# Patient Record
Sex: MALE | Race: WHITE | NOT HISPANIC OR LATINO | Employment: OTHER | URBAN - METROPOLITAN AREA
[De-identification: names, ages, dates, MRNs, and addresses within clinical notes are randomized per-mention and may not be internally consistent; named-entity substitution may affect disease eponyms.]

---

## 2023-07-08 ENCOUNTER — HOSPITAL ENCOUNTER (INPATIENT)
Facility: CLINIC | Age: 58
LOS: 8 days | Discharge: HOME | DRG: 435 | End: 2023-07-16
Attending: EMERGENCY MEDICINE | Admitting: INTERNAL MEDICINE
Payer: COMMERCIAL

## 2023-07-08 ENCOUNTER — APPOINTMENT (OUTPATIENT)
Dept: CT IMAGING | Facility: CLINIC | Age: 58
DRG: 435 | End: 2023-07-08
Attending: EMERGENCY MEDICINE
Payer: COMMERCIAL

## 2023-07-08 DIAGNOSIS — E55.9 VITAMIN D DEFICIENCY: ICD-10-CM

## 2023-07-08 DIAGNOSIS — R73.9 HYPERGLYCEMIA: ICD-10-CM

## 2023-07-08 DIAGNOSIS — D64.9 ANEMIA, UNSPECIFIED TYPE: ICD-10-CM

## 2023-07-08 DIAGNOSIS — K83.1 BILIARY OBSTRUCTION (H): ICD-10-CM

## 2023-07-08 DIAGNOSIS — Z79.4 TYPE 2 DIABETES MELLITUS WITH HYPERGLYCEMIA, WITH LONG-TERM CURRENT USE OF INSULIN (H): ICD-10-CM

## 2023-07-08 DIAGNOSIS — Z98.890 HISTORY OF BILIARY STENT INSERTION: ICD-10-CM

## 2023-07-08 DIAGNOSIS — E11.65 TYPE 2 DIABETES MELLITUS WITH HYPERGLYCEMIA, WITH LONG-TERM CURRENT USE OF INSULIN (H): ICD-10-CM

## 2023-07-08 DIAGNOSIS — C25.9 PANCREATIC ADENOCARCINOMA (H): Primary | ICD-10-CM

## 2023-07-08 DIAGNOSIS — R10.30 LOWER ABDOMINAL PAIN: ICD-10-CM

## 2023-07-08 DIAGNOSIS — K86.89 PANCREATIC MASS: ICD-10-CM

## 2023-07-08 LAB
ALBUMIN SERPL BCG-MCNC: 3.3 G/DL (ref 3.5–5.2)
ALP SERPL-CCNC: 404 U/L (ref 40–129)
ALT SERPL W P-5'-P-CCNC: 224 U/L (ref 0–70)
ANION GAP SERPL CALCULATED.3IONS-SCNC: 9 MMOL/L (ref 7–15)
AST SERPL W P-5'-P-CCNC: 83 U/L (ref 0–45)
BASOPHILS # BLD AUTO: 0.1 10E3/UL (ref 0–0.2)
BASOPHILS NFR BLD AUTO: 1 %
BILIRUB SERPL-MCNC: 6.7 MG/DL
BUN SERPL-MCNC: 15.6 MG/DL (ref 6–20)
CALCIUM SERPL-MCNC: 9 MG/DL (ref 8.6–10)
CEA SERPL-MCNC: 10.6 NG/ML
CHLORIDE SERPL-SCNC: 98 MMOL/L (ref 98–107)
CREAT SERPL-MCNC: 0.6 MG/DL (ref 0.67–1.17)
DEPRECATED HCO3 PLAS-SCNC: 28 MMOL/L (ref 22–29)
EOSINOPHIL # BLD AUTO: 0.1 10E3/UL (ref 0–0.7)
EOSINOPHIL NFR BLD AUTO: 2 %
ERYTHROCYTE [DISTWIDTH] IN BLOOD BY AUTOMATED COUNT: 14.8 % (ref 10–15)
GFR SERPL CREATININE-BSD FRML MDRD: >90 ML/MIN/1.73M2
GLUCOSE BLDC GLUCOMTR-MCNC: 174 MG/DL (ref 70–99)
GLUCOSE SERPL-MCNC: 199 MG/DL (ref 70–99)
HBA1C MFR BLD: 7.9 %
HCT VFR BLD AUTO: 27.1 % (ref 40–53)
HGB BLD-MCNC: 8.8 G/DL (ref 13.3–17.7)
IMM GRANULOCYTES # BLD: 0 10E3/UL
IMM GRANULOCYTES NFR BLD: 1 %
INR PPP: 1.37 (ref 0.85–1.15)
LACTATE SERPL-SCNC: 1 MMOL/L (ref 0.7–2)
LIPASE SERPL-CCNC: 38 U/L (ref 13–60)
LYMPHOCYTES # BLD AUTO: 0.8 10E3/UL (ref 0.8–5.3)
LYMPHOCYTES NFR BLD AUTO: 12 %
MCH RBC QN AUTO: 31.8 PG (ref 26.5–33)
MCHC RBC AUTO-ENTMCNC: 32.5 G/DL (ref 31.5–36.5)
MCV RBC AUTO: 98 FL (ref 78–100)
MONOCYTES # BLD AUTO: 0.6 10E3/UL (ref 0–1.3)
MONOCYTES NFR BLD AUTO: 8 %
NEUTROPHILS # BLD AUTO: 5.2 10E3/UL (ref 1.6–8.3)
NEUTROPHILS NFR BLD AUTO: 76 %
NRBC # BLD AUTO: 0 10E3/UL
NRBC BLD AUTO-RTO: 0 /100
PLATELET # BLD AUTO: 270 10E3/UL (ref 150–450)
POTASSIUM SERPL-SCNC: 4.3 MMOL/L (ref 3.4–5.3)
PROT SERPL-MCNC: 6.1 G/DL (ref 6.4–8.3)
RBC # BLD AUTO: 2.77 10E6/UL (ref 4.4–5.9)
SODIUM SERPL-SCNC: 135 MMOL/L (ref 136–145)
WBC # BLD AUTO: 6.8 10E3/UL (ref 4–11)

## 2023-07-08 PROCEDURE — 99207 PR NO BILLABLE SERVICE THIS VISIT: CPT | Performed by: PEDIATRICS

## 2023-07-08 PROCEDURE — 250N000012 HC RX MED GY IP 250 OP 636 PS 637: Performed by: PHYSICIAN ASSISTANT

## 2023-07-08 PROCEDURE — 85610 PROTHROMBIN TIME: CPT | Performed by: EMERGENCY MEDICINE

## 2023-07-08 PROCEDURE — 74177 CT ABD & PELVIS W/CONTRAST: CPT

## 2023-07-08 PROCEDURE — 96361 HYDRATE IV INFUSION ADD-ON: CPT | Performed by: EMERGENCY MEDICINE

## 2023-07-08 PROCEDURE — 250N000011 HC RX IP 250 OP 636: Performed by: EMERGENCY MEDICINE

## 2023-07-08 PROCEDURE — 250N000009 HC RX 250: Performed by: EMERGENCY MEDICINE

## 2023-07-08 PROCEDURE — 258N000003 HC RX IP 258 OP 636: Performed by: PHYSICIAN ASSISTANT

## 2023-07-08 PROCEDURE — 99223 1ST HOSP IP/OBS HIGH 75: CPT | Performed by: PHYSICIAN ASSISTANT

## 2023-07-08 PROCEDURE — 120N000003 HC R&B IMCU UMMC

## 2023-07-08 PROCEDURE — 83690 ASSAY OF LIPASE: CPT | Performed by: EMERGENCY MEDICINE

## 2023-07-08 PROCEDURE — 36415 COLL VENOUS BLD VENIPUNCTURE: CPT | Performed by: PHYSICIAN ASSISTANT

## 2023-07-08 PROCEDURE — 250N000011 HC RX IP 250 OP 636: Mod: JZ | Performed by: EMERGENCY MEDICINE

## 2023-07-08 PROCEDURE — 96374 THER/PROPH/DIAG INJ IV PUSH: CPT | Performed by: EMERGENCY MEDICINE

## 2023-07-08 PROCEDURE — 82378 CARCINOEMBRYONIC ANTIGEN: CPT | Performed by: PHYSICIAN ASSISTANT

## 2023-07-08 PROCEDURE — 99285 EMERGENCY DEPT VISIT HI MDM: CPT | Performed by: EMERGENCY MEDICINE

## 2023-07-08 PROCEDURE — 80053 COMPREHEN METABOLIC PANEL: CPT | Performed by: EMERGENCY MEDICINE

## 2023-07-08 PROCEDURE — 258N000003 HC RX IP 258 OP 636: Performed by: EMERGENCY MEDICINE

## 2023-07-08 PROCEDURE — 99285 EMERGENCY DEPT VISIT HI MDM: CPT | Mod: 25 | Performed by: EMERGENCY MEDICINE

## 2023-07-08 PROCEDURE — 83605 ASSAY OF LACTIC ACID: CPT | Performed by: EMERGENCY MEDICINE

## 2023-07-08 PROCEDURE — 36415 COLL VENOUS BLD VENIPUNCTURE: CPT | Performed by: EMERGENCY MEDICINE

## 2023-07-08 PROCEDURE — 85025 COMPLETE CBC W/AUTO DIFF WBC: CPT | Performed by: EMERGENCY MEDICINE

## 2023-07-08 PROCEDURE — 86301 IMMUNOASSAY TUMOR CA 19-9: CPT | Performed by: PHYSICIAN ASSISTANT

## 2023-07-08 PROCEDURE — 99418 PROLNG IP/OBS E/M EA 15 MIN: CPT | Performed by: PHYSICIAN ASSISTANT

## 2023-07-08 PROCEDURE — 250N000011 HC RX IP 250 OP 636: Performed by: INTERNAL MEDICINE

## 2023-07-08 PROCEDURE — 250N000013 HC RX MED GY IP 250 OP 250 PS 637: Performed by: PHYSICIAN ASSISTANT

## 2023-07-08 PROCEDURE — 83036 HEMOGLOBIN GLYCOSYLATED A1C: CPT | Performed by: PHYSICIAN ASSISTANT

## 2023-07-08 PROCEDURE — 96376 TX/PRO/DX INJ SAME DRUG ADON: CPT | Performed by: EMERGENCY MEDICINE

## 2023-07-08 RX ORDER — BISACODYL 10 MG
10 SUPPOSITORY, RECTAL RECTAL DAILY PRN
Status: DISCONTINUED | OUTPATIENT
Start: 2023-07-08 | End: 2023-07-16 | Stop reason: HOSPADM

## 2023-07-08 RX ORDER — NALOXONE HYDROCHLORIDE 0.4 MG/ML
0.2 INJECTION, SOLUTION INTRAMUSCULAR; INTRAVENOUS; SUBCUTANEOUS
Status: DISCONTINUED | OUTPATIENT
Start: 2023-07-08 | End: 2023-07-16 | Stop reason: HOSPADM

## 2023-07-08 RX ORDER — LIDOCAINE 40 MG/G
CREAM TOPICAL
Status: DISCONTINUED | OUTPATIENT
Start: 2023-07-08 | End: 2023-07-16 | Stop reason: HOSPADM

## 2023-07-08 RX ORDER — DEXTROSE MONOHYDRATE 25 G/50ML
25-50 INJECTION, SOLUTION INTRAVENOUS
Status: DISCONTINUED | OUTPATIENT
Start: 2023-07-08 | End: 2023-07-16 | Stop reason: HOSPADM

## 2023-07-08 RX ORDER — ACETAMINOPHEN 325 MG/1
650 TABLET ORAL EVERY 6 HOURS PRN
Status: DISCONTINUED | OUTPATIENT
Start: 2023-07-08 | End: 2023-07-14

## 2023-07-08 RX ORDER — INSULIN GLARGINE 100 [IU]/ML
INJECTION, SOLUTION SUBCUTANEOUS
Status: ON HOLD | COMMUNITY
Start: 2023-07-03 | End: 2023-07-16

## 2023-07-08 RX ORDER — HYDROMORPHONE HYDROCHLORIDE 1 MG/ML
0.5 INJECTION, SOLUTION INTRAMUSCULAR; INTRAVENOUS; SUBCUTANEOUS ONCE
Status: COMPLETED | OUTPATIENT
Start: 2023-07-08 | End: 2023-07-08

## 2023-07-08 RX ORDER — POLYETHYLENE GLYCOL 3350 17 G/17G
17 POWDER, FOR SOLUTION ORAL DAILY
Status: DISCONTINUED | OUTPATIENT
Start: 2023-07-09 | End: 2023-07-12

## 2023-07-08 RX ORDER — HYDROMORPHONE HYDROCHLORIDE 1 MG/ML
0.5 INJECTION, SOLUTION INTRAMUSCULAR; INTRAVENOUS; SUBCUTANEOUS
Status: DISCONTINUED | OUTPATIENT
Start: 2023-07-08 | End: 2023-07-08

## 2023-07-08 RX ORDER — LANOLIN ALCOHOL/MO/W.PET/CERES
6 CREAM (GRAM) TOPICAL
Status: DISCONTINUED | OUTPATIENT
Start: 2023-07-08 | End: 2023-07-14

## 2023-07-08 RX ORDER — NALOXONE HYDROCHLORIDE 0.4 MG/ML
0.4 INJECTION, SOLUTION INTRAMUSCULAR; INTRAVENOUS; SUBCUTANEOUS
Status: DISCONTINUED | OUTPATIENT
Start: 2023-07-08 | End: 2023-07-16 | Stop reason: HOSPADM

## 2023-07-08 RX ORDER — ONDANSETRON 4 MG/1
4 TABLET, ORALLY DISINTEGRATING ORAL EVERY 6 HOURS PRN
Status: DISCONTINUED | OUTPATIENT
Start: 2023-07-08 | End: 2023-07-11

## 2023-07-08 RX ORDER — HYDROXYZINE HYDROCHLORIDE 25 MG/1
25 TABLET, FILM COATED ORAL 3 TIMES DAILY PRN
Status: DISCONTINUED | OUTPATIENT
Start: 2023-07-08 | End: 2023-07-14

## 2023-07-08 RX ORDER — IOPAMIDOL 755 MG/ML
100 INJECTION, SOLUTION INTRAVASCULAR ONCE
Status: COMPLETED | OUTPATIENT
Start: 2023-07-08 | End: 2023-07-08

## 2023-07-08 RX ORDER — SODIUM CHLORIDE, SODIUM LACTATE, POTASSIUM CHLORIDE, CALCIUM CHLORIDE 600; 310; 30; 20 MG/100ML; MG/100ML; MG/100ML; MG/100ML
INJECTION, SOLUTION INTRAVENOUS CONTINUOUS
Status: DISCONTINUED | OUTPATIENT
Start: 2023-07-08 | End: 2023-07-11

## 2023-07-08 RX ORDER — HYDROXYZINE PAMOATE 25 MG/1
25 CAPSULE ORAL
COMMUNITY
Start: 2023-07-03 | End: 2023-08-21

## 2023-07-08 RX ORDER — HYDROMORPHONE HYDROCHLORIDE 2 MG/1
TABLET ORAL
Status: ON HOLD | COMMUNITY
Start: 2023-07-03 | End: 2023-07-16

## 2023-07-08 RX ORDER — DEXTROSE MONOHYDRATE 100 MG/ML
INJECTION, SOLUTION INTRAVENOUS CONTINUOUS PRN
Status: DISCONTINUED | OUTPATIENT
Start: 2023-07-08 | End: 2023-07-16 | Stop reason: HOSPADM

## 2023-07-08 RX ORDER — HYDROMORPHONE HYDROCHLORIDE 1 MG/ML
0.5 INJECTION, SOLUTION INTRAMUSCULAR; INTRAVENOUS; SUBCUTANEOUS
Status: DISCONTINUED | OUTPATIENT
Start: 2023-07-08 | End: 2023-07-11

## 2023-07-08 RX ORDER — NICOTINE POLACRILEX 4 MG
15-30 LOZENGE BUCCAL
Status: DISCONTINUED | OUTPATIENT
Start: 2023-07-08 | End: 2023-07-16 | Stop reason: HOSPADM

## 2023-07-08 RX ORDER — HYDROXYZINE HYDROCHLORIDE 25 MG/1
25 TABLET, FILM COATED ORAL ONCE
Status: COMPLETED | OUTPATIENT
Start: 2023-07-08 | End: 2023-07-08

## 2023-07-08 RX ORDER — SODIUM CHLORIDE 9 MG/ML
INJECTION, SOLUTION INTRAVENOUS ONCE
Status: COMPLETED | OUTPATIENT
Start: 2023-07-08 | End: 2023-07-08

## 2023-07-08 RX ORDER — BLOOD SUGAR DIAGNOSTIC
STRIP MISCELLANEOUS PRN
COMMUNITY
Start: 2023-05-12 | End: 2024-07-02

## 2023-07-08 RX ORDER — ONDANSETRON 2 MG/ML
4 INJECTION INTRAMUSCULAR; INTRAVENOUS EVERY 6 HOURS PRN
Status: DISCONTINUED | OUTPATIENT
Start: 2023-07-08 | End: 2023-07-11

## 2023-07-08 RX ORDER — HYDROMORPHONE HYDROCHLORIDE 2 MG/1
2 TABLET ORAL
Status: DISCONTINUED | OUTPATIENT
Start: 2023-07-08 | End: 2023-07-09

## 2023-07-08 RX ADMIN — SODIUM CHLORIDE, POTASSIUM CHLORIDE, SODIUM LACTATE AND CALCIUM CHLORIDE: 600; 310; 30; 20 INJECTION, SOLUTION INTRAVENOUS at 21:31

## 2023-07-08 RX ADMIN — HYDROMORPHONE HYDROCHLORIDE 0.5 MG: 1 INJECTION, SOLUTION INTRAMUSCULAR; INTRAVENOUS; SUBCUTANEOUS at 19:37

## 2023-07-08 RX ADMIN — HYDROMORPHONE HYDROCHLORIDE 0.5 MG: 1 INJECTION, SOLUTION INTRAMUSCULAR; INTRAVENOUS; SUBCUTANEOUS at 11:35

## 2023-07-08 RX ADMIN — HYDROMORPHONE HYDROCHLORIDE 2 MG: 2 TABLET ORAL at 23:58

## 2023-07-08 RX ADMIN — SODIUM CHLORIDE 1000 ML: 9 INJECTION, SOLUTION INTRAVENOUS at 11:35

## 2023-07-08 RX ADMIN — SODIUM CHLORIDE 61 ML: 9 INJECTION, SOLUTION INTRAVENOUS at 13:20

## 2023-07-08 RX ADMIN — SODIUM CHLORIDE: 9 INJECTION, SOLUTION INTRAVENOUS at 16:06

## 2023-07-08 RX ADMIN — HYDROMORPHONE HYDROCHLORIDE 0.5 MG: 1 INJECTION, SOLUTION INTRAMUSCULAR; INTRAVENOUS; SUBCUTANEOUS at 16:40

## 2023-07-08 RX ADMIN — IOPAMIDOL 77 ML: 755 INJECTION, SOLUTION INTRAVENOUS at 13:09

## 2023-07-08 RX ADMIN — HYDROMORPHONE HYDROCHLORIDE 0.5 MG: 1 INJECTION, SOLUTION INTRAMUSCULAR; INTRAVENOUS; SUBCUTANEOUS at 12:55

## 2023-07-08 RX ADMIN — INSULIN GLARGINE 8 UNITS: 100 INJECTION, SOLUTION SUBCUTANEOUS at 22:26

## 2023-07-08 RX ADMIN — HYDROMORPHONE HYDROCHLORIDE 0.5 MG: 1 INJECTION, SOLUTION INTRAMUSCULAR; INTRAVENOUS; SUBCUTANEOUS at 21:01

## 2023-07-08 RX ADMIN — INSULIN ASPART 1 UNITS: 100 INJECTION, SOLUTION INTRAVENOUS; SUBCUTANEOUS at 22:25

## 2023-07-08 ASSESSMENT — ACTIVITIES OF DAILY LIVING (ADL)
DIFFICULTY_EATING/SWALLOWING: NO
CONCENTRATING,_REMEMBERING_OR_MAKING_DECISIONS_DIFFICULTY: NO
DOING_ERRANDS_INDEPENDENTLY_DIFFICULTY: YES
ADLS_ACUITY_SCORE: 35
TOILETING_ISSUES: NO
FALL_HISTORY_WITHIN_LAST_SIX_MONTHS: NO
DRESSING/BATHING_DIFFICULTY: NO
ADLS_ACUITY_SCORE: 35
CHANGE_IN_FUNCTIONAL_STATUS_SINCE_ONSET_OF_CURRENT_ILLNESS/INJURY: NO
ADLS_ACUITY_SCORE: 35
WALKING_OR_CLIMBING_STAIRS_DIFFICULTY: NO
ADLS_ACUITY_SCORE: 22
WEAR_GLASSES_OR_BLIND: NO
ADLS_ACUITY_SCORE: 35

## 2023-07-08 NOTE — ED PROVIDER NOTES
[facility] EMERGENCY DEPARTMENT ([facility])    7/08/23      ED PROVIDER NOTE  ED 19   History     Chief Complaint   Patient presents with     Abdominal Pain     Pt states that he has a blocked duct into his liver. He has a GJ tube. He is on continous feedings of peptamin 1.5. He has severe abd pain     The history is provided by the patient and medical records.     Gallo Navarro is a 57 year old male, new to the [facility] system, originally seen through [facility], who presents emergency department today seeking a second opinion and to establish cares with a pancreas specialist here for persistent abdominal pain for the past week and uncontrolled blood sugars.  Patient is accompanied by wife who provides history.  Patient has extensive notes and charts through care everywhere.  He carries a diagnosis of recent acute pancreatitis with ileus and duodenal stenosis, gastric outlet obstruction on GJ tube for feedings, transaminitis, insulin-dependent diabetes and hyperglycemia.  He has had multiple labs, imaging and procedures for this acute pancreatitis and abdominal pain.  He just underwent ERCP yesterday with sphincterotomy, bile duct sweeping with biliary stent placement..  Patient states that he was told that he should be pain-free after this procedure, but instead has had constant and unrelenting abdominal pain.  He states that he tried Dilaudid for some pain relief, normally this is effective in treating acute pain but this time it did not work.  He is tearful, states that he would like some analgesia just so he could rest.  He also notes having decreased bile drain output.  The abdominal pain is across his abdomen, diffuse over entire abdomen and radiates through to his back.  He feels that he is a mess.  He also has had his GJ tube in place for the past 5 weeks, and notes that the tube was just replaced yesterday as the original tubing malfunctioned after a staff member tried to put  medications through the balloon port.  No fevers.  He has been on on the freestyle tyrone CGM for the past week, states that his blood sugars have been ranging in the 300s to 400s without it.  These days it is more around 240-360.  Over the past few days with the CGM he has had improvement to his blood sugars to 95, but they have risen again.  He states that he is on both long-acting and short acting insulin at.  Last dose of insulin was at 8:30 PM last night with long-acting insulin, but has had to use short acting insulin as his blood sugars have been going up.  He also notes that his abdomen feels more distended.    Patient is accompanied by wife who was also tearful and feels that she is having a panic attack.  She states that all of his problems started at the end of March and has been seen at [facility] multiple times for this abdominal pain and pancreatitis.  Patient and wife are frustrated with the stepwise, piecemeal work-up.  Wife notes that there is seems to be a lack of collaboration and communication between GI specialists there and that she has had to be on the phone multiple times talking to different people just to get medications for him, states that he got denied Prevacid by his insurance company.     Patient notes that he had a recent EUS and they biopsied his small bowel. They wanted to biopsy the pancreas but there is a mass in the way.  He notes being started on parenteral feeding due to the pancreatitis, was initially on TPN via PICC line and then transitioned to feeds through GJ tube.     Patient and wife are extraordinarily frustrated at [facility] and are here in hopes of getting established with the GI service, specifically Dr. Greenberg.    Social history: accompanied by wife Abigail. Wife is quite frustrated with his care at [facility]    I have reviewed the Medications, Allergies, Past Medical and Surgical History, and Social History in the Epic system.  No past medical history  on file.    Past Surgical History:   Procedure Laterality Date     IR NG TUBE PLACEMENT REQ RAD & FLUORO  5/8/2023       Past medical history, past surgical history, medications, and allergies were reviewed with the patient. Additional pertinent items: None    No family history on file.    Social History     Tobacco Use     Smoking status: Not on file     Smokeless tobacco: Not on file   Substance Use Topics     Alcohol use: Not on file        Social history was reviewed with the patient. Additional pertinent items: None    Past Medical History  No past medical history on file.  Past Surgical History:   Procedure Laterality Date     IR NG TUBE PLACEMENT REQ RAD & FLUORO  5/8/2023     blood glucose (FREESTYLE TEST STRIPS) test strip  HYDROmorphone (DILAUDID) 2 MG tablet  hydrOXYzine (VISTARIL) 25 MG capsule  insulin aspart (NOVOLOG PEN) 100 UNIT/ML pen  insulin glargine (BASAGLAR KWIKPEN) 100 UNIT/ML pen      No Known Allergies  Family History  No family history on file.  Social History          A medically appropriate review of systems was performed with pertinent positives and negatives noted in the HPI, and all other systems negative.   Physical Exam   BP: 114/77  Pulse: 101  Temp: 98  F (36.7  C)  Resp: 18  Weight: (P) 78.9 kg (174 lb)  SpO2: 99 %  /77   Pulse 101   Temp 98  F (36.7  C)   Resp 18   Wt (P) 78.9 kg (174 lb)   SpO2 99%    Physical Exam    Physical Exam   Constitutional:   well nourished, well developed, appears uncomfortable and anxious  HENT:   Head: Normocephalic and atraumatic.   Eyes: Conjunctivae are normal. Pupils are equal, round, and reactive to light.scleral icterus pharynx has no erythema or exudate, mucous membranes are moist  Neck:   no adenopathy, no bony tenderness  Cardiovascular: regular rate and rhythm without murmurs or gallops  Pulmonary/Chest: Clear to auscultation bilaterally, with no wheezes or retractions. No respiratory distress.  GI: Soft with good bowel sounds.   "GJ tube in place, diffusely tender, especially in lower abdomen, mildly distended    Back:  No bony or CVA tenderness   Musculoskeletal:  no edema or clubbing   Skin: Skin is warm and dry. No rash noted.notable  jaundice  Neurological: alert and oriented to person, place, and time. Nonfocal exam  Psychiatric:  Anxious  mood and affect.     ED Course        Procedures          EXAM: CT ABDOMEN AND PELVIS (with intravenous contrast)   DATE: 7/2/2023 9:40 PM   IMPRESSION:     1. New intrahepatic and extrahepatic biliary ductal dilatation with the common bile duct measuring 11 mm. MRCP suggested for further assessment.   2. Fullness in the pancreatic head and uncinate process appears worsened compared to prior examination suggesting possibility of worsening pancreatitis. No drainable fluid collection identified.   3. New ill-defined nodular densities within both lung bases are probably infectious or inflammatory in etiology.   4. New mild inflammatory stranding along the ascending colon suggestive the possibility of a nonspecific colitis. No mesenteric arterial or venous thrombosis identified. Relatively large amount stool throughout the colon.     [facility] LABS 7/3/2023 via Care Everywhere:  ALT 7 - 40 U/L 342 High     ALKALINE P'TASE 46 - 116 U/L 369 High     AST (SGOT) 13 - 40 U/L 135 High     PROTEIN TOTAL 5.7 - 8.2 g/dL 6.1    ALBUMIN 3.4 - 5.0 g/dL 2.9 Low     BILIRUBIN-DIRECT <0.40 mg/dL 4.18 High     BILIRUBIN-TOTAL 0.3 - 1.2 mg/dL 5.3 High       WBC 4.3 - 10.8 K/uL 6.2     RBC 4.60 - 6.20 M/uL 3.53 Low      HEMOGLOBIN 14.0 - 18.0 gm/dL 11.1 Low      HEMATOCRIT 40.0 - 54.0 % 33.2 Low      MCV 80 - 100 fL 94     MCH 27 - 33 pg 31     MCHC 33 - 36 gm/dL 33     RDW 11.5 - 14.5 % 13.9     PLATELET COUNT 150 - 400 K/     MPV 6.5 - 12 fL 9.5     PMN % % 64.6     IG% <=1.0 % 1.1 High   Immature granulocytes often indicate \"left shift\" when outside of normal limits.   LYMPH % % 22.6     MONO % % 8.5     EOS " % % 2.9     BASO % % 0.3     PMN ABSOLUTE 1.80 - 7.80 K/uL 4.02     IG ABSOLUTE 0.00 - 0.00 K/uL 0.07 High      LYMPH ABSOLUTE 1.00 - 4.00 K/uL 1.41     MONO ABSOLUTE 0.00 - 1.00 K/uL 0.53     EOS ABSOLUTE 0.00 - 0.45 K/uL 0.18     BASO ABSOLUTE 0.00 - 0.20 K/uL 0.02     NUCL RBC % 0.0 - 0.0 /100 WBC 0.0     NUCL RBC ABSOLUTE 0.00 - 0.00 K/uL 0.00       [facility]  Procedure Date: 7/7/2023 8:12 AM  Attending MD: Jaswant Dumont MD,   Procedure: ERCP  Indications:      57 y.o. with chronic pancreatitis complicated by an area of necrosis in       the uncinate process with gastric outlet obstrution secondary to a       duodenal stricture, s/p GJ tube, EUS with no evidence of malignancy,       however unable to sample the area given high grade duodenal obstruction,       now with jaundice, abdominal pain, and biliary dilation possible to       distal biliary stricture.  Providers:      Jaswant Dumont MD, Noreen Antony, DARELL, Abiola Loja, DARELL, Jaswant Dumont MD  Requesting Provider:     Medicines:      General Anesthesia  Complications:      No immediate complications. Estimated blood loss: Minimal.  Procedure:      Pre-Anesthesia Assessment:      - Prior to the procedure, a History and Physical was performed, and       patient medications and allergies were reviewed. The patient is       competent. The risks and benefits of the procedure and the sedation       options and risks were discussed with the patient. All questions were       answered and informed consent was obtained. Patient identification and       proposed procedure were verified by the physician, the nurse and the       anesthetist in the pre-procedure area in the procedure room. Mental       Status Examination: alert and oriented. Prophylactic Antibiotics: The       patient does not require prophylactic antibiotics. Prior Anticoagulants:       The patient has taken no anticoagulant or antiplatelet agents. ASA Grade        Assessment: III - A patient with severe systemic disease. After       reviewing the risks and benefits, the patient was deemed in satisfactory       condition to undergo the procedure. The anesthesia plan was to use       general anesthesia. Immediately prior to administration of medications,       the patient was re-assessed for adequacy to receive sedatives. The heart       rate, respiratory rate, oxygen saturations, blood pressure, adequacy of       pulmonary ventilation, and response to care were monitored throughout       the procedure. The physical status of the patient was re-assessed after       the procedure.      After obtaining informed consent, the scope was passed under direct       vision. Throughout the procedure, the patient's blood pressure, pulse,       and oxygen saturations were monitored continuously. The Duodenoscope was       introduced through the mouth, and advanced to the duodenum and used to       inject contrast into the bile duct. The ERCP was accomplished without       difficulty. The patient tolerated the procedure well.  Findings:      A  film of the abdomen was obtained. One gastrojejunostomy tube       ending in the mid upper abdomen was seen. A 0.018 inch NovAFARld       guidewire was advanced through the tube. The esophagus was successfully       intubated under direct vision. The scope was advanced to an       edematous-appering major papilla in the descending duodenum without       detailed examination of the pharynx, larynx and associated structures,       and upper GI tract. The duodenal stricture appeared to be unchanged. The       GJ tube was preventing adequate visualization of the major papilla and       was therefore removed over the wire. The bile duct was deeply cannulated       with the short-nosed traction sphincterotome. Contrast was injected.       There was a smooth stricture in the distal CBD measuring approximately 5       mm in length. A 0.025 inch Jagwire  Revolution was passed into the       biliary tree. A 10 mm biliary sphincterotomy was made with a       monofilament traction (standard) sphincterotome using ERBE       electrocautery. There was self limited oozing from the sphincterotomy       which did not require treatment. The biliary tree was swept with an 11.5       mm balloon and 15 mm balloon starting at the bifurcation. Sludge was       swept from the duct. One 10 Fr by 5 cm Sofflex biliary stent with a       single external flap and a single internal flap was placed 5 cm into the       common bile duct. A large amount of sludge flowed through the stent. The       stent was in good position. The GJ tube was then replaced over the       previously placed guidewire.  Impression:      - Smooth distal biliary stricture likely secondary to the adjacent       pancreatic necrosis explaining the patient's jaundice. Treated with a       biliary sphincterotomy and plastic stent.      - Significant amount of retained stool seen on the  film likely       contributing to the right side abdominal pain.  Recommendation:      - Discharge home.      - Magnesium citrate to address the right sided constipation.      - Follow up with surgery for duodenal obstruction and consideration of a       gastrojejunostomy.      - ERCP in 3 months to exchange the stent and reassess the stricture.      - GI clinic follow up in 1 month as previously recommended.  Procedure Code(s):      --- Professional ---      05810, Endoscopic retrograde cholangiopancreatography (ERCP); with       placement of endoscopic stent into biliary or pancreatic duct, including       pre- and post-dilation and guide wire passage, when performed, including       sphincterotomy, when performed, each stent      39426, Endoscopic retrograde cholangiopancreatography (ERCP); with       removal of calculi/debris from biliary/pancreatic duct(s)         Results for orders placed or performed during the hospital  encounter of 07/08/23 (from the past 24 hour(s))   CBC with platelets differential    Narrative    The following orders were created for panel order CBC with platelets differential.  Procedure                               Abnormality         Status                     ---------                               -----------         ------                     CBC with platelets and d...[[id number]]  Abnormal            Final result                 Please view results for these tests on the individual orders.   Comprehensive metabolic panel   Result Value Ref Range    Sodium 135 (L) 136 - 145 mmol/L    Potassium 4.3 3.4 - 5.3 mmol/L    Chloride 98 98 - 107 mmol/L    Carbon Dioxide (CO2) 28 22 - 29 mmol/L    Anion Gap 9 7 - 15 mmol/L    Urea Nitrogen 15.6 6.0 - 20.0 mg/dL    Creatinine 0.60 (L) 0.67 - 1.17 mg/dL    Calcium 9.0 8.6 - 10.0 mg/dL    Glucose 199 (H) 70 - 99 mg/dL    Alkaline Phosphatase 404 (H) 40 - 129 U/L    AST 83 (H) 0 - 45 U/L     (H) 0 - 70 U/L    Protein Total 6.1 (L) 6.4 - 8.3 g/dL    Albumin 3.3 (L) 3.5 - 5.2 g/dL    Bilirubin Total 6.7 (H) <=1.2 mg/dL    GFR Estimate >90 >60 mL/min/1.73m2   Lipase   Result Value Ref Range    Lipase 38 13 - 60 U/L   Lactic acid whole blood   Result Value Ref Range    Lactic Acid 1.0 0.7 - 2.0 mmol/L   INR   Result Value Ref Range    INR 1.37 (H) 0.85 - 1.15   CBC with platelets and differential   Result Value Ref Range    WBC Count 6.8 4.0 - 11.0 10e3/uL    RBC Count 2.77 (L) 4.40 - 5.90 10e6/uL    Hemoglobin 8.8 (L) 13.3 - 17.7 g/dL    Hematocrit 27.1 (L) 40.0 - 53.0 %    MCV 98 78 - 100 fL    MCH 31.8 26.5 - 33.0 pg    MCHC 32.5 31.5 - 36.5 g/dL    RDW 14.8 10.0 - 15.0 %    Platelet Count 270 150 - 450 10e3/uL    % Neutrophils 76 %    % Lymphocytes 12 %    % Monocytes 8 %    % Eosinophils 2 %    % Basophils 1 %    % Immature Granulocytes 1 %    NRBCs per 100 WBC 0 <1 /100    Absolute Neutrophils 5.2 1.6 - 8.3 10e3/uL    Absolute Lymphocytes 0.8 0.8 - 5.3  10e3/uL    Absolute Monocytes 0.6 0.0 - 1.3 10e3/uL    Absolute Eosinophils 0.1 0.0 - 0.7 10e3/uL    Absolute Basophils 0.1 0.0 - 0.2 10e3/uL    Absolute Immature Granulocytes 0.0 <=0.4 10e3/uL    Absolute NRBCs 0.0 10e3/uL   CT Abdomen Pelvis w Contrast    Narrative    EXAM: CT ABDOMEN AND PELVIS WITH CONTRAST  LOCATION: [facility]  DATE: 07/08/2023    INDICATION: Patient with pancreatic mass and recently placed biliary stent, now with worsening abdominal pain.  COMPARISON: None.  TECHNIQUE: CT scan of the abdomen and pelvis was performed following injection of IV contrast. Multiplanar reformats were obtained. Dose reduction techniques were used.  CONTRAST: 77 mL isovue 370.    FINDINGS:   LOWER CHEST: Minimal dependent atelectasis versus less likely infiltrate.    HEPATOBILIARY: Biliary stent in good position with mild biliary dilatation and pneumobilia. There is a calcific density adjacent to the stent, it is unclear if this is a pancreatic calcification or a calcified gallstone. No focal liver lesions   demonstrated.    PANCREAS: Pancreas mass in the head and body measuring approximately 6.3 x 3.9 cm. No definite pancreatic ductal dilatation.    SPLEEN: Normal size.    ADRENAL GLANDS: No significant nodules.    KIDNEYS/BLADDER: No significant mass, stone, or hydronephrosis.    BOWEL: No obstruction or inflammatory change.    LYMPH NODES: Mild peripancreatic adenopathy.    VASCULATURE: No abdominal aortic aneurysm.    PELVIC ORGANS: No pelvic masses.    MUSCULOSKELETAL: No frankly destructive bony lesions.      Impression    IMPRESSION:   1.  Pancreas mass without pancreatic ductal dilatation.  2.  Biliary stent in good position with mild pneumobilia.  3.  Question a calcific density adjacent to the distal biliary stent, it is unclear if this is a pancreatic calcification in the adjacent parenchyma versus a calcified gallstone.         Medications   0.9% sodium  chloride BOLUS (0 mLs Intravenous Stopped 7/8/23 1300)   HYDROmorphone (PF) (DILAUDID) injection 0.5 mg (0.5 mg Intravenous $Given 7/8/23 1135)   iopamidol (ISOVUE-370) solution 100 mL (77 mLs Intravenous $Given 7/8/23 1309)   sodium chloride 100mL for CT scan flush use (61 mLs Intravenous $Given 7/8/23 1320)   HYDROmorphone (PF) (DILAUDID) injection 0.5 mg (0.5 mg Intravenous $Given 7/8/23 1255)              Critical care was not performed.     Medical Decision Making  The patient's presentation was of high complexity (a chronic illness severe exacerbation, progression, or side effect of treatment).    The patient's evaluation involved:  review of external note(s) from 3+ sources (outpatient and [facility] visits)  ordering and/or review of 3+ test(s) in this encounter (see separate area of note for details)  review of 3+ test result(s) ordered prior to this encounter (prior labs and CTs)  independent interpretation of testing performed by another health professional (I independently reviewed the CT scan)  discussion of management or test interpretation with another health professional (GI)    The patient's management necessitated high risk (a parenteral controlled substance) and high risk (a decision regarding hospitalization).       Assessments & Plan (with Medical Decision Making)       I have reviewed the nursing notes.  EMERGENCY DEPARTMENT COURSE: Patient was seen and examined at 1050 am in room 19.     An IV was established and I treated the patient with normal saline bolus IV, and Dilaudid IV for repeat doses.     Laboratory studies show anemia, with a hemoglobin of 8.8.  WBC is normal at 6.8.  Lactate is normal at 1.0.  INR is 1.37.  Comprehensive metabolic panel shows elevated LFTs.  Alkaline phosphatase is 404, AST is 83, ALT is 224 and total bilirubin is elevated at 6.7.  Patient has hyperglycemia, with a glucose of 199.  Sodium is compensatorily low at 135.  Abdomen and total protein are low.   Lipase is normal at 38.    CT of the abdomen pelvis done with IV contrast shows:                                                                IMPRESSION:   1.  Pancreas mass without pancreatic ductal dilatation.  2.  Biliary stent in good position with mild pneumobilia.  3.  Question a calcific density adjacent to the distal biliary stent, it is unclear if this is a pancreatic calcification in the adjacent parenchyma versus a calcified gallstone.    I note that the patient total bilirubin is climbing.  He has also had a nearly 3 g drop in hemoglobin since comparison labs on July 3.  Hemoglobin on July 3 was 11.1.  Today it is 8.8.  In addition total bilirubin was 4.18 and today it is 6.7.    Gallo Navarro is a 57 year old male with a h/o pancreatic mass and a biliary stent placed yesterday who presents with worsening abdominal pain and great difficulty with glucose fluctuations.  He has been unhappy with his care at [facility] and presents to [facility] for further treatment.  He has worsening total bilirubin and a decreasing hemoglobin.  I treated her with normal saline bolus IV and an IV infusion as well as repeat doses of Dilaudid.  He has also received hydroxyzine p.o.  He will be admitted to the medicine team for further evaluation and treatment on the [place].  I spoke with Dr. Carr, triage hospitalist, regarding this patient.  I also spoke with 2 gastrointestinal attendings--Dr. Pickett of panc/bili and Dr. Williamson of liver regarding the patient.  He will be admitted to the [facility] for further evaluation and treatment.  I have reviewed the findings, diagnosis, plan and need for follow up with the patient.    New Prescriptions    No medications on file       Final diagnoses:   Pancreatic mass   Lower abdominal pain   Hyperglycemia   History of biliary stent insertion       I, Pauline Petty, am serving as a trained medical scribe to document services personally performed by Clara Alfaro MD based on the  provider's statements to me on July 8, 2023.  This document has been checked and approved by the attending provider.    I, Clara Alfaro MD, was physically present and have reviewed and verified the accuracy of this note documented by Pauline Petty, medical scribe.       This note was created in part by the use of Dragon voice recognition dictation system. Inadvertent grammatical errors and typographical errors may still exist.    Clara Alfaro MD      7/8/2023   [facility] EMERGENCY DEPARTMENT     Clara Alfaro MD  07/08/23 1606

## 2023-07-08 NOTE — ED TRIAGE NOTES
Pt has a history of panceatitis. He is on insulin and a an insulin monitor. He reports losing 40 lbs

## 2023-07-08 NOTE — H&P
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 Initially presented with sudden onset vomiting on 3/2023, found to have lipase >2000  but did not have active abdominal pain, with presumed dx of pancreatitis. Continued to have intractable bilious vomiting despite multiple admissions to the hospital, later found to have an acquired duodenal stricture with gastric outlet obstruction thought to be 2/2 pancreatic inflammation. Failed multiple PO trials and NG/NJ tubes, ultimately had PEGJ placed for gastric venting and nurition with unintentional weight loss of 40 lbs. Reports continued weight loss despite enteral tube feeds. Repeat CT imaging of abdomen on 6/2/2023 in follow-up of pancreatitis with note of more focal irregular hypodense masslike region in the pancreatic head (2.2 x 1.5 x 1.4 cm) with persistent mild main pancreatic ductal dilatation, subcentimeter mid mesenteric lymph nodes, stable narrowing of the main portal vein near the portal venous confluence. EUS  biopsy was performed on 6/27/23 showing duodenal inflammation. Unable to get biopsy of pancreatic mass due to poor window.  Patient then developed new abdominal pain since 6/27 after biopsy, bandlike, radiating to his back, with increase jaundice. 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 Unfortunately patient's pain progresses which is why he presents to the ED, and wanting a second opinion. Currnetly Afebrile. Mild tachycardia, but VSS. Labs notable for LFT started rising since May, currently with alk phos 404, AST 83, , and T bili 6.7 (rising since labs in care-everywhere with T bilirubin on 7/3 5.3).  Jaundice on exam with generalized abdominal pain.  CT abd/pelvis in ED with pancreatic mass without pancreatic ductal dilation with biliary stent in good position with a possible calcific density adjacent to the distal biliary stent (pancreatic calcification vs calcified gallstone). Could have pancreatic pseudocyst or inflammation causing obstruction, but with progressive symptoms and weight loss need to rule out underlying pancreatic malignancy.   - Panc/Bili consult  - Continue tube feeds    - Peptamin 1.5, 90 ml/hr.  ml Q2H   - LR 75 ml/hr   - Pain management: tylenol 650 mg Q6H PRN, Dilaudid PO 2 mg Q3H PRN, IV dilaudid 0.5 mg Q2H PRN breakthrough pain  - Bowel regimen with miralax   - Atarax PRN itching   - Repeat CEA, CA 19-9. Prior CA 19-9 was low at 7 back in 4/2023.   - Oncology consult     # IDDM2 - Diagnosed after initial dx pancreatitis 3/2023 likely 2/2 pancreatitic insufficiency.   - Hemoglobin A1c 7.9 on admission  - Endocrine consult  - Lantus 8 units daily  - Novolog sliding scale Q4H     # Acute on chronic anemia - H/H 8.8/27.1, down from one week ago on 7/3, patient was at H/H 11.1/33.2. No reported bleeding.   - Trend CBC  - Transfuse for  hgb goal >7.0     # Malnutrition   - Nutrition consulted as noted above       Diet:  Regular, as tolerated. Nutrition via PEGJ   DVT Prophylaxis: Pneumatic Compression Devices, as may need procedure.   Adan Catheter: Not present  Lines: None     Cardiac Monitoring: None  Code Status:  FULL CODE     Clinically Significant Risk Factors Present on Admission              # Hypoalbuminemia: Lowest albumin = 3.3 g/dL at 7/8/2023 11:37 AM, will monitor as appropriate  # Coagulation Defect: INR = 1.37 (Ref range: 0.85 - 1.15) and/or PTT = N/A, will monitor for bleeding                  Disposition Plan      Expected Discharge Date: 07/10/2023                The patient's care was discussed with the Attending Physician, Dr. Francesca Holcomb, Bedside Nurse, Patient and Patient's Family.    Jenna Segura PA-C  Hospitalist Service  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     Patient initially presented to OSH 3/30 with intractable bilious vomiting without any associated abdominal pain.  He was find to have elevated lipase, and diagnosed with pancreatitis.  Was thought to  be due to alcohol use, though patient was only drinking 2-4 light beers twice a week.  He has not drank any alcohol since March. Patient was sent home. He then returned due to intractable nausea and vomiting, unable to tolerate oral intake. Hospitalized at OSH 4/10/23 - 4/12/23 for pancreatitis with work up unremarkable.  Ultrasound negative for gallstone pathology or elevated triglycerides.  CT abdomen and pelvis at that time with peripancreatic inflammation without pancreatic necrosis or fluid collection.  GI felt nausea and vomiting was likely due to regional duodenal inflammation due to the pancreatitis.  Patient was discharged on PPI/pepcid.     Patient returned again to OSH due to intractable vomiting and inability to tolerate PO. He was admitted from 4/16/23-5/12/23, required NGT but failed multiple PO trials. EGD on 4/19 revealed extrinsic duodenal stenosis from pancreatic edema. NG progressed to NJ and started on tube feedings, but continued to fail PO trials, even despite MRCP 4/24/23 showing improvement in pancreatic inflammation, and small bowel follow through with no signs of obstruction. Patient was trialed on TPN via PICC, but ultimately had an PEGJ placed, with venting G tube and nutrition via J tube, tolerated tube feeds and discharged home.     Patient was then hospitalized at OSH 6/21/23 - 6/23/2023 for electrolyte imbalances, hyponatremia and weight loss.    CT imaging of abdomen on 6/2/2023 in follow-up of pancreatitis with note of more focal irregular hypodense masslike region in the pancreatic head (2.2 x 1.5 x 1.4 cm) with persistent mild main pancreatic ductal dilatation, subcentimeter mid mesenteric lymph nodes, stable narrowing of the main portal vein near the portal venous confluence. Follow up of mass on EUS by Dr. Jaswant Dumont on 6/27/2023 endosonographic imaging of the pancreas with sonographic changes consistent with severe chronic pancreatitis without clear evidence of neoplasm, area  of likely necrosis in the region of the uncinate process resulting in high-grade obstruction of the second portion of the duodenum just distal to the major papilla with dilated bile duct with sludge in the bile duct and gallbladder likely secondary to poor oral intake with no stricture noted in the bile duct. ERCP was not completed as liver test had recently been essentially normal. GJ tube was exchanged. Plan at that time was for referral for consideration of surgical duodenal bypass, follow-up in pancreas clinic in 2-3 weeks with consideration for interval MRI pending course.    Following his endoscopic ultrasound patient contacted Dr. Dumont with complaints of weakness and dehydration along with concerns of jaundice and scleral icterus. At that time recommendation was for being seen in the ED, however, he initiated Gatorade through the feeding tube and began to feel better. He did have liver tests checked through his primary care provider with bilirubin >4. Patient underwent ERCP with Dr. Dumont at [facility] on 7/7, found to have smooth distal biliary stricture likely 2/2 adjacent pancreatic necrosis, s/p biliary sphincterotomy and plastic stent.     But after procedure he reported worsening abdominal pain at 8/10 in severity, band like radiating to the back. Described as burning. States he never had pain initially until he had the EUS biopsy. Also noted increased abdominal distention. Denies any diarrhea. State he has been having hard stools daily. No reported bleeding in stools. States he was not taking prevacid, as he did not have coverage, later finding out it was OTC. Pain improved with IV dilaudid in the ED to 4/10 in severity. He has not been sleeping 2/2 pain.  Denies any fevers. Reports uncontrolled blood sugars with increasing insulin requirement. Despite getting tube feeds, patient continues to report weight loss. He reports increased thirst. Patient's wife is concerned too with new tremor in UE  bilaterally.     Patient and family mention significant frustration with previous care at [facility] and hoping to get answers with second opinion at the [facility].     Past Medical History    No past medical history on file.    Past Surgical History   Past Surgical History:   Procedure Laterality Date     IR NG TUBE PLACEMENT REQ RAD & FLUORO  2023       Prior to Admission Medications   Prior to Admission Medications   Prescriptions Last Dose Informant Patient Reported? Taking?   HYDROmorphone (DILAUDID) 2 MG tablet 2023  Yes Yes   Si.5 tablet to 1  tablet as needed Orally every 6 hrs for 4 days   blood glucose (FREESTYLE TEST STRIPS) test strip 2023  Yes Yes   Sig: by Other route as needed   hydrOXYzine (VISTARIL) 25 MG capsule 2023 at 7am  Yes Yes   Sig: Take 25 mg by mouth   insulin aspart (NOVOLOG PEN) 100 UNIT/ML pen 2023 at am  Yes Yes   insulin glargine (BASAGLAR KWIKPEN) 100 UNIT/ML pen 2023 at pm  Yes Yes   Sig: inject 10 units Subcutaneous once a day*      Facility-Administered Medications: None        Physical Exam   Vital Signs: Temp: 98  F (36.7  C)   BP: 111/74 Pulse: 93   Resp: 18 SpO2: 99 %      Weight: 0 lbs 0 oz  GENERAL: Alert and awake. Answering questions appropriately. Appears uncomfortable and anxious. Tearful. Pleasant and conversational  HEENT: Icteric sclera. EOMI without nystagmus. Mucous membranes moist   CARDIOVASCULAR: RRR. S1, S2. No murmurs, rubs, or gallops.   RESPIRATORY: Effort normal on RA. Clear to auscultation bilaterally, no rales, rhonchi or wheezes  GI: Abdomen soft, TTP in epigastrium without rebound or guarding, normoactive bowel sounds present. PEGJ in place with venting G tube draining clear liquid.  EXTREMITIES: No peripheral edema.  NEUROLOGICAL: CN II-XII grossly intact. Moving all extremities symmetrically. Fine tremor in upper extremities.   SKIN: Intact. Warm and dry. Mild jaundice.       Medical Decision Making       120 MINUTES SPENT BY  ME on the date of service doing chart review, history, exam, documentation & further activities per the note.      Data     I have personally reviewed the following data over the past 24 hrs:    6.8  \   8.8 (L)   / 270     135 (L) 98 15.6 /  174 (H)   4.3 28 0.60 (L) \       ALT: 224 (H) AST: 83 (H) AP: 404 (H) TBILI: 6.7 (H)   ALB: 3.3 (L) TOT PROTEIN: 6.1 (L) LIPASE: 38       TSH: N/A T4: N/A A1C: 7.9 (H)       Procal: N/A CRP: N/A Lactic Acid: 1.0       INR:  1.37 (H) PTT:  N/A   D-dimer:  N/A Fibrinogen:  N/A       Imaging results reviewed over the past 24 hrs:   Recent Results (from the past 24 hour(s))   CT Abdomen Pelvis w Contrast    Narrative    EXAM: CT ABDOMEN AND PELVIS WITH CONTRAST  LOCATION: [facility]  DATE: 07/08/2023    INDICATION: Patient with pancreatic mass and recently placed biliary stent, now with worsening abdominal pain.  COMPARISON: None.  TECHNIQUE: CT scan of the abdomen and pelvis was performed following injection of IV contrast. Multiplanar reformats were obtained. Dose reduction techniques were used.  CONTRAST: 77 mL isovue 370.    FINDINGS:   LOWER CHEST: Minimal dependent atelectasis versus less likely infiltrate.    HEPATOBILIARY: Biliary stent in good position with mild biliary dilatation and pneumobilia. There is a calcific density adjacent to the stent, it is unclear if this is a pancreatic calcification or a calcified gallstone. No focal liver lesions   demonstrated.    PANCREAS: Pancreas mass in the head and body measuring approximately 6.3 x 3.9 cm. No definite pancreatic ductal dilatation.    SPLEEN: Normal size.    ADRENAL GLANDS: No significant nodules.    KIDNEYS/BLADDER: No significant mass, stone, or hydronephrosis.    BOWEL: No obstruction or inflammatory change.    LYMPH NODES: Mild peripancreatic adenopathy.    VASCULATURE: No abdominal aortic aneurysm.    PELVIC ORGANS: No pelvic masses.    MUSCULOSKELETAL: No frankly  destructive bony lesions.      Impression    IMPRESSION:   1.  Pancreas mass without pancreatic ductal dilatation.  2.  Biliary stent in good position with mild pneumobilia.  3.  Question a calcific density adjacent to the distal biliary stent, it is unclear if this is a pancreatic calcification in the adjacent parenchyma versus a calcified gallstone.

## 2023-07-08 NOTE — PROGRESS NOTES
Transfer Type: [facility]  Transfer Triage Note    Originating unit:[facility] ED    Final intended location for transfer: [facility] surg or IMC     What services or anticipated services require transfer to the [place]? (please refer to triage job guide or discuss with PP RN if [facility] capabilities would be appropriate)     Tele required:  No    Time of admission request: 259pm    Anticipated to be boarding in ED for >4 hours? Yes    Was Hospitalist Team notified to complete H&P, admission orders, and assume care (sign into chart, collaborate with ED nurse, etc)? Yes    Patient added to Interhospital transfer list?  Yes    Brief case description:     Recently dgx'd new Pancreatic mass NOS (no biopsy) and s/p biliary stent yday at [facility]; worsening abd pain NV and jaundice  Cannot control his BG's at home  Pt states does not want to go back to [facility], wants to estab. At the   Reportedly there is no good window for biopsy of this mass, and thus no path specimens have been obtained to date    In the ED had work up, notable CT abd. has pancr mass that is stable, stent is patent  Afebrile thru out ED stay  Concerning Tbili rise from ~4---->6.7 and Hgb drop from 11-->8.8, no report of blood per any os    Plan to admit to [place]:  -- Oncology consult  -- Panc bili consult      Rickey Carr MD

## 2023-07-08 NOTE — MEDICATION SCRIBE - ADMISSION MEDICATION HISTORY
Medication Scribe Admission Medication History    Admission medication history is complete. The information provided in this note is only as accurate as the sources available at the time of the update.    Medication reconciliation/reorder completed by provider prior to medication history? No    Information Source(s): Patient, Family member and CareEverywhere/SureScripts via in-person    Pertinent Information: Medication history completed with help from patient and his wife Violet.    Changes made to PTA medication list:    Added: None    Deleted: None    Changed: None    Medication Affordability:       Allergies reviewed with patient and updates made in EHR: yes    Medication History Completed By: Radha Valdez 7/8/2023 11:59 AM    Prior to Admission medications    Medication Sig Last Dose Taking? Auth Provider Long Term End Date   blood glucose (FREESTYLE TEST STRIPS) test strip by Other route as needed 6/30/2023 Yes Reported, Patient     HYDROmorphone (DILAUDID) 2 MG tablet 0.5 tablet to 1  tablet as needed Orally every 6 hrs for 4 days 7/8/2023 Yes Reported, Patient     hydrOXYzine (VISTARIL) 25 MG capsule Take 25 mg by mouth 7/8/2023 at 7am Yes Reported, Patient     insulin aspart (NOVOLOG PEN) 100 UNIT/ML pen  7/8/2023 at am Yes Reported, Patient Yes    insulin glargine (BASAGLAR KWIKPEN) 100 UNIT/ML pen inject 10 units Subcutaneous once a day* 7/7/2023 at pm Yes Reported, Patient Yes

## 2023-07-09 LAB
ALBUMIN SERPL BCG-MCNC: 3.3 G/DL (ref 3.5–5.2)
ALBUMIN UR-MCNC: 20 MG/DL
ALP SERPL-CCNC: 396 U/L (ref 40–129)
ALT SERPL W P-5'-P-CCNC: 231 U/L (ref 0–70)
ANION GAP SERPL CALCULATED.3IONS-SCNC: 10 MMOL/L (ref 7–15)
APPEARANCE UR: CLEAR
AST SERPL W P-5'-P-CCNC: 100 U/L (ref 0–45)
BASOPHILS # BLD AUTO: 0 10E3/UL (ref 0–0.2)
BASOPHILS NFR BLD AUTO: 0 %
BILIRUB SERPL-MCNC: 8.1 MG/DL
BILIRUB UR QL STRIP: ABNORMAL
BUN SERPL-MCNC: 14.4 MG/DL (ref 6–20)
CALCIUM SERPL-MCNC: 8.7 MG/DL (ref 8.6–10)
CHLORIDE SERPL-SCNC: 100 MMOL/L (ref 98–107)
COLOR UR AUTO: ABNORMAL
CREAT SERPL-MCNC: 0.69 MG/DL (ref 0.67–1.17)
DEPRECATED HCO3 PLAS-SCNC: 24 MMOL/L (ref 22–29)
EOSINOPHIL # BLD AUTO: 0 10E3/UL (ref 0–0.7)
EOSINOPHIL NFR BLD AUTO: 0 %
ERYTHROCYTE [DISTWIDTH] IN BLOOD BY AUTOMATED COUNT: 15.1 % (ref 10–15)
GFR SERPL CREATININE-BSD FRML MDRD: >90 ML/MIN/1.73M2
GLUCOSE BLDC GLUCOMTR-MCNC: 200 MG/DL (ref 70–99)
GLUCOSE BLDC GLUCOMTR-MCNC: 236 MG/DL (ref 70–99)
GLUCOSE BLDC GLUCOMTR-MCNC: 276 MG/DL (ref 70–99)
GLUCOSE SERPL-MCNC: 271 MG/DL (ref 70–99)
GLUCOSE UR STRIP-MCNC: 1000 MG/DL
HCT VFR BLD AUTO: 34.6 % (ref 40–53)
HGB BLD-MCNC: 10.8 G/DL (ref 13.3–17.7)
HGB UR QL STRIP: NEGATIVE
IMM GRANULOCYTES # BLD: 0.1 10E3/UL
IMM GRANULOCYTES NFR BLD: 0 %
INR PPP: 1.78 (ref 0.85–1.15)
KETONES UR STRIP-MCNC: ABNORMAL MG/DL
LEUKOCYTE ESTERASE UR QL STRIP: NEGATIVE
LIPASE SERPL-CCNC: 34 U/L (ref 13–60)
LYMPHOCYTES # BLD AUTO: 0.4 10E3/UL (ref 0.8–5.3)
LYMPHOCYTES NFR BLD AUTO: 3 %
MCH RBC QN AUTO: 30.9 PG (ref 26.5–33)
MCHC RBC AUTO-ENTMCNC: 31.2 G/DL (ref 31.5–36.5)
MCV RBC AUTO: 99 FL (ref 78–100)
MONOCYTES # BLD AUTO: 0.8 10E3/UL (ref 0–1.3)
MONOCYTES NFR BLD AUTO: 7 %
NEUTROPHILS # BLD AUTO: 10.7 10E3/UL (ref 1.6–8.3)
NEUTROPHILS NFR BLD AUTO: 90 %
NITRATE UR QL: NEGATIVE
NRBC # BLD AUTO: 0 10E3/UL
NRBC BLD AUTO-RTO: 0 /100
PH UR STRIP: 8.5 [PH] (ref 5–7)
PLATELET # BLD AUTO: 308 10E3/UL (ref 150–450)
POTASSIUM SERPL-SCNC: 4.3 MMOL/L (ref 3.4–5.3)
PROT SERPL-MCNC: 6.1 G/DL (ref 6.4–8.3)
RBC # BLD AUTO: 3.5 10E6/UL (ref 4.4–5.9)
RBC URINE: 0 /HPF
SODIUM SERPL-SCNC: 134 MMOL/L (ref 136–145)
SP GR UR STRIP: 1.02 (ref 1–1.03)
UROBILINOGEN UR STRIP-MCNC: NORMAL MG/DL
WBC # BLD AUTO: 12 10E3/UL (ref 4–11)
WBC URINE: 2 /HPF

## 2023-07-09 PROCEDURE — 120N000003 HC R&B IMCU UMMC

## 2023-07-09 PROCEDURE — 85610 PROTHROMBIN TIME: CPT | Performed by: PHYSICIAN ASSISTANT

## 2023-07-09 PROCEDURE — 250N000013 HC RX MED GY IP 250 OP 250 PS 637: Performed by: PHYSICIAN ASSISTANT

## 2023-07-09 PROCEDURE — 80321 ALCOHOLS BIOMARKERS 1OR 2: CPT | Performed by: HOSPITALIST

## 2023-07-09 PROCEDURE — 83690 ASSAY OF LIPASE: CPT | Performed by: PHYSICIAN ASSISTANT

## 2023-07-09 PROCEDURE — 82306 VITAMIN D 25 HYDROXY: CPT | Performed by: STUDENT IN AN ORGANIZED HEALTH CARE EDUCATION/TRAINING PROGRAM

## 2023-07-09 PROCEDURE — 250N000011 HC RX IP 250 OP 636: Mod: JZ | Performed by: PHYSICIAN ASSISTANT

## 2023-07-09 PROCEDURE — 80053 COMPREHEN METABOLIC PANEL: CPT | Performed by: PHYSICIAN ASSISTANT

## 2023-07-09 PROCEDURE — 250N000013 HC RX MED GY IP 250 OP 250 PS 637: Performed by: NURSE PRACTITIONER

## 2023-07-09 PROCEDURE — 99222 1ST HOSP IP/OBS MODERATE 55: CPT

## 2023-07-09 PROCEDURE — 36415 COLL VENOUS BLD VENIPUNCTURE: CPT | Performed by: PHYSICIAN ASSISTANT

## 2023-07-09 PROCEDURE — 250N000011 HC RX IP 250 OP 636: Performed by: INTERNAL MEDICINE

## 2023-07-09 PROCEDURE — 250N000011 HC RX IP 250 OP 636: Mod: JZ | Performed by: STUDENT IN AN ORGANIZED HEALTH CARE EDUCATION/TRAINING PROGRAM

## 2023-07-09 PROCEDURE — 87077 CULTURE AEROBIC IDENTIFY: CPT | Performed by: STUDENT IN AN ORGANIZED HEALTH CARE EDUCATION/TRAINING PROGRAM

## 2023-07-09 PROCEDURE — 87149 DNA/RNA DIRECT PROBE: CPT | Performed by: STUDENT IN AN ORGANIZED HEALTH CARE EDUCATION/TRAINING PROGRAM

## 2023-07-09 PROCEDURE — 82784 ASSAY IGA/IGD/IGG/IGM EACH: CPT | Performed by: STUDENT IN AN ORGANIZED HEALTH CARE EDUCATION/TRAINING PROGRAM

## 2023-07-09 PROCEDURE — 85025 COMPLETE CBC W/AUTO DIFF WBC: CPT | Performed by: PHYSICIAN ASSISTANT

## 2023-07-09 PROCEDURE — 99231 SBSQ HOSP IP/OBS SF/LOW 25: CPT | Performed by: NURSE PRACTITIONER

## 2023-07-09 PROCEDURE — 250N000013 HC RX MED GY IP 250 OP 250 PS 637: Performed by: STUDENT IN AN ORGANIZED HEALTH CARE EDUCATION/TRAINING PROGRAM

## 2023-07-09 PROCEDURE — 99233 SBSQ HOSP IP/OBS HIGH 50: CPT | Performed by: STUDENT IN AN ORGANIZED HEALTH CARE EDUCATION/TRAINING PROGRAM

## 2023-07-09 PROCEDURE — C9113 INJ PANTOPRAZOLE SODIUM, VIA: HCPCS | Mod: JZ | Performed by: PHYSICIAN ASSISTANT

## 2023-07-09 PROCEDURE — 99222 1ST HOSP IP/OBS MODERATE 55: CPT | Mod: GC | Performed by: INTERNAL MEDICINE

## 2023-07-09 PROCEDURE — 81001 URINALYSIS AUTO W/SCOPE: CPT | Performed by: PHYSICIAN ASSISTANT

## 2023-07-09 PROCEDURE — 36415 COLL VENOUS BLD VENIPUNCTURE: CPT | Performed by: STUDENT IN AN ORGANIZED HEALTH CARE EDUCATION/TRAINING PROGRAM

## 2023-07-09 RX ORDER — PIPERACILLIN SODIUM, TAZOBACTAM SODIUM 3; .375 G/15ML; G/15ML
3.38 INJECTION, POWDER, LYOPHILIZED, FOR SOLUTION INTRAVENOUS EVERY 6 HOURS
Status: DISCONTINUED | OUTPATIENT
Start: 2023-07-09 | End: 2023-07-12

## 2023-07-09 RX ORDER — HYDROXYZINE HYDROCHLORIDE 25 MG/1
25 TABLET, FILM COATED ORAL ONCE
Status: COMPLETED | OUTPATIENT
Start: 2023-07-09 | End: 2023-07-09

## 2023-07-09 RX ORDER — HYDROMORPHONE HYDROCHLORIDE 4 MG/1
4 TABLET ORAL
Status: DISCONTINUED | OUTPATIENT
Start: 2023-07-09 | End: 2023-07-14

## 2023-07-09 RX ORDER — DEXTROSE MONOHYDRATE 100 MG/ML
INJECTION, SOLUTION INTRAVENOUS CONTINUOUS PRN
Status: DISCONTINUED | OUTPATIENT
Start: 2023-07-09 | End: 2023-07-09

## 2023-07-09 RX ADMIN — Medication 50 MCG: at 17:30

## 2023-07-09 RX ADMIN — PIPERACILLIN AND TAZOBACTAM 3.38 G: 3; .375 INJECTION, POWDER, LYOPHILIZED, FOR SOLUTION INTRAVENOUS at 22:15

## 2023-07-09 RX ADMIN — PIPERACILLIN AND TAZOBACTAM 3.38 G: 3; .375 INJECTION, POWDER, LYOPHILIZED, FOR SOLUTION INTRAVENOUS at 10:55

## 2023-07-09 RX ADMIN — HYDROMORPHONE HYDROCHLORIDE 4 MG: 4 TABLET ORAL at 21:06

## 2023-07-09 RX ADMIN — INSULIN ASPART 3 UNITS: 100 INJECTION, SOLUTION INTRAVENOUS; SUBCUTANEOUS at 04:17

## 2023-07-09 RX ADMIN — HYDROMORPHONE HYDROCHLORIDE 2 MG: 2 TABLET ORAL at 03:28

## 2023-07-09 RX ADMIN — HYDROMORPHONE HYDROCHLORIDE 2 MG: 2 TABLET ORAL at 11:09

## 2023-07-09 RX ADMIN — HYDROMORPHONE HYDROCHLORIDE 0.5 MG: 1 INJECTION, SOLUTION INTRAMUSCULAR; INTRAVENOUS; SUBCUTANEOUS at 08:52

## 2023-07-09 RX ADMIN — INSULIN ASPART 3 UNITS: 100 INJECTION, SOLUTION INTRAVENOUS; SUBCUTANEOUS at 09:18

## 2023-07-09 RX ADMIN — POLYETHYLENE GLYCOL 3350 17 G: 17 POWDER, FOR SOLUTION ORAL at 08:52

## 2023-07-09 RX ADMIN — HYDROMORPHONE HYDROCHLORIDE 0.5 MG: 1 INJECTION, SOLUTION INTRAMUSCULAR; INTRAVENOUS; SUBCUTANEOUS at 02:25

## 2023-07-09 RX ADMIN — HYDROMORPHONE HYDROCHLORIDE 4 MG: 4 TABLET ORAL at 23:54

## 2023-07-09 RX ADMIN — ACETAMINOPHEN 650 MG: 325 TABLET, FILM COATED ORAL at 20:04

## 2023-07-09 RX ADMIN — HYDROXYZINE HYDROCHLORIDE 25 MG: 25 TABLET, FILM COATED ORAL at 23:54

## 2023-07-09 RX ADMIN — HYDROXYZINE HYDROCHLORIDE 25 MG: 25 TABLET, FILM COATED ORAL at 20:04

## 2023-07-09 RX ADMIN — HYDROMORPHONE HYDROCHLORIDE 4 MG: 4 TABLET ORAL at 17:11

## 2023-07-09 RX ADMIN — HYDROMORPHONE HYDROCHLORIDE 0.5 MG: 1 INJECTION, SOLUTION INTRAMUSCULAR; INTRAVENOUS; SUBCUTANEOUS at 05:39

## 2023-07-09 RX ADMIN — PANTOPRAZOLE SODIUM 40 MG: 40 INJECTION, POWDER, FOR SOLUTION INTRAVENOUS at 08:51

## 2023-07-09 RX ADMIN — INSULIN GLARGINE 8 UNITS: 100 INJECTION, SOLUTION SUBCUTANEOUS at 22:12

## 2023-07-09 RX ADMIN — ACETAMINOPHEN 650 MG: 325 TABLET, FILM COATED ORAL at 08:51

## 2023-07-09 RX ADMIN — HYDROMORPHONE HYDROCHLORIDE 0.5 MG: 1 INJECTION, SOLUTION INTRAMUSCULAR; INTRAVENOUS; SUBCUTANEOUS at 13:14

## 2023-07-09 RX ADMIN — PIPERACILLIN AND TAZOBACTAM 3.38 G: 3; .375 INJECTION, POWDER, LYOPHILIZED, FOR SOLUTION INTRAVENOUS at 17:13

## 2023-07-09 RX ADMIN — INSULIN ASPART 3 UNITS: 100 INJECTION, SOLUTION INTRAVENOUS; SUBCUTANEOUS at 00:06

## 2023-07-09 ASSESSMENT — ACTIVITIES OF DAILY LIVING (ADL)
ADLS_ACUITY_SCORE: 22

## 2023-07-09 NOTE — PROVIDER NOTIFICATION
"   07/09/23 1500   Call Information   Date of Call 07/09/23   Time of Call 1502   Name of person requesting the team Geovanna   Title of person requesting team RN   RRT Arrival time 1510   Time RRT ended 1520   Reason for call   Type of RRT Adult   Primary reason for call Cardiovascular   Cardiovascular SBP less than 90   Was patient transferred from the ED, ICU, or PACU within last 24 hours prior to RRT call? 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 Pt is AOx4, afebrile. Repeat BP stable, 131/80, O2 sats 100% on room air.   Interventions Meds  (frequency of pain medication increased)   Patient Outcome   Patient Outcome Stabilized on unit   RRT Team   Attending/Primary/Covering Physician Family Medicine   Date Attending Physician notified 07/09/23   Time Attending Physician notified 1502   Physician(s) Sue Morales, MAXIME   Lead RN Sosa Austin   RT NA   Post RRT Intervention Assessment   Post RRT Assessment Stable/Improved   Date Follow Up Done 07/09/23   Time Follow Up Done 1800   Comments BP improved/stablized       "

## 2023-07-09 NOTE — PHARMACY-CONSULT NOTE
Pharmacy Tube Feeding Consult    Medication reviewed for administration by feeding tube and for potential food/drug interactions.    Recommendation: No changes are needed at this time.     Pharmacy will continue to follow as new medications are ordered.        Frank Bacon, PharmD, BCPS  [phone]

## 2023-07-09 NOTE — PROGRESS NOTES
CLINICAL NUTRITION SERVICES - ASSESSMENT NOTE     Nutrition Prescription    RECOMMENDATIONS FOR MDs/PROVIDERS TO ORDER:  1. Further Fluids needs per team.   - If G-tube to be kept open to gravity, patient would need to be set up for outpatient IVF infusion 2-3 times per week. Patient reports previous admissions to OSH with hypotension and hypovolemia.     - Please continue with IVF in the hospital to match G-tube output.   - Continue with free water flushes via J-tube to provide for hydration needs with TF support.     2. Consider to get endocrine involved given high BG with TF support   3. Patient would need Feed and flush pump orders for home  4. If patient with low elastase test and need for Relizorb at discharge, would need to get approval for enzymes replacement therapy. Please involve CC or SW.     Malnutrition Status:    Moderate malnutrition in the context of chronic illness     Recommendations already ordered by Registered Dietitian (RD):  1. Enteral Nutrition support recommendation:   - Access: Jejunostomy tube of the G/J tube   - Dosing wt: 81 kg admit wt on 7/8/23      - TF: Once patient is out of peptamen 1.5 ( this is home TF regimen and spouse will try to bring some more tomorrow), for now substitute tube feeds with Vital 1.5, Initiate @ 50 ml/hr and advance by 20 ml Q 6 hr as tolerated to goal @ 90 ml/hr.  Do not start or advance unless K+ >3.0, Mg++ > 1.5,  and Phos > 1.9.     Vital 1.5 @ 90 ml/hr ( 2160 ml/day) to provide: 3240 kcals ( 40 kcal/kg), 145 g PRO ( 1.8 gm/kg), 1649 ml free H20, 404 gm CHO, and 12.3 gm soluble fiber daily.    - Free water flushes: 60 ml/hr free water flushes @ continuous rate to meet hydration needs with high calorie tube feeds.   ( TF + FWF -> 1650 + 1440 -> 3090 ml free water daily -> meets lower end estimated hydration needs with TF support     - K+, Mg, Phos ordered per protocol     2. Ordered PERT:   RELIZORB CARTRIDGES  *Change 1 cartridge every 12 hours with TF  rate >20 ml/hr  *Change 1 cartridge every 6 hours with TF rate > 50 ml/hr ( need every 6 hour change due to high volume tube feed and to avoid FT clogging)  *Supplies: Obtain cartridges in the 6B unit med room or call [phone] and provide [employer] #[phone]    3. Ordered metabolic cart to better assess for calorie needs.   4. Ordered fecal elastase to better assess needs for pancreatic insufficiency  5. Ordered Divisol 5 ml daily via FT. Ordered Vitamin D lab check ( patient reports low vitamin D level previously).     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     # IDDM2 - Diagnosed after initial dx pancreatitis 3/2023, Hemoglobin A1c 7.9 on admission     NUTRITION HISTORY   Obtained from chart review:   Presents with worsening abdominal pain, increased jaundice, poor PO intake and weight loss.  - PEGJ placed previously on 5/23 for gastric venting and nutrition with unintentional weight loss of 40 lbs. Reports continued weight loss despite enteral tube feeds.      Tube feed support:   Dosing wt: use 81 kg admit wt ( 7/8/23)  Access: Jejunostomy tube of the G/J tube    EN: Peptamen 1.5, 90 ml/hr at continuous rate  Peptamen 1.5 @ goal 90 ml/hr (2160 ml/day) to provide 3240 kcals/day (40 kcal/kg/day), 147 g PRO (1.8 g/kg/day), 1663 ml free H2O, 122 gm Fat (70% from  "MCTs), 406 gm CHO and no Fiber daily.   ml Q2H -> per patient uses syringe to give water flushes manually.         Met with patient today. Spouse at bedside.   EN: via Jejunostomy. Patient notes a recent significant wt loss while on TF and therefore TF advanced gradually to current rate @ 90 ml/hr. Patient reports tolerating TF infusion well. Would like to stay on peptamen 1.5. Wife will try to bring some from home tomorrow.   Patient and spouse can not remember their home infusion provider.      PO: Reports not taking anything orally due to GOO. Has nausea and will throw up if takes any PO. Per patient, he has a venting G-tube that is open to gravity at all times. Patient notes having 1.5-2 liters output daily.     Stool: having daily BM.       CURRENT NUTRITION ORDERS  Diet: Regular ?  G/J tube. G to gravity -> 2 liters output today so far.   J-tube w/ tube feeds @ goal 90 ml/hr with Q4 FWF @ 200 ml ordered by MD last night.     LR @ 75 ml/hr.    Intake/Tolerance: Per above       LABS  Na+: 134 (L)  BUN: 14.4, Cr: 0.69, GFR: >90  LFT's: elevated  B ( on insulin), A1C: 7.9 ( on 23)   WBC: 12.0 (elevated), CRP: N/A  Fecal elastase N/A   Vitamin D: N/A -> patient notes low value at OSH       MEDICATIONS  - Bowel regimen with miralax   - Lantus 8 units daily  - Novolog sliding scale Q4H     ANTHROPOMETRICS  Height: 182.9 cm (6' .008\")  Most Recent Weight: 80.6 kg (177 lb 11.1 oz)    IBW: 81 kg ( 100% IbW)   BMI: Normal BMI  Weight History:   Wt Readings from Last 10 Encounters:   23 80.6 kg (177 lb 11.1 oz)       Dosing Weight: 81 kg admit wt 23    ASSESSED NUTRITION NEEDS  Estimated Energy Needs: 2430 - 2835 kcals/day (30-35  kcals/kg)  Justification: wt restoration, recent severe wt loss   Estimated Protein Needs: 97- 122 grams protein/day (1.2 - 1.5 grams of pro/kg)  Justification: Hypercatabolism with acute illness  Estimated Fluid Needs: (1 mL/kcal)   Justification: " Maintenance    PHYSICAL FINDINGS  See malnutrition section below.    MALNUTRITION  % Intake: On TF since last month   % Weight Loss: Previously 40 lb wt loss ( Severe )   Subcutaneous Fat Loss: Facial region:  Mild   Muscle Loss: Facial & jaw region:  Mild   Fluid Accumulation/Edema: None noted  Malnutrition Diagnosis: Moderate malnutrition in the context of chronic illness     NUTRITION DIAGNOSIS  Inadequate oral intake related to gastric outlet obstruction associated with pancreatic mass as evidenced by TF dependency     INTERVENTIONS  Implementation  Nutrition Education: Role of RD in nutrition POC.   Enteral Nutrition - Modified tube feeds, substituted tube feed to vital 1.5, as peptamen 1.5 is not on [facility] formulary list.   Feeding tube flush - Ordered feed and flush     Goals  Total avg nutritional intake to meet a minimum of 30 kcal/kg and 1.2 PRO/kg daily (per dosing wt 81 kg admit wt ).     Monitoring/Evaluation  Progress toward goals will be monitored and evaluated per protocol.    Andrea Neville RD/ARGELIA  6B/Obs RD pager [phone]  Weekend/Holiday RD pager [phone]

## 2023-07-09 NOTE — CODE/RAPID RESPONSE
Rapid Response Team Note    Assessment   In assessment a rapid response was called on Gallo Navarro due to hypotension. This presentation is likely due to vasovagal response vs acute on chronic dehydration, malnutrition.     Plan   -  BP recovered without intervention on subsequent checks, pt asymptomatic   -  Infectious w/u already in progress by primary team   -  The Internal Medicine primary team was paged and currently awaiting a response.  -  Disposition: The patient will remain on the current unit. We will continue to monitor this patient closely.  -  Reassessment and plan follow-up will be performed by the primary team      Sue Morales CNP  [facility] RRT [facility] Job Code Contact #[phone]  [facility] Paging/Directory    Hospital Course   Brief Summary of events leading to rapid response:   Patient found to have isolated SBP reading in 70s.  No c/o chest pain or dyspnea, has acute on chronic abdominal pain.  No loss of consciousness.  Mentation at baseline.      Admission Diagnosis:   Hyperglycemia [R73.9]  Pancreatic mass [K86.89]  Lower abdominal pain [R10.30]  History of biliary stent insertion [Z98.890]    Physical Exam   Temp: 98.6  F (37  C) Temp  Min: 98.3  F (36.8  C)  Max: 101.9  F (38.8  C)  Resp: 18 Resp  Min: 16  Max: 18  SpO2: 99 % SpO2  Min: 97 %  Max: 99 %  Pulse: 110 Pulse  Min: 102  Max: 125    No data recorded  BP: 130/78 Systolic (24hrs), Av , Min:78 , Max:136   Diastolic (24hrs), Av, Min:64, Max:90     I/Os: I/O last 3 completed shifts:  In: 2691.25 [P.O.:1500; I.V.:636.25; NG/GT:555]  Out: 2875 [Urine:300; Emesis/NG output:2575]     Exam:   General: chronically ill appearing  Mental Status: baseline mental status.  GI: mild abd distension, no TTP   Skin: Marked jaundice     Significant Results and Procedures   Lactic Acid:   Recent Labs   Lab Test 23  1137   LACT 1.0     CBC:   Recent Labs   Lab Test 23  0501 23  1137   WBC 12.0* 6.8   HGB 10.8* 8.8*   HCT 34.6*  27.1*    270        Sepsis Evaluation   The patient is not known to have an infection.

## 2023-07-09 NOTE — CONSULTS
GASTROENTEROLOGY CONSULTATION      Date of Admission:  7/8/2023           Reason for Consultation:   We were asked by Dr. Cannon to evaluate this patient with pancreatic mass           ASSESSMENT AND RECOMMENDATIONS:   Assessment:  Idiopathic pancreatitis  Abnormal imaging of the pancreas, concern for pancreas neoplasm   Obstructive jaundice  Leukocytosis, fever spikes  DM requiring insulin    57 year old male with a history of idiopathic pancreatitis, diabetes on insulin who presented to the ED on 7/8/2023 with abdominal pain and jaundice.    Pt's first attack of pancreatitis was in 3/2023. His main issue was intolerance to oral intake and persistent vomiting rather than pain. He had a prolonged hospital course at [facility] in April 2023 during which time he was found to have extrinsic compression of the duodenum which was thought to be due to pancreatitis, due to continued intolerance to oral diet,he was briefly on TPN and  a PEG-J tube was placed during that hosptialization.  Despite tube feedings he continued to lose weight and needed ER visits/admissions for dehydration and hypotension and dyselectrolytemia.  More recently on 6/2/2023 he was found to have a mass in the pancreatic head region on imaging, this was evaluated via EUS on 6/27/2023 there was no safe window to biopsy this lesion and it was deemed to be necrosis rather than a mass.  Few days after this EUS procedure patient developed abdominal pain and obstructive jaundice.  This led to ERCP on 7/7/2023 with biliary sphincterotomy and placement of a biliary stent.  Patient ultimately wanted to shift his care to [facility] comprehensive pancreas clinic and presented to the ER here.    Taken together this patient has idiopathic pancreatitis with a sentinel attack dating back to 3/2023.  It is unclear if the current  imaging abnormality in the pancreatic head regions/ uncinate process area is a neoplastic mass or if it is  necrosis from  prior attack of pancreatitis.  Further imaging will help understand this better.     He currently has obstructive jaundice and had a fever spike with elevated liver tests and leukocytosis which raises suspicion for possible superimposed cholangitis.  He is hemodynamically stable at this moment which is assuring.       Recommendations  -- MRCP for further evaluation of the pancreatic anatomy and bile duct  -- Will plan for further interventions basing on this study  -- Continue Zosyn  -- Follow-up blood cultures and liver tests  -- IGG4 levels  -- fecal elastase   -- Inform GI pancreaticobiliary team on call if there is any relevant change in clinical condition of the patient, in case of decompensated infection he will need urgent hepatobiliary decompression.     Thank you for involving us in this patient's care. Please do not hesitate to contact the GI service with any questions or concerns.     Pt care plan discussed with Dr. Pickett, GI staff physician.    Chester Murphy MD  -------------------------------------------------------------------------------------------------------------------           History of Present Illness:   Gallo Navarro is a 57 year old male with a history of  idiopathic pancreatitis, diabetes on insulin who presented to the ED on 7/8/2023 with abdominal pain and jaundice.      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  This continued to happen for a couple of days.  Finally he went to his  primary care provider who sent the lipase levels and they were elevated to more than 2000.  At this point he was diagnosed with pancreatitis but interestingly the patient never had pain.  He initially went to [facility] ED a couple of times due to persistent vomiting and inability to tolerate any oral food.  Eventually he went to [facility] in April 2023 and then was admitted there for 26 days.   Hospital course here was significant for initiation of TPN, development of diabetes requiring insulin therapy.  He underwent upper endoscopy there and was found to have evidence of extrinsic compression on the duodenum, he eventually ended up with a PEG J-tube.      Since then he had couple of ED visits to [facility] for low blood pressures requiring initiation of IV fluids.  On 6/2 he had a CT scan done as an outpatient that showed evidence of a 2.2 into 1.5 to 1.4 cm masslike lesion in the pancreatic head.  For further evaluation of this he was scheduled for an EUS on 6/27/2023 by Dr. Salinas and in the meantime he was admitted again on 6/21/2023 with electrolyte disturbances that required IV fluids.    He eventually had endoscopic ultrasound done on 6/27/2023 by Dr. Ivory, as per as per the report there was sonographic evidence for chronic pancreatitis, the disease process in the pancreatic head/uncinate region was felt to be necrosis and there was no safe window for performance of an FN biopsy.  A couple of days following this procedure patient reports that he developed abdominal pain and was also noticed to have jaundice.  Up until this point he reports that he never had any abdominal pain.    His primary care provider obtain liver tests which were elevated.  This led to an ERCP on 7/7/2023, there was evidence of a small distal biliary stricture adjacent to the pancreatic necrosis on cholangiogram.  Biliary sphincterotomy was done, sludge was cleared and a 10 Citizen of [place] into 5 cm of flex biliary stent with a single external and internal flap was placed.    Patient reports that following the ERCP procedure he continued to have abdominal pain.  He was upset with care at [facility] and wanted to establish care with [facility] and pancreas clinic specifically with Dr. Greenberg and decided to come to the ED.    In the ER he had evidence of leukocytosis, elevated liver tests and was admitted for further management.   Overnight patient developed fever spikes and was started on Zosyn.    At the time of my evaluation patient was tearful.  He reported that he is losing weight and wasting away and is concerned about the clinical trajectory.  He strongly denied having any significant alcohol intake.         Data:   Key relevant labs:    Latest Reference Range & Units 07/08/23 11:37 07/09/23 05:01   WBC 4.0 - 11.0 10e3/uL 6.8 12.0 (H)   Hemoglobin 13.3 - 17.7 g/dL 8.8 (L) 10.8 (L)   Hematocrit 40.0 - 53.0 % 27.1 (L) 34.6 (L)   Platelet Count 150 - 450 10e3/uL 270 308   (H): Data is abnormally high  (L): Data is abnormally low     Latest Reference Range & Units 07/08/23 11:37 07/08/23 22:36 07/09/23 05:01   Albumin 3.5 - 5.2 g/dL 3.3 (L)  3.3 (L)   Protein Total 6.4 - 8.3 g/dL 6.1 (L)  6.1 (L)   Alkaline Phosphatase 40 - 129 U/L 404 (H)  396 (H)   ALT 0 - 70 U/L 224 (H)  231 (H)   AST 0 - 45 U/L 83 (H)  100 (H)   Bilirubin Total <=1.2 mg/dL 6.7 (H)  8.1 (H)   CEA ng/mL  10.6    Glucose 70 - 99 mg/dL 199 (H)  271 (H)   Hemoglobin A1C <5.7 % 7.9 (H)     (L): Data is abnormally low  (H): Data is abnormally high    Key relevant imaging:  CT abdomen and pelvis with contrast (7/8/23)  IMPRESSION:   1.  Pancreas mass without pancreatic ductal dilatation.  2.  Biliary stent in good position with mild pneumobilia.  3.  Question a calcific density adjacent to the distal biliary stent, it is unclear if this is a pancreatic calcification in the adjacent parenchyma versus a calcified gallstone.                 Previous Endoscopy:   ERCP 7/7/2023  Impression:        - Smooth distal biliary stricture likely secondary to the adjacent        pancreatic necrosis explaining the patient's jaundice. Treated with a        biliary sphincterotomy and plastic stent.        - Significant amount of retained stool seen on the  film likely        contributing to the right side abdominal pain.     EUS 6/27/2023  Findings:        ENDOSONOGRAPHIC FINDING: :         There was a high grade stricture in the 2nd portion of the duodenum        consistent with the findings on prior endoscopy. No mass lesion was seen        in this area. aBiopsies were taken with a cold forceps for histology.        The major papilla was normal endoscopically and sonographically.        The bile duct was dilated measuring 11.5 mm in diameter. The gallbladder        was not distended with no pericholecystic fluid. A large amount of        sludge was seen in the bile duct and gallbladder.        The pancreatic parenchyma was diffusely hypoechoic and atrophic.        Hyperechoic strands were present. The pancreatic duct was tortuous and        irregular with no visible dilated sidebranches. No masses cysts or        stones were visualized in the pancreas. There was a heterogeneous, mixed        hypoechoic and hyperechoic, irregular area in the region of the uncinate        process of the pancreas consistent with the findings on imaging. This        was not ammenable to FNA due to the high grade obstruction in the 2nd        portion of the duodenum so that adequate angle of the needle could not        be obtained.        The pancreatic duct was followed from the head to the tail excluding        pancreas divisum. It measured 4 mm in the head and 2.8 mm in the body,        and 1.5 mm in the tail of the pancreas.        An 8.4 mm by 6 mm mixed isoechoic and hyperechoic, irregular,        well-defined, lymph node was seen in the jared hepatis. No other        prominent lymph nodes were seen in the upper abdomen or mediastinum.        No lesions were seen in the visualized portions of the liver.        The left adrenal was normal.        A 0.028 inch NovaGold wire was advanced through the existing GJ tube and        into the jejunum. The tube was removed leaving the guidewire in place,        and a new 18 Fr GJ tube was then advanced into the jejunum under        endoscopic and fluoroscopic guidance.  "  Impression:        - Endosonographic imaging of the pancreas showed sonographic changes        consistent with severe chronic pancreatitis with no evidence of a        neoplasm.        - Area of likely necrosis in the region of the uncinate process        resulting in high grade obstruction of the second portion of the        duodenum just distal to the major papilla.        - Dilated bile duct with sludge in the bile duct and gallbladder likely        secondary to poor oral intake as no stricture was seen in the bile duct.        ERCP not performed as patient's recent LFTs have been normal.        - GJ tube exchanged    EGD 4/19/2023    Impression:        - Normal esophagus.        - Normal stomach.        - Normal duodenal bulb.        - Acquired duodenal stenosis. Biopsied. Suspect secondary to edema        related to pancreatitis.        - Normal third portion of the duodenum and fourth portion of the        duodenum.        - Feeding tube placement was successfully performed.            Medications:     Medications Prior to Admission   Medication Sig Dispense Refill Last Dose     blood glucose (FREESTYLE TEST STRIPS) test strip by Other route as needed   6/30/2023     HYDROmorphone (DILAUDID) 2 MG tablet 0.5 tablet to 1  tablet as needed Orally every 6 hrs for 4 days   7/8/2023     hydrOXYzine (VISTARIL) 25 MG capsule Take 25 mg by mouth   7/8/2023 at 7am     insulin aspart (NOVOLOG PEN) 100 UNIT/ML pen    7/8/2023 at am     insulin glargine (BASAGLAR KWIKPEN) 100 UNIT/ML pen inject 10 units Subcutaneous once a day*   7/7/2023 at pm             Physical Exam:   BP (!) 126/92 (BP Location: Right arm, Cuff Size: Adult Regular)   Pulse 100   Temp 100.4  F (38  C) (Oral)   Resp 18   Ht 1.829 m (6' 0.01\")   Wt 80.6 kg (177 lb 11.1 oz)   SpO2 97%   BMI 24.09 kg/m    Wt:   Wt Readings from Last 2 Encounters:   07/08/23 80.6 kg (177 lb 11.1 oz)      Constitutional: Pt in no acute distress  Eyes: Sclera " icteric  Ears/nose/mouth/throat:hearing intact  CV: No edema  Respiratory: Unlabored breathing  Abd: Soft, PEGJ tube +ve, epigastric tenderness on deep palpation, no pain on superficial palpation  Skin: Jaundice  Neuro: AAO x 3, No asterixis  Psych: Normal affect  MSK: No gross deformities          Past Medical History:   Reviewed and edited as appropriate  Past Medical History:   Diagnosis Date     Acute pancreatitis 04/16/2023     Gastric outlet obstruction      Recurrent acute pancreatitis             Past Surgical History:   Reviewed and edited as appropriate   Past Surgical History:   Procedure Laterality Date     AS OPEN TREATMENT CLAVICULAR FRACTURE INTERNAL FX       INSERT PICC LINE  04/29/2023     IR NG TUBE PLACEMENT REQ RAD & FLUORO  05/08/2023            Social History:   Alcohol use: 2-3 drinks occasionally, none since 3/2023  Smoking history: Never  Living situation: Retired teacher          Family History:   Reviewed and edited as appropriate  Family History   Problem Relation Age of Onset     Diabetes Type 2  Father      Cirrhosis Father      Genetic Disorder Brother         missing [id number], mild retardation     Colon Polyps Brother      Colon Cancer Paternal Uncle      Pancreatic Cancer Paternal Aunt      Pancreatitis Cousin             Allergies:   Reviewed and edited as appropriate   No Known Allergies           Review of Systems:     A complete 10 point review of systems was performed and is negative except as noted in the HPI

## 2023-07-09 NOTE — PROGRESS NOTES
End of Shift Summary. See flowsheets for vital signs and detailed assessment.    Changes this shift: Pt admitted from [facility] @ 2100 for pancreatic mass workup and pain management. AxO 4, endorses 8-10/10 abdominal pain. Q2h IV 0.5 dilaudid and Q3 2mg J-tube dilaudid. SBA. No tele. T-max 99.4. RA. Regular diet, G/J tube. G to gravity- clear output at first, now brownish output w/ sediment. J-tube w/ tube feeds @ goal 90ml/hr Q4 FWF @ 200. LR @ 75. Voiding- kasey urine. UA sent. Per team okay to use pt's continuous glucose monitor, verified w/ hospital glucometer q8. Q4 insulin. Long acting given overnight, bgms 200s with current scale. Team notified.    Plan: Trend labs, manage glucose levels. Assess and manage pain w/ PRNs. Ongoing plan of care.

## 2023-07-09 NOTE — CONSULTS
Inpatient Diabetes consult service (IDS)  Assessment/plan  1. Hyperglycemia./ Diabetes mellitus type 3 related to pancreatic disease.   Hyperglycemia due to tube feeding and sress .  Change aspart correction from medium to high dose every 4 hours  Add prandial aspart 1/15 grams of carb   Since he is planned to be NPO after midnight tonight I will leave the glargine dose as is.    2.  On tube feeding continuously. He is also taking PO but not eating .  As above.  For now will try the regimen as listed.    3.  Tube feeding hyperglycemia  4.  Stress hyperglycemia  please notify inpatient diabetes service if changes are planned that will impact glycemia, such as NPO, starting/adjusting steroids, enteral feeding, parenteral feeding, dextrose fluids or procedures.     Pura Barreto MD    Chief complaint:  Gallo is a 57 year old male seen in consultation at the request of Dr Jenna Segura for diabetes   I have reviewed Care Everywhere including [facility], [facility] lab reports, imaging reports and provider notes as indicated.      HISTORY OF PRESENT ILLNESS     Gallo  presented to the [facility] ED last night and was admitted for worsening abdominal pain and increased jaundice, poor PO intake, weight loss, concern of biliary obstruction and further work up . Work up here so far has shown increased LFTS and bilirubin, and pancreatic mass (vs inflammation ) on CT      Gallo  recalls he was well until onset of vomiting in 4/2023.  Since then, he has had multiple ED visits and hospitalizations at [facility]:   3/30/23 Ed visit - elevated lipase; acute gastritis   4/10-4/12/23 ed pancreatitis  4/16-5/12 [facility] acute pancreaiitis with ileus and duodenal stenosis, GOO, inabiltiy to tolerate PO   5/19/23 ED  5/23/23 ED   6/21-6/23 [facility] hyponatremia, hypoK, moderate malnutrition,   7/2-7/3 [facility] pancreatic mass, GOO, tube feedings secondary to above, DM- discharged on aspart   7/8  presented to ED with abdominal pain. He also reported BS glucoses in the 300s and 400s.      He reports to me that he was taking Lantus 8 units/day at night . He was taking novolog prn high glucose > 250 which he was reading both off the CGM and confirming with FS.  He report he has taken up to 40 units/day of the novolog in the last days.     During this hospitalization he has had the following treatments relevant to the diabetes  The tube feeding has continued at the previous rate.   Ordered DM relevant regimen  glargine 8 units/day  aspart medium resistance scale     Total daily insulin:  7/9 so far: 19 units   7/8: 9 units    Active Diet Order  Orders Placed This Encounter      Combination Diet Regular Diet Adult      NPO per Anesthesia Guidelines for Procedure/Surgery Except for: Meds, Ice Chips    Tube feeding continuous peptamen 1.5 goal 90 ml/hour    Recent Labs   Lab 07/09/23  1503 07/09/23  1127 07/09/23  0501 07/09/23  0413 07/08/23  2048 07/08/23  1137   * 276* 271* 236* 174* 199*       Past DM history:   Diabetes type: 3, related to pancreatic disease starting 4/2023  Diabetes Duration: 4/2023 approximately  Usual Diabetes Regimen:   lantus 8 units at HS  aspart prn - up to 40 units/day - using unknown scale and frequency, based on his cgm review .  Ability to [place] Prescribed Regimen:   Diabetes Control: has CGM  Diabetes Complications:   History of DKA:   Able to Detect Hypoglycemia:   Usual Diabetes Care Provider:   Factors Impacting Glucose Control:     Endocrine relevant labs are as follows:  5/2/23 HgbA1c 6%  7/3/23 Na 140, K 3.6, Cl 101, Co2 31, BUn 15, creatinine 0.65, glucose 140, Ca 9.2, , alk phos 369, , albumin 2.9, bili 5.3,   7/8/2023 HgbA1c 7.9    Relevant imaging is as follows: (as read by me as it pertains to endocrine relevant organs)    REVIEW OF SYSTEMS  Weight loss 40# since April Freezing, can't get warm for a while before I entered the room  -- got extra  blankets ; had ice pack on back just prior   GI: no diarrhea; last BM last night; The pain in abdomen and back is new.  He never had pain before  10 system ROS otherwise as per the HPI or negative    Past Medical History  Past Medical History:   Diagnosis Date     Acute pancreatitis 04/16/2023     Gastric outlet obstruction      Recurrent acute pancreatitis       Past Surgical History:   Procedure Laterality Date     AS OPEN TREATMENT CLAVICULAR FRACTURE INTERNAL FX       INSERT PICC LINE  04/29/2023     IR NG TUBE PLACEMENT REQ RAD & FLUORO  05/08/2023    JG tube       Medications  No current outpatient medications on file.     Current Facility-Administered Medications   Medication     acetaminophen (TYLENOL) tablet 650 mg     bisacodyl (DULCOLAX) suppository 10 mg     cholecalciferol (D-VI-SOL, Vitamin D3) 10 mcg/mL (400 units/mL) liquid 50 mcg     dextrose 10% infusion     glucose gel 15-30 g    Or     dextrose 50 % injection 25-50 mL    Or     glucagon injection 1 mg     HYDROmorphone (DILAUDID) tablet 4 mg     HYDROmorphone (PF) (DILAUDID) injection 0.5 mg     hydrOXYzine (ATARAX) tablet 25 mg     insulin aspart (NovoLOG) injection (RAPID ACTING)     insulin aspart (NovoLOG) injection (RAPID ACTING)     insulin aspart (NovoLOG) injection (RAPID ACTING)     insulin glargine (LANTUS PEN) injection 8 Units     lactated ringers infusion     lidocaine (LMX4) cream     lidocaine 1 % 0.1-1 mL     melatonin tablet 6 mg     naloxone (NARCAN) injection 0.2 mg    Or     naloxone (NARCAN) injection 0.4 mg    Or     naloxone (NARCAN) injection 0.2 mg    Or     naloxone (NARCAN) injection 0.4 mg     ondansetron (ZOFRAN ODT) ODT tab 4 mg    Or     ondansetron (ZOFRAN) injection 4 mg     pantoprazole (PROTONIX) IV push injection 40 mg     piperacillin-tazobactam (ZOSYN) 3.375 g vial to attach to  mL bag     polyethylene glycol (MIRALAX) Packet 17 g     sodium chloride (PF) 0.9% PF flush 3 mL     sodium chloride (PF) 0.9%  "PF flush 3 mL       Allergies  No Known Allergies      Family History  Family History   Problem Relation Age of Onset     Diabetes Type 2  Father      Cirrhosis Father      Genetic Disorder Brother         missing [id number], mild retardation     Colon Polyps Brother      Colon Cancer Paternal Uncle      Pancreatic Cancer Paternal Aunt      Pancreatitis Cousin      Social History   ;     Physical Exam approx 3 PM  BP (!) 126/92 (BP Location: Right arm, Cuff Size: Adult Regular)   Pulse 100   Temp 100.4  F (38  C) (Oral)   Resp 18   Ht 1.829 m (6' 0.01\")   Wt 80.6 kg (177 lb 11.1 oz)   SpO2 97%   BMI 24.09 kg/m    Body mass index is 24.09 kg/m .  GENERAL lying in bed, HOB up; rigors; blankets   SKIN: normal color, temperature, texture   HEENT: PER, no scleral icterus, eyelid retraction, stare, lid lag, proptosis or conjunctival injection.     NECK: supple.  No visible neck masses,   LUNGS: clear to auscultation bilaterally.   CARDIAC: RRR, S1, S2 without murmurs, rubs or gallops.    ABDOMEN: Gtube upper mid abdomen; quiet BS - not palpated  BACK: normal spinal contour.    NEURO: Alert, responds appropriately to questions,     DATA REVIEW     Latest Reference Range & Units 07/09/23 05:01   Sodium 136 - 145 mmol/L 134 (L)   Potassium 3.4 - 5.3 mmol/L 4.3   Chloride 98 - 107 mmol/L 100   Carbon Dioxide (CO2) 22 - 29 mmol/L 24   Urea Nitrogen 6.0 - 20.0 mg/dL 14.4   Creatinine 0.67 - 1.17 mg/dL 0.69   GFR Estimate >60 mL/min/1.73m2 >90   Calcium 8.6 - 10.0 mg/dL 8.7   Anion Gap 7 - 15 mmol/L 10   Albumin 3.5 - 5.2 g/dL 3.3 (L)   Protein Total 6.4 - 8.3 g/dL 6.1 (L)   Alkaline Phosphatase 40 - 129 U/L 396 (H)   ALT 0 - 70 U/L 231 (H)   AST 0 - 45 U/L 100 (H)   Bilirubin Total <=1.2 mg/dL 8.1 (H)   Glucose 70 - 99 mg/dL 271 (H)   Lipase 13 - 60 U/L 34   (L): Data is abnormally low  (H): Data is abnormally high      EXAM: CT ABDOMEN AND PELVIS WITH CONTRAST  LOCATION: [facility]  DATE: 07/08/2023     INDICATION: Patient with pancreatic mass and recently placed biliary stent, now with worsening abdominal pain.  COMPARISON: None.  TECHNIQUE: CT scan of the abdomen and pelvis was performed following injection of IV contrast. Multiplanar reformats were obtained. Dose reduction techniques were used.  CONTRAST: 77 mL isovue 370.  FINDINGS:   LOWER CHEST: Minimal dependent atelectasis versus less likely infiltrate.  HEPATOBILIARY: Biliary stent in good position with mild biliary dilatation and pneumobilia. There is a calcific density adjacent to the stent, it is unclear if this is a pancreatic calcification or a calcified gallstone. No focal liver lesions   demonstrated.  PANCREAS: Pancreas mass in the head and body measuring approximately 6.3 x 3.9 cm. No definite pancreatic ductal dilatation.  SPLEEN: Normal size.  ADRENAL GLANDS: No significant nodules.  KIDNEYS/BLADDER: No significant mass, stone, or hydronephrosis.  BOWEL: No obstruction or inflammatory change.  LYMPH NODES: Mild peripancreatic adenopathy.  VASCULATURE: No abdominal aortic aneurysm.  PELVIC ORGANS: No pelvic masses.  MUSCULOSKELETAL: No frankly destructive bony lesions.                                                              IMPRESSION:   1.  Pancreas mass without pancreatic ductal dilatation.  2.  Biliary stent in good position with mild pneumobilia.  3.  Question a calcific density adjacent to the distal biliary stent, it is unclear if this is a pancreatic calcification in the adjacent parenchyma versus a calcified gallstone.

## 2023-07-09 NOTE — PROGRESS NOTES
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     Changes today:  -HR in 1102- 120s, WBC rising to 12s. Bcx x2 ordered and started on zosyn 3.375 q6h for empiric coverage for sepsis, possibly from pancreatic infection  -Appreciate GI recs for pancreatic head mass work-up - recommend MRCP, Ig4 levels, and fecal elastase  -NPO except for meds and ice chips at midnight for MRCP  -Appreciate endo recs for insulin  -Appreciate nutrition recs - home continuous Peptamen then transition to Vital.   -Spoke with oncology 7/9, patient overwhelmed. Once work-up is completed, can place outpatient referral to oncology if indicated. Consult cancelled.  -Dilaudid 2 mg po changed to 4 mg po q3h prn (hopefully will providing long-lasting relief compared to IV Dilaudid). Continue IV dilaudid and only use if po is ineffective.  -Continue fluids for at least another 24 hours, reassess 7/10  -Noted Rapid response this PM due to hypotension. Likely due to vasovagal response vs acute on chronic dehydration, malnutrition. Self-resolved, no intervention.      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 Initially presented with sudden onset vomiting on 3/2023, found to have lipase >2000  but did not have active abdominal pain, with presumed dx of pancreatitis. Continued to have intractable bilious vomiting despite multiple admissions to the hospital, later found to have an acquired duodenal stricture with gastric outlet obstruction thought to be 2/2 pancreatic inflammation. Failed multiple PO trials and NG/NJ tubes, ultimately had PEGJ placed for gastric venting and nurition with unintentional weight loss of 40 lbs. Reports continued weight loss despite enteral tube feeds. Repeat CT imaging of abdomen on 6/2/2023 in follow-up of pancreatitis with note of more focal irregular hypodense masslike region in the pancreatic head (2.2 x 1.5 x 1.4 cm) with persistent mild main pancreatic ductal dilatation, subcentimeter mid mesenteric lymph nodes, stable narrowing of the main portal vein near the portal venous confluence. EUS biopsy was performed on 6/27/23 showing duodenal inflammation. Unable to get biopsy of pancreatic mass due to poor window.  Patient then developed new abdominal pain since 6/27 after biopsy, bandlike, radiating to his back, with increase jaundice. 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 Unfortunately patient's pain progresses which is why he presents to the ED, and wanting a second opinion. Currnetly Afebrile. Mild tachycardia, but VSS. Labs notable for LFT started rising since May, currently with alk phos 404, AST 83, , and T bili 6.7 (rising since labs in care-everywhere with T bilirubin on 7/3 5.3).  Jaundice on exam with generalized abdominal pain.  CT abd/pelvis in ED with pancreatic mass without pancreatic ductal dilation with biliary stent in good  position with a possible calcific density adjacent to the distal biliary stent (pancreatic calcification vs calcified gallstone). Could have pancreatic pseudocyst or inflammation causing obstruction, but with progressive symptoms and weight loss need to rule out underlying pancreatic malignancy.   - Panc/Bili consult pending  - Nutrition - Continue tube feeds    - Peptamen 1.5, 90 ml/hr.  ml Q2H - once out of Peptamen, change to Vital 1.5 @ 90 ml/hr ( 2160 ml/day) to provide: 3240 kcals ( 40 kcal/kg), 145 g PRO ( 1.8 gm/kg), 1649 ml free H20, 404 gm CHO, and 12.3 gm soluble fiber daily.  - LR 75 ml/hr   - Pain management: tylenol 650 mg Q6H PRN, Dilaudid PO 2 mg Q3H PRN, IV dilaudid 0.5 mg Q2H PRN breakthrough pain  - Bowel regimen with miralax   - Atarax PRN itching   - Repeat CEA, CA 19-9. Prior CA 19-9 was low at 7 back in 4/2023.   - Spoke with oncology 7/9, patient overwhelmed. Once work-up is completed, can place outpatient referral to oncology if indicated. Consult cancelled.    # IDDM2 - Diagnosed after initial dx pancreatitis 3/2023 likely 2/2 pancreatitic insufficiency.   - Hemoglobin A1c 7.9 on admission  - Endocrine consult  - Lantus 8 units daily  - Novolog sliding scale Q4H     # Acute on chronic anemia - H/H 8.8/27.1, down from one week ago on 7/3, patient was at H/H 11.1/33.2. No reported bleeding.   - Trend CBC  - Transfuse for hgb goal >7.0     # Malnutrition   - Nutrition consulted as noted above       Diet: Combination Diet Regular Diet Adult  Adult Formula Drip Feeding: Continuous Other; Peptamen 1.5; Jejunostomy; Goal Rate: 90; mL/hr; From: 12:00 AM; To: 11:59 PM  Adult Formula Drip Feeding: Continuous Vital 1.5; Jejunostomy; Goal Rate: Once patient is out of peptamen 1.5 ( home TF regimen and spouse will try to bring some more tomorrow), for now substitute tube feeds with Vital 1.5, Initiate @ 50 ml/hr and...    DVT Prophylaxis: Pneumatic Compression Devices  Adan Catheter: Not  present  Lines: None     Cardiac Monitoring: None  Code Status: Full Code      Clinically Significant Risk Factors              # Hypoalbuminemia: Lowest albumin = 3.3 g/dL at 7/9/2023  5:01 AM, will monitor as appropriate  # Coagulation Defect: INR = 1.78 (Ref range: 0.85 - 1.15) and/or PTT = N/A, will monitor for bleeding            # Moderate Malnutrition: based on nutrition assessment, PRESENT ON ADMISSION        Disposition Plan      Expected Discharge Date: 07/11/2023                  Lucas Ray MD  Hospitalist Service, GOLD TEAM [address] [place] [address]  Securely message with [employer] (more info)  Text page via [facility] Paging/Directory   See signed in provider for up to date coverage information  ______________________________________________________________________    Interval History   Admitted yesterday PM. Seen and examined at bedside. Noting increased abdominal distension. Pain also not well controlled with oral Dilaudid but better controlled with IV dilaudid but does not last long. Pain meds adjusted as above.    Wife Violet also present. Explained to Gallo and Violet pathophysiology of what is happening with GOO and pancreas. Also discussed why reflux may occur in Gallo's case.    Pending GI recs for pancreatic bx of head mass and endo recs for diabetes.     Noted to them I ordered blood cultures and started him on abx due to rising WBC and tachycardia. Will continue fluids for now.    Physical Exam   Vital Signs: Temp: 100.4  F (38  C) Temp src: Oral BP: (!) 126/92 Pulse: 100   Resp: 18 SpO2: 97 % O2 Device: None (Room air)    Weight: 177 lbs 11.05 oz  General: Not in acute distress.  Head: Atraumatic. Normocephalic.   Eyes: Anicteric without injection. Eyes conjugate. Extraocular movements intact.  Ear, Nose, and Throat: Mouth pink and moist without lesions. Neck without overt masses.  Pulmonary: Unlabored. Clear to auscultation bilaterally. Speaking in full  sentences  Cardiovascular: S1 and S2 noted. No murmurs, rubs, or gallops. No overt jugular venous distension. Non-edematous.  Abdomen: Somewhat distended. Soft. Somewhat tender to epigastric palpation. Normal bowel sound present. G-tube present.   Skin: Warm. Dry. No bruises or rashes noted.  Musculoskeletal: Joints of hand normal. Muscle bulk and tone normal in UE and LE.  Neuro: Speech fluent. Face symmetric. No focal neurologic deficit noted.  Psych: Normal mood and affect. Sensorium, gross memory, thought processes, and fund of knowledge intact.      Medical Decision Making       55 MINUTES SPENT BY ME on the date of service doing chart review, history, exam, documentation & further activities per the note.      Data     I have personally reviewed the following data over the past 24 hrs:    12.0 (H)  \   10.8 (L)   / 308     134 (L) 100 14.4 /  200 (H)   4.3 24 0.69 \       ALT: 231 (H) AST: 100 (H) AP: 396 (H) TBILI: 8.1 (H)   ALB: 3.3 (L) TOT PROTEIN: 6.1 (L) LIPASE: 34       TSH: N/A T4: N/A A1C: N/A       INR:  1.78 (H) PTT:  N/A   D-dimer:  N/A Fibrinogen:  N/A

## 2023-07-09 NOTE — PROGRESS NOTES
Admitted/transferred from: [facility]   Reason for admission/transfer: Estab care, pain management, pancreatic mass workup  Patient status upon admission/transfer: RA, reports abd pain, L PIV  Interventions: CHG bath, VS obtained, PA at bedside   Plan: Continue tube feeds, monitor glucose, okay to use pt glucometer per team- verify w/ hospital glucose machine. Trend labs.   2 RN skin assessment: completed by Berta CASTANO And Ernesto LOZOYA  Sexual Orientation and Gender Identity (SOGI) smartfom completed: Done/Not Done  Result of skin assessment and interventions/actions: blanchable erythema across waistline d/t underwear band. Blanchable erythema on buttocks and sacrum  Height, weight, drug calc weight: Done  Patient belongings (see Flowsheet - Adult Profile for details):   MDRO education (if applicable):

## 2023-07-10 ENCOUNTER — APPOINTMENT (OUTPATIENT)
Dept: MRI IMAGING | Facility: CLINIC | Age: 58
DRG: 435 | End: 2023-07-10
Attending: STUDENT IN AN ORGANIZED HEALTH CARE EDUCATION/TRAINING PROGRAM
Payer: COMMERCIAL

## 2023-07-10 LAB
ALBUMIN SERPL BCG-MCNC: 2.9 G/DL (ref 3.5–5.2)
ALP SERPL-CCNC: 332 U/L (ref 40–129)
ALT SERPL W P-5'-P-CCNC: 198 U/L (ref 0–70)
ANION GAP SERPL CALCULATED.3IONS-SCNC: 11 MMOL/L (ref 7–15)
AST SERPL W P-5'-P-CCNC: 85 U/L (ref 0–45)
BILIRUB SERPL-MCNC: 8.7 MG/DL
BUN SERPL-MCNC: 12.8 MG/DL (ref 6–20)
CALCIUM SERPL-MCNC: 8.6 MG/DL (ref 8.6–10)
CANCER AG19-9 SERPL IA-ACNC: 29 U/ML
CHLORIDE SERPL-SCNC: 100 MMOL/L (ref 98–107)
CREAT SERPL-MCNC: 0.64 MG/DL (ref 0.67–1.17)
DEPRECATED CALCIDIOL+CALCIFEROL SERPL-MC: 33 UG/L (ref 20–75)
DEPRECATED HCO3 PLAS-SCNC: 23 MMOL/L (ref 22–29)
ENTEROCOCCUS FAECALIS: NOT DETECTED
ENTEROCOCCUS FAECIUM: NOT DETECTED
ERYTHROCYTE [DISTWIDTH] IN BLOOD BY AUTOMATED COUNT: 15.1 % (ref 10–15)
GFR SERPL CREATININE-BSD FRML MDRD: >90 ML/MIN/1.73M2
GLUCOSE BLDC GLUCOMTR-MCNC: 144 MG/DL (ref 70–99)
GLUCOSE BLDC GLUCOMTR-MCNC: 147 MG/DL (ref 70–99)
GLUCOSE BLDC GLUCOMTR-MCNC: 171 MG/DL (ref 70–99)
GLUCOSE BLDC GLUCOMTR-MCNC: 173 MG/DL (ref 70–99)
GLUCOSE BLDC GLUCOMTR-MCNC: 201 MG/DL (ref 70–99)
GLUCOSE BLDC GLUCOMTR-MCNC: 224 MG/DL (ref 70–99)
GLUCOSE BLDC GLUCOMTR-MCNC: 234 MG/DL (ref 70–99)
GLUCOSE SERPL-MCNC: 135 MG/DL (ref 70–99)
HCT VFR BLD AUTO: 29.3 % (ref 40–53)
HGB BLD-MCNC: 9.1 G/DL (ref 13.3–17.7)
HOLD SPECIMEN: NORMAL
INR PPP: 1.96 (ref 0.85–1.15)
LISTERIA SPECIES (DETECTED/NOT DETECTED): NOT DETECTED
MAGNESIUM SERPL-MCNC: 1.9 MG/DL (ref 1.7–2.3)
MCH RBC QN AUTO: 31 PG (ref 26.5–33)
MCHC RBC AUTO-ENTMCNC: 31.1 G/DL (ref 31.5–36.5)
MCV RBC AUTO: 100 FL (ref 78–100)
PHOSPHATE SERPL-MCNC: 2.8 MG/DL (ref 2.5–4.5)
PLATELET # BLD AUTO: 233 10E3/UL (ref 150–450)
POTASSIUM SERPL-SCNC: 3.9 MMOL/L (ref 3.4–5.3)
POTASSIUM SERPL-SCNC: 3.9 MMOL/L (ref 3.4–5.3)
PROT SERPL-MCNC: 5.7 G/DL (ref 6.4–8.3)
RBC # BLD AUTO: 2.94 10E6/UL (ref 4.4–5.9)
SODIUM SERPL-SCNC: 134 MMOL/L (ref 136–145)
STAPHYLOCOCCUS AUREUS: NOT DETECTED
STAPHYLOCOCCUS EPIDERMIDIS: DETECTED
STAPHYLOCOCCUS LUGDUNENSIS: NOT DETECTED
STREPTOCOCCUS AGALACTIAE: NOT DETECTED
STREPTOCOCCUS ANGINOSUS GROUP: NOT DETECTED
STREPTOCOCCUS PNEUMONIAE: NOT DETECTED
STREPTOCOCCUS PYOGENES: NOT DETECTED
STREPTOCOCCUS SPECIES: NOT DETECTED
WBC # BLD AUTO: 7.2 10E3/UL (ref 4–11)

## 2023-07-10 PROCEDURE — 250N000011 HC RX IP 250 OP 636: Mod: JZ | Performed by: STUDENT IN AN ORGANIZED HEALTH CARE EDUCATION/TRAINING PROGRAM

## 2023-07-10 PROCEDURE — 250N000013 HC RX MED GY IP 250 OP 250 PS 637: Performed by: PHYSICIAN ASSISTANT

## 2023-07-10 PROCEDURE — 258N000001 HC RX 258: Performed by: PHYSICIAN ASSISTANT

## 2023-07-10 PROCEDURE — 120N000002 HC R&B MED SURG/OB UMMC

## 2023-07-10 PROCEDURE — 85610 PROTHROMBIN TIME: CPT | Performed by: DIETITIAN, REGISTERED

## 2023-07-10 PROCEDURE — 250N000013 HC RX MED GY IP 250 OP 250 PS 637: Performed by: STUDENT IN AN ORGANIZED HEALTH CARE EDUCATION/TRAINING PROGRAM

## 2023-07-10 PROCEDURE — 84100 ASSAY OF PHOSPHORUS: CPT | Performed by: PHYSICIAN ASSISTANT

## 2023-07-10 PROCEDURE — 74183 MRI ABD W/O CNTR FLWD CNTR: CPT

## 2023-07-10 PROCEDURE — 84132 ASSAY OF SERUM POTASSIUM: CPT | Performed by: STUDENT IN AN ORGANIZED HEALTH CARE EDUCATION/TRAINING PROGRAM

## 2023-07-10 PROCEDURE — 99233 SBSQ HOSP IP/OBS HIGH 50: CPT | Performed by: CLINICAL NURSE SPECIALIST

## 2023-07-10 PROCEDURE — A9585 GADOBUTROL INJECTION: HCPCS | Mod: JZ | Performed by: STUDENT IN AN ORGANIZED HEALTH CARE EDUCATION/TRAINING PROGRAM

## 2023-07-10 PROCEDURE — 258N000003 HC RX IP 258 OP 636: Performed by: PHYSICIAN ASSISTANT

## 2023-07-10 PROCEDURE — 250N000009 HC RX 250: Performed by: STUDENT IN AN ORGANIZED HEALTH CARE EDUCATION/TRAINING PROGRAM

## 2023-07-10 PROCEDURE — 36415 COLL VENOUS BLD VENIPUNCTURE: CPT | Performed by: DIETITIAN, REGISTERED

## 2023-07-10 PROCEDURE — 80053 COMPREHEN METABOLIC PANEL: CPT | Performed by: DIETITIAN, REGISTERED

## 2023-07-10 PROCEDURE — 250N000011 HC RX IP 250 OP 636: Mod: JZ | Performed by: PHYSICIAN ASSISTANT

## 2023-07-10 PROCEDURE — 99233 SBSQ HOSP IP/OBS HIGH 50: CPT | Performed by: DIETITIAN, REGISTERED

## 2023-07-10 PROCEDURE — 82653 EL-1 FECAL QUANTITATIVE: CPT | Performed by: PHYSICIAN ASSISTANT

## 2023-07-10 PROCEDURE — 74183 MRI ABD W/O CNTR FLWD CNTR: CPT | Mod: 26 | Performed by: RADIOLOGY

## 2023-07-10 PROCEDURE — 85027 COMPLETE CBC AUTOMATED: CPT | Performed by: DIETITIAN, REGISTERED

## 2023-07-10 PROCEDURE — 36415 COLL VENOUS BLD VENIPUNCTURE: CPT | Performed by: PHYSICIAN ASSISTANT

## 2023-07-10 PROCEDURE — 255N000002 HC RX 255 OP 636: Mod: JZ | Performed by: STUDENT IN AN ORGANIZED HEALTH CARE EDUCATION/TRAINING PROGRAM

## 2023-07-10 PROCEDURE — 83735 ASSAY OF MAGNESIUM: CPT | Performed by: PHYSICIAN ASSISTANT

## 2023-07-10 PROCEDURE — 99233 SBSQ HOSP IP/OBS HIGH 50: CPT | Performed by: STUDENT IN AN ORGANIZED HEALTH CARE EDUCATION/TRAINING PROGRAM

## 2023-07-10 PROCEDURE — C9113 INJ PANTOPRAZOLE SODIUM, VIA: HCPCS | Mod: JZ | Performed by: PHYSICIAN ASSISTANT

## 2023-07-10 RX ORDER — HYDROXYZINE HYDROCHLORIDE 10 MG/1
10 TABLET, FILM COATED ORAL 3 TIMES DAILY PRN
Status: DISCONTINUED | OUTPATIENT
Start: 2023-07-10 | End: 2023-07-10

## 2023-07-10 RX ORDER — GADOBUTROL 604.72 MG/ML
8 INJECTION INTRAVENOUS ONCE
Status: DISCONTINUED | OUTPATIENT
Start: 2023-07-10 | End: 2023-07-10

## 2023-07-10 RX ORDER — CHOLESTYRAMINE 4 G/9G
1 POWDER, FOR SUSPENSION ORAL 2 TIMES DAILY
Status: DISCONTINUED | OUTPATIENT
Start: 2023-07-10 | End: 2023-07-16 | Stop reason: HOSPADM

## 2023-07-10 RX ORDER — PHYTONADIONE 5 MG/1
5 TABLET ORAL ONCE
Status: DISCONTINUED | OUTPATIENT
Start: 2023-07-10 | End: 2023-07-10

## 2023-07-10 RX ORDER — GADOBUTROL 604.72 MG/ML
8 INJECTION INTRAVENOUS ONCE
Status: COMPLETED | OUTPATIENT
Start: 2023-07-10 | End: 2023-07-10

## 2023-07-10 RX ADMIN — GADOBUTROL 8 ML: 604.72 INJECTION INTRAVENOUS at 10:04

## 2023-07-10 RX ADMIN — PIPERACILLIN AND TAZOBACTAM 3.38 G: 3; .375 INJECTION, POWDER, LYOPHILIZED, FOR SOLUTION INTRAVENOUS at 22:55

## 2023-07-10 RX ADMIN — PIPERACILLIN AND TAZOBACTAM 3.38 G: 3; .375 INJECTION, POWDER, LYOPHILIZED, FOR SOLUTION INTRAVENOUS at 04:38

## 2023-07-10 RX ADMIN — PIPERACILLIN AND TAZOBACTAM 3.38 G: 3; .375 INJECTION, POWDER, LYOPHILIZED, FOR SOLUTION INTRAVENOUS at 11:20

## 2023-07-10 RX ADMIN — HYDROXYZINE HYDROCHLORIDE 25 MG: 25 TABLET, FILM COATED ORAL at 04:43

## 2023-07-10 RX ADMIN — HYDROMORPHONE HYDROCHLORIDE 4 MG: 4 TABLET ORAL at 12:48

## 2023-07-10 RX ADMIN — HYDROMORPHONE HYDROCHLORIDE 4 MG: 4 TABLET ORAL at 16:11

## 2023-07-10 RX ADMIN — CHOLESTYRAMINE 4 G: 4 POWDER, FOR SUSPENSION ORAL at 20:02

## 2023-07-10 RX ADMIN — Medication: at 05:31

## 2023-07-10 RX ADMIN — HYDROXYZINE HYDROCHLORIDE 25 MG: 25 TABLET, FILM COATED ORAL at 12:52

## 2023-07-10 RX ADMIN — DEXTROSE MONOHYDRATE 1000 ML: 100 INJECTION, SOLUTION INTRAVENOUS at 00:09

## 2023-07-10 RX ADMIN — HYDROXYZINE HYDROCHLORIDE 25 MG: 25 TABLET, FILM COATED ORAL at 20:15

## 2023-07-10 RX ADMIN — PANTOPRAZOLE SODIUM 40 MG: 40 INJECTION, POWDER, FOR SOLUTION INTRAVENOUS at 09:17

## 2023-07-10 RX ADMIN — HYDROMORPHONE HYDROCHLORIDE 4 MG: 4 TABLET ORAL at 05:59

## 2023-07-10 RX ADMIN — SODIUM CHLORIDE, POTASSIUM CHLORIDE, SODIUM LACTATE AND CALCIUM CHLORIDE: 600; 310; 30; 20 INJECTION, SOLUTION INTRAVENOUS at 00:10

## 2023-07-10 RX ADMIN — INSULIN GLARGINE 8 UNITS: 100 INJECTION, SOLUTION SUBCUTANEOUS at 22:55

## 2023-07-10 RX ADMIN — HYDROMORPHONE HYDROCHLORIDE 4 MG: 4 TABLET ORAL at 03:03

## 2023-07-10 RX ADMIN — HYDROMORPHONE HYDROCHLORIDE 4 MG: 4 TABLET ORAL at 20:15

## 2023-07-10 RX ADMIN — PHYTONADIONE 5 MG: 10 INJECTION, EMULSION INTRAMUSCULAR; INTRAVENOUS; SUBCUTANEOUS at 18:19

## 2023-07-10 RX ADMIN — HYDROMORPHONE HYDROCHLORIDE 4 MG: 4 TABLET ORAL at 09:17

## 2023-07-10 RX ADMIN — CHOLESTYRAMINE 4 G: 4 POWDER, FOR SUSPENSION ORAL at 09:18

## 2023-07-10 RX ADMIN — PIPERACILLIN AND TAZOBACTAM 3.38 G: 3; .375 INJECTION, POWDER, LYOPHILIZED, FOR SOLUTION INTRAVENOUS at 16:01

## 2023-07-10 RX ADMIN — Medication 50 MCG: at 09:17

## 2023-07-10 ASSESSMENT — ACTIVITIES OF DAILY LIVING (ADL)
ADLS_ACUITY_SCORE: 22

## 2023-07-10 NOTE — PLAN OF CARE
Neuro: A&Ox4.   Cardiac: No tele. VSS.   Respiratory: Sating >94% on RA.  GI/: Adequate urine output. No BM  Diet/appetite: Tolerating reg diet. NPO since MN. TF at 50 ml/hr, paused at MN, D10 started at 50 ml/hr.  Activity:  SBA  Pain: Pain is managed with Dilaudid Q3H, pt reports itching all over his body, prn atarax ineffective. Paged team, benadryl cream ordered with good relief. Cholestyramine ordered BID  Skin: No new deficits noted.  LDA's: PIVx2, GJ tube   MRCP today  Plan: Continue with POC. Notify primary team with changes.

## 2023-07-10 NOTE — PROGRESS NOTES
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     #Hyperglycemia./ Diabetes mellitus type 3 related to pancreatic disease with Hyperglycemia exacerbated by tube feeding and stress       - aspart 1 units per 15 grams carb for PO intake, meals/snacks  - aspart high resistance correction 4h until on stable glargine for TF  - glargine 8 units at HS-- appears to fairly closely match patient's basal need.  - ADD glargine 8 units every morning, first dose now, timed with restart of Vital 1.5 at 90 ml/h.  This is still conservative based on pt's estimated insulin to carb ratio.  - BG q4h  - hypoglycemia protocol  - PRN D10W revised to read-- Infuse IV D10W at 120 ml/h-- this replaces 75% of carbs in Vital 1.5 at 90 ml/h.  IF glucose trending >180, reduce rate to 95 ml/h.  If still trending >180, reduce rate to 70 ml/h.  - education needs: assessment ongoing       Outpatient diabetes follow up: TBD-- strongly recommend diabetes specialty care  Plan discussed with patient, bedside RN, dietician and paged primary team.           Interval History:     The last 24 hours progress and nursing notes reviewed.  Glucose improved toward target once receiving D10W at 50 rather than TF at 50, 5 grams carb/h rather than 9.4.  D10W totally stopped between about 0895-7942 and no hypoglycemia developed: 147 upon return from [facility].  TF now  Los 4d  Chart reviewed by [facility].    Current 02 demand 7L/ NC  C/o SOB during Ambulation  Will continue to follow for discharge planning needs  Please consult  if any new issues arise  Discharge plan is Home with family assistance possible home 02 needed "started at GOAL 90 ml/h, providing 16.8 grams carb/h    Gallo notes his glucose has never been in target when TF running and he has not had advise to increase glargine, only been using tons of aspart.  Described proactive approach and he understands this, wants to move ahead with more glargine.  He likes the idea of BID dosing.  Has his CGMS and is tracking his trends.  He also logs his bolus doses.  Lowest BG on CGMS was 96 around 0330 today    If his basal need is about 8 units and represents 50% of his need, insulin to carb ratio is about 1 per 30.  TF provides 404 grams carb.  May need another 13 units of glargine to cover TF carbs.        Recent Labs   Lab 07/10/23  1113 07/10/23  0834 07/10/23  0437 07/10/23  0408 07/10/23  0100 07/09/23  1503   * 171* 144* 135* 201* 200*           Nutrition:     Orders Placed This Encounter      NPO per Anesthesia Guidelines for Procedure/Surgery Except for: Meds, Ice Chips            PTA Regimen:   lantus 8 units at HS  aspart prn - up to 40 units/day - using unknown scale and frequency, based on his cgm review  And home TF was Peptamin 1.5 at 90 ml/h            Review of Systems:   See interval hx          Medications of note:   No steroid         Physical Exam:     Gen: Alert, in NAD   HEENT: NC/AT hearing intact to conversational volume  Resp: Unlabored  Skin: jaundiced  Neuro: oriented x3, communicating clearly  Psych: calm mood, easily engaged  /67 (BP Location: Right arm)   Pulse 94   Temp 98.3  F (36.8  C) (Oral)   Resp 16   Ht 1.829 m (6' 0.01\")   Wt 81.1 kg (178 lb 12.7 oz)   SpO2 96%   BMI 24.24 kg/m               Data:     Lab Results   Component Value Date    A1C 7.9 07/08/2023          No results found for: HEBMP, [id number], CREAT      CBC RESULTS:   Recent Labs   Lab Test 07/10/23  0840   WBC 7.2   RBC 2.94*   HGB 9.1*   HCT 29.3*      MCH 31.0   MCHC 31.1*   RDW 15.1*        Recent Labs   Lab Test 07/10/23  1113 " 07/10/23  0834 07/10/23  0437 07/10/23  0408 07/09/23  1127 07/09/23  0501   NA  --   --   --  134*  --  134*   POTASSIUM  --   --   --  3.9  3.9  --  4.3   CHLORIDE  --   --   --  100  --  100   CO2  --   --   --  23  --  24   ANIONGAP  --   --   --  11  --  10   * 171*   < > 135*   < > 271*   BUN  --   --   --  12.8  --  14.4   CR  --   --   --  0.64*  --  0.69   DENISE  --   --   --  8.6  --  8.7    < > = values in this interval not displayed.     Liver Function Studies -   Recent Labs   Lab Test 07/10/23  0408   PROTTOTAL 5.7*   ALBUMIN 2.9*   BILITOTAL 8.7*   ALKPHOS 332*   AST 85*   *     Lab Results   Component Value Date    INR 1.96 07/10/2023    INR 1.78 07/09/2023    INR 1.37 07/08/2023       No results found for: HEBMP, COMCBC      50 minutes spent on the date of the encounter doing chart review, history and exam, coordinating care/communication, documentation and further activities per the note.          Iris MCALLISTER CNS   To contact Endocrine Diabetes service:   From 8AM-4PM: page inpatient diabetes provider that is following the patient that day (see filed or incomplete progress notes/consult notes).  If uncertain of provider assignment: page job code [phone].  For questions or updates from 4PM-8AM: page the diabetes job code for on call fellow: [phone]    Please notify inpatient diabetes service if changes are planned that will impact glycemia, such as changes to steroids, enteral feeding, parenteral feeding, dextrose fluids or procedures requiring prolonged NPO status.

## 2023-07-10 NOTE — PLAN OF CARE
"Status [id number]    BP 93/59 (BP Location: Right arm)   Pulse 105   Temp 99.9  F (37.7  C) (Oral)   Resp 18   Ht 1.829 m (6' 0.01\")   Wt 80.6 kg (177 lb 11.1 oz)   SpO2 98%   BMI 24.09 kg/m      A&O x4. Up SBA in room. VSS, tachy. Temp 99.9. Remains on continuous TF at 50mL/hr via Jtube. Gtube to gravity. PO dilaudid given for abdominal pain with adequate relief. LR infusing at 75mL/hr. Remains on IV abx. Complaints of itching, hydroxyzine given with little effect. Provider paged for additional dose.    Continue with plan of care, report changes to Gold 9.  "

## 2023-07-10 NOTE — PROGRESS NOTES
GASTROENTEROLOGY PROGRESS NOTE    Date of Admission: 7/8/2023  Reason for Admission: Pancreatic mass, hx biliary stent insertion      ASSESSMENT:  57 year old male with a history of idiopathic pancreatitis, diabetes on insulin who presented to the ED on 7/8/2023 with abdominal pain and jaundice.    #Idiopathic pancreatitis  #Abnormal imaging of the pancreas, concern for pancreas neoplasm   #Obstructive jaundice  #Leukocytosis, fever/chills    Pancreatitis History:  -Etiology: suspect idiopathic vs malignancy vs AI (pending IgG4 7/9) vs EtOH (pending PEth 7/9)  -Date of onset: 3/2023  -Concurrent organ failure: none  -Thromboses: none  -Diabetes: Type 3c (dx after initial dx of pancreatitis), A1C 7.9% (7/8), on insulin  -EPI: unknown - fecal elastase pending (7/9)  -Current nutrition access: PEG-J (placed 5/2023)  -Last imaging:    -7/8 CT A/P: Pancreas mass without pancreatic ductal dilatation, biliary stent in position with mild pneumobilia, calcific density adjacent to biliary stent (?calcified gallstone)   -7/10: MRCP pending  -Infection/anti-infectives: Zosyn (7/9-present), BC pending 7/9  -Interventions: None yet this adm.     Patient's main issue was intolerance to oral intake and persistent vomiting rather than pain. He had a prolonged hospital course at [facility] in April 2023 during which time he was found to have extrinsic compression of the duodenum which was thought to be due to pancreatitis, due to continued intolerance to oral diet,he was briefly on TPN and  a PEG-J tube was placed during that hosptialization. Despite tube feedings he continued to lose weight and needed ER visits/admissions for dehydration and hypotension and dyselectrolytemia.      More recently on 6/2/2023 he was found to have a mass in the pancreatic head region on imaging, this was evaluated via EUS on 6/27/2023 there was no safe window to biopsy vs deferred bx this lesion due to risk for infecting necrosis.  Few  days after this EUS procedure patient developed abdominal pain and symptomatic obstructive jaundice with c/f cholangitis given elevated LFTs, fever and leukocytosis.  Alk Phos remains elevated/stable but T.bili continues to rise and pt with worsening sx of pruritis.    It is unclear if the current  imaging abnormality in the pancreatic head regions/ uncinate process area is a neoplastic mass or if it is necrosis from prior attack of pancreatitis. CEA 10.6, CA 19-9 pending (7/8). Further imaging will help understand this better and awaiting completion of ordered MRCP.       #Moderate protein-calorie malnutrition  #S/p PEG-J (5/2023)  Presented with poor po d/t GOO due to duodenal stenosis and subsequent weight loss of 40#.  Taking po liquids for pleasure only PTA and Gtube vented essentially at all times with 1.5-2 L output daily and started receiving IVF infusions PTA as had hyponatremia and electrolyte abnormalities frequently.  Pending fecal elastase to evaluate need for PERT; consider evaluate fat soluble vitamin levels if determine patient has EPI.  Dietitian following.  Receiving peptide-based TFs.    RECOMMENDATIONS:  -- Await MRCP for further evaluation of the pancreatic anatomy and bile duct.  Tentatively plan (add on) for ERCP/EUS with possible bx/GJ exchange on 7/11 (pending imaging).  -- Will defer procedures 7/10 and can resume TF/CL for now.  Make NPO/hold TF at midnight 7/11.  -- Gtube to gravity.  -- Monitor Gtube outputs.  Consider replace with 0.5-1 mL IVF (LR) for every 1 mL of Gtube output to avoid hypovolemia and electrolyte imbalances.  -- Consider change to High electrolyte replacement protocols and add Phos to currently ordered Mg/K.  -- Vitamin K to correct INR <1.5 for possible procedure.  -- Repeat CBC, CMP and INR 7/11 AM (preprocedure labs).  -- Continue Zosyn  -- Follow blood cultures and LFTs  -- Follow IGG4 levels  -- Follow fecal elastase for need to consider PERT with TFs, indication  "to check ADEK levels.  -- Follow PEth  -- Inform GI pancreaticobiliary team on call if there is any relevant change in clinical condition of the patient, in case of decompensated infection he will need urgent hepatobiliary decompression.      The patient was discussed and plan agreed upon with GI staff, Dr. Pickett.    GI Follow up (we will arrange):  TBD pending clinical course.    Overall time spent on the date of this encounter preparing to see the patient (including chart review of available notes, clinical status events, imaging and labs); obtaining interim history/subjective data; ordering and/or coordinating medications, tests or procedures; ordering medications, tests or procedures; communicating with other health care professionals; and documenting the above clinical information in the electronic medical record was 50 minutes.     Iris Kaur PA-C  GI Service  [facility]  Text Page  _______________________________________________________________      Subjective: Nursing notes and 24hr events reviewed. Patient seen and examined at 11:45. Patient reports did have some chills yesterday but no fevers/chills so far today, c/o lower abd pain and feels distended but denies N/V.  Large BM yesterday. C/o significant itching.  Reports PTA was NPO or only drinking CL for pleasure and kept Gtube to gravity most of the time.    ROS:   4 pt ROS negative unless noted in subjective.     Objective:  Blood pressure 102/67, pulse 94, temperature 98.3  F (36.8  C), temperature source Oral, resp. rate 16, height 1.829 m (6' 0.01\"), weight 81.1 kg (178 lb 12.7 oz), SpO2 96 %.  Gen: Sitting up in bed. Pleasant male. Appears comfortable and in NAD  HEENT: NCAT. Sclera icteric.  CV: RRR, Peripheral perfusion intact  Resp: non-labored work of breathing on RA.  Abd: Soft, ND, minimally tender to palpation in lower quadrants but negative Dickens's, no guarding/peritoneal sx. PEG-J site c/d/i with " Gtube to gravity with purple liquid (per pt just drank grape juice) in bag.  Msk: no gross deformity  Skin: jaundice  Ext: warm, well perfused  Neuro: grossly normal  Mental status/Psych: A&O. Asks/answers questions appropriately     PROCEDURES:  None this adm.    LABS:  BMP  Recent Labs   Lab 07/10/23  0834 07/10/23  0437 07/10/23  0408 07/10/23  0100 07/09/23  1127 07/09/23  0501 07/08/23 2048 07/08/23  1137   NA  --   --  134*  --   --  134*  --  135*   POTASSIUM  --   --  3.9  3.9  --   --  4.3  --  4.3   CHLORIDE  --   --  100  --   --  100  --  98   DENISE  --   --  8.6  --   --  8.7  --  9.0   CO2  --   --  23  --   --  24  --  28   BUN  --   --  12.8  --   --  14.4  --  15.6   CR  --   --  0.64*  --   --  0.69  --  0.60*   * 144* 135* 201*   < > 271*   < > 199*    < > = values in this interval not displayed.     CBC  Recent Labs   Lab 07/10/23  0840 07/09/23  0501 07/08/23  1137   WBC 7.2 12.0* 6.8   RBC 2.94* 3.50* 2.77*   HGB 9.1* 10.8* 8.8*   HCT 29.3* 34.6* 27.1*    99 98   MCH 31.0 30.9 31.8   MCHC 31.1* 31.2* 32.5   RDW 15.1* 15.1* 14.8    308 270     INR  Recent Labs   Lab 07/10/23  0840 07/09/23 0501 07/08/23  1137   INR 1.96* 1.78* 1.37*     LFTs  Recent Labs   Lab 07/10/23  0408 07/09/23  0501 07/08/23  1137   ALKPHOS 332* 396* 404*   AST 85* 100* 83*   * 231* 224*   BILITOTAL 8.7* 8.1* 6.7*   PROTTOTAL 5.7* 6.1* 6.1*   ALBUMIN 2.9* 3.3* 3.3*      PANC  Recent Labs   Lab 07/09/23  0501 07/08/23  1137   LIPASE 34 38     IMAGING:  (Personally reviewed)    7/10/2023: MRCP - pending completion    7/8/2023: CT ABDOMEN AND PELVIS WITH CONTRAST  FINDINGS:   LOWER CHEST: Minimal dependent atelectasis versus less likely infiltrate.     HEPATOBILIARY: Biliary stent in good position with mild biliary dilatation and pneumobilia. There is a calcific density adjacent to the stent, it is unclear if this is a pancreatic calcification or a calcified gallstone. No focal liver lesions    demonstrated.     PANCREAS: Pancreas mass in the head and body measuring approximately 6.3 x 3.9 cm. No definite pancreatic ductal dilatation.     SPLEEN: Normal size.     ADRENAL GLANDS: No significant nodules.     KIDNEYS/BLADDER: No significant mass, stone, or hydronephrosis.     BOWEL: No obstruction or inflammatory change.     LYMPH NODES: Mild peripancreatic adenopathy.     VASCULATURE: No abdominal aortic aneurysm.     PELVIC ORGANS: No pelvic masses.     MUSCULOSKELETAL: No frankly destructive bony lesions.                                                                    IMPRESSION:   1.  Pancreas mass without pancreatic ductal dilatation.  2.  Biliary stent in good position with mild pneumobilia.  3.  Question a calcific density adjacent to the distal biliary stent, it is unclear if this is a pancreatic calcification in the adjacent parenchyma versus a calcified gallstone.

## 2023-07-10 NOTE — PROVIDER NOTIFICATION
Time of notification: 10:26 AM  Provider notified: Cory BELTRE  Patient status: positive blood cultures from 7/9. Gram+ cocci  Temp:  [97.9  F (36.6  C)-100.4  F (38  C)] 98.3  F (36.8  C)  Pulse:  [] 94  Resp:  [16-18] 16  BP: ()/(59-92) 102/67  SpO2:  [96 %-99 %] 96 %  Orders received: continue with POC

## 2023-07-10 NOTE — PROVIDER NOTIFICATION
"Provider paged: William Kimball    \"Pt reports feeling itchy, 25mg hydroxyzine given with little effect. Can he get extra dose?\"  "

## 2023-07-10 NOTE — PLAN OF CARE
Neuro: A&Ox4.   Cardiac: No tele. VSS. B/P: 112/69, T: 98.8, P: 99, R: 16  Respiratory: O2 sats stable on RA  GI/: Adequate urine output. No BM this shift. GJ - g to gravity drainage, J with TF.  Diet/appetite: Tolerating full liquid diet. TF via J tube @ 90ml/h with 60ml FWF Q1H. NPO at midnight for EUS tomorrow 7/11  Activity:  Assist of 1 up to chair and in halls.  Pain: At acceptable level on current regimen.   Skin: No new deficits noted. Generalized itching, hydroxyzine minimally effective. Benadryl cream not effective.  LDA's: PIV x2 SL., GJ - G to gravity, J with TF.    EUS tomorrow, NPO at midnight.    Plan: Continue with POC. Notify primary team with changes.

## 2023-07-10 NOTE — PROGRESS NOTES
CLINICAL NUTRITION SERVICES - BRIEF NOTE     Nutrition Prescription     Recommendations already ordered by Registered Dietitian (RD):    Updated TF order, ok to continue Vital 1.5 sneha as per [facility] formulary   Current TF regimen via J-port: Vital 1.5 (or equivalent) at 90 ml/hr continuous rate via J-tube to provide 2160 mL formula, 3240 Kcals (40 Kcal/kg), 147 gm protein (1.8 gm/kg), 404 gm CHO, 13 gm fiber, and 1650 mL free water daily       Metabolic cart study ordered/timing adjusted to Wednesday due to intermittent NPO status today and tomorrow (may alter results of test). Goal to better assess Kcal needs.     Future/Additional Recommendations:  Continue to monitor nutrition related findings per protocol     Free water flushes running 60 mL/hr continuously, monitor fluid status/hydration status for need to adjust    Monitor for result of fecal elastase levels; holding off on Relizorb at this time (differs from RD note yesterday)   For last full RD assessment, see note dated 7/9/23    NEW FINDINGS   - RD following up regarding nutrition POC/TFs. Pt receiving EN via J-port PTA with regimen consisting of Peptamen 1.5 sneha at rate of 90 mL/hr, run continuously x24 hours per day. Nutritionally equivalent formula Vital 1.5 initiated yesterday. Wife did bring in home formula, but pt tolerating Vital 1.5 fine and is ok with staying on this formula while admitted to [facility]. Will update TF orders.     - Fecal elastase not yet collected, but was ordered yesterday to rule out EPI. Pt denies having concern for loose/oily stools PTA; states his stools are actually pellet-like, or formed (not loose). Does admit to having some oily BMs over a month ago but state that this has resolved. Pt is aware to inform RN when he has a BM and is aware that fecal elastase levels are being checked. Will NOT order Relizorb cartridges with TF at present, given no current concern for loose stools but will monitor result of fecal elastase for need to  consider in future.     - Updated TF order to reflect ok to continue w/ Vital 1.5 formula as above. Discussed with endocrine team who are managing insulin regimen/dosing. Paged primary team MD to update as well (waiting for call back).      INTERVENTIONS  Implementation  Collaboration with other providers - Primary team MD (paged), endocrine team, bedside RN   Enteral Nutrition - Updated order to reflect POC. Not planning to order Relizorb unless indicated per fecal elastase.   Feeding tube flush - Continuous flushes per high volume fluid needs at baseline. Monitor fluid/hydration status for need to adjust.   Metabolic cart study ordered for Wednesday    Monitoring/Evaluation  Will continue to monitor and evaluate per protocol.    Puneet Hauser, SHANTANUN, LD, CNSC  [address]B work-room RD phone: *[phone]   [address]/Obs RD pager: [phone]    Weekend/Holiday RD pager [phone]

## 2023-07-10 NOTE — PLAN OF CARE
Goal Outcome Evaluation:                 Neuro: A&Ox4.   Cardiac: VSS. Tachy in the 110s. No tele    Respiratory: Sating >95 on RA.  GI/: Adequate urine output. No BM. STOOL SAMPLE NEEDED  Diet/appetite: regular diet, but with possibly biliary obstruction does not want any solid food. Drinking juice and water   Activity:  SBA to bathroom   Pain: 8/10 burning abdominal pain. IV dilaudid and PO dilaudid through J tube given consistently throughout shift   Skin: juandice   LDA's: 2 left PIVs infusing LR and intermittent antibiotics     Plan: Continue with POC. Notify primary team with changes.       Time of notification: 1500 PM  Provider notified: Dr. Ray  Patient status: pt developed rigors. Temp 99.4. systolic BP in the 70s. . LOCx4  Orders received:  No new orders. Rapid called. Subsequent Bps increased to 130s sysolic. Low BP. May have been a vaso vagal response. Pt already had blood cultures drawn and is receiving antibiotics

## 2023-07-10 NOTE — PROGRESS NOTES
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      Changes today:  -Blood culture x 1 + for staph epidermidis, methicillin sensitive. Unclear if contaminant, pt continuing on zosyn which should adequately cover for now. Will re-draw blood cultures and monitor closely.   -Appreciate GI recs for pancreatic head mass work-up - plan for EGD/EUS on 7/11. MRCP completed today 7/10. NPO @ midnight.   -VIt K PO dose per pharm given today 7/10  -Appreciate endo recs for insulin  -Appreciate nutrition recs - home continuous Peptamen then transition to Vital.   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 Initially presented with sudden onset vomiting on 3/2023, found to have lipase >2000  but did not have active abdominal pain, with presumed dx of pancreatitis. Continued to have intractable bilious vomiting despite multiple admissions to the hospital, later found to have an acquired duodenal stricture with gastric outlet  obstruction thought to be 2/2 pancreatic inflammation. Failed multiple PO trials and NG/NJ tubes, ultimately had PEGJ placed for gastric venting and nurition with unintentional weight loss of 40 lbs. Reports continued weight loss despite enteral tube feeds. Repeat CT imaging of abdomen on 6/2/2023 in follow-up of pancreatitis with note of more focal irregular hypodense masslike region in the pancreatic head (2.2 x 1.5 x 1.4 cm) with persistent mild main pancreatic ductal dilatation, subcentimeter mid mesenteric lymph nodes, stable narrowing of the main portal vein near the portal venous confluence. EUS biopsy was performed on 6/27/23 showing duodenal inflammation. Unable to get biopsy of pancreatic mass due to poor window.  Patient then developed new abdominal pain since 6/27 after biopsy, bandlike, radiating to his back, with increase jaundice. 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 Unfortunately patient's pain progresses which is why he presents to the ED, and wanting a second opinion. Currnetly Afebrile. Mild tachycardia, but VSS. Labs notable for LFT started rising since May, currently with alk phos 404, AST 83, , and T bili 6.7 (rising since labs in care-everywhere with T bilirubin on 7/3 5.3).  Jaundice on exam with generalized abdominal pain.  CT abd/pelvis in ED with pancreatic mass without pancreatic ductal dilation with biliary stent in good position with a possible calcific density adjacent to the distal biliary stent (pancreatic calcification vs calcified gallstone). Could have pancreatic pseudocyst or inflammation causing obstruction, but with progressive symptoms and weight loss need to rule out underlying pancreatic malignancy.   - Panc/Bili consult pending  - Nutrition - Continue tube feeds                - Peptamen 1.5, 90 ml/hr.  ml Q2H -  once out of Peptamen, change to Vital 1.5 @ 90 ml/hr ( 2160 ml/day) to provide: 3240 kcals ( 40 kcal/kg), 145 g PRO ( 1.8 gm/kg), 1649 ml free H20, 404 gm CHO, and 12.3 gm soluble fiber daily.  - LR 75 ml/hr   - Pain management: tylenol 650 mg Q6H PRN, Dilaudid PO 4 mg Q3H PRN, IV dilaudid 0.5 mg Q2H PRN breakthrough pain  - Bowel regimen with miralax   - Atarax PRN itching   - Repeat CEA, CA 19-9. Prior CA 19-9 was low at 7 back in 4/2023.   - Spoke with oncology 7/9, patient overwhelmed. Once work-up is completed, can place outpatient referral to oncology if indicated. Consult cancelled.     #+staph epi x 1 blood culture  Pt remains clinically stable. Methicillin sensitive. Will monitor closely  - repeat blood cultures  - continue zosyn, should be covering MSSE if true and not contaminant     # IDDM2 - Diagnosed after initial dx pancreatitis 3/2023 likely 2/2 pancreatitic insufficiency.   - Hemoglobin A1c 7.9 on admission  - Endocrine consult  - Lantus 8 units daily  - Novolog sliding scale Q4H     # Acute on chronic anemia - H/H 8.8/27.1, down from one week ago on 7/3, patient was at H/H 11.1/33.2. No reported bleeding.   - Trend CBC  - Transfuse for hgb goal >7.0     # Malnutrition   - Nutrition consulted as noted above    # Itching  LIkely due to cholestasis  - hydroxyzine PRN  - cholestyramine BID         Diet: Adult Formula Drip Feeding: Continuous Vital 1.5; Jejunostomy; Goal Rate: 90 mL/hr; mL/hr  Full Liquid Diet  NPO per Anesthesia Guidelines for Procedure/Surgery Except for: Meds    DVT Prophylaxis: Pneumatic Compression Devices  Adan Catheter: Not present  Lines: None     Cardiac Monitoring: None  Code Status: Full Code      Clinically Significant Risk Factors              # Hypoalbuminemia: Lowest albumin = 2.9 g/dL at 7/10/2023  4:08 AM, will monitor as appropriate  # Coagulation Defect: INR = 1.96 (Ref range: 0.85 - 1.15) and/or PTT = N/A, will monitor for bleeding            # Moderate  Malnutrition: based on nutrition assessment, PRESENT ON ADMISSION        Disposition Plan      Expected Discharge Date: 07/17/2023                  Clau Ray MD  Hospitalist Service, GOLD TEAM 9  [facility]  Securely message with [employer] (more info)  Text page via [employer] Paging/Directory   See signed in provider for up to date coverage information  ______________________________________________________________________    Interval History   States skin is very itchy regardless of medications that he has tried. Having abdominal discomfort that hasn't changed.     Physical Exam   Vital Signs: Temp: 98.1  F (36.7  C) Temp src: Oral BP: 113/76 Pulse: 97   Resp: 16 SpO2: 97 % O2 Device: None (Room air)    Weight: 178 lbs 12.69 oz    Gen: no acute distress, alert, interactive  HEENT: mild scleral icterus, EOMI  Nares: No discharge noted from nares bilaterally   CV: RRR, no murmurs  Pulm: CTBA  Abd: soft, tender to palpation (generalized). PEG J site c/d/i.   Msk: no deformities  Extremities: no edema  Neuro: moving all extremities spontaneously       Medical Decision Making       50 MINUTES SPENT BY ME on the date of service doing chart review, history, exam, documentation & further activities per the note.      Data     I have personally reviewed the following data over the past 24 hrs:    7.2  \   9.1 (L)   / 233     134 (L) 100 12.8 /  224 (H)   3.9; 3.9 23 0.64 (L) \       ALT: 198 (H) AST: 85 (H) AP: 332 (H) TBILI: 8.7 (H)   ALB: 2.9 (L) TOT PROTEIN: 5.7 (L) LIPASE: N/A       INR:  1.96 (H) PTT:  N/A   D-dimer:  N/A Fibrinogen:  N/A       Imaging results reviewed over the past 24 hrs:   Recent Results (from the past 24 hour(s))   MR Abdomen MRCP w/o & w Contrast    Narrative    Exam: MR ABDOMEN MRCP W/O & W CONTRAST, 7/10/2023 1:01 PM    Indication: Pancreatitis, now with pancreatic head mass    Comparison: CT abdomen 7/8/2023    Technique: Images were acquired with and  without intravenous  gadolinium contrast through the upper abdomen. The following MR images  were acquired without intravenous contrast: TrueFISP, multiplanar  T2-weighted, axial T1 in/out of phase, T2-weighted MRCP images, axial  diffusion-weighted and axial apparent diffusion coefficient.  T1-weighted images were obtained before contrast at the multiple time  points following contrast injection. 3-D reformatted images were  generated by the technologist. Contrast dose: 8mL Gadavist    FINDINGS:    Gallbladder/Biliary Tree: There is a stent within the CBD extending to  the duodenum. There is internal and extrahepatic biliary dilation. The  common bile duct measuring up to 15 mm in diameter with abrupt  narrowing at the pancreatic head. The main pancreatic duct is also  dilated and tortuous with change in caliber the pancreatic head.  Dependent sludge is noted in the gallbladder and CBD.    Pancreas: There is an ill-defined masslike soft tissue in the  pancreatic head with restricted diffusion measuring approximately 3.1  x 2.8 cm (series 103, image 10). Fat stranding surrounding the lesion  encases the SMV and SMV with focal narrowing of the SMV.    Liver: No focal hepatic mass. Heterogenous arterial phase enhancement  does not persist on the delayed phase, likely representing transient  perfusion differences. There is no T2 hyperintensity to suggest  cholangitis.    Spleen: Normal size. No focal lesions.    Kidneys: No hydronephrosis or mass.    Adrenal glands: Normal.    Bowel: Gastrojejunostomy tube in the stomach with tip in the proximal  jejunum. There is edema of the second and third portions of the  duodenum. Visualized small and large bowel loops are normal in  caliber.    Lymph nodes: Mildly enlarged peripancreatic and periportal lymph nodes  are present, including 1.3 cm common hepatic node.    Blood vessels: No aortic aneurysm.    Lung bases: Unremarkable.    Bones and soft tissues: No suspicious osseous  lesion.    Mesentery and abdominal wall: Mesenteric edema noted.    Ascites: Absent.      Impression    IMPRESSION:   1.  Ill-defined masslike soft tissue in the pancreatic head is  suspicious for malignancy given the biliary and pancreatic duct  obstruction as well as vascular encasement and narrowing of SMV,  although less likely pancreatitis may also produce similar findings.  Recommend EUS biopsy.  2.  Indeterminate mildly enlarged peripancreatic, periportal and  mesenteric lymph nodes.    FABIANO YOUNG MD         SYSTEM ID:  [id number]

## 2023-07-11 ENCOUNTER — APPOINTMENT (OUTPATIENT)
Dept: GENERAL RADIOLOGY | Facility: CLINIC | Age: 58
DRG: 435 | End: 2023-07-11
Attending: INTERNAL MEDICINE
Payer: COMMERCIAL

## 2023-07-11 ENCOUNTER — TRANSFERRED RECORDS (OUTPATIENT)
Dept: HEALTH INFORMATION MANAGEMENT | Facility: CLINIC | Age: 58
End: 2023-07-11

## 2023-07-11 ENCOUNTER — ANESTHESIA (OUTPATIENT)
Dept: SURGERY | Facility: CLINIC | Age: 58
DRG: 435 | End: 2023-07-11
Payer: COMMERCIAL

## 2023-07-11 ENCOUNTER — ANESTHESIA EVENT (OUTPATIENT)
Dept: SURGERY | Facility: CLINIC | Age: 58
DRG: 435 | End: 2023-07-11
Payer: COMMERCIAL

## 2023-07-11 LAB
ALBUMIN SERPL BCG-MCNC: 3 G/DL (ref 3.5–5.2)
ALBUMIN SERPL BCG-MCNC: 3.1 G/DL (ref 3.5–5.2)
ALP SERPL-CCNC: 355 U/L (ref 40–129)
ALP SERPL-CCNC: 356 U/L (ref 40–129)
ALT SERPL W P-5'-P-CCNC: 179 U/L (ref 0–70)
ALT SERPL W P-5'-P-CCNC: 179 U/L (ref 0–70)
ANION GAP SERPL CALCULATED.3IONS-SCNC: 10 MMOL/L (ref 7–15)
ANION GAP SERPL CALCULATED.3IONS-SCNC: 11 MMOL/L (ref 7–15)
AST SERPL W P-5'-P-CCNC: 84 U/L (ref 0–45)
AST SERPL W P-5'-P-CCNC: 87 U/L (ref 0–45)
BILIRUB SERPL-MCNC: 8.8 MG/DL
BILIRUB SERPL-MCNC: 9 MG/DL
BUN SERPL-MCNC: 10.8 MG/DL (ref 6–20)
BUN SERPL-MCNC: 9.7 MG/DL (ref 6–20)
CALCIUM SERPL-MCNC: 8.7 MG/DL (ref 8.6–10)
CALCIUM SERPL-MCNC: 8.8 MG/DL (ref 8.6–10)
CHLORIDE SERPL-SCNC: 102 MMOL/L (ref 98–107)
CHLORIDE SERPL-SCNC: 103 MMOL/L (ref 98–107)
CREAT SERPL-MCNC: 0.56 MG/DL (ref 0.67–1.17)
CREAT SERPL-MCNC: 0.65 MG/DL (ref 0.67–1.17)
DEPRECATED HCO3 PLAS-SCNC: 24 MMOL/L (ref 22–29)
DEPRECATED HCO3 PLAS-SCNC: 26 MMOL/L (ref 22–29)
ERCP: NORMAL
ERYTHROCYTE [DISTWIDTH] IN BLOOD BY AUTOMATED COUNT: 14.6 % (ref 10–15)
ERYTHROCYTE [DISTWIDTH] IN BLOOD BY AUTOMATED COUNT: 14.7 % (ref 10–15)
GFR SERPL CREATININE-BSD FRML MDRD: >90 ML/MIN/1.73M2
GFR SERPL CREATININE-BSD FRML MDRD: >90 ML/MIN/1.73M2
GLUCOSE BLDC GLUCOMTR-MCNC: 107 MG/DL (ref 70–99)
GLUCOSE BLDC GLUCOMTR-MCNC: 121 MG/DL (ref 70–99)
GLUCOSE BLDC GLUCOMTR-MCNC: 135 MG/DL (ref 70–99)
GLUCOSE BLDC GLUCOMTR-MCNC: 153 MG/DL (ref 70–99)
GLUCOSE BLDC GLUCOMTR-MCNC: 232 MG/DL (ref 70–99)
GLUCOSE SERPL-MCNC: 112 MG/DL (ref 70–99)
GLUCOSE SERPL-MCNC: 114 MG/DL (ref 70–99)
HCT VFR BLD AUTO: 30.1 % (ref 40–53)
HCT VFR BLD AUTO: 30.1 % (ref 40–53)
HGB BLD-MCNC: 9.7 G/DL (ref 13.3–17.7)
HGB BLD-MCNC: 9.9 G/DL (ref 13.3–17.7)
IGG SERPL-MCNC: 684 MG/DL (ref 610–1616)
INR PPP: 1.67 (ref 0.85–1.15)
INR PPP: 1.73 (ref 0.85–1.15)
LABORATORY REPORT: NORMAL
MAGNESIUM SERPL-MCNC: 2.1 MG/DL (ref 1.7–2.3)
MCH RBC QN AUTO: 31.5 PG (ref 26.5–33)
MCH RBC QN AUTO: 31.6 PG (ref 26.5–33)
MCHC RBC AUTO-ENTMCNC: 32.2 G/DL (ref 31.5–36.5)
MCHC RBC AUTO-ENTMCNC: 32.9 G/DL (ref 31.5–36.5)
MCV RBC AUTO: 96 FL (ref 78–100)
MCV RBC AUTO: 98 FL (ref 78–100)
PHOSPHATE SERPL-MCNC: 3.4 MG/DL (ref 2.5–4.5)
PLATELET # BLD AUTO: 270 10E3/UL (ref 150–450)
PLATELET # BLD AUTO: 274 10E3/UL (ref 150–450)
PLPETH BLD-MCNC: <10 NG/ML
POPETH BLD-MCNC: <10 NG/ML
POTASSIUM SERPL-SCNC: 3.7 MMOL/L (ref 3.4–5.3)
POTASSIUM SERPL-SCNC: 4 MMOL/L (ref 3.4–5.3)
PROT SERPL-MCNC: 6 G/DL (ref 6.4–8.3)
PROT SERPL-MCNC: 6 G/DL (ref 6.4–8.3)
RBC # BLD AUTO: 3.08 10E6/UL (ref 4.4–5.9)
RBC # BLD AUTO: 3.13 10E6/UL (ref 4.4–5.9)
SODIUM SERPL-SCNC: 138 MMOL/L (ref 136–145)
SODIUM SERPL-SCNC: 138 MMOL/L (ref 136–145)
UPPER EUS: NORMAL
WBC # BLD AUTO: 5.3 10E3/UL (ref 4–11)
WBC # BLD AUTO: 5.7 10E3/UL (ref 4–11)

## 2023-07-11 PROCEDURE — C1876 STENT, NON-COA/NON-COV W/DEL: HCPCS | Performed by: INTERNAL MEDICINE

## 2023-07-11 PROCEDURE — 370N000017 HC ANESTHESIA TECHNICAL FEE, PER MIN: Performed by: INTERNAL MEDICINE

## 2023-07-11 PROCEDURE — 250N000011 HC RX IP 250 OP 636: Performed by: ANESTHESIOLOGY

## 2023-07-11 PROCEDURE — 87040 BLOOD CULTURE FOR BACTERIA: CPT | Performed by: STUDENT IN AN ORGANIZED HEALTH CARE EDUCATION/TRAINING PROGRAM

## 2023-07-11 PROCEDURE — 36415 COLL VENOUS BLD VENIPUNCTURE: CPT | Performed by: STUDENT IN AN ORGANIZED HEALTH CARE EDUCATION/TRAINING PROGRAM

## 2023-07-11 PROCEDURE — 85027 COMPLETE CBC AUTOMATED: CPT | Performed by: INTERNAL MEDICINE

## 2023-07-11 PROCEDURE — 250N000009 HC RX 250: Performed by: NURSE ANESTHETIST, CERTIFIED REGISTERED

## 2023-07-11 PROCEDURE — 250N000011 HC RX IP 250 OP 636: Performed by: INTERNAL MEDICINE

## 2023-07-11 PROCEDURE — 272N000001 HC OR GENERAL SUPPLY STERILE: Performed by: INTERNAL MEDICINE

## 2023-07-11 PROCEDURE — 88173 CYTOPATH EVAL FNA REPORT: CPT | Mod: 26 | Performed by: PATHOLOGY

## 2023-07-11 PROCEDURE — 710N000010 HC RECOVERY PHASE 1, LEVEL 2, PER MIN: Performed by: INTERNAL MEDICINE

## 2023-07-11 PROCEDURE — 360N000083 HC SURGERY LEVEL 3 W/ FLUORO, PER MIN: Performed by: INTERNAL MEDICINE

## 2023-07-11 PROCEDURE — 0F798DZ DILATION OF COMMON BILE DUCT WITH INTRALUMINAL DEVICE, VIA NATURAL OR ARTIFICIAL OPENING ENDOSCOPIC: ICD-10-PCS | Performed by: INTERNAL MEDICINE

## 2023-07-11 PROCEDURE — 0FBD8ZX EXCISION OF PANCREATIC DUCT, VIA NATURAL OR ARTIFICIAL OPENING ENDOSCOPIC, DIAGNOSTIC: ICD-10-PCS | Performed by: INTERNAL MEDICINE

## 2023-07-11 PROCEDURE — 84450 TRANSFERASE (AST) (SGOT): CPT | Performed by: INTERNAL MEDICINE

## 2023-07-11 PROCEDURE — 88172 CYTP DX EVAL FNA 1ST EA SITE: CPT | Mod: 26 | Performed by: PATHOLOGY

## 2023-07-11 PROCEDURE — 120N000002 HC R&B MED SURG/OB UMMC

## 2023-07-11 PROCEDURE — 99233 SBSQ HOSP IP/OBS HIGH 50: CPT | Performed by: STUDENT IN AN ORGANIZED HEALTH CARE EDUCATION/TRAINING PROGRAM

## 2023-07-11 PROCEDURE — 250N000011 HC RX IP 250 OP 636: Mod: JZ | Performed by: NURSE ANESTHETIST, CERTIFIED REGISTERED

## 2023-07-11 PROCEDURE — 250N000025 HC SEVOFLURANE, PER MIN: Performed by: INTERNAL MEDICINE

## 2023-07-11 PROCEDURE — 999N000181 XR SURGERY CARM FLUORO GREATER THAN 5 MIN W STILLS: Mod: TC

## 2023-07-11 PROCEDURE — 250N000011 HC RX IP 250 OP 636: Mod: JZ | Performed by: STUDENT IN AN ORGANIZED HEALTH CARE EDUCATION/TRAINING PROGRAM

## 2023-07-11 PROCEDURE — 999N000141 HC STATISTIC PRE-PROCEDURE NURSING ASSESSMENT: Performed by: INTERNAL MEDICINE

## 2023-07-11 PROCEDURE — 250N000013 HC RX MED GY IP 250 OP 250 PS 637: Performed by: PHYSICIAN ASSISTANT

## 2023-07-11 PROCEDURE — C1726 CATH, BAL DIL, NON-VASCULAR: HCPCS | Performed by: INTERNAL MEDICINE

## 2023-07-11 PROCEDURE — 99231 SBSQ HOSP IP/OBS SF/LOW 25: CPT | Performed by: NURSE PRACTITIONER

## 2023-07-11 PROCEDURE — 85610 PROTHROMBIN TIME: CPT | Performed by: STUDENT IN AN ORGANIZED HEALTH CARE EDUCATION/TRAINING PROGRAM

## 2023-07-11 PROCEDURE — 88172 CYTP DX EVAL FNA 1ST EA SITE: CPT | Mod: TC | Performed by: INTERNAL MEDICINE

## 2023-07-11 PROCEDURE — 258N000003 HC RX IP 258 OP 636: Performed by: NURSE ANESTHETIST, CERTIFIED REGISTERED

## 2023-07-11 PROCEDURE — 88305 TISSUE EXAM BY PATHOLOGIST: CPT | Mod: 26 | Performed by: PATHOLOGY

## 2023-07-11 PROCEDURE — 255N000002 HC RX 255 OP 636: Mod: JZ | Performed by: INTERNAL MEDICINE

## 2023-07-11 PROCEDURE — 250N000011 HC RX IP 250 OP 636: Performed by: STUDENT IN AN ORGANIZED HEALTH CARE EDUCATION/TRAINING PROGRAM

## 2023-07-11 PROCEDURE — 36415 COLL VENOUS BLD VENIPUNCTURE: CPT | Performed by: INTERNAL MEDICINE

## 2023-07-11 PROCEDURE — C1769 GUIDE WIRE: HCPCS | Performed by: INTERNAL MEDICINE

## 2023-07-11 PROCEDURE — 250N000012 HC RX MED GY IP 250 OP 636 PS 637: Performed by: NURSE PRACTITIONER

## 2023-07-11 PROCEDURE — 80053 COMPREHEN METABOLIC PANEL: CPT | Performed by: STUDENT IN AN ORGANIZED HEALTH CARE EDUCATION/TRAINING PROGRAM

## 2023-07-11 PROCEDURE — 85610 PROTHROMBIN TIME: CPT | Performed by: INTERNAL MEDICINE

## 2023-07-11 PROCEDURE — 250N000011 HC RX IP 250 OP 636: Performed by: NURSE ANESTHETIST, CERTIFIED REGISTERED

## 2023-07-11 PROCEDURE — 258N000003 HC RX IP 258 OP 636: Performed by: STUDENT IN AN ORGANIZED HEALTH CARE EDUCATION/TRAINING PROGRAM

## 2023-07-11 PROCEDURE — 84100 ASSAY OF PHOSPHORUS: CPT | Performed by: PHYSICIAN ASSISTANT

## 2023-07-11 PROCEDURE — C1874 STENT, COATED/COV W/DEL SYS: HCPCS | Performed by: INTERNAL MEDICINE

## 2023-07-11 PROCEDURE — 250N000009 HC RX 250: Performed by: INTERNAL MEDICINE

## 2023-07-11 PROCEDURE — 250N000013 HC RX MED GY IP 250 OP 250 PS 637: Performed by: STUDENT IN AN ORGANIZED HEALTH CARE EDUCATION/TRAINING PROGRAM

## 2023-07-11 PROCEDURE — 83735 ASSAY OF MAGNESIUM: CPT | Performed by: PHYSICIAN ASSISTANT

## 2023-07-11 PROCEDURE — 85014 HEMATOCRIT: CPT | Performed by: STUDENT IN AN ORGANIZED HEALTH CARE EDUCATION/TRAINING PROGRAM

## 2023-07-11 DEVICE — STENT SOLUS BILIARY 10FRX05CM DBL PIGTAIL W/INTRO G25672
Type: IMPLANTABLE DEVICE | Site: BILE DUCT | Status: NON-FUNCTIONAL
Removed: 2023-08-17

## 2023-07-11 DEVICE — STENT ENDOPROS BILIARY GORE VIABIL W/O HL 10X40MM VN1004200: Type: IMPLANTABLE DEVICE | Site: BILE DUCT | Status: FUNCTIONAL

## 2023-07-11 RX ORDER — DEXAMETHASONE SODIUM PHOSPHATE 4 MG/ML
INJECTION, SOLUTION INTRA-ARTICULAR; INTRALESIONAL; INTRAMUSCULAR; INTRAVENOUS; SOFT TISSUE PRN
Status: DISCONTINUED | OUTPATIENT
Start: 2023-07-11 | End: 2023-07-11

## 2023-07-11 RX ORDER — LIDOCAINE HYDROCHLORIDE 20 MG/ML
INJECTION, SOLUTION INFILTRATION; PERINEURAL PRN
Status: DISCONTINUED | OUTPATIENT
Start: 2023-07-11 | End: 2023-07-11

## 2023-07-11 RX ORDER — HYDROMORPHONE HCL IN WATER/PF 6 MG/30 ML
0.4 PATIENT CONTROLLED ANALGESIA SYRINGE INTRAVENOUS EVERY 5 MIN PRN
Status: DISCONTINUED | OUTPATIENT
Start: 2023-07-11 | End: 2023-07-11 | Stop reason: HOSPADM

## 2023-07-11 RX ORDER — OXYCODONE HYDROCHLORIDE 10 MG/1
10 TABLET ORAL
Status: DISCONTINUED | OUTPATIENT
Start: 2023-07-11 | End: 2023-07-11 | Stop reason: HOSPADM

## 2023-07-11 RX ORDER — IOPAMIDOL 510 MG/ML
INJECTION, SOLUTION INTRAVASCULAR PRN
Status: DISCONTINUED | OUTPATIENT
Start: 2023-07-11 | End: 2023-07-11 | Stop reason: HOSPADM

## 2023-07-11 RX ORDER — HYDROMORPHONE HCL IN WATER/PF 6 MG/30 ML
0.2 PATIENT CONTROLLED ANALGESIA SYRINGE INTRAVENOUS EVERY 5 MIN PRN
Status: DISCONTINUED | OUTPATIENT
Start: 2023-07-11 | End: 2023-07-11 | Stop reason: HOSPADM

## 2023-07-11 RX ORDER — ONDANSETRON 2 MG/ML
4 INJECTION INTRAMUSCULAR; INTRAVENOUS EVERY 30 MIN PRN
Status: DISCONTINUED | OUTPATIENT
Start: 2023-07-11 | End: 2023-07-11 | Stop reason: HOSPADM

## 2023-07-11 RX ORDER — FLUMAZENIL 0.1 MG/ML
0.2 INJECTION, SOLUTION INTRAVENOUS
Status: ACTIVE | OUTPATIENT
Start: 2023-07-11 | End: 2023-07-12

## 2023-07-11 RX ORDER — OXYCODONE HYDROCHLORIDE 5 MG/1
5 TABLET ORAL
Status: DISCONTINUED | OUTPATIENT
Start: 2023-07-11 | End: 2023-07-11 | Stop reason: HOSPADM

## 2023-07-11 RX ORDER — PROPOFOL 10 MG/ML
INJECTION, EMULSION INTRAVENOUS PRN
Status: DISCONTINUED | OUTPATIENT
Start: 2023-07-11 | End: 2023-07-11

## 2023-07-11 RX ORDER — HYDROMORPHONE HYDROCHLORIDE 1 MG/ML
.5-1 INJECTION, SOLUTION INTRAMUSCULAR; INTRAVENOUS; SUBCUTANEOUS
Status: DISCONTINUED | OUTPATIENT
Start: 2023-07-11 | End: 2023-07-16 | Stop reason: HOSPADM

## 2023-07-11 RX ORDER — INDOMETHACIN 50 MG/1
100 SUPPOSITORY RECTAL
Status: DISCONTINUED | OUTPATIENT
Start: 2023-07-11 | End: 2023-07-11 | Stop reason: HOSPADM

## 2023-07-11 RX ORDER — FENTANYL CITRATE 50 UG/ML
25 INJECTION, SOLUTION INTRAMUSCULAR; INTRAVENOUS EVERY 5 MIN PRN
Status: DISCONTINUED | OUTPATIENT
Start: 2023-07-11 | End: 2023-07-11 | Stop reason: HOSPADM

## 2023-07-11 RX ORDER — FENTANYL CITRATE 50 UG/ML
50 INJECTION, SOLUTION INTRAMUSCULAR; INTRAVENOUS EVERY 5 MIN PRN
Status: DISCONTINUED | OUTPATIENT
Start: 2023-07-11 | End: 2023-07-11 | Stop reason: HOSPADM

## 2023-07-11 RX ORDER — ONDANSETRON 4 MG/1
4 TABLET, ORALLY DISINTEGRATING ORAL EVERY 30 MIN PRN
Status: DISCONTINUED | OUTPATIENT
Start: 2023-07-11 | End: 2023-07-11 | Stop reason: HOSPADM

## 2023-07-11 RX ORDER — ONDANSETRON 2 MG/ML
4 INJECTION INTRAMUSCULAR; INTRAVENOUS EVERY 6 HOURS PRN
Status: DISCONTINUED | OUTPATIENT
Start: 2023-07-11 | End: 2023-07-16 | Stop reason: HOSPADM

## 2023-07-11 RX ORDER — SODIUM CHLORIDE, SODIUM LACTATE, POTASSIUM CHLORIDE, CALCIUM CHLORIDE 600; 310; 30; 20 MG/100ML; MG/100ML; MG/100ML; MG/100ML
INJECTION, SOLUTION INTRAVENOUS CONTINUOUS PRN
Status: DISCONTINUED | OUTPATIENT
Start: 2023-07-11 | End: 2023-07-11

## 2023-07-11 RX ORDER — FENTANYL CITRATE 50 UG/ML
INJECTION, SOLUTION INTRAMUSCULAR; INTRAVENOUS PRN
Status: DISCONTINUED | OUTPATIENT
Start: 2023-07-11 | End: 2023-07-11

## 2023-07-11 RX ORDER — ONDANSETRON 2 MG/ML
INJECTION INTRAMUSCULAR; INTRAVENOUS PRN
Status: DISCONTINUED | OUTPATIENT
Start: 2023-07-11 | End: 2023-07-11

## 2023-07-11 RX ORDER — LEVOFLOXACIN 5 MG/ML
INJECTION, SOLUTION INTRAVENOUS PRN
Status: DISCONTINUED | OUTPATIENT
Start: 2023-07-11 | End: 2023-07-11

## 2023-07-11 RX ORDER — SODIUM CHLORIDE, SODIUM GLUCONATE, SODIUM ACETATE, POTASSIUM CHLORIDE AND MAGNESIUM CHLORIDE 526; 502; 368; 37; 30 MG/100ML; MG/100ML; MG/100ML; MG/100ML; MG/100ML
INJECTION, SOLUTION INTRAVENOUS CONTINUOUS PRN
Status: DISCONTINUED | OUTPATIENT
Start: 2023-07-11 | End: 2023-07-11

## 2023-07-11 RX ORDER — ONDANSETRON 4 MG/1
4 TABLET, ORALLY DISINTEGRATING ORAL EVERY 6 HOURS PRN
Status: DISCONTINUED | OUTPATIENT
Start: 2023-07-11 | End: 2023-07-16 | Stop reason: HOSPADM

## 2023-07-11 RX ORDER — SODIUM CHLORIDE, SODIUM LACTATE, POTASSIUM CHLORIDE, CALCIUM CHLORIDE 600; 310; 30; 20 MG/100ML; MG/100ML; MG/100ML; MG/100ML
INJECTION, SOLUTION INTRAVENOUS CONTINUOUS
Status: DISCONTINUED | OUTPATIENT
Start: 2023-07-11 | End: 2023-07-11 | Stop reason: HOSPADM

## 2023-07-11 RX ORDER — LIDOCAINE 40 MG/G
CREAM TOPICAL
Status: DISCONTINUED | OUTPATIENT
Start: 2023-07-11 | End: 2023-07-11 | Stop reason: HOSPADM

## 2023-07-11 RX ORDER — SODIUM CHLORIDE, SODIUM LACTATE, POTASSIUM CHLORIDE, CALCIUM CHLORIDE 600; 310; 30; 20 MG/100ML; MG/100ML; MG/100ML; MG/100ML
INJECTION, SOLUTION INTRAVENOUS CONTINUOUS
Status: ACTIVE | OUTPATIENT
Start: 2023-07-11 | End: 2023-07-12

## 2023-07-11 RX ORDER — HYDROMORPHONE HCL IN WATER/PF 6 MG/30 ML
0.5 PATIENT CONTROLLED ANALGESIA SYRINGE INTRAVENOUS ONCE
Status: COMPLETED | OUTPATIENT
Start: 2023-07-11 | End: 2023-07-11

## 2023-07-11 RX ADMIN — HYDROMORPHONE HYDROCHLORIDE 0.5 MG: 1 INJECTION, SOLUTION INTRAMUSCULAR; INTRAVENOUS; SUBCUTANEOUS at 15:28

## 2023-07-11 RX ADMIN — HYDROMORPHONE HYDROCHLORIDE 1 MG: 1 INJECTION, SOLUTION INTRAMUSCULAR; INTRAVENOUS; SUBCUTANEOUS at 19:21

## 2023-07-11 RX ADMIN — FENTANYL CITRATE 100 MCG: 50 INJECTION, SOLUTION INTRAMUSCULAR; INTRAVENOUS at 10:49

## 2023-07-11 RX ADMIN — DEXAMETHASONE SODIUM PHOSPHATE 4 MG: 4 INJECTION, SOLUTION INTRA-ARTICULAR; INTRALESIONAL; INTRAMUSCULAR; INTRAVENOUS; SOFT TISSUE at 11:15

## 2023-07-11 RX ADMIN — HYDROMORPHONE HYDROCHLORIDE 0.5 MG: 1 INJECTION, SOLUTION INTRAMUSCULAR; INTRAVENOUS; SUBCUTANEOUS at 08:25

## 2023-07-11 RX ADMIN — ONDANSETRON 4 MG: 2 INJECTION INTRAMUSCULAR; INTRAVENOUS at 12:49

## 2023-07-11 RX ADMIN — PIPERACILLIN AND TAZOBACTAM 3.38 G: 3; .375 INJECTION, POWDER, LYOPHILIZED, FOR SOLUTION INTRAVENOUS at 21:31

## 2023-07-11 RX ADMIN — HYDROMORPHONE HYDROCHLORIDE 0.5 MG: 1 INJECTION, SOLUTION INTRAMUSCULAR; INTRAVENOUS; SUBCUTANEOUS at 05:49

## 2023-07-11 RX ADMIN — PIPERACILLIN AND TAZOBACTAM 3.38 G: 3; .375 INJECTION, POWDER, LYOPHILIZED, FOR SOLUTION INTRAVENOUS at 04:00

## 2023-07-11 RX ADMIN — SUGAMMADEX 200 MG: 100 INJECTION, SOLUTION INTRAVENOUS at 12:56

## 2023-07-11 RX ADMIN — HYDROMORPHONE HYDROCHLORIDE 0.5 MG: 1 INJECTION, SOLUTION INTRAMUSCULAR; INTRAVENOUS; SUBCUTANEOUS at 00:34

## 2023-07-11 RX ADMIN — INSULIN HUMAN 10 UNITS: 100 INJECTION, SUSPENSION SUBCUTANEOUS at 20:56

## 2023-07-11 RX ADMIN — PHENYLEPHRINE HYDROCHLORIDE 100 MCG: 10 INJECTION INTRAVENOUS at 10:52

## 2023-07-11 RX ADMIN — HYDROMORPHONE HYDROCHLORIDE 4 MG: 4 TABLET ORAL at 16:59

## 2023-07-11 RX ADMIN — PROPOFOL 30 MG: 10 INJECTION, EMULSION INTRAVENOUS at 12:25

## 2023-07-11 RX ADMIN — HYDROMORPHONE HYDROCHLORIDE 1 MG: 1 INJECTION, SOLUTION INTRAMUSCULAR; INTRAVENOUS; SUBCUTANEOUS at 23:05

## 2023-07-11 RX ADMIN — SODIUM CHLORIDE, POTASSIUM CHLORIDE, SODIUM LACTATE AND CALCIUM CHLORIDE: 600; 310; 30; 20 INJECTION, SOLUTION INTRAVENOUS at 17:40

## 2023-07-11 RX ADMIN — CHOLESTYRAMINE 4 G: 4 POWDER, FOR SUSPENSION ORAL at 19:21

## 2023-07-11 RX ADMIN — FENTANYL CITRATE 25 MCG: 50 INJECTION, SOLUTION INTRAMUSCULAR; INTRAVENOUS at 14:27

## 2023-07-11 RX ADMIN — PHENYLEPHRINE HYDROCHLORIDE 100 MCG: 10 INJECTION INTRAVENOUS at 11:41

## 2023-07-11 RX ADMIN — HYDROMORPHONE HYDROCHLORIDE 0.5 MG: 0.2 INJECTION, SOLUTION INTRAMUSCULAR; INTRAVENOUS; SUBCUTANEOUS at 10:18

## 2023-07-11 RX ADMIN — PROPOFOL 150 MG: 10 INJECTION, EMULSION INTRAVENOUS at 10:49

## 2023-07-11 RX ADMIN — SODIUM CHLORIDE, POTASSIUM CHLORIDE, SODIUM LACTATE AND CALCIUM CHLORIDE: 600; 310; 30; 20 INJECTION, SOLUTION INTRAVENOUS at 12:22

## 2023-07-11 RX ADMIN — LIDOCAINE HYDROCHLORIDE 100 MG: 20 INJECTION, SOLUTION INFILTRATION; PERINEURAL at 10:49

## 2023-07-11 RX ADMIN — Medication 10 MG: at 12:25

## 2023-07-11 RX ADMIN — Medication 50 MG: at 10:50

## 2023-07-11 RX ADMIN — LEVOFLOXACIN 500 MG: 5 INJECTION, SOLUTION INTRAVENOUS at 10:40

## 2023-07-11 RX ADMIN — FENTANYL CITRATE 50 MCG: 50 INJECTION, SOLUTION INTRAMUSCULAR; INTRAVENOUS at 14:05

## 2023-07-11 RX ADMIN — SODIUM CHLORIDE, SODIUM GLUCONATE, SODIUM ACETATE, POTASSIUM CHLORIDE AND MAGNESIUM CHLORIDE: 526; 502; 368; 37; 30 INJECTION, SOLUTION INTRAVENOUS at 10:40

## 2023-07-11 RX ADMIN — PIPERACILLIN AND TAZOBACTAM 3.38 G: 3; .375 INJECTION, POWDER, LYOPHILIZED, FOR SOLUTION INTRAVENOUS at 17:12

## 2023-07-11 RX ADMIN — MIDAZOLAM 2 MG: 1 INJECTION INTRAMUSCULAR; INTRAVENOUS at 10:35

## 2023-07-11 RX ADMIN — FENTANYL CITRATE 25 MCG: 50 INJECTION, SOLUTION INTRAMUSCULAR; INTRAVENOUS at 14:12

## 2023-07-11 RX ADMIN — HYDROMORPHONE HYDROCHLORIDE 4 MG: 4 TABLET ORAL at 21:10

## 2023-07-11 RX ADMIN — PHENYLEPHRINE HYDROCHLORIDE 150 MCG: 10 INJECTION INTRAVENOUS at 11:59

## 2023-07-11 RX ADMIN — ACETAMINOPHEN 650 MG: 325 TABLET, FILM COATED ORAL at 16:59

## 2023-07-11 ASSESSMENT — ACTIVITIES OF DAILY LIVING (ADL)
ADLS_ACUITY_SCORE: 20
ADLS_ACUITY_SCORE: 22
ADLS_ACUITY_SCORE: 20

## 2023-07-11 NOTE — PROGRESS NOTES
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      Assessment:     1.  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  Acute Hyperglycemia exacerbated by tube feeding and stress   3.  Anemia         Plan:      -  Lantus 10 unit(s) at 2000 - basal needs - increased and adjusted timing   -  Lantus 8 unit(s) this AM (0800) - held NPO and in OR this AM - will review again later once returned from OR and adjust  -  Addm:  2000 hrs - Will add x1 dose of NPH 10 unit(s) to bridge until tomorrow AM Lantus dose.    -  Novolog 1 units per 15 grams carb for PO intake, meals/snacks - addm:  Will increase to 1:12 g cho for dinner since received Dex in OR  -  Novolog high resistance sliding scale insulin q4h   -  BG q4h  -  Hypoglycemia protocol  -  PRN D10W revised to read-- Infuse IV D10W at 120 ml/h-- this replaces 75% of carbs in Vital 1.5 at 90 ml/h.  IF glucose trending >180, reduce rate to 95 ml/h.  If still trending >180, reduce rate to 70 ml/h.  - Education needs: assessment ongoing  - Outpatient diabetes follow up: TBD-- strongly recommend diabetes specialty care  - Diabetes service will continue to follow.  Please do not hesitate to contact the team with any questions/concerns.   - Please notify inpatient diabetes service if changes are planned that will  "impact glycemia, such as NPO, starting/adjusting steroids, enteral feeding, parenteral feeding, dextrose fluids or procedures.     Plan discussed with bedside RN/primary team via this note.         Interval History:     The last 24 hours progress and nursing notes reviewed.      Last BG taken was 0400, none since  Lab BG at 0548 112 mg/dL     AM Lantus held 2/2 NPO  TF not running, now in OR for ERCP    Depending on timing and TF, will release Lantus    No contact for RN this AM - will face-to-face during rounds.     BG trend:       11:20 AM  Remains in OR at time of rounds. Will check in again later and adjust all orders as indicated.       Planned Procedures/surgeries: ERCP this AM   D5W-containing solutions/medications: TF continuously Vital 1.5 at 90 ml/hr     Recent Labs   Lab 23  0359 07/10/23  2250 07/10/23  1958 07/10/23  1545 07/10/23  1113 07/10/23  0834   * 234* 173* 224* 147* 171*         Nutrition:     Orders Placed This Encounter      NPO per Anesthesia Guidelines for Procedure/Surgery Except for: Meds    Tube Feeding:  Held this AM - OR/NPO   TF continuously Vital 1.5 at 90 ml/hr         PTA Regimen:     lantus 8 units at HS  aspart prn - up to 40 units/day - using unknown scale and frequency, based on his cgm review  And home TF was Peptamin 1.5 at 90 ml/h           Review of Systems:   CC: deferred - in OR          Medications:   Steroid plannin mg in OR this AM        Physical Exam:   BP (!) 140/83 (BP Location: Right arm)   Pulse 77   Temp 98  F (36.7  C) (Oral)   Resp 16   Ht 1.829 m (6' 0.01\")   Wt 81.1 kg (178 lb 12.7 oz)   SpO2 96%   BMI 24.24 kg/m      Remainder deferred - remains in OR          Data:     Lab Results   Component Value Date    A1C 7.9 2023        CBC RESULTS:   Recent Labs   Lab Test 23  0548   WBC 5.3   RBC 3.13*   HGB 9.9*   HCT 30.1*   MCV 96   MCH 31.6   MCHC 32.9   RDW 14.6        Recent Labs   Lab Test 23  0359 " 07/10/23  2250 07/10/23  0437 07/10/23  0408 07/09/23  1127 07/09/23  0501   NA  --   --   --  134*  --  134*   POTASSIUM  --   --   --  3.9  3.9  --  4.3   CHLORIDE  --   --   --  100  --  100   CO2  --   --   --  23  --  24   ANIONGAP  --   --   --  11  --  10   * 234*   < > 135*   < > 271*   BUN  --   --   --  12.8  --  14.4   CR  --   --   --  0.64*  --  0.69   DENISE  --   --   --  8.6  --  8.7    < > = values in this interval not displayed.     Liver Function Studies -   Recent Labs   Lab Test 07/10/23  0408   PROTTOTAL 5.7*   ALBUMIN 2.9*   BILITOTAL 8.7*   ALKPHOS 332*   AST 85*   *     Lab Results   Component Value Date    INR 1.96 07/10/2023    INR 1.78 07/09/2023    INR 1.37 07/08/2023     ESVIN Wheatley CNP, [facility]  Inpatient Diabetes Management Service  Pager - [phone]  Available on [employer]     To contact Endocrine Diabetes service:   From 7AM-5PM: page inpatient diabetes provider who is following the patient that day (see filed or incomplete progress notes/consult notes).  If uncertain of provider assignment: page job code [phone]. (To page job code in-house dial 3 stars, [phone] then enter number).  For questions or updates AFTER HOURS from 5PM-7AM: page the diabetes job code for on call fellow: [phone]    Please notify inpatient diabetes service if changes are planned to steroids, nutrition, or if procedures are planned requiring prolonged NPO status. Diabetes Management Team job code: [phone]     I spent a total of 25 minutes on the date of the encounter doing prep/post-work, chart review, history and exam, documentation and further activities per the note including lab review, multidisciplinary communication, counseling the patient and/or coordinating care regarding acute hyper/hypoglycemic management, as well as discharge management and planning/communication.  See note for details.

## 2023-07-11 NOTE — ANESTHESIA PREPROCEDURE EVALUATION
Anesthesia Pre-Procedure Evaluation    Patient: Gallo Navarro   MRN: [id number] : 1965        Procedure : Procedure(s):  ENDOSCOPIC RETROGRADE CHOLANGIOPANCREATOGRAPHY  possible Endoscopic ultrasound upper gastrointestinal tract (GI)          Past Medical History:   Diagnosis Date     Acute pancreatitis 2023     Gastric outlet obstruction      Recurrent acute pancreatitis       Past Surgical History:   Procedure Laterality Date     AS OPEN TREATMENT CLAVICULAR FRACTURE INTERNAL FX       INSERT PICC LINE  2023     IR NG TUBE PLACEMENT REQ RAD & FLUORO  2023    JG tube      No Known Allergies   Social History     Tobacco Use     Smoking status: Not on file     Smokeless tobacco: Not on file   Substance Use Topics     Alcohol use: Not on file      Wt Readings from Last 1 Encounters:   07/10/23 81.1 kg (178 lb 12.7 oz)        Anesthesia Evaluation   Pt has had prior anesthetic. Type: General and MAC.        ROS/MED HX  ENT/Pulmonary:  - neg pulmonary ROS     Neurologic:  - neg neurologic ROS     Cardiovascular:  - neg cardiovascular ROS     METS/Exercise Tolerance:     Hematologic:  - neg hematologic  ROS     Musculoskeletal:  - neg musculoskeletal ROS     GI/Hepatic: Comment: The patient has a pancreatic mass, and hyperbilirubinemia.  He has had two previous ERCP, and the last one had a stent placed.    (+) liver disease,     Renal/Genitourinary:       Endo: Comment: He has diabetes secondary to his pancreatic mass.    (+) type II DM,     Psychiatric/Substance Use:  - neg psychiatric ROS     Infectious Disease:  - neg infectious disease ROS     Malignancy: Comment: Pancreatic mass of unknown diagnosis at this time.      Other:            Physical Exam    Airway  airway exam normal           Respiratory Devices and Support         Dental       (+) Completely normal teeth      Cardiovascular   cardiovascular exam normal          Pulmonary   pulmonary exam normal                OUTSIDE LABS:  CBC:    Lab Results   Component Value Date    WBC 5.7 07/11/2023    WBC 5.3 07/11/2023    HGB 9.7 (L) 07/11/2023    HGB 9.9 (L) 07/11/2023    HCT 30.1 (L) 07/11/2023    HCT 30.1 (L) 07/11/2023     07/11/2023     07/11/2023     BMP:   Lab Results   Component Value Date     07/11/2023     (L) 07/10/2023    POTASSIUM 4.0 07/11/2023    POTASSIUM 3.9 07/10/2023    POTASSIUM 3.9 07/10/2023    CHLORIDE 103 07/11/2023    CHLORIDE 100 07/10/2023    CO2 24 07/11/2023    CO2 23 07/10/2023    BUN 9.7 07/11/2023    BUN 12.8 07/10/2023    CR 0.56 (L) 07/11/2023    CR 0.64 (L) 07/10/2023     (H) 07/11/2023     (H) 07/11/2023     COAGS:   Lab Results   Component Value Date    INR 1.73 (H) 07/11/2023     POC: No results found for: BGM, HCG, HCGS  HEPATIC:   Lab Results   Component Value Date    ALBUMIN 3.1 (L) 07/11/2023    PROTTOTAL 6.0 (L) 07/11/2023     (H) 07/11/2023    AST 84 (H) 07/11/2023    ALKPHOS 356 (H) 07/11/2023    BILITOTAL 8.8 (H) 07/11/2023     OTHER:   Lab Results   Component Value Date    LACT 1.0 07/08/2023    A1C 7.9 (H) 07/08/2023    DENISE 8.8 07/11/2023    PHOS 3.4 07/11/2023    MAG 2.1 07/11/2023    LIPASE 34 07/09/2023       Anesthesia Plan    ASA Status:  3, emergent    NPO Status:  NPO Appropriate    Anesthesia Type: General.   Induction: Intravenous, RSI.           Consents    Anesthesia Plan(s) and associated risks, benefits, and realistic alternatives discussed. Questions answered and patient/representative(s) expressed understanding.     - Discussed: Risks, Benefits and Alternatives for BOTH SEDATION and the PROCEDURE were discussed     - Discussed with:  Patient      - Extended Intubation/Ventilatory Support Discussed: Yes.      - Patient is DNR/DNI Status: No    Use of blood products discussed: Yes.     - Discussed with: Patient.     Postoperative Care    Pain management: IV analgesics.   PONV prophylaxis: Ondansetron (or other 5HT-3), Dexamethasone or Solumedrol      Comments:    Other Comments: The material risks, benefits and alternatives were discussed in detail.  The patient agrees to proceed.  The patient has no other complaints at this time.            Gallo Tam MD

## 2023-07-11 NOTE — ANESTHESIA PROCEDURE NOTES
Airway       Patient location during procedure: OR       Procedure Start/Stop Times: 7/11/2023 10:51 AM  Staff -        CRNA: Yasmeen Dunlap APRN CRNA       Performed By: CRNA  Consent for Airway        Urgency: elective  Indications and Patient Condition       Indications for airway management: lacey-procedural       Induction type:intravenous       Mask difficulty assessment: 2 - vent by mask + OA or adjuvant +/- NMBA    Final Airway Details       Final airway type: endotracheal airway       Successful airway: ETT - single and Oral  Endotracheal Airway Details        ETT size (mm): 7.5       Cuffed: yes       Cuff volume (mL): 8       Successful intubation technique: direct laryngoscopy       DL Blade Type: Schultz 2       Grade View of Cords: 1       Adjucts: stylet       Position: Right       Measured from: gums/teeth       Secured at (cm): 24       Bite Block used: endo bite block.    Post intubation assessment        Placement verified by: capnometry, equal breath sounds and chest rise        Number of attempts at approach: 1       Secured with: pink tape       Ease of procedure: easy       Dentition: Intact and Unchanged    Medication(s) Administered   Medication Administration Time: 7/11/2023 10:51 AM

## 2023-07-11 NOTE — ANESTHESIA CARE TRANSFER NOTE
Patient: Gallo Navarro    Procedure: Procedure(s):  ENDOSCOPIC RETROGRADE CHOLANGIOPANCREATOGRAPHY  Endoscopic ultrasound upper gastrointestinal tract (GI), with biposy, GJ tube repositioning, stent placement       Diagnosis: Pancreatic mass [K86.89]  Biliary obstruction [K83.1]  Diagnosis Additional Information: No value filed.    Anesthesia Type:   General     Note:    Oropharynx: oropharynx clear of all foreign objects and spontaneously breathing  Level of Consciousness: awake  Oxygen Supplementation: nasal cannula  Level of Supplemental Oxygen (L/min / FiO2): 2  Independent Airway: airway patency satisfactory and stable  Dentition: dentition unchanged  Vital Signs Stable: post-procedure vital signs reviewed and stable  Report to RN Given: handoff report given  Patient transferred to: PACU    Handoff Report: Identifed the Patient, Identified the Reponsible Provider, Reviewed the pertinent medical history, Discussed the surgical course, Reviewed Intra-OP anesthesia mangement and issues during anesthesia, Set expectations for post-procedure period and Allowed opportunity for questions and acknowledgement of understanding      Vitals:  Vitals Value Taken Time   /85 07/11/23 1306   Temp     Pulse 101 07/11/23 1310   Resp 10 07/11/23 1310   SpO2 100 % 07/11/23 1310   Vitals shown include unvalidated device data.    Electronically Signed By: ESVIN Nina CRNA  July 11, 2023  1:11 PM

## 2023-07-11 NOTE — ANESTHESIA POSTPROCEDURE EVALUATION
Patient: Gallo Navarro    Procedure: Procedure(s):  ENDOSCOPIC RETROGRADE CHOLANGIOPANCREATOGRAPHY  Endoscopic ultrasound upper gastrointestinal tract (GI), with biposy, GJ tube repositioning, stent placement       Anesthesia Type:  General    Note:  Disposition: Inpatient   Postop Pain Control: Uneventful            Sign Out: Well controlled pain   PONV: No   Neuro/Psych: Uneventful            Sign Out: Acceptable/Baseline neuro status   Airway/Respiratory: Uneventful            Sign Out: Acceptable/Baseline resp. status   CV/Hemodynamics: Uneventful            Sign Out: Acceptable CV status; No obvious hypovolemia; No obvious fluid overload   Other NRE: NONE   DID A NON-ROUTINE EVENT OCCUR? No    Event details/Postop Comments:  No complications.           Last vitals:  Vitals Value Taken Time   /77 07/11/23 1315   Temp 36.5  C (97.7  F) 07/11/23 1306   Pulse 100 07/11/23 1316   Resp 8 07/11/23 1316   SpO2 100 % 07/11/23 1316   Vitals shown include unvalidated device data.    Electronically Signed By: Gallo Tam MD  July 11, 2023  1:17 PM

## 2023-07-11 NOTE — PROGRESS NOTES
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      Changes today:  -Blood cultures drawn (did not get drawn on 7/10)  -ERCP and EUS completed 7/11. Biopsies taken. Severe duodenal stricture present.  Stent x 2 placed. DDX includes malignancy; family is aware.   - liberal pain control overnight     # Epigastric abdominal pain   # Pancreatitic mass vs inflammation   # Acquired duodenal stricture with gastric outlet obstruction s/p GJ tube placed for gastric venting and nutrition   # Transaminitis, with concern for biliary obstruction s/p stent    Complicated pancreatic hx. Initially presented with sudden onset vomiting on 3/2023, found to have lipase >2000  but did not have active abdominal pain, with presumed dx of pancreatitis. Continued to have intractable bilious vomiting despite multiple admissions to the hospital, later found to have an acquired duodenal stricture with gastric outlet obstruction thought to be 2/2 pancreatic inflammation. Failed multiple PO trials and NG/NJ tubes, ultimately had PEGJ placed for gastric venting and nurition with unintentional weight loss of 40 lbs. Reports continued weight loss despite enteral tube feeds. Repeat CT imaging of abdomen on 6/2/2023 in follow-up of pancreatitis with note of more focal  irregular hypodense masslike region in the pancreatic head (2.2 x 1.5 x 1.4 cm) with persistent mild main pancreatic ductal dilatation, subcentimeter mid mesenteric lymph nodes, stable narrowing of the main portal vein near the portal venous confluence. EUS biopsy was performed on 6/27/23 showing duodenal inflammation. Unable to get biopsy of pancreatic mass due to poor window.  Patient then developed new abdominal pain since 6/27 after biopsy, bandlike, radiating to his back, with increase jaundice. 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 Unfortunately patient's pain progresses which is why he presents to the ED, and wanting a second opinion. Currnetly Afebrile. Mild tachycardia, but VSS. Labs notable for LFT started rising since May, currently with alk phos 404, AST 83, , and T bili 6.7 (rising since labs in care-everywhere with T bilirubin on 7/3 5.3).  Jaundice on exam with generalized abdominal pain.  CT abd/pelvis in ED with pancreatic mass without pancreatic ductal dilation with biliary stent in good position with a possible calcific density adjacent to the distal biliary stent (pancreatic calcification vs calcified gallstone). Could have pancreatic pseudocyst or inflammation causing obstruction, but with progressive symptoms and weight loss need to rule out underlying pancreatic malignancy.   - Panc/Bili consult pending  - Nutrition - Continue tube feeds                - Peptamen 1.5, 90 ml/hr.  ml Q2H - once out of Peptamen, change to Vital 1.5 @ 90 ml/hr ( 2160 ml/day) to provide: 3240 kcals ( 40 kcal/kg), 145 g PRO ( 1.8 gm/kg), 1649 ml free H20, 404 gm CHO, and 12.3 gm soluble fiber daily.  - Pain management: tylenol 650 mg Q6H PRN, Dilaudid PO 4 mg Q3H PRN, IV dilaudid 0.5-1mg mg Q2H PRN breakthrough pain, OK to increase as needed.   - continue  LR 75ml/hr overnight, stop after 12 hrs   - Bowel regimen with miralax   - Atarax PRN itching   - Repeat CEA, CA 19-9. Prior CA 19-9 was low at 7 back in 4/2023.   - Spoke with oncology 7/9, patient overwhelmed. Once work-up is completed, can place outpatient referral to oncology if indicated. Consult cancelled.     #+staph epi x 1 blood culture  Pt remains clinically stable. Methicillin sensitive. Will monitor closely. Also growing staph. Capitis, likely all contaminants.   - repeat blood cultures x 7/11 (not completed on 7/10)  - continue zosyn, should be covering MSSE if true and not contaminant     # IDDM2 - Diagnosed after initial dx pancreatitis 3/2023 likely 2/2 pancreatitic insufficiency.   - Hemoglobin A1c 7.9 on admission  - Endocrine consult  - Lantus 10 units daily  - Novolog sliding scale Q4H     # Acute on chronic anemia - H/H 8.8/27.1, down from one week ago on 7/3, patient was at H/H 11.1/33.2. No reported bleeding.   - Trend CBC  - Transfuse for hgb goal >7.0     # Malnutrition   - Nutrition consulted as noted above    # Itching  LIkely due to cholestasis  - hydroxyzine PRN  - cholestyramine BID           Diet: Adult Formula Drip Feeding: Continuous Vital 1.5; Jejunostomy; Goal Rate: 90 mL/hr; mL/hr  Clear Liquid Diet    DVT Prophylaxis: Pneumatic Compression Devices  Adan Catheter: Not present  Lines: None     Cardiac Monitoring: None  Code Status: Full Code      Clinically Significant Risk Factors              # Hypoalbuminemia: Lowest albumin = 2.9 g/dL at 7/10/2023  4:08 AM, will monitor as appropriate  # Coagulation Defect: INR = 1.67 (Ref range: 0.85 - 1.15) and/or PTT = N/A, will monitor for bleeding            # Moderate Malnutrition: based on nutrition assessment, PRESENT ON ADMISSION        Disposition Plan      Expected Discharge Date: 07/18/2023                  Clau Ray MD  Hospitalist Service, GOLD TEAM 9  [facility]  Securely message  with Nancy (more info)  Text page via [facility] Paging/Directory   See signed in provider for up to date coverage information  ______________________________________________________________________    Interval History   Tolerated EGD and EUS ok, seen after procedure. Pt tearful when talking about potential prognosis. Wife at bedside and supportive. Discussed that Gallo did not want to talk about potential diagnosis in front of his kids. States pain was 8/10 post-procedure. Eager to have a diet.     Physical Exam   Vital Signs: Temp: 98.2  F (36.8  C) Temp src: Oral BP: (P) 120/74 Pulse: 90   Resp: 20 SpO2: 96 % O2 Device: None (Room air) Oxygen Delivery: 2 LPM  Weight: 178 lbs 12.69 oz    Gen: no acute distress, alert, interactive, a bit anxious and tearful. Jaundiced appearance  HEENT:scleral icterus, EOMI  Nares: No discharge noted from nares bilaterally   CV: RRR, no murmurs  Pulm: CTBA  Abd: soft, tender to palpation (generalized). Did not visualize PEG J site c/d/i.   Msk: no deformities  Extremities: no edema  Neuro: moving all extremities spontaneously     Medical Decision Making       50 MINUTES SPENT BY ME on the date of service doing chart review, history, exam, documentation & further activities per the note.      Data     I have personally reviewed the following data over the past 24 hrs:    5.7  \   9.7 (L)   / 270     138 102 10.8 /  153 (H)   3.7 26 0.65 (L) \       ALT: 179 (H) AST: 87 (H) AP: 355 (H) TBILI: 9.0 (H)   ALB: 3.0 (L) TOT PROTEIN: 6.0 (L) LIPASE: N/A       INR:  1.67 (H) PTT:  N/A   D-dimer:  N/A Fibrinogen:  N/A       Imaging results reviewed over the past 24 hrs:   Recent Results (from the past 24 hour(s))   XR Surgery KIEL G/T 5 Min Fluoro w Stills    Narrative    This exam was marked as non-reportable because it will not be read by a   radiologist or a [facility] non-radiologist provider.

## 2023-07-11 NOTE — CARE PLAN
"Admitted/transferred from:   2 RN full  skin assessment completed by Chadd Padgett RN and Francesca COTTON RN.   Skin assessment finding: other Jaundiced, G/J tube. CDI.    Interventions/actions: skin interventions is not required at this time. Will continue to monitor.    Vitals: Blood pressure 102/67, pulse 97, temperature 98.3  F (36.8  C), temperature source Oral, resp. rate 15, height 1.829 m (6' 0.01\"), weight 81.1 kg (178 lb 12.7 oz), SpO2 96 %.  Time: 9564-4705  Neuro: A/O x 4, calls appropriately and makes needs known.  Activity: up with SBA.  Pain and N/V: abdominal pain managed with dilaudid. pt threwup once. Relief after G-tube unclogged.    GI/: LBM 01/10/23 Voiding spontaneously. AUOP.   Cardiac: Denies cardiac chest pain.   Diet: NPO at midnight, EUS tomorrow.  Respiratory: Denies SOB, on RA  Lines: L PIV infusing LR @ 75/hr.  Incisions/Drains/Skin: WDL-X G/J. G to gravity and J is clamped. No new deficit.    Lab: Reviewed.  New changes this shift: No significant changes this shift, Continue POC.    "

## 2023-07-12 ENCOUNTER — APPOINTMENT (OUTPATIENT)
Dept: CT IMAGING | Facility: CLINIC | Age: 58
DRG: 435 | End: 2023-07-12
Attending: STUDENT IN AN ORGANIZED HEALTH CARE EDUCATION/TRAINING PROGRAM
Payer: COMMERCIAL

## 2023-07-12 LAB
ALBUMIN SERPL BCG-MCNC: 2.9 G/DL (ref 3.5–5.2)
ALP SERPL-CCNC: 322 U/L (ref 40–129)
ALT SERPL W P-5'-P-CCNC: 139 U/L (ref 0–70)
ANION GAP SERPL CALCULATED.3IONS-SCNC: 10 MMOL/L (ref 7–15)
AST SERPL W P-5'-P-CCNC: 49 U/L (ref 0–45)
BACTERIA BLD CULT: ABNORMAL
BILIRUB SERPL-MCNC: 4.9 MG/DL
BUN SERPL-MCNC: 15.8 MG/DL (ref 6–20)
CALCIUM SERPL-MCNC: 8.5 MG/DL (ref 8.6–10)
CHLORIDE SERPL-SCNC: 100 MMOL/L (ref 98–107)
CREAT SERPL-MCNC: 0.6 MG/DL (ref 0.67–1.17)
DEPRECATED HCO3 PLAS-SCNC: 26 MMOL/L (ref 22–29)
ELASTASE PANC STL-MCNT: 0.5 UG/G
ERYTHROCYTE [DISTWIDTH] IN BLOOD BY AUTOMATED COUNT: 14.6 % (ref 10–15)
GFR SERPL CREATININE-BSD FRML MDRD: >90 ML/MIN/1.73M2
GLUCOSE BLDC GLUCOMTR-MCNC: 192 MG/DL (ref 70–99)
GLUCOSE BLDC GLUCOMTR-MCNC: 218 MG/DL (ref 70–99)
GLUCOSE BLDC GLUCOMTR-MCNC: 256 MG/DL (ref 70–99)
GLUCOSE BLDC GLUCOMTR-MCNC: 259 MG/DL (ref 70–99)
GLUCOSE BLDC GLUCOMTR-MCNC: 261 MG/DL (ref 70–99)
GLUCOSE BLDC GLUCOMTR-MCNC: 285 MG/DL (ref 70–99)
GLUCOSE SERPL-MCNC: 193 MG/DL (ref 70–99)
HCT VFR BLD AUTO: 30.4 % (ref 40–53)
HGB BLD-MCNC: 9.7 G/DL (ref 13.3–17.7)
MAGNESIUM SERPL-MCNC: 2.1 MG/DL (ref 1.7–2.3)
MCH RBC QN AUTO: 31.3 PG (ref 26.5–33)
MCHC RBC AUTO-ENTMCNC: 31.9 G/DL (ref 31.5–36.5)
MCV RBC AUTO: 98 FL (ref 78–100)
PATH REPORT.COMMENTS IMP SPEC: ABNORMAL
PATH REPORT.COMMENTS IMP SPEC: ABNORMAL
PATH REPORT.COMMENTS IMP SPEC: YES
PATH REPORT.FINAL DX SPEC: ABNORMAL
PATH REPORT.GROSS SPEC: ABNORMAL
PATH REPORT.RELEVANT HX SPEC: ABNORMAL
PLATELET # BLD AUTO: 334 10E3/UL (ref 150–450)
POTASSIUM SERPL-SCNC: 4 MMOL/L (ref 3.4–5.3)
PROT SERPL-MCNC: 5.7 G/DL (ref 6.4–8.3)
RBC # BLD AUTO: 3.1 10E6/UL (ref 4.4–5.9)
SODIUM SERPL-SCNC: 136 MMOL/L (ref 136–145)
WBC # BLD AUTO: 5.4 10E3/UL (ref 4–11)

## 2023-07-12 PROCEDURE — 250N000013 HC RX MED GY IP 250 OP 250 PS 637: Performed by: STUDENT IN AN ORGANIZED HEALTH CARE EDUCATION/TRAINING PROGRAM

## 2023-07-12 PROCEDURE — 80053 COMPREHEN METABOLIC PANEL: CPT | Performed by: STUDENT IN AN ORGANIZED HEALTH CARE EDUCATION/TRAINING PROGRAM

## 2023-07-12 PROCEDURE — 250N000013 HC RX MED GY IP 250 OP 250 PS 637: Performed by: PHYSICIAN ASSISTANT

## 2023-07-12 PROCEDURE — 99418 PROLNG IP/OBS E/M EA 15 MIN: CPT | Performed by: STUDENT IN AN ORGANIZED HEALTH CARE EDUCATION/TRAINING PROGRAM

## 2023-07-12 PROCEDURE — 99233 SBSQ HOSP IP/OBS HIGH 50: CPT | Performed by: NURSE PRACTITIONER

## 2023-07-12 PROCEDURE — 36415 COLL VENOUS BLD VENIPUNCTURE: CPT | Performed by: STUDENT IN AN ORGANIZED HEALTH CARE EDUCATION/TRAINING PROGRAM

## 2023-07-12 PROCEDURE — 71260 CT THORAX DX C+: CPT | Mod: 26 | Performed by: STUDENT IN AN ORGANIZED HEALTH CARE EDUCATION/TRAINING PROGRAM

## 2023-07-12 PROCEDURE — 250N000011 HC RX IP 250 OP 636: Performed by: STUDENT IN AN ORGANIZED HEALTH CARE EDUCATION/TRAINING PROGRAM

## 2023-07-12 PROCEDURE — 71260 CT THORAX DX C+: CPT

## 2023-07-12 PROCEDURE — C9113 INJ PANTOPRAZOLE SODIUM, VIA: HCPCS | Mod: JZ | Performed by: PHYSICIAN ASSISTANT

## 2023-07-12 PROCEDURE — 120N000002 HC R&B MED SURG/OB UMMC

## 2023-07-12 PROCEDURE — 83735 ASSAY OF MAGNESIUM: CPT | Performed by: HOSPITALIST

## 2023-07-12 PROCEDURE — 36415 COLL VENOUS BLD VENIPUNCTURE: CPT | Performed by: HOSPITALIST

## 2023-07-12 PROCEDURE — 85027 COMPLETE CBC AUTOMATED: CPT | Performed by: STUDENT IN AN ORGANIZED HEALTH CARE EDUCATION/TRAINING PROGRAM

## 2023-07-12 PROCEDURE — 99233 SBSQ HOSP IP/OBS HIGH 50: CPT | Performed by: STUDENT IN AN ORGANIZED HEALTH CARE EDUCATION/TRAINING PROGRAM

## 2023-07-12 PROCEDURE — 999N000157 HC STATISTIC RCP TIME EA 10 MIN

## 2023-07-12 PROCEDURE — 99233 SBSQ HOSP IP/OBS HIGH 50: CPT | Performed by: DIETITIAN, REGISTERED

## 2023-07-12 PROCEDURE — 250N000011 HC RX IP 250 OP 636: Mod: JZ | Performed by: STUDENT IN AN ORGANIZED HEALTH CARE EDUCATION/TRAINING PROGRAM

## 2023-07-12 PROCEDURE — 94681 O2 UPTK CO2 OUTP % O2 XTRC: CPT

## 2023-07-12 PROCEDURE — 250N000011 HC RX IP 250 OP 636: Mod: JZ | Performed by: PHYSICIAN ASSISTANT

## 2023-07-12 RX ORDER — SENNOSIDES 8.6 MG
8.6 TABLET ORAL 2 TIMES DAILY
Status: DISCONTINUED | OUTPATIENT
Start: 2023-07-12 | End: 2023-07-14

## 2023-07-12 RX ORDER — IOPAMIDOL 755 MG/ML
88 INJECTION, SOLUTION INTRAVASCULAR ONCE
Status: COMPLETED | OUTPATIENT
Start: 2023-07-12 | End: 2023-07-12

## 2023-07-12 RX ORDER — POLYETHYLENE GLYCOL 3350 17 G/17G
17 POWDER, FOR SOLUTION ORAL 2 TIMES DAILY
Status: DISCONTINUED | OUTPATIENT
Start: 2023-07-12 | End: 2023-07-16 | Stop reason: HOSPADM

## 2023-07-12 RX ADMIN — AMOXICILLIN AND CLAVULANATE POTASSIUM 1 TABLET: 875; 125 TABLET, FILM COATED ORAL at 21:25

## 2023-07-12 RX ADMIN — SENNOSIDES 8.6 MG: 8.6 TABLET, FILM COATED ORAL at 19:10

## 2023-07-12 RX ADMIN — HYDROMORPHONE HYDROCHLORIDE 4 MG: 4 TABLET ORAL at 15:47

## 2023-07-12 RX ADMIN — IOPAMIDOL 88 ML: 755 INJECTION, SOLUTION INTRAVENOUS at 19:22

## 2023-07-12 RX ADMIN — PIPERACILLIN AND TAZOBACTAM 3.38 G: 3; .375 INJECTION, POWDER, LYOPHILIZED, FOR SOLUTION INTRAVENOUS at 05:09

## 2023-07-12 RX ADMIN — HYDROMORPHONE HYDROCHLORIDE 4 MG: 4 TABLET ORAL at 09:03

## 2023-07-12 RX ADMIN — HYDROMORPHONE HYDROCHLORIDE 4 MG: 4 TABLET ORAL at 12:27

## 2023-07-12 RX ADMIN — POLYETHYLENE GLYCOL 3350 17 G: 17 POWDER, FOR SOLUTION ORAL at 19:10

## 2023-07-12 RX ADMIN — Medication 50 MCG: at 09:21

## 2023-07-12 RX ADMIN — CHOLESTYRAMINE 4 G: 4 POWDER, FOR SUSPENSION ORAL at 20:53

## 2023-07-12 RX ADMIN — POLYETHYLENE GLYCOL 3350 17 G: 17 POWDER, FOR SOLUTION ORAL at 09:20

## 2023-07-12 RX ADMIN — HYDROMORPHONE HYDROCHLORIDE 4 MG: 4 TABLET ORAL at 01:15

## 2023-07-12 RX ADMIN — PIPERACILLIN AND TAZOBACTAM 3.38 G: 3; .375 INJECTION, POWDER, LYOPHILIZED, FOR SOLUTION INTRAVENOUS at 16:36

## 2023-07-12 RX ADMIN — PANTOPRAZOLE SODIUM 40 MG: 40 INJECTION, POWDER, FOR SOLUTION INTRAVENOUS at 09:20

## 2023-07-12 RX ADMIN — HYDROXYZINE HYDROCHLORIDE 25 MG: 25 TABLET, FILM COATED ORAL at 16:35

## 2023-07-12 RX ADMIN — PIPERACILLIN AND TAZOBACTAM 3.38 G: 3; .375 INJECTION, POWDER, LYOPHILIZED, FOR SOLUTION INTRAVENOUS at 10:51

## 2023-07-12 RX ADMIN — HYDROMORPHONE HYDROCHLORIDE 4 MG: 4 TABLET ORAL at 19:10

## 2023-07-12 RX ADMIN — HYDROMORPHONE HYDROCHLORIDE 4 MG: 4 TABLET ORAL at 05:09

## 2023-07-12 RX ADMIN — HYDROXYZINE HYDROCHLORIDE 25 MG: 25 TABLET, FILM COATED ORAL at 10:51

## 2023-07-12 RX ADMIN — CHOLESTYRAMINE 4 G: 4 POWDER, FOR SUSPENSION ORAL at 09:20

## 2023-07-12 RX ADMIN — HYDROMORPHONE HYDROCHLORIDE 1 MG: 1 INJECTION, SOLUTION INTRAMUSCULAR; INTRAVENOUS; SUBCUTANEOUS at 21:25

## 2023-07-12 ASSESSMENT — ACTIVITIES OF DAILY LIVING (ADL)
ADLS_ACUITY_SCORE: 20
DEPENDENT_IADLS:: INDEPENDENT
ADLS_ACUITY_SCORE: 20

## 2023-07-12 NOTE — PROGRESS NOTES
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      Assessment:     1.  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  Acute Hyperglycemia exacerbated by tube feeding and stress   3.  Anemia         Plan:      -  Lantus 10 unit(s) at 2000 - covering basal needs - reduced for NPO (planned dose today 15 unit(s)   -  BID NPH - HOLD for NPO at midnight - will give fixed dose of novolog to cover from 2000 - 0000  -  Novolog x1 fixed dose to cover for no NPH - 5 unit(s) x1 at 2000   -  Novolog 1 units per 12 grams carb for PO intake, meals/snacks - will need reduction once Dex effect wanes   -  Novolog high resistance sliding scale insulin q4h   -  BG q4h  -  Hypoglycemia protocol  -  PRN D10W revised to read-- Infuse IV D10W at 120 ml/h-- this replaces 75% of carbs in Vital 1.5 at 90 ml/h.  IF glucose trending >180, reduce rate to 95 ml/h.  If still trending >180, reduce rate to 70 ml/h.  - Education needs: assessment ongoing  - Outpatient diabetes follow up: TBD-- strongly recommend diabetes specialty care  - Diabetes service will continue to follow.  Please do not hesitate to contact the team with any questions/concerns.   - Please notify inpatient diabetes service if changes are planned that will impact glycemia, such as NPO, starting/adjusting steroids,  enteral feeding, parenteral feeding, dextrose fluids or procedures.     Plan discussed with bedside RN/primary team via this note.         Interval History:     The last 24 hours progress and nursing notes reviewed.       BG trend: Remains above target despite addition of 10 unit(s) of NPH last evening at 2000 hrs and q4h correction.     NPH 10 unit(s) not quite enough for the TF + dex effect from the OR.  Will increase to 13 unit(s) today to cover the dex effect.     Today, Gallo is having a lot of pain.  He expresses a lot of frustration with the past treatment but a lot of gratitude for being here now.  Reflective/supportive listening.       We reviewed the plan for the day and he is appreciative for the update and the conversation.           6:44 PM  Review of the notes today indicates patient has new diagnosis of adenocarinoma and has been made NPO.   Team is unclear of the length of time the procedure will be tomorrow.      Will adjust all insulins accordingly since TF will be stopped at midnight and will be NPO.   Drip is an option give the escalating BG with likely stress of dx and ongoing pain and TF.    Will adjust orders and if there is a need, can always add drip.  Patient aware of this option as it was discussed earlier during rounds.       Planned Procedures/surgeries: none (ERCP yesterday)  - will be NPO again tonight for repeat EUS/EGD     D5W-containing solutions/medications: TF continuously Vital 1.5 at 90 ml/hr     Recent Labs   Lab 07/12/23  0505 07/12/23  0108 07/11/23  1919 07/11/23  1714 07/11/23  1503 07/11/23  1325   * 285* 232* 153* 135* 107*         Nutrition:     Orders Placed This Encounter      Clear Liquid Diet    Tube Feeding:  Held this AM - OR/NPO   TF continuously Vital 1.5 at 90 ml/hr         PTA Regimen:     lantus 8 units at HS  aspart prn - up to 40 units/day - using unknown scale and frequency, based on his cgm review  And home TF was Peptamin 1.5 at 90 ml/h            "Review of Systems:   CC: \"a lot of pain right now\"          Medications:   Steroid plannin mg in OR this AM        Physical Exam:   /62 (BP Location: Left arm, Patient Position: Semi-Nicole's, Cuff Size: Adult Regular)   Pulse 78   Temp 98  F (36.7  C) (Oral)   Resp 18   Ht 1.829 m (6' 0.01\")   Wt 81.1 kg (178 lb 12.7 oz)   SpO2 99%   BMI 24.24 kg/m      General:  pleasant, in moderate distress. NG in R nare   HEENT:  NC/AT. MMM, sclera not injected  Lungs:  unremarkable, no new cough, no SOB  ABD:  Rounded -G tube in place w dressing   Skin:  warm and dry, no obvious lesions  Feet:   Not assessed today   MSK:   moving all extremities  Lymp:   Did not assess LE edema  Mental Status:  Alert and oriented x3  Psych:   Cooperative, good eye contact, full/appropriate affect         Data:     Lab Results   Component Value Date    A1C 7.9 2023        CBC RESULTS:   Recent Labs   Lab Test 23  0548   WBC 5.3   RBC 3.13*   HGB 9.9*   HCT 30.1*   MCV 96   MCH 31.6   MCHC 32.9   RDW 14.6        Recent Labs   Lab Test 23  0359 07/10/23  2250 07/10/23  0437 07/10/23  0408 23  1127 23  0501   NA  --   --   --  134*  --  134*   POTASSIUM  --   --   --  3.9  3.9  --  4.3   CHLORIDE  --   --   --  100  --  100   CO2  --   --   --    --  24   ANIONGAP  --   --   --    --  10   * 234*   < > 135*   < > 271*   BUN  --   --   --  12.8  --  14.4   CR  --   --   --  0.64*  --  0.69   DENISE  --   --   --  8.6  --  8.7    < > = values in this interval not displayed.     Liver Function Studies -   Recent Labs   Lab Test 07/10/23  0408   PROTTOTAL 5.7*   ALBUMIN 2.9*   BILITOTAL 8.7*   ALKPHOS 332*   AST 85*   *     Lab Results   Component Value Date    INR 1.96 07/10/2023    INR 1.78 2023    INR 1.37 2023     ESVIN [facility], [facility]  Inpatient Diabetes Management Service  Pager - [phone]  Available on Vocera     To contact Endocrine Diabetes " service:   From 7AM-5PM: page inpatient diabetes provider who is following the patient that day (see filed or incomplete progress notes/consult notes).  If uncertain of provider assignment: page job code [phone]. (To page job code in-house dial 3 stars, 777 then enter number).  For questions or updates AFTER HOURS from 5PM-7AM: page the diabetes job code for on call fellow: [phone]    Please notify inpatient diabetes service if changes are planned to steroids, nutrition, or if procedures are planned requiring prolonged NPO status. Diabetes Management Team job code: [phone]     I spent a total of >50 minutes on the date of the encounter doing prep/post-work, chart review, history and exam, documentation and further activities per the note including lab review, multidisciplinary communication, counseling the patient and/or coordinating care regarding acute hyper/hypoglycemic management, as well as discharge management and planning/communication.  See note for details.

## 2023-07-12 NOTE — PLAN OF CARE
"Vital signs:  Temp: 98  F (36.7  C) Temp src: Oral BP: 106/62 Pulse: 78   Resp: 18 SpO2: 99 % O2 Device: None (Room air)  Height: 182.9 cm (6' 0.01\") Weight: 81.1 kg (178 lb 12.7 oz)    5300-1959:    Activity: Up independently to bathroom.  Neuros: A & Ox4. Neuro intact.  Cardiac: BPs soft at 99/59 at beginning of shift, within parameters. Recheck /62. HR 70s. Asymptomatic.   Respiratory: LS clear. O2 sats high 90s on RA. Denies SOB. Unlabored.   GI/: BS hypoactive, denies passing flatus, no BM. Voiding adequate in bedside urinal.   Diet: Tolerating clears. New TF set up at 0530; continuous TF running at 90 mL/hr via J-tube per orders. Flushes 60cc/hr per orders.  Skin: Jaundice.   Lines: LR was infusing at 75 mL/hr via left arm PIV, now TKO. Other PIV TKO   Drains: G-tube to gravity with 1350 mL green output.  Labs: Reviewed.  and , administered sliding scale insulin per orders.   Pain/nausea: PRN oral Dilaudid 4mg x2 effective for generalized achy mid abdominal pain, patient slept in between cares. Denies nausea.   New changes this shift: None.   Plan: Continue POC.           "

## 2023-07-12 NOTE — PROGRESS NOTES
"/74 (BP Location: Left arm)   Pulse 90   Temp 98.2  F (36.8  C) (Oral)   Resp 20   Ht 1.829 m (6' 0.01\")   Wt 81.1 kg (178 lb 12.7 oz)   SpO2 96%   BMI 24.24 kg/m       Neuro: A&Ox4.   Cardiac: Afebrile, VSS.   Respiratory: RA , declined capno. Pt has been alert and awake since arriving on the floor.  GI/: Voiding spontaneously. No BM this shift.   Diet/appetite: Tolerating  Diet. TFs at goal -90cc/hr. Denies nausea   Activity: Up SBA    Pain: Agreeable to current regimen  Skin: No new deficits noted.  Lines:PIV  Drains: PEG, G-tube to gravity, J-tube tube feeds.  No new complaints.Will continue to monitor and follow plan of care.       "

## 2023-07-12 NOTE — CONSULTS
Care Management Initial Consult    General Information  Assessment completed with: Patient,    Type of CM/SW Visit: Initial Assessment    Primary Care Provider verified and updated as needed: Yes   Readmission within the last 30 days:        Reason for Consult: discharge planning  Advance Care Planning: Advance Care Planning Reviewed: no concerns identified          Communication Assessment  Patient's communication style: spoken language (English or Bilingual)    Hearing Difficulty or Deaf: no   Wear Glasses or Blind: no    Cognitive  Cognitive/Neuro/Behavioral: WDL     Arousal Level: opens eyes spontaneously     Mood/Behavior: calm, cooperative          Living Environment:   People in home: child(merry), dependent, spouse     Current living Arrangements: house      Able to return to prior arrangements: yes       Family/Social Support:  Care provided by: self, spouse/significant other  Provides care for:    Marital Status:   Wife          Description of Support System: Supportive, Involved    Support Assessment: Adequate family and caregiver support, Adequate social supports    Current Resources:   Patient receiving home care services: No     Community Resources: None  Equipment currently used at home: none  Supplies currently used at home: None    Employment/Financial:  Employment Status: retired        Financial Concerns: No concerns identified      Lifestyle & Psychosocial Needs:  Social Determinants of Health     Tobacco Use: Not on file   Alcohol Use: Not on file   Financial Resource Strain: Not on file   Food Insecurity: Not on file   Transportation Needs: Not on file   Physical Activity: Not on file   Stress: Not on file   Social Connections: Not on file   Intimate Partner Violence: Not on file   Depression: Not on file   Housing Stability: Not on file       Functional Status:  Prior to admission patient needed assistance:   Dependent ADLs:: Independent  Dependent IADLs:: Independent  Assesssment of  Functional Status: At functional baseline    Mental Health Status:  Mental Health Status: No Current Concerns       Chemical Dependency Status:  Chemical Dependency Status: No Current Concerns             Values/Beliefs:  Spiritual, Cultural Beliefs, Christian Practices, Values that affect care: no               Additional Information:  Patient is a 57 year old male with acute on chronic pancreatitis with pancreatic mass 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    Met with pt at bedside to introduce RNCC role and discuss discharge planning.  Patient lives with his wife and children(15yo, 19yo) in [place].  Pt is on tube feedings provided by [employer]. Pt was managing these independently.  Patient hopes to return home at discharge.  He reports he was needing to go to the ER frequently for IV fluids and electrolyte replacements.  He is hoping he could be ordered to get labs and fluids on a schedule as an outpt.  Will f/u with the provider regarding this at time of discharge.      GI updated this writer that pt was just told he has pancreatic adenocarcinoma and wanted resources on how to talk to their children about the diagnosis.  Provider placed palliative consult.  Reached out the palliative SW who gave this writer the following to provide to the patient and then their team will meet with the pt tomorrow.       Three Things to Tell Your Children (whatever your children's ages, what you are going to do at this point is: 1) Tell them you are seriously ill 2) Tell them the name of your disease and 3) Tell them your best understanding of what may happen. The book is How to Help Children Through a Parent's Serious  Illness, by Erendira Marin    CM team will continue to follow and assist with discharge planning.      [facility])  Phone: [phone]  Fax: [phone]      BONITA [facility]  Phone: [phone]  Pager: [phone]    SEARCHABLE in [facility] - search CARE COORDINATOR     [place] (5981-8376) Saturday & Sunday; (2504-7023) [facility] Recognized Holidays     Units: 4A, 4C, 4E, 5A & 5B   Pager: [phone]    Units: 6A & 6B    Pager: [phone]    Units: 6C & 6D   Pager: [phone]    Units: 7A, 7B, 7C, 7D & 5C    Pager: [phone]    Units: [facility] ED, 5 Ortho, 5 Med/Surg, 6 Med/Surg, 8A, 10 ICU, & [facility]    Pager: [phone]

## 2023-07-12 NOTE — CONSULTS
[facility]    Consult Note - Surgical Oncology Service  Date of Admission:  7/8/2023  Consult Requested by: Dr. Cory Michele    Reason for Consult: New Pancreatic Adenocarcinoma    Assessment & Plan: Surgery   Gallo Navarro is a previously healthy 57 year old male with recent diagnosis of pancreatitis with pancreatic insufficiency s/p PEGJ placement, EGD, ERCP, MRCP, and EUS biopsy that confirmed pancreatic adenocarcinoma with encasement of the SMA and SMV    - No plan for surgical intervention at this time - Unresectable due to extent of vascular invasion   - Agree with medical oncology consult for medical management  - Recommend port placement while in patient for chemotherapy  - Agree with GI consult for consideration of duodenal stent  - Will see in surg onc clinic after chemotherapy - Message sent to        Drains: PRESENT         - 1 Biliary Drain(s)   Code Status: Full Code      Clinically Significant Risk Factors              # Hypoalbuminemia: Lowest albumin = 2.9 g/dL at 7/12/2023  5:47 AM, will monitor as appropriate  # Coagulation Defect: INR = 1.67 (Ref range: 0.85 - 1.15) and/or PTT = N/A, will monitor for bleeding            # Moderate Malnutrition: based on nutrition assessment       The patient's care was discussed with the Chief resident who discussed with Staff Dr. Ravi Lujan MD  General Surgery PGY 3 Resident  Non-urgent messages: Securely message with [employer] (more info)  Text page via [facility] Paging/Directory     ______________________________________________________________________    Chief Complaint   New pancreatic adenocarcinoma    History is obtained from the patient and chart review     History of Present Illness   Gallo Navarro is a previously healthy 57 year old male who presented with worsening abdominal pain after ERCP and stent placement. He initially presented to an OSH 4 months ago with intractable vomiting and was  "diagnosed with pancreatitis. He has had multiple ED visits and hospitalizations since then with continued inability to tolerate PO, 40 lbs weight loss, and worsening abdominal pain and chem panel including hepatitis, pancreatitis, hypokalemia, and hyponatremia. He presented on this admission with worsening abdominal pain. Work up and course thus far is detailed below, including  PEGJ placement, EGD, and ERCP, and MRCP.     He reports that he currently does not have much abdominal pain. Nutrition has been via J tube with G tube to gravity. He continues to pass flatus and have bowel movements. Family history is significant for paternal aunt with pancreatic cancer in her 70s     He is a recently retired college , lives with his wife Violet in [place] and has 2 children. He denies a history of smoking. Uses alcohol occasionally, no recreational drugs.     Last colonoscopy was in 2016 with normal colon and terminal ileum.        Diagnostic course  \"  Initially presented with sudden onset vomiting on 3/2023, found to have lipase >2000  but did not have active abdominal pain, with presumed dx of pancreatitis. Continued to have intractable bilious vomiting despite multiple admissions to the hospital, later found to have an acquired duodenal stricture with gastric outlet obstruction thought to be 2/2 pancreatic inflammation. Failed multiple PO trials and NG/NJ tubes, ultimately had PEGJ placed for gastric venting and nurition with unintentional weight loss of 40 lbs. Reports continued weight loss despite enteral tube feeds. Repeat CT imaging of abdomen on 6/2/2023 in follow-up of pancreatitis with note of more focal irregular hypodense masslike region in the pancreatic head (2.2 x 1.5 x 1.4 cm) with persistent mild main pancreatic ductal dilatation, subcentimeter mid mesenteric lymph nodes, stable narrowing of the main portal vein near the portal venous confluence. EUS biopsy was performed on 6/27/23 " "showing duodenal inflammation. Unable to get biopsy of pancreatic mass due to poor window.  Patient then developed new abdominal pain since 6/27 after biopsy, bandlike, radiating to his back, with increase jaundice. 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 Unfortunately patient's pain progressed which is why he presented to the ED on 7/8/2023 wanting a second opinion. Labs were notable for rising LFTs since May, with alk phos 404, AST 83, , and T bili 6.7 (rising since labs in care-everywhere with T bilirubin on 7/3 5.3).  Jaundice on exam with generalized abdominal pain.  CT abd/pelvis in ED with pancreatic mass without pancreatic ductal dilation with biliary stent in good position with a possible calcific density adjacent to the distal biliary stent (pancreatic calcification vs calcified gallstone). He then underwent EGD/EUS completed 7/11 w/ biopsies taken. Pathology reports back on 7/12 with pancreatic adenocarcinoma. \"       Past Medical History    Past Medical History:   Diagnosis Date     Acute pancreatitis 04/16/2023     Gastric outlet obstruction      Recurrent acute pancreatitis        Past Surgical History   Past Surgical History:   Procedure Laterality Date     AS OPEN TREATMENT CLAVICULAR FRACTURE INTERNAL FX       INSERT PICC LINE  04/29/2023     IR NG TUBE PLACEMENT REQ RAD & FLUORO  05/08/2023    JG tube   No previous abdominal surgeries     Prior to Admission Medications   Medications Prior to Admission   Medication Sig Dispense Refill Last Dose     blood glucose (FREESTYLE TEST STRIPS) test strip by Other route as needed   6/30/2023     HYDROmorphone (DILAUDID) 2 MG tablet 0.5 tablet to 1  tablet as needed Orally every 6 hrs for 4 days   7/8/2023     hydrOXYzine (VISTARIL) 25 MG capsule Take 25 mg by mouth   7/8/2023 at 7am     insulin aspart " (NOVOLOG PEN) 100 UNIT/ML pen    7/8/2023 at am     insulin glargine (BASAGLAR KWIKPEN) 100 UNIT/ML pen inject 10 units Subcutaneous once a day*   7/7/2023 at pm          Review of Systems    The 10 point Review of Systems is negative other than noted in the HPI    Physical Exam   Vital Signs: Temp: 98.1  F (36.7  C) Temp src: Oral BP: 117/72 Pulse: 85   Resp: 14 SpO2: 98 % O2 Device: None (Room air)    Weight: 178 lbs 12.69 oz    Intake/Output Summary (Last 24 hours) at 7/12/2023 1623  Last data filed at 7/12/2023 1507  Gross per 24 hour   Intake 3766.25 ml   Output 4725 ml   Net -958.75 ml     GENERAL: Appears alert and oriented times three.   HEENT: Eye symmetrical and free of discharge bilaterally. Mucous membranes moist and without lesions. Icteric sclera  NECK: Supple and without lymphadenopathy.  CV: RRR   RESPIRATORY: Respirations regular, even, and unlabored on room air   GI: Soft and minimally distended.  No tenderness, rebound, guarding; PEGJ in place without surrounding erythema   EXTREMITIES: No peripheral edema. 2+ bilateral pedal pulses.   NEUROLOGIC: Alert and orientated x 3. CN II-XII grossly intact. No focal deficits.   MUSCULOSKELETAL: No joint swelling or tenderness.   SKIN: Jaundiced       Data     I have personally reviewed the following data over the past 24 hrs:    5.4  \   9.7 (L)   / 334     136 100 15.8 /  261 (H)   4.0 26 0.60 (L) \       ALT: 139 (H) AST: 49 (H) AP: 322 (H) TBILI: 4.9 (H)   ALB: 2.9 (L) TOT PROTEIN: 5.7 (L) LIPASE: N/A       Imaging results reviewed over the past 24 hrs:   No results found for this or any previous visit (from the past 24 hour(s)).

## 2023-07-12 NOTE — PROGRESS NOTES
GASTROENTEROLOGY PROGRESS NOTE    Date of Admission: 7/8/2023  Reason for Admission: Pancreatic mass, hx biliary stent insertion      ASSESSMENT:  57 year old male with a history of idiopathic pancreatitis, diabetes on insulin who presented to the ED on 7/8/2023 with abdominal pain and jaundice.    #Idiopathic pancreatitis  #Abnormal imaging of the pancreas, concern for pancreas neoplasm   #Gastric outlet obstruction r/t severe duodenal stricture  #Obstructive jaundice, s/p repeat biliary stenting 7/11/2023  #Leukocytosis, fever/chills (resolved)    Pancreatitis History:  -Etiology: suspect malignancy (path pending) vs idiopathic; AI (IgG4 normal) vs EtOH (PEth <10)  -Date of onset: 3/2023  -Concurrent organ failure: none  -Thromboses: none  -Diabetes: suspect Type 3c (dx after onset of pancreatitis), A1C 7.9% (7/8), on insulin  -EPI: unknown - fecal elastase still pending (7/9)  -Current nutrition access: PEG-J (placed 5/2023)  -Last imaging:    -7/8 CT A/P: Pancreas mass without pancreatic ductal dilatation, biliary stent in position with mild pneumobilia, calcific density adjacent to biliary stent (?calcified gallstone)   -7/10: MRCP with ill-defined soft tissue mass in pancreatic head with lymphadenopathy suspicious for malignancy.  -Infection/anti-infectives: Zosyn (7/9-present), BC +staphy capitis and staph epidermidis only 1/2 on 7/9 (?contaminant), repeat BC NGTD (7/11)  -Interventions:    -7/11/2023: EUS and ERCP (Dr. Pickett) - Severe D2 strictu, ~4 cm mass was identified in the uncinate process of the pancreas (bx pending), unable to visualize SMA or SMV adjacent to mass. Celiac trunk uninvolved. Lymphadenopathy.  Biliary stent occluded (likely d/t compression from mass effect of duod stricture), placed covered metal biliary sent and additional plastic DP stent                  Patient's main issue was intolerance to oral intake and persistent vomiting rather than pain. He had a prolonged hospital  course at [facility] in April 2023 during which time he was found to have extrinsic compression of the duodenum which was thought to be due to pancreatitis, due to continued intolerance to oral diet,he was briefly on TPN and  a PEG-J tube was placed during that hosptialization. Despite tube feedings he continued to lose weight and needed ER visits/admissions for dehydration and hypotension and dyselectrolytemia.      More recently on 6/2/2023 he was found to have a mass in the pancreatic head region on imaging, this was evaluated via EUS on 6/27/2023 there was no safe window to biopsy vs deferred bx this lesion due to risk for infecting necrosis.  Few days after this EUS procedure patient developed abdominal pain and symptomatic obstructive jaundice with c/f cholangitis given elevated LFTs/T.bili with pruritis, fever and leukocytosis.      It is unclear if the current imaging abnormality in the pancreatic head regions/ uncinate process area is a neoplastic mass or if it is necrosis from prior attack of pancreatitis. CEA 10.6, CA 19-9 29 (7/8). MRCP 7/10 with ill-defined soft tissue mass in pancreatic head with lymphadenopathy suspicious for malignancy.  Now s/p EUS with bx of mass (path pending) and ERCP on 7/11 with biliary stricture and possibly occluded stents, placement of additional biliary stents and noted pus/dark bile draining post.  Noted +BC 7/9 but suspect likely contaminant given organisms and only grew in 1of2 bottles.  However, would complete 7 days of abx post new drain placement/source control given sx c/f cholangitis previously.    #Moderate protein-calorie malnutrition  #S/p PEG-J (5/2023)  Presented with poor po d/t GOO due to duodenal stenosis and subsequent weight loss of 40#.  Taking po liquids for pleasure only PTA and Gtube vented essentially at all times with 1.5-2 L output daily and started receiving IVF infusions PTA as had hyponatremia and electrolyte abnormalities  frequently.  Pending fecal elastase to evaluate need for PERT; consider evaluate fat soluble vitamin levels if determine patient has EPI.  Dietitian following.  Receiving peptide-based TFs.    RECOMMENDATIONS:  -- Follow pathology for need to consider surgical oncology and hem/onc consults if positive for malignancy.     -If path positive for malignancy but surgical candidate could consider duodenal stenting for stricture/GOO vs if not a surgical candidate consider EUS GJ (inaptient vs outpatient) to bypass duodenal stricture to allow resumption of PO diet.  -- Follow repeat BC  -- Transition to PO abx (Cipro/Flagyl or Augmentin) and continue x7 day course (though 7/18) post source control with new stents and if repeat BC remains negative.  -- Trend CBC and CMP/LFTs daily  -- Continue Gtube to gravity.  -- Begin Gtube flush 30 ml q 4 hrs to ensure patency (ordered for you).  -- Monitor Gtube outputs.  Consider replace with 0.5-1 mL IVF (LR) for every 1 mL of Gtube output to avoid hypovolemia and electrolyte imbalances.  -- Increase Miralax to BID, add senna-docusate 1-2 tablet BID.  -- Administer dulcolax suppository 7/12 if without BM.    -- Consider change to High electrolyte replacement protocols and add Phos to currently ordered Mg/K.  -- Follow fecal elastase for need to consider PERT with TFs, indication to check ADEK levels.  -- Consider US evaluation of b/l LE for DVT given c/o b/l LE swelling/currently not on AC.     The patient was seen and discussed and with GI staff, Dr. York.    GI Follow up (we will arrange):  TBD pending clinical course.    Overall time spent on the date of this encounter preparing to see the patient (including chart review of available notes, clinical status events, imaging and labs); obtaining interim history/subjective data; ordering and/or coordinating medications, tests or procedures; ordering medications, tests or procedures; communicating with other health care  "professionals; and documenting the above clinical information in the electronic medical record was 50 minutes.     Iris Kaur PA-C  GI Service  [facility]  Text Page  _______________________________________________________________      Subjective: Nursing notes and 24hr events reviewed. Patient seen and examined at 11:50.  Reports initially felt better yesterday with improved pruritis after procedure but poor sleep overnight (unable to pinpoint exact reason why awake).  Reports ongoing improved pruritis but had increasing abd distention with noted decreased Gtube outputs and burning in lower abd.  Passing gas (witnessed) but no BM yesterday.  C/o increased swelling in b/l LE.    ROS:   4 pt ROS negative unless noted in subjective.     Objective:  Blood pressure 107/63, pulse 82, temperature 98.5  F (36.9  C), temperature source Oral, resp. rate 16, height 1.829 m (6' 0.01\"), weight 81.1 kg (178 lb 12.7 oz), SpO2 99 %.     Gen: Sitting up in bed/asleep but easily arouses to voice. Appears comfortable and in NAD  HEENT: NCAT. Sclera icteric.  CV: RRR, Peripheral perfusion intact  Resp: non-labored work of breathing on RA.  Abd: Soft, mildly distended, tympanic to percussion, minimally tender to palpation, no guarding/peritoneal sx. PEG-J site c/d/i with Gtube to gravity with minimal yellow/bilious fluid in bag/tubing, TF infusing via Jtube.  Msk: no gross deformity  Skin: jaundice  Ext: warm, well perfused. No significant b/l LE edema noted. Negative Frank's sx.  Neuro: grossly normal  Mental status/Psych: A&O. Asks/answers questions appropriately     PROCEDURES:  7/11/2023: EUS with Dr. Pickett  Impression:            - Severe duodenal stricture in D2 limiting exam. GJ                          tube repositioned partly                          - A ~4 cm mass was identified in the uncinate process                          of the pancreas but only partially visualized from the         "                  gastric antrum. Fine needle biopsy performed. Unable                          to visualize SMA or SMV adjacent to mass. Celiac trunk                          uninvolved.                          - A few enlarged lymph nodes were visualized in the                          peripancreatic region that appeared reactive.                          - Evidence of biliary stent obstruction with upstream                          dilation and sludge   Pathology: PENDING    7/11/2023: ERCP with Dr. Pickett  Impression:      - GJ tube temporarily repositioned to facilitate                          procedure                          - Severe duodenal stricture in D2 limiting                          visualization and scope positioning                          - Previously placed plastic stent was occluded likely                          due to distal impaction of the plastic stent against                          the edematous/strictured duodenal wall.                          - Left previously placed stent to serve as a guide for                          the rest of the ERCP. Large amount of pus and dark                          bile drained out of the biliary tree.                          - Cholangiogram showed distal biliary stricture and                          upstream sludge and dilation                          - Distal CBD stricture dilated to 4 mm alonside                          previously placed plastic stent                          - One 10 mm x 4 cm fully covered Viabil stent placed                          alongside previously placed plastic stent                          - One 10 Fr x 5 cm double pigtail Solus stent placed                          coaxially within the metal biliary stent to act as a                          bumper                          - Elected to leave the previously place plastic stent                          due to poor visualization and should add to drainage                           anyway     LABS:  BMP  Recent Labs   Lab 07/12/23  0852 07/12/23  0547 07/12/23  0505 07/12/23  0108 07/11/23  1325 07/11/23  0904 07/11/23  0548 07/10/23  0437 07/10/23  0408   NA  --  136  --   --   --  138 138  --  134*   POTASSIUM  --  4.0  --   --   --  3.7 4.0  --  3.9  3.9   CHLORIDE  --  100  --   --   --  102 103  --  100   DENISE  --  8.5*  --   --   --  8.7 8.8  --  8.6   CO2  --  26  --   --   --  26 24  --  23   BUN  --  15.8  --   --   --  10.8 9.7  --  12.8   CR  --  0.60*  --   --   --  0.65* 0.56*  --  0.64*   * 193* 256* 285*   < > 114* 112*   < > 135*    < > = values in this interval not displayed.     CBC  Recent Labs   Lab 07/12/23  0547 07/11/23  0904 07/11/23  0548 07/10/23  0840   WBC 5.4 5.7 5.3 7.2   RBC 3.10* 3.08* 3.13* 2.94*   HGB 9.7* 9.7* 9.9* 9.1*   HCT 30.4* 30.1* 30.1* 29.3*   MCV 98 98 96 100   MCH 31.3 31.5 31.6 31.0   MCHC 31.9 32.2 32.9 31.1*   RDW 14.6 14.7 14.6 15.1*    270 274 233     INR  Recent Labs   Lab 07/11/23  0904 07/11/23  0548 07/10/23  0840 07/09/23  0501   INR 1.67* 1.73* 1.96* 1.78*     LFTs  Recent Labs   Lab 07/12/23  0547 07/11/23  0904 07/11/23  0548 07/10/23  0408   ALKPHOS 322* 355* 356* 332*   AST 49* 87* 84* 85*   * 179* 179* 198*   BILITOTAL 4.9* 9.0* 8.8* 8.7*   PROTTOTAL 5.7* 6.0* 6.0* 5.7*   ALBUMIN 2.9* 3.0* 3.1* 2.9*      PANC  Recent Labs   Lab 07/09/23  0501 07/08/23  1137   LIPASE 34 38     IMAGING:  (Personally reviewed)    7/10/2023: MR ABDOMEN MRCP W/O & W CONTRAST  IMPRESSION:   1.  Ill-defined masslike soft tissue in the pancreatic head is  suspicious for malignancy given the biliary and pancreatic duct  obstruction as well as vascular encasement and narrowing of SMV,  although less likely pancreatitis may also produce similar findings.  Recommend EUS biopsy.  2.  Indeterminate mildly enlarged peripancreatic, periportal and  mesenteric lymph nodes.    7/8/2023: CT ABDOMEN AND PELVIS WITH CONTRAST  FINDINGS:    LOWER CHEST: Minimal dependent atelectasis versus less likely infiltrate.     HEPATOBILIARY: Biliary stent in good position with mild biliary dilatation and pneumobilia. There is a calcific density adjacent to the stent, it is unclear if this is a pancreatic calcification or a calcified gallstone. No focal liver lesions   demonstrated.     PANCREAS: Pancreas mass in the head and body measuring approximately 6.3 x 3.9 cm. No definite pancreatic ductal dilatation.     SPLEEN: Normal size.     ADRENAL GLANDS: No significant nodules.     KIDNEYS/BLADDER: No significant mass, stone, or hydronephrosis.     BOWEL: No obstruction or inflammatory change.     LYMPH NODES: Mild peripancreatic adenopathy.     VASCULATURE: No abdominal aortic aneurysm.     PELVIC ORGANS: No pelvic masses.     MUSCULOSKELETAL: No frankly destructive bony lesions.                                                                    IMPRESSION:   1.  Pancreas mass without pancreatic ductal dilatation.  2.  Biliary stent in good position with mild pneumobilia.  3.  Question a calcific density adjacent to the distal biliary stent, it is unclear if this is a pancreatic calcification in the adjacent parenchyma versus a calcified gallstone.

## 2023-07-12 NOTE — PROGRESS NOTES
[facility]    Medicine Progress Note - Hospitalist Service, GOLD TEAM 9    Date of Admission:  7/8/2023    Assessment & Plan   Galol Navarro is a 57 year old male with recent diagnosis of acute pancreatitis 3/2023 c/b chronic pancreatitis with pancreatic mass causing duodenal stenosis with gastric outlet obstruction s/p GJ tube (placed 5/2023), biliary obstruction s/p stent placement on 7/7/23, pancreatic insufficiency, and IDDM2, who presents with worsening abdominal pain, increased jaundice, poor PO intake and weight loss admitted on 7/8/2023 for concern of biliary obstruction, biopsy of pancreatic mass returned positive for pancreatic adenocarcinoma on 7/12/23.      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 Initially presented with sudden onset vomiting on 3/2023, found to have lipase >2000  but did not have active abdominal pain, with presumed dx of pancreatitis. Continued to have intractable bilious vomiting despite multiple admissions to the hospital, later found to have an acquired duodenal stricture with gastric outlet obstruction thought to be 2/2 pancreatic inflammation. Failed multiple PO trials and NG/NJ tubes, ultimately had PEGJ placed for gastric venting and nurition with unintentional weight loss of 40 lbs. Reports continued weight loss despite enteral tube feeds. Repeat CT imaging of abdomen on 6/2/2023 in follow-up of pancreatitis with note of more focal irregular hypodense masslike region in the pancreatic head (2.2 x  1.5 x 1.4 cm) with persistent mild main pancreatic ductal dilatation, subcentimeter mid mesenteric lymph nodes, stable narrowing of the main portal vein near the portal venous confluence. EUS biopsy was performed on 6/27/23 showing duodenal inflammation. Unable to get biopsy of pancreatic mass due to poor window.  Patient then developed new abdominal pain since 6/27 after biopsy, bandlike, radiating to his back, with increase jaundice. 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 EGD/EUS completed 7/11 w/ biopsies taken > pathology report back on 7/12 +pancreatic adenocarcinoma.   - Panc/Bili consulted  - Nutrition - Continue tube feeds                - Peptamen 1.5, 90 ml/hr.  ml Q2H - once out of Peptamen, change to Vital 1.5 @ 90 ml/hr ( 2160 ml/day) to provide: 3240 kcals ( 40 kcal/kg), 145 g PRO ( 1.8 gm/kg), 1649 ml free H20, 404 gm CHO, and 12.3 gm soluble fiber daily.  - Pain management: tylenol 650 mg Q6H PRN, Dilaudid PO 4 mg Q3H PRN, IV dilaudid 0.5-1mg mg Q2H PRN breakthrough pain, OK to increase as needed.   - Bowel regimen with miralax, senna, and suppository PRN  - Atarax PRN itching   - NPO at midnight for repeat EGD/EUS on 7/13  - Surgical Oncology consulted  - Oncology consulted   -CT chest w/ contrast ordered  - Palliative Care SW and Child Fam Life consulted to assist w/ discussions w/ children regarding diagnosis.      #+staph epi x 1 blood culture  Pt remains clinically stable. Methicillin sensitive. Will monitor closely. Also growing staph. Capitis, likely all contaminants.   - repeat blood cultures x 7/11 NGTD  - Zosyn > Augmentin on 7/12, complete course on 7/18     # IDDM2 - Diagnosed after initial dx pancreatitis 3/2023 likely 2/2 pancreatitic insufficiency.   - Hemoglobin A1c 7.9 on admission  - Endocrine consult  - Lantus 10 units daily  -  Novolog sliding scale Q4H     # Acute on chronic anemia - H/H 8.8/27.1, down from one week ago on 7/3, patient was at H/H 11.1/33.2. No reported bleeding.   - Trend CBC  - Transfuse for hgb goal >7.0     # Malnutrition   - Nutrition consulted as noted above    # Itching  LIkely due to cholestasis  - hydroxyzine PRN  - cholestyramine BID        Diet: Adult Formula Drip Feeding: Continuous Vital 1.5; Jejunostomy; Goal Rate: 90 mL/hr; mL/hr  Clear Liquid Diet  NPO per Anesthesia Guidelines for Procedure/Surgery Except for: Meds    DVT Prophylaxis: Pneumatic Compression Devices  Adan Catheter: Not present  Lines: None     Cardiac Monitoring: None  Code Status: Full Code      Clinically Significant Risk Factors              # Hypoalbuminemia: Lowest albumin = 2.9 g/dL at 7/12/2023  5:47 AM, will monitor as appropriate  # Coagulation Defect: INR = 1.67 (Ref range: 0.85 - 1.15) and/or PTT = N/A, will monitor for bleeding            # Moderate Malnutrition: based on nutrition assessment         Disposition Plan      Expected Discharge Date: 07/17/2023      Destination: home;home with help/services            Clau Ray MD  Hospitalist Service, GOLD TEAM 9  [facility]  Securely message with [employer] (more info)  Text page via [employer] Paging/Directory   See signed in provider for up to date coverage information  ______________________________________________________________________    Interval History   Pain better controlled overnight. GI delivered the news of pancreatic adenocarcinoma to patient and wife. They are wondering if there can be assistance with discussing the news with children. Aware of plan for addtl GI procedures on 7/13.       Physical Exam   Vital Signs: Temp: 98.1  F (36.7  C) Temp src: Oral BP: 117/72 Pulse: 85   Resp: 14 SpO2: 98 % O2 Device: None (Room air)    Weight: 178 lbs 12.69 oz     Gen: no acute distress, alert, interactive, a bit anxious. Jaundiced  appearance  HEENT:scleral icterus, EOMI  Nares: No discharge noted from nares bilaterally   CV: RRR, no murmurs  Pulm: CTBA  Abd: soft, tender to palpation (generalized). PEG J site c/d/i, with drainage (bilious) in bag.   Msk: no deformities  Extremities: trace edema in lower extremities.   Neuro: moving all extremities spontaneously    Medical Decision Making       80 MINUTES SPENT BY ME on the date of service doing chart review, history, exam, documentation & further activities per the note.      Data     I have personally reviewed the following data over the past 24 hrs:    5.4  \   9.7 (L)   / 334     136 100 15.8 /  261 (H)   4.0 26 0.60 (L) \       ALT: 139 (H) AST: 49 (H) AP: 322 (H) TBILI: 4.9 (H)   ALB: 2.9 (L) TOT PROTEIN: 5.7 (L) LIPASE: N/A       Imaging results reviewed over the past 24 hrs:   No results found for this or any previous visit (from the past 24 hour(s)).

## 2023-07-12 NOTE — PROVIDER NOTIFICATION
"Provider notified, \"7B. JUMANA Navarro (Rm [id number]) Hello, patient  reports general itching. pt asking for some prn medication that would help. thank you. aydin lauren [phone] \"  Carmella Carbajal RN on 7/12/2023 at 10:35 AM    "

## 2023-07-13 ENCOUNTER — ANESTHESIA EVENT (OUTPATIENT)
Dept: SURGERY | Facility: CLINIC | Age: 58
DRG: 435 | End: 2023-07-13
Payer: COMMERCIAL

## 2023-07-13 ENCOUNTER — ANESTHESIA (OUTPATIENT)
Dept: SURGERY | Facility: CLINIC | Age: 58
DRG: 435 | End: 2023-07-13
Payer: COMMERCIAL

## 2023-07-13 ENCOUNTER — APPOINTMENT (OUTPATIENT)
Dept: GENERAL RADIOLOGY | Facility: CLINIC | Age: 58
DRG: 435 | End: 2023-07-13
Attending: INTERNAL MEDICINE
Payer: COMMERCIAL

## 2023-07-13 LAB
ALBUMIN SERPL BCG-MCNC: 3.3 G/DL (ref 3.5–5.2)
ALP SERPL-CCNC: 293 U/L (ref 40–129)
ALT SERPL W P-5'-P-CCNC: 120 U/L (ref 0–70)
ANION GAP SERPL CALCULATED.3IONS-SCNC: 9 MMOL/L (ref 7–15)
AST SERPL W P-5'-P-CCNC: 39 U/L (ref 0–45)
BILIRUB SERPL-MCNC: 3.9 MG/DL
BUN SERPL-MCNC: 9.3 MG/DL (ref 6–20)
CALCIUM SERPL-MCNC: 8.8 MG/DL (ref 8.6–10)
CHLORIDE SERPL-SCNC: 104 MMOL/L (ref 98–107)
CREAT SERPL-MCNC: 0.6 MG/DL (ref 0.67–1.17)
DEPRECATED HCO3 PLAS-SCNC: 27 MMOL/L (ref 22–29)
ERYTHROCYTE [DISTWIDTH] IN BLOOD BY AUTOMATED COUNT: 14.5 % (ref 10–15)
GFR SERPL CREATININE-BSD FRML MDRD: >90 ML/MIN/1.73M2
GLUCOSE BLDC GLUCOMTR-MCNC: 104 MG/DL (ref 70–99)
GLUCOSE BLDC GLUCOMTR-MCNC: 125 MG/DL (ref 70–99)
GLUCOSE BLDC GLUCOMTR-MCNC: 134 MG/DL (ref 70–99)
GLUCOSE BLDC GLUCOMTR-MCNC: 141 MG/DL (ref 70–99)
GLUCOSE BLDC GLUCOMTR-MCNC: 191 MG/DL (ref 70–99)
GLUCOSE BLDC GLUCOMTR-MCNC: 211 MG/DL (ref 70–99)
GLUCOSE BLDC GLUCOMTR-MCNC: 233 MG/DL (ref 70–99)
GLUCOSE BLDC GLUCOMTR-MCNC: 257 MG/DL (ref 70–99)
GLUCOSE SERPL-MCNC: 109 MG/DL (ref 70–99)
HCT VFR BLD AUTO: 32.3 % (ref 40–53)
HGB BLD-MCNC: 10 G/DL (ref 13.3–17.7)
INR PPP: 1.46 (ref 0.85–1.15)
MAGNESIUM SERPL-MCNC: 2.1 MG/DL (ref 1.7–2.3)
MCH RBC QN AUTO: 30.2 PG (ref 26.5–33)
MCHC RBC AUTO-ENTMCNC: 31 G/DL (ref 31.5–36.5)
MCV RBC AUTO: 98 FL (ref 78–100)
PLATELET # BLD AUTO: 375 10E3/UL (ref 150–450)
POTASSIUM SERPL-SCNC: 4 MMOL/L (ref 3.4–5.3)
PROT SERPL-MCNC: 6.2 G/DL (ref 6.4–8.3)
RBC # BLD AUTO: 3.31 10E6/UL (ref 4.4–5.9)
SODIUM SERPL-SCNC: 140 MMOL/L (ref 136–145)
WBC # BLD AUTO: 5.9 10E3/UL (ref 4–11)

## 2023-07-13 PROCEDURE — 250N000013 HC RX MED GY IP 250 OP 250 PS 637: Performed by: STUDENT IN AN ORGANIZED HEALTH CARE EDUCATION/TRAINING PROGRAM

## 2023-07-13 PROCEDURE — 250N000011 HC RX IP 250 OP 636: Mod: JZ | Performed by: PHYSICIAN ASSISTANT

## 2023-07-13 PROCEDURE — 99223 1ST HOSP IP/OBS HIGH 75: CPT | Performed by: STUDENT IN AN ORGANIZED HEALTH CARE EDUCATION/TRAINING PROGRAM

## 2023-07-13 PROCEDURE — 36415 COLL VENOUS BLD VENIPUNCTURE: CPT | Performed by: STUDENT IN AN ORGANIZED HEALTH CARE EDUCATION/TRAINING PROGRAM

## 2023-07-13 PROCEDURE — C1769 GUIDE WIRE: HCPCS | Performed by: INTERNAL MEDICINE

## 2023-07-13 PROCEDURE — 250N000013 HC RX MED GY IP 250 OP 250 PS 637: Performed by: INTERNAL MEDICINE

## 2023-07-13 PROCEDURE — C1874 STENT, COATED/COV W/DEL SYS: HCPCS | Performed by: INTERNAL MEDICINE

## 2023-07-13 PROCEDURE — 250N000011 HC RX IP 250 OP 636: Mod: JZ | Performed by: INTERNAL MEDICINE

## 2023-07-13 PROCEDURE — 255N000002 HC RX 255 OP 636: Mod: JZ | Performed by: INTERNAL MEDICINE

## 2023-07-13 PROCEDURE — 258N000003 HC RX IP 258 OP 636: Performed by: NURSE ANESTHETIST, CERTIFIED REGISTERED

## 2023-07-13 PROCEDURE — 99223 1ST HOSP IP/OBS HIGH 75: CPT | Mod: GC | Performed by: SURGERY

## 2023-07-13 PROCEDURE — 99207 PR NO CHARGE SIGN-OFF PS: CPT | Performed by: STUDENT IN AN ORGANIZED HEALTH CARE EDUCATION/TRAINING PROGRAM

## 2023-07-13 PROCEDURE — 83735 ASSAY OF MAGNESIUM: CPT | Performed by: STUDENT IN AN ORGANIZED HEALTH CARE EDUCATION/TRAINING PROGRAM

## 2023-07-13 PROCEDURE — 272N000001 HC OR GENERAL SUPPLY STERILE: Performed by: INTERNAL MEDICINE

## 2023-07-13 PROCEDURE — 250N000011 HC RX IP 250 OP 636: Performed by: NURSE ANESTHETIST, CERTIFIED REGISTERED

## 2023-07-13 PROCEDURE — 250N000025 HC SEVOFLURANE, PER MIN: Performed by: INTERNAL MEDICINE

## 2023-07-13 PROCEDURE — 80053 COMPREHEN METABOLIC PANEL: CPT | Performed by: STUDENT IN AN ORGANIZED HEALTH CARE EDUCATION/TRAINING PROGRAM

## 2023-07-13 PROCEDURE — 85610 PROTHROMBIN TIME: CPT | Performed by: STUDENT IN AN ORGANIZED HEALTH CARE EDUCATION/TRAINING PROGRAM

## 2023-07-13 PROCEDURE — 250N000009 HC RX 250: Performed by: NURSE ANESTHETIST, CERTIFIED REGISTERED

## 2023-07-13 PROCEDURE — 0DBA8ZX EXCISION OF JEJUNUM, VIA NATURAL OR ARTIFICIAL OPENING ENDOSCOPIC, DIAGNOSTIC: ICD-10-PCS | Performed by: INTERNAL MEDICINE

## 2023-07-13 PROCEDURE — 710N000010 HC RECOVERY PHASE 1, LEVEL 2, PER MIN: Performed by: INTERNAL MEDICINE

## 2023-07-13 PROCEDURE — 250N000013 HC RX MED GY IP 250 OP 250 PS 637: Performed by: PHYSICIAN ASSISTANT

## 2023-07-13 PROCEDURE — 999N000141 HC STATISTIC PRE-PROCEDURE NURSING ASSESSMENT: Performed by: INTERNAL MEDICINE

## 2023-07-13 PROCEDURE — 120N000002 HC R&B MED SURG/OB UMMC

## 2023-07-13 PROCEDURE — 258N000003 HC RX IP 258 OP 636: Performed by: STUDENT IN AN ORGANIZED HEALTH CARE EDUCATION/TRAINING PROGRAM

## 2023-07-13 PROCEDURE — 85027 COMPLETE CBC AUTOMATED: CPT | Performed by: STUDENT IN AN ORGANIZED HEALTH CARE EDUCATION/TRAINING PROGRAM

## 2023-07-13 PROCEDURE — 250N000011 HC RX IP 250 OP 636: Performed by: PAIN MEDICINE

## 2023-07-13 PROCEDURE — 86301 IMMUNOASSAY TUMOR CA 19-9: CPT | Performed by: STUDENT IN AN ORGANIZED HEALTH CARE EDUCATION/TRAINING PROGRAM

## 2023-07-13 PROCEDURE — C9113 INJ PANTOPRAZOLE SODIUM, VIA: HCPCS | Mod: JZ | Performed by: PHYSICIAN ASSISTANT

## 2023-07-13 PROCEDURE — 99233 SBSQ HOSP IP/OBS HIGH 50: CPT | Performed by: STUDENT IN AN ORGANIZED HEALTH CARE EDUCATION/TRAINING PROGRAM

## 2023-07-13 PROCEDURE — 0D7A8DZ DILATION OF JEJUNUM WITH INTRALUMINAL DEVICE, VIA NATURAL OR ARTIFICIAL OPENING ENDOSCOPIC: ICD-10-PCS | Performed by: INTERNAL MEDICINE

## 2023-07-13 PROCEDURE — 99232 SBSQ HOSP IP/OBS MODERATE 35: CPT | Performed by: NURSE PRACTITIONER

## 2023-07-13 PROCEDURE — 999N000179 XR SURGERY CARM FLUORO LESS THAN 5 MIN W STILLS: Mod: TC

## 2023-07-13 PROCEDURE — C1726 CATH, BAL DIL, NON-VASCULAR: HCPCS | Performed by: INTERNAL MEDICINE

## 2023-07-13 PROCEDURE — 250N000011 HC RX IP 250 OP 636: Performed by: STUDENT IN AN ORGANIZED HEALTH CARE EDUCATION/TRAINING PROGRAM

## 2023-07-13 PROCEDURE — 370N000017 HC ANESTHESIA TECHNICAL FEE, PER MIN: Performed by: INTERNAL MEDICINE

## 2023-07-13 PROCEDURE — 360N000075 HC SURGERY LEVEL 2, PER MIN: Performed by: INTERNAL MEDICINE

## 2023-07-13 PROCEDURE — 99418 PROLNG IP/OBS E/M EA 15 MIN: CPT | Performed by: SURGERY

## 2023-07-13 DEVICE — STENT ESU AXIOS W/DEL SYS 15MMX10MM 10.8FR 138CM M00553650: Type: IMPLANTABLE DEVICE | Site: STOMACH | Status: FUNCTIONAL

## 2023-07-13 RX ORDER — HYDROMORPHONE HCL IN WATER/PF 6 MG/30 ML
0.2 PATIENT CONTROLLED ANALGESIA SYRINGE INTRAVENOUS EVERY 5 MIN PRN
Status: DISCONTINUED | OUTPATIENT
Start: 2023-07-13 | End: 2023-07-13 | Stop reason: HOSPADM

## 2023-07-13 RX ORDER — HYDROMORPHONE HCL IN WATER/PF 6 MG/30 ML
0.4 PATIENT CONTROLLED ANALGESIA SYRINGE INTRAVENOUS EVERY 5 MIN PRN
Status: DISCONTINUED | OUTPATIENT
Start: 2023-07-13 | End: 2023-07-13 | Stop reason: HOSPADM

## 2023-07-13 RX ORDER — FENTANYL CITRATE 50 UG/ML
50 INJECTION, SOLUTION INTRAMUSCULAR; INTRAVENOUS EVERY 5 MIN PRN
Status: DISCONTINUED | OUTPATIENT
Start: 2023-07-13 | End: 2023-07-13 | Stop reason: HOSPADM

## 2023-07-13 RX ORDER — IOPAMIDOL 510 MG/ML
INJECTION, SOLUTION INTRAVASCULAR PRN
Status: DISCONTINUED | OUTPATIENT
Start: 2023-07-13 | End: 2023-07-13 | Stop reason: HOSPADM

## 2023-07-13 RX ORDER — ONDANSETRON 4 MG/1
4 TABLET, ORALLY DISINTEGRATING ORAL EVERY 30 MIN PRN
Status: DISCONTINUED | OUTPATIENT
Start: 2023-07-13 | End: 2023-07-13 | Stop reason: HOSPADM

## 2023-07-13 RX ORDER — FENTANYL CITRATE 50 UG/ML
25 INJECTION, SOLUTION INTRAMUSCULAR; INTRAVENOUS EVERY 5 MIN PRN
Status: DISCONTINUED | OUTPATIENT
Start: 2023-07-13 | End: 2023-07-13 | Stop reason: HOSPADM

## 2023-07-13 RX ORDER — ONDANSETRON 2 MG/ML
INJECTION INTRAMUSCULAR; INTRAVENOUS PRN
Status: DISCONTINUED | OUTPATIENT
Start: 2023-07-13 | End: 2023-07-13

## 2023-07-13 RX ORDER — DEXAMETHASONE SODIUM PHOSPHATE 4 MG/ML
INJECTION, SOLUTION INTRA-ARTICULAR; INTRALESIONAL; INTRAMUSCULAR; INTRAVENOUS; SOFT TISSUE PRN
Status: DISCONTINUED | OUTPATIENT
Start: 2023-07-13 | End: 2023-07-13

## 2023-07-13 RX ORDER — PROPOFOL 10 MG/ML
INJECTION, EMULSION INTRAVENOUS PRN
Status: DISCONTINUED | OUTPATIENT
Start: 2023-07-13 | End: 2023-07-13

## 2023-07-13 RX ORDER — LEVOFLOXACIN 5 MG/ML
INJECTION, SOLUTION INTRAVENOUS PRN
Status: DISCONTINUED | OUTPATIENT
Start: 2023-07-13 | End: 2023-07-13

## 2023-07-13 RX ORDER — FENTANYL CITRATE 50 UG/ML
INJECTION, SOLUTION INTRAMUSCULAR; INTRAVENOUS PRN
Status: DISCONTINUED | OUTPATIENT
Start: 2023-07-13 | End: 2023-07-13

## 2023-07-13 RX ORDER — SODIUM CHLORIDE, CALCIUM CHLORIDE, AND POTASSIUM CHLORIDE .86; .033; .03 G/100ML; G/100ML; G/100ML
INJECTION, SOLUTION INTRAVENOUS CONTINUOUS
Status: DISCONTINUED | OUTPATIENT
Start: 2023-07-13 | End: 2023-07-13

## 2023-07-13 RX ORDER — SODIUM CHLORIDE, SODIUM LACTATE, POTASSIUM CHLORIDE, CALCIUM CHLORIDE 600; 310; 30; 20 MG/100ML; MG/100ML; MG/100ML; MG/100ML
INJECTION, SOLUTION INTRAVENOUS CONTINUOUS
Status: DISCONTINUED | OUTPATIENT
Start: 2023-07-13 | End: 2023-07-13 | Stop reason: HOSPADM

## 2023-07-13 RX ORDER — SODIUM CHLORIDE, SODIUM LACTATE, POTASSIUM CHLORIDE, CALCIUM CHLORIDE 600; 310; 30; 20 MG/100ML; MG/100ML; MG/100ML; MG/100ML
INJECTION, SOLUTION INTRAVENOUS CONTINUOUS PRN
Status: DISCONTINUED | OUTPATIENT
Start: 2023-07-13 | End: 2023-07-13

## 2023-07-13 RX ORDER — SODIUM CHLORIDE, SODIUM LACTATE, POTASSIUM CHLORIDE, CALCIUM CHLORIDE 600; 310; 30; 20 MG/100ML; MG/100ML; MG/100ML; MG/100ML
INJECTION, SOLUTION INTRAVENOUS CONTINUOUS
Status: DISCONTINUED | OUTPATIENT
Start: 2023-07-13 | End: 2023-07-14

## 2023-07-13 RX ORDER — PHENYLEPHRINE HCL IN 0.9% NACL 50MG/250ML
.5-1.25 PLASTIC BAG, INJECTION (ML) INTRAVENOUS CONTINUOUS
Status: DISCONTINUED | OUTPATIENT
Start: 2023-07-13 | End: 2023-07-13 | Stop reason: HOSPADM

## 2023-07-13 RX ORDER — LEVOFLOXACIN 500 MG/1
500 TABLET, FILM COATED ORAL DAILY
Status: DISCONTINUED | OUTPATIENT
Start: 2023-07-14 | End: 2023-07-14

## 2023-07-13 RX ORDER — LIDOCAINE HYDROCHLORIDE 20 MG/ML
INJECTION, SOLUTION INFILTRATION; PERINEURAL PRN
Status: DISCONTINUED | OUTPATIENT
Start: 2023-07-13 | End: 2023-07-13

## 2023-07-13 RX ORDER — ONDANSETRON 2 MG/ML
4 INJECTION INTRAMUSCULAR; INTRAVENOUS EVERY 30 MIN PRN
Status: DISCONTINUED | OUTPATIENT
Start: 2023-07-13 | End: 2023-07-13 | Stop reason: HOSPADM

## 2023-07-13 RX ADMIN — HYDROMORPHONE HYDROCHLORIDE 0.5 MG: 1 INJECTION, SOLUTION INTRAMUSCULAR; INTRAVENOUS; SUBCUTANEOUS at 17:24

## 2023-07-13 RX ADMIN — PANTOPRAZOLE SODIUM 40 MG: 40 INJECTION, POWDER, FOR SOLUTION INTRAVENOUS at 08:22

## 2023-07-13 RX ADMIN — PHENYLEPHRINE HYDROCHLORIDE 200 MCG: 10 INJECTION INTRAVENOUS at 12:25

## 2023-07-13 RX ADMIN — HYDROXYZINE HYDROCHLORIDE 25 MG: 25 TABLET, FILM COATED ORAL at 18:22

## 2023-07-13 RX ADMIN — SODIUM CHLORIDE, POTASSIUM CHLORIDE, SODIUM LACTATE AND CALCIUM CHLORIDE: 600; 310; 30; 20 INJECTION, SOLUTION INTRAVENOUS at 00:25

## 2023-07-13 RX ADMIN — PHENYLEPHRINE HYDROCHLORIDE 200 MCG: 10 INJECTION INTRAVENOUS at 12:37

## 2023-07-13 RX ADMIN — DEXAMETHASONE SODIUM PHOSPHATE 4 MG: 4 INJECTION, SOLUTION INTRA-ARTICULAR; INTRALESIONAL; INTRAMUSCULAR; INTRAVENOUS; SOFT TISSUE at 12:24

## 2023-07-13 RX ADMIN — POLYETHYLENE GLYCOL 3350 17 G: 17 POWDER, FOR SOLUTION ORAL at 21:19

## 2023-07-13 RX ADMIN — LIDOCAINE HYDROCHLORIDE 100 MG: 20 INJECTION, SOLUTION INFILTRATION; PERINEURAL at 12:20

## 2023-07-13 RX ADMIN — SUGAMMADEX 200 MG: 100 INJECTION, SOLUTION INTRAVENOUS at 13:34

## 2023-07-13 RX ADMIN — FENTANYL CITRATE 100 MCG: 50 INJECTION, SOLUTION INTRAMUSCULAR; INTRAVENOUS at 12:20

## 2023-07-13 RX ADMIN — Medication 50 MCG: at 21:28

## 2023-07-13 RX ADMIN — AMOXICILLIN AND CLAVULANATE POTASSIUM 1 TABLET: 875; 125 TABLET, FILM COATED ORAL at 09:13

## 2023-07-13 RX ADMIN — HYDROMORPHONE HYDROCHLORIDE 1 MG: 1 INJECTION, SOLUTION INTRAMUSCULAR; INTRAVENOUS; SUBCUTANEOUS at 19:35

## 2023-07-13 RX ADMIN — HYDROMORPHONE HYDROCHLORIDE 1 MG: 1 INJECTION, SOLUTION INTRAMUSCULAR; INTRAVENOUS; SUBCUTANEOUS at 06:04

## 2023-07-13 RX ADMIN — HYDROMORPHONE HYDROCHLORIDE 1 MG: 1 INJECTION, SOLUTION INTRAMUSCULAR; INTRAVENOUS; SUBCUTANEOUS at 08:22

## 2023-07-13 RX ADMIN — HYDROMORPHONE HYDROCHLORIDE 1 MG: 1 INJECTION, SOLUTION INTRAMUSCULAR; INTRAVENOUS; SUBCUTANEOUS at 03:12

## 2023-07-13 RX ADMIN — Medication 50 MG: at 12:21

## 2023-07-13 RX ADMIN — PHENYLEPHRINE HYDROCHLORIDE 200 MCG: 10 INJECTION INTRAVENOUS at 13:03

## 2023-07-13 RX ADMIN — PROPOFOL 150 MG: 10 INJECTION, EMULSION INTRAVENOUS at 12:20

## 2023-07-13 RX ADMIN — ONDANSETRON 4 MG: 2 INJECTION INTRAMUSCULAR; INTRAVENOUS at 12:25

## 2023-07-13 RX ADMIN — MIDAZOLAM 2 MG: 1 INJECTION INTRAMUSCULAR; INTRAVENOUS at 12:05

## 2023-07-13 RX ADMIN — HYDROMORPHONE HYDROCHLORIDE 0.5 MG: 1 INJECTION, SOLUTION INTRAMUSCULAR; INTRAVENOUS; SUBCUTANEOUS at 00:25

## 2023-07-13 RX ADMIN — HYDROMORPHONE HYDROCHLORIDE 4 MG: 4 TABLET ORAL at 09:13

## 2023-07-13 RX ADMIN — BISACODYL 10 MG: 10 SUPPOSITORY RECTAL at 03:24

## 2023-07-13 RX ADMIN — PHENYLEPHRINE HYDROCHLORIDE 100 MCG: 10 INJECTION INTRAVENOUS at 12:43

## 2023-07-13 RX ADMIN — HYDROMORPHONE HYDROCHLORIDE 4 MG: 4 TABLET ORAL at 23:14

## 2023-07-13 RX ADMIN — LEVOFLOXACIN 500 MG: 5 INJECTION, SOLUTION INTRAVENOUS at 12:05

## 2023-07-13 RX ADMIN — HYDROMORPHONE HYDROCHLORIDE 0.5 MG: 1 INJECTION, SOLUTION INTRAMUSCULAR; INTRAVENOUS; SUBCUTANEOUS at 17:16

## 2023-07-13 RX ADMIN — HYDROMORPHONE HYDROCHLORIDE 1 MG: 1 INJECTION, SOLUTION INTRAMUSCULAR; INTRAVENOUS; SUBCUTANEOUS at 21:19

## 2023-07-13 RX ADMIN — GLUCAGON 0.4 MG: KIT at 13:04

## 2023-07-13 RX ADMIN — HYDROMORPHONE HYDROCHLORIDE 1 MG: 1 INJECTION, SOLUTION INTRAMUSCULAR; INTRAVENOUS; SUBCUTANEOUS at 11:23

## 2023-07-13 RX ADMIN — PHENYLEPHRINE HYDROCHLORIDE 200 MCG: 10 INJECTION INTRAVENOUS at 12:58

## 2023-07-13 RX ADMIN — SODIUM CHLORIDE, POTASSIUM CHLORIDE, SODIUM LACTATE AND CALCIUM CHLORIDE: 600; 310; 30; 20 INJECTION, SOLUTION INTRAVENOUS at 12:05

## 2023-07-13 RX ADMIN — CHOLESTYRAMINE 4 G: 4 POWDER, FOR SUSPENSION ORAL at 21:19

## 2023-07-13 RX ADMIN — SENNOSIDES 8.6 MG: 8.6 TABLET, FILM COATED ORAL at 21:20

## 2023-07-13 RX ADMIN — ACETAMINOPHEN 650 MG: 325 TABLET, FILM COATED ORAL at 23:14

## 2023-07-13 RX ADMIN — PHENYLEPHRINE HYDROCHLORIDE 0.5 MCG/KG/MIN: 10 INJECTION INTRAVENOUS at 13:15

## 2023-07-13 RX ADMIN — PHENYLEPHRINE HYDROCHLORIDE 200 MCG: 10 INJECTION INTRAVENOUS at 12:51

## 2023-07-13 RX ADMIN — PHENYLEPHRINE HYDROCHLORIDE 200 MCG: 10 INJECTION INTRAVENOUS at 13:09

## 2023-07-13 RX ADMIN — PHENYLEPHRINE HYDROCHLORIDE 100 MCG: 10 INJECTION INTRAVENOUS at 12:48

## 2023-07-13 RX ADMIN — ACETAMINOPHEN 650 MG: 325 TABLET, FILM COATED ORAL at 09:12

## 2023-07-13 ASSESSMENT — ACTIVITIES OF DAILY LIVING (ADL)
ADLS_ACUITY_SCORE: 20

## 2023-07-13 ASSESSMENT — ENCOUNTER SYMPTOMS: DYSRHYTHMIAS: 0

## 2023-07-13 ASSESSMENT — LIFESTYLE VARIABLES: TOBACCO_USE: 0

## 2023-07-13 NOTE — PROGRESS NOTES
[facility]    Medicine Progress Note - Hospitalist Service, GOLD TEAM 9    Date of Admission:  7/8/2023    Assessment & Plan   Gallo Navarro is a 57 year old male with recent diagnosis of acute pancreatitis 3/2023 c/b chronic pancreatitis with pancreatic mass causing duodenal stenosis with gastric outlet obstruction s/p GJ tube (placed 5/2023), biliary obstruction s/p stent placement on 7/7/23, pancreatic insufficiency, and IDDM2, who presents with worsening abdominal pain, increased jaundice, poor PO intake and weight loss admitted on 7/8/2023 for concern of biliary obstruction, biopsy of pancreatic mass returned positive for pancreatic adenocarcinoma on 7/12/23.      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 Initially presented with sudden onset vomiting on 3/2023, found to have lipase >2000  but did not have active abdominal pain, with presumed dx of pancreatitis. Continued to have intractable bilious vomiting despite multiple admissions to the hospital, later found to have an acquired duodenal stricture with gastric outlet obstruction thought to be 2/2 pancreatic inflammation. Failed multiple PO trials and NG/NJ tubes, ultimately had PEGJ placed for gastric venting and nurition with unintentional weight loss of 40 lbs. Reports continued weight loss despite enteral tube feeds. Repeat CT imaging of abdomen on 6/2/2023 in follow-up of pancreatitis with note of more focal irregular hypodense masslike region in the pancreatic head  (2.2 x 1.5 x 1.4 cm) with persistent mild main pancreatic ductal dilatation, subcentimeter mid mesenteric lymph nodes, stable narrowing of the main portal vein near the portal venous confluence. EUS biopsy was performed on 6/27/23 showing duodenal inflammation. Unable to get biopsy of pancreatic mass due to poor window.  Patient then developed new abdominal pain since 6/27 after biopsy, bandlike, radiating to his back, with increase jaundice. 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 EGD/EUS completed 7/11 w/ biopsies taken > pathology report back on 7/12 +pancreatic adenocarcinoma.   - Panc/Bili consulted  - Nutrition - Hold tube feeds 7/13               - Peptamen 1.5, 90 ml/hr.  ml Q2H - once out of Peptamen, change to Vital 1.5 @ 90 ml/hr ( 2160 ml/day) to provide: 3240 kcals ( 40 kcal/kg), 145 g PRO ( 1.8 gm/kg), 1649 ml free H20, 404 gm CHO, and 12.3 gm soluble fiber daily.  - Pain management: tylenol 650 mg Q6H PRN, Dilaudid PO 4 mg Q3H PRN, IV dilaudid 0.5-1mg mg Q2H PRN breakthrough pain, OK to increase as needed.   - Bowel regimen with miralax, senna, and suppository PRN  - Atarax PRN itching   - Continue NPO per GI   - Surgical Oncology consulted  - Oncology consulted   -CT chest w/ contrast completed 7/12      #+staph epi x 1 blood culture  Pt remains clinically stable. Methicillin sensitive. Will monitor closely. Also growing staph. Capitis, likely all contaminants.   - repeat blood cultures x 7/11 NGTD  - Zosyn > Augmentin on 7/12, discontinued augmentin, started levofloxacin per GI total 5 day course (7/13-     # IDDM2 - Diagnosed after initial dx pancreatitis 3/2023 likely 2/2 pancreatitic insufficiency.   - Hemoglobin A1c 7.9 on admission  - Endocrine consult  - Lantus 10 units daily  - Novolog sliding scale Q4H     # Acute on chronic anemia - H/H  8.8/27.1, down from one week ago on 7/3, patient was at H/H 11.1/33.2. No reported bleeding.   - Trend CBC  - Transfuse for hgb goal >7.0     # Malnutrition   - Nutrition consulted as noted above    # Itching  LIkely due to cholestasis  - hydroxyzine PRN  - cholestyramine BID          Diet: NPO per Anesthesia Guidelines for Procedure/Surgery Except for: Meds  Adult Formula Drip Feeding: Continuous Vital 1.5; Jejunostomy; Goal Rate: 90 mL/hr; mL/hr    DVT Prophylaxis: Pneumatic Compression Devices  Adan Catheter: Not present  Lines: None     Cardiac Monitoring: None  Code Status: Full Code      Clinically Significant Risk Factors              # Hypoalbuminemia: Lowest albumin = 2.9 g/dL at 7/12/2023  5:47 AM, will monitor as appropriate  # Coagulation Defect: INR = 1.46 (Ref range: 0.85 - 1.15) and/or PTT = N/A, will monitor for bleeding            # Moderate Malnutrition: based on nutrition assessment         Disposition Plan      Expected Discharge Date: 07/17/2023      Destination: home            Clau Ray MD  Hospitalist Service, [address]  Securely message with [employer] (more info)  Text page via [facility] Paging/Directory   See signed in provider for up to date coverage information  ______________________________________________________________________    Interval History   Doing somewhat OK w/ the news. Processing. Is feeling that he wants to spend more time with his family and do things that he enjoys once he gets out of the hospital.     Physical Exam   Vital Signs: Temp: 97.8  F (36.6  C) Temp src: Oral BP: 110/66 Pulse: 88   Resp: 18 SpO2: 93 % O2 Device: None (Room air) Oxygen Delivery: 2 LPM  Weight: 178 lbs 12.69 oz    Gen: no acute distress, alert, interactive,answering questions appropriately. Jaundiced appearance  HEENT:scleral icterus, EOMI  Nares: No discharge noted from nares bilaterally   CV: RRR, no murmurs  Pulm: CTBA  Abd: soft, tender  to palpation (generalized). PEG J site c/d/i, with drainage (bilious) in bag.   Msk: no deformities  Extremities: trace edema in lower extremities.   Neuro: moving all extremities spontaneously     Medical Decision Making       60 MINUTES SPENT BY ME on the date of service doing chart review, history, exam, documentation & further activities per the note.      Data     I have personally reviewed the following data over the past 24 hrs:    5.9  \   10.0 (L)   / 375     140 104 9.3 /  191 (H)   4.0 27 0.60 (L) \       ALT: 120 (H) AST: 39 AP: 293 (H) TBILI: 3.9 (H)   ALB: 3.3 (L) TOT PROTEIN: 6.2 (L) LIPASE: N/A       INR:  1.46 (H) PTT:  N/A   D-dimer:  N/A Fibrinogen:  N/A       Imaging results reviewed over the past 24 hrs:   Recent Results (from the past 24 hour(s))   CT Chest w Contrast    Narrative    CT CHEST W CONTRAST 7/12/2023 7:35 PM    History: new pancreatic adenocarcinoma, per hem/onc, staging    Comparison: CT 7/8/2023.    Technique: CT of the chest was obtained withintravenous contrast.  Axial, coronal, and sagittal reconstructions were obtained and  reviewed    Contrast: 80 mL [id number]    Findings:     Lungs: No pneumothorax or pleural effusion. Discoid subsegmental  atelectasis in the right middle lobe, lingula, as well as the lower  lobes. Subpleural 7 mm oval pulmonary nodule in the paramediastinal  left upper lobe (series 4 image 151). No dense airspace consolidation.  Airways: Central tracheobronchial tree is clear.  Vessels: Main pulmonary artery and aorta are normal in caliber. Normal  three-vessel arch  Heart: Heart size is normal without pericardial effusion  Lymph nodes: No suspicious mediastinal or hilar lymphadenopathy.  Thyroid: Within normal limits.  Esophagus: Within normal limits    Upper abdomen: Partially visualized percutaneous gastrojejunostomy  tube. Biliary stents and ill-defined pancreatic head mass with  peripancreatic and gastrohepatic lymphadenopathy, better  characterized  on 7/10/2023 MRI. Trace pneumobilia.    Bones and soft tissues: Similar ill-defined sclerotic area in the  right aspect of the T10 vertebral body (series 5 image 44). Mild  spinal spondylosis.. No suspicious soft tissue lesion.      Impression    Impression:   1. A 7 mm pulmonary nodule in the left upper lobe which has not been  imaged previously is indeterminate. Recommend 3-6 month follow-up.  2. Partially visualized ill-defined pancreatic head mass with upper  abdominal lymphadenopathy. Biliary drains and percutaneous  gastrojejunostomy tube.  3. No acute pathology within the chest.    I have personally reviewed the examination and initial interpretation  and I agree with the findings.    PAULO KILLIAN DO         SYSTEM ID:  [id number]   XR Surgery KIEL Fluoro Less Than 5 Min w Stills    Narrative    This exam was marked as non-reportable because it will not be read by a   radiologist or a [facility] non-radiologist provider.

## 2023-07-13 NOTE — ANESTHESIA PROCEDURE NOTES
Airway       Patient location during procedure: OR       Procedure Start/Stop Times: 7/13/2023 12:22 PM  Staff -        CRNA: Noreen Genao APRN CRNA       Performed By: CRNA  Consent for Airway        Urgency: elective  Indications and Patient Condition       Indications for airway management: lacey-procedural       Induction type:intravenous       Mask difficulty assessment: 1 - vent by mask    Final Airway Details       Final airway type: endotracheal airway       Successful airway: ETT - single and Oral  Endotracheal Airway Details        ETT size (mm): 7.5       Cuffed: yes       Successful intubation technique: direct laryngoscopy       DL Blade Type: MAC 3       Grade View of Cords: 1       Adjucts: stylet       Position: Right       Measured from: lips       Secured at (cm): 23    Post intubation assessment        Placement verified by: capnometry, equal breath sounds and chest rise        Number of attempts at approach: 1       Secured with: commercial tube jorgensen (TubeTamer)       Ease of procedure: easy       Dentition: Unchanged and Intact    Medication(s) Administered   Medication Administration Time: 7/13/2023 12:22 PM

## 2023-07-13 NOTE — ANESTHESIA CARE TRANSFER NOTE
Patient: Gallo Navarro    Procedure: Procedure(s):  ENDOSCOPIC ULTRASOUND, ESOPHAGOSCOPY, EUS guided gastrojejunostomy       Diagnosis: Pancreatic mass [K86.89]  Diagnosis Additional Information: No value filed.    Anesthesia Type:   General     Note:    Oropharynx: oropharynx clear of all foreign objects and spontaneously breathing  Level of Consciousness: awake  Oxygen Supplementation: face mask  Level of Supplemental Oxygen (L/min / FiO2): 6  Independent Airway: airway patency satisfactory and stable  Dentition: dentition unchanged  Vital Signs Stable: post-procedure vital signs reviewed and stable  Report to RN Given: handoff report given  Patient transferred to: PACU    Handoff Report: Identifed the Patient, Identified the Reponsible Provider, Reviewed the pertinent medical history, Discussed the surgical course, Reviewed Intra-OP anesthesia mangement and issues during anesthesia, Set expectations for post-procedure period and Allowed opportunity for questions and acknowledgement of understanding      Vitals:  Vitals Value Taken Time   /85 07/13/23 1345   Temp     Pulse 81 07/13/23 1346   Resp 12 07/13/23 1346   SpO2 100 % 07/13/23 1346   Vitals shown include unvalidated device data.    Electronically Signed By: ESVIN Purcell CRNA  July 13, 2023  1:47 PM

## 2023-07-13 NOTE — PLAN OF CARE
"Goal Outcome Evaluation:      Plan of Care Reviewed With: patient    Overall Patient Progress: no changeOverall Patient Progress: no change  /66 (BP Location: Left arm)   Pulse 88   Temp 97.8  F (36.6  C) (Oral)   Resp 18   Ht 1.829 m (6' 0.01\")   Wt 81.1 kg (178 lb 12.7 oz)   SpO2 93%   BMI 24.24 kg/m    Status: s/p EUS/ EGD, stent placement.   Patient returned to unit at 1445 from PACU.  VSS. Denies pain. Voided once post procedure.  G tube and J tube clamped per orders. NPO status, TF on hold. Per MD, ken to use J tube for medication.  Abx changed to Levofloxacin. Continue to monitor and POC.   "

## 2023-07-13 NOTE — PROGRESS NOTES
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      Assessment:     1.  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  Acute Hyperglycemia exacerbated by tube feeding and stress   3.  Anemia   4.  New dx of pancreatic adenocarcinoma         Plan:      -  Lantus 12 unit(s)  this evening unit(s) at 2000 - covering basal needs - received 10 unit(s) last evening reduced for NPO  -  BID NPH - HOLD for NPO at midnight NPO  -  Novolog 1 units per 12 grams carb for PO intake, meals/snacks - once PO resumes   -  Novolog medium resistance sliding scale insulin q4h - reduced for NPO   -  BG q4h  -  Hypoglycemia protocol  -  PRN D10W revised to read-- Infuse IV D10W at 120 ml/h-- this replaces 75% of carbs in Vital 1.5 at 90 ml/h.  IF glucose trending >180, reduce rate to 95 ml/h.  If still trending >180, reduce rate to 70 ml/h.  - Education needs: assessment ongoing  - Outpatient diabetes follow up: TBD-- strongly recommend diabetes specialty care  - Diabetes service will continue to follow.  Please do not hesitate to contact the team with any questions/concerns.     - Please notify inpatient diabetes service if changes are planned that will impact glycemia, such as NPO, starting/adjusting steroids, enteral feeding, parenteral feeding, dextrose fluids or  "procedures.     Plan discussed patient/wife, with bedside RN/primary team via this note.         Interval History:     The last 24 hours progress and nursing notes reviewed.      *Primary team - please update our service with changes which will greatly impact the glycemic plan*.     BG trend: At target this AM - was elevated yesterday with impact of stress and TF and steroids.   Last dex 4 mg was  at 1100 AM in OR - effects waned.    NPO for OR this AM  Fasting BG this AM is 125 mg/dL    Holding NPH while TF is held  Lantus decreased for NPO      Discussed all adjustments and planning as able.  Dosing for this afternoon/evening TBD once back from OR and TF resumed? PO? Steroids?    Reviewed everything with wife, all questions and concerns addressed.     6:33 PM  Adjusted PM dose of lantus to 12 unit(s) - got steroids again in OR and Glucagon is recorded on glucose accordion.   20% increase will help with the pain, stress, steroids and if glucagon was given.   Will continue the q4h correction at the lower intensity of Medium resistance sliding scale insulin       Planned Procedures/surgeries:  NPO today - headed to OR at some point today.    D5W-containing solutions/medications: TF continuously Vital 1.5 at 90 ml/hr     Recent Labs   Lab 23  0447 23  0410 23  0213 23  0014 23  2052 23  1601   * 104* 134* 257* 259* 261*         Nutrition:     Orders Placed This Encounter      NPO per Anesthesia Guidelines for Procedure/Surgery Except for: Meds    Tube Feeding:  Held this AM - OR/NPO   TF continuously Vital 1.5 at 90 ml/hr         PTA Regimen:     lantus 8 units at HS  aspart prn - up to 40 units/day - using unknown scale and frequency, based on his cgm review  And home TF was Peptamin 1.5 at 90 ml/h           Review of Systems:   CC: \"doing ok, pain is fine at the moment, able to get some sleep last night\"         Medications:   Steroid plannin mg in OR this AM       " " Physical Exam:   /71 (BP Location: Left arm, Patient Position: Semi-Nicole's, Cuff Size: Adult Regular)   Pulse 74   Temp 98  F (36.7  C) (Oral)   Resp 17   Ht 1.829 m (6' 0.01\")   Wt 81.1 kg (178 lb 12.7 oz)   SpO2 98%   BMI 24.24 kg/m      General:  pleasant, wife in room - supportive    HEENT:  NC/AT. MMM, sclera mild jaundice today   Lungs:  unremarkable, no new cough, no SOB  ABD:  Rounded -G tube in place w dressing   Skin:  warm and dry, no obvious lesions  MSK:   moving all extremities  Mental Status:  Alert and oriented x3  Psych:   Cooperative, good eye contact, full/appropriate affect         Data:     Lab Results   Component Value Date    A1C 7.9 07/08/2023        CBC RESULTS: Recent Labs   Lab Test 07/13/23 0447   WBC 5.9   RBC 3.31*   HGB 10.0*   HCT 32.3*   MCV 98   MCH 30.2   MCHC 31.0*   RDW 14.5        Recent Labs   Lab Test 07/13/23  1050 07/13/23  0823 07/13/23  0447 07/12/23  0852 07/12/23  0547   NA  --   --  140  --  136   POTASSIUM  --   --  4.0  --  4.0   CHLORIDE  --   --  104  --  100   CO2  --   --  27  --  26   ANIONGAP  --   --  9  --  10   * 125* 109*   < > 193*   BUN  --   --  9.3  --  15.8   CR  --   --  0.60*  --  0.60*   DENISE  --   --  8.8  --  8.5*    < > = values in this interval not displayed.     Liver Function Studies -   Recent Labs   Lab Test 07/10/23  0408   PROTTOTAL 5.7*   ALBUMIN 2.9*   BILITOTAL 8.7*   ALKPHOS 332*   AST 85*   *     Lab Results   Component Value Date    INR 1.96 07/10/2023    INR 1.78 07/09/2023    INR 1.37 07/08/2023     Nataliya Guy, APRN CNP, BC-ADM  Inpatient Diabetes Management Service  Pager - [phone]  Available on [email or web address]     To contact Endocrine Diabetes service:   From 7AM-5PM: page inpatient diabetes provider who is following the patient that day (see filed or incomplete progress notes/consult notes).  If uncertain of provider assignment: page job code [phone]. (To page job code in-house dial 3 stars, " 777 then enter number).  For questions or updates AFTER HOURS from 5PM-7AM: page the diabetes job code for on call fellow: [phone]    Please notify inpatient diabetes service if changes are planned to steroids, nutrition, or if procedures are planned requiring prolonged NPO status. Diabetes Management Team job code: [phone]     I spent a total of 35 minutes on the date of the encounter doing prep/post-work, chart review, history and exam, documentation and further activities per the note including lab review, multidisciplinary communication, counseling the patient and/or coordinating care regarding acute hyper/hypoglycemic management, as well as discharge management and planning/communication.  See note for details.

## 2023-07-13 NOTE — PLAN OF CARE
Goal Outcome Evaluation:      Plan of Care Reviewed With: patient    Overall Patient Progress: no change    Time: 1930-0730  Neuro: A&Ox4. Calm and cooperative.  Pain: Abdominal pain managed per mar.   Nausea: Denies N/V  Cardiac: WNL. Denies cardiac chest pain.  Respiratory: WNL. Denies SOB. No cough.   GI/:  Voids spontaneously AUOP. Passing flatus per patient. No BM this shift, pt requested prn suppository overnight.    Diet: NPO at midnight. TF stopped at midnight   Lines: L PIV infusing LR at 75 ml/hr.   Skin: No acute change.  Drains: G-tube to gravity, J-tube clamped.   Lab: BG monitored.   Electrolyte Replacements: n/a  New changes this shift: Dr. Bradly Phelan paged at 0010. Pt is NPO, TF held at midnight, does pt need MIVF? New order placed for continuous LR @75 ml/hr.  Activity: Up ambulating independent in the room.  Plan: NPO at midnight, repeat EUS/EGD on 7/13. Continue POC.

## 2023-07-13 NOTE — PLAN OF CARE
"Goal Outcome Evaluation:      Plan of Care Reviewed With: patient    Overall Patient Progress: no changeOverall Patient Progress: no change    /79 (BP Location: Left arm)   Pulse 88   Temp 98  F (36.7  C) (Oral)   Resp 16   Ht 1.829 m (6' 0.01\")   Wt 81.1 kg (178 lb 12.7 oz)   SpO2 99%   BMI 24.24 kg/m      Neuros: A/O x4, calls appropriately.  Cardiac: denies chest pain, BP stable.   Respiratory: denies SOB, sating in high 90's on RA.   GI/: voiding, not saving. +BS, reports had 1 large BM this morning.   Diet: NPO   Activity: up ad nora  Skin: no new deficits.   LDA: G to gravity, drained 400 green output. J clamped. Meds given via J tube.  Pain: reports pain is well controlled. Given IV and oral dilaudid with relief.   Lab: reviewed.   Plan: patient left unit to OR for EUS/EGD, possible duodenal stent placement.          "

## 2023-07-13 NOTE — ANESTHESIA PREPROCEDURE EVALUATION
Anesthesia Pre-Procedure Evaluation    Patient: Gallo Navarro   MRN: [id number] : 1965        Procedure : Procedure(s):  ENDOSCOPIC ULTRASOUND, ESOPHAGOSCOPY / UPPER GASTROINTESTINAL TRACT (GI), possible duodenal stent, possible EUS guided gastrojejunostomy          Past Medical History:   Diagnosis Date     Acute pancreatitis 2023     Gastric outlet obstruction      Recurrent acute pancreatitis       Past Surgical History:   Procedure Laterality Date     AS OPEN TREATMENT CLAVICULAR FRACTURE INTERNAL FX       ENDOSCOPIC RETROGRADE CHOLANGIOPANCREATOGRAM N/A 2023    Procedure: ENDOSCOPIC RETROGRADE CHOLANGIOPANCREATOGRAPHY;  Surgeon: Khadar Pickett MD;  Location: [facility] OR     ENDOSCOPIC ULTRASOUND UPPER GASTROINTESTINAL TRACT (GI) N/A 2023    Procedure: Endoscopic ultrasound upper gastrointestinal tract (GI), with biposy, GJ tube repositioning, stent placement;  Surgeon: Khadar Pickett MD;  Location: [facility] OR     INSERT PICC LINE  2023     IR NG TUBE PLACEMENT REQ RAD & FLUORO  2023    JG tube      No Known Allergies   Social History     Tobacco Use     Smoking status: Never     Smokeless tobacco: Never   Substance Use Topics     Alcohol use: Not Currently     Comment: o/w light beer 2days a week      Wt Readings from Last 1 Encounters:   07/10/23 81.1 kg (178 lb 12.7 oz)        Anesthesia Evaluation   Pt has had prior anesthetic. Type: General and MAC.    No history of anesthetic complications       ROS/MED HX  ENT/Pulmonary:  - neg pulmonary ROS  (-) tobacco use, asthma and sleep apnea   Neurologic:  - neg neurologic ROS     Cardiovascular:  - neg cardiovascular ROS  (-) hypertension, CAD, BENJAMIN, arrhythmias, stent and murmur   METS/Exercise Tolerance: 4 - Raking leaves, gardening    Hematologic:       Musculoskeletal:       GI/Hepatic: Comment: Duodenal stenosis and biliary obstruction due to pancreatic mass found to be adenocarcinoma   (-) GERD   Renal/Genitourinary:  - neg Renal ROS      Endo:     (+) type II DM, Using insulin,     Psychiatric/Substance Use:  - neg psychiatric ROS     Infectious Disease:  - neg infectious disease ROS     Malignancy:       Other:            Physical Exam    Airway        Mallampati: I   TM distance: > 3 FB   Neck ROM: full   Mouth opening: > 3 cm    Respiratory Devices and Support         Dental       (+) Completely normal teeth      Cardiovascular   cardiovascular exam normal       Rhythm and rate: regular and normal (-) no murmur    Pulmonary   pulmonary exam normal        breath sounds clear to auscultation           OUTSIDE LABS:  CBC:   Lab Results   Component Value Date    WBC 5.9 07/13/2023    WBC 5.4 07/12/2023    HGB 10.0 (L) 07/13/2023    HGB 9.7 (L) 07/12/2023    HCT 32.3 (L) 07/13/2023    HCT 30.4 (L) 07/12/2023     07/13/2023     07/12/2023     BMP:   Lab Results   Component Value Date     07/13/2023     07/12/2023    POTASSIUM 4.0 07/13/2023    POTASSIUM 4.0 07/12/2023    CHLORIDE 104 07/13/2023    CHLORIDE 100 07/12/2023    CO2 27 07/13/2023    CO2 26 07/12/2023    BUN 9.3 07/13/2023    BUN 15.8 07/12/2023    CR 0.60 (L) 07/13/2023    CR 0.60 (L) 07/12/2023     (H) 07/13/2023     (H) 07/13/2023     COAGS:   Lab Results   Component Value Date    INR 1.46 (H) 07/13/2023     POC: No results found for: BGM, HCG, HCGS  HEPATIC:   Lab Results   Component Value Date    ALBUMIN 3.3 (L) 07/13/2023    PROTTOTAL 6.2 (L) 07/13/2023     (H) 07/13/2023    AST 39 07/13/2023    ALKPHOS 293 (H) 07/13/2023    BILITOTAL 3.9 (H) 07/13/2023     OTHER:   Lab Results   Component Value Date    LACT 1.0 07/08/2023    A1C 7.9 (H) 07/08/2023    DENISE 8.8 07/13/2023    PHOS 3.4 07/11/2023    MAG 2.1 07/13/2023    LIPASE 34 07/09/2023       Anesthesia Plan    ASA Status:  3   NPO Status:  NPO Appropriate    Anesthesia Type: General.     - Airway: ETT   Induction: Intravenous.   Maintenance: Inhalation.        Consents    Anesthesia  Plan(s) and associated risks, benefits, and realistic alternatives discussed. Questions answered and patient/representative(s) expressed understanding.     - Discussed: Risks, Benefits and Alternatives for BOTH SEDATION and the PROCEDURE were discussed     - Discussed with:  Patient, Spouse      - Patient is DNR/DNI Status: No         Postoperative Care    Pain management: IV analgesics, Oral pain medications.   PONV prophylaxis: Ondansetron (or other 5HT-3)     Comments:           H&P reviewed: Unable to attach H&P to encounter due to EHR limitations. H&P Update: appropriate H&P reviewed, patient examined. No interval changes since H&P (within 30 days).         Rocco Brumfield MD

## 2023-07-13 NOTE — PROGRESS NOTES
Brief GI note:    Procedure (7/13/2023): S/p EGD and EUS-guided GJ  Findings: Successful EUS-guided gastrojejunostomy using a 15 x 10 mm Hot Axios stent. Endoscopic and contrast injection confirms intrajejunal location without leakage or free air.    Recommendations:  -Levaquin IV or PO/JT x5 d course (received first dose 7/13).  -Continue NPO status.  -Begin Gtube clamp trial.    -GI team to assess in AM for approp to begin diet adv trials.    Communicated plan to primary medicine team.    Iris Kaur PA-C, RD  Inpatient Gastroenterology Service  [facility]  Pager: *[phone]  Text Page

## 2023-07-13 NOTE — BRIEF OP NOTE
[facility]    Brief Operative Note    Pre-operative diagnosis: Pancreatic mass [K86.89]  Post-operative diagnosis Duodenal obstruction.    Procedure: Procedure(s):  ENDOSCOPIC ULTRASOUND, ESOPHAGOSCOPY, EUS guided gastrojejunostomy  Surgeon: Surgeon(s) and Role:     * Tre York MD - Primary  Anesthesia: General   Estimated Blood Loss: None    Drains: None  Specimens: * No specimens in log *  Findings:   Successful EUS-guided gastrojejunostomy using a 15 x 10 mm Hot Axios stent. Endoscopic and contrast injection confirms intrajejunal location without leakage or free air.  Complications: None.  Implants:   Implant Name Type Inv. Item Serial No.  Lot No. LRB No. Used Action   STENT ESU AXIOS W/DEL SYS [id number] 10.8FR 138CM [id number] - [id number] Stent STENT ESU AXIOS W/DEL SYS [id number] 10.8FR 138CM [id number]  [employer] [id number] N/A 1 Implanted     Recommend clamping/capping G port and assessing tolerance today. If tolerates, then Gi team will assess in am re possible advancement of diet.    NPO unclear cleared by GI team.    Levaquin 500 mg po/IV x 5 days (received today's dose intraoperatively).    MARY York MD  Professor of Medicine  Division of Gastroenterology, Hepatology and Nutrition  [facility]

## 2023-07-13 NOTE — ANESTHESIA POSTPROCEDURE EVALUATION
Patient: Gallo Navarro    Procedure: Procedure(s):  ENDOSCOPIC ULTRASOUND, ESOPHAGOSCOPY, EUS guided gastrojejunostomy       Anesthesia Type:  General    Note:  Disposition: Inpatient   Postop Pain Control: Uneventful            Sign Out: Well controlled pain   PONV: No   Neuro/Psych: Uneventful            Sign Out: Acceptable/Baseline neuro status   Airway/Respiratory: Uneventful            Sign Out: Acceptable/Baseline resp. status   CV/Hemodynamics: Uneventful            Sign Out: Acceptable CV status; No obvious hypovolemia; No obvious fluid overload   Other NRE: NONE   DID A NON-ROUTINE EVENT OCCUR?            Last vitals:  Vitals Value Taken Time   /85 07/13/23 1345   Temp 36.5  C (97.7  F) 07/13/23 1340   Pulse 81 07/13/23 1357   Resp 9 07/13/23 1357   SpO2 99 % 07/13/23 1357   Vitals shown include unvalidated device data.    Electronically Signed By: Rocco Brumfield MD  July 13, 2023  1:58 PM

## 2023-07-13 NOTE — PROGRESS NOTES
Vitals: Temp: 98.1  F (36.7  C) Temp src: Oral BP: 117/72 Pulse: 85   Resp: 14 SpO2: 98 % O2 Device: None (Room air)       Time: 0700- 1930  Reason for admission: acute pancreatitis 3/2023, duodenal stenosis with gastric outlet obstruction s/p GJ tube.  Activity:  independent in the room.  Pain:  abdominal pain 6/10 managed per mar.   Neuro: A&O x4. Calm and cooperative.  Cardiac: wdl. Denies cardiac chest pain.  Respiratory:  wdl. Denies SOB. No cough.   GI/:  Voids spontaneously not saving. Passing flatus per patient. No BM. TF continuous.   Diet: clear liquids. npo at midnight.   Lines:  PIV infusing per mar.   Incisions/Drains/Skin:  No acute change.  Lab:  Reviewed.   Electrolyte Replacements: n/a, morning labs ordered.   New changes this shift: positive for pancreatic adenocarcinoma on 7/12/23

## 2023-07-13 NOTE — PROGRESS NOTES
CLINICAL NUTRITION SERVICES - BRIEF NOTE      Nutrition Prescription     RECOMMENDATIONS FOR MDs/PROVIDERS TO ORDER:  Home FWF regimen is 170 mL 10x/day for a total of 1700 mL free water daily.     Recommendations already ordered by Registered Dietitian (RD):  1. Add Relizorb to enteral nutrition regimen  RN: Change 1 RELIZORB cartridge every 24 hours with TF rate at 10-20 ml/hr.  Change 1 RELIZORB cartridge every 12 hours with TF rates >20 ml/hr.     2. Continue current enteral regimen  Vital 1.5 @ 90 ml/hr continuous rate via J-tube to provide 2160 mL formula, 3240 Kcals (40 Kcal/kg), 147 gm protein (1.8 gm/kg), 404 gm CHO, 13 gm fiber, and 1650 mL free water daily      Future/Additional Recommendations:  Could consider repeat metabolic cart study in the future.  Monitor TF tolerance, bowel movements, pertinent labs.    *Please see full assessment note from 7/9/23    New Findings:  Spoke with pt and wife who reported that their free water flush regimen at home is 170 mL 10x/day for a total of 1700 mL free water daily.    Labs:  Fecal elastase 0.5 (L)    Obtained metabolic cart study 7/12 @ 1743 with the following results: MREE = 1648 kcals/day (equiv to 20 kcal/kg/day) with RQ = 0.83.  Pt received 1800 ml of TF in 24 hours preceding the study providing 2700 kcals (164 % MREE).  RQ within physiologic range; RQ not logical given provisions (164% MREE) received prior to study.      Would not decrease feeds based off of this data d/t pt on similar caloric regimen at home and pt w/ recent wt loss.    Interventions  Enteral Nutrition - Continue current regimen with addition of Relizorb    RD to follow per protocol.    LEONCIO Sparks, RD  7C/7B (beds 230-240) RD pager: [phone]  Weekend/Holiday RD pager: [phone]

## 2023-07-13 NOTE — PROGRESS NOTES
Gallo is a 57 year-old male who has struggled with pancreatitis and recurrent admissions due to biliary tree obstruction. During this admission, he had a biopsy of his lesion in the pancreatic head performed which yielded pancreatic adenocarcinoma. Oncology and surg onc were consulted.    Palliative was also consulted along with child life for family support and to help break the news to Gallo's children. PCSW gave resources to the unit SW.  No palliative medical needs noted (symptoms or goals of care).     Messaged primary team and consult has been cancelled. I have placed a discharge referral to the palliative clinic in the discharge orders tab. Please reconsult if needed. Thank you.       The patient was not seen. This is a non-billable note.    Travon Ray DO / Palliative Medicine   Securely message with the [employer] Web Console (learn more here)   Text page via Blackboard Paging/Directory

## 2023-07-13 NOTE — OR NURSING
Notified MDA Dr. Brumfield of patients blood glucose 191 - no new orders or interventions at this time.

## 2023-07-14 PROBLEM — C25.0 MALIGNANT NEOPLASM OF HEAD OF PANCREAS (H): Status: ACTIVE | Noted: 2023-07-14

## 2023-07-14 LAB
ALBUMIN SERPL BCG-MCNC: 3.7 G/DL (ref 3.5–5.2)
ALP SERPL-CCNC: 320 U/L (ref 40–129)
ALT SERPL W P-5'-P-CCNC: 151 U/L (ref 0–70)
ANION GAP SERPL CALCULATED.3IONS-SCNC: 11 MMOL/L (ref 7–15)
AST SERPL W P-5'-P-CCNC: 85 U/L (ref 0–45)
BACTERIA BLD CULT: NO GROWTH
BILIRUB SERPL-MCNC: 4.1 MG/DL
BUN SERPL-MCNC: 9.5 MG/DL (ref 6–20)
CALCIUM SERPL-MCNC: 9.5 MG/DL (ref 8.6–10)
CANCER AG19-9 SERPL IA-ACNC: 30 U/ML
CHLORIDE SERPL-SCNC: 101 MMOL/L (ref 98–107)
CREAT SERPL-MCNC: 0.61 MG/DL (ref 0.67–1.17)
DEPRECATED HCO3 PLAS-SCNC: 24 MMOL/L (ref 22–29)
ERYTHROCYTE [DISTWIDTH] IN BLOOD BY AUTOMATED COUNT: 14.1 % (ref 10–15)
GFR SERPL CREATININE-BSD FRML MDRD: >90 ML/MIN/1.73M2
GLUCOSE BLDC GLUCOMTR-MCNC: 117 MG/DL (ref 70–99)
GLUCOSE BLDC GLUCOMTR-MCNC: 118 MG/DL (ref 70–99)
GLUCOSE BLDC GLUCOMTR-MCNC: 152 MG/DL (ref 70–99)
GLUCOSE BLDC GLUCOMTR-MCNC: 162 MG/DL (ref 70–99)
GLUCOSE BLDC GLUCOMTR-MCNC: 99 MG/DL (ref 70–99)
GLUCOSE SERPL-MCNC: 124 MG/DL (ref 70–99)
HCT VFR BLD AUTO: 35.8 % (ref 40–53)
HGB BLD-MCNC: 11.4 G/DL (ref 13.3–17.7)
MAGNESIUM SERPL-MCNC: 2.1 MG/DL (ref 1.7–2.3)
MCH RBC QN AUTO: 30.6 PG (ref 26.5–33)
MCHC RBC AUTO-ENTMCNC: 31.8 G/DL (ref 31.5–36.5)
MCV RBC AUTO: 96 FL (ref 78–100)
PLATELET # BLD AUTO: 498 10E3/UL (ref 150–450)
POTASSIUM SERPL-SCNC: 3.7 MMOL/L (ref 3.4–5.3)
PROT SERPL-MCNC: 7.2 G/DL (ref 6.4–8.3)
RBC # BLD AUTO: 3.72 10E6/UL (ref 4.4–5.9)
SODIUM SERPL-SCNC: 136 MMOL/L (ref 136–145)
WBC # BLD AUTO: 9.7 10E3/UL (ref 4–11)

## 2023-07-14 PROCEDURE — 99233 SBSQ HOSP IP/OBS HIGH 50: CPT | Performed by: STUDENT IN AN ORGANIZED HEALTH CARE EDUCATION/TRAINING PROGRAM

## 2023-07-14 PROCEDURE — 99231 SBSQ HOSP IP/OBS SF/LOW 25: CPT | Performed by: NURSE PRACTITIONER

## 2023-07-14 PROCEDURE — 250N000011 HC RX IP 250 OP 636: Mod: JZ | Performed by: INTERNAL MEDICINE

## 2023-07-14 PROCEDURE — 83735 ASSAY OF MAGNESIUM: CPT | Performed by: STUDENT IN AN ORGANIZED HEALTH CARE EDUCATION/TRAINING PROGRAM

## 2023-07-14 PROCEDURE — 85027 COMPLETE CBC AUTOMATED: CPT | Performed by: DIETITIAN, REGISTERED

## 2023-07-14 PROCEDURE — 250N000013 HC RX MED GY IP 250 OP 250 PS 637: Performed by: STUDENT IN AN ORGANIZED HEALTH CARE EDUCATION/TRAINING PROGRAM

## 2023-07-14 PROCEDURE — 258N000003 HC RX IP 258 OP 636: Performed by: STUDENT IN AN ORGANIZED HEALTH CARE EDUCATION/TRAINING PROGRAM

## 2023-07-14 PROCEDURE — 250N000013 HC RX MED GY IP 250 OP 250 PS 637: Performed by: DIETITIAN, REGISTERED

## 2023-07-14 PROCEDURE — 250N000011 HC RX IP 250 OP 636: Performed by: STUDENT IN AN ORGANIZED HEALTH CARE EDUCATION/TRAINING PROGRAM

## 2023-07-14 PROCEDURE — 36415 COLL VENOUS BLD VENIPUNCTURE: CPT | Performed by: DIETITIAN, REGISTERED

## 2023-07-14 PROCEDURE — 250N000013 HC RX MED GY IP 250 OP 250 PS 637: Performed by: INTERNAL MEDICINE

## 2023-07-14 PROCEDURE — 250N000013 HC RX MED GY IP 250 OP 250 PS 637: Performed by: PHYSICIAN ASSISTANT

## 2023-07-14 PROCEDURE — C9113 INJ PANTOPRAZOLE SODIUM, VIA: HCPCS | Mod: JZ | Performed by: INTERNAL MEDICINE

## 2023-07-14 PROCEDURE — 80053 COMPREHEN METABOLIC PANEL: CPT | Performed by: DIETITIAN, REGISTERED

## 2023-07-14 PROCEDURE — 120N000002 HC R&B MED SURG/OB UMMC

## 2023-07-14 RX ORDER — HYDROMORPHONE HYDROCHLORIDE 5 MG/5ML
4 SOLUTION ORAL
Status: DISCONTINUED | OUTPATIENT
Start: 2023-07-14 | End: 2023-07-16 | Stop reason: HOSPADM

## 2023-07-14 RX ORDER — HYDROXYZINE HCL 10 MG/5 ML
25 SOLUTION, ORAL ORAL 3 TIMES DAILY PRN
Status: DISCONTINUED | OUTPATIENT
Start: 2023-07-14 | End: 2023-07-16 | Stop reason: HOSPADM

## 2023-07-14 RX ORDER — LEVOFLOXACIN 25 MG/ML
500 SOLUTION ORAL DAILY
Status: DISCONTINUED | OUTPATIENT
Start: 2023-07-15 | End: 2023-07-16 | Stop reason: HOSPADM

## 2023-07-14 RX ORDER — ACETAMINOPHEN 325 MG/10.15ML
650 LIQUID ORAL EVERY 6 HOURS PRN
Status: DISCONTINUED | OUTPATIENT
Start: 2023-07-14 | End: 2023-07-16 | Stop reason: HOSPADM

## 2023-07-14 RX ORDER — SODIUM CHLORIDE, SODIUM LACTATE, POTASSIUM CHLORIDE, CALCIUM CHLORIDE 600; 310; 30; 20 MG/100ML; MG/100ML; MG/100ML; MG/100ML
INJECTION, SOLUTION INTRAVENOUS CONTINUOUS
Status: DISCONTINUED | OUTPATIENT
Start: 2023-07-14 | End: 2023-07-14

## 2023-07-14 RX ORDER — LEVOFLOXACIN 25 MG/ML
500 SOLUTION ORAL DAILY
Status: DISCONTINUED | OUTPATIENT
Start: 2023-07-14 | End: 2023-07-14

## 2023-07-14 RX ORDER — SODIUM BICARBONATE 325 MG/1
325 TABLET ORAL
Status: DISCONTINUED | OUTPATIENT
Start: 2023-07-14 | End: 2023-07-16 | Stop reason: HOSPADM

## 2023-07-14 RX ADMIN — HYDROMORPHONE HYDROCHLORIDE 1 MG: 1 INJECTION, SOLUTION INTRAMUSCULAR; INTRAVENOUS; SUBCUTANEOUS at 04:54

## 2023-07-14 RX ADMIN — ACETAMINOPHEN 650 MG: 325 TABLET, FILM COATED ORAL at 07:54

## 2023-07-14 RX ADMIN — HYDROMORPHONE HYDROCHLORIDE 4 MG: 4 TABLET ORAL at 07:54

## 2023-07-14 RX ADMIN — PANTOPRAZOLE SODIUM 40 MG: 40 INJECTION, POWDER, FOR SOLUTION INTRAVENOUS at 09:29

## 2023-07-14 RX ADMIN — LEVOFLOXACIN 500 MG: 500 TABLET, FILM COATED ORAL at 13:12

## 2023-07-14 RX ADMIN — SODIUM BICARBONATE 325 MG: 325 TABLET ORAL at 20:25

## 2023-07-14 RX ADMIN — PANCRELIPASE 2 CAPSULE: 24000; 76000; 120000 CAPSULE, DELAYED RELEASE PELLETS ORAL at 20:25

## 2023-07-14 RX ADMIN — Medication 50 MCG: at 09:29

## 2023-07-14 RX ADMIN — POLYETHYLENE GLYCOL 3350 17 G: 17 POWDER, FOR SOLUTION ORAL at 09:29

## 2023-07-14 RX ADMIN — ACETAMINOPHEN 650 MG: 325 SOLUTION ORAL at 22:42

## 2023-07-14 RX ADMIN — HYDROMORPHONE HYDROCHLORIDE 4 MG: 4 TABLET ORAL at 15:59

## 2023-07-14 RX ADMIN — SENNOSIDES 8.6 MG: 8.6 TABLET, FILM COATED ORAL at 09:30

## 2023-07-14 RX ADMIN — HYDROMORPHONE HYDROCHLORIDE 4 MG: 1 SOLUTION ORAL at 22:42

## 2023-07-14 RX ADMIN — HYDROMORPHONE HYDROCHLORIDE 1 MG: 1 INJECTION, SOLUTION INTRAMUSCULAR; INTRAVENOUS; SUBCUTANEOUS at 12:05

## 2023-07-14 RX ADMIN — HYDROMORPHONE HYDROCHLORIDE 1 MG: 1 INJECTION, SOLUTION INTRAMUSCULAR; INTRAVENOUS; SUBCUTANEOUS at 14:28

## 2023-07-14 RX ADMIN — HYDROMORPHONE HYDROCHLORIDE 1 MG: 1 INJECTION, SOLUTION INTRAMUSCULAR; INTRAVENOUS; SUBCUTANEOUS at 18:07

## 2023-07-14 RX ADMIN — ACETAMINOPHEN 650 MG: 325 TABLET, FILM COATED ORAL at 15:59

## 2023-07-14 RX ADMIN — SODIUM CHLORIDE, POTASSIUM CHLORIDE, SODIUM LACTATE AND CALCIUM CHLORIDE: 600; 310; 30; 20 INJECTION, SOLUTION INTRAVENOUS at 13:12

## 2023-07-14 RX ADMIN — POLYETHYLENE GLYCOL 3350 17 G: 17 POWDER, FOR SOLUTION ORAL at 20:15

## 2023-07-14 RX ADMIN — CHOLESTYRAMINE 4 G: 4 POWDER, FOR SUSPENSION ORAL at 21:38

## 2023-07-14 RX ADMIN — SENNOSIDES 5 ML: 8.8 LIQUID ORAL at 20:15

## 2023-07-14 RX ADMIN — CHOLESTYRAMINE 4 G: 4 POWDER, FOR SUSPENSION ORAL at 09:29

## 2023-07-14 RX ADMIN — HYDROMORPHONE HYDROCHLORIDE 1 MG: 1 INJECTION, SOLUTION INTRAMUSCULAR; INTRAVENOUS; SUBCUTANEOUS at 20:29

## 2023-07-14 RX ADMIN — PANCRELIPASE 3 CAPSULE: 24000; 76000; 120000 CAPSULE, DELAYED RELEASE PELLETS ORAL at 18:07

## 2023-07-14 RX ADMIN — SODIUM CHLORIDE, POTASSIUM CHLORIDE, SODIUM LACTATE AND CALCIUM CHLORIDE: 600; 310; 30; 20 INJECTION, SOLUTION INTRAVENOUS at 00:18

## 2023-07-14 ASSESSMENT — ACTIVITIES OF DAILY LIVING (ADL)
ADLS_ACUITY_SCORE: 20

## 2023-07-14 NOTE — CONSULTS
[facility] - Medical Oncology New Inpatient Consult Note  2023    Patient Identifiers     Name: Gallo Navarro  Preferred Address: [place]  Preferred Language: English  : 1965  Gender: male    Assessment and Plan     Mr. Gallo Navarro is a 57 year old male with prior history of pancreatitis c/b pancreatic insufficiency currently admitted for biliary obstruction and newly diagnosed pancreas cancer. Medical oncology consulted for treatment counseling.    Patient is admitted with newly diagnosed pancreatic adenocarcinoma with no evidence of metastatic disease. Per systematic review of the literature [Mary QP, et al. JNCI 2019], we recommend neoadjuvant chemotherapy with FOLFIRINOX.  We agree with surgical oncology regarding placement of port so that treatment may be initiated quickly.  Patient should follow-up with Dr. Haile in the outpatient setting for further management counseling.    We will make arrangements for oncologic follow-up, and treatment initiation.  Following these arrangements, patient has no further inpatient oncologic needs.  No indication for treatment start during this admission, especially as patient is recovering from biliary obstruction.  Given no further inpatient oncologic needs, oncology consult to sign off.    Thank you for this interesting consult. Oncology Consult Service to sign off. Feel free to reach out with further questions.     90 minutes spent on the date of the encounter doing chart review, review of test results, interpretation of tests, patient visit, documentation and discussion with other provider(s)     Luis Haile MD, PhD   of Medicine  Division of Hematology, Oncology and Transplantation  [facility]    -----------------------------------    Oncology Summary and HPI      Cancer Staging   Malignant neoplasm of head of pancreas (H)  Staging form: Pancreas, AJCC 8th Edition  - Clinical stage from 2023: Stage III (cT2,  "cN2, cM0) - Signed by Luis Haile MD on 7/14/2023      Oncology History   Malignant neoplasm of head of pancreas (H)   7/11/2023 -  Cancer Staged    Staging form: Pancreas, AJCC 8th Edition  - Clinical stage from 7/11/2023: Stage III (cT2, cN2, cM0)     7/14/2023 Initial Diagnosis    Malignant neoplasm of head of pancreas (H)         Subjective/Interval Events     - patient was in OR undergoing duodenal stenting during rounds, treatment decision making discussed with patients primary team and Surg Onc  - pt subsequently seen, with diagnosis and treatment discussed extensively with patient and his wife    Physical Exam     Vital signs: /83 (BP Location: Left arm)   Pulse 92   Temp 98  F (36.7  C) (Oral)   Resp 16   Ht 1.829 m (6' 0.01\")   Wt 81.1 kg (178 lb 12.7 oz)   SpO2 96%   BMI 24.24 kg/m      Physical Exam:  - otherwise healthy looking man, lying in bed in NAD    Objective Data     Lab data:  I have personally reviewed the lab data, notable for:    - TBili 3.9  - Hgb 10.0    Radiology data:  I have personally reviewed the radiology data, notable for:  07/12/2023 CT Chest  Impression:   1. A 7 mm pulmonary nodule in the left upper lobe which has not been  imaged previously is indeterminate. Recommend 3-6 month follow-up.  2. Partially visualized ill-defined pancreatic head mass with upper  abdominal lymphadenopathy. Biliary drains and percutaneous  gastrojejunostomy tube.  3. No acute pathology within the chest.    07/10/2023 MR Abd/Pelvis  IMPRESSION:   1.  Ill-defined masslike soft tissue in the pancreatic head is  suspicious for malignancy given the biliary and pancreatic duct  obstruction as well as vascular encasement and narrowing of SMV,  although less likely pancreatitis may also produce similar findings.  Recommend EUS biopsy.  2.  Indeterminate mildly enlarged peripancreatic, periportal and  mesenteric lymph nodes.    Pathology and other data:  I have personally reviewed and interpreted " the pathology data, notable for:    07/11/2023 FNA  Final Diagnosis   Specimen A                 Interpretation:                  Positive for malignancy  Morphologically consistent with adenocarcinoma          Medical/Surgical History     Past medical history:  Active Ambulatory Problems     Diagnosis Date Noted     No Active Ambulatory Problems     Resolved Ambulatory Problems     Diagnosis Date Noted     No Resolved Ambulatory Problems     Past Medical History:   Diagnosis Date     Acute pancreatitis 04/16/2023     Gastric outlet obstruction      Recurrent acute pancreatitis        Past surgical history:  Past Surgical History:   Procedure Laterality Date     AS OPEN TREATMENT CLAVICULAR FRACTURE INTERNAL FX       ENDOSCOPIC RETROGRADE CHOLANGIOPANCREATOGRAM N/A 7/11/2023    Procedure: ENDOSCOPIC RETROGRADE CHOLANGIOPANCREATOGRAPHY;  Surgeon: Khadar Pickett MD;  Location: [facility] OR     ENDOSCOPIC ULTRASOUND UPPER GASTROINTESTINAL TRACT (GI) N/A 7/11/2023    Procedure: Endoscopic ultrasound upper gastrointestinal tract (GI), with biposy, GJ tube repositioning, stent placement;  Surgeon: Khadar Pickett MD;  Location: [facility] OR     INSERT PICC LINE  04/29/2023     IR NG TUBE PLACEMENT REQ RAD & FLUORO  05/08/2023    JG tube        Social history:   Social History     Tobacco Use     Smoking status: Never     Smokeless tobacco: Never   Vaping Use     Vaping Use: Never used   Substance Use Topics     Alcohol use: Not Currently     Comment: o/w light beer 2days a week     Drug use: Never       Family history:  Family History   Problem Relation Age of Onset     Diabetes Type 2  Father      Cirrhosis Father      Genetic Disorder Brother         missing [id number], mild retardation     Colon Polyps Brother      Colon Cancer Paternal Uncle      Pancreatic Cancer Paternal Aunt      Pancreatitis Cousin        Allergies:   No Known Allergies    Outpatient medications:     Current Facility-Administered Medications:       acetaminophen (TYLENOL) tablet 650 mg, 650 mg, Per J Tube, Q6H PRN, Khadar Pickett MD, 650 mg at 07/13/23 0912     bisacodyl (DULCOLAX) suppository 10 mg, 10 mg, Rectal, Daily PRN, Khadar Pickett MD, 10 mg at 07/13/23 0324     cholecalciferol (D-VI-SOL, Vitamin D3) 10 mcg/mL (400 units/mL) liquid 50 mcg, 50 mcg, Per Feeding Tube, Daily, Khadar Pickett MD, 50 mcg at 07/13/23 2128     cholestyramine (QUESTRAN) Packet 4 g, 1 packet, Oral, BID, Khadar Pickett MD, 4 g at 07/13/23 2119     dextrose 10% infusion, , Intravenous, Continuous PRN, Khadar Pickett MD, Stopped at 07/10/23 1222     glucose gel 15-30 g, 15-30 g, Oral, Q15 Min PRN **OR** dextrose 50 % injection 25-50 mL, 25-50 mL, Intravenous, Q15 Min PRN **OR** glucagon injection 1 mg, 1 mg, Subcutaneous, Q15 Min PRN, Khadar Pickett MD     diphenhydrAMINE-zinc acetate (BENADRYL) 1-0.1 % cream, , Topical, TID PRN, Khadar Pickett MD, Given at 07/10/23 0531     HYDROmorphone (DILAUDID) tablet 4 mg, 4 mg, Per J Tube, Q3H PRN, Khadar Pickett MD, 4 mg at 07/13/23 0913     HYDROmorphone (PF) (DILAUDID) injection 0.5-1 mg, 0.5-1 mg, Intravenous, Q2H PRN, Clau Ray MD, 1 mg at 07/13/23 2119     hydrOXYzine (ATARAX) tablet 25 mg, 25 mg, Per J Tube, TID PRN, Khadar Pickett MD, 25 mg at 07/13/23 1822     insulin aspart (NovoLOG) injection (RAPID ACTING), 1-7 Units, Subcutaneous, Q4H, Nataliya Guy APRN CNP, 2 Units at 07/13/23 2010     [Held by provider] insulin aspart (NovoLOG) injection (RAPID ACTING), , Subcutaneous, TID w/meals, Nataliya Guy APRN CNP     [Held by provider] insulin aspart (NovoLOG) injection (RAPID ACTING), , Subcutaneous, With Snacks or Supplements, Khadar Pickett MD, 2 Units at 07/09/23 1829     insulin glargine (LANTUS PEN) injection 12 Units, 12 Units, Subcutaneous, QPM, Nataliya Guy APRN CNP, 12 Units at 07/13/23 2011     [Held by provider] insulin NPH injection 13 Units, 13 Units, Subcutaneous, QAM, Nataliya Guy APRN  CNP, 13 Units at 07/12/23 0910     [Held by provider] insulin NPH injection 15 Units, 15 Units, Subcutaneous, QPM, Nataliya Guy APRN CNP     lactated ringers infusion, , Intravenous, Continuous, Clau Ray MD, Last Rate: 75 mL/hr at 07/13/23 1630, Restarted at 07/13/23 1630     [START ON 7/14/2023] levofloxacin (LEVAQUIN) tablet 500 mg, 500 mg, Oral, Daily, Clau Ray MD     lidocaine (LMX4) cream, , Topical, Q1H PRN, Khadar Pickett MD     lidocaine 1 % 0.1-1 mL, 0.1-1 mL, Other, Q1H PRN, Khadar Pickett MD     melatonin tablet 6 mg, 6 mg, Per J Tube, At Bedtime PRN, Khadar Pickett MD     naloxone (NARCAN) injection 0.2 mg, 0.2 mg, Intravenous, Q2 Min PRN **OR** naloxone (NARCAN) injection 0.4 mg, 0.4 mg, Intravenous, Q2 Min PRN **OR** naloxone (NARCAN) injection 0.2 mg, 0.2 mg, Intramuscular, Q2 Min PRN **OR** naloxone (NARCAN) injection 0.4 mg, 0.4 mg, Intramuscular, Q2 Min PRN, Khadar Pickett MD     ondansetron (ZOFRAN ODT) ODT tab 4 mg, 4 mg, Oral, Q6H PRN **OR** ondansetron (ZOFRAN) injection 4 mg, 4 mg, Intravenous, Q6H PRN, Khadar Pickett MD     pantoprazole (PROTONIX) IV push injection 40 mg, 40 mg, Intravenous, Daily with breakfast, Khadar Pickett MD, 40 mg at 07/13/23 0822     phytonadione 2 mg in sodium chloride 0.9 % 50 mL intermittent infusion, 2 mg, Intravenous, Once, Khadar Pickett MD     polyethylene glycol (MIRALAX) Packet 17 g, 17 g, Per J Tube, BID, Clau Ray MD, 17 g at 07/13/23 2119     sennosides (SENOKOT) tablet 8.6 mg, 8.6 mg, Oral, BID, Clau Ray MD, 8.6 mg at 07/13/23 2120     sodium chloride (PF) 0.9% PF flush 3 mL, 3 mL, Intracatheter, Q8H, Khadar Pickett MD, 3 mL at 07/10/23 2021     sodium chloride (PF) 0.9% PF flush 3 mL, 3 mL, Intracatheter, q1 min prn, Khadar Pickett MD

## 2023-07-14 NOTE — PROGRESS NOTES
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      Assessment:     1.  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  Acute Hyperglycemia exacerbated by tube feeding and stress   3.  Anemia   4.  New dx of pancreatic adenocarcinoma      Plan:    Addm:   -  Lantus 12 unit(s)  this evening unit(s) at 2000 - covering basal needs  -  NPH 15 unit(s) at 2000 at the start of TF   -  Resume Novolog 1 units per 12 grams carb for PO intake, meals/snacks    -  Novolog medium resistance sliding scale insulin - 0800/1200 and 1600 when TF off   -  Novolog high resistance sliding scale insulin - 2000/0000 and 0400 when TF on   -  BG q4h   -  Hypoglycemia protocol  -  PRN D10W revised to read-- Infuse IV D10W at 120 ml/h-- this replaces 75% of carbs in Vital 1.5 at 90 ml/h.  IF glucose trending >180, reduce rate to 95 ml/h.  If still trending >180, reduce rate to 70 ml/h.  - Education needs: assessment ongoing  - Outpatient diabetes follow up: TBD-- strongly recommend diabetes specialty care  - Diabetes service will continue to follow.  Please do not hesitate to contact the team with any questions/concerns.     - Please notify inpatient diabetes service if changes are planned that will impact glycemia, such as NPO, starting/adjusting steroids, enteral feeding,  "parenteral feeding, dextrose fluids or procedures.     Plan discussed patient/wife, with bedside RN/primary team via this note.         Interval History:     The last 24 hours progress and nursing notes reviewed.      BG trend: At target this AM - did get steroids in OR and Glucagon  NPO again for OR this AM - appreciate updates on upcoming procedures   Fasting BG this AM is 117 mg/dL        Meeting with GI at time of rounds.    Will return later or after OR to review plan.    Updated by RD re: PO now advanced, TF plan to cycle starting at  this evening      Planned Procedures/surgeries:  NPO today - headed to OR again today     D5W-containing solutions/medications: TF continuously Vital 1.5 at 90 ml/hr - held, LR running     Recent Labs   Lab 23  0457 23  2359 23  1945 23  1729 23  1346 23  1050   * 162* 211* 233* 191* 141*         Nutrition:     Orders Placed This Encounter      NPO per Anesthesia Guidelines for Procedure/Surgery Except for: Meds    Tube Feeding:  Held until  then resume   TF to resume - cycle 12 hour  - 08 -  Vital 1.5 at 90 ml/hr         PTA Regimen:   lantus 8 units at HS  aspart prn - up to 40 units/day - using unknown scale and frequency, based on his cgm review  And home TF was Peptamin 1.5 at 90 ml/h           Review of Systems:   CC: deferred          Medications:   Steroid plannin mg in OR this AM        Physical Exam:   /71 (BP Location: Left arm)   Pulse 86   Temp 98.6  F (37  C) (Oral)   Resp 16   Ht 1.829 m (6' 0.01\")   Wt 81.1 kg (178 lb 12.7 oz)   SpO2 97%   BMI 24.24 kg/m      General:  NAD   HEENT:  NC/AT. MMM  Lungs:  unremarkable, no new cough, no SOB  ABD:  Rounded   Skin:  warm and dry, no obvious lesions  MSK:   moving all extremities  Mental Status:  Alert and oriented x3  Psych:   Cooperative, good eye contact, full/appropriate affect         Data:     Lab Results   Component Value Date    A1C 7.9 " 07/08/2023        CBC RESULTS: Recent Labs   Lab Test 07/14/23  0849   WBC 9.7   RBC 3.72*   HGB 11.4*   HCT 35.8*   MCV 96   MCH 30.6   MCHC 31.8   RDW 14.1   *     Recent Labs   Lab Test 07/14/23  0849 07/14/23  0457 07/13/23  0823 07/13/23  0447     --   --  140   POTASSIUM 3.7  --   --  4.0   CHLORIDE 101  --   --  104   CO2 24  --   --  27   ANIONGAP 11  --   --  9   * 117*   < > 109*   BUN 9.5  --   --  9.3   CR 0.61*  --   --  0.60*   DENISE 9.5  --   --  8.8    < > = values in this interval not displayed.     Liver Function Studies -   Recent Labs   Lab Test 07/10/23  0408   PROTTOTAL 5.7*   ALBUMIN 2.9*   BILITOTAL 8.7*   ALKPHOS 332*   AST 85*   *     Lab Results   Component Value Date    INR 1.96 07/10/2023    INR 1.78 07/09/2023    INR 1.37 07/08/2023     ESVIN Wheatley CNP, [facility]  Inpatient Diabetes Management Service  Pager - [phone]  Available on [employer]     To contact Endocrine Diabetes service:   From 7AM-5PM: page inpatient diabetes provider who is following the patient that day (see filed or incomplete progress notes/consult notes).  If uncertain of provider assignment: page job code [phone]. (To page job code in-house dial 3 stars, [phone] then enter number).  For questions or updates AFTER HOURS from 5PM-7AM: page the diabetes job code for on call fellow: [phone]    Please notify inpatient diabetes service if changes are planned to steroids, nutrition, or if procedures are planned requiring prolonged NPO status. Diabetes Management Team job code: [phone]     I spent a total of 25 minutes on the date of the encounter doing prep/post-work, chart review, history and exam, documentation and further activities per the note including lab review, multidisciplinary communication, counseling the patient and/or coordinating care regarding acute hyper/hypoglycemic management, as well as discharge management and planning/communication.  See note for details.

## 2023-07-14 NOTE — PROGRESS NOTES
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      Changes today:  - CLD if okay, advance to FLD tonight  - plan to advance to soft diet potentially 7/15  - levofloxacin for total 5 day course per GI (7/13-   - OK to continue tube feeds tonight 7/14     #Pancreatic adenocarcinoma (dx 7/12/23)  # Acquired duodenal stricture with gastric outlet obstruction s/p GJ tube placed for gastric venting and nutrition   Complicated pancreatic hx. Initially presented with sudden onset vomiting on 3/2023, found to have lipase >2000  but did not have active abdominal pain, with presumed dx of pancreatitis. Continued to have intractable bilious vomiting despite multiple admissions to the hospital, later found to have an acquired duodenal stricture with gastric outlet obstruction thought to be 2/2 pancreatic inflammation. Failed multiple PO trials and NG/NJ tubes, ultimately had PEGJ placed for gastric venting and nurition with unintentional weight loss of 40 lbs. Reports continued weight loss despite enteral tube feeds. Repeat CT imaging of abdomen on 6/2/2023 in follow-up of pancreatitis with note of more focal irregular hypodense masslike region in the pancreatic head (2.2 x 1.5 x 1.4 cm) with persistent mild main pancreatic ductal dilatation, subcentimeter mid mesenteric  lymph nodes, stable narrowing of the main portal vein near the portal venous confluence. EUS biopsy was performed on 6/27/23 showing duodenal inflammation. Unable to get biopsy of pancreatic mass due to poor window.  Patient then developed new abdominal pain since 6/27 after biopsy, bandlike, radiating to his back, with increase jaundice. 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 EGD/EUS completed 7/11 w/ biopsies taken > pathology report back on 7/12 +pancreatic adenocarcinoma.   - Panc/Bili consulted  - Nutrition - resume tube feeds on 7/14 PM per RD                - Peptamen 1.5, 90 ml/hr.  ml Q2H - once out of Peptamen, change to Vital 1.5 @ 90 ml/hr ( 2160 ml/day) to provide: 3240 kcals ( 40 kcal/kg), 145 g PRO ( 1.8 gm/kg), 1649 ml free H20, 404 gm CHO, and 12.3 gm soluble fiber daily.  - Pain management: tylenol 650 mg Q6H PRN, Dilaudid PO 4 mg Q3H PRN, IV dilaudid 0.5-1mg mg Q2H PRN breakthrough pain, OK to increase as needed.   - Bowel regimen with miralax, senna, and suppository PRN  - Atarax PRN itching   - Continue diet advancement as detailed above; GI following   - Surgical Oncology consulted   - they are helping to look into port placement while inpatient, IR has declined to do the procedure inpatient   - Oncology consulted   -CT chest w/ contrast completed 7/12      #+staph epi x 1 blood culture  Pt remains clinically stable. Methicillin sensitive. Will monitor closely. Also growing staph. Capitis, likely all contaminants.   - repeat blood cultures x 7/11 NGTD  - Zosyn > Augmentin on 7/12, discontinued augmentin, started levofloxacin per GI total 5 day course (7/13-     # IDDM2 - Diagnosed after initial dx pancreatitis 3/2023 likely 2/2 pancreatitic insufficiency.   - Hemoglobin A1c 7.9 on admission  - Endocrine consult  - Lantus 10 units daily  -  Novolog sliding scale Q4H     # Acute on chronic anemia - H/H 8.8/27.1, down from one week ago on 7/3, patient was at H/H 11.1/33.2. No reported bleeding.   - Trend CBC  - Transfuse for hgb goal >7.0     # Malnutrition   - Nutrition consulted as noted above    # Itching  LIkely due to cholestasis  - hydroxyzine PRN  - cholestyramine BID            Diet: Clear Liquid Diet  Adult Formula Drip Feeding: Continuous Vital 1.5; Jejunostomy; Goal Rate: 90 mL/hr x 12 hrs from 8pm-8am. Please adhere to 4-hr hangtime and see MAR for Creon/sodium bicarb administration instructions; mL/hr; From: 8:00 PM; To: 8:00 AM    DVT Prophylaxis: Pneumatic Compression Devices  Adan Catheter: Not present  Lines: None     Cardiac Monitoring: None  Code Status: Full Code      Clinically Significant Risk Factors              # Hypoalbuminemia: Lowest albumin = 2.9 g/dL at 7/12/2023  5:47 AM, will monitor as appropriate  # Coagulation Defect: INR = 1.46 (Ref range: 0.85 - 1.15) and/or PTT = N/A, will monitor for bleeding            # Moderate Malnutrition: based on nutrition assessment         Disposition Plan           Clau Ray MD  Hospitalist Service, GOLD TEAM [address] [place] [address]  Securely message with [employer] (more info)  Text page via [facility] Paging/Directory   See signed in provider for up to date coverage information  ______________________________________________________________________    Interval History   NAEO. Pain adequately managed overnight. Discussed the plan for advancing diet; discussed that potentially they would be able to discharge in the next 1-2 days. They would like to transfer their care to  system (oncology and primary care).     Physical Exam   Vital Signs: Temp: 98.3  F (36.8  C) Temp src: Oral BP: 130/76 Pulse: 96   Resp: 16 SpO2: 100 % O2 Device: None (Room air)    Weight: 178 lbs 12.69 oz    Gen: no acute distress, alert, interactive,answering questions  appropriately. Jaundiced appearance  HEENT:scleral icterus, EOMI  Nares: No discharge noted from nares bilaterally   CV: well perfused appearance  Pulm: breathing comfortably on RA   Abd:. PEG J site c/d/i, with drainage (bilious) in bag.   Neuro: moving all extremities spontaneously    Medical Decision Making       50 MINUTES SPENT BY ME on the date of service doing chart review, history, exam, documentation & further activities per the note.      Data     I have personally reviewed the following data over the past 24 hrs:    9.7  \   11.4 (L)   / 498 (H)     136 101 9.5 /  118 (H)   3.7 24 0.61 (L) \       ALT: 151 (H) AST: 85 (H) AP: 320 (H) TBILI: 4.1 (H)   ALB: 3.7 TOT PROTEIN: 7.2 LIPASE: N/A       Imaging results reviewed over the past 24 hrs:   No results found for this or any previous visit (from the past 24 hour(s)).

## 2023-07-14 NOTE — PROGRESS NOTES
Discharge date: 07/15/23 or 07/16/23 (VIPUL)  Discharge therapies to be provided by [facility]: Enteral  Formula: New Formula Vital 1.5  Rate/Dose: 90ml/hr x 12 hourse  Provider to manage enteral at home: Same as prior to admission  Estimated length of need: 90 days or more  Education completed: No teaching needed due to having enteral feedings prior   Delivery/supplies: Supplies to the home on day of discharge. Supplies needed: New formula Vital 1.5 and 60ml syringes.   Agency: NA  SNV: NA  Notes: Met with pt at bedside and went over enteral supplies he will need at home.  Pt has been changed to a new formula that is covered by his insurance per intake team.  Pt will need the new formula delivered to the home on day of discharge along with 60ml syringes. Pt stated he feels comfortable doing enteral feedings independently. Informed delivery will be to his home on day of discharge.    Thank you,  Faiza Howard LPN  Enteral Nurse Liaison  [facility]  [address] [place] [address]  [phone]

## 2023-07-14 NOTE — PROGRESS NOTES
CLINICAL NUTRITION SERVICES - REASSESSMENT NOTE     Nutrition Prescription    RECOMMENDATIONS FOR MDs/PROVIDERS TO ORDER:  Cycled nighttime TF providing ~70% estimated kcal/protein needs.  If diet doesn't advance and/or is not tolerated, consult RD to adjust TF regimen as needed (would need to coordinate with Endo as well)    Adjust IV fluids per provider discretion    Malnutrition Status:    Moderate malnutrition in the context of chronic illness    Recommendations already ordered by Registered Dietitian (RD):  1. Transition to nighttime supplemental TF tonight. Discussed plan with Endo, primary team, and GI    GOAL TF: Vital 1.5 Cuate via J-tube @ goal of 90 mL/hr x 12 hrs 8pm-8am (1080ml/day) will provide: 1620 kcals (20 kcal/kg), 72 g PRO (0.9 g/kg), 62 g fat,  825 ml free H20, 201 g CHO, and 6 g fiber daily  --> This provides 67% kcal needs and 73% protein needs    2.     Addendum @ 1434: per RNCC, pt does not have OP coverage for Relizorb.  Will order Creon/sodium bicarb to be mixed into TF formula instead.  Discussed this change with pt, discussed that bedside nursing should be able to provide teaching.  Let bedside RN know about change and need for teaching on Creon/sodium bicarb mixing into TF before discharge.    Once begin TFs, begin the following pancreatic enzyme regimen and recommend order each of the following:   (please copy and paste administration instructions into each order)  A) Sodium Bicarb tablet (325 mg), 1 tablet q 4 hrs via Jtube. Administration Instructions: Crush 1 tablet and mix into 15 ml of warm water and use this solution to mix with Creon pancreatic enzymes. DO NOT administer directly into Jtube (to be mixed into TF formula with Creon enzyme - see Creon enzyme order)   B) Creon 24, 1- 2 capsules q 4 hrs via Jtube. Administration Instructions:   --If TF rate is running @ 10-50 ml/hr, administer 1 capsule q 4 hrs;   --If TF rate is running @ 60-90 ml/hr, increase to 2 capsules q 4 hrs.     **Open capsule and empty contents into 15 ml sodium bicarb solution (see sodium bicarb order), let dissolve for about 20-30 minutes and then add this solution to the amount of TF formula hung in TF bag every 4 hrs (i.e., once TF @ goal infusion 90 ml/hr will mix 2 capsules into 360 ml of TF formula every 4 hrs).   *Note: this enzyme regimen with TF @ goal infusion will provide approx 2322 units of lipase/gram of total Fat daily and approp dosing initially for pancreatic insufficiency with more elemental TF formula.        3. Discussed with GI PA, will plan to do 50% the amount of free water flushes we had previously been giving patient now that diet advancing and G-tube clamped.    ---> Free Water Flushes (via J-tube):  60 mL before and after TF cycle + 120 mL flushes 5 times during the day (suggested times 9am, 11am, 1pm, 3pm, 5pm), will provide additional 720 mL/day free water    Future/Additional Recommendations:  Monitor diet advancement/tolerances, wt trends     EVALUATION OF THE PROGRESS TOWARD GOALS   Diet: Clear Liquid    Nutrition Support: Vital 1.5 @ 90 ml/hr continuous rate via J-tube to provide 2160 mL formula, 3240 Kcals (40 Kcal/kg), 147 gm protein (1.8 gm/kg), 404 gm CHO, 13 gm fiber, and 1650 mL free water daily     -- Relizorb ordered to start yesterday (not yet started as TF still on hold)    Free Water: 60 mL/hr at continuous rate via J-tube (1440 mL free water)    Intake: TF on hold since 7/13 @ midnight for procedure on 7/13. Per I/Os, pt has not been receiving goal TF (intake documented 7/9-7/13 was 630-1620 mL/day), but the only TF interruption noted in progress notes since admit was the current one since 7/13 (suspect some I/O documentation error?)    Diet advancing to clear liquids today, per GI will slowly advance as tolerated (possible FLD later today, soft diet tomorrow 7/15, regular diet 7/16). GI provider thinks diet advancement will go well for patient so ok with plan to have TF just  "be supplemental starting tonight.     Pt reports clear liquids going well so far.  G-tube has been clamped since yesterday and pt says he is tolerating this well.  Eager for clinical progression and to begin cancer treatment.  Pt aware of plan to start cycled TF just at night and relizorb.     NEW FINDINGS   Weight: 2 weights this admission 7/8 ad 7/10 stable ~81 kg    Labs:  Cr 0.61 (L)    Meds:  - Vitamin D (50 mcg/day)  - Questran BID  - Medium sliding scale insulin q4h  - Lantus (12 units every evening)  - NPH BID - ON HOLD  - Novolog CHO counting with meals - ON HOLD  - Levaquin (PO) ordered to start today - per med administration instructions, TF to be held 1 hr before and 1 hr after med administration (ordered at noon daily  - LR @ 75 mL/hr    GI:   - S/p EGD and EUS-guided GJ on 7/13.  Per GI note, \"Findings: Successful EUS-guided gastrojejunostomy using a 15 x 10 mm Hot Aphiosos stent. Endoscopic and contrast injection confirms intrajejunal location without leakage or free air\".    - G-tube clamped     MALNUTRITION  % Intake: Decreased intake does not meet criteria  % Weight Loss: > 5% in 1 month (severe) PTA  Subcutaneous Fat Loss: Facial region: mild (limited to observation only as pt up and walking around room)  Muscle Loss: Temporal and Facial & jaw region: mild (limited to observation only as pt up and walking around room)  Fluid Accumulation/Edema: None noted per chart review today  Malnutrition Diagnosis: Moderate malnutrition in the context of chronic illness    Previous Goals   Total avg nutritional intake to meet a minimum of 30 kcal/kg and 1.2 PRO/kg daily (per dosing wt 81 kg admit wt ).  Evaluation: Not met    Previous Nutrition Diagnosis  Inadequate oral intake related to gastric outlet obstruction associated with pancreatic mass as evidenced by TF dependency   Evaluation: Improving    CURRENT NUTRITION DIAGNOSIS  Inadequate oral intake related to slow diet advancement as evidenced by clear " liquids currently and previously with diet for pleasure with G-tube to gravity    INTERVENTIONS  Implementation  Collaboration with other providers - see above. Told RNCC pt to be started on Relizorb and requested insurance coverage be looked into.  Enteral Nutrition - see above  Feeding tube flush - see above  Nutrition Education - Discussed plan to cycle TF (supplemental for now, now that diet is advancing), begin Relizorb with pt    Goals  Total avg nutritional intake to meet a minimum of 30 kcal/kg and 1.2 g PRO/kg daily (per dosing wt 81 kg).    Monitoring/Evaluation  Progress toward goals will be monitored and evaluated per protocol.     Deena Ramos, RD, , LD  Weekday Pager: [phone]  Weekday Units covered: 7C (all beds) and 7B (beds 7230 through 7240)  Weekend/Holiday RD Pager: [phone]

## 2023-07-14 NOTE — PROGRESS NOTES
GASTROENTEROLOGY PROGRESS NOTE    Date of Admission: 7/8/2023  Reason for Admission: Pancreatic mass, hx biliary stent insertion      ASSESSMENT:  57 year old male with a history of idiopathic pancreatitis, diabetes on insulin who presented to the ED on 7/8/2023 with abdominal pain and jaundice.    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 - s/p EUS-guided GJ (7/13/23)  #Obstructive jaundice, s/p repeat biliary stenting 7/11/2023  Pancreatitis History:  -Etiology: due to malignancy; AI (IgG4 normal) vs EtOH (PEth <10)  -Date of onset: 3/2023  -Concurrent organ failure: none  -Thromboses: none  -Diabetes: suspect Type 3c (dx after onset of pancreatitis), A1C 7.9% (7/8), on insulin  -EPI: Severe (fecal elastase <0.5 on 7/9/23), on Relizorb (with TF)  -Current nutrition access: PEG-J (placed 5/2023)  -Last imaging:    -7/8 CT A/P: Pancreas mass without pancreatic ductal dilatation, biliary stent in position with mild pneumobilia, calcific density adjacent to biliary stent (?calcified gallstone)   -7/10: MRCP with ill-defined soft tissue mass in pancreatic head with lymphadenopathy suspicious for malignancy.  -Infection/anti-infectives: Zosyn (7/9-7/12), Levaquin (7/13-present); , BC +staphy capitis and staph epidermidis only 1/2 on 7/9 (?contaminant), repeat BC NGTD (7/11)  -Interventions:    -7/11/2023: EUS and ERCP (Dr. Pickett) - Severe D2 stricture, ~4 cm mass was identified in the uncinate process of the pancreas with Bx positive for adenocarcinoma with lymphadenopathy.  Biliary stent occluded (likely d/t compression from mass effect of duod stricture), placed covered metal biliary sent and additional plastic DP stent   -7/13/2023: EUS-guided GJ (Dr. York)               Patient's main issue was intolerance to oral intake and persistent vomiting rather than pain. He had a prolonged hospital course at [facility]  hospital in April 2023 during which time he was found to have extrinsic compression of the duodenum which was thought to be due to pancreatitis, due to continued intolerance to oral diet,he was briefly on TPN and  a PEG-J tube was placed during that hosptialization. Despite tube feedings he continued to lose weight and needed ER visits/admissions for dehydration and hypotension and dyselectrolytemia.      More recently on 6/2/2023 he was found to have a mass in the pancreatic head region on imaging, this was evaluated via EUS on 6/27/2023 there was no safe window to biopsy vs deferred bx this lesion due to risk for infecting necrosis.  Few days after this EUS procedure patient developed abdominal pain and symptomatic obstructive jaundice with c/f cholangitis given elevated LFTs/T.bili with pruritis, fever and leukocytosis.       It is unclear if the current imaging abnormality in the pancreatic head regions/ uncinate process area is a neoplastic mass or if it is necrosis from prior attack of pancreatitis. CEA 10.6, CA 19-9 29 (7/8). MRCP 7/10 with ill-defined soft tissue mass in pancreatic head with lymphadenopathy suspicious for malignancy.  Now 7/11 s/p EUS with bx of mass resulting pancreatic adenocarcinoma. Additionally, repeat ERCP performed on 7/11 with biliary stricture and possibly occluded stents, placement of additional biliary stents and noted pus/dark bile draining post. Noted +BC 7/9 but suspect likely contaminant given organisms (staph epidermidis and capitis) and only grew in 1of2 bottles.  However, would complete 7 days of abx post new drain placement/source control given sx c/f cholangitis and pus noted during most recent ERCP.    Surg oncology consulted but unresectable due to extent of vascular invasion.   Hem/Onc consulted - no e/o metastatic disease and rec placement of port for neoadjuvant chemo to be initiated in OP setting ASAP.    Now 7/13 s/p EUS-guided GJ. Tolerating Gtube clamp trials  post procedure without any increased abd pain nor N/V and c/o hunger.    #Moderate protein-calorie malnutrition  #Severe pancreatic exocrine insufficiency  #S/p PEG-J (5/2023)  Presented with poor po d/t GOO due to duodenal stenosis and subsequent weight loss of 40#.  Taking po liquids for pleasure only PTA and Gtube vented essentially at all times with 1.5-2 L output daily and started receiving IVF infusions PTA as had hyponatremia and electrolyte abnormalities frequently.  Fecal elastase <0.5 consistent with severe EPI.  Previous Vitamin D 33 (7/9) and on cholecalciferol 50 mcg daily; no Vitamin A/E and high risk for other fat soluble vitamin deficiencies (noted previously required IV Vit K for high INR).  Dietitian following.  Receiving peptide-based TFs with plan to begin Relizorb for pancreatic enzyme replacement therapy (PERT) with TF.  [facility] per pt.    RECOMMENDATIONS:  -- Recheck CBC and CMP (ordered for you) - continue to trend daily while admitted.  -- Clamp Gtube, vent PRN if c/o abd distention/N/V.  -- Begin the following diet progression:   -CL diet now.  If tolerates, can advance later today to FL.  If tolerates liquid diet then,   -Advance soft diet 7/15 and if tolerates them,   -Advanced to Regular diet on 7/16.  -- Education completed (handout provided) with patient on post enteral stent diet with above diet progression and encouraged chew foods well with fluids after solids  -- Resume nocturnal TF regimen for now and recommend continue at discharge (provisions and flushes per RD but anticipate can begin to wean provisions some with Gtube clamped/diet advancing).  Wean per home infusion RD pending adequacy of po intakes.  -- PERT:    -Relizorb with TF   -Begin Creon 24, 3 capsules TID with meals and 1-2 with snacks once able to advance diet > CL (ordered for you).  -- Check Vitamin A and E levels.  Supplement as indicated per RD.  -- Levaquin IV/PO daily x 5 total  (7/13-7/17)  -- If without BM 7/14, increase Miralax to BID and add senna-docusate 1-2 tablet BID.  -- Analgesia per primary team.  If pt without adequate pain control despite opioids, could consider EUS for celiac neurolysis (in OP setting).  -- Appropriate for discharge from GI perspective in the next 1-2 day if pain well controlled and tolerating soft diet.    Discussed recs and plan above with patient, RD and primary medicine provider (Dr. Ray)      The patient was seen and discussed and with GI staff, Dr. York.    GI Follow up (we will arrange):  -Recheck LFTs in 2 weeks post discharge to evaluate trends.  -Arrange for GI RNCC phone follow up with patient to follow up on the following:   --Labs: LFT check to evaluate labs for need to consider repeat ERCP (Dr. Pickett)   --Pain for approp to consider EUS/celiac neurolysis (Dr. York))   --Nutrition: PO intake progress/TF for approp to schedule for removal of PEG-J.    Overall time spent on the date of this encounter preparing to see the patient (including chart review of available notes, clinical status events, imaging and labs); obtaining interim history/subjective data; ordering and/or coordinating medications, tests or procedures; ordering medications, tests or procedures; communicating with other health care professionals; and documenting the above clinical information in the electronic medical record was 60 minutes.     Iris Kaur PA-C  GI Service  [facility]  Text Page  _______________________________________________________________      Subjective: Nursing notes and 24hr events reviewed. NAEO.  Gtube clamped.  TF not yet restarted.    Patient seen and examined at 09:30.  Reports improved abd pain (still having some lower RLQ/LLQ abd pain that radiates to back) but denies any N/V/abd distention since Gtube clamped last night. C/o hunger and wants to eat.  Denies fevers, chills. Denies pruritis. No BM today, last  "recorded 7/11.    ROS:   4 pt ROS negative unless noted in subjective.     Objective:  Blood pressure 130/76, pulse 96, temperature 98.3  F (36.8  C), temperature source Oral, resp. rate 16, height 1.829 m (6' 0.01\"), weight 81.1 kg (178 lb 12.7 oz), SpO2 100 %.     Gen: Sitting up in bed. Appears comfortable and in NAD  HEENT: NCAT. Sclera icteric (improving).  CV: RRR, Peripheral perfusion intact  Resp: non-labored work of breathing on RA.  Abd: Soft, non-distended, minimally tender to palpation, no guarding/peritoneal sx. PEG-J site c/d/i with Gtube capped. TF not infusing yet via Jtube.  Msk: no gross deformity  Skin: jaundice (improving)  Ext: warm, well perfused. No significant b/l LE edema noted.   Neuro: grossly normal  Mental status/Psych: A&O. Asks/answers questions appropriately     PROCEDURES:  7/13/2023: EUS-guided GJ with Dr. York  Impression:      - Normal esophagus.                          - The PEG/J was found in the stomach in good position.                          - Acquired duodenal stenosis.                          - Successful EUS-guided gastrojejunostomy using lumen                          apposing (Axios) stent.     7/11/2023: EUS with Dr. Pickett  Impression:            - Severe duodenal stricture in D2 limiting exam. GJ                          tube repositioned partly                          - A ~4 cm mass was identified in the uncinate process                          of the pancreas but only partially visualized from the                          gastric antrum. Fine needle biopsy performed. Unable                          to visualize SMA or SMV adjacent to mass. Celiac trunk                          uninvolved.                          - A few enlarged lymph nodes were visualized in the                          peripancreatic region that appeared reactive.                          - Evidence of biliary stent obstruction with upstream                          dilation and sludge "   Pathology: PENDING    7/11/2023: ERCP with Dr. Pickett  Impression:      - GJ tube temporarily repositioned to facilitate                          procedure                          - Severe duodenal stricture in D2 limiting                          visualization and scope positioning                          - Previously placed plastic stent was occluded likely                          due to distal impaction of the plastic stent against                          the edematous/strictured duodenal wall.                          - Left previously placed stent to serve as a guide for                          the rest of the ERCP. Large amount of pus and dark                          bile drained out of the biliary tree.                          - Cholangiogram showed distal biliary stricture and                          upstream sludge and dilation                          - Distal CBD stricture dilated to 4 mm alonside                          previously placed plastic stent                          - One 10 mm x 4 cm fully covered Viabil stent placed                          alongside previously placed plastic stent                          - One 10 Fr x 5 cm double pigtail Solus stent placed                          coaxially within the metal biliary stent to act as a                          bumper                          - Elected to leave the previously place plastic stent                          due to poor visualization and should add to drainage                          anyway     LABS:  [facility]  Recent Labs   Lab 07/14/23  0457 07/13/23  2359 07/13/23  1945 07/13/23  1729 07/13/23  0823 07/13/23  0447 07/12/23  0852 07/12/23  0547 07/11/23  1325 07/11/23  0904 07/11/23  0548   NA  --   --   --   --   --  140  --  136  --  138 138   POTASSIUM  --   --   --   --   --  4.0  --  4.0  --  3.7 4.0   CHLORIDE  --   --   --   --   --  104  --  100  --  102 103   DENISE  --   --   --   --   --  8.8  --  8.5*  --  8.7 8.8    CO2  --   --   --   --   --  27  --  26  --  26 24   BUN  --   --   --   --   --  9.3  --  15.8  --  10.8 9.7   CR  --   --   --   --   --  0.60*  --  0.60*  --  0.65* 0.56*   * 162* 211* 233*   < > 109*   < > 193*   < > 114* 112*    < > = values in this interval not displayed.     CBC  Recent Labs   Lab 07/13/23 0447 07/12/23  0547 07/11/23  0904 07/11/23  0548   WBC 5.9 5.4 5.7 5.3   RBC 3.31* 3.10* 3.08* 3.13*   HGB 10.0* 9.7* 9.7* 9.9*   HCT 32.3* 30.4* 30.1* 30.1*   MCV 98 98 98 96   MCH 30.2 31.3 31.5 31.6   MCHC 31.0* 31.9 32.2 32.9   RDW 14.5 14.6 14.7 14.6    334 270 274     INR  Recent Labs   Lab 07/13/23 0447 07/11/23  0904 07/11/23  0548 07/10/23  0840   INR 1.46* 1.67* 1.73* 1.96*     LFTs  Recent Labs   Lab 07/13/23 0447 07/12/23  0547 07/11/23  0904 07/11/23  0548   ALKPHOS 293* 322* 355* 356*   AST 39 49* 87* 84*   * 139* 179* 179*   BILITOTAL 3.9* 4.9* 9.0* 8.8*   PROTTOTAL 6.2* 5.7* 6.0* 6.0*   ALBUMIN 3.3* 2.9* 3.0* 3.1*      PANC  Recent Labs   Lab 07/09/23  0501 07/08/23  1137   LIPASE 34 38     IMAGING:  (Personally reviewed)    7/10/2023: MR ABDOMEN MRCP W/O & W CONTRAST  IMPRESSION:   1.  Ill-defined masslike soft tissue in the pancreatic head is  suspicious for malignancy given the biliary and pancreatic duct  obstruction as well as vascular encasement and narrowing of SMV,  although less likely pancreatitis may also produce similar findings.  Recommend EUS biopsy.  2.  Indeterminate mildly enlarged peripancreatic, periportal and  mesenteric lymph nodes.    7/8/2023: CT ABDOMEN AND PELVIS WITH CONTRAST  FINDINGS:   LOWER CHEST: Minimal dependent atelectasis versus less likely infiltrate.     HEPATOBILIARY: Biliary stent in good position with mild biliary dilatation and pneumobilia. There is a calcific density adjacent to the stent, it is unclear if this is a pancreatic calcification or a calcified gallstone. No focal liver lesions   demonstrated.     PANCREAS:  Pancreas mass in the head and body measuring approximately 6.3 x 3.9 cm. No definite pancreatic ductal dilatation.     SPLEEN: Normal size.     ADRENAL GLANDS: No significant nodules.     KIDNEYS/BLADDER: No significant mass, stone, or hydronephrosis.     BOWEL: No obstruction or inflammatory change.     LYMPH NODES: Mild peripancreatic adenopathy.     VASCULATURE: No abdominal aortic aneurysm.     PELVIC ORGANS: No pelvic masses.     MUSCULOSKELETAL: No frankly destructive bony lesions.                                                                    IMPRESSION:   1.  Pancreas mass without pancreatic ductal dilatation.  2.  Biliary stent in good position with mild pneumobilia.  3.  Question a calcific density adjacent to the distal biliary stent, it is unclear if this is a pancreatic calcification in the adjacent parenchyma versus a calcified gallstone.

## 2023-07-14 NOTE — PLAN OF CARE
"Goal Outcome Evaluation:    Care from 3:30 pm to 11 pm  Pt presented \"with worsening abdominal pain, increased jaundice, poor PO intake and weight loss admitted on 7/8/2023 for concern of biliary obstruction, biopsy of pancreatic mass returned positive for pancreatic adenocarcinoma on 7/12/23. \" per Md note    A&Ox4.AVSS. Sats high 90's on RA. GI placed gastrojejunostomy for duodenal obstruction  today .Pt reports mid, and bilateral abdominal pain that goes to his back rated 7/10; relief with PRN dilaudid 1 mg IV Q 2 hrs PRN. Pt requested to take po dilaudid and tylenol at bed time so that he can sleep. NPO, no tube feeds, GI team will reassess diet 7/14 in the AM per Md order.  G tube is clamped per Md order   Bs Q 4 hrs; on sliding scale and pt had lantus 12 units at 8 pm per endocrine as pt is DM 2 and pt  had dexamethasone and glucagon in OR  even though pt is NPO.LR is infusing at 75 ml/hr.  Uses urinal while in bed. No Bm this shift, last BM was this morning per pt report.  Continue with POC    "

## 2023-07-14 NOTE — PROGRESS NOTES
Time: 2330-0730  Neuro: A&Ox4. Calm and cooperative.  Pain: Abdominal pain managed with prn dilaudid with some relief.   Nausea: Denies N/V  Cardiac: WNL. Denies cardiac chest pain.  Respiratory: WNL. Denies SOB.   GI/:  Voids spontaneously AUOP. +BS, +flatus,   Diet: NPO, no tube feeds, GI team will reassess diet 7/14 in the AM  Lines: L PIV infusing LR at 75 ml/hr.   Skin: No acute change.  Drains: G-tube clamped. J-tube clamped.   Lab:  & 117.   Electrolyte Replacements: n/a  New changes this shift: No acute changes  Activity: Up ad nora.   Plan:  Continue POC.

## 2023-07-14 NOTE — DISCHARGE INSTRUCTIONS
"Gastroenterology Discharge Instructions:  -We will plan to have your liver function tests rechecked in 2 weeks after discharge and then our GI RN Care Coordinator will reach out with you to discuss the lab results, discuss how your pain is being managed and how your nutrition and TFs are going.  -Advance your diet as follows as we discussed: Liquids only 7/14,  If tolerates liquid diet then can advanced to soft diet 7/15.  If you tolerate the soft diet then advanced to regular diet on 7/16.  Continue to chew foods well, drink fluids after solids.    -Once tolerating diet beyond liquids, begin taking your Creon 24 (pancreatic enzymes supplements) with meals (3 capsules) and 1-2 with snacks to help you digest and absorb your nutrition.  -Weigh yourself at the same time every few days to make sure you are not losing a significant amount of weight as we transition you to a diet and off TF and to help make sure your pancreatic enzymes are working.  -If you develop any of the following or have questions or concerns, please contact your GI RNCC (Johanna) at [phone] or if after hours call [phone] to speak with a triage nurse:  Fevers over 101, +/- chills.  Significant nausea/vomiting causing inability to take in fluids depleting hydration.  Severe pain, ongoing symptoms (pain, nausea) that cannot be controlled with prescribed or over the counter medications.  Signs of dehydration (pale skin, low blood pressure, lightheadedness, dark urine, \"sticky\" skin when pinched, rapid heart rate, little to no urine output).       Tube feeds:  A) Sodium Bicarb tablet (325 mg), 1 tablet q 4 hrs via Jtube. Administration Instructions: Crush 1 tablet and mix into 15 ml of warm water and use this solution to mix with Creon pancreatic enzymes. DO NOT administer directly into Jtube (to be mixed into TF formula with Creon enzyme - see Creon enzyme order)   B) Creon 24, 1- 2 capsules q 4 hrs via Jtube. Administration Instructions: "   --If TF rate is running @ 10-50 ml/hr, administer 1 capsule q 4 hrs;   --If TF rate is running @ 60-90 ml/hr, increase to 2 capsules q 4 hrs.    **Open capsule and empty contents into 15 ml sodium bicarb solution (see sodium bicarb order), let dissolve for about 20-30 minutes and then add this solution to the amount of TF formula hung in TF bag every 4 hrs (i.e., once TF @ goal infusion 90 ml/hr will mix 2 capsules into 360 ml of TF formula every 4 hrs).       Endocrinology Discharge Plan              Lantus 20 U subcutaneous once daily in the morning              NPH 15 U subcutaneous once daily at night for as long as you continue     nightime tube feedings, once that is stopped, you can stop NPH insulin                                   Novolog 1 U per 8 gr of carbohydrates                            Novolog correction, to be administered if pre meal blood sugar is greater   than 140 on a sliding scale, providing 1 U of insulin for every 25 mg/dL             above 140 as follows              For Pre-Meal  - 164 give 1 unit.               For Pre-Meal  - 189 give 2 units.               For Pre-Meal  - 214 give 3 units.               For Pre-Meal  - 239 give 4 units.               For Pre-Meal  - 264 give 5 units.               For Pre-Meal  - 289 give 6 units.               For Pre-Meal  - 314 give 7 units.               For Pre-Meal  - 339 give 8 units.               For Pre-Meal  - 364 give 9 units.               For Pre-Meal BG greater than or equal to 365 give 10 units    Home TF support:  Please use Peptamen 1.5 @ goal of 90 mL/hr x 12 hrs 8pm-8am (1080ml/day)   Peptamen 1.5 @ goal (1080 ml/day) to provide 1620 kcals, 73 g PRO,  832 ml free H2O, 60 g Fat (70% from MCTs), 203 g CHO and no Fiber daily.     Water flushes: 120 ml before and after each cycle tube feeds + additional 120 ml free water flushes 4 times during the day to provide for lower end  hydration needs.    -

## 2023-07-14 NOTE — PLAN OF CARE
Goal Outcome Evaluation:      Plan of Care Reviewed With: patient    Overall Patient Progress: improvingOverall Patient Progress: improving    Outcome Evaluation: See RD progress note

## 2023-07-15 LAB
ALBUMIN SERPL BCG-MCNC: 3.2 G/DL (ref 3.5–5.2)
ALP SERPL-CCNC: 237 U/L (ref 40–129)
ALT SERPL W P-5'-P-CCNC: 119 U/L (ref 0–70)
ANION GAP SERPL CALCULATED.3IONS-SCNC: 9 MMOL/L (ref 7–15)
AST SERPL W P-5'-P-CCNC: 62 U/L (ref 0–45)
BILIRUB SERPL-MCNC: 2.8 MG/DL
BUN SERPL-MCNC: 8.9 MG/DL (ref 6–20)
CALCIUM SERPL-MCNC: 8.5 MG/DL (ref 8.6–10)
CHLORIDE SERPL-SCNC: 104 MMOL/L (ref 98–107)
CREAT SERPL-MCNC: 0.64 MG/DL (ref 0.67–1.17)
DEPRECATED HCO3 PLAS-SCNC: 25 MMOL/L (ref 22–29)
ERYTHROCYTE [DISTWIDTH] IN BLOOD BY AUTOMATED COUNT: 14.1 % (ref 10–15)
GFR SERPL CREATININE-BSD FRML MDRD: >90 ML/MIN/1.73M2
GLUCOSE BLDC GLUCOMTR-MCNC: 106 MG/DL (ref 70–99)
GLUCOSE BLDC GLUCOMTR-MCNC: 141 MG/DL (ref 70–99)
GLUCOSE BLDC GLUCOMTR-MCNC: 150 MG/DL (ref 70–99)
GLUCOSE BLDC GLUCOMTR-MCNC: 223 MG/DL (ref 70–99)
GLUCOSE BLDC GLUCOMTR-MCNC: 234 MG/DL (ref 70–99)
GLUCOSE BLDC GLUCOMTR-MCNC: 236 MG/DL (ref 70–99)
GLUCOSE BLDC GLUCOMTR-MCNC: 64 MG/DL (ref 70–99)
GLUCOSE BLDC GLUCOMTR-MCNC: 82 MG/DL (ref 70–99)
GLUCOSE BLDC GLUCOMTR-MCNC: 92 MG/DL (ref 70–99)
GLUCOSE BLDC GLUCOMTR-MCNC: 97 MG/DL (ref 70–99)
GLUCOSE SERPL-MCNC: 245 MG/DL (ref 70–99)
HCT VFR BLD AUTO: 31.7 % (ref 40–53)
HGB BLD-MCNC: 10.2 G/DL (ref 13.3–17.7)
MAGNESIUM SERPL-MCNC: 2 MG/DL (ref 1.7–2.3)
MCH RBC QN AUTO: 31.7 PG (ref 26.5–33)
MCHC RBC AUTO-ENTMCNC: 32.2 G/DL (ref 31.5–36.5)
MCV RBC AUTO: 98 FL (ref 78–100)
PLATELET # BLD AUTO: 347 10E3/UL (ref 150–450)
POTASSIUM SERPL-SCNC: 4.3 MMOL/L (ref 3.4–5.3)
PROT SERPL-MCNC: 5.9 G/DL (ref 6.4–8.3)
RBC # BLD AUTO: 3.22 10E6/UL (ref 4.4–5.9)
SODIUM SERPL-SCNC: 138 MMOL/L (ref 136–145)
WBC # BLD AUTO: 11.4 10E3/UL (ref 4–11)

## 2023-07-15 PROCEDURE — 250N000013 HC RX MED GY IP 250 OP 250 PS 637: Performed by: DIETITIAN, REGISTERED

## 2023-07-15 PROCEDURE — 83735 ASSAY OF MAGNESIUM: CPT | Performed by: STUDENT IN AN ORGANIZED HEALTH CARE EDUCATION/TRAINING PROGRAM

## 2023-07-15 PROCEDURE — 250N000013 HC RX MED GY IP 250 OP 250 PS 637: Performed by: INTERNAL MEDICINE

## 2023-07-15 PROCEDURE — 99232 SBSQ HOSP IP/OBS MODERATE 35: CPT | Mod: GC | Performed by: INTERNAL MEDICINE

## 2023-07-15 PROCEDURE — C9113 INJ PANTOPRAZOLE SODIUM, VIA: HCPCS | Mod: JZ | Performed by: INTERNAL MEDICINE

## 2023-07-15 PROCEDURE — 250N000011 HC RX IP 250 OP 636: Mod: JZ | Performed by: INTERNAL MEDICINE

## 2023-07-15 PROCEDURE — 36415 COLL VENOUS BLD VENIPUNCTURE: CPT | Performed by: STUDENT IN AN ORGANIZED HEALTH CARE EDUCATION/TRAINING PROGRAM

## 2023-07-15 PROCEDURE — 85014 HEMATOCRIT: CPT | Performed by: STUDENT IN AN ORGANIZED HEALTH CARE EDUCATION/TRAINING PROGRAM

## 2023-07-15 PROCEDURE — 250N000013 HC RX MED GY IP 250 OP 250 PS 637: Performed by: PHYSICIAN ASSISTANT

## 2023-07-15 PROCEDURE — 120N000002 HC R&B MED SURG/OB UMMC

## 2023-07-15 PROCEDURE — 250N000013 HC RX MED GY IP 250 OP 250 PS 637: Performed by: STUDENT IN AN ORGANIZED HEALTH CARE EDUCATION/TRAINING PROGRAM

## 2023-07-15 PROCEDURE — 999N000128 HC STATISTIC PERIPHERAL IV START W/O US GUIDANCE

## 2023-07-15 PROCEDURE — 250N000011 HC RX IP 250 OP 636: Performed by: STUDENT IN AN ORGANIZED HEALTH CARE EDUCATION/TRAINING PROGRAM

## 2023-07-15 PROCEDURE — 80053 COMPREHEN METABOLIC PANEL: CPT | Performed by: STUDENT IN AN ORGANIZED HEALTH CARE EDUCATION/TRAINING PROGRAM

## 2023-07-15 PROCEDURE — 99233 SBSQ HOSP IP/OBS HIGH 50: CPT | Performed by: STUDENT IN AN ORGANIZED HEALTH CARE EDUCATION/TRAINING PROGRAM

## 2023-07-15 RX ORDER — LEVOFLOXACIN 25 MG/ML
500 SOLUTION ORAL DAILY
Qty: 40 ML | Refills: 0 | Status: SHIPPED | OUTPATIENT
Start: 2023-07-15 | End: 2023-07-16

## 2023-07-15 RX ORDER — SODIUM BICARBONATE 325 MG/1
325 TABLET ORAL 3 TIMES DAILY
Qty: 90 TABLET | Refills: 0 | Status: SHIPPED | OUTPATIENT
Start: 2023-07-15 | End: 2023-07-16

## 2023-07-15 RX ORDER — POLYETHYLENE GLYCOL 3350 17 G/17G
17 POWDER, FOR SOLUTION ORAL DAILY
Qty: 510 G | Refills: 3 | Status: SHIPPED | OUTPATIENT
Start: 2023-07-15 | End: 2023-07-16

## 2023-07-15 RX ADMIN — ACETAMINOPHEN 650 MG: 325 SOLUTION ORAL at 11:25

## 2023-07-15 RX ADMIN — PANCRELIPASE 3 CAPSULE: 24000; 76000; 120000 CAPSULE, DELAYED RELEASE PELLETS ORAL at 08:09

## 2023-07-15 RX ADMIN — SENNOSIDES 5 ML: 8.8 LIQUID ORAL at 08:09

## 2023-07-15 RX ADMIN — PANCRELIPASE 2 CAPSULE: 24000; 76000; 120000 CAPSULE, DELAYED RELEASE PELLETS ORAL at 20:18

## 2023-07-15 RX ADMIN — HYDROMORPHONE HYDROCHLORIDE 0.5 MG: 1 INJECTION, SOLUTION INTRAMUSCULAR; INTRAVENOUS; SUBCUTANEOUS at 14:38

## 2023-07-15 RX ADMIN — DEXTROSE 15 G: 15 GEL ORAL at 15:19

## 2023-07-15 RX ADMIN — PANCRELIPASE 2 CAPSULE: 24000; 76000; 120000 CAPSULE, DELAYED RELEASE PELLETS ORAL at 04:00

## 2023-07-15 RX ADMIN — PANCRELIPASE 3 CAPSULE: 24000; 76000; 120000 CAPSULE, DELAYED RELEASE PELLETS ORAL at 13:22

## 2023-07-15 RX ADMIN — SODIUM BICARBONATE 325 MG: 325 TABLET ORAL at 04:00

## 2023-07-15 RX ADMIN — SENNOSIDES 5 ML: 8.8 LIQUID ORAL at 20:18

## 2023-07-15 RX ADMIN — HYDROMORPHONE HYDROCHLORIDE 1 MG: 1 INJECTION, SOLUTION INTRAMUSCULAR; INTRAVENOUS; SUBCUTANEOUS at 18:57

## 2023-07-15 RX ADMIN — HYDROMORPHONE HYDROCHLORIDE 4 MG: 1 SOLUTION ORAL at 04:09

## 2023-07-15 RX ADMIN — ACETAMINOPHEN 650 MG: 325 SOLUTION ORAL at 04:47

## 2023-07-15 RX ADMIN — HYDROMORPHONE HYDROCHLORIDE 1 MG: 1 INJECTION, SOLUTION INTRAMUSCULAR; INTRAVENOUS; SUBCUTANEOUS at 21:28

## 2023-07-15 RX ADMIN — PANCRELIPASE 2 CAPSULE: 24000; 76000; 120000 CAPSULE, DELAYED RELEASE PELLETS ORAL at 00:21

## 2023-07-15 RX ADMIN — ACETAMINOPHEN 650 MG: 325 SOLUTION ORAL at 17:53

## 2023-07-15 RX ADMIN — SODIUM BICARBONATE 325 MG: 325 TABLET ORAL at 20:18

## 2023-07-15 RX ADMIN — HYDROMORPHONE HYDROCHLORIDE 4 MG: 1 SOLUTION ORAL at 07:41

## 2023-07-15 RX ADMIN — LEVOFLOXACIN 500 MG: 25 SOLUTION ORAL at 15:50

## 2023-07-15 RX ADMIN — ONDANSETRON 4 MG: 2 INJECTION INTRAMUSCULAR; INTRAVENOUS at 15:50

## 2023-07-15 RX ADMIN — CHOLESTYRAMINE 4 G: 4 POWDER, FOR SUSPENSION ORAL at 21:31

## 2023-07-15 RX ADMIN — HYDROMORPHONE HYDROCHLORIDE 4 MG: 1 SOLUTION ORAL at 11:25

## 2023-07-15 RX ADMIN — CHOLESTYRAMINE 4 G: 4 POWDER, FOR SUSPENSION ORAL at 08:09

## 2023-07-15 RX ADMIN — HYDROMORPHONE HYDROCHLORIDE 4 MG: 1 SOLUTION ORAL at 17:53

## 2023-07-15 RX ADMIN — SODIUM BICARBONATE 325 MG: 325 TABLET ORAL at 00:21

## 2023-07-15 RX ADMIN — HYDROXYZINE HYDROCHLORIDE 25 MG: 10 SOLUTION ORAL at 18:00

## 2023-07-15 RX ADMIN — HYDROMORPHONE HYDROCHLORIDE 0.5 MG: 1 INJECTION, SOLUTION INTRAMUSCULAR; INTRAVENOUS; SUBCUTANEOUS at 09:53

## 2023-07-15 RX ADMIN — PANTOPRAZOLE SODIUM 40 MG: 40 INJECTION, POWDER, FOR SOLUTION INTRAVENOUS at 08:09

## 2023-07-15 RX ADMIN — Medication 50 MCG: at 08:10

## 2023-07-15 ASSESSMENT — ACTIVITIES OF DAILY LIVING (ADL)
ADLS_ACUITY_SCORE: 20

## 2023-07-15 NOTE — PLAN OF CARE
Goal Outcome Evaluation:    Temp: 98.3  F (36.8  C) Temp src: Oral BP: 116/79 Pulse: 83   Resp: 18 SpO2: 99 % O2 Device: None (Room air)      Shift: 6937-0833    Respiratory: WDL. 02 >99% on RA. Denies SOB on rest and exertion.   Cardiac: WDL. Denies cardiac chest pain.   Neuro: A&OX4. Pt able to make needs known.  GI/: Abdomen is slightly distended, soft, and tender. Voids spontaneously. +BS, but no BM on this shift. Last BM 7/13.   Diet: Clear liquid, pt tolerating diet without any N/V.   Skin: No new deficit noted.   Lines/drains: PIV SL.   Pain: Transverse abdominal and lower back pain managed with PRN oral and IV dilaudid and PRN tylenol.   Labs: Reviewed. No Pot and Mg replacement needed.   Activity: Independent. Pt ambulated in room and hallway.   Plan: Continue with POC.

## 2023-07-15 NOTE — PROGRESS NOTES
"VS /60 (BP Location: Right arm)   Pulse 87   Temp 98.5  F (36.9  C) (Oral)   Resp 16   Ht 1.829 m (6' 0.01\")   Wt 81.1 kg (178 lb 12.7 oz)   SpO2 97%   BMI 24.24 kg/m     Activity: IND  Neuros: A&O x4. Able to make needs known.  Cardiac: WDL. Denies cardiac chest pain.  Respiratory: WDL. RA. Denies SOB  GI/: Voiding spon. x1 BM this shift.  Diet: Full liquid. Cycled TF 8p-8a  Skin/Incisions: No new deficits noted  Lines/Drains: (L) PIV - SL. G tube- clamped. J-tube cycled TF & meds  Labs: BG 99, 236, 150  Pain/nausea: 4-6/10 pain. Managed w/ PRN dilaudid, PRN tylenol   New changes this shift: Pt refused NPH @ 2000 d/t 99 BG. Pt changed mind @ 9002. Writer discussed w/ charge RN and decided it was ok to admin.  "

## 2023-07-15 NOTE — PROGRESS NOTES
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      Assessment:     1.  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  Acute Hyperglycemia exacerbated by tube feeding and stress   3.  Anemia   4.  New dx of pancreatic adenocarcinoma      Plan:    Addm:   -  Lantus 12 unit(s), will transition to morning time administration and increase dose based on needs  -  NPH 15 unit(s) at 2000 at the start of TF   -  Resume Novolog 1 units per 12 grams carb for PO intake, meals/snacks    -  Novolog medium resistance sliding scale insulin - 0800/1200 and 1600 when TF off   -  Novolog high resistance sliding scale insulin - 2000/0000 and 0400 when TF on   -  BG q4h   -  Hypoglycemia protocol  -  PRN D10W revised to read-- Infuse IV D10W at 120 ml/h-- this replaces 75% of carbs in Vital 1.5 at 90 ml/h.  IF glucose trending >180, reduce rate to 95 ml/h.  If still trending >180, reduce rate to 70 ml/h.  - Education needs: assessment ongoing  - Outpatient diabetes follow up: TBD-- strongly recommend diabetes specialty care  - Diabetes service will continue to follow.  Please do not hesitate to contact the team with any questions/concerns.     - Please notify inpatient diabetes service if changes are planned that will impact glycemia, such as NPO, starting/adjusting  "steroids, enteral feeding, parenteral feeding, dextrose fluids or procedures.     Plan discussed patient/wife, with bedside RN/primary team via this note.         Interval History:     The last 24 hours progress and nursing notes reviewed.      He started soft diet and tolerated well.     No additional procedures planned at this time.     Continues with nighttime enteral nutrition      D5W-containing solutions/medications: TF continuously Vital 1.5 at 90 ml/hr - held, LR running           Nutrition:     Orders Placed This Encounter      Soft & Bite Sized Diet (level 6) Thin Liquids (level 0)    Tube Feeding:  Held until  then resume   TF to resume - cycle 12 hour  - 08 -  Vital 1.5 at 90 ml/hr         PTA Regimen:   lantus 8 units at HS  aspart prn - up to 40 units/day - using unknown scale and frequency, based on his cgm review  And home TF was Peptamin 1.5 at 90 ml/h           Review of Systems:   CC: deferred          Medications:   Steroid plannin mg in OR this AM        Physical Exam:   /64 (BP Location: Right arm)   Pulse 103   Temp 98.7  F (37.1  C) (Oral)   Resp 16   Ht 1.829 m (6' 0.01\")   Wt 81.1 kg (178 lb 12.7 oz)   SpO2 97%   BMI 24.24 kg/m      General:  NAD   HEENT:  NC/AT. MMM  Lungs:  unremarkable, no new cough, no SOB  ABD:  Rounded   Skin:  warm and dry, no obvious lesions  MSK:   moving all extremities  Mental Status:  Alert and oriented x3  Psych:   Cooperative, good eye contact, full/appropriate affect         Data:     Lab Results   Component Value Date    A1C 7.9 2023        CBC RESULTS: Recent Labs   Lab Test 23  0849   WBC 9.7   RBC 3.72*   HGB 11.4*   HCT 35.8*   MCV 96   MCH 30.6   MCHC 31.8   RDW 14.1   *     Recent Labs   Lab Test 23  0849 237 23     --   --  140   POTASSIUM 3.7  --   --  4.0   CHLORIDE 101  --   --  104   CO2 24  --   --  27   ANIONGAP 11  --   --  9   * 117*   < > " 109*   BUN 9.5  --   --  9.3   CR 0.61*  --   --  0.60*   DENISE 9.5  --   --  8.8    < > = values in this interval not displayed.     Liver Function Studies -   Recent Labs   Lab Test 07/10/23  0408   PROTTOTAL 5.7*   ALBUMIN 2.9*   BILITOTAL 8.7*   ALKPHOS 332*   AST 85*   *     Lab Results   Component Value Date    INR 1.96 07/10/2023    INR 1.78 07/09/2023    INR 1.37 07/08/2023     Acosta Murphy MD  Inpatient Diabetes Management Service  Pager - [phone]  Available on [employer]     To contact Endocrine Diabetes service:   From 7AM-5PM: page inpatient diabetes provider who is following the patient that day (see filed or incomplete progress notes/consult notes).  If uncertain of provider assignment: page job code [phone]. (To page job code in-house dial 3 stars, [phone] then enter number).  For questions or updates AFTER HOURS from 5PM-7AM: page the diabetes job code for on call fellow: [phone]    Please notify inpatient diabetes service if changes are planned to steroids, nutrition, or if procedures are planned requiring prolonged NPO status. Diabetes Management Team job code: [phone]    Attending tie-in note  I saw the patient with endocrine fellow Dr. Murphy and directly examined patient and discussed. Agree above note and plan.       April Sawant MD  Staff Physician  Endocrinology and Metabolism  [facility]  License: [id number]  Pager: [phone]

## 2023-07-15 NOTE — PROGRESS NOTES
[facility]    Medicine Progress Note - Hospitalist Service, GOLD TEAM 9    Date of Admission:  7/8/2023    Assessment & Plan   Gallo Navarro is a 57 year old male with recent diagnosis of acute pancreatitis 3/2023 c/b chronic pancreatitis with pancreatic mass causing duodenal stenosis with gastric outlet obstruction s/p GJ tube (placed 5/2023), biliary obstruction s/p stent placement on 7/7/23, pancreatic insufficiency, and IDDM2, who presents with worsening abdominal pain, increased jaundice, poor PO intake and weight loss admitted on 7/8/2023 for concern of biliary obstruction, biopsy of pancreatic mass returned positive for pancreatic adenocarcinoma on 7/12/23. Planning for discharge on 7/16.      Changes today:  - Resumed soft diet, if doing okay, advance to regular diet on 7/16  - levofloxacin for total 5 day course per GI (7/13-   - OK to continue tube feeds tonight 7/15, per GI, once discharged, could potentially think about removing in 1-2 weeks if tolerating Oral intake without issue.      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 Initially presented with sudden onset vomiting on 3/2023, found to have lipase >2000  but did not have active abdominal pain, with presumed dx of pancreatitis. Continued to have intractable bilious vomiting despite multiple admissions to the hospital, later found to have an acquired duodenal stricture with gastric outlet obstruction thought to be 2/2 pancreatic inflammation. Failed multiple PO trials and NG/NJ tubes, ultimately had PEGJ placed for gastric venting and nurition with unintentional weight loss of 40 lbs. Reports continued weight loss despite enteral tube feeds. Repeat CT imaging of abdomen on 6/2/2023 in follow-up of pancreatitis with note of more focal irregular hypodense masslike  region in the pancreatic head (2.2 x 1.5 x 1.4 cm) with persistent mild main pancreatic ductal dilatation, subcentimeter mid mesenteric lymph nodes, stable narrowing of the main portal vein near the portal venous confluence. EUS biopsy was performed on 6/27/23 showing duodenal inflammation. Unable to get biopsy of pancreatic mass due to poor window.  Patient then developed new abdominal pain since 6/27 after biopsy, bandlike, radiating to his back, with increase jaundice. 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 EGD/EUS completed 7/11 w/ biopsies taken > pathology report back on 7/12 +pancreatic adenocarcinoma.   - Panc/Bili consulted  - Nutrition - resume tube feeds on 7/14 PM per RD                - Peptamen 1.5, 90 ml/hr.  ml Q2H - once out of Peptamen, change to Vital 1.5 @ 90 ml/hr ( 2160 ml/day) to provide: 3240 kcals ( 40 kcal/kg), 145 g PRO ( 1.8 gm/kg), 1649 ml free H20, 404 gm CHO, and 12.3 gm soluble fiber daily.  - Pain management: tylenol 650 mg Q6H PRN, Dilaudid PO 4 mg Q3H PRN, IV dilaudid 0.5-1mg mg Q2H PRN breakthrough pain, OK to increase as needed.   - Bowel regimen with miralax, senna, and suppository PRN  - Atarax PRN itching   - Continue diet advancement as detailed above; GI following   - Surgical Oncology consulted   - they are helping to look into port placement while inpatient, IR has declined to do the procedure inpatient   - Oncology consulted   -CT chest w/ contrast completed 7/12    -oncology to arrange outpatient follow up for treatment initiation     #+staph epi x 1 blood culture  Pt remains clinically stable. Methicillin sensitive. Will monitor closely. Also growing staph. Capitis, likely all contaminants.   - repeat blood cultures x 7/11 NGTD  - Zosyn > Augmentin on 7/12, discontinued augmentin, started levofloxacin per GI total 5  day course (7/13-     # IDDM2 - Diagnosed after initial dx pancreatitis 3/2023 likely 2/2 pancreatitic insufficiency.   - Hemoglobin A1c 7.9 on admission  - Endocrine consult  - Lantus 12 units daily  -NPH at 15 units at 20:00  - Novolog sliding scale Q4H   - Outpatient diabetes follow up: TBD-- strongly recommend diabetes specialty care    # Acute on chronic anemia - H/H 8.8/27.1, down from one week ago on 7/3, patient was at H/H 11.1/33.2. No reported bleeding.   - Trend CBC  - Transfuse for hgb goal >7.0     # Malnutrition   - Nutrition consulted as noted above    # Itching  LIkely due to cholestasis  - hydroxyzine PRN  - cholestyramine BID        Diet: Adult Formula Drip Feeding: Continuous Vital 1.5; Jejunostomy; Goal Rate: 90 mL/hr x 12 hrs from 8pm-8am. Please adhere to 4-hr hangtime and see MAR for Creon/sodium bicarb administration instructions; mL/hr; From: 8:00 PM; To: 8:00 AM  Soft & Bite Sized Diet (level 6) Thin Liquids (level 0)    DVT Prophylaxis: Pneumatic Compression Devices   Adan Catheter: Not present  Lines: None     Cardiac Monitoring: None  Code Status: Full Code      Clinically Significant Risk Factors           # Hypercalcemia: corrected calcium is >10.1, will monitor as appropriate    # Hypoalbuminemia: Lowest albumin = 2.9 g/dL at 7/12/2023  5:47 AM, will monitor as appropriate  # Coagulation Defect: INR = 1.46 (Ref range: 0.85 - 1.15) and/or PTT = N/A, will monitor for bleeding            # Moderate Malnutrition: based on nutrition assessment         Disposition Plan           Clau Ray MD  Hospitalist Service, GOLD TEAM 9  [facility]  Securely message with [email or web address] (more info)  Text page via [facility] Paging/Directory   See signed in provider for up to date coverage information  ______________________________________________________________________    Interval History   Tolerated advancement of diet without much pain or issue. Agreeable to  continue advancing diet throughout day. Family visiting today. Discussed plan of discharging tmr, likely.     Physical Exam   Vital Signs: Temp: 98.7  F (37.1  C) Temp src: Oral BP: 103/64 Pulse: 103   Resp: 16 SpO2: 97 % O2 Device: None (Room air)    Weight: 178 lbs 12.69 oz    Gen: no acute distress, alert, interactive,answering questions appropriately. Improved jaundice  HEENT:minimal scleral icterus, EOMI  Nares: No discharge noted from nares bilaterally   CV: well perfused appearance  Pulm: breathing comfortably on RA   Abd:. PEG J site c/d/i, Gtube clamped.   Neuro: moving all extremities spontaneously    Medical Decision Making       Low Risk/Ambulatory with no VTE prophylaxis indicated and Pneumatic Compression Devices      Data     I have personally reviewed the following data over the past 24 hrs:    11.4 (H)  \   10.2 (L)   / 347     138 104 8.9 /  141 (H)   4.3 25 0.64 (L) \       ALT: 119 (H) AST: 62 (H) AP: 237 (H) TBILI: 2.8 (H)   ALB: 3.2 (L) TOT PROTEIN: 5.9 (L) LIPASE: N/A       Imaging results reviewed over the past 24 hrs:   No results found for this or any previous visit (from the past 24 hour(s)).

## 2023-07-16 VITALS
WEIGHT: 172.9 LBS | HEART RATE: 86 BPM | BODY MASS INDEX: 23.42 KG/M2 | HEIGHT: 72 IN | SYSTOLIC BLOOD PRESSURE: 119 MMHG | RESPIRATION RATE: 16 BRPM | DIASTOLIC BLOOD PRESSURE: 66 MMHG | TEMPERATURE: 98.3 F | OXYGEN SATURATION: 98 %

## 2023-07-16 LAB
ALBUMIN SERPL BCG-MCNC: 3.2 G/DL (ref 3.5–5.2)
ALP SERPL-CCNC: 243 U/L (ref 40–129)
ALT SERPL W P-5'-P-CCNC: 97 U/L (ref 0–70)
ANION GAP SERPL CALCULATED.3IONS-SCNC: 9 MMOL/L (ref 7–15)
AST SERPL W P-5'-P-CCNC: 36 U/L (ref 0–45)
BACTERIA BLD CULT: NO GROWTH
BACTERIA BLD CULT: NO GROWTH
BILIRUB SERPL-MCNC: 2.5 MG/DL
BUN SERPL-MCNC: 7.1 MG/DL (ref 6–20)
CALCIUM SERPL-MCNC: 8.8 MG/DL (ref 8.6–10)
CHLORIDE SERPL-SCNC: 103 MMOL/L (ref 98–107)
CREAT SERPL-MCNC: 0.61 MG/DL (ref 0.67–1.17)
DEPRECATED HCO3 PLAS-SCNC: 26 MMOL/L (ref 22–29)
ERYTHROCYTE [DISTWIDTH] IN BLOOD BY AUTOMATED COUNT: 14.4 % (ref 10–15)
GFR SERPL CREATININE-BSD FRML MDRD: >90 ML/MIN/1.73M2
GLUCOSE BLDC GLUCOMTR-MCNC: 124 MG/DL (ref 70–99)
GLUCOSE BLDC GLUCOMTR-MCNC: 152 MG/DL (ref 70–99)
GLUCOSE BLDC GLUCOMTR-MCNC: 244 MG/DL (ref 70–99)
GLUCOSE BLDC GLUCOMTR-MCNC: 278 MG/DL (ref 70–99)
GLUCOSE SERPL-MCNC: 309 MG/DL (ref 70–99)
HCT VFR BLD AUTO: 32.4 % (ref 40–53)
HGB BLD-MCNC: 10.2 G/DL (ref 13.3–17.7)
MAGNESIUM SERPL-MCNC: 2.1 MG/DL (ref 1.7–2.3)
MCH RBC QN AUTO: 31.5 PG (ref 26.5–33)
MCHC RBC AUTO-ENTMCNC: 31.5 G/DL (ref 31.5–36.5)
MCV RBC AUTO: 100 FL (ref 78–100)
PLATELET # BLD AUTO: 378 10E3/UL (ref 150–450)
POTASSIUM SERPL-SCNC: 4.8 MMOL/L (ref 3.4–5.3)
PROT SERPL-MCNC: 6.3 G/DL (ref 6.4–8.3)
RBC # BLD AUTO: 3.24 10E6/UL (ref 4.4–5.9)
SODIUM SERPL-SCNC: 138 MMOL/L (ref 136–145)
WBC # BLD AUTO: 9 10E3/UL (ref 4–11)

## 2023-07-16 PROCEDURE — 99239 HOSP IP/OBS DSCHRG MGMT >30: CPT | Performed by: STUDENT IN AN ORGANIZED HEALTH CARE EDUCATION/TRAINING PROGRAM

## 2023-07-16 PROCEDURE — 85027 COMPLETE CBC AUTOMATED: CPT | Performed by: STUDENT IN AN ORGANIZED HEALTH CARE EDUCATION/TRAINING PROGRAM

## 2023-07-16 PROCEDURE — 250N000011 HC RX IP 250 OP 636: Mod: JZ | Performed by: INTERNAL MEDICINE

## 2023-07-16 PROCEDURE — 80053 COMPREHEN METABOLIC PANEL: CPT | Performed by: STUDENT IN AN ORGANIZED HEALTH CARE EDUCATION/TRAINING PROGRAM

## 2023-07-16 PROCEDURE — 250N000011 HC RX IP 250 OP 636: Performed by: STUDENT IN AN ORGANIZED HEALTH CARE EDUCATION/TRAINING PROGRAM

## 2023-07-16 PROCEDURE — 250N000013 HC RX MED GY IP 250 OP 250 PS 637: Performed by: STUDENT IN AN ORGANIZED HEALTH CARE EDUCATION/TRAINING PROGRAM

## 2023-07-16 PROCEDURE — 250N000013 HC RX MED GY IP 250 OP 250 PS 637: Performed by: DIETITIAN, REGISTERED

## 2023-07-16 PROCEDURE — 36415 COLL VENOUS BLD VENIPUNCTURE: CPT | Performed by: STUDENT IN AN ORGANIZED HEALTH CARE EDUCATION/TRAINING PROGRAM

## 2023-07-16 PROCEDURE — 99232 SBSQ HOSP IP/OBS MODERATE 35: CPT | Mod: GC | Performed by: INTERNAL MEDICINE

## 2023-07-16 PROCEDURE — 83735 ASSAY OF MAGNESIUM: CPT | Performed by: STUDENT IN AN ORGANIZED HEALTH CARE EDUCATION/TRAINING PROGRAM

## 2023-07-16 PROCEDURE — 250N000013 HC RX MED GY IP 250 OP 250 PS 637: Performed by: INTERNAL MEDICINE

## 2023-07-16 PROCEDURE — 250N000013 HC RX MED GY IP 250 OP 250 PS 637: Performed by: PHYSICIAN ASSISTANT

## 2023-07-16 PROCEDURE — C9113 INJ PANTOPRAZOLE SODIUM, VIA: HCPCS | Mod: JZ | Performed by: INTERNAL MEDICINE

## 2023-07-16 RX ORDER — INSULIN GLARGINE 100 [IU]/ML
20 INJECTION, SOLUTION SUBCUTANEOUS EVERY MORNING
Qty: 18 ML | Refills: 3 | Status: SHIPPED | OUTPATIENT
Start: 2023-07-16 | End: 2023-12-08

## 2023-07-16 RX ORDER — HYDROMORPHONE HYDROCHLORIDE 2 MG/1
2 TABLET ORAL EVERY 6 HOURS PRN
Qty: 24 TABLET | Refills: 0 | Status: SHIPPED | OUTPATIENT
Start: 2023-07-16 | End: 2023-07-22

## 2023-07-16 RX ORDER — CHOLECALCIFEROL (VITAMIN D3) 50 MCG
1 TABLET ORAL DAILY
Qty: 30 TABLET | Refills: 1 | Status: SHIPPED | OUTPATIENT
Start: 2023-07-16 | End: 2023-10-04

## 2023-07-16 RX ORDER — LEVOFLOXACIN 25 MG/ML
500 SOLUTION ORAL DAILY
Qty: 40 ML | Refills: 0 | Status: SHIPPED | OUTPATIENT
Start: 2023-07-16 | End: 2023-08-07

## 2023-07-16 RX ORDER — SODIUM BICARBONATE 325 MG/1
325 TABLET ORAL 3 TIMES DAILY
Qty: 90 TABLET | Refills: 0 | Status: SHIPPED | OUTPATIENT
Start: 2023-07-16 | End: 2023-08-10

## 2023-07-16 RX ORDER — POLYETHYLENE GLYCOL 3350 17 G/17G
17 POWDER, FOR SOLUTION ORAL DAILY
Qty: 510 G | Refills: 3 | Status: SHIPPED | OUTPATIENT
Start: 2023-07-16 | End: 2023-12-08

## 2023-07-16 RX ADMIN — HYDROMORPHONE HYDROCHLORIDE 1 MG: 1 INJECTION, SOLUTION INTRAMUSCULAR; INTRAVENOUS; SUBCUTANEOUS at 10:49

## 2023-07-16 RX ADMIN — CHOLESTYRAMINE 4 G: 4 POWDER, FOR SUSPENSION ORAL at 07:52

## 2023-07-16 RX ADMIN — SODIUM BICARBONATE 325 MG: 325 TABLET ORAL at 04:01

## 2023-07-16 RX ADMIN — HYDROMORPHONE HYDROCHLORIDE 4 MG: 1 SOLUTION ORAL at 04:06

## 2023-07-16 RX ADMIN — HYDROMORPHONE HYDROCHLORIDE 0.5 MG: 1 INJECTION, SOLUTION INTRAMUSCULAR; INTRAVENOUS; SUBCUTANEOUS at 12:48

## 2023-07-16 RX ADMIN — ACETAMINOPHEN 650 MG: 325 SOLUTION ORAL at 13:58

## 2023-07-16 RX ADMIN — SENNOSIDES 5 ML: 8.8 LIQUID ORAL at 07:53

## 2023-07-16 RX ADMIN — PANCRELIPASE 3 CAPSULE: 24000; 76000; 120000 CAPSULE, DELAYED RELEASE PELLETS ORAL at 13:14

## 2023-07-16 RX ADMIN — ACETAMINOPHEN 650 MG: 325 SOLUTION ORAL at 07:52

## 2023-07-16 RX ADMIN — PANCRELIPASE 2 CAPSULE: 24000; 76000; 120000 CAPSULE, DELAYED RELEASE PELLETS ORAL at 00:19

## 2023-07-16 RX ADMIN — LEVOFLOXACIN 500 MG: 25 SOLUTION ORAL at 13:15

## 2023-07-16 RX ADMIN — HYDROMORPHONE HYDROCHLORIDE 1 MG: 1 INJECTION, SOLUTION INTRAMUSCULAR; INTRAVENOUS; SUBCUTANEOUS at 01:45

## 2023-07-16 RX ADMIN — HYDROMORPHONE HYDROCHLORIDE 4 MG: 1 SOLUTION ORAL at 07:52

## 2023-07-16 RX ADMIN — PANTOPRAZOLE SODIUM 40 MG: 40 INJECTION, POWDER, FOR SOLUTION INTRAVENOUS at 07:53

## 2023-07-16 RX ADMIN — Medication 50 MCG: at 07:52

## 2023-07-16 RX ADMIN — SODIUM BICARBONATE 325 MG: 325 TABLET ORAL at 00:19

## 2023-07-16 RX ADMIN — PANCRELIPASE 2 CAPSULE: 24000; 76000; 120000 CAPSULE, DELAYED RELEASE PELLETS ORAL at 04:01

## 2023-07-16 RX ADMIN — POLYETHYLENE GLYCOL 3350 17 G: 17 POWDER, FOR SOLUTION ORAL at 07:52

## 2023-07-16 RX ADMIN — ACETAMINOPHEN 650 MG: 325 SOLUTION ORAL at 00:22

## 2023-07-16 RX ADMIN — HYDROMORPHONE HYDROCHLORIDE 4 MG: 1 SOLUTION ORAL at 13:58

## 2023-07-16 RX ADMIN — PANCRELIPASE 3 CAPSULE: 24000; 76000; 120000 CAPSULE, DELAYED RELEASE PELLETS ORAL at 07:53

## 2023-07-16 RX ADMIN — HYDROMORPHONE HYDROCHLORIDE 4 MG: 1 SOLUTION ORAL at 00:22

## 2023-07-16 ASSESSMENT — ACTIVITIES OF DAILY LIVING (ADL)
ADLS_ACUITY_SCORE: 20

## 2023-07-16 NOTE — PROGRESS NOTES
"VS /71 (BP Location: Right arm)   Pulse 84   Temp 98.3  F (36.8  C) (Oral)   Resp 16   Ht 1.829 m (6' 0.01\")   Wt 78.4 kg (172 lb 14.4 oz)   SpO2 100%   BMI 23.44 kg/m     Activity: IND  Neuros: A&O x4. Able to make needs known.  Cardiac: WDL. Denies cardiac chest pain.  Respiratory: WDL. RA. Denies SOB  GI/: Voiding spon. x1 BM this shift.  Diet: Soft & Bite Sized diet. Cycled TF 8p-8a  Skin/Incisions: No new deficits noted  Lines/Drains: (L) PIV - SL. G tube- clamped. J-tube cycled TF & meds  Labs: , 278, 152  Pain/nausea: 4-6/10 pain. Managed w/ PRN dilaudid, PRN tylenol  "

## 2023-07-16 NOTE — PROGRESS NOTES
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      Assessment:     1.  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  Acute Hyperglycemia exacerbated by tube feeding and stress   3.  Anemia   4.  New dx of pancreatic adenocarcinoma      Plan:      - Discharge Plan   Lantus 20 U subcutaneous once daily in the morning   NPH 15 U subcutaneous once daily at night for as long as you continue  nightime tube feedings, once that is stopped, you can stop NPH insulin       Novolog 1 U per 8 gr of carbohydrates      Novolog correction, to be administered if pre meal blood sugar is greater  than 140 on a sliding scale, providing 1 U of insulin for every 25 mg/dL  above 140 as follows   For Pre-Meal  - 164 give 1 unit.    For Pre-Meal  - 189 give 2 units.    For Pre-Meal  - 214 give 3 units.    For Pre-Meal  - 239 give 4 units.    For Pre-Meal  - 264 give 5 units.    For Pre-Meal  - 289 give 6 units.    For Pre-Meal  - 314 give 7 units.    For Pre-Meal  - 339 give 8 units.    For Pre-Meal  - 364 give 9 units.    For Pre-Meal BG greater than or equal to 365 give 10 units             Interval History:     Has tolerated oral diet well. Some abdominal pain after a large lunch      D5W-containing  "solutions/medications: TF continuously Vital 1.5 at 90 ml/hr - held, LR running           Nutrition:     Orders Placed This Encounter      Soft & Bite Sized Diet (level 6) Thin Liquids (level 0)      Diet    TF to resume - cycle 12 hour 2000 - 0800 -  Vital 1.5 at 90 ml/hr         PTA Regimen:   lantus 8 units at HS  aspart prn - up to 40 units/day - using unknown scale and frequency, based on his cgm review  And home TF was Peptamin 1.5 at 90 ml/h           Review of Systems:   CC: deferred          Medications:          Physical Exam:   /69 (BP Location: Right arm)   Pulse 92   Temp 98.5  F (36.9  C) (Oral)   Resp 18   Ht 1.829 m (6' 0.01\")   Wt 78.4 kg (172 lb 14.4 oz)   SpO2 97%   BMI 23.44 kg/m    General:  NAD   Mental status: fully alert, awake  Face: normal color  Leg: no swelling         Data:     Lab Results   Component Value Date    A1C 7.9 07/08/2023          Acosta Murphy MD  Endocrinology Fellow    Attending tie-in note  I saw the patient with endocrine fellow Dr. Murphy and directly examined patient and discussed. Agree above note and plan.       April Sawant MD  Staff Physician  Endocrinology and Metabolism  [facility]  License: [id number]  Pager: [phone]      "

## 2023-07-16 NOTE — PLAN OF CARE
Goal Outcome Evaluation:    Temp: 98.3  F (36.8  C) Temp src: Oral BP: 125/80 Pulse: 88   Resp: 14 SpO2: 99 % O2 Device: None (Room air)      Shift: 8301-0629  Respiratory: WDL. 02 >99% on RA. Denies SOB on rest and exertion.   Cardiac: WDL. Denies cardiac chest pain.   Neuro: A&OX4. Pt able to make needs known.  GI/: Abdomen is slightly distended, soft, and tender. Voids spontaneously. +BS, but no BM on this shift. Last BM 7/14.   Diet: Soft bites, thin liquids. Pt c/o of abdominal fullness and discomfort shortly after lunch. Pt also c/o of nausea and had dry heaves, IV Zofran given X1.   Skin: No new deficit noted. Some swollen in BLE.   Lines/drains: PIV SL.   Pain: Transverse abdominal and lower back pain managed with PRN oral and IV dilaudid and PRN tylenol.   Labs: Reviewed. No Pot and Mg replacement needed. BG dropped to 64 at 1430. BG stablized to 106 after pt drank apple juice and took bites of the glucosne gel.   Activity: Independent. Pt ambulated in room and hallway.   Plan: Continue with POC.

## 2023-07-16 NOTE — PROGRESS NOTES
Nutrition Brief - contacted by provider regarding patient/ family request to see RD    Nutrition:  Diet: Soft & Bite Sized diet.   EN: Cycled TF with Vital 1.5 from 8p-8am ( G tube- clamped. J-tube cycled TF & meds)  PERT: Creon 15437 with bicarb mixture every 4 hours with TF support + oral creon    Labs noted: Fecal elastase 0.5 ( severe exocrine insufficiency)  Endocrine: On Lantus at HS and sliding scale insulin    GI/stool: x1 (7/15), x1 (7/14). Per patient, having daily BM.     Interventions:  Visited with patient today.  1. EN: patient would like to resume home TF support of Peptamen 1.5 at discharge. He is being discharged today ( wrote discharge instruction for peptamen 1.5) and discussed with [facility].   2. PERT: patient will be on creon / bicarb solution with EN support. Patient to request nursing to show the process of mixing the enzyme with bicarb prior to discharge. Patient in agreement.  3. On oral diet soft / bite sized. Patient was wondering ways to increase kcal/protein needs. Encouraged oral nutrition supplements ( ensure max up to 2-3 per day to optimize protein provision). Along with small frequent meals.     If patient with ongoing wt loss, would suggest to switch tube feed back to continuous  rate.       Andrea Neville RD/ARGELIA  Weekend Pager [phone]

## 2023-07-17 ENCOUNTER — PATIENT OUTREACH (OUTPATIENT)
Dept: GASTROENTEROLOGY | Facility: CLINIC | Age: 58
End: 2023-07-17
Payer: COMMERCIAL

## 2023-07-17 ENCOUNTER — HOME INFUSION (PRE-WILLOW HOME INFUSION) (OUTPATIENT)
Dept: PHARMACY | Facility: CLINIC | Age: 58
End: 2023-07-17
Payer: COMMERCIAL

## 2023-07-17 ENCOUNTER — TELEPHONE (OUTPATIENT)
Dept: ONCOLOGY | Facility: CLINIC | Age: 58
End: 2023-07-17
Payer: COMMERCIAL

## 2023-07-17 ENCOUNTER — TELEPHONE (OUTPATIENT)
Facility: CLINIC | Age: 58
End: 2023-07-17
Payer: COMMERCIAL

## 2023-07-17 ENCOUNTER — TRANSCRIBE ORDERS (OUTPATIENT)
Dept: OTHER | Age: 58
End: 2023-07-17

## 2023-07-17 DIAGNOSIS — C25.0 MALIGNANT NEOPLASM OF HEAD OF PANCREAS (H): Primary | ICD-10-CM

## 2023-07-17 DIAGNOSIS — Z98.890 HISTORY OF BILIARY STENT INSERTION: ICD-10-CM

## 2023-07-17 DIAGNOSIS — K86.89 PANCREATIC MASS: ICD-10-CM

## 2023-07-17 DIAGNOSIS — K31.5 DUODENAL STRICTURE: ICD-10-CM

## 2023-07-17 DIAGNOSIS — R10.84 ABDOMINAL PAIN, GENERALIZED: ICD-10-CM

## 2023-07-17 RX ORDER — ATROPINE SULFATE 0.4 MG/ML
0.4 AMPUL (ML) INJECTION
Status: CANCELLED | OUTPATIENT
Start: 2023-07-17

## 2023-07-17 RX ORDER — EPINEPHRINE 1 MG/ML
0.3 INJECTION, SOLUTION, CONCENTRATE INTRAVENOUS EVERY 5 MIN PRN
Status: CANCELLED | OUTPATIENT
Start: 2023-07-17

## 2023-07-17 RX ORDER — LORAZEPAM 2 MG/ML
0.5 INJECTION INTRAMUSCULAR EVERY 4 HOURS PRN
Status: CANCELLED | OUTPATIENT
Start: 2023-07-17

## 2023-07-17 RX ORDER — DIPHENHYDRAMINE HYDROCHLORIDE 50 MG/ML
50 INJECTION INTRAMUSCULAR; INTRAVENOUS
Status: CANCELLED
Start: 2023-07-17

## 2023-07-17 RX ORDER — ONDANSETRON 2 MG/ML
8 INJECTION INTRAMUSCULAR; INTRAVENOUS ONCE
Status: CANCELLED | OUTPATIENT
Start: 2023-07-17

## 2023-07-17 RX ORDER — ALBUTEROL SULFATE 0.83 MG/ML
2.5 SOLUTION RESPIRATORY (INHALATION)
Status: CANCELLED | OUTPATIENT
Start: 2023-07-17

## 2023-07-17 RX ORDER — MEPERIDINE HYDROCHLORIDE 25 MG/ML
25 INJECTION INTRAMUSCULAR; INTRAVENOUS; SUBCUTANEOUS EVERY 30 MIN PRN
Status: CANCELLED | OUTPATIENT
Start: 2023-07-17

## 2023-07-17 RX ORDER — HEPARIN SODIUM,PORCINE 10 UNIT/ML
5-20 VIAL (ML) INTRAVENOUS DAILY PRN
Status: CANCELLED | OUTPATIENT
Start: 2023-08-01

## 2023-07-17 RX ORDER — HEPARIN SODIUM,PORCINE 10 UNIT/ML
5-20 VIAL (ML) INTRAVENOUS DAILY PRN
Status: CANCELLED | OUTPATIENT
Start: 2023-07-17

## 2023-07-17 RX ORDER — HEPARIN SODIUM (PORCINE) LOCK FLUSH IV SOLN 100 UNIT/ML 100 UNIT/ML
5 SOLUTION INTRAVENOUS
Status: CANCELLED | OUTPATIENT
Start: 2023-08-01

## 2023-07-17 RX ORDER — ALBUTEROL SULFATE 90 UG/1
1-2 AEROSOL, METERED RESPIRATORY (INHALATION)
Status: CANCELLED
Start: 2023-07-17

## 2023-07-17 RX ORDER — HEPARIN SODIUM (PORCINE) LOCK FLUSH IV SOLN 100 UNIT/ML 100 UNIT/ML
5 SOLUTION INTRAVENOUS
Status: CANCELLED | OUTPATIENT
Start: 2023-07-17

## 2023-07-17 NOTE — DISCHARGE SUMMARY
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            Unresulted Labs Ordered in the Past 30 Days of this Admission     No orders found from 6/8/2023 to 7/9/2023.        Discharge Disposition   Discharged to home  Condition at discharge: Stable    Hospital Course   Gallo Navarro is a 57 year old male with recent diagnosis of acute pancreatitis 3/2023 c/b chronic pancreatitis with pancreatic mass causing duodenal stenosis with gastric outlet obstruction s/p GJ tube (placed 5/2023), biliary obstruction s/p stent placement on 7/7/23, pancreatic insufficiency, and IDDM2, who presented with worsening abdominal pain, increased jaundice, poor PO intake and weight loss admitted on 7/8/2023 for concern of biliary obstruction. Unfortunately, during this admission, biopsy of pancreatic mass returned positive for pancreatic adenocarcinoma on 7/12/23.     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 Initially presented with  sudden onset vomiting on 3/2023, found to have lipase >2000  but did not have active abdominal pain, with presumed dx of pancreatitis. Continued to have intractable bilious vomiting despite multiple admissions to the hospital, later found to have an acquired duodenal stricture with gastric outlet obstruction thought to be 2/2 pancreatic inflammation. Failed multiple PO trials and NG/NJ tubes, ultimately had PEGJ placed for gastric venting and nurition with unintentional weight loss of 40 lbs. Reports continued weight loss despite enteral tube feeds. Repeat CT imaging of abdomen on 6/2/2023 in follow-up of pancreatitis with note of more focal irregular hypodense masslike region in the pancreatic head (2.2 x 1.5 x 1.4 cm) with persistent mild main pancreatic ductal dilatation, subcentimeter mid mesenteric lymph nodes, stable narrowing of the main portal vein near the portal venous confluence. EUS biopsy was performed on 6/27/23 showing duodenal inflammation. Unable to get biopsy of pancreatic mass due to poor window.  Patient then developed new abdominal pain since 6/27 after biopsy, bandlike, radiating to his back, with increase jaundice. 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 EGD/EUS completed 7/11 w/ biopsies taken > pathology report back on 7/12 +pancreatic adenocarcinoma.   - Panc/Bili consulted and followed during the admission as above   GI Follow up:  -Recheck LFTs in 2 weeks post discharge to evaluate trends.  -Arrange for GI [facility] phone follow up with patient to follow up on the following:               --Labs: LFT check to evaluate labs for need to consider repeat ERCP (Dr. Pickett)               --Pain for approp to consider EUS/celiac neurolysis (Dr. York))               --Nutrition: PO intake progress/TF for approp to schedule for removal of  PEG-J.  -discharged to finished 5 day total course of levofloxacin (end date 7/17)  - Progressed to soft diet by day of discharge and instructed to continue to advance as tolerated to full diet. He was also instructed to continue night time tube feeds  Peptamen 1.5 at discharge @ goal of 90 mL/hr x 12 hrs 8pm-8am (1080ml/day)   Patient will be on creon / bicarb solution with EN support  Water flushes: 120 ml before and after each cycle tube feeds + additional 120 ml free water flushes 4 times during the day to provide for lower end hydration needs.  - pain well controlled w/ PO tylenol and a prescription of dilaudid was provided on discharge  - Bowel regimen with miralax, senna, and suppository PRN  - Surgical Oncology consulted   - newly diagnosed pancreatic cancer involves critical vascular structures   -He has encasement of the superior mesenteric artery as well as abutment of his portal vein.   -no surgical intervention is planned currently  - Oncology consulted   -attempted to place port while inpatient, but IR was unable to complete inpatient   -oncology to arrange outpatient follow up for treatment initiation     #+staph epi x 1 blood culture - contaminant  - repeat blood cultures x 7/11 NGTD    # IDDM2 - Diagnosed after initial dx pancreatitis 3/2023 likely 2/2 pancreatitic insufficiency.   - Hemoglobin A1c 7.9 on admission   - Discharge Plan              Lantus 20 U subcutaneous once daily in the morning              NPH 15 U subcutaneous once daily at night for as long as you continue     nightime tube feedings, once that is stopped, you can stop NPH insulin                                   Novolog 1 U per 8 gr of carbohydrates                            Novolog correction, to be administered if pre meal blood sugar is greater   than 140 on a sliding scale, providing 1 U of insulin for every 25 mg/dL             above 140 as follows              For Pre-Meal  - 164 give 1 unit.               For  Pre-Meal  - 189 give 2 units.               For Pre-Meal  - 214 give 3 units.               For Pre-Meal  - 239 give 4 units.               For Pre-Meal  - 264 give 5 units.               For Pre-Meal  - 289 give 6 units.               For Pre-Meal  - 314 give 7 units.               For Pre-Meal  - 339 give 8 units.               For Pre-Meal  - 364 give 9 units.               For Pre-Meal BG greater than or equal to 365 give 10 units   - Outpatient diabetes follow up: Diabetes Educator Referral placed    # Acute on chronic anemia - remained stable  # Malnutrition   - Nutrition consulted    # Itching  LIkely due to cholestasis, improved w/ stent placement    Consultations This Hospital Stay   GI PANCREATICOBILIARY ADULT IP CONSULT  ENDOCRINE DIABETES ADULT IP CONSULT  NUTRITION SERVICES ADULT IP CONSULT  PHARMACY IP CONSULT  ONCOLOGY ADULT IP CONSULT  PHARMACY IP CONSULT  ONCOLOGY ADULT IP CONSULT  NURSING TO CONSULT FOR VASCULAR ACCESS CARE IP CONSULT  PHARMACY IP CONSULT  CARE MANAGEMENT / SOCIAL WORK IP CONSULT  ONCOLOGY ADULT IP CONSULT  SURGICAL ONCOLOGY ADULT IP CONSULT  PALLIATIVE CARE ADULT IP CONSULT  CHILD FAMILY LIFE IP CONSULT  INTERVENTIONAL RADIOLOGY ADULT/PEDS IP CONSULT  NURSING TO CONSULT FOR VASCULAR ACCESS CARE IP CONSULT  INTERVENTIONAL RADIOLOGY ADULT/PEDS IP CONSULT    Code Status   Prior    Time Spent on this Encounter   I, Clau Ray MD, personally saw the patient today and spent greater than 30 minutes discharging this patient.       Clau Ray MD  [facility] [address] [place] [id number]  Phone: [phone]  ______________________________________________________________________    Physical Exam   Vital Signs: Temp: 98.3  F (36.8  C) Temp src: Oral BP: 119/66 Pulse: 86   Resp: 16 SpO2: 98 % O2 Device: None (Room air)    Weight: 172 lbs 14.4 oz     Gen: no acute distress, alert, interactive,answering  questions appropriately. Improved jaundice  HEENT:minimal scleral icterus, EOMI  Nares: No discharge noted from nares bilaterally   CV: well perfused appearance  Pulm: breathing comfortably on RA   Abd:. PEG J site c/d/i, Gtube clamped.   Neuro: moving all extremities spontaneously          Primary Care Physician   [facility]    Discharge Orders      Palliative Care Referral      Home Infusion Referral      Diabetes Educator Referral      Reason for your hospital stay    You were admitted to the hospital and unfortunately found to have a new diagnosis of pancreatic adenocarcinoma     Activity    Your activity upon discharge: activity as tolerated     Follow Up and recommended labs and tests    Follow up with Gi and Oncology, they will arrange follow up in their clinic    A referral to new primary care provider in the [facility] has been sent.     Diet    Follow this diet upon discharge:  Regular diet as tolerated    Tube feeds: Vital 1.5 Cuate via J-tube @ goal of 90 mL/hr x 12 hrs 8pm-8am       Significant Results and Procedures   Most Recent 3 CBC's:  Recent Labs   Lab Test 07/16/23  0827 07/15/23  0725 07/14/23  0849   WBC 9.0 11.4* 9.7   HGB 10.2* 10.2* 11.4*    98 96    347 498*     Most Recent 2 LFT's:  Recent Labs   Lab Test 07/16/23  0827 07/15/23  0725   AST 36 62*   ALT 97* 119*   ALKPHOS 243* 237*   BILITOTAL 2.5* 2.8*   ,   Results for orders placed or performed during the hospital encounter of 07/08/23   CT Abdomen Pelvis w Contrast    Narrative    EXAM: CT ABDOMEN AND PELVIS WITH CONTRAST  LOCATION: [facility]  DATE: 07/08/2023    INDICATION: Patient with pancreatic mass and recently placed biliary stent, now with worsening abdominal pain.  COMPARISON: None.  TECHNIQUE: CT scan of the abdomen and pelvis was performed following injection of IV contrast. Multiplanar reformats were obtained. Dose reduction techniques were  used.  CONTRAST: 77 mL isovue 370.    FINDINGS:   LOWER CHEST: Minimal dependent atelectasis versus less likely infiltrate.    HEPATOBILIARY: Biliary stent in good position with mild biliary dilatation and pneumobilia. There is a calcific density adjacent to the stent, it is unclear if this is a pancreatic calcification or a calcified gallstone. No focal liver lesions   demonstrated.    PANCREAS: Pancreas mass in the head and body measuring approximately 6.3 x 3.9 cm. No definite pancreatic ductal dilatation.    SPLEEN: Normal size.    ADRENAL GLANDS: No significant nodules.    KIDNEYS/BLADDER: No significant mass, stone, or hydronephrosis.    BOWEL: No obstruction or inflammatory change.    LYMPH NODES: Mild peripancreatic adenopathy.    VASCULATURE: No abdominal aortic aneurysm.    PELVIC ORGANS: No pelvic masses.    MUSCULOSKELETAL: No frankly destructive bony lesions.      Impression    IMPRESSION:   1.  Pancreas mass without pancreatic ductal dilatation.  2.  Biliary stent in good position with mild pneumobilia.  3.  Question a calcific density adjacent to the distal biliary stent, it is unclear if this is a pancreatic calcification in the adjacent parenchyma versus a calcified gallstone.       MR Abdomen MRCP w/o & w Contrast    Narrative    Exam: MR ABDOMEN MRCP W/O & W CONTRAST, 7/10/2023 1:01 PM    Indication: Pancreatitis, now with pancreatic head mass    Comparison: CT abdomen 7/8/2023    Technique: Images were acquired with and without intravenous  gadolinium contrast through the upper abdomen. The following MR images  were acquired without intravenous contrast: TrueFISP, multiplanar  T2-weighted, axial T1 in/out of phase, T2-weighted MRCP images, axial  diffusion-weighted and axial apparent diffusion coefficient.  T1-weighted images were obtained before contrast at the multiple time  points following contrast injection. 3-D reformatted images were  generated by the technologist. Contrast dose: 8mL  Gadavist    FINDINGS:    Gallbladder/Biliary Tree: There is a stent within the CBD extending to  the duodenum. There is internal and extrahepatic biliary dilation. The  common bile duct measuring up to 15 mm in diameter with abrupt  narrowing at the pancreatic head. The main pancreatic duct is also  dilated and tortuous with change in caliber the pancreatic head.  Dependent sludge is noted in the gallbladder and CBD.    Pancreas: There is an ill-defined masslike soft tissue in the  pancreatic head with restricted diffusion measuring approximately 3.1  x 2.8 cm (series 103, image 10). Fat stranding surrounding the lesion  encases the SMV and SMV with focal narrowing of the SMV.    Liver: No focal hepatic mass. Heterogenous arterial phase enhancement  does not persist on the delayed phase, likely representing transient  perfusion differences. There is no T2 hyperintensity to suggest  cholangitis.    Spleen: Normal size. No focal lesions.    Kidneys: No hydronephrosis or mass.    Adrenal glands: Normal.    Bowel: Gastrojejunostomy tube in the stomach with tip in the proximal  jejunum. There is edema of the second and third portions of the  duodenum. Visualized small and large bowel loops are normal in  caliber.    Lymph nodes: Mildly enlarged peripancreatic and periportal lymph nodes  are present, including 1.3 cm common hepatic node.    Blood vessels: No aortic aneurysm.    Lung bases: Unremarkable.    Bones and soft tissues: No suspicious osseous lesion.    Mesentery and abdominal wall: Mesenteric edema noted.    Ascites: Absent.      Impression    IMPRESSION:   1.  Ill-defined masslike soft tissue in the pancreatic head is  suspicious for malignancy given the biliary and pancreatic duct  obstruction as well as vascular encasement and narrowing of SMV,  although less likely pancreatitis may also produce similar findings.  Recommend EUS biopsy.  2.  Indeterminate mildly enlarged peripancreatic, periportal  and  mesenteric lymph nodes.    FABIANO YOUNG MD         SYSTEM ID:  [id number]   XR Surgery KIEL G/T 5 Min Fluoro w Stills    Narrative    This exam was marked as non-reportable because it will not be read by a   radiologist or a [facility] non-radiologist provider.         CT Chest w Contrast    Narrative    CT CHEST W CONTRAST 7/12/2023 7:35 PM    History: new pancreatic adenocarcinoma, per hem/onc, staging    Comparison: CT 7/8/2023.    Technique: CT of the chest was obtained withintravenous contrast.  Axial, coronal, and sagittal reconstructions were obtained and  reviewed    Contrast: 80 mL Isovue-370    Findings:     Lungs: No pneumothorax or pleural effusion. Discoid subsegmental  atelectasis in the right middle lobe, lingula, as well as the lower  lobes. Subpleural 7 mm oval pulmonary nodule in the paramediastinal  left upper lobe (series 4 image 151). No dense airspace consolidation.  Airways: Central tracheobronchial tree is clear.  Vessels: Main pulmonary artery and aorta are normal in caliber. Normal  three-vessel arch  Heart: Heart size is normal without pericardial effusion  Lymph nodes: No suspicious mediastinal or hilar lymphadenopathy.  Thyroid: Within normal limits.  Esophagus: Within normal limits    Upper abdomen: Partially visualized percutaneous gastrojejunostomy  tube. Biliary stents and ill-defined pancreatic head mass with  peripancreatic and gastrohepatic lymphadenopathy, better characterized  on 7/10/2023 MRI. Trace pneumobilia.    Bones and soft tissues: Similar ill-defined sclerotic area in the  right aspect of the T10 vertebral body (series 5 image 44). Mild  spinal spondylosis.. No suspicious soft tissue lesion.      Impression    Impression:   1. A 7 mm pulmonary nodule in the left upper lobe which has not been  imaged previously is indeterminate. Recommend 3-6 month follow-up.  2. Partially visualized ill-defined pancreatic head mass with upper  abdominal lymphadenopathy. Biliary  drains and percutaneous  gastrojejunostomy tube.  3. No acute pathology within the chest.    I have personally reviewed the examination and initial interpretation  and I agree with the findings.    PAULO KILLIAN DO         SYSTEM ID:  [id number]   XR Surgery KIEL Fluoro Less Than 5 Min w Stills    Narrative    This exam was marked as non-reportable because it will not be read by a   radiologist or a [facility] non-radiologist provider.               Discharge Medications   Discharge Medication List as of 7/16/2023  3:00 PM      START taking these medications    Details   acetaminophen (TYLENOL) 32 mg/mL liquid Take 10.156 mLs (325 mg) by mouth every 4 hours as needed for fever or mild pain, Disp-236 mL, R-1, E-Prescribe      insulin  UNIT/ML injection Inject 15 Units Subcutaneous every evening for 60 days, Disp-9 mL, R-0, E-Prescribe      vitamin D3 (CHOLECALCIFEROL) 50 mcg (2000 units) tablet Take 1 tablet (50 mcg) by mouth daily, Disp-30 tablet, R-1, E-Prescribe         CONTINUE these medications which have CHANGED    Details   HYDROmorphone (DILAUDID) 2 MG tablet Take 1 tablet (2 mg) by mouth every 6 hours as needed for severe pain, Disp-24 tablet, R-0, E-Prescribe      insulin aspart (NOVOLOG PEN) 100 UNIT/ML pen Inject 1-10 Units Subcutaneous 3 times daily (with meals), Disp-15 mL, R-3, E-Prescribe      insulin glargine (BASAGLAR KWIKPEN) 100 UNIT/ML pen Inject 20 Units Subcutaneous every morning for 360 days, Disp-18 mL, R-3, E-PrescribeIf Basaglar is not covered by insurance, may substitute Lantus or Semglee or other insulin glargine product per insurance preference at same dose and frequency.        levofloxacin (LEVAQUIN) 25 MG/ML solution 20 mLs (500 mg) by Per J Tube route daily, Disp-40 mL, R-0, E-Prescribe      lipase-protease-amylase (CREON 24) 24893-22899-610792 units CPEP per EC capsule 1-2 capsules by Per J Tube route 3 times daily (with meals), Disp-180 capsule, R-0, E-PrescribeThree times daily  with meals and also with overnight tube feeds (nightly)      polyethylene glycol (MIRALAX) 17 GM/Dose powder Take 17 g by mouth daily, Disp-510 g, R-3, E-Prescribe      sennosides (SENOKOT) 8.8 MG/5ML syrup 5 mLs by Per J Tube route 2 times daily, Disp-236 mL, R-3, E-Prescribe      sodium bicarbonate 325 MG tablet 1 tablet (325 mg) by Per J Tube route 3 times daily, Disp-90 tablet, R-0, E-Prescribe         CONTINUE these medications which have NOT CHANGED    Details   blood glucose (FREESTYLE TEST STRIPS) test strip by Other route as needed, Historical      hydrOXYzine (VISTARIL) 25 MG capsule Take 25 mg by mouth, Historical           Allergies   No Known Allergies

## 2023-07-17 NOTE — TELEPHONE ENCOUNTER
Called pt to discuss discharge follow up, request for LFT's     Eating, with advise of dietician, slowly. Tried oatmeal and hard boiled eggs. Does feel full depending on what he eats. Is taking enzymes (1-2 depending on the meal) and bicarb. Mixing with the tube feeds as well, running over 12 hours from 7p-7a. Filled out the application for Relizorb prior to discharge yesterday but has not been approved.     Follow up plan:   -Primary [facility] GI MD: Dr. York   -Repeat imaging/labs: LFTs in 2 weeks   -Can you please do a follow up phone visit initially with patient following LFTs for the following:   --LFTs to check on labs improving (versus need to discuss with Dr. York/Dr. Pickett for repeat ERCP if not improving)   --Pain control adequate (versus need to set up for neurolysis with Dr. York)   --Nutrition - if tolerating solid diet, if RD from home infusion thinks nutrition improved for ability to schedule for PEG-J removal.     Arranged virtual visit on 8/2/23 at 8am, pt will have LFT's drawn on 7/31/23, added to appt already made for Dr Haile's labs.  Pt has contact in case of questions.    Rosalia Cordova, RN, BSN,   [facility]  Care coordinator

## 2023-07-17 NOTE — PLAN OF CARE
Goal Outcome Evaluation:    Temp: 98.3  F (36.8  C) Temp src: Oral BP: 119/66 Pulse: 86   Resp: 16 SpO2: 98 % O2 Device: None (Room air)      Shift: [id number]    Respiratory: WDL. 02 >98% on RA. Denies SOB on rest and exertion.   Cardiac: WDL. Denies cardiac chest pain.   Neuro: A&OX4. Anxious at times. Pt able to make needs known.  GI/: Abdomen is slightly distended, soft, and tender. Voids spontaneously. +BS, but no BM on this shift. Last BM 7/14.   Diet: Soft bites, thin liquids. Pt c/o of abdominal fullness and discomfort after breakfast but was less severity than yesterday. Due to this, pt didn't eat lunch.   Skin: No new deficit noted. Some swollen in BLE.   Lines/drains: PIV removed.    Pain: Transverse abdominal and lower back pain managed with PRN oral and IV dilaudid and PRN tylenol.   Labs: Reviewed. No Pot and Mg replacement needed.   Activity: SBA. Pt ambulated in room and hallway.   Plan: Pt discharge to home at 1630.

## 2023-07-17 NOTE — TELEPHONE ENCOUNTER
Prior Authorization Approval    Medication: HYDROMORPHONE HCL 2 MG PO TABS  Authorization Effective Date: 7/17/2023  Authorization Expiration Date: 7/16/2024  Approved Dose/Quantity: 24 for 6 days  Reference #: CMM Key: [id number] - PA Case ID: [id number]   Insurance Company: [employer] - Phone [phone] Fax [phone]  Which Pharmacy is filling the prescription: [facility] [address]    Yasmin Grier  Pharmacy Liaison  Teams: Yasmin Grier  Phone: [phone]  Email: [email or web address]  July 17, 2023

## 2023-07-17 NOTE — PROGRESS NOTES
Therapy: 5fu, home disconnects, IVF, OnPro  Insurance: [employer]  Ded: $400  Met: $400    Co-Insurance: 95/5  Max Out of Pocket: $1700  Met: $1700    In reference to a benefits check received on 07/17/23 from [facility] to check 5fu, home discharge, IVF, and OnPro coverage.    Please contact Intake with any questions, [phone] or In Basket pool, [facility] ([phone]).

## 2023-07-17 NOTE — PROGRESS NOTES
Patient discharged home following hospital stay for: New diagnosis of      Vitals stable   Oxygen status: 02 >96% on RA  Diet: Patient tolerating soft bite diet.      Belongings sent with patient. IV site discontinued. Discharge meds to discharge pharmacy. AVS and education gone over with patient and wife, signed copy in patient chart. Pt to follow up as outpatient and with PCP. Pt and wife verbalized understanding of all education and teaching.

## 2023-07-18 ENCOUNTER — PATIENT OUTREACH (OUTPATIENT)
Dept: CARE COORDINATION | Facility: CLINIC | Age: 58
End: 2023-07-18
Payer: COMMERCIAL

## 2023-07-19 ENCOUNTER — PATIENT OUTREACH (OUTPATIENT)
Dept: ONCOLOGY | Facility: CLINIC | Age: 58
End: 2023-07-19
Payer: COMMERCIAL

## 2023-07-19 ENCOUNTER — MYC MEDICAL ADVICE (OUTPATIENT)
Dept: GASTROENTEROLOGY | Facility: CLINIC | Age: 58
End: 2023-07-19
Payer: COMMERCIAL

## 2023-07-19 ENCOUNTER — PATIENT OUTREACH (OUTPATIENT)
Dept: GASTROENTEROLOGY | Facility: CLINIC | Age: 58
End: 2023-07-19
Payer: COMMERCIAL

## 2023-07-19 NOTE — TELEPHONE ENCOUNTER
Returned call to pt and spouse regarding setting up a palliative care appointment; message stated so many voicemails had been left for pt they are unable to find the correct call back number.    Reached [facility]'s vm and provided main clinic line [phone] Options 5 and 2 to call back to schedule with palliative care.

## 2023-07-19 NOTE — TELEPHONE ENCOUNTER
"Called pt to follow up on conversation from Monday, is advancing diet slowly. But experiencing more gas and acid with solid foods. Taking protein shakes, apple sauce and yogurt, tried spaghetti with red sauce last night. We discussed low gas and low acid diet, will send mychart education in regards to this. Also need for starting calorie counts with dietician Cindy ([facility]) contacted and left message with our plans for follow up. Asked that reports from RD at [facility] be faxed.    Gallo states the G tube does \"puff up and lets out air at the insertion site\" And is worse in the morning after having the feeds running all night. He is currently not venting the tube.  I advised he does vent the G in the morning, prior to taking anything po. Also venting when he is noticing more gas or \"puffing up\" of the site. He understands to not vent within 1 hour of eating/drinking.     We also discussed that the appt on 8/2 will not be a formal visit, but I will call him to discuss the progress of his po intake and Dr York will follow up after that.  Relizorb application was not received, upon reviewing RD notes he does not have OP coverage for Relizorb   Pt ok with continuing with Creon/sodium bicarb via feeds and taking additional Creon by mouth. Will revisit the need for relizorb if pt does have issue with the current enzyme plan.    Rosalia Cordova, RN, BSN,   [facility]  Care coordinator    "

## 2023-07-21 ENCOUNTER — ONCOLOGY VISIT (OUTPATIENT)
Dept: ONCOLOGY | Facility: CLINIC | Age: 58
End: 2023-07-21
Attending: STUDENT IN AN ORGANIZED HEALTH CARE EDUCATION/TRAINING PROGRAM
Payer: COMMERCIAL

## 2023-07-21 ENCOUNTER — APPOINTMENT (OUTPATIENT)
Dept: LAB | Facility: CLINIC | Age: 58
End: 2023-07-21
Attending: STUDENT IN AN ORGANIZED HEALTH CARE EDUCATION/TRAINING PROGRAM
Payer: COMMERCIAL

## 2023-07-21 VITALS
TEMPERATURE: 97.9 F | DIASTOLIC BLOOD PRESSURE: 89 MMHG | HEART RATE: 87 BPM | BODY MASS INDEX: 22.94 KG/M2 | WEIGHT: 169.2 LBS | OXYGEN SATURATION: 99 % | SYSTOLIC BLOOD PRESSURE: 128 MMHG | RESPIRATION RATE: 18 BRPM

## 2023-07-21 DIAGNOSIS — C25.0 MALIGNANT NEOPLASM OF HEAD OF PANCREAS (H): ICD-10-CM

## 2023-07-21 LAB
ALBUMIN SERPL BCG-MCNC: 3.8 G/DL (ref 3.5–5.2)
ALP SERPL-CCNC: 172 U/L (ref 40–129)
ALT SERPL W P-5'-P-CCNC: 67 U/L (ref 0–70)
ANION GAP SERPL CALCULATED.3IONS-SCNC: 11 MMOL/L (ref 7–15)
AST SERPL W P-5'-P-CCNC: 38 U/L (ref 0–45)
BASOPHILS # BLD AUTO: 0.1 10E3/UL (ref 0–0.2)
BASOPHILS NFR BLD AUTO: 1 %
BILIRUB SERPL-MCNC: 1.9 MG/DL
BUN SERPL-MCNC: 16 MG/DL (ref 6–20)
CALCIUM SERPL-MCNC: 9.5 MG/DL (ref 8.6–10)
CHLORIDE SERPL-SCNC: 100 MMOL/L (ref 98–107)
CREAT SERPL-MCNC: 0.66 MG/DL (ref 0.67–1.17)
DEPRECATED HCO3 PLAS-SCNC: 27 MMOL/L (ref 22–29)
EOSINOPHIL # BLD AUTO: 0.3 10E3/UL (ref 0–0.7)
EOSINOPHIL NFR BLD AUTO: 4 %
ERYTHROCYTE [DISTWIDTH] IN BLOOD BY AUTOMATED COUNT: 13.6 % (ref 10–15)
GFR SERPL CREATININE-BSD FRML MDRD: >90 ML/MIN/1.73M2
GLUCOSE SERPL-MCNC: 91 MG/DL (ref 70–99)
HCT VFR BLD AUTO: 31.5 % (ref 40–53)
HGB BLD-MCNC: 10.4 G/DL (ref 13.3–17.7)
IMM GRANULOCYTES # BLD: 0 10E3/UL
IMM GRANULOCYTES NFR BLD: 1 %
LYMPHOCYTES # BLD AUTO: 1 10E3/UL (ref 0.8–5.3)
LYMPHOCYTES NFR BLD AUTO: 14 %
MCH RBC QN AUTO: 31.1 PG (ref 26.5–33)
MCHC RBC AUTO-ENTMCNC: 33 G/DL (ref 31.5–36.5)
MCV RBC AUTO: 94 FL (ref 78–100)
MONOCYTES # BLD AUTO: 0.8 10E3/UL (ref 0–1.3)
MONOCYTES NFR BLD AUTO: 11 %
NEUTROPHILS # BLD AUTO: 5 10E3/UL (ref 1.6–8.3)
NEUTROPHILS NFR BLD AUTO: 69 %
NRBC # BLD AUTO: 0 10E3/UL
NRBC BLD AUTO-RTO: 0 /100
PLATELET # BLD AUTO: 355 10E3/UL (ref 150–450)
POTASSIUM SERPL-SCNC: 3.8 MMOL/L (ref 3.4–5.3)
PROT SERPL-MCNC: 7.1 G/DL (ref 6.4–8.3)
RBC # BLD AUTO: 3.34 10E6/UL (ref 4.4–5.9)
SODIUM SERPL-SCNC: 138 MMOL/L (ref 136–145)
WBC # BLD AUTO: 7.2 10E3/UL (ref 4–11)

## 2023-07-21 PROCEDURE — 36415 COLL VENOUS BLD VENIPUNCTURE: CPT | Performed by: STUDENT IN AN ORGANIZED HEALTH CARE EDUCATION/TRAINING PROGRAM

## 2023-07-21 PROCEDURE — 80053 COMPREHEN METABOLIC PANEL: CPT | Performed by: STUDENT IN AN ORGANIZED HEALTH CARE EDUCATION/TRAINING PROGRAM

## 2023-07-21 PROCEDURE — 86301 IMMUNOASSAY TUMOR CA 19-9: CPT | Performed by: STUDENT IN AN ORGANIZED HEALTH CARE EDUCATION/TRAINING PROGRAM

## 2023-07-21 PROCEDURE — G0463 HOSPITAL OUTPT CLINIC VISIT: HCPCS | Performed by: STUDENT IN AN ORGANIZED HEALTH CARE EDUCATION/TRAINING PROGRAM

## 2023-07-21 PROCEDURE — 99417 PROLNG OP E/M EACH 15 MIN: CPT | Performed by: STUDENT IN AN ORGANIZED HEALTH CARE EDUCATION/TRAINING PROGRAM

## 2023-07-21 PROCEDURE — 99215 OFFICE O/P EST HI 40 MIN: CPT | Performed by: STUDENT IN AN ORGANIZED HEALTH CARE EDUCATION/TRAINING PROGRAM

## 2023-07-21 PROCEDURE — 85025 COMPLETE CBC W/AUTO DIFF WBC: CPT | Performed by: STUDENT IN AN ORGANIZED HEALTH CARE EDUCATION/TRAINING PROGRAM

## 2023-07-21 RX ORDER — PEN NEEDLE, DIABETIC 31 GX5/16"
NEEDLE, DISPOSABLE MISCELLANEOUS
COMMUNITY
Start: 2023-07-17 | End: 2023-08-17

## 2023-07-21 RX ORDER — LANCETS 33 GAUGE
EACH MISCELLANEOUS
COMMUNITY
Start: 2023-07-20 | End: 2023-12-08 | Stop reason: ALTCHOICE

## 2023-07-21 RX ORDER — PANTOPRAZOLE SODIUM 40 MG/1
40 TABLET, DELAYED RELEASE ORAL DAILY
Status: ON HOLD | COMMUNITY
Start: 2023-04-12 | End: 2023-08-25

## 2023-07-21 ASSESSMENT — PAIN SCALES - GENERAL: PAINLEVEL: EXTREME PAIN (8)

## 2023-07-21 NOTE — LETTER
2023         RE: Gallo Navarro  [address] [address] [place] [id number]        Dear Colleague,    Thank you for referring your patient, Gallo Navarro, to the [facility]. Please see a copy of my visit note below.    [facility] - Medical Oncology Follow-Up Consult Note  2023    Patient Identifiers     Name: Gallo Navarro  Preferred Address: [place]  Preferred Language: English  : 1965  Gender: male    Assessment and Plan     Mr. Gallo Navarro is a 57 year old male with a history of IDDM who returns to clinic for locally advanced, unresectable pancreatic cancer.    Mr. Navarro presents for treatment counseling for locally advanced, unresectable pancreas cancer.  We have already arranged for FOLFIRINOX infusion visits, but patient has yet to have a port placed.  We will reach out to IR team to see if port may be placed prior to scheduled infusion.  Else we will arrange for PICC placement as a temporary measure prior to port.  Plan for 3 months of FOLFIRINOX chemotherapy followed by imaging evaluation.    Given significant arterial encasement noted in original imaging, patient's eligibility for potential resection remains in significant question.  We will consider potential chemoradiation versus continued chemotherapy, versus clinical trial based on treatment response seen on follow-up imaging.  Of note, given unresectable nature of disease, we would recommend NGS testing and screening/enrollment in TCR2 clinical trial.    Patient and spouse are both under significant strain given diagnosis and challenges it represents.  We  have placed mental health referral today and ensure appropriate support for patient's spouse with caregiver support groups.    Patient to return to clinic every 2 weeks while on treatment.  We will reevaluate imaging in 3 months as outlined above followed by MD visit.    80 minutes spent on the date of the encounter doing chart review, review  of test results, interpretation of tests, patient visit, documentation, and discussion with family     Luis Haile MD, PhD   of Medicine  Division of Hematology, Oncology and Transplantation  [facility]    -----------------------------------    Oncology Summary     Cancer Staging   Malignant neoplasm of head of pancreas (H)  Staging form: Pancreas, AJCC 8th Edition  - Clinical stage from 7/11/2023: Stage III (cT2, cN2, cM0) - Signed by Luis Haile MD on 7/14/2023      Oncology History   Malignant neoplasm of head of pancreas (H)   7/11/2023 -  Cancer Staged    Staging form: Pancreas, AJCC 8th Edition  - Clinical stage from 7/11/2023: Stage III (cT2, cN2, cM0)     7/14/2023 Initial Diagnosis    Malignant neoplasm of head of pancreas (H)     7/17/2023 -  Chemotherapy    OP ONC Pancreatic Cancer - Modified FOLFIRINOX  Plan Provider: Luis Haile MD  Treatment goal: Curative  Line of treatment: [No plan line of treatment]         Subjective/Interval Events     - went to the dentist yesterday, and feels a lot more gassy and bloated. Feels it's due to inverted positioning during cleaning  - otherwise generally doing well    - pt previously noted palpitations with PICC line placement    Physical Exam     Vital signs: /89   Pulse 87   Temp 97.9  F (36.6  C) (Oral)   Resp 18   Wt 76.7 kg (169 lb 3.2 oz)   SpO2 99%   BMI 22.94 kg/m      ECOG performance status:  1  Vascular access: none    Physical Exam:  Exam performed, notable for:  - no notables on exam    Objective Data     Lab data:  I have personally reviewed the lab data, notable for:    - Hgb 10.4  - Cr 0.66    Radiology data:  I have personally reviewed the radiology data, notable for:  07/12/2023 CT Chest  Impression:   1. A 7 mm pulmonary nodule in the left upper lobe which has not been  imaged previously is indeterminate. Recommend 3-6 month follow-up.  2. Partially visualized ill-defined pancreatic head mass with  upper  abdominal lymphadenopathy. Biliary drains and percutaneous  gastrojejunostomy tube.  3. No acute pathology within the chest.    07/08/2023 CT Abd/Pelvis  IMPRESSION:   1.  Pancreas mass without pancreatic ductal dilatation.  2.  Biliary stent in good position with mild pneumobilia.  3.  Question a calcific density adjacent to the distal biliary stent, it is unclear if this is a pancreatic calcification in the adjacent parenchyma versus a calcified gallstone.    Pathology and other data:  I have personally reviewed and interpreted the pathology data, notable for:    07/11/2023 Fine Needle Aspirate  Final Diagnosis   Specimen A                 Interpretation:                  Positive for malignancy  Morphologically consistent with adenocarcinoma                 Adequacy:                 Satisfactory for evaluation     Sincerely,        Luis Haile MD

## 2023-07-21 NOTE — NURSING NOTE
Chief Complaint   Patient presents with     Oncology Clinic Visit     Malignant neoplasm of head of pancreas      Blood Draw     Labs drawn via  by RN. VS taken.     Labs collected from venipuncture by RN. Vitals taken. Checked in for appointment(s).    Flavio De Leon RN

## 2023-07-21 NOTE — NURSING NOTE
"Oncology Rooming Note    July 21, 2023 4:29 PM   Gallo Navarro is a 57 year old male who presents for:    Chief Complaint   Patient presents with     Oncology Clinic Visit     Malignant neoplasm of head of pancreas      Blood Draw     Labs drawn via  by RN. VS taken.     Initial Vitals: /89   Pulse 87   Temp 97.9  F (36.6  C) (Oral)   Resp 18   Wt 76.7 kg (169 lb 3.2 oz)   SpO2 99%   BMI 22.94 kg/m   Estimated body mass index is 22.94 kg/m  as calculated from the following:    Height as of 7/9/23: 1.829 m (6' 0.01\").    Weight as of this encounter: 76.7 kg (169 lb 3.2 oz). Body surface area is 1.97 meters squared.  Extreme Pain (8) Comment: Data Unavailable   No LMP for male patient.  Allergies reviewed: Yes  Medications reviewed: Yes    Medications: Pt req refills of Sodium Bicarb, Dilaudid + Childrens liquid tylenol if possible.    Pharmacy name entered into [facility]:    [facility] [address] [place] [address] - [address] [place] [address] - [phone]    Clinical concerns: No new clinical concerns other than reason for visit today.    Khadijah Muller, EMT    "

## 2023-07-21 NOTE — PROGRESS NOTES
[facility] - Medical Oncology Follow-Up Consult Note  2023    Patient Identifiers     Name: Gallo Navarro  Preferred Address: [place]  Preferred Language: English  : 1965  Gender: male    Assessment and Plan     Mr. Gallo Navarro is a 57 year old male with a history of IDDM who returns to clinic for locally advanced, unresectable pancreatic cancer.    Mr. Navarro presents for treatment counseling for locally advanced, unresectable pancreas cancer.  We have already arranged for FOLFIRINOX infusion visits, but patient has yet to have a port placed.  We will reach out to IR team to see if port may be placed prior to scheduled infusion.  Else we will arrange for PICC placement as a temporary measure prior to port.  Plan for 3 months of FOLFIRINOX chemotherapy followed by imaging evaluation.    Given significant arterial encasement noted in original imaging, patient's eligibility for potential resection remains in significant question.  We will consider potential chemoradiation versus continued chemotherapy, versus clinical trial based on treatment response seen on follow-up imaging.  Of note, given unresectable nature of disease, we would recommend NGS testing and screening/enrollment in TCR2 clinical trial.    Patient and spouse are both under significant strain given diagnosis and challenges it represents.  We  have placed mental health referral today and ensure appropriate support for patient's spouse with caregiver support groups.    Patient to return to clinic every 2 weeks while on treatment.  We will reevaluate imaging in 3 months as outlined above followed by MD visit.    80 minutes spent on the date of the encounter doing chart review, review of test results, interpretation of tests, patient visit, documentation, and discussion with family     Luis Haile MD, PhD   of Medicine  Division of Hematology, Oncology and Transplantation  [facility]  [place]    -----------------------------------    Oncology Summary     Cancer Staging   Malignant neoplasm of head of pancreas (H)  Staging form: Pancreas, AJCC 8th Edition  - Clinical stage from 7/11/2023: Stage III (cT2, cN2, cM0) - Signed by Luis Haile MD on 7/14/2023      Oncology History   Malignant neoplasm of head of pancreas (H)   7/11/2023 -  Cancer Staged    Staging form: Pancreas, AJCC 8th Edition  - Clinical stage from 7/11/2023: Stage III (cT2, cN2, cM0)     7/14/2023 Initial Diagnosis    Malignant neoplasm of head of pancreas (H)     7/17/2023 -  Chemotherapy    OP ONC Pancreatic Cancer - Modified FOLFIRINOX  Plan Provider: Luis Haile MD  Treatment goal: Curative  Line of treatment: [No plan line of treatment]         Subjective/Interval Events     - went to the dentist yesterday, and feels a lot more gassy and bloated. Feels it's due to inverted positioning during cleaning  - otherwise generally doing well    - pt previously noted palpitations with PICC line placement    Physical Exam     Vital signs: /89   Pulse 87   Temp 97.9  F (36.6  C) (Oral)   Resp 18   Wt 76.7 kg (169 lb 3.2 oz)   SpO2 99%   BMI 22.94 kg/m      ECOG performance status:  1  Vascular access: none    Physical Exam:  Exam performed, notable for:  - no notables on exam    Objective Data     Lab data:  I have personally reviewed the lab data, notable for:    - Hgb 10.4  - Cr 0.66    Radiology data:  I have personally reviewed the radiology data, notable for:  07/12/2023 CT Chest  Impression:   1. A 7 mm pulmonary nodule in the left upper lobe which has not been  imaged previously is indeterminate. Recommend 3-6 month follow-up.  2. Partially visualized ill-defined pancreatic head mass with upper  abdominal lymphadenopathy. Biliary drains and percutaneous  gastrojejunostomy tube.  3. No acute pathology within the chest.    07/08/2023 CT Abd/Pelvis  IMPRESSION:   1.  Pancreas mass without pancreatic ductal  dilatation.  2.  Biliary stent in good position with mild pneumobilia.  3.  Question a calcific density adjacent to the distal biliary stent, it is unclear if this is a pancreatic calcification in the adjacent parenchyma versus a calcified gallstone.    Pathology and other data:  I have personally reviewed and interpreted the pathology data, notable for:    07/11/2023 Fine Needle Aspirate  Final Diagnosis   Specimen A                 Interpretation:                  Positive for malignancy  Morphologically consistent with adenocarcinoma                 Adequacy:                 Satisfactory for evaluation

## 2023-07-22 ENCOUNTER — NURSE TRIAGE (OUTPATIENT)
Dept: NURSING | Facility: CLINIC | Age: 58
End: 2023-07-22
Payer: COMMERCIAL

## 2023-07-22 DIAGNOSIS — R10.30 LOWER ABDOMINAL PAIN: ICD-10-CM

## 2023-07-22 DIAGNOSIS — C25.9 PANCREATIC ADENOCARCINOMA (H): ICD-10-CM

## 2023-07-22 LAB — CANCER AG19-9 SERPL IA-ACNC: 12 U/ML

## 2023-07-22 RX ORDER — HYDROMORPHONE HYDROCHLORIDE 2 MG/1
2 TABLET ORAL EVERY 6 HOURS PRN
Qty: 30 TABLET | Refills: 0 | Status: SHIPPED | OUTPATIENT
Start: 2023-07-22 | End: 2023-07-27

## 2023-07-22 NOTE — TELEPHONE ENCOUNTER
At Oncology visit yesterday with Dr. Haile,   was first visit since leaving hospital 7/16. When leaving hospital was given Dilaudid # 24. Only has 4 left which will run out in 24 hours. Taking 1 every 6 hours. At visit yesterday, they requested refill which is reflected in notes and [facility] pharmacy [place] is listed. Paged on call provider Eduardo Del Valle at 911am. Provider called back at 914am to say he will reach out to provider who may be able to do this and call patient back when this may happen. Call placed to patient to give message. No answer, left message on voicemail that provider will be calling back later with details.     CARLEE AGUAYO RN  [facility] nurse advisors  7/22/2023  9:17 AM  Reason for Disposition    [1] Prescription refill request for ESSENTIAL medicine (i.e., likelihood of harm to patient if not taken) AND [2] triager unable to refill per department policy    Protocols used: Medication Refill and Renewal Call-A-

## 2023-07-26 ENCOUNTER — PATIENT OUTREACH (OUTPATIENT)
Dept: GASTROENTEROLOGY | Facility: CLINIC | Age: 58
End: 2023-07-26
Payer: COMMERCIAL

## 2023-07-26 ENCOUNTER — DOCUMENTATION ONLY (OUTPATIENT)
Dept: ONCOLOGY | Facility: CLINIC | Age: 58
End: 2023-07-26
Payer: COMMERCIAL

## 2023-07-26 ENCOUNTER — PATIENT OUTREACH (OUTPATIENT)
Dept: ONCOLOGY | Facility: CLINIC | Age: 58
End: 2023-07-26
Payer: COMMERCIAL

## 2023-07-26 DIAGNOSIS — C25.9 PANCREATIC ADENOCARCINOMA (H): Primary | ICD-10-CM

## 2023-07-26 PROCEDURE — 93010 ELECTROCARDIOGRAM REPORT: CPT | Performed by: STUDENT IN AN ORGANIZED HEALTH CARE EDUCATION/TRAINING PROGRAM

## 2023-07-26 NOTE — TELEPHONE ENCOUNTER
Gas and bloating is becoming worse. Seems more so when he lies down making sleep difficult. At the feeding tube insertion site he is getting air that comes out at the tube site. Is venting to the drainage bag when he feels this way and that does help. We discussed trying simethicone OTC.     Got about 500 calories by mouth yesterday and did not feel well. Has increased pain after eating at times.  Eating soft foods like crackers. We discussed dialing back to a liquid diet if he is not tolerating the soft diet and he agreed.     Low blood glucose levels today down to 44. BS varies, 260 at night and then slowly drifts all day while he is off the tube feeds during the day. On long acting 20 units in the morning and 15 units in the evening. I did advise he hold his morning long acting insulin tomorrow and monitor BS and give short acting insulin. Appears that an endocrine referral was placed on 7/16 at discharge but pt states there has been no follow up with that clinic. Will send message to Endo pool to inquire about this.    Rosalia Cordova, RN, BSN,   [facility]  Care coordinator   [phone]  Fax [phone]

## 2023-07-27 ENCOUNTER — VIRTUAL VISIT (OUTPATIENT)
Dept: RADIATION ONCOLOGY | Facility: CLINIC | Age: 58
End: 2023-07-27
Attending: PHYSICIAN ASSISTANT
Payer: COMMERCIAL

## 2023-07-27 ENCOUNTER — ANESTHESIA EVENT (OUTPATIENT)
Dept: SURGERY | Facility: AMBULATORY SURGERY CENTER | Age: 58
End: 2023-07-27
Payer: COMMERCIAL

## 2023-07-27 VITALS — BODY MASS INDEX: 22.89 KG/M2 | WEIGHT: 169 LBS | HEIGHT: 72 IN

## 2023-07-27 DIAGNOSIS — R10.30 LOWER ABDOMINAL PAIN: ICD-10-CM

## 2023-07-27 DIAGNOSIS — C25.9 PANCREATIC ADENOCARCINOMA (H): ICD-10-CM

## 2023-07-27 DIAGNOSIS — Z98.890 HISTORY OF BILIARY STENT INSERTION: ICD-10-CM

## 2023-07-27 DIAGNOSIS — Z51.5 PALLIATIVE CARE PATIENT: Primary | ICD-10-CM

## 2023-07-27 DIAGNOSIS — G89.3 CANCER ASSOCIATED PAIN: ICD-10-CM

## 2023-07-27 DIAGNOSIS — C25.9 PANCREATIC ADENOCARCINOMA (H): Primary | ICD-10-CM

## 2023-07-27 PROCEDURE — 99205 OFFICE O/P NEW HI 60 MIN: CPT | Mod: VID | Performed by: FAMILY MEDICINE

## 2023-07-27 RX ORDER — HYDROMORPHONE HYDROCHLORIDE 2 MG/1
2 TABLET ORAL EVERY 4 HOURS PRN
Qty: 60 TABLET | Refills: 0 | Status: SHIPPED | OUTPATIENT
Start: 2023-07-27 | End: 2023-08-04

## 2023-07-27 RX ORDER — BUPRENORPHINE 10 UG/H
1 PATCH TRANSDERMAL
Qty: 4 PATCH | Refills: 3 | Status: ON HOLD | OUTPATIENT
Start: 2023-07-27 | End: 2023-08-25

## 2023-07-27 RX ORDER — LIDOCAINE/PRILOCAINE 2.5 %-2.5%
CREAM (GRAM) TOPICAL
Qty: 30 G | Refills: 3 | Status: SHIPPED | OUTPATIENT
Start: 2023-07-27 | End: 2023-12-05

## 2023-07-27 ASSESSMENT — PAIN SCALES - GENERAL: PAINLEVEL: EXTREME PAIN (8)

## 2023-07-27 NOTE — TELEPHONE ENCOUNTER
RN Care Coordination Note     OUTGOING CALL: spoke with patient via telephone call    Provided RNCC contact information, [facility] phone number (which has options to talk with a Nurse available 24/7 - triage and RNCC via this option during business hours).   Reviewed appropriate use of MyChart, not to be used for symptom reporting.   Reviewed [facility] care team members including midlevel providers in oncology dept and Dr. Haile usual clinic hours.    Chemo Teach  re: Folfirinox treatment plan   -See Pt Education documentation - Chemo Effects (Adult)  -Reviewed treatment schedule including cycle length, lab monitoring and prn medications.  -Verbal and written instruction provided using:   [facility] Drug Information : reviewed Possible Side Effects, When to Contact Your Doctor of Health Care Provider and Self Care Tips sections.   We discussed what to expect on day of infusion / [facility] In[facility] visitor policy  Take home medications: prochlorperazine and ondansetron for nausea, loperamide for diarrhea and dexamethasone for 2 days.  Emla cream sent to local pharmacy for use on Monday 7/31 prior to coming to the clinic.    [facility] to disconnect pump, infusion RN will provide office number and date/time RN is expected to come disconnect    Follow-up visit: 7/31 C1, 8/15 C2 will see Berta GONZALEZ prior  -last minute add on: EKG needed prior to start of infusion, scheduling messaged to add on appointment tomorrow when pt is at [facility] for his port placement    Pt voiced understanding of above instructions and information and denied further questions

## 2023-07-27 NOTE — PATIENT INSTRUCTIONS
"It was good to see you today, Gallo and Abigail.    Here are the things we talked about:  START buprenorphine 10 mcg/hour patch and replace weekly  Continue dilaudid 1-2 mg by j tube q 4 hours as needed  Tylenol suspension 650 mg by J tube Three times a day  Narcan also prescribed for safety    We talked about starting to look at completing a healthcare directive.  I recommend you check out- [email or web address]     They have several tools and some advice about getting started.  Under the \"How do I document my choices\" section, I am a fan of the full length healthcare directive (for adults with serious illness) because I think it has a good combination of the medical questions that are important as well as the social and personal questions that allow healthcare providers to get to know you as a person if you are unable to speak for yourself.  You do not have to complete every question on the document.  I generally recommend patients start with 3 or 4 questions on the goals page and work from there.     There is also a Short Form, which primarily serves to designate health care agents. These are people who can speak on your behalf about your healthcare wishes/goals if you are not able to speak for yourself.    Someone from the team will reach out to schedule a follow up appointment in 4 weeks and Megan will check in with you next week.    How to get a hold of us:  For non-urgent matters, MyChart works best.    For more urgent matters, or if you prefer not to use MyChart, call our clinic nurse coordinator Megan Funez RN at [phone]    We have an on-call number for evenings and weekends. Please call this only if you are having uncontrolled symptoms or serious side effects from your medicines: [phone].     For refills, please give us a week (5 working days) notice. We don't always have providers available everyday to do refills. If you call the day you " run out of your medicine, we may not be able to refill it in time, so call 5 days in advance!    Jeremy Hurt MD MS FAAFP CAQHPM  [facility]  Office [phone]  Fax [phone]

## 2023-07-27 NOTE — NURSING NOTE
Is the patient currently in the state of [place]? YES    Visit mode:VIDEO    If the visit is dropped, the patient can be reconnected by: VIDEO VISIT: Send to e-mail at: [email or web address]    Will anyone else be joining the visit? YES: How would they like to receive their invitation? Send to e-mail at: [email or web address]      How would you like to obtain your AVS? MyChart    Are changes needed to the allergy or medication list? NO    Reason for visit: Video Visit (New apt )      Brittany Ray

## 2023-07-28 ENCOUNTER — HOSPITAL ENCOUNTER (OUTPATIENT)
Facility: AMBULATORY SURGERY CENTER | Age: 58
Discharge: HOME | End: 2023-07-28
Attending: RADIOLOGY
Payer: COMMERCIAL

## 2023-07-28 ENCOUNTER — ANESTHESIA (OUTPATIENT)
Dept: SURGERY | Facility: AMBULATORY SURGERY CENTER | Age: 58
End: 2023-07-28
Payer: COMMERCIAL

## 2023-07-28 ENCOUNTER — ANCILLARY PROCEDURE (OUTPATIENT)
Dept: RADIOLOGY | Facility: AMBULATORY SURGERY CENTER | Age: 58
End: 2023-07-28
Attending: STUDENT IN AN ORGANIZED HEALTH CARE EDUCATION/TRAINING PROGRAM
Payer: COMMERCIAL

## 2023-07-28 VITALS
WEIGHT: 169 LBS | TEMPERATURE: 97.1 F | RESPIRATION RATE: 15 BRPM | HEIGHT: 72 IN | BODY MASS INDEX: 22.89 KG/M2 | OXYGEN SATURATION: 99 % | HEART RATE: 79 BPM | DIASTOLIC BLOOD PRESSURE: 85 MMHG | SYSTOLIC BLOOD PRESSURE: 130 MMHG

## 2023-07-28 DIAGNOSIS — C25.9 PANCREATIC ADENOCARCINOMA (H): ICD-10-CM

## 2023-07-28 DIAGNOSIS — C25.0 MALIGNANT NEOPLASM OF HEAD OF PANCREAS (H): ICD-10-CM

## 2023-07-28 LAB
GLUCOSE BLDC GLUCOMTR-MCNC: 126 MG/DL (ref 70–99)
GLUCOSE BLDC GLUCOMTR-MCNC: 135 MG/DL (ref 70–99)

## 2023-07-28 PROCEDURE — 82962 GLUCOSE BLOOD TEST: CPT | Performed by: PATHOLOGY

## 2023-07-28 PROCEDURE — 77001 FLUOROGUIDE FOR VEIN DEVICE: CPT | Mod: 26 | Performed by: RADIOLOGY

## 2023-07-28 PROCEDURE — 36561 INSERT TUNNELED CV CATH: CPT | Performed by: RADIOLOGY

## 2023-07-28 PROCEDURE — 36561 INSERT TUNNELED CV CATH: CPT

## 2023-07-28 PROCEDURE — 76937 US GUIDE VASCULAR ACCESS: CPT | Mod: 26 | Performed by: RADIOLOGY

## 2023-07-28 RX ORDER — HYDRALAZINE HYDROCHLORIDE 20 MG/ML
2.5-5 INJECTION INTRAMUSCULAR; INTRAVENOUS EVERY 10 MIN PRN
Status: DISCONTINUED | OUTPATIENT
Start: 2023-07-28 | End: 2023-07-29 | Stop reason: HOSPADM

## 2023-07-28 RX ORDER — PROPOFOL 10 MG/ML
INJECTION, EMULSION INTRAVENOUS PRN
Status: DISCONTINUED | OUTPATIENT
Start: 2023-07-28 | End: 2023-07-28

## 2023-07-28 RX ORDER — HYDROXYZINE HYDROCHLORIDE 25 MG/1
25 TABLET, FILM COATED ORAL EVERY 6 HOURS PRN
Status: DISCONTINUED | OUTPATIENT
Start: 2023-07-28 | End: 2023-07-29 | Stop reason: HOSPADM

## 2023-07-28 RX ORDER — LORAZEPAM 2 MG/ML
.5-1 INJECTION INTRAMUSCULAR
Status: DISCONTINUED | OUTPATIENT
Start: 2023-07-28 | End: 2023-07-29 | Stop reason: HOSPADM

## 2023-07-28 RX ORDER — HYDROMORPHONE HYDROCHLORIDE 1 MG/ML
0.4 INJECTION, SOLUTION INTRAMUSCULAR; INTRAVENOUS; SUBCUTANEOUS EVERY 5 MIN PRN
Status: DISCONTINUED | OUTPATIENT
Start: 2023-07-28 | End: 2023-07-29 | Stop reason: HOSPADM

## 2023-07-28 RX ORDER — SODIUM CHLORIDE, SODIUM LACTATE, POTASSIUM CHLORIDE, CALCIUM CHLORIDE 600; 310; 30; 20 MG/100ML; MG/100ML; MG/100ML; MG/100ML
INJECTION, SOLUTION INTRAVENOUS CONTINUOUS
Status: DISCONTINUED | OUTPATIENT
Start: 2023-07-28 | End: 2023-07-29 | Stop reason: HOSPADM

## 2023-07-28 RX ORDER — ONDANSETRON 2 MG/ML
4 INJECTION INTRAMUSCULAR; INTRAVENOUS EVERY 30 MIN PRN
Status: DISCONTINUED | OUTPATIENT
Start: 2023-07-28 | End: 2023-07-29 | Stop reason: HOSPADM

## 2023-07-28 RX ORDER — HALOPERIDOL 5 MG/ML
1 INJECTION INTRAMUSCULAR
Status: DISCONTINUED | OUTPATIENT
Start: 2023-07-28 | End: 2023-07-29 | Stop reason: HOSPADM

## 2023-07-28 RX ORDER — HYDROMORPHONE HYDROCHLORIDE 1 MG/ML
0.2 INJECTION, SOLUTION INTRAMUSCULAR; INTRAVENOUS; SUBCUTANEOUS EVERY 5 MIN PRN
Status: DISCONTINUED | OUTPATIENT
Start: 2023-07-28 | End: 2023-07-29 | Stop reason: HOSPADM

## 2023-07-28 RX ORDER — FENTANYL CITRATE 50 UG/ML
25 INJECTION, SOLUTION INTRAMUSCULAR; INTRAVENOUS
Status: DISCONTINUED | OUTPATIENT
Start: 2023-07-28 | End: 2023-07-29 | Stop reason: HOSPADM

## 2023-07-28 RX ORDER — HEPARIN SODIUM,PORCINE 10 UNIT/ML
5-10 VIAL (ML) INTRAVENOUS EVERY 24 HOURS
Status: DISCONTINUED | OUTPATIENT
Start: 2023-07-28 | End: 2023-07-29 | Stop reason: HOSPADM

## 2023-07-28 RX ORDER — ALBUTEROL SULFATE 0.83 MG/ML
2.5 SOLUTION RESPIRATORY (INHALATION) EVERY 4 HOURS PRN
Status: DISCONTINUED | OUTPATIENT
Start: 2023-07-28 | End: 2023-07-29 | Stop reason: HOSPADM

## 2023-07-28 RX ORDER — FENTANYL CITRATE 50 UG/ML
50 INJECTION, SOLUTION INTRAMUSCULAR; INTRAVENOUS EVERY 5 MIN PRN
Status: DISCONTINUED | OUTPATIENT
Start: 2023-07-28 | End: 2023-07-29 | Stop reason: HOSPADM

## 2023-07-28 RX ORDER — ONDANSETRON 4 MG/1
4 TABLET, ORALLY DISINTEGRATING ORAL EVERY 30 MIN PRN
Status: DISCONTINUED | OUTPATIENT
Start: 2023-07-28 | End: 2023-07-29 | Stop reason: HOSPADM

## 2023-07-28 RX ORDER — HEPARIN SODIUM,PORCINE 10 UNIT/ML
5-10 VIAL (ML) INTRAVENOUS
Status: DISCONTINUED | OUTPATIENT
Start: 2023-07-28 | End: 2023-07-29 | Stop reason: HOSPADM

## 2023-07-28 RX ORDER — MEPERIDINE HYDROCHLORIDE 25 MG/ML
12.5 INJECTION INTRAMUSCULAR; INTRAVENOUS; SUBCUTANEOUS EVERY 5 MIN PRN
Status: DISCONTINUED | OUTPATIENT
Start: 2023-07-28 | End: 2023-07-29 | Stop reason: HOSPADM

## 2023-07-28 RX ORDER — PROPOFOL 10 MG/ML
INJECTION, EMULSION INTRAVENOUS CONTINUOUS PRN
Status: DISCONTINUED | OUTPATIENT
Start: 2023-07-28 | End: 2023-07-28

## 2023-07-28 RX ORDER — LABETALOL HYDROCHLORIDE 5 MG/ML
10 INJECTION, SOLUTION INTRAVENOUS
Status: DISCONTINUED | OUTPATIENT
Start: 2023-07-28 | End: 2023-07-29 | Stop reason: HOSPADM

## 2023-07-28 RX ORDER — OXYCODONE HYDROCHLORIDE 5 MG/1
5 TABLET ORAL
Status: DISCONTINUED | OUTPATIENT
Start: 2023-07-28 | End: 2023-07-29 | Stop reason: HOSPADM

## 2023-07-28 RX ORDER — FENTANYL CITRATE 50 UG/ML
25 INJECTION, SOLUTION INTRAMUSCULAR; INTRAVENOUS EVERY 5 MIN PRN
Status: DISCONTINUED | OUTPATIENT
Start: 2023-07-28 | End: 2023-07-29 | Stop reason: HOSPADM

## 2023-07-28 RX ORDER — OXYCODONE HYDROCHLORIDE 5 MG/1
10 TABLET ORAL
Status: DISCONTINUED | OUTPATIENT
Start: 2023-07-28 | End: 2023-07-29 | Stop reason: HOSPADM

## 2023-07-28 RX ORDER — DIMENHYDRINATE 50 MG/ML
25 INJECTION, SOLUTION INTRAMUSCULAR; INTRAVENOUS
Status: DISCONTINUED | OUTPATIENT
Start: 2023-07-28 | End: 2023-07-29 | Stop reason: HOSPADM

## 2023-07-28 RX ORDER — LIDOCAINE 40 MG/G
CREAM TOPICAL
Status: DISCONTINUED | OUTPATIENT
Start: 2023-07-28 | End: 2023-07-29 | Stop reason: HOSPADM

## 2023-07-28 RX ORDER — ACETAMINOPHEN 325 MG/1
975 TABLET ORAL ONCE
Status: DISCONTINUED | OUTPATIENT
Start: 2023-07-28 | End: 2023-07-29 | Stop reason: HOSPADM

## 2023-07-28 RX ORDER — DEXAMETHASONE SODIUM PHOSPHATE 10 MG/ML
4 INJECTION, SOLUTION INTRAMUSCULAR; INTRAVENOUS
Status: DISCONTINUED | OUTPATIENT
Start: 2023-07-28 | End: 2023-07-29 | Stop reason: HOSPADM

## 2023-07-28 RX ORDER — HEPARIN SODIUM (PORCINE) LOCK FLUSH IV SOLN 100 UNIT/ML 100 UNIT/ML
5-10 SOLUTION INTRAVENOUS
Status: DISCONTINUED | OUTPATIENT
Start: 2023-07-28 | End: 2023-07-29 | Stop reason: HOSPADM

## 2023-07-28 RX ORDER — CEFAZOLIN SODIUM 2 G/50ML
2 SOLUTION INTRAVENOUS
Status: COMPLETED | OUTPATIENT
Start: 2023-07-28 | End: 2023-07-28

## 2023-07-28 RX ORDER — LIDOCAINE HYDROCHLORIDE 20 MG/ML
INJECTION, SOLUTION INFILTRATION; PERINEURAL PRN
Status: DISCONTINUED | OUTPATIENT
Start: 2023-07-28 | End: 2023-07-28

## 2023-07-28 RX ORDER — KETOROLAC TROMETHAMINE 30 MG/ML
15 INJECTION, SOLUTION INTRAMUSCULAR; INTRAVENOUS
Status: DISCONTINUED | OUTPATIENT
Start: 2023-07-28 | End: 2023-07-29 | Stop reason: HOSPADM

## 2023-07-28 RX ORDER — ACETAMINOPHEN 325 MG/1
975 TABLET ORAL ONCE
Status: COMPLETED | OUTPATIENT
Start: 2023-07-28 | End: 2023-07-28

## 2023-07-28 RX ORDER — HEPARIN SODIUM (PORCINE) LOCK FLUSH IV SOLN 100 UNIT/ML 100 UNIT/ML
SOLUTION INTRAVENOUS DAILY PRN
Status: DISCONTINUED | OUTPATIENT
Start: 2023-07-28 | End: 2023-07-28 | Stop reason: HOSPADM

## 2023-07-28 RX ADMIN — LIDOCAINE HYDROCHLORIDE 60 MG: 20 INJECTION, SOLUTION INFILTRATION; PERINEURAL at 10:17

## 2023-07-28 RX ADMIN — FENTANYL CITRATE 50 MCG: 50 INJECTION, SOLUTION INTRAMUSCULAR; INTRAVENOUS at 11:22

## 2023-07-28 RX ADMIN — CEFAZOLIN SODIUM 2 G: 2 SOLUTION INTRAVENOUS at 10:18

## 2023-07-28 RX ADMIN — PROPOFOL 80 MG: 10 INJECTION, EMULSION INTRAVENOUS at 10:17

## 2023-07-28 RX ADMIN — SODIUM CHLORIDE, SODIUM LACTATE, POTASSIUM CHLORIDE, CALCIUM CHLORIDE: 600; 310; 30; 20 INJECTION, SOLUTION INTRAVENOUS at 10:18

## 2023-07-28 RX ADMIN — PROPOFOL 200 MCG/KG/MIN: 10 INJECTION, EMULSION INTRAVENOUS at 10:17

## 2023-07-28 RX ADMIN — SODIUM CHLORIDE, SODIUM LACTATE, POTASSIUM CHLORIDE, CALCIUM CHLORIDE: 600; 310; 30; 20 INJECTION, SOLUTION INTRAVENOUS at 10:17

## 2023-07-28 ASSESSMENT — LIFESTYLE VARIABLES: TOBACCO_USE: 0

## 2023-07-28 ASSESSMENT — ENCOUNTER SYMPTOMS: DYSRHYTHMIAS: 0

## 2023-07-28 NOTE — BRIEF OP NOTE
[facility]    Brief Operative Note    Pre-operative diagnosis: Malignant neoplasm of head of pancreas (H) [C25.0]  Post-operative diagnosis Same as pre-operative diagnosis    Procedure: Procedure(s):  Insert port vascular access  Surgeon: Surgeon(s) and Role:     * Tom العلي MD - Primary     * Rakan Kruger PA-C - Assisting  Anesthesia: MAC   Estimated Blood Loss: Minimal    Drains: None  Specimens: * No specimens in log *  Findings:   None.  Complications: None.  Implants:   Implant Name Type Inv. Item Serial No.  Lot No. LRB No. Used Action   Urge ClearVUE Slim Implantable Port     [id number] Right 1 Implanted     6 Fr 25 cm single lumen right internal jugular chest port with catheter tip in high right atrium. Functions well, heparin locked and ready for immediate use.

## 2023-07-28 NOTE — BRIEF OP NOTE
[facility]    Brief Operative Note    Pre-operative diagnosis: Malignant neoplasm of head of pancreas (H) [C25.0]  Post-operative diagnosis Same as pre-operative diagnosis    Procedure: Procedure(s):  Insert port vascular access  Surgeon: Surgeon(s) and Role:     * Tom العلي MD - Primary     * Rakan Kruger PA-C - Assisting  Anesthesia: MAC   Estimated Blood Loss: Minimal    Drains: None  Specimens: * No specimens in log *  Findings:   None.  Complications: None.  Implants:   Implant Name Type Inv. Item Serial No.  Lot No. LRB No. Used Action   PowerPort ClearVUE Slim Implantable Port     [id number] Right 1 Implanted       6 [place] 25 cm single lumen BD PowerPort ClearVUE Slim placed via right internal jugular vein. Tip in the high right atrium. Heparin locked and ready for use.

## 2023-07-28 NOTE — ANESTHESIA POSTPROCEDURE EVALUATION
Patient: Gallo Navarro    Procedure: Procedure(s):  Insert port vascular access       Anesthesia Type:  MAC    Note:  Disposition: Outpatient   Postop Pain Control: Uneventful            Sign Out: Well controlled pain   PONV: No   Neuro/Psych: Uneventful            Sign Out: Acceptable/Baseline neuro status   Airway/Respiratory: Uneventful            Sign Out: Acceptable/Baseline resp. status   CV/Hemodynamics: Uneventful            Sign Out: Acceptable CV status; No obvious hypovolemia; No obvious fluid overload   Other NRE: NONE   DID A NON-ROUTINE EVENT OCCUR? No           Last vitals:  Vitals Value Taken Time   /80 07/28/23 1059   Temp 36.3  C (97.3  F) 07/28/23 1059   Pulse 80 07/28/23 1059   Resp 16 07/28/23 1059   SpO2 100 % 07/28/23 1059       Electronically Signed By: Peng Mancuso MD  July 28, 2023  11:27 AM

## 2023-07-28 NOTE — DISCHARGE INSTRUCTIONS
A collaboration between [facility] and [facility]  Experts in minimally invasive, targeted treatments performed using imaging guidance    Venous Access Device,  Port Catheter or Tunneled or Non-Tunneled Central Line Placement    Today you had a procedure today to install a venous access device; either a tunneled central vein catheter or a subcutaneous port catheter.    After you go home:  Drink plenty of fluids.  Generally 6-8 (8 ounce) glasses a day is recommended.  Resume your regular diet unless otherwise ordered by a medical provider.  Keep any applied tape/gauze dressings clean and dry.  Change tape/gauze dressings if they get wet or soiled.  You may shower the following day after procedure, however cover and protect from moisture any tape/gauze dressings.  You may let water hit and run over dried skin glue, but do not scrub.  Pat the area dry after showering.  Port placement incisions are closed with absorbable suture, meaning they do not need to be removed at a later date, and a topical skin adhesive (skin glue).  This glue will wear off in 7-14 days.  Do not remove before this time.  If 14 days have passed and residual glue is present, you may gently remove it.  Do not apply gels, lotions, or ointments to the glue site for the first 10 days as this may cause the glue to prematurely soften and fail.  Do not perform strenuous activities or lift greater than 10 pounds for the next three days.  If there is bleeding or oozing from the procedure site, apply firm pressure to the area for 5-10 minutes.  If the bleeding continues seek medical advice at the numbers below.  Mild procedure site discomfort can be treated with an ice pack and over-the-counter pain relievers.        For 24 hours after any sedation used:  Relax and take it easy.  No strenuous activities.  Do not drive or operate machines at home or at work.  No alcohol consumption.  Do not make any  important or legal decisions.    Call our Interventional Radiology (IR) service if:  If you start bleeding from the procedure site.  If you do start to bleed from the site, lie down and hold some pressure on the site.  Our radiology provider can help you decide if you need to return to the hospital.  If you have new or worsening pain related to the procedure.  If you have concerning swelling at the procedure site.  If you develop persistent nausea or vomiting.  If you develop hives or a rash or any unexplained itching.  If you have a fever of greater than 100.5  F and chills in the first 5 days after procedure.  Any other concerns related to your procedure.      [facility]  Interventional Radiology (IR)  [address]  [address] [facility] [place] [address]    Contact Number:  [phone]  (IR control desk)  Monday - Friday 8:00 am - 4:30 pm    After hours for urgent concerns:  [phone]  After 4:30 pm Monday - Friday, Weekends and Holidays.   Ask for Interventional Radiology on-call.  Someone is available 24 hours a day.  [facility] toll free number:  [phone]

## 2023-07-28 NOTE — ANESTHESIA PREPROCEDURE EVALUATION
Anesthesia Pre-Procedure Evaluation    Patient: Gallo Navarro   MRN: [id number] : 1965        Procedure : Procedure(s):  Insert port vascular access          Past Medical History:   Diagnosis Date    Acute pancreatitis 2023    Gastric outlet obstruction     Recurrent acute pancreatitis       Past Surgical History:   Procedure Laterality Date    AS OPEN TREATMENT CLAVICULAR FRACTURE INTERNAL FX      ENDOSCOPIC RETROGRADE CHOLANGIOPANCREATOGRAM N/A 2023    Procedure: ENDOSCOPIC RETROGRADE CHOLANGIOPANCREATOGRAPHY;  Surgeon: Khadar Pickett MD;  Location: [facility] OR    ENDOSCOPIC ULTRASOUND UPPER GASTROINTESTINAL TRACT (GI) N/A 2023    Procedure: Endoscopic ultrasound upper gastrointestinal tract (GI), with biposy, GJ tube repositioning, stent placement;  Surgeon: Khadar Pickett MD;  Location: [facility] OR    ENDOSCOPIC ULTRASOUND UPPER GASTROINTESTINAL TRACT (GI) N/A 2023    Procedure: ENDOSCOPIC ULTRASOUND, ESOPHAGOSCOPY, EUS guided gastrojejunostomy;  Surgeon: Tre York MD;  Location: [facility] OR    INSERT PICC LINE  2023    IR NG TUBE PLACEMENT REQ RAD & FLUORO  2023    JG tube      No Known Allergies   Social History     Tobacco Use    Smoking status: Never    Smokeless tobacco: Never   Substance Use Topics    Alcohol use: Not Currently     Comment: o/w light beer 2days a week      Wt Readings from Last 1 Encounters:   23 76.7 kg (169 lb)        Anesthesia Evaluation   Pt has had prior anesthetic. Type: General and MAC.    No history of anesthetic complications       ROS/MED HX  ENT/Pulmonary:  - neg pulmonary ROS  (-) tobacco use, asthma and sleep apnea   Neurologic:  - neg neurologic ROS     Cardiovascular:  - neg cardiovascular ROS  (-) hypertension, CAD, BENJAMIN, arrhythmias, stent and murmur   METS/Exercise Tolerance: 4 - Raking leaves, gardening    Hematologic:       Musculoskeletal:       GI/Hepatic: Comment: Duodenal stenosis and biliary obstruction due to pancreatic mass  found to be adenocarcinoma   (-) GERD   Renal/Genitourinary:  - neg Renal ROS     Endo:     (+)  type II DM,   Using insulin,                 Psychiatric/Substance Use:  - neg psychiatric ROS     Infectious Disease:  - neg infectious disease ROS     Malignancy:   (+) Malignancy, History of GI.    Other:            Physical Exam    Airway        Mallampati: II   TM distance: > 3 FB   Neck ROM: full   Mouth opening: > 3 cm    Respiratory Devices and Support         Dental       (+) Minor Abnormalities - some fillings, tiny chips      Cardiovascular         (-) no murmur    Pulmonary                   OUTSIDE LABS:  CBC:   Lab Results   Component Value Date    WBC 7.2 07/21/2023    WBC 9.0 07/16/2023    HGB 10.4 (L) 07/21/2023    HGB 10.2 (L) 07/16/2023    HCT 31.5 (L) 07/21/2023    HCT 32.4 (L) 07/16/2023     07/21/2023     07/16/2023     BMP:   Lab Results   Component Value Date     07/21/2023     07/16/2023    POTASSIUM 3.8 07/21/2023    POTASSIUM 4.8 07/16/2023    CHLORIDE 100 07/21/2023    CHLORIDE 103 07/16/2023    CO2 27 07/21/2023    CO2 26 07/16/2023    BUN 16.0 07/21/2023    BUN 7.1 07/16/2023    CR 0.66 (L) 07/21/2023    CR 0.61 (L) 07/16/2023    GLC 91 07/21/2023     (H) 07/16/2023     COAGS:   Lab Results   Component Value Date    INR 1.46 (H) 07/13/2023     POC: No results found for: BGM, HCG, HCGS  HEPATIC:   Lab Results   Component Value Date    ALBUMIN 3.8 07/21/2023    PROTTOTAL 7.1 07/21/2023    ALT 67 07/21/2023    AST 38 07/21/2023    ALKPHOS 172 (H) 07/21/2023    BILITOTAL 1.9 (H) 07/21/2023     OTHER:   Lab Results   Component Value Date    LACT 1.0 07/08/2023    A1C 7.9 (H) 07/08/2023    DENISE 9.5 07/21/2023    PHOS 3.4 07/11/2023    MAG 2.1 07/16/2023    LIPASE 34 07/09/2023       Anesthesia Plan    ASA Status:  3    NPO Status:  NPO Appropriate    Anesthesia Type: MAC.     - Reason for MAC: straight local not clinically adequate   Induction: Intravenous,  Propofol.   Maintenance: TIVA.        Consents    Anesthesia Plan(s) and associated risks, benefits, and realistic alternatives discussed. Questions answered and patient/representative(s) expressed understanding.     - Discussed:     - Discussed with:  Patient            Postoperative Care       PONV prophylaxis: Background Propofol Infusion     Comments:                Peng Mancuso MD

## 2023-07-28 NOTE — ANESTHESIA CARE TRANSFER NOTE
Patient: Gallo Navarro    Procedure: Procedure(s):  Insert port vascular access       Diagnosis: Malignant neoplasm of head of pancreas (H) [C25.0]  Diagnosis Additional Information: No value filed.    Anesthesia Type:   MAC     Note:    Oropharynx: oropharynx clear of all foreign objects and spontaneously breathing  Level of Consciousness: awake  Oxygen Supplementation: room air    Independent Airway: airway patency satisfactory and stable  Dentition: dentition unchanged  Vital Signs Stable: post-procedure vital signs reviewed and stable  Report to RN Given: handoff report given  Patient transferred to: Phase II    Handoff Report: Identifed the Patient, Identified the Reponsible Provider, Reviewed the pertinent medical history, Discussed the surgical course, Reviewed Intra-OP anesthesia mangement and issues during anesthesia, Set expectations for post-procedure period and Allowed opportunity for questions and acknowledgement of understanding      Vitals:  Vitals Value Taken Time   BP     Temp     Pulse     Resp     SpO2         Electronically Signed By: ESVIN Cuba CRNA  July 28, 2023  10:57 AM

## 2023-07-31 ENCOUNTER — TELEPHONE (OUTPATIENT)
Dept: GASTROENTEROLOGY | Facility: CLINIC | Age: 58
End: 2023-07-31

## 2023-07-31 ENCOUNTER — TELEPHONE (OUTPATIENT)
Dept: ONCOLOGY | Facility: CLINIC | Age: 58
End: 2023-07-31

## 2023-07-31 ENCOUNTER — LAB (OUTPATIENT)
Dept: LAB | Facility: CLINIC | Age: 58
End: 2023-07-31
Attending: STUDENT IN AN ORGANIZED HEALTH CARE EDUCATION/TRAINING PROGRAM
Payer: COMMERCIAL

## 2023-07-31 ENCOUNTER — INFUSION THERAPY VISIT (OUTPATIENT)
Dept: ONCOLOGY | Facility: CLINIC | Age: 58
End: 2023-07-31
Attending: STUDENT IN AN ORGANIZED HEALTH CARE EDUCATION/TRAINING PROGRAM
Payer: COMMERCIAL

## 2023-07-31 VITALS
OXYGEN SATURATION: 100 % | WEIGHT: 168.4 LBS | SYSTOLIC BLOOD PRESSURE: 122 MMHG | RESPIRATION RATE: 16 BRPM | DIASTOLIC BLOOD PRESSURE: 67 MMHG | HEART RATE: 76 BPM | BODY MASS INDEX: 22.84 KG/M2 | TEMPERATURE: 97.9 F

## 2023-07-31 DIAGNOSIS — K31.5 DUODENAL STRICTURE: Primary | ICD-10-CM

## 2023-07-31 DIAGNOSIS — C25.0 MALIGNANT NEOPLASM OF HEAD OF PANCREAS (H): Primary | ICD-10-CM

## 2023-07-31 DIAGNOSIS — K31.5 DUODENAL STRICTURE: ICD-10-CM

## 2023-07-31 LAB
ALBUMIN SERPL BCG-MCNC: 3.9 G/DL (ref 3.5–5.2)
ALP SERPL-CCNC: 216 U/L (ref 40–129)
ALT SERPL W P-5'-P-CCNC: 64 U/L (ref 0–70)
ANION GAP SERPL CALCULATED.3IONS-SCNC: 9 MMOL/L (ref 7–15)
AST SERPL W P-5'-P-CCNC: 45 U/L (ref 0–45)
BASOPHILS # BLD AUTO: 0 10E3/UL (ref 0–0.2)
BASOPHILS NFR BLD AUTO: 1 %
BILIRUB DIRECT SERPL-MCNC: 0.76 MG/DL (ref 0–0.3)
BILIRUB SERPL-MCNC: 1.3 MG/DL
BUN SERPL-MCNC: 17.1 MG/DL (ref 6–20)
CALCIUM SERPL-MCNC: 9.3 MG/DL (ref 8.6–10)
CHLORIDE SERPL-SCNC: 103 MMOL/L (ref 98–107)
CREAT SERPL-MCNC: 0.55 MG/DL (ref 0.67–1.17)
DEPRECATED HCO3 PLAS-SCNC: 27 MMOL/L (ref 22–29)
EOSINOPHIL # BLD AUTO: 0.4 10E3/UL (ref 0–0.7)
EOSINOPHIL NFR BLD AUTO: 7 %
ERYTHROCYTE [DISTWIDTH] IN BLOOD BY AUTOMATED COUNT: 13.9 % (ref 10–15)
GFR SERPL CREATININE-BSD FRML MDRD: >90 ML/MIN/1.73M2
GLUCOSE BLDC GLUCOMTR-MCNC: 231 MG/DL (ref 70–99)
GLUCOSE SERPL-MCNC: 108 MG/DL (ref 70–99)
HCT VFR BLD AUTO: 29.7 % (ref 40–53)
HGB BLD-MCNC: 9.9 G/DL (ref 13.3–17.7)
IMM GRANULOCYTES # BLD: 0 10E3/UL
IMM GRANULOCYTES NFR BLD: 0 %
LYMPHOCYTES # BLD AUTO: 1.4 10E3/UL (ref 0.8–5.3)
LYMPHOCYTES NFR BLD AUTO: 25 %
MCH RBC QN AUTO: 31 PG (ref 26.5–33)
MCHC RBC AUTO-ENTMCNC: 33.3 G/DL (ref 31.5–36.5)
MCV RBC AUTO: 93 FL (ref 78–100)
MONOCYTES # BLD AUTO: 0.6 10E3/UL (ref 0–1.3)
MONOCYTES NFR BLD AUTO: 10 %
NEUTROPHILS # BLD AUTO: 3.3 10E3/UL (ref 1.6–8.3)
NEUTROPHILS NFR BLD AUTO: 57 %
NRBC # BLD AUTO: 0 10E3/UL
NRBC BLD AUTO-RTO: 0 /100
PLATELET # BLD AUTO: 266 10E3/UL (ref 150–450)
POTASSIUM SERPL-SCNC: 4 MMOL/L (ref 3.4–5.3)
PROT SERPL-MCNC: 6.7 G/DL (ref 6.4–8.3)
RBC # BLD AUTO: 3.19 10E6/UL (ref 4.4–5.9)
SODIUM SERPL-SCNC: 139 MMOL/L (ref 136–145)
WBC # BLD AUTO: 5.7 10E3/UL (ref 4–11)

## 2023-07-31 PROCEDURE — 258N000003 HC RX IP 258 OP 636: Performed by: STUDENT IN AN ORGANIZED HEALTH CARE EDUCATION/TRAINING PROGRAM

## 2023-07-31 PROCEDURE — 82962 GLUCOSE BLOOD TEST: CPT

## 2023-07-31 PROCEDURE — 96417 CHEMO IV INFUS EACH ADDL SEQ: CPT

## 2023-07-31 PROCEDURE — 96413 CHEMO IV INFUSION 1 HR: CPT

## 2023-07-31 PROCEDURE — 250N000011 HC RX IP 250 OP 636: Mod: JZ | Performed by: STUDENT IN AN ORGANIZED HEALTH CARE EDUCATION/TRAINING PROGRAM

## 2023-07-31 PROCEDURE — 80053 COMPREHEN METABOLIC PANEL: CPT

## 2023-07-31 PROCEDURE — 85025 COMPLETE CBC W/AUTO DIFF WBC: CPT

## 2023-07-31 PROCEDURE — 96415 CHEMO IV INFUSION ADDL HR: CPT

## 2023-07-31 PROCEDURE — 82248 BILIRUBIN DIRECT: CPT

## 2023-07-31 PROCEDURE — 96375 TX/PRO/DX INJ NEW DRUG ADDON: CPT

## 2023-07-31 PROCEDURE — 86301 IMMUNOASSAY TUMOR CA 19-9: CPT

## 2023-07-31 PROCEDURE — 36591 DRAW BLOOD OFF VENOUS DEVICE: CPT

## 2023-07-31 RX ORDER — ATROPINE SULFATE 0.4 MG/ML
0.4 AMPUL (ML) INJECTION
Status: DISCONTINUED | OUTPATIENT
Start: 2023-07-31 | End: 2023-07-31 | Stop reason: HOSPADM

## 2023-07-31 RX ORDER — LOPERAMIDE HCL 2 MG
CAPSULE ORAL
Qty: 30 CAPSULE | Refills: 0 | Status: SHIPPED | OUTPATIENT
Start: 2023-07-31 | End: 2023-08-18

## 2023-07-31 RX ORDER — PROCHLORPERAZINE MALEATE 10 MG
10 TABLET ORAL EVERY 6 HOURS PRN
Qty: 30 TABLET | Refills: 2 | Status: SHIPPED | OUTPATIENT
Start: 2023-07-31 | End: 2023-10-18

## 2023-07-31 RX ORDER — ONDANSETRON 2 MG/ML
8 INJECTION INTRAMUSCULAR; INTRAVENOUS ONCE
Status: COMPLETED | OUTPATIENT
Start: 2023-07-31 | End: 2023-07-31

## 2023-07-31 RX ORDER — DEXAMETHASONE 4 MG/1
8 TABLET ORAL DAILY
Qty: 4 TABLET | Refills: 2 | Status: SHIPPED | OUTPATIENT
Start: 2023-07-31 | End: 2023-09-06

## 2023-07-31 RX ORDER — ONDANSETRON 8 MG/1
8 TABLET, FILM COATED ORAL EVERY 8 HOURS PRN
Qty: 30 TABLET | Refills: 2 | Status: SHIPPED | OUTPATIENT
Start: 2023-07-31 | End: 2023-09-05

## 2023-07-31 RX ADMIN — ONDANSETRON 8 MG: 2 INJECTION INTRAMUSCULAR; INTRAVENOUS at 12:19

## 2023-07-31 RX ADMIN — ATROPINE SULFATE 0.4 MG: 0.4 INJECTION, SOLUTION INTRAVENOUS at 16:13

## 2023-07-31 RX ADMIN — DEXAMETHASONE SODIUM PHOSPHATE: 10 INJECTION, SOLUTION INTRAMUSCULAR; INTRAVENOUS at 12:23

## 2023-07-31 RX ADMIN — DEXTROSE MONOHYDRATE 250 ML: 50 INJECTION, SOLUTION INTRAVENOUS at 13:06

## 2023-07-31 RX ADMIN — OXALIPLATIN 170 MG: 5 INJECTION, SOLUTION INTRAVENOUS at 13:06

## 2023-07-31 RX ADMIN — DEXTROSE MONOHYDRATE 300 MG: 50 INJECTION, SOLUTION INTRAVENOUS at 15:14

## 2023-07-31 ASSESSMENT — PAIN SCALES - GENERAL: PAINLEVEL: SEVERE PAIN (6)

## 2023-07-31 NOTE — TELEPHONE ENCOUNTER
PA Initiation    Medication: ONDANSETRON HCL 8 MG PO TABS  Insurance Company: [employer] - Phone [phone] Fax [phone]  Pharmacy Filling the Rx:    Filling Pharmacy Phone:    Filling Pharmacy Fax:    Start Date: 7/31/2023      Shayy Mullins CPhTOncology Pharmacy [facility] [address] [address]  Office: [phone]  Fax: [phone]  [email or web address]

## 2023-07-31 NOTE — PROGRESS NOTES
"One hour into Irinotecan patient reported feeling warm, diaphoretic and abdominal cramping. VSS. Irinotecan stopped. Atropine administered. Pt monitored for 15mins. He reported feeling better and drug was restarted.     Shortly thereafter, pt reported \"blurry vision\" while watching his show on his phone. No s/s of allergic reaction. Possibly due to atropine. Pt will let writer know if if persists or worsens.    Approximately 10 minutes left of Irinotecan pt reported feeling dizzy, difficulty word finding and pt appeared pale looking. At this time, pt mildly hypotensive and blood glucose in 200's (pt wears a CGM and was correcting with insulin). Irinotecan stopped. NS bolus started. Paramedic called to assess neurological symptoms and hyperglycemia.     Writer reviewed situation with Dr. Haile; symptoms related to atropine side effects. Ok to finish irinotecan and connect 5 fluorouracil pump connected.     Pt reported feeling better prior to discharge with all symptoms. Pt and wife feel comfortable leaving.     Patient new to oncology infusion room and is receiving chemo regimen for the first time. Pt oriented to infusion room and call light. Chemotherapy teaching done previously by RNCC.  Reinforced chemotherapy teaching/side effects and schedule during infusion visit.     Copy of AVS reviewed with patient. Pt instructed to call care coordinator, triage (or MD on call if after hours/weekends) with chills/temp >=100.4, questions/concerns. Pt stated understanding of plan.                     "

## 2023-07-31 NOTE — TELEPHONE ENCOUNTER
Johanna:    I called Gallo and he continues to have pain with eating as per your prior note. Has been on a liquid diet, although reports that he was for may days prior to the call last week. Denies vomiting or drainage around the PEG site.     Reports that symptoms were better for two days or so of the the EUS GJ but now are back to the same as pre-procedure (NOTE: pain is not worse than pre-procedure).    Please arrange for upper GI series (do not need SBFT). Ind - assess for patency of gastrojejunal stent.    MARY York MD  Professor of Medicine  Division of Gastroenterology, Hepatology and Nutrition  [facility]

## 2023-07-31 NOTE — NURSING NOTE
Chief Complaint   Patient presents with    Port Draw     Port accessed by RN, labs collected, line flushed with saline and heparin.  Vitals taken. Pt checked in for appointment(s).      Nga Downing RN

## 2023-07-31 NOTE — PATIENT INSTRUCTIONS
[facility] Triage and after hours / weekends / holidays:  [phone]    Please call the triage or after hours line if you experience a temperature greater than or equal to 100.4, shaking chills, have uncontrolled nausea, vomiting and/or diarrhea, dizziness, shortness of breath, chest pain, bleeding, unexplained bruising, or if you have any other new/concerning symptoms, questions or concerns.      If you are having any concerning symptoms or wish to speak to a provider before your next infusion visit, please call triage to notify them so we can adequately serve you.     If you need a refill on a narcotic prescription or other medication, please call before your infusion appointment.

## 2023-07-31 NOTE — TELEPHONE ENCOUNTER
Prior Authorization Approval    Medication: ONDANSETRON HCL 8 MG PO TABS  Authorization Effective Date: 7/31/2023  Authorization Expiration Date: 1/27/2024  Approved Dose/Quantity:   Reference #: [id number]   Insurance Company: [employer] - Phone [phone] Fax [phone]  Expected CoPay:       CoPay Card Available:      Financial Assistance Needed:   Which Pharmacy is filling the prescription: [facility] [place], [place] - [address] [place] [address] [id number]  Pharmacy Notified: Yes  Patient Notified: Yes        Shayy uMllins CPhTOncology Pharmacy [facility] [address] [address]  Office: [phone]  Fax: [phone]  [email or web address]

## 2023-07-31 NOTE — PROGRESS NOTES
Infusion Nursing Note:  Gallo Navarro presents today for Cycle 1 Day 1 Oxaliplatin, Irinotecan, and Fluorouracil pump connect.    Patient seen by provider today: No   present during visit today: Not Applicable.    Note: Patient denies any signs or symptoms of infection. Patient endorses some abdominal pain related to current disease process. Patient saw palliative on Friday and started new pain management regimens. Butrans patch in place and patient taking PRN Dilaudid from home in infusion today. Patient declines any additional interventions. Patient offers no other complaints. Patient feels ready for treatment today.    EKG completed 7/28/23; NSR.   For future infusions, pharmacy will plan on making the Oxaliplatin in 250mL and the Irinotecan in NS d/t diabetes.    TORB: 07/31/23 @12:45pm Dr. Hurt/Cindy Cortez RN: Patient has a rash around the site of his Butrans patch. Pictures uploaded into media tab. Per MD, keep patch on and RNCC will reach out tomorrow.     Atropine x1 given about 45 minutes into Irinotecan infusion d/t complaints of abdominal cramping and diaphoresis. See note by Jennie Joaquin RN regarding the rest of the infusion.    Patient reported he was unaware of the Levofloxacin order from the hospital. Efra Lynn RNCC notified.     Intravenous Access:  Implanted Port accessed in lab.    Treatment Conditions:  Lab Results   Component Value Date    HGB 9.9 (L) 07/31/2023    WBC 5.7 07/31/2023    ANEUTAUTO 3.3 07/31/2023     07/31/2023        Lab Results   Component Value Date     07/31/2023    POTASSIUM 4.0 07/31/2023    MAG 2.1 07/16/2023    CR 0.55 (L) 07/31/2023    DENISE 9.3 07/31/2023    BILITOTAL 1.3 (H) 07/31/2023    ALBUMIN 3.9 07/31/2023    ALT 64 07/31/2023    AST 45 07/31/2023       Results reviewed, labs MET treatment parameters, ok to proceed with treatment.    Post Infusion Assessment:  Patient tolerated infusion without incident.  Blood return noted pre and post  infusion.  Site patent and intact, free from redness, edema or discomfort.  No evidence of extravasations.     Prior to discharge: Port is secured in place with tegaderm and flushed with 10cc NS with positive blood return noted.    Continuous home infusion Dosi-Fuser pump connected.    All connectors secured in place and clamps taped open.    Capillary element taped to pt's skin with Tegaderm.  Pump Connection double checked with Jennie Joaquin RN.  Infusing at 5.2mL/hour.  Patient instructed to call our clinic or [facility] with any questions or concerns at home.  Patient verbalized understanding.    Patient set up for pump disconnect at home with [facility] on August 2nd at 4pm.      Discharge Plan:   Prescription refills given for Zofran, Compazine, Decadron and Imodium.  Patient and/or family verbalized understanding of discharge instructions and all questions answered.  AVS to patient via [employer].  Patient will return 8/15/23 for next appointment.   Patient discharged in stable condition accompanied by: self and wife.  Departure Mode: Ambulatory.      Cindy Cortez RN

## 2023-08-01 ENCOUNTER — PATIENT OUTREACH (OUTPATIENT)
Dept: ONCOLOGY | Facility: CLINIC | Age: 58
End: 2023-08-01
Payer: COMMERCIAL

## 2023-08-01 LAB
ATRIAL RATE - MUSE: 69 BPM
CANCER AG19-9 SERPL IA-ACNC: 29 U/ML
DIASTOLIC BLOOD PRESSURE - MUSE: NORMAL MMHG
INTERPRETATION ECG - MUSE: NORMAL
P AXIS - MUSE: -22 DEGREES
PR INTERVAL - MUSE: 150 MS
QRS DURATION - MUSE: 74 MS
QT - MUSE: 410 MS
QTC - MUSE: 439 MS
R AXIS - MUSE: 54 DEGREES
SYSTOLIC BLOOD PRESSURE - MUSE: NORMAL MMHG
T AXIS - MUSE: 55 DEGREES
VENTRICULAR RATE- MUSE: 69 BPM

## 2023-08-01 NOTE — PROGRESS NOTES
Per infusion RN pt had a rapid response called during C1 folfirinox yesterday due to dizziness and difficulty word finding. Dr. Haile was paged and determined side effects from Atropine use, pt was okayed to finish chemo without further incidence. Infusion RN recommended pt vent his G-tube during C2 as to decrease abd cramping and may give Pepcid IV as a pre-med and not use Atropine if not needed.    Writer called pt to follow-up today;

## 2023-08-02 ENCOUNTER — LAB (OUTPATIENT)
Dept: LAB | Facility: CLINIC | Age: 58
End: 2023-08-02
Payer: COMMERCIAL

## 2023-08-02 ENCOUNTER — ALLIED HEALTH/NURSE VISIT (OUTPATIENT)
Dept: EDUCATION SERVICES | Facility: CLINIC | Age: 58
End: 2023-08-02
Payer: COMMERCIAL

## 2023-08-02 ENCOUNTER — INFUSION THERAPY VISIT (OUTPATIENT)
Dept: INFUSION THERAPY | Facility: CLINIC | Age: 58
End: 2023-08-02
Payer: COMMERCIAL

## 2023-08-02 VITALS
TEMPERATURE: 98 F | HEART RATE: 79 BPM | RESPIRATION RATE: 16 BRPM | OXYGEN SATURATION: 100 % | DIASTOLIC BLOOD PRESSURE: 83 MMHG | SYSTOLIC BLOOD PRESSURE: 132 MMHG

## 2023-08-02 DIAGNOSIS — C25.9 PANCREATIC ADENOCARCINOMA (H): ICD-10-CM

## 2023-08-02 DIAGNOSIS — C25.0 MALIGNANT NEOPLASM OF HEAD OF PANCREAS (H): Primary | ICD-10-CM

## 2023-08-02 DIAGNOSIS — C25.0 MALIGNANT NEOPLASM OF HEAD OF PANCREAS (H): ICD-10-CM

## 2023-08-02 DIAGNOSIS — Z79.4 TYPE 2 DIABETES MELLITUS WITH HYPERGLYCEMIA, WITH LONG-TERM CURRENT USE OF INSULIN (H): ICD-10-CM

## 2023-08-02 DIAGNOSIS — E11.65 TYPE 2 DIABETES MELLITUS WITH HYPERGLYCEMIA, WITH LONG-TERM CURRENT USE OF INSULIN (H): ICD-10-CM

## 2023-08-02 LAB
ALBUMIN SERPL BCG-MCNC: 4.1 G/DL (ref 3.5–5.2)
ALP SERPL-CCNC: 217 U/L (ref 40–129)
ALT SERPL W P-5'-P-CCNC: 69 U/L (ref 0–70)
ANION GAP SERPL CALCULATED.3IONS-SCNC: 11 MMOL/L (ref 7–15)
AST SERPL W P-5'-P-CCNC: 38 U/L (ref 0–45)
BASOPHILS # BLD AUTO: 0 10E3/UL (ref 0–0.2)
BASOPHILS NFR BLD AUTO: 0 %
BILIRUB SERPL-MCNC: 1.2 MG/DL
BUN SERPL-MCNC: 20.3 MG/DL (ref 6–20)
CALCIUM SERPL-MCNC: 9.6 MG/DL (ref 8.6–10)
CHLORIDE SERPL-SCNC: 99 MMOL/L (ref 98–107)
CREAT SERPL-MCNC: 0.65 MG/DL (ref 0.67–1.17)
DEPRECATED HCO3 PLAS-SCNC: 27 MMOL/L (ref 22–29)
EOSINOPHIL # BLD AUTO: 0 10E3/UL (ref 0–0.7)
EOSINOPHIL NFR BLD AUTO: 0 %
ERYTHROCYTE [DISTWIDTH] IN BLOOD BY AUTOMATED COUNT: 14 % (ref 10–15)
GFR SERPL CREATININE-BSD FRML MDRD: >90 ML/MIN/1.73M2
GLUCOSE SERPL-MCNC: 175 MG/DL (ref 70–99)
HCT VFR BLD AUTO: 32.7 % (ref 40–53)
HGB BLD-MCNC: 10.7 G/DL (ref 13.3–17.7)
IMM GRANULOCYTES # BLD: 0 10E3/UL
IMM GRANULOCYTES NFR BLD: 0 %
LYMPHOCYTES # BLD AUTO: 0.3 10E3/UL (ref 0.8–5.3)
LYMPHOCYTES NFR BLD AUTO: 4 %
MCH RBC QN AUTO: 31.1 PG (ref 26.5–33)
MCHC RBC AUTO-ENTMCNC: 32.7 G/DL (ref 31.5–36.5)
MCV RBC AUTO: 95 FL (ref 78–100)
MONOCYTES # BLD AUTO: 0.1 10E3/UL (ref 0–1.3)
MONOCYTES NFR BLD AUTO: 1 %
NEUTROPHILS # BLD AUTO: 8.7 10E3/UL (ref 1.6–8.3)
NEUTROPHILS NFR BLD AUTO: 95 %
NRBC # BLD AUTO: 0 10E3/UL
NRBC BLD AUTO-RTO: 0 /100
PLATELET # BLD AUTO: 291 10E3/UL (ref 150–450)
POTASSIUM SERPL-SCNC: 4.1 MMOL/L (ref 3.4–5.3)
PROT SERPL-MCNC: 7 G/DL (ref 6.4–8.3)
RBC # BLD AUTO: 3.44 10E6/UL (ref 4.4–5.9)
SODIUM SERPL-SCNC: 137 MMOL/L (ref 136–145)
WBC # BLD AUTO: 9.2 10E3/UL (ref 4–11)

## 2023-08-02 PROCEDURE — 99207 PR NO BILLABLE SERVICE THIS VISIT: CPT | Mod: JZ | Performed by: STUDENT IN AN ORGANIZED HEALTH CARE EDUCATION/TRAINING PROGRAM

## 2023-08-02 PROCEDURE — 85025 COMPLETE CBC W/AUTO DIFF WBC: CPT

## 2023-08-02 PROCEDURE — 36415 COLL VENOUS BLD VENIPUNCTURE: CPT

## 2023-08-02 PROCEDURE — 86301 IMMUNOASSAY TUMOR CA 19-9: CPT | Mod: 90

## 2023-08-02 PROCEDURE — 80053 COMPREHEN METABOLIC PANEL: CPT

## 2023-08-02 PROCEDURE — G0108 DIAB MANAGE TRN  PER INDIV: HCPCS

## 2023-08-02 RX ORDER — HEPARIN SODIUM (PORCINE) LOCK FLUSH IV SOLN 100 UNIT/ML 100 UNIT/ML
5 SOLUTION INTRAVENOUS
Status: DISCONTINUED | OUTPATIENT
Start: 2023-08-02 | End: 2023-08-02 | Stop reason: HOSPADM

## 2023-08-02 RX ADMIN — HEPARIN SODIUM (PORCINE) LOCK FLUSH IV SOLN 100 UNIT/ML 5 ML: 100 SOLUTION at 15:31

## 2023-08-02 ASSESSMENT — PAIN SCALES - GENERAL: PAINLEVEL: MODERATE PAIN (4)

## 2023-08-02 NOTE — PATIENT INSTRUCTIONS
PLAN:  *Resume Basaglar at 10 units in the evening  *Increase evening NPH to 17 units  *Take NPH 5 units in the morning   *Correcting with Novolog for high blood sugar at minimum needs to be every 4 hours apart  *Correction scale 1 unit per 50>150 during day hours only-morning, lunch time, and around dinner time  150-200=1 unit  201-250=2 units  251-300=3 units  *Let me know if you are having persistent blood sugars <70    FOLLOW-UP:    *Establish care with Endocrine ASAP  *I will contact you via My Chart Friday after I peek at numbers

## 2023-08-02 NOTE — PROGRESS NOTES
Pt and spouse left  this morning asking when to take dexamethasone, bottle states to start after chemo but he still has his pump connected so was unsure if he should start.    Called pt and reminded him to start Dexa the day after chemo, which he did yesterday and took 2nd dose this morning. He stated after his infusion, he did not have anymore dizziness or vision changes.     He does admit to mild nausea, he is alternating zofran and compazine since yesterday evening. Takes daily protonix and has not had worsening acid reflux. Did develop hiccups, but not bothersome at this time and was informed if hiccups gets worse, may prescribe something to help, he will let care team know if it does.    Currently his diet consists of 3 enzymes before every meal which is a protein drink as he is on a liquid diet only. He was unable to advance his diet due to pain with swallowing so is continuing to use Peptamen 1.5 in his peg-j tube overnight. Glucose high max 360 during the night, using short acting insulin and this morning was 160, he is not symptomatic when glucoses get that high.    NPH 15u at bedtime, was told to only use Basaglar 20u in the AM if BS is high, using Novolog 3u before every meal. He has an in person visit with diabetes educator this afternoon. Dr. York ordered a XR upper Gi scheduled for Friday 8/4 to assess why he is unable to advance his diet and may take him back to surgery depending on results.     Pt denied any other questions at this time. Writer did inform him since he will already be at [facility] location for his diabetes appointment, he may get his pump disconnected at the infusion center instead of rushing back home to meet home infusion RN, he was agreeable to this and message sent to scheduling pool.

## 2023-08-02 NOTE — PROGRESS NOTES
Infusion Nursing Note:  Gallo Navarro presents today for pump d/c.    Patient seen by provider today: No   present during visit today: Not Applicable.    Note: Pt here for 5FU dosi-fuser disconnect. He was originally a home disconnect but pt was here at  for diabetes management appt anyways and decided to get disconnected here. Pt not feeling great overall. He has had intermittent hiccups starting yesterday (day after treatment). He is tired and not sleeping well and blood sugars are elevated d/t the dexamethasone (insulin regimen was adjusted at today's diabetes management appt). His ankles are mildly swollen and his back/stomach hurt currently. He takes pain meds every 4 hrs and is due for them and will take them as soon as he gets home. VSS on room air. Pt/wife state they keep in touch with provider via [employer] and will message provider if he does not improve once dexamethasone is done (takes last dose today) or if anything worsens. Dosi-fuser noted to be completely empty, all connections secure, and good blood return noted on port.      Intravenous Access:  Implanted Port.    Treatment Conditions:  Not Applicable.      Post Infusion Assessment:  Patient tolerated infusion without incident.  Blood return noted pre and post infusion.  Site patent and intact, free from redness, edema or discomfort.  No evidence of extravasations.  Access discontinued per protocol.       Discharge Plan:   AVS to patient via [employer].  Patient will return next week for next appointment (upper GI) and sees Dr. Anglin 8/15/23. Future appts have been reviewed and crosschecked with appt note and plan.   Patient discharged in stable condition accompanied by: wife.  Departure Mode: Wheelchair.      Erendira Gaming RN

## 2023-08-02 NOTE — PROGRESS NOTES
Diabetes Self-Management Education & Support    Gallo Navarro presents today for education related to Type 2 diabetes-pancreatic adenocarcinoma  Patient is being treated with:  insulin  He is accompanied by spouse    Year of diagnosis:   Referring provider:  Neema Delgado  Living Situation: Home with wife and children  Employment: Retired professor    PATIENT CONCERNS RELATED TO DIABETES SELF MANAGEMENT:   High blood sugars      ASSESSMENT:    Taking Medication:     Current Diabetes Management per Patient:  Taking diabetes medications?   NPH 15 units in the evening 30 minutes prior to feeding  Novolog standard correction    Monitoring  Patient glucose self monitoring as follows: continuously using a continuous glucose monitor (CGM)  BG meter: Freestyle Bora 2  BG results:            Patient's most recent   Lab Results   Component Value Date    A1C 7.9 2023      Patient's A1C goal: <7.0    Activity: no regular exercise program    Healthy Eatin hour night feeding 8 to 8   Applesauce,  Ensure, Boost, Choni yogurt     Problem Solving:    Patient is at risk of hypoglycemia?: YES  Hospitalizations for hyper or hypoglycemia: No    Healthy Coping and Stress Management:   Sources of stress identified by patient My health  Coping mechanisms identified by patient:  Other (please specify):  Not assessed      EDUCATION and INSTRUCTION PROVIDED AT THIS VISIT:    Diagnosed with Pancreatic adenocarcinoma 2023. Initially  was diagnosed with acute pancreatitis [facility] . He had first round of chemo this past Monday with IV Dexamethasone and oral. He is scheduled for chemo every other Tuesday with chemo pack for two days after, and Dexamethasone 10 mg for 3 days. He is down 40 pounds since March. Eats minimal amounts of food, applesauce, Ensure, Boosts. He has GJ tube for twelve hour feedings, typically from 8pm to 8am. He was discharged from hospital on Basaglar 20 units, NPH 15 units in the  evening, and Novolog, standard correction. He hasn't been taking Basaglar. Bora 2 data TIR 65%, TAR 35%, with no lows. He did have a few episodes near 70, appears to be from stacking insulin. BG higher these past few days after/with steroid. Prior to steroid pattern of hyperglycemia late evening/overnight during tube feeding. Restart Basaglar 10 units in the evening, increase evening NPH dose to 17 units, and start NPH 5 units in the morning. Patient did have episode of abdominal pain, reports swelling in feel, and did have facial flushing; episode subsided after a few minutes during visit. He is not sleeping well, fatigued. Patient had visit with infusion to remove chemo pack, report handed off to charge nurse. I will peek at My Best Interest Friday after being off steroid and follow up with patient via phone next week.    Patient-stated goal written and given to Gallo Navarro.  Verbalized and demonstrated understanding of instructions.   See patient instructions  AVS printed and given to patient    PLAN:  *Resume Basaglar at 10 units in the evening  *Increase evening NPH to 17 units  *Take NPH 5 units in the morning   *Correcting with Novolog for high blood sugar at minimum needs to be every 4 hours apart  *Correction scale 1 unit per 50>150 during day hours only-morning, lunch time, and around dinner time  150-200=1 unit  201-250=2 units  251-300=3 units  *Let me know if you are having persistent blood sugars <70    FOLLOW-UP:    *Establish care with Endocrine ASAP  *I will contact you via My Chart Friday after I peek at numbers    Time spent with patient at today's visit was 60 minutes.      Any diabetes medication dose changes were made via the CDE Protocol and Collaborative Practice Agreement with [facility]  [facility].  A copy of this encounter was provided to patient's referring provider-Blu Haile

## 2023-08-03 DIAGNOSIS — Z51.11 ENCOUNTER FOR ANTINEOPLASTIC CHEMOTHERAPY: Primary | ICD-10-CM

## 2023-08-03 LAB — CANCER AG19-9 SERPL IA-ACNC: 27 U/ML

## 2023-08-04 ENCOUNTER — ANCILLARY PROCEDURE (OUTPATIENT)
Dept: GENERAL RADIOLOGY | Facility: CLINIC | Age: 58
End: 2023-08-04
Attending: INTERNAL MEDICINE
Payer: COMMERCIAL

## 2023-08-04 ENCOUNTER — MYC MEDICAL ADVICE (OUTPATIENT)
Dept: EDUCATION SERVICES | Facility: CLINIC | Age: 58
End: 2023-08-04

## 2023-08-04 DIAGNOSIS — K31.5 DUODENAL STRICTURE: ICD-10-CM

## 2023-08-04 DIAGNOSIS — C25.9 PANCREATIC ADENOCARCINOMA (H): ICD-10-CM

## 2023-08-04 PROCEDURE — 74240 X-RAY XM UPR GI TRC 1CNTRST: CPT | Mod: GC | Performed by: RADIOLOGY

## 2023-08-04 RX ORDER — HYDROMORPHONE HYDROCHLORIDE 2 MG/1
2-4 TABLET ORAL EVERY 4 HOURS PRN
Qty: 90 TABLET | Refills: 0 | Status: SHIPPED | OUTPATIENT
Start: 2023-08-04 | End: 2023-08-10

## 2023-08-04 NOTE — TELEPHONE ENCOUNTER
Call placed to patient to discuss pain control after the butrans patch was added in. Pt reports pain has not been well controlled the past 2 days despite having the patch on and taking dilaudid 2 mg every 4 hours. Discussed with Dr. Hurt. Will increase dilaudid to 2-4 mg Q 4 hr PRN. Pt will adjust his bowel meds as well.     Encouraged pt to call me back early next week if pain is still not well controlled.      Last refill: 7/27/23  Last office visit: 7/27/23  Scheduled for follow up 8/31/23      Will route request to MD for review.      Reviewed MN  Report.

## 2023-08-04 NOTE — TELEPHONE ENCOUNTER
Call placed to patient to discuss pain control after the butrans patch was added in. Pt reports pain has not been well controlled the past 2 days despite having the patch on and taking dilaudid 2 mg every 4 hours. Discussed with Dr. Hurt. Will increase dilaudid to 2-4 mg Q 4 hr PRN. Pt will adjust his bowel meds as well.    Encouraged pt to call me back early next week if pain is still not well controlled.     Last refill: 7/27/23  Last office visit: 7/27/23  Scheduled for follow up ***     Will route request to {[email or web address]:[id number]} for review.     Reviewed MN  Report.

## 2023-08-07 ENCOUNTER — OFFICE VISIT (OUTPATIENT)
Dept: FAMILY MEDICINE | Facility: CLINIC | Age: 58
End: 2023-08-07
Payer: COMMERCIAL

## 2023-08-07 VITALS
RESPIRATION RATE: 23 BRPM | BODY MASS INDEX: 22.52 KG/M2 | OXYGEN SATURATION: 98 % | DIASTOLIC BLOOD PRESSURE: 76 MMHG | HEART RATE: 97 BPM | WEIGHT: 166.3 LBS | HEIGHT: 72 IN | SYSTOLIC BLOOD PRESSURE: 112 MMHG | TEMPERATURE: 98.3 F

## 2023-08-07 DIAGNOSIS — K31.1 GASTRIC OUTLET OBSTRUCTION: ICD-10-CM

## 2023-08-07 DIAGNOSIS — C25.0 MALIGNANT NEOPLASM OF HEAD OF PANCREAS (H): Primary | ICD-10-CM

## 2023-08-07 DIAGNOSIS — E11.9 TYPE 2 DIABETES MELLITUS WITHOUT COMPLICATION, WITH LONG-TERM CURRENT USE OF INSULIN (H): ICD-10-CM

## 2023-08-07 DIAGNOSIS — Z79.4 TYPE 2 DIABETES MELLITUS WITHOUT COMPLICATION, WITH LONG-TERM CURRENT USE OF INSULIN (H): ICD-10-CM

## 2023-08-07 DIAGNOSIS — Z98.890 HISTORY OF BILIARY STENT INSERTION: ICD-10-CM

## 2023-08-07 PROCEDURE — 99203 OFFICE O/P NEW LOW 30 MIN: CPT | Performed by: INTERNAL MEDICINE

## 2023-08-07 ASSESSMENT — PAIN SCALES - GENERAL: PAINLEVEL: NO PAIN (0)

## 2023-08-07 NOTE — TELEPHONE ENCOUNTER
Increase NPH to 19 units.  Will await further recommendations until discussion with Dr. Hernandez per patient.    Pricila Aguirre, RN, Diabetes Educator  Diabetes Education Department  [facility], [facility] and [place]  [phone]     Pt with hx of AFIB reports SOB, wheezing x2 days and reports having received the J&J vaccine 8 days ago. Pt also has family with close contact who are known COVID +.

## 2023-08-07 NOTE — PROGRESS NOTES
Assessment & Plan     Malignant neoplasm of head of pancreas (H)  He is being followed up with oncology  They have started chemotherapy  He has his first infusion last Monday  Next infusion is scheduled for Tuesday  At this point the tumor is deemed unresectable  Abdominal pain is under control  However he still has issues with appetite  He has a GJ tube in place  He also has a port in place  He is on a Butrans patch and also Dilaudid for pain  Palliative care is following  I will place a home health referral  - Home Care Referral    History of biliary stent insertion  Recent scan indicates stent is functioning well  - Home Care Referral    Gastric outlet obstruction  He has a gastroduodenal stent in place  - Home Care Referral    Type 2 diabetes mellitus without complication, with long-term current use of insulin (H)  This is only been diagnosed after the CA pancreas diagnosis  He is on tube feeds overnight  Currently on NPH insulin at night and also takes correctional dose  Seen diabetic educator last week  Blood sugars are still spiking at night  We will have to adjust insulin regimen  I will liaise with the diabetic educator      30 minutes spent by me on the date of the encounter doing chart review, history and exam, documentation and further activities per the note     MED REC REQUIRED  Post Medication Reconciliation Status: discharge medications reconciled and changed, per note/orders      Akil Hernandez MD  [facility]    Lucy Holder is a 57 year old, presenting for the following health issues:  Establish Care (Establish care-needs new PCP that can make medical decisions for him in a timely manner) and Hospital F/U (Follow up from hospital visit. )      8/7/2023     9:48 AM   Additional Questions   Roomed by Zuly JOSE   Accompanied by Spouse (Violet)         8/7/2023     9:48 AM   Patient Reported Additional Medications   Patient reports taking the following new medications  "None       HPI       Hospital Follow-up Visit:    Hospital/Nursing Home/IP Rehab Facility: [facility]  Date of Admission: 07/28/23  Date of Discharge: 07/28/23  Reason(s) for Admission: Port access     Was your hospitalization related to COVID-19? No   Problems taking medications regularly:  None  Medication changes since discharge: None  Problems adhering to non-medication therapy:  None    Summary of hospitalization:  [facility] discharge summary reviewed  Diagnostic Tests/Treatments reviewed.  Follow up needed: Oncology  Other Healthcare Providers Involved in Patient s Care:         None  Update since discharge: improved.         Plan of care communicated with patient                   Review of Systems   Constitutional, HEENT, cardiovascular, pulmonary, gi and gu systems are negative, except as otherwise noted.      Objective    /76 (BP Location: Right arm, Patient Position: Sitting, Cuff Size: Adult Regular)   Pulse 97   Temp 98.3  F (36.8  C) (Oral)   Resp 23   Ht 1.816 m (5' 11.5\")   Wt 75.4 kg (166 lb 4.8 oz)   SpO2 98%   BMI 22.87 kg/m    Body mass index is 22.87 kg/m .  Physical Exam   GENERAL: healthy, alert and no distress  NECK: no adenopathy, no asymmetry, masses, or scars and thyroid normal to palpation  RESP: lungs clear to auscultation - no rales, rhonchi or wheezes  CV: regular rate and rhythm, normal S1 S2, no S3 or S4, no murmur, click or rub, no peripheral edema and peripheral pulses strong  ABDOMEN: GJ tube in place   Port in place  MS: no gross musculoskeletal defects noted, no edema  SKIN: Butrans patch in place                      "

## 2023-08-07 NOTE — TELEPHONE ENCOUNTER
Who and when should I schedule this patient with?     Thanks   Oralia poon Procedure   Orthopedics, Podiatry, Sports Medicine, Ent ,Eye , Audiology, Adult Endocrine & Diabetes, Nutrition & Medication Therapy Management Specialties   [facility]

## 2023-08-07 NOTE — TELEPHONE ENCOUNTER
HI,   I called and spoke with patient he can do virtual appointment on 10/2     Can you please schedule patient on 10/2 with Divine ramirez,    Thanks  Oralia poon Procedure   Orthopedics, Podiatry, Sports Medicine, Ent ,Eye , Audiology, Adult Endocrine & Diabetes, Nutrition & Medication Therapy Management Specialties   [facility]

## 2023-08-08 ENCOUNTER — PATIENT OUTREACH (OUTPATIENT)
Dept: CARE COORDINATION | Facility: CLINIC | Age: 58
End: 2023-08-08
Payer: COMMERCIAL

## 2023-08-08 ENCOUNTER — TELEPHONE (OUTPATIENT)
Dept: ENDOCRINOLOGY | Facility: CLINIC | Age: 58
End: 2023-08-08
Payer: COMMERCIAL

## 2023-08-08 NOTE — PROGRESS NOTES
Clinic Care Coordination Contact    [facility]le declined due to insurance out of network      [facility]; declined due to patient already receiving palliative care and no PCP we will not be able to accept at this time as insurance will not reimburse for both services.   (Of note, patient had establish care visit with Dr. Hernandez 8/7/23)    Senait Carson RN, BSN, PHN Care Coordinator  [place], [place], and [place] Arevalo   Phone: [phone]

## 2023-08-08 NOTE — LETTER
8/8/2023         RE: Gallo Navarro  [address] [address] [place] [id number]        Dear home health agency,     I am writing to you as PCP has placed a home care referral for skilled nursing. [facility].     Please review home care order, insurance and last appointment note. Please let me know via In basket message or phone call either way (Phone [phone])     Point of contact: Senait Carson RN, BSN, PHN Care Coordinator  [place], [place], and [place]   Phone: [phone]     Thank you in advance!   Senait Carson, RN

## 2023-08-08 NOTE — PROGRESS NOTES
Clinic Care Coordination Contact    Care team referral: assistance in securing alternative home care agency.     [facility] declined referral.     Contacted patient and patient aware of situation.     Plan: CC RN has sent in basket message/letters to six of our [facility] home care partners.     Senait Carson RN, BSN, PHN Care Coordinator  [place], [place], and [place]anna Arevalo   Phone: [phone]

## 2023-08-08 NOTE — Clinical Note
Dear Dr. Hernandez, Thank you for the care coordination referral.  We have secured an alternative home care agency. This is FYI only.  Thank you, Senait Carson RN, BSN, PHN Care Coordinator [place], [place], and [place] Arevalo  Phone: [phone]

## 2023-08-08 NOTE — TELEPHONE ENCOUNTER
Will add NPH 5 units in morning. Increase NPH to 20 units in the evening.    Follow up via telephone Friday.    Pricila Aguirre RN, Diabetes Educator  Diabetes Education Department  [facility], [facility] and [place]  [phone]

## 2023-08-09 ENCOUNTER — NURSE TRIAGE (OUTPATIENT)
Dept: ONCOLOGY | Facility: CLINIC | Age: 58
End: 2023-08-09
Payer: COMMERCIAL

## 2023-08-09 ENCOUNTER — INFUSION THERAPY VISIT (OUTPATIENT)
Dept: INFUSION THERAPY | Facility: CLINIC | Age: 58
End: 2023-08-09
Attending: STUDENT IN AN ORGANIZED HEALTH CARE EDUCATION/TRAINING PROGRAM
Payer: COMMERCIAL

## 2023-08-09 VITALS
DIASTOLIC BLOOD PRESSURE: 80 MMHG | RESPIRATION RATE: 18 BRPM | TEMPERATURE: 98.8 F | BODY MASS INDEX: 22.6 KG/M2 | OXYGEN SATURATION: 100 % | HEART RATE: 87 BPM | WEIGHT: 164.3 LBS | SYSTOLIC BLOOD PRESSURE: 130 MMHG

## 2023-08-09 DIAGNOSIS — C25.0 MALIGNANT NEOPLASM OF HEAD OF PANCREAS (H): Primary | ICD-10-CM

## 2023-08-09 LAB
ALBUMIN SERPL BCG-MCNC: 3.2 G/DL (ref 3.5–5.2)
ALP SERPL-CCNC: 113 U/L (ref 40–129)
ALT SERPL W P-5'-P-CCNC: 30 U/L (ref 0–70)
ANION GAP SERPL CALCULATED.3IONS-SCNC: 6 MMOL/L (ref 7–15)
AST SERPL W P-5'-P-CCNC: 16 U/L (ref 0–45)
BILIRUB SERPL-MCNC: 0.5 MG/DL
BUN SERPL-MCNC: 8.6 MG/DL (ref 6–20)
C DIFF TOX B STL QL: NEGATIVE
CALCIUM SERPL-MCNC: 8.5 MG/DL (ref 8.6–10)
CHLORIDE SERPL-SCNC: 102 MMOL/L (ref 98–107)
CREAT SERPL-MCNC: 0.48 MG/DL (ref 0.67–1.17)
DEPRECATED HCO3 PLAS-SCNC: 27 MMOL/L (ref 22–29)
GFR SERPL CREATININE-BSD FRML MDRD: >90 ML/MIN/1.73M2
GLUCOSE SERPL-MCNC: 212 MG/DL (ref 70–99)
POTASSIUM SERPL-SCNC: 4 MMOL/L (ref 3.4–5.3)
PROT SERPL-MCNC: 5.5 G/DL (ref 6.4–8.3)
SODIUM SERPL-SCNC: 135 MMOL/L (ref 136–145)

## 2023-08-09 PROCEDURE — 258N000003 HC RX IP 258 OP 636: Performed by: STUDENT IN AN ORGANIZED HEALTH CARE EDUCATION/TRAINING PROGRAM

## 2023-08-09 PROCEDURE — 87493 C DIFF AMPLIFIED PROBE: CPT

## 2023-08-09 PROCEDURE — 96360 HYDRATION IV INFUSION INIT: CPT

## 2023-08-09 PROCEDURE — 250N000011 HC RX IP 250 OP 636: Mod: JZ | Performed by: STUDENT IN AN ORGANIZED HEALTH CARE EDUCATION/TRAINING PROGRAM

## 2023-08-09 PROCEDURE — 36591 DRAW BLOOD OFF VENOUS DEVICE: CPT

## 2023-08-09 PROCEDURE — 96361 HYDRATE IV INFUSION ADD-ON: CPT

## 2023-08-09 PROCEDURE — 80053 COMPREHEN METABOLIC PANEL: CPT

## 2023-08-09 RX ORDER — HEPARIN SODIUM,PORCINE 10 UNIT/ML
5-20 VIAL (ML) INTRAVENOUS DAILY PRN
Status: CANCELLED | OUTPATIENT
Start: 2023-08-09

## 2023-08-09 RX ORDER — HEPARIN SODIUM (PORCINE) LOCK FLUSH IV SOLN 100 UNIT/ML 100 UNIT/ML
5 SOLUTION INTRAVENOUS
Status: CANCELLED | OUTPATIENT
Start: 2023-08-09

## 2023-08-09 RX ORDER — HEPARIN SODIUM (PORCINE) LOCK FLUSH IV SOLN 100 UNIT/ML 100 UNIT/ML
5 SOLUTION INTRAVENOUS
Status: DISCONTINUED | OUTPATIENT
Start: 2023-08-09 | End: 2023-08-09

## 2023-08-09 RX ADMIN — SODIUM CHLORIDE, POTASSIUM CHLORIDE, SODIUM LACTATE AND CALCIUM CHLORIDE 1000 ML: 600; 310; 30; 20 INJECTION, SOLUTION INTRAVENOUS at 14:09

## 2023-08-09 RX ADMIN — SODIUM CHLORIDE, POTASSIUM CHLORIDE, SODIUM LACTATE AND CALCIUM CHLORIDE 1000 ML: 600; 310; 30; 20 INJECTION, SOLUTION INTRAVENOUS at 12:44

## 2023-08-09 RX ADMIN — HEPARIN 5 ML: 100 SYRINGE at 15:12

## 2023-08-09 ASSESSMENT — PAIN SCALES - GENERAL: PAINLEVEL: MODERATE PAIN (5)

## 2023-08-09 NOTE — PROGRESS NOTES
Infusion Nursing Note:  Gallo Navarro presents today for fluids.    Patient seen by provider today: No   present during visit today: Not Applicable.    Note: Patient is not feeling well, every time he tries to eat he has increasing abdominal pain.  He has had diarrhea for the past 24 hours, but took immodium today and this seems somewhat better.    Per epic chat, ok given to infuse 2 liters, cmp obtained and c-diff obtained.      Intravenous Access:  Labs drawn without difficulty.  Implanted Port.    Treatment Conditions:  Not Applicable.      Post Infusion Assessment:  Patient tolerated infusion without incident.  Blood return noted pre and post infusion.  Site patent and intact, free from redness, edema or discomfort.  No evidence of extravasations.  Access discontinued per protocol.       Discharge Plan:   Patient discharged in stable condition accompanied by: wife.  Departure Mode: Ambulatory.  Will follow up with provider.    Julianna Ramirez RN

## 2023-08-09 NOTE — TELEPHONE ENCOUNTER
"Oncology Nurse Triage - Diarrhea:     Situation:   Gallo reporting Diarrhea    Background:   Treating Provider:       Date of last office visit: 07/21/23 w/    Is patient on active treatment? (if yes, drug and date of last treatment): Yes: 07/31/23 Cycle 1 Day 1 Oxaliplatin, Irinotecan, and Fluorouracil pump connect.       Is patient receiving radiation to the pelvis area: No    Has patient started any new medications: No  - if yes, what is the new medication:  N/A    Is patient currently on or recently on antibiotics: No  - if yes, what is the medication:  N/A    Does patient have history of Clostridium difficile infection: No      Assessment  Onset of symptoms: Yesterday     Number of bowel movements in 24 hour period: 5    Bowel Characteristics:     Size:   Small    Color:   Green at first and brown now    Consistency:   Very loose , \"mariaelena Liquid\"    Difficult passing stool:  No   No, patient denies straining during bowel movement.     Passing gas: Yes      Associated symptoms:  No new sx, he has abd pain that he takes pain medication for.      Oral intake:   Has a hard time eating due to pain after he eats. He does take enzymes  . He is running his formula longer. He is also doing free water in his g-tube.     Urine output:   No changes in voiding. Normal frequency and characteristics.    Pt stats he really feels like when he needed fluids.     Denies F/C, lightheadedness/dizziness    Grades of diarrhea for reference:   Grade 1 Diarrhea:   Less than 6 stools per day and no recent c. diff infection or antibiotic use      Recommendations:   This writer educated on:   Take two capsules of Imodium at onset of diarrhea  Take one capsule after each unformed stool (Maximum of 8 capsules in 24hour period)      0919 Paged  asking if okay to add for IVF?  0921  responding yes Fluids and C.Diff testing  0923 Notified pt of provider recommendation.  0926 Contacted [facility] infusion charge " nurse to ask about available times. Charge nurse stated no time available today.  0929 Added pt to [facility] wait list  0940 [place] infusion opening at 1200  0940 Pt confirming infusion appt at 1200 at [facility]. Pt requesting [employer] message with infusion center address and lab test name that he needs to complete.

## 2023-08-09 NOTE — PROGRESS NOTES
Clinic Care Coordination Contact  [facility]/Voicemail    Clinical Data: CC RN reached out to [facility], spoke with intake they will review referral. Rosa in intake will call writer back once reviewed by HC team.    Update: CC RN received a call back at 9:43 from [facility] with [facility] and they can accept the patient.     CC RN called patient and shared the above information. Patient asked for info to be sent via [email or web address], CC RN sent  [email or web address] message at patients request. Patient had no further questions at this time.     Plan: routing chart as FYI to PCP  No further outreach at this time.     Senait Carson RN, BSN, PHN Care Coordinator  [place], [place], and [place] Are[place]o   Phone: [phone]

## 2023-08-10 ENCOUNTER — MYC REFILL (OUTPATIENT)
Dept: FAMILY MEDICINE | Facility: CLINIC | Age: 58
End: 2023-08-10
Payer: COMMERCIAL

## 2023-08-10 DIAGNOSIS — Z98.890 HISTORY OF BILIARY STENT INSERTION: ICD-10-CM

## 2023-08-10 DIAGNOSIS — C25.9 PANCREATIC ADENOCARCINOMA (H): ICD-10-CM

## 2023-08-10 DIAGNOSIS — K86.89 PANCREATIC MASS: ICD-10-CM

## 2023-08-10 RX ORDER — HYDROMORPHONE HYDROCHLORIDE 2 MG/1
2-4 TABLET ORAL EVERY 4 HOURS PRN
Qty: 180 TABLET | Refills: 0 | Status: SHIPPED | OUTPATIENT
Start: 2023-08-10 | End: 2023-08-29

## 2023-08-10 RX ORDER — SODIUM BICARBONATE 325 MG/1
325 TABLET ORAL 3 TIMES DAILY
Qty: 90 TABLET | Refills: 0 | Status: SHIPPED | OUTPATIENT
Start: 2023-08-10 | End: 2023-08-29

## 2023-08-10 NOTE — PROGRESS NOTES
Outcome for 08/10/23 9:42 AM: Data uploaded on Bora  Kathy Moise MA  Outcome for 08/16/23 11:13 AM: Data obtained via [employer] website  Iris Childers MA    Patient is showing 4/5 MNCM met. Not on statin - No LDL on file.   Kathy Moise MA

## 2023-08-10 NOTE — TELEPHONE ENCOUNTER
Received telephone call from patient's wife requesting refill of hydromorphone.     Last refill: 8/4/23  Last office visit: 7/27/23  Scheduled for follow up 8/31/23     Will route request to MD for review.     Reviewed MN  Report.

## 2023-08-11 ENCOUNTER — NURSE TRIAGE (OUTPATIENT)
Dept: ONCOLOGY | Facility: CLINIC | Age: 58
End: 2023-08-11

## 2023-08-11 ENCOUNTER — LAB (OUTPATIENT)
Dept: ONCOLOGY | Facility: CLINIC | Age: 58
End: 2023-08-11
Payer: COMMERCIAL

## 2023-08-11 ENCOUNTER — VIRTUAL VISIT (OUTPATIENT)
Dept: EDUCATION SERVICES | Facility: CLINIC | Age: 58
End: 2023-08-11
Payer: COMMERCIAL

## 2023-08-11 DIAGNOSIS — R19.7 DIARRHEA: Primary | ICD-10-CM

## 2023-08-11 DIAGNOSIS — R19.7 DIARRHEA: ICD-10-CM

## 2023-08-11 DIAGNOSIS — Z79.4 TYPE 2 DIABETES MELLITUS WITHOUT COMPLICATION, WITH LONG-TERM CURRENT USE OF INSULIN (H): Primary | ICD-10-CM

## 2023-08-11 DIAGNOSIS — E11.9 TYPE 2 DIABETES MELLITUS WITHOUT COMPLICATION, WITH LONG-TERM CURRENT USE OF INSULIN (H): Primary | ICD-10-CM

## 2023-08-11 LAB
ALBUMIN SERPL BCG-MCNC: 3.6 G/DL (ref 3.5–5.2)
ALP SERPL-CCNC: 119 U/L (ref 40–129)
ALT SERPL W P-5'-P-CCNC: 32 U/L (ref 0–70)
ANION GAP SERPL CALCULATED.3IONS-SCNC: 10 MMOL/L (ref 7–15)
AST SERPL W P-5'-P-CCNC: 19 U/L (ref 0–45)
BASOPHILS # BLD AUTO: 0 10E3/UL (ref 0–0.2)
BASOPHILS NFR BLD AUTO: 0 %
BILIRUB SERPL-MCNC: 0.6 MG/DL
BUN SERPL-MCNC: 9.4 MG/DL (ref 6–20)
CALCIUM SERPL-MCNC: 9.4 MG/DL (ref 8.6–10)
CHLORIDE SERPL-SCNC: 101 MMOL/L (ref 98–107)
CREAT SERPL-MCNC: 0.59 MG/DL (ref 0.67–1.17)
DEPRECATED HCO3 PLAS-SCNC: 26 MMOL/L (ref 22–29)
EOSINOPHIL # BLD AUTO: 0.1 10E3/UL (ref 0–0.7)
EOSINOPHIL NFR BLD AUTO: 1 %
ERYTHROCYTE [DISTWIDTH] IN BLOOD BY AUTOMATED COUNT: 13.8 % (ref 10–15)
GFR SERPL CREATININE-BSD FRML MDRD: >90 ML/MIN/1.73M2
GLUCOSE SERPL-MCNC: 95 MG/DL (ref 70–99)
HCT VFR BLD AUTO: 31.4 % (ref 40–53)
HGB BLD-MCNC: 10.8 G/DL (ref 13.3–17.7)
IMM GRANULOCYTES # BLD: 0 10E3/UL
IMM GRANULOCYTES NFR BLD: 0 %
LYMPHOCYTES # BLD AUTO: 0.9 10E3/UL (ref 0.8–5.3)
LYMPHOCYTES NFR BLD AUTO: 17 %
MAGNESIUM SERPL-MCNC: 1.9 MG/DL (ref 1.7–2.3)
MCH RBC QN AUTO: 31.9 PG (ref 26.5–33)
MCHC RBC AUTO-ENTMCNC: 34.4 G/DL (ref 31.5–36.5)
MCV RBC AUTO: 93 FL (ref 78–100)
MONOCYTES # BLD AUTO: 1 10E3/UL (ref 0–1.3)
MONOCYTES NFR BLD AUTO: 20 %
NEUTROPHILS # BLD AUTO: 3 10E3/UL (ref 1.6–8.3)
NEUTROPHILS NFR BLD AUTO: 62 %
NRBC # BLD AUTO: 0 10E3/UL
NRBC BLD AUTO-RTO: 0 /100
PHOSPHATE SERPL-MCNC: 3.2 MG/DL (ref 2.5–4.5)
PLATELET # BLD AUTO: 280 10E3/UL (ref 150–450)
POTASSIUM SERPL-SCNC: 3.7 MMOL/L (ref 3.4–5.3)
PROT SERPL-MCNC: 6.4 G/DL (ref 6.4–8.3)
RBC # BLD AUTO: 3.39 10E6/UL (ref 4.4–5.9)
SODIUM SERPL-SCNC: 137 MMOL/L (ref 136–145)
WBC # BLD AUTO: 4.9 10E3/UL (ref 4–11)

## 2023-08-11 PROCEDURE — 83735 ASSAY OF MAGNESIUM: CPT | Performed by: STUDENT IN AN ORGANIZED HEALTH CARE EDUCATION/TRAINING PROGRAM

## 2023-08-11 PROCEDURE — 80053 COMPREHEN METABOLIC PANEL: CPT | Performed by: STUDENT IN AN ORGANIZED HEALTH CARE EDUCATION/TRAINING PROGRAM

## 2023-08-11 PROCEDURE — 98966 PH1 ASSMT&MGMT NQHP 5-10: CPT | Mod: 93

## 2023-08-11 PROCEDURE — 36591 DRAW BLOOD OFF VENOUS DEVICE: CPT

## 2023-08-11 PROCEDURE — 84100 ASSAY OF PHOSPHORUS: CPT | Performed by: STUDENT IN AN ORGANIZED HEALTH CARE EDUCATION/TRAINING PROGRAM

## 2023-08-11 PROCEDURE — 85025 COMPLETE CBC W/AUTO DIFF WBC: CPT | Performed by: STUDENT IN AN ORGANIZED HEALTH CARE EDUCATION/TRAINING PROGRAM

## 2023-08-11 RX ORDER — HEPARIN SODIUM (PORCINE) LOCK FLUSH IV SOLN 100 UNIT/ML 100 UNIT/ML
500 SOLUTION INTRAVENOUS EVERY 8 HOURS
Status: DISCONTINUED | OUTPATIENT
Start: 2023-08-11 | End: 2023-08-11 | Stop reason: HOSPADM

## 2023-08-11 RX ADMIN — HEPARIN SODIUM (PORCINE) LOCK FLUSH IV SOLN 100 UNIT/ML 500 UNITS: 100 SOLUTION at 16:15

## 2023-08-11 NOTE — TELEPHONE ENCOUNTER
7404 Paged Dr. Haile update, labs scheduled fro 4pm today. Pt taking in about 600ml via g-tube flush, 8oz orally.     1701 per Dr. Haile, Reviewed lab results, Electrolytes look within normal limits. Okay to add Metamucil OTC instructions. Okay to take up to twice per day to help bulk up stool.   If symptoms unmanageable at home, then recommend to go to ED.     This writer attempted to call with update, left message to call back.   MyChart reply also sent.

## 2023-08-11 NOTE — PATIENT INSTRUCTIONS
Plan:  *Restart Basaglar 5 units in the evening  *Continue NPH 5 units in the morning, 20 units in the evening  *Standard correction with Novolog every 4 hours while awake  150-200=1 unit  201-250=2 units  251-300=3 units  *Follow up 8/17 with LITA Thompson, RN, Diabetes Educator  Diabetes Education Department  [facility] and [place]  [phone]

## 2023-08-11 NOTE — PROGRESS NOTES
Pt here for port draw. Port accessed using sterile technique. Labs drawn without difficulty-good blood return noted. Port flushed/heparinized and de-accessed and pt left ambulatory.

## 2023-08-11 NOTE — PROGRESS NOTES
Diabetes Self-Management Education & Support Telephone Visit  Gallo Navarro contacted today for education related to Type 2 diabetes    Patient is being treated with:  insulin    Purpose of today's visit:  Review Bora 3 data      Subjective/Objective:      Assessment/Plan:  Chemo Tuesday 8/15. Restart Basaglar 5 units in morning. Continue NPH 5 units in AM, 20 units in evening + standard correction with Novolog.     Plan:  *Restart Basaglar 5 units in the evening  *Continue NPH 5 units in the morning, 20 units in the evening  *Standard correction with Novolog every 4 hours while awake  150-200=1 unit  201-250=2 units  251-300=3 units  *Follow up 8/17 with Berta Cid PA-C    Any diabetes medication dose changes were made via the CDE Protocol and Collaborative Practice Agreement. A copy of this encounter was provided to the patient's referring provider-discussed with Berta Cid in clinic.    Pricila Aguirre RN, Diabetes Educator  Diabetes Education Department  [facility], [facility] and [place]  [phone]    Time spent in this visit: 5 minutes

## 2023-08-11 NOTE — PROGRESS NOTES
Received phone call from [facility] [address]. They are going to increase tube feeding to 6 cartons per day. Each carton contains 46 grams of carbs. He will likely run 14-15 hours starting in the evening.    Advised to increase evening NPH to 22 units.   Follow up via My Chart on Monday.    Pricila Aguirre RN, Diabetes Educator  Diabetes Education Department  [facility], [facility] and [place]  [phone]

## 2023-08-11 NOTE — TELEPHONE ENCOUNTER
"Oncology Nurse Triage - Diarrhea  Situation:   Violet, on behalf of Gallo reporting Diarrhea    Background:   Treating Provider: Dr. Haile    Date of last office visit: 7/21/23    Is patient on chemotherapy or immunotherapy? Yes: 7/31/23 Cycle 1 Day 1 Oxaliplatin, Irinotecan, and Fluorouracil pump connect.     Has patient started any new medications: Yes  - if yes, what is the new medication:  chemo mentioned above    Is patient currently on or recently on antibiotics: No  - if yes, what is the medication:  N/A    Does patient have history of Clostridium difficile infection: No  8/9/23 C.diff test negative    Is patient receiving radiation to the pelvis area: No    Assessment  Onset of symptoms: Tuesday 8/8/23    Number of bowel movements in 24 hour period: estimates over 12, says he is going q3-4h    Bowel Characteristics:   Color:   Brown, tan     Consistency:   Liquid    Difficult passing stool:  No   No, patient denies straining during bowel movement.     Passing gas: No    Associated symptoms: abdominal cramping/pain- constant, reports abdomen is distended, rates 6/10; pain in back is \"stronger\", described as aching and throbbing, rates 7-8/10, took pain Diluadid and Tylenol prior to calling and waiting for it to take effect.   Bilateral ankles and feet are swollen- admits he has not been walking much and only \"kind of elevates legs\".     Oral intake: not taking any foods PO, only TF- Peptamen 1.5; able to take liquids PO  Had IVF on 8/9/23    Decrease in urine output:  No     Did you take a laxative or stool softener recently? No - states he has not taken Miralax or Senna in a few days.     Have you taken anything to treat the diarrhea: Yes  Which medication and how much? Imodium- took 2 tabs this AM, then taking 1 tab q2h since- took Zofran and Imodium last at 1400- states this was his 5th tab of Imodium today, reports taking Imodium and Zofran PO/ takes pain medications via tube.   Also tried whey  They are " "\"working on getting medical cannabis, but unsure of the process\".   Denies fever, shortness of breath, chest pain, dizziness/lightheadedness, states he has a home health nurse from [facility] coming out tomorrow. He states IVF were very helpful, denies feeling dehydrated currently.     Recommendations:   1432 paged Dr. Haile. 2nd page at 1519 to middle line.   1520 return call from Dr. Haile, who requests to find out how much water pt is taking in PO and bolus, try to get pt in for labs today outpatient if possible.   1527 message sent to scheduling for labs and ordered CBC, CMP, mag, phos.  Call to pt/spouse, who states pt is drinking 5 bottles of formula/day, estimates a total of 600mL total for flushes, and drinking about 8 oz fluid today so far.   Writer informed spouse that Dr. Haile will review further after current appt and that another nurse will call them back with further recommendations. Violet voiced understanding.     Pt/spouse wondering:   Should Imodium be taken orally or via J-tube? Unless coming back up, there should not be a difference.   Will Imodium work same with a different formula?  Next recommendations for diarrhea?                 "

## 2023-08-11 NOTE — LETTER
8/11/2023         RE: Gallo Navarro  [id number] [address] [place] [id number]        Dear Colleague,    Thank you for referring your patient, Gallo Navarro, to the [facility]. Please see a copy of my visit note below.    Diabetes Self-Management Education & Support Telephone Visit  Gallo Navarro contacted today for education related to Type 2 diabetes    Patient is being treated with:  insulin    Purpose of today's visit:  Review Bora 3 data      Subjective/Objective:      Assessment/Plan:  Chemo Tuesday 8/15. Restart Basaglar 5 units in morning. Continue NPH 5 units in AM, 20 units in evening + standard correction with Novolog.     Plan:  *Restart Basaglar 5 units in the evening  *Continue NPH 5 units in the morning, 20 units in the evening  *Standard correction with Novolog every 4 hours while awake  150-200=1 unit  201-250=2 units  251-300=3 units  *Follow up 8/17 with Berta Cid PA-C    Any diabetes medication dose changes were made via the CDE Protocol and Collaborative Practice Agreement. A copy of this encounter was provided to the patient's referring provider-discussed with Berta Cid in clinic.    Pricila Aguirre RN, Diabetes Educator  Diabetes Education Department  [facility], [facility] [place]  [phone]    Time spent in this visit: 5 minutes      Again, thank you for allowing me to participate in the care of your patient.        Sincerely,        Pricila Aguirre RN

## 2023-08-12 ENCOUNTER — HEALTH MAINTENANCE LETTER (OUTPATIENT)
Age: 58
End: 2023-08-12

## 2023-08-12 ENCOUNTER — MEDICAL CORRESPONDENCE (OUTPATIENT)
Dept: HEALTH INFORMATION MANAGEMENT | Facility: CLINIC | Age: 58
End: 2023-08-12
Payer: COMMERCIAL

## 2023-08-14 ENCOUNTER — PATIENT OUTREACH (OUTPATIENT)
Dept: ONCOLOGY | Facility: CLINIC | Age: 58
End: 2023-08-14
Payer: COMMERCIAL

## 2023-08-14 DIAGNOSIS — R19.7 DIARRHEA: Primary | ICD-10-CM

## 2023-08-14 DIAGNOSIS — R11.0 NAUSEA: ICD-10-CM

## 2023-08-14 RX ORDER — ONDANSETRON 8 MG/1
8 TABLET, ORALLY DISINTEGRATING ORAL EVERY 8 HOURS PRN
Qty: 30 TABLET | Refills: 3 | Status: SHIPPED | OUTPATIENT
Start: 2023-08-14 | End: 2023-11-08 | Stop reason: SINTOL

## 2023-08-14 NOTE — PROGRESS NOTES
Oncology/Hematology Visit Note  Aug 15, 2023    Reason for Visit: follow up of locally advanced, unresectable pancreatic cancer     History of Present Illness: Gallo Navarro is a 57 year old male with locally advanced, unresectable pancreatic cancer. 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 He then presented to the ED with worsening abdominal pain, increased jaundice, poor PO intake and weight loss admitted on 7/8/2023 for concern of biliary obstruction. Unfortunately, during this admission, biopsy of pancreatic mass returned positive for pancreatic adenocarcinoma on 7/12/23. He started on treatment with 5FU, irinotecan, and oxaliplatin (FOLFIRINOX) on 7/31/23. Please see previous notes for further details on the patient's history. He comes in today for routine follow up prior to cycle 2 FOLFIRINOX.    Interval History:  Patient reports that he felt okay for the first couple of days after chemotherapy, but then on days 3 through 5 he began to feel dehydrated and nauseated.  He did use his venting G-tube.  He also had pain in his abdomen, back, and central suprapubic area.  He reports that he was having diarrhea that improved significantly with starting Metamucil 3 days ago.  Prior to diagnosis he was having vomiting for about 3 months.  He wonders if this is now limiting his ability to eat.  In the last couple of days he has been having intermittent right-sided abdominal pain.  His glucose has been variable and was low this morning.  He is working with endocrine on his blood sugars.  He did take some Zofran and Compazine for nausea following chemotherapy.  He found minimal relief from Imodium for diarrhea.  At its worst, he was having 12 stools per day.  He reports taking 2 Dilaudid every 4 hours  including overnight for his abdominal and back pain.  He did start on the Butrans as well.  He is sleeping upright and then sometimes having neck discomfort related to this.  He will be having home care nurse visits on Fridays.  He wonders about getting a nerve block for his pain.  He denies other concerns.    Current Outpatient Medications   Medication Sig Dispense Refill    acetaminophen (TYLENOL) 32 mg/mL liquid Take 20.313 mLs (650 mg) by mouth every 8 hours 1800 mL 11    B-D U/F 31G X 8 MM insulin pen needle       blood glucose (FREESTYLE TEST STRIPS) test strip by Other route as needed      buprenorphine (BUTRANS) 10 MCG/HR WK patch Place 1 patch onto the skin every 7 days 4 patch 3    Continuous Blood Gluc Sensor (FREESTYLE KAREN 2 SENSOR) MISC       dexamethasone (DECADRON) 4 MG tablet Take 2 tablets (8 mg) by mouth daily Take for 2 days, starting the day after chemo. Take with food. 4 tablet 2    HYDROmorphone (DILAUDID) 2 MG tablet Take 1-2 tablets (2-4 mg) by mouth every 4 hours as needed for severe pain 180 tablet 0    hydrOXYzine (VISTARIL) 25 MG capsule Take 25 mg by mouth      insulin aspart (NOVOLOG PEN) 100 UNIT/ML pen Inject 1-10 Units Subcutaneous 3 times daily (with meals) 15 mL 3    insulin glargine (BASAGLAR KWIKPEN) 100 UNIT/ML pen Inject 20 Units Subcutaneous every morning for 360 days 18 mL 3    insulin  UNIT/ML injection Inject 15 Units Subcutaneous every evening for 60 days 9 mL 0    Lancets (ONETOUCH DELICA PLUS RESAXT39I) MISC       lidocaine-prilocaine (EMLA) 2.5-2.5 % external cream Use 1-2 times a week or as needed prior to port access 30 g 3    lidocaine-prilocaine (EMLA) 2.5-2.5 % external cream Use 1-2 times weekly or as needed prior to port access 30 g 3    lipase-protease-amylase (CREON 24) 87754-01143-033201 units CPEP per EC capsule 1-2 capsules by Per J Tube route 3 times daily (with meals) 180 capsule 0    loperamide (IMODIUM) 2 MG capsule 2 caps at 1st sign of  diarrhea & 1 cap every 2hrs until 12hrs diarrhea free. During night, 2 caps at bedtime & 2 caps every 4hrs until AM 30 capsule 0    naloxone (NARCAN) 4 MG/0.1ML nasal spray Spray 1 spray (4 mg) into one nostril alternating nostrils as needed for opioid reversal every 2-3 minutes until assistance arrives 0.2 mL 11    ondansetron (ZOFRAN ODT) 8 MG ODT tab Take 1 tablet (8 mg) by mouth every 8 hours as needed for nausea 30 tablet 3    ondansetron (ZOFRAN) 8 MG tablet Take 1 tablet (8 mg) by mouth every 8 hours as needed for nausea (vomiting) 30 tablet 2    pantoprazole (PROTONIX) 40 MG EC tablet Take 40 mg by mouth daily      polyethylene glycol (MIRALAX) 17 GM/Dose powder Take 17 g by mouth daily 510 g 3    prochlorperazine (COMPAZINE) 10 MG tablet Take 1 tablet (10 mg) by mouth every 6 hours as needed for nausea or vomiting 30 tablet 2    psyllium (METAMUCIL/KONSYL) capsule 1 capsule by Per G Tube route 2 times daily 180 capsule 3    sennosides (SENOKOT) 8.8 MG/5ML syrup 5 mLs by Per J Tube route 2 times daily 236 mL 3    sodium bicarbonate 325 MG tablet 1 tablet (325 mg) by Per J Tube route 3 times daily 90 tablet 0    vitamin D3 (CHOLECALCIFEROL) 50 mcg (2000 units) tablet Take 1 tablet (50 mcg) by mouth daily 30 tablet 1     Physical Examination:  General: The patient is a pleasant male in no acute distress. He is here today with his wife.   /76 (BP Location: Right arm, Patient Position: Sitting, Cuff Size: Adult Large)   Pulse 102   Temp 98.6  F (37  C) (Oral)   Resp 16   Wt 74.3 kg (163 lb 14.4 oz)   SpO2 97%   BMI 22.54 kg/m    Wt Readings from Last 10 Encounters:   08/15/23 74.3 kg (163 lb 14.4 oz)   08/09/23 74.5 kg (164 lb 4.8 oz)   08/07/23 75.4 kg (166 lb 4.8 oz)   07/31/23 76.4 kg (168 lb 6.4 oz)   07/28/23 76.7 kg (169 lb)   07/27/23 76.7 kg (169 lb)   07/21/23 76.7 kg (169 lb 3.2 oz)   07/15/23 78.4 kg (172 lb 14.4 oz)   HEENT: EOMI. Sclerae are anicteric.   Lymph: Neck is supple with no  lymphadenopathy in the cervical or supraclavicular areas.   Heart: Regular rate and rhythm.   Lungs: Clear to auscultation bilaterally.   Abdomen: Bowel sounds present, soft, nontender with no palpable hepatosplenomegaly or masses.   Extremities: No lower extremity edema noted bilaterally.   Neuro: Cranial nerves II through XII are grossly intact.  Skin: No rashes, petechiae, or bruising noted on exposed skin.    Laboratory Data:  Most Recent 3 CBC's:  Recent Labs   Lab Test 08/15/23  1002 08/11/23  1615 08/02/23  1343 07/31/23  1026   WBC 4.5 4.9 9.2 5.7   HGB 9.4* 10.8* 10.7* 9.9*   MCV 91 93 95 93    280 291 266   ANEUTAUTO  --  3.0 8.7* 3.3    Most Recent 3 BMP's:  Recent Labs   Lab Test 08/15/23  1002 08/11/23  1615 08/09/23  1411   * 137 135*   POTASSIUM 3.5 3.7 4.0   CHLORIDE 101 101 102   CO2 24 26 27   BUN 12.3 9.4 8.6   CR 0.55* 0.59* 0.48*   ANIONGAP 9 10 6*   DENISE 8.8 9.4 8.5*   GLC 91 95 212*   PROTTOTAL 5.8* 6.4 5.5*   ALBUMIN 3.2* 3.6 3.2*    Most Recent 2 LFT's:  Recent Labs   Lab Test 08/15/23  1002 08/11/23  1615   AST 17 19   ALT 18 32   ALKPHOS 125 119   BILITOTAL 0.3 0.6   I reviewed the above labs today.    Assessment and Plan:  Locally advanced, unresectable pancreatic cancer. Patient started on treatment with palliative FOLFIRINOX on 7/31/23. He had a moderate amount of side effects following cycle 1 with dehydration, diarrhea, and nausea. He is doing better now and will continue with cycle 2 today. He will follow-up with me prior to consideration of cycle 3. Will plan to repeat imaging after 6 cycles of FOLFIRINOX. We will consider potential chemoradiation versus continued chemotherapy, versus clinical trial based on treatment response seen on follow-up imaging.      Nutrition. S/P GJ venting. He is on Peptomin 1.5 at 6 cans/day, which he will continue. He will continue to vent his tube as needed. Will also place a referral to meet with our dietician.     Dehydration. Will give  1L IV NS with his pump unhook.    Diabetes. On insulin. Working with endocrine on management. Advised to let endocrine know about the steroids he receives with each cycle of chemotherapy: 12 mg IV dex on day 1, 8 mg po dex on days 2-3.     Nausea. Recommend scheduling Zofran and Compazine bid each for better management. He is also enrolled in the medical cannabis program, but has not yet started this.     Diarrhea. Recommend management with Imodium prn. Also, recommend continuing with Metamucil daily.     Abdominal pain. Managed with Dilaudid and Butrans. Following with palliative care. Will check with GI about getting a celiac plexus block.     Anemia. Normocytic. Suspect anemia of chronic disease. Will monitor for now.     Berta Anglin PA-C  [facility]  [address] [place] [address] [phone]    Addendum: Reviewed with Dr. York from GI and will plan for endoscopy and duodenal stent placement, as well as replacement of his GJ feeding tube. Will hold off on celiac neurolysis for now as pain is primarily associated with eating.       66 minutes spent on the date of the encounter doing chart review, review of test results, interpretation of tests, patient visit, documentation, and discussion with other provider(s)     Addendum: Given concerns with blurry vision, difficult word finding, and hand cramping during his infusion, will decrease irinotecan by 20% moving forward.

## 2023-08-14 NOTE — TELEPHONE ENCOUNTER
Returned call to pt and spouse who had questions regarding blood sugars and starting C2 folfirinox tomorrow.    Endo appt on Thursday 8/17  State BS has been lower than normal x2-3 days (61-63) drank some grape juice and put gatorade in feeding tube. Went back up to 87.  When tube feeding is going, BS back to (106-172)  Once it turns off, BS drops back to 60s. Asymptomatic.  NPH 5u at 10 am instead of 8am, BS now at 87 (5pm)  NPH 22u at 8pm when starting tube feeding, basaglar 5u added just last night at 8pm per instruction by Diabetes RN. Has not been taking in any oral foods so withholding novolog.    Pt is concerned about blood sugars with infusions, will start tube feeding 8pm-8am and come to the clinic still hooked up to tube feed, will bring his insulin. Pt is unsure whether basaglar was added just for infusion days, though confused why he would've started 2 days prior. Spouse Judit stated she heard he was only to take it before chemo, neither remembers if Diabetic RN was going to call and follow-up or they're just supposed to report to Berta Cid on 8/17.    Informed pt he may check BS at home before coming, if lower than 60 to call triage and may update Berta GONZALEZ. If normal, to go ahead and bring his insulin and Berta will go over Diabetes RN note with them, consult with Berta Cid if needed. Both verbalized understanding.    Also reviewed triage note with them and asked how GI symptoms were; pt stated much better with 2 metamucil caps a day, he puts straight in his G-tube. Does have more gas pains with metamucil. Takes zofran tab regularly for nausea, has not vomited. Asked if pt would like ODT zofran as both voiced concerns about absorption, they were agreeable and this was sent to [facility] pharmacy for  tomorrow. Both denied any other questions at this time.

## 2023-08-15 ENCOUNTER — TELEPHONE (OUTPATIENT)
Dept: ONCOLOGY | Facility: CLINIC | Age: 58
End: 2023-08-15

## 2023-08-15 ENCOUNTER — APPOINTMENT (OUTPATIENT)
Dept: LAB | Facility: CLINIC | Age: 58
End: 2023-08-15
Attending: INTERNAL MEDICINE
Payer: COMMERCIAL

## 2023-08-15 ENCOUNTER — INFUSION THERAPY VISIT (OUTPATIENT)
Dept: ONCOLOGY | Facility: CLINIC | Age: 58
End: 2023-08-15
Attending: INTERNAL MEDICINE
Payer: COMMERCIAL

## 2023-08-15 ENCOUNTER — TELEPHONE (OUTPATIENT)
Dept: EDUCATION SERVICES | Facility: CLINIC | Age: 58
End: 2023-08-15

## 2023-08-15 VITALS
BODY MASS INDEX: 22.54 KG/M2 | SYSTOLIC BLOOD PRESSURE: 116 MMHG | HEART RATE: 102 BPM | TEMPERATURE: 98.6 F | OXYGEN SATURATION: 97 % | WEIGHT: 163.9 LBS | RESPIRATION RATE: 16 BRPM | DIASTOLIC BLOOD PRESSURE: 76 MMHG

## 2023-08-15 VITALS — SYSTOLIC BLOOD PRESSURE: 108 MMHG | DIASTOLIC BLOOD PRESSURE: 66 MMHG | HEART RATE: 80 BPM

## 2023-08-15 DIAGNOSIS — Z51.11 ENCOUNTER FOR ANTINEOPLASTIC CHEMOTHERAPY: ICD-10-CM

## 2023-08-15 DIAGNOSIS — C25.0 MALIGNANT NEOPLASM OF HEAD OF PANCREAS (H): ICD-10-CM

## 2023-08-15 DIAGNOSIS — Z51.11 ENCOUNTER FOR ANTINEOPLASTIC CHEMOTHERAPY: Primary | ICD-10-CM

## 2023-08-15 LAB
ALBUMIN SERPL BCG-MCNC: 3.2 G/DL (ref 3.5–5.2)
ALP SERPL-CCNC: 125 U/L (ref 40–129)
ALT SERPL W P-5'-P-CCNC: 18 U/L (ref 0–70)
ANION GAP SERPL CALCULATED.3IONS-SCNC: 9 MMOL/L (ref 7–15)
AST SERPL W P-5'-P-CCNC: 17 U/L (ref 0–45)
BASOPHILS # BLD MANUAL: 0 10E3/UL (ref 0–0.2)
BASOPHILS NFR BLD MANUAL: 1 %
BILIRUB SERPL-MCNC: 0.3 MG/DL
BUN SERPL-MCNC: 12.3 MG/DL (ref 6–20)
CALCIUM SERPL-MCNC: 8.8 MG/DL (ref 8.6–10)
CHLORIDE SERPL-SCNC: 101 MMOL/L (ref 98–107)
CREAT SERPL-MCNC: 0.55 MG/DL (ref 0.67–1.17)
DEPRECATED HCO3 PLAS-SCNC: 24 MMOL/L (ref 22–29)
EOSINOPHIL # BLD MANUAL: 0.2 10E3/UL (ref 0–0.7)
EOSINOPHIL NFR BLD MANUAL: 4 %
ERYTHROCYTE [DISTWIDTH] IN BLOOD BY AUTOMATED COUNT: 13.5 % (ref 10–15)
GFR SERPL CREATININE-BSD FRML MDRD: >90 ML/MIN/1.73M2
GLUCOSE SERPL-MCNC: 91 MG/DL (ref 70–99)
HCT VFR BLD AUTO: 28.1 % (ref 40–53)
HGB BLD-MCNC: 9.4 G/DL (ref 13.3–17.7)
LYMPHOCYTES # BLD MANUAL: 1.2 10E3/UL (ref 0.8–5.3)
LYMPHOCYTES NFR BLD MANUAL: 27 %
MCH RBC QN AUTO: 30.5 PG (ref 26.5–33)
MCHC RBC AUTO-ENTMCNC: 33.5 G/DL (ref 31.5–36.5)
MCV RBC AUTO: 91 FL (ref 78–100)
MONOCYTES # BLD MANUAL: 0.3 10E3/UL (ref 0–1.3)
MONOCYTES NFR BLD MANUAL: 6 %
NEUTROPHILS # BLD MANUAL: 2.8 10E3/UL (ref 1.6–8.3)
NEUTROPHILS NFR BLD MANUAL: 62 %
PLAT MORPH BLD: NORMAL
PLATELET # BLD AUTO: 274 10E3/UL (ref 150–450)
POTASSIUM SERPL-SCNC: 3.5 MMOL/L (ref 3.4–5.3)
PROT SERPL-MCNC: 5.8 G/DL (ref 6.4–8.3)
RBC # BLD AUTO: 3.08 10E6/UL (ref 4.4–5.9)
RBC MORPH BLD: NORMAL
SODIUM SERPL-SCNC: 134 MMOL/L (ref 136–145)
WBC # BLD AUTO: 4.5 10E3/UL (ref 4–11)

## 2023-08-15 PROCEDURE — 96417 CHEMO IV INFUS EACH ADDL SEQ: CPT

## 2023-08-15 PROCEDURE — 250N000011 HC RX IP 250 OP 636: Mod: JZ | Performed by: PHYSICIAN ASSISTANT

## 2023-08-15 PROCEDURE — 85007 BL SMEAR W/DIFF WBC COUNT: CPT

## 2023-08-15 PROCEDURE — 85027 COMPLETE CBC AUTOMATED: CPT

## 2023-08-15 PROCEDURE — 96415 CHEMO IV INFUSION ADDL HR: CPT

## 2023-08-15 PROCEDURE — 86301 IMMUNOASSAY TUMOR CA 19-9: CPT

## 2023-08-15 PROCEDURE — 96375 TX/PRO/DX INJ NEW DRUG ADDON: CPT

## 2023-08-15 PROCEDURE — G0463 HOSPITAL OUTPT CLINIC VISIT: HCPCS | Mod: 25 | Performed by: PHYSICIAN ASSISTANT

## 2023-08-15 PROCEDURE — 258N000003 HC RX IP 258 OP 636: Performed by: PHYSICIAN ASSISTANT

## 2023-08-15 PROCEDURE — G0498 CHEMO EXTEND IV INFUS W/PUMP: HCPCS

## 2023-08-15 PROCEDURE — 96413 CHEMO IV INFUSION 1 HR: CPT

## 2023-08-15 PROCEDURE — 80053 COMPREHEN METABOLIC PANEL: CPT

## 2023-08-15 PROCEDURE — 99215 OFFICE O/P EST HI 40 MIN: CPT | Performed by: PHYSICIAN ASSISTANT

## 2023-08-15 PROCEDURE — 36591 DRAW BLOOD OFF VENOUS DEVICE: CPT

## 2023-08-15 RX ORDER — DIPHENHYDRAMINE HYDROCHLORIDE 50 MG/ML
50 INJECTION INTRAMUSCULAR; INTRAVENOUS
Status: CANCELLED
Start: 2023-08-15

## 2023-08-15 RX ORDER — LORAZEPAM 2 MG/ML
0.5 INJECTION INTRAMUSCULAR EVERY 4 HOURS PRN
Status: DISCONTINUED | OUTPATIENT
Start: 2023-08-15 | End: 2023-08-15 | Stop reason: HOSPADM

## 2023-08-15 RX ORDER — HEPARIN SODIUM,PORCINE 10 UNIT/ML
5-20 VIAL (ML) INTRAVENOUS DAILY PRN
Status: CANCELLED | OUTPATIENT
Start: 2023-08-15

## 2023-08-15 RX ORDER — METHYLPREDNISOLONE SODIUM SUCCINATE 125 MG/2ML
125 INJECTION, POWDER, LYOPHILIZED, FOR SOLUTION INTRAMUSCULAR; INTRAVENOUS
Status: CANCELLED
Start: 2023-08-15

## 2023-08-15 RX ORDER — ATROPINE SULFATE 0.4 MG/ML
0.4 AMPUL (ML) INJECTION
Status: CANCELLED | OUTPATIENT
Start: 2023-08-15

## 2023-08-15 RX ORDER — ONDANSETRON 2 MG/ML
8 INJECTION INTRAMUSCULAR; INTRAVENOUS ONCE
Status: COMPLETED | OUTPATIENT
Start: 2023-08-15 | End: 2023-08-15

## 2023-08-15 RX ORDER — MEPERIDINE HYDROCHLORIDE 25 MG/ML
25 INJECTION INTRAMUSCULAR; INTRAVENOUS; SUBCUTANEOUS EVERY 30 MIN PRN
Status: CANCELLED | OUTPATIENT
Start: 2023-08-15

## 2023-08-15 RX ORDER — HEPARIN SODIUM (PORCINE) LOCK FLUSH IV SOLN 100 UNIT/ML 100 UNIT/ML
5 SOLUTION INTRAVENOUS
Status: CANCELLED | OUTPATIENT
Start: 2023-08-15

## 2023-08-15 RX ORDER — HEPARIN SODIUM (PORCINE) LOCK FLUSH IV SOLN 100 UNIT/ML 100 UNIT/ML
5 SOLUTION INTRAVENOUS
Status: CANCELLED | OUTPATIENT
Start: 2023-08-16

## 2023-08-15 RX ORDER — ONDANSETRON 2 MG/ML
8 INJECTION INTRAMUSCULAR; INTRAVENOUS ONCE
Status: CANCELLED | OUTPATIENT
Start: 2023-08-15

## 2023-08-15 RX ORDER — ALBUTEROL SULFATE 0.83 MG/ML
2.5 SOLUTION RESPIRATORY (INHALATION)
Status: CANCELLED | OUTPATIENT
Start: 2023-08-15

## 2023-08-15 RX ORDER — LORAZEPAM 2 MG/ML
0.5 INJECTION INTRAMUSCULAR EVERY 4 HOURS PRN
Status: CANCELLED | OUTPATIENT
Start: 2023-08-15

## 2023-08-15 RX ORDER — HEPARIN SODIUM,PORCINE 10 UNIT/ML
5-20 VIAL (ML) INTRAVENOUS DAILY PRN
Status: CANCELLED | OUTPATIENT
Start: 2023-08-16

## 2023-08-15 RX ORDER — ALBUTEROL SULFATE 90 UG/1
1-2 AEROSOL, METERED RESPIRATORY (INHALATION)
Status: CANCELLED
Start: 2023-08-15

## 2023-08-15 RX ORDER — EPINEPHRINE 1 MG/ML
0.3 INJECTION, SOLUTION INTRAMUSCULAR; SUBCUTANEOUS EVERY 5 MIN PRN
Status: CANCELLED | OUTPATIENT
Start: 2023-08-15

## 2023-08-15 RX ORDER — HEPARIN SODIUM (PORCINE) LOCK FLUSH IV SOLN 100 UNIT/ML 100 UNIT/ML
5 SOLUTION INTRAVENOUS ONCE
Status: COMPLETED | OUTPATIENT
Start: 2023-08-15 | End: 2023-08-15

## 2023-08-15 RX ADMIN — DEXTROSE MONOHYDRATE 250 ML: 50 INJECTION, SOLUTION INTRAVENOUS at 11:50

## 2023-08-15 RX ADMIN — FAMOTIDINE 20 MG: 10 INJECTION INTRAVENOUS at 15:14

## 2023-08-15 RX ADMIN — OXALIPLATIN 170 MG: 5 INJECTION, SOLUTION INTRAVENOUS at 12:19

## 2023-08-15 RX ADMIN — LORAZEPAM 0.5 MG: 2 INJECTION INTRAMUSCULAR; INTRAVENOUS at 14:24

## 2023-08-15 RX ADMIN — FAMOTIDINE 20 MG: 10 INJECTION INTRAVENOUS at 14:23

## 2023-08-15 RX ADMIN — DEXAMETHASONE SODIUM PHOSPHATE: 10 INJECTION, SOLUTION INTRAMUSCULAR; INTRAVENOUS at 11:50

## 2023-08-15 RX ADMIN — SODIUM CHLORIDE 500 ML: 9 INJECTION, SOLUTION INTRAVENOUS at 16:03

## 2023-08-15 RX ADMIN — ONDANSETRON 8 MG: 2 INJECTION INTRAMUSCULAR; INTRAVENOUS at 12:12

## 2023-08-15 RX ADMIN — SODIUM CHLORIDE 300 MG: 9 INJECTION, SOLUTION INTRAVENOUS at 14:27

## 2023-08-15 RX ADMIN — Medication 5 ML: at 10:10

## 2023-08-15 ASSESSMENT — PAIN SCALES - GENERAL: PAINLEVEL: NO PAIN (0)

## 2023-08-15 NOTE — TELEPHONE ENCOUNTER
Attempted to call patient as Pricila Aguirre is currently out of the office. I am not able to view patient's tyrone 3 data in [employer] and would need to connect them to our [employer] portal to view. It is also not clear to me per the notes and message how the regimen has changed and if they have any current concerns about the blood glucose readings. Left a voicemail to please call back. Patient is scheduled with Berta GONZALEZ on Thursday. May need to involve our Advice Line Endo or on call Endo.   Julianna Oropeza, RD, LD, CDE  8/15/2023   4:01 PM

## 2023-08-15 NOTE — TELEPHONE ENCOUNTER
PA Initiation    Medication: ONDANSETRON 8 MG PO TBDP  Insurance Company: [email or web address] Phone [phone] Fax [phone]  Pharmacy Filling the Rx:   [facility]  Filling Pharmacy Phone:  on file  Filling Pharmacy Fax:  on file  Start Date: 8/15/2023  [facility]  KEY: [id number]

## 2023-08-15 NOTE — PROGRESS NOTES
"Infusion Nursing Note:  Gallo Navarro presents today for C2D1 Folfirinox.    Patient seen by provider today: Yes: LISA Johnston   present during visit today: Not Applicable.    Note: Pt saw provider prior to infusion, ok for treatment.    -thought to have increased SE from the atropine with last infusion      PRNs given this infusion:  -Ativan 0.5mg and 20 mg Pepcid IV pre Irinotecan  -20 mg Pepcid IV mid  Irinotecan infusion    Irinotecan given in NS and Oxaliplatin administered in a 250 D5W bag to prevent glucose spikes.    GJ tube was vented with bag during infusion.      -Upon completion of Irinotecan pt c/o of \"blurry vision\", difficult word finding and cramping in   his hands.    -B  -BP:102/67 which is slightly below pt baseline  -NS 500cc bolus started  -denies any respiratory symptoms  -denies any GI symptoms  -given hot chocolate to drink to possibly help with \"cotton mouth\" and to prevent a possible    bronchospasm  -pt monitored 30 minutes and BP closer to pt's baseline  -continues to deny any respiratory symptoms  -B, pt self injected own supply of insulin  -no GI symptoms  -blurry vision, difficult word finding (improved when speaking to this writer) and hand cramping remain but not as severe, feels ok to discharge home  -pt/spouse aware to contact triage with any unusual symptoms but to go to ED with new respiratory concerns    -IB sent to Care Team to see if a longer infusion would be beneficial     Intravenous Access:  Implanted Port.    Treatment Conditions:  Lab Results   Component Value Date    HGB 9.4 (L) 08/15/2023    WBC 4.5 08/15/2023    ANEU 2.8 08/15/2023    ANEUTAUTO 3.0 2023     08/15/2023        Lab Results   Component Value Date     (L) 08/15/2023    POTASSIUM 3.5 08/15/2023    MAG 1.9 2023    CR 0.55 (L) 08/15/2023    DENISE 8.8 08/15/2023    BILITOTAL 0.3 08/15/2023    ALBUMIN 3.2 (L) 08/15/2023    ALT 18 08/15/2023    AST 17 08/15/2023 " "      Results reviewed, labs MET treatment parameters, ok to proceed with treatment.      Post Infusion Assessment:  Patient tolerated infusion without incident.  Blood return noted pre and post infusion.  Site patent and intact, free from redness, edema or discomfort.  No evidence of extravasations.   Prior to discharge: Port is secured in place with tegaderm and flushed with 10cc NS with positive blood return noted.    Continuous home infusion Dosi-Fuser pump connected.    All connectors secured in place and clamps taped open.    Pump started, \"running\" noted on display (CADD): Not Applicable.   Capillary element taped to pt's skin per protocol.  Pump Connection double checked with Elissa Crenshaw RN.  Patient instructed to call our clinic or [facility] with any questions or concerns at home.  Patient verbalized understanding.    Patient set up for pump disconnect at home with [facility] on 8/17 @1430.            Discharge Plan:   Prescription refills given for DEX and Zofran.  Discharge instructions reviewed with: Patient and Family.  Patient and/or family verbalized understanding of discharge instructions and all questions answered.  AVS to patient via [employer].  Patient will return 8/29 for next appointment.   Patient discharged in stable condition accompanied by: self and wife.  Departure Mode: Wheelchair.    Divina Ludwig RN      "

## 2023-08-15 NOTE — NURSING NOTE
"Chief Complaint   Patient presents with    Oncology Clinic Visit     Malignant neoplasm of head of pancreas (H)    Port Draw     Labs drawn via port by RN. VS taken.     Port accessed with 20 gauge, 3/4\" power needle by RN, labs collected, line flushed with saline and heparin.  Vitals taken. Pt checked in for appointment(s).     Message sent to clinic r/e dressing.    Ele Gurrola RN    "

## 2023-08-15 NOTE — TELEPHONE ENCOUNTER
Prior Authorization Approval    Medication: ONDANSETRON 8 MG PO TBDP  Authorization Effective Date: 8/15/2023  Authorization Expiration Date: 2/15/2024  Approved Dose/Quantity: 30 for 10 days  Reference #: [id number]   Insurance Company: [employer] - Phone [phone] Fax [phone]  Expected CoPay:       CoPay Card Available:      Financial Assistance Needed: n/a  Which Pharmacy is filling the prescription: [facility] [address] 1-833  Pharmacy Notified: Yes  Patient Notified:  pharmacy will notify patient

## 2023-08-15 NOTE — LETTER
8/15/2023         RE: Gallo Navarro  [id number] [address] [place] [id number]        Dear Colleague,    Thank you for referring your patient, Gallo Navarro, to the [facility]. Please see a copy of my visit note below.    Oncology/Hematology Visit Note  Aug 15, 2023    Reason for Visit: follow up of locally advanced, unresectable pancreatic cancer     History of Present Illness: Gallo Navarro is a 57 year old male with locally advanced, unresectable pancreatic cancer. 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 He then presented to the ED with worsening abdominal pain, increased jaundice, poor PO intake and weight loss admitted on 7/8/2023 for concern of biliary obstruction. Unfortunately, during this admission, biopsy of pancreatic mass returned positive for pancreatic adenocarcinoma on 7/12/23. He started on treatment with 5FU, irinotecan, and oxaliplatin (FOLFIRINOX) on 7/31/23. Please see previous notes for further details on the patient's history. He comes in today for routine follow up prior to cycle 2 FOLFIRINOX.    Interval History:  Patient reports that he felt okay for the first couple of days after chemotherapy, but then on days 3 through 5 he began to feel dehydrated and nauseated.  He did use his venting G-tube.  He also had pain in his abdomen, back, and central suprapubic area.  He reports that he was having diarrhea that improved significantly with starting Metamucil 3 days ago.  Prior to diagnosis he was having vomiting for about 3 months.  He wonders if this is now limiting his ability to eat.  In the last couple of days he has been having intermittent right-sided abdominal pain.  His glucose has been variable and was low this morning.  He is working  with endocrine on his blood sugars.  He did take some Zofran and Compazine for nausea following chemotherapy.  He found minimal relief from Imodium for diarrhea.  At its worst, he was having 12 stools per day.  He reports taking 2 Dilaudid every 4 hours including overnight for his abdominal and back pain.  He did start on the Butrans as well.  He is sleeping upright and then sometimes having neck discomfort related to this.  He will be having home care nurse visits on Fridays.  He wonders about getting a nerve block for his pain.  He denies other concerns.    Current Outpatient Medications   Medication Sig Dispense Refill    acetaminophen (TYLENOL) 32 mg/mL liquid Take 20.313 mLs (650 mg) by mouth every 8 hours 1800 mL 11    B-D U/F 31G X 8 MM insulin pen needle       blood glucose (FREESTYLE TEST STRIPS) test strip by Other route as needed      buprenorphine (BUTRANS) 10 MCG/HR WK patch Place 1 patch onto the skin every 7 days 4 patch 3    Continuous Blood Gluc Sensor (FREESTYLE KAREN 2 SENSOR) MISC       dexamethasone (DECADRON) 4 MG tablet Take 2 tablets (8 mg) by mouth daily Take for 2 days, starting the day after chemo. Take with food. 4 tablet 2    HYDROmorphone (DILAUDID) 2 MG tablet Take 1-2 tablets (2-4 mg) by mouth every 4 hours as needed for severe pain 180 tablet 0    hydrOXYzine (VISTARIL) 25 MG capsule Take 25 mg by mouth      insulin aspart (NOVOLOG PEN) 100 UNIT/ML pen Inject 1-10 Units Subcutaneous 3 times daily (with meals) 15 mL 3    insulin glargine (BASAGLAR KWIKPEN) 100 UNIT/ML pen Inject 20 Units Subcutaneous every morning for 360 days 18 mL 3    insulin  UNIT/ML injection Inject 15 Units Subcutaneous every evening for 60 days 9 mL 0    Lancets (ONETOUCH DELICA PLUS [id number]) MISC       lidocaine-prilocaine (EMLA) 2.5-2.5 % external cream Use 1-2 times a week or as needed prior to port access 30 g 3    lidocaine-prilocaine (EMLA) 2.5-2.5 % external cream Use 1-2 times weekly or as  needed prior to port access 30 g 3    lipase-protease-amylase (CREON 24) 61795-81351-442204 units CPEP per EC capsule 1-2 capsules by Per J Tube route 3 times daily (with meals) 180 capsule 0    loperamide (IMODIUM) 2 MG capsule 2 caps at 1st sign of diarrhea & 1 cap every 2hrs until 12hrs diarrhea free. During night, 2 caps at bedtime & 2 caps every 4hrs until AM 30 capsule 0    naloxone (NARCAN) 4 MG/0.1ML nasal spray Spray 1 spray (4 mg) into one nostril alternating nostrils as needed for opioid reversal every 2-3 minutes until assistance arrives 0.2 mL 11    ondansetron (ZOFRAN ODT) 8 MG ODT tab Take 1 tablet (8 mg) by mouth every 8 hours as needed for nausea 30 tablet 3    ondansetron (ZOFRAN) 8 MG tablet Take 1 tablet (8 mg) by mouth every 8 hours as needed for nausea (vomiting) 30 tablet 2    pantoprazole (PROTONIX) 40 MG EC tablet Take 40 mg by mouth daily      polyethylene glycol (MIRALAX) 17 GM/Dose powder Take 17 g by mouth daily 510 g 3    prochlorperazine (COMPAZINE) 10 MG tablet Take 1 tablet (10 mg) by mouth every 6 hours as needed for nausea or vomiting 30 tablet 2    psyllium (METAMUCIL/KONSYL) capsule 1 capsule by Per G Tube route 2 times daily 180 capsule 3    sennosides (SENOKOT) 8.8 MG/5ML syrup 5 mLs by Per J Tube route 2 times daily 236 mL 3    sodium bicarbonate 325 MG tablet 1 tablet (325 mg) by Per J Tube route 3 times daily 90 tablet 0    vitamin D3 (CHOLECALCIFEROL) 50 mcg (2000 units) tablet Take 1 tablet (50 mcg) by mouth daily 30 tablet 1     Physical Examination:  General: The patient is a pleasant male in no acute distress. He is here today with his wife.   /76 (BP Location: Right arm, Patient Position: Sitting, Cuff Size: Adult Large)   Pulse 102   Temp 98.6  F (37  C) (Oral)   Resp 16   Wt 74.3 kg (163 lb 14.4 oz)   SpO2 97%   BMI 22.54 kg/m    Wt Readings from Last 10 Encounters:   08/15/23 74.3 kg (163 lb 14.4 oz)   08/09/23 74.5 kg (164 lb 4.8 oz)   08/07/23 75.4 kg  (166 lb 4.8 oz)   07/31/23 76.4 kg (168 lb 6.4 oz)   07/28/23 76.7 kg (169 lb)   07/27/23 76.7 kg (169 lb)   07/21/23 76.7 kg (169 lb 3.2 oz)   07/15/23 78.4 kg (172 lb 14.4 oz)   HEENT: EOMI. Sclerae are anicteric.   Lymph: Neck is supple with no lymphadenopathy in the cervical or supraclavicular areas.   Heart: Regular rate and rhythm.   Lungs: Clear to auscultation bilaterally.   Abdomen: Bowel sounds present, soft, nontender with no palpable hepatosplenomegaly or masses.   Extremities: No lower extremity edema noted bilaterally.   Neuro: Cranial nerves II through XII are grossly intact.  Skin: No rashes, petechiae, or bruising noted on exposed skin.    Laboratory Data:  Most Recent 3 CBC's:  Recent Labs   Lab Test 08/15/23  1002 08/11/23  1615 08/02/23  1343 07/31/23  1026   WBC 4.5 4.9 9.2 5.7   HGB 9.4* 10.8* 10.7* 9.9*   MCV 91 93 95 93    280 291 266   ANEUTAUTO  --  3.0 8.7* 3.3    Most Recent 3 BMP's:  Recent Labs   Lab Test 08/15/23  1002 08/11/23  1615 08/09/23  1411   * 137 135*   POTASSIUM 3.5 3.7 4.0   CHLORIDE 101 101 102   CO2 24 26 27   BUN 12.3 9.4 8.6   CR 0.55* 0.59* 0.48*   ANIONGAP 9 10 6*   DENISE 8.8 9.4 8.5*   GLC 91 95 212*   PROTTOTAL 5.8* 6.4 5.5*   ALBUMIN 3.2* 3.6 3.2*    Most Recent 2 LFT's:  Recent Labs   Lab Test 08/15/23  1002 08/11/23  1615   AST 17 19   ALT 18 32   ALKPHOS 125 119   BILITOTAL 0.3 0.6   I reviewed the above labs today.    Assessment and Plan:  Locally advanced, unresectable pancreatic cancer. Patient started on treatment with palliative FOLFIRINOX on 7/31/23. He had a moderate amount of side effects following cycle 1 with dehydration, diarrhea, and nausea. He is doing better now and will continue with cycle 2 today. He will follow-up with me prior to consideration of cycle 3. Will plan to repeat imaging after 6 cycles of FOLFIRINOX. We will consider potential chemoradiation versus continued chemotherapy, versus clinical trial based on treatment response  seen on follow-up imaging.      Nutrition. S/P GJ venting. He is on Peptomin 1.5 at 6 cans/day, which he will continue. He will continue to vent his tube as needed. Will also place a referral to meet with our dietician.     Dehydration. Will give 1L IV NS with his pump unhook.    Diabetes. On insulin. Working with endocrine on management. Advised to let endocrine know about the steroids he receives with each cycle of chemotherapy: 12 mg IV dex on day 1, 8 mg po dex on days 2-3.     Nausea. Recommend scheduling Zofran and Compazine bid each for better management. He is also enrolled in the medical cannabis program, but has not yet started this.     Diarrhea. Recommend management with Imodium prn. Also, recommend continuing with Metamucil daily.     Abdominal pain. Managed with Dilaudid and Butrans. Following with palliative care. Will check with GI about getting a celiac plexus block.     Anemia. Normocytic. Suspect anemia of chronic disease. Will monitor for now.     Berta Anglin PA-C  [facility]  [address] [place] [address] [phone]    Addendum: Reviewed with Dr. York from GI and will plan for endoscopy and duodenal stent placement, as well as replacement of his GJ feeding tube. Will hold off on celiac neurolysis for now as pain is primarily associated with eating.       66 minutes spent on the date of the encounter doing chart review, review of test results, interpretation of tests, patient visit, documentation, and discussion with other provider(s)     Addendum: Given concerns with blurry vision, difficult word finding, and hand cramping during his infusion, will decrease irinotecan by 20% moving forward.

## 2023-08-15 NOTE — TELEPHONE ENCOUNTER
[facility] Call Center    Phone Message    May a detailed message be left on voicemail: yes     Reason for Call: Other: Per wife Violet-Chemo steroids treatment plan has been updated in [facility] , please review and call back/send [facility] with recommendations for insulin adjustment,s  Thank you     Action Taken: Other: ENDO    Travel Screening: Not Applicable

## 2023-08-15 NOTE — NURSING NOTE
"Oncology Rooming Note    August 15, 2023 10:19 AM   Gallo Navarro is a 57 year old male who presents for:    Chief Complaint   Patient presents with    Oncology Clinic Visit     Malignant neoplasm of head of pancreas (H)    Port Draw     Labs drawn via port by RN. VS taken.     Initial Vitals: /76 (BP Location: Right arm, Patient Position: Sitting, Cuff Size: Adult Large)   Pulse 102   Temp 98.6  F (37  C) (Oral)   Resp 16   Wt 74.3 kg (163 lb 14.4 oz)   SpO2 97%   BMI 22.54 kg/m   Estimated body mass index is 22.54 kg/m  as calculated from the following:    Height as of 8/7/23: 1.816 m (5' 11.5\").    Weight as of this encounter: 74.3 kg (163 lb 14.4 oz). Body surface area is 1.94 meters squared.  No Pain (0) Comment: Data Unavailable   No LMP for male patient.  Allergies reviewed: Yes  Medications reviewed: Yes    Medications: Medication refills not needed today.  Pharmacy name entered into [facility]:    [facility] [address] [place] [address] - [address] [place] [address] - [address] [facility] [phone]    Clinical concerns:  none      Pedro Ozuna             "

## 2023-08-16 ENCOUNTER — PREP FOR PROCEDURE (OUTPATIENT)
Dept: GASTROENTEROLOGY | Facility: CLINIC | Age: 58
End: 2023-08-16
Payer: COMMERCIAL

## 2023-08-16 ENCOUNTER — PATIENT OUTREACH (OUTPATIENT)
Dept: ONCOLOGY | Facility: CLINIC | Age: 58
End: 2023-08-16
Payer: COMMERCIAL

## 2023-08-16 ENCOUNTER — PATIENT OUTREACH (OUTPATIENT)
Dept: GASTROENTEROLOGY | Facility: CLINIC | Age: 58
End: 2023-08-16
Payer: COMMERCIAL

## 2023-08-16 DIAGNOSIS — K31.5 DUODENAL STRICTURE: Primary | ICD-10-CM

## 2023-08-16 DIAGNOSIS — Z46.59 ENCOUNTER FOR CARE RELATED TO FEEDING TUBE: ICD-10-CM

## 2023-08-16 NOTE — TELEPHONE ENCOUNTER
"Called pt as follow up, possible need for EUS-guided GJ stent revision and celiac neurolysis.    Since he did the fiber supplement, his formula is holding and he feels much better. Some nausea and hiccups, may be due the chemo he is thinking. On steroids, tough to sleep.     States today is the first day he has \"no pain\". Is on the scheduled dilaudid program and having regular BM's.   Pain in the abdomen and back and flank prior to today while eating and drinking.     Still has his medicine ball chemo through tomorrow, so they are not sure if procedure tomorrow could be possible. It is causing some symptoms of nausea and they are concerned anesthesia will more. Also concerned that a mistake could be made with this infusion in his port at the time of getting procedure.     Wife got on the phone and reports that after his first endoscopy (at outside hospital) he started having more pain. Today is the first day where he is feeling good. Started the metamucil on Monday and since then he has been drinking juice and having no pain. Has also had 1/3 of an applesauce cup and felt full, but no pain.   He still is having pain with eating and is willing to consider stent intervention with Dr York. Questions about the celiac neurolysis, message forwarded to Dr York and Berta Anglin for further guidance on plan.    1400  Called pt to discuss procedure plans, no plans for celiac neurolysis at this time per Dr York. Pt in agreement with the below procedure tomorrow with Dr Greenberg. Will adjust his long acting insulin dose tonight since his tube feeding will be stopped earlier in the night then normal. Also will hold his morning insulin. Does have a virtual visit with Endocrinology tomorrow am as well, in which he can discuss his blood sugar control, has been not well controlled per patient. Not on blood thinners.       Procedure/Imaging/Clinic: ERCP, EGD with duodenal stent placement, possible GJ tube replacement "   Physician: Dionicio   Timin23   Scope time needed: per MD average   Anesthesia:General   Dx: Duodenal stricture, h/o gastrojejunal stent, encounter for care related to feeding tube   Tier:Tier 2   Location: [facility]   Header of letter for pt communication: Upper endoscopy for stent placement, possible replacement of feeding tube     Did discuss arrival timing of 9:55am and to stop tube feeds at 01:55am. Also to stop clear liquids at 8:55am.     Office visit from 23 can be used as pre op exam.  Message routed to PAN to also call patient. Jayt communication letter from OR  requested.     Rosalia Cordova, RN, BSN,   [facility]  Care coordinator

## 2023-08-16 NOTE — TELEPHONE ENCOUNTER
"[facility]: Cancer Care                                                                                          Pt received C2D1 Folfirinox on 8/15 and again had side effects of blurry vision, difficulty word finding, cramping in bilateral hands, blood pressure slightly lower than norm. BG was 200 and he self injected fast acting insulin, discharged home.    Called pt to follow-up today. He stated he is feeling \"pretty good\" stated pain is controlled on dilaudid 4 mg every 4-6 hours. Word finding difficulty was not noticeable to him but he said his wife told him she could see him thinking and trying to talk and words not coming out, he felt words were \"wobbly\". Denied of that after infusion and speaking in clear terms today.    Blurry vision subsided by the time he left the clinic, denied cramping of hands stated they just feel \"cold\" informed him this was neuropathy and ok to monitor, but keep away from very hot water and be careful watching for cuts and wounds. Update care team if symptoms get worse. Pt is pleased he is not having nausea or vomiting, diarrhea is controlled with Imodium and metamucil.     BS up to 320, still only water by mouth, TPN ran from 8pm-noon. Took 3u Novolog and checked an hour later it was 240. 30 min ago it was 300 so he took another 5u Novolog and now BS is 220.     Did inform pt there is a note by diabetic educator that they cannot view his [place] data and he needs to connect to the portal, pt stated he does not know what this mean and will wait to ask Berta Cid tomorrow. Writer verified he has Endo triage number to call and advised him to call if his BS goes up over 300 again, informed him chasing his highs with small amounts of insulin is not going to be very effective so he should get direction from the Endo nurses whether he should just increase long acting insulin when getting chemo, he stated he would.    Signature:  Efra Lynn RN  "

## 2023-08-17 ENCOUNTER — ANESTHESIA EVENT (OUTPATIENT)
Dept: SURGERY | Facility: CLINIC | Age: 58
End: 2023-08-17
Payer: COMMERCIAL

## 2023-08-17 ENCOUNTER — APPOINTMENT (OUTPATIENT)
Dept: GENERAL RADIOLOGY | Facility: CLINIC | Age: 58
End: 2023-08-17
Attending: INTERNAL MEDICINE
Payer: COMMERCIAL

## 2023-08-17 ENCOUNTER — ANESTHESIA (OUTPATIENT)
Dept: SURGERY | Facility: CLINIC | Age: 58
End: 2023-08-17
Payer: COMMERCIAL

## 2023-08-17 ENCOUNTER — VIRTUAL VISIT (OUTPATIENT)
Dept: ENDOCRINOLOGY | Facility: CLINIC | Age: 58
End: 2023-08-17
Payer: COMMERCIAL

## 2023-08-17 ENCOUNTER — HOSPITAL ENCOUNTER (OUTPATIENT)
Facility: CLINIC | Age: 58
Discharge: HOME | End: 2023-08-17
Attending: INTERNAL MEDICINE | Admitting: INTERNAL MEDICINE
Payer: COMMERCIAL

## 2023-08-17 VITALS
DIASTOLIC BLOOD PRESSURE: 68 MMHG | TEMPERATURE: 97.8 F | RESPIRATION RATE: 16 BRPM | SYSTOLIC BLOOD PRESSURE: 112 MMHG | OXYGEN SATURATION: 99 % | WEIGHT: 164.9 LBS | HEART RATE: 81 BPM | BODY MASS INDEX: 23.09 KG/M2 | HEIGHT: 71 IN

## 2023-08-17 DIAGNOSIS — Z79.4 TYPE 2 DIABETES MELLITUS WITHOUT COMPLICATION, WITH LONG-TERM CURRENT USE OF INSULIN (H): Primary | ICD-10-CM

## 2023-08-17 DIAGNOSIS — E11.9 TYPE 2 DIABETES MELLITUS WITHOUT COMPLICATION, WITH LONG-TERM CURRENT USE OF INSULIN (H): Primary | ICD-10-CM

## 2023-08-17 DIAGNOSIS — C25.0 MALIGNANT NEOPLASM OF HEAD OF PANCREAS (H): ICD-10-CM

## 2023-08-17 LAB
CANCER AG19-9 SERPL IA-ACNC: 12 U/ML
GLUCOSE BLDC GLUCOMTR-MCNC: 219 MG/DL (ref 70–99)

## 2023-08-17 PROCEDURE — 710N000012 HC RECOVERY PHASE 2, PER MINUTE: Performed by: INTERNAL MEDICINE

## 2023-08-17 PROCEDURE — 82962 GLUCOSE BLOOD TEST: CPT

## 2023-08-17 PROCEDURE — 370N000017 HC ANESTHESIA TECHNICAL FEE, PER MIN: Performed by: INTERNAL MEDICINE

## 2023-08-17 PROCEDURE — 999N000181 XR SURGERY CARM FLUORO GREATER THAN 5 MIN W STILLS: Mod: TC

## 2023-08-17 PROCEDURE — 250N000009 HC RX 250: Performed by: INTERNAL MEDICINE

## 2023-08-17 PROCEDURE — 255N000002 HC RX 255 OP 636: Mod: JZ | Performed by: INTERNAL MEDICINE

## 2023-08-17 PROCEDURE — 360N000083 HC SURGERY LEVEL 3 W/ FLUORO, PER MIN: Performed by: INTERNAL MEDICINE

## 2023-08-17 PROCEDURE — C1726 CATH, BAL DIL, NON-VASCULAR: HCPCS | Performed by: INTERNAL MEDICINE

## 2023-08-17 PROCEDURE — 999N000141 HC STATISTIC PRE-PROCEDURE NURSING ASSESSMENT: Performed by: INTERNAL MEDICINE

## 2023-08-17 PROCEDURE — 710N000010 HC RECOVERY PHASE 1, LEVEL 2, PER MIN: Performed by: INTERNAL MEDICINE

## 2023-08-17 PROCEDURE — 250N000009 HC RX 250: Performed by: NURSE ANESTHETIST, CERTIFIED REGISTERED

## 2023-08-17 PROCEDURE — C1769 GUIDE WIRE: HCPCS | Performed by: INTERNAL MEDICINE

## 2023-08-17 PROCEDURE — 250N000011 HC RX IP 250 OP 636: Performed by: NURSE ANESTHETIST, CERTIFIED REGISTERED

## 2023-08-17 PROCEDURE — C1876 STENT, NON-COA/NON-COV W/DEL: HCPCS | Performed by: INTERNAL MEDICINE

## 2023-08-17 PROCEDURE — 258N000003 HC RX IP 258 OP 636: Performed by: NURSE ANESTHETIST, CERTIFIED REGISTERED

## 2023-08-17 PROCEDURE — 272N000001 HC OR GENERAL SUPPLY STERILE: Performed by: INTERNAL MEDICINE

## 2023-08-17 PROCEDURE — 272N000002 HC OR SUPPLY OTHER OPNP: Performed by: INTERNAL MEDICINE

## 2023-08-17 PROCEDURE — 99214 OFFICE O/P EST MOD 30 MIN: CPT | Mod: VID | Performed by: PHYSICIAN ASSISTANT

## 2023-08-17 PROCEDURE — 250N000025 HC SEVOFLURANE, PER MIN: Performed by: INTERNAL MEDICINE

## 2023-08-17 DEVICE — IMPLANTABLE DEVICE: Type: IMPLANTABLE DEVICE | Site: DUODENUM | Status: FUNCTIONAL

## 2023-08-17 RX ORDER — NALOXONE HYDROCHLORIDE 0.4 MG/ML
0.2 INJECTION, SOLUTION INTRAMUSCULAR; INTRAVENOUS; SUBCUTANEOUS
Status: DISCONTINUED | OUTPATIENT
Start: 2023-08-17 | End: 2023-08-17 | Stop reason: HOSPADM

## 2023-08-17 RX ORDER — HYDROMORPHONE HCL IN WATER/PF 6 MG/30 ML
0.4 PATIENT CONTROLLED ANALGESIA SYRINGE INTRAVENOUS EVERY 5 MIN PRN
Status: DISCONTINUED | OUTPATIENT
Start: 2023-08-17 | End: 2023-08-17 | Stop reason: HOSPADM

## 2023-08-17 RX ORDER — SODIUM CHLORIDE, SODIUM LACTATE, POTASSIUM CHLORIDE, CALCIUM CHLORIDE 600; 310; 30; 20 MG/100ML; MG/100ML; MG/100ML; MG/100ML
INJECTION, SOLUTION INTRAVENOUS CONTINUOUS
Status: DISCONTINUED | OUTPATIENT
Start: 2023-08-17 | End: 2023-08-17 | Stop reason: HOSPADM

## 2023-08-17 RX ORDER — DEXAMETHASONE SODIUM PHOSPHATE 4 MG/ML
INJECTION, SOLUTION INTRA-ARTICULAR; INTRALESIONAL; INTRAMUSCULAR; INTRAVENOUS; SOFT TISSUE PRN
Status: DISCONTINUED | OUTPATIENT
Start: 2023-08-17 | End: 2023-08-17

## 2023-08-17 RX ORDER — SODIUM CHLORIDE, SODIUM LACTATE, POTASSIUM CHLORIDE, CALCIUM CHLORIDE 600; 310; 30; 20 MG/100ML; MG/100ML; MG/100ML; MG/100ML
INJECTION, SOLUTION INTRAVENOUS CONTINUOUS PRN
Status: DISCONTINUED | OUTPATIENT
Start: 2023-08-17 | End: 2023-08-17

## 2023-08-17 RX ORDER — KETAMINE HYDROCHLORIDE 10 MG/ML
INJECTION INTRAMUSCULAR; INTRAVENOUS PRN
Status: DISCONTINUED | OUTPATIENT
Start: 2023-08-17 | End: 2023-08-17

## 2023-08-17 RX ORDER — FENTANYL CITRATE 50 UG/ML
50 INJECTION, SOLUTION INTRAMUSCULAR; INTRAVENOUS EVERY 5 MIN PRN
Status: DISCONTINUED | OUTPATIENT
Start: 2023-08-17 | End: 2023-08-17 | Stop reason: HOSPADM

## 2023-08-17 RX ORDER — ONDANSETRON 2 MG/ML
4 INJECTION INTRAMUSCULAR; INTRAVENOUS EVERY 30 MIN PRN
Status: DISCONTINUED | OUTPATIENT
Start: 2023-08-17 | End: 2023-08-17 | Stop reason: HOSPADM

## 2023-08-17 RX ORDER — FENTANYL CITRATE 50 UG/ML
25 INJECTION, SOLUTION INTRAMUSCULAR; INTRAVENOUS EVERY 5 MIN PRN
Status: DISCONTINUED | OUTPATIENT
Start: 2023-08-17 | End: 2023-08-17 | Stop reason: HOSPADM

## 2023-08-17 RX ORDER — LIDOCAINE 40 MG/G
CREAM TOPICAL
Status: DISCONTINUED | OUTPATIENT
Start: 2023-08-17 | End: 2023-08-17 | Stop reason: HOSPADM

## 2023-08-17 RX ORDER — LEVOFLOXACIN 5 MG/ML
INJECTION, SOLUTION INTRAVENOUS PRN
Status: DISCONTINUED | OUTPATIENT
Start: 2023-08-17 | End: 2023-08-17

## 2023-08-17 RX ORDER — OXYCODONE HYDROCHLORIDE 10 MG/1
10 TABLET ORAL
Status: DISCONTINUED | OUTPATIENT
Start: 2023-08-17 | End: 2023-08-17 | Stop reason: HOSPADM

## 2023-08-17 RX ORDER — PEN NEEDLE, DIABETIC 31 GX5/16"
NEEDLE, DISPOSABLE MISCELLANEOUS
Qty: 500 EACH | Refills: 1 | Status: SHIPPED | OUTPATIENT
Start: 2023-08-17 | End: 2023-12-05

## 2023-08-17 RX ORDER — NALOXONE HYDROCHLORIDE 0.4 MG/ML
0.4 INJECTION, SOLUTION INTRAMUSCULAR; INTRAVENOUS; SUBCUTANEOUS
Status: DISCONTINUED | OUTPATIENT
Start: 2023-08-17 | End: 2023-08-17 | Stop reason: HOSPADM

## 2023-08-17 RX ORDER — LIDOCAINE HYDROCHLORIDE 20 MG/ML
INJECTION, SOLUTION INFILTRATION; PERINEURAL PRN
Status: DISCONTINUED | OUTPATIENT
Start: 2023-08-17 | End: 2023-08-17

## 2023-08-17 RX ORDER — FLUMAZENIL 0.1 MG/ML
0.2 INJECTION, SOLUTION INTRAVENOUS
Status: DISCONTINUED | OUTPATIENT
Start: 2023-08-17 | End: 2023-08-17 | Stop reason: HOSPADM

## 2023-08-17 RX ORDER — ONDANSETRON 4 MG/1
4 TABLET, ORALLY DISINTEGRATING ORAL EVERY 30 MIN PRN
Status: DISCONTINUED | OUTPATIENT
Start: 2023-08-17 | End: 2023-08-17 | Stop reason: HOSPADM

## 2023-08-17 RX ORDER — ONDANSETRON 2 MG/ML
4 INJECTION INTRAMUSCULAR; INTRAVENOUS EVERY 6 HOURS PRN
Status: DISCONTINUED | OUTPATIENT
Start: 2023-08-17 | End: 2023-08-17 | Stop reason: HOSPADM

## 2023-08-17 RX ORDER — PROPOFOL 10 MG/ML
INJECTION, EMULSION INTRAVENOUS PRN
Status: DISCONTINUED | OUTPATIENT
Start: 2023-08-17 | End: 2023-08-17

## 2023-08-17 RX ORDER — ONDANSETRON 2 MG/ML
INJECTION INTRAMUSCULAR; INTRAVENOUS PRN
Status: DISCONTINUED | OUTPATIENT
Start: 2023-08-17 | End: 2023-08-17

## 2023-08-17 RX ORDER — HYDROMORPHONE HCL IN WATER/PF 6 MG/30 ML
0.2 PATIENT CONTROLLED ANALGESIA SYRINGE INTRAVENOUS EVERY 5 MIN PRN
Status: DISCONTINUED | OUTPATIENT
Start: 2023-08-17 | End: 2023-08-17 | Stop reason: HOSPADM

## 2023-08-17 RX ORDER — FENTANYL CITRATE 50 UG/ML
INJECTION, SOLUTION INTRAMUSCULAR; INTRAVENOUS PRN
Status: DISCONTINUED | OUTPATIENT
Start: 2023-08-17 | End: 2023-08-17

## 2023-08-17 RX ORDER — IOPAMIDOL 510 MG/ML
INJECTION, SOLUTION INTRAVASCULAR PRN
Status: DISCONTINUED | OUTPATIENT
Start: 2023-08-17 | End: 2023-08-17 | Stop reason: HOSPADM

## 2023-08-17 RX ORDER — ONDANSETRON 4 MG/1
4 TABLET, ORALLY DISINTEGRATING ORAL EVERY 6 HOURS PRN
Status: DISCONTINUED | OUTPATIENT
Start: 2023-08-17 | End: 2023-08-17 | Stop reason: HOSPADM

## 2023-08-17 RX ORDER — OXYCODONE HYDROCHLORIDE 5 MG/1
5 TABLET ORAL
Status: DISCONTINUED | OUTPATIENT
Start: 2023-08-17 | End: 2023-08-17 | Stop reason: HOSPADM

## 2023-08-17 RX ADMIN — Medication 10 MG: at 11:40

## 2023-08-17 RX ADMIN — Medication 10 MG: at 11:56

## 2023-08-17 RX ADMIN — LIDOCAINE HYDROCHLORIDE 80 MG: 20 INJECTION, SOLUTION INFILTRATION; PERINEURAL at 11:13

## 2023-08-17 RX ADMIN — SODIUM CHLORIDE, POTASSIUM CHLORIDE, SODIUM LACTATE AND CALCIUM CHLORIDE: 600; 310; 30; 20 INJECTION, SOLUTION INTRAVENOUS at 11:09

## 2023-08-17 RX ADMIN — PHENYLEPHRINE HYDROCHLORIDE 100 MCG: 10 INJECTION INTRAVENOUS at 11:31

## 2023-08-17 RX ADMIN — PHENYLEPHRINE HYDROCHLORIDE 100 MCG: 10 INJECTION INTRAVENOUS at 12:49

## 2023-08-17 RX ADMIN — Medication 20 MG: at 12:10

## 2023-08-17 RX ADMIN — Medication 30 MG: at 11:14

## 2023-08-17 RX ADMIN — ONDANSETRON 4 MG: 2 INJECTION INTRAMUSCULAR; INTRAVENOUS at 12:35

## 2023-08-17 RX ADMIN — PHENYLEPHRINE HYDROCHLORIDE 200 MCG: 10 INJECTION INTRAVENOUS at 11:20

## 2023-08-17 RX ADMIN — PROPOFOL 100 MG: 10 INJECTION, EMULSION INTRAVENOUS at 11:14

## 2023-08-17 RX ADMIN — Medication 50 MG: at 11:15

## 2023-08-17 RX ADMIN — LEVOFLOXACIN 500 MG: 5 INJECTION, SOLUTION INTRAVENOUS at 11:16

## 2023-08-17 RX ADMIN — MIDAZOLAM 2 MG: 1 INJECTION INTRAMUSCULAR; INTRAVENOUS at 11:01

## 2023-08-17 RX ADMIN — Medication 10 MG: at 12:27

## 2023-08-17 RX ADMIN — SUGAMMADEX 200 MG: 100 INJECTION, SOLUTION INTRAVENOUS at 12:53

## 2023-08-17 RX ADMIN — DEXAMETHASONE SODIUM PHOSPHATE 8 MG: 4 INJECTION, SOLUTION INTRA-ARTICULAR; INTRALESIONAL; INTRAMUSCULAR; INTRAVENOUS; SOFT TISSUE at 11:18

## 2023-08-17 RX ADMIN — FENTANYL CITRATE 100 MCG: 50 INJECTION, SOLUTION INTRAMUSCULAR; INTRAVENOUS at 11:01

## 2023-08-17 ASSESSMENT — LIFESTYLE VARIABLES: TOBACCO_USE: 0

## 2023-08-17 ASSESSMENT — ACTIVITIES OF DAILY LIVING (ADL)
ADLS_ACUITY_SCORE: 35
ADLS_ACUITY_SCORE: 35

## 2023-08-17 ASSESSMENT — ENCOUNTER SYMPTOMS: DYSRHYTHMIAS: 0

## 2023-08-17 NOTE — BRIEF OP NOTE
[facility]    Brief Operative Note    Pre-operative diagnosis: Duodenal stricture [K31.5]  Encounter for care related to feeding tube [Z46.59]  Post-operative diagnosis Same as pre-operative diagnosis    Procedure: Procedure(s):  ENDOSCOPIC RETROGRADE  with stent removal x1, balloon sweep  ESOPHAGOGASTRODUODENOSCOPY with duodenal stent placement x2  possible REPLACEMENT, GASTROJEJUNOSTOMY TUBE, PERCUTANEOUS  Surgeon: Surgeon(s) and Role:     * Christos Greenberg MD - Primary  Anesthesia: General   Estimated Blood Loss: None    Drains: None  Specimens: * No specimens in log *  Findings:   None.  Complications: None.  Implants:   Implant Name Type Inv. Item Serial No.  Lot No. LRB No. Used Action   STENT SOLUS BILIARY [id number] DBL PIGTAIL W/INTRO [id number] - [id number] Stent STENT SOLUS BILIARY [id number] DBL PIGTAIL W/INTRO [id number]  [employer] [id number] N/A 1 Explanted   STENT DUO HANAROSTENT UNCOVER [id number] - [id number] Stent STENT DUO HANAROSTENT UNCOVER [id number]  [employer] [id number] N/A 1 Implanted   STENT DUO HANAROSTENT UNCOVER [id number]--[id number] - [id number] Stent STENT DUO HANAROSTENT UNCOVER [id number]--[id number]  [employer]ENT[employer] [id number] N/A 1 Implanted       Complex procedure modifier 22  GJ pulled back  180 duodenoscope to compressed duodenum, removed SOLUS stent, swept VIABIL biliary stent clear of sludge, pulled back 5mm into duodenum. Plastic stent beside VIABIL was plasttered behind it not visible, left in place.   Long duodenal stenosis starting around level of papilla to D3. Two overlapping 12cm HANARO stents placed to D3 back to ampulla, post dilated to 15mm, second stent overlapped back to just below pylorus.  Pediatric scope passed through gastrostomy tract through stents all the way to patent AXIOS gastrojejunostomy. Wire placed to distal jejunum, 18 x 45 cm CRUZ GJ passed to  jejunum beyond AXIOS GJ. Contrast injected through J tube refluxed back up jejunum through AXIOS into stomach.    IMP: Gastric outlet obstruction, treated w overlapping duodenal stents, after removing plastic biliary stent from lumen of VIABIL fully covered metallic biliary stent. GJ replaced through patent duodenal stent to beyond AXIOS GJ which is widely patent    REC: Home, slow advance diet to full liquids, soft mechanical, meds, no leafy vegetables.  Follow-up w Dr Greenberg in clinic in couple of weeks  IF oral diet not adequately tolerated, plan to remove AXIOS to avoid circular reflux, and if that doesn't work, remove GJ and replace w Gplug..

## 2023-08-17 NOTE — NURSING NOTE
Is the patient currently in the state of [place]? YES    Visit mode:VIDEO    If the visit is dropped, the patient can be reconnected by: Text/email    Will anyone else be joining the visit? Not asked      How would you like to obtain your AVS? MyChart    Are changes needed to the allergy or medication list? NO patient reported no changes to his medication/allergy list since last reviewed in Epic.     Reason for visit: RECHECK

## 2023-08-17 NOTE — LETTER
8/17/2023         RE: Gallo Navarro  [address] [address] [place] [id number]        Dear Colleague,    Thank you for referring your patient, Gallo Navarro, to the [facility]. Please see a copy of my visit note below.    Outcome for 08/10/23 9:42 AM: Data uploaded on [employer]  Kathy Moise MA  Outcome for 08/16/23 11:13 AM: Data obtained via [employer] website  Iris Childers MA    Patient is showing 4/5 MNCM met. Not on statin - No LDL on file.   Kathy Moise MA              Assessment/Plan :   Type 2 DM, with long term use of insulin. Gallo feels more comfortable with adjusting insulin. He also feels like his blood sugars have been stabilizing. He has a stent procedure today and he is hopeful that he will be able to eat solid food. We discussed how this would change his insulin dosing. We also discussed his recent CGMS report. His blood sugars seemed to be stabilizing with NPH 25 unit(s) in the evening, 5 unit(s) in the morning, along with 5 unit(s) of insulin glargine in the evening. After today's procedure, I encouraged him to restart this insulin schedule. He is due for his scheduled chemotherapy on the 29th and we will reach out to him a few days before, to recheck his blood sugars and advise him on how to adjust the insulin. We will follow-up in a couple months.     Due to the COVID 19 pandemic this visit was a telephone/video visit in order to help prevent spread of infection in this patient and the general population. The patient gave verbal consent for the visit today. I have independently reviewed and interpreted labs, imaging as indicated.       Distant Location (provider location):  On-site  Mode of Communication:  Video Conference via [employer]  Chart review/prep time 10    Joined the call at 8/17/2023, 8:32:35 am.  Left the call at 8/17/2023, 8:49:06 am.  You were on the call for 16 minutes 31 seconds .  30 minutes spent on the date of the encounter doing chart  review, history and exam, documentation and further activities as noted above.      Chief complaint:  Gallo is a 57 year old male who comes to our clinic to establish care for the management of Type 2 DM in the setting of pancreatic adenocarcinoma.    I have reviewed Care Everywhere including [facility], [facility], [facility], [facility], [facility] , [facility], [place] lab reports, imaging reports and provider notes as indicated.      HISTORY OF PRESENT ILLNESS  Gallo was recently diagnosed with diabetes during hospital admission in April of 2023. He developed abdominal discomfort towards the end of March. The abdominal pain was originally thought to be due to an acute case of pancreatitis. He tried limiting alcohol intake and eating a diet of bland foods but the abdominal pain persisted and he returned to the ER on both April 10th and April 16th. On the 16th he was found to have pancreatits with functional gastric outlet obstruction 2/2 pancreatic edema requiring placement of NG tube fore decompression and subsequent tube feedings. It was during these initial tube feedings that he first experienced episodes of hyperglycemia. He was discharged on May 12th with instructions to use Novolog as needed based on a sliding scale, during tube feedings.    Unfortunately, his abdominal pain persisted, he also struggled with dehydration and weight loss after discharge. He had multiple trips to the hospital and a biopsy was performed on July 12th which returned positive for pancreatic adenocarcinoma. During the hospital stay he continued to experience episodes of hyperglycemia and he was discharged on Lantus 20 unit(s) every morning, NPH 15 unit(s) at night with tube feedings, and Novolog based on a correction scale of 1 unit(s) for every 25 mg/dl over 140 mg/dl. Pt was also referred to oncology and chemotherapy was started. He has chemotherapy every other Tuesday with a chemo pack for two days after  and dexamethasone 10 mg for 3 days.    While at home he continued to struggle with both hypoglycemia and hyperglycemia. He had discontinued insulin glargine due to hypoglycemia and he was relying heavily on Novolog to make small corrections during tube feedings. He would also experience significant spikes during chemotherapy. He met with Pricila Aguirre RN on August 2nd and they have been working on making adjustments to his insulin therapy. At present time, he is taking insulin glargine 5 unit(s) in the evening along with 20 unit(s) of NPH. He also takes 5 unit(s) of NPH in the morning. He continues to use Novolog, as needed. He has been monitoring his blood sugars closely with the FreeStyle Bora 2 CGMS device.    He feels like he has a much better handle on his blood sugars. He is still experiencing fluctuations but he feels like it is not as significant. He has also had less nausea and he is hopeful that he will be reintroducing solid food. He has a procedure scheduled today for stent placement and he is hopeful that he will be able to discontinue TPN. He will have his next scheduled chemotherapy on August 29th.    Endocrine relevant labs are as follows:     Latest Reference Range & Units 07/08/23 11:37   Hemoglobin A1C <5.7 % 7.9 (H)   (H): Data is abnormally high    REVIEW OF SYSTEMS    Endocrine: positive for diabetes  Skin: negative  Eyes: negative for, visual blurring, redness, tearing  Ears/Nose/Throat: negative for, postnasal drainage, persistent sore throat, hoarseness  Respiratory: No shortness of breath, dyspnea on exertion, cough, or hemoptysis  Cardiovascular: negative for, chest pain, lower extremity edema, and exercise intolerance  Gastrointestinal: positive for poor appetite and nausea, negative for, reflux, and excessive gas or bloating  Genitourinary: negative for, nocturia, dysuria, frequency, and urgency  Musculoskeletal: positive for muscular weakness, negative for, and nocturnal  cramping  Neurologic: negative for and numbness or tingling of feet  Psychiatric: negative  Hematologic/Lymphatic/Immunologic: currently undergoing chemotherapy for management of pancreatic adenocarcinoma    Past Medical History  Past Medical History:   Diagnosis Date     Acute pancreatitis 04/16/2023     Gastric outlet obstruction      Recurrent acute pancreatitis        Medications  Current Outpatient Medications   Medication Sig Dispense Refill     acetaminophen (TYLENOL) 32 mg/mL liquid Take 20.313 mLs (650 mg) by mouth every 8 hours 1800 mL 11     B-D U/F 31G X 8 MM insulin pen needle        blood glucose (FREESTYLE TEST STRIPS) test strip by Other route as needed       buprenorphine (BUTRANS) 10 MCG/HR WK patch Place 1 patch onto the skin every 7 days 4 patch 3     Continuous Blood Gluc Sensor (FREESTYLE KAREN 2 SENSOR) MISC        dexamethasone (DECADRON) 4 MG tablet Take 2 tablets (8 mg) by mouth daily Take for 2 days, starting the day after chemo. Take with food. 4 tablet 2     HYDROmorphone (DILAUDID) 2 MG tablet Take 1-2 tablets (2-4 mg) by mouth every 4 hours as needed for severe pain 180 tablet 0     hydrOXYzine (VISTARIL) 25 MG capsule Take 25 mg by mouth       insulin aspart (NOVOLOG PEN) 100 UNIT/ML pen Inject 1-10 Units Subcutaneous 3 times daily (with meals) 15 mL 3     insulin glargine (BASAGLAR KWIKPEN) 100 UNIT/ML pen Inject 20 Units Subcutaneous every morning for 360 days 18 mL 3     insulin  UNIT/ML injection Inject 15 Units Subcutaneous every evening for 60 days 9 mL 0     Lancets (ONETOUCH DELICA PLUS [id number]) MISC        lidocaine-prilocaine (EMLA) 2.5-2.5 % external cream Use 1-2 times a week or as needed prior to port access 30 g 3     lidocaine-prilocaine (EMLA) 2.5-2.5 % external cream Use 1-2 times weekly or as needed prior to port access 30 g 3     lipase-protease-amylase (CREON 24) 66642-37871-413712 units CPEP per EC capsule 1-2 capsules by Per J Tube route 3 times daily  (with meals) 180 capsule 0     loperamide (IMODIUM) 2 MG capsule 2 caps at 1st sign of diarrhea & 1 cap every 2hrs until 12hrs diarrhea free. During night, 2 caps at bedtime & 2 caps every 4hrs until AM 30 capsule 0     naloxone (NARCAN) 4 MG/0.1ML nasal spray Spray 1 spray (4 mg) into one nostril alternating nostrils as needed for opioid reversal every 2-3 minutes until assistance arrives 0.2 mL 11     ondansetron (ZOFRAN ODT) 8 MG ODT tab Take 1 tablet (8 mg) by mouth every 8 hours as needed for nausea 30 tablet 3     ondansetron (ZOFRAN) 8 MG tablet Take 1 tablet (8 mg) by mouth every 8 hours as needed for nausea (vomiting) 30 tablet 2     pantoprazole (PROTONIX) 40 MG EC tablet Take 40 mg by mouth daily       polyethylene glycol (MIRALAX) 17 GM/Dose powder Take 17 g by mouth daily 510 g 3     prochlorperazine (COMPAZINE) 10 MG tablet Take 1 tablet (10 mg) by mouth every 6 hours as needed for nausea or vomiting 30 tablet 2     psyllium (METAMUCIL/KONSYL) capsule 1 capsule by Per G Tube route 2 times daily 180 capsule 3     sennosides (SENOKOT) 8.8 MG/5ML syrup 5 mLs by Per J Tube route 2 times daily 236 mL 3     sodium bicarbonate 325 MG tablet 1 tablet (325 mg) by Per J Tube route 3 times daily 90 tablet 0     vitamin D3 (CHOLECALCIFEROL) 50 mcg (2000 units) tablet Take 1 tablet (50 mcg) by mouth daily 30 tablet 1       Allergies  No Known Allergies    Family History  family history includes Cirrhosis in his father; Colon Cancer in his paternal uncle; Colon Polyps in his brother; Diabetes Type 2  in his father; Genetic Disorder in his brother; Pancreatic Cancer in his paternal aunt; Pancreatitis in his cousin.    Social History  Social History     Tobacco Use     Smoking status: Never     Passive exposure: Never     Smokeless tobacco: Never   Vaping Use     Vaping Use: Never used   Substance Use Topics     Alcohol use: Not Currently     Comment: o/w light beer 2days a week     Drug use: Never       Physical  Exam  There is no height or weight on file to calculate BMI.  GENERAL: no distress. He is accompanied by his wife  SKIN: Visible skin clear. No significant rash, abnormal pigmentation or lesions.  EYES: Eyes grossly normal to inspection.  No discharge or erythema, or obvious scleral/conjunctival abnormalities.  NECK: visible goiter is not present; no visible neck masses  RESP: No audible wheeze, cough, or visible cyanosis.  No visible retractions or increased work of breathing.    NEURO: Awake, alert, responds appropriately to questions.  Mentation and speech fluent.  PSYCH:affect normal and appearance well-groomed.      DATA REVIEW  FreeStyle LibreView  Time in target range 50%  High 36% and Very high 14%  Current Ave  mg/dl        Again, thank you for allowing me to participate in the care of your patient.        Sincerely,        Berta Cid PA-C

## 2023-08-17 NOTE — TELEPHONE ENCOUNTER
Please refer to 8/11 My Chart Encounter for additional details.    Patient has visit with Berta Cid today.    Pricila Aguirre RN, Diabetes Educator  Diabetes Education Department  [facility] [place]  [phone]

## 2023-08-17 NOTE — ANESTHESIA PREPROCEDURE EVALUATION
Anesthesia Pre-Procedure Evaluation    Patient: Gallo Navarro   MRN: [id number] : 1965        Procedure : Procedure(s):  ENDOSCOPIC RETROGRADE CHOLANGIOPANCREATOGRAPHY  ESOPHAGOGASTRODUODENOSCOPY with duodenal stent placement  possible REPLACEMENT, GASTROJEJUNOSTOMY TUBE, PERCUTANEOUS          Past Medical History:   Diagnosis Date    Acute pancreatitis 2023    Gastric outlet obstruction     Recurrent acute pancreatitis       Past Surgical History:   Procedure Laterality Date    AS OPEN TREATMENT CLAVICULAR FRACTURE INTERNAL FX      ENDOSCOPIC RETROGRADE CHOLANGIOPANCREATOGRAM N/A 2023    Procedure: ENDOSCOPIC RETROGRADE CHOLANGIOPANCREATOGRAPHY;  Surgeon: Khadar Pickett MD;  Location: [facility] OR    ENDOSCOPIC ULTRASOUND UPPER GASTROINTESTINAL TRACT (GI) N/A 2023    Procedure: Endoscopic ultrasound upper gastrointestinal tract (GI), with biposy, GJ tube repositioning, stent placement;  Surgeon: Khadar Pickett MD;  Location: [facility] OR    ENDOSCOPIC ULTRASOUND UPPER GASTROINTESTINAL TRACT (GI) N/A 2023    Procedure: ENDOSCOPIC ULTRASOUND, ESOPHAGOSCOPY, EUS guided gastrojejunostomy;  Surgeon: Tre York MD;  Location: [facility] OR    INSERT PICC LINE  2023    INSERT PORT VASCULAR ACCESS Right 2023    Procedure: Insert port vascular access;  Surgeon: Tom العلي MD;  Location: [facility] OR    IR CHEST PORT PLACEMENT > 5 YRS OF AGE  2023    IR NG TUBE PLACEMENT REQ RAD & FLUORO  2023    JG tube      No Known Allergies   Social History     Tobacco Use    Smoking status: Never     Passive exposure: Never    Smokeless tobacco: Never   Substance Use Topics    Alcohol use: Not Currently     Comment: o/w light beer 2days a week      Wt Readings from Last 1 Encounters:   23 74.8 kg (164 lb 14.5 oz)        Anesthesia Evaluation   Pt has had prior anesthetic. Type: General and MAC.    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  (+) Malignancy, History of GI.GI CA  Active status post Chemo.      Other:            Physical Exam    Airway        Mallampati: II   TM distance: > 3 FB   Neck ROM: full   Mouth opening: > 3 cm    Respiratory Devices and Support         Dental       (+) Minor Abnormalities - some fillings, tiny chips      Cardiovascular   cardiovascular exam normal       Rhythm and rate: normal (-) no murmur    Pulmonary   pulmonary exam normal        breath sounds clear to auscultation           OUTSIDE LABS:  CBC:   Lab Results   Component Value Date    WBC 4.5 08/15/2023    WBC 4.9 08/11/2023    HGB 9.4 (L) 08/15/2023    HGB 10.8 (L) 08/11/2023    HCT 28.1 (L) 08/15/2023    HCT 31.4 (L) 08/11/2023     08/15/2023     08/11/2023     BMP:   Lab Results   Component Value Date     (L) 08/15/2023     08/11/2023    POTASSIUM 3.5 08/15/2023    POTASSIUM 3.7 08/11/2023    CHLORIDE 101 08/15/2023    CHLORIDE 101 08/11/2023    CO2 24 08/15/2023    CO2 26 08/11/2023    BUN 12.3 08/15/2023    BUN 9.4 08/11/2023    CR 0.55 (L) 08/15/2023    CR 0.59 (L) 08/11/2023    GLC 91 08/15/2023    GLC 95 08/11/2023     COAGS:   Lab Results   Component Value Date     INR 1.46 (H) 07/13/2023     POC: No results found for: BGM, HCG, HCGS  HEPATIC:   Lab Results   Component Value Date    ALBUMIN 3.2 (L) 08/15/2023    PROTTOTAL 5.8 (L) 08/15/2023    ALT 18 08/15/2023    AST 17 08/15/2023    ALKPHOS 125 08/15/2023    BILITOTAL 0.3 08/15/2023     OTHER:   Lab Results   Component Value Date    LACT 1.0 07/08/2023    A1C 7.9 (H) 07/08/2023    DENISE 8.8 08/15/2023    PHOS 3.2 08/11/2023    MAG 1.9 08/11/2023    LIPASE 34 07/09/2023       Anesthesia Plan    ASA Status:  3    NPO Status:  NPO Appropriate    Anesthesia Type: General.     - Airway: ETT   Induction: Intravenous, Propofol.   Maintenance: Balanced.        Consents    Anesthesia Plan(s) and associated risks, benefits, and realistic alternatives discussed. Questions answered and patient/representative(s) expressed understanding.     - Discussed:     - Discussed with:  Patient      - Extended Intubation/Ventilatory Support Discussed: No.      - Patient is DNR/DNI Status: No     Use of blood products discussed: No .     Postoperative Care    Pain management: Multi-modal analgesia.   PONV prophylaxis: Ondansetron (or other 5HT-3)     Comments:                Estrella Munroe MD

## 2023-08-17 NOTE — PROGRESS NOTES
Assessment/Plan :   Type 2 DM, with long term use of insulin. Gallo feels more comfortable with adjusting insulin. He also feels like his blood sugars have been stabilizing. He has a stent procedure today and he is hopeful that he will be able to eat solid food. We discussed how this would change his insulin dosing. We also discussed his recent CGMS report. His blood sugars seemed to be stabilizing with NPH 25 unit(s) in the evening, 5 unit(s) in the morning, along with 5 unit(s) of insulin glargine in the evening. After today's procedure, I encouraged him to restart this insulin schedule. He is due for his scheduled chemotherapy on the 29th and we will reach out to him a few days before, to recheck his blood sugars and advise him on how to adjust the insulin. We will follow-up in a couple months.     Due to the COVID 19 pandemic this visit was a telephone/video visit in order to help prevent spread of infection in this patient and the general population. The patient gave verbal consent for the visit today. I have independently reviewed and interpreted labs, imaging as indicated.       Distant Location (provider location):  On-site  Mode of Communication:  Video Conference via [employer]  Chart review/prep time 10    Joined the call at 8/17/2023, 8:32:35 am.  Left the call at 8/17/2023, 8:49:06 am.  You were on the call for 16 minutes 31 seconds .  30 minutes spent on the date of the encounter doing chart review, history and exam, documentation and further activities as noted above.      Chief complaint:  Gallo is a 57 year old male who comes to our clinic to establish care for the management of Type 2 DM in the setting of pancreatic adenocarcinoma.    I have reviewed Care Everywhere including [facility], [facility], [facility], [place], [facility], [facility] , [facility], [place] lab reports, imaging reports and provider notes as indicated.      HISTORY OF PRESENT ILLNESS  Gallo was recently  diagnosed with diabetes during hospital admission in April of 2023. He developed abdominal discomfort towards the end of March. The abdominal pain was originally thought to be due to an acute case of pancreatitis. He tried limiting alcohol intake and eating a diet of bland foods but the abdominal pain persisted and he returned to the ER on both April 10th and April 16th. On the 16th he was found to have pancreatits with functional gastric outlet obstruction 2/2 pancreatic edema requiring placement of NG tube fore decompression and subsequent tube feedings. It was during these initial tube feedings that he first experienced episodes of hyperglycemia. He was discharged on May 12th with instructions to use Novolog as needed based on a sliding scale, during tube feedings.    Unfortunately, his abdominal pain persisted, he also struggled with dehydration and weight loss after discharge. He had multiple trips to the hospital and a biopsy was performed on July 12th which returned positive for pancreatic adenocarcinoma. During the hospital stay he continued to experience episodes of hyperglycemia and he was discharged on Lantus 20 unit(s) every morning, NPH 15 unit(s) at night with tube feedings, and Novolog based on a correction scale of 1 unit(s) for every 25 mg/dl over 140 mg/dl. Pt was also referred to oncology and chemotherapy was started. He has chemotherapy every other Tuesday with a chemo pack for two days after and dexamethasone 10 mg for 3 days.    While at home he continued to struggle with both hypoglycemia and hyperglycemia. He had discontinued insulin glargine due to hypoglycemia and he was relying heavily on Novolog to make small corrections during tube feedings. He would also experience significant spikes during chemotherapy. He met with Pricila Aguirre RN on August 2nd and they have been working on making adjustments to his insulin therapy. At present time, he is taking insulin glargine 5 unit(s) in the  evening along with 20 unit(s) of NPH. He also takes 5 unit(s) of NPH in the morning. He continues to use Novolog, as needed. He has been monitoring his blood sugars closely with the FreeStyle Bora 2 CGMS device.    He feels like he has a much better handle on his blood sugars. He is still experiencing fluctuations but he feels like it is not as significant. He has also had less nausea and he is hopeful that he will be reintroducing solid food. He has a procedure scheduled today for stent placement and he is hopeful that he will be able to discontinue TPN. He will have his next scheduled chemotherapy on August 29th.    Endocrine relevant labs are as follows:     Latest Reference Range & Units 07/08/23 11:37   Hemoglobin A1C <5.7 % 7.9 (H)   (H): Data is abnormally high    REVIEW OF SYSTEMS    Endocrine: positive for diabetes  Skin: negative  Eyes: negative for, visual blurring, redness, tearing  Ears/Nose/Throat: negative for, postnasal drainage, persistent sore throat, hoarseness  Respiratory: No shortness of breath, dyspnea on exertion, cough, or hemoptysis  Cardiovascular: negative for, chest pain, lower extremity edema, and exercise intolerance  Gastrointestinal: positive for poor appetite and nausea, negative for, reflux, and excessive gas or bloating  Genitourinary: negative for, nocturia, dysuria, frequency, and urgency  Musculoskeletal: positive for muscular weakness, negative for, and nocturnal cramping  Neurologic: negative for and numbness or tingling of feet  Psychiatric: negative  Hematologic/Lymphatic/Immunologic: currently undergoing chemotherapy for management of pancreatic adenocarcinoma    Past Medical History  Past Medical History:   Diagnosis Date    Acute pancreatitis 04/16/2023    Gastric outlet obstruction     Recurrent acute pancreatitis        Medications  Current Outpatient Medications   Medication Sig Dispense Refill    acetaminophen (TYLENOL) 32 mg/mL liquid Take 20.313 mLs (650 mg) by  mouth every 8 hours 1800 mL 11    B-D U/F 31G X 8 MM insulin pen needle       blood glucose (FREESTYLE TEST STRIPS) test strip by Other route as needed      buprenorphine (BUTRANS) 10 MCG/HR WK patch Place 1 patch onto the skin every 7 days 4 patch 3    Continuous Blood Gluc Sensor (FREESTYLE KAREN 2 SENSOR) MISC       dexamethasone (DECADRON) 4 MG tablet Take 2 tablets (8 mg) by mouth daily Take for 2 days, starting the day after chemo. Take with food. 4 tablet 2    HYDROmorphone (DILAUDID) 2 MG tablet Take 1-2 tablets (2-4 mg) by mouth every 4 hours as needed for severe pain 180 tablet 0    hydrOXYzine (VISTARIL) 25 MG capsule Take 25 mg by mouth      insulin aspart (NOVOLOG PEN) 100 UNIT/ML pen Inject 1-10 Units Subcutaneous 3 times daily (with meals) 15 mL 3    insulin glargine (BASAGLAR KWIKPEN) 100 UNIT/ML pen Inject 20 Units Subcutaneous every morning for 360 days 18 mL 3    insulin  UNIT/ML injection Inject 15 Units Subcutaneous every evening for 60 days 9 mL 0    Lancets (ONETOUCH DELICA PLUS [id number]) MISC       lidocaine-prilocaine (EMLA) 2.5-2.5 % external cream Use 1-2 times a week or as needed prior to port access 30 g 3    lidocaine-prilocaine (EMLA) 2.5-2.5 % external cream Use 1-2 times weekly or as needed prior to port access 30 g 3    lipase-protease-amylase (CREON 24) 60687-82013-945270 units CPEP per EC capsule 1-2 capsules by Per J Tube route 3 times daily (with meals) 180 capsule 0    loperamide (IMODIUM) 2 MG capsule 2 caps at 1st sign of diarrhea & 1 cap every 2hrs until 12hrs diarrhea free. During night, 2 caps at bedtime & 2 caps every 4hrs until AM 30 capsule 0    naloxone (NARCAN) 4 MG/0.1ML nasal spray Spray 1 spray (4 mg) into one nostril alternating nostrils as needed for opioid reversal every 2-3 minutes until assistance arrives 0.2 mL 11    ondansetron (ZOFRAN ODT) 8 MG ODT tab Take 1 tablet (8 mg) by mouth every 8 hours as needed for nausea 30 tablet 3    ondansetron  (ZOFRAN) 8 MG tablet Take 1 tablet (8 mg) by mouth every 8 hours as needed for nausea (vomiting) 30 tablet 2    pantoprazole (PROTONIX) 40 MG EC tablet Take 40 mg by mouth daily      polyethylene glycol (MIRALAX) 17 GM/Dose powder Take 17 g by mouth daily 510 g 3    prochlorperazine (COMPAZINE) 10 MG tablet Take 1 tablet (10 mg) by mouth every 6 hours as needed for nausea or vomiting 30 tablet 2    psyllium (METAMUCIL/KONSYL) capsule 1 capsule by Per G Tube route 2 times daily 180 capsule 3    sennosides (SENOKOT) 8.8 MG/5ML syrup 5 mLs by Per J Tube route 2 times daily 236 mL 3    sodium bicarbonate 325 MG tablet 1 tablet (325 mg) by Per J Tube route 3 times daily 90 tablet 0    vitamin D3 (CHOLECALCIFEROL) 50 mcg (2000 units) tablet Take 1 tablet (50 mcg) by mouth daily 30 tablet 1       Allergies  No Known Allergies    Family History  family history includes Cirrhosis in his father; Colon Cancer in his paternal uncle; Colon Polyps in his brother; Diabetes Type 2  in his father; Genetic Disorder in his brother; Pancreatic Cancer in his paternal aunt; Pancreatitis in his cousin.    Social History  Social History     Tobacco Use    Smoking status: Never     Passive exposure: Never    Smokeless tobacco: Never   Vaping Use    Vaping Use: Never used   Substance Use Topics    Alcohol use: Not Currently     Comment: o/w light beer 2days a week    Drug use: Never       Physical Exam  There is no height or weight on file to calculate BMI.  GENERAL: no distress. He is accompanied by his wife  SKIN: Visible skin clear. No significant rash, abnormal pigmentation or lesions.  EYES: Eyes grossly normal to inspection.  No discharge or erythema, or obvious scleral/conjunctival abnormalities.  NECK: visible goiter is not present; no visible neck masses  RESP: No audible wheeze, cough, or visible cyanosis.  No visible retractions or increased work of breathing.    NEURO: Awake, alert, responds appropriately to questions.  Mentation  and speech fluent.  PSYCH:affect normal and appearance well-groomed.      DATA REVIEW  FreeStyle LibreView  Time in target range 50%  High 36% and Very high 14%  Current Ave  mg/dl

## 2023-08-17 NOTE — DISCHARGE INSTRUCTIONS
[facility], [place]  Same-Day Surgery   Adult Discharge Orders & Instructions     For 24 hours after surgery    Get plenty of rest.  A responsible adult must stay with you for at least 24 hours after you leave the hospital.   Do not drive or use heavy equipment.  If you have weakness or tingling, don't drive or use heavy equipment until this feeling goes away.  Do not drink alcohol.  Avoid strenuous or risky activities.  Ask for help when climbing stairs.   You may feel lightheaded.  IF so, sit for a few minutes before standing.  Have someone help you get up.   If you have nausea (feel sick to your stomach): Drink only clear liquids such as apple juice, ginger ale, broth or 7-Up.  Rest may also help.  Be sure to drink enough fluids.  Move to a regular diet as you feel able.  You may have a slight fever. Call the doctor if your fever is over 100 F (37.7 C) (taken under the tongue) or lasts longer than 24 hours.  You may have a dry mouth, a sore throat, muscle aches or trouble sleeping.  These should go away after 24 hours.  Do not make important or legal decisions.   Call your doctor for any of the followin.  Signs of infection (fever, growing tenderness at the surgery site, a large amount of drainage or bleeding, severe pain, foul-smelling drainage, redness, swelling).    2. It has been over 8 to 10 hours since surgery and you are still not able to urinate (pass water).    3.  Headache for over 24 hours.    4.  Numbness, tingling or weakness the day after surgery (if you had spinal anesthesia).  To contact a doctor, call ________________________________________ or:    '   [phone] and ask for the resident on call for   ______________________________________________ (answered 24 hours a day)  '   Emergency Department:    [facility]: [phone]       (TTY for hearing impaired: [phone])    [facility]: [phone]       (TTY for hearing impaired:  [phone])

## 2023-08-17 NOTE — OR NURSING
Spoke with ken Parra for patient to treat blood sugar 219. No treatment necessary in pacu and patient can discharge to home.

## 2023-08-17 NOTE — ANESTHESIA PROCEDURE NOTES
Airway       Patient location during procedure: OR       Procedure Start/Stop Times: 8/17/2023 11:18 AM  Staff -        CRNA: Miles El APRN CRNA       Performed By: CRNAIndications and Patient Condition       Indications for airway management: lacey-procedural       Induction type:intravenous       Mask difficulty assessment: 1 - vent by mask    Final Airway Details       Final airway type: endotracheal airway       Successful airway: ETT - single  Endotracheal Airway Details        ETT size (mm): 7.5       Cuffed: yes       Successful intubation technique: direct laryngoscopy       DL Blade Type: Schultz 2       Grade View of Cords: 1       Adjucts: stylet       Position: Right       Measured from: lips       Secured at (cm): 23       Bite block used: None    Post intubation assessment        Placement verified by: capnometry, equal breath sounds and chest rise        Number of attempts at approach: 1       Secured with: silk tape       Ease of procedure: easy       Dentition: Unchanged and Intact    Medication(s) Administered   Medication Administration Time: 8/17/2023 11:18 AM

## 2023-08-17 NOTE — ANESTHESIA CARE TRANSFER NOTE
Patient: Gallo Navarro    Procedure: Procedure(s):  ENDOSCOPIC RETROGRADE  with stent removal x1, balloon sweep  ESOPHAGOGASTRODUODENOSCOPY with duodenal stent placement x2  possible REPLACEMENT, GASTROJEJUNOSTOMY TUBE, PERCUTANEOUS       Diagnosis: Duodenal stricture [K31.5]  Encounter for care related to feeding tube [Z46.59]  Diagnosis Additional Information: No value filed.    Anesthesia Type:   General     Note:    Oropharynx: oropharynx clear of all foreign objects and spontaneously breathing  Level of Consciousness: awake  Oxygen Supplementation: nasal cannula  Level of Supplemental Oxygen (L/min / FiO2): 3  Independent Airway: airway patency satisfactory and stable  Dentition: dentition unchanged  Vital Signs Stable: post-procedure vital signs reviewed and stable  Report to RN Given: handoff report given  Patient transferred to: PACU    Handoff Report: Identifed the Patient, Identified the Reponsible Provider, Reviewed the pertinent medical history, Discussed the surgical course, Reviewed Intra-OP anesthesia mangement and issues during anesthesia, Set expectations for post-procedure period and Allowed opportunity for questions and acknowledgement of understanding    Vitals:  Vitals Value Taken Time   BP     Temp     Pulse 98 08/17/23 1309   Resp 15 08/17/23 1309   SpO2 100 % 08/17/23 1309   Vitals shown include unvalidated device data.    Electronically Signed By: ESVIN Cervantes CRNA  August 17, 2023  1:09 PM

## 2023-08-17 NOTE — ANESTHESIA POSTPROCEDURE EVALUATION
Patient: Gallo Navarro    Procedure: Procedure(s):  ENDOSCOPIC RETROGRADE  with stent removal x1, balloon sweep  ESOPHAGOGASTRODUODENOSCOPY with duodenal stent placement x2  possible REPLACEMENT, GASTROJEJUNOSTOMY TUBE, PERCUTANEOUS       Anesthesia Type:  General    Note:  Disposition: Outpatient   Postop Pain Control: Uneventful            Sign Out: Well controlled pain   PONV: No   Neuro/Psych: Uneventful            Sign Out: Acceptable/Baseline neuro status   Airway/Respiratory: Uneventful            Sign Out: Acceptable/Baseline resp. status   CV/Hemodynamics: Uneventful            Sign Out: Acceptable CV status; No obvious hypovolemia; No obvious fluid overload   Other NRE: NONE   DID A NON-ROUTINE EVENT OCCUR? No           Last vitals:  Vitals Value Taken Time   /75 08/17/23 1330   Temp 36.5  C (97.7  F) 08/17/23 1310   Pulse 82 08/17/23 1345   Resp 10 08/17/23 1345   SpO2 100 % 08/17/23 1345   Vitals shown include unvalidated device data.    Electronically Signed By: Estrella Munroe MD  August 17, 2023  1:46 PM

## 2023-08-18 ENCOUNTER — PATIENT OUTREACH (OUTPATIENT)
Dept: GASTROENTEROLOGY | Facility: CLINIC | Age: 58
End: 2023-08-18
Payer: COMMERCIAL

## 2023-08-18 ENCOUNTER — MYC REFILL (OUTPATIENT)
Dept: ONCOLOGY | Facility: CLINIC | Age: 58
End: 2023-08-18
Payer: COMMERCIAL

## 2023-08-18 ENCOUNTER — MYC REFILL (OUTPATIENT)
Dept: FAMILY MEDICINE | Facility: CLINIC | Age: 58
End: 2023-08-18
Payer: COMMERCIAL

## 2023-08-18 ENCOUNTER — TELEPHONE (OUTPATIENT)
Dept: FAMILY MEDICINE | Facility: CLINIC | Age: 58
End: 2023-08-18
Payer: COMMERCIAL

## 2023-08-18 DIAGNOSIS — C25.0 MALIGNANT NEOPLASM OF HEAD OF PANCREAS (H): ICD-10-CM

## 2023-08-18 DIAGNOSIS — Z98.890 HISTORY OF BILIARY STENT INSERTION: ICD-10-CM

## 2023-08-18 DIAGNOSIS — C25.9 PANCREATIC ADENOCARCINOMA (H): ICD-10-CM

## 2023-08-18 RX ORDER — LOPERAMIDE HCL 2 MG
CAPSULE ORAL
Qty: 100 CAPSULE | Refills: 3 | Status: SHIPPED | OUTPATIENT
Start: 2023-08-18 | End: 2023-12-08

## 2023-08-18 NOTE — TELEPHONE ENCOUNTER
Called pt as follow up to request for 3 way stopcock for his feeding tube. Pt will go to [facility] to obtain it, staff there have some for him to .  Pt states he is doing well since procedure yesterday. Still experiencing diarrhea but hopeful that as he advances his diet he will have some resolution of it. Is taking metamucil capsules. Feel well overall.    Rosalia Cordova, RN, BSN,   [facility]  Care coordinator

## 2023-08-18 NOTE — TELEPHONE ENCOUNTER
Medication: loperamide 2 mg capsule  Last prescribing provider: Luis aHile MD    Last clinic visit date: 8/15/23 with LISA Johnston    Any missed appointments or no-shows since last clinic visit?: no    Recommendations for requested medication (if none, N/A): NA    Next clinic visit date: 8/29/23 with LISA Johnston    Any other pertinent information (if none, N/A): NA    Pended and Routed to Provider: LISA Johnston

## 2023-08-18 NOTE — TELEPHONE ENCOUNTER
Forms/Letter Request    Type of form/letter:  [facility] Order [id number]    Have you been seen for this request: N/A    Do we have the form/letter: Yes: Will place form on providers desk for review/signature    When is form/letter needed by: 7 days    How would you like the form/letter returned: Fax : [phone]

## 2023-08-20 LAB — ERCP: NORMAL

## 2023-08-21 ENCOUNTER — TELEPHONE (OUTPATIENT)
Dept: ONCOLOGY | Facility: CLINIC | Age: 58
End: 2023-08-21
Payer: COMMERCIAL

## 2023-08-21 ENCOUNTER — DOCUMENTATION ONLY (OUTPATIENT)
Dept: ONCOLOGY | Facility: CLINIC | Age: 58
End: 2023-08-21

## 2023-08-21 ENCOUNTER — APPOINTMENT (OUTPATIENT)
Dept: CT IMAGING | Facility: CLINIC | Age: 58
DRG: 393 | End: 2023-08-21
Attending: EMERGENCY MEDICINE
Payer: COMMERCIAL

## 2023-08-21 ENCOUNTER — PATIENT OUTREACH (OUTPATIENT)
Dept: ONCOLOGY | Facility: CLINIC | Age: 58
End: 2023-08-21

## 2023-08-21 ENCOUNTER — HOSPITAL ENCOUNTER (INPATIENT)
Facility: CLINIC | Age: 58
LOS: 4 days | Discharge: HOME HEALTH | DRG: 393 | End: 2023-08-25
Attending: EMERGENCY MEDICINE | Admitting: HOSPITALIST
Payer: COMMERCIAL

## 2023-08-21 DIAGNOSIS — C25.0 MALIGNANT NEOPLASM OF HEAD OF PANCREAS (H): Primary | ICD-10-CM

## 2023-08-21 DIAGNOSIS — C25.9 PANCREATIC ADENOCARCINOMA (H): ICD-10-CM

## 2023-08-21 DIAGNOSIS — G89.3 CANCER ASSOCIATED PAIN: ICD-10-CM

## 2023-08-21 DIAGNOSIS — K31.1 GASTRIC OUTLET OBSTRUCTION: ICD-10-CM

## 2023-08-21 DIAGNOSIS — R10.30 LOWER ABDOMINAL PAIN: ICD-10-CM

## 2023-08-21 DIAGNOSIS — Z79.4 TYPE 2 DIABETES MELLITUS WITHOUT COMPLICATION, WITH LONG-TERM CURRENT USE OF INSULIN (H): ICD-10-CM

## 2023-08-21 DIAGNOSIS — Z53.9 DIAGNOSIS NOT YET DEFINED: Primary | ICD-10-CM

## 2023-08-21 DIAGNOSIS — E11.9 TYPE 2 DIABETES MELLITUS WITHOUT COMPLICATION, WITH LONG-TERM CURRENT USE OF INSULIN (H): ICD-10-CM

## 2023-08-21 LAB
ALBUMIN SERPL BCG-MCNC: 3.3 G/DL (ref 3.5–5.2)
ALBUMIN UR-MCNC: NEGATIVE MG/DL
ALP SERPL-CCNC: 82 U/L (ref 40–129)
ALT SERPL W P-5'-P-CCNC: 28 U/L (ref 0–70)
ANION GAP SERPL CALCULATED.3IONS-SCNC: 9 MMOL/L (ref 7–15)
APPEARANCE UR: CLEAR
AST SERPL W P-5'-P-CCNC: 23 U/L (ref 0–45)
BASOPHILS # BLD AUTO: 0 10E3/UL (ref 0–0.2)
BASOPHILS NFR BLD AUTO: 0 %
BILIRUB SERPL-MCNC: 0.4 MG/DL
BILIRUB UR QL STRIP: NEGATIVE
BUN SERPL-MCNC: 16.8 MG/DL (ref 6–20)
CALCIUM SERPL-MCNC: 8.3 MG/DL (ref 8.6–10)
CHLORIDE SERPL-SCNC: 105 MMOL/L (ref 98–107)
COLOR UR AUTO: YELLOW
CREAT SERPL-MCNC: 0.47 MG/DL (ref 0.67–1.17)
DEPRECATED HCO3 PLAS-SCNC: 24 MMOL/L (ref 22–29)
EOSINOPHIL # BLD AUTO: 0.1 10E3/UL (ref 0–0.7)
EOSINOPHIL NFR BLD AUTO: 2 %
ERYTHROCYTE [DISTWIDTH] IN BLOOD BY AUTOMATED COUNT: 13.2 % (ref 10–15)
GFR SERPL CREATININE-BSD FRML MDRD: >90 ML/MIN/1.73M2
GLUCOSE BLDC GLUCOMTR-MCNC: 153 MG/DL (ref 70–99)
GLUCOSE SERPL-MCNC: 143 MG/DL (ref 70–99)
GLUCOSE UR STRIP-MCNC: NEGATIVE MG/DL
HCT VFR BLD AUTO: 29.5 % (ref 40–53)
HGB BLD-MCNC: 9.8 G/DL (ref 13.3–17.7)
HGB UR QL STRIP: NEGATIVE
HOLD SPECIMEN: NORMAL
HOLD SPECIMEN: NORMAL
IMM GRANULOCYTES # BLD: 0.1 10E3/UL
IMM GRANULOCYTES NFR BLD: 1 %
KETONES UR STRIP-MCNC: 20 MG/DL
LEUKOCYTE ESTERASE UR QL STRIP: NEGATIVE
LIPASE SERPL-CCNC: 9 U/L (ref 13–60)
LYMPHOCYTES # BLD AUTO: 1 10E3/UL (ref 0.8–5.3)
LYMPHOCYTES NFR BLD AUTO: 14 %
MCH RBC QN AUTO: 30.4 PG (ref 26.5–33)
MCHC RBC AUTO-ENTMCNC: 33.2 G/DL (ref 31.5–36.5)
MCV RBC AUTO: 92 FL (ref 78–100)
MONOCYTES # BLD AUTO: 0.4 10E3/UL (ref 0–1.3)
MONOCYTES NFR BLD AUTO: 5 %
MUCOUS THREADS #/AREA URNS LPF: PRESENT /LPF
NEUTROPHILS # BLD AUTO: 5.8 10E3/UL (ref 1.6–8.3)
NEUTROPHILS NFR BLD AUTO: 78 %
NITRATE UR QL: NEGATIVE
NRBC # BLD AUTO: 0 10E3/UL
NRBC BLD AUTO-RTO: 0 /100
PH UR STRIP: 5.5 [PH] (ref 5–7)
PLATELET # BLD AUTO: 223 10E3/UL (ref 150–450)
POTASSIUM SERPL-SCNC: 4.1 MMOL/L (ref 3.4–5.3)
PROT SERPL-MCNC: 5.5 G/DL (ref 6.4–8.3)
RBC # BLD AUTO: 3.22 10E6/UL (ref 4.4–5.9)
RBC URINE: 2 /HPF
SODIUM SERPL-SCNC: 138 MMOL/L (ref 136–145)
SP GR UR STRIP: 1.01 (ref 1–1.03)
UROBILINOGEN UR STRIP-MCNC: NORMAL MG/DL
WBC # BLD AUTO: 7.4 10E3/UL (ref 4–11)
WBC URINE: <1 /HPF

## 2023-08-21 PROCEDURE — 36415 COLL VENOUS BLD VENIPUNCTURE: CPT | Performed by: EMERGENCY MEDICINE

## 2023-08-21 PROCEDURE — 82962 GLUCOSE BLOOD TEST: CPT

## 2023-08-21 PROCEDURE — 258N000003 HC RX IP 258 OP 636

## 2023-08-21 PROCEDURE — 99285 EMERGENCY DEPT VISIT HI MDM: CPT | Mod: 25 | Performed by: EMERGENCY MEDICINE

## 2023-08-21 PROCEDURE — 85025 COMPLETE CBC W/AUTO DIFF WBC: CPT | Performed by: EMERGENCY MEDICINE

## 2023-08-21 PROCEDURE — 99285 EMERGENCY DEPT VISIT HI MDM: CPT | Performed by: EMERGENCY MEDICINE

## 2023-08-21 PROCEDURE — 83690 ASSAY OF LIPASE: CPT | Performed by: EMERGENCY MEDICINE

## 2023-08-21 PROCEDURE — 99223 1ST HOSP IP/OBS HIGH 75: CPT | Mod: FS

## 2023-08-21 PROCEDURE — 99418 PROLNG IP/OBS E/M EA 15 MIN: CPT

## 2023-08-21 PROCEDURE — 250N000011 HC RX IP 250 OP 636: Performed by: EMERGENCY MEDICINE

## 2023-08-21 PROCEDURE — 81001 URINALYSIS AUTO W/SCOPE: CPT | Performed by: EMERGENCY MEDICINE

## 2023-08-21 PROCEDURE — 120N000002 HC R&B MED SURG/OB UMMC

## 2023-08-21 PROCEDURE — 99207 PR APP CREDIT; MD BILLING SHARED VISIT: CPT | Performed by: HOSPITALIST

## 2023-08-21 PROCEDURE — 96375 TX/PRO/DX INJ NEW DRUG ADDON: CPT | Performed by: EMERGENCY MEDICINE

## 2023-08-21 PROCEDURE — 99214 OFFICE O/P EST MOD 30 MIN: CPT | Performed by: STUDENT IN AN ORGANIZED HEALTH CARE EDUCATION/TRAINING PROGRAM

## 2023-08-21 PROCEDURE — 250N000011 HC RX IP 250 OP 636

## 2023-08-21 PROCEDURE — 258N000003 HC RX IP 258 OP 636: Performed by: EMERGENCY MEDICINE

## 2023-08-21 PROCEDURE — G0180 MD CERTIFICATION HHA PATIENT: HCPCS | Performed by: INTERNAL MEDICINE

## 2023-08-21 PROCEDURE — 250N000009 HC RX 250: Performed by: EMERGENCY MEDICINE

## 2023-08-21 PROCEDURE — 96361 HYDRATE IV INFUSION ADD-ON: CPT | Performed by: EMERGENCY MEDICINE

## 2023-08-21 PROCEDURE — 74177 CT ABD & PELVIS W/CONTRAST: CPT

## 2023-08-21 PROCEDURE — 250N000013 HC RX MED GY IP 250 OP 250 PS 637

## 2023-08-21 PROCEDURE — 96374 THER/PROPH/DIAG INJ IV PUSH: CPT | Performed by: EMERGENCY MEDICINE

## 2023-08-21 PROCEDURE — 74177 CT ABD & PELVIS W/CONTRAST: CPT | Mod: 26 | Performed by: STUDENT IN AN ORGANIZED HEALTH CARE EDUCATION/TRAINING PROGRAM

## 2023-08-21 PROCEDURE — 80053 COMPREHEN METABOLIC PANEL: CPT | Performed by: EMERGENCY MEDICINE

## 2023-08-21 RX ORDER — ACETAMINOPHEN 325 MG/10.15ML
650 LIQUID ORAL EVERY 8 HOURS
Status: COMPLETED | OUTPATIENT
Start: 2023-08-21 | End: 2023-08-24

## 2023-08-21 RX ORDER — SODIUM BICARBONATE 325 MG/1
325 TABLET ORAL 3 TIMES DAILY
Status: DISCONTINUED | OUTPATIENT
Start: 2023-08-21 | End: 2023-08-22

## 2023-08-21 RX ORDER — PANTOPRAZOLE SODIUM 40 MG/1
40 TABLET, DELAYED RELEASE ORAL ONCE
Status: COMPLETED | OUTPATIENT
Start: 2023-08-21 | End: 2023-08-21

## 2023-08-21 RX ORDER — BUPRENORPHINE 10 UG/H
1 PATCH TRANSDERMAL WEEKLY
Status: DISCONTINUED | OUTPATIENT
Start: 2023-08-21 | End: 2023-08-22

## 2023-08-21 RX ORDER — HYDROMORPHONE HYDROCHLORIDE 1 MG/ML
0.5 INJECTION, SOLUTION INTRAMUSCULAR; INTRAVENOUS; SUBCUTANEOUS EVERY 30 MIN PRN
Status: DISCONTINUED | OUTPATIENT
Start: 2023-08-21 | End: 2023-08-21

## 2023-08-21 RX ORDER — DEXTROSE MONOHYDRATE 25 G/50ML
25-50 INJECTION, SOLUTION INTRAVENOUS
Status: DISCONTINUED | OUTPATIENT
Start: 2023-08-21 | End: 2023-08-25 | Stop reason: HOSPADM

## 2023-08-21 RX ORDER — HYDROMORPHONE HYDROCHLORIDE 1 MG/ML
0.5 INJECTION, SOLUTION INTRAMUSCULAR; INTRAVENOUS; SUBCUTANEOUS
Status: DISCONTINUED | OUTPATIENT
Start: 2023-08-21 | End: 2023-08-23

## 2023-08-21 RX ORDER — PROCHLORPERAZINE MALEATE 10 MG
10 TABLET ORAL EVERY 6 HOURS PRN
Status: DISCONTINUED | OUTPATIENT
Start: 2023-08-21 | End: 2023-08-25 | Stop reason: HOSPADM

## 2023-08-21 RX ORDER — LIDOCAINE 40 MG/G
CREAM TOPICAL
Status: DISCONTINUED | OUTPATIENT
Start: 2023-08-21 | End: 2023-08-25 | Stop reason: HOSPADM

## 2023-08-21 RX ORDER — SODIUM CHLORIDE, SODIUM LACTATE, POTASSIUM CHLORIDE, CALCIUM CHLORIDE 600; 310; 30; 20 MG/100ML; MG/100ML; MG/100ML; MG/100ML
INJECTION, SOLUTION INTRAVENOUS CONTINUOUS
Status: ACTIVE | OUTPATIENT
Start: 2023-08-21 | End: 2023-08-22

## 2023-08-21 RX ORDER — NICOTINE POLACRILEX 4 MG
15-30 LOZENGE BUCCAL
Status: DISCONTINUED | OUTPATIENT
Start: 2023-08-21 | End: 2023-08-25 | Stop reason: HOSPADM

## 2023-08-21 RX ORDER — ACETAMINOPHEN 325 MG/10.15ML
650 LIQUID ORAL EVERY 8 HOURS PRN
Status: DISCONTINUED | OUTPATIENT
Start: 2023-08-24 | End: 2023-08-24

## 2023-08-21 RX ORDER — LOPERAMIDE HCL 2 MG
2-4 CAPSULE ORAL 4 TIMES DAILY PRN
Status: DISCONTINUED | OUTPATIENT
Start: 2023-08-21 | End: 2023-08-25 | Stop reason: HOSPADM

## 2023-08-21 RX ORDER — PANTOPRAZOLE SODIUM 40 MG/1
40 TABLET, DELAYED RELEASE ORAL DAILY
Status: DISCONTINUED | OUTPATIENT
Start: 2023-08-21 | End: 2023-08-25

## 2023-08-21 RX ORDER — IOPAMIDOL 755 MG/ML
100 INJECTION, SOLUTION INTRAVASCULAR ONCE
Status: COMPLETED | OUTPATIENT
Start: 2023-08-21 | End: 2023-08-21

## 2023-08-21 RX ORDER — HYDROMORPHONE HYDROCHLORIDE 2 MG/1
2-4 TABLET ORAL EVERY 4 HOURS PRN
Status: DISCONTINUED | OUTPATIENT
Start: 2023-08-21 | End: 2023-08-23

## 2023-08-21 RX ORDER — PROCHLORPERAZINE MALEATE 10 MG
10 TABLET ORAL EVERY 6 HOURS PRN
Qty: 60 TABLET | Refills: 1 | Status: SHIPPED | OUTPATIENT
Start: 2023-08-21 | End: 2023-09-20

## 2023-08-21 RX ADMIN — HYDROMORPHONE HYDROCHLORIDE 1 MG: 1 INJECTION, SOLUTION INTRAMUSCULAR; INTRAVENOUS; SUBCUTANEOUS at 18:06

## 2023-08-21 RX ADMIN — PROCHLORPERAZINE EDISYLATE 10 MG: 5 INJECTION INTRAMUSCULAR; INTRAVENOUS at 13:58

## 2023-08-21 RX ADMIN — HYDROMORPHONE HYDROCHLORIDE 1 MG: 1 INJECTION, SOLUTION INTRAMUSCULAR; INTRAVENOUS; SUBCUTANEOUS at 13:58

## 2023-08-21 RX ADMIN — SODIUM CHLORIDE, PRESERVATIVE FREE 65 ML: 5 INJECTION INTRAVENOUS at 15:05

## 2023-08-21 RX ADMIN — SODIUM CHLORIDE, POTASSIUM CHLORIDE, SODIUM LACTATE AND CALCIUM CHLORIDE: 600; 310; 30; 20 INJECTION, SOLUTION INTRAVENOUS at 20:16

## 2023-08-21 RX ADMIN — HYDROMORPHONE HYDROCHLORIDE 0.5 MG: 1 INJECTION, SOLUTION INTRAMUSCULAR; INTRAVENOUS; SUBCUTANEOUS at 20:44

## 2023-08-21 RX ADMIN — BUPRENORPHINE 1 PATCH: 10 PATCH, EXTENDED RELEASE TRANSDERMAL at 20:22

## 2023-08-21 RX ADMIN — PANTOPRAZOLE SODIUM 40 MG: 40 TABLET, DELAYED RELEASE ORAL at 20:21

## 2023-08-21 RX ADMIN — SODIUM CHLORIDE 1000 ML: 9 INJECTION, SOLUTION INTRAVENOUS at 13:59

## 2023-08-21 RX ADMIN — Medication 1 CAPSULE: at 20:21

## 2023-08-21 RX ADMIN — IOPAMIDOL 100 ML: 755 INJECTION, SOLUTION INTRAVENOUS at 15:05

## 2023-08-21 ASSESSMENT — ACTIVITIES OF DAILY LIVING (ADL)
ADLS_ACUITY_SCORE: 35

## 2023-08-21 NOTE — TELEPHONE ENCOUNTER
[facility] - Medical Oncology Update/Event Note  8/21/2023    CC: called pt to assess his hiccups, diarrhea, and abdominal pain    - pain appears related to the constipation, with relief with flatulence  - continues to have loose stools when he relieves himself. Is taking 4 imodium and 4 metamucil daily with continued watery output. No fever/chills.  - continues to have hiccups. Is managing nausea with zofran.     Assessment\Recommendations:  Mr. Navarro notes worsening hiccups, persistent abdominal pain and bloating, and unchanged diarrhea.  The symptoms are likely related to patient's underlying cancer diagnosis, worsened by the medications being utilized for side effect management.  The constipatory symptoms are likely worsened by the Zofran and Dilaudid.  The latter is critically necessary for pain management, so we recommend alteration of the former to a different class of antiemetic.  The hiccups may also be related to constipation and bowel dysmotility and medication alteration may assist with this.    Despite the potential for clinical benefit, patient's symptoms are unfortunately too severe for slow outpatient management.  He has been miserable for multiple days and requires acute medication management that cannot be provided in his current setting.  Therefore, we recommend inpatient admission with abdominal CT scan imaging and palliative care consultation.  This will allow for medication optimization to maximize symptom relief, while minimizing additional side effects.    Severity of patient's symptoms required he leave the call prior to the communication of all recommendations.  Patient's wife acknowledges these recommendations and will bring him to [facility] via EMT.    Luis Haile MD, PhD   of Medicine  Division of Hematology, Oncology and Transplantation  [facility]

## 2023-08-21 NOTE — PROGRESS NOTES
Brief Oncology Consult:    Pt presented to ER after telephone encounter by Dr. Haile for worsening abdominal pain and hiccups. Dr. Haile recommended CT scan and hospital admission and palliative consultation.  In the ER, pt is afebrile, labs grossly wnl with no elevated LFTs or hyperbilirubinemia. Pt received Dilaudid in ER with partial pain relief. CT A/P showing increased size of the ill marginated pancreatic head mass,  extending into the mesenteric root with encasement of the SMA, SMV,  gastroduodenal artery and broad area of contact with the splenoportal  junction, splenic vein and central portal vein. Increased mesenteric LAD. No gastric dilation, no increased CBD/ biliary dilatation. +mild increased pancreatic ductal dilatation.      ER wants to know if this is Progressive disease and is pt needs chemo inpatient.    Assessment: Discussed with ER physician scans are showing signs of progression but without any acute compressive issues coinciding with stable labs. Pt has however only received 2 cycles mFOLFIRINOX most recently 8/15/23, so tough to label it as progressive disease.   - acute reasons for hosp admission would be for pain control and palliative consult per Dr. Haile's recommendations  - if additional questions while inpatient, please place oncology consult order  - if pt wants to be discharged, pt needs closer follow up with Dr. Haile and palliative care consultation.  - will route my note to Dr. Haile to notify CT results    Lupe Hamilton MD  Hematology and Medical Oncology Fellow  [facility]  Pager: [phone]

## 2023-08-21 NOTE — ED PROVIDER NOTES
[place] EMERGENCY DEPARTMENT ([facility])    8/21/23       ED PROVIDER NOTE  VTA      History     Chief Complaint   Patient presents with    Abdominal Pain    Diarrhea     HPI  Gallo Navarro is a 57 year old male with a past medical history significant for pancreatic adenocarcinoma with gastric outlet obstruction s/p GJ tube, biliary obstruction s/p stent and DM2 who presents to the Emergency Department for evaluation of abdominal pain.  Patient states has been having lower abdominal pain which is usually manageable with his 4 mg of Dilaudid however, this has not been helping.  Patient states ever since he has begun receiving endoscopies his pain has been increased and his diarrhea has been more common. Patient has been put on antidiarrheal and fiber which has now caused him to feel constipated at times on the bathroom as well. He states usually when he passes bowel movements his abdominal pain feels a little better however, when he sits on the toilet now he is unsure if he can even pass gas. Patient is on formula through his J tube. He states his pain worsens when he receives food. He states his G-tube has been filling up with green bile and gas. Patient also states he has been having hiccups that feel as if he is gasping for breaths. He states this is involuntary and he feels as if he cannot breath at times. Patient denies any fevers, vomiting or pain with urination.     Per Chart Review: Patient called [facility] and received recommendations to present to the ED and be admitted for medications management, imaging and palliative care. Dr. Haile believes symptoms are likely related to patient's underlying cancer diagnosis, worsened by the medications being utilized for side effect management. He adds the constipatory symptoms are likely worsened by the Zofran and Dilaudid. The latter is critically necessary for pain management, so he recommends alteration of the former to a different class  of antiemetic. He states the hiccups may also be related to constipation and bowel dysmotility and medication alteration may assist with this.         Past Medical History  Past Medical History:   Diagnosis Date    Acute pancreatitis 04/16/2023    Gastric outlet obstruction     Recurrent acute pancreatitis      Past Surgical History:   Procedure Laterality Date    AS OPEN TREATMENT CLAVICULAR FRACTURE INTERNAL FX      ENDOSCOPIC RETROGRADE CHOLANGIOPANCREATOGRAM N/A 7/11/2023    Procedure: ENDOSCOPIC RETROGRADE CHOLANGIOPANCREATOGRAPHY;  Surgeon: Khadar Pickett MD;  Location: [facility] OR    ENDOSCOPIC RETROGRADE CHOLANGIOPANCREATOGRAM N/A 8/17/2023    Procedure: ENDOSCOPIC RETROGRADE  with stent removal x1, balloon sweep;  Surgeon: Christos Greenberg MD;  Location: [facility] OR    ENDOSCOPIC ULTRASOUND UPPER GASTROINTESTINAL TRACT (GI) N/A 7/11/2023    Procedure: Endoscopic ultrasound upper gastrointestinal tract (GI), with biposy, GJ tube repositioning, stent placement;  Surgeon: Khadar Pickett MD;  Location: [facility] OR    ENDOSCOPIC ULTRASOUND UPPER GASTROINTESTINAL TRACT (GI) N/A 7/13/2023    Procedure: ENDOSCOPIC ULTRASOUND, ESOPHAGOSCOPY, EUS guided gastrojejunostomy;  Surgeon: Tre York MD;  Location: [facility] OR    INSERT PICC LINE  04/29/2023    INSERT PORT VASCULAR ACCESS Right 7/28/2023    Procedure: Insert port vascular access;  Surgeon: Tom العلي MD;  Location: [facility] OR    IR CHEST PORT PLACEMENT > 5 YRS OF AGE  7/28/2023    IR NG TUBE PLACEMENT REQ RAD & FLUORO  05/08/2023    JG tube    REPLACE GASTROJEJUNOSTOMY TUBE, PERCUTANEOUS N/A 8/17/2023    Procedure: possible REPLACEMENT, GASTROJEJUNOSTOMY TUBE, PERCUTANEOUS;  Surgeon: Christos Greenberg MD;  Location: [facility] OR     acetaminophen (TYLENOL) 32 mg/mL liquid  B-D U/F 31G X 8 MM insulin pen needle  blood glucose (FREESTYLE TEST STRIPS) test strip  buprenorphine (BUTRANS) 10 MCG/HR WK patch  Continuous Blood Gluc Sensor (FREESTYLE KAREN 2 SENSOR)  MISC  dexamethasone (DECADRON) 4 MG tablet  HYDROmorphone (DILAUDID) 2 MG tablet  insulin aspart (NOVOLOG PEN) 100 UNIT/ML pen  insulin glargine (BASAGLAR KWIKPEN) 100 UNIT/ML pen  insulin  UNIT/ML injection  Lancets (ONETOUCH DELICA PLUS [id number]) MISC  lipase-protease-amylase (CREON 24) 02698-01588-341829 units CPEP per EC capsule  loperamide (IMODIUM) 2 MG capsule  ondansetron (ZOFRAN ODT) 8 MG ODT tab  ondansetron (ZOFRAN) 8 MG tablet  prochlorperazine (COMPAZINE) 10 MG tablet  prochlorperazine (COMPAZINE) 10 MG tablet  psyllium (METAMUCIL/KONSYL) capsule  sennosides (SENOKOT) 8.8 MG/5ML syrup  simethicone (MYLICON) 125 MG chewable tablet  sodium bicarbonate 325 MG tablet  vitamin D3 (CHOLECALCIFEROL) 50 mcg (2000 units) tablet  lidocaine-prilocaine (EMLA) 2.5-2.5 % external cream  lidocaine-prilocaine (EMLA) 2.5-2.5 % external cream  naloxone (NARCAN) 4 MG/0.1ML nasal spray  pantoprazole (PROTONIX) 40 MG EC tablet  polyethylene glycol (MIRALAX) 17 GM/Dose powder      No Known Allergies  Family History  Family History   Problem Relation Age of Onset    Diabetes Type 2  Father     Cirrhosis Father     Genetic Disorder Brother         missing [id number], mild retardation    Colon Polyps Brother     Colon Cancer Paternal Uncle     Pancreatic Cancer Paternal Aunt     Pancreatitis Cousin      Social History   Social History     Tobacco Use    Smoking status: Never     Passive exposure: Never    Smokeless tobacco: Never   Vaping Use    Vaping Use: Never used   Substance Use Topics    Alcohol use: Not Currently     Comment: o/w light beer 2days a week    Drug use: Never      Past medical history, past surgical history, medications, allergies, family history, and social history were reviewed with the patient. No additional pertinent items.      A complete review of systems was performed with pertinent positives and negatives noted in the HPI, and all other systems negative.    Physical Exam   BP:  108/63  Pulse: 97  Temp: 98  F (36.7  C)  Resp: 16  Height: 182.9 cm (6')  Weight: 74.4 kg (164 lb)  SpO2: 98 %  Physical Exam  Vitals and nursing note reviewed.   Constitutional:       General: He is in acute distress.      Appearance: Normal appearance. He is well-developed. He is ill-appearing. He is not diaphoretic.   HENT:      Head: Normocephalic and atraumatic.      Nose: Nose normal.      Mouth/Throat:      Mouth: Mucous membranes are dry.   Eyes:      General: No scleral icterus.     Conjunctiva/sclera: Conjunctivae normal.   Cardiovascular:      Rate and Rhythm: Normal rate.   Pulmonary:      Effort: Pulmonary effort is normal. No respiratory distress.      Breath sounds: No stridor.   Abdominal:      General: There is no distension.      Palpations: Abdomen is soft.      Tenderness: There is abdominal tenderness. There is no rebound.   Musculoskeletal:         General: No deformity or signs of injury. Normal range of motion.      Cervical back: Normal range of motion and neck supple.   Skin:     General: Skin is warm and dry.      Coloration: Skin is not jaundiced or pale.      Findings: No rash.   Neurological:      Mental Status: He is alert and oriented to person, place, and time.   Psychiatric:         Attention and Perception: Attention normal.         Mood and Affect: Mood is depressed.         Behavior: Behavior normal.         Thought Content: Thought content normal.           ED Course, Procedures, & Data      Procedures                      Results for orders placed or performed during the hospital encounter of 08/21/23   CT Abdomen Pelvis w Contrast     Status: None    Narrative    EXAM: Abdomen/pelvis CT with contrast 8/21/2023 3:24 PM      HISTORY: pancreatic cancer, pain.     COMPARISON: Upper GI 8/4/2023. Abdominal MRI 7/10/2023. Abdomen/pelvis  CT 70 2023    TECHNIQUE: Helical CT from the lung bases through the symphysis pubis  was performed with intravenous contrast. Reconstructed coronal  and  sagittal images obtained. Images reviewed in soft tissue, bone, and  lung windows.    FINDINGS:    LIVER: No new focal liver lesion. Pneumobilia predominantly involving  the left-sided intrahepatic bile ducts.    GALLBLADDER: Layering density in the gallbladder, calcified stone  versus layering vicarious excreted contrast. (Series 9, image 127)     PANCREAS: There is pancreatic ductal dilatation. Infiltrative  hypoenhancing mass in the pancreatic head is ill-defined and difficult  to measure however on series 10, image 37, measures about 5 x 5.1 cm  compared to 4.3 x 4.5 cm on prior CT from 7/8/2023 when measured in a  similar manner. The mass is encasing the SMA, gastroduodenal artery,  SMV with broad area of contact with the inferior main portal vein and  splenic vein. There are multiple adjacent prominent mesenteric nodes,  with extension of the ill marginated mass into the root of mesentery.  The mass is having broad area of contact with the horizontal part of  duodenum.    SPLEEN: Normal size.    ADRENALS: No nodule.    KIDNEYS/BLADDER: No hydronephrosis, calculus, or renal mass. Urinary  bladder is unremarkable.    REPRODUCTIVE: No suspicious pelvic mass. Dystrophic calcification in  the prostate. Mild prostatomegaly.    BOWEL: Fluid-filled distal esophagus. Question small hiatal hernia.  Percutaneous gastrojejunostomy tube with intact balloon, tip  terminating in the jejunum. Stable position of the gastrojejunal  stent. There is a metallic duodenal stent stent in place; the  gastrojejunostomy stent is seen traversing the metallic stent.  Metallic stent in the distal bile duct. Additional stent in the common  bile duct, tip near the ampulla. The left hemicolon demonstrates some  inner wall enhancement, mild wall thickening and mild pericolonic  streakiness. Mild air-filled distention of the colon.     MESENTERY/PERITONEUM: No pneumoperitoneum. There is simple free fluid  in the low pelvis.    LYMPH NODES:  Prominent peripancreatic and mesenteric nodes similar to  slightly more conspicuous than prior for example central mesenteric  node measures 9 mm compared to 7 mm (series 8, image 69).    VASCULAR: The superior mesenteric vein continues to be encased by the  pancreatic head mass, which also encases the superior mesenteric  artery.    BONES/SOFT TISSUES: Partially visualized sclerotic lesion in the T10  vertebral body involving the pedicle on the right, appears increased  in size from CT 7/8/2023. No acute fracture or dislocation. Soft  tissues are unremarkable. New sclerotic lesion in the left pubic bone.      LUNG BASES: Curvilinear density in the lingula. No pleural effusion or  pneumothorax.      Impression    IMPRESSION: In this patient with history of pancreatic cancer and  ongoing pain, the current CT scan compared to prior from 7/8/2023  shows:    1a. Increased size of the ill marginated pancreatic head mass,  extending into the mesenteric root with encasement of the SMA, SMV,  gastroduodenal artery and broad area of contact with the splenoportal  junction, splenic vein and central portal vein.  1b. Similar to slight increase in size of peripancreatic and  mesenteric lymph nodes, reactive and/or neoplastic.  1c. Increase in size of sclerotic lesion in the lower thoracic  vertebral body with new sclerotic lesion in the left pubic bone,  concerning for metastasis  1d. Percutaneous gastrojejunostomy tube, gastroduodenal stent, and  gastrojejunostomy metallic lumen opposing stent; no significant  gastric distention  1e. Slight increased pancreatic ductal dilatation,   1f. Biliary ductal stents with pneumobilia, no increased biliary  ductal dilatation  1g. New ascites.  2a. Increased inner wall enhancement with mild thickening involving  the left colon with mild pericolonic streakiness, may represent  colitis secondary to infectious, or inflammatory etiology. No  pneumatosis, pneumoperitoneum, pericolic  collection.  2b. Mild air-filled distention of transverse colon and part of the  redundant sigmoid colon may represent developing adynamic ileus.    I have personally reviewed the examination and initial interpretation  and I agree with the findings.    ZURDO MANJARREZ MD         SYSTEM ID:  [id number]   XR Abdomen Port 1 View     Status: None    Narrative    EXAM: Abdominal radiograph 8/22/2023 4:05 PM    HISTORY: Ileus evolution.    COMPARISON: CT 8/21/2023.    TECHNIQUE: Portable frontal supine view(s) of the abdomen.    FINDINGS:   Duodenal stent, percutaneous gastrojejunostomy traversing through it.  Additional pancreatic, gastrojejunal, biliary stents. Gastrojejunal  Axios stent. Nonobstructive bowel gas pattern. No pneumatosis or  portal venous gas. Lung bases are clear. No acute osseous abnormality.        Impression    IMPRESSION:   1. Nonobstructive bowel gas pattern.  2. Stable position of the various stents.    I have personally reviewed the examination and initial interpretation  and I agree with the findings.    JATIN CAMARILLO MD         SYSTEM ID:  [id number]   Comprehensive metabolic panel     Status: Abnormal   Result Value Ref Range    Sodium 138 136 - 145 mmol/L    Potassium 4.1 3.4 - 5.3 mmol/L    Chloride 105 98 - 107 mmol/L    Carbon Dioxide (CO2) 24 22 - 29 mmol/L    Anion Gap 9 7 - 15 mmol/L    Urea Nitrogen 16.8 6.0 - 20.0 mg/dL    Creatinine 0.47 (L) 0.67 - 1.17 mg/dL    Calcium 8.3 (L) 8.6 - 10.0 mg/dL    Glucose 143 (H) 70 - 99 mg/dL    Alkaline Phosphatase 82 40 - 129 U/L    AST 23 0 - 45 U/L    ALT 28 0 - 70 U/L    Protein Total 5.5 (L) 6.4 - 8.3 g/dL    Albumin 3.3 (L) 3.5 - 5.2 g/dL    Bilirubin Total 0.4 <=1.2 mg/dL    GFR Estimate >90 >60 mL/min/1.73m2   Lipase     Status: Abnormal   Result Value Ref Range    Lipase 9 (L) 13 - 60 U/L   CBC with platelets and differential     Status: Abnormal   Result Value Ref Range    WBC Count 7.4 4.0 - 11.0 10e3/uL    RBC Count 3.22 (L) 4.40 - 5.90  10e6/uL    Hemoglobin 9.8 (L) 13.3 - 17.7 g/dL    Hematocrit 29.5 (L) 40.0 - 53.0 %    MCV 92 78 - 100 fL    MCH 30.4 26.5 - 33.0 pg    MCHC 33.2 31.5 - 36.5 g/dL    RDW 13.2 10.0 - 15.0 %    Platelet Count 223 150 - 450 10e3/uL    % Neutrophils 78 %    % Lymphocytes 14 %    % Monocytes 5 %    % Eosinophils 2 %    % Basophils 0 %    % Immature Granulocytes 1 %    NRBCs per 100 WBC 0 <1 /100    Absolute Neutrophils 5.8 1.6 - 8.3 10e3/uL    Absolute Lymphocytes 1.0 0.8 - 5.3 10e3/uL    Absolute Monocytes 0.4 0.0 - 1.3 10e3/uL    Absolute Eosinophils 0.1 0.0 - 0.7 10e3/uL    Absolute Basophils 0.0 0.0 - 0.2 10e3/uL    Absolute Immature Granulocytes 0.1 <=0.4 10e3/uL    Absolute NRBCs 0.0 10e3/uL   [place] Draw     Status: None    Narrative    The following orders were created for panel order [place] Draw.  Procedure                               Abnormality         Status                     ---------                               -----------         ------                     Extra Blue Top Tube[[id number]]                              Final result               Extra Red Top Tube[[id number]]                               Final result                 Please view results for these tests on the individual orders.   Extra Blue Top Tube     Status: None   Result Value Ref Range    Hold Specimen JIC    Extra Red Top Tube     Status: None   Result Value Ref Range    Hold Specimen JI    UA with Microscopic reflex to Culture     Status: Abnormal    Specimen: Urine, Midstream   Result Value Ref Range    Color Urine Yellow Colorless, Straw, Light Yellow, Yellow    Appearance Urine Clear Clear    Glucose Urine Negative Negative mg/dL    Bilirubin Urine Negative Negative    Ketones Urine 20 (A) Negative mg/dL    Specific Gravity Urine 1.015 1.003 - 1.035    Blood Urine Negative Negative    pH Urine 5.5 5.0 - 7.0    Protein Albumin Urine Negative Negative mg/dL    Urobilinogen Urine Normal Normal, 2.0 mg/dL    Nitrite Urine  Negative Negative    Leukocyte Esterase Urine Negative Negative    Mucus Urine Present (A) None Seen /LPF    RBC Urine 2 <=2 /HPF    WBC Urine <1 <=5 /HPF    Narrative    Urine Culture not indicated   Glucose by meter     Status: Abnormal   Result Value Ref Range    GLUCOSE BY METER POCT 153 (H) 70 - 99 mg/dL   Comprehensive metabolic panel     Status: Abnormal   Result Value Ref Range    Sodium 137 136 - 145 mmol/L    Potassium 4.0 3.4 - 5.3 mmol/L    Chloride 105 98 - 107 mmol/L    Carbon Dioxide (CO2) 23 22 - 29 mmol/L    Anion Gap 9 7 - 15 mmol/L    Urea Nitrogen 14.3 6.0 - 20.0 mg/dL    Creatinine 0.49 (L) 0.67 - 1.17 mg/dL    Calcium 8.1 (L) 8.6 - 10.0 mg/dL    Glucose 108 (H) 70 - 99 mg/dL    Alkaline Phosphatase 71 40 - 129 U/L    AST 19 0 - 45 U/L    ALT 22 0 - 70 U/L    Protein Total 4.8 (L) 6.4 - 8.3 g/dL    Albumin 2.6 (L) 3.5 - 5.2 g/dL    Bilirubin Total 0.4 <=1.2 mg/dL    GFR Estimate >90 >60 mL/min/1.73m2   CBC with platelets and differential     Status: Abnormal   Result Value Ref Range    WBC Count 5.9 4.0 - 11.0 10e3/uL    RBC Count 2.97 (L) 4.40 - 5.90 10e6/uL    Hemoglobin 9.0 (L) 13.3 - 17.7 g/dL    Hematocrit 27.3 (L) 40.0 - 53.0 %    MCV 92 78 - 100 fL    MCH 30.3 26.5 - 33.0 pg    MCHC 33.0 31.5 - 36.5 g/dL    RDW 13.1 10.0 - 15.0 %    Platelet Count 214 150 - 450 10e3/uL    % Neutrophils 66 %    % Lymphocytes 23 %    % Monocytes 7 %    % Eosinophils 3 %    % Basophils 0 %    % Immature Granulocytes 1 %    NRBCs per 100 WBC 0 <1 /100    Absolute Neutrophils 3.9 1.6 - 8.3 10e3/uL    Absolute Lymphocytes 1.3 0.8 - 5.3 10e3/uL    Absolute Monocytes 0.4 0.0 - 1.3 10e3/uL    Absolute Eosinophils 0.2 0.0 - 0.7 10e3/uL    Absolute Basophils 0.0 0.0 - 0.2 10e3/uL    Absolute Immature Granulocytes 0.1 <=0.4 10e3/uL    Absolute NRBCs 0.0 10e3/uL   Glucose by meter     Status: Abnormal   Result Value Ref Range    GLUCOSE BY METER POCT 132 (H) 70 - 99 mg/dL   Glucose by meter     Status: Abnormal    Result Value Ref Range    GLUCOSE BY METER POCT 116 (H) 70 - 99 mg/dL   Glucose by meter     Status: Abnormal   Result Value Ref Range    GLUCOSE BY METER POCT 111 (H) 70 - 99 mg/dL   CBC with platelets differential     Status: Abnormal    Narrative    The following orders were created for panel order CBC with platelets differential.  Procedure                               Abnormality         Status                     ---------                               -----------         ------                     CBC with platelets and d...[[id number]]  Abnormal            Final result                 Please view results for these tests on the individual orders.   CBC with platelets differential     Status: Abnormal    Narrative    The following orders were created for panel order CBC with platelets differential.  Procedure                               Abnormality         Status                     ---------                               -----------         ------                     CBC with platelets and d...[[id number]]  Abnormal            Final result                 Please view results for these tests on the individual orders.     Medications   acetaminophen (TYLENOL) solution 650 mg (650 mg Oral or Feeding Tube $Given 8/22/23 1630)   acetaminophen (TYLENOL) solution 650 mg (has no administration in time range)   HYDROmorphone (DILAUDID) tablet 2-4 mg (has no administration in time range)   loperamide (IMODIUM) capsule 2-4 mg (has no administration in time range)   pantoprazole (PROTONIX) EC tablet 40 mg ( Oral Automatically Held 8/27/23 0800)   prochlorperazine (COMPAZINE) tablet 10 mg (has no administration in time range)   psyllium (METAMUCIL/KONSYL) capsule 1 capsule ( Per G Tube Automatically Held 8/27/23 0800)   lidocaine 1 % 0.1-1 mL (has no administration in time range)   lidocaine (LMX4) cream (has no administration in time range)   sodium chloride (PF) 0.9% PF flush 3 mL ( Intracatheter Canceled Entry  8/22/23 1845)   sodium chloride (PF) 0.9% PF flush 3 mL (3 mLs Intracatheter $Given 8/22/23 1652)   glucose gel 15-30 g (has no administration in time range)     Or   dextrose 50 % injection 25-50 mL (has no administration in time range)     Or   glucagon injection 1 mg (has no administration in time range)   Contraindications to both pharmacological and mechanical prophylaxis (must document contraindications for both in this order) (has no administration in time range)   lactated ringers infusion (0 mLs Intravenous Stopped 8/22/23 1449)   insulin glargine (LANTUS PEN) injection 5 Units ( Subcutaneous Unheld by provider 8/22/23 1330)   insulin NPH injection 5 Units ( Subcutaneous Automatically Held 8/25/23 0730)   insulin aspart (NovoLOG) injection (RAPID ACTING) ( Subcutaneous Not Given 8/22/23 1701)   insulin NPH injection 25 Units ( Subcutaneous Automatically Held 8/27/23 2200)   HYDROmorphone (PF) (DILAUDID) injection 0.5 mg (0.5 mg Intravenous $Given 8/22/23 1651)   naloxone (NARCAN) injection 0.2 mg (has no administration in time range)     Or   naloxone (NARCAN) injection 0.4 mg (has no administration in time range)     Or   naloxone (NARCAN) injection 0.2 mg (has no administration in time range)     Or   naloxone (NARCAN) injection 0.4 mg (has no administration in time range)   ondansetron (ZOFRAN) injection 4 mg (4 mg Intravenous $Given 8/22/23 1357)   prochlorperazine (COMPAZINE) injection 5 mg (5 mg Intravenous $Given 8/22/23 0958)   dextrose 10% infusion (has no administration in time range)   multivitamin, therapeutic (THERA-VIT) tablet 1 tablet (1 tablet Oral or Feeding Tube Not Given 8/22/23 1256)   lipase-protease-amylase (CREON 24) [id number] units per EC capsule 1-2 capsule ( Per Feeding Tube Not Given 8/22/23 1253)     And   sodium bicarbonate tablet 325 mg (325 mg Per Feeding Tube Not Given 8/22/23 1256)   lipase-protease-amylase (CREON 24) 56108-11186-235765 units per EC capsule 3  capsule (3 capsules Oral Not Given 8/22/23 1701)   lipase-protease-amylase (CREON 24) 27481-76185-712460 units per EC capsule 1 capsule (has no administration in time range)   enoxaparin ANTICOAGULANT (LOVENOX) injection 40 mg (has no administration in time range)   buprenorphine (BUTRANS) Patch in Place ( Transdermal Patch in Place 8/22/23 1650)   buprenorphine (BUTRANS) 20 MCG/HR WK patch 1 patch (1 patch Transdermal $Patch/Med Applied 8/22/23 1639)   pantoprazole (PROTONIX) IV push injection 40 mg (has no administration in time range)   gabapentin (NEURONTIN) capsule 100 mg (100 mg Oral $Given 8/22/23 1632)   melatonin tablet 1 mg (has no administration in time range)   0.9% sodium chloride BOLUS (0 mLs Intravenous Stopped 8/21/23 1558)   prochlorperazine (COMPAZINE) injection 10 mg (10 mg Intravenous $Given 8/21/23 1358)   CT saline (65 mLs Intravenous $Given 8/21/23 1505)   iopamidol (ISOVUE-370) solution 100 mL (100 mLs Intravenous $Given 8/21/23 1505)   pantoprazole (PROTONIX) EC tablet 40 mg (40 mg Oral $Given 8/21/23 2021)     Labs Ordered and Resulted from Time of ED Arrival to Time of ED Departure   COMPREHENSIVE METABOLIC PANEL - Abnormal       Result Value    Sodium 138      Potassium 4.1      Chloride 105      Carbon Dioxide (CO2) 24      Anion Gap 9      Urea Nitrogen 16.8      Creatinine 0.47 (*)     Calcium 8.3 (*)     Glucose 143 (*)     Alkaline Phosphatase 82      AST 23      ALT 28      Protein Total 5.5 (*)     Albumin 3.3 (*)     Bilirubin Total 0.4      GFR Estimate >90     LIPASE - Abnormal    Lipase 9 (*)    CBC WITH PLATELETS AND DIFFERENTIAL - Abnormal    WBC Count 7.4      RBC Count 3.22 (*)     Hemoglobin 9.8 (*)     Hematocrit 29.5 (*)     MCV 92      MCH 30.4      MCHC 33.2      RDW 13.2      Platelet Count 223      % Neutrophils 78      % Lymphocytes 14      % Monocytes 5      % Eosinophils 2      % Basophils 0      % Immature Granulocytes 1      NRBCs per 100 WBC 0      Absolute  Neutrophils 5.8      Absolute Lymphocytes 1.0      Absolute Monocytes 0.4      Absolute Eosinophils 0.1      Absolute Basophils 0.0      Absolute Immature Granulocytes 0.1      Absolute NRBCs 0.0     ROUTINE UA WITH MICROSCOPIC REFLEX TO CULTURE - Abnormal    Color Urine Yellow      Appearance Urine Clear      Glucose Urine Negative      Bilirubin Urine Negative      Ketones Urine 20 (*)     Specific Gravity Urine 1.015      Blood Urine Negative      pH Urine 5.5      Protein Albumin Urine Negative      Urobilinogen Urine Normal      Nitrite Urine Negative      Leukocyte Esterase Urine Negative      Mucus Urine Present (*)     RBC Urine 2      WBC Urine <1     GLUCOSE BY METER - Abnormal    GLUCOSE BY METER POCT 153 (*)    COMPREHENSIVE METABOLIC PANEL - Abnormal    Sodium 137      Potassium 4.0      Chloride 105      Carbon Dioxide (CO2) 23      Anion Gap 9      Urea Nitrogen 14.3      Creatinine 0.49 (*)     Calcium 8.1 (*)     Glucose 108 (*)     Alkaline Phosphatase 71      AST 19      ALT 22      Protein Total 4.8 (*)     Albumin 2.6 (*)     Bilirubin Total 0.4      GFR Estimate >90     CBC WITH PLATELETS AND DIFFERENTIAL - Abnormal    WBC Count 5.9      RBC Count 2.97 (*)     Hemoglobin 9.0 (*)     Hematocrit 27.3 (*)     MCV 92      MCH 30.3      MCHC 33.0      RDW 13.1      Platelet Count 214      % Neutrophils 66      % Lymphocytes 23      % Monocytes 7      % Eosinophils 3      % Basophils 0      % Immature Granulocytes 1      NRBCs per 100 WBC 0      Absolute Neutrophils 3.9      Absolute Lymphocytes 1.3      Absolute Monocytes 0.4      Absolute Eosinophils 0.2      Absolute Basophils 0.0      Absolute Immature Granulocytes 0.1      Absolute NRBCs 0.0     GLUCOSE BY METER - Abnormal    GLUCOSE BY METER POCT 132 (*)    GLUCOSE BY METER - Abnormal    GLUCOSE BY METER POCT 116 (*)    GLUCOSE BY METER - Abnormal    GLUCOSE BY METER POCT 111 (*)    GLUCOSE MONITOR NURSING POCT   GLUCOSE MONITOR NURSING POCT    GLUCOSE MONITOR NURSING POCT   ENTERIC BACTERIA AND VIRUS PANEL BY PCR     XR Abdomen Port 1 View   Final Result   IMPRESSION:    1. Nonobstructive bowel gas pattern.   2. Stable position of the various stents.      I have personally reviewed the examination and initial interpretation   and I agree with the findings.      JATIN CAMARILLO MD            SYSTEM ID:  [id number]      CT Abdomen Pelvis w Contrast   Final Result   IMPRESSION: In this patient with history of pancreatic cancer and   ongoing pain, the current CT scan compared to prior from 7/8/2023   shows:      1a. Increased size of the ill marginated pancreatic head mass,   extending into the mesenteric root with encasement of the SMA, SMV,   gastroduodenal artery and broad area of contact with the splenoportal   junction, splenic vein and central portal vein.   1b. Similar to slight increase in size of peripancreatic and   mesenteric lymph nodes, reactive and/or neoplastic.   1c. Increase in size of sclerotic lesion in the lower thoracic   vertebral body with new sclerotic lesion in the left pubic bone,   concerning for metastasis   1d. Percutaneous gastrojejunostomy tube, gastroduodenal stent, and   gastrojejunostomy metallic lumen opposing stent; no significant   gastric distention   1e. Slight increased pancreatic ductal dilatation,    1f. Biliary ductal stents with pneumobilia, no increased biliary   ductal dilatation   1g. New ascites.   2a. Increased inner wall enhancement with mild thickening involving   the left colon with mild pericolonic streakiness, may represent   colitis secondary to infectious, or inflammatory etiology. No   pneumatosis, pneumoperitoneum, pericolic collection.   2b. Mild air-filled distention of transverse colon and part of the   redundant sigmoid colon may represent developing adynamic ileus.      I have personally reviewed the examination and initial interpretation   and I agree with the findings.      ZURDO MANJARREZ MD             SYSTEM ID:  [id number]             Critical care was not performed.     Medical Decision Making  The patient's presentation was of high complexity (a chronic illness severe exacerbation, progression, or side effect of treatment).    The patient's evaluation involved:  review of external note(s) from 2 sources (see separate area of note for details)  ordering and/or review of 3+ test(s) in this encounter (see separate area of note for details)  review of 3+ test result(s) ordered prior to this encounter (see separate area of note for details)  independent interpretation of testing performed by another health professional (see separate area of note for details)  discussion of management or test interpretation with another health professional (see separate area of note for details)    The patient's management necessitated moderate risk (prescription drug management including medications given in the ED), high risk (a parenteral controlled substance), high risk (drug therapy requiring intensive monitoring (see separate area of note for details)), and high risk (a decision regarding hospitalization).    Assessment & Plan    Gallo Navarro is a 57 year old male with a past medical history significant for pancreatic adenocarcinoma with gastric outlet obstruction s/p GJ tube, biliary obstruction s/p stent and DM2 who presents to the Emergency Department for evaluation of abdominal pain.     Ddx: progressive cancer, pancreatitis, sbo, biliary obstruction    Patient afebrile with nl vitals on arrival. Given IV fluid and Dilaudid for pain.  Compazine for nausea.  Labs with stable CMP.  Normal liver enzymes.  Normal bilirubin.  Normal white count.  Stable hemoglobin.  Urinalysis bland.  Lipase normal.  CT abdomen pelvis shows increased size of the ill marginated pancreatic head mass extending into the mesenteric root.  Increase in size of sclerotic lesion in the thoracic vertebral body and pubic bone.  Increase in  peripancreatic and mesenteric lymph nodes.  Slightly increased pancreatic ductal dilation.  Biliary ductal stent with pneumobilia but no increased biliary ductal dilation.  New ascites.  Increase in her wall enhancement with mild thickening involving the left colon with mild pericolonic streakiness.  Mild air-filled distention of the transverse colon and part of the redundant sigmoid colon.  Discussed with the oncology fellow who reviewed the image findings.  No acute interventions for these findings and oncology service will review appropriateness of patient's current chemotherapeutic regimen but given that he is early in the course of treatment he may not require any significant changes.  Patient admitted to medicine for oncology.  Plan for ongoing pain control and palliative care consult.      I have reviewed the nursing notes. I have reviewed the findings, diagnosis, plan and need for follow up with the patient.    New Prescriptions    No medications on file       Final diagnoses:   Lower abdominal pain   IKELSIE, am serving as a trained medical scribe to document services personally performed by Sydnie Moran MD, based on the provider's statements to me.      Sydnie FLOYD MD, was physically present and have reviewed and verified the accuracy of this note documented by KELSIE BLACKWOOD.    Sydnie Moran MD  [facility] EMERGENCY DEPARTMENT  8/21/2023     Sydnie Moran MD  08/22/23 1963

## 2023-08-21 NOTE — TELEPHONE ENCOUNTER
"[facility]: Cancer Care                                                                                          Received vm from spouse Violet that pt had a really bad weekend, continued to have hiccups constantly and \"couldn't breathe\" this happened a few times. Hiccups are painful and constant. Pt currently sleeping in upright position.     ERCP 8/17, pt able take tablets/capsules orally with fluid but only tried alejandra broth (4 oz) with tofu over the weekend. Diarrhea \"flare up again\" sudden and urgent, averaging every 2 hours, not always with stool but \"a lot of gas\", continued overnight. Denied foul odor, change in color but is watery. Taking imodium as instructed 2 tabs with first stool, 1 tab every stool after. Tube feeding stopped at 8 am. No BM since then. G-tube filled up right away with green bile and gas (bag is 500 mL). [id number] documented Imodium. Took compazine 8 hours ago. Ondansetron also taken before bed time. Lower abd pain does relieve with each stool. Took dilaudid twice with tylenol this morning at 5 am and 8 am, no pain rating.    Advised pt be seen in person today, will discuss with Dr. Haile to order labs/imaging prior.    Signature:  Efra Lynn RN  "

## 2023-08-21 NOTE — H&P
[facility]    History and Physical - Hospitalist Service, GOLD TEAM        Date of Admission:  8/21/2023    Assessment & Plan      Gallo Navarro is a 57 year old male admitted on 8/21/2023. He has a past medical history of recent diagnosis of pancreatic adenocarcinoma c/b T2DM, pancreatic insufficiency, gastric outlet obstruction (S/P GJ tube placement 05/2023), biliary obstruction (S/P stent placement 07/2023). He was admitted after presenting to the ED for acute-on-chronic abdominal pain, increased G-tube output. He was admitted for further monitoring and management.    Acute-on-Chronic Abdominal Pain  Hx of Gastric Outlet Obstruction (S/P GJ tube placement 05/2023, duodenal stent placement x2 08/17/2023)  Hx of Biliary Obstruction (S/P stent placement 07/2023)  Hx of Pancreatitis   Pt w/ hx of recent diagnosis of pancreatic cancer as below c/b gastric outlet obstruction, biliary obstruction. Had recent duodenal stent placement x2 on 08/17/23 w/ Dr. Greenberg from GI for gastric outlet obstruction. However, past few days has had increased output from G-tube, and abdominal pain not amenable to PTA med regimen (Dilaudid 2-4mg Q4H PRN, Butrans patch weekly). OP Oncology recommended to go to ED for further evaluation, out of c/f SBO, though still passing gas and stool (see diarrhea as below). While in ED, CT A/P showed encasement from pancreatic tumor of SMA, SMV, gastroduodenal arteries, and interval increase of tumor compared to CT 7/8/23. Has otherwise remained HDS, and CBC, CMP w/o any acute abnormalities. At this point, suspect his symptoms are multifactorial and r/t progression of disease, duodenal stents, and a bowel dysmotility r/t opioids. Unlikely acute pancreatitis as had WNL lipase, and lack of acute findings on imaging. Less c/f biliary obstruction as not jaundiced, afebrile, not tachycardic, and LFTs WNL. Gut ischemia was on ddx, but lower suspicion for this as  admitting team review reviewed imaging w/ Rads who confirmed patency of intraabdominal vasculature.  - GI consulted   - Will keep NPO for now until GI input to determine if needing interventions  - Pain mgmt as below for now   - PO Dilaudid 2-4 mg Q4H PRN for severe pain   - IV Dilaudid 0.5mg Q3H PRN for x3 doses for breakthrough pain   - PTA Butrans weekly patch replaced 8/21   - Tylenol 650mg scheduled Q8H for x3 days (through 8/24), after which can be PRN  - Palliative consulted for continuity of care and to review and assist in optimization of pain regimen  - Continue venting w/ G-tube, all else through J-tube  - Compazine PRN for nausea  - RD consulted to mgmt TFs once    - Pt has reportedly not had anything by mouth since 8am, usually cycles TFs overnight, was turned off this AM   - NPO for now  - LR @ 100mL/hr overnight for now while NPO    Pancreatic Adenocarcinoma  Pt w/ insidious onset of abd pain in March of this year, sx initially thought to be d/t acute pancreatitis, but had persistent abd pain, n/v, failing multiple PO trials. Had GJ tube placed for feeds and venting, but began to have rapid weight loss despite TFs. Imaging during admission 07/2023 showed mass of pancreatic head, and biopsy confirmed adenocarcinoma 07/12/23. Has started on palliative FOLFIRINOX (has completed x2 cycles), following w/ Oncology and Palliative as an OP.  Next scheduled chemo is on 8/29/23 (does every other Tuesday). Imaging on admission showed interval increase in pancreatic mass, which raises c/f failure of treatment vs ?delayed improvement.   - Palliative consulted as above, and broach goals of care pending Oncology involvement and determination of next steps of care  - Oncology consulted given findings of progression of disease as noted above on CT, consider discussion of ongoing treatment vs transition of care    Type 2 Diabetes Mellitus, insulin-dependent   Occurred following dx of pancreatic cancer and while on TFs.  Has followed w/ Endo as an OP, last saw on 8/17/23. PTA regimen included Lantus 5 units qPM, NPH 5 units qAM + 25 units qPM w/ TFs, and high sliding scale insulin. T2DM c/b requiring Dexamethasone 10mg x3 days following each cycle of chemotherapy. Has been monitoring his BGs closely w/ FreeStyle Bora 2 CGMS at home. BG on admission was 143.   - Holding PTA regimen as above while NPO, pending GI consult to determine if needing procedural intervention   - Holding Lantus 5 units at bedtime   - Holding NPH BID (5 units qAM + 25 units qPM)  - While NPO, doing MSSI Q4H PRN for BG >140  - BG monitoring Q4H while NPO  - Hypoglycemia protocol    Pancreatic Insufficiency  Noted following dx on pancreatic cancer. PTA pancreatic enzymes via J tube TID w/ meals (feeds).   - RD consulted to assist w/ mgmt of TFs once resuming  - Continue PTA pancreatic enzymes w/ meals (or TFs)    Diarrhea  Developed after having numerous endoscopies for above workup of pancreatic cancer, and interventions for associated mass. At its worst, was stooling ~10-12 times daily, not voluminous stools, but reportedly more malodorous than usual. PTA Imodium PRN, Metamucil daily which have been helpful until the past couple days. No lyte derangements on admission.  - Continue PTA Metamucil daily  - Continue Imodium PRN   - Please see comments section of order for his PTA instructions  - Would not opt for C diff testing at this time as afebrile and no leukocytosis  - CMP in AM to follow lytes given profuse diarrhea    Hiccups  Nausea  This began shortly after his first round of chemo, was initially intermittent, but then following second round became more persistent. Per CT A/P in ED, was noted to have questionable small hiatal hernia and fluid pooling in distal esophagus, so suspect that this, in addition to reflux, may be strongest contributors to hiccups. Nausea has also been a provoking factor in hiccups, notably getting near immediate relief w/  "Compazine of both nausea and hiccups.   - As he has remained NPO throughout today, giving x1 dose of PPI tonight  - Continue PTA Protonix daily  - Compazine for nausea  - G-tube to venting as above  - Continue to monitor    Chronic Normocytic Anemia  Baseline hgb 9s-10s, on admission was 9.8. Has remained HDS, no s/sx to suggest active bleed. No other abnormalities in cell lines; plts, WBC both WNL. Suspect anemia is multifactorial and r/t underlying cancer in addition to chemotherapy he has been receiving.   - Continue to monitor, notify Medicine if c/f acute bleeding  - CBC in AM       Diet: NPO for Medical/Clinical Reasons Except for: Meds  DVT Prophylaxis: Pneumatic Compression Devices, would advocate switching to Lovenox if he will not be getting a procedure.    Adan Catheter: Not present  Lines: PRESENT      Port a Cath 07/31/23 Right Chest wall-Site Assessment: WDL      Cardiac Monitoring: None  Code Status: Full Code    Clinically Significant Risk Factors Present on Admission          # Hypocalcemia: Lowest Ca = 8.3 mg/dL in last 2 days, will monitor and replace as appropriate     # Hypoalbuminemia: Lowest albumin = 3.3 g/dL at 8/21/2023  1:51 PM, will monitor as appropriate         # DMII: A1C = 7.9 % (Ref range: <5.7 %) within past 6 months              Disposition Plan      Expected Discharge Date: 08/23/2023                The patient's care was discussed with the Attending Physician, Dr. Richard Titus, Bedside Nurse, Patient, Patient's Family, and Gastroenterology Consultant(s).    Abe Rebollar PA-C  Hospitalist Service, [facility]  Securely message with [employer] (more info)  Text page via [facility] Paging/Directory   See signed in provider for up to date coverage information    ______________________________________________________________________    Chief Complaint   \"I don't feel great\"    History is obtained from the patient    History of " Present Illness   Gallo Navarro is a 57 year old male who is seen in the ED [facility] with his wife (Abigail) present.  Patient reports baseline abdominal pain, but feels that for the past few days, his pain has been uncontrolled with his PTA regimen of Dilaudid and Butrans patch.  The abdominal pain is primarily concentrated in the bilateral lower abdomen, and is worse with the hiccups that he developed following his first round of chemotherapy.  He denies associated nausea or vomiting, but does endorse profuse diarrhea, estimating that he has had approximately 10-12 episodes the past couple days, but before this his bowel regimen had been working adequately.  He otherwise denies headaches, vision changes, chest pain, shortness of breath, cough, lower extremity swelling or pain.    Of note, his wife forgot to remove his old patch that was placed last week, and put on his new patch while he had the old patch on.  However, she forgot which of the patches as the old one, and which is a new, thus, she did not remove either of them.  She inquires if he should keep both, or have them replaced.      Past Medical History    Past Medical History:   Diagnosis Date    Acute pancreatitis 04/16/2023    Gastric outlet obstruction     Recurrent acute pancreatitis        Past Surgical History   Past Surgical History:   Procedure Laterality Date    AS OPEN TREATMENT CLAVICULAR FRACTURE INTERNAL FX      ENDOSCOPIC RETROGRADE CHOLANGIOPANCREATOGRAM N/A 7/11/2023    Procedure: ENDOSCOPIC RETROGRADE CHOLANGIOPANCREATOGRAPHY;  Surgeon: Khadar Pickett MD;  Location: [facility] OR    ENDOSCOPIC RETROGRADE CHOLANGIOPANCREATOGRAM N/A 8/17/2023    Procedure: ENDOSCOPIC RETROGRADE  with stent removal x1, balloon sweep;  Surgeon: Christos Greenberg MD;  Location: [facility] OR    ENDOSCOPIC ULTRASOUND UPPER GASTROINTESTINAL TRACT (GI) N/A 7/11/2023    Procedure: Endoscopic ultrasound upper gastrointestinal tract (GI), with biposy, GJ tube repositioning,  stent placement;  Surgeon: Khadar Pickett MD;  Location: [facility] OR    ENDOSCOPIC ULTRASOUND UPPER GASTROINTESTINAL TRACT (GI) N/A 7/13/2023    Procedure: ENDOSCOPIC ULTRASOUND, ESOPHAGOSCOPY, EUS guided gastrojejunostomy;  Surgeon: Tre York MD;  Location: [facility] OR    INSERT PICC LINE  04/29/2023    INSERT PORT VASCULAR ACCESS Right 7/28/2023    Procedure: Insert port vascular access;  Surgeon: Tom العلي MD;  Location: [facility] OR    IR CHEST PORT PLACEMENT > 5 YRS OF AGE  7/28/2023    IR NG TUBE PLACEMENT REQ RAD & FLUORO  05/08/2023    JG tube    REPLACE GASTROJEJUNOSTOMY TUBE, PERCUTANEOUS N/A 8/17/2023    Procedure: possible REPLACEMENT, GASTROJEJUNOSTOMY TUBE, PERCUTANEOUS;  Surgeon: Christos Greenberg MD;  Location: [facility] OR       Prior to Admission Medications   Prior to Admission Medications   Prescriptions Last Dose Informant Patient Reported? Taking?   B-D U/F 31G X 8 MM insulin pen needle   No No   Sig: Inject Subcutaneous 5 times daily Use 5 pen needles daily or as directed.   Continuous Blood Gluc Sensor (FREESTYLE KAREN 2 SENSOR) MISC   Yes No   HYDROmorphone (DILAUDID) 2 MG tablet 8/21/2023 at 1100  No Yes   Sig: Take 1-2 tablets (2-4 mg) by mouth every 4 hours as needed for severe pain   Lancets (ONETOUCH DELICA PLUS [id number]) MISC   Yes No   acetaminophen (TYLENOL) 32 mg/mL liquid   No No   Sig: Take 20.313 mLs (650 mg) by mouth every 8 hours   blood glucose (FREESTYLE TEST STRIPS) test strip   Yes No   Sig: by Other route as needed   buprenorphine (BUTRANS) 10 MCG/HR WK patch   No No   Sig: Place 1 patch onto the skin every 7 days   dexamethasone (DECADRON) 4 MG tablet Past Week  No Yes   Sig: Take 2 tablets (8 mg) by mouth daily Take for 2 days, starting the day after chemo. Take with food.   insulin  UNIT/ML injection   No No   Sig: Inject 15 Units Subcutaneous every evening for 60 days   insulin aspart (NOVOLOG PEN) 100 UNIT/ML pen   No No   Sig: Inject 1-10 Units  Subcutaneous 3 times daily (with meals)   insulin glargine (BASAGLAR KWIKPEN) 100 UNIT/ML pen   No No   Sig: Inject 20 Units Subcutaneous every morning for 360 days   lidocaine-prilocaine (EMLA) 2.5-2.5 % external cream   No No   Sig: Use 1-2 times a week or as needed prior to port access   lidocaine-prilocaine (EMLA) 2.5-2.5 % external cream   No No   Sig: Use 1-2 times weekly or as needed prior to port access   lipase-protease-amylase (CREON 24) 25633-48881-154094 units CPEP per EC capsule   No No   Si-2 capsules by Per J Tube route 3 times daily (with meals)   loperamide (IMODIUM) 2 MG capsule   No No   Si caps at 1st sign of diarrhea & 1 cap every 2hrs until 12hrs diarrhea free. During night, 2 caps at bedtime & 2 caps every 4hrs until AM   naloxone (NARCAN) 4 MG/0.1ML nasal spray   No No   Sig: Spray 1 spray (4 mg) into one nostril alternating nostrils as needed for opioid reversal every 2-3 minutes until assistance arrives   ondansetron (ZOFRAN ODT) 8 MG ODT tab 2023  No Yes   Sig: Take 1 tablet (8 mg) by mouth every 8 hours as needed for nausea   ondansetron (ZOFRAN) 8 MG tablet   No No   Sig: Take 1 tablet (8 mg) by mouth every 8 hours as needed for nausea (vomiting)   pantoprazole (PROTONIX) 40 MG EC tablet   Yes No   Sig: Take 40 mg by mouth daily   polyethylene glycol (MIRALAX) 17 GM/Dose powder   No No   Sig: Take 17 g by mouth daily   prochlorperazine (COMPAZINE) 10 MG tablet   No No   Sig: Take 1 tablet (10 mg) by mouth every 6 hours as needed for nausea or vomiting   prochlorperazine (COMPAZINE) 10 MG tablet   No No   Sig: Take 1 tablet (10 mg) by mouth every 6 hours as needed for nausea or vomiting   psyllium (METAMUCIL/KONSYL) capsule   Yes No   Si capsule by Per G Tube route 2 times daily   sennosides (SENOKOT) 8.8 MG/5ML syrup   No No   Si mLs by Per J Tube route 2 times daily   sodium bicarbonate 325 MG tablet   No No   Si tablet (325 mg) by Per J Tube route 3 times daily    vitamin D3 (CHOLECALCIFEROL) 50 mcg (2000 units) tablet   No No   Sig: Take 1 tablet (50 mcg) by mouth daily      Facility-Administered Medications: None        Review of Systems    The 10 point Review of Systems is negative other than noted in the HPI or here.      Physical Exam   Vital Signs: Temp: 98  F (36.7  C) Temp src: Oral BP: 108/63 Pulse: 97   Resp: 16 SpO2: 98 % O2 Device: None (Room air)    Weight: 164 lbs 0 oz  Constitutional: awake, alert, cooperative, no apparent distress, and appears older than stated age.   Eyes: lids and lashes normal, sclera clear, and conjunctiva normal  ENT: normocephalic, without obvious abnormality  Respiratory: No increased work of breathing, good air exchange, clear to auscultation bilaterally, no crackles or wheezing  Cardiovascular: regular rate and rhythm, normal S1 and S2, no S3, no S4, and no murmur noted  GI: GJ tube w/ dressing noted in epigastrium, no drainage or bleeding around the site, normal bowel sounds, non-distended, and tenderness to the bilateral lower abdomen which is mildly firm to palpation, but remainder of abdomen is soft.  Genitounirinary: Urine visualized in specimen cup is clear, straw-colored urine, no hematuria or sediment noted.   Skin: no bruising or bleeding, no redness, warmth, or swelling, no rashes, and no lesions on visualized skin.   Musculoskeletal: no lower extremity pitting edema present, no calf tenderness, no deformities.  Neurologic: Moving all extremities equally and spontaneously. No obvious focal neuro deficits.  Neuropsychiatric: General: normal, calm, and normal eye contact  Level of consciousness: alert / normal  Affect: normal and pleasant    Medical Decision Making       150 MINUTES SPENT BY ME on the date of service doing chart review, history, exam, documentation & further activities per the note.      Data     I have personally reviewed the following data over the past 24 hrs:    7.4  \   9.8 (L)   / 223     138 105 16.8  /  143 (H)   4.1 24 0.47 (L) \     ALT: 28 AST: 23 AP: 82 TBILI: 0.4   ALB: 3.3 (L) TOT PROTEIN: 5.5 (L) LIPASE: 9 (L)       Imaging results reviewed over the past 24 hrs:   Recent Results (from the past 24 hour(s))   CT Abdomen Pelvis w Contrast    Narrative    EXAM: Abdomen/pelvis CT with contrast 8/21/2023 3:24 PM      HISTORY: pancreatic cancer, pain.     COMPARISON: Upper GI 8/4/2023. Abdominal MRI 7/10/2023. Abdomen/pelvis  CT 70 2023    TECHNIQUE: Helical CT from the lung bases through the symphysis pubis  was performed with intravenous contrast. Reconstructed coronal and  sagittal images obtained. Images reviewed in soft tissue, bone, and  lung windows.    FINDINGS:    LIVER: No new focal liver lesion. Pneumobilia predominantly involving  the left-sided intrahepatic bile ducts.    GALLBLADDER: Layering density in the gallbladder, calcified stone  versus layering vicarious excreted contrast. (Series 9, image 127)     PANCREAS: There is pancreatic ductal dilatation. Infiltrative  hypoenhancing mass in the pancreatic head is ill-defined and difficult  to measure however on series 10, image 37, measures about 5 x 5.1 cm  compared to 4.3 x 4.5 cm on prior CT from 7/8/2023 when measured in a  similar manner. The mass is encasing the SMA, gastroduodenal artery,  SMV with broad area of contact with the inferior main portal vein and  splenic vein. There are multiple adjacent prominent mesenteric nodes,  with extension of the ill marginated mass into the root of mesentery.  The mass is having broad area of contact with the horizontal part of  duodenum.    SPLEEN: Normal size.    ADRENALS: No nodule.    KIDNEYS/BLADDER: No hydronephrosis, calculus, or renal mass. Urinary  bladder is unremarkable.    REPRODUCTIVE: No suspicious pelvic mass. Dystrophic calcification in  the prostate. Mild prostatomegaly.    BOWEL: Fluid-filled distal esophagus. Question small hiatal hernia.  Percutaneous gastrojejunostomy tube with  intact balloon, tip  terminating in the jejunum. Stable position of the gastrojejunal  stent. There is a metallic duodenal stent stent in place; the  gastrojejunostomy stent is seen traversing the metallic stent.  Metallic stent in the distal bile duct. Additional stent in the common  bile duct, tip near the ampulla. The left hemicolon demonstrates some  inner wall enhancement, mild wall thickening and mild pericolonic  streakiness. Mild air-filled distention of the colon.     MESENTERY/PERITONEUM: No pneumoperitoneum. There is simple free fluid  in the low pelvis.    LYMPH NODES: Prominent peripancreatic and mesenteric nodes similar to  slightly more conspicuous than prior for example central mesenteric  node measures 9 mm compared to 7 mm (series 8, image 69).    VASCULAR: The superior mesenteric vein continues to be encased by the  pancreatic head mass, which also encases the superior mesenteric  artery.    BONES/SOFT TISSUES: Partially visualized sclerotic lesion in the T10  vertebral body involving the pedicle on the right, appears increased  in size from CT 7/8/2023. No acute fracture or dislocation. Soft  tissues are unremarkable. New sclerotic lesion in the left pubic bone.      LUNG BASES: Curvilinear density in the lingula. No pleural effusion or  pneumothorax.      Impression    IMPRESSION: In this patient with history of pancreatic cancer and  ongoing pain, the current CT scan compared to prior from 7/8/2023  shows:    1a. Increased size of the ill marginated pancreatic head mass,  extending into the mesenteric root with encasement of the SMA, SMV,  gastroduodenal artery and broad area of contact with the splenoportal  junction, splenic vein and central portal vein.  1b. Similar to slight increase in size of peripancreatic and  mesenteric lymph nodes, reactive and/or neoplastic.  1c. Increase in size of sclerotic lesion in the lower thoracic  vertebral body with new sclerotic lesion in the left pubic  bone,  concerning for metastasis  1d. Percutaneous gastrojejunostomy tube, gastroduodenal stent, and  gastrojejunostomy metallic lumen opposing stent; no significant  gastric distention  1e. Slight increased pancreatic ductal dilatation,   1f. Biliary ductal stents with pneumobilia, no increased biliary  ductal dilatation  1g. New ascites.  2a. Increased inner wall enhancement with mild thickening involving  the left colon with mild pericolonic streakiness, may represent  colitis secondary to infectious, or inflammatory etiology. No  pneumatosis, pneumoperitoneum, pericolic collection.  2b. Mild air-filled distention of transverse colon and part of the  redundant sigmoid colon may represent developing adynamic ileus.    I have personally reviewed the examination and initial interpretation  and I agree with the findings.    ZURDO MANJARREZ MD         SYSTEM ID:  [id number]     Recent Labs   Lab 08/21/23  1351 08/17/23  1311 08/15/23  1002   WBC 7.4  --  4.5   HGB 9.8*  --  9.4*   MCV 92  --  91     --  274     --  134*   POTASSIUM 4.1  --  3.5   CHLORIDE 105  --  101   CO2 24  --  24   BUN 16.8  --  12.3   CR 0.47*  --  0.55*   ANIONGAP 9  --  9   DENISE 8.3*  --  8.8   * 219* 91   ALBUMIN 3.3*  --  3.2*   PROTTOTAL 5.5*  --  5.8*   BILITOTAL 0.4  --  0.3   ALKPHOS 82  --  125   ALT 28  --  18   AST 23  --  17   LIPASE 9*  --   --

## 2023-08-21 NOTE — ED TRIAGE NOTES
Pt comes in per oncologist's recommendation for symptom management--lower burning abdominal pain, diarrhea, persistent hiccups, ankle swelling and can't sleep. Has pancreatic cancer, on chemotherapy regime     Triage Assessment       Row Name 08/21/23 1302       Triage Assessment (Adult)    Airway WDL WDL       Respiratory WDL    Respiratory WDL WDL       Skin Circulation/Temperature WDL    Skin Circulation/Temperature WDL WDL       Cardiac WDL    Cardiac WDL WDL       Peripheral/Neurovascular WDL    Peripheral Neurovascular WDL WDL       Cognitive/Neuro/Behavioral WDL    Cognitive/Neuro/Behavioral WDL WDL

## 2023-08-22 ENCOUNTER — APPOINTMENT (OUTPATIENT)
Dept: GENERAL RADIOLOGY | Facility: CLINIC | Age: 58
DRG: 393 | End: 2023-08-22
Attending: STUDENT IN AN ORGANIZED HEALTH CARE EDUCATION/TRAINING PROGRAM
Payer: COMMERCIAL

## 2023-08-22 LAB
ALBUMIN SERPL BCG-MCNC: 2.6 G/DL (ref 3.5–5.2)
ALP SERPL-CCNC: 71 U/L (ref 40–129)
ALT SERPL W P-5'-P-CCNC: 22 U/L (ref 0–70)
ANION GAP SERPL CALCULATED.3IONS-SCNC: 9 MMOL/L (ref 7–15)
AST SERPL W P-5'-P-CCNC: 19 U/L (ref 0–45)
BASOPHILS # BLD AUTO: 0 10E3/UL (ref 0–0.2)
BASOPHILS NFR BLD AUTO: 0 %
BILIRUB SERPL-MCNC: 0.4 MG/DL
BUN SERPL-MCNC: 14.3 MG/DL (ref 6–20)
C DIFF TOX B STL QL: NEGATIVE
CALCIUM SERPL-MCNC: 8.1 MG/DL (ref 8.6–10)
CHLORIDE SERPL-SCNC: 105 MMOL/L (ref 98–107)
CREAT SERPL-MCNC: 0.49 MG/DL (ref 0.67–1.17)
DEPRECATED HCO3 PLAS-SCNC: 23 MMOL/L (ref 22–29)
EOSINOPHIL # BLD AUTO: 0.2 10E3/UL (ref 0–0.7)
EOSINOPHIL NFR BLD AUTO: 3 %
ERYTHROCYTE [DISTWIDTH] IN BLOOD BY AUTOMATED COUNT: 13.1 % (ref 10–15)
GFR SERPL CREATININE-BSD FRML MDRD: >90 ML/MIN/1.73M2
GLUCOSE BLDC GLUCOMTR-MCNC: 111 MG/DL (ref 70–99)
GLUCOSE BLDC GLUCOMTR-MCNC: 116 MG/DL (ref 70–99)
GLUCOSE BLDC GLUCOMTR-MCNC: 132 MG/DL (ref 70–99)
GLUCOSE BLDC GLUCOMTR-MCNC: 173 MG/DL (ref 70–99)
GLUCOSE SERPL-MCNC: 108 MG/DL (ref 70–99)
HCT VFR BLD AUTO: 27.3 % (ref 40–53)
HGB BLD-MCNC: 9 G/DL (ref 13.3–17.7)
IMM GRANULOCYTES # BLD: 0.1 10E3/UL
IMM GRANULOCYTES NFR BLD: 1 %
LYMPHOCYTES # BLD AUTO: 1.3 10E3/UL (ref 0.8–5.3)
LYMPHOCYTES NFR BLD AUTO: 23 %
MCH RBC QN AUTO: 30.3 PG (ref 26.5–33)
MCHC RBC AUTO-ENTMCNC: 33 G/DL (ref 31.5–36.5)
MCV RBC AUTO: 92 FL (ref 78–100)
MONOCYTES # BLD AUTO: 0.4 10E3/UL (ref 0–1.3)
MONOCYTES NFR BLD AUTO: 7 %
NEUTROPHILS # BLD AUTO: 3.9 10E3/UL (ref 1.6–8.3)
NEUTROPHILS NFR BLD AUTO: 66 %
NRBC # BLD AUTO: 0 10E3/UL
NRBC BLD AUTO-RTO: 0 /100
PLATELET # BLD AUTO: 214 10E3/UL (ref 150–450)
POTASSIUM SERPL-SCNC: 4 MMOL/L (ref 3.4–5.3)
PROT SERPL-MCNC: 4.8 G/DL (ref 6.4–8.3)
RBC # BLD AUTO: 2.97 10E6/UL (ref 4.4–5.9)
SODIUM SERPL-SCNC: 137 MMOL/L (ref 136–145)
WBC # BLD AUTO: 5.9 10E3/UL (ref 4–11)

## 2023-08-22 PROCEDURE — 74018 RADEX ABDOMEN 1 VIEW: CPT | Mod: 26 | Performed by: RADIOLOGY

## 2023-08-22 PROCEDURE — 99233 SBSQ HOSP IP/OBS HIGH 50: CPT | Performed by: STUDENT IN AN ORGANIZED HEALTH CARE EDUCATION/TRAINING PROGRAM

## 2023-08-22 PROCEDURE — 87507 IADNA-DNA/RNA PROBE TQ 12-25: CPT | Performed by: DIETITIAN, REGISTERED

## 2023-08-22 PROCEDURE — 250N000013 HC RX MED GY IP 250 OP 250 PS 637

## 2023-08-22 PROCEDURE — 99222 1ST HOSP IP/OBS MODERATE 55: CPT | Mod: GC | Performed by: INTERNAL MEDICINE

## 2023-08-22 PROCEDURE — 250N000011 HC RX IP 250 OP 636: Mod: JZ | Performed by: STUDENT IN AN ORGANIZED HEALTH CARE EDUCATION/TRAINING PROGRAM

## 2023-08-22 PROCEDURE — 87493 C DIFF AMPLIFIED PROBE: CPT

## 2023-08-22 PROCEDURE — 99223 1ST HOSP IP/OBS HIGH 75: CPT | Mod: GC | Performed by: STUDENT IN AN ORGANIZED HEALTH CARE EDUCATION/TRAINING PROGRAM

## 2023-08-22 PROCEDURE — 250N000011 HC RX IP 250 OP 636

## 2023-08-22 PROCEDURE — 82962 GLUCOSE BLOOD TEST: CPT

## 2023-08-22 PROCEDURE — 80053 COMPREHEN METABOLIC PANEL: CPT

## 2023-08-22 PROCEDURE — 36415 COLL VENOUS BLD VENIPUNCTURE: CPT

## 2023-08-22 PROCEDURE — 85025 COMPLETE CBC W/AUTO DIFF WBC: CPT

## 2023-08-22 PROCEDURE — 258N000003 HC RX IP 258 OP 636

## 2023-08-22 PROCEDURE — 74018 RADEX ABDOMEN 1 VIEW: CPT

## 2023-08-22 PROCEDURE — 250N000013 HC RX MED GY IP 250 OP 250 PS 637: Performed by: STUDENT IN AN ORGANIZED HEALTH CARE EDUCATION/TRAINING PROGRAM

## 2023-08-22 PROCEDURE — 99223 1ST HOSP IP/OBS HIGH 75: CPT | Performed by: DIETITIAN, REGISTERED

## 2023-08-22 PROCEDURE — C9113 INJ PANTOPRAZOLE SODIUM, VIA: HCPCS | Mod: JZ | Performed by: STUDENT IN AN ORGANIZED HEALTH CARE EDUCATION/TRAINING PROGRAM

## 2023-08-22 PROCEDURE — 120N000002 HC R&B MED SURG/OB UMMC

## 2023-08-22 RX ORDER — MULTIVITAMIN,THERAPEUTIC
1 TABLET ORAL DAILY
Status: DISCONTINUED | OUTPATIENT
Start: 2023-08-22 | End: 2023-08-25 | Stop reason: HOSPADM

## 2023-08-22 RX ORDER — SODIUM BICARBONATE 325 MG/1
325 TABLET ORAL
Status: DISCONTINUED | OUTPATIENT
Start: 2023-08-22 | End: 2023-08-25 | Stop reason: HOSPADM

## 2023-08-22 RX ORDER — BUPRENORPHINE 20 UG/H
1 PATCH TRANSDERMAL WEEKLY
Status: DISCONTINUED | OUTPATIENT
Start: 2023-08-22 | End: 2023-08-25 | Stop reason: HOSPADM

## 2023-08-22 RX ORDER — NALOXONE HYDROCHLORIDE 0.4 MG/ML
0.4 INJECTION, SOLUTION INTRAMUSCULAR; INTRAVENOUS; SUBCUTANEOUS
Status: DISCONTINUED | OUTPATIENT
Start: 2023-08-22 | End: 2023-08-25 | Stop reason: HOSPADM

## 2023-08-22 RX ORDER — DEXTROSE MONOHYDRATE 100 MG/ML
INJECTION, SOLUTION INTRAVENOUS CONTINUOUS PRN
Status: DISCONTINUED | OUTPATIENT
Start: 2023-08-22 | End: 2023-08-25 | Stop reason: HOSPADM

## 2023-08-22 RX ORDER — ONDANSETRON 2 MG/ML
4 INJECTION INTRAMUSCULAR; INTRAVENOUS EVERY 8 HOURS PRN
Status: DISCONTINUED | OUTPATIENT
Start: 2023-08-22 | End: 2023-08-25 | Stop reason: HOSPADM

## 2023-08-22 RX ORDER — ENOXAPARIN SODIUM 100 MG/ML
40 INJECTION SUBCUTANEOUS EVERY 24 HOURS
Status: DISCONTINUED | OUTPATIENT
Start: 2023-08-23 | End: 2023-08-25 | Stop reason: HOSPADM

## 2023-08-22 RX ORDER — SIMETHICONE 125 MG
125 TABLET,CHEWABLE ORAL 2 TIMES DAILY
COMMUNITY
End: 2023-12-08

## 2023-08-22 RX ORDER — NALOXONE HYDROCHLORIDE 0.4 MG/ML
0.2 INJECTION, SOLUTION INTRAMUSCULAR; INTRAVENOUS; SUBCUTANEOUS
Status: DISCONTINUED | OUTPATIENT
Start: 2023-08-22 | End: 2023-08-25 | Stop reason: HOSPADM

## 2023-08-22 RX ORDER — GABAPENTIN 100 MG/1
100 CAPSULE ORAL 3 TIMES DAILY
Status: DISCONTINUED | OUTPATIENT
Start: 2023-08-22 | End: 2023-08-23

## 2023-08-22 RX ADMIN — HYDROMORPHONE HYDROCHLORIDE 0.5 MG: 1 INJECTION, SOLUTION INTRAMUSCULAR; INTRAVENOUS; SUBCUTANEOUS at 16:51

## 2023-08-22 RX ADMIN — PANTOPRAZOLE SODIUM 40 MG: 40 INJECTION, POWDER, FOR SOLUTION INTRAVENOUS at 09:55

## 2023-08-22 RX ADMIN — BUPRENORPHINE 1 PATCH: 20 PATCH, EXTENDED RELEASE TRANSDERMAL at 16:39

## 2023-08-22 RX ADMIN — PROCHLORPERAZINE EDISYLATE 5 MG: 5 INJECTION, SOLUTION INTRAMUSCULAR; INTRAVENOUS at 09:58

## 2023-08-22 RX ADMIN — SODIUM BICARBONATE 325 MG: 325 TABLET ORAL at 21:35

## 2023-08-22 RX ADMIN — PANCRELIPASE 1 CAPSULE: 24000; 76000; 120000 CAPSULE, DELAYED RELEASE PELLETS ORAL at 21:35

## 2023-08-22 RX ADMIN — SODIUM CHLORIDE, POTASSIUM CHLORIDE, SODIUM LACTATE AND CALCIUM CHLORIDE: 600; 310; 30; 20 INJECTION, SOLUTION INTRAVENOUS at 08:30

## 2023-08-22 RX ADMIN — ACETAMINOPHEN 650 MG: 160 SUSPENSION ORAL at 16:30

## 2023-08-22 RX ADMIN — Medication 1 MG: at 23:40

## 2023-08-22 RX ADMIN — HYDROMORPHONE HYDROCHLORIDE 0.5 MG: 1 INJECTION, SOLUTION INTRAMUSCULAR; INTRAVENOUS; SUBCUTANEOUS at 00:51

## 2023-08-22 RX ADMIN — PANTOPRAZOLE SODIUM 40 MG: 40 INJECTION, POWDER, FOR SOLUTION INTRAVENOUS at 20:13

## 2023-08-22 RX ADMIN — HYDROMORPHONE HYDROCHLORIDE 0.5 MG: 1 INJECTION, SOLUTION INTRAMUSCULAR; INTRAVENOUS; SUBCUTANEOUS at 13:52

## 2023-08-22 RX ADMIN — ACETAMINOPHEN 650 MG: 160 SUSPENSION ORAL at 00:54

## 2023-08-22 RX ADMIN — HYDROMORPHONE HYDROCHLORIDE 0.5 MG: 1 INJECTION, SOLUTION INTRAMUSCULAR; INTRAVENOUS; SUBCUTANEOUS at 20:13

## 2023-08-22 RX ADMIN — HYDROMORPHONE HYDROCHLORIDE 0.5 MG: 1 INJECTION, SOLUTION INTRAMUSCULAR; INTRAVENOUS; SUBCUTANEOUS at 23:24

## 2023-08-22 RX ADMIN — GABAPENTIN 100 MG: 100 CAPSULE ORAL at 16:32

## 2023-08-22 RX ADMIN — ONDANSETRON 4 MG: 2 INJECTION INTRAMUSCULAR; INTRAVENOUS at 13:57

## 2023-08-22 RX ADMIN — HYDROMORPHONE HYDROCHLORIDE 0.5 MG: 1 INJECTION, SOLUTION INTRAMUSCULAR; INTRAVENOUS; SUBCUTANEOUS at 09:51

## 2023-08-22 RX ADMIN — GABAPENTIN 100 MG: 100 CAPSULE ORAL at 21:36

## 2023-08-22 RX ADMIN — ACETAMINOPHEN 650 MG: 160 SUSPENSION ORAL at 08:23

## 2023-08-22 RX ADMIN — HYDROMORPHONE HYDROCHLORIDE 0.5 MG: 1 INJECTION, SOLUTION INTRAMUSCULAR; INTRAVENOUS; SUBCUTANEOUS at 06:21

## 2023-08-22 ASSESSMENT — ACTIVITIES OF DAILY LIVING (ADL)
ADLS_ACUITY_SCORE: 35
ADLS_ACUITY_SCORE: 35
ADLS_ACUITY_SCORE: 24
ADLS_ACUITY_SCORE: 35

## 2023-08-22 NOTE — PLAN OF CARE
Goal Outcome Evaluation:      Plan of Care Reviewed With: patient    Overall Patient Progress: no changeOverall Patient Progress: no change    Outcome Evaluation: see RD note 8/22    Mary Chawla RDN, LD    6C (beds [id number])/7C (beds [id number])/ED   RD pager: [phone]  Weekend/Holiday RD pager: [phone]

## 2023-08-22 NOTE — PROGRESS NOTES
CLINICAL NUTRITION SERVICES - ASSESSMENT NOTE     Nutrition Prescription    RECOMMENDATIONS FOR MDs/PROVIDERS TO ORDER:  Provider to adjust IVF with TF to resume. Provider to manage additional free water flushes.     Malnutrition Status:    Moderate malnutrition in the context of chronic illness/disease    Recommendations already ordered by Registered Dietitian (RD):  EN access: GJ tube (J for feeds, G for venting)  Goal TF: Vital 1.5 at 90 ml/hr x 17 hours (run from 8pm to 1pm) to provide 1530 mL formula, 2295 kcal (31 kcal/kg), 104 g protein (1.4 g/kg), 286 g CHO, 87 g fat, 9 g fiber, 1169 mL free water.   ADDENDUM after discussion with provider around 1330: For first night of TF, begin at 30 ml/hr and advance by 20 ml/hr every 4 hours goal rate of 90 ml/hr as tolerated. Subsequent days, TF to begin at 90 ml/hr and run x 17 hours overnight as tolerated.    Do not advance TF rate unless Mg++ > 1.5, K+ is > 3, and phos > 1.9  Recommend 60 ml Q4H fluid flushes for tube patency. Additional fluids and/or adjustments per MD.    Ordered multivitamin/minerals to help ensure micronutrient needs being met with suspected hypermetabolic demands and potential interruptions to TF infusions.     Adjusted oral Creon order to: Creon 24 3 capsules TID with meals (provides 968 units lipase/kg/meal; 1 capsule with snacks/supplements PRN    Once begin TFs, begin the following pancreatic enzyme regimen:   A) Sodium Bicarb tablet (325 mg), 1 tablet q 4 hrs via Jtube. Administration Instructions: Crush 1 tablet and mix into 15 ml of warm water and use this solution to mix with Creon pancreatic enzymes. DO NOT administer directly into Jtube (to be mixed into TF formula with Creon enzyme - see Creon enzyme order)   B) Creon 24, 1- 2 capsules q 4 hrs via Jtube. Administration Instructions:   --If TF rate is running @ 10-50 ml/hr, administer 1 capsule q 4 hrs while TF is running;   --If TF rate is running @ 60-90 ml/hr, increase to 2  capsules q 4 hrs while TF is running.    **Open capsule and empty contents into 15 ml sodium bicarb solution (see sodium bicarb order), let dissolve for about 20-30 minutes and then add this solution to the amount of TF formula hung in TF bag every 4 hrs (i.e., once TF @ goal infusion 90 ml/hr will mix 2 capsules into 360 ml of TF formula every 4 hrs).   *Note: this enzyme regimen with TF @ goal infusion will provide approx 2207 units of lipase/gram of total Fat daily and approp dosing initially for pancreatic insufficiency with more elemental TF formula.       Future/Additional Recommendations:  Monitor medical appropriateness to resume PO. Pt would like to work on resuming PO as able.      REASON FOR ASSESSMENT  Gallo Navarro is a/an 57 year old male assessed by the dietitian for Provider Order - Registered Dietitian to Assess and Order TF per Medical Nutrition Therapy Protocol    Patient admitted for acute-on-chronic abdominal pain, increased G-tube output.    MEDICAL HISTORY  pancreatic adenocarcinoma c/b T2DM, pancreatic insufficiency, gastric outlet obstruction (S/P GJ tube placement 05/2023), biliary obstruction (S/P stent placement 07/2023).     NUTRITION HISTORY  Per last inpatient RD note 7/16, patient was on Vital 1.5 at 90 ml/hr x 12 hours overnight (providing 1080 mL formula, 3 1620 kcals, 72 g PRO, 62 g fat, 825 ml free H20, 201 g CHO, and 6 g fiber daily) via J tube but resumed Peptamen 1.5 at discharge (not available inpatient). TF providing about 70% of estimated needs. Pt had Creon 24 mixed with sodium bicarb in tube feeding and oral Creon for oral intakes (patient does not have OP coverage for Relizorb). FWF were 60 mL before and after TF cycle + 120 mL flushes 5 times during the day (suggested times 9am, 11am, 1pm, 3pm, 5pm), which provides additional 720 mL/day free water. Pt was on soft and bite sized diet with TF.     Pt reports most recently he was eating as tolerated but wasn't able to  tolerate much PO d/t N/V and painful hiccups. Pt reports taking Zofran orally to help with nausea. He also reports recently having a lot of reflux. He reports taking Creon if eating something substantial. He was using juice and lemonade at times if he had low blood sugar. For tube feeding, he was taking 6 cartons (1500 mL) daily of Peptamen 1.5 running at 90 ml/hr x about 17 hours from about 7pm to 12pm. He reports mixing 1 capsule of Creon with 1 carton of TF. If patient was still using Creon 24, this would provide 1714 unit lipase/gram of total fat. He reports flushing tube for patency and additional 120 mL free water flushes 4 times per day. He reports also drinking some water but feels he was getting a bit behind on water intake. TF provided 2250 kcal, 102 g protein, 84 g fat, 282 g CHO, 0 g fiber and 1152 mL free water (+ at least 480 mL FWF).   Pt reports desire to work on resuming PO as he is able. Discussed TF is providing full nutrition needs but he can eat as able once medically cleared to resume PO (currently NPO). Discussed continuing to take oral Creon with meals/snacks especially when they contain fat.     ADDENDUM: discussed w/ provider, pt reported some intolerance (diarrhea) with peptamen PTA. Discussed pt will be on Vital 1.5 in hospital d/t Peptamen formula not available at our facility. Discussed will begin w/ slow advancement of TF as tolerated tonight.     CURRENT NUTRITION ORDERS  Diet: NPO    Intake/Tolerance: No documented intakes to assess.    LABS   08/21/23 13:51 08/22/23 07:11   Sodium 138 137   Potassium 4.1 4.0   Chloride 105 105   Carbon Dioxide (CO2) 24 23   Urea Nitrogen 16.8 14.3   Creatinine 0.47 (L) 0.49 (L)   GFR Estimate >90 >90   Calcium 8.3 (L) 8.1 (L)   Anion Gap 9 9   Albumin 3.3 (L) 2.6 (L)   Protein Total 5.5 (L) 4.8 (L)   Alkaline Phosphatase 82 71   ALT 28 22   AST 23 19   Bilirubin Total 0.4 0.4   Glucose 143 (H) 108 (H)   Lipase 9 (L)        MEDICATIONS  Novolog  "insulin  Creon 1-2 capsules TID with meals per J tube  Protonix  Metamucil  Sodium bicarb 325 mg TID with meals per J tube  IVF LR @ 100 ml/hr    ANTHROPOMETRICS  Height: 182.9 cm (6' 0\")  Most Recent Weight: 74.4 kg (164 lb)    IBW: 80.9 kg   Body mass index is 22.24 kg/m . BMI Category: Normal BMI  Weight History:  15.5 kg (17.2%) weight loss over 4 months.  Wt Readings from Last 15 Encounters:   08/21/23 74.4 kg (164 lb)   08/17/23 74.8 kg (164 lb 14.5 oz)   08/15/23 74.3 kg (163 lb 14.4 oz)   08/09/23 74.5 kg (164 lb 4.8 oz)   08/07/23 75.4 kg (166 lb 4.8 oz)   07/31/23 76.4 kg (168 lb 6.4 oz)   07/28/23 76.7 kg (169 lb)   07/27/23 76.7 kg (169 lb)   07/21/23 76.7 kg (169 lb 3.2 oz)   07/15/23 78.4 kg (172 lb 14.4 oz)     76.2 kg (168 lb) 07/07/2023     76.2 kg (168 lb) 06/23/2023     82.7 kg (182 lb 5.1 oz) 05/12/2023      89.9 kg (198 lb 3.2 oz) 04/12/2023       ASSESSED NUTRITION NEEDS  Dosing Weight: 74.4 kg (Actual BW)   Estimated Energy Needs: 7085-7819 kcals/day (30 - 35 kcals/kg )  Justification: Increased needs  Estimated Protein Needs:  grams protein/day (1.2 - 1.5 grams of pro/kg)  Justification: Increased needs  Estimated Fluid Needs: 8402-3972 mL/day (1 mL/kcal)   Justification: Maintenance    PHYSICAL FINDINGS  See malnutrition section below.    Ted Score: 19  Per EMR:      MALNUTRITION  % Intake: Decreased intake does not meet criteria  % Weight Loss: > 10% in 6 months (severe malnutrition)  Subcutaneous Fat Loss: Facial region:  mild   Muscle Loss: Temporal:  mild and Thoracic region (clavicle, acromium bone, deltoid, trapezius, pectoral):  mild  Fluid Accumulation/Edema: None noted  Malnutrition Diagnosis: Moderate malnutrition in the context of chronic illness/disease    NUTRITION DIAGNOSIS  Inadequate oral intake related to nausea, vomiting, hiccups, poor appetite as evidenced by patient report, weight loss, reliance on enteral nutrition support to meet estimated nutrition needs.   "     INTERVENTIONS  Implementation  Nutrition Education: will be provided if nutrition education needs arise.    Enteral Nutrition - Initiate  Feeding tube flush  Nutrition-related medication management     Goals  Total avg nutritional intake to meet a minimum of 30 kcal/kg and 1.2 g PRO/kg daily (per dosing wt 74.4 kg).        Monitoring/Evaluation  Progress toward goals will be monitored and evaluated per protocol.    Mary Chawla, SHANTANUN, LD    6C (beds 7408-7552)/7C (beds 4718-0400)/ED   RD pager: [phone]  Weekend/Holiday RD pager: [phone]

## 2023-08-22 NOTE — PLAN OF CARE
Goal Outcome Evaluation:    Temp: 98.3  F (36.8  C) Temp src: Oral BP: 105/79 Pulse: 102   Resp: 16 SpO2: 97 % O2 Device: None (Room air)      Neuro: A&Ox4.   Cardiac: Tachycardic low 100's. OVSS.   Respiratory: Sating 97% on RA.  GI/: Adequate urine output. No BM   Diet/appetite: NPO.  Activity: Up independently   Pain: Abdomen and back pain managed with iv dilaudid. Zofran given for nausea  Skin: No new deficits noted.  LDA's: Port tko for meds. GJ tube clamped clamped     Plan to get cycle TF tonight from 8pm to 1pm     Still need stool sample foe enteric bacterial and virus panel     Plan: Continue with POC. Notify primary team with changes.

## 2023-08-22 NOTE — PROGRESS NOTES
[facility]    Medicine Progress Note - Hospitalist Service, GOLD TEAM 1    Date of Admission:  8/21/2023    Assessment & Plan      Gallo Navarro is a 57 year old male admitted on 8/21/2023. He has a past medical history of recent diagnosis of pancreatic adenocarcinoma c/b T2DM, pancreatic insufficiency, gastric outlet obstruction (S/P GJ tube placement 05/2023), biliary obstruction (S/P stent placement 07/2023). He was admitted after presenting to the ED for acute-on-chronic abdominal pain, increased G-tube output. He was admitted for further monitoring and management.    Interval Changes:  -Stop IV fluids  -Resume tube feeds if no plans for procedure per GI, may require lower infusion rate given intolerance reported by patient  -RD consulted  -enteric panel  -Palliative care consulted for assistance with pain management  -Oncology consulted  -GI consulted  -Resume at bedtime lantus  -Resume NPH once tube feeds resumed, they are cycled at home  -Start dvt ppx  -IV ppi and IV anti emetics for symptom relief (zofran and compazine)    Acute-on-Chronic Abdominal Pain  Possible adynamic ileus  Recent diarrhea  Hx of Gastric Outlet Obstruction (S/P GJ tube placement 05/2023, duodenal stent placement x2 08/17/2023)  Hx of Biliary Obstruction (S/P stent placement 07/2023)  Hx of Pancreatitis   Pt w/ hx of recent diagnosis of pancreatic cancer as below c/b gastric outlet obstruction, biliary obstruction. Had recent duodenal stent placement x2 on 08/17/23 w/ Dr. Greenberg from GI for gastric outlet obstruction.   -However, past few days has had increased output from G-tube, and abdominal pain not amenable to PTA med regimen (Dilaudid 2-4mg Q4H PRN, Butrans patch weekly).  - OP Oncology recommended to go to ED for further evaluation, out of c/f SBO, though still passing gas and stool (see diarrhea as below).   -CT A/P (8/21) showed encasement from pancreatic tumor of SMA, SMV,  gastroduodenal arteries, and interval increase of tumor compared to CT 7/8/23 along with possible early adynamic ileus   Plan:  - GI consulted   - Enteric panel   - PO Dilaudid 2-4 mg Q4H PRN for severe pain, IV dilaudid q3h prn   - PTA Butrans weekly patch replaced 8/21   - Tylenol 650mg scheduled   - Palliative consulted   - Continue venting w/ G-tube, all tube feeds through J-tube  - RD consulted to mgmt TFs usually cycles TFs overnight    Pancreatic Adenocarcinoma  -Pt w/ insidious onset of abd pain in March of this year, sx initially thought to be d/t acute pancreatitis, but had persistent abd pain, n/v, failing multiple PO trials.   -Had GJ tube placed for feeds and venting, but began to have rapid weight loss despite TFs.  - Imaging during admission 07/2023 showed mass of pancreatic head, and biopsy confirmed adenocarcinoma 07/12/23.   -Has started on palliative FOLFIRINOX (has completed x2 cycles)Next scheduled chemo is on 8/29/23   -Following w/ Oncology and Palliative as an OP.  (does every other Tuesday).  Plan:  >Palliative care and Oncology consulted     Type 2 Diabetes Mellitus, insulin-dependent   Occurred following dx of pancreatic cancer and while on TFs. Has followed w/ Endo as an OP, last saw on 8/17/23. PTA regimen included Lantus 5 units qPM, NPH 5 units qAM + 25 units qPM w/ TFs, and high sliding scale insulin.   Plan:  >Resume lantus at bedtime (5 units)  >Resume NPH once tube feeds resumed    - Holding NPH BID (5 units qAM + 25 units qPM)  - Sliding scale TID AC    Pancreatic Insufficiency  G tube dependency due to gastric outlet obstruciton  Noted following dx on pancreatic cancer.   - RD consulted, patient reports intolerance with peptamen  - Continue PTA pancreatic enzymes w/ meals (or TFs)    Diarrhea  Developed after having numerous endoscopies for above workup of pancreatic cancer, and interventions for associated mass. Approx 10-12 stools per day  Plan:  - Continue PTA Metamucil  daily  - enteric panel per GI recommendations    Hiccups  Nausea  This began shortly after his first round of chemo, was initially intermittent, but then following second round became more persistent.   Plan:  - Continue PTA Protonix daily  - zofran and comapzine for nausea  - G-tube to venting as above    Chronic Normocytic Anemia  Baseline hgb 9s-10s, on admission was 9.8.    Suspect anemia is multifactorial and r/t underlying cancer in addition to chemotherapy  and malnutrition       Diet: NPO for Medical/Clinical Reasons Except for: Meds    DVT Prophylaxis: Enoxaparin (Lovenox) SQ  Adan Catheter: Not present  Lines: PRESENT      Port a Cath 07/31/23 Right Chest wall-Site Assessment: WDL      Cardiac Monitoring: None  Code Status: Full Code      Clinically Significant Risk Factors Present on Admission          # Hypocalcemia: Lowest Ca = 8.3 mg/dL in last 2 days, will monitor and replace as appropriate     # Hypoalbuminemia: Lowest albumin = 3.3 g/dL at 8/21/2023  1:51 PM, will monitor as appropriate         # DMII: A1C = 7.9 % (Ref range: <5.7 %) within past 6 months              Disposition Plan      Expected Discharge Date: 08/23/2023                  Kenton Lopez MD  Hospitalist Service, GOLD TEAM [address] [place] [address]  Securely message with [employer] (more info)  Text page via [facility] Paging/Directory   See signed in provider for up to date coverage information  ______________________________________________________________________    Interval History   Patient admitted overnight. This afernoon he has finally been able to rest. In the days prior he was unable to sleep. This was thought secondary to steroids from chemotherapy but persisted. In addition, had severe abdominal pain which was only minimally relived with g tube venting. No fever or chills. NO chest pain. No shortness of breath. He has been using dilaudid every 4 hours as wll as butrans patch.     Physical  Exam   Vital Signs: Temp: 98  F (36.7  C) Temp src: Oral BP: 109/81 Pulse: 97   Resp: 15 SpO2: 97 % O2 Device: None (Room air)    Weight: 164 lbs 0 oz    Constitutional: somnolent but awakens to voice, lying in bed  Eyes: no icterus  Respiratory: CTAB, no wheezing  Cardiovascular: RRR  GI: distended, mild tenderness in epigastric, g tube in place, no peritoneal signs  Musculoskeletal: no lower extremity edema  Neurologic: holding eye contact, following commands, moving all extremities     Medical Decision Making             Data     I have personally reviewed the following data over the past 24 hrs:    5.9  \   9.0 (L)   / 214     137 105 14.3 /  132 (H)   4.0 23 0.49 (L) \     ALT: 22 AST: 19 AP: 71 TBILI: 0.4   ALB: 2.6 (L) TOT PROTEIN: 4.8 (L) LIPASE: 9 (L)

## 2023-08-22 NOTE — MEDICATION SCRIBE - ADMISSION MEDICATION HISTORY
Medication Scribe Admission Medication History    Admission medication history is complete. The information provided in this note is only as accurate as the sources available at the time of the update.    Medication reconciliation/reorder completed by provider prior to medication history? Yes    Information Source(s): Patient and Family member via in-person    Pertinent Information: None    Changes made to PTA medication list:  Added: None  Deleted: None  Changed: None  Not Taking: lidocaine prilocaine cream, pantoprazole, polyethylene glycol    Medication Affordability:  Not including over the counter (OTC) medications, was there a time in the past 3 months when you did not take your medications as prescribed because of cost?: No    Allergies reviewed with patient and updates made in EHR: yes    Medication History Completed By: Ansley Iverson 8/22/2023 1:02 PM    Prior to Admission medications    Medication Sig Last Dose Taking? Auth Provider Long Term End Date   acetaminophen (TYLENOL) 32 mg/mL liquid Take 20.313 mLs (650 mg) by mouth every 8 hours 8/21/2023 at am Yes Jeremy Hurt MD     B-D U/F 31G X 8 MM insulin pen needle Inject Subcutaneous 5 times daily Use 5 pen needles daily or as directed. Unknown at unknown Yes Berta Cid PA-C     blood glucose (FREESTYLE TEST STRIPS) test strip by Other route as needed Unknown at unknown Yes Reported, Patient     buprenorphine (BUTRANS) 10 MCG/HR WK patch Place 1 patch onto the skin every 7 days 8/21/2023 at unknown Yes Jeremy Hurt MD     Continuous Blood Gluc Sensor (FREESTYLE KAREN 2 SENSOR) MISC  Unknown at unknown Yes Reported, Patient     dexamethasone (DECADRON) 4 MG tablet Take 2 tablets (8 mg) by mouth daily Take for 2 days, starting the day after chemo. Take with food. Past Week at unknown Yes Luis Haile MD Yes    HYDROmorphone (DILAUDID) 2 MG tablet Take 1-2 tablets (2-4 mg) by mouth every 4 hours as needed for severe pain 8/21/2023  at 1100 Yes Jeremy Hurt MD     insulin aspart (NOVOLOG PEN) 100 UNIT/ML pen Inject 1-10 Units Subcutaneous 3 times daily (with meals) Past Week at unknown Yes Clau Ray MD Yes    insulin glargine (BASAGLAR KWIKPEN) 100 UNIT/ML pen Inject 20 Units Subcutaneous every morning for 360 days  Patient taking differently: Inject 20 Units Subcutaneous At Bedtime 8/20/2023 at hs #5 units Yes Clau Ray MD Yes 7/10/24   insulin  UNIT/ML injection Inject 15 Units Subcutaneous every evening for 60 days 8/20/2023 at #20 U hs Yes Clau Ray MD Yes 9/14/23   Lancets (ONETOUCH DELICA PLUS [id number]) MISC  Unknown at unknown Yes Reported, Patient     lipase-protease-amylase (CREON 24) 86388-63582-166562 units CPEP per EC capsule 1-2 capsules by Per J Tube route 3 times daily (with meals) 8/20/2023 at pm Yes Akil Hernandez MD     loperamide (IMODIUM) 2 MG capsule 2 caps at 1st sign of diarrhea & 1 cap every 2hrs until 12hrs diarrhea free. During night, 2 caps at bedtime & 2 caps every 4hrs until AM 8/20/2023 at unknown Yes Berta Anglin PA-C     ondansetron (ZOFRAN ODT) 8 MG ODT tab Take 1 tablet (8 mg) by mouth every 8 hours as needed for nausea 8/20/2023 at unknown Yes Luis Haile MD     ondansetron (ZOFRAN) 8 MG tablet Take 1 tablet (8 mg) by mouth every 8 hours as needed for nausea (vomiting) 8/20/2023 at unknown Yes Luis Haile MD     prochlorperazine (COMPAZINE) 10 MG tablet Take 1 tablet (10 mg) by mouth every 6 hours as needed for nausea or vomiting 8/21/2023 at unknown Yes Luis Haile MD  9/20/23   prochlorperazine (COMPAZINE) 10 MG tablet Take 1 tablet (10 mg) by mouth every 6 hours as needed for nausea or vomiting 8/21/2023 at unknown Yes Luis Haile MD     psyllium (METAMUCIL/KONSYL) capsule 1 capsule by Per G Tube route 2 times daily 8/21/2023 at over a week Yes Luis Haile MD     sennosides (SENOKOT) 8.8 MG/5ML syrup 5 mLs by Per J Tube route 2 times daily Past Month at over a  week Yes Clau Ray MD     simethicone (MYLICON) 125 MG chewable tablet Take 125 mg by mouth 2 times daily Past Week at unknown Yes Unknown, Entered By History     sodium bicarbonate 325 MG tablet 1 tablet (325 mg) by Per J Tube route 3 times daily 8/20/2023 at pm Yes Akil Hernandez MD     vitamin D3 (CHOLECALCIFEROL) 50 mcg (2000 units) tablet Take 1 tablet (50 mcg) by mouth daily Past Month at over a week Yes Clau Ray MD     lidocaine-prilocaine (EMLA) 2.5-2.5 % external cream Use 1-2 times a week or as needed prior to port access  at never used  Luis Haile MD Yes    lidocaine-prilocaine (EMLA) 2.5-2.5 % external cream Use 1-2 times weekly or as needed prior to port access  at never used  Luis Haile MD Yes    naloxone (NARCAN) 4 MG/0.1ML nasal spray Spray 1 spray (4 mg) into one nostril alternating nostrils as needed for opioid reversal every 2-3 minutes until assistance arrives  at on hand  Jeremy Hurt MD Yes    pantoprazole (PROTONIX) 40 MG EC tablet Take 40 mg by mouth daily  at not taking  Reported, Patient     polyethylene glycol (MIRALAX) 17 GM/Dose powder Take 17 g by mouth daily  at not taking  Clau Ray MD

## 2023-08-22 NOTE — CONSULTS
"Palliative Care Consultation Note  [facility]      Patient: Gallo Navarro  Date of Admission:  8/21/2023    Requesting Clinician / Team: Medicine hospitalist service, gold team  Reason for consult: Pain management  Symptom management       Recommendations & Counseling     GOALS OF CARE:   Restorative without limits , states \"I want to keep on living\"    ADVANCE CARE PLANNING:  No health care directive on file. Per  informed consent policy, next of kin should be involved if patient becomes unable.  Pt notes his wife would be the one who would make decisions if he were unable.  There is no POLST form on file, recommend to complete prior to DC.  Code status: Full Code  Plan to connect with oncology prior to further GOC conversations.  Plan to have Gallo continue to follow with palliative clinic to continue GOC conversations as well.    MEDICAL MANAGEMENT:   #Worsening Lower Abdominal Pain & Low back pain - acute on chronic, worsening may be multifactorial with crampy pain from diarrhea, progression of pancreatic CA,  with likely met in pelvis, and potential developing ileus in redundant sigmoid (as well as transverse colon, which seems less likely to be playing a role in lower abd pain).  Low back pain seems related to abd pain and does not seem muscular or neuropathic in etiology.  - Increase butrans patch from 10mcg/hr to 20mcg/hr (done for you), since GI would like to minimize full opiate agonists in setting of potentially developing ileus  - Continue dilaudid 2-4mg PO q4h PRN pain  - Continue dilaudid 0.5mg IV q3h PRN pain  - IF pt continues to notice end of dose failure of PO opiates after increase in butrans patch, can consider making PO dilaudid available more frequently than q4h in the coming days.  - IF pt continues to have worsening pain while TF are running, can consider decreasing tube feed rate or holding.      #Nausea -- acute on chronic, worsened during and " "after his last round of chemo  - May be influenced by GERD in setting of GOO, as he also describes a burning feeling in his epigastric region and has hiatal hernia on CT with some fluid pooling in distal esophagus, recommend increasing protonix to 40mg to BID (ordered for you)  - Continue drainage through Gtube for GOO  - Continue compazine PRN and zofran PRN.  Recommend using compazine first to avoid constipating side effects of zofran with c/f developing ileus.  Could consider Reglan in future for the potential pro-motility effects with c/f developing ileus, although at this time pt is already having diarrhea (12 loose stools in the 24h PTA), so would not recommend switching to reglan at this time.    #Diarrhea, with c/f developing ileus in transverse colon and redundant sigmoid on CT  - currently on imodium & psyllium, management per GI    #Hiccups  - Recommend Gabapentin 100mg TID for hiccups (ordered for you)  - Could be influenced by GERD as well, recommendation as above to increase protonix    #Sleep (also affected by pain, diarrhea, hiccups, management for those as above)  - Recommend also adding melatonin 1mg at bedtime PRN (ordered for you)    PSYCHOSOCIAL/SPIRITUAL SUPPORT:  Family    Friends    Would like palliative SW for non-pharm management to \"keep his mind off of things\"  Would also like     Palliative Care will continue to follow. Thank you for the consult and allowing us to aid in the care of Gallo Navarro.    These recommendations have been discussed with Dr. Ray.    Nataliya Britton MD  Securely message with [employer] (more info)  Text page via [facility] Paging/Directory       Assessment      Gallo Navarro is a 57 year old male admitted on 8/21/2023. He has a past medical history of recent diagnosis of pancreatic adenocarcinoma c/b T2DM, pancreatic insufficiency, gastric outlet obstruction (S/P GJ tube placement 05/2023), biliary obstruction (S/P stent placement 07/2023) and duodenal obstruction (s/p " stent placement x2 8/17). He was admitted after presenting to the ED for acute-on-chronic abdominal pain, increased G-tube output, and worsening diarrhea.    CT A/P with increase in tumor burden (with encasement from pancreatic tumor of SMA, SMV, gastroduodenal arteries) since 7/8/23. Has only completed 2 cycles of chemo following dx on 7/12, so not considered progression through chemo per onc.    PTA pain regimen was dilaudid 2-4mg q4h PRN and butrans patch (replaced 8/21) 10mcg     Nausea / hiccups: Per CT A/P in ED, was noted to have questionable small hiatal hernia and fluid pooling in distal esophagus, so suspect that this, in addition to reflux, may be strongest contributors to hiccups. Nausea has also been a provoking factor in hiccups, notably getting near immediate relief w/ Compazine of both nausea and hiccups.     Today, the patient was seen for:  Pain management  Symptom management    Palliative Care Summary:   Met with Gallo and his wife at bedside.    Symptoms:   Pain  - chronic lower abdominal and lower back pain, worsened this past week when diarrhea worsened during this last round of chemotherapy.  - pain is crampy in nature and sometimes relieved with a diarrheal BM  - taking home dilaudid and APAP without good relief  - notices end of dose failure (pain returns 1-1.5 hours before next dose)  - has also noticed that pain is worse while tube feeds are running    Nausea   - has also been worse in the past week since last round of chemo  - feels a burning feeling in the epigastric / subX region, c/w prior GERD sxs.  - has improved while in ED with compazine, somewhat (from 9 to a 6)    Hiccups  - seem to be worse after this 2nd round of chemo (did get this with first round, but worse this time)  - seemed to get worse after the steroids ended  - this prevents PO intake, which is important to him for QoL  - also interfering with sleep    Diarrhea  - reports 12 loose stools in past 24h PTA  - had this  "with first round of chemo as well but worse this round    Prognosis, Goals, & Planning:     Prognosis, Goals, and/or Advance Care Planning:  Pt does not have a HCD, states \"I want to keep living\" and states his wife will be his decision maker.    Code Status was addressed today:   No          Patient has decision-making capacity today for complex decisions:Intact            Coping, Meaning, & Spirituality:   Mood, coping, and/or meaning in the context of serious illness were addressed today: Yes  would like  support for non-pharm anxiety management and  support as well    Social:   Living situation:lives with significant other/spouse  Fulfillment / chetna: PO intake is important for his QoL, as is spending time with family.  Time at cabin is relaxing.    Medications:  I have reviewed this patient's medication profile and medications from this hospitalization.     Notable medications:  APAP 650mg q8h scheduled and q8h PRN  Butrans 10mcg/hr wk (replaced 8/21)  Hydromorphone 2-4mg PO q4h PRN (  Hydromorphone 0.5mg IV q3h PRN    Protonix IV 40 daily  Psyllium 1 capsule daily    Loperamide 2-4mg QID PRN (max 16/day)    Zofran 4mg IV q8h PRN  Compazine 5-10mg q6h PRN    Pt had appt to get medical marijuana as outpt yesterday but required ED visit at that time, so has not gotten yet    ROS:  Comprehensive ROS is reviewed and is negative except as here & per HPI:     Physical Exam   Vital Signs with Ranges  Temp:  [98  F (36.7  C)-98.3  F (36.8  C)] 98.3  F (36.8  C)  Pulse:  [] 102  Resp:  [15-16] 16  BP: (105-115)/(63-81) 105/79  SpO2:  [97 %-98 %] 97 %  164 lbs 0 oz    Gen: sitting in bed, alert, appropriate, awake  HEENT: no scleral icterus  Pulm: no resp distress  Abd: no TTP (pain not worsened with palpation)  Back: no TTP (pain not worsened with palpation)  Psych: appropriate mood and affect    Data reviewed:  CT AP 8/21/23  IMPRESSION: In this patient with history of pancreatic cancer and  ongoing pain, " the current CT scan compared to prior from 7/8/2023  shows:     1a. Increased size of the ill marginated pancreatic head mass,  extending into the mesenteric root with encasement of the SMA, SMV,  gastroduodenal artery and broad area of contact with the splenoportal  junction, splenic vein and central portal vein.  1b. Similar to slight increase in size of peripancreatic and  mesenteric lymph nodes, reactive and/or neoplastic.  1c. Increase in size of sclerotic lesion in the lower thoracic  vertebral body with new sclerotic lesion in the left pubic bone,  concerning for metastasis  1d. Percutaneous gastrojejunostomy tube, gastroduodenal stent, and  gastrojejunostomy metallic lumen opposing stent; no significant  gastric distention  1e. Slight increased pancreatic ductal dilatation,   1f. Biliary ductal stents with pneumobilia, no increased biliary  ductal dilatation  1g. New ascites.  2a. Increased inner wall enhancement with mild thickening involving  the left colon with mild pericolonic streakiness, may represent  colitis secondary to infectious, or inflammatory etiology. No  pneumatosis, pneumoperitoneum, pericolic collection.  2b. Mild air-filled distention of transverse colon and part of the  redundant sigmoid colon may represent developing adynamic ileus.    Last Comprehensive Metabolic Panel:  Lab Results   Component Value Date     08/22/2023    POTASSIUM 4.0 08/22/2023    CHLORIDE 105 08/22/2023    CO2 23 08/22/2023    ANIONGAP 9 08/22/2023     (H) 08/22/2023    BUN 14.3 08/22/2023    CR 0.49 (L) 08/22/2023    GFRESTIMATED >90 08/22/2023    DENISE 8.1 (L) 08/22/2023     AST 19, ALT 22, Prot 4.8, Alb 2.6, Bili 0.4, AP 71    CBC RESULTS:   Recent Labs   Lab Test 08/22/23  0711   WBC 5.9   RBC 2.97*   HGB 9.0*   HCT 27.3*   MCV 92   MCH 30.3   MCHC 33.0   RDW 13.1

## 2023-08-22 NOTE — CONSULTS
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    #Colitis with colonic ileus (transverse/sigmoid)  #Diarrhea of unclear etiology  Presents with some increased lower abd pain and cramping 3 days PTA with diarrhea that has been experiencing since started chemo. CT A/P left colon with mild pericolonic streakiness and increased inner wall enhancement/mild thickening involving possibly c/w colitis secondary to infectious vs inflammatory etiology; additionally with distention of transverse colon (~7 cm per measure) and sigmoid colon may represent developing adynamic ileus.  Onset of worsening diarrhea since initiation of chemo and per discussion with oncology team, ADR of chemo can be colonic inflammation and diarrhea but infectious etiology also on ddx given immunocompromise.  Bowel ischemia also possibility given noted SMA/SMV/GDA encasement on CT.  Additionally with buprenorphine and opioid induced ileus also possible factor to distention.    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 - s/p EUS-guided GJ (7/13/23), duodenal stent (8/17/23)  #Obstructive jaundice, s/p repeat biliary stenting 7/11 and 8/17/23  Pancreatitis  History:  -Etiology: due to malignancy  -Date of onset: 3/2023  -Concurrent organ failure: none  -Thromboses: none  -Diabetes: suspect Type 3c (dx after onset of pancreatitis), A1C 7.9% (7/8), on insulin  -EPI: Severe (fecal elastase <0.5 on 7/9/23), on Relizorb (with TF) and Creon 24  -Current nutrition access: PEG-J (placed 5/2023)  -Last imaging:                -8/21: CT A/P - stable position of all stents (duodenal, axios, biliary), increased size of pancreatic head mass with encasement of SMA/SMV/GDA, no gastric distention, c/f bony mets (pubic bone lesion, vertebral body), new ascites, left colitis and dilated transverse colon and sigmoid (developing adynamic ileus)  -Infection/anti-infectives: None  -Interventions:                -7/11/2023: EUS and ERCP (Dr. Pickett) - Severe D2 stricture, ~4 cm mass was identified in the uncinate process of the pancreas with Bx positive for adenocarcinoma with lymphadenopathy.  Biliary stent occluded (likely d/t compression from mass effect of duod stricture), placed covered metal biliary sent and additional plastic DP stent               -7/13/2023: EUS-guided GJ Axios stent (Dr. York)   -8/17/2023: EGD and ERCP (Dr. Greenberg) confirmed Axios GJ stent in good position/patent/expanded, duodenal stenosis (papilla to D3) with placement of x2 overlapping [place] duodenal stents (from bulb to D3), removed plastic biliary stent and swept Viabil covered metal stent, PEG-J replacement (via duodenal stents)    Recommendations:  -No indication for repeat ERCP at this time as all stents in appropriate position, normal LFTs and suspect abd pain related to colonic ileus/colitis.  -Obtain enteric panel and C.diff (ordered for you).  -Hold imodium and fiber pending infectious work up and colonic distention.  -Obtain lactic acid  -Obtain AXR 8/22 and repeat 8/23 for serial colonic ileus evaluation.  -Monitor serial abd exam and passage of flatus.  -Continue conservative management of  ileus:  - CL as tolerates.  - Restart low rate (10-20 ml/hr) continuous infusion to evaluate GI tolerance/approp for advancement back to goal/nocturnal cycle.  - Clamp Gtube but return to gravity PRN if worsening abd pain, N/V.  - IV fluids until able to advance TF/PO  - Correct electrolyte abnormalities (maintain K>4, Mg>2)  - Minimize anticholinergic and opioid medications.  - Antiemetics per primary team.  -Consider decompressive colonoscopy if without improvements and pending infectious studies.  -Continue IV PPI daily. Consider increase to BID if worsening sx reflux.  Agree with Palliative consult for assistance with pain management.   -Considering celiax plexus (vs celiac neurolysis) to assist with pain management.  -Antiemetics per primary team.    Discussed with primary medicine team, oncology and palliative teams.    Gastroenterology follow up recommendations:   TBD pending clinical course with resolution of colonic ileus.    Previous post procedure recs where to follow-up w Dr Greenberg in clinic in couple of weeks if oral diet not adequately tolerated for possible removal of AXIOS to avoid circular reflux vs remove GJ and replace w   Gastrostomy plug if tolerating solids but having reflux.    Thank you for involving us in this patient's care. Please do not hesitate to contact the GI service with any questions or concerns.     Pt seen and care plan discussed with Dr. Murrieta, GI staff physician.    Overall time spent on the date of this encounter preparing to see the patient (including chart review of available notes, clinical status events, imaging and labs); obtaining and/or reviewing separately obtained history; ordering medications, tests or procedures; communicating with other health care professionals; and documenting the above clinical information in the electronic medical record was 90 minutes.    Iris Kaur PA-C  GI Service  [facility]  Text  Page  -------------------------------------------------------------------------------------------------------------------       Reason for Consultation:   Pancreatic CA pt here, recently had x2 duodenal stents placed and biliary stent exchanged. Acute-on-Chronic abd pain now, imaging c/f tumor growth. ?if repeat ERCP or stent upsizing necessary.          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    Patient seen and examined at 11:30. History is obtained from patient and wife (Violet) who report that since EGD/ERCP procedure on 8/17, has had minimal po intakes (only juice and bites of applesauce) with some nausea, no emesis and without increased abd pain and reports increased fear/apprehension about advancing po as primary limiting factor to advancing po. Reports some increased reflux with po.  Then about 3 days PTA (after completed steroids), noted increased abd pain after onset of hiccups (report frequent bouts with hiccups after start of chemo). Denies fever, chills but does endorse worsening diarrhea (brown/loose stool, up to 10-12 times daily) since start of chemo requiring IVFs intermittently and has been trying imodium BID and metamucil 1 capsule daily but without significant improvements to diarrhea. Additionally noted increased Gtube outputs.  Denies any known sick contacts but was recently around family with young children who came to visit.     Previous Procedures:  8/17/2023: ERCP with Dr. Greenberg  Impression:    Gastric outlet obstruction, previous  AXIOS                          gastrojejunal bypass stent in good position, patent,                          easily traversed, but apparently not allowing solid                          food tolerance                          Duodenal obstruction ttreated w overlapping duodenal                          stents                          Prio to that, after removing plastic biliary stent                          from lumen of VIABIL fully covered metallic biliary                          stent swept clear and should be held patent by                          duodenal stent                          GJ replaced through patent duodenal stent to beyond                          AXIOS GJ which is widely patent                          Hoped that aboe will allow solid food tolerance               Past Medical History:   Reviewed and edited as appropriate  Past Medical History:   Diagnosis Date    Acute pancreatitis 04/16/2023    Gastric outlet obstruction     Recurrent acute pancreatitis             Past Surgical History:   Reviewed and edited as appropriate   Past Surgical History:   Procedure Laterality Date    AS OPEN TREATMENT CLAVICULAR FRACTURE INTERNAL FX      ENDOSCOPIC RETROGRADE CHOLANGIOPANCREATOGRAM N/A 7/11/2023    Procedure: ENDOSCOPIC RETROGRADE CHOLANGIOPANCREATOGRAPHY;  Surgeon: Khadar Pickett MD;  Location: [facility] OR    ENDOSCOPIC RETROGRADE CHOLANGIOPANCREATOGRAM N/A 8/17/2023    Procedure: ENDOSCOPIC RETROGRADE  with stent removal x1, balloon sweep;  Surgeon: Christos Greenberg MD;  Location: [facility] OR    ENDOSCOPIC ULTRASOUND UPPER GASTROINTESTINAL TRACT (GI) N/A 7/11/2023    Procedure: Endoscopic ultrasound upper gastrointestinal tract (GI), with biposy, GJ tube repositioning, stent placement;  Surgeon: Khadar Pickett MD;  Location: [facility] OR    ENDOSCOPIC ULTRASOUND UPPER GASTROINTESTINAL TRACT (GI) N/A 7/13/2023    Procedure: ENDOSCOPIC ULTRASOUND, ESOPHAGOSCOPY, EUS guided gastrojejunostomy;  Surgeon: Jaylen  Tre Malcolm MD;  Location: [facility] OR    INSERT PICC LINE  04/29/2023    INSERT PORT VASCULAR ACCESS Right 7/28/2023    Procedure: Insert port vascular access;  Surgeon: Tom العلي MD;  Location: [facility] OR    IR CHEST PORT PLACEMENT > 5 YRS OF AGE  7/28/2023    IR NG TUBE PLACEMENT REQ RAD & FLUORO  05/08/2023    JG tube    REPLACE GASTROJEJUNOSTOMY TUBE, PERCUTANEOUS N/A 8/17/2023    Procedure: possible REPLACEMENT, GASTROJEJUNOSTOMY TUBE, PERCUTANEOUS;  Surgeon: Christos Greenberg MD;  Location: [facility] OR              Social History:   The patient lives in [place] with wife (Violet).         Family History:   Patient's family history is reviewed today and is non-contributory    Family History   Problem Relation Age of Onset    Diabetes Type 2  Father     Cirrhosis Father     Genetic Disorder Brother         missing [id number], mild retardation    Colon Polyps Brother     Colon Cancer Paternal Uncle     Pancreatic Cancer Paternal Aunt     Pancreatitis Cousin              Allergies:   Reviewed and edited as appropriate   No Known Allergies         Medications:     Current Facility-Administered Medications   Medication    acetaminophen (TYLENOL) solution 650 mg    [START ON 8/24/2023] acetaminophen (TYLENOL) solution 650 mg    buprenorphine (BUTRANS) 10 MCG/HR WK patch 1 patch    buprenorphine (BUTRANS) Patch in Place    Contraindications to both pharmacological and mechanical prophylaxis (must document contraindications for both in this order)    dextrose 10% infusion    glucose gel 15-30 g    Or    dextrose 50 % injection 25-50 mL    Or    glucagon injection 1 mg    HYDROmorphone (DILAUDID) tablet 2-4 mg    HYDROmorphone (PF) (DILAUDID) injection 0.5 mg    insulin aspart (NovoLOG) injection (RAPID ACTING)    [Held by provider] insulin glargine (LANTUS PEN) injection 5 Units    [Held by provider] insulin NPH injection 25 Units    [Held by provider] insulin NPH injection 5 Units    lactated ringers infusion     lidocaine (LMX4) cream    lidocaine 1 % 0.1-1 mL    lipase-protease-amylase (CREON 24) 02985-19357-591579 units per EC capsule 1 capsule    lipase-protease-amylase (CREON 24) 24648-83304-926965 units per EC capsule 1-2 capsule    And    sodium bicarbonate tablet 325 mg    lipase-protease-amylase (CREON 24) 01530-42219-376105 units per EC capsule 3 capsule    loperamide (IMODIUM) capsule 2-4 mg    multivitamin, therapeutic (THERA-VIT) tablet 1 tablet    naloxone (NARCAN) injection 0.2 mg    Or    naloxone (NARCAN) injection 0.4 mg    Or    naloxone (NARCAN) injection 0.2 mg    Or    naloxone (NARCAN) injection 0.4 mg    ondansetron (ZOFRAN) injection 4 mg    [Held by provider] pantoprazole (PROTONIX) EC tablet 40 mg    pantoprazole (PROTONIX) IV push injection 40 mg    prochlorperazine (COMPAZINE) injection 5 mg    prochlorperazine (COMPAZINE) tablet 10 mg    psyllium (METAMUCIL/KONSYL) capsule 1 capsule    sodium chloride (PF) 0.9% PF flush 3 mL    sodium chloride (PF) 0.9% PF flush 3 mL     Current Outpatient Medications   Medication Sig    dexamethasone (DECADRON) 4 MG tablet Take 2 tablets (8 mg) by mouth daily Take for 2 days, starting the day after chemo. Take with food.    HYDROmorphone (DILAUDID) 2 MG tablet Take 1-2 tablets (2-4 mg) by mouth every 4 hours as needed for severe pain    ondansetron (ZOFRAN ODT) 8 MG ODT tab Take 1 tablet (8 mg) by mouth every 8 hours as needed for nausea    acetaminophen (TYLENOL) 32 mg/mL liquid Take 20.313 mLs (650 mg) by mouth every 8 hours    B-D U/F 31G X 8 MM insulin pen needle Inject Subcutaneous 5 times daily Use 5 pen needles daily or as directed.    blood glucose (FREESTYLE TEST STRIPS) test strip by Other route as needed    buprenorphine (BUTRANS) 10 MCG/HR WK patch Place 1 patch onto the skin every 7 days    Continuous Blood Gluc Sensor (FREESTYLE KAREN 2 SENSOR) MISC     insulin aspart (NOVOLOG PEN) 100 UNIT/ML pen Inject 1-10 Units Subcutaneous 3 times daily  (with meals)    insulin glargine (BASAGLAR KWIKPEN) 100 UNIT/ML pen Inject 20 Units Subcutaneous every morning for 360 days    insulin  UNIT/ML injection Inject 15 Units Subcutaneous every evening for 60 days    Lancets (ONETOUCH DELICA PLUS CMSDEU30A) MISC     lidocaine-prilocaine (EMLA) 2.5-2.5 % external cream Use 1-2 times a week or as needed prior to port access    lidocaine-prilocaine (EMLA) 2.5-2.5 % external cream Use 1-2 times weekly or as needed prior to port access    lipase-protease-amylase (CREON 24) [id number] units CPEP per EC capsule 1-2 capsules by Per J Tube route 3 times daily (with meals)    loperamide (IMODIUM) 2 MG capsule 2 caps at 1st sign of diarrhea & 1 cap every 2hrs until 12hrs diarrhea free. During night, 2 caps at bedtime & 2 caps every 4hrs until AM    naloxone (NARCAN) 4 MG/0.1ML nasal spray Spray 1 spray (4 mg) into one nostril alternating nostrils as needed for opioid reversal every 2-3 minutes until assistance arrives    ondansetron (ZOFRAN) 8 MG tablet Take 1 tablet (8 mg) by mouth every 8 hours as needed for nausea (vomiting)    pantoprazole (PROTONIX) 40 MG EC tablet Take 40 mg by mouth daily    polyethylene glycol (MIRALAX) 17 GM/Dose powder Take 17 g by mouth daily    prochlorperazine (COMPAZINE) 10 MG tablet Take 1 tablet (10 mg) by mouth every 6 hours as needed for nausea or vomiting    prochlorperazine (COMPAZINE) 10 MG tablet Take 1 tablet (10 mg) by mouth every 6 hours as needed for nausea or vomiting    psyllium (METAMUCIL/KONSYL) capsule 1 capsule by Per G Tube route 2 times daily    sennosides (SENOKOT) 8.8 MG/5ML syrup 5 mLs by Per J Tube route 2 times daily    sodium bicarbonate 325 MG tablet 1 tablet (325 mg) by Per J Tube route 3 times daily    vitamin D3 (CHOLECALCIFEROL) 50 mcg (2000 units) tablet Take 1 tablet (50 mcg) by mouth daily             Review of Systems:     A complete review of systems was performed and is negative except as noted in  the HPI           Physical Exam:   Temp: 98.3  F (36.8  C) Temp src: Oral BP: 105/79 Pulse: 102   Resp: 16 SpO2: 97 % O2 Device: None (Room air)    Wt:   Wt Readings from Last 2 Encounters:   08/21/23 74.4 kg (164 lb)   08/17/23 74.8 kg (164 lb 14.5 oz)      General: Fatigued male in NAD/non-toxic appearing.  Answers appropriately.    HEENT: Head is AT/NC. Sclera anicteric. No conjunctival injection.  Oropharynx is clear, moist and w/o exudate or lesions.   Lungs: Non-labored breathing on RA.  Heart: Regular rate and rhythm.   Abdomen: Soft, non-distended, minimally tympanic, non-tender. No rebound or peritoneal signs. PEG-J site c/d/I and Gtube clamped.    Extremities: WWP, no pedal edema.  MSK: no gross deformity, no overt muscle wasting  Skin: No jaundice or rash  Neurologic: Grossly non-focal.  CN 2-12 grossly intact.   Psych: mood appropriate to situation         Data:   Labs and imaging below were independently reviewed and interpreted    LAB WORK:    BMP  Recent Labs   Lab 08/22/23  0711 08/21/23 2012 08/21/23  1351 08/17/23  1311 08/15/23  1002     --  138  --  134*   POTASSIUM 4.0  --  4.1  --  3.5   CHLORIDE 105  --  105  --  101   DENISE 8.1*  --  8.3*  --  8.8   CO2 23  --  24  --  24   BUN 14.3  --  16.8  --  12.3   CR 0.49*  --  0.47*  --  0.55*   * 153* 143* 219* 91     CBC  Recent Labs   Lab 08/22/23  0711 08/21/23  1351 08/15/23  1002   WBC 5.9 7.4 4.5   RBC 2.97* 3.22* 3.08*   HGB 9.0* 9.8* 9.4*   HCT 27.3* 29.5* 28.1*   MCV 92 92 91   MCH 30.3 30.4 30.5   MCHC 33.0 33.2 33.5   RDW 13.1 13.2 13.5    223 274     INRNo lab results found in last 7 days.  LFTs  Recent Labs   Lab 08/22/23  0711 08/21/23  1351 08/15/23  1002   ALKPHOS 71 82 125   AST 19 23 17   ALT 22 28 18   BILITOTAL 0.4 0.4 0.3   PROTTOTAL 4.8* 5.5* 5.8*   ALBUMIN 2.6* 3.3* 3.2*      PANC  Recent Labs   Lab 08/21/23  1351   LIPASE 9*       IMAGING:  (Personally reviewed)    Results for orders placed or performed  during the hospital encounter of 08/21/23   CT Abdomen Pelvis w Contrast    Impression    IMPRESSION: In this patient with history of pancreatic cancer and  ongoing pain, the current CT scan compared to prior from 7/8/2023  shows:    1a. Increased size of the ill marginated pancreatic head mass,  extending into the mesenteric root with encasement of the SMA, SMV,  gastroduodenal artery and broad area of contact with the splenoportal  junction, splenic vein and central portal vein.  1b. Similar to slight increase in size of peripancreatic and  mesenteric lymph nodes, reactive and/or neoplastic.  1c. Increase in size of sclerotic lesion in the lower thoracic  vertebral body with new sclerotic lesion in the left pubic bone,  concerning for metastasis  1d. Percutaneous gastrojejunostomy tube, gastroduodenal stent, and  gastrojejunostomy metallic lumen opposing stent; no significant  gastric distention  1e. Slight increased pancreatic ductal dilatation,   1f. Biliary ductal stents with pneumobilia, no increased biliary  ductal dilatation  1g. New ascites.  2a. Increased inner wall enhancement with mild thickening involving  the left colon with mild pericolonic streakiness, may represent  colitis secondary to infectious, or inflammatory etiology. No  pneumatosis, pneumoperitoneum, pericolic collection.  2b. Mild air-filled distention of transverse colon and part of the  redundant sigmoid colon may represent developing adynamic ileus.    I have personally reviewed the examination and initial interpretation  and I agree with the findings.    ZURDO MANJARREZ MD         SYSTEM ID:  [id number]            =======================================================================

## 2023-08-22 NOTE — PROGRESS NOTES
BP (!) 105/95 (BP Location: Right arm)   Pulse 88   Temp 98  F (36.7  C) (Oral)   Resp 16   Ht 1.829 m (6')   Wt 74.4 kg (164 lb)   SpO2 98%   BMI 22.24 kg/m      Shift: [id number]  Reason for Admission: Acute on chronic abdominal pain with increased GT output and diarrhea.   VS/Tele: VSS on RA  Neuros: A/Ox4, able to make needs known. Calls appropriately   Respiratory: Sats >95% on RA  GI/: No BMs this shift. Voiding in urinal.  Nutrition: Tolerating CLD. Cycled TF from 8p to 1pm via JTube  Drains/Lines: Port TKO. GJ tube- clamped  Activity: Pt up ad nora  Pain/Nausea: C/o of abdominal pain- PRN IV Dilaudid given. Denies nausea currently  Skin: Intact. Buprenorphine patch on L. Shoulder.   Labs: BG ACHS. Still needs stool sample   Social: Wife at bedside  Plan of care: Plan to start TF at 8pm tonight at starting rate (30 mL/hr and advance by 20 mL/hr q4h until goal rate of 90 mL/hr). Will continue to monitor and follow POC.

## 2023-08-22 NOTE — CONSULTS
Oncology Consult Note   Date of Service: 08/22/2023    Patient: Gallo Navarro  MRN: [id number]  Admission Date: 8/21/2023  Hospital Day # 1   Primary Outpatient Oncologist: Dr. Haile    Reason for Consult: patient with pancreatic cancer with worsening abdominal pain      History of Present Illness:    Gallo Navarro is a 57 year old male with locally advanced, unresectable pancreatic cancer, s/p gastric outlet obstruction s/p GJ tube (placed 5/2023), biliary obstruction s/p stent placement on 7/7/23, pancreatic insufficiency, and IDDM2, recently completing C2 of FOLFIRINOX, presenting to the ED with worsening abdominal pain, hiccuping, bloating, diarrhea. He communicated with cancer clinic yesterday and Dr. Acosta recommended for inpatient admission with abdominal CT and palliative care consultation.  In the ED, VS stable, labs with nl liver enzymes, wbc 5.9, anemia, imaging s/f ncrease of the pancreatic mass, slight increase of lymph nodes around pancreas, concern for developing ileus, ascites, left colonic thickening. He was admitted for further management. Today, his wife and him say he had been tolerating chemo well until this last cycle where he had increase diarrhea, fatigue, abdominal pain,hiccups that interrupt his sleep. They are concerned regarding findings on imaging showing increase size of pancreatic mass. He denies any n/v, tolerating very little intake most of the nutrition through GJ tube, no fevers, no SOB.     Oncology History:  Diagnosis:  locally advanced, unresectable pancreatic cancer.   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 He then presented to the ED with worsening abdominal pain, increased jaundice, poor PO intake and weight loss admitted on 7/8/2023 for concern of  biliary obstruction. Unfortunately, during this admission, biopsy of pancreatic mass returned positive for pancreatic adenocarcinoma on 7/12/23. He started on treatment with 5FU, irinotecan, and oxaliplatin (FOLFIRINOX) on 7/31/23. Please see previous notes for further details on the patient's history. He comes in today for routine follow up prior to cycle 2 FOLFIRINOX.   Review of Systems: Pertinent positive and negative systems described in HPI; the remainder of the 14 systems are negative    Past Medical History:  Past Medical History:   Diagnosis Date    Acute pancreatitis 04/16/2023    Gastric outlet obstruction     Recurrent acute pancreatitis        Past Surgical History:  Past Surgical History:   Procedure Laterality Date    AS OPEN TREATMENT CLAVICULAR FRACTURE INTERNAL FX      ENDOSCOPIC RETROGRADE CHOLANGIOPANCREATOGRAM N/A 7/11/2023    Procedure: ENDOSCOPIC RETROGRADE CHOLANGIOPANCREATOGRAPHY;  Surgeon: Khadar Pickett MD;  Location: [facility] OR    ENDOSCOPIC RETROGRADE CHOLANGIOPANCREATOGRAM N/A 8/17/2023    Procedure: ENDOSCOPIC RETROGRADE  with stent removal x1, balloon sweep;  Surgeon: Christos Greenberg MD;  Location: [facility] OR    ENDOSCOPIC ULTRASOUND UPPER GASTROINTESTINAL TRACT (GI) N/A 7/11/2023    Procedure: Endoscopic ultrasound upper gastrointestinal tract (GI), with biposy, GJ tube repositioning, stent placement;  Surgeon: Khadar Pickett MD;  Location: [facility] OR    ENDOSCOPIC ULTRASOUND UPPER GASTROINTESTINAL TRACT (GI) N/A 7/13/2023    Procedure: ENDOSCOPIC ULTRASOUND, ESOPHAGOSCOPY, EUS guided gastrojejunostomy;  Surgeon: Tre York MD;  Location: [facility] OR    INSERT PICC LINE  04/29/2023    INSERT PORT VASCULAR ACCESS Right 7/28/2023    Procedure: Insert port vascular access;  Surgeon: Tom العلي MD;  Location: [facility] OR    IR CHEST PORT PLACEMENT > 5 YRS OF AGE  7/28/2023    IR NG TUBE PLACEMENT REQ RAD & FLUORO  05/08/2023    JG tube    REPLACE GASTROJEJUNOSTOMY TUBE, PERCUTANEOUS  N/A 8/17/2023    Procedure: possible REPLACEMENT, GASTROJEJUNOSTOMY TUBE, PERCUTANEOUS;  Surgeon: Christos Greenberg MD;  Location:  OR       Social History:  Social History     Socioeconomic History    Marital status:      Spouse name: None    Number of children: None    Years of education: None    Highest education level: None   Tobacco Use    Smoking status: Never     Passive exposure: Never    Smokeless tobacco: Never   Vaping Use    Vaping Use: Never used   Substance and Sexual Activity    Alcohol use: Not Currently     Comment: o/w light beer 2days a week    Drug use: Never        Family History  Family History   Problem Relation Age of Onset    Diabetes Type 2  Father     Cirrhosis Father     Genetic Disorder Brother         missing [id number], mild retardation    Colon Polyps Brother     Colon Cancer Paternal Uncle     Pancreatic Cancer Paternal Aunt     Pancreatitis Cousin        Outpatient Medications:  No current facility-administered medications on file prior to encounter.  acetaminophen (TYLENOL) 32 mg/mL liquid, Take 20.313 mLs (650 mg) by mouth every 8 hours  B-D U/F 31G X 8 MM insulin pen needle, Inject Subcutaneous 5 times daily Use 5 pen needles daily or as directed.  blood glucose (FREESTYLE TEST STRIPS) test strip, by Other route as needed  buprenorphine (BUTRANS) 10 MCG/HR WK patch, Place 1 patch onto the skin every 7 days  Continuous Blood Gluc Sensor (FREESTYLE KAREN 2 SENSOR) MISC,   dexamethasone (DECADRON) 4 MG tablet, Take 2 tablets (8 mg) by mouth daily Take for 2 days, starting the day after chemo. Take with food.  HYDROmorphone (DILAUDID) 2 MG tablet, Take 1-2 tablets (2-4 mg) by mouth every 4 hours as needed for severe pain  insulin aspart (NOVOLOG PEN) 100 UNIT/ML pen, Inject 1-10 Units Subcutaneous 3 times daily (with meals)  insulin glargine (BASAGLAR KWIKPEN) 100 UNIT/ML pen, Inject 20 Units Subcutaneous every morning for 360 days (Patient taking differently: Inject  20 Units Subcutaneous At Bedtime)  insulin  UNIT/ML injection, Inject 15 Units Subcutaneous every evening for 60 days  Lancets (ONETOUCH DELICA PLUS [id number]) MISC,   lipase-protease-amylase (CREON 24) 86596-57772-429789 units CPEP per EC capsule, 1-2 capsules by Per J Tube route 3 times daily (with meals)  loperamide (IMODIUM) 2 MG capsule, 2 caps at 1st sign of diarrhea & 1 cap every 2hrs until 12hrs diarrhea free. During night, 2 caps at bedtime & 2 caps every 4hrs until AM  ondansetron (ZOFRAN ODT) 8 MG ODT tab, Take 1 tablet (8 mg) by mouth every 8 hours as needed for nausea  ondansetron (ZOFRAN) 8 MG tablet, Take 1 tablet (8 mg) by mouth every 8 hours as needed for nausea (vomiting)  prochlorperazine (COMPAZINE) 10 MG tablet, Take 1 tablet (10 mg) by mouth every 6 hours as needed for nausea or vomiting  prochlorperazine (COMPAZINE) 10 MG tablet, Take 1 tablet (10 mg) by mouth every 6 hours as needed for nausea or vomiting  psyllium (METAMUCIL/KONSYL) capsule, 1 capsule by Per G Tube route 2 times daily  sennosides (SENOKOT) 8.8 MG/5ML syrup, 5 mLs by Per J Tube route 2 times daily  simethicone (MYLICON) 125 MG chewable tablet, Take 125 mg by mouth 2 times daily  sodium bicarbonate 325 MG tablet, 1 tablet (325 mg) by Per J Tube route 3 times daily  vitamin D3 (CHOLECALCIFEROL) 50 mcg (2000 units) tablet, Take 1 tablet (50 mcg) by mouth daily  lidocaine-prilocaine (EMLA) 2.5-2.5 % external cream, Use 1-2 times a week or as needed prior to port access  lidocaine-prilocaine (EMLA) 2.5-2.5 % external cream, Use 1-2 times weekly or as needed prior to port access  naloxone (NARCAN) 4 MG/0.1ML nasal spray, Spray 1 spray (4 mg) into one nostril alternating nostrils as needed for opioid reversal every 2-3 minutes until assistance arrives  pantoprazole (PROTONIX) 40 MG EC tablet, Take 40 mg by mouth daily  polyethylene glycol (MIRALAX) 17 GM/Dose powder, Take 17 g by mouth daily         Physical Exam:    BP  105/79 (BP Location: Right arm)   Pulse 102   Temp 98.3  F (36.8  C) (Oral)   Resp 16   Ht 1.829 m (6')   Wt 74.4 kg (164 lb)   SpO2 97%   BMI 22.24 kg/m    Gen: in mild distress  HEENT: EOMI, PERRL, mmm, oropharynx clear  CV: Normal rate, regular rhythm. No m/r/g  Pulm: CTAB, no wheezing, normal work of breathing  Abd: distended, diffusely tender  Ext: Warm and well perfused. No lower extremity edema  Skin: No rash, cyanosis or petechial lesion  Neuro: Alert and answering questions appropriately.     Labs & Studies: I personally reviewed the following studies:  ROUTINE LABS (Last four results):  CMP  Recent Labs   Lab 08/22/23  1012 08/22/23  0711 08/21/23 2012 08/21/23  1351   NA  --  137  --  138   POTASSIUM  --  4.0  --  4.1   CHLORIDE  --  105  --  105   CO2  --  23  --  24   ANIONGAP  --  9  --  9   * 108* 153* 143*   BUN  --  14.3  --  16.8   CR  --  0.49*  --  0.47*   GFRESTIMATED  --  >90  --  >90   DENISE  --  8.1*  --  8.3*   PROTTOTAL  --  4.8*  --  5.5*   ALBUMIN  --  2.6*  --  3.3*   BILITOTAL  --  0.4  --  0.4   ALKPHOS  --  71  --  82   AST  --  19  --  23   ALT  --  22  --  28     CBC  Recent Labs   Lab 08/22/23  0711 08/21/23  1351   WBC 5.9 7.4   RBC 2.97* 3.22*   HGB 9.0* 9.8*   HCT 27.3* 29.5*   MCV 92 92   MCH 30.3 30.4   MCHC 33.0 33.2   RDW 13.1 13.2    223     INRNo lab results found in last 7 days.    Imaging    CT A/P 8/21/23    IMPRESSION: In this patient with history of pancreatic cancer and  ongoing pain, the current CT scan compared to prior from 7/8/2023  shows:     1a. Increased size of the ill marginated pancreatic head mass,  extending into the mesenteric root with encasement of the SMA, SMV,  gastroduodenal artery and broad area of contact with the splenoportal  junction, splenic vein and central portal vein.  1b. Similar to slight increase in size of peripancreatic and  mesenteric lymph nodes, reactive and/or neoplastic.  1c. Increase in size of sclerotic  lesion in the lower thoracic  vertebral body with new sclerotic lesion in the left pubic bone,  concerning for metastasis  1d. Percutaneous gastrojejunostomy tube, gastroduodenal stent, and  gastrojejunostomy metallic lumen opposing stent; no significant  gastric distention  1e. Slight increased pancreatic ductal dilatation,   1f. Biliary ductal stents with pneumobilia, no increased biliary  ductal dilatation  1g. New ascites.  2a. Increased inner wall enhancement with mild thickening involving  the left colon with mild pericolonic streakiness, may represent  colitis secondary to infectious, or inflammatory etiology. No  pneumatosis, pneumoperitoneum, pericolic collection.  2b. Mild air-filled distention of transverse colon and part of the  redundant sigmoid colon may represent developing adynamic ileus.       Assessment & Plan:   Gallo Navarro is a 57 year old male with locally advanced, unresectable pancreatic cancer, s/p gastric outlet obstruction s/p GJ tube (placed 5/2023), biliary obstruction s/p stent placement on 7/7/23, pancreatic insufficiency, and IDDM2, recently completing C2 of FOLFIRINOX, presenting with worsening abdominal pain.    Locally advanced, unresectable pancreatic cancer. Patient started on treatment with palliative FOLFIRINOX on 7/31/23.  Etiology of abdominal pain is multifactorial. On imaging he does have increase of the pancreatic mass, slight increase of lymph nodes around pancrease, and worsening sclerotic lesion in lower thoracic body and new lesion in the left pubic bone, however he has only received 2 rounds of chemotherapy thus it is too early to assess treatment response. He is also noted to have ascites, left colon wall thickening, and developing ileus, which could certainly be contributing to current symptoms. He is not noted to have any obstructions of prior stents. Recommend to obtain GI consult  to assess for need of any interventions, though, less likely given imaging results,  rule out underlying infectious colitis, consider celiac plexus block for symptomatic management, and obtain palliative care consult to assist with pain management. No cancer directed therapy while inpatient and further changes to his chemotherapy regimen (if needed) would be done after acute illness has resolved, likely outpatient.    Recommendations:   - Appreciate input from GI regarding any interventios  - Palliative care consult  - Consider celiac plexus block  - Optimize PPI  - No cancer directed therapy while inpatient    Patient was seen and plan of care was discussed with attending physician Dr. Larsen.    We will continue to follow] this patient. Please don't hesitate to contact the Fellow On-Call with questions.    Estrella Redd MD  Hematology/Oncology  Fellow

## 2023-08-22 NOTE — PHARMACY-CONSULT NOTE
Pharmacy Tube Feeding Consult    Medication reviewed for administration by feeding tube and for potential food/drug interactions.    Recommendation: No changes are needed at this time as patient is able to take medication PO. If patient is unable to take medications PO, would need to change pantoprazole EC tablet to suspension,.    Pharmacy will continue to follow as new medications are ordered.    June Ray, DayD, BCPS

## 2023-08-23 ENCOUNTER — APPOINTMENT (OUTPATIENT)
Dept: GENERAL RADIOLOGY | Facility: CLINIC | Age: 58
DRG: 393 | End: 2023-08-23
Attending: STUDENT IN AN ORGANIZED HEALTH CARE EDUCATION/TRAINING PROGRAM
Payer: COMMERCIAL

## 2023-08-23 LAB
ADV 40+41 DNA STL QL NAA+NON-PROBE: NEGATIVE
ANION GAP SERPL CALCULATED.3IONS-SCNC: 8 MMOL/L (ref 7–15)
ASTRO TYP 1-8 RNA STL QL NAA+NON-PROBE: NEGATIVE
BUN SERPL-MCNC: 10.1 MG/DL (ref 6–20)
C CAYETANENSIS DNA STL QL NAA+NON-PROBE: NEGATIVE
CALCIUM SERPL-MCNC: 8.4 MG/DL (ref 8.6–10)
CAMPYLOBACTER DNA SPEC NAA+PROBE: NEGATIVE
CHLORIDE SERPL-SCNC: 104 MMOL/L (ref 98–107)
CREAT SERPL-MCNC: 0.52 MG/DL (ref 0.67–1.17)
CRYPTOSP DNA STL QL NAA+NON-PROBE: NEGATIVE
DEPRECATED HCO3 PLAS-SCNC: 26 MMOL/L (ref 22–29)
E COLI O157 DNA STL QL NAA+NON-PROBE: NORMAL
E HISTOLYT DNA STL QL NAA+NON-PROBE: NEGATIVE
EAEC ASTA GENE ISLT QL NAA+PROBE: NEGATIVE
EC STX1+STX2 GENES STL QL NAA+NON-PROBE: NEGATIVE
EPEC EAE GENE STL QL NAA+NON-PROBE: NEGATIVE
ERYTHROCYTE [DISTWIDTH] IN BLOOD BY AUTOMATED COUNT: 13.2 % (ref 10–15)
ETEC LTA+ST1A+ST1B TOX ST NAA+NON-PROBE: NEGATIVE
G LAMBLIA DNA STL QL NAA+NON-PROBE: NEGATIVE
GFR SERPL CREATININE-BSD FRML MDRD: >90 ML/MIN/1.73M2
GLUCOSE BLDC GLUCOMTR-MCNC: 104 MG/DL (ref 70–99)
GLUCOSE BLDC GLUCOMTR-MCNC: 139 MG/DL (ref 70–99)
GLUCOSE BLDC GLUCOMTR-MCNC: 154 MG/DL (ref 70–99)
GLUCOSE BLDC GLUCOMTR-MCNC: 160 MG/DL (ref 70–99)
GLUCOSE BLDC GLUCOMTR-MCNC: 221 MG/DL (ref 70–99)
GLUCOSE BLDC GLUCOMTR-MCNC: 244 MG/DL (ref 70–99)
GLUCOSE BLDC GLUCOMTR-MCNC: 83 MG/DL (ref 70–99)
GLUCOSE SERPL-MCNC: 94 MG/DL (ref 70–99)
HCT VFR BLD AUTO: 26.7 % (ref 40–53)
HGB BLD-MCNC: 8.7 G/DL (ref 13.3–17.7)
LACTATE SERPL-SCNC: 0.8 MMOL/L (ref 0.7–2)
MAGNESIUM SERPL-MCNC: 1.9 MG/DL (ref 1.7–2.3)
MCH RBC QN AUTO: 30.3 PG (ref 26.5–33)
MCHC RBC AUTO-ENTMCNC: 32.6 G/DL (ref 31.5–36.5)
MCV RBC AUTO: 93 FL (ref 78–100)
NOROVIRUS GI+II RNA STL QL NAA+NON-PROBE: NEGATIVE
P SHIGELLOIDES DNA STL QL NAA+NON-PROBE: NEGATIVE
PLATELET # BLD AUTO: 195 10E3/UL (ref 150–450)
POTASSIUM SERPL-SCNC: 3.6 MMOL/L (ref 3.4–5.3)
RBC # BLD AUTO: 2.87 10E6/UL (ref 4.4–5.9)
RVA RNA STL QL NAA+NON-PROBE: NEGATIVE
SALMONELLA SP RPOD STL QL NAA+PROBE: NEGATIVE
SAPO I+II+IV+V RNA STL QL NAA+NON-PROBE: NEGATIVE
SHIGELLA SP+EIEC IPAH ST NAA+NON-PROBE: NEGATIVE
SODIUM SERPL-SCNC: 138 MMOL/L (ref 136–145)
V CHOLERAE DNA SPEC QL NAA+PROBE: NEGATIVE
VIBRIO DNA SPEC NAA+PROBE: NEGATIVE
WBC # BLD AUTO: 3.9 10E3/UL (ref 4–11)
Y ENTEROCOL DNA STL QL NAA+PROBE: NEGATIVE

## 2023-08-23 PROCEDURE — 250N000011 HC RX IP 250 OP 636: Mod: JZ | Performed by: INTERNAL MEDICINE

## 2023-08-23 PROCEDURE — 36591 DRAW BLOOD OFF VENOUS DEVICE: CPT | Performed by: STUDENT IN AN ORGANIZED HEALTH CARE EDUCATION/TRAINING PROGRAM

## 2023-08-23 PROCEDURE — 250N000013 HC RX MED GY IP 250 OP 250 PS 637: Performed by: INTERNAL MEDICINE

## 2023-08-23 PROCEDURE — 99233 SBSQ HOSP IP/OBS HIGH 50: CPT | Performed by: STUDENT IN AN ORGANIZED HEALTH CARE EDUCATION/TRAINING PROGRAM

## 2023-08-23 PROCEDURE — 250N000012 HC RX MED GY IP 250 OP 636 PS 637: Performed by: STUDENT IN AN ORGANIZED HEALTH CARE EDUCATION/TRAINING PROGRAM

## 2023-08-23 PROCEDURE — 80048 BASIC METABOLIC PNL TOTAL CA: CPT | Performed by: STUDENT IN AN ORGANIZED HEALTH CARE EDUCATION/TRAINING PROGRAM

## 2023-08-23 PROCEDURE — 83605 ASSAY OF LACTIC ACID: CPT | Performed by: STUDENT IN AN ORGANIZED HEALTH CARE EDUCATION/TRAINING PROGRAM

## 2023-08-23 PROCEDURE — 99233 SBSQ HOSP IP/OBS HIGH 50: CPT | Mod: GC | Performed by: STUDENT IN AN ORGANIZED HEALTH CARE EDUCATION/TRAINING PROGRAM

## 2023-08-23 PROCEDURE — 250N000011 HC RX IP 250 OP 636: Mod: JZ | Performed by: STUDENT IN AN ORGANIZED HEALTH CARE EDUCATION/TRAINING PROGRAM

## 2023-08-23 PROCEDURE — 250N000013 HC RX MED GY IP 250 OP 250 PS 637: Performed by: STUDENT IN AN ORGANIZED HEALTH CARE EDUCATION/TRAINING PROGRAM

## 2023-08-23 PROCEDURE — 250N000012 HC RX MED GY IP 250 OP 636 PS 637

## 2023-08-23 PROCEDURE — 250N000013 HC RX MED GY IP 250 OP 250 PS 637: Performed by: EMERGENCY MEDICINE

## 2023-08-23 PROCEDURE — 250N000011 HC RX IP 250 OP 636

## 2023-08-23 PROCEDURE — 250N000013 HC RX MED GY IP 250 OP 250 PS 637

## 2023-08-23 PROCEDURE — C9113 INJ PANTOPRAZOLE SODIUM, VIA: HCPCS | Mod: JZ | Performed by: STUDENT IN AN ORGANIZED HEALTH CARE EDUCATION/TRAINING PROGRAM

## 2023-08-23 PROCEDURE — 74018 RADEX ABDOMEN 1 VIEW: CPT

## 2023-08-23 PROCEDURE — 83735 ASSAY OF MAGNESIUM: CPT | Performed by: DIETITIAN, REGISTERED

## 2023-08-23 PROCEDURE — 120N000002 HC R&B MED SURG/OB UMMC

## 2023-08-23 PROCEDURE — 85027 COMPLETE CBC AUTOMATED: CPT | Performed by: STUDENT IN AN ORGANIZED HEALTH CARE EDUCATION/TRAINING PROGRAM

## 2023-08-23 PROCEDURE — 74018 RADEX ABDOMEN 1 VIEW: CPT | Mod: 26 | Performed by: RADIOLOGY

## 2023-08-23 RX ORDER — HEPARIN SODIUM,PORCINE 10 UNIT/ML
3 VIAL (ML) INTRAVENOUS
Status: DISCONTINUED | OUTPATIENT
Start: 2023-08-23 | End: 2023-08-25 | Stop reason: HOSPADM

## 2023-08-23 RX ORDER — HYDROMORPHONE HYDROCHLORIDE 2 MG/1
2-4 TABLET ORAL EVERY 4 HOURS PRN
Status: DISCONTINUED | OUTPATIENT
Start: 2023-08-23 | End: 2023-08-24

## 2023-08-23 RX ORDER — CHLORPROMAZINE HYDROCHLORIDE 25 MG/1
50 TABLET, FILM COATED ORAL 4 TIMES DAILY PRN
Status: DISCONTINUED | OUTPATIENT
Start: 2023-08-23 | End: 2023-08-25 | Stop reason: HOSPADM

## 2023-08-23 RX ORDER — HYDROMORPHONE HYDROCHLORIDE 1 MG/ML
0.5 INJECTION, SOLUTION INTRAMUSCULAR; INTRAVENOUS; SUBCUTANEOUS
Status: DISCONTINUED | OUTPATIENT
Start: 2023-08-23 | End: 2023-08-25 | Stop reason: HOSPADM

## 2023-08-23 RX ORDER — GABAPENTIN 300 MG/1
300 CAPSULE ORAL 3 TIMES DAILY
Status: DISCONTINUED | OUTPATIENT
Start: 2023-08-23 | End: 2023-08-25 | Stop reason: HOSPADM

## 2023-08-23 RX ORDER — CHLORPROMAZINE HYDROCHLORIDE 25 MG/1
25 TABLET, FILM COATED ORAL 4 TIMES DAILY PRN
Status: DISCONTINUED | OUTPATIENT
Start: 2023-08-23 | End: 2023-08-23

## 2023-08-23 RX ADMIN — GABAPENTIN 300 MG: 300 CAPSULE ORAL at 20:32

## 2023-08-23 RX ADMIN — INSULIN HUMAN 12 UNITS: 100 INJECTION, SUSPENSION SUBCUTANEOUS at 21:57

## 2023-08-23 RX ADMIN — GABAPENTIN 100 MG: 100 CAPSULE ORAL at 08:09

## 2023-08-23 RX ADMIN — HYDROMORPHONE HYDROCHLORIDE 2 MG: 2 TABLET ORAL at 14:27

## 2023-08-23 RX ADMIN — PANTOPRAZOLE SODIUM 40 MG: 40 INJECTION, POWDER, FOR SOLUTION INTRAVENOUS at 20:32

## 2023-08-23 RX ADMIN — THERA TABS 1 TABLET: TAB at 08:09

## 2023-08-23 RX ADMIN — PANCRELIPASE 2 CAPSULE: 24000; 76000; 120000 CAPSULE, DELAYED RELEASE PELLETS ORAL at 20:45

## 2023-08-23 RX ADMIN — GABAPENTIN 300 MG: 300 CAPSULE ORAL at 14:21

## 2023-08-23 RX ADMIN — HYDROMORPHONE HYDROCHLORIDE 4 MG: 2 TABLET ORAL at 20:32

## 2023-08-23 RX ADMIN — Medication 3 ML: at 20:35

## 2023-08-23 RX ADMIN — PANCRELIPASE 1 CAPSULE: 24000; 76000; 120000 CAPSULE, DELAYED RELEASE PELLETS ORAL at 13:11

## 2023-08-23 RX ADMIN — INSULIN HUMAN 12 UNITS: 100 INJECTION, SUSPENSION SUBCUTANEOUS at 01:30

## 2023-08-23 RX ADMIN — CHLORPROMAZINE HYDROCHLORIDE 25 MG: 25 TABLET, FILM COATED ORAL at 07:01

## 2023-08-23 RX ADMIN — PANCRELIPASE 1 CAPSULE: 24000; 76000; 120000 CAPSULE, DELAYED RELEASE PELLETS ORAL at 09:01

## 2023-08-23 RX ADMIN — SODIUM BICARBONATE 325 MG: 325 TABLET ORAL at 20:45

## 2023-08-23 RX ADMIN — ACETAMINOPHEN 650 MG: 160 SUSPENSION ORAL at 09:07

## 2023-08-23 RX ADMIN — HYDROMORPHONE HYDROCHLORIDE 0.5 MG: 1 INJECTION, SOLUTION INTRAMUSCULAR; INTRAVENOUS; SUBCUTANEOUS at 17:58

## 2023-08-23 RX ADMIN — Medication 3 ML: at 07:02

## 2023-08-23 RX ADMIN — INSULIN GLARGINE 5 UNITS: 100 INJECTION, SOLUTION SUBCUTANEOUS at 22:00

## 2023-08-23 RX ADMIN — SODIUM BICARBONATE 325 MG: 325 TABLET ORAL at 09:02

## 2023-08-23 RX ADMIN — SODIUM BICARBONATE 325 MG: 325 TABLET ORAL at 13:11

## 2023-08-23 RX ADMIN — PANTOPRAZOLE SODIUM 40 MG: 40 INJECTION, POWDER, FOR SOLUTION INTRAVENOUS at 08:09

## 2023-08-23 RX ADMIN — HYDROMORPHONE HYDROCHLORIDE 2 MG: 2 TABLET ORAL at 10:31

## 2023-08-23 RX ADMIN — HYDROMORPHONE HYDROCHLORIDE 0.5 MG: 1 INJECTION, SOLUTION INTRAMUSCULAR; INTRAVENOUS; SUBCUTANEOUS at 02:49

## 2023-08-23 RX ADMIN — ACETAMINOPHEN 650 MG: 160 SUSPENSION ORAL at 01:28

## 2023-08-23 RX ADMIN — HYDROMORPHONE HYDROCHLORIDE 0.5 MG: 1 INJECTION, SOLUTION INTRAMUSCULAR; INTRAVENOUS; SUBCUTANEOUS at 22:41

## 2023-08-23 RX ADMIN — HYDROMORPHONE HYDROCHLORIDE 0.5 MG: 1 INJECTION, SOLUTION INTRAMUSCULAR; INTRAVENOUS; SUBCUTANEOUS at 07:01

## 2023-08-23 RX ADMIN — HYDROMORPHONE HYDROCHLORIDE 2 MG: 2 TABLET ORAL at 16:00

## 2023-08-23 RX ADMIN — ENOXAPARIN SODIUM 40 MG: 40 INJECTION SUBCUTANEOUS at 09:07

## 2023-08-23 RX ADMIN — ACETAMINOPHEN 650 MG: 160 SUSPENSION ORAL at 17:59

## 2023-08-23 RX ADMIN — INSULIN ASPART 3 UNITS: 100 INJECTION, SOLUTION INTRAVENOUS; SUBCUTANEOUS at 22:03

## 2023-08-23 RX ADMIN — INSULIN ASPART 1 UNITS: 100 INJECTION, SOLUTION INTRAVENOUS; SUBCUTANEOUS at 16:00

## 2023-08-23 ASSESSMENT — ACTIVITIES OF DAILY LIVING (ADL)
ADLS_ACUITY_SCORE: 26
ADLS_ACUITY_SCORE: 30
ADLS_ACUITY_SCORE: 26
ADLS_ACUITY_SCORE: 26
DEPENDENT_IADLS:: INDEPENDENT
ADLS_ACUITY_SCORE: 26

## 2023-08-23 NOTE — PROGRESS NOTES
Nursing Focus: Admission    D: Patient admitted/transferred from ED via transport for pain management, persistent hiccups, diarrhea.     I: Upon arrival to the unit patient was oriented to room, unit, and call light. Patient s height, weight, and vital signs were obtained. Allergies reviewed and allergy band applied. MD notified of patient s arrival on the unit. Adult AVS completed. Head to toe assessment completed. Education assessment completed. Care plan initiated.     A: Vital signs stable upon admission. Patient rates pain at 6/10. Two RN skin assessment completed yes. Second RN was vAtar. Significant Skin Findings include PEG tube, skin intact around site. WO Nurse Consult Ordered No.     P: Continue to monitor patient s pain and intervene as needed. Continue with plan of care. Notify MD with any concerns or changes in patient status.

## 2023-08-23 NOTE — PLAN OF CARE
[id number]  Goal Outcome Evaluation:    Plan of Care Reviewed With: patient. Overall Patient Progress: no change. Outcome Evaluation: Pain not controlled fully w/ IV dilaudid. Hiccups persistent. Poor oral intake. Continue to increase tube feed.    Admit from ED for pain management, persistent hiccups, loose stools. Vitals stable. Pain 7/10 in abdomen radiating to low back. IV dilaudid given x1, pt hesitant to take oral dilaudid. Education provided. Intermittent nausea, none currently. Persistent hiccups, thorazine ordered PRN. Plan to give once verified by pharmacy. Tube feed hung at 2145 in ED, increase 20 ml q4 until at goal rate of 90 ml/hr. Free water flushes 60 ml q4. Clear liquid diet. Last BM 8/22, Cdiff sample pending. Independent. Melatonin given for sleep.

## 2023-08-23 NOTE — PROGRESS NOTES
[facility]  Palliative Care Daily Progress Note       Recommendations & Counseling     GOALS OF CARE:   Restorative without limits     ADVANCE CARE PLANNING:  No health care directive on file. Pt notes his wife would be the one who would make decisions if he were unable.  Code status: Full Code  Plan to connect with oncology prior to further GOC conversations.  Plan to have Gallo continue to follow with palliative clinic to continue GOC conversations as well.  Referral placed for follow up with Dr. Hurt.     MEDICAL MANAGEMENT:   #Worsening Lower Abdominal Pain & Low back pain - acute on chronic, worsening may be multifactorial with crampy pain from diarrhea, progression of pancreatic CA, with likely met in pelvis, and potential developing ileus in redundant sigmoid (as well as transverse colon, which seems less likely to be playing a role in lower abd pain).  Low back pain seems related to abd pain and does not seem muscular or neuropathic in etiology.  - Increased butrans patch from 10mcg/hr to 20mcg/hr on 8/22 (may be 72h+ to see response), since GI would like to minimize full opiate agonists in setting of potentially developing ileus  - Continue dilaudid 2-4mg PO q4h PRN pain - encouraged pt to take PO dilaudid prior to using IV today to see if that manages his pain well enough.  He is in agreement with this plan.  - Continue dilaudid 0.5mg IV q3h PRN pain for breakthrough (if PO dilaudid isn't working well enough)  - IF pt continues to notice end of dose failure of PO opiates after increase in butrans patch, can consider making PO dilaudid available more frequently than q4h in the coming days.  - IF pt continues to have worsening pain while TF are running, can consider decreasing tube feed rate or holding.  - GI/onc considering nerve block for pain control.  While the celiac plexus block seems like the appropriate block for pain from pancreatic CA, his pain  "seems to be much lower in the pelvis, and we are wondering which may work better -- a celiac plexus versus hypogastric nerve block.     #Nausea -- acute on chronic, worsened during and after his last round of chemo  - May be influenced by GERD in setting of GOO, as he also describes a burning feeling in his epigastric region and has hiatal hernia on CT with some fluid pooling in distal esophagus, protonix increased to 40mg to BID 8/23  - Continue drainage through Gtube for GOO  - Continue compazine PRN and zofran PRN.  Recommend using compazine first to avoid constipating side effects of zofran with c/f developing ileus.  Could consider Reglan in future for the potential pro-motility effects with c/f developing ileus, although at this time pt is already having diarrhea, so would not recommend switching to reglan at this time.     #Diarrhea, with c/f developing ileus in transverse colon and redundant sigmoid on CT  - imodium & psyllium held per GI     #Hiccups  - Recommend increasing Gabapentin from 100mg TID to 300mg TID for hiccups (ordered for you)  - Could be influenced by GERD as well, protonix increased to BID 8/22  - Recommend increasing thorazine from 25mg to 50mg PRN (25mg ineffective this AM) (ordered for you)  - If these medications are ineffective, could consider baclofen in the future (holding off ordering this now as we are uptitrating the 2 above meds)     #Sleep (also affected by pain, diarrhea, hiccups, management for those as above)  - Melatonin 1mg at bedtime PRN was helpful for sleep last PM - plan to continue     PSYCHOSOCIAL/SPIRITUAL SUPPORT:  Family    Friends    Would like palliative SW for non-pharm management to \"keep his mind off of things\"  Would also like      Palliative Care will continue to follow. Thank you for the consult and allowing us to aid in the care of Gallo Navarro.     These recommendations have been discussed with Dr. Ray.     Nataliya Britton MD  Securely message with " Nancy (more info)  Text page via [facility] Paging/Directory       Assessments          Gallo Navarro is a 57 year old male admitted on 8/21/2023. He has a past medical history of recent diagnosis of pancreatic adenocarcinoma c/b T2DM, pancreatic insufficiency, gastric outlet obstruction (S/P GJ tube placement 05/2023), biliary obstruction (S/P stent placement 07/2023) and duodenal obstruction (s/p stent placement x2 8/17). He was admitted after presenting to the ED for acute-on-chronic abdominal pain, increased G-tube output, and worsening diarrhea.     CT A/P with increase in tumor burden (with encasement from pancreatic tumor of SMA, SMV, gastroduodenal arteries) since 7/8/23. Has only completed 2 cycles of chemo following dx on 7/12, so not considered progression through chemo per onc.     PTA pain regimen was dilaudid 2-4mg q4h PRN and butrans patch (replaced 8/21) 10mcg      Nausea / hiccups: Per CT A/P in ED, was noted to have questionable small hiatal hernia and fluid pooling in distal esophagus, so suspect that this, in addition to reflux, may be strongest contributors to hiccups. Nausea has also been a provoking factor in hiccups, notably getting near immediate relief w/ Compazine of both nausea and hiccups.      Today, the patient was seen for:  Pain management  Symptom management    Prognosis, Goals, or Advance Care Planning was addressed today with: No.  Mood, coping, and/or meaning in the context of serious illness were addressed today: No.  Summary/Comments:            Interval History:     Chart review/discussion with unit or clinical team members:   Thorazine rx'd overnight, 25mg  100mg gabapentin given x 3  Melatonin used last pm   Imodium and fiber held.  Pt was hesitant about using PO dilaudid o/n.  Used IV dilaudid x 7 ([id number])  Loose stools x 3 o/n    Per patient or family/caregivers today:  Pt noted worsening pain with tube feeds last night, with distention.  Having a BM was able to improve the  pain.  Stools are still diarrhea but much less frequent.  Is tolerating food by mouth, nausea well controlled with PRN meds.  Sleep was better with melatonin.    Key Palliative Symptoms:  # Pain severity the last 12 hours: moderate  # Nausea severity the last 12 hours: low               Medications:     I have reviewed this patient's medication profile and medications during this hospitalization.    Noted meds:    Notable medications:  APAP 650mg q8h scheduled and q8h PRN  Butrans 20mcg/hr wk (increased from 10mcg/hr 8/22)  Hydromorphone 2-4mg PO q4h PRN  Hydromorphone 0.5mg IV q3h PRN     Protonix IV 40 BID  Zofran 4mg IV q8h PRN  Compazine 5-10mg q6h PRN    Gabapentin 100mg TID  Thorazine 25mg q6h PRN    Melatonin 1mg PO at bedtime PRN     Pt had appt to get medical marijuana as outpt yesterday but required ED visit at that time, so has not gotten yet           Physical Exam:   /71 (BP Location: Right arm)   Pulse 79   Temp 97.9  F (36.6  C) (Oral)   Resp 20   Ht 1.829 m (6')   Wt 73.4 kg (161 lb 12.8 oz)   SpO2 99%   BMI 21.94 kg/m    Gen: sitting in bed, alert, appropriate, awake.  Able to get up and get to bathroom without difficulty  HEENT: no scleral icterus  Pulm: no resp distress  Psych: appropriate mood and affect             Data Reviewed:     Reviewed recent pertinent imaging, comments:   CT AP 8/21/23  IMPRESSION: In this patient with history of pancreatic cancer and  ongoing pain, the current CT scan compared to prior from 7/8/2023  shows:     1a. Increased size of the ill marginated pancreatic head mass,  extending into the mesenteric root with encasement of the SMA, SMV,  gastroduodenal artery and broad area of contact with the splenoportal  junction, splenic vein and central portal vein.  1b. Similar to slight increase in size of peripancreatic and  mesenteric lymph nodes, reactive and/or neoplastic.  1c. Increase in size of sclerotic lesion in the lower thoracic  vertebral body with  new sclerotic lesion in the left pubic bone,  concerning for metastasis  1d. Percutaneous gastrojejunostomy tube, gastroduodenal stent, and  gastrojejunostomy metallic lumen opposing stent; no significant  gastric distention  1e. Slight increased pancreatic ductal dilatation,   1f. Biliary ductal stents with pneumobilia, no increased biliary  ductal dilatation  1g. New ascites.  2a. Increased inner wall enhancement with mild thickening involving  the left colon with mild pericolonic streakiness, may represent  colitis secondary to infectious, or inflammatory etiology. No  pneumatosis, pneumoperitoneum, pericolic collection.  2b. Mild air-filled distention of transverse colon and part of the  redundant sigmoid colon may represent developing adynamic ileus.    Reviewed recent labs, comments:   Last Comprehensive Metabolic Panel:  Lab Results   Component Value Date     08/23/2023    POTASSIUM 3.6 08/23/2023    CHLORIDE 104 08/23/2023    CO2 26 08/23/2023    ANIONGAP 8 08/23/2023    GLC 83 08/23/2023    BUN 10.1 08/23/2023    CR 0.52 (L) 08/23/2023    GFRESTIMATED >90 08/23/2023    DENISE 8.4 (L) 08/23/2023       CBC RESULTS:   Recent Labs   Lab Test 08/23/23  0638   WBC 3.9*   RBC 2.87*   HGB 8.7*   HCT 26.7*   MCV 93   MCH 30.3   MCHC 32.6   RDW 13.2

## 2023-08-23 NOTE — CONSULTS
Care Management Initial Consult    General Information  Assessment completed with: Patient,    Type of CM/SW Visit: Initial Assessment  Primary Care Provider verified and updated as needed: Yes   Readmission within the last 30 days: no previous admission in last 30 days      Reason for Consult: discharge planning  Advance Care Planning: Advance Care Planning Reviewed: no concerns identified        Communication Assessment  Patient's communication style: spoken language (English or Bilingual)    Hearing Difficulty or Deaf: no   Wear Glasses or Blind: no    Cognitive  Cognitive/Neuro/Behavioral: WDL                      Living Environment:   People in home: child(merry), dependent, spouse, child(merry), adult     Current living Arrangements: house      Able to return to prior arrangements: yes    Family/Social Support:  Care provided by: self, spouse/significant other  Provides care for: child(merry)  Marital Status:   Wife, Children          Description of Support System: Supportive    Support Assessment: Adequate family and caregiver support, Adequate social supports    Current Resources:   Patient receiving home care services: No  Community Resources: None  Equipment currently used at home: none  Supplies currently used at home: None    Employment/Financial:  Employment Status: retired     Financial Concerns: No concerns identified   Referral to Financial Worker: No    Does the patient's insurance plan have a 3 day qualifying hospital stay waiver?  No    Lifestyle & Psychosocial Needs: (pulled from chart)  Social Determinants of Health     Tobacco Use: Low Risk  (8/21/2023)    Patient History     Smoking Tobacco Use: Never     Smokeless Tobacco Use: Never     Passive Exposure: Never   Alcohol Use: Not on file   Financial Resource Strain: Not on file   Food Insecurity: Not on file   Transportation Needs: Not on file   Physical Activity: Not on file   Stress: Not on file   Social Connections: Not on file   Intimate  Partner Violence: Not on file   Depression: Not at risk (8/17/2023)    PHQ-2     PHQ-2 Score: 0   Housing Stability: Not on file       Functional Status:  Prior to admission patient needed assistance:   Dependent ADLs:: Independent  Dependent IADLs:: Independent       Mental Health Status:  Mental Health Status: No Current Concerns       Chemical Dependency Status:  Chemical Dependency Status: No Current Concerns             Values/Beliefs:  Spiritual, Cultural Beliefs, Taoism Practices, Values that affect care: no  Description of Beliefs that Will Affect Care: stephanie- non Restorationist            Additional Information:  Pt  is a 57 year old male with locally advanced, unresectable pancreatic cancer, s/p gastric outlet obstruction s/p GJ tube (placed 5/2023), biliary obstruction s/p stent placement on 7/7/23, pancreatic insufficiency, and IDDM2, recently completing C2 of FOLFIRINOX, presenting to the ED with worsening abdominal pain, hiccuping, bloating, diarrhea.     RNCC completed CM assessment due to CM consult and need for discharge planning. RNCC met with pt at bedside and introduced RNCC role. Pt states he lives in a home with his wife, son (adult), and daughter (dependent). Pt states there are 2 steps to get into the home and flight of stairs to get to bedroom. Pt states he as been sleeping on his couch so has not needed to utilize stairs. Pt states he was fully independent with all ADLs and IADLs before hospitalization and did not use any assistive devices. Pt states he has a lot of support from his family and has extended family close by who would be willing to help. Pt states LISA is a nurse. Pt states he is retired has has a good amount of savings stored and has no financial concerns. Pt states he is currently getting tube feeds through [facility]. Resumption orders added. Pt states wife will provide discharge transportation. RNCC will continue to follow and assist with discharge needs as they  arise.    Kaushik Pierce, RN BSN  5A RN Care Coordinator   Ph: [phone]  Pager: [phone]

## 2023-08-23 NOTE — PROGRESS NOTES
"SPIRITUAL HEALTH SERVICES  [facility] 5A  REFERRAL SOURCE: Pt request through Palliative Team    SUMMARY OF VISIT   Visited briefly with pt who was in a lot of pain and then needed the bathroom.  Leaving pt room, I met pt's spouse Violet who shared about pt journey, stating \"its been really hard on him, he's been in so much pain.\"     Violet shares worries about their kids - son-20y. dtr 14y. Violet states \"Our kids are real busy, I think they're doing the same thing, as I am, keep moving forward. There are still so many unanswered questions.\"     PLAN: Will follow-up with pt thur/fri     Rev. Julianna Lin MDiv, [facility]  Staff    Pager [phone]  * [facility] remains available 24/7 for emergent requests/referrals, either by having the switchboard page the on-call  or by entering an ASAP/STAT consult in Epic (this will also page the on-call ).*   "

## 2023-08-23 NOTE — PROGRESS NOTES
[facility]    Medicine Progress Note - Hospitalist Service, GOLD TEAM 1    Date of Admission:  8/21/2023    Assessment & Plan      Gallo Navarro is a 57 year old male admitted on 8/21/2023. He has a past medical history of recent diagnosis of pancreatic adenocarcinoma c/b T2DM, pancreatic insufficiency, gastric outlet obstruction (S/P GJ tube placement 05/2023), biliary obstruction (S/P stent placement 07/2023). He was admitted after presenting to the ED for acute-on-chronic abdominal pain, increased G-tube output. He was admitted for further monitoring and management.    Interval Changes:  -Consult regional pain service for consideration of celiac plexus neurolysis, will eval in AM per their service  -Increase gabapentin to 300 mg TID  -Increase thorazine to 50 mg QID prn  -Intolerance of advancement of tube feeds past 30 ml/hr. If intolerant of tube feeding would decrease rate to 20 ml/hr to give some caloric nutrition'  -Attempt to preferrentially use oral dilaudid rather than IV to help facilitate discharge planning    Acute-on-Chronic Abdominal Pain  Possible adynamic ileus  Recent diarrhea  Hx of Gastric Outlet Obstruction (S/P GJ tube placement 05/2023, duodenal stent placement x2 08/17/2023)  Hx of Biliary Obstruction (S/P stent placement 07/2023)  Hx of Pancreatitis   Pt w/ hx of recent diagnosis of pancreatic cancer as below c/b gastric outlet obstruction, biliary obstruction. Had recent duodenal stent placement x2 on 08/17/23 w/ Dr. Greenberg from GI for gastric outlet obstruction.   -However, past few days has had increased output from G-tube, and abdominal pain not amenable to PTA med regimen (Dilaudid 2-4mg Q4H PRN, Butrans patch weekly).  - OP Oncology recommended to go to ED for further evaluation, out of c/f SBO, though still passing gas and stool (see diarrhea as below).   -CT A/P (8/21) showed encasement from pancreatic tumor of SMA, SMV, gastroduodenal  arteries, and interval increase of tumor compared to CT 7/8/23 along with possible early adynamic ileus   -Enteric panel, c. Diff - negative  Plan:  - GI consulted   - PO Dilaudid 2-4 mg Q4H PRN for severe pain, IV dilaudid q3h prn   - PTA Butrans weekly patch replaced 8/21 - increased to 20 mcg/hr   - Tylenol 650mg scheduled   - Palliative consulted   -Consult region pain service for consideration of celiac plexus block  - Continue venting w/ G-tube, all tube feeds through J-tube  - RD consulted to mgmt TFs usually cycles TFs overnight    Pancreatic Adenocarcinoma  -Pt w/ insidious onset of abd pain in March of this year, sx initially thought to be d/t acute pancreatitis, but had persistent abd pain, n/v, failing multiple PO trials.   -Had GJ tube placed for feeds and venting, but began to have rapid weight loss despite TFs.  - Imaging during admission 07/2023 showed mass of pancreatic head, and biopsy confirmed adenocarcinoma 07/12/23.   -Has started on palliative FOLFIRINOX (has completed x2 cycles)Next scheduled chemo is on 8/29/23   -Following w/ Oncology and Palliative as an OP.  (does every other Tuesday).  Plan:  >Palliative care and Oncology consulted     Type 2 Diabetes Mellitus, insulin-dependent   Occurred following dx of pancreatic cancer and while on TFs. Has followed w/ Endo as an OP, last saw on 8/17/23. PTA regimen included Lantus 5 units qPM, NPH 5 units qAM + 25 units qPM w/ TFs, and high sliding scale insulin.   Plan:  >Resume lantus at bedtime (5 units)  >Resume NPH at 12 units given decreased feeding tolerance   -Holding qAM NPH  - Sliding scale TID AC    Pancreatic Insufficiency  G tube dependency due to gastric outlet obstruciton  Noted following dx on pancreatic cancer.   - RD consulted, patient reports intolerance with peptamen  - Continue PTA pancreatic enzymes w/ meals (or TFs)    Diarrhea  Developed after having numerous endoscopies for above workup of pancreatic cancer, and  interventions for associated mass. Approx 10-12 stools per day  Plan:  --hold metamucil  - enteric panel per GI recommendations    Hiccups  Nausea  This began shortly after his first round of chemo, was initially intermittent, but then following second round became more persistent.   Plan:  - Continue PTA Protonix daily  - zofran and comapzine for nausea  - G-tube to venting as above  -Gabapentin 300 mg TID - increased 8/23  -Thorazine 25 mg QID prn    Chronic Normocytic Anemia  Baseline hgb 9s-10s, on admission was 9.8.    Suspect anemia is multifactorial and r/t underlying cancer in addition to chemotherapy  and malnutrition       Diet: Adult Formula Drip Feeding: Continuous Vital 1.5; Jejunostomy; Goal Rate: For first night of TF, begin at 30 ml/hr and advance by 20 ml/hr every 4 hours goal rate of 90 ml/hr as tolerated. Subsequent days, TF to begin at 90 ml/hr and run x 17 hour...  Clear Liquid Diet    DVT Prophylaxis: Enoxaparin (Lovenox) SQ  Adan Catheter: Not present  Lines: PRESENT      Port a Cath 07/31/23 Right Chest wall-Site Assessment: WDL      Cardiac Monitoring: None  Code Status: Full Code      Clinically Significant Risk Factors              # Hypoalbuminemia: Lowest albumin = 2.6 g/dL at 8/22/2023  7:11 AM, will monitor as appropriate           # DMII: A1C = 7.9 % (Ref range: <5.7 %) within past 6 months    # Moderate Malnutrition: based on nutrition assessment, PRESENT ON ADMISSION          Disposition Plan     Expected Discharge Date: 08/23/2023                  Kenton Lopez MD  Hospitalist Service, GOLD TEAM 1  [facility]  Securely message with [employer] (more info)  Text page via [facility] Paging/Directory   See signed in provider for up to date coverage information  ______________________________________________________________________    Interval History   Overnight, patient with increased pain and distension with increasing tube feeding rate. He  vented his g tube and then was able to eat. He had a soft stool. He denies any fever or chills. No chest pain or shortness of breath. Reports ongoing abdominal pain but relief with current medications. He also notes hiccups as well. Venting has not made hiccups go away.     Physical Exam   Vital Signs: Temp: 97.9  F (36.6  C) Temp src: Oral BP: 118/71 Pulse: 79   Resp: 20 SpO2: 99 % O2 Device: None (Room air)    Weight: 161 lbs 12.8 oz    Constitutional: somnolent but awakens to voice, lying in bed  Eyes: no icterus  Respiratory: CTAB, no wheezing  Cardiovascular: RRR  GI: distended, mild tenderness in epigastric, g tube in place, no peritoneal signs  Musculoskeletal: no lower extremity edema  Neurologic: holding eye contact, following commands, moving all extremities     Medical Decision Making             Data     I have personally reviewed the following data over the past 24 hrs:    5.9  \   9.0 (L)   / 214     137 105 14.3 /  104 (H)   4.0 23 0.49 (L) \     ALT: 22 AST: 19 AP: 71 TBILI: 0.4   ALB: 2.6 (L) TOT PROTEIN: 4.8 (L) LIPASE: N/A

## 2023-08-23 NOTE — PROVIDER NOTIFICATION
Provider Calvin notified via [employer] 0000.     Pt complaining of persistent hiccups. Can you order PRN chlorpromazine per note? Or baclofen? Also heparin orders to lock port.     Plan: Provider responded, plan to place orders.

## 2023-08-23 NOTE — PLAN OF CARE
Goal Outcome Evaluation:      Plan of Care Reviewed With: patient     Admit from ED with worsening abdominal pain, hiccups and diarrhea.     A/Ox4. VSS, on room air. Clear liquid diet. Denies nausea on this shift. Pain managed with IV dilaudid. Passing gas and had 1 episode of diarrhea. Negative for C-diff. Voids spont. Tube feedings stopped at 3AM for increase in abdominal pressure and distension. G-tube to gravity. MD paged and waiting for response. Chest port SL. Up ad nora. Cont POC.

## 2023-08-23 NOTE — PLAN OF CARE
Goal Outcome Evaluation:      HD1 -pt with locally advanced, unresectable pancreatic cancer, s/p gastric outlet obstruction s/p GJ tube (placed 5/2023), biliary obstruction s/p stent placement on 7/7/23, pancreatic insufficiency, and IDDM2, recently completing C2 of FOLFIRINOX, presenting to the ED with worsening abdominal pain, hiccuping, bloating, diarrhea(per MD note)   VSS on RA.  Intact neuros.  LS clear.   +BS, multiple loose BM.  Voiding spontaneously.  Tolerated clear liquids at breakfast. BG wnl, no insulin given.  Pt has GJ tube,(G-tube to gravity in theprevious d/t abd pain and inability to tolerate TF). TFstarted at 30ml/hr x4hrs then to 50ml/hr x4hrs with increment of 20ml/ivu2ofj  to goal of 90ml/hr). Pt is tolerating it so far.  UAL in room.  C/o's pain in abd that radiates to back improved with prn PO dilaudid. T-pump ordered for back pain  Rt chest port is heplocked.  Continue with POC. Palliative consulted today

## 2023-08-23 NOTE — PROGRESS NOTES
PALLIATIVE CARE SOCIAL WORK Progress Note   Location: [facility]      PCSW was asked to introduce self and role. Gallo had asked for support around nonpharm ideas for pain and distraction.     Gallo was sleeping at the time of attempted visit.     Plan: PCSW will attempt again later this week.       BETHANIE Avila  [facility], Palliative Care  Securely message with the [employer] Web Console (learn more here) or  Text page via [facility] Paging/Directory

## 2023-08-23 NOTE — PROVIDER NOTIFICATION
Provider Abe Rebollar notified via [employer] [id number].     Can order for cdiff sample be placed? According to note should already be in place but currently only enteric panel.     Omar LOZOYA     Plan: Lab ordered

## 2023-08-23 NOTE — PROVIDER NOTIFICATION
Paged Joel Simpson at [phone]. Tube feeds infusing at starting rate of 30 mL/hr. Pt reporting increased abdominal pain and distention. Writer paused tube feeds and opened g-tube to gravity drainage.     No new orders from MD.

## 2023-08-23 NOTE — PROGRESS NOTES
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     #Colitis with colonic ileus (transverse/sigmoid)  #Diarrhea of unclear etiology  Presents with some increased lower abd pain and cramping 3 days PTA with diarrhea that has been experiencing since started chemo. CT A/P left colon with mild pericolonic streakiness and increased inner wall enhancement/mild thickening involving possibly c/w colitis secondary to infectious vs inflammatory etiology; additionally with distention of transverse colon (~7 cm per measure) and sigmoid colon may represent developing adynamic ileus.  Onset of worsening diarrhea since initiation of chemo and per discussion with oncology team, ADR of chemo can be colonic inflammation and diarrhea.  Low suspicion r/t infectious etiology as enteric panel and C.diff negative. Low suspicion for bowel ischemia (noted SMA/SMV/GDA encasement on CT) lactic acid normal and per adm team discussions with radiology no concerns for compromised GI vasculature.  Suspect most likely r/t opioid induced ileus vs inflammation r/t chemo treatments.    Per review of serial AXRs, ongoing transverse colon dilation but appears stable to improving and pt reporting improved abd distention after having x3 large/loose BMs and now tolerating clamping of Gtube with TF restarted and no N/V.    #GERD  #Hiccups  #Suspect bile reflux post  duodenal and GJ stents  Worsening reflux and hiccups and since s/p duodenal stent placement and with GJ stent and suspect potentially r/t circular bile reflux.  PPI increased to BID on 8/22. Could additionally trial cholestyramine to sequester bile acids and see if improvement in sx.    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 - s/p EUS-guided GJ (7/13/23), duodenal stent (8/17/23)  #Obstructive jaundice, s/p repeat biliary stenting 7/11 and 8/17/23  Pancreatitis History:  -Etiology: due to malignancy  -Date of onset: 3/2023  -Concurrent organ failure: none  -Thromboses: none  -Diabetes: suspect Type 3c (dx after onset of pancreatitis), A1C 7.9% (7/8), on insulin  -EPI: Severe (fecal elastase <0.5 on 7/9/23), on Relizorb (with TF) and Creon 24  -Current nutrition access: PEG-J (placed 5/2023)  -Last imaging:                -8/21: CT A/P - stable position of all stents (duodenal, axios, biliary), increased size of pancreatic head mass with encasement of SMA/SMV/GDA, no gastric distention, c/f bony mets (pubic bone lesion, vertebral body), new ascites, left colitis and dilated transverse colon and sigmoid (developing adynamic ileus)  -Infection/anti-infectives: None  -Interventions:                -7/11/2023: EUS and ERCP (Dr. Pickett) - Severe D2 stricture, ~4 cm mass was identified in the uncinate process of the pancreas with Bx positive for adenocarcinoma with lymphadenopathy.  Biliary stent occluded (likely d/t compression from mass effect of duod stricture), placed covered metal biliary sent and additional plastic DP stent               -7/13/2023: EUS-guided GJ Axios stent (Dr. York)               -8/17/2023: EGD and ERCP (Dr. Greenberg) confirmed Axios GJ stent in good position/patent/expanded, duodenal stenosis (papilla to D3) with placement of x2 overlapping [place] duodenal stents (from bulb to D3), removed  plastic biliary stent and swept Viabil covered metal stent, PEG-J replacement (via duodenal stents)     Recommendations:  -No indication for repeat ERCP at this time as all stents in appropriate position, normal LFTs and suspect abd pain related to colonic ileus/colitis.  -No indication for decompressive colonoscopy at this time.  -Consider trial of cholestyramine PO 4 gm packets BID and titrate up to QID for suspected bile reflux.  Recommend pharmacy consult/input for dosing to avoid drug-drug/drug-nutrient interactions.  -Hold imodium and fiber until colonic distention improves and ensure ongoing antegrade bowel function.  -Follow abd exam and repeat AXR if worsening distention.  -Continue conservative management of ileus:  - CL, ADAT to FL and if tolerates then can consider mechanical soft diet.  - Advance TF to goal as tolerates.  - Clamp Gtube, return to gravity PRN if worsening abd pain, N/V.  - IV fluids until able to advance TF/PO  - Correct electrolyte abnormalities (maintain K>4, Mg>2)  - Minimize anticholinergic and opioid medications.  - Antiemetics per primary team.  -Continue PPI BID.  -Agree with Palliative consult for assistance with pain management. Agree with not pursuing celiac plexus/celiac neurolysis given abd pain < umbilicus and would likely not benefit.  -Antiemetics per primary team.     Discussed with primary medicine team, oncology and palliative teams.     Gastroenterology follow up recommendations:   Follow up with Dr. Greenberg in 1-2 weeks to check progress on po and determine need to consider possible removal of AXIOS to avoid circular reflux vs remove GJ and replace with a Gastrostomy plug if tolerating solids but having reflux.     Overall time spent on the date of this encounter preparing to see the patient (including chart review of available notes, clinical status events, imaging and labs); obtaining interim history/subjective data; ordering and/or coordinating medications, tests  or procedures; ordering medications, tests or procedures; communicating with other health care professionals; and documenting the above clinical information in the electronic medical record was 50 minutes.     The inpatient gastroenterology service will sign off at this time. Thank you for allowing us to participate in the care of this patient. Please do not hesitate to page the GI service with any questions or concerns.      Iris Kaur PA-C  GI Service  [facility]  Text Page  _______________________________________________________________      Subjective: Nursing notes and 24hr events reviewed. Patient seen and examined at 11:00. Patient reports had some increased abd distention overnight after TF started so held Tfs, put Gtube to gravity.  Had x3 large BMs/passing flatus with further improvement in abd distention/cramping and re-clamped Gtube and restarted TF without worsening sx distention/N/V thus far and overall feels better.  Continues to report chronic abd pain in lower quadrants (below umbilicus).    ROS:   4 pt ROS negative unless noted in subjective.     Objective:  Blood pressure 120/79, pulse 88, temperature 96.8  F (36  C), temperature source Temporal, resp. rate 18, height 1.829 m (6'), weight 73.4 kg (161 lb 12.8 oz), SpO2 99 %.  Gen: Sitting up in bed. Fatigued appearing but appears comfortable and in NAD.  HEENT: NCAT. Conjunctiva clear. Sclera anicteric   CV: RRR , Peripheral perfusion intact  Resp: non-labored work of breathing on RA  Abd: Soft, non-distended, non-tender, no guarding or rebound, +BS.  Msk: no gross deformity  Skin:  no jaundice  Ext: warm, well perfused  Neuro: grossly normal  Mental status/Psych: A&O. Asks/answers questions appropriately     Date 08/23/23 0700 - 08/24/23 0659   Shift 2832-8852 9363-5238 4947-7506 24 Hour Total   INTAKE   P.O. 840   840   Enteral 220   220   Shift Total(mL/kg) 1060(14.44)   1060(14.44)   OUTPUT   Emesis/NG  output 220   220   Shift Total(mL/kg) 220(3)   220(3)   Weight (kg) 73.39 73.39 73.39 73.39     PROCEDURES:  None this adm.    LABS:  BMP  Recent Labs   Lab 08/23/23  1533 08/23/23  1216 08/23/23  0826 08/23/23  0638 08/22/23  1012 08/22/23  0711 08/21/23 2012 08/21/23  1351   NA  --   --   --  138  --  137  --  138   POTASSIUM  --   --   --  3.6  --  4.0  --  4.1   CHLORIDE  --   --   --  104  --  105  --  105   DENISE  --   --   --  8.4*  --  8.1*  --  8.3*   CO2  --   --   --  26  --  23  --  24   BUN  --   --   --  10.1  --  14.3  --  16.8   CR  --   --   --  0.52*  --  0.49*  --  0.47*   * 139* 83 94   < > 108*   < > 143*    < > = values in this interval not displayed.     CBC  Recent Labs   Lab 08/23/23  0638 08/22/23  0711 08/21/23  1351   WBC 3.9* 5.9 7.4   RBC 2.87* 2.97* 3.22*   HGB 8.7* 9.0* 9.8*   HCT 26.7* 27.3* 29.5*   MCV 93 92 92   MCH 30.3 30.3 30.4   MCHC 32.6 33.0 33.2   RDW 13.2 13.1 13.2    214 223     INRNo lab results found in last 7 days.  LFTs  Recent Labs   Lab 08/22/23  0711 08/21/23  1351   ALKPHOS 71 82   AST 19 23   ALT 22 28   BILITOTAL 0.4 0.4   PROTTOTAL 4.8* 5.5*   ALBUMIN 2.6* 3.3*      PANC  Recent Labs   Lab 08/21/23  1351   LIPASE 9*     IMAGING:  (Personally reviewed)  Results for orders placed or performed during the hospital encounter of 08/21/23   CT Abdomen Pelvis w Contrast    Impression    IMPRESSION: In this patient with history of pancreatic cancer and  ongoing pain, the current CT scan compared to prior from 7/8/2023  shows:    1a. Increased size of the ill marginated pancreatic head mass,  extending into the mesenteric root with encasement of the SMA, SMV,  gastroduodenal artery and broad area of contact with the splenoportal  junction, splenic vein and central portal vein.  1b. Similar to slight increase in size of peripancreatic and  mesenteric lymph nodes, reactive and/or neoplastic.  1c. Increase in size of sclerotic lesion in the lower  thoracic  vertebral body with new sclerotic lesion in the left pubic bone,  concerning for metastasis  1d. Percutaneous gastrojejunostomy tube, gastroduodenal stent, and  gastrojejunostomy metallic lumen opposing stent; no significant  gastric distention  1e. Slight increased pancreatic ductal dilatation,   1f. Biliary ductal stents with pneumobilia, no increased biliary  ductal dilatation  1g. New ascites.  2a. Increased inner wall enhancement with mild thickening involving  the left colon with mild pericolonic streakiness, may represent  colitis secondary to infectious, or inflammatory etiology. No  pneumatosis, pneumoperitoneum, pericolic collection.  2b. Mild air-filled distention of transverse colon and part of the  redundant sigmoid colon may represent developing adynamic ileus.    I have personally reviewed the examination and initial interpretation  and I agree with the findings.    ZURDO MANJARREZ MD         SYSTEM ID:  [id number]   XR Abdomen Port 1 View    Impression    IMPRESSION:   1. Nonobstructive bowel gas pattern.  2. Stable position of the various stents.    I have personally reviewed the examination and initial interpretation  and I agree with the findings.    JATIN CAMARILLO MD         SYSTEM ID:  [id number]   XR Abdomen Port 1 View    Impression    IMPRESSION:   Nonobstructive bowel gas pattern.    I have personally reviewed the examination and initial interpretation  and I agree with the findings.    SHAHANA GUIDRY MD         SYSTEM ID:  [id number]

## 2023-08-24 LAB
GLUCOSE BLDC GLUCOMTR-MCNC: 134 MG/DL (ref 70–99)
GLUCOSE BLDC GLUCOMTR-MCNC: 138 MG/DL (ref 70–99)
GLUCOSE BLDC GLUCOMTR-MCNC: 170 MG/DL (ref 70–99)
GLUCOSE BLDC GLUCOMTR-MCNC: 185 MG/DL (ref 70–99)
GLUCOSE BLDC GLUCOMTR-MCNC: 200 MG/DL (ref 70–99)
GLUCOSE BLDC GLUCOMTR-MCNC: 209 MG/DL (ref 70–99)

## 2023-08-24 PROCEDURE — C9113 INJ PANTOPRAZOLE SODIUM, VIA: HCPCS | Mod: JZ | Performed by: STUDENT IN AN ORGANIZED HEALTH CARE EDUCATION/TRAINING PROGRAM

## 2023-08-24 PROCEDURE — 250N000011 HC RX IP 250 OP 636: Mod: JZ | Performed by: STUDENT IN AN ORGANIZED HEALTH CARE EDUCATION/TRAINING PROGRAM

## 2023-08-24 PROCEDURE — 250N000013 HC RX MED GY IP 250 OP 250 PS 637: Performed by: STUDENT IN AN ORGANIZED HEALTH CARE EDUCATION/TRAINING PROGRAM

## 2023-08-24 PROCEDURE — 250N000013 HC RX MED GY IP 250 OP 250 PS 637

## 2023-08-24 PROCEDURE — 250N000011 HC RX IP 250 OP 636: Performed by: STUDENT IN AN ORGANIZED HEALTH CARE EDUCATION/TRAINING PROGRAM

## 2023-08-24 PROCEDURE — 99233 SBSQ HOSP IP/OBS HIGH 50: CPT | Performed by: STUDENT IN AN ORGANIZED HEALTH CARE EDUCATION/TRAINING PROGRAM

## 2023-08-24 PROCEDURE — 99221 1ST HOSP IP/OBS SF/LOW 40: CPT | Performed by: NURSE PRACTITIONER

## 2023-08-24 PROCEDURE — 250N000012 HC RX MED GY IP 250 OP 636 PS 637: Performed by: STUDENT IN AN ORGANIZED HEALTH CARE EDUCATION/TRAINING PROGRAM

## 2023-08-24 PROCEDURE — 250N000013 HC RX MED GY IP 250 OP 250 PS 637: Performed by: EMERGENCY MEDICINE

## 2023-08-24 PROCEDURE — 120N000002 HC R&B MED SURG/OB UMMC

## 2023-08-24 PROCEDURE — 250N000011 HC RX IP 250 OP 636: Mod: JZ | Performed by: INTERNAL MEDICINE

## 2023-08-24 RX ORDER — DICYCLOMINE HYDROCHLORIDE 10 MG/5ML
20 SOLUTION ORAL
Status: DISCONTINUED | OUTPATIENT
Start: 2023-08-24 | End: 2023-08-25 | Stop reason: HOSPADM

## 2023-08-24 RX ORDER — LANOLIN ALCOHOL/MO/W.PET/CERES
3 CREAM (GRAM) TOPICAL
Status: DISCONTINUED | OUTPATIENT
Start: 2023-08-24 | End: 2023-08-25 | Stop reason: HOSPADM

## 2023-08-24 RX ORDER — ACETAMINOPHEN 325 MG/10.15ML
975 LIQUID ORAL 3 TIMES DAILY
Status: DISCONTINUED | OUTPATIENT
Start: 2023-08-24 | End: 2023-08-25 | Stop reason: HOSPADM

## 2023-08-24 RX ORDER — HYDROMORPHONE HYDROCHLORIDE 2 MG/1
4-6 TABLET ORAL
Status: DISCONTINUED | OUTPATIENT
Start: 2023-08-24 | End: 2023-08-25 | Stop reason: HOSPADM

## 2023-08-24 RX ADMIN — HYDROMORPHONE HYDROCHLORIDE 0.5 MG: 1 INJECTION, SOLUTION INTRAMUSCULAR; INTRAVENOUS; SUBCUTANEOUS at 20:06

## 2023-08-24 RX ADMIN — PANCRELIPASE 2 CAPSULE: 24000; 76000; 120000 CAPSULE, DELAYED RELEASE PELLETS ORAL at 09:18

## 2023-08-24 RX ADMIN — Medication 3 ML: at 19:59

## 2023-08-24 RX ADMIN — SODIUM BICARBONATE 325 MG: 325 TABLET ORAL at 09:18

## 2023-08-24 RX ADMIN — Medication 3 ML: at 05:33

## 2023-08-24 RX ADMIN — PANTOPRAZOLE SODIUM 40 MG: 40 INJECTION, POWDER, FOR SOLUTION INTRAVENOUS at 07:51

## 2023-08-24 RX ADMIN — PANCRELIPASE 2 CAPSULE: 24000; 76000; 120000 CAPSULE, DELAYED RELEASE PELLETS ORAL at 13:03

## 2023-08-24 RX ADMIN — PANCRELIPASE 2 CAPSULE: 24000; 76000; 120000 CAPSULE, DELAYED RELEASE PELLETS ORAL at 05:24

## 2023-08-24 RX ADMIN — HYDROMORPHONE HYDROCHLORIDE 0.5 MG: 1 INJECTION, SOLUTION INTRAMUSCULAR; INTRAVENOUS; SUBCUTANEOUS at 01:21

## 2023-08-24 RX ADMIN — INSULIN ASPART 1 UNITS: 100 INJECTION, SOLUTION INTRAVENOUS; SUBCUTANEOUS at 11:55

## 2023-08-24 RX ADMIN — SODIUM BICARBONATE 325 MG: 325 TABLET ORAL at 01:06

## 2023-08-24 RX ADMIN — PANCRELIPASE 3 CAPSULE: 24000; 76000; 120000 CAPSULE, DELAYED RELEASE PELLETS ORAL at 15:53

## 2023-08-24 RX ADMIN — Medication 3 MG: at 22:30

## 2023-08-24 RX ADMIN — HYDROMORPHONE HYDROCHLORIDE 0.5 MG: 1 INJECTION, SOLUTION INTRAMUSCULAR; INTRAVENOUS; SUBCUTANEOUS at 05:37

## 2023-08-24 RX ADMIN — PANCRELIPASE 2 CAPSULE: 24000; 76000; 120000 CAPSULE, DELAYED RELEASE PELLETS ORAL at 01:06

## 2023-08-24 RX ADMIN — INSULIN GLARGINE 5 UNITS: 100 INJECTION, SOLUTION SUBCUTANEOUS at 22:46

## 2023-08-24 RX ADMIN — GABAPENTIN 300 MG: 300 CAPSULE ORAL at 07:51

## 2023-08-24 RX ADMIN — ACETAMINOPHEN 975 MG: 325 SOLUTION ORAL at 19:58

## 2023-08-24 RX ADMIN — ENOXAPARIN SODIUM 40 MG: 40 INJECTION SUBCUTANEOUS at 09:20

## 2023-08-24 RX ADMIN — THERA TABS 1 TABLET: TAB at 07:51

## 2023-08-24 RX ADMIN — DICYCLOMINE HYDROCHLORIDE 20 MG: 10 SOLUTION ORAL at 22:30

## 2023-08-24 RX ADMIN — INSULIN ASPART 2 UNITS: 100 INJECTION, SOLUTION INTRAVENOUS; SUBCUTANEOUS at 22:41

## 2023-08-24 RX ADMIN — Medication 1 MG: at 03:02

## 2023-08-24 RX ADMIN — SODIUM BICARBONATE 325 MG: 325 TABLET ORAL at 13:03

## 2023-08-24 RX ADMIN — ACETAMINOPHEN 650 MG: 160 SUSPENSION ORAL at 01:06

## 2023-08-24 RX ADMIN — Medication 3 ML: at 13:55

## 2023-08-24 RX ADMIN — INSULIN ASPART 2 UNITS: 100 INJECTION, SOLUTION INTRAVENOUS; SUBCUTANEOUS at 15:46

## 2023-08-24 RX ADMIN — PANCRELIPASE 2 CAPSULE: 24000; 76000; 120000 CAPSULE, DELAYED RELEASE PELLETS ORAL at 19:59

## 2023-08-24 RX ADMIN — DICYCLOMINE HYDROCHLORIDE 20 MG: 10 SOLUTION ORAL at 13:08

## 2023-08-24 RX ADMIN — HYDROMORPHONE HYDROCHLORIDE 4 MG: 2 TABLET ORAL at 07:51

## 2023-08-24 RX ADMIN — SODIUM BICARBONATE 325 MG: 325 TABLET ORAL at 20:00

## 2023-08-24 RX ADMIN — ACETAMINOPHEN 975 MG: 325 SOLUTION ORAL at 13:48

## 2023-08-24 RX ADMIN — SODIUM BICARBONATE 325 MG: 325 TABLET ORAL at 05:24

## 2023-08-24 RX ADMIN — HYDROMORPHONE HYDROCHLORIDE 4 MG: 2 TABLET ORAL at 03:02

## 2023-08-24 RX ADMIN — PANTOPRAZOLE SODIUM 40 MG: 40 INJECTION, POWDER, FOR SOLUTION INTRAVENOUS at 19:59

## 2023-08-24 RX ADMIN — HYDROMORPHONE HYDROCHLORIDE 4 MG: 2 TABLET ORAL at 22:54

## 2023-08-24 RX ADMIN — HYDROMORPHONE HYDROCHLORIDE 0.5 MG: 1 INJECTION, SOLUTION INTRAMUSCULAR; INTRAVENOUS; SUBCUTANEOUS at 13:49

## 2023-08-24 RX ADMIN — HYDROMORPHONE HYDROCHLORIDE 4 MG: 2 TABLET ORAL at 18:29

## 2023-08-24 RX ADMIN — Medication 3 ML: at 01:20

## 2023-08-24 RX ADMIN — ACETAMINOPHEN 650 MG: 160 SUSPENSION ORAL at 09:20

## 2023-08-24 RX ADMIN — GABAPENTIN 300 MG: 300 CAPSULE ORAL at 19:59

## 2023-08-24 RX ADMIN — GABAPENTIN 300 MG: 300 CAPSULE ORAL at 13:49

## 2023-08-24 RX ADMIN — DICYCLOMINE HYDROCHLORIDE 20 MG: 10 SOLUTION ORAL at 15:46

## 2023-08-24 RX ADMIN — HYDROMORPHONE HYDROCHLORIDE 4 MG: 2 TABLET ORAL at 11:48

## 2023-08-24 ASSESSMENT — ACTIVITIES OF DAILY LIVING (ADL)
ADLS_ACUITY_SCORE: 26

## 2023-08-24 NOTE — PLAN OF CARE
Goal Outcome Evaluation:      Plan of Care Reviewed With: patient    Admit from ED with worsening abdominal pain, hiccups and diarrhea.      A/Ox4. VSS, on room air. Clear liquid diet. Denies nausea on this shift. Pain managed with Oral and IV Dilaudid. Passing gas and continues to have loose stools. Voids spont. Tube feeding running at 70 ml/hr and to be increased 20 Ml/hr Q4 until at goal rate of 90 ml/hr. Chest port HL. Up ad nora. Cont POC.

## 2023-08-24 NOTE — PROGRESS NOTES
[facility]  Palliative Care Daily Progress Note       Recommendations & Counseling     GOALS OF CARE:   Restorative without limits - s/p cycle 2 of FOLFIRINOX with next scheduled doses 8/29/2023 (total of 6 cycles and reassessing cancer burden after that)     ADVANCE CARE PLANNING:  No health care directive on file. Pt notes his wife would be the one who would make decisions if he were unable.  Code status: Full Code  If needed, will connect with oncology prior to further GOC conversations.  Plan to have Gallo continue to follow with palliative clinic to continue GOC conversations as well.  Referral placed for follow up with Dr. Hurt.     MEDICAL MANAGEMENT:   #Lower Abdominal Pain & Low back pain - acute on chronic, worsening may be multifactorial with crampy pain from diarrhea, progression of pancreatic CA, with likely met in pelvis, and potential developing ileus in redundant sigmoid (as well as transverse colon, which seems less likely to be playing a role in lower abd pain). Low back pain seems related to abd pain and does not seem muscular or neuropathic in etiology.   Gallo states that he usually takes 4 mg q4h of Dilaudid at home but 4 mg was not quite enough last night and he had to use IV Dilaudid.  - Butrans 20mcg/hr started on 8/22 (may need 72h+ to see response) - hope this will help minimize use of full opiate agonists   - INCREASE Dilaudid po from 2-4mg q4h to 4-6 q3h PRN pain (done for you) - encouraged pt to take PO dilaudid prior to using IV today to see if that manages his pain well enough.  He is in agreement with this plan.  - Dilaudid 0.5mg IV q3h PRN pain for breakthrough (if PO dilaudid isn't working well enough)  - Changed APAP from 650 mg to 975 mg solution q8h scheduled (done for you)  - GI declined to do celiac plexus block due to his pain being located in his lower abdomen (block would not help this location)  - Would recommend  "consideration for a hypogastric nerve block in the future    #Nausea  #GERD -- acute on chronic, worsened during and after his last round of chemo  May be influenced by GERD in setting of GOO, as he also describes a burning feeling in his epigastric region and has hiatal hernia on CT with some fluid pooling in distal esophagus  - Protonix 40 mg IV BID 8/23 - if okay, can consider changing to G-tube route  - Drainage through Gtube for GOO  - Compazine PRN and zofran PRN.  Recommend using compazine first to avoid constipating side effects of zofran with c/f developing ileus.     #Diarrhea, with c/f developing ileus in transverse colon and redundant sigmoid on CT however likely more due to chemotherapy side effect  - Multiple BMs still  - Agree with Banatrol   - Unheld metamucil daily (done for you)  - Unheld Imodium prn (done for you)  - START Bentyl 20 mg solution 4 times daily with meals and bedtime (done for you) - hope this will help slow his bowel movements and maybe help with some cramping pain      #Hiccups - Resolved  - Gabapentin 300mg TID for hiccups  - Could be influenced by GERD, see above   - Thorazine 50mg PRN (25 mg was ineffective)  - Would maximally titrate up gabapentin first if hiccups return. Then can consider baclofen in the future     #Sleep (also affected by pain, diarrhea, hiccups, management for those as above)  - Melatonin 1mg at bedtime PRN      PSYCHOSOCIAL/SPIRITUAL SUPPORT:  Family    Friends    Would like palliative SW for non-pharm management to \"keep his mind off of things\"  Would also like      Palliative will follow.    Total time spent was 50 minutes regarding symptom control and support on the date of the encounter. These recommendations were given to the primary team via this note.    Travon Ray DO / Internal and Palliative Medicine   Securely message with the Vocera Web Console (learn more here)   Text page via [employer] Paging/Directory         Assessments          Gallo Navarro " is a 57 year old male admitted on 8/21/2023. He has a past medical history of recent diagnosis of pancreatic adenocarcinoma c/b T2DM, pancreatic insufficiency, gastric outlet obstruction (S/P GJ tube placement 05/2023), biliary obstruction (S/P stent placement 07/2023) and duodenal obstruction (s/p stent placement x2 8/17). He was admitted after presenting to the ED for acute-on-chronic abdominal pain, increased G-tube output, and worsening diarrhea.     CT A/P with increase in tumor burden (with encasement from pancreatic tumor of SMA, SMV, gastroduodenal arteries) since 7/8/23. Has only completed 2 cycles of chemo following dx on 7/12, so not considered progression through chemo per onc.     PTA pain regimen was dilaudid 2-4mg q4h PRN and butrans patch (replaced 8/21) 10mcg      Nausea / hiccups: Per CT A/P in ED, was noted to have questionable small hiatal hernia and fluid pooling in distal esophagus, so suspect that this, in addition to reflux, may be strongest contributors to hiccups. Nausea has also been a provoking factor in hiccups, notably getting near immediate relief w/ Compazine of both nausea and hiccups.      Today, the patient was seen for:  Pain related to neoplasm  Hiccups  Diarrhea  Insomnia  GERD  Nausea  Support  Encounter for palliative care               Interval History:     Chart review/discussion with unit or clinical team members:   Received scheduled medications and oral Dilaudid 2 mg x1 and 4 mg x2 and IV Dilaudid 0.5 mg x5 and melatonin 1 mg x1 from 0700 to 0700.    Per patient or family/caregivers today:  Tube feeds increased from 70 to 90 ml/hr but decreased back to 70 ml/hr overnight due to intolerance. Spoke with Gallo at length about his hope to get off tube feeds eventually and desire to continue chemotherapy. He states that he just feels like he needs to get through each cycle of chemo with good symptom control and hopes to complete the course. Notes having worsening pain  overnight and some of his pain is relieved when defecating. Notes some of it is his original pain radiating to his lower back but also has to do with a bowel cramp sensation. Does state that his GERD and hiccups seemed to have improved. Medications as above.    Key Palliative Symptoms:  # Pain severity the last 12 hours: moderate  # Nausea severity the last 12 hours: none               Medications:     I have reviewed this patient's medication profile and medications during this hospitalization.    Noted meds:    APAP 975mg q8h scheduled \  Butrans 20mcg/hr wk (increased from 10mcg/hr 8/22)  Hydromorphone 4-6mg PO q3h PRN  Hydromorphone 0.5mg IV q3h PRN     Protonix IV 40 BID  Zofran 4mg IV q8h PRN  Compazine 5-10mg q6h PRN    Metamucil daily  Banatrol  Bentyl 20 mg 4 times daily before meals and at bedtime     Imodium 2-4 mg 4 times daily prn  Gabapentin 300mg TID  Thorazine 50mg q6h PRN    Melatonin 1mg PO at bedtime PRN     Pt had appt to get medical marijuana as outpt yesterday but required ED visit at that time, so has not gotten yet           Physical Exam:   /81 (BP Location: Right arm, Cuff Size: Adult Regular)   Pulse 100   Temp 97.7  F (36.5  C) (Oral)   Resp 18   Ht 1.829 m (6')   Wt 73.4 kg (161 lb 12.8 oz)   SpO2 96%   BMI 21.94 kg/m    Gen: sitting in bed, alert, appropriate, awake.  Able to get up and get to bathroom without difficulty  HEENT: no scleral icterus  Pulm: no resp distress  Psych: appropriate mood and affect             Data Reviewed:     Reviewed recent pertinent imaging, comments:   No new imaging    Reviewed recent labs, comments:   Last Comprehensive Metabolic Panel:  Lab Results   Component Value Date     08/23/2023    POTASSIUM 3.6 08/23/2023    CHLORIDE 104 08/23/2023    CO2 26 08/23/2023    ANIONGAP 8 08/23/2023     (H) 08/24/2023    BUN 10.1 08/23/2023    CR 0.52 (L) 08/23/2023    GFRESTIMATED >90 08/23/2023    DENISE 8.4 (L) 08/23/2023     CBC RESULTS:    Recent Labs   Lab Test 08/23/23  0638   WBC 3.9*   RBC 2.87*   HGB 8.7*   HCT 26.7*   MCV 93   MCH 30.3   MCHC 32.6   RDW 13.2

## 2023-08-24 NOTE — PROGRESS NOTES
Palliative Care Initial Social Work Note  Location: [facility]    Patient Info:  Gallo Navarro is a 57 year old male with recent diagnosis of pancreatic adenocarcinoma.     Brief summary of visit: Met with Gallo this afternoon to introduce self and role. He reports that he feels that overall he's doing ok. He likes to be busy and has many good distractions at home. He states that in the hospital that has been more difficult, but mostly as he hasn't been feeling well and isn't sleeping well. He does think that his pain levels and his amount of sleep are improving.     Gallo is hoping to discharge over the weekend for a few days at home before he's readmitted for chemo next week. He'll ask for PCSW visit if he would find it helpful.     Date of Admission: 8/21/2023    Reason for consult: Patient and family support    Sources of information: Patient  Staff -Palliative Care SUZETTE  Other -Chart review    Recommendations & Plan:  -PCSW will remain available for patient and family emotional support, but will wait of Gallo or his family to request a visit.        Symptoms & Concerns Addressed Today:  Caregiver -Gallo reports that he's been relying on his family and friends/neighbors to help with many tasks. He's trying to get some projects planned while he's here in the hospital.  Family  Social -Gallo reports that he has great support. He likes to be active and involved.     Strengths Identified:    -Good family support    Relationships & Support:  Aspects of relationships and support assessed today:  Identified family members: wife, 20 year old son, 14 year old daughter  Professional supports: medical teams  Family coping: not assessed  Bereavement Risk concerns: not assessed    Coping, Mental Health & Adjustment to Illness:   Adjustment to Illness/Hospitalization    Goals, Decision Making & Advance Care Planning:   Prognosis, Goals, and/or Advance Care Planning were assessed today: No  If yes, brief summary of  discussion: n/a  Preferred language: English  Patient's decision making preferences: independently  I have concerns about the patient/family's health literacy today: No  Patient has a completed Health Care Directive: No.   Code status per chart review: Full Code      Clinical Social Work Interventions:   Assessment of palliative specific issues    Introduction of Palliative clinical social work interventions   Adjustment to illness counseling  Facilitation of processing of thoughts/feelings      BETHANIE Avila  [facility], Palliative Care  Securely message with the [employer] Web Console (learn more here) or  Text page via [email or web address] Paging/Directory

## 2023-08-24 NOTE — PLAN OF CARE
Goal Outcome Evaluation:    Plan of Care Reviewed With: patient  Overall Patient Progress: improvingOverall Patient Progress: improving      HD2 -pt with locally advanced, unresectable pancreatic cancer, s/p gastric outlet obstruction s/p GJ tube (placed 5/2023), biliary obstruction s/p stent placement on 7/7/23, pancreatic insufficiency, and IDDM2, recently completing C2 of FOLFIRINOX, presenting to the ED with worsening abdominal pain, hiccuping, bloating, diarrhea(per MD note)     VSS on RA.  Intact neuros.  LS clear.   +BS, loose BMs. Started on fiber today and bentyl.  Voiding spontaneously.  Tolerating clear liqs then advanced to mech/dental soft diet. Insulin given per sliding scale.  Pt has GJ tube, TF thru J-tube at 70ml/hr. Pt stated that he can only tolerate it at this rate(MD is aware).  UAL in room.Independent with hygiene.  C/o's pain in abd that radiates to back improved with prn PO and IV dilaudid. Has sched tylenol as well. T-pump to back at times(per pt's preference)  Rt chest port is heplocked.  Palliative following pt  Continue with POC.

## 2023-08-24 NOTE — CONSULTS
Pain Service Consultation Note  [facility]      Patient Name: Gallo Navarro  MRN: [id number]   Age: 57 year old  Sex: male  Date: August 24, 2023                                      Reviewed: Yes    Referring Provider:  RON Lopez  Referring Service:  Medicine  Reason for Consultation: eval for celiac plexus block for neoplastic related pain from pancreatic cancer eval for celiac plexus block for neoplastic related pain from pancreatic cancer     Assessment/Recommendations:  57 year old male with PMH of pancreatic adenocarcinoma c/b T2DM, pancreatic insufficiency, gastric outlet obstruction (s/p G-tube placement), biliary obstruction (s/p stent placement) who was admitted with acute on chronic abdominal pain, worsening diarrhea, increased G-tube output.      PTA pain  not relieved with outpatient regimen butrans patch and Dilaudid PO taking 4 mg q 3 hrs.    Pain low abdomen, pelvis radiating to bilateral thighs may likely GI related pain given colicky nature and relief after BM,   Tolerating increase in analgesic regimen per palliative team recommendations and reports he is satisfied with current analgesic plan.     Plan:   No role for celiac plexus nor hypogastric neurolytic block at this time  No plan for implantable pump at this time  Pain recommendations per Palliative Medicine Service   Outpatient referral can be made [facility] neurolytic block or implantable device evaluation for cancer related pain (DAMON Cheema MD) if needed in the future       Thank you for the opportunity to participate in the care of Gallo Navarro  Pain Service will sign off.    Discussed with attending anesthesiologist  Primary Service Contacted with Recommendations? Yes    Judit Mckenna, ESVIN CNP  8/24/2023      Pain Medications/Prescriber: Palliative Care    Past Medical History:  Past Medical History:   Diagnosis Date    Acute pancreatitis 04/16/2023    Gastric outlet obstruction     Recurrent  acute pancreatitis          Family History:    Family History   Problem Relation Age of Onset    Diabetes Type 2  Father     Cirrhosis Father     Genetic Disorder Brother         missing [id number], mild retardation    Colon Polyps Brother     Colon Cancer Paternal Uncle     Pancreatic Cancer Paternal Aunt     Pancreatitis Cousin        Social History:  Social History     Tobacco Use    Smoking status: Never     Passive exposure: Never    Smokeless tobacco: Never   Substance Use Topics    Alcohol use: Not Currently     Comment: o/w light beer 2days a week             Review of Systems:  Complete ROS reviewed. Unless otherwise noted, all other systems found to be negative.        Laboratory Results:  Recent Labs   Lab Test 08/23/23  0638 07/14/23  0849 07/13/23  0447   INR  --   --  1.46*      < > 375   BUN 10.1   < > 9.3    < > = values in this interval not displayed.         Allergies:  No Known Allergies      Current Pain Related Medications:  Medications related to Pain Management (From now, onward)      Start     Dose/Rate Route Frequency Ordered Stop    08/24/23 1400  acetaminophen (TYLENOL) solution 975 mg         975 mg Oral 3 TIMES DAILY 08/24/23 0920      08/24/23 1130  dicyclomine (BENTYL) solution 20 mg         20 mg Oral 4 TIMES DAILY BEFORE MEALS & NIGHTLY 08/24/23 1048      08/24/23 1000  HYDROmorphone (DILAUDID) tablet 4-6 mg        Note to Pharmacy: PTA Sig:Take 1-2 tablets (2-4 mg) by mouth every 4 hours as needed for severe pain      4-6 mg Oral EVERY 3 HOURS PRN 08/24/23 0954      08/23/23 1506  HYDROmorphone (PF) (DILAUDID) injection 0.5 mg         0.5 mg Intravenous EVERY 3 HOURS PRN 08/23/23 1506      08/23/23 1400  gabapentin (NEURONTIN) capsule 300 mg         300 mg Oral 3 TIMES DAILY 08/23/23 1048      08/22/23 1700  buprenorphine (BUTRANS) 20 MCG/HR WK patch 1 patch         1 patch  over 168 Hours Transdermal WEEKLY 08/22/23 1547      08/21/23 1845  psyllium (METAMUCIL/KONSYL)  "capsule 1 capsule        Note to Pharmacy: PTA Si capsule by Per G Tube route 2 times daily      1 capsule Per G Tube DAILY 23  lidocaine 1 % 0.1-1 mL         0.1-1 mL Other EVERY 1 HOUR PRN 23  lidocaine (LMX4) cream          Topical EVERY 1 HOUR PRN 23                Physical Exam:  Vitals: /81 (BP Location: Right arm, Cuff Size: Adult Regular)   Pulse 100   Temp 97.7  F (36.5  C) (Oral)   Resp 18   Ht 1.829 m (6')   Wt 73.4 kg (161 lb 12.8 oz)   SpO2 96%   BMI 21.94 kg/m      Physical Exam:   CONSTITUTIONAL/GENERAL APPEARANCE:  NAD  EYES: EOMI, sclera anicteric, PERRLA  ENT/NECK: atraumatic, lips and oral mucous membranes dry  RESPIRATORY: non-labored breathing on room air  ABDOMEN: Soft, non-tender, non-distended, LUQ G-tube intact  MUSCULOSKELETAL/BACK/SPINE/EXTREMITIES: Moves all extremities purposefully.  No edema or obvious joint deformities   NEURO: Alert and Oriented x3. Answers questions appropriately  SKIN/VASCULAR EXAM:  No jaundice, no visible rashes or lesions      Please see A&P for additional details of medical decision making.      Acute Inpatient Pain Service [facility]  Hours of pain coverage    Page via Amcom- Please Page the Pain Team Via Amcom: \"PAIN MANAGEMENT ACUTE INPATIENT/ [facility]\"            "

## 2023-08-24 NOTE — PROGRESS NOTES
[facility]    Medicine Progress Note - Hospitalist Service, GOLD TEAM 1    Date of Admission:  8/21/2023    Assessment & Plan      Gallo Navarro is a 57 year old male admitted on 8/21/2023. He has a past medical history of recent diagnosis of pancreatic adenocarcinoma c/b T2DM, pancreatic insufficiency, gastric outlet obstruction (S/P GJ tube placement 05/2023), biliary obstruction (S/P stent placement 07/2023). He was admitted after presenting to the ED for acute-on-chronic abdominal pain, increased G-tube output. He was admitted for further monitoring and management.    Interval Changes:  -Increase tube feeding as tolerated to goal, currently at 70 ml/hr  -Increase NPH to 17 units at bedtime  -Increase PO dilaudid to 4-6 mg q3h prn  -Regional pain evaluated and not currently candidate for neurolysis given area of pain  -Banatrol, metamucil and prn immodium for diarrhea (likely chemotherapy related)  -Advance diet to soft, bite sized - avoid leafy green per previous GI notes    Acute-on-Chronic Abdominal Pain  Colitis likely secondary to chemotherapy  Recent diarrhea  Hx of Gastric Outlet Obstruction (S/P GJ tube placement 05/2023, duodenal stent placement x2 08/17/2023)  Hx of Biliary Obstruction (S/P stent placement 07/2023)  Hx of Pancreatitis   Pt w/ hx of recent diagnosis of pancreatic cancer as below c/b gastric outlet obstruction, biliary obstruction. Had recent duodenal stent placement x2 on 08/17/23 w/ Dr. Greenberg from GI for gastric outlet obstruction.   -However, past few days has had increased output from G-tube, and abdominal pain not amenable to PTA med regimen (Dilaudid 2-4mg Q4H PRN, Butrans patch weekly).  - OP Oncology recommended to go to ED for further evaluation, out of c/f SBO, though still passing gas and stool (see diarrhea as below).   -CT A/P (8/21) showed encasement from pancreatic tumor of SMA, SMV, gastroduodenal arteries, and interval  increase of tumor compared to CT 7/8/23 along with possible early adynamic ileus   -Enteric panel, c. Diff - negative  Plan:  - GI consulted, now signed off 8/23  - Palliative consulted   - PO Dilaudid 4-6 mg Q3H PRN for severe pain, IV dilaudid q3h prn   - PTA Butrans weekly patch replaced 8/21 - increased to 20 mcg/hr   - Tylenol 975mg scheduled   -Oncology consulted,no current cancer directed therapy while inpatient due  -Consult region pain service for consideration of celiac plexus block - likely wont benefit and will re-evaluate in future  - Continue venting w/ G-tube prn, all tube feeds through J-tube  - RD consulted to mgmt TFs usually cycles TFs overnight    Pancreatic Adenocarcinoma  -Pt w/ insidious onset of abd pain in March of this year, sx initially thought to be d/t acute pancreatitis, but had persistent abd pain, n/v, failing multiple PO trials.   -Had GJ tube placed for feeds and venting, but began to have rapid weight loss despite TFs.  - Imaging during admission 07/2023 showed mass of pancreatic head, and biopsy confirmed adenocarcinoma 07/12/23.   -Has started on palliative FOLFIRINOX (has completed x2 cycles)Next scheduled chemo is on 8/29/23   -Following w/ Oncology and Palliative as an OP.  (does every other Tuesday).  Plan:  >Palliative care and Oncology consulted     Type 2 Diabetes Mellitus, insulin-dependent   Occurred following dx of pancreatic cancer and while on TFs. Has followed w/ Endo as an OP, last saw on 8/17/23. PTA regimen included Lantus 5 units qPM, NPH 5 units qAM + 25 units qPM w/ TFs, and high sliding scale insulin.   Plan:  >Resume lantus at bedtime (5 units)  >Increase NPH at 17 units given ability to increase tube feed rates   -Holding qAM NPH  - Sliding scale TID AC    Pancreatic Insufficiency  G tube dependency due to gastric outlet obstruciton  Noted following dx on pancreatic cancer.   - RD consulted, patient reports intolerance with peptamen  - Continue PTA  pancreatic enzymes w/ meals (or TFs)    Multi factorial diarrhea (pancreatic insufficiency, likely worsened by recent FOLFIRINOX chemotherapy)  Developed after having numerous endoscopies for above workup of pancreatic cancer, and interventions for associated mass. Approx 10-12 stools per day  -enteric panel negative (8/23)  Plan:  -banatrol daily  -metamucil  -immodium prn    Hiccups  Nausea  This began shortly after his first round of chemo, was initially intermittent, but then following second round became more persistent.   Plan:  - Continue PTA Protonix daily  - zofran and comapzine for nausea  - G-tube to venting as above  -Gabapentin 300 mg TID - increased 8/23  -Thorazine 25 mg QID prn    Chronic Normocytic Anemia  Baseline hgb 9s-10s, on admission was 9.8.    Suspect anemia is multifactorial and r/t underlying cancer in addition to chemotherapy  and malnutrition       Diet: Adult Formula Drip Feeding: Continuous Vital 1.5; Jejunostomy; Goal Rate: For first night of TF, begin at 30 ml/hr and advance by 20 ml/hr every 4 hours goal rate of 90 ml/hr as tolerated. Subsequent days, TF to begin at 90 ml/hr and run x 17 hour...  Clear Liquid Diet    DVT Prophylaxis: Enoxaparin (Lovenox) SQ  Adan Catheter: Not present  Lines: PRESENT      Port a Cath 07/31/23 Right Chest wall-Site Assessment: WDL      Cardiac Monitoring: None  Code Status: Full Code      Clinically Significant Risk Factors              # Hypoalbuminemia: Lowest albumin = 2.6 g/dL at 8/22/2023  7:11 AM, will monitor as appropriate           # DMII: A1C = 7.9 % (Ref range: <5.7 %) within past 6 months    # Moderate Malnutrition: based on nutrition assessment, PRESENT ON ADMISSION          Disposition Plan     Expected Discharge Date: 08/25/2023      Destination: home with family  Discharge Comments: GI & Palliative consulted. Started on TF yesterday but pt didn't tolerate TF and it was stopped. Need outpatient pain plan and tube feeding going before  discharge          Kenton Lopez MD  Hospitalist Service, GOLD TEAM 1  [facility]  Securely message with [employer] (more info)  Text page via [employer] Paging/Directory   See signed in provider for up to date coverage information  ______________________________________________________________________    Interval History   Patient tolerating additional tube feeding but had cramping when at goal. We discussed listening to his body and if he can only tolerate 70 can attempt to increase later this evening or tomorrow. He reports that lower abdominal cramping seems to coorelate with bowel movements and stools. He thinks cramping/pain and stool is slowly improving but still having > 5 bms per day. No fever or chills. No chest pain. No shortness of breath. Discussed and caught up on his family situation with multiple children.     Physical Exam   Vital Signs: Temp: 97.7  F (36.5  C) Temp src: Oral BP: 122/81 Pulse: 100   Resp: 18 SpO2: 96 % O2 Device: None (Room air)    Weight: 161 lbs 12.8 oz    Constitutional: awake, alert, lying in bed, no acute distress  Eyes: no icterus  Respiratory: CTAB, no wheezing  Cardiovascular: RRR  GI: distended, mild tenderness in epigastric, g tube in place, no peritoneal signs  Musculoskeletal: no lower extremity edema  Neurologic: holding eye contact, following commands, moving all extremities   Skin- skin pallor    Medical Decision Making             Data     I have personally reviewed the following data over the past 24 hrs:    3.9 (L)  \   8.7 (L)   / 195     138 104 10.1 /  138 (H)   3.6 26 0.52 (L) \     Procal: N/A CRP: N/A Lactic Acid: 0.8

## 2023-08-24 NOTE — PLAN OF CARE
Goal Outcome Evaluation:      Plan of Care Reviewed With: patient    Overall Patient Progress: improving    Outcome Evaluation: 4x A/O. VSS. Pain controlled with IV and PO dilaudid. Ambulated in room. Tube feedings at 70 mL/hr. Advanced to 90mL/hr @ 2145, not tolerated with extreme onset of pain, decreased to 70mL/hr. No nausea, passing gas, had multiple loose BMs.

## 2023-08-25 ENCOUNTER — DOCUMENTATION ONLY (OUTPATIENT)
Dept: ONCOLOGY | Facility: CLINIC | Age: 58
End: 2023-08-25
Payer: COMMERCIAL

## 2023-08-25 VITALS
DIASTOLIC BLOOD PRESSURE: 76 MMHG | BODY MASS INDEX: 21.91 KG/M2 | HEART RATE: 84 BPM | SYSTOLIC BLOOD PRESSURE: 121 MMHG | HEIGHT: 72 IN | WEIGHT: 161.8 LBS | TEMPERATURE: 98.1 F | RESPIRATION RATE: 18 BRPM | OXYGEN SATURATION: 97 %

## 2023-08-25 LAB
GLUCOSE BLDC GLUCOMTR-MCNC: 120 MG/DL (ref 70–99)
GLUCOSE BLDC GLUCOMTR-MCNC: 137 MG/DL (ref 70–99)
GLUCOSE BLDC GLUCOMTR-MCNC: 79 MG/DL (ref 70–99)

## 2023-08-25 PROCEDURE — 250N000011 HC RX IP 250 OP 636: Mod: JZ | Performed by: INTERNAL MEDICINE

## 2023-08-25 PROCEDURE — 99233 SBSQ HOSP IP/OBS HIGH 50: CPT | Performed by: STUDENT IN AN ORGANIZED HEALTH CARE EDUCATION/TRAINING PROGRAM

## 2023-08-25 PROCEDURE — 99239 HOSP IP/OBS DSCHRG MGMT >30: CPT | Performed by: STUDENT IN AN ORGANIZED HEALTH CARE EDUCATION/TRAINING PROGRAM

## 2023-08-25 PROCEDURE — 250N000013 HC RX MED GY IP 250 OP 250 PS 637

## 2023-08-25 PROCEDURE — 250N000013 HC RX MED GY IP 250 OP 250 PS 637: Performed by: EMERGENCY MEDICINE

## 2023-08-25 PROCEDURE — 250N000011 HC RX IP 250 OP 636: Performed by: STUDENT IN AN ORGANIZED HEALTH CARE EDUCATION/TRAINING PROGRAM

## 2023-08-25 PROCEDURE — 250N000011 HC RX IP 250 OP 636: Mod: JZ | Performed by: STUDENT IN AN ORGANIZED HEALTH CARE EDUCATION/TRAINING PROGRAM

## 2023-08-25 PROCEDURE — C9113 INJ PANTOPRAZOLE SODIUM, VIA: HCPCS | Mod: JZ | Performed by: STUDENT IN AN ORGANIZED HEALTH CARE EDUCATION/TRAINING PROGRAM

## 2023-08-25 PROCEDURE — 250N000013 HC RX MED GY IP 250 OP 250 PS 637: Performed by: STUDENT IN AN ORGANIZED HEALTH CARE EDUCATION/TRAINING PROGRAM

## 2023-08-25 RX ORDER — HEPARIN SODIUM,PORCINE 10 UNIT/ML
5-10 VIAL (ML) INTRAVENOUS EVERY 24 HOURS
Status: DISCONTINUED | OUTPATIENT
Start: 2023-08-25 | End: 2023-08-25 | Stop reason: HOSPADM

## 2023-08-25 RX ORDER — HEPARIN SODIUM,PORCINE 10 UNIT/ML
5-10 VIAL (ML) INTRAVENOUS
Status: DISCONTINUED | OUTPATIENT
Start: 2023-08-25 | End: 2023-08-25 | Stop reason: HOSPADM

## 2023-08-25 RX ORDER — HEPARIN SODIUM (PORCINE) LOCK FLUSH IV SOLN 100 UNIT/ML 100 UNIT/ML
5-10 SOLUTION INTRAVENOUS
Status: DISCONTINUED | OUTPATIENT
Start: 2023-08-25 | End: 2023-08-25 | Stop reason: HOSPADM

## 2023-08-25 RX ORDER — PANTOPRAZOLE SODIUM 40 MG/1
40 TABLET, DELAYED RELEASE ORAL 2 TIMES DAILY
Qty: 60 TABLET | Refills: 0 | Status: SHIPPED | OUTPATIENT
Start: 2023-08-25 | End: 2023-10-18

## 2023-08-25 RX ORDER — GABAPENTIN 300 MG/1
300 CAPSULE ORAL 3 TIMES DAILY
Qty: 90 CAPSULE | Refills: 0 | Status: SHIPPED | OUTPATIENT
Start: 2023-08-25 | End: 2023-12-05 | Stop reason: SINTOL

## 2023-08-25 RX ORDER — BUPRENORPHINE 20 UG/H
1 PATCH TRANSDERMAL WEEKLY
Qty: 4 PATCH | Refills: 0 | Status: SHIPPED | OUTPATIENT
Start: 2023-08-29 | End: 2023-09-21

## 2023-08-25 RX ORDER — DICYCLOMINE HYDROCHLORIDE 10 MG/5ML
20 SOLUTION ORAL 4 TIMES DAILY PRN
Qty: 473 ML | Refills: 0 | Status: SHIPPED | OUTPATIENT
Start: 2023-08-25 | End: 2023-10-18

## 2023-08-25 RX ADMIN — HYDROMORPHONE HYDROCHLORIDE 0.5 MG: 1 INJECTION, SOLUTION INTRAMUSCULAR; INTRAVENOUS; SUBCUTANEOUS at 04:31

## 2023-08-25 RX ADMIN — Medication 3 ML: at 05:08

## 2023-08-25 RX ADMIN — ACETAMINOPHEN 975 MG: 325 SOLUTION ORAL at 13:41

## 2023-08-25 RX ADMIN — GABAPENTIN 300 MG: 300 CAPSULE ORAL at 08:25

## 2023-08-25 RX ADMIN — HYDROMORPHONE HYDROCHLORIDE 0.5 MG: 1 INJECTION, SOLUTION INTRAMUSCULAR; INTRAVENOUS; SUBCUTANEOUS at 15:28

## 2023-08-25 RX ADMIN — PANCRELIPASE 2 CAPSULE: 24000; 76000; 120000 CAPSULE, DELAYED RELEASE PELLETS ORAL at 11:06

## 2023-08-25 RX ADMIN — DICYCLOMINE HYDROCHLORIDE 20 MG: 10 SOLUTION ORAL at 11:04

## 2023-08-25 RX ADMIN — PANTOPRAZOLE SODIUM 40 MG: 40 INJECTION, POWDER, FOR SOLUTION INTRAVENOUS at 08:32

## 2023-08-25 RX ADMIN — Medication 5 ML: at 15:40

## 2023-08-25 RX ADMIN — HYDROMORPHONE HYDROCHLORIDE 6 MG: 2 TABLET ORAL at 11:04

## 2023-08-25 RX ADMIN — SODIUM BICARBONATE 325 MG: 325 TABLET ORAL at 00:27

## 2023-08-25 RX ADMIN — PANCRELIPASE 2 CAPSULE: 24000; 76000; 120000 CAPSULE, DELAYED RELEASE PELLETS ORAL at 08:26

## 2023-08-25 RX ADMIN — PANCRELIPASE 3 CAPSULE: 24000; 76000; 120000 CAPSULE, DELAYED RELEASE PELLETS ORAL at 08:28

## 2023-08-25 RX ADMIN — HYDROMORPHONE HYDROCHLORIDE 4 MG: 2 TABLET ORAL at 06:10

## 2023-08-25 RX ADMIN — HYDROMORPHONE HYDROCHLORIDE 0.5 MG: 1 INJECTION, SOLUTION INTRAMUSCULAR; INTRAVENOUS; SUBCUTANEOUS at 08:18

## 2023-08-25 RX ADMIN — ACETAMINOPHEN 975 MG: 325 SOLUTION ORAL at 08:25

## 2023-08-25 RX ADMIN — ENOXAPARIN SODIUM 40 MG: 40 INJECTION SUBCUTANEOUS at 08:31

## 2023-08-25 RX ADMIN — PANCRELIPASE 2 CAPSULE: 24000; 76000; 120000 CAPSULE, DELAYED RELEASE PELLETS ORAL at 05:09

## 2023-08-25 RX ADMIN — SODIUM BICARBONATE 325 MG: 325 TABLET ORAL at 11:06

## 2023-08-25 RX ADMIN — DICYCLOMINE HYDROCHLORIDE 20 MG: 10 SOLUTION ORAL at 08:25

## 2023-08-25 RX ADMIN — THERA TABS 1 TABLET: TAB at 08:25

## 2023-08-25 RX ADMIN — PANCRELIPASE 2 CAPSULE: 24000; 76000; 120000 CAPSULE, DELAYED RELEASE PELLETS ORAL at 00:29

## 2023-08-25 RX ADMIN — Medication 1 CAPSULE: at 08:25

## 2023-08-25 RX ADMIN — SODIUM BICARBONATE 325 MG: 325 TABLET ORAL at 05:09

## 2023-08-25 RX ADMIN — GABAPENTIN 300 MG: 300 CAPSULE ORAL at 13:41

## 2023-08-25 RX ADMIN — SODIUM BICARBONATE 325 MG: 325 TABLET ORAL at 08:26

## 2023-08-25 ASSESSMENT — ACTIVITIES OF DAILY LIVING (ADL)
ADLS_ACUITY_SCORE: 26

## 2023-08-25 NOTE — PROGRESS NOTES
Care Management Discharge Note    Discharge Date: 08/25/2023  Discharge Disposition: Home  Discharge Services: Home Infusion  Discharge DME: None  Discharge Transportation: family or friend will provide  Private pay costs discussed: Not applicable  Does the patient's insurance plan have a 3 day qualifying hospital stay waiver?  No  PAS Confirmation Code:  NA  Patient/family educated on Medicare website which has current facility and service quality ratings: no  Education Provided on the Discharge Plan: Yes  Persons Notified of Discharge Plans: Patient  Patient/Family in Agreement with the Plan: yes    Handoff Referral Completed: Yes    Additional Information:  RNCC was notified by primary team that pt is medically ready for discharge today. RNCC notified [facility] liaison, regarding discharge so they can resume pt's TF and IV Fluids. Family will provide transport home. No other discharge needs identified.       Kaushik Pierce RN BSN  5A RN Care Coordinator   Ph: [phone]  Pager: [phone]

## 2023-08-25 NOTE — PROGRESS NOTES
[facility]  Palliative Care Daily Progress Note       Recommendations & Counseling     GOALS OF CARE:   Restorative without limits - s/p cycle 2 of FOLFIRINOX with next scheduled doses 8/29/2023 (total of 6 cycles and plan is likely to reassess cancer burden after that)     ADVANCE CARE PLANNING:  No health care directive on file. Pt notes his wife would be the one who would make decisions if he were unable.  Code status: Full Code  Plan to have Gallo continue to follow with palliative clinic to continue GOC conversations as well. Scheduled outpatient palliative appointment is on 8/31, Gallo is aware.      MEDICAL MANAGEMENT:   #Lower Abdominal Pain & Low back pain - acute on chronic, worsening may be multifactorial with crampy pain from diarrhea, progression of pancreatic CA, with likely met in pelvis, and potential developing ileus in redundant sigmoid (as well as transverse colon, which seems less likely to be playing a role in lower abd pain). Low back pain seems related to abd pain and does not seem muscular or neuropathic in etiology.   Gallo stated that his pain flared again last night and was refractory to 4 mg Dilaudid. He states that a heat pack and massage helped and hopes his massager at home will help as well. He is aware he cannot go home with IV Dilaudid. I advised him to try the 6 mg dose which he has available of oral Dilaudid and he was agreeable to this.   - Butrans 20mcg/hr started on 8/22 (may need 72h+ to see response) - hope this will help minimize use of full opiate agonists - placed ~8/22 so should be reaching therapeutic effect in the next hours to few days  - Dilaudid 4-6 mg po q3h PRN pain - encouraged pt to take PO dilaudid and use 6 mg dose if needed  - Dilaudid 0.5mg IV q3h PRN pain for breakthrough (if PO dilaudid isn't working well enough)  - APAP 975 mg solution q8h scheduled  - GI/Pain declined to do celiac plexus block or hypogastric  "due to his pain being located in his lower abdomen and unsure how much benefit he would get. Not a candidate for pain pump due to variable blood sugars. Gallo was interested in pursuing the pain pump option in the future    #Nausea  #GERD - acute on chronic, worsened during and after his last round of chemo  May be influenced by GERD in setting of GOO, as he also describes a burning feeling in his epigastric region and has hiatal hernia on CT with some fluid pooling in distal esophagus. Controlled.   - STOPPED Protonix 40 mg IV BID and CHANGED to 40 mg solution BID per feeding tube (done for you)  - Drainage through Gtube for GOO  - Compazine PRN and Zofran PRN - Recommend using compazine first to avoid constipating side effects of zofran with c/f developing ileus.     #Diarrhea - low suspicion for ileus given bowel movements, more likely due to chemotherapy side effect. Symptoms controlled with medications and improving.  - BMs slowing down   - Banatrol BID, Metamucil daily, Bentyl 20 mg 4 times daily with meals and at bedtime, and Imodium prn     #Hiccups - resolved  - Gabapentin 300mg TID   - Could be influenced by GERD, see above   - Thorazine 50mg IV 4 times daily PRN (25 mg was ineffective)  - Would maximally titrate up gabapentin first if hiccups return. Then can consider baclofen in the future     #Sleep (also affected by pain, diarrhea, hiccups, management for those as above)  Slept well with melatonin overnight 3 mg  - Melatonin 3mg at bedtime PRN      PSYCHOSOCIAL/SPIRITUAL SUPPORT:  Family    Friends    Appreciate PCSW and  support. Noted that Gallo reports he is hopeful that cancer therapy will help and is restorative in goals however is also getting \"his affairs in order\" in the background.     Gallo states he is discharging today 8/25. He has an outpatient palliative appointment scheduled. Symptoms are reasonably controlled.     Total time spent was 50 minutes regarding symptom control and " support on the date of the encounter. These recommendations were given to the primary team via this note.    Travon Ray DO / Internal and Palliative Medicine   Securely message with the [employer] Web [employer] (learn more here)   Text page via [facility] Paging/Directory         Assessments          Gallo Navarro is a 57 year old male admitted on 8/21/2023. He has a past medical history of recent diagnosis of pancreatic adenocarcinoma c/b T2DM, pancreatic insufficiency, gastric outlet obstruction (S/P GJ tube placement 05/2023), biliary obstruction (S/P stent placement 07/2023) and duodenal obstruction (s/p stent placement x2 8/17). He was admitted after presenting to the ED for acute-on-chronic abdominal pain, increased G-tube output, and worsening diarrhea.     CT A/P with increase in tumor burden (with encasement from pancreatic tumor of SMA, SMV, gastroduodenal arteries) since 7/8/23. Has only completed 2 cycles of chemo following dx on 7/12, so not considered progression through chemo per onc.     PTA pain regimen was dilaudid 2-4mg q4h PRN and butrans patch (replaced 8/21) 10mcg      Nausea / hiccups: Per CT A/P in ED, was noted to have questionable small hiatal hernia and fluid pooling in distal esophagus, so suspect that this, in addition to reflux, may be strongest contributors to hiccups. Nausea has also been a provoking factor in hiccups, notably getting near immediate relief w/ Compazine of both nausea and hiccups.      Today, the patient was seen for:  Pain related to neoplasm  Hiccups  Diarrhea  Insomnia  GERD  Nausea  Support  Encounter for palliative care               Interval History:     Chart review/discussion with unit or clinical team members:   Received scheduled medications and melatonin 3 mg x1, Dilaudid IV 0.5 mg x3, and Dilaudid 4 mg x4 from 0700 to 0700.    Per patient or family/caregivers today:  Seen and examined at bedside. Visit detailed above. No needs noted and feels well and would like to go  home. Advised to use the higher dose of Dilaudid 6 mg po for his pain instead of IV Dilaudid.    Key Palliative Symptoms:  # Pain severity the last 12 hours: low  # Nausea severity the last 12 hours: none           Medications:     I have reviewed this patient's medication profile and medications during this hospitalization.    Noted meds:    APAP 975mg q8h scheduled   Butrans 20mcg/hr wk (increased from 10mcg/hr 8/22)  Hydromorphone 4-6mg PO q3h PRN  Hydromorphone 0.5mg IV q3h PRN     Protonix solution 40 BID  Zofran 4mg IV q8h PRN  Compazine 5-10mg q6h PRN    Metamucil daily  Banatrol  Bentyl 20 mg 4 times daily before meals and at bedtime     Imodium 2-4 mg 4 times daily prn  Gabapentin 300mg TID  Thorazine 50mg q6h PRN    Melatonin 1mg PO at bedtime PRN     Pt had appt to get medical marijuana as outpt yesterday but required ED visit at that time, so has not gotten yet           Physical Exam:   /76 (BP Location: Right arm)   Pulse 84   Temp 98.1  F (36.7  C) (Oral)   Resp 18   Ht 1.829 m (6')   Wt 73.4 kg (161 lb 12.8 oz)   SpO2 97%   BMI 21.94 kg/m    Gen: sitting in bed, alert, appropriate, awake.  Able to get up and get to bathroom without difficulty  HEENT: no scleral icterus  Pulm: no resp distress  GI: Feeding tube in place without erythema or surrounding induration or drainage  Psych: appropriate mood and affect             Data Reviewed:     Reviewed recent pertinent imaging, comments:   No new imaging    Reviewed recent labs, comments:   Last Comprehensive Metabolic Panel:  Lab Results   Component Value Date     08/23/2023    POTASSIUM 3.6 08/23/2023    CHLORIDE 104 08/23/2023    CO2 26 08/23/2023    ANIONGAP 8 08/23/2023    GLC 79 08/25/2023    BUN 10.1 08/23/2023    CR 0.52 (L) 08/23/2023    GFRESTIMATED >90 08/23/2023    DENISE 8.4 (L) 08/23/2023     CBC RESULTS:   Recent Labs   Lab Test 08/23/23  0638   WBC 3.9*   RBC 2.87*   HGB 8.7*   HCT 26.7*   MCV 93   MCH 30.3   MCHC 32.6    RDW 13.2

## 2023-08-25 NOTE — DISCHARGE SUMMARY
[facility]  Hospitalist Discharge Summary      Date of Admission:  8/21/2023  Date of Discharge:  8/25/2023  Discharging Provider: Kenton Lopez MD  Discharge Service: Hospitalist Service, GOLD TEAM 1    Discharge Diagnoses   Acute-on-Chronic Abdominal Pain  Colitis likely secondary to chemotherapy  Pancreatic Adenocarcinoma   Multi factorial diarrhea (pancreatic insufficiency, likely worsened by recent FOLFIRINOX chemotherapy)   Type 2 Diabetes Mellitus, insulin-dependent   Hx of Gastric Outlet Obstruction (S/P GJ tube placement 05/2023, duodenal stent placement x2 08/17/2023)  Hx of Biliary Obstruction (S/P stent placement 07/2023)  Hx of Pancreatitis   Pancreatic Insufficiency  G tube dependency due to gastric outlet obstruciton  Hiccups  Nausea  Chronic Normocytic Anemia     Clinically Significant Risk Factors     # DMII: A1C = 7.9 % (Ref range: <5.7 %) within past 6 months  # Moderate Malnutrition: based on nutrition assessment      Follow-ups Needed After Discharge   Follow up with palliative care and oncology    Unresulted Labs Ordered in the Past 30 Days of this Admission       No orders found from 7/22/2023 to 8/22/2023.        These results will be followed up by hospitalist    Discharge Disposition   Discharged to home  Condition at discharge: Stable    Hospital Course   Gallo Navarro is a 57 year old male admitted on 8/21/2023. He has a past medical history of recent diagnosis of pancreatic adenocarcinoma c/b T2DM, pancreatic insufficiency, gastric outlet obstruction (S/P GJ tube placement 05/2023), biliary obstruction (S/P stent placement 07/2023). He was admitted after presenting to the ED for acute-on-chronic abdominal pain, increased G-tube output, and diarrhea. He underwent imaging which showed colitis which was felt to likely reflect chemotherapy side effect given ongoing diarrhea.GI was consulte during stay and no procedural intervention was  needed. He was seen by oncology and palliative care. His pain regiment was adjusted. He was started on additional medications for hiccups, acid reflux, and diarrhea with improvement in his symptoms each day during the hospital course. He was discharged with plans for palliative care follow up and oncology follow up.    Consultations This Hospital Stay   NUTRITION SERVICES ADULT IP CONSULT  ONCOLOGY ADULT IP CONSULT  GI LUMINAL ADULT IP CONSULT  PALLIATIVE CARE ADULT IP CONSULT  PHARMACY IP CONSULT  NUTRITION SERVICES ADULT IP CONSULT  ANESTHESIOLOGY IP CONSULT  REGIONAL ANESTHESIA PAIN SERVICE ADULT IP CONSULT  CARE MANAGEMENT / SOCIAL WORK IP CONSULT    Code Status   Full Code    Time Spent on this Encounter   I, Kenton Lopez MD, personally saw the patient today and spent greater than 30 minutes discharging this patient.       Kenton Lopez MD  [facility]A ONCOLOGY  [address] [facility] [address]  Phone: [phone]  ______________________________________________________________________    Physical Exam   Vital Signs: Temp: 98.1  F (36.7  C) Temp src: Oral BP: 121/76 Pulse: 84   Resp: 18 SpO2: 97 % O2 Device: None (Room air)    Weight: 161 lbs 12.8 oz  Constitutional: alert, oriented, lying in bed  Eyes: no ictersu  ENT: no elevated JVD  Respiratory: CTAB, no wheezing  Cardiovascular: RRR  GI: soft, non-tender, non-distended, g tube in place  Musculoskeletal: no lower extremity edema       Primary Care Physician   Akil Hernandez    Discharge Orders      Palliative Care Referral      Primary Care - Care Coordination Referral      Resume Home Care Services    Patient to resume tube feeds with [facility].     Resume Home Care Services    Patient to resume home services with [facility]     Reason for your hospital stay    Pancreatic cancer and abdominal pain. You were seen by palliative care and your medications were adjusted. You were re-started on tube feeds and the rate  was increased during the hospital stay. You will follow up with oncology in clinic and with palliative care in the clinic.     Activity    Your activity upon discharge: activity as tolerated     Diet    Follow this diet upon discharge: Orders Placed This Encounter      Adult Formula Drip Feeding: Continuous Vital 1.5; Jejunostomy; Goal Rate: For first night of TF, begin at 30 ml/hr and advance by 20 ml/hr every 4 hours goal rate of 90 ml/hr as tolerated. Subsequent days, TF to begin at 90 ml/hr and run x 17 hour...      Mechanical/Dental Soft Diet       Significant Results and Procedures   Most Recent 3 CBC's:  Recent Labs   Lab Test 08/23/23  0638 08/22/23  0711 08/21/23  1351   WBC 3.9* 5.9 7.4   HGB 8.7* 9.0* 9.8*   MCV 93 92 92    214 223     Most Recent 3 BMP's:  Recent Labs   Lab Test 08/25/23  1159 08/25/23  0742 08/25/23  0200 08/23/23  0826 08/23/23  0638 08/22/23  1012 08/22/23  0711 08/21/23 2012 08/21/23  1351   NA  --   --   --   --  138  --  137  --  138   POTASSIUM  --   --   --   --  3.6  --  4.0  --  4.1   CHLORIDE  --   --   --   --  104  --  105  --  105   CO2  --   --   --   --  26  --  23  --  24   BUN  --   --   --   --  10.1  --  14.3  --  16.8   CR  --   --   --   --  0.52*  --  0.49*  --  0.47*   ANIONGAP  --   --   --   --  8  --  9  --  9   DENISE  --   --   --   --  8.4*  --  8.1*  --  8.3*   * 79 137*   < > 94   < > 108*   < > 143*    < > = values in this interval not displayed.     Most Recent 2 LFT's:  Recent Labs   Lab Test 08/22/23  0711 08/21/23  1351   AST 19 23   ALT 22 28   ALKPHOS 71 82   BILITOTAL 0.4 0.4       Discharge Medications   Current Discharge Medication List        START taking these medications    Details   buprenorphine (BUTRANS) 20 MCG/HR WK patch Place 1 patch onto the skin once a week  Qty: 4 patch, Refills: 0    Associated Diagnoses: Malignant neoplasm of head of pancreas (H); Cancer associated pain; Gastric outlet obstruction; Type 2 diabetes  mellitus without complication, with long-term current use of insulin (H)      dicyclomine (BENTYL) 10 MG/5ML solution Take 10 mLs (20 mg) by mouth 4 times daily as needed (abdominal discomfort, best before meals)  Qty: 473 mL, Refills: 0    Associated Diagnoses: Malignant neoplasm of head of pancreas (H); Cancer associated pain; Gastric outlet obstruction; Type 2 diabetes mellitus without complication, with long-term current use of insulin (H)      gabapentin (NEURONTIN) 300 MG capsule Take 1 capsule (300 mg) by mouth 3 times daily  Qty: 90 capsule, Refills: 0    Associated Diagnoses: Malignant neoplasm of head of pancreas (H); Cancer associated pain; Gastric outlet obstruction; Type 2 diabetes mellitus without complication, with long-term current use of insulin (H)           CONTINUE these medications which have CHANGED    Details   pantoprazole (PROTONIX) 40 MG EC tablet Take 1 tablet (40 mg) by mouth 2 times daily  Qty: 60 tablet, Refills: 0    Associated Diagnoses: Malignant neoplasm of head of pancreas (H); Cancer associated pain; Gastric outlet obstruction; Type 2 diabetes mellitus without complication, with long-term current use of insulin (H)           CONTINUE these medications which have NOT CHANGED    Details   acetaminophen (TYLENOL) 32 mg/mL liquid Take 20.313 mLs (650 mg) by mouth every 8 hours  Qty: 1800 mL, Refills: 11    Associated Diagnoses: Lower abdominal pain      B-D U/F 31G X 8 MM insulin pen needle Inject Subcutaneous 5 times daily Use 5 pen needles daily or as directed.  Qty: 500 each, Refills: 1    Associated Diagnoses: Type 2 diabetes mellitus without complication, with long-term current use of insulin (H)      blood glucose (FREESTYLE TEST STRIPS) test strip by Other route as needed      Continuous Blood Gluc Sensor (FREESTYLE KAREN 2 SENSOR) MISC       dexamethasone (DECADRON) 4 MG tablet Take 2 tablets (8 mg) by mouth daily Take for 2 days, starting the day after chemo. Take with  food.  Qty: 4 tablet, Refills: 2    Associated Diagnoses: Malignant neoplasm of head of pancreas (H)      HYDROmorphone (DILAUDID) 2 MG tablet Take 1-2 tablets (2-4 mg) by mouth every 4 hours as needed for severe pain  Qty: 180 tablet, Refills: 0    Associated Diagnoses: Pancreatic adenocarcinoma (H)      insulin aspart (NOVOLOG PEN) 100 UNIT/ML pen Inject 1-10 Units Subcutaneous 3 times daily (with meals)  Qty: 15 mL, Refills: 3    Associated Diagnoses: Pancreatic adenocarcinoma (H)      insulin glargine (BASAGLAR KWIKPEN) 100 UNIT/ML pen Inject 20 Units Subcutaneous every morning for 360 days  Qty: 18 mL, Refills: 3    Comments: If Basaglar is not covered by insurance, may substitute Lantus or Semglee or other insulin glargine product per insurance preference at same dose and frequency.    Associated Diagnoses: Pancreatic adenocarcinoma (H)      insulin  UNIT/ML injection Inject 15 Units Subcutaneous every evening for 60 days  Qty: 9 mL, Refills: 0    Associated Diagnoses: Pancreatic adenocarcinoma (H)      Lancets (ONETOUCH DELICA PLUS [id number]) MISC       lipase-protease-amylase (CREON 24) 57114-80245-982830 units CPEP per EC capsule 1-2 capsules by Per J Tube route 3 times daily (with meals)  Qty: 180 capsule, Refills: 3    Associated Diagnoses: History of biliary stent insertion; Pancreatic adenocarcinoma (H)      loperamide (IMODIUM) 2 MG capsule 2 caps at 1st sign of diarrhea & 1 cap every 2hrs until 12hrs diarrhea free. During night, 2 caps at bedtime & 2 caps every 4hrs until AM  Qty: 100 capsule, Refills: 3    Associated Diagnoses: Malignant neoplasm of head of pancreas (H)      ondansetron (ZOFRAN ODT) 8 MG ODT tab Take 1 tablet (8 mg) by mouth every 8 hours as needed for nausea  Qty: 30 tablet, Refills: 3    Associated Diagnoses: Nausea      ondansetron (ZOFRAN) 8 MG tablet Take 1 tablet (8 mg) by mouth every 8 hours as needed for nausea (vomiting)  Qty: 30 tablet, Refills: 2    Associated  Diagnoses: Malignant neoplasm of head of pancreas (H)      !! prochlorperazine (COMPAZINE) 10 MG tablet Take 1 tablet (10 mg) by mouth every 6 hours as needed for nausea or vomiting  Qty: 60 tablet, Refills: 1    Associated Diagnoses: Malignant neoplasm of head of pancreas (H)      !! prochlorperazine (COMPAZINE) 10 MG tablet Take 1 tablet (10 mg) by mouth every 6 hours as needed for nausea or vomiting  Qty: 30 tablet, Refills: 2    Associated Diagnoses: Malignant neoplasm of head of pancreas (H)      psyllium (METAMUCIL/KONSYL) capsule 1 capsule by Per G Tube route 2 times daily  Qty: 180 capsule, Refills: 3    Comments: Pharmacy to dispense capsule size that is available.  Associated Diagnoses: Diarrhea      sennosides (SENOKOT) 8.8 MG/5ML syrup 5 mLs by Per J Tube route 2 times daily  Qty: 236 mL, Refills: 3    Associated Diagnoses: Pancreatic adenocarcinoma (H)      simethicone (MYLICON) 125 MG chewable tablet Take 125 mg by mouth 2 times daily      sodium bicarbonate 325 MG tablet 1 tablet (325 mg) by Per J Tube route 3 times daily  Qty: 90 tablet, Refills: 0    Associated Diagnoses: Pancreatic mass; History of biliary stent insertion; Pancreatic adenocarcinoma (H)      vitamin D3 (CHOLECALCIFEROL) 50 mcg (2000 units) tablet Take 1 tablet (50 mcg) by mouth daily  Qty: 30 tablet, Refills: 1    Associated Diagnoses: Vitamin D deficiency      !! lidocaine-prilocaine (EMLA) 2.5-2.5 % external cream Use 1-2 times a week or as needed prior to port access  Qty: 30 g, Refills: 3    Associated Diagnoses: Malignant neoplasm of head of pancreas (H)      !! lidocaine-prilocaine (EMLA) 2.5-2.5 % external cream Use 1-2 times weekly or as needed prior to port access  Qty: 30 g, Refills: 3    Associated Diagnoses: Pancreatic adenocarcinoma (H)      naloxone (NARCAN) 4 MG/0.1ML nasal spray Spray 1 spray (4 mg) into one nostril alternating nostrils as needed for opioid reversal every 2-3 minutes until assistance arrives  Qty:  0.2 mL, Refills: 11    Associated Diagnoses: Pancreatic adenocarcinoma (H); Cancer associated pain      polyethylene glycol (MIRALAX) 17 GM/Dose powder Take 17 g by mouth daily  Qty: 510 g, Refills: 3    Associated Diagnoses: Pancreatic adenocarcinoma (H)       !! - Potential duplicate medications found. Please discuss with provider.        STOP taking these medications       buprenorphine (BUTRANS) 10 MCG/HR WK patch Comments:   Reason for Stopping:             Allergies   No Known Allergies

## 2023-08-25 NOTE — PLAN OF CARE
"Goal Outcome Evaluation:      Plan of Care Reviewed With: patient    Overall Patient Progress: improving    Outcome Evaluation: 4x A/O. VSS. Pain controlled with IV and PO dilaudid. Ambulated in hallway x1. Tube feeding q4h currently 70 mL/hr. Did not advance to 90mL/hr, possible tomorrow. No nausea, passing gas, loose BMs. Pt states, \"I feel much better today\".      "

## 2023-08-25 NOTE — PLAN OF CARE
Goal Outcome Evaluation: Pt afebrile, vitals stable. Pt slept at long intervals tonight. Pt was in pain after awakening but stated he was glad to have slept. Pt received a dose of IV dilaudid for lower back pain rated an 8. Pt given back massage and aqua K heat pad which really helped to relieve the pain. TF cont at 70cc/hr vial PEG. Pt did not want the rate increased during the night bc he said he began to have pain issues within an hour o the rate change. Pt stated that he would be willing to try to increase the rate to 90 again this morning. I just needed to sleep. Pt has had 2 diarrhea loose stools tonight. Blood sugar 0200= 137. Cont to maintain pain control. Monitor tolerance to TF at increased rate. Monitor lytes, next draw with am labs.

## 2023-08-25 NOTE — PROGRESS NOTES
"SPIRITUAL HEALTH SERVICES Progress Note  [facility]B    Saw pt Gallo Navarro per- follow-up per admission request    SUMMARY  Pt was feeling better today. He stated \"I met my goals. I'm eating real food and I'm not in pain.\"  Pt shared stories from his life, placing following God's 10 Commandments at the center of family life.    PLAN   No further follow-up    Rev Julianna Lin MDiv, [facility]  Staff   Pager [phone]      Patient/Family Understanding of Illness and Goals of Care - Pt shares he is grateful for the care he has received and understands his diagnosis.        He shares he is discharging today and feels ready.    Distress and Loss -  Pt had been struggling with physical pain but that has subsided.    Strengths, Coping, and Resources -  pt has micky and family support that is central to his well being.    Meaning, Beliefs, and Spirituality - Pt was raised [employer]ist. Spouse was raised [employer]. Currently they attend [employer] in [place]. Pt shares that he has great support from his . Pt states \"God's power is with me. We live by the 10 Commandments and God knows that.\"      * [facility] remains available 24/7 for emergent requests/referrals, either by having the switchboard page the on-call  or by entering an ASAP/STAT consult in Epic (this will also page the on-call ).*     "

## 2023-08-25 NOTE — PLAN OF CARE
/76 (BP Location: Right arm)   Pulse 84   Temp 98.1  F (36.7  C) (Oral)   Resp 18   Ht 1.829 m (6')   Wt 73.4 kg (161 lb 12.8 oz)   SpO2 97%   BMI 21.94 kg/m      VSS on room air. Pt. discharged at 1530 to home, was accompanied by wife, and left with personal belongings. Pt. received complete discharge paperwork and medications as filled by discharge pharmacy. Pt. was given times of last dose for all discharge medications in writing on discharge medication sheets. Discharge teaching included medication, pain management, activity restrictions, dressing changes, and signs and symptoms of infection. Pt. had no further questions at the time of discharge and no unmet needs were identified.

## 2023-08-25 NOTE — PROGRESS NOTES
Prior Authorization Approval    Medication: BUPRENORPHINE 20 MCG/HR TD PTWK  PA Initiated: 8/25/2023  PA Type: Clinical    Insurance: [employer] - Phone [phone] Fax [phone]  [facility] / Reference #: [id number] / [id number]   Authorization Effective Dates: 8/25/2023 - 8/24/2024    Expected CoPay: 18.00     CoPay Card Eligible: No      Filling Pharmacy: [facility] - [place], [place] - [address] [place] [address]  Pharmacy Notified: Yes  Patient Notified: Yes      Berta Valle  [facility] Pharmacy Liaison  Ph: [phone] Pager: [phone]   Securely message with the Vocera Web Console (learn more here)

## 2023-08-26 ENCOUNTER — NURSE TRIAGE (OUTPATIENT)
Dept: NURSING | Facility: CLINIC | Age: 58
End: 2023-08-26
Payer: COMMERCIAL

## 2023-08-26 NOTE — TELEPHONE ENCOUNTER
Nurse Triage SBAR    Is this a 2nd Level Triage? No    Situation/Background: Patient's spouse is calling that the patient was swarmed by hornets. Patient came running into the house with two hornets still attached. The incident happened approximately 15 minutes prior to the call. The patient showered immediately and applied hydrocortisone cream to the sting sites.     Assessment:   Left side of neck has swelling around the one sting  Arms- seven stings  One sting on the leg  Patient thinks he has 7-8 total stings    Recommendation: Per disposition, Home care advice provided. Advised patient on worsening symptoms to watch for. Advised patient to call back with any new or worsening symptoms. Patient verbalized understanding and agrees with plan.    Protocol Recommended Disposition: Telephone advice    Maris Mary RN on 8/26/2023 at 6:07 PM  [facility] Nurse Advisors  Reason for Disposition   Normal local reaction to bee, wasp, or yellow jacket sting    Additional Information   Negative: [1] Life-threatening reaction (anaphylaxis) in the past to sting AND [2] < 2 hours since sting   Negative: Attacked by swarm of bees now   Negative: Passed out (i.e., lost consciousness, collapsed and was not responding)   Negative: Wheezing, stridor, or difficulty breathing   Negative: [1] Hoarseness or cough AND [2] sudden onset following sting   Negative: [1] Tightness in the throat or chest AND [2] sudden onset following sting   Negative: [1] Swollen tongue or difficulty swallowing AND [2] sudden onset following sting   Negative: Sounds like a life-threatening emergency to the triager   Negative: Not a bee, wasp, hornet, or yellow jacket sting   Negative: Ring stuck on swollen finger (or toe) following bee sting   Negative: [1] Hives, itching, or swelling elsewhere on body (i.e., not a site of sting) AND [2] started within 2 hours of sting  (Exception: Only at site of sting.)   Negative: [1] Vomiting or abdominal cramps  AND [2] started within 2 hours of sting   Negative: [1] Gave epinephrine shot AND [2] no symptoms now   Negative: Sting inside the mouth   Negative: Sting on eyeball (e.g., cornea)   Negative: Patient sounds very sick or weak to the triager   Negative: More than 50 stings   Negative: [1] Fever AND [2] red area   Negative: [1] Fever AND [2] area is very tender to touch   Negative: [1] Red streak or red line AND [2] length > 2 inches (5 cm)   Negative: [1] Red or very tender (to touch) area AND [2] started over 24 hours after the sting   Negative: [1] Red or very tender (to touch) area AND [2] getting larger over 48 hours after the sting   Negative: Swelling is huge (e.g., more than 4 inches or 10 cm, spreads beyond wrist or ankle)   Negative: [1] Hives, itching, or swelling elsewhere on body (i.e., not a site of stings) AND [2] started over 2 hours after sting  (Exception: Only at site of sting.)   Negative: [1] Scab is present AND [2] it drains pus or increases in size AND [3] not improved after applying  antibiotic ointment for 2 days    Protocols used: [facility] or Yellow Jacket [facility]

## 2023-08-28 ENCOUNTER — PATIENT OUTREACH (OUTPATIENT)
Dept: CARE COORDINATION | Facility: CLINIC | Age: 58
End: 2023-08-28
Payer: COMMERCIAL

## 2023-08-28 NOTE — PROGRESS NOTES
Oncology/Hematology Visit Note  Aug 29, 2023    Reason for Visit: follow up of locally advanced, unresectable pancreatic cancer     History of Present Illness: Gallo Navarro is a 57 year old male with locally advanced, unresectable pancreatic cancer. 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 He then presented to the ED with worsening abdominal pain, increased jaundice, poor PO intake and weight loss admitted on 7/8/2023 for concern of biliary obstruction. Unfortunately, during this admission, biopsy of pancreatic mass returned positive for pancreatic adenocarcinoma on 7/12/23. He started on treatment with 5FU, irinotecan, and oxaliplatin (FOLFIRINOX) on 7/31/23. Please see previous notes for further details on the patient's history.     8/17/23 - ERCP/EGD, duodenal stents x2 placed, exchange of GJ tube    8/21-/8/25 hospitalized due to diarrhea, colitis, abdominal pain.        He comes in today for routine follow up prior to cycle 3 FOLFIRINOX.    Interval History:  Gallo presents today accompanied by his wife Violet.     Since his recent hospitalization, he has had a drastic improvement in symptoms. He and Violet both also mention his quality of life had greatly improved due to pain control and tolerating food orally.      He has been advancing his diet at home, eating mostly soft food. No nausea, vomiting, or reflux. Taking Compazine 4x/day scheduled along with dicyclomine 4x/day. Blood sugar control continues to be an issue. Working closely with diabetic educator adjusting insulin doses. Had consistent low sugars overnight. Bowel movements have returned to baseline, still loose but only 4 times per day. Taking Metamucil daily.     Was started on gabapentin inpatient for hiccups, has continued  taking at home and has not had any hiccups. Abdominal pain is well controlled, currently rating 2/10, doses of Dilaudid and Butrans were increased inpatient.     Following last infusion, he had cold sensitivity in his mouth an hands lasting a few days. Persisted in hands for one week. Denies neuropathy other than cold sensitivity. His energy level has been good, remains active around his house. Violet is concerned with the swelling in his lower legs, request compression stockings. Denies any pain or redness to legs. Denies any shortness of breath, chest pain or cough. No fever, chills or concerns for infection. Denies bleeding concerns. No rashes, bruising or other skin concerns.           Current Outpatient Medications   Medication Sig Dispense Refill    dicyclomine (BENTYL) 10 MG capsule Take 2 capsules (20 mg) by mouth 4 times daily (before meals and nightly) for 20 days 160 capsule 0    acetaminophen (TYLENOL) 32 mg/mL liquid Take 20.313 mLs (650 mg) by mouth every 8 hours 1800 mL 11    B-D U/F 31G X 8 MM insulin pen needle Inject Subcutaneous 5 times daily Use 5 pen needles daily or as directed. 500 each 1    blood glucose (FREESTYLE TEST STRIPS) test strip by Other route as needed      buprenorphine (BUTRANS) 20 MCG/HR WK patch Place 1 patch onto the skin once a week 4 patch 0    Continuous Blood Gluc Sensor (FREESTYLE KAREN 2 SENSOR) MISC       dexamethasone (DECADRON) 4 MG tablet Take 2 tablets (8 mg) by mouth daily Take for 2 days, starting the day after chemo. Take with food. 4 tablet 2    dicyclomine (BENTYL) 10 MG/5ML solution Take 10 mLs (20 mg) by mouth 4 times daily as needed (abdominal discomfort, best before meals) 473 mL 0    gabapentin (NEURONTIN) 300 MG capsule Take 1 capsule (300 mg) by mouth 3 times daily 90 capsule 0    HYDROmorphone (DILAUDID) 2 MG tablet Take 1-2 tablets (2-4 mg) by mouth every 4 hours as needed for severe pain 180 tablet 0    insulin aspart (NOVOLOG PEN) 100 UNIT/ML pen  Inject 1-10 Units Subcutaneous 3 times daily (with meals) 15 mL 3    insulin glargine (BASAGLAR KWIKPEN) 100 UNIT/ML pen Inject 20 Units Subcutaneous every morning for 360 days (Patient taking differently: Inject 20 Units Subcutaneous At Bedtime) 18 mL 3    insulin  UNIT/ML injection Inject 15 Units Subcutaneous every evening for 60 days (Patient taking differently: Inject 16 Units Subcutaneous every evening) 9 mL 0    Lancets (ONETOUCH DELICA PLUS BDLYLT23O) MISC       lidocaine-prilocaine (EMLA) 2.5-2.5 % external cream Use 1-2 times a week or as needed prior to port access 30 g 3    lidocaine-prilocaine (EMLA) 2.5-2.5 % external cream Use 1-2 times weekly or as needed prior to port access 30 g 3    lipase-protease-amylase (CREON 24) 92497-55006-600633 units CPEP per EC capsule 1-2 capsules by Per J Tube route 3 times daily (with meals) 180 capsule 3    loperamide (IMODIUM) 2 MG capsule 2 caps at 1st sign of diarrhea & 1 cap every 2hrs until 12hrs diarrhea free. During night, 2 caps at bedtime & 2 caps every 4hrs until  capsule 3    naloxone (NARCAN) 4 MG/0.1ML nasal spray Spray 1 spray (4 mg) into one nostril alternating nostrils as needed for opioid reversal every 2-3 minutes until assistance arrives 0.2 mL 11    ondansetron (ZOFRAN ODT) 8 MG ODT tab Take 1 tablet (8 mg) by mouth every 8 hours as needed for nausea 30 tablet 3    ondansetron (ZOFRAN) 8 MG tablet Take 1 tablet (8 mg) by mouth every 8 hours as needed for nausea (vomiting) 30 tablet 2    pantoprazole (PROTONIX) 40 MG EC tablet Take 1 tablet (40 mg) by mouth 2 times daily 60 tablet 0    polyethylene glycol (MIRALAX) 17 GM/Dose powder Take 17 g by mouth daily 510 g 3    prochlorperazine (COMPAZINE) 10 MG tablet Take 1 tablet (10 mg) by mouth every 6 hours as needed for nausea or vomiting 60 tablet 1    prochlorperazine (COMPAZINE) 10 MG tablet Take 1 tablet (10 mg) by mouth every 6 hours as needed for nausea or vomiting 30 tablet 2     psyllium (METAMUCIL/KONSYL) capsule 1 capsule by Per G Tube route 2 times daily 180 capsule 3    sennosides (SENOKOT) 8.8 MG/5ML syrup 5 mLs by Per J Tube route 2 times daily 236 mL 3    simethicone (MYLICON) 125 MG chewable tablet Take 125 mg by mouth 2 times daily      sodium bicarbonate 325 MG tablet 1 tablet (325 mg) by Per J Tube route 3 times daily 90 tablet 0    vitamin D3 (CHOLECALCIFEROL) 50 mcg (2000 units) tablet Take 1 tablet (50 mcg) by mouth daily 30 tablet 1     Physical Examination:     8/29/2023  9:54 AM 8/29/2023  11:25 AM   Vital Signs     Systolic 96  104    Diastolic 65  68    Pulse 89     Temperature 98.8  F (37.1  C)     Respirations 16     Weight (LB) 162 lb 7.7 oz     Height     BMI (Calculated)     Pain Score 0 (None)     O2 99 %          Wt Readings from Last 10 Encounters:   08/29/23 73.7 kg (162 lb 7.7 oz)   08/22/23 73.4 kg (161 lb 12.8 oz)   08/17/23 74.8 kg (164 lb 14.5 oz)   08/15/23 74.3 kg (163 lb 14.4 oz)   08/09/23 74.5 kg (164 lb 4.8 oz)   08/07/23 75.4 kg (166 lb 4.8 oz)   07/31/23 76.4 kg (168 lb 6.4 oz)     General: The patient is a pleasant male in no acute distress. He is here today with his wife.  HEENT: EOMI. Sclerae are anicteric. Mucous membranes moist, no lesions   Lymph: Neck is supple with no lymphadenopathy in the cervical or supraclavicular areas.   Heart: Regular rate and rhythm.   Lungs: Clear to auscultation bilaterally.   Abdomen: Bowel sounds present, soft, nontender with no palpable hepatosplenomegaly or masses. GJ tube in place, mild redness noted around insertion site, see photo below. Scant serosanguinous drainage present on gauze dressing.    Extremities: 1+ pitting edema noted to bilateral lower extremities.    Neuro: Cranial nerves II through XII are grossly intact.  Skin: No rashes, petechiae, or bruising noted on exposed skin.      Media Information    Document Information  Other: Photograph      08/29/2023 11:54 AM   Attached To:   Oncology Visit on  8/29/23 with Megan Farrell APRN CNP   Source Information  Meagn Farrell APRN CNP  [facility]         Laboratory Data:  Most Recent 3 CBC's:  Recent Labs   Lab Test 08/29/23  1007 08/23/23  0638 08/22/23  0711 08/21/23  1351   WBC 2.8* 3.9* 5.9 7.4   HGB 8.4* 8.7* 9.0* 9.8*   MCV 91 93 92 92    195 214 223   ANEUTAUTO 0.9*  --  3.9 5.8    Most Recent 3 BMP's:  Recent Labs   Lab Test 08/29/23  1007 08/25/23  1159 08/25/23  0742 08/23/23  0826 08/23/23  0638 08/22/23  1012 08/22/23  0711 08/21/23 2012 08/21/23  1351     --   --   --  138  --  137  --  138   POTASSIUM 3.4  --   --   --  3.6  --  4.0  --  4.1   CHLORIDE 106  --   --   --  104  --  105  --  105   CO2 26  --   --   --  26  --  23  --  24   BUN 8.5  --   --   --  10.1  --  14.3  --  16.8   CR 0.48*  --   --   --  0.52*  --  0.49*  --  0.47*   ANIONGAP 8  --   --   --  8  --  9  --  9   DENISE 8.4*  --   --   --  8.4*  --  8.1*  --  8.3*   * 120* 79   < > 94   < > 108*   < > 143*   PROTTOTAL 5.2*  --   --   --   --   --  4.8*  --  5.5*   ALBUMIN 3.0*  --   --   --   --   --  2.6*  --  3.3*    < > = values in this interval not displayed.    Most Recent 2 LFT's:  Recent Labs   Lab Test 08/29/23  1007 08/22/23  0711   AST 16 19   ALT 17 22   ALKPHOS 85 71   BILITOTAL 0.3 0.4   I reviewed the above labs today.    Imaging: CT Abdomen/Pelvis   IMPRESSION: In this patient with history of pancreatic cancer and  ongoing pain, the current CT scan compared to prior from 7/8/2023  shows:     1a. Increased size of the ill marginated pancreatic head mass,  extending into the mesenteric root with encasement of the SMA, SMV,  gastroduodenal artery and broad area of contact with the splenoportal  junction, splenic vein and central portal vein.  1b. Similar to slight increase in size of peripancreatic and  mesenteric lymph nodes, reactive and/or neoplastic.  1c. Increase in size of sclerotic lesion in the lower thoracic  vertebral body with new  sclerotic lesion in the left pubic bone,  concerning for metastasis  1d. Percutaneous gastrojejunostomy tube, gastroduodenal stent, and  gastrojejunostomy metallic lumen opposing stent; no significant  gastric distention  1e. Slight increased pancreatic ductal dilatation,   1f. Biliary ductal stents with pneumobilia, no increased biliary  ductal dilatation  1g. New ascites.  2a. Increased inner wall enhancement with mild thickening involving  the left colon with mild pericolonic streakiness, may represent  colitis secondary to infectious, or inflammatory etiology. No  pneumatosis, pneumoperitoneum, pericolic collection.  2b. Mild air-filled distention of transverse colon and part of the  redundant sigmoid colon may represent developing adynamic ileus.     I have personally reviewed the examination and initial interpretation  and I agree with the findings.     ZURDO MANJARREZ MD       Assessment and Plan:  Locally advanced, unresectable pancreatic cancer. Patient started on treatment with palliative FOLFIRINOX on 7/31/23. He had a moderate amount of side effects following cycle 1 with dehydration, diarrhea, and nausea. Received cycle 2 on 8/15/23. ERCP/EGD on 8/17 with GJ tube exchange and duodenal stents placed. Hospitalized 8/21-8/25 due to colitis and abdominal pain. Today he reports he feels better than he has in weeks. Since discharge, he has been advancing his diet, pain is well controlled, and bowel movements have returned to baseline.   - ANC 1.9 today, defer chemo x1 week. Will see him back in clinic next week prior to cycle 3. , Will add Neulasta with future cycles.   - Had symptoms with cycle 1 and 2 during irinotecan including blurry vision, hand cramping, and difficulty getting words out. Thought to be due to atropine, which was not given with cycle 2 and symptoms reoccurred. Previously discussed with Dr. Haile, plan to dose reduce irinotecan 20% going forward.   - Will plan to repeat imaging after 6  cycles of FOLFIRINOX. We will consider potential chemoradiation versus continued chemotherapy, versus clinical trial based on treatment response seen on follow-up imaging.     Nutrition. Continues Peptomin tube feeding overnight through J tube.Reduced down to 5 cans/day. Advancing his oral intake of food, has tolerated soft foods well. Has not been venting his G tube.  He will continue to vent his tube as needed. Continue to push oral hydration, drinking 64+ oz of fluid/day. Missed initial dietician appointment due to hospitalization, will request consult to be rescheduled.     Lower extremity edema. Albumin and protein are low. No pain or redness. Compresion stockings ordered. Elevate legs as often as possible when sitting.     Diabetes. On insulin. Only taking NPH insulin at night, had low blood sugars overnight and didn't get much sleep. Working closely with endocrine/diabetic educator on dose adjustments/management.     Nausea. Avoiding ondansetron due to side effect of constipation. Has been taking Compazine every 6 hours scheduled, no nausea.He is also enrolled in the medical cannabis program, but has not yet started this. Continue Protonix daily.      Diarrhea/constipation. Was previously having diarrhea. Bowels still soft/liquid, but reduced frequency to baseline of 4x/day. Possible ileus during hospitalization. Continue Metamucil.  Was started on dicylomine inpatient, will continue this for now. Will discuss trying three times per day dosing at next week's appointment.     Abdominal pain. Managed with Dilaudid, acetaminophen, and Butrans. Doses adjusted  during hospitalization and pain is well controlled today. Following with palliative care, next scheduled 8/31.     Anemia. Normocytic. Suspect anemia of chronic disease. Will monitor for now.       60 minutes spent on the date of the encounter doing chart review, review of test results, interpretation of tests, patient visit, and documentation     Megan  ESVIN Farrell CNP

## 2023-08-28 NOTE — PROGRESS NOTES
Clinic Care Coordination Contact  [facility]: Post-Discharge Note  SITUATION                                                      Admission:    Admission Date: 08/21/23   Reason for Admission: acute-on-chronic abdominal pain, increased G-tube output, and diarrhea.  Discharge:   Discharge Date: 08/25/23  Discharge Diagnosis: Acute-on-Chronic Abdominal Pain  Colitis likely secondary to chemotherapy  Pancreatic Adenocarcinoma   Multi factorial diarrhea (pancreatic insufficiency, likely worsened by recent FOLFIRINOX chemotherapy)   Type 2 Diabetes Mellitus, insulin-dependent   Hx of Gastric Outlet Obstruction (S/P GJ tube placement 05/2023, duodenal stent placement x2 08/17/2023)  Hx of Biliary Obstruction (S/P stent placement 07/2023)  Hx of Pancreatitis   Pancreatic Insufficiency  G tube dependency due to gastric outlet obstruciton  Hiccups  Nausea  Chronic Normocytic Anemia    BACKGROUND                                                      Per hospital discharge summary and inpatient provider notes:  Gallo Navarro is a 57 year old male admitted on 8/21/2023. He has a past medical history of recent diagnosis of pancreatic adenocarcinoma c/b T2DM, pancreatic insufficiency, gastric outlet obstruction (S/P GJ tube placement 05/2023), biliary obstruction (S/P stent placement 07/2023). He was admitted after presenting to the ED for acute-on-chronic abdominal pain, increased G-tube output, and diarrhea. He underwent imaging which showed colitis which was felt to likely reflect chemotherapy side effect given ongoing diarrhea.GI was consulte during stay and no procedural intervention was needed. He was seen by oncology and palliative care. His pain regiment was adjusted. He was started on additional medications for hiccups, acid reflux, and diarrhea with improvement in his symptoms each day during the hospital course. He was discharged with plans for palliative care follow up and oncology follow up.       ASSESSMENT   "         Discharge Assessment  How are you doing now that you are home?: CC RN contacted patient, introduced self, role, care coordination program and reason for call.  Patient reports \"Im feeling way better, I had a good night of sleep, I'm starting to eat food.\" Able to tolerate rice ramen soup and sweet potatoes. Hiccups went away saturday. Patient denies barriers to care. Wife providing transportation, buying groceries via [employer] home delivery. Patient declined CC, \"too many cooks in the kitchen.\"  How are your symptoms? (Red Flag symptoms escalate to triage hotline per guidelines): Improved  Do you feel your condition is stable enough to be safe at home until your provider visit?: Yes  Does the patient have their discharge instructions? : Yes  Does the patient have questions regarding their discharge instructions? : No  Were you started on any new medications or were there changes to any of your previous medications? : Yes  Does the patient have all of their medications?: Yes  Do you have questions regarding any of your medications? : No  Discharge follow-up appointment scheduled within 14 calendar days? : Yes  Discharge Follow Up Appointment Date: 08/31/23  Discharge Follow Up Appointment Scheduled with?: Specialty Care Provider         Post-op (Clinicians Only)  Did the patient have surgery or a procedure: No  Fever: No  Chills: No  Eating & Drinking: eating and drinking without complaints/concerns  Bowel Function: loose stools  Date of last BM: 08/28/23  Urinary Status: voiding without complaint/concerns    PLAN                                                      Outpatient Plan:  1. Patient verbalized good understanding of care plans and will follow recommendations.  2. Care Coordination goals not identified at this time. Patient may be referred to Care Coordination in the future if additional needs arise.    3. Patient encouraged to contact the care team if situation changes and assistance is " needed.    Future Appointments   Date Time Provider Department Center   8/29/2023  9:30 AM  [facility] DRAW [facility]   8/29/2023 10:00 AM Megan Farrell APRN CNP [facility]   8/29/2023 10:00 AM  ONC INFUSION NURSE [facility]   8/31/2023 11:50 AM Jeremy Hurt MD [facility]   9/12/2023  8:15 AM  [facility]   9/12/2023  9:00 AM Berta Anglin PA-C [facility]   9/12/2023 11:00 AM  ONC INFUSION NURSE [facility]   9/26/2023  7:30 AM  [facility]   9/26/2023  8:00 AM Berta Anglin PA-C [facility]   9/26/2023  8:00 AM  ONC INFUSION NURSE [facility]   10/4/2023  1:30 PM Akil Hernandez MD [facility]   10/10/2023  9:30 AM  [facility]RAW [facility]   10/10/2023 10:00 AM Berta Anglin PA-C [facility]   10/10/2023 11:00 AM  ONC INFUSION NURSE [facility]         For any urgent concerns, please contact our 24 hour nurse triage line: [phone] ([phone])         Senait Carson RN

## 2023-08-29 ENCOUNTER — MYC REFILL (OUTPATIENT)
Dept: FAMILY MEDICINE | Facility: CLINIC | Age: 58
End: 2023-08-29

## 2023-08-29 ENCOUNTER — INFUSION THERAPY VISIT (OUTPATIENT)
Dept: ONCOLOGY | Facility: CLINIC | Age: 58
End: 2023-08-29
Attending: STUDENT IN AN ORGANIZED HEALTH CARE EDUCATION/TRAINING PROGRAM
Payer: COMMERCIAL

## 2023-08-29 ENCOUNTER — TELEPHONE (OUTPATIENT)
Dept: FAMILY MEDICINE | Facility: CLINIC | Age: 58
End: 2023-08-29

## 2023-08-29 ENCOUNTER — MYC REFILL (OUTPATIENT)
Dept: PALLIATIVE CARE | Facility: CLINIC | Age: 58
End: 2023-08-29

## 2023-08-29 ENCOUNTER — APPOINTMENT (OUTPATIENT)
Dept: LAB | Facility: CLINIC | Age: 58
End: 2023-08-29
Attending: STUDENT IN AN ORGANIZED HEALTH CARE EDUCATION/TRAINING PROGRAM
Payer: COMMERCIAL

## 2023-08-29 VITALS
OXYGEN SATURATION: 99 % | WEIGHT: 162.48 LBS | SYSTOLIC BLOOD PRESSURE: 104 MMHG | RESPIRATION RATE: 16 BRPM | BODY MASS INDEX: 22.04 KG/M2 | DIASTOLIC BLOOD PRESSURE: 68 MMHG | TEMPERATURE: 98.8 F | HEART RATE: 89 BPM

## 2023-08-29 DIAGNOSIS — K86.89 PANCREATIC MASS: ICD-10-CM

## 2023-08-29 DIAGNOSIS — R10.30 LOWER ABDOMINAL PAIN: ICD-10-CM

## 2023-08-29 DIAGNOSIS — C25.0 MALIGNANT NEOPLASM OF HEAD OF PANCREAS (H): Primary | ICD-10-CM

## 2023-08-29 DIAGNOSIS — Z79.4 TYPE 2 DIABETES MELLITUS WITHOUT COMPLICATION, WITH LONG-TERM CURRENT USE OF INSULIN (H): ICD-10-CM

## 2023-08-29 DIAGNOSIS — K31.1 GASTRIC OUTLET OBSTRUCTION: ICD-10-CM

## 2023-08-29 DIAGNOSIS — R60.9 EDEMA, UNSPECIFIED TYPE: ICD-10-CM

## 2023-08-29 DIAGNOSIS — E11.9 TYPE 2 DIABETES MELLITUS WITHOUT COMPLICATION, WITH LONG-TERM CURRENT USE OF INSULIN (H): ICD-10-CM

## 2023-08-29 DIAGNOSIS — Z51.11 ENCOUNTER FOR ANTINEOPLASTIC CHEMOTHERAPY: ICD-10-CM

## 2023-08-29 DIAGNOSIS — C25.9 PANCREATIC ADENOCARCINOMA (H): ICD-10-CM

## 2023-08-29 DIAGNOSIS — R11.0 NAUSEA: ICD-10-CM

## 2023-08-29 DIAGNOSIS — Z98.890 HISTORY OF BILIARY STENT INSERTION: ICD-10-CM

## 2023-08-29 DIAGNOSIS — R10.84 ABDOMINAL PAIN, GENERALIZED: ICD-10-CM

## 2023-08-29 LAB
ALBUMIN SERPL BCG-MCNC: 3 G/DL (ref 3.5–5.2)
ALP SERPL-CCNC: 85 U/L (ref 40–129)
ALT SERPL W P-5'-P-CCNC: 17 U/L (ref 0–70)
ANION GAP SERPL CALCULATED.3IONS-SCNC: 8 MMOL/L (ref 7–15)
AST SERPL W P-5'-P-CCNC: 16 U/L (ref 0–45)
BASOPHILS # BLD AUTO: 0 10E3/UL (ref 0–0.2)
BASOPHILS NFR BLD AUTO: 0 %
BILIRUB SERPL-MCNC: 0.3 MG/DL
BUN SERPL-MCNC: 8.5 MG/DL (ref 6–20)
CALCIUM SERPL-MCNC: 8.4 MG/DL (ref 8.6–10)
CHLORIDE SERPL-SCNC: 106 MMOL/L (ref 98–107)
CREAT SERPL-MCNC: 0.48 MG/DL (ref 0.67–1.17)
DEPRECATED HCO3 PLAS-SCNC: 26 MMOL/L (ref 22–29)
EOSINOPHIL # BLD AUTO: 0.2 10E3/UL (ref 0–0.7)
EOSINOPHIL NFR BLD AUTO: 7 %
ERYTHROCYTE [DISTWIDTH] IN BLOOD BY AUTOMATED COUNT: 13.8 % (ref 10–15)
GFR SERPL CREATININE-BSD FRML MDRD: >90 ML/MIN/1.73M2
GLUCOSE SERPL-MCNC: 116 MG/DL (ref 70–99)
HCT VFR BLD AUTO: 25.4 % (ref 40–53)
HGB BLD-MCNC: 8.4 G/DL (ref 13.3–17.7)
IMM GRANULOCYTES # BLD: 0 10E3/UL
IMM GRANULOCYTES NFR BLD: 0 %
LYMPHOCYTES # BLD AUTO: 1.1 10E3/UL (ref 0.8–5.3)
LYMPHOCYTES NFR BLD AUTO: 38 %
MCH RBC QN AUTO: 30.1 PG (ref 26.5–33)
MCHC RBC AUTO-ENTMCNC: 33.1 G/DL (ref 31.5–36.5)
MCV RBC AUTO: 91 FL (ref 78–100)
MONOCYTES # BLD AUTO: 0.6 10E3/UL (ref 0–1.3)
MONOCYTES NFR BLD AUTO: 23 %
NEUTROPHILS # BLD AUTO: 0.9 10E3/UL (ref 1.6–8.3)
NEUTROPHILS NFR BLD AUTO: 32 %
NRBC # BLD AUTO: 0 10E3/UL
NRBC BLD AUTO-RTO: 0 /100
PLAT MORPH BLD: NORMAL
PLATELET # BLD AUTO: 201 10E3/UL (ref 150–450)
POTASSIUM SERPL-SCNC: 3.4 MMOL/L (ref 3.4–5.3)
PROT SERPL-MCNC: 5.2 G/DL (ref 6.4–8.3)
RBC # BLD AUTO: 2.79 10E6/UL (ref 4.4–5.9)
RBC MORPH BLD: NORMAL
SODIUM SERPL-SCNC: 140 MMOL/L (ref 136–145)
WBC # BLD AUTO: 2.8 10E3/UL (ref 4–11)

## 2023-08-29 PROCEDURE — 85025 COMPLETE CBC W/AUTO DIFF WBC: CPT | Performed by: STUDENT IN AN ORGANIZED HEALTH CARE EDUCATION/TRAINING PROGRAM

## 2023-08-29 PROCEDURE — 36591 DRAW BLOOD OFF VENOUS DEVICE: CPT | Performed by: STUDENT IN AN ORGANIZED HEALTH CARE EDUCATION/TRAINING PROGRAM

## 2023-08-29 PROCEDURE — 99215 OFFICE O/P EST HI 40 MIN: CPT

## 2023-08-29 PROCEDURE — 80053 COMPREHEN METABOLIC PANEL: CPT | Performed by: STUDENT IN AN ORGANIZED HEALTH CARE EDUCATION/TRAINING PROGRAM

## 2023-08-29 PROCEDURE — 250N000011 HC RX IP 250 OP 636: Mod: JZ | Performed by: STUDENT IN AN ORGANIZED HEALTH CARE EDUCATION/TRAINING PROGRAM

## 2023-08-29 RX ORDER — DICYCLOMINE HYDROCHLORIDE 10 MG/1
20 CAPSULE ORAL
Qty: 160 CAPSULE | Refills: 0 | Status: SHIPPED | OUTPATIENT
Start: 2023-08-29 | End: 2023-09-18

## 2023-08-29 RX ORDER — HEPARIN SODIUM (PORCINE) LOCK FLUSH IV SOLN 100 UNIT/ML 100 UNIT/ML
5 SOLUTION INTRAVENOUS
Status: DISCONTINUED | OUTPATIENT
Start: 2023-08-29 | End: 2023-08-29 | Stop reason: HOSPADM

## 2023-08-29 RX ORDER — SODIUM BICARBONATE 325 MG/1
325 TABLET ORAL 3 TIMES DAILY
Qty: 90 TABLET | Refills: 0 | Status: SHIPPED | OUTPATIENT
Start: 2023-08-29 | End: 2023-12-08

## 2023-08-29 RX ORDER — HYDROMORPHONE HYDROCHLORIDE 2 MG/1
2-4 TABLET ORAL EVERY 4 HOURS PRN
Qty: 180 TABLET | Refills: 0 | Status: SHIPPED | OUTPATIENT
Start: 2023-08-29 | End: 2023-09-15

## 2023-08-29 RX ADMIN — Medication 5 ML: at 09:54

## 2023-08-29 ASSESSMENT — PAIN SCALES - GENERAL: PAINLEVEL: NO PAIN (0)

## 2023-08-29 NOTE — Clinical Note
8/29/2023         RE: Gallo Navarro  [id number] [address] [place] [id number]        Dear Colleague,    Thank you for referring your patient, Gallo Navarro, to the [facility]. Please see a copy of my visit note below.    Oncology/Hematology Visit Note  Aug 29, 2023    Reason for Visit: follow up of locally advanced, unresectable pancreatic cancer     History of Present Illness: Gallo Navarro is a 57 year old male with locally advanced, unresectable pancreatic cancer. 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 He then presented to the ED with worsening abdominal pain, increased jaundice, poor PO intake and weight loss admitted on 7/8/2023 for concern of biliary obstruction. Unfortunately, during this admission, biopsy of pancreatic mass returned positive for pancreatic adenocarcinoma on 7/12/23. He started on treatment with 5FU, irinotecan, and oxaliplatin (FOLFIRINOX) on 7/31/23.     8/17/23 - ERCP/EGD, duodenal stents x2 placed, exchange of GJ tube    8/21-/8/25 hospitalized due to ***       Please see previous notes for further details on the patient's history.     He comes in today for routine follow up prior to cycle 3 FOLFIRINOX.    Interval History:  Nutrition/weight/pancreatic enzymes?   Tube feeding, fluids, PO nutrition  Pain -   Diarrhea/stools -   Hiccups  Blood sugars  Neuropathy/cold sensitivity  Energy/activity  Bone mets - dental clearance ***   Last infusion -       5 cartons overnight, increased PO intake    Lower extremity edema, compression stockings    Metamucil, no other bowel meds, stools soft but less often    *prescription fiber - banatrol ***     Cold sensitivity - lasts 1 week, mouth lasts a couple days      Current Outpatient Medications    Medication Sig Dispense Refill    acetaminophen (TYLENOL) 32 mg/mL liquid Take 20.313 mLs (650 mg) by mouth every 8 hours 1800 mL 11    B-D U/F 31G X 8 MM insulin pen needle Inject Subcutaneous 5 times daily Use 5 pen needles daily or as directed. 500 each 1    blood glucose (FREESTYLE TEST STRIPS) test strip by Other route as needed      buprenorphine (BUTRANS) 20 MCG/HR WK patch Place 1 patch onto the skin once a week 4 patch 0    Continuous Blood Gluc Sensor (FREESTYLE KAREN 2 SENSOR) MISC       dexamethasone (DECADRON) 4 MG tablet Take 2 tablets (8 mg) by mouth daily Take for 2 days, starting the day after chemo. Take with food. 4 tablet 2    dicyclomine (BENTYL) 10 MG/5ML solution Take 10 mLs (20 mg) by mouth 4 times daily as needed (abdominal discomfort, best before meals) 473 mL 0    gabapentin (NEURONTIN) 300 MG capsule Take 1 capsule (300 mg) by mouth 3 times daily 90 capsule 0    HYDROmorphone (DILAUDID) 2 MG tablet Take 1-2 tablets (2-4 mg) by mouth every 4 hours as needed for severe pain 180 tablet 0    insulin aspart (NOVOLOG PEN) 100 UNIT/ML pen Inject 1-10 Units Subcutaneous 3 times daily (with meals) 15 mL 3    insulin glargine (BASAGLAR KWIKPEN) 100 UNIT/ML pen Inject 20 Units Subcutaneous every morning for 360 days (Patient taking differently: Inject 20 Units Subcutaneous At Bedtime) 18 mL 3    insulin  UNIT/ML injection Inject 15 Units Subcutaneous every evening for 60 days 9 mL 0    Lancets (ONETOUCH DELICA PLUS [id number]) MISC       lidocaine-prilocaine (EMLA) 2.5-2.5 % external cream Use 1-2 times a week or as needed prior to port access 30 g 3    lidocaine-prilocaine (EMLA) 2.5-2.5 % external cream Use 1-2 times weekly or as needed prior to port access 30 g 3    lipase-protease-amylase (CREON 24) 99284-19302-461800 units CPEP per EC capsule 1-2 capsules by Per J Tube route 3 times daily (with meals) 180 capsule 3    loperamide (IMODIUM) 2 MG capsule 2 caps at 1st sign of diarrhea & 1  cap every 2hrs until 12hrs diarrhea free. During night, 2 caps at bedtime & 2 caps every 4hrs until  capsule 3    naloxone (NARCAN) 4 MG/0.1ML nasal spray Spray 1 spray (4 mg) into one nostril alternating nostrils as needed for opioid reversal every 2-3 minutes until assistance arrives 0.2 mL 11    ondansetron (ZOFRAN ODT) 8 MG ODT tab Take 1 tablet (8 mg) by mouth every 8 hours as needed for nausea 30 tablet 3    ondansetron (ZOFRAN) 8 MG tablet Take 1 tablet (8 mg) by mouth every 8 hours as needed for nausea (vomiting) 30 tablet 2    pantoprazole (PROTONIX) 40 MG EC tablet Take 1 tablet (40 mg) by mouth 2 times daily 60 tablet 0    polyethylene glycol (MIRALAX) 17 GM/Dose powder Take 17 g by mouth daily 510 g 3    prochlorperazine (COMPAZINE) 10 MG tablet Take 1 tablet (10 mg) by mouth every 6 hours as needed for nausea or vomiting 60 tablet 1    prochlorperazine (COMPAZINE) 10 MG tablet Take 1 tablet (10 mg) by mouth every 6 hours as needed for nausea or vomiting 30 tablet 2    psyllium (METAMUCIL/KONSYL) capsule 1 capsule by Per G Tube route 2 times daily 180 capsule 3    sennosides (SENOKOT) 8.8 MG/5ML syrup 5 mLs by Per J Tube route 2 times daily 236 mL 3    simethicone (MYLICON) 125 MG chewable tablet Take 125 mg by mouth 2 times daily      sodium bicarbonate 325 MG tablet 1 tablet (325 mg) by Per J Tube route 3 times daily 90 tablet 0    vitamin D3 (CHOLECALCIFEROL) 50 mcg (2000 units) tablet Take 1 tablet (50 mcg) by mouth daily 30 tablet 1     Physical Examination:  General: The patient is a pleasant male in no acute distress. He is here today with his wife.   There were no vitals taken for this visit.  Wt Readings from Last 10 Encounters:   08/22/23 73.4 kg (161 lb 12.8 oz)   08/17/23 74.8 kg (164 lb 14.5 oz)   08/15/23 74.3 kg (163 lb 14.4 oz)   08/09/23 74.5 kg (164 lb 4.8 oz)   08/07/23 75.4 kg (166 lb 4.8 oz)   07/31/23 76.4 kg (168 lb 6.4 oz)   07/28/23 76.7 kg (169 lb)   07/27/23 76.7 kg (169  lb)   07/21/23 76.7 kg (169 lb 3.2 oz)   07/15/23 78.4 kg (172 lb 14.4 oz)   HEENT: EOMI. Sclerae are anicteric.   Lymph: Neck is supple with no lymphadenopathy in the cervical or supraclavicular areas.   Heart: Regular rate and rhythm.   Lungs: Clear to auscultation bilaterally.   Abdomen: Bowel sounds present, soft, nontender with no palpable hepatosplenomegaly or masses.   Extremities: No lower extremity edema noted bilaterally.   Neuro: Cranial nerves II through XII are grossly intact.  Skin: No rashes, petechiae, or bruising noted on exposed skin.    Laboratory Data:  Most Recent 3 CBC's:  Recent Labs   Lab Test 08/23/23  0638 08/22/23  0711 08/21/23  1351 08/15/23  1002 08/11/23  1615   WBC 3.9* 5.9 7.4   < > 4.9   HGB 8.7* 9.0* 9.8*   < > 10.8*   MCV 93 92 92   < > 93    214 223   < > 280   ANEUTAUTO  --  3.9 5.8  --  3.0    < > = values in this interval not displayed.    Most Recent 3 BMP's:  Recent Labs   Lab Test 08/25/23  1159 08/25/23  0742 08/25/23  0200 08/23/23  0826 08/23/23  0638 08/22/23  1012 08/22/23  0711 08/21/23 2012 08/21/23  1351 08/17/23  1311 08/15/23  1002   NA  --   --   --   --  138  --  137  --  138  --  134*   POTASSIUM  --   --   --   --  3.6  --  4.0  --  4.1  --  3.5   CHLORIDE  --   --   --   --  104  --  105  --  105  --  101   CO2  --   --   --   --  26  --  23  --  24  --  24   BUN  --   --   --   --  10.1  --  14.3  --  16.8  --  12.3   CR  --   --   --   --  0.52*  --  0.49*  --  0.47*  --  0.55*   ANIONGAP  --   --   --   --  8  --  9  --  9  --  9   DENISE  --   --   --   --  8.4*  --  8.1*  --  8.3*  --  8.8   * 79 137*   < > 94   < > 108*   < > 143*   < > 91   PROTTOTAL  --   --   --   --   --   --  4.8*  --  5.5*  --  5.8*   ALBUMIN  --   --   --   --   --   --  2.6*  --  3.3*  --  3.2*    < > = values in this interval not displayed.    Most Recent 2 LFT's:  Recent Labs   Lab Test 08/22/23  0711 08/21/23  1351   AST 19 23   ALT 22 28   ALKPHOS 71 18    BILITOTAL 0.4 0.4   I reviewed the above labs today.    Assessment and Plan:  Locally advanced, unresectable pancreatic cancer. Patient started on treatment with palliative FOLFIRINOX on 7/31/23. He had a moderate amount of side effects following cycle 1 with dehydration, diarrhea, and nausea. Today ***     -Will plan to repeat imaging after 6 cycles of FOLFIRINOX. We will consider potential chemoradiation versus continued chemotherapy, versus clinical trial based on treatment response seen on follow-up imaging.      Bone metastasis     Nutrition. S/P GJ venting. He is on Peptomin 1.5 at 6 cans/day, which he will continue. He will continue to vent his tube as needed. Will also place a referral to meet with our dietician.     Dehydration. Will give 1L IV NS with his pump unhook. ***    Diabetes. On insulin. Working with endocrine on management.   ***  Advised to let endocrine know about the steroids he receives with each cycle of chemotherapy: 12 mg IV dex on day 1, 8 mg po dex on days 2-3.     Nausea. Recommend scheduling Zofran and Compazine bid each for better management. He is also enrolled in the medical cannabis program, but has not yet started this.     Diarrhea. Recommend management with Imodium prn. Also, recommend continuing with Metamucil daily.     Abdominal pain. Managed with Dilaudid, *** and Butrans. Following with palliative care.       Anemia. Normocytic. Suspect anemia of chronic disease. Will monitor for now.         Addendum: Reviewed with Dr. York from GI and will plan for endoscopy and duodenal stent placement, as well as replacement of his GJ feeding tube. Will hold off on celiac neurolysis for now as pain is primarily associated with eating.       Addendum: Given concerns with blurry vision, difficult word finding, and hand cramping during his infusion, will decrease irinotecan by 20% moving forward.     *** minutes spent on the date of the encounter doing {2021 E&M time in:358809}      Megan Farrell, APRN CNP      Repeat CT imaging of abdomen on 6/2/2023 in follow-up of pancreatitis with note of more focal irregular hypodense masslike region in the pancreatic head (2.2 x 1.5 x 1.4 cm) with persistent mild main pancreatic ductal dilatation, subcentimeter mid mesenteric lymph nodes, stable narrowing of the main portal vein near the portal venous confluence. EUS biopsy was performed on 6/27/23 showing duodenal inflammation. Unable to get biopsy of pancreatic mass due to poor window.  Patient then developed new abdominal pain since 6/27 after biopsy, bandlike, radiating to his back, with increase jaundice. 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 EGD/EUS completed 7/11 w/ biopsies taken > pathology report back on 7/12 +pancreatic adenocarcinoma.     Oncology/Hematology Visit Note  Aug 29, 2023    Reason for Visit: follow up of locally advanced, unresectable pancreatic cancer     History of Present Illness: Gallo Navarro is a 57 year old male with locally advanced, unresectable pancreatic cancer. 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 He then presented to the ED with worsening abdominal pain, increased jaundice, poor PO intake and weight loss admitted on 7/8/2023 for concern of biliary obstruction. Unfortunately, during this admission, biopsy of pancreatic mass returned positive for pancreatic adenocarcinoma on 7/12/23. He started on treatment with 5FU, irinotecan, and oxaliplatin (FOLFIRINOX) on 7/31/23.     8/17/23 - ERCP/EGD, duodenal stents x2 placed, exchange of GJ tube    8/21-/8/25 hospitalized due to ***       Please see  previous notes for further details on the patient's history.     He comes in today for routine follow up prior to cycle 3 FOLFIRINOX.    Interval History:  Nutrition/weight/pancreatic enzymes?   Tube feeding, fluids, PO nutrition  Pain -   Diarrhea/stools -   Hiccups  Blood sugars  Neuropathy/cold sensitivity  Energy/activity  Bone mets - dental clearance ***   Last infusion -       5 cartons overnight, increased PO intake    Lower extremity edema, compression stockings    Metamucil, no other bowel meds, stools soft but less often    *prescription fiber - banatrol ***     Cold sensitivity - lasts 1 week, mouth lasts a couple days      Current Outpatient Medications   Medication Sig Dispense Refill     acetaminophen (TYLENOL) 32 mg/mL liquid Take 20.313 mLs (650 mg) by mouth every 8 hours 1800 mL 11     B-D U/F 31G X 8 MM insulin pen needle Inject Subcutaneous 5 times daily Use 5 pen needles daily or as directed. 500 each 1     blood glucose (FREESTYLE TEST STRIPS) test strip by Other route as needed       buprenorphine (BUTRANS) 20 MCG/HR WK patch Place 1 patch onto the skin once a week 4 patch 0     Continuous Blood Gluc Sensor (FREESTYLE KAREN 2 SENSOR) MISC        dexamethasone (DECADRON) 4 MG tablet Take 2 tablets (8 mg) by mouth daily Take for 2 days, starting the day after chemo. Take with food. 4 tablet 2     dicyclomine (BENTYL) 10 MG/5ML solution Take 10 mLs (20 mg) by mouth 4 times daily as needed (abdominal discomfort, best before meals) 473 mL 0     gabapentin (NEURONTIN) 300 MG capsule Take 1 capsule (300 mg) by mouth 3 times daily 90 capsule 0     HYDROmorphone (DILAUDID) 2 MG tablet Take 1-2 tablets (2-4 mg) by mouth every 4 hours as needed for severe pain 180 tablet 0     insulin aspart (NOVOLOG PEN) 100 UNIT/ML pen Inject 1-10 Units Subcutaneous 3 times daily (with meals) 15 mL 3     insulin glargine (BASAGLAR KWIKPEN) 100 UNIT/ML pen Inject 20 Units Subcutaneous every morning for 360 days (Patient  taking differently: Inject 20 Units Subcutaneous At Bedtime) 18 mL 3     insulin  UNIT/ML injection Inject 15 Units Subcutaneous every evening for 60 days 9 mL 0     Lancets (ONETOUCH DELICA PLUS [id number]) MISC        lidocaine-prilocaine (EMLA) 2.5-2.5 % external cream Use 1-2 times a week or as needed prior to port access 30 g 3     lidocaine-prilocaine (EMLA) 2.5-2.5 % external cream Use 1-2 times weekly or as needed prior to port access 30 g 3     lipase-protease-amylase (CREON 24) 77789-25046-373607 units CPEP per EC capsule 1-2 capsules by Per J Tube route 3 times daily (with meals) 180 capsule 3     loperamide (IMODIUM) 2 MG capsule 2 caps at 1st sign of diarrhea & 1 cap every 2hrs until 12hrs diarrhea free. During night, 2 caps at bedtime & 2 caps every 4hrs until  capsule 3     naloxone (NARCAN) 4 MG/0.1ML nasal spray Spray 1 spray (4 mg) into one nostril alternating nostrils as needed for opioid reversal every 2-3 minutes until assistance arrives 0.2 mL 11     ondansetron (ZOFRAN ODT) 8 MG ODT tab Take 1 tablet (8 mg) by mouth every 8 hours as needed for nausea 30 tablet 3     ondansetron (ZOFRAN) 8 MG tablet Take 1 tablet (8 mg) by mouth every 8 hours as needed for nausea (vomiting) 30 tablet 2     pantoprazole (PROTONIX) 40 MG EC tablet Take 1 tablet (40 mg) by mouth 2 times daily 60 tablet 0     polyethylene glycol (MIRALAX) 17 GM/Dose powder Take 17 g by mouth daily 510 g 3     prochlorperazine (COMPAZINE) 10 MG tablet Take 1 tablet (10 mg) by mouth every 6 hours as needed for nausea or vomiting 60 tablet 1     prochlorperazine (COMPAZINE) 10 MG tablet Take 1 tablet (10 mg) by mouth every 6 hours as needed for nausea or vomiting 30 tablet 2     psyllium (METAMUCIL/KONSYL) capsule 1 capsule by Per G Tube route 2 times daily 180 capsule 3     sennosides (SENOKOT) 8.8 MG/5ML syrup 5 mLs by Per J Tube route 2 times daily 236 mL 3     simethicone (MYLICON) 125 MG chewable tablet Take 125 mg by  mouth 2 times daily       sodium bicarbonate 325 MG tablet 1 tablet (325 mg) by Per J Tube route 3 times daily 90 tablet 0     vitamin D3 (CHOLECALCIFEROL) 50 mcg (2000 units) tablet Take 1 tablet (50 mcg) by mouth daily 30 tablet 1     Physical Examination:  General: The patient is a pleasant male in no acute distress. He is here today with his wife.   There were no vitals taken for this visit.  Wt Readings from Last 10 Encounters:   08/22/23 73.4 kg (161 lb 12.8 oz)   08/17/23 74.8 kg (164 lb 14.5 oz)   08/15/23 74.3 kg (163 lb 14.4 oz)   08/09/23 74.5 kg (164 lb 4.8 oz)   08/07/23 75.4 kg (166 lb 4.8 oz)   07/31/23 76.4 kg (168 lb 6.4 oz)   07/28/23 76.7 kg (169 lb)   07/27/23 76.7 kg (169 lb)   07/21/23 76.7 kg (169 lb 3.2 oz)   07/15/23 78.4 kg (172 lb 14.4 oz)   HEENT: EOMI. Sclerae are anicteric.   Lymph: Neck is supple with no lymphadenopathy in the cervical or supraclavicular areas.   Heart: Regular rate and rhythm.   Lungs: Clear to auscultation bilaterally.   Abdomen: Bowel sounds present, soft, nontender with no palpable hepatosplenomegaly or masses.   Extremities: No lower extremity edema noted bilaterally.   Neuro: Cranial nerves II through XII are grossly intact.  Skin: No rashes, petechiae, or bruising noted on exposed skin.    Laboratory Data:  Most Recent 3 CBC's:  Recent Labs   Lab Test 08/23/23  0638 08/22/23  0711 08/21/23  1351 08/15/23  1002 08/11/23  1615   WBC 3.9* 5.9 7.4   < > 4.9   HGB 8.7* 9.0* 9.8*   < > 10.8*   MCV 93 92 92   < > 93    214 223   < > 280   ANEUTAUTO  --  3.9 5.8  --  3.0    < > = values in this interval not displayed.    Most Recent 3 BMP's:  Recent Labs   Lab Test 08/25/23  1159 08/25/23  0742 08/25/23  0200 08/23/23  0826 08/23/23  0638 08/22/23  1012 08/22/23  0711 08/21/23 2012 08/21/23  1351 08/17/23  1311 08/15/23  1002   NA  --   --   --   --  138  --  137  --  138  --  134*   POTASSIUM  --   --   --   --  3.6  --  4.0  --  4.1  --  3.5   CHLORIDE  --    --   --   --  104  --  105  --  105  --  101   CO2  --   --   --   --  26  --  23  --  24  --  24   BUN  --   --   --   --  10.1  --  14.3  --  16.8  --  12.3   CR  --   --   --   --  0.52*  --  0.49*  --  0.47*  --  0.55*   ANIONGAP  --   --   --   --  8  --  9  --  9  --  9   DENISE  --   --   --   --  8.4*  --  8.1*  --  8.3*  --  8.8   * 79 137*   < > 94   < > 108*   < > 143*   < > 91   PROTTOTAL  --   --   --   --   --   --  4.8*  --  5.5*  --  5.8*   ALBUMIN  --   --   --   --   --   --  2.6*  --  3.3*  --  3.2*    < > = values in this interval not displayed.    Most Recent 2 LFT's:  Recent Labs   Lab Test 08/22/23  0711 08/21/23  1351   AST 19 23   ALT 22 28   ALKPHOS 71 82   BILITOTAL 0.4 0.4   I reviewed the above labs today.    Assessment and Plan:  Locally advanced, unresectable pancreatic cancer. Patient started on treatment with palliative FOLFIRINOX on 7/31/23. He had a moderate amount of side effects following cycle 1 with dehydration, diarrhea, and nausea. Today ***   -ANC today ***, add neulasta with future   -dose reduce irinotecan due to infusion symptoms ***    -Will plan to repeat imaging after 6 cycles of FOLFIRINOX. We will consider potential chemoradiation versus continued chemotherapy, versus clinical trial based on treatment response seen on follow-up imaging.      Bone metastasis     Nutrition. S/P GJ venting. He is on Peptomin 1.5 at 6 cans/day, which he will continue. He will continue to vent his tube as needed. Will also place a referral to meet with our dietician.     Dehydration. Will give 1L IV NS with his pump unhook. ***    Diabetes. On insulin. Working with endocrine on management.   ***  Advised to let endocrine know about the steroids he receives with each cycle of chemotherapy: 12 mg IV dex on day 1, 8 mg po dex on days 2-3.     Nausea. Recommend scheduling Zofran and Compazine bid each for better management. He is also enrolled in the medical cannabis program, but has not  yet started this.     Diarrhea. Recommend management with Imodium prn. Also, recommend continuing with Metamucil daily.     Abdominal pain. Managed with Dilaudid, *** and Butrans. Following with palliative care.       Anemia. Normocytic. Suspect anemia of chronic disease. Will monitor for now.               Addendum: Given concerns with blurry vision, difficult word finding, and hand cramping during his infusion, will decrease irinotecan by 20% moving forward.     *** minutes spent on the date of the encounter doing {2021 E&M time in:867613}     ESVIN Canales CNP      Repeat CT imaging of abdomen on 6/2/2023 in follow-up of pancreatitis with note of more focal irregular hypodense masslike region in the pancreatic head (2.2 x 1.5 x 1.4 cm) with persistent mild main pancreatic ductal dilatation, subcentimeter mid mesenteric lymph nodes, stable narrowing of the main portal vein near the portal venous confluence. EUS biopsy was performed on 6/27/23 showing duodenal inflammation. Unable to get biopsy of pancreatic mass due to poor window.  Patient then developed new abdominal pain since 6/27 after biopsy, bandlike, radiating to his back, with increase jaundice. 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 EGD/EUS completed 7/11 w/ biopsies taken > pathology report back on 7/12 +pancreatic adenocarcinoma.       Again, thank you for allowing me to participate in the care of your patient.        Sincerely,        ESVIN Canaels CNP

## 2023-08-29 NOTE — PROGRESS NOTES
Infusion Nursing Note:  Gallo Navarro presents today for Cycle 3 Day 1 Oxaliplatin, Irinotecan and Fluorouracil pump connect (DEFERRED).    Patient seen by provider today: Yes: Megan Farrell NP   present during visit today: Not Applicable.    Note: Pt presents to infusion feeling well. He offers no new concerns following his provider appointment today.    ANC below parameters at 0.9.   TORB: Megan Farrell NP/Annie Acevedo RN on 8/29/23 at 1130:  - hold chemotherapy today, will defer 1 week  - will request scheduling     RN reinforced neutropenic precautions with pt and wife today, they confirm understanding of plan.     Intravenous Access:  Implanted Port.    Treatment Conditions:  Lab Results   Component Value Date    HGB 8.4 (L) 08/29/2023    WBC 2.8 (L) 08/29/2023    ANEU 2.8 08/15/2023    ANEUTAUTO 0.9 (L) 08/29/2023     08/29/2023        Lab Results   Component Value Date     08/29/2023    POTASSIUM 3.4 08/29/2023    MAG 1.9 08/23/2023    CR 0.48 (L) 08/29/2023    DENISE 8.4 (L) 08/29/2023    BILITOTAL 0.3 08/29/2023    ALBUMIN 3.0 (L) 08/29/2023    ALT 17 08/29/2023    AST 16 08/29/2023       Results reviewed, labs did NOT meet treatment parameters: ANC < 1.2, see TORB.      Post Infusion Assessment:  Site patent and intact, free from redness, edema or discomfort.  No evidence of extravasations.  Access discontinued per protocol.       Discharge Plan:   Prescription refills given for Compazine, liquid Tylenol, Bentyl, Gabapentin and Protonix. Pt will  Dilaudid and Butrans patch as these are controlled substances sent to local pharmacy.  Discharge instructions reviewed with: Patient and Family.  Patient and/or family verbalized understanding of discharge instructions and all questions answered.  AVS to patient via MailTimeT. Patient will return ~1 week for next appointment, pt aware he will likely be placed on our waitlist and scheduling will follow up.   Patient discharged in  stable condition accompanied by: wife.  Departure Mode: Ambulatory.      Annie Acevedo RN

## 2023-08-29 NOTE — TELEPHONE ENCOUNTER
Forms/Letter Request    [facility]letter:  [id number]    Have you been seen for this request: N/A    Do we have the form/letter: Yes: Will place form on providers desk for review/signature    When is form/letter needed by: asap    How would you like the form/letter returned: Fax : [phone]

## 2023-08-29 NOTE — TELEPHONE ENCOUNTER
Received [employer]atch[employer] message from patient requesting refill of Dilaudid.     Last refill: 8/16/2023  Last office visit: 7/27/2023  Scheduled for follow up 8/31/2023     Will route request to MD for review.     Reviewed [place]  Report.

## 2023-08-29 NOTE — PATIENT INSTRUCTIONS
Manuel Triage and after hours / weekends / holidays:  [phone]    Please call the triage or after hours line if you experience a temperature greater than or equal to 100.4, shaking chills, have uncontrolled nausea, vomiting and/or diarrhea, dizziness, shortness of breath, chest pain, bleeding, unexplained bruising, or if you have any other new/concerning symptoms, questions or concerns.      If you are having any concerning symptoms or wish to speak to a provider before your next infusion visit, please call triage to notify them so we can adequately serve you.     If you need a refill on a narcotic prescription or other medication, please call before your infusion appointment.                August 2023 Sunday Monday Tuesday Wednesday Thursday Friday Saturday             1     2    LAB   1:40 PM   (10 min.)   LAB FIRST FLOOR [facility] Laboratory    DIABETIC ED   2:00 PM   (60 min.)   Pricila Aguirre RN   [facility]    INFUSION 0.5 HR (30 MIN)   3:00 PM   (30 min.)   BAY 1 INFUSION   [facility] 3     4    XR UPPER GI   3:15 PM   (45 min.)   [id number]   [facility] Xray [place] 5       6     7    ED/HOSP FOLLOW UP   9:15 AM   (30 min.)   Akil Hernandez MD   [facility] 8     9    INFUSION 3 HR (180 MIN)  12:00 PM   (180 min.)   UR INFUSION CHAIR   [facility] 10     11    TELEPHONE VISIT SHORT  11:00 AM   (30 min.)   Pricila Aguirre RN   [facility]    LAB CENTRAL   4:00 PM   (15 min.)   NURSE ONLY CANCER CENTER   [facility] 12       13     14     15    LAB CENTRAL   9:15 AM   (15 min.)   [facility] DRAW   [facility]    RETURN CCSL   9:45 AM   (45 min.)   Berta Anglin PA-C   [facility]    ONC INFUSION 6 HR  (360 MIN)  11:00 AM   (360 min.)   UC ONC INFUSION NURSE   [facility] 16     17    NEW DIABETES   8:30 AM   (60 min.)   Berta Cid PA-C   [facility]    Admission   9:32 AM   Christos Greenberg MD   [facility] [place]   (Discharge: 8/17/2023)    ENDOSCOPIC RETROGRADE CHOLANGIOPANCREATOGRAPHY  11:55 AM   Christos Greenberg MD   [facility] 18     19       20     21    Admission   1:14 PM   Richard Titus MD   [facility] 5A Oncology   (Discharge: 8/25/2023)    CT ABDOMEN PELVIS W   2:20 PM   (20 min.)   [id number]   [facility] 22    XR ABDOMEN PORT 1 VIEW   3:10 PM   (20 min.)   [id number]   [facility] 23    XR ABDOMEN PORT 1 VIEW   8:00 AM   (20 min.)   [id number]   [facility] 24     25     26       27     28     29    LAB CENTRAL   9:30 AM   (15 min.)   [facility] DRAW   [facility]    RETURN CCSL   9:45 AM   (45 min.)   Megan Farrell APRN CNP   [facility]    ONC INFUSION 6 HR (360 MIN)  10:00 AM   (360 min.)   UC ONC INFUSION NURSE   [facility] 30     31    RETURN RADIATION ONCOLOGY  11:35 AM   (40 min.)   Jeremy Hurt MD   [facility] [place]                       September 2023 Sunday Monday Tuesday Wednesday Thursday Friday Saturday                            1     2       3     4     5     6     7     8     9       10     11     12    LAB CENTRAL   8:15 AM   (15 min.)   [facility] DRAW   [facility]    RETURN CCSL   8:45 AM   (45 min.)   Berta Anglin PA-C   [facility]    ONC INFUSION 6 HR (360 MIN)  11:00 AM   (360 min.)   UC ONC INFUSION NURSE   [facility] 13     14     15     16       17     18     19     20     21     22     23        24     25     26    LAB CENTRAL   7:30 AM   (15 min.)   [facility] LAB DRAW   [facility]    RETURN CCSL   7:45 AM   (45 min.)   Berta Anglin PA-C   [facility]    ONC INFUSION 6 HR (360 MIN)   8:00 AM   (360 min.)    ONC INFUSION NURSE   [facility] 27     28     29     30                     Lab Results:  Recent Results (from the past 12 hour(s))   Comprehensive metabolic panel    Collection Time: 08/29/23 10:07 AM   Result Value Ref Range    Sodium 140 136 - 145 mmol/L    Potassium 3.4 3.4 - 5.3 mmol/L    Chloride 106 98 - 107 mmol/L    Carbon Dioxide (CO2) 26 22 - 29 mmol/L    Anion Gap 8 7 - 15 mmol/L    Urea Nitrogen 8.5 6.0 - 20.0 mg/dL    Creatinine 0.48 (L) 0.67 - 1.17 mg/dL    Calcium 8.4 (L) 8.6 - 10.0 mg/dL    Glucose 116 (H) 70 - 99 mg/dL    Alkaline Phosphatase 85 40 - 129 U/L    AST 16 0 - 45 U/L    ALT 17 0 - 70 U/L    Protein Total 5.2 (L) 6.4 - 8.3 g/dL    Albumin 3.0 (L) 3.5 - 5.2 g/dL    Bilirubin Total 0.3 <=1.2 mg/dL    GFR Estimate >90 >60 mL/min/1.73m2   CBC with platelets and differential    Collection Time: 08/29/23 10:07 AM   Result Value Ref Range    WBC Count 2.8 (L) 4.0 - 11.0 10e3/uL    RBC Count 2.79 (L) 4.40 - 5.90 10e6/uL    Hemoglobin 8.4 (L) 13.3 - 17.7 g/dL    Hematocrit 25.4 (L) 40.0 - 53.0 %    MCV 91 78 - 100 fL    MCH 30.1 26.5 - 33.0 pg    MCHC 33.1 31.5 - 36.5 g/dL    RDW 13.8 10.0 - 15.0 %    Platelet Count 201 150 - 450 10e3/uL    % Neutrophils 32 %    % Lymphocytes 38 %    % Monocytes 23 %    % Eosinophils 7 %    % Basophils 0 %    % Immature Granulocytes 0 %    NRBCs per 100 WBC 0 <1 /100    Absolute Neutrophils 0.9 (L) 1.6 - 8.3 10e3/uL    Absolute Lymphocytes 1.1 0.8 - 5.3 10e3/uL    Absolute Monocytes 0.6 0.0 - 1.3 10e3/uL    Absolute Eosinophils 0.2 0.0 - 0.7 10e3/uL    Absolute Basophils 0.0 0.0 - 0.2 10e3/uL    Absolute Immature Granulocytes 0.0 <=0.4 10e3/uL     Absolute NRBCs 0.0 10e3/uL   RBC and Platelet Morphology    Collection Time: 08/29/23 10:07 AM   Result Value Ref Range    Platelet Assessment  Automated Count Confirmed. Platelet morphology is normal.     Automated Count Confirmed. Platelet morphology is normal.    RBC Morphology Confirmed RBC Indices

## 2023-08-29 NOTE — TELEPHONE ENCOUNTER
Forms/Letter Request    Type of form/letter:  Keila Counts include 234 beds at the [facility]    Have you been seen for this request: N/A    Do we have the form/letter: Yes: Will place form on providers desk for review/signature    When is form/letter needed by: asap    How would you like the form/letter returned: Fax : [phone]

## 2023-08-29 NOTE — NURSING NOTE
Chief Complaint   Patient presents with    Port Draw     Labs drawn by RN in Lab from Right Chest Port-a-Cath. Line flushed with Saline and Heparin.      Noreen Rascon, DARELL    Pt states he had low blood sugars overnight. Pt states he will discuss this with Provider today. Current blood glucose reading on his monitor is 111.

## 2023-08-30 ENCOUNTER — MEDICAL CORRESPONDENCE (OUTPATIENT)
Dept: HEALTH INFORMATION MANAGEMENT | Facility: CLINIC | Age: 58
End: 2023-08-30
Payer: COMMERCIAL

## 2023-08-30 NOTE — CONFIDENTIAL NOTE
Disp Refills Start End EDA    ALPRAZolam (XANAX) 0.5 MG tablet 15 tablet 0 8/28/2023  No   Sig - Route: Take 1 tablet (0.5 mg) by mouth 2 times daily as needed for anxiety - Oral   Sent to pharmacy as: ALPRAZolam 0.5 MG Oral Tablet (XANAX)   Class: E-Prescribe   Order: [id number]   E-Prescribing Status: Receipt confirmed by pharmacy (8/28/2023  4:51 PM CDT)

## 2023-08-31 ENCOUNTER — VIRTUAL VISIT (OUTPATIENT)
Dept: RADIATION ONCOLOGY | Facility: CLINIC | Age: 58
End: 2023-08-31
Attending: PHYSICIAN ASSISTANT
Payer: COMMERCIAL

## 2023-08-31 ENCOUNTER — TELEPHONE (OUTPATIENT)
Dept: FAMILY MEDICINE | Facility: CLINIC | Age: 58
End: 2023-08-31
Payer: COMMERCIAL

## 2023-08-31 VITALS — HEIGHT: 72 IN | WEIGHT: 163 LBS | BODY MASS INDEX: 22.08 KG/M2

## 2023-08-31 DIAGNOSIS — C25.0 MALIGNANT NEOPLASM OF HEAD OF PANCREAS (H): ICD-10-CM

## 2023-08-31 DIAGNOSIS — C25.9 PANCREATIC ADENOCARCINOMA (H): ICD-10-CM

## 2023-08-31 DIAGNOSIS — G89.3 CANCER ASSOCIATED PAIN: ICD-10-CM

## 2023-08-31 DIAGNOSIS — Z51.5 PALLIATIVE CARE PATIENT: Primary | ICD-10-CM

## 2023-08-31 PROCEDURE — 99215 OFFICE O/P EST HI 40 MIN: CPT | Mod: VID | Performed by: FAMILY MEDICINE

## 2023-08-31 ASSESSMENT — PAIN SCALES - GENERAL: PAINLEVEL: NO PAIN (0)

## 2023-08-31 NOTE — NURSING NOTE
Is the patient currently in the state of [place]? YES    Visit mode:VIDEO    If the visit is dropped, the patient can be reconnected by: VIDEO VISIT: Send to e-mail at: [email or web address]    Will anyone else be joining the visit? Pts wife   (If patient encounters technical issues they should call [phone]    How would you like to obtain your AVS? MyChart    Are changes needed to the allergy or medication list? No, Pt declined allergy review, and Pt declined med review    Patient declined individual allergy and medication review by support staff because they deny any changes and state that all information remains accurate since last reviewed/verified.     No changes since reviewed two days ago per pt     Reason for visit: LO Pantoja MA VVF

## 2023-08-31 NOTE — TELEPHONE ENCOUNTER
RN called Sallie to relay provider message below verbatim regarding home care orders. Sallie verbalized good understanding. No further questions or concerns.    DARELL Salazar  [facility] Primary Care Triage

## 2023-08-31 NOTE — PATIENT INSTRUCTIONS
"It was good to see you today, Gallo and Violet. Go Pack Go!    Here are the things we talked about:  I'm glad you're feeling so much better.    Please give a preference to protein when you're eating to help build back up your body's protein stores.    Let's not change any medication today since things are good.  If you notice more cold sensitivity or burning/tingling in your fingers or toes let us know and we can increase the gabapentin.  Consider wearing gloves when getting things out of the freezer.    Someone from the team will reach out to schedule a follow up appointment in 4-5 weeks.  We talked about starting to look at completing a healthcare directive at your last visit and here is the link again.  I recommend you check out- [email or web address]     They have several tools and some advice about getting started.  Under the \"How do I document my choices\" section, I am a fan of the full length healthcare directive (for adults with serious illness) because I think it has a good combination of the medical questions that are important as well as the social and personal questions that allow healthcare providers to get to know you as a person if you are unable to speak for yourself.  You do not have to complete every question on the document.  I generally recommend patients start with 3 or 4 questions on the goals page and work from there.     There is also a Short Form, which primarily serves to designate health care agents. These are people who can speak on your behalf about your healthcare wishes/goals if you are not able to speak for yourself.     How to get a hold of us:  For non-urgent matters, MyChart works best.    For more urgent matters, or if you prefer not to use MyChart, call our clinic nurse coordinator Megan Funez RN at [phone]    We have an on-call number for evenings and weekends. Please call this only if you are having uncontrolled " symptoms or serious side effects from your medicines: [phone].     For refills, please give us a week (5 working days) notice. We don't always have providers available everyday to do refills. If you call the day you run out of your medicine, we may not be able to refill it in time, so call 5 days in advance!    Jeremy Hurt MD MS FAAFP CAQHPM  [facility]  Office [phone]  Fax [phone]

## 2023-08-31 NOTE — CONFIDENTIAL NOTE
Please call homecare and change their visits to what ever frequency and and timing that the patient wants

## 2023-08-31 NOTE — PROGRESS NOTES
Virtual Visit Details    Type of service:  Video Visit     Originating Location (pt. Location): Home    Distant Location (provider location):  On-site  Platform used for Video Visit: [facility]    Palliative Care Outpatient Clinic Progress Note    Patient Name: Gallo Navarro  Primary Provider: Akil Hernandez    Impressions:  ECO  Decision Making Capacity:  VERY PRESENT  PDMP review:  yes, no concerns     Locally extensive non-resectable pancreatic cancer  Cancer associated pain     GOALS OF CARE:  Life prolonging without limits at this time.     Recommendations & Counseling:  CONTINUE buprenorphine 20 mcg/hour patch and replace weekly  CONTINUE gabapentin one 300 capsule three times a day  Continue dilaudid 4-6 mg by j tube q 3 hours prn  Tylenol suspension 650 mg by J tube TID  Narcan also prescribed for safety  Follow up by video in 5 weeks, sooner prn.     Link provided in AVS to ACP documents.     Counseling: All of the above was explained to the patient in lay language. The patient has verbalized a clear understanding of the discussion, asked appropriate questions, which have been answered to patient's apparent satisfaction. The patient is in agreement with the above plan.      Chief Complaint/Patient ID: Gallo Navarro 57 year old male with PMHx of unresectable localized pancreatic cancer    Last Palliative care appointment: 2023 with me     Reviewed: yes, no concerns    Interim History:  Gallo Navarro is a 57 year old male who is seen today for follow up with Palliative Care via billable video visit. He is joined by his wife, Violet.  He started eating more by mouth (6 small meals yesterday) and he continues with his GJ tube feedings.  He has no nausea or vomiting or recurrent hiccups; pain is much better on Butrans 20 mcg/hr; and using dilaudid 4-6 mg prn.  He had a recent four day hospital stay for acute worsening of abdominal pain; His 3rd FOLFIRINOX cycle was delayed due to neutropenia     Pain:  0  under control at this time    Appetite/Nausea: much improved     Bowels: no concerns     Sleep: very well with current medications and timing     Mood: no anxiety or depression; he is glad he chose to go to the ED when he did and feels like he is 'on top of the wave and not at risk of being crashed into the beach'    Hiccups:  resolved     Coping:  overall well;     Family History- Reviewed in Epic.    No Known Allergies    Social History:  Pertinent changes to social history/social situation since last visit: none  Key support resources: wife and children and large network of family and friends  Advance Directive Status:  not completed at this time.    Social History     Tobacco Use    Smoking status: Never     Passive exposure: Never    Smokeless tobacco: Never   Vaping Use    Vaping Use: Never used   Substance Use Topics    Alcohol use: Not Currently     Comment: o/w light beer 2days a week    Drug use: Never         No Known Allergies  Current Outpatient Medications   Medication Sig Dispense Refill    acetaminophen (TYLENOL) 32 mg/mL liquid Take 20.313 mLs (650 mg) by mouth every 8 hours 1800 mL 11    B-D U/F 31G X 8 MM insulin pen needle Inject Subcutaneous 5 times daily Use 5 pen needles daily or as directed. 500 each 1    blood glucose (FREESTYLE TEST STRIPS) test strip by Other route as needed      buprenorphine (BUTRANS) 20 MCG/HR WK patch Place 1 patch onto the skin once a week 4 patch 0    Continuous Blood Gluc Sensor (FREESTYLE KAREN 2 SENSOR) MISC       dexamethasone (DECADRON) 4 MG tablet Take 2 tablets (8 mg) by mouth daily Take for 2 days, starting the day after chemo. Take with food. 4 tablet 2    dicyclomine (BENTYL) 10 MG capsule Take 2 capsules (20 mg) by mouth 4 times daily (before meals and nightly) for 20 days 160 capsule 0    dicyclomine (BENTYL) 10 MG/5ML solution Take 10 mLs (20 mg) by mouth 4 times daily as needed (abdominal discomfort, best before meals) 473 mL 0    gabapentin (NEURONTIN)  300 MG capsule Take 1 capsule (300 mg) by mouth 3 times daily 90 capsule 0    HYDROmorphone (DILAUDID) 2 MG tablet Take 1-2 tablets (2-4 mg) by mouth every 4 hours as needed for severe pain 180 tablet 0    insulin aspart (NOVOLOG PEN) 100 UNIT/ML pen Inject 1-10 Units Subcutaneous 3 times daily (with meals) 15 mL 3    insulin glargine (BASAGLAR KWIKPEN) 100 UNIT/ML pen Inject 20 Units Subcutaneous every morning for 360 days (Patient taking differently: Inject 20 Units Subcutaneous At Bedtime) 18 mL 3    insulin  UNIT/ML injection Inject 15 Units Subcutaneous every evening for 60 days (Patient taking differently: Inject 16 Units Subcutaneous every evening) 9 mL 0    Lancets (ONETOUCH DELICA PLUS [id number]) MISC       lidocaine-prilocaine (EMLA) 2.5-2.5 % external cream Use 1-2 times a week or as needed prior to port access 30 g 3    lidocaine-prilocaine (EMLA) 2.5-2.5 % external cream Use 1-2 times weekly or as needed prior to port access 30 g 3    lipase-protease-amylase (CREON 24) 87776-52929-603956 units CPEP per EC capsule 1-2 capsules by Per J Tube route 3 times daily (with meals) 180 capsule 3    loperamide (IMODIUM) 2 MG capsule 2 caps at 1st sign of diarrhea & 1 cap every 2hrs until 12hrs diarrhea free. During night, 2 caps at bedtime & 2 caps every 4hrs until  capsule 3    naloxone (NARCAN) 4 MG/0.1ML nasal spray Spray 1 spray (4 mg) into one nostril alternating nostrils as needed for opioid reversal every 2-3 minutes until assistance arrives 0.2 mL 11    ondansetron (ZOFRAN ODT) 8 MG ODT tab Take 1 tablet (8 mg) by mouth every 8 hours as needed for nausea 30 tablet 3    ondansetron (ZOFRAN) 8 MG tablet Take 1 tablet (8 mg) by mouth every 8 hours as needed for nausea (vomiting) 30 tablet 2    pantoprazole (PROTONIX) 40 MG EC tablet Take 1 tablet (40 mg) by mouth 2 times daily 60 tablet 0    polyethylene glycol (MIRALAX) 17 GM/Dose powder Take 17 g by mouth daily 510 g 3    prochlorperazine  (COMPAZINE) 10 MG tablet Take 1 tablet (10 mg) by mouth every 6 hours as needed for nausea or vomiting 60 tablet 1    prochlorperazine (COMPAZINE) 10 MG tablet Take 1 tablet (10 mg) by mouth every 6 hours as needed for nausea or vomiting 30 tablet 2    psyllium (METAMUCIL/KONSYL) capsule 1 capsule by Per G Tube route 2 times daily 180 capsule 3    sennosides (SENOKOT) 8.8 MG/5ML syrup 5 mLs by Per J Tube route 2 times daily 236 mL 3    simethicone (MYLICON) 125 MG chewable tablet Take 125 mg by mouth 2 times daily      sodium bicarbonate 325 MG tablet 1 tablet (325 mg) by Per J Tube route 3 times daily 90 tablet 0    vitamin D3 (CHOLECALCIFEROL) 50 mcg (2000 units) tablet Take 1 tablet (50 mcg) by mouth daily 30 tablet 1     Past Medical History:   Diagnosis Date    Acute pancreatitis 04/16/2023    Gastric outlet obstruction     Recurrent acute pancreatitis      Past Surgical History:   Procedure Laterality Date    AS OPEN TREATMENT CLAVICULAR FRACTURE INTERNAL FX      ENDOSCOPIC RETROGRADE CHOLANGIOPANCREATOGRAM N/A 7/11/2023    Procedure: ENDOSCOPIC RETROGRADE CHOLANGIOPANCREATOGRAPHY;  Surgeon: Khadar Pickett MD;  [facility] OR    ENDOSCOPIC RETROGRADE CHOLANGIOPANCREATOGRAM N/A 8/17/2023    Procedure: ENDOSCOPIC RETROGRADE  with stent removal x1, balloon sweep;  Surgeon: Christos Greenberg MD;  [facility] OR    ENDOSCOPIC ULTRASOUND UPPER GASTROINTESTINAL TRACT (GI) N/A 7/11/2023    Procedure: Endoscopic ultrasound upper gastrointestinal tract (GI), with biposy, GJ tube repositioning, stent placement;  Surgeon: Khadar Pickett MD;  [facility] OR    ENDOSCOPIC ULTRASOUND UPPER GASTROINTESTINAL TRACT (GI) N/A 7/13/2023    Procedure: ENDOSCOPIC ULTRASOUND, ESOPHAGOSCOPY, EUS guided gastrojejunostomy;  Surgeon: Tre York MD;  [facility] OR    INSERT PICC LINE  04/29/2023    INSERT PORT VASCULAR ACCESS Right 7/28/2023    Procedure: Insert port vascular access;  Surgeon: Tom العلي MD;   [facility] OR    IR CHEST PORT PLACEMENT > 5 YRS OF AGE  2023    IR NG TUBE PLACEMENT REQ RAD & FLUORO  2023    JG tube    REPLACE GASTROJEJUNOSTOMY TUBE, PERCUTANEOUS N/A 2023    Procedure: possible REPLACEMENT, GASTROJEJUNOSTOMY TUBE, PERCUTANEOUS;  Surgeon: Christos Greenberg MD;  Location: [facility] OR       Physical Exam:   GENERAL APPEARANCE: healthy, alert and no distress; neatly groomed in [place]  fan apparel!  EYES: Eyes grossly normal to inspection, PERRLA, conjunctivae and sclerae without injection or discharge, EOM intact   RESP:  no increased work of breathing; speaks in complete sentences;   MS: No musculoskeletal defects are noted  SKIN: No suspicious lesions or rashes, hydration status appears adequate with normal skin turgor   PSYCH: Alert and oriented x3; speech- coherent , normal rate and volume; able to articulate logical thoughts, able to abstract reason, no tangential thoughts, no hallucinations or delusions, mentation appears normal, Mood is euthymic. Affect is appropriate for this mood state and bright. Thought content is free of suicidal ideation, hallucinations, and delusions.  Eye contact is good during conversation.       Key Data Reviewed:  LABS: 2023- Cr 0.48, Albumin 3.0,  Hgb 8.4; WBC 2.8,      IMAGIN2023 CT AP W/CONTRAST    IMPRESSION: In this patient with history of pancreatic cancer and  ongoing pain, the current CT scan compared to prior from 2023  shows:     1a. Increased size of the ill marginated pancreatic head mass,  extending into the mesenteric root with encasement of the SMA, SMV,  gastroduodenal artery and broad area of contact with the splenoportal  junction, splenic vein and central portal vein.  1b. Similar to slight increase in size of peripancreatic and  mesenteric lymph nodes, reactive and/or neoplastic.  1c. Increase in size of sclerotic lesion in the lower thoracic  vertebral body with new sclerotic lesion in the left  pubic bone,  concerning for metastasis  1d. Percutaneous gastrojejunostomy tube, gastroduodenal stent, and  gastrojejunostomy metallic lumen opposing stent; no significant  gastric distention  1e. Slight increased pancreatic ductal dilatation,   1f. Biliary ductal stents with pneumobilia, no increased biliary  ductal dilatation  1g. New ascites.  2a. Increased inner wall enhancement with mild thickening involving  the left colon with mild pericolonic streakiness, may represent  colitis secondary to infectious, or inflammatory etiology. No  pneumatosis, pneumoperitoneum, pericolic collection.  2b. Mild air-filled distention of transverse colon and part of the  redundant sigmoid colon may represent developing adynamic ileus.    43 minutes spent on the date of the encounter doing chart review, history and exam, patient education & counseling, documentation and other activities as noted above.    Jeremy Hurt MD MS FAAFP CAQHPM  [facility] Palliative Care [facility]  Office [phone]  Fax [phone]

## 2023-08-31 NOTE — TELEPHONE ENCOUNTER
Sallie, home care RN from [facility].    Home Care is calling regarding an established patient with  [facility] [place].   Requesting orders from: Akil Hernandez  Provider is following patient: Yes  Is this a 60-day recertification request?  No    Orders Requested  Asking for a delay and change in visits. .  RN ordered to visit patient every other Tuesday but patient is requesting to have it changed to the opposite weeks because he has CA treatment on the currently scheduled Tuesdays.   RN asking to start 9/5/23.    Information was gathered and will be sent to provider for review.  RN will contact Home Care with information after provider review.  Confirmed ok to leave a detailed message with call back.  Contact information confirmed and updated as needed.    Samantha Mirza RN

## 2023-09-01 ENCOUNTER — PATIENT OUTREACH (OUTPATIENT)
Dept: ONCOLOGY | Facility: CLINIC | Age: 58
End: 2023-09-01
Payer: COMMERCIAL

## 2023-09-01 NOTE — TELEPHONE ENCOUNTER
"[facility]: Cancer Care                                                                                          Returned call to pt who stated he was supposed to contact dental office to get clearance for \"something\" he couldn't remember the name. Stated he forgot until today and office is already closed for the holiday weekend, he will call back next week. Stated his last cleaning was just a month and a half ago and he did not have any issues or need for follow-up.    Informed him typically he would be asked to get dental clearance for Zometa, the only change writer sees in his chart is to add Neulasta to his next infusion which is scheduled 9/5. Will clarify with care team if he is supposed to get Zometa at some point, can add to infusion date 9/20 if approved. Informed pt he can ask dental office about clearance for Zometa and they can give a verbal ok to writer, ok to give direct extension to them, pt verbalized understanding.    Signature:  Efra Lynn RN  "

## 2023-09-05 ENCOUNTER — INFUSION THERAPY VISIT (OUTPATIENT)
Dept: ONCOLOGY | Facility: CLINIC | Age: 58
End: 2023-09-05
Attending: INTERNAL MEDICINE
Payer: COMMERCIAL

## 2023-09-05 ENCOUNTER — LAB (OUTPATIENT)
Dept: LAB | Facility: CLINIC | Age: 58
End: 2023-09-05
Attending: STUDENT IN AN ORGANIZED HEALTH CARE EDUCATION/TRAINING PROGRAM
Payer: COMMERCIAL

## 2023-09-05 ENCOUNTER — MYC MEDICAL ADVICE (OUTPATIENT)
Dept: EDUCATION SERVICES | Facility: CLINIC | Age: 58
End: 2023-09-05

## 2023-09-05 ENCOUNTER — ONCOLOGY VISIT (OUTPATIENT)
Dept: ONCOLOGY | Facility: CLINIC | Age: 58
End: 2023-09-05
Attending: STUDENT IN AN ORGANIZED HEALTH CARE EDUCATION/TRAINING PROGRAM
Payer: COMMERCIAL

## 2023-09-05 ENCOUNTER — MYC MEDICAL ADVICE (OUTPATIENT)
Dept: ONCOLOGY | Facility: CLINIC | Age: 58
End: 2023-09-05

## 2023-09-05 VITALS
TEMPERATURE: 98.6 F | RESPIRATION RATE: 18 BRPM | OXYGEN SATURATION: 98 % | WEIGHT: 162.9 LBS | BODY MASS INDEX: 22.09 KG/M2 | DIASTOLIC BLOOD PRESSURE: 69 MMHG | HEART RATE: 86 BPM | SYSTOLIC BLOOD PRESSURE: 105 MMHG

## 2023-09-05 VITALS
RESPIRATION RATE: 18 BRPM | BODY MASS INDEX: 22.07 KG/M2 | TEMPERATURE: 98.6 F | WEIGHT: 162.92 LBS | HEIGHT: 72 IN | DIASTOLIC BLOOD PRESSURE: 69 MMHG | HEART RATE: 86 BPM | SYSTOLIC BLOOD PRESSURE: 105 MMHG | OXYGEN SATURATION: 98 %

## 2023-09-05 DIAGNOSIS — Z79.4 TYPE 2 DIABETES MELLITUS WITHOUT COMPLICATION, WITH LONG-TERM CURRENT USE OF INSULIN (H): Primary | ICD-10-CM

## 2023-09-05 DIAGNOSIS — E11.9 TYPE 2 DIABETES MELLITUS WITHOUT COMPLICATION, WITH LONG-TERM CURRENT USE OF INSULIN (H): Primary | ICD-10-CM

## 2023-09-05 DIAGNOSIS — Z51.11 ENCOUNTER FOR ANTINEOPLASTIC CHEMOTHERAPY: ICD-10-CM

## 2023-09-05 DIAGNOSIS — E11.9 TYPE 2 DIABETES MELLITUS WITHOUT COMPLICATION, WITH LONG-TERM CURRENT USE OF INSULIN (H): ICD-10-CM

## 2023-09-05 DIAGNOSIS — G89.3 CANCER ASSOCIATED PAIN: ICD-10-CM

## 2023-09-05 DIAGNOSIS — C25.0 MALIGNANT NEOPLASM OF HEAD OF PANCREAS (H): ICD-10-CM

## 2023-09-05 DIAGNOSIS — C25.0 MALIGNANT NEOPLASM OF HEAD OF PANCREAS (H): Primary | ICD-10-CM

## 2023-09-05 DIAGNOSIS — Z79.4 TYPE 2 DIABETES MELLITUS WITHOUT COMPLICATION, WITH LONG-TERM CURRENT USE OF INSULIN (H): ICD-10-CM

## 2023-09-05 DIAGNOSIS — R10.30 LOWER ABDOMINAL PAIN: ICD-10-CM

## 2023-09-05 LAB
ALBUMIN SERPL BCG-MCNC: 3.1 G/DL (ref 3.5–5.2)
ALP SERPL-CCNC: 117 U/L (ref 40–129)
ALT SERPL W P-5'-P-CCNC: 14 U/L (ref 0–70)
ANION GAP SERPL CALCULATED.3IONS-SCNC: 7 MMOL/L (ref 7–15)
AST SERPL W P-5'-P-CCNC: 13 U/L (ref 0–45)
BASOPHILS # BLD AUTO: 0 10E3/UL (ref 0–0.2)
BASOPHILS NFR BLD AUTO: 1 %
BILIRUB SERPL-MCNC: 0.2 MG/DL
BUN SERPL-MCNC: 14.9 MG/DL (ref 6–20)
CALCIUM SERPL-MCNC: 8.7 MG/DL (ref 8.6–10)
CHLORIDE SERPL-SCNC: 104 MMOL/L (ref 98–107)
CREAT SERPL-MCNC: 0.52 MG/DL (ref 0.67–1.17)
DEPRECATED HCO3 PLAS-SCNC: 28 MMOL/L (ref 22–29)
EOSINOPHIL # BLD AUTO: 0.3 10E3/UL (ref 0–0.7)
EOSINOPHIL NFR BLD AUTO: 6 %
ERYTHROCYTE [DISTWIDTH] IN BLOOD BY AUTOMATED COUNT: 13.7 % (ref 10–15)
GFR SERPL CREATININE-BSD FRML MDRD: >90 ML/MIN/1.73M2
GLUCOSE BLDC GLUCOMTR-MCNC: 294 MG/DL (ref 70–99)
GLUCOSE SERPL-MCNC: 146 MG/DL (ref 70–99)
HCT VFR BLD AUTO: 28.7 % (ref 40–53)
HGB BLD-MCNC: 9.3 G/DL (ref 13.3–17.7)
IMM GRANULOCYTES # BLD: 0 10E3/UL
IMM GRANULOCYTES NFR BLD: 1 %
LYMPHOCYTES # BLD AUTO: 2 10E3/UL (ref 0.8–5.3)
LYMPHOCYTES NFR BLD AUTO: 37 %
MCH RBC QN AUTO: 30 PG (ref 26.5–33)
MCHC RBC AUTO-ENTMCNC: 32.4 G/DL (ref 31.5–36.5)
MCV RBC AUTO: 93 FL (ref 78–100)
MONOCYTES # BLD AUTO: 0.7 10E3/UL (ref 0–1.3)
MONOCYTES NFR BLD AUTO: 13 %
NEUTROPHILS # BLD AUTO: 2.3 10E3/UL (ref 1.6–8.3)
NEUTROPHILS NFR BLD AUTO: 42 %
NRBC # BLD AUTO: 0 10E3/UL
NRBC BLD AUTO-RTO: 0 /100
PLATELET # BLD AUTO: 241 10E3/UL (ref 150–450)
POTASSIUM SERPL-SCNC: 4.2 MMOL/L (ref 3.4–5.3)
PROT SERPL-MCNC: 5.7 G/DL (ref 6.4–8.3)
RBC # BLD AUTO: 3.1 10E6/UL (ref 4.4–5.9)
SODIUM SERPL-SCNC: 139 MMOL/L (ref 136–145)
WBC # BLD AUTO: 5.4 10E3/UL (ref 4–11)

## 2023-09-05 PROCEDURE — 96417 CHEMO IV INFUS EACH ADDL SEQ: CPT

## 2023-09-05 PROCEDURE — 96375 TX/PRO/DX INJ NEW DRUG ADDON: CPT

## 2023-09-05 PROCEDURE — 36591 DRAW BLOOD OFF VENOUS DEVICE: CPT

## 2023-09-05 PROCEDURE — 96376 TX/PRO/DX INJ SAME DRUG ADON: CPT

## 2023-09-05 PROCEDURE — 80053 COMPREHEN METABOLIC PANEL: CPT

## 2023-09-05 PROCEDURE — 85025 COMPLETE CBC W/AUTO DIFF WBC: CPT

## 2023-09-05 PROCEDURE — 96413 CHEMO IV INFUSION 1 HR: CPT

## 2023-09-05 PROCEDURE — 96415 CHEMO IV INFUSION ADDL HR: CPT

## 2023-09-05 PROCEDURE — G0498 CHEMO EXTEND IV INFUS W/PUMP: HCPCS

## 2023-09-05 PROCEDURE — 82962 GLUCOSE BLOOD TEST: CPT

## 2023-09-05 PROCEDURE — 96367 TX/PROPH/DG ADDL SEQ IV INF: CPT

## 2023-09-05 PROCEDURE — 250N000011 HC RX IP 250 OP 636: Mod: JZ | Performed by: STUDENT IN AN ORGANIZED HEALTH CARE EDUCATION/TRAINING PROGRAM

## 2023-09-05 PROCEDURE — 258N000003 HC RX IP 258 OP 636: Performed by: STUDENT IN AN ORGANIZED HEALTH CARE EDUCATION/TRAINING PROGRAM

## 2023-09-05 PROCEDURE — G0463 HOSPITAL OUTPT CLINIC VISIT: HCPCS | Mod: 25

## 2023-09-05 PROCEDURE — 250N000011 HC RX IP 250 OP 636: Mod: JZ | Performed by: INTERNAL MEDICINE

## 2023-09-05 PROCEDURE — 258N000003 HC RX IP 258 OP 636

## 2023-09-05 PROCEDURE — 99215 OFFICE O/P EST HI 40 MIN: CPT

## 2023-09-05 RX ORDER — MEPERIDINE HYDROCHLORIDE 25 MG/ML
25 INJECTION INTRAMUSCULAR; INTRAVENOUS; SUBCUTANEOUS EVERY 30 MIN PRN
Status: CANCELLED | OUTPATIENT
Start: 2023-09-05

## 2023-09-05 RX ORDER — LORAZEPAM 2 MG/ML
0.5 INJECTION INTRAMUSCULAR EVERY 4 HOURS PRN
Status: DISCONTINUED | OUTPATIENT
Start: 2023-09-05 | End: 2023-09-05 | Stop reason: HOSPADM

## 2023-09-05 RX ORDER — HEPARIN SODIUM,PORCINE 10 UNIT/ML
5-20 VIAL (ML) INTRAVENOUS DAILY PRN
Status: CANCELLED | OUTPATIENT
Start: 2023-09-06

## 2023-09-05 RX ORDER — ALBUTEROL SULFATE 90 UG/1
1-2 AEROSOL, METERED RESPIRATORY (INHALATION)
Status: CANCELLED
Start: 2023-09-05

## 2023-09-05 RX ORDER — ALBUTEROL SULFATE 0.83 MG/ML
2.5 SOLUTION RESPIRATORY (INHALATION)
Status: CANCELLED | OUTPATIENT
Start: 2023-09-05

## 2023-09-05 RX ORDER — HEPARIN SODIUM,PORCINE 10 UNIT/ML
5-20 VIAL (ML) INTRAVENOUS DAILY PRN
Status: CANCELLED | OUTPATIENT
Start: 2023-09-05

## 2023-09-05 RX ORDER — ATROPINE SULFATE 0.4 MG/ML
0.4 AMPUL (ML) INJECTION
Status: CANCELLED | OUTPATIENT
Start: 2023-09-05

## 2023-09-05 RX ORDER — HEPARIN SODIUM (PORCINE) LOCK FLUSH IV SOLN 100 UNIT/ML 100 UNIT/ML
5 SOLUTION INTRAVENOUS ONCE
Status: COMPLETED | OUTPATIENT
Start: 2023-09-05 | End: 2023-09-05

## 2023-09-05 RX ORDER — ONDANSETRON 2 MG/ML
8 INJECTION INTRAMUSCULAR; INTRAVENOUS ONCE
Status: COMPLETED | OUTPATIENT
Start: 2023-09-05 | End: 2023-09-05

## 2023-09-05 RX ORDER — ONDANSETRON 2 MG/ML
8 INJECTION INTRAMUSCULAR; INTRAVENOUS ONCE
Status: CANCELLED | OUTPATIENT
Start: 2023-09-05

## 2023-09-05 RX ORDER — EPINEPHRINE 1 MG/ML
0.3 INJECTION, SOLUTION INTRAMUSCULAR; SUBCUTANEOUS EVERY 5 MIN PRN
Status: CANCELLED | OUTPATIENT
Start: 2023-09-05

## 2023-09-05 RX ORDER — LORAZEPAM 2 MG/ML
0.5 INJECTION INTRAMUSCULAR EVERY 4 HOURS PRN
Status: CANCELLED | OUTPATIENT
Start: 2023-09-05

## 2023-09-05 RX ORDER — HEPARIN SODIUM (PORCINE) LOCK FLUSH IV SOLN 100 UNIT/ML 100 UNIT/ML
5 SOLUTION INTRAVENOUS
Status: CANCELLED | OUTPATIENT
Start: 2023-09-05

## 2023-09-05 RX ORDER — DIPHENHYDRAMINE HYDROCHLORIDE 50 MG/ML
50 INJECTION INTRAMUSCULAR; INTRAVENOUS
Status: CANCELLED
Start: 2023-09-05

## 2023-09-05 RX ORDER — HEPARIN SODIUM (PORCINE) LOCK FLUSH IV SOLN 100 UNIT/ML 100 UNIT/ML
5 SOLUTION INTRAVENOUS
Status: CANCELLED | OUTPATIENT
Start: 2023-09-06

## 2023-09-05 RX ORDER — METHYLPREDNISOLONE SODIUM SUCCINATE 125 MG/2ML
125 INJECTION, POWDER, LYOPHILIZED, FOR SOLUTION INTRAMUSCULAR; INTRAVENOUS
Status: CANCELLED
Start: 2023-09-05

## 2023-09-05 RX ADMIN — LORAZEPAM 0.5 MG: 2 INJECTION INTRAMUSCULAR; INTRAVENOUS at 16:20

## 2023-09-05 RX ADMIN — SODIUM CHLORIDE 250 ML: 9 INJECTION, SOLUTION INTRAVENOUS at 15:22

## 2023-09-05 RX ADMIN — Medication 5 ML: at 11:14

## 2023-09-05 RX ADMIN — OXALIPLATIN 170 MG: 5 INJECTION, SOLUTION INTRAVENOUS at 13:19

## 2023-09-05 RX ADMIN — IRINOTECAN HYDROCHLORIDE 240 MG: 20 INJECTION, SOLUTION INTRAVENOUS at 15:22

## 2023-09-05 RX ADMIN — FAMOTIDINE 20 MG: 10 INJECTION, SOLUTION INTRAVENOUS at 16:20

## 2023-09-05 RX ADMIN — DEXTROSE MONOHYDRATE 250 ML: 50 INJECTION, SOLUTION INTRAVENOUS at 12:39

## 2023-09-05 RX ADMIN — FAMOTIDINE 20 MG: 10 INJECTION, SOLUTION INTRAVENOUS at 15:20

## 2023-09-05 RX ADMIN — LORAZEPAM 0.5 MG: 2 INJECTION INTRAMUSCULAR; INTRAVENOUS at 15:20

## 2023-09-05 RX ADMIN — DEXAMETHASONE SODIUM PHOSPHATE: 10 INJECTION, SOLUTION INTRAMUSCULAR; INTRAVENOUS at 12:39

## 2023-09-05 RX ADMIN — ONDANSETRON 8 MG: 2 INJECTION INTRAMUSCULAR; INTRAVENOUS at 13:05

## 2023-09-05 ASSESSMENT — PAIN SCALES - GENERAL
PAINLEVEL: NO PAIN (0)
PAINLEVEL: NO PAIN (0)

## 2023-09-05 NOTE — LETTER
9/5/2023         RE: Gallo Navarro  [id number] [address] [place] [id number]        Dear Colleague,    Thank you for referring your patient, Gallo Navarro, to the [facility]. Please see a copy of my visit note below.    Oncology/Hematology Visit Note  Sep 5, 2023    Reason for Visit: follow up of locally advanced, unresectable pancreatic cancer     History of Present Illness: Gallo Navarro is a 57 year old male with locally advanced, unresectable pancreatic cancer. 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 He then presented to the ED with worsening abdominal pain, increased jaundice, poor PO intake and weight loss admitted on 7/8/2023 for concern of biliary obstruction. Unfortunately, during this admission, biopsy of pancreatic mass returned positive for pancreatic adenocarcinoma on 7/12/23. He started on treatment with 5FU, irinotecan, and oxaliplatin (FOLFIRINOX) on 7/31/23. Please see previous notes for further details on the patient's history.     8/17/23 - ERCP/EGD, duodenal stents x2 placed, exchange of GJ tube    8/21-/8/25 hospitalized due to diarrhea, colitis, abdominal pain.      8/29 - Cycle 3 deferred due to neutropenia.     Gallo returns today for consideration of Cycle 3 FOLFIRINOX.     Interval History:  Gallo presents today accompanied by his wife Violet.     Continues to tolerate a soft diet well. Notices he feels full easily. When abdomen gets full, has some discomfort and Gtube vents more air, but denies any nausea or vomiting. Has only taken compazine a couple of times in the past week during these episodes. Continues tube feed at night, 5 cartons of Peptomin daily. Weight is stable. Having a soft bowel movement daily. Continues NPH insulin at  bedtime, has not needed any other insulin dosing. Working closely with diabetic education to adjust insulin as needed.     Has been wearing compression stockings regularly and elevating his legs. Edema has improved. Reports he is able to ambulate and complete daily activities without issue. Denies neuropathy. Denies shortness of breath, chest pain, or cough. Has been sleeping well, energy level is good.     Abdominal pain has improved, continues Butrans patch, liquid acetaminophen, and hydromorphone. Decreased hydromorphone over the last week to 4mg every 4 hours. Continues Bentyl, but now taking 1 capsule 4x/day.    No fever, chills or concerns for infection. Denies bleeding concerns. No rashes, bruising or other skin concerns.       Current Outpatient Medications   Medication Sig Dispense Refill    acetaminophen (TYLENOL) 32 mg/mL liquid Take 20.313 mLs (650 mg) by mouth every 8 hours 1800 mL 11    B-D U/F 31G X 8 MM insulin pen needle Inject Subcutaneous 5 times daily Use 5 pen needles daily or as directed. 500 each 1    blood glucose (FREESTYLE TEST STRIPS) test strip by Other route as needed      buprenorphine (BUTRANS) 20 MCG/HR WK patch Place 1 patch onto the skin once a week 4 patch 0    Continuous Blood Gluc Sensor (FREESTYLE KAREN 2 SENSOR) MISC       dexamethasone (DECADRON) 4 MG tablet Take 2 tablets (8 mg) by mouth daily Take for 2 days, starting the day after chemo. Take with food. 4 tablet 2    dicyclomine (BENTYL) 10 MG capsule Take 2 capsules (20 mg) by mouth 4 times daily (before meals and nightly) for 20 days 160 capsule 0    dicyclomine (BENTYL) 10 MG/5ML solution Take 10 mLs (20 mg) by mouth 4 times daily as needed (abdominal discomfort, best before meals) 473 mL 0    gabapentin (NEURONTIN) 300 MG capsule Take 1 capsule (300 mg) by mouth 3 times daily 90 capsule 0    HYDROmorphone (DILAUDID) 2 MG tablet Take 1-2 tablets (2-4 mg) by mouth every 4 hours as needed for severe pain 180 tablet 0     insulin aspart (NOVOLOG PEN) 100 UNIT/ML pen Inject 1-10 Units Subcutaneous 3 times daily (with meals) 15 mL 3    insulin glargine (BASAGLAR KWIKPEN) 100 UNIT/ML pen Inject 20 Units Subcutaneous every morning for 360 days (Patient taking differently: Inject 20 Units Subcutaneous At Bedtime) 18 mL 3    insulin  UNIT/ML injection Inject 15 Units Subcutaneous every evening for 60 days (Patient taking differently: Inject 16 Units Subcutaneous every evening) 9 mL 0    Lancets (ONETOUCH DELICA PLUS [id number]) MISC       lidocaine-prilocaine (EMLA) 2.5-2.5 % external cream Use 1-2 times a week or as needed prior to port access 30 g 3    lidocaine-prilocaine (EMLA) 2.5-2.5 % external cream Use 1-2 times weekly or as needed prior to port access 30 g 3    lipase-protease-amylase (CREON 24) 13195-09406-903059 units CPEP per EC capsule 1-2 capsules by Per J Tube route 3 times daily (with meals) 180 capsule 3    loperamide (IMODIUM) 2 MG capsule 2 caps at 1st sign of diarrhea & 1 cap every 2hrs until 12hrs diarrhea free. During night, 2 caps at bedtime & 2 caps every 4hrs until  capsule 3    ondansetron (ZOFRAN ODT) 8 MG ODT tab Take 1 tablet (8 mg) by mouth every 8 hours as needed for nausea 30 tablet 3    pantoprazole (PROTONIX) 40 MG EC tablet Take 1 tablet (40 mg) by mouth 2 times daily 60 tablet 0    polyethylene glycol (MIRALAX) 17 GM/Dose powder Take 17 g by mouth daily 510 g 3    prochlorperazine (COMPAZINE) 10 MG tablet Take 1 tablet (10 mg) by mouth every 6 hours as needed for nausea or vomiting 60 tablet 1    prochlorperazine (COMPAZINE) 10 MG tablet Take 1 tablet (10 mg) by mouth every 6 hours as needed for nausea or vomiting 30 tablet 2    psyllium (METAMUCIL/KONSYL) capsule 1 capsule by Per G Tube route 2 times daily 180 capsule 3    sennosides (SENOKOT) 8.8 MG/5ML syrup 5 mLs by Per J Tube route 2 times daily 236 mL 3    simethicone (MYLICON) 125 MG chewable tablet Take 125 mg by mouth 2 times daily    "   sodium bicarbonate 325 MG tablet 1 tablet (325 mg) by Per J Tube route 3 times daily 90 tablet 0    vitamin D3 (CHOLECALCIFEROL) 50 mcg (2000 units) tablet Take 1 tablet (50 mcg) by mouth daily 30 tablet 1    naloxone (NARCAN) 4 MG/0.1ML nasal spray Spray 1 spray (4 mg) into one nostril alternating nostrils as needed for opioid reversal every 2-3 minutes until assistance arrives (Patient not taking: Reported on 9/5/2023) 0.2 mL 11     Physical Examination:  /69   Pulse 86   Temp 98.6  F (37  C) (Oral)   Resp 18   Ht 1.829 m (6' 0.01\")   Wt 73.9 kg (162 lb 14.7 oz)   SpO2 98%   BMI 22.09 kg/m    Wt Readings from Last 10 Encounters:   09/05/23 73.9 kg (162 lb 14.4 oz)   09/05/23 73.9 kg (162 lb 14.7 oz)   08/31/23 73.9 kg (163 lb)   08/29/23 73.7 kg (162 lb 7.7 oz)   08/22/23 73.4 kg (161 lb 12.8 oz)   08/17/23 74.8 kg (164 lb 14.5 oz)   08/15/23 74.3 kg (163 lb 14.4 oz)   08/09/23 74.5 kg (164 lb 4.8 oz)   08/07/23 75.4 kg (166 lb 4.8 oz)   07/31/23 76.4 kg (168 lb 6.4 oz)   General: The patient is a pleasant male in no acute distress.  HEENT: EOMI. Sclerae are anicteric. Mucous membranes moist, no lesions   Lymph: Neck is supple with no lymphadenopathy in the cervical or supraclavicular areas.   Heart: Regular rate and rhythm.   Lungs: Clear to auscultation bilaterally.   Abdomen: Bowel sounds present, soft, nontender with no palpable hepatosplenomegaly or masses. GJ tube in place, no redness noted around insertion site. Scant  drainage present on gauze dressing.    Extremities: No edema noted to bilateral lower extremities.     Neuro: Cranial nerves II through XII are grossly intact.  Skin: No rashes, petechiae, or bruising noted on exposed skin.     Laboratory Data:  Most Recent 3 CBC's:  Recent Labs   Lab Test 09/05/23  1112 08/29/23  1007 08/23/23  0638 08/22/23  0711   WBC 5.4 2.8* 3.9* 5.9   HGB 9.3* 8.4* 8.7* 9.0*   MCV 93 91 93 92    201 195 214   ANEUTAUTO 2.3 0.9*  --  3.9    Most " Recent 3 BMP's:  Recent Labs   Lab Test 09/05/23  1112 08/29/23  1007 08/25/23  1159 08/23/23  0826 08/23/23  0638 08/22/23  1012 08/22/23  0711    140  --   --  138  --  137   POTASSIUM 4.2 3.4  --   --  3.6  --  4.0   CHLORIDE 104 106  --   --  104  --  105   CO2 28 26  --   --  26  --  23   BUN 14.9 8.5  --   --  10.1  --  14.3   CR 0.52* 0.48*  --   --  0.52*  --  0.49*   ANIONGAP 7 8  --   --  8  --  9   DENISE 8.7 8.4*  --   --  8.4*  --  8.1*   * 116* 120*   < > 94   < > 108*   PROTTOTAL 5.7* 5.2*  --   --   --   --  4.8*   ALBUMIN 3.1* 3.0*  --   --   --   --  2.6*    < > = values in this interval not displayed.    Most Recent 2 LFT's:  Recent Labs   Lab Test 09/05/23  1112 08/29/23  1007   AST 13 16   ALT 14 17   ALKPHOS 117 85   BILITOTAL 0.2 0.3   I reviewed the above labs today.    Imaging: CT Abdomen/Pelvis 8/21/23  IMPRESSION: In this patient with history of pancreatic cancer and  ongoing pain, the current CT scan compared to prior from 7/8/2023  shows:     1a. Increased size of the ill marginated pancreatic head mass,  extending into the mesenteric root with encasement of the SMA, SMV,  gastroduodenal artery and broad area of contact with the splenoportal  junction, splenic vein and central portal vein.  1b. Similar to slight increase in size of peripancreatic and  mesenteric lymph nodes, reactive and/or neoplastic.  1c. Increase in size of sclerotic lesion in the lower thoracic  vertebral body with new sclerotic lesion in the left pubic bone,  concerning for metastasis  1d. Percutaneous gastrojejunostomy tube, gastroduodenal stent, and  gastrojejunostomy metallic lumen opposing stent; no significant  gastric distention  1e. Slight increased pancreatic ductal dilatation,   1f. Biliary ductal stents with pneumobilia, no increased biliary  ductal dilatation  1g. New ascites.  2a. Increased inner wall enhancement with mild thickening involving  the left colon with mild pericolonic streakiness,  may represent  colitis secondary to infectious, or inflammatory etiology. No  pneumatosis, pneumoperitoneum, pericolic collection.  2b. Mild air-filled distention of transverse colon and part of the  redundant sigmoid colon may represent developing adynamic ileus.     I have personally reviewed the examination and initial interpretation  and I agree with the findings.     ZURDO MANJARREZ MD       Assessment and Plan:  Locally advanced, unresectable pancreatic cancer. Patient started on treatment with palliative FOLFIRINOX on 7/31/23. He had a moderate amount of side effects following cycle 1 with dehydration, diarrhea, and nausea. Received cycle 2 on 8/15/23. ERCP/EGD on 8/17 with GJ tube exchange and duodenal stents placed. Hospitalized 8/21-8/25 due to colitis and abdominal pain. Cycle 3 was deferred last week due to neutropenia.    - Will proceed with cycle 3 today, WBC and ANC improved. Neulasta OnPro added to day 3. Can take Claritin 10mg daily to help with bone pain.   - Had symptoms with cycle 1 and 2 during irinotecan including blurry vision, hand cramping, and difficulty getting words out. Thought to be due to atropine, which was not given with cycle 2 and symptoms reoccurred. Discussed with Dr. Haile, irinotecan dose reduced 20% today. Also encouraged patient to drink warm fluids and warm his hands during infusion in case symptoms are related to cold sensitivity from Oxaliplatin.   -Return to clinic in 2 weeks with next cycle, SUZETTE visit with labs prior.   -Will plan to repeat imaging after 6 cycles of FOLFIRINOX. We will consider potential chemoradiation versus continued chemotherapy, versus clinical trial based on treatment response seen on follow-up imaging.     Nutrition.  Continues 5 cans Peptomin tube feeding overnight through J tube. Continues to tolerate a soft diet orally, eating more frequent small meals throughout the day as he feels full easily. If stomach becomes over full, has mild  discomfort and Gtube vents air. Denies nausea or vomiting. Continue to push oral hydration, drinking 64+ oz of fluid/day. Dietician consult scheduled for tomorrow.     Lower extremity edema. Improved. Trace edema bilaterally today. Continue compression stockings and elevation. Albumin and protein are low, continue to monitor.     Diabetes. On insulin, only taking NPH insulin at night. Will adjust insulin based on steroids with chemotherapy. Working closely with endocrine/diabetic educator on dose adjustments/management.     Nausea. Has had minimal episodes of nausea, only occurs after eating too much and feeling full. Over the past week, only took compazine 1 or 2 times. Avoiding ondansetron due to side effect of constipation. He is also enrolled in the medical cannabis program, but has not yet started this. Continue Protonix daily.      Diarrhea/constipation.  Resolved. Now having daily soft bowel movements. Continue Metamucil.  Continue dicylomine, patient reduced over the last week to 1 capsule 4x/day.      Abdominal pain. Managed by palliative care with Dilaudid, acetaminophen, and Butrans. Reduced Dilaudid dose over the past week, taking 4mg approximately every 4 hours. Continues gabapentin for hiccups.     Anemia. Normocytic. Suspect anemia of chronic disease. Will monitor for now.       45 minutes spent on the date of the encounter doing chart review, review of test results, interpretation of tests, patient visit, documentation, and discussion with other provider(s)       ESVIN Canales CNP

## 2023-09-05 NOTE — PROGRESS NOTES
Oncology/Hematology Visit Note  Sep 5, 2023    Reason for Visit: follow up of locally advanced, unresectable pancreatic cancer     History of Present Illness: Gallo Navarro is a 57 year old male with locally advanced, unresectable pancreatic cancer. 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 He then presented to the ED with worsening abdominal pain, increased jaundice, poor PO intake and weight loss admitted on 7/8/2023 for concern of biliary obstruction. Unfortunately, during this admission, biopsy of pancreatic mass returned positive for pancreatic adenocarcinoma on 7/12/23. He started on treatment with 5FU, irinotecan, and oxaliplatin (FOLFIRINOX) on 7/31/23. Please see previous notes for further details on the patient's history.     8/17/23 - ERCP/EGD, duodenal stents x2 placed, exchange of GJ tube    8/21-/8/25 hospitalized due to diarrhea, colitis, abdominal pain.      8/29 - Cycle 3 deferred due to neutropenia.     Gallo returns today for consideration of Cycle 3 FOLFIRINOX.     Interval History:  Gallo presents today accompanied by his wife Violet.     Continues to tolerate a soft diet well. Notices he feels full easily. When abdomen gets full, has some discomfort and Gtube vents more air, but denies any nausea or vomiting. Has only taken compazine a couple of times in the past week during these episodes. Continues tube feed at night, 5 cartons of Peptomin daily. Weight is stable. Having a soft bowel movement daily. Continues NPH insulin at bedtime, has not needed any other insulin dosing. Working closely with diabetic education to adjust insulin as needed.     Has been wearing compression stockings regularly and elevating his legs. Edema has improved. Reports he is able to ambulate and  complete daily activities without issue. Denies neuropathy. Denies shortness of breath, chest pain, or cough. Has been sleeping well, energy level is good.     Abdominal pain has improved, continues Butrans patch, liquid acetaminophen, and hydromorphone. Decreased hydromorphone over the last week to 4mg every 4 hours. Continues Bentyl, but now taking 1 capsule 4x/day.    No fever, chills or concerns for infection. Denies bleeding concerns. No rashes, bruising or other skin concerns.       Current Outpatient Medications   Medication Sig Dispense Refill    acetaminophen (TYLENOL) 32 mg/mL liquid Take 20.313 mLs (650 mg) by mouth every 8 hours 1800 mL 11    B-D U/F 31G X 8 MM insulin pen needle Inject Subcutaneous 5 times daily Use 5 pen needles daily or as directed. 500 each 1    blood glucose (FREESTYLE TEST STRIPS) test strip by Other route as needed      buprenorphine (BUTRANS) 20 MCG/HR WK patch Place 1 patch onto the skin once a week 4 patch 0    Continuous Blood Gluc Sensor (FREESTYLE KAREN 2 SENSOR) MISC       dexamethasone (DECADRON) 4 MG tablet Take 2 tablets (8 mg) by mouth daily Take for 2 days, starting the day after chemo. Take with food. 4 tablet 2    dicyclomine (BENTYL) 10 MG capsule Take 2 capsules (20 mg) by mouth 4 times daily (before meals and nightly) for 20 days 160 capsule 0    dicyclomine (BENTYL) 10 MG/5ML solution Take 10 mLs (20 mg) by mouth 4 times daily as needed (abdominal discomfort, best before meals) 473 mL 0    gabapentin (NEURONTIN) 300 MG capsule Take 1 capsule (300 mg) by mouth 3 times daily 90 capsule 0    HYDROmorphone (DILAUDID) 2 MG tablet Take 1-2 tablets (2-4 mg) by mouth every 4 hours as needed for severe pain 180 tablet 0    insulin aspart (NOVOLOG PEN) 100 UNIT/ML pen Inject 1-10 Units Subcutaneous 3 times daily (with meals) 15 mL 3    insulin glargine (BASAGLAR KWIKPEN) 100 UNIT/ML pen Inject 20 Units Subcutaneous every morning for 360 days (Patient taking differently:  Inject 20 Units Subcutaneous At Bedtime) 18 mL 3    insulin  UNIT/ML injection Inject 15 Units Subcutaneous every evening for 60 days (Patient taking differently: Inject 16 Units Subcutaneous every evening) 9 mL 0    Lancets (ONETOUCH DELICA PLUS [id number]) MISC       lidocaine-prilocaine (EMLA) 2.5-2.5 % external cream Use 1-2 times a week or as needed prior to port access 30 g 3    lidocaine-prilocaine (EMLA) 2.5-2.5 % external cream Use 1-2 times weekly or as needed prior to port access 30 g 3    lipase-protease-amylase (CREON 24) 03162-35896-911458 units CPEP per EC capsule 1-2 capsules by Per J Tube route 3 times daily (with meals) 180 capsule 3    loperamide (IMODIUM) 2 MG capsule 2 caps at 1st sign of diarrhea & 1 cap every 2hrs until 12hrs diarrhea free. During night, 2 caps at bedtime & 2 caps every 4hrs until  capsule 3    ondansetron (ZOFRAN ODT) 8 MG ODT tab Take 1 tablet (8 mg) by mouth every 8 hours as needed for nausea 30 tablet 3    pantoprazole (PROTONIX) 40 MG EC tablet Take 1 tablet (40 mg) by mouth 2 times daily 60 tablet 0    polyethylene glycol (MIRALAX) 17 GM/Dose powder Take 17 g by mouth daily 510 g 3    prochlorperazine (COMPAZINE) 10 MG tablet Take 1 tablet (10 mg) by mouth every 6 hours as needed for nausea or vomiting 60 tablet 1    prochlorperazine (COMPAZINE) 10 MG tablet Take 1 tablet (10 mg) by mouth every 6 hours as needed for nausea or vomiting 30 tablet 2    psyllium (METAMUCIL/KONSYL) capsule 1 capsule by Per G Tube route 2 times daily 180 capsule 3    sennosides (SENOKOT) 8.8 MG/5ML syrup 5 mLs by Per J Tube route 2 times daily 236 mL 3    simethicone (MYLICON) 125 MG chewable tablet Take 125 mg by mouth 2 times daily      sodium bicarbonate 325 MG tablet 1 tablet (325 mg) by Per J Tube route 3 times daily 90 tablet 0    vitamin D3 (CHOLECALCIFEROL) 50 mcg (2000 units) tablet Take 1 tablet (50 mcg) by mouth daily 30 tablet 1    naloxone (NARCAN) 4 MG/0.1ML nasal  "spray Spray 1 spray (4 mg) into one nostril alternating nostrils as needed for opioid reversal every 2-3 minutes until assistance arrives (Patient not taking: Reported on 9/5/2023) 0.2 mL 11     Physical Examination:  /69   Pulse 86   Temp 98.6  F (37  C) (Oral)   Resp 18   Ht 1.829 m (6' 0.01\")   Wt 73.9 kg (162 lb 14.7 oz)   SpO2 98%   BMI 22.09 kg/m    Wt Readings from Last 10 Encounters:   09/05/23 73.9 kg (162 lb 14.4 oz)   09/05/23 73.9 kg (162 lb 14.7 oz)   08/31/23 73.9 kg (163 lb)   08/29/23 73.7 kg (162 lb 7.7 oz)   08/22/23 73.4 kg (161 lb 12.8 oz)   08/17/23 74.8 kg (164 lb 14.5 oz)   08/15/23 74.3 kg (163 lb 14.4 oz)   08/09/23 74.5 kg (164 lb 4.8 oz)   08/07/23 75.4 kg (166 lb 4.8 oz)   07/31/23 76.4 kg (168 lb 6.4 oz)   General: The patient is a pleasant male in no acute distress.  HEENT: EOMI. Sclerae are anicteric. Mucous membranes moist, no lesions   Lymph: Neck is supple with no lymphadenopathy in the cervical or supraclavicular areas.   Heart: Regular rate and rhythm.   Lungs: Clear to auscultation bilaterally.   Abdomen: Bowel sounds present, soft, nontender with no palpable hepatosplenomegaly or masses. GJ tube in place, no redness noted around insertion site. Scant  drainage present on gauze dressing.    Extremities: No edema noted to bilateral lower extremities.     Neuro: Cranial nerves II through XII are grossly intact.  Skin: No rashes, petechiae, or bruising noted on exposed skin.     Laboratory Data:  Most Recent 3 CBC's:  Recent Labs   Lab Test 09/05/23  1112 08/29/23  1007 08/23/23  0638 08/22/23  0711   WBC 5.4 2.8* 3.9* 5.9   HGB 9.3* 8.4* 8.7* 9.0*   MCV 93 91 93 92    201 195 214   ANEUTAUTO 2.3 0.9*  --  3.9    Most Recent 3 BMP's:  Recent Labs   Lab Test 09/05/23  1112 08/29/23  1007 08/25/23  1159 08/23/23  0826 08/23/23  0638 08/22/23  1012 08/22/23  0711    140  --   --  138  --  137   POTASSIUM 4.2 3.4  --   --  3.6  --  4.0   CHLORIDE 104 106  --   " --  104  --  105   CO2 28 26  --   --  26  --  23   BUN 14.9 8.5  --   --  10.1  --  14.3   CR 0.52* 0.48*  --   --  0.52*  --  0.49*   ANIONGAP 7 8  --   --  8  --  9   DENISE 8.7 8.4*  --   --  8.4*  --  8.1*   * 116* 120*   < > 94   < > 108*   PROTTOTAL 5.7* 5.2*  --   --   --   --  4.8*   ALBUMIN 3.1* 3.0*  --   --   --   --  2.6*    < > = values in this interval not displayed.    Most Recent 2 LFT's:  Recent Labs   Lab Test 09/05/23  1112 08/29/23  1007   AST 13 16   ALT 14 17   ALKPHOS 117 85   BILITOTAL 0.2 0.3   I reviewed the above labs today.    Imaging: CT Abdomen/Pelvis 8/21/23  IMPRESSION: In this patient with history of pancreatic cancer and  ongoing pain, the current CT scan compared to prior from 7/8/2023  shows:     1a. Increased size of the ill marginated pancreatic head mass,  extending into the mesenteric root with encasement of the SMA, SMV,  gastroduodenal artery and broad area of contact with the splenoportal  junction, splenic vein and central portal vein.  1b. Similar to slight increase in size of peripancreatic and  mesenteric lymph nodes, reactive and/or neoplastic.  1c. Increase in size of sclerotic lesion in the lower thoracic  vertebral body with new sclerotic lesion in the left pubic bone,  concerning for metastasis  1d. Percutaneous gastrojejunostomy tube, gastroduodenal stent, and  gastrojejunostomy metallic lumen opposing stent; no significant  gastric distention  1e. Slight increased pancreatic ductal dilatation,   1f. Biliary ductal stents with pneumobilia, no increased biliary  ductal dilatation  1g. New ascites.  2a. Increased inner wall enhancement with mild thickening involving  the left colon with mild pericolonic streakiness, may represent  colitis secondary to infectious, or inflammatory etiology. No  pneumatosis, pneumoperitoneum, pericolic collection.  2b. Mild air-filled distention of transverse colon and part of the  redundant sigmoid colon may represent developing  adynamic ileus.     I have personally reviewed the examination and initial interpretation  and I agree with the findings.     ZURDO MANJARREZ MD       Assessment and Plan:  Locally advanced, unresectable pancreatic cancer. Patient started on treatment with palliative FOLFIRINOX on 7/31/23. He had a moderate amount of side effects following cycle 1 with dehydration, diarrhea, and nausea. Received cycle 2 on 8/15/23. ERCP/EGD on 8/17 with GJ tube exchange and duodenal stents placed. Hospitalized 8/21-8/25 due to colitis and abdominal pain. Cycle 3 was deferred last week due to neutropenia.    - Will proceed with cycle 3 today, WBC and ANC improved. Neulasta OnPro added to day 3. Can take Claritin 10mg daily to help with bone pain.   - Had symptoms with cycle 1 and 2 during irinotecan including blurry vision, hand cramping, and difficulty getting words out. Thought to be due to atropine, which was not given with cycle 2 and symptoms reoccurred. Discussed with Dr. Haile, irinotecan dose reduced 20% today. Also encouraged patient to drink warm fluids and warm his hands during infusion in case symptoms are related to cold sensitivity from Oxaliplatin.   -Return to clinic in 2 weeks with next cycle, SUZETTE visit with labs prior.   -Will plan to repeat imaging after 6 cycles of FOLFIRINOX. We will consider potential chemoradiation versus continued chemotherapy, versus clinical trial based on treatment response seen on follow-up imaging.     Nutrition.  Continues 5 cans Peptomin tube feeding overnight through J tube. Continues to tolerate a soft diet orally, eating more frequent small meals throughout the day as he feels full easily. If stomach becomes over full, has mild discomfort and Gtube vents air. Denies nausea or vomiting. Continue to push oral hydration, drinking 64+ oz of fluid/day. Dietician consult scheduled for tomorrow.     Lower extremity edema. Improved. Trace edema bilaterally today. Continue compression  stockings and elevation. Albumin and protein are low, continue to monitor.     Diabetes. On insulin, only taking NPH insulin at night. Will adjust insulin based on steroids with chemotherapy. Working closely with endocrine/diabetic educator on dose adjustments/management.     Nausea. Has had minimal episodes of nausea, only occurs after eating too much and feeling full. Over the past week, only took compazine 1 or 2 times. Avoiding ondansetron due to side effect of constipation. He is also enrolled in the medical cannabis program, but has not yet started this. Continue Protonix daily.      Diarrhea/constipation.  Resolved. Now having daily soft bowel movements. Continue Metamucil.  Continue dicylomine, patient reduced over the last week to 1 capsule 4x/day.      Abdominal pain. Managed by palliative care with Dilaudid, acetaminophen, and Butrans. Reduced Dilaudid dose over the past week, taking 4mg approximately every 4 hours. Continues gabapentin for hiccups.     Anemia. Normocytic. Suspect anemia of chronic disease. Will monitor for now.       45 minutes spent on the date of the encounter doing chart review, review of test results, interpretation of tests, patient visit, documentation, and discussion with other provider(s)       ESVIN Canales CNP

## 2023-09-05 NOTE — NURSING NOTE
"Oncology Rooming Note    September 5, 2023 11:36 AM   Gallo Navarro is a 57 year old male who presents for:    Chief Complaint   Patient presents with    Oncology Clinic Visit     [facility] RETURN - PANCREATIC CANCER     Initial Vitals: /69   Pulse 86   Temp 98.6  F (37  C) (Oral)   Resp 18   Ht 1.829 m (6' 0.01\")   Wt 73.9 kg (162 lb 14.7 oz)   SpO2 98%   BMI 22.09 kg/m   Estimated body mass index is 22.09 kg/m  as calculated from the following:    Height as of this encounter: 1.829 m (6' 0.01\").    Weight as of this encounter: 73.9 kg (162 lb 14.7 oz). Body surface area is 1.94 meters squared.  No Pain (0) Comment: Data Unavailable   No LMP for male patient.  Allergies reviewed: Yes  Medications reviewed: Yes    Medications: Medication refills not needed today.  Pharmacy name entered into [employer]:    [facility] [address] [place] [address] - [address] [place] [address] PHARMACY [place], [place] - [address] [facility] [phone]    Malik Keane LPN              "

## 2023-09-05 NOTE — NURSING NOTE
"Chief Complaint   Patient presents with    Port Draw     Labs drawn via port by RN. Port accessed with 20g 3/4\" power needle. Vitals taken. Flushed with saline and heparin. Pt tolerated well. Patient checked into next appointment.       Carolyn Caro RN    "

## 2023-09-05 NOTE — PROGRESS NOTES
Infusion Nursing Note:  Gallo Navarro presents today for C3D1 Folfirinox.    Patient seen by provider today: Yes: Megan Farrell, CNP   present during visit today: Not Applicable.    Note: Pt saw provider prior to infusion, ok for treatment.    PRNS:  -20 mg Pepcid IV and 0.5 mg Ativan IV pre Irinotecan  -20 mg Pepcid IV and 0.5 mg Ativan IV mid Irinotecan    -Pt had a great appetite in the infusion room and was able to eat all of his lunch and some snacks  -Unfortunately he forgot his Novolog Pen and needles  -BG on pt's device:348  -Rechecked after 15 minutes with infusion glucometer:294  -Pharmacy was able to fill a small supply of Novolog for pt to use  -began to have minor slurred speech 1/4 into Irinotecan  -minor bilateral jaw cramping  -no respiratory concerns  -same as previous infusions, seems to have started earlier in the infusion today  -c/o abdominal pain, not new, took his PO pain meds with relief  -post Irinotecan reports of double vision and some minor hand cramps  -slurred speech continues  -denies any respiratory concerns  -pt will monitor BG at home      Intravenous Access:  Implanted Port.    Treatment Conditions:   Latest Reference Range & Units 09/05/23 11:12   Sodium 136 - 145 mmol/L 139   Potassium 3.4 - 5.3 mmol/L 4.2   Chloride 98 - 107 mmol/L 104   Carbon Dioxide (CO2) 22 - 29 mmol/L 28   Urea Nitrogen 6.0 - 20.0 mg/dL 14.9   Creatinine 0.67 - 1.17 mg/dL 0.52 (L)   GFR Estimate >60 mL/min/1.73m2 >90   Calcium 8.6 - 10.0 mg/dL 8.7   Anion Gap 7 - 15 mmol/L 7   Albumin 3.5 - 5.2 g/dL 3.1 (L)   Protein Total 6.4 - 8.3 g/dL 5.7 (L)   Alkaline Phosphatase 40 - 129 U/L 117   ALT 0 - 70 U/L 14   AST 0 - 45 U/L 13   Bilirubin Total <=1.2 mg/dL 0.2   Glucose 70 - 99 mg/dL 146 (H)   WBC 4.0 - 11.0 10e3/uL 5.4   Hemoglobin 13.3 - 17.7 g/dL 9.3 (L)   Hematocrit 40.0 - 53.0 % 28.7 (L)   Platelet Count 150 - 450 10e3/uL 241   RBC Count 4.40 - 5.90 10e6/uL 3.10 (L)   MCV 78 - 100 fL 93   MCH  "26.5 - 33.0 pg 30.0   MCHC 31.5 - 36.5 g/dL 32.4   RDW 10.0 - 15.0 % 13.7   % Neutrophils % 42   % Lymphocytes % 37   % Monocytes % 13   % Eosinophils % 6   % Basophils % 1   Absolute Basophils 0.0 - 0.2 10e3/uL 0.0   Absolute Eosinophils 0.0 - 0.7 10e3/uL 0.3   Absolute Immature Granulocytes <=0.4 10e3/uL 0.0   Absolute Lymphocytes 0.8 - 5.3 10e3/uL 2.0   Absolute Monocytes 0.0 - 1.3 10e3/uL 0.7   % Immature Granulocytes % 1   Absolute Neutrophils 1.6 - 8.3 10e3/uL 2.3   Absolute NRBCs 10e3/uL 0.0   NRBCs per 100 WBC <1 /100 0     Post Infusion Assessment:  Patient tolerated infusion without incident.  Blood return noted pre and post infusion.  Site patent and intact, free from redness, edema or discomfort.  No evidence of extravasations.   Prior to discharge: Port is secured in place with tegaderm and flushed with 10cc NS with positive blood return noted.    Continuous home infusion Dosi-Fuser pump connected.    All connectors secured in place and clamps taped open.    Pump started, \"running\" noted on display (CADD): Not Applicable.   Capillary element taped to pt's skin per protocol.  Pump Connection double checked with Elissa Crenshaw RN.  Patient instructed to call our clinic or [facility] with any questions or concerns at home.  Patient verbalized understanding.    Patient set up for pump disconnect at home with [facility] on Thursday 9/7 @1500 with Onpro Neulasta.      Discharge Plan:   Prescription refills given for DEX and Novolog.  Discharge instructions reviewed with: Patient and Family.  Patient and/or family verbalized understanding of discharge instructions and all questions answered.  AVS to patient via [employer].  Patient will return 9/20 for next appointment.   Patient discharged in stable condition accompanied by: self and wife.  Departure Mode: Wheelchair.    Divina Ludwig RN      "

## 2023-09-06 ENCOUNTER — VIRTUAL VISIT (OUTPATIENT)
Dept: ONCOLOGY | Facility: CLINIC | Age: 58
End: 2023-09-06
Attending: DIETITIAN, REGISTERED
Payer: COMMERCIAL

## 2023-09-06 ENCOUNTER — MYC MEDICAL ADVICE (OUTPATIENT)
Dept: ONCOLOGY | Facility: CLINIC | Age: 58
End: 2023-09-06

## 2023-09-06 DIAGNOSIS — Z79.4 TYPE 2 DIABETES MELLITUS WITHOUT COMPLICATION, WITH LONG-TERM CURRENT USE OF INSULIN (H): ICD-10-CM

## 2023-09-06 DIAGNOSIS — E11.9 TYPE 2 DIABETES MELLITUS WITHOUT COMPLICATION, WITH LONG-TERM CURRENT USE OF INSULIN (H): ICD-10-CM

## 2023-09-06 DIAGNOSIS — C25.0 MALIGNANT NEOPLASM OF HEAD OF PANCREAS (H): Primary | ICD-10-CM

## 2023-09-06 PROCEDURE — 97802 MEDICAL NUTRITION INDIV IN: CPT | Mod: 95 | Performed by: DIETITIAN, REGISTERED

## 2023-09-06 RX ORDER — DEXAMETHASONE 4 MG/1
8 TABLET ORAL DAILY
Qty: 4 TABLET | Refills: 2 | Status: SHIPPED | OUTPATIENT
Start: 2023-09-06 | End: 2023-10-18

## 2023-09-06 NOTE — LETTER
9/6/2023         RE: Gallo Navarro  [address] [address] [place] [id number]        Dear Colleague,    Thank you for referring your patient, Gallo Navarro, to the [facility]. Please see a copy of my visit note below.      Video-Visit Details     Type of service:  Video Visit     Video Start Time (time video started): 11:00am     Video End Time (time video stopped): 11:31am    Originating Location (pt. Location): Home     Distant Location (provider location):  [facility] NUTRITION SERVICES      Mode of Communication:  [facility] Conference [facility]    CLINICAL NUTRITION SERVICES - ASSESSMENT NOTE    Gallo Navarro 57 year old referred for MNT related to pancreatic cancer     Time Spent: 31 minutes  Visit Type: video  Pt accompanied by: self  Referring Physician: Berta Anglin 8/17/23  C25.0 (ICD-10-CM) - Malignant neoplasm of head of pancreas (H)     NUTRITION HISTORY  Factors affecting nutrition intake include:early fullness  Current diet/appetite: Soft diet/small frequent meals  Chemotherapy: Folfirinox  Surgery history: distal pancreatectomy   PEJ tube: Peptamen 1.5 - 5 cartons/day; 80mL/hr = 1875 sneha (26kcal/kg), 85g pro (1.2g/kg), 235 g CHO, no fiber.     Gallo was taking 6 cartons per day but reduced to 5/day as his pump was running until noon which has been interfering with this breakfast meal.    He tells me that that has been tolerating a soft diet very well and is pleased with his progress since his hospitalization and surgery.    He would like to continue to take 5 cartons of tube feeding in addition to trying 4-5 small meals a day.    He has been limiting his fat intake and choosing mostly soft foods.    He has been taking Creon 24 with his meals and snacks.  He takes ~3 Creon ~15 min before a main meal.  He has been trying to get in 5 fruits and veggies a day.  He has been eating soft fish for his primary protein source. He is eating cottage cheese and chobani  "yogurt as well.   He's working on getting at least 90g protein/day between tube feeding and PO intake.  He's eating eating Paneras mac n cheese with chicken cut up small. He's eating walleye with sweet potatoes  He thinks he's getting upward of 3000 calories/day with EN and PO intake.  He would like to continue his current EN and PO regimen to help replete his weight.   He is taking !32 oz water PO and 16 oz water with flushes through feeding tube.  He reports good BM, having one per day.  He denies constipation or diarrhea.   He is working closely with endocrine on insulin adjustments and food intake to help control BG.      Diet Recall  Breakfast Hashbrowns, 2 eggs OR Premier protein shake   Lunch Fish or chicken tacos   Dinner Walleye, sweet potatoes    Snacks Applesauce pouch blend, Chobani yogurt   Beverages V8 Fusion, green tea, water        Treatment Plan:  Oncology History   Malignant neoplasm of head of pancreas (H)   7/11/2023 -  Cancer Staged    Staging form: Pancreas, AJCC 8th Edition  - Clinical stage from 7/11/2023: Stage III (cT2, cN2, cM0)     7/14/2023 Initial Diagnosis    Malignant neoplasm of head of pancreas (H)     7/31/2023 -  Chemotherapy    OP ONC Pancreatic Cancer - Modified FOLFIRINOX  Plan Provider: Luis Haile MD  Treatment goal: Curative  Line of treatment: [No plan line of treatment]     Treatment plan has been reviewed.    ANTHROPOMETRICS  Height: 6'0\"  Weight: 164 lbs/74kg  BMI: 22  Weight Status:  Normal BMI  IBW: 178 lbs (92%)  Weight History: per pt report, down ~40 lb since March; reported UBW of 208 lbs.    Wt Readings from Last 10 Encounters:   09/05/23 73.9 kg (162 lb 14.4 oz)   09/05/23 73.9 kg (162 lb 14.7 oz)   08/31/23 73.9 kg (163 lb)   08/29/23 73.7 kg (162 lb 7.7 oz)   08/22/23 73.4 kg (161 lb 12.8 oz)   08/17/23 74.8 kg (164 lb 14.5 oz)   08/15/23 74.3 kg (163 lb 14.4 oz)   08/09/23 74.5 kg (164 lb 4.8 oz)   08/07/23 75.4 kg (166 lb 4.8 oz)   07/31/23 76.4 kg (168 lb " 6.4 oz)     Dosing Weight: 73kg    Medications/vitamins/minerals/herbals:   Reviewed - Vitamin D3, Men's One a day MVI    Labs:   Labs reviewed    NUTRITION FOCUSED PHYSICAL ASSESSMENT FOR DIAGNOSING MALNUTRITION:  Consult for education only      ASSESSED NUTRITION NEEDS:  Estimated Energy Needs: 2500 kcals+ (35+ Kcal/Kg)  Justification: repletion  Estimated Protein Needs:  grams protein (1.2-1.5 g pro/Kg)  Justification: Repletion  Estimated Fluid Needs: 6206-2479  mL   Justification: increased needs with chemo    MALNUTRITION:  % Weight Loss:  > 10% in 6 months (severe malnutrition)  % Intake:  <75% for >/= 3 months (moderate malnutrition)  Subcutaneous Fat Loss:  Orbital region mild depletion  Muscle Loss:  Temporal region mild depletion  Fluid Retention:  None noted    Malnutrition Diagnosis: Moderate malnutrition  In Context of:  Acute illness or injury    NUTRITION DIAGNOSIS:  Inadequate oral intake related to decreased ability to consume sufficient energy/protein due to side effects of cancer and cancer therapy as evidenced by 40 lb (20%) wt loss x past 6 months, dietary intake <75% estimated needs, dependent on EN via PEJ tube for support    INTERVENTIONS  Provided written & verbal education:     - Reviewed nutrition and hydration needs.   Advised pt to aim for at least 2500+kcal and 100+g protein daily.    Advised pt to aim for 80-10 cups non-caffeine containing beverages (water/electrolytes) daily.    - Discussed strategies to help fortify soft meals and snacks. Encouraged to focus on small, frequent meals.    - Reviewed sources of protein. Encouraged to have a protein source with each meal and snack.    - Encouraged to chew foods to pureed consistency.  Encouraged to continue to avoid skins on fruits, raw veggies, beans with hulls etc to prevent blockage of stent.   - Reviewed high calorie/high protein oral nutrition supplement/smoothie options.  - Encouraged utilizing these oral nutrition shakes in  home made shakes/smoothies to prevent flavor fatigue.    - Reviewed administration and dosing of Creon. Encouraged to take capsules throughout meals and snacks versus 15 in before a meal.     Provided pt with corresponding education materials/handouts on:  Academy of Nutrition and Dietetics High sneha/High protein recipes, Academy of Nutrition and Dietetics High protein list, Sources of Protein    Pt verbalize understanding of materials provided during consult.   Patient Understanding: good  Expected patient engagement: good     Goals  1.  Aim for 5-6 small frequent meals  2.  Aim for 2500-3000kcal and 90-100g protein, 8+ cups water/electrolyte fluids  3. Weight repletion towards 180-190 lbs per patient desire    Follow-Up Plans: Pt has RD contact information for questions.      Joan Reeves RD, , LD

## 2023-09-06 NOTE — PATIENT INSTRUCTIONS
Nargis Holder,    I have attached the resources that I referred to during our conversation (see your email):  --High calorie/high protein recipes  --Sources of protein  --High calorie/high protein beverages    Here are your nutrition goals:  --Calories: 4067-0638  --Protein:  grams/day  --Fluids: 8-10 cups non-caffeine containing beverages   --Creon 24s - take 3 capsules with larger meals and 1-2 with snacks/protein shakes.  You can spread the capsules throughout your meal.       Please reach out to me if you have any questions.    Be well,     Joan Reeves RD, , LD  Board Certified Specialist in Oncology Nutrition  [facility]  Oncology Services  [email or web address]   Office: [phone]  Fax: [phone]

## 2023-09-06 NOTE — PROGRESS NOTES
Video-Visit Details     Type of service:  Video Visit     Video Start Time (time video started): 11:00am     Video End Time (time video stopped): 11:31am    Originating Location (pt. Location): Home     Distant Location (provider location):  [facility] NUTRITION SERVICES      Mode of Communication:  Video Conference via [facility]    CLINICAL NUTRITION SERVICES - ASSESSMENT NOTE    Gallo Navarro 57 year old referred for MNT related to pancreatic cancer     Time Spent: 31 minutes  Visit Type: video  Pt accompanied by: self  Referring Physician: Berta Anglin 8/17/23  C25.0 (ICD-10-CM) - Malignant neoplasm of head of pancreas (H)     NUTRITION HISTORY  Factors affecting nutrition intake include:early fullness  Current diet/appetite: Soft diet/small frequent meals  Chemotherapy: Folfirinox  Surgery history: distal pancreatectomy   PEJ tube: Peptamen 1.5 - 5 cartons/day; 80mL/hr = 1875 sneha (26kcal/kg), 85g pro (1.2g/kg), 235 g CHO, no fiber.     Gallo was taking 6 cartons per day but reduced to 5/day as his pump was running until noon which has been interfering with this breakfast meal.    He tells me that that has been tolerating a soft diet very well and is pleased with his progress since his hospitalization and surgery.    He would like to continue to take 5 cartons of tube feeding in addition to trying 4-5 small meals a day.    He has been limiting his fat intake and choosing mostly soft foods.    He has been taking Creon 24 with his meals and snacks.  He takes ~3 Creon ~15 min before a main meal.  He has been trying to get in 5 fruits and veggies a day.  He has been eating soft fish for his primary protein source. He is eating cottage cheese and chobani yogurt as well.   He's working on getting at least 90g protein/day between tube feeding and PO intake.  He's eating eating Paneras mac n cheese with chicken cut up small. He's eating walleye with sweet potatoes  He thinks he's getting upward of 3000  "calories/day with EN and PO intake.  He would like to continue his current EN and PO regimen to help replete his weight.   He is taking !32 oz water PO and 16 oz water with flushes through feeding tube.  He reports good BM, having one per day.  He denies constipation or diarrhea.   He is working closely with endocrine on insulin adjustments and food intake to help control BG.      Diet Recall  Breakfast Hashbrowns, 2 eggs OR Premier protein shake   Lunch Fish or chicken tacos   Dinner Walleye, sweet potatoes    Snacks Applesauce pouch blend, Chobani yogurt   Beverages V8 Fusion, green tea, water        Treatment Plan:  Oncology History   Malignant neoplasm of head of pancreas (H)   7/11/2023 -  Cancer Staged    Staging form: Pancreas, AJCC 8th Edition  - Clinical stage from 7/11/2023: Stage III (cT2, cN2, cM0)     7/14/2023 Initial Diagnosis    Malignant neoplasm of head of pancreas (H)     7/31/2023 -  Chemotherapy    OP ONC Pancreatic Cancer - Modified FOLFIRINOX  Plan Provider: Luis Haile MD  Treatment goal: Curative  Line of treatment: [No plan line of treatment]     Treatment plan has been reviewed.    ANTHROPOMETRICS  Height: 6'0\"  Weight: 164 lbs/74kg  BMI: 22  Weight Status:  Normal BMI  IBW: 178 lbs (92%)  Weight History: per pt report, down ~40 lb since March; reported UBW of 208 lbs.    Wt Readings from Last 10 Encounters:   09/05/23 73.9 kg (162 lb 14.4 oz)   09/05/23 73.9 kg (162 lb 14.7 oz)   08/31/23 73.9 kg (163 lb)   08/29/23 73.7 kg (162 lb 7.7 oz)   08/22/23 73.4 kg (161 lb 12.8 oz)   08/17/23 74.8 kg (164 lb 14.5 oz)   08/15/23 74.3 kg (163 lb 14.4 oz)   08/09/23 74.5 kg (164 lb 4.8 oz)   08/07/23 75.4 kg (166 lb 4.8 oz)   07/31/23 76.4 kg (168 lb 6.4 oz)     Dosing Weight: 73kg    Medications/vitamins/minerals/herbals:   Reviewed - Vitamin D3, Men's One a day MVI    Labs:   Labs reviewed    NUTRITION FOCUSED PHYSICAL ASSESSMENT FOR DIAGNOSING MALNUTRITION:  Consult for education " only      ASSESSED NUTRITION NEEDS:  Estimated Energy Needs: 2500 kcals+ (35+ Kcal/Kg)  Justification: repletion  Estimated Protein Needs:  grams protein (1.2-1.5 g pro/Kg)  Justification: Repletion  Estimated Fluid Needs: 6371-8473  mL   Justification: increased needs with chemo    MALNUTRITION:  % Weight Loss:  > 10% in 6 months (severe malnutrition)  % Intake:  <75% for >/= 3 months (moderate malnutrition)  Subcutaneous Fat Loss:  Orbital region mild depletion  Muscle Loss:  Temporal region mild depletion  Fluid Retention:  None noted    Malnutrition Diagnosis: Moderate malnutrition  In Context of:  Acute illness or injury    NUTRITION DIAGNOSIS:  Inadequate oral intake related to decreased ability to consume sufficient energy/protein due to side effects of cancer and cancer therapy as evidenced by 40 lb (20%) wt loss x past 6 months, dietary intake <75% estimated needs, dependent on EN via PEJ tube for support    INTERVENTIONS  Provided written & verbal education:     - Reviewed nutrition and hydration needs.   Advised pt to aim for at least 2500+kcal and 100+g protein daily.    Advised pt to aim for 80-10 cups non-caffeine containing beverages (water/electrolytes) daily.    - Discussed strategies to help fortify soft meals and snacks. Encouraged to focus on small, frequent meals.    - Reviewed sources of protein. Encouraged to have a protein source with each meal and snack.    - Encouraged to chew foods to pureed consistency.  Encouraged to continue to avoid skins on fruits, raw veggies, beans with hulls etc to prevent blockage of stent.   - Reviewed high calorie/high protein oral nutrition supplement/smoothie options.  - Encouraged utilizing these oral nutrition shakes in home made shakes/smoothies to prevent flavor fatigue.    - Reviewed administration and dosing of Creon. Encouraged to take capsules throughout meals and snacks versus 15 in before a meal.     Provided pt with corresponding education  materials/handouts on:  Academy of Nutrition and Dietetics High sneha/High protein recipes, Academy of Nutrition and Dietetics High protein list, Sources of Protein    Pt verbalize understanding of materials provided during consult.   Patient Understanding: good  Expected patient engagement: good     Goals  1.  Aim for 5-6 small frequent meals  2.  Aim for 2500-3000kcal and 90-100g protein, 8+ cups water/electrolyte fluids  3. Weight repletion towards 180-190 lbs per patient desire    Follow-Up Plans: Pt has RD contact information for questions.      Joan Reeves RD, , LD

## 2023-09-06 NOTE — TELEPHONE ENCOUNTER
TF 8pm-8am. 5 bottles. 80 ml. 235 grams of carbs.    Appetite improved. See diet recall from Oncology Note.    Chemo yesterday with Dexamethasone 8mg and chemo pack through tomorrow.    Took NPH 16 units last evening + 5 units Basaglar. He has been correcting with Novolog using standard correction 1 unit per 50>150.    Plan:  *Increase evening NPH to 20 units   *Resume NPH 10 units in the morning  *No Basaglar  *Correct with Novolog at meal time using scale 1 unit per 50>140  140-190=1 unit  191-240=2 units  241-290= 3 units  291-340=4 units  *I will follow up with you via My Chart Friday    Pricila Aguirre RN, Diabetes Educator  Diabetes Education Department  [facility], [place] [facility] [place]  [phone]       Any diabetes medication dose changes were made via the CDE Protocol and Collaborative Practice Agreement. A copy of this encounter was provided to the patient's referring provider-Berta Cid.

## 2023-09-06 NOTE — TELEPHONE ENCOUNTER
Dexamethasone refill   Last prescribing provider: DR Haile     Last clinic visit date: 9/5/23 Megan Farrell     Recommendations for requested medication (if none, N/A): Copied from chart note   9/5/23 Megan Farrell     dexamethasone (DECADRON) 4 MG tablet Take 2 tablets (8 mg) by mouth daily Take for 2 days, starting the day after chemo. Take with food. 4 tablet 2     Any other pertinent information (if none, N/A): N/A    Refilled: Y/N, if NO, why?

## 2023-09-07 ENCOUNTER — TELEPHONE (OUTPATIENT)
Dept: ENDOCRINOLOGY | Facility: CLINIC | Age: 58
End: 2023-09-07
Payer: COMMERCIAL

## 2023-09-07 DIAGNOSIS — Z79.4 TYPE 2 DIABETES MELLITUS WITHOUT COMPLICATION, WITH LONG-TERM CURRENT USE OF INSULIN (H): ICD-10-CM

## 2023-09-07 DIAGNOSIS — E11.9 TYPE 2 DIABETES MELLITUS WITHOUT COMPLICATION, WITH LONG-TERM CURRENT USE OF INSULIN (H): ICD-10-CM

## 2023-09-08 ENCOUNTER — TELEPHONE (OUTPATIENT)
Dept: FAMILY MEDICINE | Facility: CLINIC | Age: 58
End: 2023-09-08
Payer: COMMERCIAL

## 2023-09-08 ENCOUNTER — PATIENT OUTREACH (OUTPATIENT)
Dept: ONCOLOGY | Facility: CLINIC | Age: 58
End: 2023-09-08
Payer: COMMERCIAL

## 2023-09-08 DIAGNOSIS — R06.6 HICCUPS: Primary | ICD-10-CM

## 2023-09-08 RX ORDER — BACLOFEN 10 MG/1
5-10 TABLET ORAL 3 TIMES DAILY PRN
Qty: 30 TABLET | Refills: 0 | Status: SHIPPED | OUTPATIENT
Start: 2023-09-08 | End: 2023-10-04

## 2023-09-08 NOTE — TELEPHONE ENCOUNTER
Forms/Letter Request    Type of form/letter:  [facility] Order [id number]    Have you been seen for this request: N/A    Do we have the form/letter: Yes: Will place formon providers desk for review/signature    When is form/letter needed by: asap    How would you like the form/letter returned: Fax : [phone]

## 2023-09-08 NOTE — PROGRESS NOTES
Returned call to Yaneth pharmacist at [facility]; yesterday pt had declined IVF at pump disconnect because he was going to his daughter's ball game. He would like fluids today and supplies already shipped to pt's home. Verbal ok given to Yaneth.    Per Yaneth orders are under oncology treatment Day 3; if pt would rather have IVF prn, then this can be deleted by providers and they will just use therapy plan instead, this does need to be updated and signed.

## 2023-09-08 NOTE — TELEPHONE ENCOUNTER
Oncology Nurse Triage - Reporting Symptoms    Situation:   Gallo reporting the following symptoms:hiccups       Background:   Treating Provider:   Dr Haile and Megan Farrell     Date of last office visit: 9/5/23 Megan Farrell     Recent treatments: Yes: 9/5/23  C3D1 Folfirinox.       Assessment  Onset of symptoms: Hiccups mostly at night, keep coming and going   Taking Gabapentin for hiccups but this is not seeming to help very much.      Pharmacy [facility] in [place]     Paged provider: 10:46 Epic secure chat sent to Megan Farrell     Recommendations:     13:16 Megan Farrell: she will send some Baclofen in to his preferred pharmacy for him. Please instruct him to stop the gabapentin since it is not helping and he should not be taking both gabapentin and baclofen as they can both be sedating.     1:19 call placed to Gallo: let him know that a new prescription for Baclofen would be sent to his [facility] pharmacy in [place]. Instructed him to stop the Gabapentin since he should not take Baclofen and gabapentin at same time as they both can be very sedating. Hopefully the Baclofen will help with the hiccups, if he has any further problems he can call the triage line or send in a my chart.

## 2023-09-08 NOTE — TELEPHONE ENCOUNTER
Forms/Letter Request    Type of form/letter: [facility] Order [id number]    Have you been seen for this request: N/A    Do we have the form/letter: Yes: Will place form on providers desk for review/signature    When is form/letter needed by: asap    How would you like the form/letter returned: Fax : [phone]

## 2023-09-11 ENCOUNTER — MYC MEDICAL ADVICE (OUTPATIENT)
Dept: PALLIATIVE CARE | Facility: CLINIC | Age: 58
End: 2023-09-11
Payer: COMMERCIAL

## 2023-09-11 ENCOUNTER — MEDICAL CORRESPONDENCE (OUTPATIENT)
Dept: HEALTH INFORMATION MANAGEMENT | Facility: CLINIC | Age: 58
End: 2023-09-11
Payer: COMMERCIAL

## 2023-09-11 DIAGNOSIS — C25.9 PANCREATIC ADENOCARCINOMA (H): Primary | ICD-10-CM

## 2023-09-11 DIAGNOSIS — G89.3 CANCER ASSOCIATED PAIN: ICD-10-CM

## 2023-09-11 RX ORDER — BUPRENORPHINE 10 UG/H
1 PATCH TRANSDERMAL
Qty: 4 PATCH | Refills: 3 | Status: SHIPPED | OUTPATIENT
Start: 2023-09-11 | End: 2023-10-23

## 2023-09-12 ENCOUNTER — LAB REQUISITION (OUTPATIENT)
Dept: LAB | Facility: CLINIC | Age: 58
End: 2023-09-12
Payer: COMMERCIAL

## 2023-09-12 DIAGNOSIS — K85.90 ACUTE PANCREATITIS WITHOUT NECROSIS OR INFECTION, UNSPECIFIED: ICD-10-CM

## 2023-09-12 LAB
BASOPHILS # BLD AUTO: 0.1 10E3/UL (ref 0–0.2)
BASOPHILS NFR BLD AUTO: 1 %
EOSINOPHIL # BLD AUTO: 0.4 10E3/UL (ref 0–0.7)
EOSINOPHIL NFR BLD AUTO: 2 %
ERYTHROCYTE [DISTWIDTH] IN BLOOD BY AUTOMATED COUNT: 13.9 % (ref 10–15)
HCT VFR BLD AUTO: 32.1 % (ref 40–53)
HGB BLD-MCNC: 10.4 G/DL (ref 13.3–17.7)
IMM GRANULOCYTES # BLD: 0.2 10E3/UL
IMM GRANULOCYTES NFR BLD: 1 %
LYMPHOCYTES # BLD AUTO: 2.6 10E3/UL (ref 0.8–5.3)
LYMPHOCYTES NFR BLD AUTO: 18 %
MCH RBC QN AUTO: 30.1 PG (ref 26.5–33)
MCHC RBC AUTO-ENTMCNC: 32.4 G/DL (ref 31.5–36.5)
MCV RBC AUTO: 93 FL (ref 78–100)
MONOCYTES # BLD AUTO: 2 10E3/UL (ref 0–1.3)
MONOCYTES NFR BLD AUTO: 14 %
NEUTROPHILS # BLD AUTO: 9.3 10E3/UL (ref 1.6–8.3)
NEUTROPHILS NFR BLD AUTO: 64 %
NRBC # BLD AUTO: 0 10E3/UL
NRBC BLD AUTO-RTO: 0 /100
PLATELET # BLD AUTO: 300 10E3/UL (ref 150–450)
RBC # BLD AUTO: 3.45 10E6/UL (ref 4.4–5.9)
WBC # BLD AUTO: 14.4 10E3/UL (ref 4–11)

## 2023-09-12 PROCEDURE — 85004 AUTOMATED DIFF WBC COUNT: CPT | Performed by: STUDENT IN AN ORGANIZED HEALTH CARE EDUCATION/TRAINING PROGRAM

## 2023-09-13 ENCOUNTER — PATIENT OUTREACH (OUTPATIENT)
Dept: GASTROENTEROLOGY | Facility: CLINIC | Age: 58
End: 2023-09-13
Payer: COMMERCIAL

## 2023-09-13 ENCOUNTER — TELEPHONE (OUTPATIENT)
Dept: FAMILY MEDICINE | Facility: CLINIC | Age: 58
End: 2023-09-13
Payer: COMMERCIAL

## 2023-09-13 ENCOUNTER — MEDICAL CORRESPONDENCE (OUTPATIENT)
Dept: HEALTH INFORMATION MANAGEMENT | Facility: CLINIC | Age: 58
End: 2023-09-13
Payer: COMMERCIAL

## 2023-09-13 NOTE — TELEPHONE ENCOUNTER
Forms/Letter Request    Type of form/letter:  [facility] Order [id number]    Have you been seen for this request: N/A    Do we have the form/letter: Yes: Will place form on providers desk for review/signature    When is form/letter needed by: asap    How would you like the form/letter returned: Fax : [phone]

## 2023-09-13 NOTE — TELEPHONE ENCOUNTER
"Called pt in response to message from outpt provider about report of drainage around his GJ tube site. Pt reports that a few days ago he noticed some \"puffiness and tube feeding coloration\" to the site. He had a home nurse come and assess it and they applied bacitracin and now the site is feeling and looking better. No drainage. Running feeds overnight at 80ml/hour. Eating more during the day, had mac and cheese and tolerated. Advised sitting up for 3 hours after eating to help with movement down the GI tract and less drainage from site. Pt verbalized understanding, questions answered.    Rosalia Cordova, RN, BSN,   [facility]  Care coordinator        "

## 2023-09-14 LAB — UPPER EUS: NORMAL

## 2023-09-15 ENCOUNTER — MYC REFILL (OUTPATIENT)
Dept: PALLIATIVE CARE | Facility: CLINIC | Age: 58
End: 2023-09-15
Payer: COMMERCIAL

## 2023-09-15 ENCOUNTER — TELEPHONE (OUTPATIENT)
Dept: FAMILY MEDICINE | Facility: CLINIC | Age: 58
End: 2023-09-15
Payer: COMMERCIAL

## 2023-09-15 DIAGNOSIS — C25.9 PANCREATIC ADENOCARCINOMA (H): ICD-10-CM

## 2023-09-15 RX ORDER — HYDROMORPHONE HYDROCHLORIDE 2 MG/1
2-4 TABLET ORAL EVERY 4 HOURS PRN
Qty: 180 TABLET | Refills: 0 | Status: SHIPPED | OUTPATIENT
Start: 2023-09-15 | End: 2023-10-09

## 2023-09-15 NOTE — TELEPHONE ENCOUNTER
Forms/Letter Request    Type of form/letter:  [facility] #[id number]    Have you been seen for this request: N/A    Do we have the form/letter: Yes: Will place form on providers desk for review/signature    When is form/letter needed by: asap    How would you like the form/letter returned: Fax : [phone]

## 2023-09-15 NOTE — TELEPHONE ENCOUNTER
Received [employer] message from patient requesting refill of hydromorphone.     Last refill: 8/29/23  Last office visit: 8/31/23  Scheduled for follow up 10/23/23     Will route request to MD for review.     Reviewed [place]  Report.

## 2023-09-18 ENCOUNTER — MEDICAL CORRESPONDENCE (OUTPATIENT)
Dept: HEALTH INFORMATION MANAGEMENT | Facility: CLINIC | Age: 58
End: 2023-09-18
Payer: COMMERCIAL

## 2023-09-19 ENCOUNTER — TELEPHONE (OUTPATIENT)
Dept: FAMILY MEDICINE | Facility: CLINIC | Age: 58
End: 2023-09-19

## 2023-09-19 ENCOUNTER — VIRTUAL VISIT (OUTPATIENT)
Dept: EDUCATION SERVICES | Facility: CLINIC | Age: 58
End: 2023-09-19
Payer: COMMERCIAL

## 2023-09-19 VITALS — BODY MASS INDEX: 22.37 KG/M2 | WEIGHT: 165 LBS

## 2023-09-19 DIAGNOSIS — E11.9 TYPE 2 DIABETES MELLITUS WITHOUT COMPLICATION, WITH LONG-TERM CURRENT USE OF INSULIN (H): Primary | ICD-10-CM

## 2023-09-19 DIAGNOSIS — Z79.4 TYPE 2 DIABETES MELLITUS WITHOUT COMPLICATION, WITH LONG-TERM CURRENT USE OF INSULIN (H): Primary | ICD-10-CM

## 2023-09-19 DIAGNOSIS — C25.9 PANCREATIC ADENOCARCINOMA (H): ICD-10-CM

## 2023-09-19 PROCEDURE — G0108 DIAB MANAGE TRN  PER INDIV: HCPCS | Mod: VID

## 2023-09-19 NOTE — PATIENT INSTRUCTIONS
Plan:  *Continue NPH 16 units   *Increase NPH to 20 units the day of Chemo and for 2-3 days after  * Bora 2 sensor from Check in desk C  *Our schedulers will reach out to schedule follow up with Berta Cid  *I will let you know what our Pharmacy Liaison Team says about Bora 2 coverage

## 2023-09-19 NOTE — NURSING NOTE
Is the patient currently in the state of [place]? YES    Visit mode:VIDEO    If the visit is dropped, the patient can be reconnected by: VIDEO VISIT: Text to cell phone:   Telephone Information:   Mobile [phone]       Will anyone else be joining the visit? NO  (If patient encounters technical issues they should call [phone]    How would you like to obtain your AVS? MyChart    Are changes needed to the allergy or medication list? No    Reason for visit: RECHECK and Video Visit    Carolyn NICOLE

## 2023-09-19 NOTE — LETTER
9/19/2023         RE: Gallo Navarro  [address] [address] [place] [id number]        Dear Colleague,    Thank you for referring your patient, Gallo Navarro, to the [facility]. Please see a copy of my visit note below.    Virtual Visit Details    Type of service:  Video Visit     Originating Location (pt. Location): Home    Distant Location (provider location):  On-site  Platform used for Video Visit: Xueersi    Diabetes Self-Management Education & Support Telephone Visit    Gallo Navarro contacted today for education related to Type 2 diabetes    Patient is being treated with:  insulin    Purpose of today's visit:  Review Bora data/Plan for Chemo      Subjective/Objective:  NPH 16 units, Novolog if needed  Tube feedings: 5 bottles over 12 hours-8pm-12pm, 80 ml/hr    Nutrition:   Eggs, burritos, chicken, cheeseburger, chobani yogurt, applesauce, soup, pasta, protein shake, water    GLUCOSE DATA:        Assessment/Plan:  Overall he is happy with management of diabetes. Appetite has improved (see above for diet details). Tube feeding is from 8pm-12pm. Currently only taking NPH 16 units, Novolog as needed. Chemo tomorrow with two days of chemo pack and steroid after. Advised to increase NPH to 20 units on chemo days and for the following 2-3 days after. He is hesitant to take Basaglar due to previous lows. He will follow up with Berta Cid in October.       Plan:  *Continue NPH 16 units   *Increase NPH to 20 units the day of Chemo and for 2-3 days after  * Bora 2 sensor from Check in desk C  *Our schedulers will reach out to schedule follow up with Berta Cid  *I will let you know what our Pharmacy Liaison Team says about Bora 2 coverage    Pricila Aguirre, RN, Diabetes Educator  Diabetes Education Department  [facility], [place]  [phone]    Any diabetes medication dose changes were made via the CDE Protocol and Collaborative Practice Agreement. A  copy of this encounter was provided to the patient's referring provider-Jose Roberto      Time spent in this visit: 30 minutes      Again, thank you for allowing me to participate in the care of your patient.        Sincerely,        Pricila Aguirre RN

## 2023-09-19 NOTE — TELEPHONE ENCOUNTER
Forms/Letter Request    Type of form/letter:  [facility] #[id number]    Have you been seen for this request: N/A    Do we have the form/letter: Yes: Will place form on providers desk for review/signature    When is form/letter needed by: asap    How would you like the form/letter returned: Fax : [phone]

## 2023-09-19 NOTE — PROGRESS NOTES
Virtual Visit Details    Type of service:  Video Visit     Originating Location (pt. Location): Home    Distant Location (provider location):  On-site  Platform used for Video Visit: [employer]    Diabetes Self-Management Education & Support Telephone Visit    Gallo Navarro contacted today for education related to Type 2 diabetes    Patient is being treated with:  insulin    Purpose of today's visit:  Review Bora data/Plan for Chemo      Subjective/Objective:  NPH 16 units, Novolog if needed  Tube feedings: 5 bottles over 12 hours-8pm-12pm, 80 ml/hr    Nutrition:   Eggs, burritos, chicken, cheeseburger, chobani yogurt, applesauce, soup, pasta, protein shake, water    GLUCOSE DATA:        Assessment/Plan:  Overall he is happy with management of diabetes. Appetite has improved (see above for diet details). Tube feeding is from 8pm-12pm. Currently only taking NPH 16 units, Novolog as needed. Chemo tomorrow with two days of chemo pack and steroid after. Advised to increase NPH to 20 units on chemo days and for the following 2-3 days after. He is hesitant to take Basaglar due to previous lows. He will follow up with Berta Cid in October.       Plan:  *Continue NPH 16 units   *Increase NPH to 20 units the day of Chemo and for 2-3 days after  * Bora 2 sensor from Check in desk C  *Our schedulers will reach out to schedule follow up with Berta Cid  *I will let you know what our Pharmacy Liaison Team says about Bora 2 coverage    Pricila Aguirre RN, Diabetes Educator  Diabetes Education Department  [facility], [facility]  [phone]    Any diabetes medication dose changes were made via the CDE Protocol and Collaborative Practice Agreement. A copy of this encounter was provided to the patient's referring provider-Jose Roberto      Time spent in this visit: 30 minutes

## 2023-09-19 NOTE — PROGRESS NOTES
9/19 Called and left voicemail, provided phone number [phone] to schedule a follow up appointment with maria luisa castellon.     Oralia poon Procedure   Orthopedics, Podiatry, Sports Medicine, Ent ,Eye , Audiology, Adult Endocrine & Diabetes, Nutrition & Medication Therapy Management Specialties   [facility]

## 2023-09-20 ENCOUNTER — ONCOLOGY VISIT (OUTPATIENT)
Dept: ONCOLOGY | Facility: CLINIC | Age: 58
End: 2023-09-20
Attending: STUDENT IN AN ORGANIZED HEALTH CARE EDUCATION/TRAINING PROGRAM
Payer: COMMERCIAL

## 2023-09-20 ENCOUNTER — MEDICAL CORRESPONDENCE (OUTPATIENT)
Dept: HEALTH INFORMATION MANAGEMENT | Facility: CLINIC | Age: 58
End: 2023-09-20

## 2023-09-20 ENCOUNTER — APPOINTMENT (OUTPATIENT)
Dept: LAB | Facility: CLINIC | Age: 58
End: 2023-09-20
Attending: STUDENT IN AN ORGANIZED HEALTH CARE EDUCATION/TRAINING PROGRAM
Payer: COMMERCIAL

## 2023-09-20 VITALS
SYSTOLIC BLOOD PRESSURE: 102 MMHG | DIASTOLIC BLOOD PRESSURE: 70 MMHG | WEIGHT: 163.3 LBS | HEART RATE: 92 BPM | BODY MASS INDEX: 22.14 KG/M2 | TEMPERATURE: 98.6 F | OXYGEN SATURATION: 98 % | RESPIRATION RATE: 16 BRPM

## 2023-09-20 DIAGNOSIS — Z79.4 TYPE 2 DIABETES MELLITUS WITHOUT COMPLICATION, WITH LONG-TERM CURRENT USE OF INSULIN (H): ICD-10-CM

## 2023-09-20 DIAGNOSIS — C25.0 MALIGNANT NEOPLASM OF HEAD OF PANCREAS (H): Primary | ICD-10-CM

## 2023-09-20 DIAGNOSIS — K31.5 DUODENAL STRICTURE: ICD-10-CM

## 2023-09-20 DIAGNOSIS — Z51.11 ENCOUNTER FOR ANTINEOPLASTIC CHEMOTHERAPY: ICD-10-CM

## 2023-09-20 DIAGNOSIS — R10.84 ABDOMINAL PAIN, GENERALIZED: ICD-10-CM

## 2023-09-20 DIAGNOSIS — E11.9 TYPE 2 DIABETES MELLITUS WITHOUT COMPLICATION, WITH LONG-TERM CURRENT USE OF INSULIN (H): ICD-10-CM

## 2023-09-20 LAB
ALBUMIN SERPL BCG-MCNC: 3.4 G/DL (ref 3.5–5.2)
ALP SERPL-CCNC: 136 U/L (ref 40–129)
ALT SERPL W P-5'-P-CCNC: 14 U/L (ref 0–70)
ANION GAP SERPL CALCULATED.3IONS-SCNC: 10 MMOL/L (ref 7–15)
AST SERPL W P-5'-P-CCNC: 14 U/L (ref 0–45)
BASOPHILS # BLD AUTO: 0.1 10E3/UL (ref 0–0.2)
BASOPHILS NFR BLD AUTO: 1 %
BILIRUB SERPL-MCNC: 0.2 MG/DL
BUN SERPL-MCNC: 13.2 MG/DL (ref 6–20)
CALCIUM SERPL-MCNC: 8.9 MG/DL (ref 8.6–10)
CHLORIDE SERPL-SCNC: 102 MMOL/L (ref 98–107)
CREAT SERPL-MCNC: 0.52 MG/DL (ref 0.67–1.17)
DEPRECATED HCO3 PLAS-SCNC: 27 MMOL/L (ref 22–29)
EGFRCR SERPLBLD CKD-EPI 2021: >90 ML/MIN/1.73M2
EOSINOPHIL # BLD AUTO: 0.2 10E3/UL (ref 0–0.7)
EOSINOPHIL NFR BLD AUTO: 2 %
ERYTHROCYTE [DISTWIDTH] IN BLOOD BY AUTOMATED COUNT: 14 % (ref 10–15)
GLUCOSE SERPL-MCNC: 122 MG/DL (ref 70–99)
HCT VFR BLD AUTO: 30.9 % (ref 40–53)
HGB BLD-MCNC: 9.8 G/DL (ref 13.3–17.7)
IMM GRANULOCYTES # BLD: 0.1 10E3/UL
IMM GRANULOCYTES NFR BLD: 1 %
LYMPHOCYTES # BLD AUTO: 1.7 10E3/UL (ref 0.8–5.3)
LYMPHOCYTES NFR BLD AUTO: 18 %
MCH RBC QN AUTO: 29.5 PG (ref 26.5–33)
MCHC RBC AUTO-ENTMCNC: 31.7 G/DL (ref 31.5–36.5)
MCV RBC AUTO: 93 FL (ref 78–100)
MONOCYTES # BLD AUTO: 0.8 10E3/UL (ref 0–1.3)
MONOCYTES NFR BLD AUTO: 9 %
NEUTROPHILS # BLD AUTO: 6.4 10E3/UL (ref 1.6–8.3)
NEUTROPHILS NFR BLD AUTO: 69 %
NRBC # BLD AUTO: 0 10E3/UL
NRBC BLD AUTO-RTO: 0 /100
PLATELET # BLD AUTO: 197 10E3/UL (ref 150–450)
POTASSIUM SERPL-SCNC: 4.3 MMOL/L (ref 3.4–5.3)
PROT SERPL-MCNC: 6.1 G/DL (ref 6.4–8.3)
RBC # BLD AUTO: 3.32 10E6/UL (ref 4.4–5.9)
SODIUM SERPL-SCNC: 139 MMOL/L (ref 136–145)
WBC # BLD AUTO: 9.2 10E3/UL (ref 4–11)

## 2023-09-20 PROCEDURE — 258N000003 HC RX IP 258 OP 636

## 2023-09-20 PROCEDURE — 250N000011 HC RX IP 250 OP 636: Mod: JZ

## 2023-09-20 PROCEDURE — 96376 TX/PRO/DX INJ SAME DRUG ADON: CPT

## 2023-09-20 PROCEDURE — G0498 CHEMO EXTEND IV INFUS W/PUMP: HCPCS

## 2023-09-20 PROCEDURE — 258N000003 HC RX IP 258 OP 636: Performed by: PHYSICIAN ASSISTANT

## 2023-09-20 PROCEDURE — 250N000011 HC RX IP 250 OP 636: Mod: JW | Performed by: PHYSICIAN ASSISTANT

## 2023-09-20 PROCEDURE — 96415 CHEMO IV INFUSION ADDL HR: CPT

## 2023-09-20 PROCEDURE — 96375 TX/PRO/DX INJ NEW DRUG ADDON: CPT

## 2023-09-20 PROCEDURE — 85004 AUTOMATED DIFF WBC COUNT: CPT

## 2023-09-20 PROCEDURE — G0463 HOSPITAL OUTPT CLINIC VISIT: HCPCS

## 2023-09-20 PROCEDURE — 80053 COMPREHEN METABOLIC PANEL: CPT

## 2023-09-20 PROCEDURE — 36591 DRAW BLOOD OFF VENOUS DEVICE: CPT

## 2023-09-20 PROCEDURE — 86301 IMMUNOASSAY TUMOR CA 19-9: CPT

## 2023-09-20 PROCEDURE — 96417 CHEMO IV INFUS EACH ADDL SEQ: CPT

## 2023-09-20 PROCEDURE — 99215 OFFICE O/P EST HI 40 MIN: CPT

## 2023-09-20 PROCEDURE — 96367 TX/PROPH/DG ADDL SEQ IV INF: CPT

## 2023-09-20 PROCEDURE — 96413 CHEMO IV INFUSION 1 HR: CPT

## 2023-09-20 RX ORDER — EPINEPHRINE 1 MG/ML
0.3 INJECTION, SOLUTION INTRAMUSCULAR; SUBCUTANEOUS EVERY 5 MIN PRN
Status: CANCELLED | OUTPATIENT
Start: 2023-09-20

## 2023-09-20 RX ORDER — LORAZEPAM 2 MG/ML
0.5 INJECTION INTRAMUSCULAR EVERY 4 HOURS PRN
Status: DISCONTINUED | OUTPATIENT
Start: 2023-09-20 | End: 2023-09-20 | Stop reason: HOSPADM

## 2023-09-20 RX ORDER — ALBUTEROL SULFATE 90 UG/1
1-2 AEROSOL, METERED RESPIRATORY (INHALATION)
Status: CANCELLED
Start: 2023-09-20

## 2023-09-20 RX ORDER — HEPARIN SODIUM,PORCINE 10 UNIT/ML
5-20 VIAL (ML) INTRAVENOUS DAILY PRN
Status: CANCELLED | OUTPATIENT
Start: 2023-09-20

## 2023-09-20 RX ORDER — HEPARIN SODIUM (PORCINE) LOCK FLUSH IV SOLN 100 UNIT/ML 100 UNIT/ML
5 SOLUTION INTRAVENOUS
Status: CANCELLED | OUTPATIENT
Start: 2023-09-21

## 2023-09-20 RX ORDER — LORAZEPAM 2 MG/ML
0.5 INJECTION INTRAMUSCULAR ONCE
Status: CANCELLED
Start: 2023-09-20 | End: 2023-09-26

## 2023-09-20 RX ORDER — ALBUTEROL SULFATE 0.83 MG/ML
2.5 SOLUTION RESPIRATORY (INHALATION)
Status: CANCELLED | OUTPATIENT
Start: 2023-09-20

## 2023-09-20 RX ORDER — ONDANSETRON 2 MG/ML
8 INJECTION INTRAMUSCULAR; INTRAVENOUS ONCE
Status: COMPLETED | OUTPATIENT
Start: 2023-09-20 | End: 2023-09-20

## 2023-09-20 RX ORDER — ONDANSETRON 2 MG/ML
8 INJECTION INTRAMUSCULAR; INTRAVENOUS ONCE
Status: CANCELLED | OUTPATIENT
Start: 2023-09-20

## 2023-09-20 RX ORDER — DIPHENHYDRAMINE HYDROCHLORIDE 50 MG/ML
50 INJECTION INTRAMUSCULAR; INTRAVENOUS
Status: CANCELLED
Start: 2023-09-20

## 2023-09-20 RX ORDER — LORAZEPAM 2 MG/ML
0.5 INJECTION INTRAMUSCULAR ONCE
Status: COMPLETED | OUTPATIENT
Start: 2023-09-20 | End: 2023-09-20

## 2023-09-20 RX ORDER — MEPERIDINE HYDROCHLORIDE 25 MG/ML
25 INJECTION INTRAMUSCULAR; INTRAVENOUS; SUBCUTANEOUS EVERY 30 MIN PRN
Status: CANCELLED | OUTPATIENT
Start: 2023-09-20

## 2023-09-20 RX ORDER — HEPARIN SODIUM (PORCINE) LOCK FLUSH IV SOLN 100 UNIT/ML 100 UNIT/ML
5 SOLUTION INTRAVENOUS ONCE
Status: COMPLETED | OUTPATIENT
Start: 2023-09-20 | End: 2023-09-20

## 2023-09-20 RX ORDER — HEPARIN SODIUM,PORCINE 10 UNIT/ML
5-20 VIAL (ML) INTRAVENOUS DAILY PRN
Status: CANCELLED | OUTPATIENT
Start: 2023-09-21

## 2023-09-20 RX ORDER — HEPARIN SODIUM (PORCINE) LOCK FLUSH IV SOLN 100 UNIT/ML 100 UNIT/ML
5 SOLUTION INTRAVENOUS
Status: CANCELLED | OUTPATIENT
Start: 2023-09-20

## 2023-09-20 RX ORDER — LORAZEPAM 2 MG/ML
0.5 INJECTION INTRAMUSCULAR EVERY 4 HOURS PRN
Status: CANCELLED | OUTPATIENT
Start: 2023-09-20

## 2023-09-20 RX ORDER — METHYLPREDNISOLONE SODIUM SUCCINATE 125 MG/2ML
125 INJECTION, POWDER, LYOPHILIZED, FOR SOLUTION INTRAMUSCULAR; INTRAVENOUS
Status: CANCELLED
Start: 2023-09-20

## 2023-09-20 RX ORDER — ATROPINE SULFATE 0.4 MG/ML
0.4 AMPUL (ML) INJECTION
Status: CANCELLED | OUTPATIENT
Start: 2023-09-20

## 2023-09-20 RX ADMIN — SODIUM CHLORIDE 250 ML: 9 INJECTION, SOLUTION INTRAVENOUS at 11:31

## 2023-09-20 RX ADMIN — FAMOTIDINE 20 MG: 10 INJECTION, SOLUTION INTRAVENOUS at 11:32

## 2023-09-20 RX ADMIN — Medication 5 ML: at 06:29

## 2023-09-20 RX ADMIN — LORAZEPAM 0.5 MG: 2 INJECTION INTRAMUSCULAR; INTRAVENOUS at 11:35

## 2023-09-20 RX ADMIN — OXALIPLATIN 170 MG: 5 INJECTION, SOLUTION INTRAVENOUS at 09:30

## 2023-09-20 RX ADMIN — LORAZEPAM 0.5 MG: 2 INJECTION INTRAMUSCULAR; INTRAVENOUS at 12:45

## 2023-09-20 RX ADMIN — IRINOTECAN HYDROCHLORIDE 240 MG: 20 INJECTION, SOLUTION INTRAVENOUS at 11:38

## 2023-09-20 RX ADMIN — DEXTROSE MONOHYDRATE 250 ML: 50 INJECTION, SOLUTION INTRAVENOUS at 08:46

## 2023-09-20 RX ADMIN — FAMOTIDINE 20 MG: 10 INJECTION, SOLUTION INTRAVENOUS at 12:42

## 2023-09-20 RX ADMIN — ONDANSETRON 8 MG: 2 INJECTION INTRAMUSCULAR; INTRAVENOUS at 08:46

## 2023-09-20 RX ADMIN — DEXAMETHASONE SODIUM PHOSPHATE: 10 INJECTION, SOLUTION INTRAMUSCULAR; INTRAVENOUS at 08:50

## 2023-09-20 ASSESSMENT — PAIN SCALES - GENERAL: PAINLEVEL: MILD PAIN (2)

## 2023-09-20 NOTE — LETTER
9/20/2023         RE: Gallo Navarro  [address] [address] [place] [id number]        Dear Colleague,    Thank you for referring your patient, Gallo Navarro, to the [facility]. Please see a copy of my visit note below.    Oncology/Hematology Visit Note  Sep 20, 2023    Reason for Visit: follow up of locally advanced, unresectable pancreatic cancer     History of Present Illness: Gallo Navarro is a 57 year old male with locally advanced, unresectable pancreatic cancer. 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 He then presented to the ED with worsening abdominal pain, increased jaundice, poor PO intake and weight loss admitted on 7/8/2023 for concern of biliary obstruction. Unfortunately, during this admission, biopsy of pancreatic mass returned positive for pancreatic adenocarcinoma on 7/12/23. He started on treatment with 5FU, irinotecan, and oxaliplatin (FOLFIRINOX) on 7/31/23. Please see previous notes for further details on the patient's history.     8/17/23 - ERCP/EGD, duodenal stents x2 placed, exchange of GJ tube    8/21-/8/25 hospitalized due to diarrhea, colitis, abdominal pain.      8/29 - Cycle 3 deferred due to neutropenia.   Neulasta added. Irinotecan dose reduced 20% due to symptoms including cramping, double vision and slurred speech during infusion.      Gallo returns today for consideration of Cycle 4 FOLFIRINOX.     Interval History:    Reports today that he feels the best he has in months. His energy level is good, has been able to do more activities around the house. He is going to [place] next week for a game and is very excited.    Working with palliative care team adjusting pain medications. Increased Butrans patch dose and has been  successfully decreasing oral dilaudid dose.     Appetite is good, eating more foods orally. Continues on 5 cartons via tube feed daily. Denies nausea, constipation or diarrhea. GJ tube site improved, previously irritated with scant drainage. Has not been more mindful of staying upright after eating and drainage and irritation have resolved.     Had persistent hiccups following last infusion. Tried one dose of baclofen x2 days, which resolved hiccups but made him slightly confused. Does not want to take any more Baclofen if possible.     Blood sugars have been well controlled, working with diabetic educator to adjust insulin as needed.       Cold sensitivity for a couple of days following chemo in his mouth and hands. Denies neuropathy otherwise.         Current Outpatient Medications   Medication Sig Dispense Refill    acetaminophen (TYLENOL) 32 mg/mL liquid Take 20.313 mLs (650 mg) by mouth every 8 hours 1800 mL 11    B-D U/F 31G X 8 MM insulin pen needle Inject Subcutaneous 5 times daily Use 5 pen needles daily or as directed. 500 each 1    baclofen (LIORESAL) 10 MG tablet Take 0.5-1 tablets (5-10 mg) by mouth 3 times daily as needed for other (hiccups) 30 tablet 0    blood glucose (FREESTYLE TEST STRIPS) test strip by Other route as needed      buprenorphine (BUTRANS) 10 MCG/HR WK patch Place 1 patch onto the skin every 7 days Use with 20 mcg/hour patch for 30 mcg/hour total. 4 patch 3    buprenorphine (BUTRANS) 20 MCG/HR WK patch Place 1 patch onto the skin once a week 4 patch 0    Continuous Blood Gluc Sensor (FREESTYLE KAREN 2 SENSOR) MISC Change every 14 days 6 each 3    dexAMETHasone (DECADRON) 4 MG tablet Take 2 tablets (8 mg) by mouth daily Take for 2 days, starting the day after chemo. Take with food. 4 tablet 2    dicyclomine (BENTYL) 10 MG/5ML solution Take 10 mLs (20 mg) by mouth 4 times daily as needed (abdominal discomfort, best before meals) 473 mL 0    gabapentin (NEURONTIN) 300 MG capsule Take 1  capsule (300 mg) by mouth 3 times daily 90 capsule 0    HYDROmorphone (DILAUDID) 2 MG tablet Take 1-2 tablets (2-4 mg) by mouth every 4 hours as needed for severe pain 180 tablet 0    insulin aspart (NOVOLOG PEN) 100 UNIT/ML pen Inject 1-10 Units Subcutaneous 3 times daily (with meals) 15 mL 3    insulin glargine (BASAGLAR KWIKPEN) 100 UNIT/ML pen Inject 20 Units Subcutaneous every morning for 360 days (Patient taking differently: Inject 20 Units Subcutaneous At Bedtime) 18 mL 3    insulin  UNIT/ML injection Inject 16 Units Subcutaneous every evening Take 20 units day of chemo and two days after while on steroid 9 mL 1    Lancets (ONETOUCH DELICA PLUS [id number]) MISC       lidocaine-prilocaine (EMLA) 2.5-2.5 % external cream Use 1-2 times a week or as needed prior to port access 30 g 3    lidocaine-prilocaine (EMLA) 2.5-2.5 % external cream Use 1-2 times weekly or as needed prior to port access 30 g 3    lipase-protease-amylase (CREON 24) 11706-55[address]-297449 units CPEP per EC capsule 1-2 capsules by Per J Tube route 3 times daily (with meals) 180 capsule 3    loperamide (IMODIUM) 2 MG capsule 2 caps at 1st sign of diarrhea & 1 cap every 2hrs until 12hrs diarrhea free. During night, 2 caps at bedtime & 2 caps every 4hrs until  capsule 3    naloxone (NARCAN) 4 MG/0.1ML nasal spray Spray 1 spray (4 mg) into one nostril alternating nostrils as needed for opioid reversal every 2-3 minutes until assistance arrives (Patient not taking: Reported on 9/5/2023) 0.2 mL 11    ondansetron (ZOFRAN ODT) 8 MG ODT tab Take 1 tablet (8 mg) by mouth every 8 hours as needed for nausea 30 tablet 3    pantoprazole (PROTONIX) 40 MG EC tablet Take 1 tablet (40 mg) by mouth 2 times daily 60 tablet 0    polyethylene glycol (MIRALAX) 17 GM/Dose powder Take 17 g by mouth daily 510 g 3    prochlorperazine (COMPAZINE) 10 MG tablet Take 1 tablet (10 mg) by mouth every 6 hours as needed for nausea or vomiting 60 tablet 1     prochlorperazine (COMPAZINE) 10 MG tablet Take 1 tablet (10 mg) by mouth every 6 hours as needed for nausea or vomiting 30 tablet 2    psyllium (METAMUCIL/KONSYL) capsule 1 capsule by Per G Tube route 2 times daily 180 capsule 3    sennosides (SENOKOT) 8.8 MG/5ML syrup 5 mLs by Per J Tube route 2 times daily 236 mL 3    simethicone (MYLICON) 125 MG chewable tablet Take 125 mg by mouth 2 times daily      sodium bicarbonate 325 MG tablet 1 tablet (325 mg) by Per J Tube route 3 times daily 90 tablet 0    vitamin D3 (CHOLECALCIFEROL) 50 mcg (2000 units) tablet Take 1 tablet (50 mcg) by mouth daily 30 tablet 1     Physical Examination:  /70   Pulse 92   Temp 98.6  F (37  C) (Oral)   Resp 16   Wt 74.1 kg (163 lb 4.8 oz)   SpO2 98%   BMI 22.14 kg/m    Wt Readings from Last 10 Encounters:   09/20/23 74.1 kg (163 lb 4.8 oz)   09/19/23 74.8 kg (165 lb)   09/05/23 73.9 kg (162 lb 14.4 oz)   09/05/23 73.9 kg (162 lb 14.7 oz)   08/31/23 73.9 kg (163 lb)   08/29/23 73.7 kg (162 lb 7.7 oz)   08/22/23 73.4 kg (161 lb 12.8 oz)   08/17/23 74.8 kg (164 lb 14.5 oz)   08/15/23 74.3 kg (163 lb 14.4 oz)   08/09/23 74.5 kg (164 lb 4.8 oz)   General: The patient is a pleasant male in no acute distress.  HEENT: EOMI. Sclerae are anicteric. Mucous membranes moist, no lesions   Lymph: Neck is supple with no lymphadenopathy in the cervical or supraclavicular areas.   Heart: Regular rate and rhythm.   Lungs: Clear to auscultation bilaterally.   Abdomen: Bowel sounds present, soft, nontender with no palpable hepatosplenomegaly or masses. GJ tube in place, no redness or drainage noted around insertion site.   Extremities: No edema noted to bilateral lower extremities.     Neuro: Cranial nerves II through XII are grossly intact.  Skin: No rashes, petechiae, or bruising noted on exposed skin.     Laboratory Data:  Most Recent 3 CBC's:  Recent Labs   Lab Test 09/20/23  0636 09/12/23  1130 09/05/23  1112   WBC 9.2 14.4* 5.4   HGB 9.8*  10.4* 9.3*   MCV 93 93 93    300 241   ANEUTAUTO 6.4 9.3* 2.3    Most Recent 3 BMP's:  Recent Labs   Lab Test 09/20/23  0636 09/05/23  1608 09/05/23  1112 08/29/23  1007     --  139 140   POTASSIUM 4.3  --  4.2 3.4   CHLORIDE 102  --  104 106   CO2 27  --  28 26   BUN 13.2  --  14.9 8.5   CR 0.52*  --  0.52* 0.48*   ANIONGAP 10  --  7 8   DENISE 8.9  --  8.7 8.4*   * 294* 146* 116*   PROTTOTAL 6.1*  --  5.7* 5.2*   ALBUMIN 3.4*  --  3.1* 3.0*    Most Recent 2 LFT's:  Recent Labs   Lab Test 09/20/23  0636 09/05/23  1112   AST 14 13   ALT 14 14   ALKPHOS 136* 117   BILITOTAL 0.2 0.2   I reviewed the above labs today.    Imaging: CT Abdomen/Pelvis 8/21/23  IMPRESSION: In this patient with history of pancreatic cancer and  ongoing pain, the current CT scan compared to prior from 7/8/2023  shows:     1a. Increased size of the ill marginated pancreatic head mass,  extending into the mesenteric root with encasement of the SMA, SMV,  gastroduodenal artery and broad area of contact with the splenoportal  junction, splenic vein and central portal vein.  1b. Similar to slight increase in size of peripancreatic and  mesenteric lymph nodes, reactive and/or neoplastic.  1c. Increase in size of sclerotic lesion in the lower thoracic  vertebral body with new sclerotic lesion in the left pubic bone,  concerning for metastasis  1d. Percutaneous gastrojejunostomy tube, gastroduodenal stent, and  gastrojejunostomy metallic lumen opposing stent; no significant  gastric distention  1e. Slight increased pancreatic ductal dilatation,   1f. Biliary ductal stents with pneumobilia, no increased biliary  ductal dilatation  1g. New ascites.  2a. Increased inner wall enhancement with mild thickening involving  the left colon with mild pericolonic streakiness, may represent  colitis secondary to infectious, or inflammatory etiology. No  pneumatosis, pneumoperitoneum, pericolic collection.  2b. Mild air-filled distention of  transverse colon and part of the  redundant sigmoid colon may represent developing adynamic ileus.     I have personally reviewed the examination and initial interpretation  and I agree with the findings.     ZURDO MANJARREZ MD       Assessment and Plan:  Locally advanced, unresectable pancreatic cancer. Patient started on treatment with palliative FOLFIRINOX on 7/31/23. He had a moderate amount of side effects following cycle 1 with dehydration, diarrhea, and nausea. Received cycle 2 on 8/15/23. ERCP/EGD on 8/17 with GJ tube exchange and duodenal stents placed. Hospitalized 8/21-8/25 due to colitis and abdominal pain. Cycle 3 was deferred last week due to neutropenia.    - Will proceed with cycle 3 today, WBC and ANC improved. Neulasta OnPro added to day 3. Can take Claritin 10mg daily to help with bone pain.   - Had symptoms with cycle 1 and 2 during irinotecan including blurry vision, hand cramping, and difficulty getting words out. Thought to be due to atropine, which was not given with cycle 2 and symptoms reoccurred. Discussed with Dr. Haile, irinotecan dose reduced 20% with cycle 3. Symptoms still occurred but Gallo reports they are tolerable.  -Proceed with Cycle 4 today.    -Return to clinic in 2 weeks with next cycle, SUZETTE visit with labs prior.   -Will plan to repeat imaging after 6 cycles of FOLFIRINOX. We will consider potential chemoradiation versus continued chemotherapy, versus clinical trial based on treatment response seen on follow-up imaging.     Nutrition.  Continues 5 cans Peptomin tube feeding overnight through J tube. Continues to tolerate a soft diet orally and is eating more food. Denies nausea or vomiting. Continue to push oral hydration, drinking 64+ oz of fluid/day. Working with dietician. Weight stable.     Lower extremity edema. Resolved. Continue compression stockings and elevation. Albumin and protein are low, continue to monitor.     Diabetes. On insulin, only taking NPH insulin  at night. Will adjust insulin based on steroids with chemotherapy. Working closely with endocrine/diabetic educator on dose adjustments/management.     Nausea. No issues recently. Continue compazine as needed. Has cannabis gummies but has not needed. Avoiding ondansetron due to side effect of constipation. Continue Protonix daily.      Diarrhea/constipation.  Resolved. Now having daily soft bowel movements. Continue Metamucil and dicylomine.    Abdominal pain. Managed by palliative care with Dilaudid, acetaminophen, and Butrans. Increased Butrans patch recently and has been working on reducing oral Dilaudid dose. Follow up with palliative care 10/23.      Hiccups. Resolved with Baclofen, but did not like the side effects including mild confusion. Previously took gabapentin but did not find this helpful for hiccups.       Anemia. Normocytic. Suspect anemia of chronic disease. Will monitor for now.       40 minutes spent on the date of the encounter doing chart review, review of test results, interpretation of tests, patient visit, and documentation         ESVIN Canales CNP

## 2023-09-20 NOTE — PROGRESS NOTES
"Infusion Nursing Note:  Gallo Navarro presents today for Cycle 4 Day 1 Oxaliplatin, Irinotecan Fluorouracil 46 hour dosi-fusor.    Patient seen by provider today: Yes: Jennifer Farrell NP   present during visit today: Not Applicable.    Note:     Due to previous side effects of atropine and irinotecan the following was administered as were on cycle 2 and cycle 3.     20 mg Pepcid IV and 0.5 mg Ativan IV pre Irinotecan  20 mg Pepcid IV and 0.5 mg Ativan IV mid Irinotecan     Intravenous Access:  Implanted Port.    Treatment Conditions:  Lab Results   Component Value Date    HGB 9.8 (L) 09/20/2023    WBC 9.2 09/20/2023    ANEU 2.8 08/15/2023    ANEUTAUTO 6.4 09/20/2023     09/20/2023        Lab Results   Component Value Date     09/20/2023    POTASSIUM 4.3 09/20/2023    MAG 1.9 08/23/2023    CR 0.52 (L) 09/20/2023    DENISE 8.9 09/20/2023    BILITOTAL 0.2 09/20/2023    ALBUMIN 3.4 (L) 09/20/2023    ALT 14 09/20/2023    AST 14 09/20/2023       Results reviewed, labs MET treatment parameters, ok to proceed with treatment.      Post Infusion Assessment:  Patient tolerated infusion without incident.  Blood return noted pre and post infusion.  Prior to discharge: Port is secured in place with tegaderm and flushed with 10cc NS with positive blood return noted.    Continuous home infusion Dosi-Fuser pump connected.    All connectors secured in place and clamps taped open.    Pump started, \"running\" noted on display (CADD): Not Applicable.   Capillary element taped to pt's skin per protocol.  Patient instructed to call our clinic or [facility] with any questions or concerns at home.  Patient verbalized understanding.    Patient set up for pump disconnect at home with [facility] on 9/22 with          Discharge Plan:   Prescription refills given for Decadron.  Patient and/or family verbalized understanding of discharge instructions and all questions answered.  AVS to patient via [employer].  " Patient will return 10/2 for next appointment- this is two days early. Message sent to care team to address or adjust infusion appointment accordingly.   Departure Mode: Ambulatory.      Jennie Joaquin RN

## 2023-09-20 NOTE — PROGRESS NOTES
Oncology/Hematology Visit Note  Sep 20, 2023    Reason for Visit: follow up of locally advanced, unresectable pancreatic cancer     History of Present Illness: Gallo Navarro is a 57 year old male with locally advanced, unresectable pancreatic cancer. 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 He then presented to the ED with worsening abdominal pain, increased jaundice, poor PO intake and weight loss admitted on 7/8/2023 for concern of biliary obstruction. Unfortunately, during this admission, biopsy of pancreatic mass returned positive for pancreatic adenocarcinoma on 7/12/23. He started on treatment with 5FU, irinotecan, and oxaliplatin (FOLFIRINOX) on 7/31/23. Please see previous notes for further details on the patient's history.     8/17/23 - ERCP/EGD, duodenal stents x2 placed, exchange of GJ tube    8/21-/8/25 hospitalized due to diarrhea, colitis, abdominal pain.      8/29 - Cycle 3 deferred due to neutropenia.   Neulasta added. Irinotecan dose reduced 20% due to symptoms including cramping, double vision and slurred speech during infusion.      Gallo returns today for consideration of Cycle 4 FOLFIRINOX.     Interval History:    Reports today that he feels the best he has in months. His energy level is good, has been able to do more activities around the house. He is going to [employer] next week for a game and is very excited.    Working with palliative care team adjusting pain medications. Increased Butrans patch dose and has been successfully decreasing oral dilaudid dose.     Appetite is good, eating more foods orally. Continues on 5 cartons via tube feed daily. Denies nausea, constipation or diarrhea. GJ tube site improved, previously irritated with scant drainage. Has not been more  mindful of staying upright after eating and drainage and irritation have resolved.     Had persistent hiccups following last infusion. Tried one dose of baclofen x2 days, which resolved hiccups but made him slightly confused. Does not want to take any more Baclofen if possible.     Blood sugars have been well controlled, working with diabetic educator to adjust insulin as needed.       Cold sensitivity for a couple of days following chemo in his mouth and hands. Denies neuropathy otherwise.         Current Outpatient Medications   Medication Sig Dispense Refill    acetaminophen (TYLENOL) 32 mg/mL liquid Take 20.313 mLs (650 mg) by mouth every 8 hours 1800 mL 11    B-D U/F 31G X 8 MM insulin pen needle Inject Subcutaneous 5 times daily Use 5 pen needles daily or as directed. 500 each 1    baclofen (LIORESAL) 10 MG tablet Take 0.5-1 tablets (5-10 mg) by mouth 3 times daily as needed for other (hiccups) 30 tablet 0    blood glucose (FREESTYLE TEST STRIPS) test strip by Other route as needed      buprenorphine (BUTRANS) 10 MCG/HR WK patch Place 1 patch onto the skin every 7 days Use with 20 mcg/hour patch for 30 mcg/hour total. 4 patch 3    buprenorphine (BUTRANS) 20 MCG/HR WK patch Place 1 patch onto the skin once a week 4 patch 0    Continuous Blood Gluc Sensor (FREESTYLE KAREN 2 SENSOR) MISC Change every 14 days 6 each 3    dexAMETHasone (DECADRON) 4 MG tablet Take 2 tablets (8 mg) by mouth daily Take for 2 days, starting the day after chemo. Take with food. 4 tablet 2    dicyclomine (BENTYL) 10 MG/5ML solution Take 10 mLs (20 mg) by mouth 4 times daily as needed (abdominal discomfort, best before meals) 473 mL 0    gabapentin (NEURONTIN) 300 MG capsule Take 1 capsule (300 mg) by mouth 3 times daily 90 capsule 0    HYDROmorphone (DILAUDID) 2 MG tablet Take 1-2 tablets (2-4 mg) by mouth every 4 hours as needed for severe pain 180 tablet 0    insulin aspart (NOVOLOG PEN) 100 UNIT/ML pen Inject 1-10 Units Subcutaneous 3  times daily (with meals) 15 mL 3    insulin glargine (BASAGLAR KWIKPEN) 100 UNIT/ML pen Inject 20 Units Subcutaneous every morning for 360 days (Patient taking differently: Inject 20 Units Subcutaneous At Bedtime) 18 mL 3    insulin  UNIT/ML injection Inject 16 Units Subcutaneous every evening Take 20 units day of chemo and two days after while on steroid 9 mL 1    Lancets (ONETOUCH DELICA PLUS [id number]) MISC       lidocaine-prilocaine (EMLA) 2.5-2.5 % external cream Use 1-2 times a week or as needed prior to port access 30 g 3    lidocaine-prilocaine (EMLA) 2.5-2.5 % external cream Use 1-2 times weekly or as needed prior to port access 30 g 3    lipase-protease-amylase (CREON 24) 83160-66388-339684 units CPEP per EC capsule 1-2 capsules by Per J Tube route 3 times daily (with meals) 180 capsule 3    loperamide (IMODIUM) 2 MG capsule 2 caps at 1st sign of diarrhea & 1 cap every 2hrs until 12hrs diarrhea free. During night, 2 caps at bedtime & 2 caps every 4hrs until  capsule 3    naloxone (NARCAN) 4 MG/0.1ML nasal spray Spray 1 spray (4 mg) into one nostril alternating nostrils as needed for opioid reversal every 2-3 minutes until assistance arrives (Patient not taking: Reported on 9/5/2023) 0.2 mL 11    ondansetron (ZOFRAN ODT) 8 MG ODT tab Take 1 tablet (8 mg) by mouth every 8 hours as needed for nausea 30 tablet 3    pantoprazole (PROTONIX) 40 MG EC tablet Take 1 tablet (40 mg) by mouth 2 times daily 60 tablet 0    polyethylene glycol (MIRALAX) 17 GM/Dose powder Take 17 g by mouth daily 510 g 3    prochlorperazine (COMPAZINE) 10 MG tablet Take 1 tablet (10 mg) by mouth every 6 hours as needed for nausea or vomiting 60 tablet 1    prochlorperazine (COMPAZINE) 10 MG tablet Take 1 tablet (10 mg) by mouth every 6 hours as needed for nausea or vomiting 30 tablet 2    psyllium (METAMUCIL/KONSYL) capsule 1 capsule by Per G Tube route 2 times daily 180 capsule 3    sennosides (SENOKOT) 8.8 MG/5ML syrup 5  mLs by Per J Tube route 2 times daily 236 mL 3    simethicone (MYLICON) 125 MG chewable tablet Take 125 mg by mouth 2 times daily      sodium bicarbonate 325 MG tablet 1 tablet (325 mg) by Per J Tube route 3 times daily 90 tablet 0    vitamin D3 (CHOLECALCIFEROL) 50 mcg (2000 units) tablet Take 1 tablet (50 mcg) by mouth daily 30 tablet 1     Physical Examination:  /70   Pulse 92   Temp 98.6  F (37  C) (Oral)   Resp 16   Wt 74.1 kg (163 lb 4.8 oz)   SpO2 98%   BMI 22.14 kg/m    Wt Readings from Last 10 Encounters:   09/20/23 74.1 kg (163 lb 4.8 oz)   09/19/23 74.8 kg (165 lb)   09/05/23 73.9 kg (162 lb 14.4 oz)   09/05/23 73.9 kg (162 lb 14.7 oz)   08/31/23 73.9 kg (163 lb)   08/29/23 73.7 kg (162 lb 7.7 oz)   08/22/23 73.4 kg (161 lb 12.8 oz)   08/17/23 74.8 kg (164 lb 14.5 oz)   08/15/23 74.3 kg (163 lb 14.4 oz)   08/09/23 74.5 kg (164 lb 4.8 oz)   General: The patient is a pleasant male in no acute distress.  HEENT: EOMI. Sclerae are anicteric. Mucous membranes moist, no lesions   Lymph: Neck is supple with no lymphadenopathy in the cervical or supraclavicular areas.   Heart: Regular rate and rhythm.   Lungs: Clear to auscultation bilaterally.   Abdomen: Bowel sounds present, soft, nontender with no palpable hepatosplenomegaly or masses. GJ tube in place, no redness or drainage noted around insertion site.   Extremities: No edema noted to bilateral lower extremities.     Neuro: Cranial nerves II through XII are grossly intact.  Skin: No rashes, petechiae, or bruising noted on exposed skin.     Laboratory Data:  Most Recent 3 CBC's:  Recent Labs   Lab Test 09/20/23  0636 09/12/23  1130 09/05/23  1112   WBC 9.2 14.4* 5.4   HGB 9.8* 10.4* 9.3*   MCV 93 93 93    300 241   ANEUTAUTO 6.4 9.3* 2.3    Most Recent 3 BMP's:  Recent Labs   Lab Test 09/20/23  0636 09/05/23  1608 09/05/23  1112 08/29/23  1007     --  139 140   POTASSIUM 4.3  --  4.2 3.4   CHLORIDE 102  --  104 106   CO2 27  --  28 26    BUN 13.2  --  14.9 8.5   CR 0.52*  --  0.52* 0.48*   ANIONGAP 10  --  7 8   DENISE 8.9  --  8.7 8.4*   * 294* 146* 116*   PROTTOTAL 6.1*  --  5.7* 5.2*   ALBUMIN 3.4*  --  3.1* 3.0*    Most Recent 2 LFT's:  Recent Labs   Lab Test 09/20/23  0636 09/05/23  1112   AST 14 13   ALT 14 14   ALKPHOS 136* 117   BILITOTAL 0.2 0.2   I reviewed the above labs today.    Imaging: CT Abdomen/Pelvis 8/21/23  IMPRESSION: In this patient with history of pancreatic cancer and  ongoing pain, the current CT scan compared to prior from 7/8/2023  shows:     1a. Increased size of the ill marginated pancreatic head mass,  extending into the mesenteric root with encasement of the SMA, SMV,  gastroduodenal artery and broad area of contact with the splenoportal  junction, splenic vein and central portal vein.  1b. Similar to slight increase in size of peripancreatic and  mesenteric lymph nodes, reactive and/or neoplastic.  1c. Increase in size of sclerotic lesion in the lower thoracic  vertebral body with new sclerotic lesion in the left pubic bone,  concerning for metastasis  1d. Percutaneous gastrojejunostomy tube, gastroduodenal stent, and  gastrojejunostomy metallic lumen opposing stent; no significant  gastric distention  1e. Slight increased pancreatic ductal dilatation,   1f. Biliary ductal stents with pneumobilia, no increased biliary  ductal dilatation  1g. New ascites.  2a. Increased inner wall enhancement with mild thickening involving  the left colon with mild pericolonic streakiness, may represent  colitis secondary to infectious, or inflammatory etiology. No  pneumatosis, pneumoperitoneum, pericolic collection.  2b. Mild air-filled distention of transverse colon and part of the  redundant sigmoid colon may represent developing adynamic ileus.     I have personally reviewed the examination and initial interpretation  and I agree with the findings.     ZURDO MANJARREZ MD       Assessment and Plan:  Locally advanced,  unresectable pancreatic cancer. Patient started on treatment with palliative FOLFIRINOX on 7/31/23. He had a moderate amount of side effects following cycle 1 with dehydration, diarrhea, and nausea. Received cycle 2 on 8/15/23. ERCP/EGD on 8/17 with GJ tube exchange and duodenal stents placed. Hospitalized 8/21-8/25 due to colitis and abdominal pain. Cycle 3 was deferred last week due to neutropenia.    - Will proceed with cycle 3 today, WBC and ANC improved. Neulasta OnPro added to day 3. Can take Claritin 10mg daily to help with bone pain.   - Had symptoms with cycle 1 and 2 during irinotecan including blurry vision, hand cramping, and difficulty getting words out. Thought to be due to atropine, which was not given with cycle 2 and symptoms reoccurred. Discussed with Dr. Haile, irinotecan dose reduced 20% with cycle 3. Symptoms still occurred but Gallo reports they are tolerable.  -Proceed with Cycle 4 today.    -Return to clinic in 2 weeks with next cycle, SUZETTE visit with labs prior.   -Will plan to repeat imaging after 6 cycles of FOLFIRINOX. We will consider potential chemoradiation versus continued chemotherapy, versus clinical trial based on treatment response seen on follow-up imaging.     Nutrition.  Continues 5 cans Peptomin tube feeding overnight through J tube. Continues to tolerate a soft diet orally and is eating more food. Denies nausea or vomiting. Continue to push oral hydration, drinking 64+ oz of fluid/day. Working with dietician. Weight stable.     Lower extremity edema. Resolved. Continue compression stockings and elevation. Albumin and protein are low, continue to monitor.     Diabetes. On insulin, only taking NPH insulin at night. Will adjust insulin based on steroids with chemotherapy. Working closely with endocrine/diabetic educator on dose adjustments/management.     Nausea. No issues recently. Continue compazine as needed. Has cannabis gummies but has not needed. Avoiding ondansetron due  to side effect of constipation. Continue Protonix daily.      Diarrhea/constipation.  Resolved. Now having daily soft bowel movements. Continue Metamucil and dicylomine.    Abdominal pain. Managed by palliative care with Dilaudid, acetaminophen, and Butrans. Increased Butrans patch recently and has been working on reducing oral Dilaudid dose. Follow up with palliative care 10/23.      Hiccups. Resolved with Baclofen, but did not like the side effects including mild confusion. Previously took gabapentin but did not find this helpful for hiccups.       Anemia. Normocytic. Suspect anemia of chronic disease. Will monitor for now.       40 minutes spent on the date of the encounter doing chart review, review of test results, interpretation of tests, patient visit, and documentation         ESVIN Canales CNP

## 2023-09-20 NOTE — NURSING NOTE
Chief Complaint   Patient presents with    Port Draw     Port accessed with 20g flat needle by RN, labs collected, line flushed with saline and heparin.  Vitals taken. Pt checked in for appointment(s).      Ilene FABIAN RN PHN BSN  BMT/Oncology Lab

## 2023-09-20 NOTE — NURSING NOTE
"Oncology Rooming Note    September 20, 2023 6:59 AM   Gallo Navarro is a 57 year old male who presents for:    Chief Complaint   Patient presents with    Port Draw     Port accessed with 20g flat needle by RN, labs collected, line flushed with saline and heparin.  Vitals taken. Pt checked in for appointment(s).     Oncology Clinic Visit     Pancreatic CA     Initial Vitals: /70   Pulse 92   Temp 98.6  F (37  C) (Oral)   Resp 16   Wt 74.1 kg (163 lb 4.8 oz)   SpO2 98%   BMI 22.14 kg/m   Estimated body mass index is 22.14 kg/m  as calculated from the following:    Height as of 9/5/23: 1.829 m (6' 0.01\").    Weight as of this encounter: 74.1 kg (163 lb 4.8 oz). Body surface area is 1.94 meters squared.  Mild Pain (2) Comment: Data Unavailable   No LMP for male patient.  Allergies reviewed: Yes  Medications reviewed: Yes    Medications: Medication refills not needed today.  Pharmacy name entered into EPIC:    [facility] - [place], [place] - [address] [place] [address] PHARMACY [place], [place] - [address] [place] [address] [id number]  [facility] - [place] - ONE [facility] AT PORTAL TO REGISTERED [facility] SITES  [facility] - [place], [place] - [address]     Clinical concerns:        Carolyn Mena              "

## 2023-09-21 DIAGNOSIS — E11.9 TYPE 2 DIABETES MELLITUS WITHOUT COMPLICATION, WITH LONG-TERM CURRENT USE OF INSULIN (H): ICD-10-CM

## 2023-09-21 DIAGNOSIS — K31.1 GASTRIC OUTLET OBSTRUCTION: ICD-10-CM

## 2023-09-21 DIAGNOSIS — G89.3 CANCER ASSOCIATED PAIN: ICD-10-CM

## 2023-09-21 DIAGNOSIS — C25.0 MALIGNANT NEOPLASM OF HEAD OF PANCREAS (H): ICD-10-CM

## 2023-09-21 DIAGNOSIS — Z79.4 TYPE 2 DIABETES MELLITUS WITHOUT COMPLICATION, WITH LONG-TERM CURRENT USE OF INSULIN (H): ICD-10-CM

## 2023-09-21 RX ORDER — BUPRENORPHINE 20 UG/H
1 PATCH TRANSDERMAL WEEKLY
Qty: 4 PATCH | Refills: 0 | Status: SHIPPED | OUTPATIENT
Start: 2023-09-21 | End: 2023-10-23

## 2023-09-21 NOTE — TELEPHONE ENCOUNTER
Received telephone call from patient requesting refill of butrans 20 mcg patch.     Last refill: 8/31/23 (3 patches-21 day supply)  Last office visit: 8/31/23  Scheduled for follow up 10/23/23     Will route request to NP for review.     Reviewed [place]  Report.

## 2023-09-22 ENCOUNTER — TELEPHONE (OUTPATIENT)
Dept: ENDOCRINOLOGY | Facility: CLINIC | Age: 58
End: 2023-09-22
Payer: COMMERCIAL

## 2023-09-22 ENCOUNTER — DOCUMENTATION ONLY (OUTPATIENT)
Dept: PHARMACY | Facility: CLINIC | Age: 58
End: 2023-09-22
Payer: COMMERCIAL

## 2023-09-22 LAB — CANCER AG19-9 SERPL IA-ACNC: 8 U/ML

## 2023-09-22 NOTE — TELEPHONE ENCOUNTER
PA Initiation    Med[facility]: [facility]  Insurance Company: [employer] - Phone [phone] Fax [phone]  Pharmacy Filling the Rx: [facility] - [place], [place] - [facility] [address]  Filling Pharmacy Phone: [phone]  Filling Pharmacy Fax: [phone]  Start Date: 9/22/2023

## 2023-09-22 NOTE — TELEPHONE ENCOUNTER
MEDICAL    PRIOR AUTHORIZATION REQUIRED - See Reason for Call Comments for specific products. Billing with (A) codes.  CGM: FREESTStandardNine KAREN 2 SENSORS  A/K codes:       INSURANCE: [employer] [facility]  ID: [id number]        [facility] TEAM  PHONE: [phone]  FAX: [phone]    Route determinations back to Pharm Diabetes pool ([id number])      ;

## 2023-09-23 NOTE — TELEPHONE ENCOUNTER
Prior Authorization Not Needed per Insurance    Medication: FREESTYLE KAREN 2 SENSOR MISC  Insurance Company: [employer] - Phone [phone] Fax [phone]  Expected CoPay:      Pharmacy Filling the Rx: [facility] - [place], [place] - [address]  Pharmacy Notified: Yes  Patient Notified: Yes **Instructed pharmacy to notify patient when script is ready to /ship.**

## 2023-09-25 NOTE — PROGRESS NOTES
9/25 2nd attempt.  Called and left voicemail, provided phone number [phone] to schedule a follow up appointment with maria luisa castellon.      Oralia poon Procedure   Orthopedics, Podiatry, Sports Medicine, Ent ,Eye , Audiology, Adult Endocrine & Diabetes, Nutrition & Medication Therapy Management Specialties   [facility]

## 2023-09-27 ENCOUNTER — MEDICAL CORRESPONDENCE (OUTPATIENT)
Dept: HEALTH INFORMATION MANAGEMENT | Facility: CLINIC | Age: 58
End: 2023-09-27
Payer: COMMERCIAL

## 2023-09-27 ENCOUNTER — TELEPHONE (OUTPATIENT)
Dept: FAMILY MEDICINE | Facility: CLINIC | Age: 58
End: 2023-09-27
Payer: COMMERCIAL

## 2023-09-27 NOTE — TELEPHONE ENCOUNTER
Forms/Letter Request    Type of form/letter:  [facility]    Have you been seen for this request: N/A    Do we have the form/letter: Yes: Will place form on providers desk for review/signature    When is form/letter needed by: asap    How would you like the form/letter returned: Fax : [phone]

## 2023-09-28 ENCOUNTER — PATIENT OUTREACH (OUTPATIENT)
Dept: ONCOLOGY | Facility: CLINIC | Age: 58
End: 2023-09-28
Payer: COMMERCIAL

## 2023-09-28 NOTE — PROGRESS NOTES
Pt and spouse called to state they're going to [place], [place] for the weekend and forgot pt's Creon RX at home. Wondered if a script could be called into a [facility]. Per chart review, pt's Creon was last filled by pcp Dr. Hernandez with 3 refills, last picked up #180 on 8/18/23, informed them if pt is due for a refill he can go to any [facility] and just ask for them to call local [facility] and transfer the script there to fill. Once they're back home they can have [facility] transfer the script back. They may also try OTC pancreatic enzymes if they're only in [place] 1-2 days. They stated understanding and will wait until they reach [place] to check with [facility].

## 2023-10-01 ENCOUNTER — MYC MEDICAL ADVICE (OUTPATIENT)
Dept: EDUCATION SERVICES | Facility: CLINIC | Age: 58
End: 2023-10-01
Payer: COMMERCIAL

## 2023-10-03 ENCOUNTER — ONCOLOGY VISIT (OUTPATIENT)
Dept: ONCOLOGY | Facility: CLINIC | Age: 58
End: 2023-10-03
Payer: COMMERCIAL

## 2023-10-03 ENCOUNTER — APPOINTMENT (OUTPATIENT)
Dept: LAB | Facility: CLINIC | Age: 58
End: 2023-10-03
Payer: COMMERCIAL

## 2023-10-03 ENCOUNTER — INFUSION THERAPY VISIT (OUTPATIENT)
Dept: ONCOLOGY | Facility: CLINIC | Age: 58
End: 2023-10-03
Attending: STUDENT IN AN ORGANIZED HEALTH CARE EDUCATION/TRAINING PROGRAM
Payer: COMMERCIAL

## 2023-10-03 VITALS
BODY MASS INDEX: 22.25 KG/M2 | SYSTOLIC BLOOD PRESSURE: 121 MMHG | OXYGEN SATURATION: 98 % | TEMPERATURE: 97.6 F | HEART RATE: 92 BPM | RESPIRATION RATE: 16 BRPM | DIASTOLIC BLOOD PRESSURE: 77 MMHG | WEIGHT: 164.1 LBS

## 2023-10-03 DIAGNOSIS — Z79.4 TYPE 2 DIABETES MELLITUS WITHOUT COMPLICATION, WITH LONG-TERM CURRENT USE OF INSULIN (H): ICD-10-CM

## 2023-10-03 DIAGNOSIS — K31.5 DUODENAL STRICTURE: ICD-10-CM

## 2023-10-03 DIAGNOSIS — R10.84 ABDOMINAL PAIN, GENERALIZED: ICD-10-CM

## 2023-10-03 DIAGNOSIS — E11.9 TYPE 2 DIABETES MELLITUS WITHOUT COMPLICATION, WITH LONG-TERM CURRENT USE OF INSULIN (H): ICD-10-CM

## 2023-10-03 DIAGNOSIS — C25.0 MALIGNANT NEOPLASM OF HEAD OF PANCREAS (H): Primary | ICD-10-CM

## 2023-10-03 DIAGNOSIS — Z51.11 ENCOUNTER FOR ANTINEOPLASTIC CHEMOTHERAPY: ICD-10-CM

## 2023-10-03 LAB
ALBUMIN SERPL BCG-MCNC: 3.5 G/DL (ref 3.5–5.2)
ALP SERPL-CCNC: 203 U/L (ref 40–129)
ALT SERPL W P-5'-P-CCNC: 15 U/L (ref 0–70)
ANION GAP SERPL CALCULATED.3IONS-SCNC: 10 MMOL/L (ref 7–15)
AST SERPL W P-5'-P-CCNC: 15 U/L (ref 0–45)
BASO+EOS+MONOS # BLD AUTO: ABNORMAL 10*3/UL
BASO+EOS+MONOS NFR BLD AUTO: ABNORMAL %
BASOPHILS # BLD AUTO: 0 10E3/UL (ref 0–0.2)
BASOPHILS NFR BLD AUTO: 0 %
BILIRUB SERPL-MCNC: 0.2 MG/DL
BUN SERPL-MCNC: 12.7 MG/DL (ref 6–20)
CALCIUM SERPL-MCNC: 8.7 MG/DL (ref 8.6–10)
CHLORIDE SERPL-SCNC: 102 MMOL/L (ref 98–107)
CREAT SERPL-MCNC: 0.51 MG/DL (ref 0.67–1.17)
DEPRECATED HCO3 PLAS-SCNC: 26 MMOL/L (ref 22–29)
EGFRCR SERPLBLD CKD-EPI 2021: >90 ML/MIN/1.73M2
EOSINOPHIL # BLD AUTO: 0.1 10E3/UL (ref 0–0.7)
EOSINOPHIL NFR BLD AUTO: 1 %
ERYTHROCYTE [DISTWIDTH] IN BLOOD BY AUTOMATED COUNT: 14.1 % (ref 10–15)
GLUCOSE SERPL-MCNC: 107 MG/DL (ref 70–99)
HCT VFR BLD AUTO: 30.5 % (ref 40–53)
HGB BLD-MCNC: 9.6 G/DL (ref 13.3–17.7)
IMM GRANULOCYTES # BLD: 0.1 10E3/UL
IMM GRANULOCYTES NFR BLD: 1 %
LYMPHOCYTES # BLD AUTO: 1.8 10E3/UL (ref 0.8–5.3)
LYMPHOCYTES NFR BLD AUTO: 11 %
MCH RBC QN AUTO: 28.7 PG (ref 26.5–33)
MCHC RBC AUTO-ENTMCNC: 31.5 G/DL (ref 31.5–36.5)
MCV RBC AUTO: 91 FL (ref 78–100)
MONOCYTES # BLD AUTO: 1.2 10E3/UL (ref 0–1.3)
MONOCYTES NFR BLD AUTO: 7 %
NEUTROPHILS # BLD AUTO: 12.5 10E3/UL (ref 1.6–8.3)
NEUTROPHILS NFR BLD AUTO: 80 %
NRBC # BLD AUTO: 0 10E3/UL
NRBC BLD AUTO-RTO: 0 /100
PLATELET # BLD AUTO: 204 10E3/UL (ref 150–450)
POTASSIUM SERPL-SCNC: 3.7 MMOL/L (ref 3.4–5.3)
PROT SERPL-MCNC: 6.1 G/DL (ref 6.4–8.3)
RBC # BLD AUTO: 3.34 10E6/UL (ref 4.4–5.9)
SODIUM SERPL-SCNC: 138 MMOL/L (ref 135–145)
WBC # BLD AUTO: 15.8 10E3/UL (ref 4–11)

## 2023-10-03 PROCEDURE — 85025 COMPLETE CBC W/AUTO DIFF WBC: CPT

## 2023-10-03 PROCEDURE — 96367 TX/PROPH/DG ADDL SEQ IV INF: CPT

## 2023-10-03 PROCEDURE — 36591 DRAW BLOOD OFF VENOUS DEVICE: CPT

## 2023-10-03 PROCEDURE — 258N000003 HC RX IP 258 OP 636

## 2023-10-03 PROCEDURE — 250N000011 HC RX IP 250 OP 636: Mod: JZ

## 2023-10-03 PROCEDURE — 250N000011 HC RX IP 250 OP 636

## 2023-10-03 PROCEDURE — 99214 OFFICE O/P EST MOD 30 MIN: CPT

## 2023-10-03 PROCEDURE — 250N000011 HC RX IP 250 OP 636: Performed by: REGISTERED NURSE

## 2023-10-03 PROCEDURE — 96375 TX/PRO/DX INJ NEW DRUG ADDON: CPT

## 2023-10-03 PROCEDURE — G0498 CHEMO EXTEND IV INFUS W/PUMP: HCPCS

## 2023-10-03 PROCEDURE — 96415 CHEMO IV INFUSION ADDL HR: CPT

## 2023-10-03 PROCEDURE — 96376 TX/PRO/DX INJ SAME DRUG ADON: CPT

## 2023-10-03 PROCEDURE — 96417 CHEMO IV INFUS EACH ADDL SEQ: CPT

## 2023-10-03 PROCEDURE — 258N000003 HC RX IP 258 OP 636: Performed by: REGISTERED NURSE

## 2023-10-03 PROCEDURE — 86301 IMMUNOASSAY TUMOR CA 19-9: CPT

## 2023-10-03 PROCEDURE — 80053 COMPREHEN METABOLIC PANEL: CPT

## 2023-10-03 PROCEDURE — 96413 CHEMO IV INFUSION 1 HR: CPT

## 2023-10-03 RX ORDER — ALBUTEROL SULFATE 90 UG/1
1-2 AEROSOL, METERED RESPIRATORY (INHALATION)
Status: CANCELLED
Start: 2023-10-03

## 2023-10-03 RX ORDER — LORAZEPAM 2 MG/ML
0.5 INJECTION INTRAMUSCULAR EVERY 4 HOURS PRN
Status: CANCELLED | OUTPATIENT
Start: 2023-10-03

## 2023-10-03 RX ORDER — ATROPINE SULFATE 0.4 MG/ML
0.4 AMPUL (ML) INJECTION
Status: DISCONTINUED | OUTPATIENT
Start: 2023-10-03 | End: 2023-10-03 | Stop reason: HOSPADM

## 2023-10-03 RX ORDER — HEPARIN SODIUM,PORCINE 10 UNIT/ML
5-20 VIAL (ML) INTRAVENOUS DAILY PRN
Status: CANCELLED | OUTPATIENT
Start: 2023-10-04

## 2023-10-03 RX ORDER — ATROPINE SULFATE 0.4 MG/ML
0.4 AMPUL (ML) INJECTION
Status: CANCELLED | OUTPATIENT
Start: 2023-10-03

## 2023-10-03 RX ORDER — METHYLPREDNISOLONE SODIUM SUCCINATE 125 MG/2ML
125 INJECTION, POWDER, LYOPHILIZED, FOR SOLUTION INTRAMUSCULAR; INTRAVENOUS
Status: CANCELLED
Start: 2023-10-03

## 2023-10-03 RX ORDER — ONDANSETRON 2 MG/ML
8 INJECTION INTRAMUSCULAR; INTRAVENOUS ONCE
Status: CANCELLED | OUTPATIENT
Start: 2023-10-03

## 2023-10-03 RX ORDER — HEPARIN SODIUM (PORCINE) LOCK FLUSH IV SOLN 100 UNIT/ML 100 UNIT/ML
5 SOLUTION INTRAVENOUS DAILY PRN
Status: DISCONTINUED | OUTPATIENT
Start: 2023-10-03 | End: 2023-10-03 | Stop reason: HOSPADM

## 2023-10-03 RX ORDER — MEPERIDINE HYDROCHLORIDE 25 MG/ML
25 INJECTION INTRAMUSCULAR; INTRAVENOUS; SUBCUTANEOUS EVERY 30 MIN PRN
Status: CANCELLED | OUTPATIENT
Start: 2023-10-03

## 2023-10-03 RX ORDER — LORAZEPAM 2 MG/ML
0.5 INJECTION INTRAMUSCULAR ONCE
Status: CANCELLED
Start: 2023-10-03 | End: 2023-10-04

## 2023-10-03 RX ORDER — HEPARIN SODIUM,PORCINE 10 UNIT/ML
5-20 VIAL (ML) INTRAVENOUS DAILY PRN
Status: CANCELLED | OUTPATIENT
Start: 2023-10-03

## 2023-10-03 RX ORDER — ONDANSETRON 2 MG/ML
8 INJECTION INTRAMUSCULAR; INTRAVENOUS ONCE
Status: COMPLETED | OUTPATIENT
Start: 2023-10-03 | End: 2023-10-03

## 2023-10-03 RX ORDER — EPINEPHRINE 1 MG/ML
0.3 INJECTION, SOLUTION INTRAMUSCULAR; SUBCUTANEOUS EVERY 5 MIN PRN
Status: CANCELLED | OUTPATIENT
Start: 2023-10-03

## 2023-10-03 RX ORDER — ALBUTEROL SULFATE 0.83 MG/ML
2.5 SOLUTION RESPIRATORY (INHALATION)
Status: CANCELLED | OUTPATIENT
Start: 2023-10-03

## 2023-10-03 RX ORDER — HEPARIN SODIUM (PORCINE) LOCK FLUSH IV SOLN 100 UNIT/ML 100 UNIT/ML
5 SOLUTION INTRAVENOUS
Status: CANCELLED | OUTPATIENT
Start: 2023-10-04

## 2023-10-03 RX ORDER — DIPHENHYDRAMINE HYDROCHLORIDE 50 MG/ML
50 INJECTION INTRAMUSCULAR; INTRAVENOUS
Status: CANCELLED
Start: 2023-10-03

## 2023-10-03 RX ORDER — LORAZEPAM 2 MG/ML
0.5 INJECTION INTRAMUSCULAR ONCE
Status: COMPLETED | OUTPATIENT
Start: 2023-10-03 | End: 2023-10-03

## 2023-10-03 RX ORDER — LORAZEPAM 2 MG/ML
0.5 INJECTION INTRAMUSCULAR EVERY 4 HOURS PRN
Status: DISCONTINUED | OUTPATIENT
Start: 2023-10-03 | End: 2023-10-03 | Stop reason: HOSPADM

## 2023-10-03 RX ORDER — HEPARIN SODIUM (PORCINE) LOCK FLUSH IV SOLN 100 UNIT/ML 100 UNIT/ML
5 SOLUTION INTRAVENOUS
Status: CANCELLED | OUTPATIENT
Start: 2023-10-03

## 2023-10-03 RX ADMIN — LORAZEPAM 0.5 MG: 2 INJECTION INTRAMUSCULAR; INTRAVENOUS at 11:56

## 2023-10-03 RX ADMIN — FAMOTIDINE 20 MG: 10 INJECTION, SOLUTION INTRAVENOUS at 11:01

## 2023-10-03 RX ADMIN — DEXTROSE MONOHYDRATE 250 ML: 50 INJECTION, SOLUTION INTRAVENOUS at 08:35

## 2023-10-03 RX ADMIN — FAMOTIDINE 20 MG: 10 INJECTION, SOLUTION INTRAVENOUS at 11:57

## 2023-10-03 RX ADMIN — IRINOTECAN HYDROCHLORIDE 240 MG: 20 INJECTION, SOLUTION INTRAVENOUS at 11:07

## 2023-10-03 RX ADMIN — LORAZEPAM 0.5 MG: 2 INJECTION INTRAMUSCULAR; INTRAVENOUS at 11:00

## 2023-10-03 RX ADMIN — Medication 5 ML: at 06:25

## 2023-10-03 RX ADMIN — OXALIPLATIN 170 MG: 100 INJECTION, SOLUTION, CONCENTRATE INTRAVENOUS at 08:59

## 2023-10-03 RX ADMIN — SODIUM CHLORIDE 250 ML: 9 INJECTION, SOLUTION INTRAVENOUS at 11:00

## 2023-10-03 RX ADMIN — ONDANSETRON 8 MG: 2 INJECTION INTRAMUSCULAR; INTRAVENOUS at 08:35

## 2023-10-03 RX ADMIN — DEXAMETHASONE SODIUM PHOSPHATE: 10 INJECTION, SOLUTION INTRAMUSCULAR; INTRAVENOUS at 08:35

## 2023-10-03 ASSESSMENT — PAIN SCALES - GENERAL: PAINLEVEL: NO PAIN (0)

## 2023-10-03 NOTE — NURSING NOTE
Chief Complaint   Patient presents with    Port Draw     Labs drawn via port by RN in lab. VS taken.      Labs drawn via Port accessed using 20g flat needle. Line flushed and Heparin locked. Vital signs taken. Checked into next appointment.     Mary Sahni RN

## 2023-10-03 NOTE — Clinical Note
10/3/2023         RE: Gallo Navarro  [id number] [address] [place] [id number]        Dear Colleague,    Thank you for referring your patient, Gallo Navarro, to the [facility]. Please see a copy of my visit note below.    Oncology/Hematology Visit Note  Oct 3, 2023    Reason for Visit: follow up of locally advanced, unresectable pancreatic cancer     History of Present Illness: Gallo Navarro is a 57 year old male with locally advanced, unresectable pancreatic cancer. 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 He then presented to the ED with worsening abdominal pain, increased jaundice, poor PO intake and weight loss admitted on 7/8/2023 for concern of biliary obstruction. Unfortunately, during this admission, biopsy of pancreatic mass returned positive for pancreatic adenocarcinoma on 7/12/23. He started on treatment with 5FU, irinotecan, and oxaliplatin (FOLFIRINOX) on 7/31/23. Please see previous notes for further details on the patient's history.     8/17/23 - ERCP/EGD, duodenal stents x2 placed, exchange of GJ tube    8/21-/8/25 hospitalized due to diarrhea, colitis, abdominal pain.      8/29 - Cycle 3 deferred due to neutropenia.   Neulasta added. Irinotecan dose reduced 20% due to symptoms including cramping, double vision and slurred speech during infusion.      Gallo returns today for consideration of Cycle 4 FOLFIRINOX.     Interval History:    Pain medication reg -   Abdominal pain - no, no bentyl, butrans patch only with dilaudid every once in a while   Cold sensitivity/neuropathy - cold for hands x1 week,   Diarrhea - bowels are regular with more food intake, one episode related to food intake   Tube feeding/oral intake - eating more food, 4 cans tube  feed/day  Hiccups - none  Nausea - none    Staying active. Played pickleball     Blood sugars have been well controlled, working with diabetic educator to adjust insulin as needed.       Cold sensitivity for a couple of days following chemo in his mouth and hands. Denies neuropathy otherwise.         Current Outpatient Medications   Medication Sig Dispense Refill    acetaminophen (TYLENOL) 32 mg/mL liquid Take 20.313 mLs (650 mg) by mouth every 8 hours 1800 mL 11    B-D U/F 31G X 8 MM insulin pen needle Inject Subcutaneous 5 times daily Use 5 pen needles daily or as directed. 500 each 1    baclofen (LIORESAL) 10 MG tablet Take 0.5-1 tablets (5-10 mg) by mouth 3 times daily as needed for other (hiccups) 30 tablet 0    blood glucose (FREESTYLE TEST STRIPS) test strip by Other route as needed      buprenorphine (BUTRANS) 10 MCG/HR WK patch Place 1 patch onto the skin every 7 days Use with 20 mcg/hour patch for 30 mcg/hour total. 4 patch 3    buprenorphine (BUTRANS) 20 MCG/HR WK patch Place 1 patch onto the skin once a week 4 patch 0    Continuous Blood Gluc Sensor (FREESTYLE KAREN 2 SENSOR) MISC Change every 14 days 6 each 3    dexAMETHasone (DECADRON) 4 MG tablet Take 2 tablets (8 mg) by mouth daily Take for 2 days, starting the day after chemo. Take with food. 4 tablet 2    dicyclomine (BENTYL) 10 MG/5ML solution Take 10 mLs (20 mg) by mouth 4 times daily as needed (abdominal discomfort, best before meals) 473 mL 0    gabapentin (NEURONTIN) 300 MG capsule Take 1 capsule (300 mg) by mouth 3 times daily 90 capsule 0    HYDROmorphone (DILAUDID) 2 MG tablet Take 1-2 tablets (2-4 mg) by mouth every 4 hours as needed for severe pain 180 tablet 0    insulin aspart (NOVOLOG PEN) 100 UNIT/ML pen Inject 1-10 Units Subcutaneous 3 times daily (with meals) 15 mL 3    insulin glargine (BASAGLAR KWIKPEN) 100 UNIT/ML pen Inject 20 Units Subcutaneous every morning for 360 days (Patient taking differently: Inject 20 Units Subcutaneous  At Bedtime) 18 mL 3    insulin  UNIT/ML injection Inject 16 Units Subcutaneous every evening Take 20 units day of chemo and two days after while on steroid 9 mL 1    Lancets (ONETOUCH DELICA PLUS [id number]) MISC       lidocaine-prilocaine (EMLA) 2.5-2.5 % external cream Use 1-2 times a week or as needed prior to port access 30 g 3    lidocaine-prilocaine (EMLA) 2.5-2.5 % external cream Use 1-2 times weekly or as needed prior to port access 30 g 3    lipase-protease-amylase (CREON 24) 45017-59[address]-549232 units CPEP per EC capsule 1-2 capsules by Per J Tube route 3 times daily (with meals) 180 capsule 3    loperamide (IMODIUM) 2 MG capsule 2 caps at 1st sign of diarrhea & 1 cap every 2hrs until 12hrs diarrhea free. During night, 2 caps at bedtime & 2 caps every 4hrs until  capsule 3    naloxone (NARCAN) 4 MG/0.1ML nasal spray Spray 1 spray (4 mg) into one nostril alternating nostrils as needed for opioid reversal every 2-3 minutes until assistance arrives (Patient not taking: Reported on 9/5/2023) 0.2 mL 11    ondansetron (ZOFRAN ODT) 8 MG ODT tab Take 1 tablet (8 mg) by mouth every 8 hours as needed for nausea 30 tablet 3    pantoprazole (PROTONIX) 40 MG EC tablet Take 1 tablet (40 mg) by mouth 2 times daily 60 tablet 0    polyethylene glycol (MIRALAX) 17 GM/Dose powder Take 17 g by mouth daily 510 g 3    prochlorperazine (COMPAZINE) 10 MG tablet Take 1 tablet (10 mg) by mouth every 6 hours as needed for nausea or vomiting 30 tablet 2    psyllium (METAMUCIL/KONSYL) capsule 1 capsule by Per G Tube route 2 times daily 180 capsule 3    sennosides (SENOKOT) 8.8 MG/5ML syrup 5 mLs by Per J Tube route 2 times daily 236 mL 3    simethicone (MYLICON) 125 MG chewable tablet Take 125 mg by mouth 2 times daily      sodium bicarbonate 325 MG tablet 1 tablet (325 mg) by Per J Tube route 3 times daily 90 tablet 0    vitamin D3 (CHOLECALCIFEROL) 50 mcg (2000 units) tablet Take 1 tablet (50 mcg) by mouth daily 30 tablet  1     Physical Examination:  /77   Pulse 92   Temp 97.6  F (36.4  C) (Oral)   Resp 16   Wt 74.4 kg (164 lb 1.6 oz)   SpO2 98%   BMI 22.25 kg/m    Wt Readings from Last 10 Encounters:   10/03/23 74.4 kg (164 lb 1.6 oz)   09/20/23 74.1 kg (163 lb 4.8 oz)   09/19/23 74.8 kg (165 lb)   09/05/23 73.9 kg (162 lb 14.4 oz)   09/05/23 73.9 kg (162 lb 14.7 oz)   08/31/23 73.9 kg (163 lb)   08/29/23 73.7 kg (162 lb 7.7 oz)   08/22/23 73.4 kg (161 lb 12.8 oz)   08/17/23 74.8 kg (164 lb 14.5 oz)   08/15/23 74.3 kg (163 lb 14.4 oz)   General: The patient is a pleasant male in no acute distress.  HEENT: EOMI. Sclerae are anicteric. Mucous membranes moist, no lesions   Lymph: Neck is supple with no lymphadenopathy in the cervical or supraclavicular areas.   Heart: Regular rate and rhythm.   Lungs: Clear to auscultation bilaterally.   Abdomen: Bowel sounds present, soft, nontender with no palpable hepatosplenomegaly or masses. GJ tube in place, no redness or drainage noted around insertion site.   Extremities: No edema noted to bilateral lower extremities.     Neuro: Cranial nerves II through XII are grossly intact.  Skin: No rashes, petechiae, or bruising noted on exposed skin.     Laboratory Data:  Most Recent 3 CBC's:  Recent Labs   Lab Test 10/03/23  0631 09/20/23  0636 09/12/23  1130   WBC 15.8* 9.2 14.4*   HGB 9.6* 9.8* 10.4*   MCV 91 93 93    197 300   ANEUTAUTO 12.5* 6.4 9.3*    Most Recent 3 BMP's:  Recent Labs   Lab Test 09/20/23  0636 09/05/23  1608 09/05/23  1112 08/29/23  1007     --  139 140   POTASSIUM 4.3  --  4.2 3.4   CHLORIDE 102  --  104 106   CO2 27  --  28 26   BUN 13.2  --  14.9 8.5   CR 0.52*  --  0.52* 0.48*   ANIONGAP 10  --  7 8   DENISE 8.9  --  8.7 8.4*   * 294* 146* 116*   PROTTOTAL 6.1*  --  5.7* 5.2*   ALBUMIN 3.4*  --  3.1* 3.0*    Most Recent 2 LFT's:  Recent Labs   Lab Test 09/20/23  0636 09/05/23  1112   AST 14 13   ALT 14 14   ALKPHOS 136* 117   BILITOTAL 0.2 0.2   I  reviewed the above labs today.    Imaging: CT Abdomen/Pelvis 8/21/23  IMPRESSION: In this patient with history of pancreatic cancer and  ongoing pain, the current CT scan compared to prior from 7/8/2023  shows:     1a. Increased size of the ill marginated pancreatic head mass,  extending into the mesenteric root with encasement of the SMA, SMV,  gastroduodenal artery and broad area of contact with the splenoportal  junction, splenic vein and central portal vein.  1b. Similar to slight increase in size of peripancreatic and  mesenteric lymph nodes, reactive and/or neoplastic.  1c. Increase in size of sclerotic lesion in the lower thoracic  vertebral body with new sclerotic lesion in the left pubic bone,  concerning for metastasis  1d. Percutaneous gastrojejunostomy tube, gastroduodenal stent, and  gastrojejunostomy metallic lumen opposing stent; no significant  gastric distention  1e. Slight increased pancreatic ductal dilatation,   1f. Biliary ductal stents with pneumobilia, no increased biliary  ductal dilatation  1g. New ascites.  2a. Increased inner wall enhancement with mild thickening involving  the left colon with mild pericolonic streakiness, may represent  colitis secondary to infectious, or inflammatory etiology. No  pneumatosis, pneumoperitoneum, pericolic collection.  2b. Mild air-filled distention of transverse colon and part of the  redundant sigmoid colon may represent developing adynamic ileus.     I have personally reviewed the examination and initial interpretation  and I agree with the findings.     ZURDO MANJARREZ MD       Assessment and Plan:  Locally advanced, unresectable pancreatic cancer. Patient started on treatment with palliative FOLFIRINOX on 7/31/23. He had a moderate amount of side effects following cycle 1 with dehydration, diarrhea, and nausea. Received cycle 2 on 8/15/23. ERCP/EGD on 8/17 with GJ tube exchange and duodenal stents placed. Hospitalized 8/21-8/25 due to colitis and  abdominal pain. Cycle 3 was deferred last week due to neutropenia.    - Will proceed with cycle 3 today, WBC and ANC improved. Neulasta OnPro added to day 3. Can take Claritin 10mg daily to help with bone pain.   - Had symptoms with cycle 1 and 2 during irinotecan including blurry vision, hand cramping, and difficulty getting words out. Thought to be due to atropine, which was not given with cycle 2 and symptoms reoccurred. Discussed with Dr. Haile, irinotecan dose reduced 20% with cycle 3. Symptoms still occurred but Gallo reports they are tolerable.  -Proceed with Cycle 5*** today.    -Return to clinic in 2 weeks with next cycle, SUZETTE visit with labs prior.   -CT 10/24 and follow up with Dr. Haile 10/30    Nutrition.  *** Continues 5 cans Peptomin tube feeding overnight through J tube. Continues to tolerate a soft diet orally and is eating more food. Denies nausea or vomiting. Continue to push oral hydration, drinking 64+ oz of fluid/day. Working with dietician. Weight stable.     Lower extremity edema. Resolved. Continue compression stockings and elevation. Albumin and protein are low, continue to monitor.     Diabetes. *** On insulin, only taking NPH insulin at night. Will adjust insulin based on steroids with chemotherapy. Working closely with endocrine/diabetic educator on dose adjustments/management.     Nausea. No issues recently. Continue compazine as needed. Has cannabis gummies but has not needed. Avoiding ondansetron due to side effect of constipation. Continue Protonix daily.      Diarrhea/constipation.  Resolved. Now having daily soft bowel movements. Continue Metamucil and dicylomine.    Abdominal pain. Managed by palliative care with Dilaudid, acetaminophen, and Butrans. Increased Butrans patch recently and has been working on reducing oral Dilaudid dose. Follow up with palliative care 10/23.      Hiccups. Resolved with Baclofen, but did not like the side effects including mild confusion.  Previously took gabapentin but did not find this helpful for hiccups.       Anemia. Normocytic. Suspect anemia of chronic disease. Will monitor for now.       40 minutes spent on the date of the encounter doing chart review, review of test results, interpretation of tests, patient visit, and documentation         ESVIN Canales CNP    Oncology/Hematology Visit Note  Oct 3, 2023    Reason for Visit: follow up of locally advanced, unresectable pancreatic cancer     History of Present Illness: Gallo Navarro is a 57 year old male with locally advanced, unresectable pancreatic cancer. 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 He then presented to the ED with worsening abdominal pain, increased jaundice, poor PO intake and weight loss admitted on 7/8/2023 for concern of biliary obstruction. Unfortunately, during this admission, biopsy of pancreatic mass returned positive for pancreatic adenocarcinoma on 7/12/23. He started on treatment with 5FU, irinotecan, and oxaliplatin (FOLFIRINOX) on 7/31/23. Please see previous notes for further details on the patient's history.     8/17/23 - ERCP/EGD, duodenal stents x2 placed, exchange of GJ tube    8/21-/8/25 hospitalized due to diarrhea, colitis, abdominal pain.      8/29 - Cycle 3 deferred due to neutropenia.   Neulasta added. Irinotecan dose reduced 20% due to symptoms including cramping, double vision and slurred speech during infusion.      Gallo returns today for consideration of Cycle 4 FOLFIRINOX.     Interval History:    Continues to increase oral intake of food. Decreased tube feeding down to 4 cartons per day. Weight stable. Denies nausea, reflux, diarrhea or constipation. Bowel movements have become more regular with increased food intake.  Abdominal pain has improved. Continues with Butrans patch and occasional PO dilaudid. No longer needing Bentyl. Did not have hiccups with last chemo infusion.     Staying active. Played [employer] ball this week. Denies shortness of breath, cough, or chest pain. No fevers, chills or concerns for infection. Cold sensitivity for a couple of days following chemo in his mouth and hands, lasting a week total in hands. Denies neuropathy otherwise. Denies bleeding concerns. No rashes, bruising, or skin concerns.     Blood sugars have been well controlled, working with diabetic educator to adjust insulin as needed.         Current Outpatient Medications   Medication Sig Dispense Refill     baclofen (LIORESAL) 10 MG tablet Take 0.5-1 tablets (5-10 mg) by mouth 3 times daily as needed for other (hiccups) 30 tablet 0     buprenorphine (BUTRANS) 10 MCG/HR WK patch Place 1 patch onto the skin every 7 days Use with 20 mcg/hour patch for 30 mcg/hour total. 4 patch 3     buprenorphine (BUTRANS) 20 MCG/HR WK patch Place 1 patch onto the skin once a week 4 patch 0     gabapentin (NEURONTIN) 300 MG capsule Take 1 capsule (300 mg) by mouth 3 times daily 90 capsule 0     HYDROmorphone (DILAUDID) 2 MG tablet Take 1-2 tablets (2-4 mg) by mouth every 4 hours as needed for severe pain 180 tablet 0     insulin aspart (NOVOLOG PEN) 100 UNIT/ML pen Inject 1-10 Units Subcutaneous 3 times daily (with meals) 15 mL 3     insulin  UNIT/ML injection Inject 16 Units Subcutaneous every evening Take 20 units day of chemo and two days after while on steroid 9 mL 1     lipase-protease-amylase (CREON 24) 62214-66716-668069 units CPEP per EC capsule 1-2 capsules by Per J Tube route 3 times daily (with meals) 180 capsule 3     pantoprazole (PROTONIX) 40 MG EC tablet Take 1 tablet (40 mg) by mouth 2 times daily 60 tablet 0     sodium bicarbonate 325 MG tablet 1 tablet (325 mg) by Per J Tube route 3 times daily 90 tablet 0     vitamin D3 (CHOLECALCIFEROL)  50 mcg (2000 units) tablet Take 1 tablet (50 mcg) by mouth daily 30 tablet 1     acetaminophen (TYLENOL) 32 mg/mL liquid Take 20.313 mLs (650 mg) by mouth every 8 hours 1800 mL 11     B-D U/F 31G X 8 MM insulin pen needle Inject Subcutaneous 5 times daily Use 5 pen needles daily or as directed. 500 each 1     blood glucose (FREESTYLE TEST STRIPS) test strip by Other route as needed       Continuous Blood Gluc Sensor (FREESTYLE KAREN 2 SENSOR) MISC Change every 14 days 6 each 3     dexAMETHasone (DECADRON) 4 MG tablet Take 2 tablets (8 mg) by mouth daily Take for 2 days, starting the day after chemo. Take with food. (Patient not taking: Reported on 10/3/2023) 4 tablet 2     dicyclomine (BENTYL) 10 MG/5ML solution Take 10 mLs (20 mg) by mouth 4 times daily as needed (abdominal discomfort, best before meals) (Patient not taking: Reported on 10/3/2023) 473 mL 0     insulin glargine (BASAGLAR KWIKPEN) 100 UNIT/ML pen Inject 20 Units Subcutaneous every morning for 360 days (Patient not taking: Reported on 10/3/2023) 18 mL 3     Lancets (ONETOUCH DELICA PLUS [id number]) MISC        lidocaine-prilocaine (EMLA) 2.5-2.5 % external cream Use 1-2 times a week or as needed prior to port access (Patient not taking: Reported on 10/3/2023) 30 g 3     lidocaine-prilocaine (EMLA) 2.5-2.5 % external cream Use 1-2 times weekly or as needed prior to port access (Patient not taking: Reported on 10/3/2023) 30 g 3     loperamide (IMODIUM) 2 MG capsule 2 caps at 1st sign of diarrhea & 1 cap every 2hrs until 12hrs diarrhea free. During night, 2 caps at bedtime & 2 caps every 4hrs until AM (Patient not taking: Reported on 10/3/2023) 100 capsule 3     naloxone (NARCAN) 4 MG/0.1ML nasal spray Spray 1 spray (4 mg) into one nostril alternating nostrils as needed for opioid reversal every 2-3 minutes until assistance arrives (Patient not taking: Reported on 9/5/2023) 0.2 mL 11     ondansetron (ZOFRAN ODT) 8 MG ODT tab Take 1 tablet (8 mg) by mouth  every 8 hours as needed for nausea (Patient not taking: Reported on 10/3/2023) 30 tablet 3     polyethylene glycol (MIRALAX) 17 GM/Dose powder Take 17 g by mouth daily (Patient not taking: Reported on 10/3/2023) 510 g 3     prochlorperazine (COMPAZINE) 10 MG tablet Take 1 tablet (10 mg) by mouth every 6 hours as needed for nausea or vomiting (Patient not taking: Reported on 10/3/2023) 30 tablet 2     psyllium (METAMUCIL/KONSYL) capsule 1 capsule by Per G Tube route 2 times daily (Patient not taking: Reported on 10/3/2023) 180 capsule 3     sennosides (SENOKOT) 8.8 MG/5ML syrup 5 mLs by Per J Tube route 2 times daily (Patient not taking: Reported on 10/3/2023) 236 mL 3     simethicone (MYLICON) 125 MG chewable tablet Take 125 mg by mouth 2 times daily (Patient not taking: Reported on 10/3/2023)       Physical Examination:  /77   Pulse 92   Temp 97.6  F (36.4  C) (Oral)   Resp 16   Wt 74.4 kg (164 lb 1.6 oz)   SpO2 98%   BMI 22.25 kg/m    Wt Readings from Last 10 Encounters:   10/03/23 74.4 kg (164 lb 1.6 oz)   09/20/23 74.1 kg (163 lb 4.8 oz)   09/19/23 74.8 kg (165 lb)   09/05/23 73.9 kg (162 lb 14.4 oz)   09/05/23 73.9 kg (162 lb 14.7 oz)   08/31/23 73.9 kg (163 lb)   08/29/23 73.7 kg (162 lb 7.7 oz)   08/22/23 73.4 kg (161 lb 12.8 oz)   08/17/23 74.8 kg (164 lb 14.5 oz)   08/15/23 74.3 kg (163 lb 14.4 oz)   General: The patient is a pleasant male in no acute distress.  HEENT: EOMI. Sclerae are anicteric. Mucous membranes moist, no lesions   Lymph: Neck is supple with no lymphadenopathy in the cervical or supraclavicular areas.   Heart: Regular rate and rhythm.   Lungs: Clear to auscultation bilaterally.   Abdomen: Bowel sounds present, soft, nontender with no palpable hepatosplenomegaly or masses. GJ tube in place, no redness or drainage noted around insertion site.   Extremities: No edema noted to bilateral lower extremities.     Neuro: Cranial nerves II through XII are grossly intact.  Skin: No  rashes, petechiae, or bruising noted on exposed skin.     Laboratory Data:  Most Recent 3 CBC's:  Recent Labs   Lab Test 10/03/23  0631 09/20/23  0636 09/12/23  1130   WBC 15.8* 9.2 14.4*   HGB 9.6* 9.8* 10.4*   MCV 91 93 93    197 300   ANEUTAUTO 12.5* 6.4 9.3*    Most Recent 3 BMP's:  Recent Labs   Lab Test 10/03/23  0631 09/20/23  0636 09/05/23  1608 09/05/23  1112    139  --  139   POTASSIUM 3.7 4.3  --  4.2   CHLORIDE 102 102  --  104   CO2 26 27  --  28   BUN 12.7 13.2  --  14.9   CR 0.51* 0.52*  --  0.52*   ANIONGAP 10 10  --  7   DENISE 8.7 8.9  --  8.7   * 122* 294* 146*   PROTTOTAL 6.1* 6.1*  --  5.7*   ALBUMIN 3.5 3.4*  --  3.1*    Most Recent 2 LFT's:  Recent Labs   Lab Test 10/03/23  0631 09/20/23  0636   AST 15 14   ALT 15 14   ALKPHOS 203* 136*   BILITOTAL 0.2 0.2   I reviewed the above labs today.    Imaging: CT Abdomen/Pelvis 8/21/23  IMPRESSION: In this patient with history of pancreatic cancer and  ongoing pain, the current CT scan compared to prior from 7/8/2023  shows:     1a. Increased size of the ill marginated pancreatic head mass,  extending into the mesenteric root with encasement of the SMA, SMV,  gastroduodenal artery and broad area of contact with the splenoportal  junction, splenic vein and central portal vein.  1b. Similar to slight increase in size of peripancreatic and  mesenteric lymph nodes, reactive and/or neoplastic.  1c. Increase in size of sclerotic lesion in the lower thoracic  vertebral body with new sclerotic lesion in the left pubic bone,  concerning for metastasis  1d. Percutaneous gastrojejunostomy tube, gastroduodenal stent, and  gastrojejunostomy metallic lumen opposing stent; no significant  gastric distention  1e. Slight increased pancreatic ductal dilatation,   1f. Biliary ductal stents with pneumobilia, no increased biliary  ductal dilatation  1g. New ascites.  2a. Increased inner wall enhancement with mild thickening involving  the left colon with  mild pericolonic streakiness, may represent  colitis secondary to infectious, or inflammatory etiology. No  pneumatosis, pneumoperitoneum, pericolic collection.  2b. Mild air-filled distention of transverse colon and part of the  redundant sigmoid colon may represent developing adynamic ileus.     I have personally reviewed the examination and initial interpretation  and I agree with the findings.     ZURDO MANJARREZ MD       Assessment and Plan:  Locally advanced, unresectable pancreatic cancer. Patient started on treatment with palliative FOLFIRINOX on 7/31/23. He had a moderate amount of side effects following cycle 1 with dehydration, diarrhea, and nausea. Received cycle 2 on 8/15/23. ERCP/EGD on 8/17 with GJ tube exchange and duodenal stents placed. Hospitalized 8/21-8/25 due to colitis and abdominal pain. Cycle 3 was deferred last week due to neutropenia.    - Will proceed with cycle 3 today, WBC and ANC improved. Neulasta OnPro added to day 3. Can take Claritin 10mg daily to help with bone pain.   - Had symptoms with cycle 1 and 2 during irinotecan including blurry vision, hand cramping, and difficulty getting words out. Thought to be due to atropine, which was not given with cycle 2 and symptoms reoccurred. Discussed with Dr. Haile, irinotecan dose reduced 20% with cycle 3. Symptoms still occurred but were less with cycle 4.  -Proceed with Cycle 5 today.    -Return to clinic in 2 weeks with next cycle, SUZETTE visit with labs prior.   -CT 10/24 and follow up with Dr. Haile 10/30    Nutrition.  Increased oral intake and decreased to 4 cans Peptomin tube feeding overnight through J tube. Weight stable. Denies nausea or vomiting. Continue to push oral hydration, drinking 64+ oz of fluid/day. Working with dietician.     Lower extremity edema. Resolved. Continue compression stockings and elevation. Continue to monitor.     Diabetes. On insulin, blood sugars have been well controlled. Will adjust insulin based on  steroids with chemotherapy. Working closely with endocrine/diabetic educator on dose adjustments/management.     Nausea. No issues recently. Continue compazine as needed. Has cannabis gummies but has not needed. Avoiding ondansetron due to side effect of constipation. Continue Protonix daily.      Diarrhea/constipation.  Resolved. Bowel movements more regular with increased food intake. Continue Metamucil and dicylomine if needed.    Abdominal pain. Managed by palliative care with Butrans patch. Only needing an occasional oral Dilaudid dose. Follow up with palliative care 10/23.      Hiccups. Did not occur with more recent cycle of chemo. Previously tried Baclofen, but did not like the side effects including mild confusion. Has also tried gabapentin but did not find this helpful for hiccups.       Anemia. Normocytic. Suspect anemia of chronic disease. Hemoglobin stable. Will monitor for now.       30 minutes spent on the date of the encounter doing chart review, review of test results, interpretation of tests, patient visit, and documentation         ESVIN Canales CNP      Again, thank you for allowing me to participate in the care of your patient.        Sincerely,        ESVIN Canales CNP

## 2023-10-03 NOTE — PATIENT INSTRUCTIONS
[facility] Triage and after hours / weekends / holidays:  [phone]    Please call the triage or after hours line if you experience a temperature greater than or equal to 100.4, shaking chills, have uncontrolled nausea, vomiting and/or diarrhea, dizziness, shortness of breath, chest pain, bleeding, unexplained bruising, or if you have any other new/concerning symptoms, questions or concerns.      If you are having any concerning symptoms or wish to speak to a provider before your next infusion visit, please call your care coordinator or triage to notify them so we can adequately serve you.     If you need a refill on a narcotic prescription or other medication, please call before your infusion appointment.

## 2023-10-03 NOTE — PROGRESS NOTES
Oncology/Hematology Visit Note  Oct 3, 2023    Reason for Visit: follow up of locally advanced, unresectable pancreatic cancer     History of Present Illness: Gallo Navarro is a 57 year old male with locally advanced, unresectable pancreatic cancer. 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 He then presented to the ED with worsening abdominal pain, increased jaundice, poor PO intake and weight loss admitted on 7/8/2023 for concern of biliary obstruction. Unfortunately, during this admission, biopsy of pancreatic mass returned positive for pancreatic adenocarcinoma on 7/12/23. He started on treatment with 5FU, irinotecan, and oxaliplatin (FOLFIRINOX) on 7/31/23. Please see previous notes for further details on the patient's history.     8/17/23 - ERCP/EGD, duodenal stents x2 placed, exchange of GJ tube    8/21-/8/25 hospitalized due to diarrhea, colitis, abdominal pain.      8/29 - Cycle 3 deferred due to neutropenia.   Neulasta added. Irinotecan dose reduced 20% due to symptoms including cramping, double vision and slurred speech during infusion.      Gallo returns today for consideration of Cycle 4 FOLFIRINOX.     Interval History:    Continues to increase oral intake of food. Decreased tube feeding down to 4 cartons per day. Weight stable. Denies nausea, reflux, diarrhea or constipation. Bowel movements have become more regular with increased food intake. Abdominal pain has improved. Continues with Butrans patch and occasional PO dilaudid. No longer needing Bentyl. Did not have hiccups with last chemo infusion.     Staying active. Played Blu[employer] this week. Denies shortness of breath, cough, or chest pain. No fevers, chills or concerns for infection. Cold sensitivity for a couple of days  following chemo in his mouth and hands, lasting a week total in hands. Denies neuropathy otherwise. Denies bleeding concerns. No rashes, bruising, or skin concerns.     Blood sugars have been well controlled, working with diabetic educator to adjust insulin as needed.         Current Outpatient Medications   Medication Sig Dispense Refill    baclofen (LIORESAL) 10 MG tablet Take 0.5-1 tablets (5-10 mg) by mouth 3 times daily as needed for other (hiccups) 30 tablet 0    buprenorphine (BUTRANS) 10 MCG/HR WK patch Place 1 patch onto the skin every 7 days Use with 20 mcg/hour patch for 30 mcg/hour total. 4 patch 3    buprenorphine (BUTRANS) 20 MCG/HR WK patch Place 1 patch onto the skin once a week 4 patch 0    gabapentin (NEURONTIN) 300 MG capsule Take 1 capsule (300 mg) by mouth 3 times daily 90 capsule 0    HYDROmorphone (DILAUDID) 2 MG tablet Take 1-2 tablets (2-4 mg) by mouth every 4 hours as needed for severe pain 180 tablet 0    insulin aspart (NOVOLOG PEN) 100 UNIT/ML pen Inject 1-10 Units Subcutaneous 3 times daily (with meals) 15 mL 3    insulin  UNIT/ML injection Inject 16 Units Subcutaneous every evening Take 20 units day of chemo and two days after while on steroid 9 mL 1    lipase-protease-amylase (CREON 24) 39979-31460-510364 units CPEP per EC capsule 1-2 capsules by Per J Tube route 3 times daily (with meals) 180 capsule 3    pantoprazole (PROTONIX) 40 MG EC tablet Take 1 tablet (40 mg) by mouth 2 times daily 60 tablet 0    sodium bicarbonate 325 MG tablet 1 tablet (325 mg) by Per J Tube route 3 times daily 90 tablet 0    vitamin D3 (CHOLECALCIFEROL) 50 mcg (2000 units) tablet Take 1 tablet (50 mcg) by mouth daily 30 tablet 1    acetaminophen (TYLENOL) 32 mg/mL liquid Take 20.313 mLs (650 mg) by mouth every 8 hours 1800 mL 11    B-D U/F 31G X 8 MM insulin pen needle Inject Subcutaneous 5 times daily Use 5 pen needles daily or as directed. 500 each 1    blood glucose (FREESTYLE TEST STRIPS) test  strip by Other route as needed      Continuous Blood Gluc Sensor (FREESTYLE KAREN 2 SENSOR) MISC Change every 14 days 6 each 3    dexAMETHasone (DECADRON) 4 MG tablet Take 2 tablets (8 mg) by mouth daily Take for 2 days, starting the day after chemo. Take with food. (Patient not taking: Reported on 10/3/2023) 4 tablet 2    dicyclomine (BENTYL) 10 MG/5ML solution Take 10 mLs (20 mg) by mouth 4 times daily as needed (abdominal discomfort, best before meals) (Patient not taking: Reported on 10/3/2023) 473 mL 0    insulin glargine (BASAGLAR KWIKPEN) 100 UNIT/ML pen Inject 20 Units Subcutaneous every morning for 360 days (Patient not taking: Reported on 10/3/2023) 18 mL 3    Lancets (ONETOUCH DELICA PLUS [id number]) MISC       lidocaine-prilocaine (EMLA) 2.5-2.5 % external cream Use 1-2 times a week or as needed prior to port access (Patient not taking: Reported on 10/3/2023) 30 g 3    lidocaine-prilocaine (EMLA) 2.5-2.5 % external cream Use 1-2 times weekly or as needed prior to port access (Patient not taking: Reported on 10/3/2023) 30 g 3    loperamide (IMODIUM) 2 MG capsule 2 caps at 1st sign of diarrhea & 1 cap every 2hrs until 12hrs diarrhea free. During night, 2 caps at bedtime & 2 caps every 4hrs until AM (Patient not taking: Reported on 10/3/2023) 100 capsule 3    naloxone (NARCAN) 4 MG/0.1ML nasal spray Spray 1 spray (4 mg) into one nostril alternating nostrils as needed for opioid reversal every 2-3 minutes until assistance arrives (Patient not taking: Reported on 9/5/2023) 0.2 mL 11    ondansetron (ZOFRAN ODT) 8 MG ODT tab Take 1 tablet (8 mg) by mouth every 8 hours as needed for nausea (Patient not taking: Reported on 10/3/2023) 30 tablet 3    polyethylene glycol (MIRALAX) 17 GM/Dose powder Take 17 g by mouth daily (Patient not taking: Reported on 10/3/2023) 510 g 3    prochlorperazine (COMPAZINE) 10 MG tablet Take 1 tablet (10 mg) by mouth every 6 hours as needed for nausea or vomiting (Patient not taking:  Reported on 10/3/2023) 30 tablet 2    psyllium (METAMUCIL/KONSYL) capsule 1 capsule by Per G Tube route 2 times daily (Patient not taking: Reported on 10/3/2023) 180 capsule 3    sennosides (SENOKOT) 8.8 MG/5ML syrup 5 mLs by Per J Tube route 2 times daily (Patient not taking: Reported on 10/3/2023) 236 mL 3    simethicone (MYLICON) 125 MG chewable tablet Take 125 mg by mouth 2 times daily (Patient not taking: Reported on 10/3/2023)       Physical Examination:  /77   Pulse 92   Temp 97.6  F (36.4  C) (Oral)   Resp 16   Wt 74.4 kg (164 lb 1.6 oz)   SpO2 98%   BMI 22.25 kg/m    Wt Readings from Last 10 Encounters:   10/03/23 74.4 kg (164 lb 1.6 oz)   09/20/23 74.1 kg (163 lb 4.8 oz)   09/19/23 74.8 kg (165 lb)   09/05/23 73.9 kg (162 lb 14.4 oz)   09/05/23 73.9 kg (162 lb 14.7 oz)   08/31/23 73.9 kg (163 lb)   08/29/23 73.7 kg (162 lb 7.7 oz)   08/22/23 73.4 kg (161 lb 12.8 oz)   08/17/23 74.8 kg (164 lb 14.5 oz)   08/15/23 74.3 kg (163 lb 14.4 oz)   General: The patient is a pleasant male in no acute distress.  HEENT: EOMI. Sclerae are anicteric. Mucous membranes moist, no lesions   Lymph: Neck is supple with no lymphadenopathy in the cervical or supraclavicular areas.   Heart: Regular rate and rhythm.   Lungs: Clear to auscultation bilaterally.   Abdomen: Bowel sounds present, soft, nontender with no palpable hepatosplenomegaly or masses. GJ tube in place, no redness or drainage noted around insertion site.   Extremities: No edema noted to bilateral lower extremities.     Neuro: Cranial nerves II through XII are grossly intact.  Skin: No rashes, petechiae, or bruising noted on exposed skin.     Laboratory Data:  Most Recent 3 CBC's:  Recent Labs   Lab Test 10/03/23  0631 09/20/23  0636 09/12/23  1130   WBC 15.8* 9.2 14.4*   HGB 9.6* 9.8* 10.4*   MCV 91 93 93    197 300   ANEUTAUTO 12.5* 6.4 9.3*    Most Recent 3 BMP's:  Recent Labs   Lab Test 10/03/23  0631 09/20/23  0636 09/05/23  1608  09/05/23  1112    139  --  139   POTASSIUM 3.7 4.3  --  4.2   CHLORIDE 102 102  --  104   CO2 26 27  --  28   BUN 12.7 13.2  --  14.9   CR 0.51* 0.52*  --  0.52*   ANIONGAP 10 10  --  7   DENISE 8.7 8.9  --  8.7   * 122* 294* 146*   PROTTOTAL 6.1* 6.1*  --  5.7*   ALBUMIN 3.5 3.4*  --  3.1*    Most Recent 2 LFT's:  Recent Labs   Lab Test 10/03/23  0631 09/20/23  0636   AST 15 14   ALT 15 14   ALKPHOS 203* 136*   BILITOTAL 0.2 0.2   I reviewed the above labs today.    Imaging: CT Abdomen/Pelvis 8/21/23  IMPRESSION: In this patient with history of pancreatic cancer and  ongoing pain, the current CT scan compared to prior from 7/8/2023  shows:     1a. Increased size of the ill marginated pancreatic head mass,  extending into the mesenteric root with encasement of the SMA, SMV,  gastroduodenal artery and broad area of contact with the splenoportal  junction, splenic vein and central portal vein.  1b. Similar to slight increase in size of peripancreatic and  mesenteric lymph nodes, reactive and/or neoplastic.  1c. Increase in size of sclerotic lesion in the lower thoracic  vertebral body with new sclerotic lesion in the left pubic bone,  concerning for metastasis  1d. Percutaneous gastrojejunostomy tube, gastroduodenal stent, and  gastrojejunostomy metallic lumen opposing stent; no significant  gastric distention  1e. Slight increased pancreatic ductal dilatation,   1f. Biliary ductal stents with pneumobilia, no increased biliary  ductal dilatation  1g. New ascites.  2a. Increased inner wall enhancement with mild thickening involving  the left colon with mild pericolonic streakiness, may represent  colitis secondary to infectious, or inflammatory etiology. No  pneumatosis, pneumoperitoneum, pericolic collection.  2b. Mild air-filled distention of transverse colon and part of the  redundant sigmoid colon may represent developing adynamic ileus.     I have personally reviewed the examination and initial  interpretation  and I agree with the findings.     ZURDO MANJARREZ MD       Assessment and Plan:  Locally advanced, unresectable pancreatic cancer. Patient started on treatment with palliative FOLFIRINOX on 7/31/23. He had a moderate amount of side effects following cycle 1 with dehydration, diarrhea, and nausea. Received cycle 2 on 8/15/23. ERCP/EGD on 8/17 with GJ tube exchange and duodenal stents placed. Hospitalized 8/21-8/25 due to colitis and abdominal pain. Cycle 3 was deferred last week due to neutropenia.    - Will proceed with cycle 3 today, WBC and ANC improved. Neulasta OnPro added to day 3. Can take Claritin 10mg daily to help with bone pain.   - Had symptoms with cycle 1 and 2 during irinotecan including blurry vision, hand cramping, and difficulty getting words out. Thought to be due to atropine, which was not given with cycle 2 and symptoms reoccurred. Discussed with Dr. Haile, irinotecan dose reduced 20% with cycle 3. Symptoms still occurred but were less with cycle 4.  -Proceed with Cycle 5 today.    -Return to clinic in 2 weeks with next cycle, SUZETTE visit with labs prior.   -CT 10/24 and follow up with Dr. Haile 10/30    Nutrition.  Increased oral intake and decreased to 4 cans Peptomin tube feeding overnight through J tube. Weight stable. Denies nausea or vomiting. Continue to push oral hydration, drinking 64+ oz of fluid/day. Working with dietician.     Lower extremity edema. Resolved. Continue compression stockings and elevation. Continue to monitor.     Diabetes. On insulin, blood sugars have been well controlled. Will adjust insulin based on steroids with chemotherapy. Working closely with endocrine/diabetic educator on dose adjustments/management.     Nausea. No issues recently. Continue compazine as needed. Has cannabis gummies but has not needed. Avoiding ondansetron due to side effect of constipation. Continue Protonix daily.      Diarrhea/constipation.  Resolved. Bowel movements more  regular with increased food intake. Continue Metamucil and dicylomine if needed.    Abdominal pain. Managed by palliative care with Butrans patch. Only needing an occasional oral Dilaudid dose. Follow up with palliative care 10/23.      Hiccups. Did not occur with more recent cycle of chemo. Previously tried Baclofen, but did not like the side effects including mild confusion. Has also tried gabapentin but did not find this helpful for hiccups.       Anemia. Normocytic. Suspect anemia of chronic disease. Hemoglobin stable. Will monitor for now.       30 minutes spent on the date of the encounter doing chart review, review of test results, interpretation of tests, patient visit, and documentation         ESVIN Canales CNP

## 2023-10-03 NOTE — PROGRESS NOTES
"Infusion Nursing Note:  Gallo Navarro presents today for Cycle 5 Day 1 oxaliplatin, irinotecan, fluorouracil home pump connect.    Patient seen by provider today: Yes: Megan Farrell, JESSIKA   present during visit today: Not Applicable.    Note: Gallo presents today feeling overall well. Denies pain or nausea/vomiting. Offers no concerns since visit with Megan Farrell prior to infusion.    20 mg IV pepcid + 0.5 mg IV ativan given prior to and MCFP through irinotecan infusion, as in previous infusions. Gallo felt a little woozy and his speech was slightly slurred at the end of infusion, but he reports that this has been the effect of these premedications in the past, his wife concurred. Denied changes in swallowing or other side effects. No GI complaints.    Prior to discharge: Port is secured in place with tegaderm and flushed with 10cc NS with positive blood return noted.    Continuous home infusion C-series pump connected.    All connectors secured in place and clamps taped open.    Pump started, \"running\" noted on display (CADD): Not Applicable.   Capillary element taped to pt's skin per protocol.  Pump Connection double checked with Zoë Ludwig RN.  Patient instructed to call our clinic or [facility] with any questions or concerns at home.  Patient verbalized understanding.    Patient set up for pump disconnect + IV fluids at home with [facility] on 10/05, udenyca injection on 10/06.  IB sent to [facility] Coordinator with date/time.      Intravenous Access:  Implanted Port.    Treatment Conditions:     Latest Reference Range & Units 10/03/23 06:31   Sodium 135 - 145 mmol/L 138   Potassium 3.4 - 5.3 mmol/L 3.7   Chloride 98 - 107 mmol/L 102   Carbon Dioxide (CO2) 22 - 29 mmol/L 26   Urea Nitrogen 6.0 - 20.0 mg/dL 12.7   Creatinine 0.67 - 1.17 mg/dL 0.51 (L)   GFR Estimate >60 mL/min/1.73m2 >90   Calcium 8.6 - 10.0 mg/dL 8.7   Anion Gap 7 - 15 mmol/L 10   Albumin 3.5 - 5.2 g/dL 3.5 "   Protein Total 6.4 - 8.3 g/dL 6.1 (L)   Alkaline Phosphatase 40 - 129 U/L 203 (H)   ALT 0 - 70 U/L 15   AST 0 - 45 U/L 15   Bilirubin Total <=1.2 mg/dL 0.2   Glucose 70 - 99 mg/dL 107 (H)   WBC 4.0 - 11.0 10e3/uL 15.8 (H)   Hemoglobin 13.3 - 17.7 g/dL 9.6 (L)   Hematocrit 40.0 - 53.0 % 30.5 (L)   Platelet Count 150 - 450 10e3/uL 204   RBC Count 4.40 - 5.90 10e6/uL 3.34 (L)   MCV 78 - 100 fL 91   MCH 26.5 - 33.0 pg 28.7   MCHC 31.5 - 36.5 g/dL 31.5   RDW 10.0 - 15.0 % 14.1   % Neutrophils % 80   % Lymphocytes % 11   % Monocytes % 7   % Eosinophils % 1   % Basophils % 0   Absolute Basophils 0.0 - 0.2 10e3/uL 0.0   Absolute Eosinophils 0.0 - 0.7 10e3/uL 0.1   Absolute Immature Granulocytes <=0.4 10e3/uL 0.1   Absolute Lymphocytes 0.8 - 5.3 10e3/uL 1.8   Absolute Monocytes 0.0 - 1.3 10e3/uL 1.2   % Immature Granulocytes % 1   Absolute Neutrophils 1.6 - 8.3 10e3/uL 12.5 (H)   Absolute NRBCs 10e3/uL 0.0   NRBCs per 100 WBC <1 /100 0     Results reviewed, labs MET treatment parameters, ok to proceed with treatment.      Post Infusion Assessment:  Patient tolerated infusion without incident.  Blood return noted pre and post infusion.  Site patent and intact, free from redness, edema or discomfort.  No evidence of extravasations.  Access discontinued per protocol.       Discharge Plan:   Patient declined prescription refills.  Discharge instructions reviewed with: Patient.  Patient and/or family verbalized understanding of discharge instructions and all questions answered.  AVS to patient via [employer].  Patient will return 10/18 for next infusion appointment.   Patient discharged in stable condition accompanied by: self.  Departure Mode: Ambulatory.      Katie Keane RN

## 2023-10-03 NOTE — NURSING NOTE
"Oncology Rooming Note    October 3, 2023 6:44 AM   Gallo Navarro is a 57 year old male who presents for:    Chief Complaint   Patient presents with    Port Draw     Labs drawn via port by RN in lab. VS taken.     Oncology Clinic Visit     Pancreatic Cancer     Initial Vitals: /77   Pulse 92   Temp 97.6  F (36.4  C) (Oral)   Resp 16   Wt 74.4 kg (164 lb 1.6 oz)   SpO2 98%   BMI 22.25 kg/m   Estimated body mass index is 22.25 kg/m  as calculated from the following:    Height as of 9/5/23: 1.829 m (6' 0.01\").    Weight as of this encounter: 74.4 kg (164 lb 1.6 oz). Body surface area is 1.94 meters squared.  No Pain (0) Comment: Data Unavailable   No LMP for male patient.  Allergies reviewed: Yes  Medications reviewed: Yes    Medications: Medication refills not needed today.  Pharmacy name entered into [employer]:    [facility] - [place], [place] - [address] [place] [address] PHARMACY [place], [place] - [address] [place] [address] [phone]  [facility] - [place] - ONE [facility] AT PORTAL TO REGISTERED [facility] SITES  [facility] - [place], [place] - [address]    Clinical concerns: none       Gita Mansfield CMA            " 

## 2023-10-04 ENCOUNTER — OFFICE VISIT (OUTPATIENT)
Dept: FAMILY MEDICINE | Facility: CLINIC | Age: 58
End: 2023-10-04
Attending: INTERNAL MEDICINE
Payer: COMMERCIAL

## 2023-10-04 VITALS
TEMPERATURE: 97.1 F | BODY MASS INDEX: 22.35 KG/M2 | WEIGHT: 165 LBS | SYSTOLIC BLOOD PRESSURE: 115 MMHG | HEART RATE: 82 BPM | OXYGEN SATURATION: 97 % | RESPIRATION RATE: 15 BRPM | DIASTOLIC BLOOD PRESSURE: 73 MMHG | HEIGHT: 72 IN

## 2023-10-04 DIAGNOSIS — R06.6 HICCUPS: ICD-10-CM

## 2023-10-04 DIAGNOSIS — E55.9 VITAMIN D DEFICIENCY: ICD-10-CM

## 2023-10-04 DIAGNOSIS — E11.9 TYPE 2 DIABETES MELLITUS WITHOUT COMPLICATION, WITH LONG-TERM CURRENT USE OF INSULIN (H): Primary | ICD-10-CM

## 2023-10-04 DIAGNOSIS — Z79.4 TYPE 2 DIABETES MELLITUS WITHOUT COMPLICATION, WITH LONG-TERM CURRENT USE OF INSULIN (H): Primary | ICD-10-CM

## 2023-10-04 DIAGNOSIS — Z98.890 HISTORY OF BILIARY STENT INSERTION: ICD-10-CM

## 2023-10-04 DIAGNOSIS — K31.1 GASTRIC OUTLET OBSTRUCTION: ICD-10-CM

## 2023-10-04 DIAGNOSIS — C25.9 PANCREATIC ADENOCARCINOMA (H): ICD-10-CM

## 2023-10-04 LAB — HBA1C MFR BLD: 6.8 % (ref 0–5.6)

## 2023-10-04 PROCEDURE — 99214 OFFICE O/P EST MOD 30 MIN: CPT | Performed by: INTERNAL MEDICINE

## 2023-10-04 PROCEDURE — 83036 HEMOGLOBIN GLYCOSYLATED A1C: CPT | Performed by: INTERNAL MEDICINE

## 2023-10-04 PROCEDURE — 36415 COLL VENOUS BLD VENIPUNCTURE: CPT | Performed by: INTERNAL MEDICINE

## 2023-10-04 RX ORDER — BACLOFEN 10 MG/1
5-10 TABLET ORAL 3 TIMES DAILY PRN
Qty: 30 TABLET | Refills: 0
Start: 2023-10-04 | End: 2023-12-05

## 2023-10-04 RX ORDER — CHOLECALCIFEROL (VITAMIN D3) 50 MCG
1 TABLET ORAL DAILY
Qty: 90 TABLET | Refills: 1 | Status: SHIPPED | OUTPATIENT
Start: 2023-10-04 | End: 2023-12-19

## 2023-10-04 ASSESSMENT — PAIN SCALES - GENERAL: PAINLEVEL: NO PAIN (0)

## 2023-10-04 NOTE — PROGRESS NOTES
Assessment & Plan     Type 2 diabetes mellitus without complication, with long-term current use of insulin (H)  He is taking NPH 16 units in the evening and 5 units in the morning on the days of chemo  He has CGM and monitors his sugars regularly  Once he got hypoglycemic and immediately addressed that  His A1c is good today at 6.9  - HEMOGLOBIN A1C; Future  - HEMOGLOBIN A1C    Hiccups    - baclofen (LIORESAL) 10 MG tablet; Take 0.5-1 tablets (5-10 mg) by mouth 3 times daily as needed for other (hiccups)    Vitamin D deficiency    - vitamin D3 (CHOLECALCIFEROL) 50 mcg (2000 units) tablet; Take 1 tablet (50 mcg) by mouth daily    History of biliary stent insertion  Recent scan indicates it is functioning well    Pancreatic adenocarcinoma (H)   started on treatment with palliative FOLFIRINOX on 7/31/23. He had a moderate amount of side effects following cycle 1 with dehydration, diarrhea, and nausea. Received cycle 2 on 8/15/23. ERCP/EGD on 8/17 with GJ tube exchange and duodenal stents placed. Hospitalized 8/21-8/25 due to colitis and abdominal pain. Cycle 3 was deferred last week due to neutropenia.   He is having cycle 4 of his chemotherapy now  He is eating fine and wants to get off the tube feeds  I told him that even if he is going to come off tube feeds we still have to leave the GJ tube in place as it will be useful for us in future in case he he has any nausea or vomiting and cannot eat    Gastric outlet obstruction  Status post gastroduodenal stent in place      30 minutes spent by me on the date of the encounter doing chart review, history and exam, documentation and further activities per the note           Akil Hernandez MD  [facility]    Lucy Holder is a 57 year old, presenting for the following health issues:  Follow Up (Pancrease cancer follow up )      10/4/2023     1:16 PM   Additional Questions   Roomed by Zeinab BOURGEOIS   Accompanied by Violet -wife       [facility]                [unfilled]   Constitutional, HEENT, cardiovascular, pulmonary, gi and gu systems are negative, except as otherwise noted.      Objective    /73 (BP Location: Right arm, Patient Position: Sitting, Cuff Size: Adult Regular)   Pulse 82   Temp 97.1  F (36.2  C) (Temporal)   Resp 15   Ht 1.829 m (6')   Wt 74.8 kg (165 lb)   SpO2 97%   BMI 22.38 kg/m    Body mass index is 22.38 kg/m .  [unfilled]   GENERAL: healthy, alert and no distress  NECK: no adenopathy, no asymmetry, masses, or scars and thyroid normal to palpation  RESP: lungs clear to auscultation - no rales, rhonchi or wheezes  CV: regular rate and rhythm, normal S1 S2, no S3 or S4, no murmur, click or rub, no peripheral edema and peripheral pulses strong  ABDOMEN: GJ tube in place  MS: no gross musculoskeletal defects noted, no edema  SKIN: Port in place                        Answers submitted by the patient for this visit:  General Questionnaire (Submitted on 10/4/2023)  Chief Complaint: Chronic problems general questions HPI Form  What is the reason for your visit today? : cancer but follow up as he is primary DR  How many servings of fruits and vegetables do you eat daily?: 2-3  On average, how many sweetened beverages do you drink each day (Examples: soda, juice, sweet tea, etc.  Do NOT count diet or artificially sweetened beverages)?: 0  How many days per week do you miss taking your medication?: 0

## 2023-10-05 LAB — CANCER AG19-9 SERPL IA-ACNC: 5 U/ML

## 2023-10-09 ENCOUNTER — MYC REFILL (OUTPATIENT)
Dept: PALLIATIVE CARE | Facility: CLINIC | Age: 58
End: 2023-10-09
Payer: COMMERCIAL

## 2023-10-09 DIAGNOSIS — C25.9 PANCREATIC ADENOCARCINOMA (H): ICD-10-CM

## 2023-10-09 RX ORDER — HYDROMORPHONE HYDROCHLORIDE 2 MG/1
2-4 TABLET ORAL EVERY 4 HOURS PRN
Qty: 180 TABLET | Refills: 0 | Status: SHIPPED | OUTPATIENT
Start: 2023-10-09 | End: 2023-11-01

## 2023-10-09 NOTE — TELEPHONE ENCOUNTER
Received [employer] message from patient requesting refill of hydromorphone.     Last refill: 9/15/23  Last office visit: 8/31/23  Scheduled for follow up 10/23/23     Will route request to MD for review.     Reviewed [place]  Report.

## 2023-10-11 ENCOUNTER — MEDICAL CORRESPONDENCE (OUTPATIENT)
Dept: HEALTH INFORMATION MANAGEMENT | Facility: CLINIC | Age: 58
End: 2023-10-11

## 2023-10-11 ENCOUNTER — ALLIED HEALTH/NURSE VISIT (OUTPATIENT)
Dept: FAMILY MEDICINE | Facility: CLINIC | Age: 58
End: 2023-10-11
Payer: COMMERCIAL

## 2023-10-11 DIAGNOSIS — Z23 NEED FOR PROPHYLACTIC VACCINATION AND INOCULATION AGAINST INFLUENZA: Primary | ICD-10-CM

## 2023-10-11 PROCEDURE — 99207 PR NO CHARGE NURSE ONLY: CPT

## 2023-10-11 PROCEDURE — 90682 RIV4 VACC RECOMBINANT DNA IM: CPT

## 2023-10-11 PROCEDURE — 91320 SARSCV2 VAC 30MCG TRS-SUC IM: CPT

## 2023-10-11 PROCEDURE — 90471 IMMUNIZATION ADMIN: CPT

## 2023-10-11 PROCEDURE — 90480 ADMN SARSCOV2 VAC 1/ONLY CMP: CPT

## 2023-10-11 NOTE — NURSING NOTE
Prior to immunization administration, verified patients identity using patient s name and date of birth. Please see Immunization Activity for additional information.     Screening Questionnaire for Adult Immunization    Are you sick today?   No   Do you have allergies to medications, food, a vaccine component or latex?   No   Have you ever had a serious reaction after receiving a vaccination?   No   Do you have a long-term health problem with heart, lung, kidney, or metabolic disease (e.g., diabetes), asthma, a blood disorder, no spleen, complement component deficiency, a cochlear implant, or a spinal fluid leak?  Are you on long-term aspirin therapy?   No   Do you have cancer, leukemia, HIV/AIDS, or any other immune system problem?   No   Do you have a parent, brother, or sister with an immune system problem?   No   In the past 3 months, have you taken medications that affect  your immune system, such as prednisone, other steroids, or anticancer drugs; drugs for the treatment of rheumatoid arthritis, Crohn s disease, or psoriasis; or have you had radiation treatments?   No   Have you had a seizure, or a brain or other nervous system problem?   No   During the past year, have you received a transfusion of blood or blood    products, or been given immune (gamma) globulin or antiviral drug?   No   For women: Are you pregnant or is there a chance you could become       pregnant during the next month?   No   Have you received any vaccinations in the past 4 weeks?   No     Immunization questionnaire answers were all negative.    I have reviewed the following standing orders:   This patient is due and qualifies for the Covid-19 vaccine.     Click here for COVID-19 Standing Order    I have reviewed the vaccines inclusion and exclusion criteria; No concerns regarding eligibility.     This patient is due and qualifies for the Influenza vaccine.    Click here for Influenza Vaccine Standing Order    I have reviewed the  vaccines inclusion and exclusion criteria; No concerns regarding eligibility.     Patient instructed to remain in clinic for 15 minutes afterwards, and to report any adverse reactions.     Screening performed by Margaret Schmid MA on 10/11/2023 at 12:42 PM.

## 2023-10-13 ENCOUNTER — TELEPHONE (OUTPATIENT)
Dept: FAMILY MEDICINE | Facility: CLINIC | Age: 58
End: 2023-10-13
Payer: COMMERCIAL

## 2023-10-13 NOTE — TELEPHONE ENCOUNTER
Forms/Letter Request    Type of form/letter: [facility] order # [id number]    Have you been seen for this request: N/A    Do we have the form/letter: Yes: placed in providers forms basket for review    When is form/letter needed by: ASAP    How would you like the form/letter returned: Fax  [phone]

## 2023-10-16 DIAGNOSIS — Z53.9 DIAGNOSIS NOT YET DEFINED: Primary | ICD-10-CM

## 2023-10-16 PROCEDURE — G0179 MD RECERTIFICATION HHA PT: HCPCS | Performed by: INTERNAL MEDICINE

## 2023-10-17 NOTE — TELEPHONE ENCOUNTER
F/U with Berta Cid 12/7/2023.    Pricila Aguirre, RN, Diabetes Educator  Diabetes Education Department  [facility], [place]  [phone]

## 2023-10-18 ENCOUNTER — ONCOLOGY VISIT (OUTPATIENT)
Dept: ONCOLOGY | Facility: CLINIC | Age: 58
End: 2023-10-18
Payer: COMMERCIAL

## 2023-10-18 ENCOUNTER — TELEPHONE (OUTPATIENT)
Dept: FAMILY MEDICINE | Facility: CLINIC | Age: 58
End: 2023-10-18

## 2023-10-18 ENCOUNTER — INFUSION THERAPY VISIT (OUTPATIENT)
Dept: ONCOLOGY | Facility: CLINIC | Age: 58
End: 2023-10-18
Attending: STUDENT IN AN ORGANIZED HEALTH CARE EDUCATION/TRAINING PROGRAM
Payer: COMMERCIAL

## 2023-10-18 ENCOUNTER — MEDICAL CORRESPONDENCE (OUTPATIENT)
Dept: HEALTH INFORMATION MANAGEMENT | Facility: CLINIC | Age: 58
End: 2023-10-18

## 2023-10-18 ENCOUNTER — APPOINTMENT (OUTPATIENT)
Dept: LAB | Facility: CLINIC | Age: 58
End: 2023-10-18
Payer: COMMERCIAL

## 2023-10-18 VITALS
DIASTOLIC BLOOD PRESSURE: 73 MMHG | RESPIRATION RATE: 16 BRPM | SYSTOLIC BLOOD PRESSURE: 124 MMHG | HEART RATE: 82 BPM | OXYGEN SATURATION: 97 % | WEIGHT: 168.3 LBS | BODY MASS INDEX: 22.83 KG/M2 | TEMPERATURE: 97.7 F

## 2023-10-18 DIAGNOSIS — C25.0 MALIGNANT NEOPLASM OF HEAD OF PANCREAS (H): Primary | ICD-10-CM

## 2023-10-18 DIAGNOSIS — K31.1 GASTRIC OUTLET OBSTRUCTION: ICD-10-CM

## 2023-10-18 DIAGNOSIS — Z79.4 TYPE 2 DIABETES MELLITUS WITHOUT COMPLICATION, WITH LONG-TERM CURRENT USE OF INSULIN (H): ICD-10-CM

## 2023-10-18 DIAGNOSIS — E11.9 TYPE 2 DIABETES MELLITUS WITHOUT COMPLICATION, WITH LONG-TERM CURRENT USE OF INSULIN (H): ICD-10-CM

## 2023-10-18 DIAGNOSIS — C25.9 PANCREATIC ADENOCARCINOMA (H): ICD-10-CM

## 2023-10-18 DIAGNOSIS — Z51.11 ENCOUNTER FOR ANTINEOPLASTIC CHEMOTHERAPY: ICD-10-CM

## 2023-10-18 DIAGNOSIS — G89.3 CANCER ASSOCIATED PAIN: ICD-10-CM

## 2023-10-18 DIAGNOSIS — Z98.890 HISTORY OF BILIARY STENT INSERTION: ICD-10-CM

## 2023-10-18 LAB
ALBUMIN SERPL BCG-MCNC: 3.3 G/DL (ref 3.5–5.2)
ALP SERPL-CCNC: 169 U/L (ref 40–129)
ALT SERPL W P-5'-P-CCNC: 16 U/L (ref 0–70)
ANION GAP SERPL CALCULATED.3IONS-SCNC: 8 MMOL/L (ref 7–15)
AST SERPL W P-5'-P-CCNC: 17 U/L (ref 0–45)
BASO+EOS+MONOS # BLD AUTO: ABNORMAL 10*3/UL
BASO+EOS+MONOS NFR BLD AUTO: ABNORMAL %
BASOPHILS # BLD AUTO: 0.1 10E3/UL (ref 0–0.2)
BASOPHILS NFR BLD AUTO: 1 %
BILIRUB SERPL-MCNC: 0.2 MG/DL
BUN SERPL-MCNC: 11.8 MG/DL (ref 6–20)
CALCIUM SERPL-MCNC: 8.9 MG/DL (ref 8.6–10)
CHLORIDE SERPL-SCNC: 104 MMOL/L (ref 98–107)
CREAT SERPL-MCNC: 0.55 MG/DL (ref 0.67–1.17)
DEPRECATED HCO3 PLAS-SCNC: 28 MMOL/L (ref 22–29)
EGFRCR SERPLBLD CKD-EPI 2021: >90 ML/MIN/1.73M2
EOSINOPHIL # BLD AUTO: 0.2 10E3/UL (ref 0–0.7)
EOSINOPHIL NFR BLD AUTO: 2 %
ERYTHROCYTE [DISTWIDTH] IN BLOOD BY AUTOMATED COUNT: 15.1 % (ref 10–15)
GLUCOSE SERPL-MCNC: 110 MG/DL (ref 70–99)
HCT VFR BLD AUTO: 31.2 % (ref 40–53)
HGB BLD-MCNC: 9.7 G/DL (ref 13.3–17.7)
IMM GRANULOCYTES # BLD: 0.1 10E3/UL
IMM GRANULOCYTES NFR BLD: 1 %
LYMPHOCYTES # BLD AUTO: 1.8 10E3/UL (ref 0.8–5.3)
LYMPHOCYTES NFR BLD AUTO: 19 %
MCH RBC QN AUTO: 28.3 PG (ref 26.5–33)
MCHC RBC AUTO-ENTMCNC: 31.1 G/DL (ref 31.5–36.5)
MCV RBC AUTO: 91 FL (ref 78–100)
MONOCYTES # BLD AUTO: 0.9 10E3/UL (ref 0–1.3)
MONOCYTES NFR BLD AUTO: 9 %
NEUTROPHILS # BLD AUTO: 6.4 10E3/UL (ref 1.6–8.3)
NEUTROPHILS NFR BLD AUTO: 68 %
NRBC # BLD AUTO: 0 10E3/UL
NRBC BLD AUTO-RTO: 0 /100
PLATELET # BLD AUTO: 197 10E3/UL (ref 150–450)
POTASSIUM SERPL-SCNC: 4.1 MMOL/L (ref 3.4–5.3)
PROT SERPL-MCNC: 5.8 G/DL (ref 6.4–8.3)
RBC # BLD AUTO: 3.43 10E6/UL (ref 4.4–5.9)
SODIUM SERPL-SCNC: 140 MMOL/L (ref 135–145)
WBC # BLD AUTO: 9.4 10E3/UL (ref 4–11)

## 2023-10-18 PROCEDURE — 36591 DRAW BLOOD OFF VENOUS DEVICE: CPT

## 2023-10-18 PROCEDURE — 85004 AUTOMATED DIFF WBC COUNT: CPT

## 2023-10-18 PROCEDURE — 86301 IMMUNOASSAY TUMOR CA 19-9: CPT

## 2023-10-18 PROCEDURE — 250N000011 HC RX IP 250 OP 636

## 2023-10-18 PROCEDURE — 96376 TX/PRO/DX INJ SAME DRUG ADON: CPT

## 2023-10-18 PROCEDURE — 96375 TX/PRO/DX INJ NEW DRUG ADDON: CPT

## 2023-10-18 PROCEDURE — 99213 OFFICE O/P EST LOW 20 MIN: CPT | Mod: 25

## 2023-10-18 PROCEDURE — 96417 CHEMO IV INFUS EACH ADDL SEQ: CPT

## 2023-10-18 PROCEDURE — 258N000003 HC RX IP 258 OP 636: Performed by: PHYSICIAN ASSISTANT

## 2023-10-18 PROCEDURE — 99213 OFFICE O/P EST LOW 20 MIN: CPT

## 2023-10-18 PROCEDURE — 96413 CHEMO IV INFUSION 1 HR: CPT

## 2023-10-18 PROCEDURE — G0498 CHEMO EXTEND IV INFUS W/PUMP: HCPCS

## 2023-10-18 PROCEDURE — 96367 TX/PROPH/DG ADDL SEQ IV INF: CPT

## 2023-10-18 PROCEDURE — 96415 CHEMO IV INFUSION ADDL HR: CPT

## 2023-10-18 PROCEDURE — 250N000011 HC RX IP 250 OP 636: Mod: JZ

## 2023-10-18 PROCEDURE — 250N000011 HC RX IP 250 OP 636: Mod: JZ | Performed by: PHYSICIAN ASSISTANT

## 2023-10-18 PROCEDURE — 258N000003 HC RX IP 258 OP 636

## 2023-10-18 PROCEDURE — 80053 COMPREHEN METABOLIC PANEL: CPT

## 2023-10-18 RX ORDER — DIPHENHYDRAMINE HYDROCHLORIDE 50 MG/ML
50 INJECTION INTRAMUSCULAR; INTRAVENOUS
Status: CANCELLED
Start: 2023-10-18

## 2023-10-18 RX ORDER — LORAZEPAM 2 MG/ML
0.5 INJECTION INTRAMUSCULAR ONCE
Status: CANCELLED
Start: 2023-11-01 | End: 2023-11-01

## 2023-10-18 RX ORDER — HEPARIN SODIUM (PORCINE) LOCK FLUSH IV SOLN 100 UNIT/ML 100 UNIT/ML
5 SOLUTION INTRAVENOUS
Status: CANCELLED | OUTPATIENT
Start: 2023-11-02

## 2023-10-18 RX ORDER — ONDANSETRON 2 MG/ML
8 INJECTION INTRAMUSCULAR; INTRAVENOUS ONCE
Status: COMPLETED | OUTPATIENT
Start: 2023-10-18 | End: 2023-10-18

## 2023-10-18 RX ORDER — LORAZEPAM 2 MG/ML
0.5 INJECTION INTRAMUSCULAR EVERY 4 HOURS PRN
Status: CANCELLED | OUTPATIENT
Start: 2023-10-18

## 2023-10-18 RX ORDER — HEPARIN SODIUM (PORCINE) LOCK FLUSH IV SOLN 100 UNIT/ML 100 UNIT/ML
5 SOLUTION INTRAVENOUS
Status: CANCELLED | OUTPATIENT
Start: 2023-11-01

## 2023-10-18 RX ORDER — MEPERIDINE HYDROCHLORIDE 25 MG/ML
25 INJECTION INTRAMUSCULAR; INTRAVENOUS; SUBCUTANEOUS EVERY 30 MIN PRN
Status: CANCELLED | OUTPATIENT
Start: 2023-10-18

## 2023-10-18 RX ORDER — DEXAMETHASONE 4 MG/1
8 TABLET ORAL DAILY
Qty: 4 TABLET | Refills: 2 | Status: SHIPPED | OUTPATIENT
Start: 2023-10-18 | End: 2023-12-08

## 2023-10-18 RX ORDER — HEPARIN SODIUM,PORCINE 10 UNIT/ML
5-20 VIAL (ML) INTRAVENOUS DAILY PRN
Status: CANCELLED | OUTPATIENT
Start: 2023-11-01

## 2023-10-18 RX ORDER — PROCHLORPERAZINE MALEATE 10 MG
10 TABLET ORAL EVERY 6 HOURS PRN
Qty: 30 TABLET | Refills: 2 | Status: SHIPPED | OUTPATIENT
Start: 2023-10-18 | End: 2023-11-17

## 2023-10-18 RX ORDER — ALBUTEROL SULFATE 0.83 MG/ML
2.5 SOLUTION RESPIRATORY (INHALATION)
Status: CANCELLED | OUTPATIENT
Start: 2023-10-18

## 2023-10-18 RX ORDER — EPINEPHRINE 1 MG/ML
0.3 INJECTION, SOLUTION INTRAMUSCULAR; SUBCUTANEOUS EVERY 5 MIN PRN
Status: CANCELLED | OUTPATIENT
Start: 2023-10-18

## 2023-10-18 RX ORDER — LORAZEPAM 2 MG/ML
0.5 INJECTION INTRAMUSCULAR EVERY 4 HOURS PRN
Status: CANCELLED | OUTPATIENT
Start: 2023-11-01

## 2023-10-18 RX ORDER — DIPHENHYDRAMINE HYDROCHLORIDE 50 MG/ML
50 INJECTION INTRAMUSCULAR; INTRAVENOUS
Status: CANCELLED
Start: 2023-11-01

## 2023-10-18 RX ORDER — HEPARIN SODIUM (PORCINE) LOCK FLUSH IV SOLN 100 UNIT/ML 100 UNIT/ML
5 SOLUTION INTRAVENOUS
Status: CANCELLED | OUTPATIENT
Start: 2023-10-19

## 2023-10-18 RX ORDER — ATROPINE SULFATE 0.4 MG/ML
0.4 AMPUL (ML) INJECTION
Status: CANCELLED | OUTPATIENT
Start: 2023-11-01

## 2023-10-18 RX ORDER — ALBUTEROL SULFATE 0.83 MG/ML
2.5 SOLUTION RESPIRATORY (INHALATION)
Status: CANCELLED | OUTPATIENT
Start: 2023-11-01

## 2023-10-18 RX ORDER — DICYCLOMINE HYDROCHLORIDE 10 MG/5ML
20 SOLUTION ORAL 4 TIMES DAILY PRN
Qty: 473 ML | Refills: 0 | Status: SHIPPED | OUTPATIENT
Start: 2023-10-18 | End: 2023-11-16

## 2023-10-18 RX ORDER — LORAZEPAM 2 MG/ML
0.5 INJECTION INTRAMUSCULAR ONCE
Status: COMPLETED | OUTPATIENT
Start: 2023-10-18 | End: 2023-10-18

## 2023-10-18 RX ORDER — MEPERIDINE HYDROCHLORIDE 25 MG/ML
25 INJECTION INTRAMUSCULAR; INTRAVENOUS; SUBCUTANEOUS EVERY 30 MIN PRN
Status: CANCELLED | OUTPATIENT
Start: 2023-11-01

## 2023-10-18 RX ORDER — METHYLPREDNISOLONE SODIUM SUCCINATE 125 MG/2ML
125 INJECTION, POWDER, LYOPHILIZED, FOR SOLUTION INTRAMUSCULAR; INTRAVENOUS
Status: CANCELLED
Start: 2023-10-18

## 2023-10-18 RX ORDER — ONDANSETRON 2 MG/ML
8 INJECTION INTRAMUSCULAR; INTRAVENOUS ONCE
Status: CANCELLED | OUTPATIENT
Start: 2023-10-18

## 2023-10-18 RX ORDER — HEPARIN SODIUM (PORCINE) LOCK FLUSH IV SOLN 100 UNIT/ML 100 UNIT/ML
5 SOLUTION INTRAVENOUS
Status: CANCELLED | OUTPATIENT
Start: 2023-10-18

## 2023-10-18 RX ORDER — HEPARIN SODIUM,PORCINE 10 UNIT/ML
5-20 VIAL (ML) INTRAVENOUS DAILY PRN
Status: CANCELLED | OUTPATIENT
Start: 2023-10-19

## 2023-10-18 RX ORDER — LORAZEPAM 2 MG/ML
0.5 INJECTION INTRAMUSCULAR EVERY 4 HOURS PRN
Status: DISCONTINUED | OUTPATIENT
Start: 2023-10-18 | End: 2023-10-18 | Stop reason: HOSPADM

## 2023-10-18 RX ORDER — ALBUTEROL SULFATE 90 UG/1
1-2 AEROSOL, METERED RESPIRATORY (INHALATION)
Status: CANCELLED
Start: 2023-10-18

## 2023-10-18 RX ORDER — HEPARIN SODIUM (PORCINE) LOCK FLUSH IV SOLN 100 UNIT/ML 100 UNIT/ML
5 SOLUTION INTRAVENOUS ONCE
Status: COMPLETED | OUTPATIENT
Start: 2023-10-18 | End: 2023-10-18

## 2023-10-18 RX ORDER — ATROPINE SULFATE 0.4 MG/ML
0.4 AMPUL (ML) INJECTION
Status: CANCELLED | OUTPATIENT
Start: 2023-10-18

## 2023-10-18 RX ORDER — HEPARIN SODIUM,PORCINE 10 UNIT/ML
5-20 VIAL (ML) INTRAVENOUS DAILY PRN
Status: CANCELLED | OUTPATIENT
Start: 2023-10-18

## 2023-10-18 RX ORDER — METHYLPREDNISOLONE SODIUM SUCCINATE 125 MG/2ML
125 INJECTION, POWDER, LYOPHILIZED, FOR SOLUTION INTRAMUSCULAR; INTRAVENOUS
Status: CANCELLED
Start: 2023-11-01

## 2023-10-18 RX ORDER — LORAZEPAM 2 MG/ML
0.5 INJECTION INTRAMUSCULAR ONCE
Status: CANCELLED
Start: 2023-10-18 | End: 2023-10-18

## 2023-10-18 RX ORDER — PANTOPRAZOLE SODIUM 40 MG/1
40 TABLET, DELAYED RELEASE ORAL 2 TIMES DAILY
Qty: 60 TABLET | Refills: 0 | Status: SHIPPED | OUTPATIENT
Start: 2023-10-18 | End: 2023-11-14

## 2023-10-18 RX ORDER — ONDANSETRON 2 MG/ML
8 INJECTION INTRAMUSCULAR; INTRAVENOUS ONCE
Status: CANCELLED | OUTPATIENT
Start: 2023-11-01

## 2023-10-18 RX ORDER — EPINEPHRINE 1 MG/ML
0.3 INJECTION, SOLUTION INTRAMUSCULAR; SUBCUTANEOUS EVERY 5 MIN PRN
Status: CANCELLED | OUTPATIENT
Start: 2023-11-01

## 2023-10-18 RX ORDER — HEPARIN SODIUM,PORCINE 10 UNIT/ML
5-20 VIAL (ML) INTRAVENOUS DAILY PRN
Status: CANCELLED | OUTPATIENT
Start: 2023-11-02

## 2023-10-18 RX ORDER — ALBUTEROL SULFATE 90 UG/1
1-2 AEROSOL, METERED RESPIRATORY (INHALATION)
Status: CANCELLED
Start: 2023-11-01

## 2023-10-18 RX ADMIN — SODIUM CHLORIDE 250 ML: 9 INJECTION, SOLUTION INTRAVENOUS at 11:07

## 2023-10-18 RX ADMIN — FAMOTIDINE 20 MG: 10 INJECTION, SOLUTION INTRAVENOUS at 12:01

## 2023-10-18 RX ADMIN — FAMOTIDINE 20 MG: 10 INJECTION, SOLUTION INTRAVENOUS at 11:09

## 2023-10-18 RX ADMIN — IRINOTECAN HYDROCHLORIDE 240 MG: 20 INJECTION, SOLUTION INTRAVENOUS at 11:16

## 2023-10-18 RX ADMIN — FOSAPREPITANT: 150 INJECTION, POWDER, LYOPHILIZED, FOR SOLUTION INTRAVENOUS at 08:09

## 2023-10-18 RX ADMIN — LORAZEPAM 0.5 MG: 2 INJECTION INTRAMUSCULAR; INTRAVENOUS at 11:09

## 2023-10-18 RX ADMIN — ONDANSETRON 8 MG: 2 INJECTION INTRAMUSCULAR; INTRAVENOUS at 08:07

## 2023-10-18 RX ADMIN — OXALIPLATIN 170 MG: 5 INJECTION, SOLUTION INTRAVENOUS at 08:59

## 2023-10-18 RX ADMIN — Medication 5 ML: at 06:53

## 2023-10-18 RX ADMIN — LORAZEPAM 0.5 MG: 2 INJECTION INTRAMUSCULAR; INTRAVENOUS at 12:02

## 2023-10-18 RX ADMIN — DEXTROSE MONOHYDRATE 250 ML: 50 INJECTION, SOLUTION INTRAVENOUS at 08:05

## 2023-10-18 ASSESSMENT — PAIN SCALES - GENERAL: PAINLEVEL: NO PAIN (0)

## 2023-10-18 NOTE — PROGRESS NOTES
"Infusion Nursing Note:  Gallo Navarro presents today for cycle 6 day 1 oxaliplatin, irinotecan, fluorouracil pump connect.    Patient seen by provider today: Yes: Megan Farrell CNP   present during visit today: Not Applicable.    Note:   Patient has no questions or concerns after his appointment with Megan Farrell.    Pepcid and ativan given prior to and long-term through irinotecan as given with previous infusions.    Intravenous Access:  Implanted Port.    Treatment Conditions:  Lab Results   Component Value Date    HGB 9.7 (L) 10/18/2023    WBC 9.4 10/18/2023    ANEU 2.8 08/15/2023    ANEUTAUTO 6.4 10/18/2023     10/18/2023        Lab Results   Component Value Date     10/18/2023    POTASSIUM 4.1 10/18/2023    MAG 1.9 08/23/2023    CR 0.55 (L) 10/18/2023    DENISE 8.9 10/18/2023    BILITOTAL 0.2 10/18/2023    ALBUMIN 3.3 (L) 10/18/2023    ALT 16 10/18/2023    AST 17 10/18/2023       Results reviewed, labs MET treatment parameters, ok to proceed with treatment.      Post Infusion Assessment:  Patient tolerated infusion without incident.  Blood return noted pre and post infusion.  Site patent and intact, free from redness, edema or discomfort.  No evidence of extravasations.    Prior to discharge: Port is secured in place with tegaderm and flushed with 10cc NS with positive blood return noted.    Continuous home infusion Dosi-Fuser pump connected.    All connectors secured in place and clamps taped open.    Pump started, \"running\" noted on display (CADD): Not Applicable.   Capillary element taped to pt's skin per protocol.  Pump Connection double checked with Chitra LOPEZ RN.  Patient instructed to call our clinic or [facility] with any questions or concerns at home.  Patient verbalized understanding.    Patient set up for pump disconnect at home with [facility] on Friday 10/20 at 1100 and udenyca injection on Saturday 10/21.        Discharge Plan:   Patient declined prescription " refills. Patient requested all prescriptions be sent to [facility], provider sent dexamethasone script to [facility] and patient will .  Discharge instructions reviewed with: Patient.  Patient and/or family verbalized understanding of discharge instructions and all questions answered.  AVS to patient via [employer].  Patient will return 10/31 for next appointment.   Patient discharged in stable condition accompanied by: self.  Departure Mode: Ambulatory.      Elissa Crenshaw RN

## 2023-10-18 NOTE — NURSING NOTE
"Chief Complaint   Patient presents with    Port Draw     Labs drawn via port by RN. VS taken.     Port accessed with 20 gauge, 3/4\" power needle by RN, labs collected, line flushed with saline and heparin.  Vitals taken. Pt checked in for appointment(s).     Ele Gurrola RN    "

## 2023-10-18 NOTE — PATIENT INSTRUCTIONS
[facility] Triage and after hours / weekends / holidays:  [phone]    Please call the triage or after hours line if you experience a temperature greater than or equal to 100.5, shaking chills, have uncontrolled nausea, vomiting and/or diarrhea, dizziness, shortness of breath, chest pain, bleeding, unexplained bruising, or if you have any other new/concerning symptoms, questions or concerns.      If you are having any concerning symptoms or wish to speak to a provider before your next infusion visit, please call your care coordinator or triage to notify them so we can adequately serve you.     If you need a refill on a narcotic prescription or other medication, please call before your infusion appointment.

## 2023-10-18 NOTE — Clinical Note
10/18/2023         RE: Gallo Navarro  [id number] [address] [place] [id number]        Dear Colleague,    Thank you for referring your patient, Gallo Navarro, to the [facility]. Please see a copy of my visit note below.    Oncology/Hematology Visit Note  Oct 18, 2023    Reason for Visit: follow up of locally advanced, unresectable pancreatic cancer, consideration of cycle 6 FOLFIRINOX and monitoring of toxicities associated with chemotherapy.     History of Present Illness: Gallo Navarro is a 57 year old male with locally advanced, unresectable pancreatic cancer. 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 He then presented to the ED with worsening abdominal pain, increased jaundice, poor PO intake and weight loss admitted on 7/8/2023 for concern of biliary obstruction. Unfortunately, during this admission, biopsy of pancreatic mass returned positive for pancreatic adenocarcinoma on 7/12/23. He started on treatment with 5FU, irinotecan, and oxaliplatin (FOLFIRINOX) on 7/31/23. Please see previous notes for further details on the patient's history.     8/17/23 - ERCP/EGD, duodenal stents x2 placed, exchange of GJ tube    8/21-/8/25 hospitalized due to diarrhea, colitis, abdominal pain.      8/29 - Cycle 3 deferred due to neutropenia.   Neulasta added. Irinotecan dose reduced 20% due to symptoms including cramping, double vision and slurred speech during infusion.      Gallo returns today for consideration of Cycle 6 FOLFIRINOX.     Interval History:      Nutrition - eating more food, weight is up, 3 cartons per day  Pain  Neuropathy  Infusion side effects   Blood sugars          Current Outpatient Medications   Medication Sig Dispense Refill    acetaminophen (TYLENOL)  32 mg/mL liquid Take 20.313 mLs (650 mg) by mouth every 8 hours 1800 mL 11    B-D U/F 31G X 8 MM insulin pen needle Inject Subcutaneous 5 times daily Use 5 pen needles daily or as directed. 500 each 1    baclofen (LIORESAL) 10 MG tablet Take 0.5-1 tablets (5-10 mg) by mouth 3 times daily as needed for other (hiccups) 30 tablet 0    blood glucose (FREESTYLE TEST STRIPS) test strip by Other route as needed      buprenorphine (BUTRANS) 10 MCG/HR WK patch Place 1 patch onto the skin every 7 days Use with 20 mcg/hour patch for 30 mcg/hour total. 4 patch 3    buprenorphine (BUTRANS) 20 MCG/HR WK patch Place 1 patch onto the skin once a week 4 patch 0    Continuous Blood Gluc Sensor (FREESTYLE KAREN 2 SENSOR) MISC Change every 14 days 6 each 3    dexAMETHasone (DECADRON) 4 MG tablet Take 2 tablets (8 mg) by mouth daily Take for 2 days, starting the day after chemo. Take with food. 4 tablet 2    dicyclomine (BENTYL) 10 MG/5ML solution Take 10 mLs (20 mg) by mouth 4 times daily as needed (abdominal discomfort, best before meals) 473 mL 0    gabapentin (NEURONTIN) 300 MG capsule Take 1 capsule (300 mg) by mouth 3 times daily 90 capsule 0    HYDROmorphone (DILAUDID) 2 MG tablet Take 1-2 tablets (2-4 mg) by mouth every 4 hours as needed for severe pain 180 tablet 0    insulin aspart (NOVOLOG PEN) 100 UNIT/ML pen Inject 1-10 Units Subcutaneous 3 times daily (with meals) 15 mL 3    insulin glargine (BASAGLAR KWIKPEN) 100 UNIT/ML pen Inject 20 Units Subcutaneous every morning for 360 days 18 mL 3    insulin  UNIT/ML injection Inject 16 Units Subcutaneous every evening Take 20 units day of chemo and two days after while on steroid 9 mL 1    Lancets (ONETOUCH DELICA PLUS [id number]) MISC       lidocaine-prilocaine (EMLA) 2.5-2.5 % external cream Use 1-2 times a week or as needed prior to port access 30 g 3    lidocaine-prilocaine (EMLA) 2.5-2.5 % external cream Use 1-2 times weekly or as needed prior to port access 30 g 3     lipase-protease-amylase (CREON 24) 46395-05414-904368 units CPEP per EC capsule 1-2 capsules by Per J Tube route 3 times daily (with meals) 180 capsule 3    loperamide (IMODIUM) 2 MG capsule 2 caps at 1st sign of diarrhea & 1 cap every 2hrs until 12hrs diarrhea free. During night, 2 caps at bedtime & 2 caps every 4hrs until  capsule 3    naloxone (NARCAN) 4 MG/0.1ML nasal spray Spray 1 spray (4 mg) into one nostril alternating nostrils as needed for opioid reversal every 2-3 minutes until assistance arrives 0.2 mL 11    ondansetron (ZOFRAN ODT) 8 MG ODT tab Take 1 tablet (8 mg) by mouth every 8 hours as needed for nausea 30 tablet 3    pantoprazole (PROTONIX) 40 MG EC tablet Take 1 tablet (40 mg) by mouth 2 times daily 60 tablet 0    polyethylene glycol (MIRALAX) 17 GM/Dose powder Take 17 g by mouth daily 510 g 3    prochlorperazine (COMPAZINE) 10 MG tablet Take 1 tablet (10 mg) by mouth every 6 hours as needed for nausea or vomiting 30 tablet 2    psyllium (METAMUCIL/KONSYL) capsule 1 capsule by Per G Tube route 2 times daily 180 capsule 3    sennosides (SENOKOT) 8.8 MG/5ML syrup 5 mLs by Per J Tube route 2 times daily 236 mL 3    simethicone (MYLICON) 125 MG chewable tablet Take 125 mg by mouth 2 times daily      sodium bicarbonate 325 MG tablet 1 tablet (325 mg) by Per J Tube route 3 times daily 90 tablet 0    vitamin D3 (CHOLECALCIFEROL) 50 mcg (2000 units) tablet Take 1 tablet (50 mcg) by mouth daily 90 tablet 1     Physical Examination:  /73 (BP Location: Right arm, Patient Position: Sitting, Cuff Size: Adult Regular)   Pulse 82   Temp 97.7  F (36.5  C) (Oral)   Resp 16   Wt 76.3 kg (168 lb 4.8 oz)   SpO2 97%   BMI 22.83 kg/m    Wt Readings from Last 10 Encounters:   10/18/23 76.3 kg (168 lb 4.8 oz)   10/04/23 74.8 kg (165 lb)   10/03/23 74.4 kg (164 lb 1.6 oz)   09/20/23 74.1 kg (163 lb 4.8 oz)   09/19/23 74.8 kg (165 lb)   09/05/23 73.9 kg (162 lb 14.4 oz)   09/05/23 73.9 kg (162 lb 14.7  oz)   08/31/23 73.9 kg (163 lb)   08/29/23 73.7 kg (162 lb 7.7 oz)   08/22/23 73.4 kg (161 lb 12.8 oz)   General: The patient is a pleasant male in no acute distress.  HEENT: EOMI. Sclerae are anicteric. Mucous membranes moist, no lesions   Lymph: Neck is supple with no lymphadenopathy in the cervical or supraclavicular areas.   Heart: Regular rate and rhythm.   Lungs: Clear to auscultation bilaterally.   Abdomen: Bowel sounds present, soft, nontender with no palpable hepatosplenomegaly or masses. GJ tube in place, no redness or drainage noted around insertion site.   Extremities: No edema noted to bilateral lower extremities.     Neuro: Cranial nerves II through XII are grossly intact.  Skin: No rashes, petechiae, or bruising noted on exposed skin.     Laboratory Data:  Most Recent 3 CBC's:  Recent Labs   Lab Test 10/18/23  0648 10/03/23  0631 09/20/23  0636   WBC 9.4 15.8* 9.2   HGB 9.7* 9.6* 9.8*   MCV 91 91 93    204 197   ANEUTAUTO 6.4 12.5* 6.4    Most Recent 3 BMP's:  Recent Labs   Lab Test 10/03/23  0631 09/20/23  0636 09/05/23  1608 09/05/23  1112    139  --  139   POTASSIUM 3.7 4.3  --  4.2   CHLORIDE 102 102  --  104   CO2 26 27  --  28   BUN 12.7 13.2  --  14.9   CR 0.51* 0.52*  --  0.52*   ANIONGAP 10 10  --  7   DENISE 8.7 8.9  --  8.7   * 122* 294* 146*   PROTTOTAL 6.1* 6.1*  --  5.7*   ALBUMIN 3.5 3.4*  --  3.1*    Most Recent 2 LFT's:  Recent Labs   Lab Test 10/03/23  0631 09/20/23  0636   AST 15 14   ALT 15 14   ALKPHOS 203* 136*   BILITOTAL 0.2 0.2   I reviewed the above labs today.        Assessment and Plan:  Locally advanced, unresectable pancreatic cancer. Patient started on treatment with palliative FOLFIRINOX on 7/31/23. He had a moderate amount of side effects following cycle 1 with dehydration, diarrhea, and nausea. Received cycle 2 on 8/15/23. ERCP/EGD on 8/17 with GJ tube exchange and duodenal stents placed. Hospitalized 8/21-8/25 due to colitis and abdominal pain. Cycle  3 was deferred due to neutropenia, Neulasta/Udenyca added on day 4.  - Had symptoms with cycle 1 and 2 during irinotecan including blurry vision, hand cramping, and difficulty getting words out. Thought to be due to atropine, which was not given with cycle 2 and symptoms reoccurred. Discussed with Dr. Haile, irinotecan dose reduced 20% with cycle 3. Symptoms still occurred but were less with cycle 4. ***  -Proceed with Cycle 6 today.  ***  -Return to clinic in 2 weeks with next cycle, SUZETTE visit with labs prior.   -CT 10/24 and follow up with Dr. Haile 10/30    Nutrition.  Increased oral intake and decreased to 4 cans Peptomin tube feeding overnight through J tube. Weight stable. Denies nausea or vomiting. Continue to push oral hydration, drinking 64+ oz of fluid/day. Working with dietician.     Lower extremity edema. Resolved. Continue compression stockings and elevation. Continue to monitor.     Diabetes. On insulin, blood sugars have been well controlled. Will adjust insulin based on steroids with chemotherapy. Working closely with endocrine/diabetic educator on dose adjustments/management.     Nausea. No issues recently. Continue compazine as needed. Has cannabis gummies but has not needed. Avoiding ondansetron due to side effect of constipation. Continue Protonix daily.      Diarrhea/constipation.  Resolved. Bowel movements more regular with increased food intake. Continue Metamucil and dicylomine if needed.    Abdominal pain. Managed by palliative care with Butrans patch. Only needing an occasional oral Dilaudid dose. Follow up with palliative care 10/23.      Hiccups. Did not occur with more recent cycle of chemo. Previously tried Baclofen, but did not like the side effects including mild confusion. Has also tried gabapentin but did not find this helpful for hiccups.       Anemia. Normocytic. Suspect anemia of chronic disease. Hemoglobin stable. Will monitor for now.       *** minutes spent on the date of  the encounter doing {2021 E&M time in:804177}         ESVIN Canales CNP    Oncology/Hematology Visit Note  Oct 18, 2023    Reason for Visit: follow up of locally advanced, unresectable pancreatic cancer, consideration of cycle 6 FOLFIRINOX and monitoring of toxicities associated with chemotherapy.     History of Present Illness: Gallo Navarro is a 57 year old male with locally advanced, unresectable pancreatic cancer. 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 He then presented to the ED with worsening abdominal pain, increased jaundice, poor PO intake and weight loss admitted on 7/8/2023 for concern of biliary obstruction. Unfortunately, during this admission, biopsy of pancreatic mass returned positive for pancreatic adenocarcinoma on 7/12/23. He started on treatment with 5FU, irinotecan, and oxaliplatin (FOLFIRINOX) on 7/31/23. Please see previous notes for further details on the patient's history.     8/17/23 - ERCP/EGD, duodenal stents x2 placed, exchange of GJ tube    8/21-/8/25 hospitalized due to diarrhea, colitis, abdominal pain.      8/29 - Cycle 3 deferred due to neutropenia.   Neulasta added. Irinotecan dose reduced 20% due to symptoms including cramping, double vision and slurred speech during infusion.      Gallo returns today for consideration of Cycle 6 FOLFIRINOX.     Interval History:    Continues to tolerate chemotherapy well. Feels good today and has no concerns. Continues to expand his diet and tolerating food well. Weight is up a couple of pounds. Continues on 3 cartons of tube feed at night. Denies diarrhea or constipation, taking a fiber supplement.     Abdominal pain is well controlled. Taking Dilaudid as needed and has Butrans patch. Mild cold sensitivity in the few  days following chemo in his mouth, , no neuropathy.       Review of Systems:  Patient denies any of the following except if noted above: fevers, chills, difficulty with energy, vision or hearing changes, chest pain, dyspnea, abdominal pain, nausea, vomiting, diarrhea, constipation, urinary concerns, headaches, numbness, tingling, issues with sleep or mood. He also denies lumps, bumps, rashes or skin lesions, bleeding or bruising issues.      Current Outpatient Medications   Medication Sig Dispense Refill     dicyclomine (BENTYL) 10 MG/5ML solution Take 10 mLs (20 mg) by mouth 4 times daily as needed (abdominal discomfort, best before meals) 473 mL 0     lipase-protease-amylase (CREON 24) 24278-36769-536648 units CPEP per EC capsule 1-2 capsules by Per J Tube route 3 times daily (with meals) 180 capsule 3     pantoprazole (PROTONIX) 40 MG EC tablet Take 1 tablet (40 mg) by mouth 2 times daily 60 tablet 0     prochlorperazine (COMPAZINE) 10 MG tablet Take 1 tablet (10 mg) by mouth every 6 hours as needed for nausea or vomiting 30 tablet 2     acetaminophen (TYLENOL) 32 mg/mL liquid Take 20.313 mLs (650 mg) by mouth every 8 hours 1800 mL 11     B-D U/F 31G X 8 MM insulin pen needle Inject Subcutaneous 5 times daily Use 5 pen needles daily or as directed. 500 each 1     baclofen (LIORESAL) 10 MG tablet Take 0.5-1 tablets (5-10 mg) by mouth 3 times daily as needed for other (hiccups) 30 tablet 0     blood glucose (FREESTYLE TEST STRIPS) test strip by Other route as needed       buprenorphine (BUTRANS) 10 MCG/HR WK patch Place 1 patch onto the skin every 7 days Use with 20 mcg/hour patch for 30 mcg/hour total. 4 patch 3     buprenorphine (BUTRANS) 20 MCG/HR WK patch Place 1 patch onto the skin once a week 4 patch 0     Continuous Blood Gluc Sensor (FREESTYLE KAREN 2 SENSOR) MISC Change every 14 days 6 each 3     dexAMETHasone (DECADRON) 4 MG tablet Take 2 tablets (8 mg) by mouth daily Take for 2 days, starting the day  after chemo. Take with food. 4 tablet 2     gabapentin (NEURONTIN) 300 MG capsule Take 1 capsule (300 mg) by mouth 3 times daily 90 capsule 0     HYDROmorphone (DILAUDID) 2 MG tablet Take 1-2 tablets (2-4 mg) by mouth every 4 hours as needed for severe pain 180 tablet 0     insulin aspart (NOVOLOG PEN) 100 UNIT/ML pen Inject 1-10 Units Subcutaneous 3 times daily (with meals) 15 mL 3     insulin glargine (BASAGLAR KWIKPEN) 100 UNIT/ML pen Inject 20 Units Subcutaneous every morning for 360 days 18 mL 3     insulin  UNIT/ML injection Inject 16 Units Subcutaneous every evening Take 20 units day of chemo and two days after while on steroid 9 mL 1     Lancets (ONETOUCH DELICA PLUS [id number]) MISC        lidocaine-prilocaine (EMLA) 2.5-2.5 % external cream Use 1-2 times a week or as needed prior to port access 30 g 3     lidocaine-prilocaine (EMLA) 2.5-2.5 % external cream Use 1-2 times weekly or as needed prior to port access 30 g 3     loperamide (IMODIUM) 2 MG capsule 2 caps at 1st sign of diarrhea & 1 cap every 2hrs until 12hrs diarrhea free. During night, 2 caps at bedtime & 2 caps every 4hrs until  capsule 3     naloxone (NARCAN) 4 MG/0.1ML nasal spray Spray 1 spray (4 mg) into one nostril alternating nostrils as needed for opioid reversal every 2-3 minutes until assistance arrives 0.2 mL 11     ondansetron (ZOFRAN ODT) 8 MG ODT tab Take 1 tablet (8 mg) by mouth every 8 hours as needed for nausea 30 tablet 3     polyethylene glycol (MIRALAX) 17 GM/Dose powder Take 17 g by mouth daily 510 g 3     psyllium (METAMUCIL/KONSYL) capsule 1 capsule by Per G Tube route 2 times daily 180 capsule 3     sennosides (SENOKOT) 8.8 MG/5ML syrup 5 mLs by Per J Tube route 2 times daily 236 mL 3     simethicone (MYLICON) 125 MG chewable tablet Take 125 mg by mouth 2 times daily       sodium bicarbonate 325 MG tablet 1 tablet (325 mg) by Per J Tube route 3 times daily 90 tablet 0     vitamin D3 (CHOLECALCIFEROL) 50 mcg  (2000 units) tablet Take 1 tablet (50 mcg) by mouth daily 90 tablet 1     Physical Examination:  /73 (BP Location: Right arm, Patient Position: Sitting, Cuff Size: Adult Regular)   Pulse 82   Temp 97.7  F (36.5  C) (Oral)   Resp 16   Wt 76.3 kg (168 lb 4.8 oz)   SpO2 97%   BMI 22.83 kg/m    Wt Readings from Last 10 Encounters:   10/18/23 76.3 kg (168 lb 4.8 oz)   10/04/23 74.8 kg (165 lb)   10/03/23 74.4 kg (164 lb 1.6 oz)   09/20/23 74.1 kg (163 lb 4.8 oz)   09/19/23 74.8 kg (165 lb)   09/05/23 73.9 kg (162 lb 14.4 oz)   09/05/23 73.9 kg (162 lb 14.7 oz)   08/31/23 73.9 kg (163 lb)   08/29/23 73.7 kg (162 lb 7.7 oz)   08/22/23 73.4 kg (161 lb 12.8 oz)   General: The patient is a pleasant male in no acute distress.  HEENT: EOMI. Sclerae are anicteric. Mucous membranes moist, no lesions   Lymph: Neck is supple with no lymphadenopathy in the cervical or supraclavicular areas.   Heart: Regular rate and rhythm.   Lungs: Clear to auscultation bilaterally.   Abdomen: Bowel sounds present, soft, nontender with no palpable hepatosplenomegaly or masses. GJ tube in place, no redness or drainage noted around insertion site.   Extremities: No edema noted to bilateral lower extremities.     Neuro: Cranial nerves II through XII are grossly intact.  Skin: No rashes, petechiae, or bruising noted on exposed skin.     Laboratory Data:  Most Recent 3 CBC's:  Recent Labs   Lab Test 10/18/23  0648 10/03/23  0631 09/20/23  0636   WBC 9.4 15.8* 9.2   HGB 9.7* 9.6* 9.8*   MCV 91 91 93    204 197   ANEUTAUTO 6.4 12.5* 6.4    Most Recent 3 BMP's:  Recent Labs   Lab Test 10/18/23  0648 10/03/23  0631 09/20/23  0636    138 139   POTASSIUM 4.1 3.7 4.3   CHLORIDE 104 102 102   CO2 28 26 27   BUN 11.8 12.7 13.2   CR 0.55* 0.51* 0.52*   ANIONGAP 8 10 10   DENISE 8.9 8.7 8.9   * 107* 122*   PROTTOTAL 5.8* 6.1* 6.1*   ALBUMIN 3.3* 3.5 3.4*    Most Recent 2 LFT's:  Recent Labs   Lab Test 10/18/23  0648 10/03/23  0631  09/20/23  0636   AST  --  15 14   ALT 16 15 14   ALKPHOS 169* 203* 136*   BILITOTAL 0.2 0.2 0.2   I reviewed the above labs today.        Assessment and Plan:  Locally advanced, unresectable pancreatic cancer. Patient started on treatment with palliative FOLFIRINOX on 7/31/23. He had a moderate amount of side effects following cycle 1 with dehydration, diarrhea, and nausea. Received cycle 2 on 8/15/23. ERCP/EGD on 8/17 with GJ tube exchange and duodenal stents placed. Hospitalized 8/21-8/25 due to colitis and abdominal pain. Cycle 3 was deferred due to neutropenia, Neulasta/Udenyca added on day 4.  - Had symptoms with cycle 1 and 2 during irinotecan including blurry vision, hand cramping, and difficulty getting words out. Thought to be due to atropine, which was not given with cycle 2 and symptoms reoccurred. Discussed with Dr. Haile, irinotecan dose reduced 20% with cycle 3. Symptoms still occurred but were a lot more tolerable with cycle 4 and 5.   -Proceed with Cycle 6 today.    -Return to clinic in 2 weeks with next cycle, SUZETTE visit with labs prior.   -CT 10/24 and follow up with Dr. Haile 10/30    Nutrition.  Continues to increased oral intake and expand diet without issues. Decreased to 3 cans Peptomin tube feeding overnight through J tube. Weight increased a couple of pounds. Denies nausea or vomiting. Continue to push oral hydration, drinking 64+ oz of fluid/day. Working with dietician.     Lower extremity edema. Resolved. Continue compression stockings and elevation. Continue to monitor.     Diabetes. On insulin, blood sugars have been well controlled. Will adjust insulin based on steroids with chemotherapy. Working closely with endocrine/diabetic educator on dose adjustments/management.     Nausea. No issues recently. Continue compazine as needed. Has cannabis gummies but has not needed. Avoiding ondansetron due to side effect of constipation. Continue Protonix daily.      Diarrhea/constipation.   Resolved. Bowel movements more regular with increased food intake. Continue Metamucil and dicylomine as needed.    Abdominal pain. Managed by palliative care with Butrans patch. Only needing an occasional oral Dilaudid dose. Follow up with palliative care 10/23.      Hiccups. Hasn't had hiccups with the past couple of doses of chemo. Previously tried Baclofen, but did not like the side effects including mild confusion. Has also tried gabapentin but did not find this helpful for hiccups.       Anemia. Normocytic. Suspect anemia of chronic disease. Hemoglobin stable. Will monitor for now.       25 minutes spent on the date of the encounter doing chart review, review of test results, interpretation of tests, patient visit, and documentation       ESVIN Canales CNP        Again, thank you for allowing me to participate in the care of your patient.        Sincerely,        ESVIN Canales CNP

## 2023-10-18 NOTE — PROGRESS NOTES
Oncology/Hematology Visit Note  Oct 18, 2023    Reason for Visit: follow up of locally advanced, unresectable pancreatic cancer, consideration of cycle 6 FOLFIRINOX and monitoring of toxicities associated with chemotherapy.     History of Present Illness: Gallo Navarro is a 57 year old male with locally advanced, unresectable pancreatic cancer. 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 He then presented to the ED with worsening abdominal pain, increased jaundice, poor PO intake and weight loss admitted on 7/8/2023 for concern of biliary obstruction. Unfortunately, during this admission, biopsy of pancreatic mass returned positive for pancreatic adenocarcinoma on 7/12/23. He started on treatment with 5FU, irinotecan, and oxaliplatin (FOLFIRINOX) on 7/31/23. Please see previous notes for further details on the patient's history.     8/17/23 - ERCP/EGD, duodenal stents x2 placed, exchange of GJ tube    8/21-/8/25 hospitalized due to diarrhea, colitis, abdominal pain.      8/29 - Cycle 3 deferred due to neutropenia.   Neulasta added. Irinotecan dose reduced 20% due to symptoms including cramping, double vision and slurred speech during infusion.      Gallo returns today for consideration of Cycle 6 FOLFIRINOX.     Interval History:    Continues to tolerate chemotherapy well. Feels good today and has no concerns. Continues to expand his diet and tolerating food well. Weight is up a couple of pounds. Continues on 3 cartons of tube feed at night. Denies diarrhea or constipation, taking a fiber supplement.     Abdominal pain is well controlled. Taking Dilaudid as needed and has Butrans patch. Mild cold sensitivity in the few days following chemo in his mouth, , no neuropathy.       Review of  Systems:  Patient denies any of the following except if noted above: fevers, chills, difficulty with energy, vision or hearing changes, chest pain, dyspnea, abdominal pain, nausea, vomiting, diarrhea, constipation, urinary concerns, headaches, numbness, tingling, issues with sleep or mood. He also denies lumps, bumps, rashes or skin lesions, bleeding or bruising issues.      Current Outpatient Medications   Medication Sig Dispense Refill    dicyclomine (BENTYL) 10 MG/5ML solution Take 10 mLs (20 mg) by mouth 4 times daily as needed (abdominal discomfort, best before meals) 473 mL 0    lipase-protease-amylase (CREON 24) [id number] units CPEP per EC capsule 1-2 capsules by Per J Tube route 3 times daily (with meals) 180 capsule 3    pantoprazole (PROTONIX) 40 MG EC tablet Take 1 tablet (40 mg) by mouth 2 times daily 60 tablet 0    prochlorperazine (COMPAZINE) 10 MG tablet Take 1 tablet (10 mg) by mouth every 6 hours as needed for nausea or vomiting 30 tablet 2    acetaminophen (TYLENOL) 32 mg/mL liquid Take 20.313 mLs (650 mg) by mouth every 8 hours 1800 mL 11    B-D U/F 31G X 8 MM insulin pen needle Inject Subcutaneous 5 times daily Use 5 pen needles daily or as directed. 500 each 1    baclofen (LIORESAL) 10 MG tablet Take 0.5-1 tablets (5-10 mg) by mouth 3 times daily as needed for other (hiccups) 30 tablet 0    blood glucose (FREESTYLE TEST STRIPS) test strip by Other route as needed      buprenorphine (BUTRANS) 10 MCG/HR WK patch Place 1 patch onto the skin every 7 days Use with 20 mcg/hour patch for 30 mcg/hour total. 4 patch 3    buprenorphine (BUTRANS) 20 MCG/HR WK patch Place 1 patch onto the skin once a week 4 patch 0    Continuous Blood Gluc Sensor (FREESTYLE KAREN 2 SENSOR) MISC Change every 14 days 6 each 3    dexAMETHasone (DECADRON) 4 MG tablet Take 2 tablets (8 mg) by mouth daily Take for 2 days, starting the day after chemo. Take with food. 4 tablet 2    gabapentin (NEURONTIN) 300 MG capsule  Take 1 capsule (300 mg) by mouth 3 times daily 90 capsule 0    HYDROmorphone (DILAUDID) 2 MG tablet Take 1-2 tablets (2-4 mg) by mouth every 4 hours as needed for severe pain 180 tablet 0    insulin aspart (NOVOLOG PEN) 100 UNIT/ML pen Inject 1-10 Units Subcutaneous 3 times daily (with meals) 15 mL 3    insulin glargine (BASAGLAR KWIKPEN) 100 UNIT/ML pen Inject 20 Units Subcutaneous every morning for 360 days 18 mL 3    insulin  UNIT/ML injection Inject 16 Units Subcutaneous every evening Take 20 units day of chemo and two days after while on steroid 9 mL 1    Lancets (ONETOUCH DELICA PLUS [id number]) MISC       lidocaine-prilocaine (EMLA) 2.5-2.5 % external cream Use 1-2 times a week or as needed prior to port access 30 g 3    lidocaine-prilocaine (EMLA) 2.5-2.5 % external cream Use 1-2 times weekly or as needed prior to port access 30 g 3    loperamide (IMODIUM) 2 MG capsule 2 caps at 1st sign of diarrhea & 1 cap every 2hrs until 12hrs diarrhea free. During night, 2 caps at bedtime & 2 caps every 4hrs until  capsule 3    naloxone (NARCAN) 4 MG/0.1ML nasal spray Spray 1 spray (4 mg) into one nostril alternating nostrils as needed for opioid reversal every 2-3 minutes until assistance arrives 0.2 mL 11    ondansetron (ZOFRAN ODT) 8 MG ODT tab Take 1 tablet (8 mg) by mouth every 8 hours as needed for nausea 30 tablet 3    polyethylene glycol (MIRALAX) 17 GM/Dose powder Take 17 g by mouth daily 510 g 3    psyllium (METAMUCIL/KONSYL) capsule 1 capsule by Per G Tube route 2 times daily 180 capsule 3    sennosides (SENOKOT) 8.8 MG/5ML syrup 5 mLs by Per J Tube route 2 times daily 236 mL 3    simethicone (MYLICON) 125 MG chewable tablet Take 125 mg by mouth 2 times daily      sodium bicarbonate 325 MG tablet 1 tablet (325 mg) by Per J Tube route 3 times daily 90 tablet 0    vitamin D3 (CHOLECALCIFEROL) 50 mcg (2000 units) tablet Take 1 tablet (50 mcg) by mouth daily 90 tablet 1     Physical Examination:  BP  124/73 (BP Location: Right arm, Patient Position: Sitting, Cuff Size: Adult Regular)   Pulse 82   Temp 97.7  F (36.5  C) (Oral)   Resp 16   Wt 76.3 kg (168 lb 4.8 oz)   SpO2 97%   BMI 22.83 kg/m    Wt Readings from Last 10 Encounters:   10/18/23 76.3 kg (168 lb 4.8 oz)   10/04/23 74.8 kg (165 lb)   10/03/23 74.4 kg (164 lb 1.6 oz)   09/20/23 74.1 kg (163 lb 4.8 oz)   09/19/23 74.8 kg (165 lb)   09/05/23 73.9 kg (162 lb 14.4 oz)   09/05/23 73.9 kg (162 lb 14.7 oz)   08/31/23 73.9 kg (163 lb)   08/29/23 73.7 kg (162 lb 7.7 oz)   08/22/23 73.4 kg (161 lb 12.8 oz)   General: The patient is a pleasant male in no acute distress.  HEENT: EOMI. Sclerae are anicteric. Mucous membranes moist, no lesions   Lymph: Neck is supple with no lymphadenopathy in the cervical or supraclavicular areas.   Heart: Regular rate and rhythm.   Lungs: Clear to auscultation bilaterally.   Abdomen: Bowel sounds present, soft, nontender with no palpable hepatosplenomegaly or masses. GJ tube in place, no redness or drainage noted around insertion site.   Extremities: No edema noted to bilateral lower extremities.     Neuro: Cranial nerves II through XII are grossly intact.  Skin: No rashes, petechiae, or bruising noted on exposed skin.     Laboratory Data:  Most Recent 3 CBC's:  Recent Labs   Lab Test 10/18/23  0648 10/03/23  0631 09/20/23  0636   WBC 9.4 15.8* 9.2   HGB 9.7* 9.6* 9.8*   MCV 91 91 93    204 197   ANEUTAUTO 6.4 12.5* 6.4    Most Recent 3 BMP's:  Recent Labs   Lab Test 10/18/23  0648 10/03/23  0631 09/20/23  0636    138 139   POTASSIUM 4.1 3.7 4.3   CHLORIDE 104 102 102   CO2 28 26 27   BUN 11.8 12.7 13.2   CR 0.55* 0.51* 0.52*   ANIONGAP 8 10 10   DENISE 8.9 8.7 8.9   * 107* 122*   PROTTOTAL 5.8* 6.1* 6.1*   ALBUMIN 3.3* 3.5 3.4*    Most Recent 2 LFT's:  Recent Labs   Lab Test 10/18/23  0648 10/03/23  0631 09/20/23  0636   AST  --  15 14   ALT 16 15 14   ALKPHOS 169* 203* 136*   BILITOTAL 0.2 0.2 0.2   I  reviewed the above labs today.        Assessment and Plan:  Locally advanced, unresectable pancreatic cancer. Patient started on treatment with palliative FOLFIRINOX on 7/31/23. He had a moderate amount of side effects following cycle 1 with dehydration, diarrhea, and nausea. Received cycle 2 on 8/15/23. ERCP/EGD on 8/17 with GJ tube exchange and duodenal stents placed. Hospitalized 8/21-8/25 due to colitis and abdominal pain. Cycle 3 was deferred due to neutropenia, Neulasta/Udenyca added on day 4.  - Had symptoms with cycle 1 and 2 during irinotecan including blurry vision, hand cramping, and difficulty getting words out. Thought to be due to atropine, which was not given with cycle 2 and symptoms reoccurred. Discussed with Dr. Haile, irinotecan dose reduced 20% with cycle 3. Symptoms still occurred but were a lot more tolerable with cycle 4 and 5.   -Proceed with Cycle 6 today.    -Return to clinic in 2 weeks with next cycle, SUZETTE visit with labs prior.   -CT 10/24 and follow up with Dr. Haile 10/30    Nutrition.  Continues to increased oral intake and expand diet without issues. Decreased to 3 cans Peptomin tube feeding overnight through J tube. Weight increased a couple of pounds. Denies nausea or vomiting. Continue to push oral hydration, drinking 64+ oz of fluid/day. Working with dietician.     Lower extremity edema. Resolved. Continue compression stockings and elevation. Continue to monitor.     Diabetes. On insulin, blood sugars have been well controlled. Will adjust insulin based on steroids with chemotherapy. Working closely with endocrine/diabetic educator on dose adjustments/management.     Nausea. No issues recently. Continue compazine as needed. Has cannabis gummies but has not needed. Avoiding ondansetron due to side effect of constipation. Continue Protonix daily.      Diarrhea/constipation.  Resolved. Bowel movements more regular with increased food intake. Continue Metamucil and dicylomine as  needed.    Abdominal pain. Managed by palliative care with Butrans patch. Only needing an occasional oral Dilaudid dose. Follow up with palliative care 10/23.      Hiccups. Hasn't had hiccups with the past couple of doses of chemo. Previously tried Baclofen, but did not like the side effects including mild confusion. Has also tried gabapentin but did not find this helpful for hiccups.       Anemia. Normocytic. Suspect anemia of chronic disease. Hemoglobin stable. Will monitor for now.       25 minutes spent on the date of the encounter doing chart review, review of test results, interpretation of tests, patient visit, and documentation       ESVIN Canales CNP

## 2023-10-18 NOTE — TELEPHONE ENCOUNTER
Forms/Letter Request    Type of form/letter:  [facility]    Have you been seen for this request: N/A    Do we have the form/letter: Yes: Will place form on providers desk for review/signature    When is form/letter needed by: asap    How would you like the form/letter returned: Fax : [phone]

## 2023-10-18 NOTE — NURSING NOTE
Oncology Rooming Note    October 18, 2023 7:04 AM   Gallo Navarro is a 57 year old male who presents for:    Chief Complaint   Patient presents with    Port Draw     Labs drawn via port by RN. VS taken.    Oncology Clinic Visit     Pancreatic Cancer     Initial Vitals: /73 (BP Location: Right arm, Patient Position: Sitting, Cuff Size: Adult Regular)   Pulse 82   Temp 97.7  F (36.5  C) (Oral)   Resp 16   Wt 76.3 kg (168 lb 4.8 oz)   SpO2 97%   BMI 22.83 kg/m   Estimated body mass index is 22.83 kg/m  as calculated from the following:    Height as of 10/4/23: 1.829 m (6').    Weight as of this encounter: 76.3 kg (168 lb 4.8 oz). Body surface area is 1.97 meters squared.  No Pain (0) Comment: Data Unavailable   No LMP for male patient.  Allergies reviewed: Yes  Medications reviewed: Yes    Medications: needed  Pharmacy name entered into EPIC:    [facility] - [place], [place] - [address] [facility] - [place], [place] - [id number]  [facility] [place] [address] [facility] AT PORTAL TO REGISTERED [facility] SITES  [facility] - [place], [place] - [address]  [facility] [id number] [facility] [employer] - [place], [place] - [address]    Clinical concerns: Refills needed: Dicyclomine, Compazine, Pantroprazole, Creon (transfer Creon to [facility] pharmacy)      Gisella Mondragon, EMT  10/18/2023

## 2023-10-20 ENCOUNTER — MYC MEDICAL ADVICE (OUTPATIENT)
Dept: ONCOLOGY | Facility: CLINIC | Age: 58
End: 2023-10-20
Payer: COMMERCIAL

## 2023-10-20 DIAGNOSIS — C25.9 PANCREATIC ADENOCARCINOMA (H): ICD-10-CM

## 2023-10-20 DIAGNOSIS — Z98.890 HISTORY OF BILIARY STENT INSERTION: ICD-10-CM

## 2023-10-20 LAB — CANCER AG19-9 SERPL IA-ACNC: 6 U/ML

## 2023-10-20 NOTE — TELEPHONE ENCOUNTER
New dose of Creon (1-2 capsules per J tube 3 times daily) just ordered 10/18/2023 to [facility] [place]. Not covered by insurance per [facility] as it's too early to fill as prescribed. Per pt report to [facility], he is using up to 15 capsules a day.    Pharmacist re-ran script for 2-3 capsules 3 times a day plus 1-2 capsules with snacks, max of 15 capsules a day and this did go through. Rx routed to Megan BRADFORD for approval.

## 2023-10-23 ENCOUNTER — VIRTUAL VISIT (OUTPATIENT)
Dept: RADIATION ONCOLOGY | Facility: HOSPITAL | Age: 58
End: 2023-10-23
Attending: FAMILY MEDICINE
Payer: COMMERCIAL

## 2023-10-23 VITALS — BODY MASS INDEX: 22.83 KG/M2 | HEIGHT: 72 IN

## 2023-10-23 DIAGNOSIS — G89.3 CANCER ASSOCIATED PAIN: ICD-10-CM

## 2023-10-23 DIAGNOSIS — K31.1 GASTRIC OUTLET OBSTRUCTION: ICD-10-CM

## 2023-10-23 DIAGNOSIS — E11.9 TYPE 2 DIABETES MELLITUS WITHOUT COMPLICATION, WITH LONG-TERM CURRENT USE OF INSULIN (H): ICD-10-CM

## 2023-10-23 DIAGNOSIS — Z79.4 TYPE 2 DIABETES MELLITUS WITHOUT COMPLICATION, WITH LONG-TERM CURRENT USE OF INSULIN (H): ICD-10-CM

## 2023-10-23 DIAGNOSIS — R10.30 LOWER ABDOMINAL PAIN: ICD-10-CM

## 2023-10-23 DIAGNOSIS — C25.0 MALIGNANT NEOPLASM OF HEAD OF PANCREAS (H): ICD-10-CM

## 2023-10-23 DIAGNOSIS — C25.9 PANCREATIC ADENOCARCINOMA (H): ICD-10-CM

## 2023-10-23 PROCEDURE — 99215 OFFICE O/P EST HI 40 MIN: CPT | Mod: VID | Performed by: FAMILY MEDICINE

## 2023-10-23 RX ORDER — BUPRENORPHINE 10 UG/H
1 PATCH TRANSDERMAL
Qty: 4 PATCH | Refills: 3 | Status: SHIPPED | OUTPATIENT
Start: 2023-11-06 | End: 2023-11-17

## 2023-10-23 RX ORDER — BUPRENORPHINE 20 UG/H
1 PATCH TRANSDERMAL WEEKLY
Qty: 4 PATCH | Refills: 0 | Status: SHIPPED | OUTPATIENT
Start: 2023-10-23 | End: 2023-11-27

## 2023-10-23 ASSESSMENT — PAIN SCALES - GENERAL: PAINLEVEL: NO PAIN (0)

## 2023-10-23 NOTE — NURSING NOTE
Is the patient currently in the state of [place]? YES    Visit mode:VIDEO    If the visit is dropped, the patient can be reconnected by: VIDEO VISIT: Text to cell phone:   Telephone Information:   Mobile [phone]       Will anyone else be joining the visit? NO  (If patient encounters technical issues they should call [phone])    How would you like to obtain your AVS? MyChart    Are changes needed to the allergy or medication list? No    10/20/23  Blood pressure 123/78  Weight 166 lbs    Reason for visit: LO RAMOSF

## 2023-10-23 NOTE — PATIENT INSTRUCTIONS
It was good to see you today, Gallo.  I'm glad I got to see Violet. Congrats on her sabbatical.    Here are the things we talked about:  I refilled the 20 mcg patches and acetaminophen liquid.  The 10 mcg patches will be available in 2 weeks.    Someone from the team will reach out to schedule a follow up appointment in 2 months..     How to get a hold of us:  For non-urgent matters, MyChart works best.    For more urgent matters, or if you prefer not to use MyChart, call our clinic nurse coordinator Megan Funez RN at [phone]    We have an on-call number for evenings and weekends. Please call this only if you are having uncontrolled symptoms or serious side effects from your medicines: [phone].     For refills, please give us a week (5 working days) notice. We don't always have providers available everyday to do refills. If you call the day you run out of your medicine, we may not be able to refill it in time, so call 5 days in advance!    Jeremy Hurt MD MS FAAFP CAQHPM  [facility]  Office [phone]  Fax [phone]

## 2023-10-23 NOTE — PROGRESS NOTES
Virtual Visit Details    Type of service:  Video Visit     Originating Location (pt. Location): Home    Distant Location (provider location):  On-site  Platform used for Video Visit: [facility]    Palliative Care Outpatient Clinic Progress Note    Patient Name: Gallo Navarro  Primary Provider: Akil Hernandez    Impressions:  ECO  Decision Making Capacity:  VERY PRESENT  PDMP review:  yes, no concerns     Locally extensive non-resectable pancreatic cancer  Cancer associated pain  Lymphedema in BLE     GOALS OF CARE:  Life prolonging without limits at this time.     Recommendations & Counseling:  CONTINUE buprenorphine 30 mcg/hour patch (across 2 patches) and replace weekly  CONTINUE gabapentin one 300 capsule three times a day  Continue dilaudid 4 mg po q 3 hours prn--this is usually three times a day  Tylenol suspension 650 mg po TID  Narcan also prescribed for safety  Follow up by video in 6 weeks, sooner prn.     Link provided in AVS to ACP documents.     Counseling: All of the above was explained to the patient in lay language. The patient has verbalized a clear understanding of the discussion, asked appropriate questions, which have been answered to patient's apparent satisfaction. The patient is in agreement with the above plan.        Chief Complaint/Patient ID: Gallo Navarro 57 year old male with PMHx of unresectable localized pancreatic cancer    Last Palliative care appointment: 2023 with me     Reviewed: yes, no concerns    Interim History:  Gallo Navarro is a 57 year old male who is seen today for follow up with Palliative Care via  billable video visit.  He was joined by  his wife Violet.  He is tolerating his FOLFIRINOX and has a staging CT tomorrow and follow up oncology visit next week.  He is also putting on weight and he is up to 166#.  He rec'd flu and covid vaccines.     Pain:  good control with current regimen; some neuropathy when hands get cold.    Appetite/Nausea: good appetite and he is  gaining weight.     Bowels: no constipation or diarrhea     Sleep: good     Mood: overall good     Coping:  he is taking things one day at a time and he enjoys being creative in managing his diet.    Family History- Reviewed in Epic.    No Known Allergies    Social History:  Pertinent changes to social history/social situation since last visit: none; he did get to [employer] for a game this fall.  Key support resources: his family and many friends  Advance Directive Status:  not completed yet.    Social History     Tobacco Use    Smoking status: Never     Passive exposure: Never    Smokeless tobacco: Never   Vaping Use    Vaping Use: Never used   Substance Use Topics    Alcohol use: Not Currently     Comment: o/w light beer 2days a week    Drug use: Never         No Known Allergies  Current Outpatient Medications   Medication Sig Dispense Refill    acetaminophen (TYLENOL) 32 mg/mL liquid Take 20.313 mLs (650 mg) by mouth every 8 hours 1800 mL 11    B-D U/F 31G X 8 MM insulin pen needle Inject Subcutaneous 5 times daily Use 5 pen needles daily or as directed. 500 each 1    baclofen (LIORESAL) 10 MG tablet Take 0.5-1 tablets (5-10 mg) by mouth 3 times daily as needed for other (hiccups) 30 tablet 0    blood glucose (FREESTYLE TEST STRIPS) test strip by Other route as needed      buprenorphine (BUTRANS) 10 MCG/HR WK patch Place 1 patch onto the skin every 7 days Use with 20 mcg/hour patch for 30 mcg/hour total. 4 patch 3    buprenorphine (BUTRANS) 20 MCG/HR WK patch Place 1 patch onto the skin once a week 4 patch 0    Continuous Blood Gluc Sensor (FREESTYLE KAREN 2 SENSOR) MISC Change every 14 days 6 each 3    dexAMETHasone (DECADRON) 4 MG tablet Take 2 tablets (8 mg) by mouth daily Take for 2 days, starting the day after chemo. Take with food. 4 tablet 2    dicyclomine (BENTYL) 10 MG/5ML solution Take 10 mLs (20 mg) by mouth 4 times daily as needed (abdominal discomfort, best before meals) 473 mL 0    gabapentin  (NEURONTIN) 300 MG capsule Take 1 capsule (300 mg) by mouth 3 times daily 90 capsule 0    HYDROmorphone (DILAUDID) 2 MG tablet Take 1-2 tablets (2-4 mg) by mouth every 4 hours as needed for severe pain 180 tablet 0    insulin aspart (NOVOLOG PEN) 100 UNIT/ML pen Inject 1-10 Units Subcutaneous 3 times daily (with meals) 15 mL 3    insulin glargine (BASAGLAR KWIKPEN) 100 UNIT/ML pen Inject 20 Units Subcutaneous every morning for 360 days 18 mL 3    insulin  UNIT/ML injection Inject 16 Units Subcutaneous every evening Take 20 units day of chemo and two days after while on steroid 9 mL 1    Lancets (ONETOUCH DELICA PLUS [id number]) MISC       lidocaine-prilocaine (EMLA) 2.5-2.5 % external cream Use 1-2 times a week or as needed prior to port access 30 g 3    lidocaine-prilocaine (EMLA) 2.5-2.5 % external cream Use 1-2 times weekly or as needed prior to port access 30 g 3    lipase-protease-amylase (CREON 24) [id number] units CPEP per EC capsule 2-3 capsules with meals 3 times a day and 1-2 capsules with snacks max of 15 capsules a day 180 capsule 3    loperamide (IMODIUM) 2 MG capsule 2 caps at 1st sign of diarrhea & 1 cap every 2hrs until 12hrs diarrhea free. During night, 2 caps at bedtime & 2 caps every 4hrs until  capsule 3    naloxone (NARCAN) 4 MG/0.1ML nasal spray Spray 1 spray (4 mg) into one nostril alternating nostrils as needed for opioid reversal every 2-3 minutes until assistance arrives 0.2 mL 11    ondansetron (ZOFRAN ODT) 8 MG ODT tab Take 1 tablet (8 mg) by mouth every 8 hours as needed for nausea 30 tablet 3    pantoprazole (PROTONIX) 40 MG EC tablet Take 1 tablet (40 mg) by mouth 2 times daily 60 tablet 0    polyethylene glycol (MIRALAX) 17 GM/Dose powder Take 17 g by mouth daily 510 g 3    prochlorperazine (COMPAZINE) 10 MG tablet Take 1 tablet (10 mg) by mouth every 6 hours as needed for nausea or vomiting 30 tablet 2    psyllium (METAMUCIL/KONSYL) capsule 1 capsule by Per G Tube  route 2 times daily 180 capsule 3    sennosides (SENOKOT) 8.8 MG/5ML syrup 5 mLs by Per J Tube route 2 times daily 236 mL 3    simethicone (MYLICON) 125 MG chewable tablet Take 125 mg by mouth 2 times daily      sodium bicarbonate 325 MG tablet 1 tablet (325 mg) by Per J Tube route 3 times daily 90 tablet 0    vitamin D3 (CHOLECALCIFEROL) 50 mcg (2000 units) tablet Take 1 tablet (50 mcg) by mouth daily 90 tablet 1     Past Medical History:   Diagnosis Date    Acute pancreatitis 04/16/2023    Gastric outlet obstruction     Recurrent acute pancreatitis      Past Surgical History:   Procedure Laterality Date    AS OPEN TREATMENT CLAVICULAR FRACTURE INTERNAL FX      ENDOSCOPIC RETROGRADE CHOLANGIOPANCREATOGRAM N/A 7/11/2023    Procedure: ENDOSCOPIC RETROGRADE CHOLANGIOPANCREATOGRAPHY;  Surgeon: Khadar Pickett MD;  Location: [facility] OR    ENDOSCOPIC RETROGRADE CHOLANGIOPANCREATOGRAM N/A 8/17/2023    Procedure: ENDOSCOPIC RETROGRADE  with stent removal x1, balloon sweep;  Surgeon: Christos Greenberg MD;  Location: [facility] OR    ENDOSCOPIC ULTRASOUND UPPER GASTROINTESTINAL TRACT (GI) N/A 7/11/2023    Procedure: Endoscopic ultrasound upper gastrointestinal tract (GI), with biposy, GJ tube repositioning, stent placement;  Surgeon: Khadar Pickett MD;  Location: [facility] OR    ENDOSCOPIC ULTRASOUND UPPER GASTROINTESTINAL TRACT (GI) N/A 7/13/2023    Procedure: ENDOSCOPIC ULTRASOUND, ESOPHAGOSCOPY, EUS guided gastrojejunostomy;  Surgeon: Tre York MD;  Location: [facility] OR    INSERT PICC LINE  04/29/2023    INSERT PORT VASCULAR ACCESS Right 7/28/2023    Procedure: Insert port vascular access;  Surgeon: Tom العلي MD;  Location: [facility] OR    IR CHEST PORT PLACEMENT > 5 YRS OF AGE  7/28/2023    IR NG TUBE PLACEMENT REQ RAD & FLUORO  05/08/2023    JG tube    REPLACE GASTROJEJUNOSTOMY TUBE, PERCUTANEOUS N/A 8/17/2023    Procedure: possible REPLACEMENT, GASTROJEJUNOSTOMY TUBE, PERCUTANEOUS;  Surgeon: Christos Greenberg MD;   Location: [facility] OR       Physical Exam:   GENERAL APPEARANCE: healthy appearing, alert and no distress; neatly  groomed  EYES: Eyes grossly normal to inspection, PERRLA, conjunctivae and sclerae without injection or discharge, EOM intact   RESP:  no increased work of breathing; speaks in complete sentences;   MS: No musculoskeletal defects are noted  SKIN: No suspicious lesions or rashes, hydration status appears adequate with normal skin turgor   PSYCH: Alert and oriented x3; speech- coherent , normal rate and volume; able to articulate logical thoughts, able to abstract reason, no tangential thoughts, no hallucinations or delusions, mentation appears normal, Mood is euthymic. Affect is appropriate for this mood state and bright. Thought content is free of suicidal ideation, hallucinations, and delusions.  Eye contact is good during conversation.       Key Data Reviewed:  LABS: 10/18/2023- Cr 0.55, Albumin 3.3,  Hgb 9.7,      IMAGING: no recent imaging.    47 minutes spent on the date of the encounter doing chart review, history and exam, patient education & counseling, documentation and other activities as noted above.    Jeremy Hurt MD MS FAAFP CAQHPM  [facility]  Office [phone]  Fax [phone]

## 2023-10-24 ENCOUNTER — LAB (OUTPATIENT)
Dept: LAB | Facility: CLINIC | Age: 58
End: 2023-10-24
Payer: COMMERCIAL

## 2023-10-24 ENCOUNTER — ANCILLARY PROCEDURE (OUTPATIENT)
Dept: CT IMAGING | Facility: CLINIC | Age: 58
End: 2023-10-24
Payer: COMMERCIAL

## 2023-10-24 DIAGNOSIS — C25.0 MALIGNANT NEOPLASM OF HEAD OF PANCREAS (H): Primary | ICD-10-CM

## 2023-10-24 DIAGNOSIS — C25.0 MALIGNANT NEOPLASM OF HEAD OF PANCREAS (H): ICD-10-CM

## 2023-10-24 PROCEDURE — 250N000011 HC RX IP 250 OP 636: Mod: JZ

## 2023-10-24 PROCEDURE — 74177 CT ABD & PELVIS W/CONTRAST: CPT | Mod: GC | Performed by: RADIOLOGY

## 2023-10-24 PROCEDURE — 71260 CT THORAX DX C+: CPT | Mod: GC | Performed by: RADIOLOGY

## 2023-10-24 RX ORDER — IOPAMIDOL 755 MG/ML
87 INJECTION, SOLUTION INTRAVASCULAR ONCE
Status: COMPLETED | OUTPATIENT
Start: 2023-10-24 | End: 2023-10-24

## 2023-10-24 RX ORDER — HEPARIN SODIUM,PORCINE 10 UNIT/ML
5-20 VIAL (ML) INTRAVENOUS DAILY PRN
OUTPATIENT
Start: 2023-10-24

## 2023-10-24 RX ORDER — HEPARIN SODIUM (PORCINE) LOCK FLUSH IV SOLN 100 UNIT/ML 100 UNIT/ML
500 SOLUTION INTRAVENOUS ONCE
Status: COMPLETED | OUTPATIENT
Start: 2023-10-24 | End: 2023-10-24

## 2023-10-24 RX ORDER — HEPARIN SODIUM,PORCINE 10 UNIT/ML
5-20 VIAL (ML) INTRAVENOUS DAILY PRN
Status: CANCELLED | OUTPATIENT
Start: 2023-10-24

## 2023-10-24 RX ORDER — HEPARIN SODIUM (PORCINE) LOCK FLUSH IV SOLN 100 UNIT/ML 100 UNIT/ML
5 SOLUTION INTRAVENOUS
Status: DISCONTINUED | OUTPATIENT
Start: 2023-10-24 | End: 2023-10-30 | Stop reason: HOSPADM

## 2023-10-24 RX ORDER — HEPARIN SODIUM (PORCINE) LOCK FLUSH IV SOLN 100 UNIT/ML 100 UNIT/ML
5 SOLUTION INTRAVENOUS
Status: CANCELLED | OUTPATIENT
Start: 2023-10-24

## 2023-10-24 RX ADMIN — Medication 5 ML: at 10:12

## 2023-10-24 RX ADMIN — HEPARIN SODIUM (PORCINE) LOCK FLUSH IV SOLN 100 UNIT/ML 500 UNITS: 100 SOLUTION at 10:47

## 2023-10-24 RX ADMIN — IOPAMIDOL 87 ML: 755 INJECTION, SOLUTION INTRAVASCULAR at 10:27

## 2023-10-24 NOTE — NURSING NOTE
"Chief Complaint   Patient presents with    Port Draw     Port accessed by RN in lab. No labs ordered     Port accessed with 20g 3/4\" power needle by RN in lab. No labs ordered.    Lenore Vicente RN    "

## 2023-10-25 ENCOUNTER — PATIENT OUTREACH (OUTPATIENT)
Dept: ONCOLOGY | Facility: CLINIC | Age: 58
End: 2023-10-25
Payer: COMMERCIAL

## 2023-10-25 DIAGNOSIS — C25.9 PANCREATIC ADENOCARCINOMA (H): Primary | ICD-10-CM

## 2023-10-25 NOTE — TELEPHONE ENCOUNTER
Strata requisition submitted using specimen [id number] retrieved 7/11/2023 at [facility].    Order ID: [id number]

## 2023-10-26 ENCOUNTER — LAB REQUISITION (OUTPATIENT)
Dept: LAB | Facility: CLINIC | Age: 58
End: 2023-10-26
Payer: COMMERCIAL

## 2023-10-26 ENCOUNTER — PATIENT OUTREACH (OUTPATIENT)
Dept: ONCOLOGY | Facility: CLINIC | Age: 58
End: 2023-10-26
Payer: COMMERCIAL

## 2023-10-26 ENCOUNTER — NURSE TRIAGE (OUTPATIENT)
Dept: ONCOLOGY | Facility: CLINIC | Age: 58
End: 2023-10-26
Payer: COMMERCIAL

## 2023-10-26 LAB — RADIOLOGIST FLAGS: NORMAL

## 2023-10-26 NOTE — TELEPHONE ENCOUNTER
Pt called back and provided dental off number [phone] [facility], did state his last visit was for a cleaning in July 2023 and that was not completed because he became ill during the xrays portion of exam. He has not been contacted for a follow-up.     Writer called and reached mina ervin. Per machine hours on Thursday 830-5 and on Friday 730-330.

## 2023-10-26 NOTE — TELEPHONE ENCOUNTER
[facility]: Cancer Care                                                                                          LM for pt to call back regarding dental clearance for Zometa. Last time writer discussed with patient was 9/1/2023 and no follow-up from either pt or dental clinic documented.     Signature:  Efra Lynn RN

## 2023-10-26 NOTE — TELEPHONE ENCOUNTER
CT scan 10/24/23 Incidental findin. Appreciable filling defects in the right middle and lower lobe  segmental and subsegmental pulmonary arteries are indeterminate for  emboli versus flow artifacts as the current study is not optimized for  PE detection. Recommend dedicated PE study.         While on call radiologist stated she found a note in chart stating  was made aware of finding. No need to page.

## 2023-10-27 NOTE — TELEPHONE ENCOUNTER
[facility]nd [facility], pt's next infusion is Tuesday Oct 31st, asked for call back today or Monday.

## 2023-10-30 ENCOUNTER — TELEPHONE (OUTPATIENT)
Dept: ONCOLOGY | Facility: CLINIC | Age: 58
End: 2023-10-30
Payer: COMMERCIAL

## 2023-10-30 NOTE — TELEPHONE ENCOUNTER
[facility] - Medical Oncology Update/Event Note  10/30/2023    CC: Called patient re: new imaging findings    Assessment\Recommendations:  Patient's imaging demonstrates progression in mesenteric nodes, lung, and osseous lesions. Given growth, we recommend treatment alteration to Smyth/Abraxane along with Zometa for control of bony lesions. While data are weak on the addition of Zometa in PDAC, there are corollary data across multiple malignancies suggesting benefit in osseous mets and some pre-clinical data in pancreas cancer as well. We will simultaneously provide Rad Onc recommendation for treatment consideration of T10 lesion in particular given concerning location in R pedicle.    Regarding concern for vascular occlusion, patient is currently asymptomatic without evidence of large occlusion or strain. We will plan for CT-PE and AC initiation if these findings are confirmed.     Luis Haile MD, PhD   of Medicine  Division of Hematology, Oncology and Transplantation  [facility]

## 2023-10-30 NOTE — TELEPHONE ENCOUNTER
Received vm from Dr. Nelson Duong to discuss pt's dental treatment, asked writer to call office back. Called and reached mina ervin.

## 2023-10-30 NOTE — TELEPHONE ENCOUNTER
[facility] and requested information on the drug they are to approve. Stated dentist is unsure why this needs to be approved by them if a chemo drug. Informed tech the dental clearance is for Zometa, a bisphosphonate infusion given to strengthen bone and decrease calcium in the blood. Precautions include osteonecrosis of the jaw so clearance is needed to make sure pt does not need any dental work done before this infusion is given. Zometa education handout from [facility] faxed to [facility] at [phone].

## 2023-10-31 ENCOUNTER — ANCILLARY PROCEDURE (OUTPATIENT)
Dept: CT IMAGING | Facility: CLINIC | Age: 58
End: 2023-10-31
Attending: STUDENT IN AN ORGANIZED HEALTH CARE EDUCATION/TRAINING PROGRAM
Payer: COMMERCIAL

## 2023-10-31 ENCOUNTER — ONCOLOGY VISIT (OUTPATIENT)
Dept: ONCOLOGY | Facility: CLINIC | Age: 58
End: 2023-10-31
Payer: COMMERCIAL

## 2023-10-31 ENCOUNTER — TELEPHONE (OUTPATIENT)
Dept: ONCOLOGY | Facility: CLINIC | Age: 58
End: 2023-10-31

## 2023-10-31 ENCOUNTER — APPOINTMENT (OUTPATIENT)
Dept: LAB | Facility: CLINIC | Age: 58
End: 2023-10-31
Attending: STUDENT IN AN ORGANIZED HEALTH CARE EDUCATION/TRAINING PROGRAM
Payer: COMMERCIAL

## 2023-10-31 ENCOUNTER — INFUSION THERAPY VISIT (OUTPATIENT)
Dept: ONCOLOGY | Facility: CLINIC | Age: 58
End: 2023-10-31
Payer: COMMERCIAL

## 2023-10-31 VITALS
BODY MASS INDEX: 22.11 KG/M2 | OXYGEN SATURATION: 100 % | DIASTOLIC BLOOD PRESSURE: 80 MMHG | WEIGHT: 163 LBS | SYSTOLIC BLOOD PRESSURE: 122 MMHG | HEART RATE: 59 BPM | TEMPERATURE: 98 F | RESPIRATION RATE: 18 BRPM

## 2023-10-31 DIAGNOSIS — I26.94 MULTIPLE SUBSEGMENTAL PULMONARY EMBOLI WITHOUT ACUTE COR PULMONALE (H): Primary | ICD-10-CM

## 2023-10-31 DIAGNOSIS — Z51.11 ENCOUNTER FOR ANTINEOPLASTIC CHEMOTHERAPY: Primary | ICD-10-CM

## 2023-10-31 DIAGNOSIS — C25.0 MALIGNANT NEOPLASM OF HEAD OF PANCREAS (H): ICD-10-CM

## 2023-10-31 DIAGNOSIS — C25.0 MALIGNANT NEOPLASM OF HEAD OF PANCREAS (H): Primary | ICD-10-CM

## 2023-10-31 DIAGNOSIS — Z51.11 ENCOUNTER FOR ANTINEOPLASTIC CHEMOTHERAPY: ICD-10-CM

## 2023-10-31 DIAGNOSIS — C25.9 PANCREATIC ADENOCARCINOMA (H): ICD-10-CM

## 2023-10-31 DIAGNOSIS — C79.51 MALIGNANT NEOPLASM METASTATIC TO BONE (H): ICD-10-CM

## 2023-10-31 DIAGNOSIS — G89.3 CANCER ASSOCIATED PAIN: ICD-10-CM

## 2023-10-31 LAB
ALBUMIN SERPL BCG-MCNC: 3.7 G/DL (ref 3.5–5.2)
ALP SERPL-CCNC: 198 U/L (ref 40–129)
ALT SERPL W P-5'-P-CCNC: 14 U/L (ref 0–70)
ANION GAP SERPL CALCULATED.3IONS-SCNC: 9 MMOL/L (ref 7–15)
AST SERPL W P-5'-P-CCNC: 20 U/L (ref 0–45)
BASOPHILS # BLD AUTO: 0 10E3/UL (ref 0–0.2)
BASOPHILS NFR BLD AUTO: 0 %
BILIRUB SERPL-MCNC: 0.3 MG/DL
BUN SERPL-MCNC: 12.3 MG/DL (ref 6–20)
CALCIUM SERPL-MCNC: 9 MG/DL (ref 8.6–10)
CHLORIDE SERPL-SCNC: 101 MMOL/L (ref 98–107)
CREAT SERPL-MCNC: 0.5 MG/DL (ref 0.67–1.17)
DEPRECATED HCO3 PLAS-SCNC: 28 MMOL/L (ref 22–29)
EGFRCR SERPLBLD CKD-EPI 2021: >90 ML/MIN/1.73M2
EOSINOPHIL # BLD AUTO: 0.1 10E3/UL (ref 0–0.7)
EOSINOPHIL NFR BLD AUTO: 1 %
ERYTHROCYTE [DISTWIDTH] IN BLOOD BY AUTOMATED COUNT: 16.4 % (ref 10–15)
GLUCOSE SERPL-MCNC: 124 MG/DL (ref 70–99)
HCT VFR BLD AUTO: 32 % (ref 40–53)
HGB BLD-MCNC: 10 G/DL (ref 13.3–17.7)
IMM GRANULOCYTES # BLD: 0.1 10E3/UL
IMM GRANULOCYTES NFR BLD: 1 %
LYMPHOCYTES # BLD AUTO: 2.2 10E3/UL (ref 0.8–5.3)
LYMPHOCYTES NFR BLD AUTO: 15 %
MCH RBC QN AUTO: 28.5 PG (ref 26.5–33)
MCHC RBC AUTO-ENTMCNC: 31.3 G/DL (ref 31.5–36.5)
MCV RBC AUTO: 91 FL (ref 78–100)
MONOCYTES # BLD AUTO: 1.3 10E3/UL (ref 0–1.3)
MONOCYTES NFR BLD AUTO: 9 %
NEUTROPHILS # BLD AUTO: 11 10E3/UL (ref 1.6–8.3)
NEUTROPHILS NFR BLD AUTO: 74 %
NRBC # BLD AUTO: 0 10E3/UL
NRBC BLD AUTO-RTO: 0 /100
PLATELET # BLD AUTO: 193 10E3/UL (ref 150–450)
POTASSIUM SERPL-SCNC: 4.2 MMOL/L (ref 3.4–5.3)
PROT SERPL-MCNC: 6.3 G/DL (ref 6.4–8.3)
RBC # BLD AUTO: 3.51 10E6/UL (ref 4.4–5.9)
SODIUM SERPL-SCNC: 138 MMOL/L (ref 135–145)
WBC # BLD AUTO: 14.7 10E3/UL (ref 4–11)

## 2023-10-31 PROCEDURE — 96417 CHEMO IV INFUS EACH ADDL SEQ: CPT

## 2023-10-31 PROCEDURE — 86301 IMMUNOASSAY TUMOR CA 19-9: CPT

## 2023-10-31 PROCEDURE — 96413 CHEMO IV INFUSION 1 HR: CPT

## 2023-10-31 PROCEDURE — 80053 COMPREHEN METABOLIC PANEL: CPT

## 2023-10-31 PROCEDURE — 99213 OFFICE O/P EST LOW 20 MIN: CPT | Mod: 25

## 2023-10-31 PROCEDURE — 71275 CT ANGIOGRAPHY CHEST: CPT | Mod: GC | Performed by: RADIOLOGY

## 2023-10-31 PROCEDURE — 99214 OFFICE O/P EST MOD 30 MIN: CPT

## 2023-10-31 PROCEDURE — 258N000003 HC RX IP 258 OP 636: Performed by: STUDENT IN AN ORGANIZED HEALTH CARE EDUCATION/TRAINING PROGRAM

## 2023-10-31 PROCEDURE — 85025 COMPLETE CBC W/AUTO DIFF WBC: CPT

## 2023-10-31 PROCEDURE — 96375 TX/PRO/DX INJ NEW DRUG ADDON: CPT

## 2023-10-31 PROCEDURE — 36415 COLL VENOUS BLD VENIPUNCTURE: CPT

## 2023-10-31 PROCEDURE — 250N000011 HC RX IP 250 OP 636: Mod: JZ | Performed by: STUDENT IN AN ORGANIZED HEALTH CARE EDUCATION/TRAINING PROGRAM

## 2023-10-31 RX ORDER — MEPERIDINE HYDROCHLORIDE 25 MG/ML
25 INJECTION INTRAMUSCULAR; INTRAVENOUS; SUBCUTANEOUS EVERY 30 MIN PRN
Status: CANCELLED | OUTPATIENT
Start: 2023-10-31

## 2023-10-31 RX ORDER — EPINEPHRINE 1 MG/ML
0.3 INJECTION, SOLUTION INTRAMUSCULAR; SUBCUTANEOUS EVERY 5 MIN PRN
Status: CANCELLED | OUTPATIENT
Start: 2023-11-14

## 2023-10-31 RX ORDER — ALBUTEROL SULFATE 0.83 MG/ML
2.5 SOLUTION RESPIRATORY (INHALATION)
Status: CANCELLED | OUTPATIENT
Start: 2023-10-31

## 2023-10-31 RX ORDER — ONDANSETRON 2 MG/ML
8 INJECTION INTRAMUSCULAR; INTRAVENOUS ONCE
Status: CANCELLED | OUTPATIENT
Start: 2023-11-14

## 2023-10-31 RX ORDER — METHYLPREDNISOLONE SODIUM SUCCINATE 125 MG/2ML
125 INJECTION, POWDER, LYOPHILIZED, FOR SOLUTION INTRAMUSCULAR; INTRAVENOUS
Status: CANCELLED
Start: 2023-10-31

## 2023-10-31 RX ORDER — HEPARIN SODIUM,PORCINE 10 UNIT/ML
5-20 VIAL (ML) INTRAVENOUS DAILY PRN
Status: CANCELLED | OUTPATIENT
Start: 2023-11-14

## 2023-10-31 RX ORDER — PACLITAXEL 100 MG/20ML
125 INJECTION, POWDER, LYOPHILIZED, FOR SUSPENSION INTRAVENOUS ONCE
Status: COMPLETED | OUTPATIENT
Start: 2023-10-31 | End: 2023-10-31

## 2023-10-31 RX ORDER — HEPARIN SODIUM,PORCINE 10 UNIT/ML
5-20 VIAL (ML) INTRAVENOUS DAILY PRN
Status: CANCELLED | OUTPATIENT
Start: 2023-10-31

## 2023-10-31 RX ORDER — EPINEPHRINE 1 MG/ML
0.3 INJECTION, SOLUTION INTRAMUSCULAR; SUBCUTANEOUS EVERY 5 MIN PRN
Status: CANCELLED | OUTPATIENT
Start: 2023-10-31

## 2023-10-31 RX ORDER — LORAZEPAM 2 MG/ML
0.5 INJECTION INTRAMUSCULAR EVERY 4 HOURS PRN
Status: CANCELLED | OUTPATIENT
Start: 2023-11-07

## 2023-10-31 RX ORDER — PACLITAXEL 100 MG/20ML
125 INJECTION, POWDER, LYOPHILIZED, FOR SUSPENSION INTRAVENOUS ONCE
Status: CANCELLED | OUTPATIENT
Start: 2023-10-31

## 2023-10-31 RX ORDER — IOPAMIDOL 755 MG/ML
58 INJECTION, SOLUTION INTRAVASCULAR ONCE
Status: COMPLETED | OUTPATIENT
Start: 2023-10-31 | End: 2023-10-31

## 2023-10-31 RX ORDER — PACLITAXEL 100 MG/20ML
125 INJECTION, POWDER, LYOPHILIZED, FOR SUSPENSION INTRAVENOUS ONCE
Status: CANCELLED | OUTPATIENT
Start: 2023-11-07

## 2023-10-31 RX ORDER — HEPARIN SODIUM (PORCINE) LOCK FLUSH IV SOLN 100 UNIT/ML 100 UNIT/ML
5 SOLUTION INTRAVENOUS
Status: CANCELLED | OUTPATIENT
Start: 2023-11-14

## 2023-10-31 RX ORDER — ONDANSETRON 2 MG/ML
8 INJECTION INTRAMUSCULAR; INTRAVENOUS ONCE
Status: COMPLETED | OUTPATIENT
Start: 2023-10-31 | End: 2023-10-31

## 2023-10-31 RX ORDER — ALBUTEROL SULFATE 90 UG/1
1-2 AEROSOL, METERED RESPIRATORY (INHALATION)
Status: CANCELLED
Start: 2023-10-31

## 2023-10-31 RX ORDER — ALBUTEROL SULFATE 0.83 MG/ML
2.5 SOLUTION RESPIRATORY (INHALATION)
Status: CANCELLED | OUTPATIENT
Start: 2023-11-07

## 2023-10-31 RX ORDER — ALBUTEROL SULFATE 90 UG/1
1-2 AEROSOL, METERED RESPIRATORY (INHALATION)
Status: CANCELLED
Start: 2023-11-07

## 2023-10-31 RX ORDER — ONDANSETRON 2 MG/ML
8 INJECTION INTRAMUSCULAR; INTRAVENOUS ONCE
Status: CANCELLED | OUTPATIENT
Start: 2023-11-07

## 2023-10-31 RX ORDER — ALBUTEROL SULFATE 0.83 MG/ML
2.5 SOLUTION RESPIRATORY (INHALATION)
Status: CANCELLED | OUTPATIENT
Start: 2023-11-14

## 2023-10-31 RX ORDER — DIPHENHYDRAMINE HYDROCHLORIDE 50 MG/ML
50 INJECTION INTRAMUSCULAR; INTRAVENOUS
Status: CANCELLED
Start: 2023-10-31

## 2023-10-31 RX ORDER — METHYLPREDNISOLONE SODIUM SUCCINATE 125 MG/2ML
125 INJECTION, POWDER, LYOPHILIZED, FOR SOLUTION INTRAMUSCULAR; INTRAVENOUS
Status: CANCELLED
Start: 2023-11-07

## 2023-10-31 RX ORDER — METHYLPREDNISOLONE SODIUM SUCCINATE 125 MG/2ML
125 INJECTION, POWDER, LYOPHILIZED, FOR SOLUTION INTRAMUSCULAR; INTRAVENOUS
Status: CANCELLED
Start: 2023-11-14

## 2023-10-31 RX ORDER — PACLITAXEL 100 MG/20ML
125 INJECTION, POWDER, LYOPHILIZED, FOR SUSPENSION INTRAVENOUS ONCE
Status: CANCELLED | OUTPATIENT
Start: 2023-11-14

## 2023-10-31 RX ORDER — ONDANSETRON 2 MG/ML
8 INJECTION INTRAMUSCULAR; INTRAVENOUS ONCE
Status: CANCELLED | OUTPATIENT
Start: 2023-10-31

## 2023-10-31 RX ORDER — LORAZEPAM 2 MG/ML
0.5 INJECTION INTRAMUSCULAR EVERY 4 HOURS PRN
Status: CANCELLED | OUTPATIENT
Start: 2023-11-14

## 2023-10-31 RX ORDER — DIPHENHYDRAMINE HYDROCHLORIDE 50 MG/ML
50 INJECTION INTRAMUSCULAR; INTRAVENOUS
Status: CANCELLED
Start: 2023-11-07

## 2023-10-31 RX ORDER — EPINEPHRINE 1 MG/ML
0.3 INJECTION, SOLUTION INTRAMUSCULAR; SUBCUTANEOUS EVERY 5 MIN PRN
Status: CANCELLED | OUTPATIENT
Start: 2023-11-07

## 2023-10-31 RX ORDER — DIPHENHYDRAMINE HYDROCHLORIDE 50 MG/ML
50 INJECTION INTRAMUSCULAR; INTRAVENOUS
Status: CANCELLED
Start: 2023-11-14

## 2023-10-31 RX ORDER — HEPARIN SODIUM,PORCINE 10 UNIT/ML
5-20 VIAL (ML) INTRAVENOUS DAILY PRN
Status: CANCELLED | OUTPATIENT
Start: 2023-11-07

## 2023-10-31 RX ORDER — HEPARIN SODIUM (PORCINE) LOCK FLUSH IV SOLN 100 UNIT/ML 100 UNIT/ML
5 SOLUTION INTRAVENOUS
Status: CANCELLED | OUTPATIENT
Start: 2023-11-07

## 2023-10-31 RX ORDER — HEPARIN SODIUM (PORCINE) LOCK FLUSH IV SOLN 100 UNIT/ML 100 UNIT/ML
5 SOLUTION INTRAVENOUS
Status: CANCELLED | OUTPATIENT
Start: 2023-10-31

## 2023-10-31 RX ORDER — LORAZEPAM 2 MG/ML
0.5 INJECTION INTRAMUSCULAR EVERY 4 HOURS PRN
Status: CANCELLED | OUTPATIENT
Start: 2023-10-31

## 2023-10-31 RX ORDER — MEPERIDINE HYDROCHLORIDE 25 MG/ML
25 INJECTION INTRAMUSCULAR; INTRAVENOUS; SUBCUTANEOUS EVERY 30 MIN PRN
Status: CANCELLED | OUTPATIENT
Start: 2023-11-14

## 2023-10-31 RX ORDER — MEPERIDINE HYDROCHLORIDE 25 MG/ML
25 INJECTION INTRAMUSCULAR; INTRAVENOUS; SUBCUTANEOUS EVERY 30 MIN PRN
Status: CANCELLED | OUTPATIENT
Start: 2023-11-07

## 2023-10-31 RX ORDER — HEPARIN SODIUM (PORCINE) LOCK FLUSH IV SOLN 100 UNIT/ML 100 UNIT/ML
5 SOLUTION INTRAVENOUS
Status: DISCONTINUED | OUTPATIENT
Start: 2023-10-31 | End: 2023-10-31 | Stop reason: HOSPADM

## 2023-10-31 RX ORDER — ALBUTEROL SULFATE 90 UG/1
1-2 AEROSOL, METERED RESPIRATORY (INHALATION)
Status: CANCELLED
Start: 2023-11-14

## 2023-10-31 RX ADMIN — GEMCITABINE 2000 MG: 38 INJECTION, SOLUTION INTRAVENOUS at 12:44

## 2023-10-31 RX ADMIN — Medication 5 ML: at 13:17

## 2023-10-31 RX ADMIN — ONDANSETRON 8 MG: 2 INJECTION INTRAMUSCULAR; INTRAVENOUS at 11:50

## 2023-10-31 RX ADMIN — SODIUM CHLORIDE 250 ML: 9 INJECTION, SOLUTION INTRAVENOUS at 11:50

## 2023-10-31 RX ADMIN — IOPAMIDOL 58 ML: 755 INJECTION, SOLUTION INTRAVASCULAR at 13:48

## 2023-10-31 RX ADMIN — PACLITAXEL 250 MG: 100 INJECTION, POWDER, LYOPHILIZED, FOR SUSPENSION INTRAVENOUS at 12:04

## 2023-10-31 ASSESSMENT — PAIN SCALES - GENERAL: PAINLEVEL: NO PAIN (0)

## 2023-10-31 NOTE — TELEPHONE ENCOUNTER
[facility] - Medical Oncology Update/Event Note  10/31/2023    CC: Called pt to confirm PE found on CT-PE    Assessment\Recommendations:  Repeat imaging confirmed earlier findings demonstrating small PE's. We will initiate Eliquis BID for definitive treatment. Given size of clots and patient's good clinical status no indication for loading dose at this time. We will plan to continue clinic follow-up for chemotherapy as scheduled. Will discuss clinical trial options when pt seen in clinic next week.     Luis Haile MD, PhD   of Medicine  Division of Hematology, Oncology and Transplantation  [facility]

## 2023-10-31 NOTE — NURSING NOTE
Chief Complaint   Patient presents with    Port Draw     Labs drawn via port accessed by RN. VS taken.     Port accessed with 20 g 3/4 inch power needle by RN, labs collected, line flushed with saline and heparin.  Vitals taken. Pt checked in for appointment(s).     Discussed need for 20 g PIV for CT scan with patient. Patient reported wanting infusion through port and getting an IV for scan later. RN agreed with patient's plan as CT scan is scheduled 8 hours after lab appointment.    Flavio De Leon RN

## 2023-10-31 NOTE — LETTER
10/31/2023         RE: Gallo Navarro  [id number] [address] [place] [id number]        Dear Colleague,    Thank you for referring your patient, Gallo Navarro, to the [facility]. Please see a copy of my visit note below.    Oncology/Hematology Visit Note  Oct 31, 2023    Reason for Visit: follow up of locally advanced, unresectable pancreatic cancer, consideration of cycle 6 FOLFIRINOX and monitoring of toxicities associated with chemotherapy.     History of Present Illness: Gallo Navarro is a 57 year old male with locally advanced, unresectable pancreatic cancer. 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 He then presented to the ED with worsening abdominal pain, increased jaundice, poor PO intake and weight loss admitted on 7/8/2023 for concern of biliary obstruction. Unfortunately, during this admission, biopsy of pancreatic mass returned positive for pancreatic adenocarcinoma on 7/12/23. He started on treatment with 5FU, irinotecan, and oxaliplatin (FOLFIRINOX) on 7/31/23. Please see previous notes for further details on the patient's history.     8/17/23 - ERCP/EGD, duodenal stents x2 placed, exchange of GJ tube    8/21-/8/25 hospitalized due to diarrhea, colitis, abdominal pain.      8/29 - Cycle 3 deferred due to neutropenia.   Neulasta added. Irinotecan dose reduced 20% due to symptoms including cramping, double vision and slurred speech during infusion.      10/24/23 CT CAP with similar to slight increase in size of peripancreatic and mesenteric  lymph nodes, enlargement of previous skeletal metastasis as well as new sclerotic metastasis to left posterior 5th rib, enlarging pulmonary nodule, and unchanged pancreatic head mass. Also unclear concerns for PE,  "scheduled for CT-PE today.     Plan to switch treatment to gemzar, Abraxane (days 1, 8, 15)     Gallo returns today for close follow up and consideration of Gemzar, Abraxane.      Interval History:    Feeling really well with no acute concerns today. Continues to expand his diet and finding more foods he tolerates. Eating well with a  good appetite. Denies nausea. Has occasional loose bowel movements but improve with fiber supplement.  Continues on 3 cartons of tube feed overnight. Has not taken insulin for at least the past 5 days. Blood sugars continue to be within appropriate range, was having hypoglycemic episodes often with insulin.    Denies fatigue, staying very active.Sleeping well. Denies pain. Taking 6 tablets of dilaudid and a couple small doses of Tylenol daily, reports partway through the day he \"generally starts to not feel well and after taking the meds, it goes away.\"       Review of Systems:  Patient denies any of the following except if noted above: fevers, chills, difficulty with energy, vision or hearing changes, chest pain, dyspnea, abdominal pain, nausea, vomiting, diarrhea, constipation, urinary concerns, headaches, numbness, tingling, issues with sleep or mood. He also denies lumps, bumps, rashes or skin lesions, bleeding or bruising issues.      Current Outpatient Medications   Medication Sig Dispense Refill    acetaminophen (TYLENOL) 32 mg/mL liquid Take 20.313 mLs (650 mg) by mouth every 8 hours 1800 mL 11    B-D U/F 31G X 8 MM insulin pen needle Inject Subcutaneous 5 times daily Use 5 pen needles daily or as directed. 500 each 1    baclofen (LIORESAL) 10 MG tablet Take 0.5-1 tablets (5-10 mg) by mouth 3 times daily as needed for other (hiccups) 30 tablet 0    blood glucose (FREESTYLE TEST STRIPS) test strip by Other route as needed      [START ON 11/6/2023] buprenorphine (BUTRANS) 10 MCG/HR WK patch Place 1 patch onto the skin every 7 days Use with 20 mcg/hour patch for 30 mcg/hour total. " 4 patch 3    buprenorphine (BUTRANS) 20 MCG/HR WK patch Place 1 patch onto the skin once a week 4 patch 0    Continuous Blood Gluc Sensor (FREESTYLE KAREN 2 SENSOR) MISC Change every 14 days 6 each 3    dexAMETHasone (DECADRON) 4 MG tablet Take 2 tablets (8 mg) by mouth daily Take for 2 days, starting the day after chemo. Take with food. 4 tablet 2    dicyclomine (BENTYL) 10 MG/5ML solution Take 10 mLs (20 mg) by mouth 4 times daily as needed (abdominal discomfort, best before meals) 473 mL 0    gabapentin (NEURONTIN) 300 MG capsule Take 1 capsule (300 mg) by mouth 3 times daily 90 capsule 0    HYDROmorphone (DILAUDID) 2 MG tablet Take 1-2 tablets (2-4 mg) by mouth every 4 hours as needed for severe pain 180 tablet 0    insulin aspart (NOVOLOG PEN) 100 UNIT/ML pen Inject 1-10 Units Subcutaneous 3 times daily (with meals) 15 mL 3    insulin glargine (BASAGLAR KWIKPEN) 100 UNIT/ML pen Inject 20 Units Subcutaneous every morning for 360 days 18 mL 3    insulin  UNIT/ML injection Inject 16 Units Subcutaneous every evening Take 20 units day of chemo and two days after while on steroid 9 mL 1    Lancets (ONETOUCH DELICA PLUS [id number]) MISC       lidocaine-prilocaine (EMLA) 2.5-2.5 % external cream Use 1-2 times a week or as needed prior to port access 30 g 3    lidocaine-prilocaine (EMLA) 2.5-2.5 % external cream Use 1-2 times weekly or as needed prior to port access 30 g 3    lipase-protease-amylase (CREON 24) 70709-10134-686645 units CPEP per EC capsule 2-3 capsules with meals 3 times a day and 1-2 capsules with snacks max of 15 capsules a day 180 capsule 3    loperamide (IMODIUM) 2 MG capsule 2 caps at 1st sign of diarrhea & 1 cap every 2hrs until 12hrs diarrhea free. During night, 2 caps at bedtime & 2 caps every 4hrs until  capsule 3    naloxone (NARCAN) 4 MG/0.1ML nasal spray Spray 1 spray (4 mg) into one nostril alternating nostrils as needed for opioid reversal every 2-3 minutes until assistance  arrives 0.2 mL 11    ondansetron (ZOFRAN ODT) 8 MG ODT tab Take 1 tablet (8 mg) by mouth every 8 hours as needed for nausea 30 tablet 3    pantoprazole (PROTONIX) 40 MG EC tablet Take 1 tablet (40 mg) by mouth 2 times daily 60 tablet 0    polyethylene glycol (MIRALAX) 17 GM/Dose powder Take 17 g by mouth daily 510 g 3    prochlorperazine (COMPAZINE) 10 MG tablet Take 1 tablet (10 mg) by mouth every 6 hours as needed for nausea or vomiting 30 tablet 2    psyllium (METAMUCIL/KONSYL) capsule 1 capsule by Per G Tube route 2 times daily 180 capsule 3    sennosides (SENOKOT) 8.8 MG/5ML syrup 5 mLs by Per J Tube route 2 times daily 236 mL 3    simethicone (MYLICON) 125 MG chewable tablet Take 125 mg by mouth 2 times daily      sodium bicarbonate 325 MG tablet 1 tablet (325 mg) by Per J Tube route 3 times daily 90 tablet 0    vitamin D3 (CHOLECALCIFEROL) 50 mcg (2000 units) tablet Take 1 tablet (50 mcg) by mouth daily 90 tablet 1     Physical Examination:  /80   Pulse 59   Temp 98  F (36.7  C) (Oral)   Resp 18   Wt 73.9 kg (163 lb)   SpO2 100%   BMI 22.11 kg/m    Wt Readings from Last 10 Encounters:   10/31/23 73.9 kg (163 lb)   10/18/23 76.3 kg (168 lb 4.8 oz)   10/04/23 74.8 kg (165 lb)   10/03/23 74.4 kg (164 lb 1.6 oz)   09/20/23 74.1 kg (163 lb 4.8 oz)   09/19/23 74.8 kg (165 lb)   09/05/23 73.9 kg (162 lb 14.4 oz)   09/05/23 73.9 kg (162 lb 14.7 oz)   08/31/23 73.9 kg (163 lb)   08/29/23 73.7 kg (162 lb 7.7 oz)   General: The patient is a pleasant male in no acute distress.  HEENT: EOMI. Sclerae are anicteric. Mucous membranes moist, no lesions   Lymph: Neck is supple with no lymphadenopathy in the cervical or supraclavicular areas.   Heart: Regular rate and rhythm.   Lungs: Clear to auscultation bilaterally.   Abdomen: Bowel sounds present, soft, nontender with no palpable hepatosplenomegaly or masses. GJ tube in place, no redness or drainage noted around insertion site.   Extremities: No edema noted to  bilateral lower extremities.     Neuro: Cranial nerves II through XII are grossly intact.  Skin: No rashes, petechiae, or bruising noted on exposed skin.     Laboratory Data:  Most Recent 3 CBC's:  Recent Labs   Lab Test 10/31/23  1008 10/18/23  0648 10/03/23  0631   WBC 14.7* 9.4 15.8*   HGB 10.0* 9.7* 9.6*   MCV 91 91 91    197 204   ANEUTAUTO 11.0* 6.4 12.5*    Most Recent 3 BMP's:  Recent Labs   Lab Test 10/31/23  1008 10/18/23  0648 10/03/23  0631    140 138   POTASSIUM 4.2 4.1 3.7   CHLORIDE 101 104 102   CO2 28 28 26   BUN 12.3 11.8 12.7   CR 0.50* 0.55* 0.51*   ANIONGAP 9 8 10   DENISE 9.0 8.9 8.7   * 110* 107*   PROTTOTAL 6.3* 5.8* 6.1*   ALBUMIN 3.7 3.3* 3.5    Most Recent 2 LFT's:  Recent Labs   Lab Test 10/31/23  1008 10/18/23  0648   AST 20 17   ALT 14 16   ALKPHOS 198* 169*   BILITOTAL 0.3 0.2   I reviewed the above labs today.        Assessment and Plan:  Locally advanced, unresectable pancreatic cancer. Patient started on treatment with palliative FOLFIRINOX on 7/31/23. He had a moderate amount of side effects following cycle 1 with dehydration, diarrhea, and nausea. Received cycle 2 on 8/15/23. ERCP/EGD on 8/17 with GJ tube exchange and duodenal stents placed. Hospitalized 8/21-8/25 due to colitis and abdominal pain. Cycle 3 was deferred due to neutropenia, Neulasta/Udenyca added on day 4.  - CT CAP 10/24 with progression in skeletal mets, lung nodule and lymph nodes. Stable pancreatic mass. Plan to change treatment to gemcitabine, Abraxane (day 1, 8, 15) and Zometa.    -Proceed with cycle 1 today. Discussed gemcitabine, Abraxane and Zometa including schedule and potential side effects and management.   -RNCC has been in contact with [facility] regarding dental clearance for Zometa, unable to confirm today if ok to proceed or if Gallo needs urgent dental work. Will follow up with dentist and plan to proceed with Zometa when he returns for chemo next week.   -Return to clinic for  day 8 and 15. Follow up with labs and SUZETTE prior to cycle 2.   -Follow up with Dr. Haile scheduled for 11/6 (rescheduled from 10/30).     Nutrition.  Continues to increased oral intake and expand diet without issues. Decreased to 3 cans Peptomin tube feeding overnight through J tube. Weight decreased a couple of pounds but overall stable. Denies nausea or vomiting. Continue to push oral hydration, drinking 64+ oz of fluid/day. Working with dietician.     Lower extremity edema. Resolved. Continue compression stockings and elevation. Continue to monitor.     Diabetes. Not taking insulin the past five days, was having hypoglycemic episodes. Blood sugars continue to run in appropriate range. Working closely with endocrine/diabetic educator on dose adjustments/management.     Nausea. No issues recently. Continue compazine as needed, confirmed he has adequate supply at home since starting new chemotherapy regimen. Has cannabis gummies but has not needed. Avoiding ondansetron due to side effect of constipation. Continue Protonix daily.      Diarrhea/constipation.  Resolved. Bowel movements more regular with increased food intake. Continue Metamucil as needed. No longer taking dicylomine.    Abdominal pain. Managed by palliative care with Butrans patch. Only needing an occasional oral Dilaudid dose.     Hiccups. Hasn't had hiccups with the past couple of doses of chemo. Previously tried Baclofen, but did not like the side effects including mild confusion. Has also tried gabapentin but did not find this helpful for hiccups.       Anemia. Normocytic. Suspect anemia of chronic disease. Hemoglobin stable. Will monitor for now.     35 minutes spent on the date of the encounter doing chart review, review of test results, interpretation of tests, patient visit, and documentation       ESVIN Canales CNP

## 2023-10-31 NOTE — PROGRESS NOTES
Oncology/Hematology Visit Note  Oct 31, 2023    Reason for Visit: follow up of locally advanced, unresectable pancreatic cancer, consideration of cycle 6 FOLFIRINOX and monitoring of toxicities associated with chemotherapy.     History of Present Illness: Gallo Navarro is a 57 year old male with locally advanced, unresectable pancreatic cancer. 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 He then presented to the ED with worsening abdominal pain, increased jaundice, poor PO intake and weight loss admitted on 7/8/2023 for concern of biliary obstruction. Unfortunately, during this admission, biopsy of pancreatic mass returned positive for pancreatic adenocarcinoma on 7/12/23. He started on treatment with 5FU, irinotecan, and oxaliplatin (FOLFIRINOX) on 7/31/23. Please see previous notes for further details on the patient's history.     8/17/23 - ERCP/EGD, duodenal stents x2 placed, exchange of GJ tube    8/21-/8/25 hospitalized due to diarrhea, colitis, abdominal pain.      8/29 - Cycle 3 deferred due to neutropenia.   Neulasta added. Irinotecan dose reduced 20% due to symptoms including cramping, double vision and slurred speech during infusion.      10/24/23 CT CAP with similar to slight increase in size of peripancreatic and mesenteric  lymph nodes, enlargement of previous skeletal metastasis as well as new sclerotic metastasis to left posterior 5th rib, enlarging pulmonary nodule, and unchanged pancreatic head mass. Also unclear concerns for PE, scheduled for CT-PE today.     Plan to switch treatment to gemzar, Abraxane (days 1, 8, 15)     Gallo returns today for close follow up and consideration of Gemzar, Abraxane.      Interval History:    Feeling really well with no acute concerns today.  "Continues to expand his diet and finding more foods he tolerates. Eating well with a  good appetite. Denies nausea. Has occasional loose bowel movements but improve with fiber supplement.  Continues on 3 cartons of tube feed overnight. Has not taken insulin for at least the past 5 days. Blood sugars continue to be within appropriate range, was having hypoglycemic episodes often with insulin.    Denies fatigue, staying very active.Sleeping well. Denies pain. Taking 6 tablets of dilaudid and a couple small doses of Tylenol daily, reports partway through the day he \"generally starts to not feel well and after taking the meds, it goes away.\"       Review of Systems:  Patient denies any of the following except if noted above: fevers, chills, difficulty with energy, vision or hearing changes, chest pain, dyspnea, abdominal pain, nausea, vomiting, diarrhea, constipation, urinary concerns, headaches, numbness, tingling, issues with sleep or mood. He also denies lumps, bumps, rashes or skin lesions, bleeding or bruising issues.      Current Outpatient Medications   Medication Sig Dispense Refill    acetaminophen (TYLENOL) 32 mg/mL liquid Take 20.313 mLs (650 mg) by mouth every 8 hours 1800 mL 11    B-D U/F 31G X 8 MM insulin pen needle Inject Subcutaneous 5 times daily Use 5 pen needles daily or as directed. 500 each 1    baclofen (LIORESAL) 10 MG tablet Take 0.5-1 tablets (5-10 mg) by mouth 3 times daily as needed for other (hiccups) 30 tablet 0    blood glucose (FREESTYLE TEST STRIPS) test strip by Other route as needed      [START ON 11/6/2023] buprenorphine (BUTRANS) 10 MCG/HR WK patch Place 1 patch onto the skin every 7 days Use with 20 mcg/hour patch for 30 mcg/hour total. 4 patch 3    buprenorphine (BUTRANS) 20 MCG/HR WK patch Place 1 patch onto the skin once a week 4 patch 0    Continuous Blood Gluc Sensor (FREESTYLE KAREN 2 SENSOR) MISC Change every 14 days 6 each 3    dexAMETHasone (DECADRON) 4 MG tablet Take 2 " tablets (8 mg) by mouth daily Take for 2 days, starting the day after chemo. Take with food. 4 tablet 2    dicyclomine (BENTYL) 10 MG/5ML solution Take 10 mLs (20 mg) by mouth 4 times daily as needed (abdominal discomfort, best before meals) 473 mL 0    gabapentin (NEURONTIN) 300 MG capsule Take 1 capsule (300 mg) by mouth 3 times daily 90 capsule 0    HYDROmorphone (DILAUDID) 2 MG tablet Take 1-2 tablets (2-4 mg) by mouth every 4 hours as needed for severe pain 180 tablet 0    insulin aspart (NOVOLOG PEN) 100 UNIT/ML pen Inject 1-10 Units Subcutaneous 3 times daily (with meals) 15 mL 3    insulin glargine (BASAGLAR KWIKPEN) 100 UNIT/ML pen Inject 20 Units Subcutaneous every morning for 360 days 18 mL 3    insulin  UNIT/ML injection Inject 16 Units Subcutaneous every evening Take 20 units day of chemo and two days after while on steroid 9 mL 1    Lancets (ONETOUCH DELICA PLUS [id number]) MISC       lidocaine-prilocaine (EMLA) 2.5-2.5 % external cream Use 1-2 times a week or as needed prior to port access 30 g 3    lidocaine-prilocaine (EMLA) 2.5-2.5 % external cream Use 1-2 times weekly or as needed prior to port access 30 g 3    lipase-protease-amylase (CREON 24) 91583-24114-085441 units CPEP per EC capsule 2-3 capsules with meals 3 times a day and 1-2 capsules with snacks max of 15 capsules a day 180 capsule 3    loperamide (IMODIUM) 2 MG capsule 2 caps at 1st sign of diarrhea & 1 cap every 2hrs until 12hrs diarrhea free. During night, 2 caps at bedtime & 2 caps every 4hrs until  capsule 3    naloxone (NARCAN) 4 MG/0.1ML nasal spray Spray 1 spray (4 mg) into one nostril alternating nostrils as needed for opioid reversal every 2-3 minutes until assistance arrives 0.2 mL 11    ondansetron (ZOFRAN ODT) 8 MG ODT tab Take 1 tablet (8 mg) by mouth every 8 hours as needed for nausea 30 tablet 3    pantoprazole (PROTONIX) 40 MG EC tablet Take 1 tablet (40 mg) by mouth 2 times daily 60 tablet 0    polyethylene  glycol (MIRALAX) 17 GM/Dose powder Take 17 g by mouth daily 510 g 3    prochlorperazine (COMPAZINE) 10 MG tablet Take 1 tablet (10 mg) by mouth every 6 hours as needed for nausea or vomiting 30 tablet 2    psyllium (METAMUCIL/KONSYL) capsule 1 capsule by Per G Tube route 2 times daily 180 capsule 3    sennosides (SENOKOT) 8.8 MG/5ML syrup 5 mLs by Per J Tube route 2 times daily 236 mL 3    simethicone (MYLICON) 125 MG chewable tablet Take 125 mg by mouth 2 times daily      sodium bicarbonate 325 MG tablet 1 tablet (325 mg) by Per J Tube route 3 times daily 90 tablet 0    vitamin D3 (CHOLECALCIFEROL) 50 mcg (2000 units) tablet Take 1 tablet (50 mcg) by mouth daily 90 tablet 1     Physical Examination:  /80   Pulse 59   Temp 98  F (36.7  C) (Oral)   Resp 18   Wt 73.9 kg (163 lb)   SpO2 100%   BMI 22.11 kg/m    Wt Readings from Last 10 Encounters:   10/31/23 73.9 kg (163 lb)   10/18/23 76.3 kg (168 lb 4.8 oz)   10/04/23 74.8 kg (165 lb)   10/03/23 74.4 kg (164 lb 1.6 oz)   09/20/23 74.1 kg (163 lb 4.8 oz)   09/19/23 74.8 kg (165 lb)   09/05/23 73.9 kg (162 lb 14.4 oz)   09/05/23 73.9 kg (162 lb 14.7 oz)   08/31/23 73.9 kg (163 lb)   08/29/23 73.7 kg (162 lb 7.7 oz)   General: The patient is a pleasant male in no acute distress.  HEENT: EOMI. Sclerae are anicteric. Mucous membranes moist, no lesions   Lymph: Neck is supple with no lymphadenopathy in the cervical or supraclavicular areas.   Heart: Regular rate and rhythm.   Lungs: Clear to auscultation bilaterally.   Abdomen: Bowel sounds present, soft, nontender with no palpable hepatosplenomegaly or masses. GJ tube in place, no redness or drainage noted around insertion site.   Extremities: No edema noted to bilateral lower extremities.     Neuro: Cranial nerves II through XII are grossly intact.  Skin: No rashes, petechiae, or bruising noted on exposed skin.     Laboratory Data:  Most Recent 3 CBC's:  Recent Labs   Lab Test 10/31/23  1008 10/18/23  0652  10/03/23  0631   WBC 14.7* 9.4 15.8*   HGB 10.0* 9.7* 9.6*   MCV 91 91 91    197 204   ANEUTAUTO 11.0* 6.4 12.5*    Most Recent 3 BMP's:  Recent Labs   Lab Test 10/31/23  1008 10/18/23  0648 10/03/23  0631    140 138   POTASSIUM 4.2 4.1 3.7   CHLORIDE 101 104 102   CO2 28 28 26   BUN 12.3 11.8 12.7   CR 0.50* 0.55* 0.51*   ANIONGAP 9 8 10   DENISE 9.0 8.9 8.7   * 110* 107*   PROTTOTAL 6.3* 5.8* 6.1*   ALBUMIN 3.7 3.3* 3.5    Most Recent 2 LFT's:  Recent Labs   Lab Test 10/31/23  1008 10/18/23  0648   AST 20 17   ALT 14 16   ALKPHOS 198* 169*   BILITOTAL 0.3 0.2   I reviewed the above labs today.        Assessment and Plan:  Locally advanced, unresectable pancreatic cancer. Patient started on treatment with palliative FOLFIRINOX on 7/31/23. He had a moderate amount of side effects following cycle 1 with dehydration, diarrhea, and nausea. Received cycle 2 on 8/15/23. ERCP/EGD on 8/17 with GJ tube exchange and duodenal stents placed. Hospitalized 8/21-8/25 due to colitis and abdominal pain. Cycle 3 was deferred due to neutropenia, Neulasta/Udenyca added on day 4.  - CT CAP 10/24 with progression in skeletal mets, lung nodule and lymph nodes. Stable pancreatic mass. Plan to change treatment to gemcitabine, Abraxane (day 1, 8, 15) and Zometa.    -Proceed with cycle 1 today. Discussed gemcitabine, Abraxane and Zometa including schedule and potential side effects and management.   -[facility] in [facility] regarding dental clearance for Zometa, unable to confirm today if ok to proceed or if Gallo needs urgent dental work. Will follow up with dentist and plan to proceed with Zometa when he returns for chemo next week.   -Return to clinic for day 8 and 15. Follow up with labs and SUZETTE prior to cycle 2.   -Follow up with Dr. Haile scheduled for 11/6 (rescheduled from 10/30).     Nutrition.  Continues to increased oral intake and expand diet without issues. Decreased to 3 cans Peptomin tube  feeding overnight through J tube. Weight decreased a couple of pounds but overall stable. Denies nausea or vomiting. Continue to push oral hydration, drinking 64+ oz of fluid/day. Working with dietician.     Lower extremity edema. Resolved. Continue compression stockings and elevation. Continue to monitor.     Diabetes. Not taking insulin the past five days, was having hypoglycemic episodes. Blood sugars continue to run in appropriate range. Working closely with endocrine/diabetic educator on dose adjustments/management.     Nausea. No issues recently. Continue compazine as needed, confirmed he has adequate supply at home since starting new chemotherapy regimen. Has cannabis gummies but has not needed. Avoiding ondansetron due to side effect of constipation. Continue Protonix daily.      Diarrhea/constipation.  Resolved. Bowel movements more regular with increased food intake. Continue Metamucil as needed. No longer taking dicylomine.    Abdominal pain. Managed by palliative care with Butrans patch. Only needing an occasional oral Dilaudid dose.     Hiccups. Hasn't had hiccups with the past couple of doses of chemo. Previously tried Baclofen, but did not like the side effects including mild confusion. Has also tried gabapentin but did not find this helpful for hiccups.       Anemia. Normocytic. Suspect anemia of chronic disease. Hemoglobin stable. Will monitor for now.     35 minutes spent on the date of the encounter doing chart review, review of test results, interpretation of tests, patient visit, and documentation       ESVIN Canales CNP

## 2023-10-31 NOTE — DISCHARGE INSTRUCTIONS

## 2023-10-31 NOTE — PROGRESS NOTES
Infusion Nursing Note:  Gallo Navarro presents today for Cycle 1 Day 1 abraxane, gemcitabine.    Patient seen by provider today: Yes: Megan Farrell, JESSIKA   present during visit today: Not Applicable.    Note: Gallo presents today feeling overall well. Denies pain or nausea/vomiting. Offers no concerns since visit with Megan Farrell prior to infusion. Received chemotherapy teaching previously, reviewed during infusion. Education handouts provided.      Intravenous Access:  Peripheral IV placed by vascular access with 20G for CT scan following infusion.  Implanted Port.    Treatment Conditions:     Latest Reference Range & Units 10/31/23 10:08   Sodium 135 - 145 mmol/L 138   Potassium 3.4 - 5.3 mmol/L 4.2   Chloride 98 - 107 mmol/L 101   Carbon Dioxide (CO2) 22 - 29 mmol/L 28   Urea Nitrogen 6.0 - 20.0 mg/dL 12.3   Creatinine 0.67 - 1.17 mg/dL 0.50 (L)   GFR Estimate >60 mL/min/1.73m2 >90   Calcium 8.6 - 10.0 mg/dL 9.0   Anion Gap 7 - 15 mmol/L 9   Albumin 3.5 - 5.2 g/dL 3.7   Protein Total 6.4 - 8.3 g/dL 6.3 (L)   Alkaline Phosphatase 40 - 129 U/L 198 (H)   ALT 0 - 70 U/L 14   AST 0 - 45 U/L 20   Bilirubin Total <=1.2 mg/dL 0.3   Glucose 70 - 99 mg/dL 124 (H)   WBC 4.0 - 11.0 10e3/uL 14.7 (H)   Hemoglobin 13.3 - 17.7 g/dL 10.0 (L)   Hematocrit 40.0 - 53.0 % 32.0 (L)   Platelet Count 150 - 450 10e3/uL 193   RBC Count 4.40 - 5.90 10e6/uL 3.51 (L)   MCV 78 - 100 fL 91   MCH 26.5 - 33.0 pg 28.5   MCHC 31.5 - 36.5 g/dL 31.3 (L)   RDW 10.0 - 15.0 % 16.4 (H)   % Neutrophils % 74   % Lymphocytes % 15   % Monocytes % 9   % Eosinophils % 1   % Basophils % 0   Absolute Basophils 0.0 - 0.2 10e3/uL 0.0   Absolute Eosinophils 0.0 - 0.7 10e3/uL 0.1   Absolute Immature Granulocytes <=0.4 10e3/uL 0.1   Absolute Lymphocytes 0.8 - 5.3 10e3/uL 2.2   Absolute Monocytes 0.0 - 1.3 10e3/uL 1.3   % Immature Granulocytes % 1   Absolute Neutrophils 1.6 - 8.3 10e3/uL 11.0 (H)   Absolute NRBCs 10e3/uL 0.0   NRBCs per 100 WBC <1 /100 0      Results reviewed, labs MET treatment parameters, ok to proceed with treatment.      Post Infusion Assessment:  Patient tolerated infusion without incident.  Blood return noted pre and post infusion.  Site patent and intact, free from redness, edema or discomfort.  No evidence of extravasations.  Port access discontinued per protocol. PIV remains intact for CT scan following infusion.      Discharge Plan:   Patient declined prescription refills.  Discharge instructions reviewed with: Patient.  Patient and/or family verbalized understanding of discharge instructions and all questions answered.  Copy of AVS reviewed with patient and/or family.  Patient will return 11/07 for next infusion appointment.  Patient discharged in stable condition accompanied by: self.  Departure Mode: Ambulatory.      Katie Keane RN

## 2023-10-31 NOTE — NURSING NOTE
Oncology Rooming Note    October 31, 2023 10:19 AM   Gallo Navarro is a 57 year old male who presents for:    Chief Complaint   Patient presents with    Port Draw     Labs drawn via port accessed by RN. VS taken.    Oncology Clinic Visit     Pancreatic CA     Initial Vitals: /80   Pulse 59   Temp 98  F (36.7  C) (Oral)   Resp 18   Wt 73.9 kg (163 lb)   SpO2 100%   BMI 22.11 kg/m   Estimated body mass index is 22.11 kg/m  as calculated from the following:    Height as of 10/23/23: 1.829 m (6').    Weight as of this encounter: 73.9 kg (163 lb). Body surface area is 1.94 meters squared.  No Pain (0) Comment: Data Unavailable   No LMP for male patient.  Allergies reviewed: Yes  Medications reviewed: Yes    Medications: Medication refills not needed today.  Pharmacy name entered into EPIC:    [facility] - [place], [place] - [address] [facility] - [place], [place] - [phone]  [facility] [place] [address] [facility] AT PORTAL TO REGISTERED [facility] SITES  [facility] - [place], [place] - [address]  [facility] [id number] [address] [employer] - [place], [place] - [address]    Clinical concerns:        Carolyn Mena

## 2023-10-31 NOTE — PROGRESS NOTES
Written communication 10/31/23 [id number] Megan Farrell, MAXIME/JUMANA Easley RN: No zometa today, still confirming dental clearance, will try for next week.    Mary Easley RN

## 2023-10-31 NOTE — TELEPHONE ENCOUNTER
Dr. Thompson called back and stated pt will need scaling and root cleaning of all 4 quadrants. He was scheduled for Feb 2024 but knowing he will be getting Zometa, will have scheduling work pt in in the next couple of weeks. Office will reach out to patient. Thanked Dr. Thompson for his call.

## 2023-10-31 NOTE — PATIENT INSTRUCTIONS
[facility] Triage and after hours / weekends / holidays:  [phone]    Please call the triage or after hours line if you experience a temperature greater than or equal to 100.4, shaking chills, have uncontrolled nausea, vomiting and/or diarrhea, dizziness, shortness of breath, chest pain, bleeding, unexplained bruising, or if you have any other new/concerning symptoms, questions or concerns.      If you are having any concerning symptoms or wish to speak to a provider before your next infusion visit, please call your care coordinator or triage to notify them so we can adequately serve you.     If you need a refill on a narcotic prescription or other medication, please call before your infusion appointment.

## 2023-11-01 ENCOUNTER — MYC REFILL (OUTPATIENT)
Dept: PALLIATIVE CARE | Facility: CLINIC | Age: 58
End: 2023-11-01
Payer: COMMERCIAL

## 2023-11-01 DIAGNOSIS — C25.9 PANCREATIC ADENOCARCINOMA (H): ICD-10-CM

## 2023-11-01 LAB — CANCER AG19-9 SERPL IA-ACNC: 6 U/ML

## 2023-11-01 NOTE — TELEPHONE ENCOUNTER
Received [email or web address] message from patient requesting refill of hydromorphone.     Last refill: 10/9/23  Last office visit: 10/23/23  Scheduled for follow up 12/5/23     Will route request to NP for review.     Reviewed [place]  Report.

## 2023-11-02 ENCOUNTER — MYC REFILL (OUTPATIENT)
Dept: RADIATION ONCOLOGY | Facility: HOSPITAL | Age: 58
End: 2023-11-02
Payer: COMMERCIAL

## 2023-11-02 DIAGNOSIS — R10.30 LOWER ABDOMINAL PAIN: ICD-10-CM

## 2023-11-02 RX ORDER — HYDROMORPHONE HYDROCHLORIDE 2 MG/1
2-4 TABLET ORAL EVERY 4 HOURS PRN
Qty: 180 TABLET | Refills: 0 | Status: SHIPPED | OUTPATIENT
Start: 2023-11-02 | End: 2023-11-27

## 2023-11-02 NOTE — TELEPHONE ENCOUNTER
Received [employer] message from patient requesting refill of tylenol.     Last office visit: 10/23/23  Scheduled for follow up 12/5/23     Will route request to MD for review.

## 2023-11-06 ENCOUNTER — PATIENT OUTREACH (OUTPATIENT)
Dept: ONCOLOGY | Facility: CLINIC | Age: 58
End: 2023-11-06
Payer: COMMERCIAL

## 2023-11-06 ENCOUNTER — VIRTUAL VISIT (OUTPATIENT)
Dept: ONCOLOGY | Facility: CLINIC | Age: 58
End: 2023-11-06
Payer: COMMERCIAL

## 2023-11-06 VITALS — HEIGHT: 71 IN | BODY MASS INDEX: 22.96 KG/M2 | WEIGHT: 164 LBS

## 2023-11-06 DIAGNOSIS — I26.94 MULTIPLE SUBSEGMENTAL PULMONARY EMBOLI WITHOUT ACUTE COR PULMONALE (H): ICD-10-CM

## 2023-11-06 DIAGNOSIS — I26.94 MULTIPLE SUBSEGMENTAL PULMONARY EMBOLI WITHOUT ACUTE COR PULMONALE (H): Primary | ICD-10-CM

## 2023-11-06 DIAGNOSIS — G89.3 CANCER ASSOCIATED PAIN: ICD-10-CM

## 2023-11-06 DIAGNOSIS — C25.0 MALIGNANT NEOPLASM OF HEAD OF PANCREAS (H): ICD-10-CM

## 2023-11-06 DIAGNOSIS — M79.604 PAIN OF RIGHT LOWER EXTREMITY: Primary | ICD-10-CM

## 2023-11-06 DIAGNOSIS — C79.51 MALIGNANT NEOPLASM METASTATIC TO BONE (H): ICD-10-CM

## 2023-11-06 PROCEDURE — 99215 OFFICE O/P EST HI 40 MIN: CPT | Mod: VID | Performed by: STUDENT IN AN ORGANIZED HEALTH CARE EDUCATION/TRAINING PROGRAM

## 2023-11-06 ASSESSMENT — PAIN SCALES - GENERAL: PAINLEVEL: MODERATE PAIN (4)

## 2023-11-06 NOTE — LETTER
2023         RE: Gallo Navarro  [address] [address] [place] [id number]        Dear Colleague,    Thank you for referring your patient, Gallo Navarro, to the [facility]. Please see a copy of my visit note below.    [facility] - Medical Oncology TeleHealth Consult Note  2023    Patient Identifiers     Name: Gallo Navarro  Preferred Address: [place]  Preferred Language: English  : 1965  Gender: male    Assessment and Plan     Mr. Gallo Navarro is a 57 year old male with a history of IDDM and metastatic pancreas cancer progressing on first-line FOLFIRINOX  who returns to clinic for treatment counseling.    NGS: KRAS [id number]  Immuno: pMMR, KAYLIE, TMB-low  Clinical Trial: MARIPOSA-186, MARIPOSA-280, TORL    Mr. Navarro presents for treatment response evaluation for metastatic pancreas cancer on first-line FOLFIRINOX chemotherapy. Pt's imaging unfortunately shows diffuse progression, with skeletal metastatic progression most concerning. Thus, we need to modify systemic therapy while also pursuing skeletal metastatic-specific treatment along with locoregional therapies. We will transition to second line [place]/Abraxane, which should be reasonably active in patient's disease. We reviewed the risks/benefits of treatment and he consents to treatment alteration.    We also extensively reviewed both local and distant clinical trial opportunities. We have multiple local trials for which he may be a good candidate. We will place him on the waitlist for MARIPOSA-186 and MARIPOSA-280, trials of a bispecific antibodies targeting CD3 and EGFR, and B7H3 respectively. We will also place him on the waitlist for TORL, a claudin6 targeted ADC. Externally, we will review KRAS-targeted cell therapy trials which may present an opportunity for prolonged PFS. There may be opportunities at major cell therapy centers across the country. Patient has contacts in [place], so we will recommend [facility].     Of note,  patient's right leg pain does not appear to have a clear imaging correlate on current scans. We will perform dedicated right lower extremity imaging in order to determine whether a lesion is present. If so, we will request Rad Onc palliation.    Plan to begin [place]/Abraxane infusion ASAP. Clinical monitoring ever 3 weeks with SUZETTE visits while on treatment. Plan for MD visit in 3mo w/ imaging prior.    45 minutes spent on the date of the encounter doing chart review, review of test results, interpretation of tests, patient visit, and documentation       Luis Haile MD, PhD   of Medicine  Division of Hematology, Oncology and Transplantation  [facility]    -----------------------------------    Oncology Summary     Cancer Staging   Malignant neoplasm of head of pancreas (H)  Staging form: Pancreas, AJCC 8th Edition  - Clinical stage from 7/11/2023: Stage III (cT2, cN2, cM0) - Signed by Luis Haile MD on 7/14/2023      Oncology History   Malignant neoplasm of head of pancreas (H)   7/11/2023 -  Cancer Staged    Staging form: Pancreas, AJCC 8th Edition  - Clinical stage from 7/11/2023: Stage III (cT2, cN2, cM0)     7/14/2023 Initial Diagnosis    Malignant neoplasm of head of pancreas (H)     7/31/2023 - 10/21/2023 Chemotherapy    OP ONC Pancreatic Cancer - Modified FOLFIRINOX  Plan Provider: Luis Haile MD  Treatment goal: Curative  Line of treatment: [No plan line of treatment]     10/31/2023 -  Chemotherapy    OP ONC Pancreatic Cancer - PACLitaxel protein-bound (Abraxane) / Gemcitabine  Plan Provider: Luis Haile MD  Treatment goal: Curative  Line of treatment: Second Line         Subjective/Interval Events     - pt is generally feeling well, without notable back pain; his R leg pain is stable from prior    Review of Systems: 14 point ROS negative other than the symptoms noted above in the HPI.      Objective Data     Lab data:  I have personally reviewed the lab data, notable  for:    - Hgb 8.6  - Alk Phos 198  - Plt 129    Radiology data:  I have personally reviewed the radiology data, notable for:  10/24/2023 CT C/A/P  IMPRESSION:      1. No substantial change in appearance of ill-marginated infiltrative  pancreatic head mass, extending into the mesenteric root with  encasement of the SMA, SMV, gastroduodenal artery and broad area of  contact with the splenoportal junction, splenic vein and central  portal vein.  2. Similar to slight increase in size of peripancreatic and mesenteric  lymph nodes, likely regional pablo metastasis.  3. Interval enlargement of several scattered sclerotic metastases in  the axial and proximal appendicular skeleton. New sclerotic metastasis  in left posterior 5th rib.   4. Enlarging subpleural 1.2 cm irregular pulmonary nodule in the  paramediastinal left upper lobe, concerning for metastasis.  5. Gastrojejunal axios stent, gastrojejunostomy tube, gastroduodenal  stent, and gastrojejunostomy metallic lumen opposing stent; no  significant gastric distention.  6. Appreciable filling defects in the right middle and lower lobe  segmental and subsegmental pulmonary arteries are indeterminate for  emboli versus flow artifacts as the current study is not optimized for  PE detection. Recommend dedicated PE study.     Pathology and other data:  I have personally reviewed and interpreted the pathology data, notable for:    - no new data    Billing Stuff     Virtual Visit Details    Type of service:  Video Visit     Originating Location (pt. Location): Home    Distant Location (provider location):  On-site  Platform used for Video Visit: Lorna

## 2023-11-06 NOTE — PROGRESS NOTES
[facility] - Medical Oncology TeleHealth Consult Note  2023    Patient Identifiers     Name: Gallo Navarro  Preferred Address: [place]  Preferred Language: English  : 1965  Gender: male    Assessment and Plan     Mr. Gallo Navarro is a 57 year old male with a history of IDDM and metastatic pancreas cancer progressing on first-line FOLFIRINOX  who returns to clinic for treatment counseling.    NGS: KRAS [id number]  Immuno: pMMR, KAYLIE, TMB-low  Clinical Trial: MARIPOSA-186, MARIPOSA-280, TORL    Mr. Navarro presents for treatment response evaluation for metastatic pancreas cancer on first-line FOLFIRINOX chemotherapy. Pt's imaging unfortunately shows diffuse progression, with skeletal metastatic progression most concerning. Thus, we need to modify systemic therapy while also pursuing skeletal metastatic-specific treatment along with locoregional therapies. We will transition to second line Bland/Abraxane, which should be reasonably active in patient's disease. We reviewed the risks/benefits of treatment and he consents to treatment alteration.    We also extensively reviewed both local and distant clinical trial opportunities. We have multiple local trials for which he may be a good candidate. We will place him on the waitlist for MARIPOSA-186 and MARIPOSA-280, trials of a bispecific antibodies targeting CD3 and EGFR, and B7H3 respectively. We will also place him on the waitlist for TORL, a claudin6 targeted ADC. Externally, we will review KRAS-targeted cell therapy trials which may present an opportunity for prolonged PFS. There may be opportunities at major cell therapy centers across the country. Patient has contacts in [place], so we will recommend [facility].     Of note, patient's right leg pain does not appear to have a clear imaging correlate on current scans. We will perform dedicated right lower extremity imaging in order to determine whether a lesion is present. If so, we will request Rad Onc palliation.    Plan to begin  Owsley/Abraxane infusion ASAP. Clinical monitoring ever 3 weeks with SUZETTE visits while on treatment. Plan for MD visit in 3mo w/ imaging prior.    45 minutes spent on the date of the encounter doing chart review, review of test results, interpretation of tests, patient visit, and documentation       Luis Haile MD, PhD   of Medicine  Division of Hematology, Oncology and Transplantation  [facility]    -----------------------------------    Oncology Summary     Cancer Staging   Malignant neoplasm of head of pancreas (H)  Staging form: Pancreas, AJCC 8th Edition  - Clinical stage from 7/11/2023: Stage III (cT2, cN2, cM0) - Signed by Luis Haile MD on 7/14/2023      Oncology History   Malignant neoplasm of head of pancreas (H)   7/11/2023 -  Cancer Staged    Staging form: Pancreas, AJCC 8th Edition  - Clinical stage from 7/11/2023: Stage III (cT2, cN2, cM0)     7/14/2023 Initial Diagnosis    Malignant neoplasm of head of pancreas (H)     7/31/2023 - 10/21/2023 Chemotherapy    OP ONC Pancreatic Cancer - Modified FOLFIRINOX  Plan Provider: Luis Haile MD  Treatment goal: Curative  Line of treatment: [No plan line of treatment]     10/31/2023 -  Chemotherapy    OP ONC Pancreatic Cancer - PACLitaxel protein-bound (Abraxane) / Gemcitabine  Plan Provider: Luis Haile MD  Treatment goal: Curative  Line of treatment: Second Line         Subjective/Interval Events     - pt is generally feeling well, without notable back pain; his R leg pain is stable from prior    Review of Systems: 14 point ROS negative other than the symptoms noted above in the HPI.      Objective Data     Lab data:  I have personally reviewed the lab data, notable for:    - Hgb 8.6  - Alk Phos 198  - Plt 129    Radiology data:  I have personally reviewed the radiology data, notable for:  10/24/2023 CT C/A/P  IMPRESSION:      1. No substantial change in appearance of ill-marginated infiltrative  pancreatic head mass,  extending into the mesenteric root with  encasement of the SMA, SMV, gastroduodenal artery and broad area of  contact with the splenoportal junction, splenic vein and central  portal vein.  2. Similar to slight increase in size of peripancreatic and mesenteric  lymph nodes, likely regional pablo metastasis.  3. Interval enlargement of several scattered sclerotic metastases in  the axial and proximal appendicular skeleton. New sclerotic metastasis  in left posterior 5th rib.   4. Enlarging subpleural 1.2 cm irregular pulmonary nodule in the  paramediastinal left upper lobe, concerning for metastasis.  5. Gastrojejunal axios stent, gastrojejunostomy tube, gastroduodenal  stent, and gastrojejunostomy metallic lumen opposing stent; no  significant gastric distention.  6. Appreciable filling defects in the right middle and lower lobe  segmental and subsegmental pulmonary arteries are indeterminate for  emboli versus flow artifacts as the current study is not optimized for  PE detection. Recommend dedicated PE study.     Pathology and other data:  I have personally reviewed and interpreted the pathology data, notable for:    - no new data    Billing Stuff     Virtual Visit Details    Type of service:  Video Visit     Originating Location (pt. Location): Home    Distant Location (provider location):  On-site  Platform used for Video Visit: Lorna

## 2023-11-06 NOTE — NURSING NOTE
Is the patient currently in the state of [place]? YES    Visit mode:VIDEO    If the visit is dropped, the patient can be reconnected by: VIDEO VISIT: Text to cell phone:   Telephone Information:   Mobile [phone]       Will anyone else be joining the visit? NO  (If patient encounters technical issues they should call [phone]    How would you like to obtain your AVS? MyChart    Are changes needed to the allergy or medication list? No    Reason for visit: Video Visit (Follow up )    Brittany NICOLE

## 2023-11-06 NOTE — PROGRESS NOTES
Rad Onc referral placed, in basket message to Rad Onc nursing and  pool for urgent scheduling request.

## 2023-11-07 ENCOUNTER — INFUSION THERAPY VISIT (OUTPATIENT)
Dept: ONCOLOGY | Facility: CLINIC | Age: 58
End: 2023-11-07
Attending: STUDENT IN AN ORGANIZED HEALTH CARE EDUCATION/TRAINING PROGRAM
Payer: COMMERCIAL

## 2023-11-07 ENCOUNTER — APPOINTMENT (OUTPATIENT)
Dept: LAB | Facility: CLINIC | Age: 58
End: 2023-11-07
Attending: STUDENT IN AN ORGANIZED HEALTH CARE EDUCATION/TRAINING PROGRAM
Payer: COMMERCIAL

## 2023-11-07 VITALS
DIASTOLIC BLOOD PRESSURE: 81 MMHG | BODY MASS INDEX: 23.26 KG/M2 | HEART RATE: 96 BPM | TEMPERATURE: 98 F | WEIGHT: 166.8 LBS | SYSTOLIC BLOOD PRESSURE: 126 MMHG | OXYGEN SATURATION: 98 % | RESPIRATION RATE: 18 BRPM

## 2023-11-07 DIAGNOSIS — Z51.11 ENCOUNTER FOR ANTINEOPLASTIC CHEMOTHERAPY: Primary | ICD-10-CM

## 2023-11-07 DIAGNOSIS — C79.51 MALIGNANT NEOPLASM METASTATIC TO BONE (H): ICD-10-CM

## 2023-11-07 DIAGNOSIS — C25.0 MALIGNANT NEOPLASM OF HEAD OF PANCREAS (H): ICD-10-CM

## 2023-11-07 LAB
BASOPHILS # BLD AUTO: 0 10E3/UL (ref 0–0.2)
BASOPHILS NFR BLD AUTO: 0 %
EOSINOPHIL # BLD AUTO: 0.1 10E3/UL (ref 0–0.7)
EOSINOPHIL NFR BLD AUTO: 3 %
ERYTHROCYTE [DISTWIDTH] IN BLOOD BY AUTOMATED COUNT: 15.3 % (ref 10–15)
HCT VFR BLD AUTO: 27.9 % (ref 40–53)
HGB BLD-MCNC: 8.6 G/DL (ref 13.3–17.7)
IMM GRANULOCYTES # BLD: 0 10E3/UL
IMM GRANULOCYTES NFR BLD: 0 %
LYMPHOCYTES # BLD AUTO: 1.2 10E3/UL (ref 0.8–5.3)
LYMPHOCYTES NFR BLD AUTO: 21 %
MCH RBC QN AUTO: 28 PG (ref 26.5–33)
MCHC RBC AUTO-ENTMCNC: 30.8 G/DL (ref 31.5–36.5)
MCV RBC AUTO: 91 FL (ref 78–100)
MONOCYTES # BLD AUTO: 0.5 10E3/UL (ref 0–1.3)
MONOCYTES NFR BLD AUTO: 10 %
NEUTROPHILS # BLD AUTO: 3.8 10E3/UL (ref 1.6–8.3)
NEUTROPHILS NFR BLD AUTO: 66 %
NRBC # BLD AUTO: 0 10E3/UL
NRBC BLD AUTO-RTO: 0 /100
PLATELET # BLD AUTO: 129 10E3/UL (ref 150–450)
RBC # BLD AUTO: 3.07 10E6/UL (ref 4.4–5.9)
WBC # BLD AUTO: 5.7 10E3/UL (ref 4–11)

## 2023-11-07 PROCEDURE — 258N000003 HC RX IP 258 OP 636: Performed by: STUDENT IN AN ORGANIZED HEALTH CARE EDUCATION/TRAINING PROGRAM

## 2023-11-07 PROCEDURE — 96375 TX/PRO/DX INJ NEW DRUG ADDON: CPT

## 2023-11-07 PROCEDURE — 96367 TX/PROPH/DG ADDL SEQ IV INF: CPT

## 2023-11-07 PROCEDURE — 250N000011 HC RX IP 250 OP 636: Mod: JW | Performed by: STUDENT IN AN ORGANIZED HEALTH CARE EDUCATION/TRAINING PROGRAM

## 2023-11-07 PROCEDURE — 250N000011 HC RX IP 250 OP 636: Mod: JZ

## 2023-11-07 PROCEDURE — 96413 CHEMO IV INFUSION 1 HR: CPT

## 2023-11-07 PROCEDURE — 96417 CHEMO IV INFUS EACH ADDL SEQ: CPT

## 2023-11-07 PROCEDURE — 85025 COMPLETE CBC W/AUTO DIFF WBC: CPT | Performed by: STUDENT IN AN ORGANIZED HEALTH CARE EDUCATION/TRAINING PROGRAM

## 2023-11-07 PROCEDURE — 36591 DRAW BLOOD OFF VENOUS DEVICE: CPT | Performed by: STUDENT IN AN ORGANIZED HEALTH CARE EDUCATION/TRAINING PROGRAM

## 2023-11-07 RX ORDER — MEPERIDINE HYDROCHLORIDE 25 MG/ML
25 INJECTION INTRAMUSCULAR; INTRAVENOUS; SUBCUTANEOUS EVERY 30 MIN PRN
Status: CANCELLED | OUTPATIENT
Start: 2023-11-07

## 2023-11-07 RX ORDER — EPINEPHRINE 1 MG/ML
0.3 INJECTION, SOLUTION, CONCENTRATE INTRAVENOUS EVERY 5 MIN PRN
Status: CANCELLED | OUTPATIENT
Start: 2023-11-07

## 2023-11-07 RX ORDER — HEPARIN SODIUM (PORCINE) LOCK FLUSH IV SOLN 100 UNIT/ML 100 UNIT/ML
5 SOLUTION INTRAVENOUS
Status: DISCONTINUED | OUTPATIENT
Start: 2023-11-07 | End: 2023-11-07 | Stop reason: HOSPADM

## 2023-11-07 RX ORDER — ALBUTEROL SULFATE 90 UG/1
1-2 AEROSOL, METERED RESPIRATORY (INHALATION)
Status: CANCELLED
Start: 2024-05-05

## 2023-11-07 RX ORDER — HEPARIN SODIUM (PORCINE) LOCK FLUSH IV SOLN 100 UNIT/ML 100 UNIT/ML
5 SOLUTION INTRAVENOUS
Status: CANCELLED | OUTPATIENT
Start: 2023-11-07

## 2023-11-07 RX ORDER — ZOLEDRONIC ACID 0.04 MG/ML
4 INJECTION, SOLUTION INTRAVENOUS ONCE
Status: COMPLETED | OUTPATIENT
Start: 2023-11-07 | End: 2023-11-07

## 2023-11-07 RX ORDER — ONDANSETRON 2 MG/ML
8 INJECTION INTRAMUSCULAR; INTRAVENOUS ONCE
Status: COMPLETED | OUTPATIENT
Start: 2023-11-07 | End: 2023-11-07

## 2023-11-07 RX ORDER — HEPARIN SODIUM (PORCINE) LOCK FLUSH IV SOLN 100 UNIT/ML 100 UNIT/ML
5 SOLUTION INTRAVENOUS ONCE
Status: COMPLETED | OUTPATIENT
Start: 2023-11-07 | End: 2023-11-07

## 2023-11-07 RX ORDER — METHYLPREDNISOLONE SODIUM SUCCINATE 125 MG/2ML
125 INJECTION, POWDER, LYOPHILIZED, FOR SOLUTION INTRAMUSCULAR; INTRAVENOUS
Status: CANCELLED
Start: 2024-05-05

## 2023-11-07 RX ORDER — HEPARIN SODIUM,PORCINE 10 UNIT/ML
5-20 VIAL (ML) INTRAVENOUS DAILY PRN
Status: CANCELLED | OUTPATIENT
Start: 2023-11-07

## 2023-11-07 RX ORDER — DIPHENHYDRAMINE HYDROCHLORIDE 50 MG/ML
50 INJECTION INTRAMUSCULAR; INTRAVENOUS
Status: CANCELLED
Start: 2024-05-05

## 2023-11-07 RX ORDER — ZOLEDRONIC ACID 0.04 MG/ML
4 INJECTION, SOLUTION INTRAVENOUS ONCE
Status: CANCELLED | OUTPATIENT
Start: 2024-05-05 | End: 2024-05-05

## 2023-11-07 RX ORDER — PACLITAXEL 100 MG/20ML
125 INJECTION, POWDER, LYOPHILIZED, FOR SUSPENSION INTRAVENOUS ONCE
Status: COMPLETED | OUTPATIENT
Start: 2023-11-07 | End: 2023-11-07

## 2023-11-07 RX ORDER — ALBUTEROL SULFATE 90 UG/1
1-2 AEROSOL, METERED RESPIRATORY (INHALATION)
Status: CANCELLED
Start: 2023-11-07

## 2023-11-07 RX ORDER — ALBUTEROL SULFATE 0.83 MG/ML
2.5 SOLUTION RESPIRATORY (INHALATION)
Status: CANCELLED | OUTPATIENT
Start: 2024-05-05

## 2023-11-07 RX ORDER — MEPERIDINE HYDROCHLORIDE 25 MG/ML
25 INJECTION INTRAMUSCULAR; INTRAVENOUS; SUBCUTANEOUS EVERY 30 MIN PRN
Status: CANCELLED | OUTPATIENT
Start: 2024-05-05

## 2023-11-07 RX ORDER — HEPARIN SODIUM (PORCINE) LOCK FLUSH IV SOLN 100 UNIT/ML 100 UNIT/ML
5 SOLUTION INTRAVENOUS
Status: CANCELLED | OUTPATIENT
Start: 2024-05-05

## 2023-11-07 RX ORDER — HEPARIN SODIUM,PORCINE 10 UNIT/ML
5-20 VIAL (ML) INTRAVENOUS DAILY PRN
Status: CANCELLED | OUTPATIENT
Start: 2024-05-05

## 2023-11-07 RX ORDER — ALBUTEROL SULFATE 0.83 MG/ML
2.5 SOLUTION RESPIRATORY (INHALATION)
Status: CANCELLED | OUTPATIENT
Start: 2023-11-07

## 2023-11-07 RX ORDER — DIPHENHYDRAMINE HYDROCHLORIDE 50 MG/ML
50 INJECTION INTRAMUSCULAR; INTRAVENOUS
Status: CANCELLED
Start: 2023-11-07

## 2023-11-07 RX ORDER — EPINEPHRINE 1 MG/ML
0.3 INJECTION, SOLUTION, CONCENTRATE INTRAVENOUS EVERY 5 MIN PRN
Status: CANCELLED | OUTPATIENT
Start: 2024-05-05

## 2023-11-07 RX ORDER — METHYLPREDNISOLONE SODIUM SUCCINATE 125 MG/2ML
125 INJECTION, POWDER, LYOPHILIZED, FOR SOLUTION INTRAMUSCULAR; INTRAVENOUS
Status: CANCELLED
Start: 2023-11-07

## 2023-11-07 RX ADMIN — ONDANSETRON 8 MG: 2 INJECTION INTRAMUSCULAR; INTRAVENOUS at 08:59

## 2023-11-07 RX ADMIN — ZOLEDRONIC ACID 4 MG: 0.04 INJECTION, SOLUTION INTRAVENOUS at 10:48

## 2023-11-07 RX ADMIN — Medication 5 ML: at 11:09

## 2023-11-07 RX ADMIN — Medication 5 ML: at 08:21

## 2023-11-07 RX ADMIN — PACLITAXEL 250 MG: 100 INJECTION, POWDER, LYOPHILIZED, FOR SUSPENSION INTRAVENOUS at 09:38

## 2023-11-07 RX ADMIN — SODIUM CHLORIDE 250 ML: 9 INJECTION, SOLUTION INTRAVENOUS at 08:57

## 2023-11-07 RX ADMIN — GEMCITABINE 2000 MG: 38 INJECTION, SOLUTION INTRAVENOUS at 10:14

## 2023-11-07 ASSESSMENT — PAIN SCALES - GENERAL: PAINLEVEL: NO PAIN (0)

## 2023-11-07 NOTE — NURSING NOTE
Chief Complaint   Patient presents with    Blood Draw     Labs obtained via noncoring powerport 20 gauge, 3/4 inch needle, heparin flushed, VS taken, checked into next appt

## 2023-11-07 NOTE — TELEPHONE ENCOUNTER
Imaging Received  2023 8:23 AM ABT   Action: Images from NM received and resolved to PACS.     MEDICAL RECORDS REQUEST   Radiation Oncology  [address] [place] [address]  Fax: [phone]          FUTURE VISIT INFORMATION                                                   Gallo Navarro, : 1965 scheduled for future visit at [facility] Radiation Oncology    RECORDS REQUESTED FOR VISIT                                                     SOFT TISSUE     OFFICE NOTE from PCP Epic 10/04/23: Dr. Akil Hernandez   OFFICE NOTE from medical oncologist Epic 23: Dr. Luis Haile   OFFICE NOTE from other specialist [facility]:  10/23/23: Dr. Jeremy Hurt   CHEMOTHERAPY NOTES/LOG Epic 23: C1D8 Abraxane/Gemzar   OPERATIVE/BIOPSY REPORTS [facility]/CE-NM 23: ENDOSCOPIC RETROGRADE     23: ENDOSCOPIC ULTRASOUND, ESOPHAGOSCOPY     23: ENDOSCOPIC RETROGRADE CHOLANGIOPANCREATOGRAPHY     23: ERCP    23: EUS Upper GI    23: EGD   MEDICATION LIST [facility]    LABS     PATHOLOGY REPORTS Report in Epic FNA:  23: [id number]   ANYTHING RELATED TO DIAGNOSIS Epic Most recent 23   IMAGING (NEED IMAGES & REPORT)     CT SCANS PACS PACS:  10/31/23, 23: CT Chest  10/24/23: CT CAP  23, 23: CT Abd Pel    NM:  23-04/10/23: CT Abd Pel  23: CT Chest Abd   MRI PACS PACS:  07/10/23: MR Abd    NM:  23: MR Abd   XRAYS PACS PACS:  23, 23: XR Abd    NM:  23-23: XR Abd  29: XR Chest   ULTRASOUND PACS NM:  23: US Abd

## 2023-11-07 NOTE — PROGRESS NOTES
Infusion Nursing Note:  Gallo Navarro presents today for C1D8 Abraxane/Gemzar.    Patient seen by provider today: No. Dr. Haile 11/6/23   present during visit today: Not Applicable.    Note: Patient reports no changes overnight. Provider visit 11/6/23. Patient states that he just obtained Dental Clearance for Zometa yesterday from Dr. Pereira. No new complaints.       Intravenous Access:  Implanted Port.    Treatment Conditions:  Lab Results   Component Value Date    HGB 8.6 (L) 11/07/2023    WBC 5.7 11/07/2023    ANEU 2.8 08/15/2023    ANEUTAUTO 3.8 11/07/2023     (L) 11/07/2023        Results reviewed, labs MET treatment parameters, ok to proceed with treatment.    TORB: 11/7/23/0930H/ Dr. Haile/Uma Hutton RN/ He can have Zometa. Please use CMP done last weeks if able.       Post Infusion Assessment:  Patient tolerated infusion without incident.  Blood return noted pre and post infusion.  Site patent and intact, free from redness, edema or discomfort.  No evidence of extravasations.  Access discontinued per protocol.       Discharge Plan:   Patient declined prescription refills.  Discharge instructions reviewed with: Patient.  Patient and/or family verbalized understanding of discharge instructions and all questions answered.  AVS to patient via Sporthold[employer].  Patient will return 11/14/23 for next appointment.   Patient discharged in stable condition accompanied by: self.  Departure Mode: Ambulatory.      GREG HINOJOSA RN

## 2023-11-08 ENCOUNTER — NURSE TRIAGE (OUTPATIENT)
Dept: NURSING | Facility: CLINIC | Age: 58
End: 2023-11-08
Payer: COMMERCIAL

## 2023-11-08 ENCOUNTER — HOSPITAL ENCOUNTER (OUTPATIENT)
Dept: GENERAL RADIOLOGY | Facility: CLINIC | Age: 58
Discharge: HOME | End: 2023-11-08
Attending: RADIOLOGY | Admitting: RADIOLOGY
Payer: COMMERCIAL

## 2023-11-08 ENCOUNTER — PRE VISIT (OUTPATIENT)
Dept: RADIATION ONCOLOGY | Facility: CLINIC | Age: 58
End: 2023-11-08
Payer: COMMERCIAL

## 2023-11-08 ENCOUNTER — OFFICE VISIT (OUTPATIENT)
Dept: RADIATION ONCOLOGY | Facility: CLINIC | Age: 58
End: 2023-11-08
Attending: STUDENT IN AN ORGANIZED HEALTH CARE EDUCATION/TRAINING PROGRAM
Payer: COMMERCIAL

## 2023-11-08 VITALS
SYSTOLIC BLOOD PRESSURE: 119 MMHG | BODY MASS INDEX: 23.15 KG/M2 | WEIGHT: 166 LBS | OXYGEN SATURATION: 98 % | RESPIRATION RATE: 18 BRPM | TEMPERATURE: 99.8 F | DIASTOLIC BLOOD PRESSURE: 80 MMHG | HEART RATE: 103 BPM

## 2023-11-08 DIAGNOSIS — M79.651 PAIN OF RIGHT THIGH: ICD-10-CM

## 2023-11-08 DIAGNOSIS — C79.51 MALIGNANT NEOPLASM METASTATIC TO BONE (H): Primary | ICD-10-CM

## 2023-11-08 DIAGNOSIS — C79.51 MALIGNANT NEOPLASM METASTATIC TO BONE (H): ICD-10-CM

## 2023-11-08 DIAGNOSIS — C25.0 MALIGNANT NEOPLASM OF HEAD OF PANCREAS (H): ICD-10-CM

## 2023-11-08 DIAGNOSIS — G89.3 CANCER ASSOCIATED PAIN: ICD-10-CM

## 2023-11-08 PROCEDURE — 73552 X-RAY EXAM OF FEMUR 2/>: CPT | Mod: RT

## 2023-11-08 PROCEDURE — 73552 X-RAY EXAM OF FEMUR 2/>: CPT | Mod: 26 | Performed by: RADIOLOGY

## 2023-11-08 PROCEDURE — 99213 OFFICE O/P EST LOW 20 MIN: CPT | Performed by: RADIOLOGY

## 2023-11-08 PROCEDURE — 99205 OFFICE O/P NEW HI 60 MIN: CPT | Performed by: RADIOLOGY

## 2023-11-08 ASSESSMENT — ENCOUNTER SYMPTOMS
RESPIRATORY NEGATIVE: 1
FEVER: 1
NAUSEA: 1
PSYCHIATRIC NEGATIVE: 1
EYES NEGATIVE: 1
HEADACHES: 1
BACK PAIN: 1
CARDIOVASCULAR NEGATIVE: 1

## 2023-11-08 ASSESSMENT — PAIN SCALES - GENERAL: PAINLEVEL: MODERATE PAIN (4)

## 2023-11-08 NOTE — PROGRESS NOTES
"  INITIAL PATIENT ASSESSMENT    Diagnosis: Metastatic pancreatic cancer    Prior radiation therapy: None    Prior chemotherapy: On 7/31/2023 systemic therapy with Folfirinox was initiated. Then on 10/31/23 Dr. Haile changed systemic therapy to gemcitabine/Abraxane with Zometa for bony lesion control.    Prior hormonal therapy:No    Pain Eval:  Current history of pain associated with this visit:   Intensity/Location: Pt has four areas pain, but the most significant area of pain is in the L femur and rated 4/10 currently.  The other areas are all currently rated a 2-3/10 currently and consist of the lower back, bottom of the ribcage on the L side, and bilaterally on the shins.    Treatment: Pt states his pain is being comfortably managed with his currently pain regimen of Butran's patches totaling 30 mcg, dilaudid 2-4 mg q4h and tylenol.     Psychosocial  Living arrangements: wife, 14 year old daughter, and 21 year old son who lives between his parents house and girlfriends house  Fall Risk: independent   referral needs: Not needed    Advanced Directive: No  Implantable Cardiac Device? No    LMP: No LMP for male patient.      Nurse face-to-face time: Level 5:  over 15 min face to face time            Review of Systems   Constitutional:  Positive for fever.        At 0630 am this morning Pt had a temporal temp of 102.8 F and was disoriented, thus his wife called 911.  Paramedics came and gave the \"ok\" for Pt to not come in and it was deemed a reaction to his recently started zometa infusion he had yesterday.  Current temp is 99.8 orally and all other VSS.  Pt is also A&Ox4.   HENT: Negative.     Eyes: Negative.    Respiratory: Negative.     Cardiovascular: Negative.    Gastrointestinal:  Positive for nausea.        Intermittent nausea that is relieved by PRN compazine.  Pt reports an appetite and is able to keep food down without any episodes of emesis.    Genitourinary: Negative.    Musculoskeletal:  " Positive for back pain.   Skin: Negative.    Neurological:  Positive for headaches.   Endo/Heme/Allergies: Negative.    Psychiatric/Behavioral: Negative.

## 2023-11-08 NOTE — TELEPHONE ENCOUNTER
Pt gave verbal permission to speak with wife Violet about his medical care     Pt is had second cycle of chemotherapy yesterday     Pt is now very tired and running a fever of 102.8    O2Sats running 91% on room air     Pulse 120    Pt states that he is urinating     Pt is acting confused - keeps saying, I do not know, this is a change in condition for pt     Pt is having a headache     Per disposition: Call  Now     Wife will be calling  Now for evaluation of pt     Care advice given per protocol and when to call back. Pt verbalized understanding and agrees to plan of care.    Pascale Whitten RN  [facility] Nurse Advisor  6:51 AM 11/8/2023          Reason for Disposition   Difficult to awaken or acting confused (e.g., disoriented, slurred speech)    Additional Information   Negative: Shock suspected (e.g., cold/pale/clammy skin, too weak to stand, low BP, rapid pulse)    Protocols used: Cancer - Fever-A-AH

## 2023-11-08 NOTE — LETTER
2023         RE: Gallo Navarro  [address] [address] [place] [id number]         Department of [place] [address]  [phone]       Consultation Note    Name: Gallo Navarro MRN: [id number]   : 1965   Date of Service: 2023  Referring: Dr. Luis Haile     Diagnosis: Pancreatic cancer  Stage: metastatic    History of Present Illness   Mr. Navarro is a 57 year old male with newly diagnosed pancreatic cancer.    Patient's diagnosis came to light when he presented to [facility] ED on 2023 with worsening pre-existing abdominal pain and jaundice, weight loss and poor p.o. intake.  He has a history of acute pancreatitis with ileus and duodenal stenosis, gastric outlet obstruction requiring GJ tube (placed 2023) for feeding.  After receiving ERCP with sphincterotomy at [facility] prior to admission, the patient did not have relief of abdominal pain, and he presented to [facility] to establish care with our system.    Prior to his care at [facility] in 2023 she had initial presentation of sudden onset vomiting and found to have lipase greater than 2000, later revealed to have acquired duodenal stricture and gastric outlet obstruction.  He had PEG GJ placed for gastric venting and nutrition after weight loss of 40 pounds.  CT of the abdomen performed in 2023 for pancreatitis follow-up revealed focal irregular hypodense masslike region in the pancreatic head measuring 2.2 x 1.5 x 1.4 cm with persistent mild main pancreatic ductal dilation and associated subcentimeter mesenteric lymph nodes.  EUS biopsy on 2020 was nondiagnostic showing duodenal inflammation.  He then proceeded with ERCP on 2023, as above.     CT abdomen pelvis on  showed pancreatic mass without pancreatic ductal dilatation, with biliary stent in position, mild pneumobilia, calcific density adjacent to biliary stent.    MRCP from  7/10/2023 showed ill-defined mass soft tissue in the pancreatic head suspicious for malignancy given biliary and pancreatic duct obstruction with vascular encasement and narrowing of SMV, less likely pancreatitis.  Mildly enlarged peripancreatic and periportal and enteric lymph nodes are present, indeterminate.    At [facility] he underwent EGD/EUS on 7/11/2023, with pathology showing adenocarcinoma.  Surgical oncology was consulted, who felt surgery should be reconsidered after 6 months of adjuvant chemotherapy.    CT chest from 7/12/2023 showed an ill-defined pancreatic head mass with upper abdominal lymphadenopathy, and a 7 mm pulmonary nodule left upper lobe, indeterminate. He underwent duodenal stenting on 7/13/2023.    He initiated systemic therapy was FOLFIRINOX on 7/31/2023.    CT chest abdomen pelvis on 10/24/2023 showed interval enlargement of scattered sclerotic osseous metastases, and enlarging subpleural 1.2 cm pulmonary nodule in paramediastinal EAMON, no substantial change in the pancreatic head mass.  Filling defects were seen in the right middle and lower lobe, concerning for emboli.  Echo follow-up CT PE was performed on 10/31/2023 which revealed a acute PE in the right lower lobe segment.  He was subsequently prescribed Eliquis twice daily by Dr. Haile.      Upon follow-up with Dr. Haile on 11/6/2023, review of imaging showed disease progression, particularly with skeletal metastatic progression.  He was recommended for treatment for the T10 lesion given its location to the right pedicle.  Dr. Haile changed systemic therapy to gemcitabine/Abraxane with Zometa for bony lesion control on 10/31/23.    Gallo presents today with his wife.  Earlier today he contacted emergency medical services for fever, chills, in the setting of ongoing fatigue, and he was found to be tachycardic into the 110s.  This is thought to be related to infusion reaction, as he received his first Zometa infusion yesterday.  He  reports significant fatigue after his most recent infusion.  He reports he has left inferior costal rib pain, low lumbar back pain, bilateral anterior shin pain, and most notably 4/10 aching right medial femur pain.  His pain is low level, 2/10 at other sites.  Onset of this pain was over the past several weeks.  He takes Dilaudid at home for pain control which does not really change the quality of his pain, but makes it more tolerable.  His leg pain is most noticeable, and is exacerbated by walking, as he takes his dog for long walks twice a day.    CHEMOTHERAPY HISTORY: Yes Per HPI  PACEMAKER: No  PREGNANCY STATUS: No  PAST RADIATION THERAPY HISTORY: No    PAST MEDICAL HISTORY:  Past Medical History:   Diagnosis Date     Acute pancreatitis 04/16/2023     Gastric outlet obstruction      Metastasis from pancreatic cancer (H)      Recurrent acute pancreatitis        PAST SURGICAL HISTORY:  Past Surgical History:   Procedure Laterality Date     AS OPEN TREATMENT CLAVICULAR FRACTURE INTERNAL FX       ENDOSCOPIC RETROGRADE CHOLANGIOPANCREATOGRAM N/A 7/11/2023    Procedure: ENDOSCOPIC RETROGRADE CHOLANGIOPANCREATOGRAPHY;  Surgeon: Khadar Pickett MD;  Location: [facility] OR     ENDOSCOPIC RETROGRADE CHOLANGIOPANCREATOGRAM N/A 8/17/2023    Procedure: ENDOSCOPIC RETROGRADE  with stent removal x1, balloon sweep;  Surgeon: Christos Greenberg MD;  Location: [facility] OR     ENDOSCOPIC ULTRASOUND UPPER GASTROINTESTINAL TRACT (GI) N/A 7/11/2023    Procedure: Endoscopic ultrasound upper gastrointestinal tract (GI), with biposy, GJ tube repositioning, stent placement;  Surgeon: Khadar Pickett MD;  Location: [facility] OR     ENDOSCOPIC ULTRASOUND UPPER GASTROINTESTINAL TRACT (GI) N/A 7/13/2023    Procedure: ENDOSCOPIC ULTRASOUND, ESOPHAGOSCOPY, EUS guided gastrojejunostomy;  Surgeon: Tre York MD;  Location: [facility] OR     INSERT PICC LINE  04/29/2023     INSERT PORT VASCULAR ACCESS Right 7/28/2023    Procedure: Insert port vascular access;   Surgeon: Tom العلي MD;  Location: [facility] OR     IR CHEST PORT PLACEMENT > 5 YRS OF AGE  7/28/2023     IR NG TUBE PLACEMENT REQ RAD & FLUORO  05/08/2023    JG tube     REPLACE GASTROJEJUNOSTOMY TUBE, PERCUTANEOUS N/A 8/17/2023    Procedure: possible REPLACEMENT, GASTROJEJUNOSTOMY TUBE, PERCUTANEOUS;  Surgeon: Christos Greenberg MD;  Location: [facility] OR       ALLERGIES:  Allergies as of 11/08/2023     (No Known Allergies)       MEDICATIONS:  Current Outpatient Medications   Medication Sig Dispense Refill     acetaminophen (TYLENOL) 32 mg/mL liquid Take 20.313 mLs (650 mg) by mouth every 8 hours 1800 mL 11     apixaban ANTICOAGULANT (ELIQUIS) 5 MG tablet Take 1 tablet (5 mg) by mouth 2 times daily for 90 days 60 tablet 2     B-D U/F 31G X 8 MM insulin pen needle Inject Subcutaneous 5 times daily Use 5 pen needles daily or as directed. 500 each 1     blood glucose (FREESTYLE TEST STRIPS) test strip by Other route as needed       buprenorphine (BUTRANS) 10 MCG/HR WK patch Place 1 patch onto the skin every 7 days Use with 20 mcg/hour patch for 30 mcg/hour total. 4 patch 3     buprenorphine (BUTRANS) 20 MCG/HR WK patch Place 1 patch onto the skin once a week 4 patch 0     Continuous Blood Gluc Sensor (FREESTYLE KAREN 2 SENSOR) MISC Change every 14 days 6 each 3     gabapentin (NEURONTIN) 300 MG capsule Take 1 capsule (300 mg) by mouth 3 times daily 90 capsule 0     HYDROmorphone (DILAUDID) 2 MG tablet Take 1-2 tablets (2-4 mg) by mouth every 4 hours as needed for severe pain 180 tablet 0     Lancets (ONETOUCH DELICA PLUS [id number]) MISC        lidocaine-prilocaine (EMLA) 2.5-2.5 % external cream Use 1-2 times a week or as needed prior to port access 30 g 3     lidocaine-prilocaine (EMLA) 2.5-2.5 % external cream Use 1-2 times weekly or as needed prior to port access 30 g 3     lipase-protease-amylase (CREON 24) 34941-43820-222240 units CPEP per EC capsule 2-3 capsules with meals 3 times a day and 1-2 capsules with  snacks max of 15 capsules a day 180 capsule 3     naloxone (NARCAN) 4 MG/0.1ML nasal spray Spray 1 spray (4 mg) into one nostril alternating nostrils as needed for opioid reversal every 2-3 minutes until assistance arrives 0.2 mL 11     pantoprazole (PROTONIX) 40 MG EC tablet Take 1 tablet (40 mg) by mouth 2 times daily 60 tablet 0     prochlorperazine (COMPAZINE) 10 MG tablet Take 1 tablet (10 mg) by mouth every 6 hours as needed for nausea or vomiting 30 tablet 2     sodium bicarbonate 325 MG tablet 1 tablet (325 mg) by Per J Tube route 3 times daily 90 tablet 0     vitamin D3 (CHOLECALCIFEROL) 50 mcg (2000 units) tablet Take 1 tablet (50 mcg) by mouth daily 90 tablet 1     baclofen (LIORESAL) 10 MG tablet Take 0.5-1 tablets (5-10 mg) by mouth 3 times daily as needed for other (hiccups) (Patient not taking: Reported on 11/8/2023) 30 tablet 0     dexAMETHasone (DECADRON) 4 MG tablet Take 2 tablets (8 mg) by mouth daily Take for 2 days, starting the day after chemo. Take with food. (Patient not taking: Reported on 11/8/2023) 4 tablet 2     dicyclomine (BENTYL) 10 MG/5ML solution Take 10 mLs (20 mg) by mouth 4 times daily as needed (abdominal discomfort, best before meals) (Patient not taking: Reported on 11/8/2023) 473 mL 0     insulin aspart (NOVOLOG PEN) 100 UNIT/ML pen Inject 1-10 Units Subcutaneous 3 times daily (with meals) (Patient not taking: Reported on 11/8/2023) 15 mL 3     insulin glargine (BASAGLAR KWIKPEN) 100 UNIT/ML pen Inject 20 Units Subcutaneous every morning for 360 days (Patient not taking: Reported on 11/8/2023) 18 mL 3     insulin  UNIT/ML injection Inject 16 Units Subcutaneous every evening Take 20 units day of chemo and two days after while on steroid (Patient not taking: Reported on 11/8/2023) 9 mL 1     loperamide (IMODIUM) 2 MG capsule 2 caps at 1st sign of diarrhea & 1 cap every 2hrs until 12hrs diarrhea free. During night, 2 caps at bedtime & 2 caps every 4hrs until AM (Patient  not taking: Reported on 11/8/2023) 100 capsule 3     polyethylene glycol (MIRALAX) 17 GM/Dose powder Take 17 g by mouth daily (Patient not taking: Reported on 11/8/2023) 510 g 3     psyllium (METAMUCIL/KONSYL) capsule 1 capsule by Per G Tube route 2 times daily (Patient not taking: Reported on 11/8/2023) 180 capsule 3     sennosides (SENOKOT) 8.8 MG/5ML syrup 5 mLs by Per J Tube route 2 times daily (Patient not taking: Reported on 11/8/2023) 236 mL 3     simethicone (MYLICON) 125 MG chewable tablet Take 125 mg by mouth 2 times daily (Patient not taking: Reported on 11/8/2023)          FAMILY HISTORY:  Family History   Problem Relation Age of Onset     Diabetes Type 2  Father      Cirrhosis Father      Genetic Disorder Brother         missing [id number], mild retardation     Colon Polyps Brother      Pancreatic Cancer Paternal Aunt      Colon Cancer Paternal Uncle      Pancreatitis Cousin        SOCIAL HISTORY:  Social History     Socioeconomic History     Marital status:      Spouse name: Not on file     Number of children: Not on file     Years of education: Not on file     Highest education level: Not on file   Occupational History     Not on file   Tobacco Use     Smoking status: Never     Passive exposure: Never     Smokeless tobacco: Never   Vaping Use     Vaping Use: Never used   Substance and Sexual Activity     Alcohol use: Not Currently     Comment: o/w light beer 2days a week     Drug use: Never     Sexual activity: Not on file   Other Topics Concern     Not on file   Social History Narrative     Not on file     Social Determinants of Health     Financial Resource Strain: Low Risk  (10/4/2023)    Financial Resource Strain      Within the past 12 months, have you or your family members you live with been unable to get utilities (heat, electricity) when it was really needed?: No   Food Insecurity: Low Risk  (10/4/2023)    Food Insecurity      Within the past 12 months, did you worry that your food  would run out before you got money to buy more?: No      Within the past 12 months, did the food you bought just not last and you didn t have money to get more?: No   Transportation Needs: Low Risk  (10/4/2023)    Transportation Needs      Within the past 12 months, has lack of transportation kept you from medical appointments, getting your medicines, non-medical meetings or appointments, work, or from getting things that you need?: No   Physical Activity: Not on file   Stress: Not on file   Social Connections: Not on file   Interpersonal Safety: Not on file   Housing Stability: Low Risk  (10/4/2023)    Housing Stability      Do you have housing? 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    Review of Systems   A 12-point review of systems was performed. Pertinent findings are noted in the HPI.    Physical Exam   ECOG Status: 2    VITALS: /80 (BP Location: Left arm)   Pulse 103   Temp 99.8  F (37.7  C)   Resp 18   Wt 75.3 kg (166 lb)   SpO2 98%   BMI 23.15 kg/m    GEN: mildly lethargic, oriented, and in NAD sitting in wheelchair  HEENT: EOMI, normal conjunctiva, MMM  CV: Warm and well-perfused  RESP: Breathing comfortably on room air  MSK: Patient locates his pain at low lumbar versus high sacral location.  It is mildly tender in midline.  Patient has very mild tenderness with palpation to his femur.  He reports more significant pain with palpation on his anterior bilateral shins.  SKIN: Normal color and turgor  NEURO: No focal deficits, CN 3-12 grossly intact  PSYCH: Appropriate mood and affect    Imaging/Path/Labs   Imaging: per hpi     CT chest abdomen pelvis 7/8/2023  IMPRESSION:   1.  Pancreas mass without pancreatic ductal dilatation.  2.  Biliary stent in good position with mild pneumobilia.  3.  Question a calcific density adjacent to the distal biliary stent, it is unclear if this is a pancreatic  calcification in the adjacent parenchyma versus a calcified gallstone.  CT chest abdomen pelvis 10/24/2023    IMPRESSION:      1. No substantial change in appearance of ill-marginated infiltrative  pancreatic head mass, extending into the mesenteric root with  encasement of the SMA, SMV, gastroduodenal artery and broad area of  contact with the splenoportal junction, splenic vein and central  portal vein.     2. Similar to slight increase in size of peripancreatic and mesenteric  lymph nodes, likely regional pablo metastasis.     3. Interval enlargement of several scattered sclerotic metastases in  the axial and proximal appendicular skeleton. New sclerotic metastasis  in left posterior 5th rib.      4. Enlarging subpleural 1.2 cm irregular pulmonary nodule in the  paramediastinal left upper lobe, concerning for metastasis.     5. Gastrojejunal axios stent, gastrojejunostomy tube, gastroduodenal  stent, and gastrojejunostomy metallic lumen opposing stent; no  significant gastric distention.     6. Appreciable filling defects in the right middle and lower lobe  segmental and subsegmental pulmonary arteries are indeterminate for  emboli versus flow artifacts as the current study is not optimized for  PE detection. Recommend dedicated PE study.               Path: per hpi     Labs: reviewed    Assessment    Mr. Navarro is a 57 year old male with metastatic pancreatic cancer with multiple osseous metastasis of the axial and appendicular skeleton, who is currently on second line chemotherapy gemcitabine/Abraxane with Zometa.    After completion of FOLFIRINOX, he had disease progression on most recent CT, specifically in multiple osseous locations, with concern for notably faster interval growth of the vertebral body lesion at T10.  On exam, the patient had back pain that appeared lower than the L4 lesion.  The patient did not have recent imaging of his femurs, and inclusion of the partial femur and most recent CT scan did  not reveal lesions.  Therefore prior to proceeding with radiation therapy we recommended obtaining a plain film x-ray of his right femur.    We discussed that the location of his anterior bilateral shin pain is unlikely to be due to metastatic disease, unfortunately found elsewhere.  His right femur pain should be evaluated with imaging, and we ordered an MRI of the femur.  This resulted a small lesion near the lesser trochanter of the femur, thought to be metastatic.  There is no fracture, however the report mentions quadriceps enthesopathy which may better correlate to the patient's mid femur pain as opposed to a sub-1 cm lesion located in the proximal femur.     The patient is amenable to proceeding with palliative radiation, I explained the rationale for treating symptomatic bone pain.  The lesion at T10 is not painful, but given its progression while on first-line chemotherapy, we will choose to treat prophylactically for protection of the vertebral body and spinal canal.     As such we will plan for patient to return for CT simulation.  The patient is having a chemotherapy break on the week of Thanksgiving, week of 11/19.    Plan     Return for CT simulation.    Patient was seen and discussed with Dr. Becca Anguiano.    Haris Elias MD  PGY-3  Radiation Oncology    Physician Attestation   I, Becca Anguiano, saw this patient and agree with the findings and plan of care as documented in the note.   I was present for key portions of the history and physical exam. Briefly, this is a 57-year-old man with  metastatic pancreatic cancer with multiple osseous metastasis of the axial and appendicular skeleton, who is currently on second line chemotherapy gemcitabine/Abraxane with Zometa. Presently, he has evidence of growth of a posterior vertebral body lesion at T10 for which he was preferred for consideration of palliative radiotherapy to be used a potential means for reducing risk of skeletal events or neuraxis compromise;  he is asymptomatic at this site. He also reports right mid-femur pain. On the PET-CT, there is a very small sclerotic lesion at the superior femur that does not correspond to his site of pain.    We have recommended palliative radiation to 20 Gy in 5 fractions to the T10 lesion. We have ordered an Xray to evaluate the right femur. Mr. Navarro is in agreement with this plan.    I personally reviewed the available CT images. Laboratory data and pathology as noted above was reviewed. I reviewed previous medical records, which are summarized in the HPI. A total of 60 minutes were spent (including face to face time) during this visit, and more than 50% of the time was spent in counseling and coordination of care.     Becca Anguiano MD MPH PhD    Department of Radiation Oncology      ADDENDUM: Xray did not reveal a mid-femur lesion to associate with pain in the right mid-thigh. We have recommended potentially scanning into the femur during CT simulation. However, Mr. Navarro apprised our schedulers that he would rather be treated at [place]. As such, I spoke with Dr. Reaves there, who will take over his case.      INITIAL PATIENT ASSESSMENT    Diagnosis: Metastatic pancreatic cancer    Prior radiation therapy: None    Prior chemotherapy: On 7/31/2023 systemic therapy with Folfirinox was initiated. Then on 10/31/23 Dr. Haile changed systemic therapy to gemcitabine/Abraxane with Zometa for bony lesion control.    Prior hormonal therapy:No    Pain Eval:  Current history of pain associated with this visit:   Intensity/Location: Pt has four areas pain, but the most significant area of pain is in the L femur and rated 4/10 currently.  The other areas are all currently rated a 2-3/10 currently and consist of the lower back, bottom of the ribcage on the L side, and bilaterally on the shins.    Treatment: Pt states his pain is being comfortably managed with his currently pain regimen of Butran's patches  "totaling 30 mcg, dilaudid 2-4 mg q4h and tylenol.     Psychosocial  Living arrangements: wife, 14 year old daughter, and 21 year old son who lives between his parents house and girlfriends house  Fall Risk: independent   referral needs: Not needed    Advanced Directive: No  Implantable Cardiac Device? No    LMP: No LMP for male patient.      Nurse face-to-face time: Level 5:  over 15 min face to face time            Review of Systems   Constitutional:  Positive for fever.        At 0630 am this morning Pt had a temporal temp of 102.8 F and was disoriented, thus his wife called 911.  Paramedics came and gave the \"ok\" for Pt to not come in and it was deemed a reaction to his recently started zometa infusion he had yesterday.  Current temp is 99.8 orally and all other VSS.  Pt is also A&Ox4.   HENT: Negative.     Eyes: Negative.    Respiratory: Negative.     Cardiovascular: Negative.    Gastrointestinal:  Positive for nausea.        Intermittent nausea that is relieved by PRN compazine.  Pt reports an appetite and is able to keep food down without any episodes of emesis.    Genitourinary: Negative.    Musculoskeletal:  Positive for back pain.   Skin: Negative.    Neurological:  Positive for headaches.   Endo/Heme/Allergies: Negative.    Psychiatric/Behavioral: Negative.                     Becca Anguiano  "

## 2023-11-08 NOTE — PROGRESS NOTES
Department of Radiation Oncology  [address] [address]  [phone]       Consultation Note    Name: Gallo Navarro MRN: [id number]   : 1965   Date of Service: 2023  Referring: Dr. Luis Haile     Diagnosis: Pancreatic cancer  Stage: metastatic    History of Present Illness   Mr. Navarro is a 57 year old male with newly diagnosed pancreatic cancer.    Patient's diagnosis came to light when he presented to [facility] ED on 2023 with worsening pre-existing abdominal pain and jaundice, weight loss and poor p.o. intake.  He has a history of acute pancreatitis with ileus and duodenal stenosis, gastric outlet obstruction requiring GJ tube (placed 2023) for feeding.  After receiving ERCP with sphincterotomy at [facility] prior to admission, the patient did not have relief of abdominal pain, and he presented to [facility] to establish care with our system.    Prior to his care at [facility] in 2023 she had initial presentation of sudden onset vomiting and found to have lipase greater than 2000, later revealed to have acquired duodenal stricture and gastric outlet obstruction.  He had PEG GJ placed for gastric venting and nutrition after weight loss of 40 pounds.  CT of the abdomen performed in 2023 for pancreatitis follow-up revealed focal irregular hypodense masslike region in the pancreatic head measuring 2.2 x 1.5 x 1.4 cm with persistent mild main pancreatic ductal dilation and associated subcentimeter mesenteric lymph nodes.  EUS biopsy on 2020 was nondiagnostic showing duodenal inflammation.  He then proceeded with ERCP on 2023, as above.     CT abdomen pelvis on  showed pancreatic mass without pancreatic ductal dilatation, with biliary stent in position, mild pneumobilia, calcific density adjacent to biliary stent.    MRCP from 7/10/2023 showed ill-defined mass soft tissue in the pancreatic head suspicious for  malignancy given biliary and pancreatic duct obstruction with vascular encasement and narrowing of SMV, less likely pancreatitis.  Mildly enlarged peripancreatic and periportal and enteric lymph nodes are present, indeterminate.    At [facility] he underwent EGD/EUS on 7/11/2023, with pathology showing adenocarcinoma.  Surgical oncology was consulted, who felt surgery should be reconsidered after 6 months of adjuvant chemotherapy.    CT chest from 7/12/2023 showed an ill-defined pancreatic head mass with upper abdominal lymphadenopathy, and a 7 mm pulmonary nodule left upper lobe, indeterminate. He underwent duodenal stenting on 7/13/2023.    He initiated systemic therapy was FOLFIRINOX on 7/31/2023.    CT chest abdomen pelvis on 10/24/2023 showed interval enlargement of scattered sclerotic osseous metastases, and enlarging subpleural 1.2 cm pulmonary nodule in paramediastinal EAMON, no substantial change in the pancreatic head mass.  Filling defects were seen in the right middle and lower lobe, concerning for emboli.  Echo follow-up CT PE was performed on 10/31/2023 which revealed a acute PE in the right lower lobe segment.  He was subsequently prescribed Eliquis twice daily by Dr. Haile.      Upon follow-up with Dr. Haile on 11/6/2023, review of imaging showed disease progression, particularly with skeletal metastatic progression.  He was recommended for treatment for the T10 lesion given its location to the right pedicle.  Dr. Haile changed systemic therapy to gemcitabine/Abraxane with Zometa for bony lesion control on 10/31/23.    Gallo presents today with his wife.  Earlier today he contacted emergency medical services for fever, chills, in the setting of ongoing fatigue, and he was found to be tachycardic into the 110s.  This is thought to be related to infusion reaction, as he received his first Zometa infusion yesterday.  He reports significant fatigue after his most recent infusion.  He reports he has left  inferior costal rib pain, low lumbar back pain, bilateral anterior shin pain, and most notably 4/10 aching right medial femur pain.  His pain is low level, 2/10 at other sites.  Onset of this pain was over the past several weeks.  He takes Dilaudid at home for pain control which does not really change the quality of his pain, but makes it more tolerable.  His leg pain is most noticeable, and is exacerbated by walking, as he takes his dog for long walks twice a day.    CHEMOTHERAPY HISTORY: Yes Per HPI  PACEMAKER: No  PREGNANCY STATUS: No  PAST RADIATION THERAPY HISTORY: No    PAST MEDICAL HISTORY:  Past Medical History:   Diagnosis Date     Acute pancreatitis 04/16/2023     Gastric outlet obstruction      Metastasis from pancreatic cancer (H)      Recurrent acute pancreatitis        PAST SURGICAL HISTORY:  Past Surgical History:   Procedure Laterality Date     AS OPEN TREATMENT CLAVICULAR FRACTURE INTERNAL FX       ENDOSCOPIC RETROGRADE CHOLANGIOPANCREATOGRAM N/A 7/11/2023    Procedure: ENDOSCOPIC RETROGRADE CHOLANGIOPANCREATOGRAPHY;  Surgeon: Khadar Pickett MD;  Location: [facility] OR     ENDOSCOPIC RETROGRADE CHOLANGIOPANCREATOGRAM N/A 8/17/2023    Procedure: ENDOSCOPIC RETROGRADE  with stent removal x1, balloon sweep;  Surgeon: Christos Greenberg MD;  Location: [facility] OR     ENDOSCOPIC ULTRASOUND UPPER GASTROINTESTINAL TRACT (GI) N/A 7/11/2023    Procedure: Endoscopic ultrasound upper gastrointestinal tract (GI), with biposy, GJ tube repositioning, stent placement;  Surgeon: Khadar Pickett MD;  Location: [facility] OR     ENDOSCOPIC ULTRASOUND UPPER GASTROINTESTINAL TRACT (GI) N/A 7/13/2023    Procedure: ENDOSCOPIC ULTRASOUND, ESOPHAGOSCOPY, EUS guided gastrojejunostomy;  Surgeon: Tre York MD;  Location: [facility] OR     INSERT PICC LINE  04/29/2023     INSERT PORT VASCULAR ACCESS Right 7/28/2023    Procedure: Insert port vascular access;  Surgeon: Tom العلي MD;  Location: [facility] OR     IR CHEST PORT PLACEMENT > 5  YRS OF AGE  7/28/2023     IR NG TUBE PLACEMENT REQ RAD & FLUORO  05/08/2023    JG tube     REPLACE GASTROJEJUNOSTOMY TUBE, PERCUTANEOUS N/A 8/17/2023    Procedure: possible REPLACEMENT, GASTROJEJUNOSTOMY TUBE, PERCUTANEOUS;  Surgeon: Christos Greenberg MD;  Location:  OR       ALLERGIES:  Allergies as of 11/08/2023     (No Known Allergies)       MEDICATIONS:  Current Outpatient Medications   Medication Sig Dispense Refill     acetaminophen (TYLENOL) 32 mg/mL liquid Take 20.313 mLs (650 mg) by mouth every 8 hours 1800 mL 11     apixaban ANTICOAGULANT (ELIQUIS) 5 MG tablet Take 1 tablet (5 mg) by mouth 2 times daily for 90 days 60 tablet 2     B-D U/F 31G X 8 MM insulin pen needle Inject Subcutaneous 5 times daily Use 5 pen needles daily or as directed. 500 each 1     blood glucose (FREESTYLE TEST STRIPS) test strip by Other route as needed       buprenorphine (BUTRANS) 10 MCG/HR WK patch Place 1 patch onto the skin every 7 days Use with 20 mcg/hour patch for 30 mcg/hour total. 4 patch 3     buprenorphine (BUTRANS) 20 MCG/HR WK patch Place 1 patch onto the skin once a week 4 patch 0     Continuous Blood Gluc Sensor (FREESTYLE KAREN 2 SENSOR) MISC Change every 14 days 6 each 3     gabapentin (NEURONTIN) 300 MG capsule Take 1 capsule (300 mg) by mouth 3 times daily 90 capsule 0     HYDROmorphone (DILAUDID) 2 MG tablet Take 1-2 tablets (2-4 mg) by mouth every 4 hours as needed for severe pain 180 tablet 0     Lancets (ONETOUCH DELICA PLUS [id number]) MISC        lidocaine-prilocaine (EMLA) 2.5-2.5 % external cream Use 1-2 times a week or as needed prior to port access 30 g 3     lidocaine-prilocaine (EMLA) 2.5-2.5 % external cream Use 1-2 times weekly or as needed prior to port access 30 g 3     lipase-protease-amylase (CREON 24) 20262-82881-322604 units CPEP per EC capsule 2-3 capsules with meals 3 times a day and 1-2 capsules with snacks max of 15 capsules a day 180 capsule 3     naloxone (NARCAN) 4 MG/0.1ML nasal  spray Spray 1 spray (4 mg) into one nostril alternating nostrils as needed for opioid reversal every 2-3 minutes until assistance arrives 0.2 mL 11     pantoprazole (PROTONIX) 40 MG EC tablet Take 1 tablet (40 mg) by mouth 2 times daily 60 tablet 0     prochlorperazine (COMPAZINE) 10 MG tablet Take 1 tablet (10 mg) by mouth every 6 hours as needed for nausea or vomiting 30 tablet 2     sodium bicarbonate 325 MG tablet 1 tablet (325 mg) by Per J Tube route 3 times daily 90 tablet 0     vitamin D3 (CHOLECALCIFEROL) 50 mcg (2000 units) tablet Take 1 tablet (50 mcg) by mouth daily 90 tablet 1     baclofen (LIORESAL) 10 MG tablet Take 0.5-1 tablets (5-10 mg) by mouth 3 times daily as needed for other (hiccups) (Patient not taking: Reported on 11/8/2023) 30 tablet 0     dexAMETHasone (DECADRON) 4 MG tablet Take 2 tablets (8 mg) by mouth daily Take for 2 days, starting the day after chemo. Take with food. (Patient not taking: Reported on 11/8/2023) 4 tablet 2     dicyclomine (BENTYL) 10 MG/5ML solution Take 10 mLs (20 mg) by mouth 4 times daily as needed (abdominal discomfort, best before meals) (Patient not taking: Reported on 11/8/2023) 473 mL 0     insulin aspart (NOVOLOG PEN) 100 UNIT/ML pen Inject 1-10 Units Subcutaneous 3 times daily (with meals) (Patient not taking: Reported on 11/8/2023) 15 mL 3     insulin glargine (BASAGLAR KWIKPEN) 100 UNIT/ML pen Inject 20 Units Subcutaneous every morning for 360 days (Patient not taking: Reported on 11/8/2023) 18 mL 3     insulin  UNIT/ML injection Inject 16 Units Subcutaneous every evening Take 20 units day of chemo and two days after while on steroid (Patient not taking: Reported on 11/8/2023) 9 mL 1     loperamide (IMODIUM) 2 MG capsule 2 caps at 1st sign of diarrhea & 1 cap every 2hrs until 12hrs diarrhea free. During night, 2 caps at bedtime & 2 caps every 4hrs until AM (Patient not taking: Reported on 11/8/2023) 100 capsule 3     polyethylene glycol (MIRALAX) 17  GM/Dose powder Take 17 g by mouth daily (Patient not taking: Reported on 11/8/2023) 510 g 3     psyllium (METAMUCIL/KONSYL) capsule 1 capsule by Per G Tube route 2 times daily (Patient not taking: Reported on 11/8/2023) 180 capsule 3     sennosides (SENOKOT) 8.8 MG/5ML syrup 5 mLs by Per J Tube route 2 times daily (Patient not taking: Reported on 11/8/2023) 236 mL 3     simethicone (MYLICON) 125 MG chewable tablet Take 125 mg by mouth 2 times daily (Patient not taking: Reported on 11/8/2023)          FAMILY HISTORY:  Family History   Problem Relation Age of Onset     Diabetes Type 2  Father      Cirrhosis Father      Genetic Disorder Brother         missing [id number], mild retardation     Colon Polyps Brother      Pancreatic Cancer Paternal Aunt      Colon Cancer Paternal Uncle      Pancreatitis Cousin        SOCIAL HISTORY:  Social History     Socioeconomic History     Marital status:      Spouse name: Not on file     Number of children: Not on file     Years of education: Not on file     Highest education level: Not on file   Occupational History     Not on file   Tobacco Use     Smoking status: Never     Passive exposure: Never     Smokeless tobacco: Never   Vaping Use     Vaping Use: Never used   Substance and Sexual Activity     Alcohol use: Not Currently     Comment: o/w light beer 2days a week     Drug use: Never     Sexual activity: Not on file   Other Topics Concern     Not on file   Social History Narrative     Not on file     Social Determinants of Health     Financial Resource Strain: Low Risk  (10/4/2023)    Financial Resource Strain      Within the past 12 months, have you or your family members you live with been unable to get utilities (heat, electricity) when it was really needed?: No   Food Insecurity: Low Risk  (10/4/2023)    Food Insecurity      Within the past 12 months, did you worry that your food would run out before you got money to buy more?: No      Within the past 12 months, did  the food you bought just not last and you didn t have money to get more?: No   Transportation Needs: Low Risk  (10/4/2023)    Transportation Needs      Within the past 12 months, has lack of transportation kept you from medical appointments, getting your medicines, non-medical meetings or appointments, work, or from getting things that you need?: No   Physical Activity: Not on file   Stress: Not on file   Social Connections: Not on file   Interpersonal Safety: Not on file   Housing Stability: Low Risk  (10/4/2023)    Housing Stability      Do you have housing? 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    Review of Systems   A 12-point review of systems was performed. Pertinent findings are noted in the HPI.    Physical Exam   ECOG Status: 2    VITALS: /80 (BP Location: Left arm)   Pulse 103   Temp 99.8  F (37.7  C)   Resp 18   Wt 75.3 kg (166 lb)   SpO2 98%   BMI 23.15 kg/m    GEN: mildly lethargic, oriented, and in NAD sitting in wheelchair  HEENT: EOMI, normal conjunctiva, MMM  CV: Warm and well-perfused  RESP: Breathing comfortably on room air  MSK: Patient locates his pain at low lumbar versus high sacral location.  It is mildly tender in midline.  Patient has very mild tenderness with palpation to his femur.  He reports more significant pain with palpation on his anterior bilateral shins.  SKIN: Normal color and turgor  NEURO: No focal deficits, CN 3-12 grossly intact  PSYCH: Appropriate mood and affect    Imaging/Path/Labs   Imaging: per hpi     CT chest abdomen pelvis 7/8/2023  IMPRESSION:   1.  Pancreas mass without pancreatic ductal dilatation.  2.  Biliary stent in good position with mild pneumobilia.  3.  Question a calcific density adjacent to the distal biliary stent, it is unclear if this is a pancreatic calcification in the adjacent parenchyma versus a calcified gallstone.  CT chest abdomen  pelvis 10/24/2023    IMPRESSION:      1. No substantial change in appearance of ill-marginated infiltrative  pancreatic head mass, extending into the mesenteric root with  encasement of the SMA, SMV, gastroduodenal artery and broad area of  contact with the splenoportal junction, splenic vein and central  portal vein.     2. Similar to slight increase in size of peripancreatic and mesenteric  lymph nodes, likely regional pablo metastasis.     3. Interval enlargement of several scattered sclerotic metastases in  the axial and proximal appendicular skeleton. New sclerotic metastasis  in left posterior 5th rib.      4. Enlarging subpleural 1.2 cm irregular pulmonary nodule in the  paramediastinal left upper lobe, concerning for metastasis.     5. Gastrojejunal axios stent, gastrojejunostomy tube, gastroduodenal  stent, and gastrojejunostomy metallic lumen opposing stent; no  significant gastric distention.     6. Appreciable filling defects in the right middle and lower lobe  segmental and subsegmental pulmonary arteries are indeterminate for  emboli versus flow artifacts as the current study is not optimized for  PE detection. Recommend dedicated PE study.               Path: per hpi     Labs: reviewed    Assessment    Mr. Navarro is a 57 year old male with metastatic pancreatic cancer with multiple osseous metastasis of the axial and appendicular skeleton, who is currently on second line chemotherapy gemcitabine/Abraxane with Zometa.    After completion of FOLFIRINOX, he had disease progression on most recent CT, specifically in multiple osseous locations, with concern for notably faster interval growth of the vertebral body lesion at T10.  On exam, the patient had back pain that appeared lower than the L4 lesion.  The patient did not have recent imaging of his femurs, and inclusion of the partial femur and most recent CT scan did not reveal lesions.  Therefore prior to proceeding with radiation therapy we recommended  obtaining a plain film x-ray of his right femur.    We discussed that the location of his anterior bilateral shin pain is unlikely to be due to metastatic disease, unfortunately found elsewhere.  His right femur pain should be evaluated with imaging, and we ordered an MRI of the femur.  This resulted a small lesion near the lesser trochanter of the femur, thought to be metastatic.  There is no fracture, however the report mentions quadriceps enthesopathy which may better correlate to the patient's mid femur pain as opposed to a sub-1 cm lesion located in the proximal femur.     The patient is amenable to proceeding with palliative radiation, I explained the rationale for treating symptomatic bone pain.  The lesion at T10 is not painful, but given its progression while on first-line chemotherapy, we will choose to treat prophylactically for protection of the vertebral body and spinal canal.     As such we will plan for patient to return for CT simulation.  The patient is having a chemotherapy break on the week of Thanksgiving, week of 11/19.    Plan     Return for CT simulation.    Patient was seen and discussed with Dr. Becca Anguiano.    Haris Elias MD  PGY-3  Radiation Oncology    Physician Attestation   I, Becca Anguiano, saw this patient and agree with the findings and plan of care as documented in the note.   I was present for key portions of the history and physical exam. Briefly, this is a 57-year-old man with  metastatic pancreatic cancer with multiple osseous metastasis of the axial and appendicular skeleton, who is currently on second line chemotherapy gemcitabine/Abraxane with Zometa. Presently, he has evidence of growth of a posterior vertebral body lesion at T10 for which he was preferred for consideration of palliative radiotherapy to be used a potential means for reducing risk of skeletal events or neuraxis compromise; he is asymptomatic at this site. He also reports right mid-femur pain. On the PET-CT,  there is a very small sclerotic lesion at the superior femur that does not correspond to his site of pain.    We have recommended palliative radiation to 20 Gy in 5 fractions to the T10 lesion. We have ordered an Xray to evaluate the right femur. Mr. Navarro is in agreement with this plan.    I personally reviewed the available CT images. Laboratory data and pathology as noted above was reviewed. I reviewed previous medical records, which are summarized in the HPI. A total of 60 minutes were spent (including face to face time) during this visit, and more than 50% of the time was spent in counseling and coordination of care.     Becca Anguiano MD MPH PhD    Department of Radiation Oncology      ADDENDUM: Xray did not reveal a mid-femur lesion to associate with pain in the right mid-thigh. We have recommended potentially scanning into the femur during CT simulation. However, Mr. Navarro apprised our schedulers that he would rather be treated at [place]. As such, I spoke with Dr. Reaves there, who will take over his case.

## 2023-11-08 NOTE — LETTER
2023         RE: Gallo Navarro  [id number] [address] [place] [id number]        Dear Colleague,    Thank you for referring your patient, Gallo Navarro, to the [facility] RADIATION ONCOLOGY. Please see a copy of my visit note below.       Department of Radiation Oncology  [address] [address]  [phone]       Consultation Note    Name: Gallo Navarro MRN: [id number]   : 1965   Date of Service: 2023  Referring: Dr. Luis Haile     Diagnosis: Pancreatic cancer  Stage: metastatic    History of Present Illness   Mr. Navarro is a 57 year old male with newly diagnosed pancreatic cancer.    Patient's diagnosis came to light when he presented to [facility] ED on 2023 with worsening pre-existing abdominal pain and jaundice, weight loss and poor p.o. intake.  He has a history of acute pancreatitis with ileus and duodenal stenosis, gastric outlet obstruction requiring GJ tube (placed 2023) for feeding.  After receiving ERCP with sphincterotomy at [facility] prior to admission, the patient did not have relief of abdominal pain, and he presented to [facility] to establish care with our system.    Prior to his care at [facility] in 2023 she had initial presentation of sudden onset vomiting and found to have lipase greater than 2000, later revealed to have acquired duodenal stricture and gastric outlet obstruction.  He had PEG GJ placed for gastric venting and nutrition after weight loss of 40 pounds.  CT of the abdomen performed in 2023 for pancreatitis follow-up revealed focal irregular hypodense masslike region in the pancreatic head measuring 2.2 x 1.5 x 1.4 cm with persistent mild main pancreatic ductal dilation and associated subcentimeter mesenteric lymph nodes.  EUS biopsy on 2020 was nondiagnostic showing duodenal inflammation.  He then proceeded with ERCP on 2023, as above.     CT abdomen pelvis on   showed pancreatic mass without pancreatic ductal dilatation, with biliary stent in position, mild pneumobilia, calcific density adjacent to biliary stent.    MRCP from 7/10/2023 showed ill-defined mass soft tissue in the pancreatic head suspicious for malignancy given biliary and pancreatic duct obstruction with vascular encasement and narrowing of SMV, less likely pancreatitis.  Mildly enlarged peripancreatic and periportal and enteric lymph nodes are present, indeterminate.    At [facility] he underwent EGD/EUS on 7/11/2023, with pathology showing adenocarcinoma.  Surgical oncology was consulted, who felt surgery should be reconsidered after 6 months of adjuvant chemotherapy.    CT chest from 7/12/2023 showed an ill-defined pancreatic head mass with upper abdominal lymphadenopathy, and a 7 mm pulmonary nodule left upper lobe, indeterminate. He underwent duodenal stenting on 7/13/2023.    He initiated systemic therapy was FOLFIRINOX on 7/31/2023.    CT chest abdomen pelvis on 10/24/2023 showed interval enlargement of scattered sclerotic osseous metastases, and enlarging subpleural 1.2 cm pulmonary nodule in paramediastinal EAMON, no substantial change in the pancreatic head mass.  Filling defects were seen in the right middle and lower lobe, concerning for emboli.  Echo follow-up CT PE was performed on 10/31/2023 which revealed a acute PE in the right lower lobe segment.  He was subsequently prescribed Eliquis twice daily by Dr. Haile.      Upon follow-up with Dr. Haile on 11/6/2023, review of imaging showed disease progression, particularly with skeletal metastatic progression.  He was recommended for treatment for the T10 lesion given its location to the right pedicle.  Dr. Haile changed systemic therapy to gemcitabine/Abraxane with Zometa for bony lesion control on 10/31/23.    Gallo presents today with his wife.  Earlier today he contacted emergency medical services for fever, chills, in the setting of ongoing  fatigue, and he was found to be tachycardic into the 110s.  This is thought to be related to infusion reaction, as he received his first Zometa infusion yesterday.  He reports significant fatigue after his most recent infusion.  He reports he has left inferior costal rib pain, low lumbar back pain, bilateral anterior shin pain, and most notably 4/10 aching right medial femur pain.  His pain is low level, 2/10 at other sites.  Onset of this pain was over the past several weeks.  He takes Dilaudid at home for pain control which does not really change the quality of his pain, but makes it more tolerable.  His leg pain is most noticeable, and is exacerbated by walking, as he takes his dog for long walks twice a day.    CHEMOTHERAPY HISTORY: Yes Per HPI  PACEMAKER: No  PREGNANCY STATUS: No  PAST RADIATION THERAPY HISTORY: No    PAST MEDICAL HISTORY:  Past Medical History:   Diagnosis Date     Acute pancreatitis 04/16/2023     Gastric outlet obstruction      Metastasis from pancreatic cancer (H)      Recurrent acute pancreatitis        PAST SURGICAL HISTORY:  Past Surgical History:   Procedure Laterality Date     AS OPEN TREATMENT CLAVICULAR FRACTURE INTERNAL FX       ENDOSCOPIC RETROGRADE CHOLANGIOPANCREATOGRAM N/A 7/11/2023    Procedure: ENDOSCOPIC RETROGRADE CHOLANGIOPANCREATOGRAPHY;  Surgeon: Khadar Pickett MD;  Location: [facility] OR     ENDOSCOPIC RETROGRADE CHOLANGIOPANCREATOGRAM N/A 8/17/2023    Procedure: ENDOSCOPIC RETROGRADE  with stent removal x1, balloon sweep;  Surgeon: Christos Greenberg MD;  Location: [facility] OR     ENDOSCOPIC ULTRASOUND UPPER GASTROINTESTINAL TRACT (GI) N/A 7/11/2023    Procedure: Endoscopic ultrasound upper gastrointestinal tract (GI), with biposy, GJ tube repositioning, stent placement;  Surgeon: Khadar Pickett MD;  Location: [facility] OR     ENDOSCOPIC ULTRASOUND UPPER GASTROINTESTINAL TRACT (GI) N/A 7/13/2023    Procedure: ENDOSCOPIC ULTRASOUND, ESOPHAGOSCOPY, EUS guided gastrojejunostomy;  Surgeon:  Tre York MD;  Location: [facility] OR     INSERT PICC LINE  04/29/2023     INSERT PORT VASCULAR ACCESS Right 7/28/2023    Procedure: Insert port vascular access;  Surgeon: Tom العلي MD;  Location: [facility] OR     IR CHEST PORT PLACEMENT > 5 YRS OF AGE  7/28/2023     IR NG TUBE PLACEMENT REQ RAD & FLUORO  05/08/2023    JG tube     REPLACE GASTROJEJUNOSTOMY TUBE, PERCUTANEOUS N/A 8/17/2023    Procedure: possible REPLACEMENT, GASTROJEJUNOSTOMY TUBE, PERCUTANEOUS;  Surgeon: Christos Greenberg MD;  Location: [facility] OR       ALLERGIES:  Allergies as of 11/08/2023     (No Known Allergies)       MEDICATIONS:  Current Outpatient Medications   Medication Sig Dispense Refill     acetaminophen (TYLENOL) 32 mg/mL liquid Take 20.313 mLs (650 mg) by mouth every 8 hours 1800 mL 11     apixaban ANTICOAGULANT (ELIQUIS) 5 MG tablet Take 1 tablet (5 mg) by mouth 2 times daily for 90 days 60 tablet 2     B-D U/F 31G X 8 MM insulin pen needle Inject Subcutaneous 5 times daily Use 5 pen needles daily or as directed. 500 each 1     blood glucose (FREESTYLE TEST STRIPS) test strip by Other route as needed       buprenorphine (BUTRANS) 10 MCG/HR WK patch Place 1 patch onto the skin every 7 days Use with 20 mcg/hour patch for 30 mcg/hour total. 4 patch 3     buprenorphine (BUTRANS) 20 MCG/HR WK patch Place 1 patch onto the skin once a week 4 patch 0     Continuous Blood Gluc Sensor (FREESTYLE KAREN 2 SENSOR) MISC Change every 14 days 6 each 3     gabapentin (NEURONTIN) 300 MG capsule Take 1 capsule (300 mg) by mouth 3 times daily 90 capsule 0     HYDROmorphone (DILAUDID) 2 MG tablet Take 1-2 tablets (2-4 mg) by mouth every 4 hours as needed for severe pain 180 tablet 0     Lancets (ONETOUCH DELICA PLUS [id number]) MISC        lidocaine-prilocaine (EMLA) 2.5-2.5 % external cream Use 1-2 times a week or as needed prior to port access 30 g 3     lidocaine-prilocaine (EMLA) 2.5-2.5 % external cream Use 1-2 times weekly or as needed  prior to port access 30 g 3     lipase-protease-amylase (CREON 24) 16019-66137-605374 units CPEP per EC capsule 2-3 capsules with meals 3 times a day and 1-2 capsules with snacks max of 15 capsules a day 180 capsule 3     naloxone (NARCAN) 4 MG/0.1ML nasal spray Spray 1 spray (4 mg) into one nostril alternating nostrils as needed for opioid reversal every 2-3 minutes until assistance arrives 0.2 mL 11     pantoprazole (PROTONIX) 40 MG EC tablet Take 1 tablet (40 mg) by mouth 2 times daily 60 tablet 0     prochlorperazine (COMPAZINE) 10 MG tablet Take 1 tablet (10 mg) by mouth every 6 hours as needed for nausea or vomiting 30 tablet 2     sodium bicarbonate 325 MG tablet 1 tablet (325 mg) by Per J Tube route 3 times daily 90 tablet 0     vitamin D3 (CHOLECALCIFEROL) 50 mcg (2000 units) tablet Take 1 tablet (50 mcg) by mouth daily 90 tablet 1     baclofen (LIORESAL) 10 MG tablet Take 0.5-1 tablets (5-10 mg) by mouth 3 times daily as needed for other (hiccups) (Patient not taking: Reported on 11/8/2023) 30 tablet 0     dexAMETHasone (DECADRON) 4 MG tablet Take 2 tablets (8 mg) by mouth daily Take for 2 days, starting the day after chemo. Take with food. (Patient not taking: Reported on 11/8/2023) 4 tablet 2     dicyclomine (BENTYL) 10 MG/5ML solution Take 10 mLs (20 mg) by mouth 4 times daily as needed (abdominal discomfort, best before meals) (Patient not taking: Reported on 11/8/2023) 473 mL 0     insulin aspart (NOVOLOG PEN) 100 UNIT/ML pen Inject 1-10 Units Subcutaneous 3 times daily (with meals) (Patient not taking: Reported on 11/8/2023) 15 mL 3     insulin glargine (BASAGLAR KWIKPEN) 100 UNIT/ML pen Inject 20 Units Subcutaneous every morning for 360 days (Patient not taking: Reported on 11/8/2023) 18 mL 3     insulin  UNIT/ML injection Inject 16 Units Subcutaneous every evening Take 20 units day of chemo and two days after while on steroid (Patient not taking: Reported on 11/8/2023) 9 mL 1     loperamide  (IMODIUM) 2 MG capsule 2 caps at 1st sign of diarrhea & 1 cap every 2hrs until 12hrs diarrhea free. During night, 2 caps at bedtime & 2 caps every 4hrs until AM (Patient not taking: Reported on 11/8/2023) 100 capsule 3     polyethylene glycol (MIRALAX) 17 GM/Dose powder Take 17 g by mouth daily (Patient not taking: Reported on 11/8/2023) 510 g 3     psyllium (METAMUCIL/KONSYL) capsule 1 capsule by Per G Tube route 2 times daily (Patient not taking: Reported on 11/8/2023) 180 capsule 3     sennosides (SENOKOT) 8.8 MG/5ML syrup 5 mLs by Per J Tube route 2 times daily (Patient not taking: Reported on 11/8/2023) 236 mL 3     simethicone (MYLICON) 125 MG chewable tablet Take 125 mg by mouth 2 times daily (Patient not taking: Reported on 11/8/2023)          FAMILY HISTORY:  Family History   Problem Relation Age of Onset     Diabetes Type 2  Father      Cirrhosis Father      Genetic Disorder Brother         missing [id number], mild retardation     Colon Polyps Brother      Pancreatic Cancer Paternal Aunt      Colon Cancer Paternal Uncle      Pancreatitis Cousin        SOCIAL HISTORY:  Social History     Socioeconomic History     Marital status:      Spouse name: Not on file     Number of children: Not on file     Years of education: Not on file     Highest education level: Not on file   Occupational History     Not on file   Tobacco Use     Smoking status: Never     Passive exposure: Never     Smokeless tobacco: Never   Vaping Use     Vaping Use: Never used   Substance and Sexual Activity     Alcohol use: Not Currently     Comment: o/w light beer 2days a week     Drug use: Never     Sexual activity: Not on file   Other Topics Concern     Not on file   Social History Narrative     Not on file     Social Determinants of Health     Financial Resource Strain: Low Risk  (10/4/2023)    Financial Resource Strain      Within the past 12 months, have you or your family members you live with been unable to get utilities  (heat, electricity) when it was really needed?: No   Food Insecurity: Low Risk  (10/4/2023)    Food Insecurity      Within the past 12 months, did you worry that your food would run out before you got money to buy more?: No      Within the past 12 months, did the food you bought just not last and you didn t have money to get more?: No   Transportation Needs: Low Risk  (10/4/2023)    Transportation Needs      Within the past 12 months, has lack of transportation kept you from medical appointments, getting your medicines, non-medical meetings or appointments, work, or from getting things that you need?: No   Physical Activity: Not on file   Stress: Not on file   Social Connections: Not on file   Interpersonal Safety: Not on file   Housing Stability: Low Risk  (10/4/2023)    Housing Stability      Do you have housing? 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    Review of Systems   A 12-point review of systems was performed. Pertinent findings are noted in the HPI.    Physical Exam   ECOG Status: 2    VITALS: /80 (BP Location: Left arm)   Pulse 103   Temp 99.8  F (37.7  C)   Resp 18   Wt 75.3 kg (166 lb)   SpO2 98%   BMI 23.15 kg/m    GEN: mildly lethargic, oriented, and in NAD sitting in wheelchair  HEENT: EOMI, normal conjunctiva, MMM  CV: Warm and well-perfused  RESP: Breathing comfortably on room air  MSK: Patient locates his pain at low lumbar versus high sacral location.  It is mildly tender in midline.  Patient has very mild tenderness with palpation to his femur.  He reports more significant pain with palpation on his anterior bilateral shins.  SKIN: Normal color and turgor  NEURO: No focal deficits, CN 3-12 grossly intact  PSYCH: Appropriate mood and affect    Imaging/Path/Labs   Imaging: per hpi     CT chest abdomen pelvis 7/8/2023  IMPRESSION:   1.  Pancreas mass without pancreatic ductal  dilatation.  2.  Biliary stent in good position with mild pneumobilia.  3.  Question a calcific density adjacent to the distal biliary stent, it is unclear if this is a pancreatic calcification in the adjacent parenchyma versus a calcified gallstone.  CT chest abdomen pelvis 10/24/2023    IMPRESSION:      1. No substantial change in appearance of ill-marginated infiltrative  pancreatic head mass, extending into the mesenteric root with  encasement of the SMA, SMV, gastroduodenal artery and broad area of  contact with the splenoportal junction, splenic vein and central  portal vein.     2. Similar to slight increase in size of peripancreatic and mesenteric  lymph nodes, likely regional pablo metastasis.     3. Interval enlargement of several scattered sclerotic metastases in  the axial and proximal appendicular skeleton. New sclerotic metastasis  in left posterior 5th rib.      4. Enlarging subpleural 1.2 cm irregular pulmonary nodule in the  paramediastinal left upper lobe, concerning for metastasis.     5. Gastrojejunal axios stent, gastrojejunostomy tube, gastroduodenal  stent, and gastrojejunostomy metallic lumen opposing stent; no  significant gastric distention.     6. Appreciable filling defects in the right middle and lower lobe  segmental and subsegmental pulmonary arteries are indeterminate for  emboli versus flow artifacts as the current study is not optimized for  PE detection. Recommend dedicated PE study.               Path: per hpi     Labs: reviewed    Assessment    Mr. Navarro is a 57 year old male with metastatic pancreatic cancer with multiple osseous metastasis of the axial and appendicular skeleton, who is currently on second line chemotherapy gemcitabine/Abraxane with Zometa.    After completion of FOLFIRINOX, he had disease progression on most recent CT, specifically in multiple osseous locations, with concern for notably faster interval growth of the vertebral body lesion at T10.  On exam, the  patient had back pain that appeared lower than the L4 lesion.  The patient did not have recent imaging of his femurs, and inclusion of the partial femur and most recent CT scan did not reveal lesions.  Therefore prior to proceeding with radiation therapy we recommended obtaining a plain film x-ray of his right femur.    We discussed that the location of his anterior bilateral shin pain is unlikely to be due to metastatic disease, unfortunately found elsewhere.  His right femur pain should be evaluated with imaging, and we ordered an MRI of the femur.  This resulted a small lesion near the lesser trochanter of the femur, thought to be metastatic.  There is no fracture, however the report mentions quadriceps enthesopathy which may better correlate to the patient's mid femur pain as opposed to a sub-1 cm lesion located in the proximal femur.     The patient is amenable to proceeding with palliative radiation, I explained the rationale for treating symptomatic bone pain.  The lesion at T10 is not painful, but given its progression while on first-line chemotherapy, we will choose to treat prophylactically for protection of the vertebral body and spinal canal.     As such we will plan for patient to return for CT simulation.  The patient is having a chemotherapy break on the week of Thanksgiving, week of 11/19.    Plan     Return for CT simulation.    Patient was seen and discussed with Dr. Becca Anguiano.    Haris Elias MD  PGY-3  Radiation Oncology    Physician Attestation   I, Becca Anguiano, saw this patient and agree with the findings and plan of care as documented in the note.   I was present for key portions of the history and physical exam. Briefly, this is a 57-year-old man with  metastatic pancreatic cancer with multiple osseous metastasis of the axial and appendicular skeleton, who is currently on second line chemotherapy gemcitabine/Abraxane with Zometa. Presently, he has evidence of growth of a posterior vertebral  body lesion at T10 for which he was preferred for consideration of palliative radiotherapy to be used a potential means for reducing risk of skeletal events or neuraxis compromise; he is asymptomatic at this site. He also reports right mid-femur pain. On the PET-CT, there is a very small sclerotic lesion at the superior femur that does not correspond to his site of pain.    We have recommended palliative radiation to 20 Gy in 5 fractions to the T10 lesion. We have ordered an Xray to evaluate the right femur. Mr. Navarro is in agreement with this plan.    I personally reviewed the available CT images. Laboratory data and pathology as noted above was reviewed. I reviewed previous medical records, which are summarized in the HPI. A total of 60 minutes were spent (including face to face time) during this visit, and more than 50% of the time was spent in counseling and coordination of care.     Becca Anguiano MD MPH PhD    Department of Radiation Oncology      ADDENDUM: Xray did not reveal a mid-femur lesion to associate with pain in the right mid-thigh. We have recommended potentially scanning into the femur during CT simulation. However, Mr. Navarro apprised our schedulers that he would rather be treated at [place]. As such, I spoke with Dr. Reaves there, who will take over his case.      INITIAL PATIENT ASSESSMENT    Diagnosis: Metastatic pancreatic cancer    Prior radiation therapy: None    Prior chemotherapy: On 7/31/2023 systemic therapy with Folfirinox was initiated. Then on 10/31/23 Dr. Haile changed systemic therapy to gemcitabine/Abraxane with Zometa for bony lesion control.    Prior hormonal therapy:No    Pain Eval:  Current history of pain associated with this visit:   Intensity/Location: Pt has four areas pain, but the most significant area of pain is in the L femur and rated 4/10 currently.  The other areas are all currently rated a 2-3/10 currently and consist of the lower back, bottom  "of the ribcage on the L side, and bilaterally on the shins.    Treatment: Pt states his pain is being comfortably managed with his currently pain regimen of Butran's patches totaling 30 mcg, dilaudid 2-4 mg q4h and tylenol.     Psychosocial  Living arrangements: wife, 14 year old daughter, and 21 year old son who lives between his parents house and girlfriends house  Fall Risk: independent   referral needs: Not needed    Advanced Directive: No  Implantable Cardiac Device? No    LMP: No LMP for male patient.      Nurse face-to-face time: Level 5:  over 15 min face to face time            Review of Systems   Constitutional:  Positive for fever.        At 0630 am this morning Pt had a temporal temp of 102.8 F and was disoriented, thus his wife called 911.  Paramedics came and gave the \"ok\" for Pt to not come in and it was deemed a reaction to his recently started zometa infusion he had yesterday.  Current temp is 99.8 orally and all other VSS.  Pt is also A&Ox4.   HENT: Negative.     Eyes: Negative.    Respiratory: Negative.     Cardiovascular: Negative.    Gastrointestinal:  Positive for nausea.        Intermittent nausea that is relieved by PRN compazine.  Pt reports an appetite and is able to keep food down without any episodes of emesis.    Genitourinary: Negative.    Musculoskeletal:  Positive for back pain.   Skin: Negative.    Neurological:  Positive for headaches.   Endo/Heme/Allergies: Negative.    Psychiatric/Behavioral: Negative.                   Again, thank you for allowing me to participate in the care of your patient.        Sincerely,        Becca Anguiano"

## 2023-11-09 ENCOUNTER — PATIENT OUTREACH (OUTPATIENT)
Dept: ONCOLOGY | Facility: CLINIC | Age: 58
End: 2023-11-09
Payer: COMMERCIAL

## 2023-11-09 ENCOUNTER — NURSE TRIAGE (OUTPATIENT)
Dept: ONCOLOGY | Facility: CLINIC | Age: 58
End: 2023-11-09
Payer: COMMERCIAL

## 2023-11-09 ENCOUNTER — TELEPHONE (OUTPATIENT)
Dept: RADIATION ONCOLOGY | Facility: CLINIC | Age: 58
End: 2023-11-09
Payer: COMMERCIAL

## 2023-11-09 NOTE — TELEPHONE ENCOUNTER
"1014 attempted to contact both pt and spouse, both lines busy and unable to leave a message. Will try to contact again today.   [facility] for pt requesting call back to triage line at [phone] option 5, option 2, ask for triage, any nurse can assist.    Oncology Nurse Triage - Reporting Symptoms  Situation:   Follow up from FNA report on 11/8/23- called pt to follow up on fever, HR, confusion.    Background:   Treating Provider:  Dr. Haile    Date of last office visit: 11/6/23    Recent treatments: Yes: 11/7/23 C1D8 Abraxane/Gemzar.     Assessment  Onset of symptoms: 11/8/23   Per rad onc office note on 11/8/23, \"At 0630 am this morning Pt had a temporal temp of 102.8 F and was disoriented, thus his wife called 911.  Paramedics came and gave the \"ok\" for Pt to not come in and it was deemed a reaction to his recently started zometa infusion he had yesterday.  Current temp is 99.8 orally and all other VSS.  Pt is also A&Ox4.\"    Per Nanci, RNCC, she talked with pt and will follow up with Dr. Haile and rad onc. No further action required by triage.             "

## 2023-11-09 NOTE — TELEPHONE ENCOUNTER
Spoke with [facility]ila home care RN Lenore, she confirmed she can get labs on patient if orders are faxed to [facility] office at [phone]. Informed her if writer is unable to get CT scheduled this week, then will call her back with order for labs.     CT femur scheduled at  location for 11/15 at 1:05 pm.     Standing orders CBC, CMP faxed to [phone], unable to reach Keila Grover on cell, lmcb. Called pt and spouse to let them know pt can go to [facility] for labs if fatigue has not improved. Per Dr. Mic sims to check labs and trend hgb. Reached  and lmcb.

## 2023-11-09 NOTE — TELEPHONE ENCOUNTER
Telephone encounter    Called patient to update him on x-ray results.  No answer, left VM.  Patient contacted by Pango message as well.    Haris Elias MD on 11/9/2023 at 11:12 AM

## 2023-11-09 NOTE — TELEPHONE ENCOUNTER
"[facility]: Cancer Care                                                                                          Called pt to follow-up on triage note from 11/8: temp 102.8 and chills, tachycardic in the 110s, EMT called to home. Pt was able to go to Rad Onc consultation and plan for 5 fractions of radiation, unclear whether CT femur is to be scheduled concurrently with CT Sim or not, pt stated he was left a message by Dr. Elias but did not listen to its entirety.    Today pt reports mostly fatigue, more than he's felt before with previous chemo. He does feel this is from Zometa. He's been taking dilaudid and tylenol for pain as needed. Rates R femur pain \"higher\" than it's been previously. Advised pt for pain 1-4 take 1 tablet dilaudid (2mg) for pain 5-10 take 2 tablets, take around the clock to stay on top of the pain. He verbalized understanding and admitted he has not been taking pain meds consistently. Pt and spouse did inform writer he already has home care services through [facility], managed by primary doctor. Patient denied new sob, chest pain or pressure. Looks slightly paler to spouse. Will discuss with Dr. Haile whether home care should collect some labs before his next chemo infusion on 11/14.     Spouse also mentioned with previous folfox plan, pt's 2 days of dexamethasone gave him \"a surge of energy\" and wondered if this could be added to current plan. Pt stated he was \"jumping off the walls\" and would prefer a lower dose, 2-4 mg versus 8 mg. Will relay to Dr. Haile and Megan Farrell SUZETTE.    Stated [place] consult is 12/21-12/22, family is going to [place] 12/11-12/19, and pt is scheduled for chemo 12/19 wondered if he should reschedule to 12/20 or get a later start time as they're coming straight from the airport. Informed them Megan Farrell SUZETTE does have a 1 pm, will check with infusion scheduling if able to move infusion to after 1 pm.    Will coordinate with Rad Onc if CT femur can be obtained at " [facility] location with labs on same day.    Signature:  Efra Lynn RN

## 2023-11-10 ENCOUNTER — TELEPHONE (OUTPATIENT)
Dept: RADIATION ONCOLOGY | Facility: CLINIC | Age: 58
End: 2023-11-10
Payer: COMMERCIAL

## 2023-11-10 NOTE — TELEPHONE ENCOUNTER
TELEPHONE ENCOUNTER    Called him. He does want to go to [facility], it is 5 min from his house and transportation is an issue. I told him he may have a delay in care to repeat consult with [facility] doctors, and he understands.     Haris Elias MD on 11/10/2023 at 11:53 AM

## 2023-11-10 NOTE — TELEPHONE ENCOUNTER
Received call back from Keila Grover that she reached pt and was able to draw labs, their home care agency takes labs to [facility].

## 2023-11-13 NOTE — TELEPHONE ENCOUNTER
MEDICAL RECORDS REQUEST   Radiation Oncology  [address] [place] [address]  Fax: [phone]          FUTURE VISIT INFORMATION                                                   Gallo Navarro, : 1965 scheduled for future visit at [facility] Radiation Oncology    RECORDS REQUESTED FOR VISIT                                                     SOFT TISSUE     OFFICE NOTE from PCP [facility] 10/04/23: Dr. Akil Hernandez    OFFICE NOTE from medical oncologist Epic 23: Dr. Luis Haile    CHEMOTHERAPY NOTES/LOG [facility] 23   OPERATIVE/BIOPSY REPORTS Epic/CE-NM 23: ENDOSCOPIC RETROGRADE      23: ENDOSCOPIC ULTRASOUND, ESOPHAGOSCOPY      23: ENDOSCOPIC RETROGRADE CHOLANGIOPANCREATOGRAPHY      23: ERCP     23: EUS Upper GI     23: EGD   MEDICATION LIST [facility]    LABS     PATHOLOGY REPORTS Report in EPIC FNA:  23: [id number]   ANYTHING RELATED TO DIAGNOSIS Epic Most recent 23   IMAGING (NEED IMAGES & REPORT)     CT SCANS PACS 11/15/23: CT Femur  10/31/23, 23: CT Chest  10/24/23: CT CAP  23-04/10/23: CT AP  23: CT Chest Abd   MRI PACS 07/10/23, 23: MR Abd   XRAYS PACS 23: XR Femur  23-23: XR Abd  23: XR Chest   ULTRASOUND PACS 23: US Abd   PREVIOUS RADIATION  (Consult only)   WHAT HEALTHCARE FACILITY [facility] 23: [facility]   RADIATION ONCOLOGIST NOTES [facility] 23: Dr. Becca Anguiano

## 2023-11-14 ENCOUNTER — APPOINTMENT (OUTPATIENT)
Dept: LAB | Facility: CLINIC | Age: 58
End: 2023-11-14
Attending: STUDENT IN AN ORGANIZED HEALTH CARE EDUCATION/TRAINING PROGRAM
Payer: COMMERCIAL

## 2023-11-14 ENCOUNTER — INFUSION THERAPY VISIT (OUTPATIENT)
Dept: ONCOLOGY | Facility: CLINIC | Age: 58
End: 2023-11-14
Attending: STUDENT IN AN ORGANIZED HEALTH CARE EDUCATION/TRAINING PROGRAM
Payer: COMMERCIAL

## 2023-11-14 VITALS
RESPIRATION RATE: 20 BRPM | SYSTOLIC BLOOD PRESSURE: 121 MMHG | TEMPERATURE: 98.5 F | WEIGHT: 166.2 LBS | OXYGEN SATURATION: 98 % | HEART RATE: 79 BPM | DIASTOLIC BLOOD PRESSURE: 70 MMHG | BODY MASS INDEX: 23.18 KG/M2

## 2023-11-14 DIAGNOSIS — K31.1 GASTRIC OUTLET OBSTRUCTION: ICD-10-CM

## 2023-11-14 DIAGNOSIS — C25.0 MALIGNANT NEOPLASM OF HEAD OF PANCREAS (H): ICD-10-CM

## 2023-11-14 DIAGNOSIS — Z79.4 TYPE 2 DIABETES MELLITUS WITHOUT COMPLICATION, WITH LONG-TERM CURRENT USE OF INSULIN (H): ICD-10-CM

## 2023-11-14 DIAGNOSIS — Z51.11 ENCOUNTER FOR ANTINEOPLASTIC CHEMOTHERAPY: Primary | ICD-10-CM

## 2023-11-14 DIAGNOSIS — E11.9 TYPE 2 DIABETES MELLITUS WITHOUT COMPLICATION, WITH LONG-TERM CURRENT USE OF INSULIN (H): ICD-10-CM

## 2023-11-14 DIAGNOSIS — G89.3 CANCER ASSOCIATED PAIN: ICD-10-CM

## 2023-11-14 LAB
BASOPHILS # BLD AUTO: 0 10E3/UL (ref 0–0.2)
BASOPHILS NFR BLD AUTO: 1 %
EOSINOPHIL # BLD AUTO: 0.1 10E3/UL (ref 0–0.7)
EOSINOPHIL NFR BLD AUTO: 3 %
ERYTHROCYTE [DISTWIDTH] IN BLOOD BY AUTOMATED COUNT: 15.8 % (ref 10–15)
HCT VFR BLD AUTO: 25.4 % (ref 40–53)
HGB BLD-MCNC: 7.9 G/DL (ref 13.3–17.7)
IMM GRANULOCYTES # BLD: 0 10E3/UL
IMM GRANULOCYTES NFR BLD: 0 %
LYMPHOCYTES # BLD AUTO: 0.8 10E3/UL (ref 0.8–5.3)
LYMPHOCYTES NFR BLD AUTO: 20 %
MCH RBC QN AUTO: 28.3 PG (ref 26.5–33)
MCHC RBC AUTO-ENTMCNC: 31.1 G/DL (ref 31.5–36.5)
MCV RBC AUTO: 91 FL (ref 78–100)
MONOCYTES # BLD AUTO: 0.5 10E3/UL (ref 0–1.3)
MONOCYTES NFR BLD AUTO: 13 %
NEUTROPHILS # BLD AUTO: 2.7 10E3/UL (ref 1.6–8.3)
NEUTROPHILS NFR BLD AUTO: 63 %
NRBC # BLD AUTO: 0 10E3/UL
NRBC BLD AUTO-RTO: 0 /100
PLATELET # BLD AUTO: 133 10E3/UL (ref 150–450)
RBC # BLD AUTO: 2.79 10E6/UL (ref 4.4–5.9)
WBC # BLD AUTO: 4.2 10E3/UL (ref 4–11)

## 2023-11-14 PROCEDURE — 36591 DRAW BLOOD OFF VENOUS DEVICE: CPT | Performed by: STUDENT IN AN ORGANIZED HEALTH CARE EDUCATION/TRAINING PROGRAM

## 2023-11-14 PROCEDURE — 96375 TX/PRO/DX INJ NEW DRUG ADDON: CPT

## 2023-11-14 PROCEDURE — 96417 CHEMO IV INFUS EACH ADDL SEQ: CPT

## 2023-11-14 PROCEDURE — 85025 COMPLETE CBC W/AUTO DIFF WBC: CPT | Performed by: STUDENT IN AN ORGANIZED HEALTH CARE EDUCATION/TRAINING PROGRAM

## 2023-11-14 PROCEDURE — 96361 HYDRATE IV INFUSION ADD-ON: CPT

## 2023-11-14 PROCEDURE — 250N000011 HC RX IP 250 OP 636: Mod: JZ | Performed by: STUDENT IN AN ORGANIZED HEALTH CARE EDUCATION/TRAINING PROGRAM

## 2023-11-14 PROCEDURE — 258N000003 HC RX IP 258 OP 636: Performed by: STUDENT IN AN ORGANIZED HEALTH CARE EDUCATION/TRAINING PROGRAM

## 2023-11-14 PROCEDURE — 96413 CHEMO IV INFUSION 1 HR: CPT

## 2023-11-14 PROCEDURE — 250N000011 HC RX IP 250 OP 636: Mod: JZ

## 2023-11-14 RX ORDER — HEPARIN SODIUM (PORCINE) LOCK FLUSH IV SOLN 100 UNIT/ML 100 UNIT/ML
5 SOLUTION INTRAVENOUS
Status: DISCONTINUED | OUTPATIENT
Start: 2023-11-14 | End: 2023-11-14 | Stop reason: HOSPADM

## 2023-11-14 RX ORDER — ONDANSETRON 2 MG/ML
8 INJECTION INTRAMUSCULAR; INTRAVENOUS ONCE
Status: COMPLETED | OUTPATIENT
Start: 2023-11-14 | End: 2023-11-14

## 2023-11-14 RX ORDER — HEPARIN SODIUM,PORCINE 10 UNIT/ML
5-20 VIAL (ML) INTRAVENOUS DAILY PRN
OUTPATIENT
Start: 2023-11-14

## 2023-11-14 RX ORDER — HEPARIN SODIUM (PORCINE) LOCK FLUSH IV SOLN 100 UNIT/ML 100 UNIT/ML
5 SOLUTION INTRAVENOUS
OUTPATIENT
Start: 2023-11-14

## 2023-11-14 RX ORDER — PACLITAXEL 100 MG/20ML
125 INJECTION, POWDER, LYOPHILIZED, FOR SUSPENSION INTRAVENOUS ONCE
Status: COMPLETED | OUTPATIENT
Start: 2023-11-14 | End: 2023-11-14

## 2023-11-14 RX ADMIN — Medication 5 ML: at 07:27

## 2023-11-14 RX ADMIN — SODIUM CHLORIDE 250 ML: 9 INJECTION, SOLUTION INTRAVENOUS at 08:07

## 2023-11-14 RX ADMIN — ONDANSETRON 8 MG: 2 INJECTION INTRAMUSCULAR; INTRAVENOUS at 08:08

## 2023-11-14 RX ADMIN — Medication 5 ML: at 10:08

## 2023-11-14 RX ADMIN — GEMCITABINE HYDROCHLORIDE 2000 MG: 2 INJECTION, SOLUTION INTRAVENOUS at 09:36

## 2023-11-14 RX ADMIN — PACLITAXEL 250 MG: 100 INJECTION, POWDER, LYOPHILIZED, FOR SUSPENSION INTRAVENOUS at 08:47

## 2023-11-14 ASSESSMENT — PAIN SCALES - GENERAL: PAINLEVEL: NO PAIN (0)

## 2023-11-14 NOTE — PATIENT INSTRUCTIONS
[facility] Triage and after hours / weekends / holidays:  [phone]    Please call the triage or after hours line if you experience a temperature greater than or equal to 100.4, shaking chills, have uncontrolled nausea, vomiting and/or diarrhea, dizziness, shortness of breath, chest pain, bleeding, unexplained bruising, or if you have any other new/concerning symptoms, questions or concerns.      If you are having any concerning symptoms or wish to speak to a provider before your next infusion visit, please call triage to notify them so we can adequately serve you.     If you need a refill on a narcotic prescription or other medication, please call before your infusion appointment.

## 2023-11-14 NOTE — PROGRESS NOTES
Infusion Nursing Note:  Gallo Navarro presents today for Cycle 1 Day 15 Gemzar/Abraxane.    Patient seen by provider today: No   present during visit today: Not Applicable.    Note: Gallo comes into the clinic today doing well overall. He does have some new swelling since last infusion to both of his feet. Right foot is more swollen than his left and mildly pitting. Bottoms of feet are red and dry but patient attributes dryness to seasonal changes and not lotioning feet enough. He said he tries to elevate feet as much as possible but is a very active jimmy. He also mentions no pain but mild tingling in feet. Compression stockings have been on. Based on above symptoms, provider was consulted.     Per secure chat message 11/14/23 @7503 Megan Farrell CNP/Chitra Mccurdy RN:  - OK to go ahead with chemotherapy. Patient should monitor for increased symptoms of neuropathy and for increased swelling. He should also continue to wear compression stockings and elevate as much as possible.     Besides the feet. Patient states that he has worse near sided vision and is using his glasses more frequently. He has no other assessment changes since last visit and otherwise feels well. No pain, SOB, chest pain, hearing changes, D/V/N, urinary concerns or s/s of infection.    Intravenous Access:  Implanted Port.    Treatment Conditions:  Lab Results   Component Value Date    HGB 7.9 (L) 11/14/2023    WBC 4.2 11/14/2023    ANEU 2.8 08/15/2023    ANEUTAUTO 2.7 11/14/2023     (L) 11/14/2023        Results reviewed, labs MET treatment parameters, ok to proceed with treatment.      Post Infusion Assessment:  Patient tolerated infusion without incident.  Blood return noted pre and post infusion.  Site patent and intact, free from redness, edema or discomfort.  No evidence of extravasations.  Access discontinued per protocol.       Discharge Plan:   Patient declined prescription refills.  Discharge instructions reviewed with:  Patient and friend.  Patient and/or family verbalized understanding of discharge instructions and all questions answered.  AVS to patient via [employer].  Patient will return 11/28 for next appointment.   Patient discharged in stable condition accompanied by: self and friend.  Departure Mode: Ambulatory.      Chitra Mccurdy RN

## 2023-11-14 NOTE — TELEPHONE ENCOUNTER
Pantoprazole Refill 1 month supply with refills   Last prescribing provider: Megan Farrell     Last clinic visit date: 11/6/23 Dr Haile    Recommendations for requested medication (if none, N/A): N/A    Any other pertinent information (if none, N/A): N/A    Refilled: Y/N, if NO, why?

## 2023-11-14 NOTE — NURSING NOTE
Chief Complaint   Patient presents with    Port Draw     Port labs     Port accessed with 20g 3/4inch gripper needle. Labs drawn without incident. Port heparin locked and kept accessed for infusion. Vitals checked and pt checked in to next appt.     Cristine Acevedo RN on 11/14/2023 at 7:31 AM

## 2023-11-15 ENCOUNTER — MYC MEDICAL ADVICE (OUTPATIENT)
Dept: FAMILY MEDICINE | Facility: CLINIC | Age: 58
End: 2023-11-15

## 2023-11-15 DIAGNOSIS — C25.0 MALIGNANT NEOPLASM OF HEAD OF PANCREAS (H): Primary | ICD-10-CM

## 2023-11-15 RX ORDER — PANTOPRAZOLE SODIUM 40 MG/1
40 TABLET, DELAYED RELEASE ORAL 2 TIMES DAILY
Qty: 60 TABLET | Refills: 4 | Status: SHIPPED | OUTPATIENT
Start: 2023-11-15 | End: 2024-02-22

## 2023-11-16 ENCOUNTER — MYC REFILL (OUTPATIENT)
Dept: ONCOLOGY | Facility: CLINIC | Age: 58
End: 2023-11-16

## 2023-11-16 ENCOUNTER — OFFICE VISIT (OUTPATIENT)
Dept: RADIATION ONCOLOGY | Facility: CLINIC | Age: 58
End: 2023-11-16
Payer: COMMERCIAL

## 2023-11-16 ENCOUNTER — TELEPHONE (OUTPATIENT)
Dept: FAMILY MEDICINE | Facility: CLINIC | Age: 58
End: 2023-11-16

## 2023-11-16 ENCOUNTER — PRE VISIT (OUTPATIENT)
Dept: RADIATION ONCOLOGY | Facility: CLINIC | Age: 58
End: 2023-11-16

## 2023-11-16 VITALS
TEMPERATURE: 99.8 F | BODY MASS INDEX: 23.15 KG/M2 | SYSTOLIC BLOOD PRESSURE: 108 MMHG | RESPIRATION RATE: 18 BRPM | OXYGEN SATURATION: 97 % | HEART RATE: 103 BPM | WEIGHT: 166 LBS | DIASTOLIC BLOOD PRESSURE: 67 MMHG

## 2023-11-16 DIAGNOSIS — G89.3 CANCER ASSOCIATED PAIN: ICD-10-CM

## 2023-11-16 DIAGNOSIS — K31.1 GASTRIC OUTLET OBSTRUCTION: ICD-10-CM

## 2023-11-16 DIAGNOSIS — C79.51 MALIGNANT NEOPLASM METASTATIC TO BONE (H): Primary | ICD-10-CM

## 2023-11-16 DIAGNOSIS — C25.0 MALIGNANT NEOPLASM OF HEAD OF PANCREAS (H): ICD-10-CM

## 2023-11-16 DIAGNOSIS — Z79.4 TYPE 2 DIABETES MELLITUS WITHOUT COMPLICATION, WITH LONG-TERM CURRENT USE OF INSULIN (H): ICD-10-CM

## 2023-11-16 DIAGNOSIS — E11.9 TYPE 2 DIABETES MELLITUS WITHOUT COMPLICATION, WITH LONG-TERM CURRENT USE OF INSULIN (H): ICD-10-CM

## 2023-11-16 LAB
BKR LAB AP ADD'L TEST STATUS: NORMAL
BKR PATH ADDL TEST FINAL COMMENTS: NORMAL

## 2023-11-16 PROCEDURE — 77263 THER RADIOLOGY TX PLNG CPLX: CPT | Performed by: SURGERY

## 2023-11-16 PROCEDURE — 99214 OFFICE O/P EST MOD 30 MIN: CPT | Mod: 25 | Performed by: SURGERY

## 2023-11-16 PROCEDURE — 77290 THER RAD SIMULAJ FIELD CPLX: CPT | Performed by: SURGERY

## 2023-11-16 ASSESSMENT — PAIN SCALES - GENERAL: PAINLEVEL: NO PAIN (1)

## 2023-11-16 NOTE — PATIENT INSTRUCTIONS
What to expect at your Simulation visit:    You will meet with a Radiation Therapist and other team members who will be doing a planning session called a  simulation  with you. This process will determine your daily treatment.    ~ You will lie on a flat table and have a treatment planning CT scan.  It is important during the scan to hold very still and breathe normally.    ~ Your therapist may construct a body mold to help you hold still for your treatments.    ~ If you are having treatment to the head or neck area you will be fitted with a plastic mesh mask that fits very snugly over your face and neck.     ~ Your therapist will be taking some digital photos that will go in your treatment chart.      ~Your therapist will make marks on your skin and take measurements. Your therapist may ask you about making small tattoos (a permanent small dot) over these marks.  These marks are used to position you daily for your radiation therapy treatments. Please do not wash off any marks until all of your radiation therapy treatments are complete unless you are instructed to do so by your therapist.    ~ Once the simulation is completed it can take from 3 to 10 business days before you start radiation therapy treatments.    ~ You may meet with a nurse who will go over management of treatment side effects and self care during your treatments. The nurse will help to plan care with other departments and physicians if needed.       Please contact [facility] RN with questions or concerns following today's appointment: [phone].       Please feel free to leave a detailed message if your call is not answered.    If your call is not received before 3:00 PM, it may not be returned until the following business day.    If you are receiving radiation treatment and need assistance after 3:00 PM or on the weekends, please call [phone] and ask to speak to the radiation oncologist on-call.    Thank  you!    Bessie LOZOYA

## 2023-11-16 NOTE — CONFIDENTIAL NOTE
Sent for out of network review today. Will await their decision.  Minda  Referral coordinator

## 2023-11-16 NOTE — NURSING NOTE
"INITIAL PATIENT ASSESSMENT      Diagnosis: metastatic pancreatic cancer    Prior radiation therapy: None    Prior chemotherapy:   Protocol: Gemzar + Abraxane  Facility: [facility] - Dr. Haile  Dates: treatment began on 10/31/2023 with most recent infusion on 11/14/2023 - see medical oncology notes for full chemotherapy details.        Pain Eval:  Current history of pain associated with this visit:   Intensity: 1/10  Current: dull and aching  Location: \"mid low back\" per patient report, denies radiation of pain  Treatment: patient reports Butran's 10 mcg and 20 mcg patches, reports taking Dilaudid 2 mg po two tablets every six hours and reports taking Tylenol 10 ml every six hours    Psychosocial  Living arrangements: lives at home in [place] and presents to clinic with his wife, Abigail  Fall Risk: independent  [facility] Falls Risk Screening Completed: Yes Result: Negative   referral needs: Not needed    Advanced Directive: No - patient declined information packet  Implantable Cardiac Device: No  Authorization To Share Protected Health Information: YES - Date: 11/16/2023      Reproductive note: not recorded for male patient.    Review of Systems     Constitutional: Negative.    HENT: Negative.     Eyes:  Positive for blurred vision. Negative for double vision, photophobia, pain, discharge and redness.        Patient reports slight blurred vision with chemotherapy.   Respiratory: Negative.     Cardiovascular:  Positive for leg swelling. Negative for chest pain, palpitations, orthopnea, claudication and PND.        Patient reports bilateral lower extremity edema, wearing compression socks.   Gastrointestinal:  Positive for constipation. Negative for abdominal pain, blood in stool, diarrhea, heartburn, melena, nausea and vomiting.        Patient reports intermittent constipation.  Patient has feeding tube and three cans of formula per night in addition to eating and drinking by mouth. "   Genitourinary: Negative.    Musculoskeletal:  Positive for back pain. Negative for falls, joint pain, myalgias and neck pain.        See pain note.   Skin: Negative.    Neurological: Negative.    Endo/Heme/Allergies: Negative.    Psychiatric/Behavioral: Negative.       Nurse face-to-face time: Level 5:  over 15 min face to face time.    Bessie Vidales RN BSN OCN CBCN

## 2023-11-16 NOTE — LETTER
2023         RE: Gallo Navarro  [address] [facility] [address]        Dear Colleague,    Thank you for referring your patient, Gallo Naavrro, to the [facility] RADIATION ONCOLOGY [place]. Please see a copy of my visit note below.       Department of Radiation Oncology  [facility]: Cancer Center  [facility]  [address] [place] [address]  [phone]       Consultation Note    Name: Gallo Navarro MRN: [id number]   : 1965   Date of Service: 2023   Referring: Dr. Anguiano     Reason for consultation: metastatic pancreatic cancer to T10    History of Present Illness     Mr. Navarro is a 57 year old male with metastatic pancreatic cancer to T10.    Briefly, his oncologic history is as follows. He was seen by my colleague at the [facility], Dr. Becca Anguiano, note is referenced from that consultation note dated 23:    Patient's diagnosis came to light when he presented to [facility] ED on 2023 with worsening pre-existing abdominal pain and jaundice, weight loss and poor p.o. intake.  He has a history of acute pancreatitis with ileus and duodenal stenosis, gastric outlet obstruction requiring GJ tube (placed 2023) for feeding.  After receiving ERCP with sphincterotomy at [facility] prior to admission, the patient did not have relief of abdominal pain, and he presented to [facility] to establish care with our system.     Prior to his care at [facility] in 2023 she had initial presentation of sudden onset vomiting and found to have lipase greater than 2000, later revealed to have acquired duodenal stricture and gastric outlet obstruction.  He had PEG GJ placed for gastric venting and nutrition after weight loss of 40 pounds.  CT of the abdomen performed in 2023 for pancreatitis follow-up revealed focal irregular hypodense masslike region in the pancreatic head measuring 2.2 x 1.5 x 1.4 cm with  persistent mild main pancreatic ductal dilation and associated subcentimeter mesenteric lymph nodes.  EUS biopsy on 6/27/2020 was nondiagnostic showing duodenal inflammation.  He then proceeded with ERCP on 7/7/2023, as above.      CT abdomen pelvis on 7/8 showed pancreatic mass without pancreatic ductal dilatation, with biliary stent in position, mild pneumobilia, calcific density adjacent to biliary stent.     MRCP from 7/10/2023 showed ill-defined mass soft tissue in the pancreatic head suspicious for malignancy given biliary and pancreatic duct obstruction with vascular encasement and narrowing of SMV, less likely pancreatitis.  Mildly enlarged peripancreatic and periportal and enteric lymph nodes are present, indeterminate.     At [facility] he underwent EGD/EUS on 7/11/2023, with pathology showing adenocarcinoma.  Surgical oncology was consulted, who felt surgery should be reconsidered after 6 months of adjuvant chemotherapy.     CT chest from 7/12/2023 showed an ill-defined pancreatic head mass with upper abdominal lymphadenopathy, and a 7 mm pulmonary nodule left upper lobe, indeterminate. He underwent duodenal stenting on 7/13/2023.     He initiated systemic therapy was FOLFIRINOX on 7/31/2023.     CT chest abdomen pelvis on 10/24/2023 showed interval enlargement of scattered sclerotic osseous metastases, and enlarging subpleural 1.2 cm pulmonary nodule in paramediastinal EAMON, no substantial change in the pancreatic head mass.  Filling defects were seen in the right middle and lower lobe, concerning for emboli.  Echo follow-up CT PE was performed on 10/31/2023 which revealed a acute PE in the right lower lobe segment.  He was subsequently prescribed Eliquis twice daily by Dr. Haile.       Upon follow-up with Dr. Haile on 11/6/2023, review of imaging showed disease progression, particularly with skeletal metastatic progression.  He was recommended for treatment for the T10 lesion given its location to the  right pedicle.  Dr. Haile changed systemic therapy to gemcitabine/Abraxane with Zometa for bony lesion control on 10/31/23.    Interval history:    During time of initial consultation with Dr. Anguiano, he complained of lower thoracic, mid back, mild pain.  He also described a vague hip pain in the right.  He did have a x-ray of the right femur on 11/8/2023 which demonstrated a tiny sclerotic focus at the level of the right lesser trochanter.  He was scheduled for follow-up CT scan of the femur to further evaluate his source of pain, but he had eventually canceled this, due to his pain resolving.  He was going to be treated for at [facility] for pain and rapid progression between interval scans, but wanted to be treated at the [place] location, given proximity.    Past Medical History:   Past Medical History:   Diagnosis Date     Acute pancreatitis 04/16/2023     Gastric outlet obstruction      Metastasis from pancreatic cancer (H)      Personal history of chemotherapy     Gemzar + Abraxane started on 10/31/2023     Recurrent acute pancreatitis        Past Surgical History:   Past Surgical History:   Procedure Laterality Date     AS OPEN TREATMENT CLAVICULAR FRACTURE INTERNAL FX       ENDOSCOPIC RETROGRADE CHOLANGIOPANCREATOGRAM N/A 7/11/2023    Procedure: ENDOSCOPIC RETROGRADE CHOLANGIOPANCREATOGRAPHY;  Surgeon: Khadar Pickett MD;  Location: [facility] OR     ENDOSCOPIC RETROGRADE CHOLANGIOPANCREATOGRAM N/A 8/17/2023    Procedure: ENDOSCOPIC RETROGRADE  with stent removal x1, balloon sweep;  Surgeon: Christos Greenberg MD;  Location: [facility] OR     ENDOSCOPIC ULTRASOUND UPPER GASTROINTESTINAL TRACT (GI) N/A 7/11/2023    Procedure: Endoscopic ultrasound upper gastrointestinal tract (GI), with biposy, GJ tube repositioning, stent placement;  Surgeon: Khadar Pickett MD;  Location: [facility] OR     ENDOSCOPIC ULTRASOUND UPPER GASTROINTESTINAL TRACT (GI) N/A 7/13/2023    Procedure: ENDOSCOPIC ULTRASOUND, ESOPHAGOSCOPY, EUS guided  gastrojejunostomy;  Surgeon: Tre York MD;  Location: [facility] OR     INSERT PICC LINE  04/29/2023     INSERT PORT VASCULAR ACCESS Right 7/28/2023    Procedure: Insert port vascular access;  Surgeon: Tom العلي MD;  Location: [facility] OR     IR CHEST PORT PLACEMENT > 5 YRS OF AGE  7/28/2023     IR NG TUBE PLACEMENT REQ RAD & FLUORO  05/08/2023    JG tube     REPLACE GASTROJEJUNOSTOMY TUBE, PERCUTANEOUS N/A 8/17/2023    Procedure: possible REPLACEMENT, GASTROJEJUNOSTOMY TUBE, PERCUTANEOUS;  Surgeon: Christos Greenberg MD;  Location: [facility] OR       Chemotherapy History:  Per HPI    Radiation History:  No prior RT    Pregnant: No  Implanted Cardiac Devices: No    Medications:  Current Outpatient Medications   Medication     apixaban ANTICOAGULANT (ELIQUIS) 5 MG tablet     B-D U/F 31G X 8 MM insulin pen needle     blood glucose (FREESTYLE TEST STRIPS) test strip     buprenorphine (BUTRANS) 20 MCG/HR WK patch     Continuous Blood Gluc Sensor (FREESTYLE KAREN 2 SENSOR) MISC     gabapentin (NEURONTIN) 300 MG capsule     HYDROmorphone (DILAUDID) 2 MG tablet     Lancets (ONETOUCH DELICA PLUS [id number]) MISC     lidocaine-prilocaine (EMLA) 2.5-2.5 % external cream     lidocaine-prilocaine (EMLA) 2.5-2.5 % external cream     lipase-protease-amylase (CREON 24) 58836-19874-579343 units CPEP per EC capsule     pantoprazole (PROTONIX) 40 MG EC tablet     sodium bicarbonate 325 MG tablet     vitamin D3 (CHOLECALCIFEROL) 50 mcg (2000 units) tablet     acetaminophen (TYLENOL) 32 mg/mL liquid     baclofen (LIORESAL) 10 MG tablet     buprenorphine (BUTRANS) 10 MCG/HR WK patch     dexAMETHasone (DECADRON) 4 MG tablet     dicyclomine (BENTYL) 10 MG capsule     dicyclomine (BENTYL) 10 MG/5ML solution     insulin aspart (NOVOLOG PEN) 100 UNIT/ML pen     insulin glargine (BASAGLAR KWIKPEN) 100 UNIT/ML pen     insulin  UNIT/ML injection     loperamide (IMODIUM) 2 MG capsule     naloxone (NARCAN) 4 MG/0.1ML nasal spray      polyethylene glycol (MIRALAX) 17 GM/Dose powder     prochlorperazine (COMPAZINE) 10 MG tablet     psyllium (METAMUCIL/KONSYL) capsule     sennosides (SENOKOT) 8.8 MG/5ML syrup     simethicone (MYLICON) 125 MG chewable tablet     No current facility-administered medications for this visit.         Allergies:   No Known Allergies    Social History:    Social History     Tobacco Use     Smoking status: Never     Passive exposure: Never     Smokeless tobacco: Never   Vaping Use     Vaping Use: Never used   Substance Use Topics     Alcohol use: Not Currently     Drug use: Never         Family History:  Family History   Problem Relation Age of Onset     Diabetes Type 2  Father      Cirrhosis Father      Genetic Disorder Brother         missing [id number], mild retardation     Colon Polyps Brother      Pancreatic Cancer Paternal Aunt 85     Colon Cancer Paternal Uncle      Pancreatitis Cousin        Review of Systems   A 10-point review of systems was performed. Pertinent findings are noted in the HPI.    Physical Exam   ECOG Status: 2    Vitals:  /67 (BP Location: Right arm, Patient Position: Chair, Cuff Size: Adult Regular)   Pulse 103   Temp 99.8  F (37.7  C) (Oral)   Resp 18   Wt 75.3 kg (166 lb)   SpO2 97%   BMI 23.15 kg/m      Gen: Alert, in NAD  Head: NC/AT  Eyes: PERRL, EOMI, sclera anicteric  Ears: No external auricular lesions  Nose/sinus: No rhinorrhea or epistaxis  Oral cavity/oropharynx: MMM, no visible oral cavity lesions  Neck: Full ROM, supple, no palpable adenopathy  Pulm: No wheezing, stridor or respiratory distress  CV: Extremities are warm and well-perfused, no cyanosis, no pedal edema  Abdominal: Normal bowel sounds, soft, nontender, no masses  Musculoskeletal: Normal bulk and tone, mildly tender to palpation in lower thoracic/upper lumbar location  Skin: Normal color and turgor  Neuro: A/Ox3, CN II-XII intact, normal gait    Imaging/Path/Labs   Imaging: per HPI, personally reviewed and  in agreement    CT 10/24/23              Path: per HPI, personally reviewed and in agreement    Labs: per HPI, personally reviewed and in agreement    Assessment      Mr. Navarro is a 57 year old male with metastatic pancreatic cancer to T10.    He has demonstrated mild back pain, while on multiple pain medications, as well as interval progression of disease in this location. Given his symptoms and location, he was recommended palliative radiotherapy. He was initially seen in consultation with Dr. Anguiano at the [facility] but preferred treatment at [facility], and was thus referred here.     Plan     After extensive discussion, patient wishes to pursue palliative radiotherapy to T10.    CT simulation to be performed today, tentative start in the near future.  We will plan for 20 Gy/5 fractions      All benefits and risks discussed, and patient is in agreement with the oncologic plan discussed above.     Thank you for allowing me to participate in your patient's care.  If you should require any additional information, please do not hesitate in contacting me.     Ben Reaves MD  Department of Radiation Oncology  [facility]       Again, thank you for allowing me to participate in the care of your patient.        Sincerely,        Ben Reaves MD

## 2023-11-16 NOTE — TELEPHONE ENCOUNTER
Forms/Letter Request    Type of form/letter: Keila [facility] order id [id number]    Have you been seen for this request: N/A    Do we have the form/letter: Yes: placed in providers forms basket for review    When is form/letter needed by: ASAP    How would you like the form/letter returned: Fax  [phone].

## 2023-11-17 ENCOUNTER — PATIENT OUTREACH (OUTPATIENT)
Dept: GASTROENTEROLOGY | Facility: CLINIC | Age: 58
End: 2023-11-17
Payer: COMMERCIAL

## 2023-11-17 ENCOUNTER — TELEPHONE (OUTPATIENT)
Dept: FAMILY MEDICINE | Facility: CLINIC | Age: 58
End: 2023-11-17
Payer: COMMERCIAL

## 2023-11-17 ENCOUNTER — PATIENT OUTREACH (OUTPATIENT)
Dept: ONCOLOGY | Facility: CLINIC | Age: 58
End: 2023-11-17
Payer: COMMERCIAL

## 2023-11-17 DIAGNOSIS — R10.30 LOWER ABDOMINAL PAIN: ICD-10-CM

## 2023-11-17 RX ORDER — DICYCLOMINE HYDROCHLORIDE 10 MG/1
10 CAPSULE ORAL
Qty: 120 CAPSULE | Refills: 1 | Status: SHIPPED | OUTPATIENT
Start: 2023-11-17 | End: 2024-03-19

## 2023-11-17 RX ORDER — DICYCLOMINE HYDROCHLORIDE 10 MG/5ML
20 SOLUTION ORAL 4 TIMES DAILY PRN
Qty: 473 ML | Refills: 0 | Status: SHIPPED | OUTPATIENT
Start: 2023-11-17 | End: 2023-11-29

## 2023-11-17 NOTE — TELEPHONE ENCOUNTER
Forms/Letter Request    Type of form/letter:  [facility]     Have you been seen for this request: N/A    Do we have the form/letter: Yes: Will place form on providers desk    When is form/letter needed by: asap    How would you like the form/letter returned: Fax : [phone]

## 2023-11-17 NOTE — TELEPHONE ENCOUNTER
[facility]: Cancer Care                                                                                          [facility] Prior Authorization Request for In-Network Benefits filled out and faxed to [phone] requesting approval for pt to see Out of Network Clinician Dr. Duc Benoit at [facility] in [place], [place] Clinic Tax ID [id number].    Pt also requesting referrals to:    [facility] in [place], [place]  [facility] in [facility] in [facility] in [place]    Had advised pt to gather as much information as he can about each facility to expedite writer's requests to [facility].    Signature:  Efra Lynn RN

## 2023-11-17 NOTE — TELEPHONE ENCOUNTER
"Called patient to discuss deflated GJ balloon    Per procedure note 8-17-23  Follow-up w Dr Greenberg in clinic in couple of weeks  IF oral diet not adequately tolerated, plan to remove     AXIOS to avoid circular reflux, and if pt tolerating solids, but having reflux, remove GJ and replace w    Gastrostomy plug..     Dr. Greenberg directed no clinic follow up.    Called patient to triage tube issues and PO intake.     Tube is taped down, has moved out    Feeding at night, has 3 bottles of feeds overnight. Patient is eating \"he's figured out his diet\", 'eats when he's hungry\", small frequent meals, tries to drink more water. Is hydrated.  Patient wondering if he still needs the tube. Tube has been in for 6 months.     Message sent to [facility] dietitians to do PO intake assessment to determine if he needs tube replaced or not.     Message sent to Dr. Greenberg to make sure no action needed on axios stent.    ML  "

## 2023-11-17 NOTE — CONFIDENTIAL NOTE
Bentyl Refill   Last prescribing provider: Megan Farrell     Last clinic visit date: 11/6/23 DR Haile     Recommendations for requested medication (if none, N/A): n/a    Any other pertinent information (if none, N/A): N/A    Refilled: Y/N, if NO, why?

## 2023-11-17 NOTE — TELEPHONE ENCOUNTER
Pt called to change bentyl formulation to capsule instead of suspension, he state he cannot stand he taste of the suspension. Also stated acetaminophen dispensed by [facility] is a very thick liquid and he preferred the thinner concentration he was given when first prescribed. Reviewed history of refills and all concentration has been 32 mg/ml, 325 mg to 650 mg orally or g-tube. Informed pt may have been pharmacy preference which suspension type he received, will send one fill to original [facility] for  today if this is the concentration they prefer.

## 2023-11-20 ENCOUNTER — APPOINTMENT (OUTPATIENT)
Dept: RADIATION ONCOLOGY | Facility: CLINIC | Age: 58
End: 2023-11-20
Payer: COMMERCIAL

## 2023-11-20 ENCOUNTER — MEDICAL CORRESPONDENCE (OUTPATIENT)
Dept: HEALTH INFORMATION MANAGEMENT | Facility: CLINIC | Age: 58
End: 2023-11-20

## 2023-11-20 ENCOUNTER — MYC MEDICAL ADVICE (OUTPATIENT)
Dept: GASTROENTEROLOGY | Facility: CLINIC | Age: 58
End: 2023-11-20
Payer: COMMERCIAL

## 2023-11-20 PROCEDURE — 77307 TELETHX ISODOSE PLAN CPLX: CPT | Performed by: SURGERY

## 2023-11-20 NOTE — TELEPHONE ENCOUNTER
"Per Dr. Greenberg related to tube removal:  Yes RN removal of PEGJ no problem. Follow-up w us if symptomatic per onc and palliative     Per dietitian note:  Steve Soliz, this is Cindy one of the [facility] dietitians who has been working with Gallo. I received a message that you would like an intake assessment. His supplemental tube feeds were providing him ~1,100 kcal and 50 gm protein overnight. We discussed how to get those calories and protein via food and nutrition shakes. He is confident that he can increase his oral intake but hasn't done it yet. Ideally I would like to see him make that change and have a stable weight x1-2 weeks before officially deciding. Additionally Dr. Hernandez's note from 10/4/23 also said that they would prefer to leave the tube in in case it is needed in the future.     Called patient to follow up on request for tube removal. Reviewed dietitian recommendations to keep tube in a a bit longer to make sure he can maintain calories.     Patient adamant that he wants tube out now. \"I am an adult and I can make an adult decisions, I want to travel and I don't want this tube in my way\"    Patient wants 9am visit for tube removal on Wed , agreed to this plan. Advised on NPO guidelines before/after, RN clinic visit scheduled.     ML  "

## 2023-11-21 ENCOUNTER — MYC MEDICAL ADVICE (OUTPATIENT)
Dept: FAMILY MEDICINE | Facility: CLINIC | Age: 58
End: 2023-11-21
Payer: COMMERCIAL

## 2023-11-21 DIAGNOSIS — C79.51 MALIGNANT NEOPLASM METASTATIC TO BONE (H): ICD-10-CM

## 2023-11-21 DIAGNOSIS — C25.0 MALIGNANT NEOPLASM OF HEAD OF PANCREAS (H): Primary | ICD-10-CM

## 2023-11-22 ENCOUNTER — APPOINTMENT (OUTPATIENT)
Dept: RADIATION ONCOLOGY | Facility: CLINIC | Age: 58
End: 2023-11-22
Payer: COMMERCIAL

## 2023-11-22 ENCOUNTER — TELEPHONE (OUTPATIENT)
Dept: FAMILY MEDICINE | Facility: CLINIC | Age: 58
End: 2023-11-22

## 2023-11-22 ENCOUNTER — ALLIED HEALTH/NURSE VISIT (OUTPATIENT)
Dept: GASTROENTEROLOGY | Facility: CLINIC | Age: 58
End: 2023-11-22
Payer: COMMERCIAL

## 2023-11-22 ENCOUNTER — MEDICAL CORRESPONDENCE (OUTPATIENT)
Dept: HEALTH INFORMATION MANAGEMENT | Facility: CLINIC | Age: 58
End: 2023-11-22

## 2023-11-22 DIAGNOSIS — Z46.59 ENCOUNTER FOR CARE RELATED TO FEEDING TUBE: Primary | ICD-10-CM

## 2023-11-22 PROCEDURE — 77402 RADIATION TX DELIVERY LVL 1: CPT | Performed by: SURGERY

## 2023-11-22 PROCEDURE — 77280 THER RAD SIMULAJ FIELD SMPL: CPT | Performed by: SURGERY

## 2023-11-22 PROCEDURE — 99211 OFF/OP EST MAY X REQ PHY/QHP: CPT

## 2023-11-22 NOTE — PROGRESS NOTES
Gallo Navarro comes into clinic today at the request of Dr Greenberg and ordering provider for evaluation of feeding tube removal. Balloon has broken, patient has pushed tube back into the tract. Patient is not using it for feeds, no longer wants to use it for feeds and feels strongly that it should be removed.    Objective:  Tube was placed on 8/17/23 and is intact, but with broken balloon. Balloon was completely aspirated prior to removal as a precaution- 1cc removed.     Tube removed without difficulty. Site dressed, supplies provided. Patient understands site could leak for a few weeks. Patient has upcoming travel to [place], advised that he not submerge the site in pool/ocean unless completely healed.     Patient will contact us with future needs.     Heydi Crowder, RN Care Coordinator  Dr. Greenberg & Dr. Murrieta   [facility]  [phone]

## 2023-11-22 NOTE — TELEPHONE ENCOUNTER
Forms/Letter Request    Type of form/letter: [facility] [place] [id number]    Have you been seen for this request: N/A    Do we have the form/letter: Yes: placed in providers forms basket for review    When is form/letter needed by: ASAP    How would you like the form/letter returned: Fax  [phone]

## 2023-11-24 ENCOUNTER — APPOINTMENT (OUTPATIENT)
Dept: RADIATION ONCOLOGY | Facility: CLINIC | Age: 58
End: 2023-11-24
Payer: COMMERCIAL

## 2023-11-24 PROCEDURE — 77402 RADIATION TX DELIVERY LVL 1: CPT | Performed by: SURGERY

## 2023-11-27 ENCOUNTER — APPOINTMENT (OUTPATIENT)
Dept: RADIATION ONCOLOGY | Facility: CLINIC | Age: 58
End: 2023-11-27
Payer: COMMERCIAL

## 2023-11-27 DIAGNOSIS — Z79.4 TYPE 2 DIABETES MELLITUS WITHOUT COMPLICATION, WITH LONG-TERM CURRENT USE OF INSULIN (H): ICD-10-CM

## 2023-11-27 DIAGNOSIS — C25.0 MALIGNANT NEOPLASM OF HEAD OF PANCREAS (H): ICD-10-CM

## 2023-11-27 DIAGNOSIS — K31.1 GASTRIC OUTLET OBSTRUCTION: ICD-10-CM

## 2023-11-27 DIAGNOSIS — C25.9 PANCREATIC ADENOCARCINOMA (H): ICD-10-CM

## 2023-11-27 DIAGNOSIS — E11.9 TYPE 2 DIABETES MELLITUS WITHOUT COMPLICATION, WITH LONG-TERM CURRENT USE OF INSULIN (H): ICD-10-CM

## 2023-11-27 DIAGNOSIS — G89.3 CANCER ASSOCIATED PAIN: ICD-10-CM

## 2023-11-27 PROCEDURE — 77417 THER RADIOLOGY PORT IMAGE(S): CPT | Performed by: RADIOLOGY

## 2023-11-27 PROCEDURE — 77402 RADIATION TX DELIVERY LVL 1: CPT | Performed by: RADIOLOGY

## 2023-11-27 RX ORDER — HYDROMORPHONE HYDROCHLORIDE 2 MG/1
2-4 TABLET ORAL EVERY 4 HOURS PRN
Qty: 180 TABLET | Refills: 0 | Status: SHIPPED | OUTPATIENT
Start: 2023-11-27 | End: 2023-12-05

## 2023-11-27 RX ORDER — BUPRENORPHINE 20 UG/H
1 PATCH TRANSDERMAL WEEKLY
Qty: 4 PATCH | Refills: 3 | Status: SHIPPED | OUTPATIENT
Start: 2023-11-27 | End: 2024-01-16

## 2023-11-27 NOTE — TELEPHONE ENCOUNTER
Received voicemail from patient requesting refill of butrans 20 mcg patch and hydrmorphone.     Last refill of butrans 20 mcg patch: 10/24/23  Last refill of hydrmorphone: 11/2/23  Last office visit: 10/23/23  Scheduled for follow up 12/5/23     Will route request to NP for review.     Reviewed [place]  Report.

## 2023-11-28 ENCOUNTER — ONCOLOGY VISIT (OUTPATIENT)
Dept: ONCOLOGY | Facility: CLINIC | Age: 58
End: 2023-11-28
Payer: COMMERCIAL

## 2023-11-28 ENCOUNTER — APPOINTMENT (OUTPATIENT)
Dept: LAB | Facility: CLINIC | Age: 58
End: 2023-11-28
Attending: STUDENT IN AN ORGANIZED HEALTH CARE EDUCATION/TRAINING PROGRAM
Payer: COMMERCIAL

## 2023-11-28 ENCOUNTER — MYC REFILL (OUTPATIENT)
Dept: ONCOLOGY | Facility: CLINIC | Age: 58
End: 2023-11-28

## 2023-11-28 ENCOUNTER — APPOINTMENT (OUTPATIENT)
Dept: RADIATION ONCOLOGY | Facility: CLINIC | Age: 58
End: 2023-11-28
Payer: COMMERCIAL

## 2023-11-28 ENCOUNTER — INFUSION THERAPY VISIT (OUTPATIENT)
Dept: ONCOLOGY | Facility: CLINIC | Age: 58
End: 2023-11-28
Attending: STUDENT IN AN ORGANIZED HEALTH CARE EDUCATION/TRAINING PROGRAM
Payer: COMMERCIAL

## 2023-11-28 VITALS
BODY MASS INDEX: 23.89 KG/M2 | RESPIRATION RATE: 16 BRPM | OXYGEN SATURATION: 98 % | HEART RATE: 74 BPM | WEIGHT: 171.3 LBS | DIASTOLIC BLOOD PRESSURE: 79 MMHG | SYSTOLIC BLOOD PRESSURE: 119 MMHG | TEMPERATURE: 98.1 F

## 2023-11-28 DIAGNOSIS — Z51.11 ENCOUNTER FOR ANTINEOPLASTIC CHEMOTHERAPY: Primary | ICD-10-CM

## 2023-11-28 DIAGNOSIS — C25.0 MALIGNANT NEOPLASM OF HEAD OF PANCREAS (H): Primary | ICD-10-CM

## 2023-11-28 DIAGNOSIS — C25.0 MALIGNANT NEOPLASM OF HEAD OF PANCREAS (H): ICD-10-CM

## 2023-11-28 DIAGNOSIS — Z51.11 ENCOUNTER FOR ANTINEOPLASTIC CHEMOTHERAPY: ICD-10-CM

## 2023-11-28 DIAGNOSIS — I26.94 MULTIPLE SUBSEGMENTAL PULMONARY EMBOLI WITHOUT ACUTE COR PULMONALE (H): ICD-10-CM

## 2023-11-28 LAB
ALBUMIN SERPL BCG-MCNC: 3.5 G/DL (ref 3.5–5.2)
ALP SERPL-CCNC: 108 U/L (ref 40–150)
ALT SERPL W P-5'-P-CCNC: 26 U/L (ref 0–70)
ANION GAP SERPL CALCULATED.3IONS-SCNC: 8 MMOL/L (ref 7–15)
AST SERPL W P-5'-P-CCNC: 31 U/L (ref 0–45)
BASOPHILS # BLD AUTO: 0.1 10E3/UL (ref 0–0.2)
BASOPHILS NFR BLD AUTO: 2 %
BILIRUB SERPL-MCNC: 0.2 MG/DL
BUN SERPL-MCNC: 14.5 MG/DL (ref 6–20)
CALCIUM SERPL-MCNC: 8.8 MG/DL (ref 8.6–10)
CHLORIDE SERPL-SCNC: 106 MMOL/L (ref 98–107)
CREAT SERPL-MCNC: 0.59 MG/DL (ref 0.67–1.17)
DEPRECATED HCO3 PLAS-SCNC: 27 MMOL/L (ref 22–29)
EGFRCR SERPLBLD CKD-EPI 2021: >90 ML/MIN/1.73M2
EOSINOPHIL # BLD AUTO: 0.2 10E3/UL (ref 0–0.7)
EOSINOPHIL NFR BLD AUTO: 4 %
ERYTHROCYTE [DISTWIDTH] IN BLOOD BY AUTOMATED COUNT: 17 % (ref 10–15)
GLUCOSE SERPL-MCNC: 138 MG/DL (ref 70–99)
HCT VFR BLD AUTO: 30.1 % (ref 40–53)
HGB BLD-MCNC: 9.2 G/DL (ref 13.3–17.7)
IMM GRANULOCYTES # BLD: 0 10E3/UL
IMM GRANULOCYTES NFR BLD: 1 %
LYMPHOCYTES # BLD AUTO: 1.2 10E3/UL (ref 0.8–5.3)
LYMPHOCYTES NFR BLD AUTO: 29 %
MCH RBC QN AUTO: 29.1 PG (ref 26.5–33)
MCHC RBC AUTO-ENTMCNC: 30.6 G/DL (ref 31.5–36.5)
MCV RBC AUTO: 95 FL (ref 78–100)
MONOCYTES # BLD AUTO: 0.6 10E3/UL (ref 0–1.3)
MONOCYTES NFR BLD AUTO: 15 %
NEUTROPHILS # BLD AUTO: 2.1 10E3/UL (ref 1.6–8.3)
NEUTROPHILS NFR BLD AUTO: 49 %
NRBC # BLD AUTO: 0 10E3/UL
NRBC BLD AUTO-RTO: 0 /100
PLATELET # BLD AUTO: 254 10E3/UL (ref 150–450)
POTASSIUM SERPL-SCNC: 4.1 MMOL/L (ref 3.4–5.3)
PROT SERPL-MCNC: 6 G/DL (ref 6.4–8.3)
RBC # BLD AUTO: 3.16 10E6/UL (ref 4.4–5.9)
SODIUM SERPL-SCNC: 141 MMOL/L (ref 135–145)
WBC # BLD AUTO: 4.3 10E3/UL (ref 4–11)

## 2023-11-28 PROCEDURE — 80053 COMPREHEN METABOLIC PANEL: CPT

## 2023-11-28 PROCEDURE — 250N000011 HC RX IP 250 OP 636: Mod: JW

## 2023-11-28 PROCEDURE — 36591 DRAW BLOOD OFF VENOUS DEVICE: CPT

## 2023-11-28 PROCEDURE — 96413 CHEMO IV INFUSION 1 HR: CPT

## 2023-11-28 PROCEDURE — 96417 CHEMO IV INFUS EACH ADDL SEQ: CPT

## 2023-11-28 PROCEDURE — 250N000011 HC RX IP 250 OP 636: Mod: JZ

## 2023-11-28 PROCEDURE — 258N000003 HC RX IP 258 OP 636

## 2023-11-28 PROCEDURE — 99214 OFFICE O/P EST MOD 30 MIN: CPT

## 2023-11-28 PROCEDURE — 96375 TX/PRO/DX INJ NEW DRUG ADDON: CPT

## 2023-11-28 PROCEDURE — 99213 OFFICE O/P EST LOW 20 MIN: CPT

## 2023-11-28 PROCEDURE — 85025 COMPLETE CBC W/AUTO DIFF WBC: CPT

## 2023-11-28 PROCEDURE — 86301 IMMUNOASSAY TUMOR CA 19-9: CPT

## 2023-11-28 PROCEDURE — 77402 RADIATION TX DELIVERY LVL 1: CPT | Performed by: RADIOLOGY

## 2023-11-28 RX ORDER — HEPARIN SODIUM (PORCINE) LOCK FLUSH IV SOLN 100 UNIT/ML 100 UNIT/ML
5 SOLUTION INTRAVENOUS
Status: DISCONTINUED | OUTPATIENT
Start: 2023-11-28 | End: 2023-11-28 | Stop reason: HOSPADM

## 2023-11-28 RX ORDER — METHYLPREDNISOLONE SODIUM SUCCINATE 125 MG/2ML
125 INJECTION, POWDER, LYOPHILIZED, FOR SOLUTION INTRAMUSCULAR; INTRAVENOUS
Status: CANCELLED
Start: 2023-12-05

## 2023-11-28 RX ORDER — ONDANSETRON 2 MG/ML
8 INJECTION INTRAMUSCULAR; INTRAVENOUS ONCE
Status: CANCELLED | OUTPATIENT
Start: 2023-12-05

## 2023-11-28 RX ORDER — HEPARIN SODIUM,PORCINE 10 UNIT/ML
5-20 VIAL (ML) INTRAVENOUS DAILY PRN
Status: CANCELLED | OUTPATIENT
Start: 2023-11-28

## 2023-11-28 RX ORDER — HEPARIN SODIUM (PORCINE) LOCK FLUSH IV SOLN 100 UNIT/ML 100 UNIT/ML
5 SOLUTION INTRAVENOUS
Status: CANCELLED | OUTPATIENT
Start: 2023-12-05

## 2023-11-28 RX ORDER — LORAZEPAM 2 MG/ML
0.5 INJECTION INTRAMUSCULAR EVERY 4 HOURS PRN
Status: CANCELLED | OUTPATIENT
Start: 2023-12-12

## 2023-11-28 RX ORDER — ALBUTEROL SULFATE 90 UG/1
1-2 AEROSOL, METERED RESPIRATORY (INHALATION)
Status: CANCELLED
Start: 2023-12-12

## 2023-11-28 RX ORDER — MEPERIDINE HYDROCHLORIDE 25 MG/ML
25 INJECTION INTRAMUSCULAR; INTRAVENOUS; SUBCUTANEOUS EVERY 30 MIN PRN
Status: CANCELLED | OUTPATIENT
Start: 2023-12-12

## 2023-11-28 RX ORDER — EPINEPHRINE 1 MG/ML
0.3 INJECTION, SOLUTION INTRAMUSCULAR; SUBCUTANEOUS EVERY 5 MIN PRN
Status: CANCELLED | OUTPATIENT
Start: 2023-12-05

## 2023-11-28 RX ORDER — PACLITAXEL 100 MG/20ML
125 INJECTION, POWDER, LYOPHILIZED, FOR SUSPENSION INTRAVENOUS ONCE
Status: CANCELLED | OUTPATIENT
Start: 2023-11-28

## 2023-11-28 RX ORDER — ALBUTEROL SULFATE 0.83 MG/ML
2.5 SOLUTION RESPIRATORY (INHALATION)
Status: CANCELLED | OUTPATIENT
Start: 2023-11-28

## 2023-11-28 RX ORDER — HEPARIN SODIUM,PORCINE 10 UNIT/ML
5-20 VIAL (ML) INTRAVENOUS DAILY PRN
Status: CANCELLED | OUTPATIENT
Start: 2023-12-05

## 2023-11-28 RX ORDER — PACLITAXEL 100 MG/20ML
125 INJECTION, POWDER, LYOPHILIZED, FOR SUSPENSION INTRAVENOUS ONCE
Status: COMPLETED | OUTPATIENT
Start: 2023-11-28 | End: 2023-11-28

## 2023-11-28 RX ORDER — ALBUTEROL SULFATE 0.83 MG/ML
2.5 SOLUTION RESPIRATORY (INHALATION)
Status: CANCELLED | OUTPATIENT
Start: 2023-12-12

## 2023-11-28 RX ORDER — HEPARIN SODIUM (PORCINE) LOCK FLUSH IV SOLN 100 UNIT/ML 100 UNIT/ML
5 SOLUTION INTRAVENOUS DAILY PRN
Status: DISCONTINUED | OUTPATIENT
Start: 2023-11-28 | End: 2023-11-29 | Stop reason: HOSPADM

## 2023-11-28 RX ORDER — DIPHENHYDRAMINE HYDROCHLORIDE 50 MG/ML
50 INJECTION INTRAMUSCULAR; INTRAVENOUS
Status: CANCELLED
Start: 2023-11-28

## 2023-11-28 RX ORDER — HEPARIN SODIUM,PORCINE 10 UNIT/ML
5-20 VIAL (ML) INTRAVENOUS DAILY PRN
Status: CANCELLED | OUTPATIENT
Start: 2023-12-12

## 2023-11-28 RX ORDER — METHYLPREDNISOLONE SODIUM SUCCINATE 125 MG/2ML
125 INJECTION, POWDER, LYOPHILIZED, FOR SOLUTION INTRAMUSCULAR; INTRAVENOUS
Status: CANCELLED
Start: 2023-12-12

## 2023-11-28 RX ORDER — LORAZEPAM 2 MG/ML
0.5 INJECTION INTRAMUSCULAR EVERY 4 HOURS PRN
Status: CANCELLED | OUTPATIENT
Start: 2023-12-05

## 2023-11-28 RX ORDER — PROCHLORPERAZINE MALEATE 10 MG
10 TABLET ORAL EVERY 6 HOURS PRN
Qty: 30 TABLET | Refills: 2 | OUTPATIENT
Start: 2023-11-28

## 2023-11-28 RX ORDER — HEPARIN SODIUM (PORCINE) LOCK FLUSH IV SOLN 100 UNIT/ML 100 UNIT/ML
5 SOLUTION INTRAVENOUS
Status: CANCELLED | OUTPATIENT
Start: 2023-12-12

## 2023-11-28 RX ORDER — HEPARIN SODIUM (PORCINE) LOCK FLUSH IV SOLN 100 UNIT/ML 100 UNIT/ML
5 SOLUTION INTRAVENOUS
Status: CANCELLED | OUTPATIENT
Start: 2023-11-28

## 2023-11-28 RX ORDER — MEPERIDINE HYDROCHLORIDE 25 MG/ML
25 INJECTION INTRAMUSCULAR; INTRAVENOUS; SUBCUTANEOUS EVERY 30 MIN PRN
Status: CANCELLED | OUTPATIENT
Start: 2023-11-28

## 2023-11-28 RX ORDER — LORAZEPAM 2 MG/ML
0.5 INJECTION INTRAMUSCULAR EVERY 4 HOURS PRN
Status: CANCELLED | OUTPATIENT
Start: 2023-11-28

## 2023-11-28 RX ORDER — PACLITAXEL 100 MG/20ML
125 INJECTION, POWDER, LYOPHILIZED, FOR SUSPENSION INTRAVENOUS ONCE
Status: CANCELLED | OUTPATIENT
Start: 2023-12-12

## 2023-11-28 RX ORDER — ALBUTEROL SULFATE 90 UG/1
1-2 AEROSOL, METERED RESPIRATORY (INHALATION)
Status: CANCELLED
Start: 2023-12-05

## 2023-11-28 RX ORDER — ONDANSETRON 2 MG/ML
8 INJECTION INTRAMUSCULAR; INTRAVENOUS ONCE
Status: CANCELLED | OUTPATIENT
Start: 2023-11-28

## 2023-11-28 RX ORDER — DIPHENHYDRAMINE HYDROCHLORIDE 50 MG/ML
50 INJECTION INTRAMUSCULAR; INTRAVENOUS
Status: CANCELLED
Start: 2023-12-05

## 2023-11-28 RX ORDER — EPINEPHRINE 1 MG/ML
0.3 INJECTION, SOLUTION INTRAMUSCULAR; SUBCUTANEOUS EVERY 5 MIN PRN
Status: CANCELLED | OUTPATIENT
Start: 2023-12-12

## 2023-11-28 RX ORDER — EPINEPHRINE 1 MG/ML
0.3 INJECTION, SOLUTION INTRAMUSCULAR; SUBCUTANEOUS EVERY 5 MIN PRN
Status: CANCELLED | OUTPATIENT
Start: 2023-11-28

## 2023-11-28 RX ORDER — ONDANSETRON 2 MG/ML
8 INJECTION INTRAMUSCULAR; INTRAVENOUS ONCE
Status: COMPLETED | OUTPATIENT
Start: 2023-11-28 | End: 2023-11-28

## 2023-11-28 RX ORDER — METHYLPREDNISOLONE SODIUM SUCCINATE 125 MG/2ML
125 INJECTION, POWDER, LYOPHILIZED, FOR SOLUTION INTRAMUSCULAR; INTRAVENOUS
Status: CANCELLED
Start: 2023-11-28

## 2023-11-28 RX ORDER — ONDANSETRON 2 MG/ML
8 INJECTION INTRAMUSCULAR; INTRAVENOUS ONCE
Status: CANCELLED | OUTPATIENT
Start: 2023-12-12

## 2023-11-28 RX ORDER — MEPERIDINE HYDROCHLORIDE 25 MG/ML
25 INJECTION INTRAMUSCULAR; INTRAVENOUS; SUBCUTANEOUS EVERY 30 MIN PRN
Status: CANCELLED | OUTPATIENT
Start: 2023-12-05

## 2023-11-28 RX ORDER — ALBUTEROL SULFATE 90 UG/1
1-2 AEROSOL, METERED RESPIRATORY (INHALATION)
Status: CANCELLED
Start: 2023-11-28

## 2023-11-28 RX ORDER — ALBUTEROL SULFATE 0.83 MG/ML
2.5 SOLUTION RESPIRATORY (INHALATION)
Status: CANCELLED | OUTPATIENT
Start: 2023-12-05

## 2023-11-28 RX ORDER — DIPHENHYDRAMINE HYDROCHLORIDE 50 MG/ML
50 INJECTION INTRAMUSCULAR; INTRAVENOUS
Status: CANCELLED
Start: 2023-12-12

## 2023-11-28 RX ORDER — PACLITAXEL 100 MG/20ML
125 INJECTION, POWDER, LYOPHILIZED, FOR SUSPENSION INTRAVENOUS ONCE
Status: CANCELLED | OUTPATIENT
Start: 2023-12-05

## 2023-11-28 RX ADMIN — Medication 5 ML: at 06:44

## 2023-11-28 RX ADMIN — Medication 5 ML: at 09:59

## 2023-11-28 RX ADMIN — GEMCITABINE 2000 MG: 38 INJECTION, SOLUTION INTRAVENOUS at 09:25

## 2023-11-28 RX ADMIN — ONDANSETRON 8 MG: 2 INJECTION INTRAMUSCULAR; INTRAVENOUS at 08:07

## 2023-11-28 RX ADMIN — SODIUM CHLORIDE 250 ML: 9 INJECTION, SOLUTION INTRAVENOUS at 08:07

## 2023-11-28 RX ADMIN — PACLITAXEL 250 MG: 100 INJECTION, POWDER, LYOPHILIZED, FOR SUSPENSION INTRAVENOUS at 08:44

## 2023-11-28 ASSESSMENT — PAIN SCALES - GENERAL: PAINLEVEL: NO PAIN (0)

## 2023-11-28 NOTE — TELEPHONE ENCOUNTER
Medication:prochlorperazine  Last prescribing provider:Megan Farrell CNP  Last clinic visit date: 11/28/23 Megan Farrell CNP  Recommendations for requested medication:for nausea  Any other pertinent information:last ordered on 11/20/23 with 2 refills.     1436 This writer called [facility] spoke to Ivory  and there is refill ready for pt for  at pharmacy since 11/24/23.

## 2023-11-28 NOTE — LETTER
11/28/2023         RE: Gallo Navarro  [address] [address] [place] [id number]        Dear Colleague,    Thank you for referring your patient, Gallo Navarro, to the [facility]. Please see a copy of my visit note below.    Oncology/Hematology Visit Note  Nov 28, 2023    Reason for Visit: follow up of locally advanced, unresectable pancreatic cancer, consideration of cycle 6 FOLFIRINOX and monitoring of toxicities associated with chemotherapy.     History of Present Illness: Gallo Navarro is a 57 year old male with locally advanced, unresectable pancreatic cancer. 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 He then presented to the ED with worsening abdominal pain, increased jaundice, poor PO intake and weight loss admitted on 7/8/2023 for concern of biliary obstruction. Unfortunately, during this admission, biopsy of pancreatic mass returned positive for pancreatic adenocarcinoma on 7/12/23. He started on treatment with 5FU, irinotecan, and oxaliplatin (FOLFIRINOX) on 7/31/23. Please see previous notes for further details on the patient's history.     8/17/23 - ERCP/EGD, duodenal stents x2 placed, exchange of GJ tube    8/21-/8/25 hospitalized due to diarrhea, colitis, abdominal pain.      8/29 - Cycle 3 deferred due to neutropenia.   Neulasta added. Irinotecan dose reduced 20% due to symptoms including cramping, double vision and slurred speech during infusion.      10/24/23 CT CAP with similar to slight increase in size of peripancreatic and mesenteric  lymph nodes, enlargement of previous skeletal metastasis as well as new sclerotic metastasis to left posterior 5th rib, enlarging pulmonary nodule, and unchanged pancreatic head mass. Also unclear concerns for PE,   right lower lobe segmental PE confirmed on CT-PE. Started on apixaban.     10/31/23 Cycle 1 gemzar, abraxane  11/22/23 Removal of GJ tube.       Gallo returns today for close follow up and consideration of Gemzar, Abraxane.      Interval History:    Appetite has been good, continues to tolerate oral intake well. Gaining weight. Is careful what he eats due to duodenal stent. Denies nausea ,vomiting or reflux. Denies bowel concerns.     More fatigue since starting radiation, still remains very active. Pain is well controlled with Butrans patch and oral Dilaudid.     Had a fever after cycle 1 + 1st dose of Zometa. Highest temperature reported ~102, wife reported altered mental status as well. Called EMS but symptoms resolved and vitals improved and was not taken in. He denies any other fevers, chills or signs/symptoms of infection. Denies cough, chest pain or shortness of breath. No urinary concerns.     Traveling to [place] in December.     Review of Systems:  Patient denies any of the following except if noted above: fevers, chills, difficulty with energy, vision or hearing changes, chest pain, dyspnea, abdominal pain, nausea, vomiting, diarrhea, constipation, urinary concerns, headaches, numbness, tingling, issues with sleep or mood. He also denies lumps, bumps, rashes or skin lesions, bleeding or bruising issues.      Current Outpatient Medications   Medication Sig Dispense Refill    insulin aspart (NOVOLOG PEN) 100 UNIT/ML pen Inject 1-10 Units Subcutaneous 3 times daily (with meals) 15 mL 3    acetaminophen (TYLENOL) 32 mg/mL liquid Take 20.313 mLs (650 mg) by mouth every 8 hours 1800 mL 0    apixaban ANTICOAGULANT (ELIQUIS) 5 MG tablet Take 1 tablet (5 mg) by mouth 2 times daily for 90 days 60 tablet 2    B-D U/F 31G X 8 MM insulin pen needle Inject Subcutaneous 5 times daily Use 5 pen needles daily or as directed. 500 each 1    baclofen (LIORESAL) 10 MG tablet Take 0.5-1 tablets (5-10 mg) by mouth 3 times daily  as needed for other (hiccups) (Patient not taking: Reported on 11/16/2023) 30 tablet 0    blood glucose (FREESTYLE TEST STRIPS) test strip by Other route as needed      buprenorphine (BUTRANS) 10 MCG/HR WK patch Place 1 patch onto the skin every 7 days Use with 20 mcg/hour patch for 30 mcg/hour total. 4 patch 3    buprenorphine (BUTRANS) 20 MCG/HR WK patch Place 1 patch onto the skin once a week Use with 10 mcg/hour patch for 30 mcg/hour total. 4 patch 3    Continuous Blood Gluc Sensor (FREESTYLE KAREN 2 SENSOR) MISC Change every 14 days 6 each 3    dexAMETHasone (DECADRON) 4 MG tablet Take 2 tablets (8 mg) by mouth daily Take for 2 days, starting the day after chemo. Take with food. (Patient not taking: Reported on 11/8/2023) 4 tablet 2    dicyclomine (BENTYL) 10 MG capsule Take 1 capsule (10 mg) by mouth 4 times daily (before meals and nightly) 120 capsule 1    dicyclomine (BENTYL) 10 MG/5ML solution Take 10 mLs (20 mg) by mouth 4 times daily as needed (abdominal discomfort, best before meals) 473 mL 0    gabapentin (NEURONTIN) 300 MG capsule Take 1 capsule (300 mg) by mouth 3 times daily 90 capsule 0    HYDROmorphone (DILAUDID) 2 MG tablet Take 1-2 tablets (2-4 mg) by mouth every 4 hours as needed for severe pain 180 tablet 0    insulin glargine (BASAGLAR KWIKPEN) 100 UNIT/ML pen Inject 20 Units Subcutaneous every morning for 360 days (Patient not taking: Reported on 11/8/2023) 18 mL 3    insulin  UNIT/ML injection Inject 16 Units Subcutaneous every evening Take 20 units day of chemo and two days after while on steroid (Patient not taking: Reported on 11/8/2023) 9 mL 1    Lancets (ONETOUCH DELICA PLUS [id number]) MISC       lidocaine-prilocaine (EMLA) 2.5-2.5 % external cream Use 1-2 times a week or as needed prior to port access 30 g 3    lidocaine-prilocaine (EMLA) 2.5-2.5 % external cream Use 1-2 times weekly or as needed prior to port access 30 g 3    lipase-protease-amylase (CREON 24)  18613-10735-661772 units CPEP per EC capsule 2-3 capsules with meals 3 times a day and 1-2 capsules with snacks max of 15 capsules a day 180 capsule 3    loperamide (IMODIUM) 2 MG capsule 2 caps at 1st sign of diarrhea & 1 cap every 2hrs until 12hrs diarrhea free. During night, 2 caps at bedtime & 2 caps every 4hrs until AM (Patient not taking: Reported on 11/8/2023) 100 capsule 3    naloxone (NARCAN) 4 MG/0.1ML nasal spray Spray 1 spray (4 mg) into one nostril alternating nostrils as needed for opioid reversal every 2-3 minutes until assistance arrives (Patient not taking: Reported on 11/16/2023) 0.2 mL 11    pantoprazole (PROTONIX) 40 MG EC tablet Take 1 tablet (40 mg) by mouth 2 times daily 60 tablet 4    polyethylene glycol (MIRALAX) 17 GM/Dose powder Take 17 g by mouth daily (Patient not taking: Reported on 11/8/2023) 510 g 3    prochlorperazine (COMPAZINE) 10 MG tablet Take 1 tablet (10 mg) by mouth every 6 hours as needed for nausea or vomiting 30 tablet 2    psyllium (METAMUCIL/KONSYL) capsule 1 capsule by Per G Tube route 2 times daily (Patient not taking: Reported on 11/8/2023) 180 capsule 3    sennosides (SENOKOT) 8.8 MG/5ML syrup 5 mLs by Per J Tube route 2 times daily (Patient not taking: Reported on 11/8/2023) 236 mL 3    simethicone (MYLICON) 125 MG chewable tablet Take 125 mg by mouth 2 times daily (Patient not taking: Reported on 11/8/2023)      sodium bicarbonate 325 MG tablet 1 tablet (325 mg) by Per J Tube route 3 times daily 90 tablet 0    vitamin D3 (CHOLECALCIFEROL) 50 mcg (2000 units) tablet Take 1 tablet (50 mcg) by mouth daily 90 tablet 1     Physical Examination:  /79 (BP Location: Left arm, Patient Position: Sitting, Cuff Size: Adult Regular)   Pulse 74   Temp 98.1  F (36.7  C) (Oral)   Resp 16   Wt 77.7 kg (171 lb 4.8 oz)   SpO2 98%   BMI 23.89 kg/m    Wt Readings from Last 10 Encounters:   11/28/23 77.7 kg (171 lb 4.8 oz)   11/16/23 75.3 kg (166 lb)   11/14/23 75.4 kg (166  lb 3.2 oz)   11/08/23 75.3 kg (166 lb)   11/07/23 75.7 kg (166 lb 12.8 oz)   11/06/23 74.4 kg (164 lb)   10/31/23 73.9 kg (163 lb)   10/18/23 76.3 kg (168 lb 4.8 oz)   10/04/23 74.8 kg (165 lb)   10/03/23 74.4 kg (164 lb 1.6 oz)   General: The patient is a pleasant male in no acute distress.  HEENT: EOMI. Sclerae are anicteric. Mucous membranes moist, no lesions or thrush.   Lymph: Neck is supple with no lymphadenopathy in the cervical or supraclavicular areas.   Heart: Regular rate and rhythm.   Lungs: Clear to auscultation bilaterally.   Abdomen: Bowel sounds present, soft, nontender with no palpable hepatosplenomegaly or masses. Extremities: No edema noted to bilateral lower extremities.     Neuro: Cranial nerves II through XII are grossly intact.  Skin: No rashes, petechiae, or bruising noted on exposed skin.     Laboratory Data:  Most Recent 3 CBC's:  Recent Labs   Lab Test 11/28/23  0648 11/14/23  0720 11/07/23  0812   WBC 4.3 4.2 5.7   HGB 9.2* 7.9* 8.6*   MCV 95 91 91    133* 129*   ANEUTAUTO 2.1 2.7 3.8    Most Recent 3 BMP's:  Recent Labs   Lab Test 11/28/23  0648 10/31/23  1008 10/18/23  0648    138 140   POTASSIUM 4.1 4.2 4.1   CHLORIDE 106 101 104   CO2 27 28 28   BUN 14.5 12.3 11.8   CR 0.59* 0.50* 0.55*   ANIONGAP 8 9 8   DENISE 8.8 9.0 8.9   * 124* 110*   PROTTOTAL 6.0* 6.3* 5.8*   ALBUMIN 3.5 3.7 3.3*    Most Recent 2 LFT's:  Recent Labs   Lab Test 11/28/23  0648 10/31/23  1008   AST 31 20   ALT 26 14   ALKPHOS 108 198*   BILITOTAL 0.2 0.3   I reviewed the above labs today.        Assessment and Plan:  Locally advanced, unresectable pancreatic cancer. Patient started on treatment with palliative FOLFIRINOX on 7/31/23. He had a moderate amount of side effects following cycle 1 with dehydration, diarrhea, and nausea. Received cycle 2 on 8/15/23. ERCP/EGD on 8/17 with GJ tube exchange and duodenal stents placed. Hospitalized 8/21-8/25 due to colitis and abdominal pain. Cycle 3 was  deferred due to neutropenia, Neulasta/Udenyca added on day 4.  - CT CAP 10/24 with progression in skeletal mets, lung nodule and lymph nodes. Stable pancreatic mass. Changed treatment to gemcitabine, Abraxane (day 1, 8, 15) and Zometa.  Tolerated cycle 1 well with the exception of a fever.   -Proceed with cycle 2 today, labs reviewed with no concerns.   -Received Zometa with cycle 1, had fever later that night, reports no other side effects or concerns. Will continue every 6 months.    -Return to clinic for day 8. Skipping day 15 with cycle 2 due to travel.  Follow up with labs and SUZETTE prior to cycle 3.     Pulmonary Embolism. Right lower segmental PE found on routine imaging. Started on apixiban, denies any bleeding concerns. Denies any breathing concerns today.      Nutrition.  Feeding tube removed. Continues to tolerate oral intake well. Gaining weight. No issues with nausea or vomiting. Continue to push oral hydration, drinking 64+ oz of fluid/day.  Continue Protonix daily.  Has compazine as needed for nausea.     Lower extremity edema. Resolved. Continue compression stockings and elevation. Continue to monitor.     Diabetes. Working closely with endocrine/diabetic educator on dose adjustments/management. Has only needed insulin 1-2 times over the last week.     Diarrhea/constipation.  Resolved. Bowel movements more regular with increased food intake. Continue Metamucil as needed.     Abdominal pain. Managed by palliative care with Butrans patch and oral Dilaudid.     Hiccups. Hasn't had hiccups with the past couple of doses of chemo. Previously tried Baclofen, but did not like the side effects including mild confusion. Has also tried gabapentin but did not find this helpful for hiccups.       Anemia. Normocytic. Suspect anemia of chronic disease. Hemoglobin stable. Will monitor for now.       30 minutes spent on the date of the encounter doing chart review, review of test results, interpretation of tests,  patient visit, and documentation     ESVIN Canales CNP

## 2023-11-28 NOTE — PROGRESS NOTES
Oncology/Hematology Visit Note  Nov 28, 2023    Reason for Visit: follow up of locally advanced, unresectable pancreatic cancer, consideration of cycle 6 FOLFIRINOX and monitoring of toxicities associated with chemotherapy.     History of Present Illness: Gallo Navarro is a 57 year old male with locally advanced, unresectable pancreatic cancer. 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 He then presented to the ED with worsening abdominal pain, increased jaundice, poor PO intake and weight loss admitted on 7/8/2023 for concern of biliary obstruction. Unfortunately, during this admission, biopsy of pancreatic mass returned positive for pancreatic adenocarcinoma on 7/12/23. He started on treatment with 5FU, irinotecan, and oxaliplatin (FOLFIRINOX) on 7/31/23. Please see previous notes for further details on the patient's history.     8/17/23 - ERCP/EGD, duodenal stents x2 placed, exchange of GJ tube    8/21-/8/25 hospitalized due to diarrhea, colitis, abdominal pain.      8/29 - Cycle 3 deferred due to neutropenia.   Neulasta added. Irinotecan dose reduced 20% due to symptoms including cramping, double vision and slurred speech during infusion.      10/24/23 CT CAP with similar to slight increase in size of peripancreatic and mesenteric  lymph nodes, enlargement of previous skeletal metastasis as well as new sclerotic metastasis to left posterior 5th rib, enlarging pulmonary nodule, and unchanged pancreatic head mass. Also unclear concerns for PE,  right lower lobe segmental PE confirmed on CT-PE. Started on apixaban.     10/31/23 Cycle 1 gemzar, abraxane  11/22/23 Removal of GJ tube.       Gallo returns today for close follow up and consideration of Gemzar, Abraxane.      Interval History:    Appetite  has been good, continues to tolerate oral intake well. Gaining weight. Is careful what he eats due to duodenal stent. Denies nausea ,vomiting or reflux. Denies bowel concerns.     More fatigue since starting radiation, still remains very active. Pain is well controlled with Butrans patch and oral Dilaudid.     Had a fever after cycle 1 + 1st dose of Zometa. Highest temperature reported ~102, wife reported altered mental status as well. Called EMS but symptoms resolved and vitals improved and was not taken in. He denies any other fevers, chills or signs/symptoms of infection. Denies cough, chest pain or shortness of breath. No urinary concerns.     Traveling to [place] in December.     Review of Systems:  Patient denies any of the following except if noted above: fevers, chills, difficulty with energy, vision or hearing changes, chest pain, dyspnea, abdominal pain, nausea, vomiting, diarrhea, constipation, urinary concerns, headaches, numbness, tingling, issues with sleep or mood. He also denies lumps, bumps, rashes or skin lesions, bleeding or bruising issues.      Current Outpatient Medications   Medication Sig Dispense Refill    insulin aspart (NOVOLOG PEN) 100 UNIT/ML pen Inject 1-10 Units Subcutaneous 3 times daily (with meals) 15 mL 3    acetaminophen (TYLENOL) 32 mg/mL liquid Take 20.313 mLs (650 mg) by mouth every 8 hours 1800 mL 0    apixaban ANTICOAGULANT (ELIQUIS) 5 MG tablet Take 1 tablet (5 mg) by mouth 2 times daily for 90 days 60 tablet 2    B-D U/F 31G X 8 MM insulin pen needle Inject Subcutaneous 5 times daily Use 5 pen needles daily or as directed. 500 each 1    baclofen (LIORESAL) 10 MG tablet Take 0.5-1 tablets (5-10 mg) by mouth 3 times daily as needed for other (hiccups) (Patient not taking: Reported on 11/16/2023) 30 tablet 0    blood glucose (FREESTYLE TEST STRIPS) test strip by Other route as needed      buprenorphine (BUTRANS) 10 MCG/HR WK patch Place 1 patch onto the skin every 7 days Use  with 20 mcg/hour patch for 30 mcg/hour total. 4 patch 3    buprenorphine (BUTRANS) 20 MCG/HR WK patch Place 1 patch onto the skin once a week Use with 10 mcg/hour patch for 30 mcg/hour total. 4 patch 3    Continuous Blood Gluc Sensor (FREESTYLE KAREN 2 SENSOR) MISC Change every 14 days 6 each 3    dexAMETHasone (DECADRON) 4 MG tablet Take 2 tablets (8 mg) by mouth daily Take for 2 days, starting the day after chemo. Take with food. (Patient not taking: Reported on 11/8/2023) 4 tablet 2    dicyclomine (BENTYL) 10 MG capsule Take 1 capsule (10 mg) by mouth 4 times daily (before meals and nightly) 120 capsule 1    dicyclomine (BENTYL) 10 MG/5ML solution Take 10 mLs (20 mg) by mouth 4 times daily as needed (abdominal discomfort, best before meals) 473 mL 0    gabapentin (NEURONTIN) 300 MG capsule Take 1 capsule (300 mg) by mouth 3 times daily 90 capsule 0    HYDROmorphone (DILAUDID) 2 MG tablet Take 1-2 tablets (2-4 mg) by mouth every 4 hours as needed for severe pain 180 tablet 0    insulin glargine (BASAGLAR KWIKPEN) 100 UNIT/ML pen Inject 20 Units Subcutaneous every morning for 360 days (Patient not taking: Reported on 11/8/2023) 18 mL 3    insulin  UNIT/ML injection Inject 16 Units Subcutaneous every evening Take 20 units day of chemo and two days after while on steroid (Patient not taking: Reported on 11/8/2023) 9 mL 1    Lancets (ONETOUCH DELICA PLUS [id number]) MISC       lidocaine-prilocaine (EMLA) 2.5-2.5 % external cream Use 1-2 times a week or as needed prior to port access 30 g 3    lidocaine-prilocaine (EMLA) 2.5-2.5 % external cream Use 1-2 times weekly or as needed prior to port access 30 g 3    lipase-protease-amylase (CREON 24) 68092-43486-016682 units CPEP per EC capsule 2-3 capsules with meals 3 times a day and 1-2 capsules with snacks max of 15 capsules a day 180 capsule 3    loperamide (IMODIUM) 2 MG capsule 2 caps at 1st sign of diarrhea & 1 cap every 2hrs until 12hrs diarrhea free. During  night, 2 caps at bedtime & 2 caps every 4hrs until AM (Patient not taking: Reported on 11/8/2023) 100 capsule 3    naloxone (NARCAN) 4 MG/0.1ML nasal spray Spray 1 spray (4 mg) into one nostril alternating nostrils as needed for opioid reversal every 2-3 minutes until assistance arrives (Patient not taking: Reported on 11/16/2023) 0.2 mL 11    pantoprazole (PROTONIX) 40 MG EC tablet Take 1 tablet (40 mg) by mouth 2 times daily 60 tablet 4    polyethylene glycol (MIRALAX) 17 GM/Dose powder Take 17 g by mouth daily (Patient not taking: Reported on 11/8/2023) 510 g 3    prochlorperazine (COMPAZINE) 10 MG tablet Take 1 tablet (10 mg) by mouth every 6 hours as needed for nausea or vomiting 30 tablet 2    psyllium (METAMUCIL/KONSYL) capsule 1 capsule by Per G Tube route 2 times daily (Patient not taking: Reported on 11/8/2023) 180 capsule 3    sennosides (SENOKOT) 8.8 MG/5ML syrup 5 mLs by Per J Tube route 2 times daily (Patient not taking: Reported on 11/8/2023) 236 mL 3    simethicone (MYLICON) 125 MG chewable tablet Take 125 mg by mouth 2 times daily (Patient not taking: Reported on 11/8/2023)      sodium bicarbonate 325 MG tablet 1 tablet (325 mg) by Per J Tube route 3 times daily 90 tablet 0    vitamin D3 (CHOLECALCIFEROL) 50 mcg (2000 units) tablet Take 1 tablet (50 mcg) by mouth daily 90 tablet 1     Physical Examination:  /79 (BP Location: Left arm, Patient Position: Sitting, Cuff Size: Adult Regular)   Pulse 74   Temp 98.1  F (36.7  C) (Oral)   Resp 16   Wt 77.7 kg (171 lb 4.8 oz)   SpO2 98%   BMI 23.89 kg/m    Wt Readings from Last 10 Encounters:   11/28/23 77.7 kg (171 lb 4.8 oz)   11/16/23 75.3 kg (166 lb)   11/14/23 75.4 kg (166 lb 3.2 oz)   11/08/23 75.3 kg (166 lb)   11/07/23 75.7 kg (166 lb 12.8 oz)   11/06/23 74.4 kg (164 lb)   10/31/23 73.9 kg (163 lb)   10/18/23 76.3 kg (168 lb 4.8 oz)   10/04/23 74.8 kg (165 lb)   10/03/23 74.4 kg (164 lb 1.6 oz)   General: The patient is a pleasant male in  no acute distress.  HEENT: EOMI. Sclerae are anicteric. Mucous membranes moist, no lesions or thrush.   Lymph: Neck is supple with no lymphadenopathy in the cervical or supraclavicular areas.   Heart: Regular rate and rhythm.   Lungs: Clear to auscultation bilaterally.   Abdomen: Bowel sounds present, soft, nontender with no palpable hepatosplenomegaly or masses. Extremities: No edema noted to bilateral lower extremities.     Neuro: Cranial nerves II through XII are grossly intact.  Skin: No rashes, petechiae, or bruising noted on exposed skin.     Laboratory Data:  Most Recent 3 CBC's:  Recent Labs   Lab Test 11/28/23  0648 11/14/23  0720 11/07/23  0812   WBC 4.3 4.2 5.7   HGB 9.2* 7.9* 8.6*   MCV 95 91 91    133* 129*   ANEUTAUTO 2.1 2.7 3.8    Most Recent 3 BMP's:  Recent Labs   Lab Test 11/28/23  0648 10/31/23  1008 10/18/23  0648    138 140   POTASSIUM 4.1 4.2 4.1   CHLORIDE 106 101 104   CO2 27 28 28   BUN 14.5 12.3 11.8   CR 0.59* 0.50* 0.55*   ANIONGAP 8 9 8   DENISE 8.8 9.0 8.9   * 124* 110*   PROTTOTAL 6.0* 6.3* 5.8*   ALBUMIN 3.5 3.7 3.3*    Most Recent 2 LFT's:  Recent Labs   Lab Test 11/28/23  0648 10/31/23  1008   AST 31 20   ALT 26 14   ALKPHOS 108 198*   BILITOTAL 0.2 0.3   I reviewed the above labs today.        Assessment and Plan:  Locally advanced, unresectable pancreatic cancer. Patient started on treatment with palliative FOLFIRINOX on 7/31/23. He had a moderate amount of side effects following cycle 1 with dehydration, diarrhea, and nausea. Received cycle 2 on 8/15/23. ERCP/EGD on 8/17 with GJ tube exchange and duodenal stents placed. Hospitalized 8/21-8/25 due to colitis and abdominal pain. Cycle 3 was deferred due to neutropenia, Neulasta/Udenyca added on day 4.  - CT CAP 10/24 with progression in skeletal mets, lung nodule and lymph nodes. Stable pancreatic mass. Changed treatment to gemcitabine, Abraxane (day 1, 8, 15) and Zometa.  Tolerated cycle 1 well with the exception  of a fever.   -Proceed with cycle 2 today, labs reviewed with no concerns.   -Received Zometa with cycle 1, had fever later that night, reports no other side effects or concerns. Will continue every 6 months.    -Return to clinic for day 8. Skipping day 15 with cycle 2 due to travel.  Follow up with labs and SUZETTE prior to cycle 3.     Pulmonary Embolism. Right lower segmental PE found on routine imaging. Started on apixiban, denies any bleeding concerns. Denies any breathing concerns today.      Nutrition.  Feeding tube removed. Continues to tolerate oral intake well. Gaining weight. No issues with nausea or vomiting. Continue to push oral hydration, drinking 64+ oz of fluid/day.  Continue Protonix daily.  Has compazine as needed for nausea.     Lower extremity edema. Resolved. Continue compression stockings and elevation. Continue to monitor.     Diabetes. Working closely with endocrine/diabetic educator on dose adjustments/management. Has only needed insulin 1-2 times over the last week.     Diarrhea/constipation.  Resolved. Bowel movements more regular with increased food intake. Continue Metamucil as needed.     Abdominal pain. Managed by palliative care with Butrans patch and oral Dilaudid.     Hiccups. Hasn't had hiccups with the past couple of doses of chemo. Previously tried Baclofen, but did not like the side effects including mild confusion. Has also tried gabapentin but did not find this helpful for hiccups.       Anemia. Normocytic. Suspect anemia of chronic disease. Hemoglobin stable. Will monitor for now.       30 minutes spent on the date of the encounter doing chart review, review of test results, interpretation of tests, patient visit, and documentation     SEVIN Canales CNP

## 2023-11-28 NOTE — NURSING NOTE
"Oncology Rooming Note    November 28, 2023 6:56 AM   Gallo Navarro is a 57 year old male who presents for:    Chief Complaint   Patient presents with    Port Draw     Labs drawn via port by RN in lab. VS checked.    Oncology Clinic Visit     Malignant neoplasm of head of pancreas      Initial Vitals: /79 (BP Location: Left arm, Patient Position: Sitting, Cuff Size: Adult Regular)   Pulse 74   Temp 98.1  F (36.7  C) (Oral)   Resp 16   Wt 77.7 kg (171 lb 4.8 oz)   SpO2 98%   BMI 23.89 kg/m   Estimated body mass index is 23.89 kg/m  as calculated from the following:    Height as of 11/6/23: 1.803 m (5' 11\").    Weight as of this encounter: 77.7 kg (171 lb 4.8 oz). Body surface area is 1.97 meters squared.  No Pain (0) Comment: Data Unavailable   No LMP for male patient.  Allergies reviewed: Yes  Medications reviewed: Yes    Medications: Medication refills not needed today.  Pharmacy name entered into [facility]:    [facility] - [place], [place] - [address] [facility] [place] - [phone]  [facility] [place] - [address] [facility] AT PORTAL TO REGISTERED [facility] SITES  [facility] - [place], [place] - [address] [place] [address] SE  [id number] - [place], [place] - [address]    Clinical concerns:        Linda Valero CMA              "

## 2023-11-28 NOTE — PROGRESS NOTES
Infusion Nursing Note:  Gallo Navarro presents today for Cycle 2 Day 1 Abraxthalia Gemzar.    Patient seen by provider today: Yes: Megan Farrell CNP   present during visit today: Not Applicable.    Note: N/A.      Intravenous Access:  Implanted Port.    Treatment Conditions:  Lab Results   Component Value Date    HGB 9.2 (L) 11/28/2023    WBC 4.3 11/28/2023    ANEU 2.8 08/15/2023    ANEUTAUTO 2.1 11/28/2023     11/28/2023        Lab Results   Component Value Date     11/28/2023    POTASSIUM 4.1 11/28/2023    MAG 1.9 08/23/2023    CR 0.59 (L) 11/28/2023    DENISE 8.8 11/28/2023    BILITOTAL 0.2 11/28/2023    ALBUMIN 3.5 11/28/2023    ALT 26 11/28/2023    AST 31 11/28/2023       Results reviewed, labs MET treatment parameters, ok to proceed with treatment.      Post Infusion Assessment:  Patient tolerated infusion without incident.  Blood return noted pre and post infusion.  Access discontinued per protocol.       Discharge Plan:   Patient declined prescription refills.  [facility] via [facility].  Patient will return 12/5 for next appointment.   Patient discharged in stable condition accompanied by: friend.  Departure Mode: Ambulatory.      Glenda Schultz RN

## 2023-11-29 ENCOUNTER — OFFICE VISIT (OUTPATIENT)
Dept: RADIATION ONCOLOGY | Facility: CLINIC | Age: 58
End: 2023-11-29
Payer: COMMERCIAL

## 2023-11-29 ENCOUNTER — APPOINTMENT (OUTPATIENT)
Dept: RADIATION ONCOLOGY | Facility: CLINIC | Age: 58
End: 2023-11-29
Payer: COMMERCIAL

## 2023-11-29 ENCOUNTER — DOCUMENTATION ONLY (OUTPATIENT)
Dept: RADIATION ONCOLOGY | Facility: CLINIC | Age: 58
End: 2023-11-29
Payer: COMMERCIAL

## 2023-11-29 VITALS
DIASTOLIC BLOOD PRESSURE: 65 MMHG | TEMPERATURE: 99.1 F | OXYGEN SATURATION: 96 % | HEART RATE: 91 BPM | BODY MASS INDEX: 23.49 KG/M2 | RESPIRATION RATE: 18 BRPM | SYSTOLIC BLOOD PRESSURE: 100 MMHG | WEIGHT: 168.4 LBS

## 2023-11-29 DIAGNOSIS — G89.3 CANCER ASSOCIATED PAIN: ICD-10-CM

## 2023-11-29 DIAGNOSIS — C25.9 PANCREATIC ADENOCARCINOMA (H): Primary | ICD-10-CM

## 2023-11-29 DIAGNOSIS — C79.51 MALIGNANT NEOPLASM METASTATIC TO BONE (H): Primary | ICD-10-CM

## 2023-11-29 PROCEDURE — 99207 PR NO BILLABLE SERVICE THIS VISIT: CPT | Performed by: SURGERY

## 2023-11-29 PROCEDURE — 77336 RADIATION PHYSICS CONSULT: CPT | Performed by: SURGERY

## 2023-11-29 PROCEDURE — 99207 PR DROP WITH A PROCEDURE: CPT | Performed by: SURGERY

## 2023-11-29 PROCEDURE — 77402 RADIATION TX DELIVERY LVL 1: CPT | Performed by: SURGERY

## 2023-11-29 PROCEDURE — 77427 RADIATION TX MANAGEMENT X5: CPT | Performed by: SURGERY

## 2023-11-29 ASSESSMENT — PAIN SCALES - GENERAL: PAINLEVEL: NO PAIN (0)

## 2023-11-29 NOTE — PROGRESS NOTES
[facility] PHYSICIANS  SPECIALIZING IN BREAKTHROUGHS  Radiation Oncology    On Treatment Visit Note      Gallo Navarro      Date: 2023   MRN: [id number]   : 1965  Diagnosis: Metastatic Pancreatic Cancer        ID: Mr. Navarro is a 57 year old male with metastatic pancreatic cancer to T10.     Reason for Visit:  On Radiation Treatment Visit       Treatment Summary to Date  Treatment Site: T10 Spine Current Dose: 2000/2000 cGy Fractions: 5/5        Chemotherapy  Chemo concurrent with radx?: Yes  Oncologist: Dr. Haile  Drug Name/Frequency 1: Gemzar + Abraxane    Subjective:   No complaints, tolerating RT well overall.    Nursing ROS:   Nutrition Alteration  Diet Type: Patient's Preference  Skin  Skin Reaction: 0 - No changes  Skin Intervention: Discussed Aquaphor as needed.        Cardiovascular  Respiratory effort: 1 - Normal - without distress        Psychosocial  Psychosocial Note: Patient reports increased fatigue, states he had chemo yesterday.  Pain Assessment  0-10 Pain Scale: 0  Pain Note: Continues current pain regimen.      Objective:   /65   Pulse 91   Temp 99.1  F (37.3  C) (Oral)   Resp 18   Wt 76.4 kg (168 lb 6.4 oz)   SpO2 96%   BMI 23.49 kg/m    Gen: Appears well, in no acute distress  Skin: No erythema  CV/Resp: rrr, breathing comfortably on room air  Neuro: CN 2-12 grossly intact, UE/LE full strength     Labs:  CBC RESULTS:   Recent Labs   Lab Test 23  0648   WBC 4.3   RBC 3.16*   HGB 9.2*   HCT 30.1*   MCV 95   MCH 29.1   MCHC 30.6*   RDW 17.0*        ELECTROLYTES:  Recent Labs   Lab Test 23  0648      POTASSIUM 4.1   CHLORIDE 106   DENISE 8.8   CO2 27   BUN 14.5   CR 0.59*   *       Assessment:    Tolerating radiation therapy well.  All questions and concerns addressed.      Plan:   Continue current therapy.  Follow up with radiation oncology on as needed basis  Follow up with Megan Farrell CNP in medical oncology  12/19/23      Mosaiq chart and setup information reviewed  Ports checked and MVCT/IGRT images checked    Medication Review  Med list reviewed with patient?: Yes    Educational Topic Discussed  Additional Instructions: Follow up with Dr. Jean Paul JONES, next infusion 12/5/23, follow up with Megan Farrell CNP with Med Onc on 12/19/23.  Education Instructions: Fatigue, Pain      Ben Reaves MD  Radiation Oncology   [facility]: [phone]

## 2023-11-29 NOTE — LETTER
2023         RE: Gallo Navarro  [address] [address] [place] [id number]        Dear Colleague,    Thank you for referring your patient, Gallo Navarro, to the [facility] RADIATION ONCOLOGY [place]. Please see a copy of my visit note below.    [facility] PHYSICIANS  SPECIALIZING IN BREAKTHROUGHS  Radiation Oncology    On Treatment Visit Note      Gallo Navarro      Date: 2023   MRN: [id number]   : 1965  Diagnosis: Metastatic Pancreatic Cancer        ID: Mr. Navarro is a 57 year old male with metastatic pancreatic cancer to T10.     Reason for Visit:  On Radiation Treatment Visit       Treatment Summary to Date  Treatment Site: T10 Spine Current Dose: 2000/2000 cGy Fractions: 5/5        Chemotherapy  Chemo concurrent with radx?: Yes  Oncologist: Dr. Haile  Drug Name/Frequency 1: Gemzar + Abraxane    Subjective:   No complaints, tolerating RT well overall.    Nursing ROS:   Nutrition Alteration  Diet Type: Patient's Preference  Skin  Skin Reaction: 0 - No changes  Skin Intervention: Discussed Aquaphor as needed.        Cardiovascular  Respiratory effort: 1 - Normal - without distress        Psychosocial  Psychosocial Note: Patient reports increased fatigue, states he had chemo yesterday.  Pain Assessment  0-10 Pain Scale: 0  Pain Note: Continues current pain regimen.      Objective:   /65   Pulse 91   Temp 99.1  F (37.3  C) (Oral)   Resp 18   Wt 76.4 kg (168 lb 6.4 oz)   SpO2 96%   BMI 23.49 kg/m    Gen: Appears well, in no acute distress  Skin: No erythema  CV/Resp: rrr, breathing comfortably on room air  Neuro: CN 2-12 grossly intact, UE/LE full strength     Labs:  CBC RESULTS:   Recent Labs   Lab Test 23  0648   WBC 4.3   RBC 3.16*   HGB 9.2*   HCT 30.1*   MCV 95   MCH 29.1   MCHC 30.6*   RDW 17.0*        ELECTROLYTES:  Recent Labs   Lab Test 23  0648      POTASSIUM 4.1   CHLORIDE 106   DENISE 8.8   CO2 27   BUN 14.5   CR 0.59*   GLC  138*       Assessment:    Tolerating radiation therapy well.  All questions and concerns addressed.      Plan:   Continue current therapy.  Follow up with radiation oncology on as needed basis  Follow up with Megan Farrell CNP in medical oncology 12/19/23      Mosaiq chart and setup information reviewed  Ports checked and MVCT/IGRT images checked    Medication Review  Med list reviewed with patient?: Yes    Educational Topic Discussed  Additional Instructions: Follow up with Dr. Jean Paul JONES, next infusion 12/5/23, follow up with Megan Farrell CNP with Med Onc on 12/19/23.  Education Instructions: Fatigue, Pain      Ben Reaves MD  Radiation Oncology   [facility]: [phone]        Again, thank you for allowing me to participate in the care of your patient.        Sincerely,        Ben Reaves MD

## 2023-11-30 LAB — CANCER AG19-9 SERPL IA-ACNC: 4 U/ML

## 2023-12-01 ENCOUNTER — PATIENT OUTREACH (OUTPATIENT)
Dept: ONCOLOGY | Facility: CLINIC | Age: 58
End: 2023-12-01
Payer: COMMERCIAL

## 2023-12-05 ENCOUNTER — INFUSION THERAPY VISIT (OUTPATIENT)
Dept: ONCOLOGY | Facility: CLINIC | Age: 58
End: 2023-12-05
Attending: STUDENT IN AN ORGANIZED HEALTH CARE EDUCATION/TRAINING PROGRAM
Payer: COMMERCIAL

## 2023-12-05 ENCOUNTER — APPOINTMENT (OUTPATIENT)
Dept: LAB | Facility: CLINIC | Age: 58
End: 2023-12-05
Attending: STUDENT IN AN ORGANIZED HEALTH CARE EDUCATION/TRAINING PROGRAM
Payer: COMMERCIAL

## 2023-12-05 ENCOUNTER — MYC MEDICAL ADVICE (OUTPATIENT)
Dept: FAMILY MEDICINE | Facility: CLINIC | Age: 58
End: 2023-12-05

## 2023-12-05 ENCOUNTER — MYC REFILL (OUTPATIENT)
Dept: RADIATION ONCOLOGY | Facility: HOSPITAL | Age: 58
End: 2023-12-05

## 2023-12-05 ENCOUNTER — VIRTUAL VISIT (OUTPATIENT)
Dept: RADIATION ONCOLOGY | Facility: HOSPITAL | Age: 58
End: 2023-12-05
Attending: FAMILY MEDICINE
Payer: COMMERCIAL

## 2023-12-05 VITALS
HEART RATE: 80 BPM | TEMPERATURE: 98.2 F | BODY MASS INDEX: 22.82 KG/M2 | RESPIRATION RATE: 16 BRPM | DIASTOLIC BLOOD PRESSURE: 76 MMHG | WEIGHT: 163.6 LBS | SYSTOLIC BLOOD PRESSURE: 116 MMHG | OXYGEN SATURATION: 97 %

## 2023-12-05 VITALS — BODY MASS INDEX: 22.21 KG/M2 | WEIGHT: 164 LBS | HEIGHT: 72 IN

## 2023-12-05 DIAGNOSIS — C79.51 MALIGNANT NEOPLASM METASTATIC TO BONE (H): ICD-10-CM

## 2023-12-05 DIAGNOSIS — Z51.5 PALLIATIVE CARE PATIENT: Primary | ICD-10-CM

## 2023-12-05 DIAGNOSIS — C25.0 MALIGNANT NEOPLASM OF HEAD OF PANCREAS (H): ICD-10-CM

## 2023-12-05 DIAGNOSIS — C25.9 PANCREATIC ADENOCARCINOMA (H): ICD-10-CM

## 2023-12-05 DIAGNOSIS — G89.3 CANCER ASSOCIATED PAIN: ICD-10-CM

## 2023-12-05 DIAGNOSIS — Z51.11 ENCOUNTER FOR ANTINEOPLASTIC CHEMOTHERAPY: Primary | ICD-10-CM

## 2023-12-05 LAB
ALBUMIN SERPL BCG-MCNC: 3.6 G/DL (ref 3.5–5.2)
ALP SERPL-CCNC: 117 U/L (ref 40–150)
ALT SERPL W P-5'-P-CCNC: 18 U/L (ref 0–70)
ANION GAP SERPL CALCULATED.3IONS-SCNC: 8 MMOL/L (ref 7–15)
AST SERPL W P-5'-P-CCNC: 22 U/L (ref 0–45)
BASOPHILS # BLD AUTO: 0.1 10E3/UL (ref 0–0.2)
BASOPHILS NFR BLD AUTO: 1 %
BILIRUB SERPL-MCNC: 0.3 MG/DL
BUN SERPL-MCNC: 9.4 MG/DL (ref 6–20)
CALCIUM SERPL-MCNC: 9 MG/DL (ref 8.6–10)
CHLORIDE SERPL-SCNC: 105 MMOL/L (ref 98–107)
CREAT SERPL-MCNC: 0.62 MG/DL (ref 0.67–1.17)
DEPRECATED HCO3 PLAS-SCNC: 26 MMOL/L (ref 22–29)
EGFRCR SERPLBLD CKD-EPI 2021: >90 ML/MIN/1.73M2
EOSINOPHIL # BLD AUTO: 0.1 10E3/UL (ref 0–0.7)
EOSINOPHIL NFR BLD AUTO: 3 %
ERYTHROCYTE [DISTWIDTH] IN BLOOD BY AUTOMATED COUNT: 16.1 % (ref 10–15)
GLUCOSE SERPL-MCNC: 146 MG/DL (ref 70–99)
HCT VFR BLD AUTO: 30.4 % (ref 40–53)
HGB BLD-MCNC: 9.5 G/DL (ref 13.3–17.7)
IMM GRANULOCYTES # BLD: 0 10E3/UL
IMM GRANULOCYTES NFR BLD: 0 %
LYMPHOCYTES # BLD AUTO: 0.8 10E3/UL (ref 0.8–5.3)
LYMPHOCYTES NFR BLD AUTO: 17 %
MCH RBC QN AUTO: 29 PG (ref 26.5–33)
MCHC RBC AUTO-ENTMCNC: 31.3 G/DL (ref 31.5–36.5)
MCV RBC AUTO: 93 FL (ref 78–100)
MONOCYTES # BLD AUTO: 0.6 10E3/UL (ref 0–1.3)
MONOCYTES NFR BLD AUTO: 12 %
NEUTROPHILS # BLD AUTO: 3.3 10E3/UL (ref 1.6–8.3)
NEUTROPHILS NFR BLD AUTO: 67 %
NRBC # BLD AUTO: 0 10E3/UL
NRBC BLD AUTO-RTO: 0 /100
PLATELET # BLD AUTO: 179 10E3/UL (ref 150–450)
POTASSIUM SERPL-SCNC: 4.2 MMOL/L (ref 3.4–5.3)
PROT SERPL-MCNC: 6.3 G/DL (ref 6.4–8.3)
RBC # BLD AUTO: 3.28 10E6/UL (ref 4.4–5.9)
SODIUM SERPL-SCNC: 139 MMOL/L (ref 135–145)
WBC # BLD AUTO: 4.9 10E3/UL (ref 4–11)

## 2023-12-05 PROCEDURE — 85014 HEMATOCRIT: CPT | Performed by: STUDENT IN AN ORGANIZED HEALTH CARE EDUCATION/TRAINING PROGRAM

## 2023-12-05 PROCEDURE — 250N000011 HC RX IP 250 OP 636: Mod: JZ | Performed by: STUDENT IN AN ORGANIZED HEALTH CARE EDUCATION/TRAINING PROGRAM

## 2023-12-05 PROCEDURE — 80053 COMPREHEN METABOLIC PANEL: CPT

## 2023-12-05 PROCEDURE — 96417 CHEMO IV INFUS EACH ADDL SEQ: CPT

## 2023-12-05 PROCEDURE — 96413 CHEMO IV INFUSION 1 HR: CPT

## 2023-12-05 PROCEDURE — 96375 TX/PRO/DX INJ NEW DRUG ADDON: CPT

## 2023-12-05 PROCEDURE — 99215 OFFICE O/P EST HI 40 MIN: CPT | Mod: VID | Performed by: FAMILY MEDICINE

## 2023-12-05 PROCEDURE — 36415 COLL VENOUS BLD VENIPUNCTURE: CPT

## 2023-12-05 PROCEDURE — 258N000003 HC RX IP 258 OP 636: Performed by: STUDENT IN AN ORGANIZED HEALTH CARE EDUCATION/TRAINING PROGRAM

## 2023-12-05 RX ORDER — ONDANSETRON 2 MG/ML
8 INJECTION INTRAMUSCULAR; INTRAVENOUS ONCE
Status: COMPLETED | OUTPATIENT
Start: 2023-12-05 | End: 2023-12-05

## 2023-12-05 RX ORDER — HYDROMORPHONE HYDROCHLORIDE 2 MG/1
2-4 TABLET ORAL EVERY 4 HOURS PRN
Qty: 180 TABLET | Refills: 0 | Status: SHIPPED | OUTPATIENT
Start: 2023-12-08 | End: 2023-12-30

## 2023-12-05 RX ORDER — HEPARIN SODIUM (PORCINE) LOCK FLUSH IV SOLN 100 UNIT/ML 100 UNIT/ML
5 SOLUTION INTRAVENOUS ONCE
Status: COMPLETED | OUTPATIENT
Start: 2023-12-05 | End: 2023-12-05

## 2023-12-05 RX ORDER — PACLITAXEL 100 MG/20ML
125 INJECTION, POWDER, LYOPHILIZED, FOR SUSPENSION INTRAVENOUS ONCE
Status: COMPLETED | OUTPATIENT
Start: 2023-12-05 | End: 2023-12-05

## 2023-12-05 RX ORDER — HEPARIN SODIUM (PORCINE) LOCK FLUSH IV SOLN 100 UNIT/ML 100 UNIT/ML
5 SOLUTION INTRAVENOUS
Status: DISCONTINUED | OUTPATIENT
Start: 2023-12-05 | End: 2023-12-05 | Stop reason: HOSPADM

## 2023-12-05 RX ADMIN — Medication 5 ML: at 08:17

## 2023-12-05 RX ADMIN — PACLITAXEL 250 MG: 100 INJECTION, POWDER, LYOPHILIZED, FOR SUSPENSION INTRAVENOUS at 10:02

## 2023-12-05 RX ADMIN — GEMCITABINE 2000 MG: 38 INJECTION, SOLUTION INTRAVENOUS at 10:55

## 2023-12-05 RX ADMIN — Medication 5 ML: at 11:26

## 2023-12-05 RX ADMIN — ONDANSETRON 8 MG: 2 INJECTION INTRAMUSCULAR; INTRAVENOUS at 09:23

## 2023-12-05 RX ADMIN — SODIUM CHLORIDE 250 ML: 9 INJECTION, SOLUTION INTRAVENOUS at 09:23

## 2023-12-05 ASSESSMENT — PAIN SCALES - GENERAL
PAINLEVEL: NO PAIN (0)
PAINLEVEL: NO PAIN (0)

## 2023-12-05 NOTE — NURSING NOTE
Is the patient currently in the state of [place]? YES    Visit mode:VIDEO    If the visit is dropped, the patient can be reconnected by: VIDEO VISIT: Text to cell phone:   Telephone Information:   Mobile [phone]       Will anyone else be joining the visit? NO  (If patient encounters technical issues they should call [phone])    How would you like to obtain your AVS? MyChart    Are changes needed to the allergy or medication list? Pt stated no changes to allergies and Pt stated no med changes    Reason for visit: RECHECK    Patricia NICOLE

## 2023-12-05 NOTE — NURSING NOTE
Chief Complaint   Patient presents with    Port Draw     Labs drawn via port by RN in lab. VS taken.      Labs drawn via port by RN. Port accessed with 20g flat needle. Flushed with saline and heparin. Pt tolerated well. Vitals taken. Pt checked into next appt.      Heydi Brar RN

## 2023-12-05 NOTE — PROGRESS NOTES
Infusion Nursing Note:  Gallo Navarro presents today for Cycle 2 Day 8 Abraxane and Gemzar.    Patient seen by provider today: No   present during visit today: Not Applicable.    Note: Pt presents to infusion feeling well, reporting he had a good week. He had 3 days of increased fatigue following chemo and radiation, which improved by the weekend. He is managing nausea/reflux with Compazine and Protonix. His pain is controlled with Dilaudid and Butrans patch. His bilateral pedal edema is at baseline, and he continues to use compression stockings and elevation. He denies fevers/chills, cough/congestion, SOB, chest pain, bruising/bleeding, neuropathy, loose stools/constipation or further concerns today.    Per secure chat with Megan Farrell NP @ [phone]:  - ok for pt to stop Dexamethasone r/t bothersome side effects    Intravenous Access:  Implanted Port. Lab RN unable to obtain blood return from Port. However upon presentation to infusion, writer got brisk blood return from Port prior to and following each infusion.    Treatment Conditions:  Lab Results   Component Value Date    HGB 9.5 (L) 12/05/2023    WBC 4.9 12/05/2023    ANEU 2.8 08/15/2023    ANEUTAUTO 3.3 12/05/2023     12/05/2023        Lab Results   Component Value Date     12/05/2023    POTASSIUM 4.2 12/05/2023    MAG 1.9 08/23/2023    CR 0.62 (L) 12/05/2023    DENISE 9.0 12/05/2023    BILITOTAL 0.3 12/05/2023    ALBUMIN 3.6 12/05/2023    ALT 18 12/05/2023    AST 22 12/05/2023     Results reviewed, labs MET treatment parameters, ok to proceed with treatment.      Post Infusion Assessment:  Patient tolerated infusion without incident.  Blood return noted pre and post infusion.  Site patent and intact, free from redness, edema or discomfort.  No evidence of extravasations.  Access discontinued per protocol.       Discharge Plan:   Pt requested refills of Dilaudid, Butrans patch and Creon prior to his trip to [place] on 12/9. Per MARCELL Figueroa:  unable to fill here today. Pt is aware to coordinate with his Palliative MD this afternoon to organize refills prior to his vacation.  Discharge instructions reviewed with: Patient and Family.  Patient and/or family verbalized understanding of discharge instructions and all questions answered.  AVS to patient via [employer]. Patient will return 12/19/23 for next appointment.   Patient discharged in stable condition accompanied by: wife.  Departure Mode: Ambulatory.      Annie Acevedo RN

## 2023-12-05 NOTE — PATIENT INSTRUCTIONS
It was good to see you today, Gallo and Violet..    Here are the things we talked about:  I sent a new prescription for the dilaudid  We'll revisit the need for Ritalin after your trip to [place].  Please get a full spectrum light to use 20-30 minutes a day to help with seasonal grumpiness    Someone from the team will reach out to schedule a follow up appointment in 4-6 weeks.     How to get a hold of us:  For non-urgent matters, MyChart works best.    For more urgent matters, or if you prefer not to use MyChart, call our clinic nurse coordinator Megan Funez RN at [phone]    We have an on-call number for evenings and weekends. Please call this only if you are having uncontrolled symptoms or serious side effects from your medicines: [phone].     For refills, please give us a week (5 working days) notice. We don't always have providers available everyday to do refills. If you call the day you run out of your medicine, we may not be able to refill it in time, so call 5 days in advance!    Jeremy Hurt MD MS FAAFP CAQHPM  [facility]  Office [phone]  Fax [phone]

## 2023-12-05 NOTE — PATIENT INSTRUCTIONS
[facility] Triage and after hours / weekends / holidays:  [phone]    Please call the triage or after hours line if you experience a temperature greater than or equal to 100.4, shaking chills, have uncontrolled nausea, vomiting and/or diarrhea, dizziness, shortness of breath, chest pain, bleeding, unexplained bruising, or if you have any other new/concerning symptoms, questions or concerns.      If you are having any concerning symptoms or wish to speak to a provider before your next infusion visit, please call triage to notify them so we can adequately serve you.     If you need a refill on a narcotic prescription or other medication, please call before your infusion appointment.                December 2023 Sunday Monday Tuesday Wednesday Thursday Friday Saturday                            1     2       3     4     5    LAB CENTRAL   8:00 AM   (15 min.)   [facility] DRAW   [facility]    ONC INFUSION 2 HR (120 MIN)   8:30 AM   (120 min.)    ONC INFUSION NURSE   [facility]    RETURN RADIATION ONCOLOGY   1:45 PM   (40 min.)   Jeremy Hurt MD   [facility] [place] 6     7    RETURN DIABETES   2:30 PM   (30 min.)   Berta Cid PA-C   [facility] 8     9       10     11     12     13     14     15     16       17     18     19    LAB CENTRAL   9:30 AM   (15 min.)   [facility]   [facility]    RETURN CCSL   9:45 AM   (45 min.)   Megan Farrell APRN CNP   [facility]    ONC INFUSION 2 HR (120 MIN)  10:30 AM   (120 min.)    ONC INFUSION NURSE   [facility] 20     21     22     23       24     25     26    LAB CENTRAL   8:15 AM   (15 min.)   [facility]   [facility]    ONC INFUSION 2 HR (120 MIN)   8:30 AM   (120 min.)    ONC INFUSION NURSE   [facility]  [facility] 27 28 29 30 31 January 2024 Sunday Monday Tuesday Wednesday Thursday Friday Saturday        1     2    LAB CENTRAL   8:00 AM   (15 min.)   [facility] DRAW   [facility]    ONC INFUSION 2 HR (120 MIN)   8:30 AM   (120 min.)    ONC INFUSION NURSE   [facility] 3     4     5     6       7     8     9     10     11     12     13       14     15     16     17     18     19     20       21     22     23     24    MYC OFFICE VISIT    1:10 PM   (30 min.)   Akil Hernandez MD   [facility] 25     26     27       28     29     30     31                                    Lab Results:  Recent Results (from the past 12 hour(s))   Comprehensive metabolic panel    Collection Time: 12/05/23  8:35 AM   Result Value Ref Range    Sodium 139 135 - 145 mmol/L    Potassium 4.2 3.4 - 5.3 mmol/L    Carbon Dioxide (CO2) 26 22 - 29 mmol/L    Anion Gap 8 7 - 15 mmol/L    Urea Nitrogen 9.4 6.0 - 20.0 mg/dL    Creatinine 0.62 (L) 0.67 - 1.17 mg/dL    GFR Estimate >90 >60 mL/min/1.73m2    Calcium 9.0 8.6 - 10.0 mg/dL    Chloride 105 98 - 107 mmol/L    Glucose 146 (H) 70 - 99 mg/dL    Alkaline Phosphatase 117 40 - 150 U/L    AST 22 0 - 45 U/L    ALT 18 0 - 70 U/L    Protein Total 6.3 (L) 6.4 - 8.3 g/dL    Albumin 3.6 3.5 - 5.2 g/dL    Bilirubin Total 0.3 <=1.2 mg/dL   CBC with platelets and differential    Collection Time: 12/05/23  8:35 AM   Result Value Ref Range    WBC Count 4.9 4.0 - 11.0 10e3/uL    RBC Count 3.28 (L) 4.40 - 5.90 10e6/uL    Hemoglobin 9.5 (L) 13.3 - 17.7 g/dL    Hematocrit 30.4 (L) 40.0 - 53.0 %    MCV 93 78 - 100 fL    MCH 29.0 26.5 - 33.0 pg    MCHC 31.3 (L) 31.5 - 36.5 g/dL    RDW 16.1 (H) 10.0 - 15.0 %    Platelet Count 179 150 - 450 10e3/uL    % Neutrophils 67 %    % Lymphocytes 17 %    % Monocytes 12 %    % Eosinophils 3 %    % Basophils 1 %     % Immature Granulocytes 0 %    NRBCs per 100 WBC 0 <1 /100    Absolute Neutrophils 3.3 1.6 - 8.3 10e3/uL    Absolute Lymphocytes 0.8 0.8 - 5.3 10e3/uL    Absolute Monocytes 0.6 0.0 - 1.3 10e3/uL    Absolute Eosinophils 0.1 0.0 - 0.7 10e3/uL    Absolute Basophils 0.1 0.0 - 0.2 10e3/uL    Absolute Immature Granulocytes 0.0 <=0.4 10e3/uL    Absolute NRBCs 0.0 10e3/uL

## 2023-12-05 NOTE — PROGRESS NOTES
Virtual Visit Details    Type of service:  Video Visit     Originating Location (pt. Location): Home    Distant Location (provider location):  On-site  Platform used for Video Visit: [facility]    Palliative Care Outpatient Clinic Progress Note    Patient Name: Gallo Navarro  Primary Provider: Akil Hernandez    Impressions:  ECO  Decision Making Capacity:  VERY PRESENT  PDMP review:  yes, no concerns     Locally extensive non-resectable pancreatic cancer  Cancer associated pain  Lymphedema in BLE     GOALS OF CARE:  Life prolonging without limits at this time.     Recommendations & Counseling:  CONTINUE buprenorphine 30 mcg/hour patch (across 2 patches) and replace weekly  Stop gabapentin one 300 capsule three times a day  Continue dilaudid 4 mg po q 3 hours prn--this is usually three times a day  Tylenol suspension 650 mg po TID  Narcan also prescribed for safety  Follow up by video in 6 weeks, sooner prn.   Try a full spectrum light for irritability and SAD  Will consider Ritalin trial at next visit if his fatigue is persisting.     Counseling: All of the above was explained to the patient in lay language. The patient has verbalized a clear understanding of the discussion, asked appropriate questions, which have been answered to patient's apparent satisfaction. The patient is in agreement with the above plan.        Chief Complaint/Patient ID: Gallo Navarro 57 year old male with PMHx of unresectable localized pancreatic cancer    Last Palliative care appointment: 10/23/2023 with me     Reviewed: yes, no concerns    Interim History:  Gallo Navarro is a 58 year old male who is seen today for follow up with Palliative Care via billable video visit. He is joined by his wife, Violet.  He has been doing pretty well.  Feeding tube broke and he chose to not replace it.  His weight has been consistent at about 164-165#.     Pain:  good control with 30 mcg/hr Butrans patches and prn dilaudid.  He also completed RT to T10.  He  "needs his dilaudid refilled as he is leaving for [place] on December 11th through the 19th.    Appetite/Nausea: OK--working hard to supplement his diet with protein sources     Bowels: none     Sleep: well, all night long and not interrupted by pain     Mood:  Gallo thinks 'OK\" though his wife says he has been 'crabby.'     Coping:  overall well--he's very excited about getting to [place].    Family History- Reviewed in Epic.    No Known Allergies    Social History:  Pertinent changes to social history/social situation since last visit: none  Key support resources: family and friends  Advance Directive Status:  no documents in Epic.    Social History     Tobacco Use    Smoking status: Never     Passive exposure: Never    Smokeless tobacco: Never   Vaping Use    Vaping Use: Never used   Substance Use Topics    Alcohol use: Not Currently    Drug use: Never         No Known Allergies  Current Outpatient Medications   Medication Sig Dispense Refill    acetaminophen (TYLENOL) 32 mg/mL liquid Take 20.313 mLs (650 mg) by mouth every 8 hours 1800 mL 0    apixaban ANTICOAGULANT (ELIQUIS) 5 MG tablet Take 1 tablet (5 mg) by mouth 2 times daily for 90 days 60 tablet 2    B-D U/F 31G X 8 MM insulin pen needle Inject Subcutaneous 5 times daily Use 5 pen needles daily or as directed. (Patient not taking: Reported on 12/5/2023) 500 each 1    baclofen (LIORESAL) 10 MG tablet Take 0.5-1 tablets (5-10 mg) by mouth 3 times daily as needed for other (hiccups) (Patient not taking: Reported on 11/16/2023) 30 tablet 0    blood glucose (FREESTYLE TEST STRIPS) test strip by Other route as needed      buprenorphine (BUTRANS) 10 MCG/HR WK patch Place 1 patch onto the skin every 7 days Use with 20 mcg/hour patch for 30 mcg/hour total. 4 patch 3    buprenorphine (BUTRANS) 20 MCG/HR WK patch Place 1 patch onto the skin once a week Use with 10 mcg/hour patch for 30 mcg/hour total. 4 patch 3    Continuous Blood Gluc Sensor (FREESTYLE KAREN 2 SENSOR) " MISC Change every 14 days 6 each 3    dexAMETHasone (DECADRON) 4 MG tablet Take 2 tablets (8 mg) by mouth daily Take for 2 days, starting the day after chemo. Take with food. (Patient not taking: Reported on 12/5/2023) 4 tablet 2    dicyclomine (BENTYL) 10 MG capsule Take 1 capsule (10 mg) by mouth 4 times daily (before meals and nightly) 120 capsule 1    gabapentin (NEURONTIN) 300 MG capsule Take 1 capsule (300 mg) by mouth 3 times daily (Patient not taking: Reported on 12/5/2023) 90 capsule 0    HYDROmorphone (DILAUDID) 2 MG tablet Take 1-2 tablets (2-4 mg) by mouth every 4 hours as needed for severe pain 180 tablet 0    insulin aspart (NOVOLOG PEN) 100 UNIT/ML pen Inject 1-10 Units Subcutaneous 3 times daily (with meals) (Patient not taking: Reported on 12/5/2023) 15 mL 3    insulin glargine (BASAGLAR KWIKPEN) 100 UNIT/ML pen Inject 20 Units Subcutaneous every morning for 360 days (Patient not taking: Reported on 12/5/2023) 18 mL 3    insulin  UNIT/ML injection Inject 16 Units Subcutaneous every evening Take 20 units day of chemo and two days after while on steroid (Patient not taking: Reported on 12/5/2023) 9 mL 1    Lancets (ONETOUCH DELICA PLUS [id number]) MISC  (Patient not taking: Reported on 12/5/2023)      lidocaine-prilocaine (EMLA) 2.5-2.5 % external cream Use 1-2 times a week or as needed prior to port access 30 g 3    lidocaine-prilocaine (EMLA) 2.5-2.5 % external cream Use 1-2 times weekly or as needed prior to port access (Patient not taking: Reported on 12/5/2023) 30 g 3    lipase-protease-amylase (CREON 24) 69914-35847-094072 units CPEP per EC capsule 2-3 capsules with meals 3 times a day and 1-2 capsules with snacks max of 15 capsules a day 180 capsule 3    loperamide (IMODIUM) 2 MG capsule 2 caps at 1st sign of diarrhea & 1 cap every 2hrs until 12hrs diarrhea free. During night, 2 caps at bedtime & 2 caps every 4hrs until AM (Patient not taking: Reported on 11/8/2023) 100 capsule 3     naloxone (NARCAN) 4 MG/0.1ML nasal spray Spray 1 spray (4 mg) into one nostril alternating nostrils as needed for opioid reversal every 2-3 minutes until assistance arrives (Patient not taking: Reported on 11/16/2023) 0.2 mL 11    pantoprazole (PROTONIX) 40 MG EC tablet Take 1 tablet (40 mg) by mouth 2 times daily 60 tablet 4    polyethylene glycol (MIRALAX) 17 GM/Dose powder Take 17 g by mouth daily (Patient not taking: Reported on 11/8/2023) 510 g 3    prochlorperazine (COMPAZINE) 10 MG tablet Take 1 tablet (10 mg) by mouth every 6 hours as needed for nausea or vomiting 30 tablet 2    psyllium (METAMUCIL/KONSYL) capsule 1 capsule by Per G Tube route 2 times daily (Patient not taking: Reported on 11/8/2023) 180 capsule 3    sennosides (SENOKOT) 8.8 MG/5ML syrup 5 mLs by Per J Tube route 2 times daily (Patient not taking: Reported on 11/8/2023) 236 mL 3    simethicone (MYLICON) 125 MG chewable tablet Take 125 mg by mouth 2 times daily (Patient not taking: Reported on 11/8/2023)      sodium bicarbonate 325 MG tablet 1 tablet (325 mg) by Per J Tube route 3 times daily (Patient not taking: Reported on 12/5/2023) 90 tablet 0    vitamin D3 (CHOLECALCIFEROL) 50 mcg (2000 units) tablet Take 1 tablet (50 mcg) by mouth daily 90 tablet 1     Past Medical History:   Diagnosis Date    Acute pancreatitis 04/16/2023    Gastric outlet obstruction     Metastasis from pancreatic cancer (H)     Personal history of chemotherapy     Gemzar + Abraxane started on 10/31/2023    Recurrent acute pancreatitis      Past Surgical History:   Procedure Laterality Date    AS OPEN TREATMENT CLAVICULAR FRACTURE INTERNAL FX      ENDOSCOPIC RETROGRADE CHOLANGIOPANCREATOGRAM N/A 7/11/2023    Procedure: ENDOSCOPIC RETROGRADE CHOLANGIOPANCREATOGRAPHY;  Surgeon: Khadar Pickett MD;  Location: [facility] OR    ENDOSCOPIC RETROGRADE CHOLANGIOPANCREATOGRAM N/A 8/17/2023    Procedure: ENDOSCOPIC RETROGRADE  with stent removal x1, balloon sweep;  Surgeon: Dionicio  Christos Brar MD;  Location: [facility] OR    ENDOSCOPIC ULTRASOUND UPPER GASTROINTESTINAL TRACT (GI) N/A 2023    Procedure: Endoscopic ultrasound upper gastrointestinal tract (GI), with biposy, GJ tube repositioning, stent placement;  Surgeon: Khadar Pickett MD;  Location: [facility] OR    ENDOSCOPIC ULTRASOUND UPPER GASTROINTESTINAL TRACT (GI) N/A 2023    Procedure: ENDOSCOPIC ULTRASOUND, ESOPHAGOSCOPY, EUS guided gastrojejunostomy;  Surgeon: Tre York MD;  Location: [facility] OR    INSERT PICC LINE  2023    INSERT PORT VASCULAR ACCESS Right 2023    Procedure: Insert port vascular access;  Surgeon: Tom العلي MD;  Location: [facility] OR    IR CHEST PORT PLACEMENT > 5 YRS OF AGE  2023    IR NG TUBE PLACEMENT REQ RAD & FLUORO  2023    JG tube    REPLACE GASTROJEJUNOSTOMY TUBE, PERCUTANEOUS N/A 2023    Procedure: possible REPLACEMENT, GASTROJEJUNOSTOMY TUBE, PERCUTANEOUS;  Surgeon: Christos Greenberg MD;  Location: [facility] OR       Physical Exam:   GENERAL APPEARANCE: bald now, alert and no distress; neatly groomed  EYES: Eyes grossly normal to inspection, PERRLA, conjunctivae and sclerae without injection or discharge, EOM intact   RESP:  no increased work of breathing; speaks in complete sentences;   MS: No musculoskeletal defects are noted  SKIN: No suspicious lesions or rashes, hydration status appears adequate with normal skin turgor   PSYCH: Alert and oriented x3; speech- coherent , normal rate and volume; able to articulate logical thoughts, able to abstract reason, no tangential thoughts, no hallucinations or delusions, mentation appears normal, Mood is euthymic. Affect is appropriate for this mood state and bright. Thought content is free of suicidal ideation, hallucinations, and delusions.  Eye contact is good during conversation.       Key Data Reviewed:  LABS: 2023- Cr 0.62, Albumin 3.6,  Hgb 9.5,      [id number]    XR Femur right 2 v  Impression:     1. Tiny  sclerotic focus in the approximate level of lesser trochanter  correlating with lesion seen 10/24/2023 CT. Findings new since  8/21/2023 CT and likely represents metastatic lesion.  2. No acute fracture or dislocation. No substantial degenerative  changes.    40 minutes spent on the date of the encounter doing chart review, history and exam, patient education & counseling, documentation and other activities as noted above.    Jeremy Hurt MD MS FAAFP CAQHPM  [facility] [place] [facility]  Office [phone]  Fax [phone]

## 2023-12-06 DIAGNOSIS — C25.9 PANCREATIC ADENOCARCINOMA (H): ICD-10-CM

## 2023-12-06 DIAGNOSIS — Z98.890 HISTORY OF BILIARY STENT INSERTION: ICD-10-CM

## 2023-12-06 RX ORDER — BUPRENORPHINE 10 UG/H
1 PATCH TRANSDERMAL
Qty: 4 PATCH | Refills: 3 | OUTPATIENT
Start: 2023-12-06

## 2023-12-06 NOTE — TELEPHONE ENCOUNTER
Pt requested a refill of butrans. Last fill 11/28. Refill requested too soon.    Megan Funez, MICHAELN, RN  Palliative Care Nurse Clinician    [phone] (Direct)  [phone] (Main)  [phone] (Appointment Scheduling)

## 2023-12-07 ENCOUNTER — VIRTUAL VISIT (OUTPATIENT)
Dept: ENDOCRINOLOGY | Facility: CLINIC | Age: 58
End: 2023-12-07
Payer: COMMERCIAL

## 2023-12-07 ENCOUNTER — TELEPHONE (OUTPATIENT)
Dept: ENDOCRINOLOGY | Facility: CLINIC | Age: 58
End: 2023-12-07

## 2023-12-07 DIAGNOSIS — E11.9 TYPE 2 DIABETES MELLITUS WITHOUT COMPLICATION, WITH LONG-TERM CURRENT USE OF INSULIN (H): Primary | ICD-10-CM

## 2023-12-07 DIAGNOSIS — C25.0 MALIGNANT NEOPLASM OF HEAD OF PANCREAS (H): ICD-10-CM

## 2023-12-07 DIAGNOSIS — Z79.4 TYPE 2 DIABETES MELLITUS WITHOUT COMPLICATION, WITH LONG-TERM CURRENT USE OF INSULIN (H): Primary | ICD-10-CM

## 2023-12-07 PROCEDURE — 99213 OFFICE O/P EST LOW 20 MIN: CPT | Mod: VID | Performed by: PHYSICIAN ASSISTANT

## 2023-12-07 NOTE — NURSING NOTE
Is the patient currently in the state of [place]? YES    Visit mode:VIDEO    If the visit is dropped, the patient can be reconnected by: VIDEO VISIT: Text to cell phone:   Telephone Information:   Mobile [phone]       Will anyone else be joining the visit? NO  (If patient encounters technical issues they should call [phone]    How would you like to obtain your AVS? MyChart    Are changes needed to the allergy or medication list? No, patient reported no changes were made to his e-check in since it was completed prior to visit. VF did not review information again due to this.    Reason for visit: RECHECK    Milly NICOLE

## 2023-12-07 NOTE — PROGRESS NOTES
Assessment/Plan :   Steroid induced hyperglycemia. Gallo is doing great. I recommend that he keep the Novolog on hand, in case he experiences a severe hyperglycemia excursion. I would also like him to go back to using fingerstick testing, as needed. If he has any problems, he is to reach out to my office. Otherwise, we will plan on a follow-up visit in about 6 mos.    Due to the COVID 19 pandemic this visit was a telephone/video visit in order to help prevent spread of infection in this patient and the general population. The patient gave verbal consent for the visit today. I have independently reviewed and interpreted labs, imaging as indicated.       Distant Location (provider location):  On-site  Mode of Communication:  Video Conference via [employer]  Chart review/prep time 5  Joined the call at 12/7/2023, 2:32:50 pm.  Left the call at 12/7/2023, 2:38:29 pm.  You were on the call for 5 minutes 38 seconds .  14 minutes spent on the date of the encounter doing chart review, history and exam, documentation and further activities as noted above.      Chief complaint:  Gallo is a 58 year old male who returns for follow-up regarding steroid induced hyperglycemia.    I have reviewed Care Everywhere including [facility], [facility], [facility] , [facility] lab reports, imaging reports and provider notes as indicated.      HISTORY OF PRESENT ILLNESS  Gallo was diagnosed with diabetes during a hospital admission in April of 2023 while receiving tube feedings due to acute pancreatitis with functional gastric outlet obstruction. He was started on Novolog, as needed, during the tube feedings. In July, he was found to have pancreatic adenocarcinoma. During his second hospitalization he continued to struggle with hyperglycemia and he was started on Lantus 20 unit(s) every morning, NPH 15 unit(s) at night with tube feedings, and Novolog as needed. He was discharged and  chemotherapy was started. During chemotherapy he would receive dexamethasone for 3 days with therapy. This led to a lot of glucose fluctuation. We were able to lower the Lantus to 5 unit(s) in the evening, and he remained on 20 unit(s) of NPH in the evening and 5 unit(s) in the morning, along with Novolog as needed. We also started him on a Bora sensor to keep close track of his blood sugars.     Since our last visit, his chemotherapy has been adjusted. He is no longer receiving dexamethasone and he is no longer relying solely on tube feedings. He has been eating solid food and he has been working to get plenty of protein. He continued to decrease the Lantus and NPH, as the tube feedings slowed. He has continued to monitor his blood sugars with the FreeStyle Bora and he has continued to use the Novolog, as needed. The last time he took Novolog was a few weeks ago. He took 2 unit(s) after his blood sugars had spiked.     He denies any problems with severe hyperglycemia and/or hypoglycemia. He also denies any problems with blurred vision or numbness/tingling in his feet. Overall, he is doing okay. He is getting ready to fly to [place] for vacation.    Endocrine relevant labs are as follows:   Latest Reference Range & Units 10/04/23 14:33   Hemoglobin A1C 0.0 - 5.6 % 6.8 (H)   (H): Data is abnormally high    REVIEW OF SYSTEMS    Endocrine: positive for steroid induced hyperglycemia  Skin: negative  Eyes: negative for, visual blurring, redness, tearing  Ears/Nose/Throat: negative for, nasal congestion, hoarseness  Respiratory: No shortness of breath, dyspnea on exertion, cough, or hemoptysis  Cardiovascular: negative for, chest pain, lower extremity edema, and exercise intolerance  Gastrointestinal: negative for, nausea, vomiting, constipation, and diarrhea  Genitourinary: negative for, nocturia, dysuria, frequency, and urgency  Musculoskeletal: negative for, muscular weakness, nocturnal cramping, and foot  pain  Neurologic: negative for, local weakness, numbness or tingling of hands, and numbness or tingling of feet  Psychiatric: negative  Hematologic/Lymphatic/Immunologic: negative    Past Medical History  Past Medical History:   Diagnosis Date    Acute pancreatitis 04/16/2023    Gastric outlet obstruction     Metastasis from pancreatic cancer (H)     Personal history of chemotherapy     Gemzar + Abraxane started on 10/31/2023    Recurrent acute pancreatitis        Medications  Current Outpatient Medications   Medication Sig Dispense Refill    acetaminophen (TYLENOL) 32 mg/mL liquid Take 20.313 mLs (650 mg) by mouth every 8 hours 1800 mL 0    apixaban ANTICOAGULANT (ELIQUIS) 5 MG tablet Take 1 tablet (5 mg) by mouth 2 times daily for 90 days 60 tablet 2    blood glucose (FREESTYLE TEST STRIPS) test strip by Other route as needed      buprenorphine (BUTRANS) 10 MCG/HR WK patch Place 1 patch onto the skin every 7 days Use with 20 mcg/hour patch for 30 mcg/hour total. 4 patch 3    buprenorphine (BUTRANS) 20 MCG/HR WK patch Place 1 patch onto the skin once a week Use with 10 mcg/hour patch for 30 mcg/hour total. 4 patch 3    Continuous Blood Gluc Sensor (FREESTYLE KAREN 2 SENSOR) MISC Change every 14 days 6 each 3    dexAMETHasone (DECADRON) 4 MG tablet Take 2 tablets (8 mg) by mouth daily Take for 2 days, starting the day after chemo. Take with food. (Patient not taking: Reported on 12/5/2023) 4 tablet 2    dicyclomine (BENTYL) 10 MG capsule Take 1 capsule (10 mg) by mouth 4 times daily (before meals and nightly) 120 capsule 1    [START ON 12/8/2023] HYDROmorphone (DILAUDID) 2 MG tablet Take 1-2 tablets (2-4 mg) by mouth every 4 hours as needed for severe pain or breakthrough pain 180 tablet 0    insulin aspart (NOVOLOG PEN) 100 UNIT/ML pen Inject 1-10 Units Subcutaneous 3 times daily (with meals) (Patient not taking: Reported on 12/5/2023) 15 mL 3    insulin glargine (BASAGLAR KWIKPEN) 100 UNIT/ML pen Inject 20 Units  Subcutaneous every morning for 360 days (Patient not taking: Reported on 12/5/2023) 18 mL 3    insulin  UNIT/ML injection Inject 16 Units Subcutaneous every evening Take 20 units day of chemo and two days after while on steroid (Patient not taking: Reported on 12/5/2023) 9 mL 1    Lancets (ONETOUCH DELICA PLUS [id number]) MISC  (Patient not taking: Reported on 12/5/2023)      lidocaine-prilocaine (EMLA) 2.5-2.5 % external cream Use 1-2 times a week or as needed prior to port access 30 g 3    lipase-protease-amylase (CREON 24) 71104-72648-598630 units CPEP per EC capsule 2-3 capsules with meals 3 times a day and 1-2 capsules with snacks max of 15 capsules a day 200 capsule 0    loperamide (IMODIUM) 2 MG capsule 2 caps at 1st sign of diarrhea & 1 cap every 2hrs until 12hrs diarrhea free. During night, 2 caps at bedtime & 2 caps every 4hrs until AM (Patient not taking: Reported on 11/8/2023) 100 capsule 3    naloxone (NARCAN) 4 MG/0.1ML nasal spray Spray 1 spray (4 mg) into one nostril alternating nostrils as needed for opioid reversal every 2-3 minutes until assistance arrives (Patient not taking: Reported on 11/16/2023) 0.2 mL 11    pantoprazole (PROTONIX) 40 MG EC tablet Take 1 tablet (40 mg) by mouth 2 times daily 60 tablet 4    polyethylene glycol (MIRALAX) 17 GM/Dose powder Take 17 g by mouth daily (Patient not taking: Reported on 11/8/2023) 510 g 3    prochlorperazine (COMPAZINE) 10 MG tablet Take 1 tablet (10 mg) by mouth every 6 hours as needed for nausea or vomiting 30 tablet 2    psyllium (METAMUCIL/KONSYL) capsule 1 capsule by Per G Tube route 2 times daily (Patient not taking: Reported on 11/8/2023) 180 capsule 3    sennosides (SENOKOT) 8.8 MG/5ML syrup 5 mLs by Per J Tube route 2 times daily (Patient not taking: Reported on 11/8/2023) 236 mL 3    simethicone (MYLICON) 125 MG chewable tablet Take 125 mg by mouth 2 times daily (Patient not taking: Reported on 11/8/2023)      sodium bicarbonate 325 MG  tablet 1 tablet (325 mg) by Per J Tube route 3 times daily (Patient not taking: Reported on 12/5/2023) 90 tablet 0    vitamin D3 (CHOLECALCIFEROL) 50 mcg (2000 units) tablet Take 1 tablet (50 mcg) by mouth daily 90 tablet 1       Allergies  No Known Allergies    Family History  family history includes Cirrhosis in his father; Colon Cancer in his paternal uncle; Colon Polyps in his brother; Diabetes Type 2  in his father; Genetic Disorder in his brother; Pancreatic Cancer (age of onset: 85) in his paternal aunt; Pancreatitis in his cousin.    Social History  Social History     Tobacco Use    Smoking status: Never     Passive exposure: Never    Smokeless tobacco: Never   Vaping Use    Vaping Use: Never used   Substance Use Topics    Alcohol use: Not Currently    Drug use: Never       Physical Exam  There is no height or weight on file to calculate BMI.  GENERAL: no distress  SKIN: Visible skin clear. No significant rash, abnormal pigmentation or lesions.  EYES: Eyes grossly normal to inspection.  No discharge or erythema, or obvious scleral/conjunctival abnormalities.  NECK: visible goiter is not present; no visible neck masses  RESP: No audible wheeze, cough, or visible cyanosis.  No visible retractions or increased work of breathing.    NEURO: Awake, alert, responds appropriately to questions.  Mentation and speech fluent.  PSYCH:affect normal and appearance well-groomed.      DATA REVIEW  FreeStyle LibreView  Time in target range 89%  High 11%  Current Ave  mg/dl

## 2023-12-07 NOTE — LETTER
12/7/2023         RE: Gallo Navarro  [id number] [address] [place] [id number]        Dear Colleague,    Thank you for referring your patient, Gallo Navarro, to the [facility]. Please see a copy of my visit note below.    Outcome for 12/07/23 6:43 AM: Data obtained via [employer] website  Erica Bustos LPN                 Assessment/Plan :   Steroid induced hyperglycemia. Gallo is doing great. I recommend that he keep the Novolog on hand, in case he experiences a severe hyperglycemia excursion. I would also like him to go back to using fingerstick testing, as needed. If he has any problems, he is to reach out to my office. Otherwise, we will plan on a follow-up visit in about 6 mos.    Due to the COVID 19 pandemic this visit was a telephone/video visit in order to help prevent spread of infection in this patient and the general population. The patient gave verbal consent for the visit today. I have independently reviewed and interpreted labs, imaging as indicated.       Distant Location (provider location):  On-site  Mode of Communication:  Video Conference via [employer]  Chart review/prep time 5  Joined the call at 12/7/2023, 2:32:50 pm.  Left the call at 12/7/2023, 2:38:29 pm.  You were on the call for 5 minutes 38 seconds .  14 minutes spent on the date of the encounter doing chart review, history and exam, documentation and further activities as noted above.      Chief complaint:  Gallo is a 58 year old male who returns for follow-up regarding steroid induced hyperglycemia.    I have reviewed Care Everywhere including [facility], [facility], [facility], [facility], [facility] , [facility], [place] lab reports, imaging reports and provider notes as indicated.      HISTORY OF PRESENT ILLNESS  Gallo was diagnosed with diabetes during a hospital admission in April of 2023 while receiving tube feedings due to acute pancreatitis with functional gastric outlet  obstruction. He was started on Novolog, as needed, during the tube feedings. In July, he was found to have pancreatic adenocarcinoma. During his second hospitalization he continued to struggle with hyperglycemia and he was started on Lantus 20 unit(s) every morning, NPH 15 unit(s) at night with tube feedings, and Novolog as needed. He was discharged and chemotherapy was started. During chemotherapy he would receive dexamethasone for 3 days with therapy. This led to a lot of glucose fluctuation. We were able to lower the Lantus to 5 unit(s) in the evening, and he remained on 20 unit(s) of NPH in the evening and 5 unit(s) in the morning, along with Novolog as needed. We also started him on a Bora sensor to keep close track of his blood sugars.     Since our last visit, his chemotherapy has been adjusted. He is no longer receiving dexamethasone and he is no longer relying solely on tube feedings. He has been eating solid food and he has been working to get plenty of protein. He continued to decrease the Lantus and NPH, as the tube feedings slowed. He has continued to monitor his blood sugars with the FreeStyle Bora and he has continued to use the Novolog, as needed. The last time he took Novolog was a few weeks ago. He took 2 unit(s) after his blood sugars had spiked.     He denies any problems with severe hyperglycemia and/or hypoglycemia. He also denies any problems with blurred vision or numbness/tingling in his feet. Overall, he is doing okay. He is getting ready to fly to [place] for vacation.    Endocrine relevant labs are as follows:   Latest Reference Range & Units 10/04/23 14:33   Hemoglobin A1C 0.0 - 5.6 % 6.8 (H)   (H): Data is abnormally high    REVIEW OF SYSTEMS    Endocrine: positive for steroid induced hyperglycemia  Skin: negative  Eyes: negative for, visual blurring, redness, tearing  Ears/Nose/Throat: negative for, nasal congestion, hoarseness  Respiratory: No shortness of breath, dyspnea on  exertion, cough, or hemoptysis  Cardiovascular: negative for, chest pain, lower extremity edema, and exercise intolerance  Gastrointestinal: negative for, nausea, vomiting, constipation, and diarrhea  Genitourinary: negative for, nocturia, dysuria, frequency, and urgency  Musculoskeletal: negative for, muscular weakness, nocturnal cramping, and foot pain  Neurologic: negative for, local weakness, numbness or tingling of hands, and numbness or tingling of feet  Psychiatric: negative  Hematologic/Lymphatic/Immunologic: negative    Past Medical History  Past Medical History:   Diagnosis Date     Acute pancreatitis 04/16/2023     Gastric outlet obstruction      Metastasis from pancreatic cancer (H)      Personal history of chemotherapy     Gemzar + Abraxane started on 10/31/2023     Recurrent acute pancreatitis        Medications  Current Outpatient Medications   Medication Sig Dispense Refill     acetaminophen (TYLENOL) 32 mg/mL liquid Take 20.313 mLs (650 mg) by mouth every 8 hours 1800 mL 0     apixaban ANTICOAGULANT (ELIQUIS) 5 MG tablet Take 1 tablet (5 mg) by mouth 2 times daily for 90 days 60 tablet 2     blood glucose (FREESTYLE TEST STRIPS) test strip by Other route as needed       buprenorphine (BUTRANS) 10 MCG/HR WK patch Place 1 patch onto the skin every 7 days Use with 20 mcg/hour patch for 30 mcg/hour total. 4 patch 3     buprenorphine (BUTRANS) 20 MCG/HR WK patch Place 1 patch onto the skin once a week Use with 10 mcg/hour patch for 30 mcg/hour total. 4 patch 3     Continuous Blood Gluc Sensor (FREESTYLE KAREN 2 SENSOR) MISC Change every 14 days 6 each 3     dexAMETHasone (DECADRON) 4 MG tablet Take 2 tablets (8 mg) by mouth daily Take for 2 days, starting the day after chemo. Take with food. (Patient not taking: Reported on 12/5/2023) 4 tablet 2     dicyclomine (BENTYL) 10 MG capsule Take 1 capsule (10 mg) by mouth 4 times daily (before meals and nightly) 120 capsule 1     [START ON 12/8/2023]  HYDROmorphone (DILAUDID) 2 MG tablet Take 1-2 tablets (2-4 mg) by mouth every 4 hours as needed for severe pain or breakthrough pain 180 tablet 0     insulin aspart (NOVOLOG PEN) 100 UNIT/ML pen Inject 1-10 Units Subcutaneous 3 times daily (with meals) (Patient not taking: Reported on 12/5/2023) 15 mL 3     insulin glargine (BASAGLAR KWIKPEN) 100 UNIT/ML pen Inject 20 Units Subcutaneous every morning for 360 days (Patient not taking: Reported on 12/5/2023) 18 mL 3     insulin  UNIT/ML injection Inject 16 Units Subcutaneous every evening Take 20 units day of chemo and two days after while on steroid (Patient not taking: Reported on 12/5/2023) 9 mL 1     Lancets (ONETOUCH DELICA PLUS [id number]) MISC  (Patient not taking: Reported on 12/5/2023)       lidocaine-prilocaine (EMLA) 2.5-2.5 % external cream Use 1-2 times a week or as needed prior to port access 30 g 3     lipase-protease-amylase (CREON 24) 25608-50444-078718 units CPEP per EC capsule 2-3 capsules with meals 3 times a day and 1-2 capsules with snacks max of 15 capsules a day 200 capsule 0     loperamide (IMODIUM) 2 MG capsule 2 caps at 1st sign of diarrhea & 1 cap every 2hrs until 12hrs diarrhea free. During night, 2 caps at bedtime & 2 caps every 4hrs until AM (Patient not taking: Reported on 11/8/2023) 100 capsule 3     naloxone (NARCAN) 4 MG/0.1ML nasal spray Spray 1 spray (4 mg) into one nostril alternating nostrils as needed for opioid reversal every 2-3 minutes until assistance arrives (Patient not taking: Reported on 11/16/2023) 0.2 mL 11     pantoprazole (PROTONIX) 40 MG EC tablet Take 1 tablet (40 mg) by mouth 2 times daily 60 tablet 4     polyethylene glycol (MIRALAX) 17 GM/Dose powder Take 17 g by mouth daily (Patient not taking: Reported on 11/8/2023) 510 g 3     prochlorperazine (COMPAZINE) 10 MG tablet Take 1 tablet (10 mg) by mouth every 6 hours as needed for nausea or vomiting 30 tablet 2     psyllium (METAMUCIL/KONSYL) capsule 1  capsule by Per G Tube route 2 times daily (Patient not taking: Reported on 11/8/2023) 180 capsule 3     sennosides (SENOKOT) 8.8 MG/5ML syrup 5 mLs by Per J Tube route 2 times daily (Patient not taking: Reported on 11/8/2023) 236 mL 3     simethicone (MYLICON) 125 MG chewable tablet Take 125 mg by mouth 2 times daily (Patient not taking: Reported on 11/8/2023)       sodium bicarbonate 325 MG tablet 1 tablet (325 mg) by Per J Tube route 3 times daily (Patient not taking: Reported on 12/5/2023) 90 tablet 0     vitamin D3 (CHOLECALCIFEROL) 50 mcg (2000 units) tablet Take 1 tablet (50 mcg) by mouth daily 90 tablet 1       Allergies  No Known Allergies    Family History  family history includes Cirrhosis in his father; Colon Cancer in his paternal uncle; Colon Polyps in his brother; Diabetes Type 2  in his father; Genetic Disorder in his brother; Pancreatic Cancer (age of onset: 85) in his paternal aunt; Pancreatitis in his cousin.    Social History  Social History     Tobacco Use     Smoking status: Never     Passive exposure: Never     Smokeless tobacco: Never   Vaping Use     Vaping Use: Never used   Substance Use Topics     Alcohol use: Not Currently     Drug use: Never       Physical Exam  There is no height or weight on file to calculate BMI.  GENERAL: no distress  SKIN: Visible skin clear. No significant rash, abnormal pigmentation or lesions.  EYES: Eyes grossly normal to inspection.  No discharge or erythema, or obvious scleral/conjunctival abnormalities.  NECK: visible goiter is not present; no visible neck masses  RESP: No audible wheeze, cough, or visible cyanosis.  No visible retractions or increased work of breathing.    NEURO: Awake, alert, responds appropriately to questions.  Mentation and speech fluent.  PSYCH:affect normal and appearance well-groomed.      DATA REVIEW  FreeStyle LibreView  Time in target range 89%  High 11%  Current Ave  mg/dl        Again, thank you for allowing me to  participate in the care of your patient.        Sincerely,        Berta Cid PA-C

## 2023-12-14 ENCOUNTER — PATIENT OUTREACH (OUTPATIENT)
Dept: ONCOLOGY | Facility: CLINIC | Age: 58
End: 2023-12-14
Payer: COMMERCIAL

## 2023-12-14 NOTE — TELEPHONE ENCOUNTER
Pt and spouse called back, after writing a mychart that perhaps pt should have infusions 1/12 and 1/19 before leaving for [facility]. Informed them Dr. Haile is on board with pt delaying infusions until he's back from [facility], and if they consent him for a trial to start immediately they can just cancel any upcoming infusions they have with [facility]. Will have scheduling arrange 1/30 gem/abraxane as a placeholder. Both agreeable with plan to finish out cycle 9, go to [facility] 12/23 and  cycle 10 on 1/30.

## 2023-12-14 NOTE — TELEPHONE ENCOUNTER
[facility]: Cancer Care                                                                                          Spouse Abigail left a  that pt has been scheduled with [facility] for a consult on 1/23/24 and pt should be off chemo that week. Stated they re-arranged [place] appointments in December so pt can keep chemo infusions as scheduled at the [facility] (12/19, 12/26, 1/2).    Pt currently in [facility] until 12/18. Was planning to go to [place] early January but think they will delay this trip now, knowing he has to prepare to for the [facility] trip as well.     Reached  and lmcb, will discuss Cycle 4 scheduling as that will be 1/16, 1/23, 1/30. May delay starting until he gets back from [facility].    Signature:  Efra Lynn RN

## 2023-12-19 ENCOUNTER — APPOINTMENT (OUTPATIENT)
Dept: LAB | Facility: CLINIC | Age: 58
End: 2023-12-19
Attending: STUDENT IN AN ORGANIZED HEALTH CARE EDUCATION/TRAINING PROGRAM
Payer: COMMERCIAL

## 2023-12-19 ENCOUNTER — ONCOLOGY VISIT (OUTPATIENT)
Dept: ONCOLOGY | Facility: CLINIC | Age: 58
End: 2023-12-19
Attending: STUDENT IN AN ORGANIZED HEALTH CARE EDUCATION/TRAINING PROGRAM
Payer: COMMERCIAL

## 2023-12-19 VITALS
RESPIRATION RATE: 18 BRPM | DIASTOLIC BLOOD PRESSURE: 75 MMHG | TEMPERATURE: 97.6 F | BODY MASS INDEX: 22.72 KG/M2 | HEART RATE: 84 BPM | WEIGHT: 167.5 LBS | OXYGEN SATURATION: 99 % | SYSTOLIC BLOOD PRESSURE: 111 MMHG

## 2023-12-19 DIAGNOSIS — E55.9 VITAMIN D DEFICIENCY: ICD-10-CM

## 2023-12-19 DIAGNOSIS — Z51.11 ENCOUNTER FOR ANTINEOPLASTIC CHEMOTHERAPY: Primary | ICD-10-CM

## 2023-12-19 DIAGNOSIS — Z51.11 ENCOUNTER FOR ANTINEOPLASTIC CHEMOTHERAPY: ICD-10-CM

## 2023-12-19 DIAGNOSIS — C25.0 MALIGNANT NEOPLASM OF HEAD OF PANCREAS (H): ICD-10-CM

## 2023-12-19 DIAGNOSIS — I26.94 MULTIPLE SUBSEGMENTAL PULMONARY EMBOLI WITHOUT ACUTE COR PULMONALE (H): ICD-10-CM

## 2023-12-19 DIAGNOSIS — C25.0 MALIGNANT NEOPLASM OF HEAD OF PANCREAS (H): Primary | ICD-10-CM

## 2023-12-19 LAB
ALBUMIN SERPL BCG-MCNC: 3.7 G/DL (ref 3.5–5.2)
ALP SERPL-CCNC: 119 U/L (ref 40–150)
ALT SERPL W P-5'-P-CCNC: 17 U/L (ref 0–70)
ANION GAP SERPL CALCULATED.3IONS-SCNC: 8 MMOL/L (ref 7–15)
AST SERPL W P-5'-P-CCNC: 25 U/L (ref 0–45)
BASOPHILS # BLD AUTO: 0.1 10E3/UL (ref 0–0.2)
BASOPHILS NFR BLD AUTO: 1 %
BILIRUB SERPL-MCNC: 0.2 MG/DL
BUN SERPL-MCNC: 18.7 MG/DL (ref 6–20)
CALCIUM SERPL-MCNC: 8.6 MG/DL (ref 8.6–10)
CHLORIDE SERPL-SCNC: 106 MMOL/L (ref 98–107)
CREAT SERPL-MCNC: 0.65 MG/DL (ref 0.67–1.17)
DEPRECATED HCO3 PLAS-SCNC: 26 MMOL/L (ref 22–29)
EGFRCR SERPLBLD CKD-EPI 2021: >90 ML/MIN/1.73M2
EOSINOPHIL # BLD AUTO: 0.3 10E3/UL (ref 0–0.7)
EOSINOPHIL NFR BLD AUTO: 7 %
ERYTHROCYTE [DISTWIDTH] IN BLOOD BY AUTOMATED COUNT: 15.9 % (ref 10–15)
GLUCOSE SERPL-MCNC: 180 MG/DL (ref 70–99)
HCT VFR BLD AUTO: 31 % (ref 40–53)
HGB BLD-MCNC: 9.4 G/DL (ref 13.3–17.7)
IMM GRANULOCYTES # BLD: 0 10E3/UL
IMM GRANULOCYTES NFR BLD: 0 %
LYMPHOCYTES # BLD AUTO: 0.9 10E3/UL (ref 0.8–5.3)
LYMPHOCYTES NFR BLD AUTO: 21 %
MCH RBC QN AUTO: 28.7 PG (ref 26.5–33)
MCHC RBC AUTO-ENTMCNC: 30.3 G/DL (ref 31.5–36.5)
MCV RBC AUTO: 95 FL (ref 78–100)
MONOCYTES # BLD AUTO: 0.6 10E3/UL (ref 0–1.3)
MONOCYTES NFR BLD AUTO: 13 %
NEUTROPHILS # BLD AUTO: 2.6 10E3/UL (ref 1.6–8.3)
NEUTROPHILS NFR BLD AUTO: 58 %
NRBC # BLD AUTO: 0 10E3/UL
NRBC BLD AUTO-RTO: 0 /100
PLATELET # BLD AUTO: 238 10E3/UL (ref 150–450)
POTASSIUM SERPL-SCNC: 3.7 MMOL/L (ref 3.4–5.3)
PROT SERPL-MCNC: 6.3 G/DL (ref 6.4–8.3)
RBC # BLD AUTO: 3.27 10E6/UL (ref 4.4–5.9)
SODIUM SERPL-SCNC: 140 MMOL/L (ref 135–145)
WBC # BLD AUTO: 4.5 10E3/UL (ref 4–11)

## 2023-12-19 PROCEDURE — 36591 DRAW BLOOD OFF VENOUS DEVICE: CPT | Performed by: STUDENT IN AN ORGANIZED HEALTH CARE EDUCATION/TRAINING PROGRAM

## 2023-12-19 PROCEDURE — 258N000003 HC RX IP 258 OP 636: Performed by: PHYSICIAN ASSISTANT

## 2023-12-19 PROCEDURE — 36591 DRAW BLOOD OFF VENOUS DEVICE: CPT | Performed by: PHYSICIAN ASSISTANT

## 2023-12-19 PROCEDURE — 85025 COMPLETE CBC W/AUTO DIFF WBC: CPT | Performed by: STUDENT IN AN ORGANIZED HEALTH CARE EDUCATION/TRAINING PROGRAM

## 2023-12-19 PROCEDURE — 99213 OFFICE O/P EST LOW 20 MIN: CPT

## 2023-12-19 PROCEDURE — 258N000003 HC RX IP 258 OP 636: Performed by: STUDENT IN AN ORGANIZED HEALTH CARE EDUCATION/TRAINING PROGRAM

## 2023-12-19 PROCEDURE — 96417 CHEMO IV INFUS EACH ADDL SEQ: CPT

## 2023-12-19 PROCEDURE — 86301 IMMUNOASSAY TUMOR CA 19-9: CPT

## 2023-12-19 PROCEDURE — 99213 OFFICE O/P EST LOW 20 MIN: CPT | Mod: 25

## 2023-12-19 PROCEDURE — 80053 COMPREHEN METABOLIC PANEL: CPT | Performed by: PHYSICIAN ASSISTANT

## 2023-12-19 PROCEDURE — 250N000011 HC RX IP 250 OP 636: Mod: JZ | Performed by: PHYSICIAN ASSISTANT

## 2023-12-19 PROCEDURE — 96375 TX/PRO/DX INJ NEW DRUG ADDON: CPT

## 2023-12-19 PROCEDURE — 96413 CHEMO IV INFUSION 1 HR: CPT

## 2023-12-19 PROCEDURE — 250N000011 HC RX IP 250 OP 636: Mod: JZ

## 2023-12-19 RX ORDER — ALBUTEROL SULFATE 0.83 MG/ML
2.5 SOLUTION RESPIRATORY (INHALATION)
Status: CANCELLED | OUTPATIENT
Start: 2023-12-19

## 2023-12-19 RX ORDER — ALBUTEROL SULFATE 0.83 MG/ML
2.5 SOLUTION RESPIRATORY (INHALATION)
Status: CANCELLED | OUTPATIENT
Start: 2023-12-26

## 2023-12-19 RX ORDER — DIPHENHYDRAMINE HYDROCHLORIDE 50 MG/ML
50 INJECTION INTRAMUSCULAR; INTRAVENOUS
Status: CANCELLED
Start: 2023-12-26

## 2023-12-19 RX ORDER — ONDANSETRON 2 MG/ML
8 INJECTION INTRAMUSCULAR; INTRAVENOUS ONCE
Status: DISCONTINUED | OUTPATIENT
Start: 2023-12-19 | End: 2023-12-19

## 2023-12-19 RX ORDER — PACLITAXEL 100 MG/20ML
125 INJECTION, POWDER, LYOPHILIZED, FOR SUSPENSION INTRAVENOUS ONCE
Status: CANCELLED | OUTPATIENT
Start: 2023-12-26

## 2023-12-19 RX ORDER — EPINEPHRINE 1 MG/ML
0.3 INJECTION, SOLUTION INTRAMUSCULAR; SUBCUTANEOUS EVERY 5 MIN PRN
Status: CANCELLED | OUTPATIENT
Start: 2023-12-26

## 2023-12-19 RX ORDER — ALBUTEROL SULFATE 90 UG/1
1-2 AEROSOL, METERED RESPIRATORY (INHALATION)
Status: CANCELLED
Start: 2023-12-26

## 2023-12-19 RX ORDER — MEPERIDINE HYDROCHLORIDE 25 MG/ML
25 INJECTION INTRAMUSCULAR; INTRAVENOUS; SUBCUTANEOUS EVERY 30 MIN PRN
Status: CANCELLED | OUTPATIENT
Start: 2024-01-02

## 2023-12-19 RX ORDER — ALBUTEROL SULFATE 90 UG/1
1-2 AEROSOL, METERED RESPIRATORY (INHALATION)
Status: CANCELLED
Start: 2024-01-02

## 2023-12-19 RX ORDER — ONDANSETRON 2 MG/ML
8 INJECTION INTRAMUSCULAR; INTRAVENOUS ONCE
Status: CANCELLED | OUTPATIENT
Start: 2023-12-26

## 2023-12-19 RX ORDER — PACLITAXEL 100 MG/20ML
125 INJECTION, POWDER, LYOPHILIZED, FOR SUSPENSION INTRAVENOUS ONCE
Status: COMPLETED | OUTPATIENT
Start: 2023-12-19 | End: 2023-12-19

## 2023-12-19 RX ORDER — HEPARIN SODIUM (PORCINE) LOCK FLUSH IV SOLN 100 UNIT/ML 100 UNIT/ML
5 SOLUTION INTRAVENOUS
Status: CANCELLED | OUTPATIENT
Start: 2023-12-26

## 2023-12-19 RX ORDER — ALBUTEROL SULFATE 90 UG/1
1-2 AEROSOL, METERED RESPIRATORY (INHALATION)
Status: CANCELLED
Start: 2023-12-19

## 2023-12-19 RX ORDER — LORAZEPAM 2 MG/ML
0.5 INJECTION INTRAMUSCULAR EVERY 4 HOURS PRN
Status: CANCELLED | OUTPATIENT
Start: 2023-12-26

## 2023-12-19 RX ORDER — METHYLPREDNISOLONE SODIUM SUCCINATE 125 MG/2ML
125 INJECTION, POWDER, LYOPHILIZED, FOR SOLUTION INTRAMUSCULAR; INTRAVENOUS
Status: CANCELLED
Start: 2024-01-02

## 2023-12-19 RX ORDER — METHYLPREDNISOLONE SODIUM SUCCINATE 125 MG/2ML
125 INJECTION, POWDER, LYOPHILIZED, FOR SOLUTION INTRAMUSCULAR; INTRAVENOUS
Status: CANCELLED
Start: 2023-12-26

## 2023-12-19 RX ORDER — EPINEPHRINE 1 MG/ML
0.3 INJECTION, SOLUTION INTRAMUSCULAR; SUBCUTANEOUS EVERY 5 MIN PRN
Status: CANCELLED | OUTPATIENT
Start: 2024-01-02

## 2023-12-19 RX ORDER — ONDANSETRON 2 MG/ML
8 INJECTION INTRAMUSCULAR; INTRAVENOUS ONCE
Status: CANCELLED | OUTPATIENT
Start: 2023-12-19

## 2023-12-19 RX ORDER — METHYLPREDNISOLONE SODIUM SUCCINATE 125 MG/2ML
125 INJECTION, POWDER, LYOPHILIZED, FOR SOLUTION INTRAMUSCULAR; INTRAVENOUS
Status: CANCELLED
Start: 2023-12-19

## 2023-12-19 RX ORDER — PACLITAXEL 100 MG/20ML
125 INJECTION, POWDER, LYOPHILIZED, FOR SUSPENSION INTRAVENOUS ONCE
Status: DISCONTINUED | OUTPATIENT
Start: 2023-12-19 | End: 2023-12-19

## 2023-12-19 RX ORDER — EPINEPHRINE 1 MG/ML
0.3 INJECTION, SOLUTION INTRAMUSCULAR; SUBCUTANEOUS EVERY 5 MIN PRN
Status: CANCELLED | OUTPATIENT
Start: 2023-12-19

## 2023-12-19 RX ORDER — HEPARIN SODIUM,PORCINE 10 UNIT/ML
5-20 VIAL (ML) INTRAVENOUS DAILY PRN
Status: CANCELLED | OUTPATIENT
Start: 2023-12-19

## 2023-12-19 RX ORDER — ONDANSETRON 2 MG/ML
8 INJECTION INTRAMUSCULAR; INTRAVENOUS ONCE
Status: COMPLETED | OUTPATIENT
Start: 2023-12-19 | End: 2023-12-19

## 2023-12-19 RX ORDER — ONDANSETRON 2 MG/ML
8 INJECTION INTRAMUSCULAR; INTRAVENOUS ONCE
Status: CANCELLED | OUTPATIENT
Start: 2024-01-02

## 2023-12-19 RX ORDER — MEPERIDINE HYDROCHLORIDE 25 MG/ML
25 INJECTION INTRAMUSCULAR; INTRAVENOUS; SUBCUTANEOUS EVERY 30 MIN PRN
Status: CANCELLED | OUTPATIENT
Start: 2023-12-19

## 2023-12-19 RX ORDER — DIPHENHYDRAMINE HYDROCHLORIDE 50 MG/ML
50 INJECTION INTRAMUSCULAR; INTRAVENOUS
Status: CANCELLED
Start: 2023-12-19

## 2023-12-19 RX ORDER — HEPARIN SODIUM (PORCINE) LOCK FLUSH IV SOLN 100 UNIT/ML 100 UNIT/ML
5 SOLUTION INTRAVENOUS ONCE
Status: COMPLETED | OUTPATIENT
Start: 2023-12-19 | End: 2023-12-19

## 2023-12-19 RX ORDER — LORAZEPAM 2 MG/ML
0.5 INJECTION INTRAMUSCULAR EVERY 4 HOURS PRN
Status: CANCELLED | OUTPATIENT
Start: 2024-01-02

## 2023-12-19 RX ORDER — ALBUTEROL SULFATE 0.83 MG/ML
2.5 SOLUTION RESPIRATORY (INHALATION)
Status: CANCELLED | OUTPATIENT
Start: 2024-01-02

## 2023-12-19 RX ORDER — DIPHENHYDRAMINE HYDROCHLORIDE 50 MG/ML
50 INJECTION INTRAMUSCULAR; INTRAVENOUS
Status: CANCELLED
Start: 2024-01-02

## 2023-12-19 RX ORDER — HEPARIN SODIUM,PORCINE 10 UNIT/ML
5-20 VIAL (ML) INTRAVENOUS DAILY PRN
Status: CANCELLED | OUTPATIENT
Start: 2023-12-26

## 2023-12-19 RX ORDER — HEPARIN SODIUM (PORCINE) LOCK FLUSH IV SOLN 100 UNIT/ML 100 UNIT/ML
5 SOLUTION INTRAVENOUS
Status: CANCELLED | OUTPATIENT
Start: 2023-12-19

## 2023-12-19 RX ORDER — MEPERIDINE HYDROCHLORIDE 25 MG/ML
25 INJECTION INTRAMUSCULAR; INTRAVENOUS; SUBCUTANEOUS EVERY 30 MIN PRN
Status: CANCELLED | OUTPATIENT
Start: 2023-12-26

## 2023-12-19 RX ORDER — PACLITAXEL 100 MG/20ML
125 INJECTION, POWDER, LYOPHILIZED, FOR SUSPENSION INTRAVENOUS ONCE
Status: CANCELLED | OUTPATIENT
Start: 2023-12-19

## 2023-12-19 RX ORDER — PACLITAXEL 100 MG/20ML
125 INJECTION, POWDER, LYOPHILIZED, FOR SUSPENSION INTRAVENOUS ONCE
Status: CANCELLED | OUTPATIENT
Start: 2024-01-02

## 2023-12-19 RX ORDER — HEPARIN SODIUM (PORCINE) LOCK FLUSH IV SOLN 100 UNIT/ML 100 UNIT/ML
5 SOLUTION INTRAVENOUS
Status: CANCELLED | OUTPATIENT
Start: 2024-01-02

## 2023-12-19 RX ORDER — LORAZEPAM 2 MG/ML
0.5 INJECTION INTRAMUSCULAR EVERY 4 HOURS PRN
Status: CANCELLED | OUTPATIENT
Start: 2023-12-19

## 2023-12-19 RX ORDER — HEPARIN SODIUM (PORCINE) LOCK FLUSH IV SOLN 100 UNIT/ML 100 UNIT/ML
5 SOLUTION INTRAVENOUS
Status: DISCONTINUED | OUTPATIENT
Start: 2023-12-19 | End: 2023-12-19 | Stop reason: HOSPADM

## 2023-12-19 RX ORDER — CHOLECALCIFEROL (VITAMIN D3) 50 MCG
1 TABLET ORAL DAILY
Qty: 90 TABLET | Refills: 1 | Status: SHIPPED | OUTPATIENT
Start: 2023-12-19 | End: 2024-03-19

## 2023-12-19 RX ORDER — HEPARIN SODIUM,PORCINE 10 UNIT/ML
5-20 VIAL (ML) INTRAVENOUS DAILY PRN
Status: CANCELLED | OUTPATIENT
Start: 2024-01-02

## 2023-12-19 RX ADMIN — ONDANSETRON 8 MG: 2 INJECTION INTRAMUSCULAR; INTRAVENOUS at 11:19

## 2023-12-19 RX ADMIN — SODIUM CHLORIDE 250 ML: 9 INJECTION, SOLUTION INTRAVENOUS at 11:19

## 2023-12-19 RX ADMIN — Medication 5 ML: at 09:51

## 2023-12-19 RX ADMIN — PACLITAXEL 250 MG: 100 INJECTION, POWDER, LYOPHILIZED, FOR SUSPENSION INTRAVENOUS at 11:39

## 2023-12-19 RX ADMIN — Medication 5 ML: at 12:52

## 2023-12-19 RX ADMIN — GEMCITABINE 2000 MG: 38 INJECTION, SOLUTION INTRAVENOUS at 12:19

## 2023-12-19 ASSESSMENT — PAIN SCALES - GENERAL: PAINLEVEL: NO PAIN (0)

## 2023-12-19 NOTE — LETTER
12/19/2023         RE: Gallo Navarro  [id number] [address] [place] [id number]        Dear Colleague,    Thank you for referring your patient, Gallo Navarro, to the [facility]. Please see a copy of my visit note below.    Oncology/Hematology Visit Note  Dec 19, 2023    Reason for Visit: follow up of locally advanced, unresectable pancreatic cancer, consideration of cycle 6 FOLFIRINOX and monitoring of toxicities associated with chemotherapy.     History of Present Illness: Gallo Navarro is a 58 year old male with locally advanced, unresectable pancreatic cancer. 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 He then presented to the ED with worsening abdominal pain, increased jaundice, poor PO intake and weight loss admitted on 7/8/2023 for concern of biliary obstruction. Unfortunately, during this admission, biopsy of pancreatic mass returned positive for pancreatic adenocarcinoma on 7/12/23. He started on treatment with 5FU, irinotecan, and oxaliplatin (FOLFIRINOX) on 7/31/23. Please see previous notes for further details on the patient's history.     8/17/23 - ERCP/EGD, duodenal stents x2 placed, exchange of GJ tube    8/21-/8/25 hospitalized due to diarrhea, colitis, abdominal pain.      8/29 - Cycle 3 deferred due to neutropenia.   Neulasta added. Irinotecan dose reduced 20% due to symptoms including cramping, double vision and slurred speech during infusion.      10/24/23 CT CAP with similar to slight increase in size of peripancreatic and mesenteric  lymph nodes, enlargement of previous skeletal metastasis as well as new sclerotic metastasis to left posterior 5th rib, enlarging pulmonary nodule, and unchanged pancreatic head mass. Also unclear concerns for PE,   right lower lobe segmental PE confirmed on CT-PE. Started on apixaban.     10/31/23 Cycle 1 gemzar, abraxane  11/22/23 Removal of GJ tube.   11/29/23 Completed palliative radiation to T10     Gallo returns today for close follow up and consideration of Gemzar, Abraxane.      Interval History:    Just returned from trip to [place] yesterday. Had a good time but is very tired today. Had some abdominal pain on the travel home due to eating different foods. Now that he is back home and eating his normal diet, no abdominal pain or bowel concerns. GJ tube site is well healed, avoided swimming while on vacation. Appetite is good, feels hungry. Denies nausea.     Pain is well controlled, denies any today. No neuropathy. Denies breathing concerns. No edema. No fever, chills or concerns for infection.       Review of Systems:  Patient denies any of the following except if noted above: fevers, chills, difficulty with energy, vision or hearing changes, chest pain, dyspnea, abdominal pain, nausea, vomiting, diarrhea, constipation, urinary concerns, headaches, numbness, tingling, issues with sleep or mood. He also denies lumps, bumps, rashes or skin lesions, bleeding or bruising issues.      Current Outpatient Medications   Medication Sig Dispense Refill    acetaminophen (TYLENOL) 32 mg/mL liquid Take 20.313 mLs (650 mg) by mouth every 8 hours 1800 mL 0    apixaban ANTICOAGULANT (ELIQUIS) 5 MG tablet Take 1 tablet (5 mg) by mouth 2 times daily 60 tablet 2    blood glucose (FREESTYLE TEST STRIPS) test strip by Other route as needed      buprenorphine (BUTRANS) 10 MCG/HR WK patch Place 1 patch onto the skin every 7 days Use with 20 mcg/hour patch for 30 mcg/hour total. 4 patch 3    buprenorphine (BUTRANS) 20 MCG/HR WK patch Place 1 patch onto the skin once a week Use with 10 mcg/hour patch for 30 mcg/hour total. 4 patch 3    Continuous Blood Gluc Sensor (FREESTYLE KAREN 2 SENSOR) MISC Change every 14 days 6 each 3    dicyclomine  (BENTYL) 10 MG capsule Take 1 capsule (10 mg) by mouth 4 times daily (before meals and nightly) 120 capsule 1    HYDROmorphone (DILAUDID) 2 MG tablet Take 1-2 tablets (2-4 mg) by mouth every 4 hours as needed for severe pain or breakthrough pain 180 tablet 0    lidocaine-prilocaine (EMLA) 2.5-2.5 % external cream Use 1-2 times a week or as needed prior to port access 30 g 3    lipase-protease-amylase (CREON 24) 21583-32084-151193 units CPEP per EC capsule 2-3 capsules with meals 3 times a day and 1-2 capsules with snacks max of 15 capsules a day 200 capsule 0    Multiple Vitamins-Minerals (CENTRUM SILVER 50+MEN) TABS as directed Orally      pantoprazole (PROTONIX) 40 MG EC tablet Take 1 tablet (40 mg) by mouth 2 times daily 60 tablet 4    prochlorperazine (COMPAZINE) 10 MG tablet Take 1 tablet (10 mg) by mouth every 6 hours as needed for nausea or vomiting 30 tablet 2    vitamin D3 (CHOLECALCIFEROL) 50 mcg (2000 units) tablet Take 1 tablet (50 mcg) by mouth daily 90 tablet 1    insulin aspart (NOVOLOG PEN) 100 UNIT/ML pen Inject 1-10 Units Subcutaneous 3 times daily (with meals) (Patient not taking: Reported on 12/5/2023) 15 mL 3     Physical Examination:  /75 (BP Location: Right arm, Patient Position: Sitting, Cuff Size: Adult Small)   Pulse 84   Temp 97.6  F (36.4  C) (Oral)   Resp 18   Wt 76 kg (167 lb 8 oz)   SpO2 99%   BMI 22.72 kg/m    Wt Readings from Last 10 Encounters:   12/19/23 76 kg (167 lb 8 oz)   12/05/23 74.4 kg (164 lb)   12/05/23 74.2 kg (163 lb 9.6 oz)   11/29/23 76.4 kg (168 lb 6.4 oz)   11/28/23 77.7 kg (171 lb 4.8 oz)   11/16/23 75.3 kg (166 lb)   11/14/23 75.4 kg (166 lb 3.2 oz)   11/08/23 75.3 kg (166 lb)   11/07/23 75.7 kg (166 lb 12.8 oz)   11/06/23 74.4 kg (164 lb)   General: The patient is a pleasant male in no acute distress.  HEENT: EOMI. Sclerae are anicteric. Mucous membranes moist, no lesions or thrush.   Lymph: Neck is supple with no lymphadenopathy in the cervical or  supraclavicular areas.   Heart: Regular rate and rhythm.   Lungs: Clear to auscultation bilaterally.   Abdomen: Bowel sounds present, soft, nontender with no palpable hepatosplenomegaly or masses. GJ tube incision is healed well with no drainage, swelling or redness.   Extremities: No edema noted to bilateral lower extremities.     Neuro: Cranial nerves II through XII are grossly intact.  Skin: No rashes, petechiae, or bruising noted on exposed skin.     Laboratory Data:  Most Recent 3 CBC's:  Recent Labs   Lab Test 12/19/23  0958 12/05/23  0835 11/28/23  0648   WBC 4.5 4.9 4.3   HGB 9.4* 9.5* 9.2*   MCV 95 93 95    179 254   ANEUTAUTO 2.6 3.3 2.1    Most Recent 3 BMP's:  Recent Labs   Lab Test 12/19/23  1116 12/05/23  0835 11/28/23  0648    139 141   POTASSIUM 3.7 4.2 4.1   CHLORIDE 106 105 106   CO2 26 26 27   BUN 18.7 9.4 14.5   CR 0.65* 0.62* 0.59*   ANIONGAP 8 8 8   DENISE 8.6 9.0 8.8   * 146* 138*   PROTTOTAL 6.3* 6.3* 6.0*   ALBUMIN 3.7 3.6 3.5    Most Recent 2 LFT's:  Recent Labs   Lab Test 12/19/23  1116 12/05/23  0835   AST 25 22   ALT 17 18   ALKPHOS 119 117   BILITOTAL 0.2 0.3   I reviewed the above labs today.          Assessment and Plan:  Locally advanced, unresectable pancreatic cancer. Patient started on treatment with palliative FOLFIRINOX on 7/31/23. He had a moderate amount of side effects following cycle 1 with dehydration, diarrhea, and nausea. Received cycle 2 on 8/15/23. ERCP/EGD on 8/17 with GJ tube exchange and duodenal stents placed. Hospitalized 8/21-8/25 due to colitis and abdominal pain. Cycle 3 was deferred due to neutropenia, Neulasta/Udenyca added on day 4.  - CT CAP 10/24 with progression in skeletal mets, lung nodule and lymph nodes. Stable pancreatic mass. Changed treatment to gemcitabine, Abraxane (day 1, 8, 15) and Zometa.  Tolerated cycle 1 well with the exception of a fever. Tolerated cycle 2 well. Cycle 2 day 15 skipped due to travel.   -Proceed with cycle 3  today, labs reviewed with no concerns.     -Return to clinic for day 8 and 15 with labs prior. Has appointments at [facility] scheduled for tomorrow. Consult at [facility] scheduled 1/23. Will delay cycle 4 until 1/30 as scheduled. Will request follow up with Dr. Haile prior to cycle 4.     Skeletal mets.   -Worsening skeletal mets noted on CT 10/24   -Received Zometa with cycle 1 (11/7/23), had fever later that night, reports no other side effects or concerns. Will continue every 6 months (next due 5/24).    -Radiation to T10 completed 11/29/23.     Pulmonary Embolism. Right lower segmental PE found on routine imaging. Started on apixiban, denies any bleeding concerns. Denies any breathing concerns today.      Nutrition.  Feeding tube removed. Continues to tolerate oral intake well. Gaining weight. No issues with nausea or vomiting. Continue to push oral hydration, drinking 64+ oz of fluid/day.  Continue Protonix daily.  Has compazine as needed for nausea.     Lower extremity edema. Resolved. Continue compression stockings and elevation. Continue to monitor.     Diabetes. Working closely with endocrine/diabetic educator on dose adjustments/management. Minimal need for insulin lately.     Diarrhea/constipation.  Resolved. Bowel movements more regular with increased food intake. Continue Metamucil as needed.     Abdominal pain. Managed by palliative care with Butrans patch and oral Dilaudid.     Hiccups. Hasn't had hiccups with the past couple of doses of chemo. Previously tried Baclofen, but did not like the side effects including mild confusion. Has also tried gabapentin but did not find this helpful for hiccups.       Anemia. Normocytic. Suspect anemia of chronic disease. Hemoglobin stable. Will monitor for now.       25 minutes spent on the date of the encounter doing chart review, review of test results, interpretation of tests, patient visit, and documentation       ESVIN Canales CNP

## 2023-12-19 NOTE — NURSING NOTE
Chief Complaint   Patient presents with    Oncology Clinic Visit     Pancreatic cancer    Port Draw     Labs drawn via 20g flat port needle by RN in lab. VS taken.     Labs drawn via port by RN. Port accessed with 20g flat needle. Flushed with saline and heparin. Pt tolerated well. Vitals taken. Pt checked into next appt.     Heydi Brar RN

## 2023-12-19 NOTE — PROGRESS NOTES
Oncology/Hematology Visit Note  Dec 19, 2023    Reason for Visit: follow up of locally advanced, unresectable pancreatic cancer, consideration of cycle 6 FOLFIRINOX and monitoring of toxicities associated with chemotherapy.     History of Present Illness: Gallo Navarro is a 58 year old male with locally advanced, unresectable pancreatic cancer. 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 He then presented to the ED with worsening abdominal pain, increased jaundice, poor PO intake and weight loss admitted on 7/8/2023 for concern of biliary obstruction. Unfortunately, during this admission, biopsy of pancreatic mass returned positive for pancreatic adenocarcinoma on 7/12/23. He started on treatment with 5FU, irinotecan, and oxaliplatin (FOLFIRINOX) on 7/31/23. Please see previous notes for further details on the patient's history.     8/17/23 - ERCP/EGD, duodenal stents x2 placed, exchange of GJ tube    8/21-/8/25 hospitalized due to diarrhea, colitis, abdominal pain.      8/29 - Cycle 3 deferred due to neutropenia.   Neulasta added. Irinotecan dose reduced 20% due to symptoms including cramping, double vision and slurred speech during infusion.      10/24/23 CT CAP with similar to slight increase in size of peripancreatic and mesenteric  lymph nodes, enlargement of previous skeletal metastasis as well as new sclerotic metastasis to left posterior 5th rib, enlarging pulmonary nodule, and unchanged pancreatic head mass. Also unclear concerns for PE,  right lower lobe segmental PE confirmed on CT-PE. Started on apixaban.     10/31/23 Cycle 1 gemzar, abraxane  11/22/23 Removal of GJ tube.   11/29/23 Completed palliative radiation to T10     Gallo returns today for close follow up and consideration of  Svitlana Esparza.      Interval History:    Just returned from trip to [place] yesterday. Had a good time but is very tired today. Had some abdominal pain on the travel home due to eating different foods. Now that he is back home and eating his normal diet, no abdominal pain or bowel concerns. GJ tube site is well healed, avoided swimming while on vacation. Appetite is good, feels hungry. Denies nausea.     Pain is well controlled, denies any today. No neuropathy. Denies breathing concerns. No edema. No fever, chills or concerns for infection.       Review of Systems:  Patient denies any of the following except if noted above: fevers, chills, difficulty with energy, vision or hearing changes, chest pain, dyspnea, abdominal pain, nausea, vomiting, diarrhea, constipation, urinary concerns, headaches, numbness, tingling, issues with sleep or mood. He also denies lumps, bumps, rashes or skin lesions, bleeding or bruising issues.      Current Outpatient Medications   Medication Sig Dispense Refill    acetaminophen (TYLENOL) 32 mg/mL liquid Take 20.313 mLs (650 mg) by mouth every 8 hours 1800 mL 0    apixaban ANTICOAGULANT (ELIQUIS) 5 MG tablet Take 1 tablet (5 mg) by mouth 2 times daily 60 tablet 2    blood glucose (FREESTYLE TEST STRIPS) test strip by Other route as needed      buprenorphine (BUTRANS) 10 MCG/HR WK patch Place 1 patch onto the skin every 7 days Use with 20 mcg/hour patch for 30 mcg/hour total. 4 patch 3    buprenorphine (BUTRANS) 20 MCG/HR WK patch Place 1 patch onto the skin once a week Use with 10 mcg/hour patch for 30 mcg/hour total. 4 patch 3    Continuous Blood Gluc Sensor (FREESTYLE KAREN 2 SENSOR) MISC Change every 14 days 6 each 3    dicyclomine (BENTYL) 10 MG capsule Take 1 capsule (10 mg) by mouth 4 times daily (before meals and nightly) 120 capsule 1    HYDROmorphone (DILAUDID) 2 MG tablet Take 1-2 tablets (2-4 mg) by mouth every 4 hours as needed for severe pain or breakthrough pain 180 tablet  0    lidocaine-prilocaine (EMLA) 2.5-2.5 % external cream Use 1-2 times a week or as needed prior to port access 30 g 3    lipase-protease-amylase (CREON 24) 52904-68287-504808 units CPEP per EC capsule 2-3 capsules with meals 3 times a day and 1-2 capsules with snacks max of 15 capsules a day 200 capsule 0    Multiple Vitamins-Minerals (CENTRUM SILVER 50+MEN) TABS as directed Orally      pantoprazole (PROTONIX) 40 MG EC tablet Take 1 tablet (40 mg) by mouth 2 times daily 60 tablet 4    prochlorperazine (COMPAZINE) 10 MG tablet Take 1 tablet (10 mg) by mouth every 6 hours as needed for nausea or vomiting 30 tablet 2    vitamin D3 (CHOLECALCIFEROL) 50 mcg (2000 units) tablet Take 1 tablet (50 mcg) by mouth daily 90 tablet 1    insulin aspart (NOVOLOG PEN) 100 UNIT/ML pen Inject 1-10 Units Subcutaneous 3 times daily (with meals) (Patient not taking: Reported on 12/5/2023) 15 mL 3     Physical Examination:  /75 (BP Location: Right arm, Patient Position: Sitting, Cuff Size: Adult Small)   Pulse 84   Temp 97.6  F (36.4  C) (Oral)   Resp 18   Wt 76 kg (167 lb 8 oz)   SpO2 99%   BMI 22.72 kg/m    Wt Readings from Last 10 Encounters:   12/19/23 76 kg (167 lb 8 oz)   12/05/23 74.4 kg (164 lb)   12/05/23 74.2 kg (163 lb 9.6 oz)   11/29/23 76.4 kg (168 lb 6.4 oz)   11/28/23 77.7 kg (171 lb 4.8 oz)   11/16/23 75.3 kg (166 lb)   11/14/23 75.4 kg (166 lb 3.2 oz)   11/08/23 75.3 kg (166 lb)   11/07/23 75.7 kg (166 lb 12.8 oz)   11/06/23 74.4 kg (164 lb)   General: The patient is a pleasant male in no acute distress.  HEENT: EOMI. Sclerae are anicteric. Mucous membranes moist, no lesions or thrush.   Lymph: Neck is supple with no lymphadenopathy in the cervical or supraclavicular areas.   Heart: Regular rate and rhythm.   Lungs: Clear to auscultation bilaterally.   Abdomen: Bowel sounds present, soft, nontender with no palpable hepatosplenomegaly or masses. GJ tube incision is healed well with no drainage, swelling or  redness.   Extremities: No edema noted to bilateral lower extremities.     Neuro: Cranial nerves II through XII are grossly intact.  Skin: No rashes, petechiae, or bruising noted on exposed skin.     Laboratory Data:  Most Recent 3 CBC's:  Recent Labs   Lab Test 12/19/23  0958 12/05/23  0835 11/28/23  0648   WBC 4.5 4.9 4.3   HGB 9.4* 9.5* 9.2*   MCV 95 93 95    179 254   ANEUTAUTO 2.6 3.3 2.1    Most Recent 3 BMP's:  Recent Labs   Lab Test 12/19/23  1116 12/05/23  0835 11/28/23  0648    139 141   POTASSIUM 3.7 4.2 4.1   CHLORIDE 106 105 106   CO2 26 26 27   BUN 18.7 9.4 14.5   CR 0.65* 0.62* 0.59*   ANIONGAP 8 8 8   DENISE 8.6 9.0 8.8   * 146* 138*   PROTTOTAL 6.3* 6.3* 6.0*   ALBUMIN 3.7 3.6 3.5    Most Recent 2 LFT's:  Recent Labs   Lab Test 12/19/23  1116 12/05/23  0835   AST 25 22   ALT 17 18   ALKPHOS 119 117   BILITOTAL 0.2 0.3   I reviewed the above labs today.          Assessment and Plan:  Locally advanced, unresectable pancreatic cancer. Patient started on treatment with palliative FOLFIRINOX on 7/31/23. He had a moderate amount of side effects following cycle 1 with dehydration, diarrhea, and nausea. Received cycle 2 on 8/15/23. ERCP/EGD on 8/17 with GJ tube exchange and duodenal stents placed. Hospitalized 8/21-8/25 due to colitis and abdominal pain. Cycle 3 was deferred due to neutropenia, Neulasta/Udenyca added on day 4.  - CT CAP 10/24 with progression in skeletal mets, lung nodule and lymph nodes. Stable pancreatic mass. Changed treatment to gemcitabine, Abraxane (day 1, 8, 15) and Zometa.  Tolerated cycle 1 well with the exception of a fever. Tolerated cycle 2 well. Cycle 2 day 15 skipped due to travel.   -Proceed with cycle 3 today, labs reviewed with no concerns.     -Return to clinic for day 8 and 15 with labs prior. Has appointments at [facility] scheduled for tomorrow. Consult at [facility] scheduled 1/23. Will delay cycle 4 until 1/30 as scheduled. Will request follow up with   [facility] prior to cycle 4.     Skeletal mets.   -Worsening skeletal mets noted on CT 10/24   -Received Zometa with cycle 1 (11/7/23), had fever later that night, reports no other side effects or concerns. Will continue every 6 months (next due 5/24).    -Radiation to T10 completed 11/29/23.     Pulmonary Embolism. Right lower segmental PE found on routine imaging. Started on apixiban, denies any bleeding concerns. Denies any breathing concerns today.      Nutrition.  Feeding tube removed. Continues to tolerate oral intake well. Gaining weight. No issues with nausea or vomiting. Continue to push oral hydration, drinking 64+ oz of fluid/day.  Continue Protonix daily.  Has compazine as needed for nausea.     Lower extremity edema. Resolved. Continue compression stockings and elevation. Continue to monitor.     Diabetes. Working closely with endocrine/diabetic educator on dose adjustments/management. Minimal need for insulin lately.     Diarrhea/constipation.  Resolved. Bowel movements more regular with increased food intake. Continue Metamucil as needed.     Abdominal pain. Managed by palliative care with Butrans patch and oral Dilaudid.     Hiccups. Hasn't had hiccups with the past couple of doses of chemo. Previously tried Baclofen, but did not like the side effects including mild confusion. Has also tried gabapentin but did not find this helpful for hiccups.       Anemia. Normocytic. Suspect anemia of chronic disease. Hemoglobin stable. Will monitor for now.       25 minutes spent on the date of the encounter doing chart review, review of test results, interpretation of tests, patient visit, and documentation       ESVIN Canales CNP

## 2023-12-19 NOTE — PROGRESS NOTES
Infusion Nursing Note:  Gallo Navarro presents today for cycle 3, day 1 abraxane, gemzar.    Patient seen by provider today: Yes: Megan Farrell CNP   present during visit today: Not Applicable.    Note:     12/19/2023 1051 Written Communication: Megan Farrell CNP/Yajaira Gutierrez RN  -today should be cycle 3 day 1  -please draw CMP and tumor marker today, ok to proceed with treatment without the results       Intravenous Access:  Implanted Port.    Treatment Conditions:  Lab Results   Component Value Date    HGB 9.4 (L) 12/19/2023    WBC 4.5 12/19/2023    ANEU 2.8 08/15/2023    ANEUTAUTO 2.6 12/19/2023     12/19/2023        Lab Results   Component Value Date     12/19/2023    POTASSIUM 3.7 12/19/2023    MAG 1.9 08/23/2023    CR 0.65 (L) 12/19/2023    DENISE 8.6 12/19/2023    BILITOTAL 0.2 12/19/2023    ALBUMIN 3.7 12/19/2023    ALT 17 12/19/2023    AST 25 12/19/2023       Results reviewed, labs MET treatment parameters, ok to proceed with treatment.      Post Infusion Assessment:  Patient tolerated infusion without incident.  Blood return noted pre and post infusion.  Site patent and intact, free from redness, edema or discomfort.  No evidence of extravasations.  Access discontinued per protocol.       Discharge Plan:   Patient declined prescription refills.  Discharge instructions reviewed with: Patient and spouse.  Patient and/or family verbalized understanding of discharge instructions and all questions answered.  AVS to patient via DocRun.  Patient will return 12/26/23 for next appointment.   Patient discharged in stable condition accompanied by: self and wife.  Departure Mode: Ambulatory.      Yajaira Gutierrez, RN

## 2023-12-19 NOTE — NURSING NOTE
Oncology Rooming Note    December 19, 2023 10:04 AM   Gallo Navarro is a 58 year old male who presents for:    Chief Complaint   Patient presents with    Oncology Clinic Visit     Pancreatic cancer    Port Draw     Labs drawn via 20g flat port needle by RN in lab. VS taken.     Initial Vitals: /75 (BP Location: Right arm, Patient Position: Sitting, Cuff Size: Adult Small)   Pulse 84   Temp 97.6  F (36.4  C) (Oral)   Resp 18   Wt 76 kg (167 lb 8 oz)   SpO2 99%   BMI 22.72 kg/m   Estimated body mass index is 22.72 kg/m  as calculated from the following:    Height as of 12/5/23: 1.829 m (6').    Weight as of this encounter: 76 kg (167 lb 8 oz). Body surface area is 1.96 meters squared.  No Pain (0) Comment: Data Unavailable   No LMP for male patient.  Allergies reviewed: Yes  Medications reviewed: Yes    Medications: MEDICATION REFILLS NEEDED TODAY. Provider was notified.  Pharmacy name entered into EPIC:    [facility] - [place], [place] - [address] [facility] - [place], [place] - [phone]  [facility] [place] [facility] [address] [facility] AT PORTAL TO REGISTERED [facility] SITES  [facility] - [place], [place] - [address] [place] [address] SE  [facility] [id number] [facility] - [place], [place] - [address] [facility] [address]    Clinical concerns: Needs refills on eliquis and vitamin D        Luz Rocha

## 2023-12-19 NOTE — PATIENT INSTRUCTIONS
[facility] Triage and after hours / weekends / holidays:  [phone]    Please call the triage or after hours line if you experience a temperature greater than or equal to 100.4, shaking chills, have uncontrolled nausea, vomiting and/or diarrhea, dizziness, shortness of breath, chest pain, bleeding, unexplained bruising, or if you have any other new/concerning symptoms, questions or concerns.      If you are having any concerning symptoms or wish to speak to a provider before your next infusion visit, please call triage to notify them so we can adequately serve you.     If you need a refill on a narcotic prescription or other medication, please call before your infusion appointment.           December 2023 Sunday Monday Tuesday Wednesday Thursday Friday Saturday                            1     2       3     4     5    LAB CENTRAL   8:00 AM   (15 min.)   [facility] DRAW   [facility]    ONC INFUSION 2 HR (120 MIN)   8:30 AM   (120 min.)    ONC INFUSION NURSE   [facility]    RETURN RADIATION ONCOLOGY   1:45 PM   (40 min.)   Jeremy Hurt MD   [facility] Radiation Oncology [place] 6     7    RETURN DIABETES   2:30 PM   (30 min.)   Berta Cid PA-C   [facility] 8     9       10     11     12     13     14     15     16       17     18     19    LAB CENTRAL   9:30 AM   (15 min.)   [facility] DRAW   [facility]    RETURN CCSL   9:45 AM   (45 min.)   Megan Farrell APRN CNP   [facility]    ONC INFUSION 2 HR (120 MIN)  10:30 AM   (120 min.)    ONC INFUSION NURSE   [facility] 20     21     22     23       24     25     26    LAB CENTRAL   8:15 AM   (15 min.)   [facility]   [facility]    ONC INFUSION 2 HR (120 MIN)   8:30 AM   (120 min.)    ONC INFUSION NURSE   [facility] 27     28     29     30       31 January 2024 Sunday Monday Tuesday Wednesday Thursday Friday Saturday        1     2    LAB CENTRAL   8:00 AM   (15 min.)   [facility]   [facility]    ONC INFUSION 2 HR (120 MIN)   8:30 AM   (120 min.)   UC ONC INFUSION NURSE   [facility] 3     4     5     6       7     8     9     10     11     12     13       14     15     16     17     18     19     20       21     22     23    RETURN RADIATION ONCOLOGY  10:25 AM   (40 min.)   Jeremy Hurt MD   [facility] Radiation Oncology [address] OFFICE VISIT    1:10 PM   (30 min.)   Akil Hernandez MD   [facility] 25     26     27       28     29     30    LAB CENTRAL   8:00 AM   (15 min.)   [facility]   [facility]    ONC INFUSION 2 HR (120 MIN)   8:30 AM   (120 min.)   UC ONC INFUSION NURSE   [facility] 31                                   Recent Results (from the past 24 hour(s))   CBC with platelets and differential    Collection Time: 12/19/23  9:58 AM   Result Value Ref Range    WBC Count 4.5 4.0 - 11.0 10e3/uL    RBC Count 3.27 (L) 4.40 - 5.90 10e6/uL    Hemoglobin 9.4 (L) 13.3 - 17.7 g/dL    Hematocrit 31.0 (L) 40.0 - 53.0 %    MCV 95 78 - 100 fL    MCH 28.7 26.5 - 33.0 pg    MCHC 30.3 (L) 31.5 - 36.5 g/dL    RDW 15.9 (H) 10.0 - 15.0 %    Platelet Count 238 150 - 450 10e3/uL    % Neutrophils 58 %    % Lymphocytes 21 %    % Monocytes 13 %    % Eosinophils 7 %    % Basophils 1 %    % Immature Granulocytes 0 %    NRBCs per 100 WBC 0 <1 /100    Absolute Neutrophils 2.6 1.6 - 8.3 10e3/uL    Absolute Lymphocytes 0.9 0.8 - 5.3 10e3/uL    Absolute Monocytes 0.6 0.0 - 1.3 10e3/uL    Absolute Eosinophils 0.3 0.0 - 0.7 10e3/uL    Absolute Basophils 0.1 0.0 - 0.2 10e3/uL    Absolute Immature Granulocytes  0.0 <=0.4 10e3/uL    Absolute NRBCs 0.0 10e3/uL   Comprehensive metabolic panel    Collection Time: 12/19/23 11:16 AM   Result Value Ref Range    Sodium 140 135 - 145 mmol/L    Potassium 3.7 3.4 - 5.3 mmol/L    Carbon Dioxide (CO2) 26 22 - 29 mmol/L    Anion Gap 8 7 - 15 mmol/L    Urea Nitrogen 18.7 6.0 - 20.0 mg/dL    Creatinine 0.65 (L) 0.67 - 1.17 mg/dL    GFR Estimate >90 >60 mL/min/1.73m2    Calcium 8.6 8.6 - 10.0 mg/dL    Chloride 106 98 - 107 mmol/L    Glucose 180 (H) 70 - 99 mg/dL    Alkaline Phosphatase 119 40 - 150 U/L    AST 25 0 - 45 U/L    ALT 17 0 - 70 U/L    Protein Total 6.3 (L) 6.4 - 8.3 g/dL    Albumin 3.7 3.5 - 5.2 g/dL    Bilirubin Total 0.2 <=1.2 mg/dL

## 2023-12-21 ENCOUNTER — TELEPHONE (OUTPATIENT)
Dept: FAMILY MEDICINE | Facility: CLINIC | Age: 58
End: 2023-12-21

## 2023-12-21 LAB — CANCER AG19-9 SERPL IA-ACNC: 3 U/ML

## 2023-12-21 NOTE — TELEPHONE ENCOUNTER
Forms/Letter Request     Type of form/letter: [facility] Order Number: [id number]     Have you been seen for this request: N/A     Do we have the form/letter: Yes: Will place form on providers desk for review/signature     When is form/letter needed by: ASAP     How would you like the form/letter returned: Fax : [phone]

## 2023-12-23 ENCOUNTER — MYC REFILL (OUTPATIENT)
Dept: RADIATION ONCOLOGY | Facility: HOSPITAL | Age: 58
End: 2023-12-23
Payer: COMMERCIAL

## 2023-12-23 DIAGNOSIS — G89.3 CANCER ASSOCIATED PAIN: ICD-10-CM

## 2023-12-23 DIAGNOSIS — C25.9 PANCREATIC ADENOCARCINOMA (H): ICD-10-CM

## 2023-12-23 RX ORDER — BUPRENORPHINE 10 UG/H
1 PATCH TRANSDERMAL
Qty: 4 PATCH | Refills: 3 | Status: CANCELLED | OUTPATIENT
Start: 2023-12-23

## 2023-12-24 ENCOUNTER — MYC REFILL (OUTPATIENT)
Dept: ENDOCRINOLOGY | Facility: CLINIC | Age: 58
End: 2023-12-24
Payer: COMMERCIAL

## 2023-12-24 DIAGNOSIS — E11.9 TYPE 2 DIABETES MELLITUS WITHOUT COMPLICATION, WITH LONG-TERM CURRENT USE OF INSULIN (H): ICD-10-CM

## 2023-12-24 DIAGNOSIS — Z79.4 TYPE 2 DIABETES MELLITUS WITHOUT COMPLICATION, WITH LONG-TERM CURRENT USE OF INSULIN (H): ICD-10-CM

## 2023-12-26 ENCOUNTER — INFUSION THERAPY VISIT (OUTPATIENT)
Dept: ONCOLOGY | Facility: CLINIC | Age: 58
End: 2023-12-26
Attending: STUDENT IN AN ORGANIZED HEALTH CARE EDUCATION/TRAINING PROGRAM
Payer: COMMERCIAL

## 2023-12-26 ENCOUNTER — MYC REFILL (OUTPATIENT)
Dept: ONCOLOGY | Facility: CLINIC | Age: 58
End: 2023-12-26

## 2023-12-26 ENCOUNTER — APPOINTMENT (OUTPATIENT)
Dept: LAB | Facility: CLINIC | Age: 58
End: 2023-12-26
Attending: STUDENT IN AN ORGANIZED HEALTH CARE EDUCATION/TRAINING PROGRAM
Payer: COMMERCIAL

## 2023-12-26 VITALS
OXYGEN SATURATION: 95 % | RESPIRATION RATE: 18 BRPM | WEIGHT: 157.8 LBS | HEART RATE: 92 BPM | DIASTOLIC BLOOD PRESSURE: 70 MMHG | SYSTOLIC BLOOD PRESSURE: 109 MMHG | BODY MASS INDEX: 21.4 KG/M2 | TEMPERATURE: 98.5 F

## 2023-12-26 DIAGNOSIS — C25.0 MALIGNANT NEOPLASM OF HEAD OF PANCREAS (H): ICD-10-CM

## 2023-12-26 DIAGNOSIS — Z98.890 HISTORY OF BILIARY STENT INSERTION: ICD-10-CM

## 2023-12-26 DIAGNOSIS — Z51.11 ENCOUNTER FOR ANTINEOPLASTIC CHEMOTHERAPY: Primary | ICD-10-CM

## 2023-12-26 DIAGNOSIS — C25.9 PANCREATIC ADENOCARCINOMA (H): ICD-10-CM

## 2023-12-26 LAB
ALBUMIN SERPL BCG-MCNC: 3.7 G/DL (ref 3.5–5.2)
ALP SERPL-CCNC: 105 U/L (ref 40–150)
ALT SERPL W P-5'-P-CCNC: 19 U/L (ref 0–70)
ANION GAP SERPL CALCULATED.3IONS-SCNC: 9 MMOL/L (ref 7–15)
AST SERPL W P-5'-P-CCNC: 26 U/L (ref 0–45)
BASOPHILS # BLD AUTO: ABNORMAL 10*3/UL
BASOPHILS # BLD MANUAL: 0 10E3/UL (ref 0–0.2)
BASOPHILS NFR BLD AUTO: ABNORMAL %
BASOPHILS NFR BLD MANUAL: 0 %
BILIRUB SERPL-MCNC: 0.4 MG/DL
BUN SERPL-MCNC: 10.1 MG/DL (ref 6–20)
CALCIUM SERPL-MCNC: 9.1 MG/DL (ref 8.6–10)
CHLORIDE SERPL-SCNC: 103 MMOL/L (ref 98–107)
CREAT SERPL-MCNC: 0.7 MG/DL (ref 0.67–1.17)
DEPRECATED HCO3 PLAS-SCNC: 27 MMOL/L (ref 22–29)
EGFRCR SERPLBLD CKD-EPI 2021: >90 ML/MIN/1.73M2
EOSINOPHIL # BLD AUTO: ABNORMAL 10*3/UL
EOSINOPHIL # BLD MANUAL: 0 10E3/UL (ref 0–0.7)
EOSINOPHIL NFR BLD AUTO: ABNORMAL %
EOSINOPHIL NFR BLD MANUAL: 0 %
ERYTHROCYTE [DISTWIDTH] IN BLOOD BY AUTOMATED COUNT: 14.9 % (ref 10–15)
GLUCOSE SERPL-MCNC: 170 MG/DL (ref 70–99)
HCT VFR BLD AUTO: 31.8 % (ref 40–53)
HGB BLD-MCNC: 10 G/DL (ref 13.3–17.7)
IMM GRANULOCYTES # BLD: ABNORMAL 10*3/UL
IMM GRANULOCYTES NFR BLD: ABNORMAL %
LYMPHOCYTES # BLD AUTO: ABNORMAL 10*3/UL
LYMPHOCYTES # BLD MANUAL: 0.9 10E3/UL (ref 0.8–5.3)
LYMPHOCYTES NFR BLD AUTO: ABNORMAL %
LYMPHOCYTES NFR BLD MANUAL: 24 %
MCH RBC QN AUTO: 29.1 PG (ref 26.5–33)
MCHC RBC AUTO-ENTMCNC: 31.4 G/DL (ref 31.5–36.5)
MCV RBC AUTO: 92 FL (ref 78–100)
MONOCYTES # BLD AUTO: ABNORMAL 10*3/UL
MONOCYTES # BLD MANUAL: 0.6 10E3/UL (ref 0–1.3)
MONOCYTES NFR BLD AUTO: ABNORMAL %
MONOCYTES NFR BLD MANUAL: 17 %
NEUTROPHILS # BLD AUTO: ABNORMAL 10*3/UL
NEUTROPHILS # BLD MANUAL: 2.2 10E3/UL (ref 1.6–8.3)
NEUTROPHILS NFR BLD AUTO: ABNORMAL %
NEUTROPHILS NFR BLD MANUAL: 59 %
NRBC # BLD AUTO: 0 10E3/UL
NRBC BLD AUTO-RTO: 0 /100
PLAT MORPH BLD: NORMAL
PLATELET # BLD AUTO: 148 10E3/UL (ref 150–450)
POTASSIUM SERPL-SCNC: 3.8 MMOL/L (ref 3.4–5.3)
PROT SERPL-MCNC: 6.6 G/DL (ref 6.4–8.3)
RBC # BLD AUTO: 3.44 10E6/UL (ref 4.4–5.9)
RBC MORPH BLD: NORMAL
SODIUM SERPL-SCNC: 139 MMOL/L (ref 135–145)
WBC # BLD AUTO: 3.8 10E3/UL (ref 4–11)

## 2023-12-26 PROCEDURE — 85007 BL SMEAR W/DIFF WBC COUNT: CPT

## 2023-12-26 PROCEDURE — 85027 COMPLETE CBC AUTOMATED: CPT

## 2023-12-26 PROCEDURE — 258N000003 HC RX IP 258 OP 636: Performed by: PHYSICIAN ASSISTANT

## 2023-12-26 PROCEDURE — 86301 IMMUNOASSAY TUMOR CA 19-9: CPT

## 2023-12-26 PROCEDURE — 96413 CHEMO IV INFUSION 1 HR: CPT

## 2023-12-26 PROCEDURE — 250N000011 HC RX IP 250 OP 636: Mod: JZ | Performed by: PHYSICIAN ASSISTANT

## 2023-12-26 PROCEDURE — 250N000011 HC RX IP 250 OP 636: Mod: JZ | Performed by: STUDENT IN AN ORGANIZED HEALTH CARE EDUCATION/TRAINING PROGRAM

## 2023-12-26 PROCEDURE — 36591 DRAW BLOOD OFF VENOUS DEVICE: CPT

## 2023-12-26 PROCEDURE — 96417 CHEMO IV INFUS EACH ADDL SEQ: CPT

## 2023-12-26 PROCEDURE — 82310 ASSAY OF CALCIUM: CPT

## 2023-12-26 PROCEDURE — 96375 TX/PRO/DX INJ NEW DRUG ADDON: CPT

## 2023-12-26 RX ORDER — HEPARIN SODIUM (PORCINE) LOCK FLUSH IV SOLN 100 UNIT/ML 100 UNIT/ML
5 SOLUTION INTRAVENOUS
Status: DISCONTINUED | OUTPATIENT
Start: 2023-12-26 | End: 2023-12-26 | Stop reason: HOSPADM

## 2023-12-26 RX ORDER — ONDANSETRON 2 MG/ML
8 INJECTION INTRAMUSCULAR; INTRAVENOUS ONCE
Status: COMPLETED | OUTPATIENT
Start: 2023-12-26 | End: 2023-12-26

## 2023-12-26 RX ORDER — HEPARIN SODIUM (PORCINE) LOCK FLUSH IV SOLN 100 UNIT/ML 100 UNIT/ML
5 SOLUTION INTRAVENOUS ONCE
Status: COMPLETED | OUTPATIENT
Start: 2023-12-26 | End: 2023-12-26

## 2023-12-26 RX ORDER — PACLITAXEL 100 MG/20ML
125 INJECTION, POWDER, LYOPHILIZED, FOR SUSPENSION INTRAVENOUS ONCE
Status: COMPLETED | OUTPATIENT
Start: 2023-12-26 | End: 2023-12-26

## 2023-12-26 RX ADMIN — GEMCITABINE 2000 MG: 38 INJECTION, SOLUTION INTRAVENOUS at 10:53

## 2023-12-26 RX ADMIN — SODIUM CHLORIDE 250 ML: 9 INJECTION, SOLUTION INTRAVENOUS at 10:00

## 2023-12-26 RX ADMIN — Medication 5 ML: at 08:22

## 2023-12-26 RX ADMIN — PACLITAXEL 250 MG: 100 INJECTION, POWDER, LYOPHILIZED, FOR SUSPENSION INTRAVENOUS at 10:01

## 2023-12-26 RX ADMIN — ONDANSETRON 8 MG: 2 INJECTION INTRAMUSCULAR; INTRAVENOUS at 09:14

## 2023-12-26 RX ADMIN — Medication 5 ML: at 11:26

## 2023-12-26 ASSESSMENT — PAIN SCALES - GENERAL: PAINLEVEL: NO PAIN (0)

## 2023-12-26 NOTE — TELEPHONE ENCOUNTER
Medication requested: Creon 24    Last prescribing provider: Megan Farrell CNP    Last clinic visit date: 12/19/23 with Megan Farrell CNP    Recommendations for requested medication (if none, N/A): NA    Any other pertinent information (if none, N/A): NA    Refilled: Y/N, if NO, why?    Pended and Routed to Megan Farrell CNP

## 2023-12-26 NOTE — PROGRESS NOTES
Infusion Nursing Note:  Gallo Navarro presents today for Cycle 3 Day 8 Abraxane and Gemcitabine.    Patient seen by provider today: No   present during visit today: Not Applicable.    Note: Patient states he has been feeling ok.  He reports continued fatigue, but has otherwise been tolerating treatment ok.  He has some questions about genetic testing for clinical trial enrollment at [facility].  Message sent to patient's care team to ask what further testing is needed.  Patient offers no other new concerns at this time.    Intravenous Access:  Implanted Port.    Treatment Conditions:  Lab Results   Component Value Date    HGB 10.0 (L) 12/26/2023    WBC 3.8 (L) 12/26/2023    ANEU 2.2 12/26/2023    ANEUTAUTO 2.6 12/19/2023     (L) 12/26/2023        Lab Results   Component Value Date     12/26/2023    POTASSIUM 3.8 12/26/2023    MAG 1.9 08/23/2023    CR 0.70 12/26/2023    DENISE 9.1 12/26/2023    BILITOTAL 0.4 12/26/2023    ALBUMIN 3.7 12/26/2023    ALT 19 12/26/2023    AST 26 12/26/2023       Results reviewed, labs MET treatment parameters, ok to proceed with treatment.    Post Infusion Assessment:  Patient tolerated infusion without incident.  Blood return noted pre and post infusion.  Site patent and intact, free from redness, edema or discomfort.  No evidence of extravasations.  Access discontinued per protocol.     Discharge Plan:   Patient declined prescription refills.  Discharge instructions reviewed with: Patient and Family.  Patient and/or family verbalized understanding of discharge instructions and all questions answered.  AVS to patient via mySugr.  Patient will return 1/2/24 for next appointment.   Patient discharged in stable condition accompanied by: wife.  Departure Mode: Ambulatory.      PAULINA TURNER RN

## 2023-12-26 NOTE — TELEPHONE ENCOUNTER
Received [employer] message from patient requesting refill of buprenorphine. Last filled 12/23. Will send message to pt asking him to request the January refill in about 3 weeks.

## 2023-12-26 NOTE — NURSING NOTE
Chief Complaint   Patient presents with    Port Draw     Labs drawn from port by RN in lab. VS Taken.      Port accessed and labs drawn by RN. Pt tolerated well.  VS taken.  Pt checked in for next appt.    Yajaira Larsen RN

## 2023-12-27 ENCOUNTER — MEDICAL CORRESPONDENCE (OUTPATIENT)
Dept: HEALTH INFORMATION MANAGEMENT | Facility: CLINIC | Age: 58
End: 2023-12-27
Payer: COMMERCIAL

## 2023-12-27 DIAGNOSIS — C25.9 PANCREATIC ADENOCARCINOMA (H): Primary | ICD-10-CM

## 2023-12-27 LAB — CANCER AG19-9 SERPL IA-ACNC: 4 U/ML

## 2023-12-27 NOTE — TELEPHONE ENCOUNTER
Forms were faxed to requested Fax number for [facility] on 12/27/2023 @ 8.25 AM

## 2023-12-28 ENCOUNTER — TELEPHONE (OUTPATIENT)
Dept: FAMILY MEDICINE | Facility: CLINIC | Age: 58
End: 2023-12-28
Payer: COMMERCIAL

## 2023-12-28 ENCOUNTER — PATIENT OUTREACH (OUTPATIENT)
Dept: ONCOLOGY | Facility: CLINIC | Age: 58
End: 2023-12-28
Payer: COMMERCIAL

## 2023-12-28 NOTE — PROGRESS NOTES
Writer received referral to Cancer Risk Management/Genetic Counseling.    Referred for: pancreatic cancer; Germline genetic testing for clinical trial enrollment     HOLD: Jing Romero 1/9 urgent       I have a message out to Dr. Haile to inquire if this plan will be soon enough.    Allyssa Boogie, RN, BSN  Oncology New Patient Nurse Navigator   [facility]  [phone]

## 2023-12-28 NOTE — TELEPHONE ENCOUNTER
Copy in abstract and tc bin. Original placed at the [facility]  for . Called pt and lvm so he is aware this has been done and is ready to be picked up.

## 2023-12-28 NOTE — TELEPHONE ENCOUNTER
Forms/Letter Request    Type of form/letter: Handicap Parking permit    Have you been seen for this request: N/A    Do we have the form/letter: Yes: printed out    Who is the form from? [employer] (if other please explain), pt needs parking sticker renewed.     Where did/will the form come from? Patient or family brought in       When is form/letter needed by: ASAP    How would you like the form/letter returned:     Patient Notified form requests are processed in 3-5 business days:Yes    Could we send this information to you in VISup or would you prefer to receive a phone call?:   Patient would prefer a phone call   Okay to leave a detailed message?: Yes at Cell number on file:    Telephone Information:   Mobile [phone]

## 2023-12-29 ENCOUNTER — MYC REFILL (OUTPATIENT)
Dept: ONCOLOGY | Facility: CLINIC | Age: 58
End: 2023-12-29
Payer: COMMERCIAL

## 2023-12-29 DIAGNOSIS — R10.30 LOWER ABDOMINAL PAIN: ICD-10-CM

## 2023-12-30 ENCOUNTER — MYC REFILL (OUTPATIENT)
Dept: RADIATION ONCOLOGY | Facility: HOSPITAL | Age: 58
End: 2023-12-30
Payer: COMMERCIAL

## 2023-12-30 DIAGNOSIS — C25.9 PANCREATIC ADENOCARCINOMA (H): ICD-10-CM

## 2023-12-30 NOTE — PROGRESS NOTES
Radiotherapy Treatment Summary              PATIENT: Gallo Navarro  MEDICAL RECORD NO: [id number]   : 1965    DIAGNOSIS / ID: Mr. Navarro is a 57 year old male with metastatic pancreatic cancer to T10.       INTENT OF RADIOTHERAPY: Palliative     CONCURRENT SYSTEMIC THERAPY: No             SITE OF TREATMENT: Thoracic spine (T10)     DATES  OF TREATMENT: 23-23    TOTAL DOSE OF TREATMENT / FRACTIONS: 20Gy/5fx                                         FOLLOW UP PLAN:  Follow up with Radiation Oncology on as needed basis  Follow up with Megan Farrell CNP in medical oncology 23     CC  Patient Care Team:  Akil Hernandez MD as PCP - General (Internal Medicine)  Akil Hernandez MD as Assigned PCP  Efra Lynn RN as Specialty Care Coordinator (Hematology & Oncology)  Tre York MD as MD (Gastroenterology)  Jeremy Hurt MD as Assigned Palliative Care Provider  Christos Greenberg MD as MD (Gastroenterology)  Megan Farrell APRN CNP as Assigned Cancer Care Provider  Becca Anguiano as Physician (Radiation Oncology)  Luis Haile MD as MD Ben Reaves M.D.  Department of Radiation Oncology  [facility]

## 2024-01-02 ENCOUNTER — INFUSION THERAPY VISIT (OUTPATIENT)
Dept: ONCOLOGY | Facility: CLINIC | Age: 59
End: 2024-01-02
Attending: STUDENT IN AN ORGANIZED HEALTH CARE EDUCATION/TRAINING PROGRAM
Payer: COMMERCIAL

## 2024-01-02 ENCOUNTER — APPOINTMENT (OUTPATIENT)
Dept: LAB | Facility: CLINIC | Age: 59
End: 2024-01-02
Attending: STUDENT IN AN ORGANIZED HEALTH CARE EDUCATION/TRAINING PROGRAM
Payer: COMMERCIAL

## 2024-01-02 VITALS
DIASTOLIC BLOOD PRESSURE: 64 MMHG | RESPIRATION RATE: 16 BRPM | WEIGHT: 158.1 LBS | TEMPERATURE: 98.1 F | HEART RATE: 89 BPM | SYSTOLIC BLOOD PRESSURE: 106 MMHG | BODY MASS INDEX: 21.44 KG/M2 | OXYGEN SATURATION: 97 %

## 2024-01-02 DIAGNOSIS — Z51.11 ENCOUNTER FOR ANTINEOPLASTIC CHEMOTHERAPY: Primary | ICD-10-CM

## 2024-01-02 DIAGNOSIS — C25.0 MALIGNANT NEOPLASM OF HEAD OF PANCREAS (H): ICD-10-CM

## 2024-01-02 LAB
BASOPHILS # BLD AUTO: 0 10E3/UL (ref 0–0.2)
BASOPHILS NFR BLD AUTO: 1 %
EOSINOPHIL # BLD AUTO: 0 10E3/UL (ref 0–0.7)
EOSINOPHIL NFR BLD AUTO: 1 %
ERYTHROCYTE [DISTWIDTH] IN BLOOD BY AUTOMATED COUNT: 14.3 % (ref 10–15)
HCT VFR BLD AUTO: 28.9 % (ref 40–53)
HGB BLD-MCNC: 9.1 G/DL (ref 13.3–17.7)
IMM GRANULOCYTES # BLD: 0 10E3/UL
IMM GRANULOCYTES NFR BLD: 1 %
LYMPHOCYTES # BLD AUTO: 0.7 10E3/UL (ref 0.8–5.3)
LYMPHOCYTES NFR BLD AUTO: 21 %
MCH RBC QN AUTO: 29 PG (ref 26.5–33)
MCHC RBC AUTO-ENTMCNC: 31.5 G/DL (ref 31.5–36.5)
MCV RBC AUTO: 92 FL (ref 78–100)
MONOCYTES # BLD AUTO: 0.6 10E3/UL (ref 0–1.3)
MONOCYTES NFR BLD AUTO: 17 %
NEUTROPHILS # BLD AUTO: 2 10E3/UL (ref 1.6–8.3)
NEUTROPHILS NFR BLD AUTO: 59 %
NRBC # BLD AUTO: 0 10E3/UL
NRBC BLD AUTO-RTO: 0 /100
PLATELET # BLD AUTO: 96 10E3/UL (ref 150–450)
RBC # BLD AUTO: 3.14 10E6/UL (ref 4.4–5.9)
WBC # BLD AUTO: 3.3 10E3/UL (ref 4–11)

## 2024-01-02 PROCEDURE — 36591 DRAW BLOOD OFF VENOUS DEVICE: CPT | Performed by: PHYSICIAN ASSISTANT

## 2024-01-02 PROCEDURE — 250N000011 HC RX IP 250 OP 636: Performed by: PHYSICIAN ASSISTANT

## 2024-01-02 PROCEDURE — 96413 CHEMO IV INFUSION 1 HR: CPT

## 2024-01-02 PROCEDURE — 250N000011 HC RX IP 250 OP 636: Performed by: STUDENT IN AN ORGANIZED HEALTH CARE EDUCATION/TRAINING PROGRAM

## 2024-01-02 PROCEDURE — 85025 COMPLETE CBC W/AUTO DIFF WBC: CPT | Performed by: PHYSICIAN ASSISTANT

## 2024-01-02 PROCEDURE — 258N000003 HC RX IP 258 OP 636: Performed by: PHYSICIAN ASSISTANT

## 2024-01-02 PROCEDURE — 96375 TX/PRO/DX INJ NEW DRUG ADDON: CPT

## 2024-01-02 PROCEDURE — 96417 CHEMO IV INFUS EACH ADDL SEQ: CPT

## 2024-01-02 RX ORDER — ONDANSETRON 2 MG/ML
8 INJECTION INTRAMUSCULAR; INTRAVENOUS ONCE
Status: COMPLETED | OUTPATIENT
Start: 2024-01-02 | End: 2024-01-02

## 2024-01-02 RX ORDER — PACLITAXEL 100 MG/20ML
125 INJECTION, POWDER, LYOPHILIZED, FOR SUSPENSION INTRAVENOUS ONCE
Status: COMPLETED | OUTPATIENT
Start: 2024-01-02 | End: 2024-01-02

## 2024-01-02 RX ORDER — HYDROMORPHONE HYDROCHLORIDE 2 MG/1
2-4 TABLET ORAL EVERY 4 HOURS PRN
Qty: 180 TABLET | Refills: 0 | Status: SHIPPED | OUTPATIENT
Start: 2024-01-02 | End: 2024-02-09

## 2024-01-02 RX ORDER — HEPARIN SODIUM (PORCINE) LOCK FLUSH IV SOLN 100 UNIT/ML 100 UNIT/ML
500 SOLUTION INTRAVENOUS ONCE
Status: COMPLETED | OUTPATIENT
Start: 2024-01-02 | End: 2024-01-02

## 2024-01-02 RX ORDER — HEPARIN SODIUM (PORCINE) LOCK FLUSH IV SOLN 100 UNIT/ML 100 UNIT/ML
5 SOLUTION INTRAVENOUS
Status: DISCONTINUED | OUTPATIENT
Start: 2024-01-02 | End: 2024-01-02 | Stop reason: HOSPADM

## 2024-01-02 RX ADMIN — Medication 5 ML: at 11:38

## 2024-01-02 RX ADMIN — GEMCITABINE 2000 MG: 38 INJECTION, SOLUTION INTRAVENOUS at 11:06

## 2024-01-02 RX ADMIN — SODIUM CHLORIDE 250 ML: 9 INJECTION, SOLUTION INTRAVENOUS at 09:12

## 2024-01-02 RX ADMIN — ONDANSETRON 8 MG: 2 INJECTION INTRAMUSCULAR; INTRAVENOUS at 09:12

## 2024-01-02 RX ADMIN — Medication 500 UNITS: at 08:22

## 2024-01-02 RX ADMIN — PACLITAXEL 250 MG: 100 INJECTION, POWDER, LYOPHILIZED, FOR SUSPENSION INTRAVENOUS at 10:17

## 2024-01-02 ASSESSMENT — PAIN SCALES - GENERAL: PAINLEVEL: NO PAIN (0)

## 2024-01-02 NOTE — TELEPHONE ENCOUNTER
Received [employer] message from patient requesting refill of hydromorphone.     Last refill: 12/8/23  Last office visit: 12/5/23  Scheduled for follow up 1/23/24     Will route request to MD for review.     Reviewed [place]  Report.

## 2024-01-02 NOTE — CONFIDENTIAL NOTE
Acetaminophen 32mg/mL liquid  Last prescribing provider: Dr. Luis Haile    Last clinic visit date: 12/19/23 w/ Megan Farrell    Recommendations for requested medication (if none, N/A): NA    Any other pertinent information (if none, N/A): NA    Refilled: Y/N, if NO, why?

## 2024-01-02 NOTE — PROGRESS NOTES
Infusion Nursing Note:  Gallo Navarro presents today for Cycle 3 Day 15 Paclitaxel-protein bound and Gemcitabine.    Patient seen by provider today: No   present during visit today: Not Applicable.    Note: Pt presents to infusion today feeling well. Pt denies having any fevers/chills, cough, congestion, chest pain, sob, nausea/vomiting, bladder or bowel concerns.      Intravenous Access:  Implanted Port.    Treatment Conditions:  Lab Results   Component Value Date    HGB 9.1 (L) 01/02/2024    WBC 3.3 (L) 01/02/2024    ANEU 2.2 12/26/2023    ANEUTAUTO 2.0 01/02/2024    PLT 96 (L) 01/02/2024        Results reviewed, labs MET treatment parameters, ok to proceed with treatment.      Post Infusion Assessment:  Patient tolerated infusion without incident.  Blood return noted pre and post infusion.  Site patent and intact, free from redness, edema or discomfort.  No evidence of extravasations.  Access discontinued per protocol.       Discharge Plan:   Patient declined prescription refills.  Discharge instructions reviewed with: Patient and Family.  Patient and/or family verbalized understanding of discharge instructions and all questions answered.  AVS to patient via kenxus.  Patient will return 1/30/24 for next appointment.   Patient discharged in stable condition accompanied by: self and wife.  Departure Mode: Ambulatory.      Sachi Eldridge RN

## 2024-01-02 NOTE — NURSING NOTE
Chief Complaint   Patient presents with    Chemotherapy     Cycle 3 Day 15 Paclitaxel-protein bound and Gemcitabine.      Port Draw     Labs drawn via port by RN in lab     Port accessed with 20 gauge, 3/4 inch flat needle by RN, labs collected, line flushed with saline and heparin.  Vitals taken. Pt checked in for appointment(s).     Erica Baez RN

## 2024-01-02 NOTE — PATIENT INSTRUCTIONS
[facility] Triage and after hours / weekends / holidays:  [phone]    Please call the triage or after hours line if you experience a temperature greater than or equal to 100.4, shaking chills, have uncontrolled nausea, vomiting and/or diarrhea, dizziness, shortness of breath, chest pain, bleeding, unexplained bruising, or if you have any other new/concerning symptoms, questions or concerns.      If you are having any concerning symptoms or wish to speak to a provider before your next infusion visit, please call triage to notify them so we can adequately serve you.     If you need a refill on a narcotic prescription or other medication, please call before your infusion appointment.                January 2024 Sunday Monday Tuesday Wednesday Thursday Friday Saturday        1     2    LAB CENTRAL   8:00 AM   (15 min.)   [facility] LAB DRAW   [facility]    ONC INFUSION 2 HR (120 MIN)   8:30 AM   (120 min.)    ONC INFUSION NURSE   [facility] 3     4     5     6       7     8     9     10     11     12     13       14     15    NEW ONCOLOGY  10:00 AM   (60 min.)   Sherrill Petesron GC   [facility] 16     17     18     19     20       21     22     23    RETURN RADIATION ONCOLOGY  10:25 AM   (40 min.)   Jeremy Hurt MD   [facility] Radiation Oncology [place] [address]    MYC OFFICE VISIT    1:10 PM   (30 min.)   Akil Hernandez MD   [facility] 25     26     27       28     29     30    LAB CENTRAL   8:00 AM   (15 min.)   [facility] DRAW   [facility]    ONC INFUSION 2 HR (120 MIN)   8:30 AM   (120 min.)    ONC INFUSION NURSE   [facility] 31 February 2024 Sunday Monday Tuesday Wednesday Thursday Friday Saturday                       1     2     3       4     5     6    LAB [facility]   8:00 AM    (15 min[facility]    ONC INFUSION 2 HR (120 MIN)   8:30 AM   (120 min.)    ONC INFUSION NURSE   [facility] 7     8     9     10       11     12     13    LAB CENTRAL   8:00 AM   (15 min.)   [facility] DRAW   [facility]    ONC INFUSION 2 HR (120 MIN)   8:30 AM   (120 min.)    ONC INFUSION NURSE   [facility] 14     15     16     17       18     19     20     21     22     23     24       25     26     27     28     29                              Recent Results (from the past 24 hour(s))   CBC with platelets and differential    Collection Time: 01/02/24  8:28 AM   Result Value Ref Range    WBC Count 3.3 (L) 4.0 - 11.0 10e3/uL    RBC Count 3.14 (L) 4.40 - 5.90 10e6/uL    Hemoglobin 9.1 (L) 13.3 - 17.7 g/dL    Hematocrit 28.9 (L) 40.0 - 53.0 %    MCV 92 78 - 100 fL    MCH 29.0 26.5 - 33.0 pg    MCHC 31.5 31.5 - 36.5 g/dL    RDW 14.3 10.0 - 15.0 %    Platelet Count 96 (L) 150 - 450 10e3/uL    % Neutrophils 59 %    % Lymphocytes 21 %    % Monocytes 17 %    % Eosinophils 1 %    % Basophils 1 %    % Immature Granulocytes 1 %    NRBCs per 100 WBC 0 <1 /100    Absolute Neutrophils 2.0 1.6 - 8.3 10e3/uL    Absolute Lymphocytes 0.7 (L) 0.8 - 5.3 10e3/uL    Absolute Monocytes 0.6 0.0 - 1.3 10e3/uL    Absolute Eosinophils 0.0 0.0 - 0.7 10e3/uL    Absolute Basophils 0.0 0.0 - 0.2 10e3/uL    Absolute Immature Granulocytes 0.0 <=0.4 10e3/uL    Absolute NRBCs 0.0 10e3/uL

## 2024-01-10 ENCOUNTER — MYC MEDICAL ADVICE (OUTPATIENT)
Dept: ONCOLOGY | Facility: CLINIC | Age: 59
End: 2024-01-10
Payer: COMMERCIAL

## 2024-01-11 NOTE — TELEPHONE ENCOUNTER
[facility]: Cancer Care                                                                                          LM for pt to call back regarding infusion schedule. Chart shows pt had C3D15 chemo on 1/2 this should be his off week and C4D1 should start on 1/16. No notes from last infusion regarding needing scheduling or wait list for next week. Pt may be holding infusions for consult at [facility] appt is scheduled for 1/23/23.     Placeholder infusion appt scheduled for 1/30/24.    Signature:  Efra Lynn RN

## 2024-01-12 NOTE — TELEPHONE ENCOUNTER
Pt plan to be at [facility] 1/23-1/29 and will be back 1/30. His sister is accompanying him instead of spouse. They just got back from [place] and pt is feeling well rested, enjoyed his time there and looking forward to [facility]. They did ask if the germline testing to be done at their Genetics appointment on 1/15 would be back in time to forward to [facility], informed them this would depend on what is ordered. Of note, [facility] is not requesting additional genetics testing, it was a recommendation by [facility] instead. Spouse mentioned they had CTs done at [facility] but were not given the disc and wondering how to get these to [facility], assuming insurance would not approve additional CTs so close to the last one. Asked them to find the care coordinator for their [facility] provider and ask for a disc to be fed-ex'ed directly to them or provide tracking that this had been forwarded to [facility]. [employer] legally cannot request another healthcare system's imaging as this is [facility]'s property.     Additional questions:    If there were support groups either in person or virtual pt can connect with while he is at [facility]  Persisting neuropathy BLE feet, swelling as gone down, just fyi  How long chemo has to be held before pt can enter a trial; typically 2-4 weeks depending on the chemo, as pt's last infusion was 1/2/24, when he gets to [facility] on 1/23 he will already be 4 weeks out from chemo so he is cleared.

## 2024-01-15 ENCOUNTER — LAB (OUTPATIENT)
Dept: LAB | Facility: CLINIC | Age: 59
End: 2024-01-15
Payer: COMMERCIAL

## 2024-01-15 ENCOUNTER — VIRTUAL VISIT (OUTPATIENT)
Dept: ONCOLOGY | Facility: CLINIC | Age: 59
End: 2024-01-15
Payer: COMMERCIAL

## 2024-01-15 ENCOUNTER — MYC REFILL (OUTPATIENT)
Dept: RADIATION ONCOLOGY | Facility: HOSPITAL | Age: 59
End: 2024-01-15

## 2024-01-15 DIAGNOSIS — Z80.6 FAMILY HISTORY OF LEUKEMIA: ICD-10-CM

## 2024-01-15 DIAGNOSIS — C25.9 PANCREATIC ADENOCARCINOMA (H): ICD-10-CM

## 2024-01-15 DIAGNOSIS — Z80.3 FAMILY HISTORY OF MALIGNANT NEOPLASM OF BREAST: ICD-10-CM

## 2024-01-15 DIAGNOSIS — G89.3 CANCER ASSOCIATED PAIN: ICD-10-CM

## 2024-01-15 DIAGNOSIS — Z80.0 FAMILY HISTORY OF PANCREATIC CANCER: ICD-10-CM

## 2024-01-15 DIAGNOSIS — Z80.0 FAMILY HISTORY OF PANCREATIC CANCER: Primary | ICD-10-CM

## 2024-01-15 PROCEDURE — 96040 HC GENETIC COUNSELING, EACH 30 MINUTES: CPT | Mod: GT,95 | Performed by: GENETIC COUNSELOR, MS

## 2024-01-15 PROCEDURE — 36415 COLL VENOUS BLD VENIPUNCTURE: CPT

## 2024-01-15 PROCEDURE — 99000 SPECIMEN HANDLING OFFICE-LAB: CPT

## 2024-01-15 NOTE — PROGRESS NOTES
"1/15/2023    Virtual Visit Details  Type of service:  Video Visit   Originating Location (pt. Location): Home  Distant Location (provider location):  On-site  Platform used for Video Visit: Lorna  Length of video visit: 62 minutes    Referring Provider: Luis Haile MD    Presenting Information:   Today Gallo and his wife Abigail elected for a virtual genetic counseling visit through the Cancer Risk Management Program to discuss Gallo's personal and family history of cancer. We reviewed this history, cancer screening recommendations, and available genetic testing options.    Personal History:  Gallo is a 58 year old male. He was diagnosed with pancreatic adenocarcinoma at age 57; treatment has included chemotherapy and radiation. [facility] tumor testing identified KRAS variant p.G12R (17% variant allele frequency) and TP53 variant p.R249S (19%); the tumor is also microsatellite stable. Per Gallo, he is now exploring clinical trials at [facility] and his germline genetic testing results may inform treatment decisions.      His most recent colonoscopies in May and December 2016 identified one polyp (colonic mucosa with lymphoid aggregate) and follow-up was recommended in 10 years. He does not regularly do any other cancer screening at this time.    Family History: (Please see scanned pedigree for detailed family history information)  Gallo's brother is 58 and was diagnosed with MDS in his 30's after a wound did not heal properly. Blood testing done at that time reportedly identified a missing chromosome (possibly 18); additional details are unknown. Gallo shared that his family assumes his brother was born with the missing chromosome, as he had low muscle tone as an infant (required physical therapy, now \"very strong\"), needed additional help in school (now lives independently, obtained a post-high school two year degree), and has a large nose. He was also diagnosed with rectal cancer at an unknown age and skin cancer " "at age 50.  Gallo's maternal grandmother was diagnosed with and passed away from breast cancer at age 40.  One paternal aunt is 80 and was diagnosed with colon cancer at age 75.  One of her sons was diagnosed with colon cancer at age 46 and passed away at age 51.  One paternal aunt was diagnosed with pancreatic cancer at age 87 and passed away at age 88.  One paternal uncle was diagnosed with colon cancer at age 49 and passed away at age 50.  One paternal uncle was diagnosed with liver cancer at age 68 (unknown if primary versus metastasis) and passed away at age 70.  One paternal uncle passed away at age 2 from unknown causes, but Gallo recalls it may be have been related to a condition he was born with and \"couldn't overcome\".  Of note, Gallo reports that his 20 year old son was born with bilateral hearing loss and has an autosomal recessive condition related to the connexin 26 gene. Gallo and his wife expressed familiarity with the condition and that their daughter (who is hearing) has a 2/3 chance of being a carrier of a connexin 26 gene mutation. She is encouraged to discuss this history with her medical providers, if she is considering having biological children.  His maternal ethnicity is [place], Scandinavian, and Yugoslavian. His paternal ethnicity is [place]enegrin and Greenlandic. There is no known Ashkenazi Hoahaoism ancestry on either side of his family.    Discussion:  We reviewed the features of sporadic, familial, and hereditary cancers. In looking at Gallo's family history, it is possible that a cancer susceptibility gene is present as Gallo, his brother, and relatives on each side of his family have been diagnosed with related cancers; several of his relatives were also diagnosed under age 50 and/or with multiple primary cancers.  We discussed the natural history and genetics of hereditary pancreatic cancer, including Childers syndrome.   Childers syndrome can be caused by a mutation in one of five genes:  " MLH1, MSH2, MSH6, PMS2, and EPCAM. The highest cancer risks associated with Childers syndrome include colon, endometrial/uterine, gastric, and ovarian cancer. Other cancers have also been reported with Childers syndrome, including pancreatic and hepatobiliary tract cancers.   We discussed that there are additional genes that could cause increased risk for the cancers in Gallo's family. As many of these genes present with overlapping features in a family and accurate cancer risk cannot always be established based upon the pedigree analysis alone, it would be reasonable for Gallo to consider panel genetic testing to analyze multiple genes at once.  Based on his personal and family history, Gallo meets current National Comprehensive Cancer Network (NCCN) criteria for genetic testing of high penetrance pancreatic, breast, and/or colon cancer genes (I.e. APC, SARAH, BRCA1, BRCA2, CDKN2A, CDH1, MUTYH, MLH1, MSH2, MSH6, PALB2, PMS2, EPCAM, BMPR1A, SMAD4, PTEN, STK11, TP53, etc.)    A detailed handout regarding these genes/syndromes and the information we discussed was provided to Gallo at the end of our appointment today and can be found in the after visit summary. Topics included: inheritance pattern, cancer risks, cancer screening recommendations, and also risks, benefits and limitations of testing.  We reviewed genetic testing options for hereditary gastrointestinal, breast, and related cancers: Invitae Common Hereditary Cancers Panel and expanded Invitae Multi-Cancer Panel. He opted for the Invitae Multi-Cancer Panel.  The Invitae Multi-Cancer Panel + Pancreatic Preliminary Evidence Genes analyzes 72 genes associated with increased risk for cancer: AIP, ALK, APC, SARAH, AXIN2, BAP1, BARD1, BLM, BMPR1A, BRCA1, BRCA2, BRIP1, CDC73, CDH1, CDK4, CDKN1B, CDKN2A, CHEK2, CTNNA1, DICER1, EGFR, EPCAM, FANCC, FH, FLCN, GREM1, HOXB13, KIT, LZTR1, MAX, MBD4, MEN1, MET, MITF, MLH1, MSH2, MSH3, MSH6, MUTYH, NF1, NF2, NTHL1, PALB2, PALLD,  PDGFRA, PMS2, POLD1, POLE, POT1, OPESW3Z, PTCH1, PTEN, RAD51C, RAD51D, RB1, RET, SDHA, SDHAF2, SDHB, SDHC, SDHD, SMAD4, SMARCA4, SMARCB1, SMARCE1, STK11, SUFU, [id number], TP53, TSC1, TSC2, VHL.  Consent was obtained over the video and Gallo elected to schedule a lab appointment at his earliest convenience. Once his blood is drawn, the sample will be sent to [facility] for testing. Of note, testing will begin with the BRCA1 and BRCA2 genes with reflex to the complete panel. Turn around time: 10-21 days after Brigid receives his blood sample.  Of note, Gallo shared that his sister likely has additional details regarding his brother's cancer history and any genetic testing that may have been completed. He will send me this information via [employer], at which time we can discuss any impact the information may have on Gallo's genetic testing options. For example, [facility] does offer a hereditary MDS/leukemia multi-gene panel. Gallo verbalized understanding but shared that he would not like to wait for this information before pursuing this initial genetic testing, though, as results are needed soon for treatment decisions.   Medical Management: For Gallo, we reviewed that the information from genetic testing may determine:  additional cancer screening for which Gallo may qualify (i.e. more frequent colonoscopies, regular upper endoscopies, regular prostate screening, more frequent dermatologic exams, etc.),  and targeted chemotherapies for Gallo's active cancer, or if he were to develop certain cancers in the future (i.e. immunotherapy for individuals with Childers syndrome, PARP inhibitors, etc.).   These recommendations and possible targeted chemotherapies will be discussed in detail once genetic testing is completed.     Plan:  1) Today Gallo elected to proceed with testing of the BRCA1 and BRCA2 genes, with reflex to the [facility] Multi-Cancer Panel + Pancreatic Preliminary Evidence Genes, through [facility]. He will  schedule a lab appointment at his earliest convenience.  2) The results should be available 10-21 days after Brigid receives his blood sample.  3) I will contact Gallo to discuss the results, when available. Depending on when the testing is completed, Gallo shared that he may be traveling to and/or at [facility].    Sherrill Peterson MS, [facility] [place]  Licensed, Certified Genetic Counselor  Office: [phone]  Pager: [phone]

## 2024-01-15 NOTE — NURSING NOTE
Is the patient currently in the state of [place]? YES    Visit mode:VIDEO    If the visit is dropped, the patient can be reconnected by: VIDEO VISIT: Send to e-mail at: [email or web address]    Will anyone else be joining the visit? NO  (If patient encounters technical issues they should call [phone]    How would you like to obtain your AVS? MyChart    Are changes needed to the allergy or medication list? N/A    Reason for visit: Consult    Taina NICOLE

## 2024-01-15 NOTE — LETTER
1/15/2024         RE: Gallo Navarro  [address] [address] [place] [id number]        Dear Colleague,    Thank you for referring your patient, Gallo Navarro, to the [facility]. Please see a copy of my visit note below.    1/15/2023    Virtual Visit Details  Type of service:  Video Visit   Originating Location (pt. Location): Home  Distant Location (provider location):  On-site  Platform used for Video Visit: [employer]  Length of video visit: 62 minutes    Referring Provider: Luis Haile MD    Presenting Information:   Today Gallo and his wife Abigail elected for a virtual genetic counseling visit through the Cancer Risk Management Program to discuss Gallo's personal and family history of cancer. We reviewed this history, cancer screening recommendations, and available genetic testing options.    Personal History:  Gallo is a 58 year old male. He was diagnosed with pancreatic adenocarcinoma at age 57; treatment has included chemotherapy and radiation. [facility] tumor testing identified KRAS variant p.G12R (17% variant allele frequency) and TP53 variant p.R249S (19%); the tumor is also microsatellite stable. Per Gallo, he is now exploring clinical trials at [facility] and his germline genetic testing results may inform treatment decisions.      His most recent colonoscopies in May and December 2016 identified one polyp (colonic mucosa with lymphoid aggregate) and follow-up was recommended in 10 years. He does not regularly do any other cancer screening at this time.    Family History: (Please see scanned pedigree for detailed family history information)  Gallo's brother is 58 and was diagnosed with MDS in his 30's after a wound did not heal properly. Blood testing done at that time reportedly identified a missing chromosome (possibly 18); additional details are unknown. Gallo shared that his family assumes his brother was born with the missing chromosome, as he had low muscle tone as an  "infant (required physical therapy, now \"very strong\"), needed additional help in school (now lives independently, obtained a post-high school two year degree), and has a large nose. He was also diagnosed with rectal cancer at an unknown age and skin cancer at age 48.  Gallo's maternal grandmother was diagnosed with and passed away from breast cancer at age 40.  One paternal aunt is 80 and was diagnosed with colon cancer at age 75.  One of her sons was diagnosed with colon cancer at age 46 and passed away at age 51.  One paternal aunt was diagnosed with pancreatic cancer at age 87 and passed away at age 88.  One paternal uncle was diagnosed with colon cancer at age 49 and passed away at age 50.  One paternal uncle was diagnosed with liver cancer at age 68 (unknown if primary versus metastasis) and passed away at age 70.  One paternal uncle passed away at age 2 from unknown causes, but Gallo recalls it may be have been related to a condition he was born with and \"couldn't overcome\".  Of note, Gallo reports that his 20 year old son was born with bilateral hearing loss and has an autosomal recessive condition related to the connexin 26 gene. Gallo and his wife expressed familiarity with the condition and that their daughter (who is hearing) has a 2/3 chance of being a carrier of a connexin 26 gene mutation. She is encouraged to discuss this history with her medical providers, if she is considering having biological children.  His maternal ethnicity is [place] Island[place], Scandinavian, and Yugoslavian. His paternal ethnicity is Grenadian and Amharic. There is no known Ashkenazi Episcopal ancestry on either side of his family.    Discussion:  We reviewed the features of sporadic, familial, and hereditary cancers. In looking at Gallo's family history, it is possible that a cancer susceptibility gene is present as Gallo, his brother, and relatives on each side of his family have been diagnosed with related cancers; several of his " relatives were also diagnosed under age 50 and/or with multiple primary cancers.  We discussed the natural history and genetics of hereditary pancreatic cancer, including Childers syndrome.   Childers syndrome can be caused by a mutation in one of five genes:  MLH1, MSH2, MSH6, PMS2, and EPCAM. The highest cancer risks associated with Childers syndrome include colon, endometrial/uterine, gastric, and ovarian cancer. Other cancers have also been reported with Childers syndrome, including pancreatic and hepatobiliary tract cancers.   We discussed that there are additional genes that could cause increased risk for the cancers in Gallo's family. As many of these genes present with overlapping features in a family and accurate cancer risk cannot always be established based upon the pedigree analysis alone, it would be reasonable for Gallo to consider panel genetic testing to analyze multiple genes at once.  Based on his personal and family history, Gallo meets current National Comprehensive Cancer Network (NCCN) criteria for genetic testing of high penetrance pancreatic, breast, and/or colon cancer genes (I.e. APC, SARAH, BRCA1, BRCA2, CDKN2A, CDH1, MUTYH, MLH1, MSH2, MSH6, PALB2, PMS2, EPCAM, BMPR1A, SMAD4, PTEN, STK11, TP53, etc.)    A detailed handout regarding these genes/syndromes and the information we discussed was provided to Gallo at the end of our appointment today and can be found in the after visit summary. Topics included: inheritance pattern, cancer risks, cancer screening recommendations, and also risks, benefits and limitations of testing.  We reviewed genetic testing options for hereditary gastrointestinal, breast, and related cancers: Invitae Common Hereditary Cancers Panel and expanded Invitae Multi-Cancer Panel. He opted for the Invitae Multi-Cancer Panel.  The Invitae Multi-Cancer Panel + Pancreatic Preliminary Evidence Genes analyzes 72 genes associated with increased risk for cancer: AIP, ALK, APC, SARAH, AXIN2,  BAP1, BARD1, BLM, BMPR1A, BRCA1, BRCA2, BRIP1, CDC73, CDH1, CDK4, CDKN1B, CDKN2A, CHEK2, CTNNA1, DICER1, EGFR, EPCAM, FANCC, FH, FLCN, GREM1, HOXB13, KIT, LZTR1, MAX, MBD4, MEN1, MET, MITF, MLH1, MSH2, MSH3, MSH6, MUTYH, NF1, NF2, NTHL1, PALB2, PALLD, PDGFRA, PMS2, POLD1, POLE, POT1, VHKPJ6N, PTCH1, PTEN, RAD51C, RAD51D, RB1, RET, SDHA, SDHAF2, SDHB, SDHC, SDHD, SMAD4, SMARCA4, SMARCB1, SMARCE1, STK11, SUFU, QOST278, TP53, TSC1, TSC2, VHL.  Consent was obtained over the video and Gallo elected to schedule a lab appointment at his earliest convenience. Once his blood is drawn, the sample will be sent to [facility] for testing. Of note, testing will begin with the BRCA1 and BRCA2 genes with reflex to the complete panel. Turn around time: 10-21 days after Brigid receives his blood sample.  Of note, Gallo shared that his sister likely has additional details regarding his brother's cancer history and any genetic testing that may have been completed. He will send me this information via [employer], at which time we can discuss any impact the information may have on Gallo's genetic testing options. For example, [facility] does offer a hereditary MDS/leukemia multi-gene panel. Gallo verbalized understanding but shared that he would not like to wait for this information before pursuing this initial genetic testing, though, as results are needed soon for treatment decisions.   Medical Management: For Gallo, we reviewed that the information from genetic testing may determine:  additional cancer screening for which Gallo may qualify (i.e. more frequent colonoscopies, regular upper endoscopies, regular prostate screening, more frequent dermatologic exams, etc.),  and targeted chemotherapies for Gallo's active cancer, or if he were to develop certain cancers in the future (i.e. immunotherapy for individuals with Childers syndrome, PARP inhibitors, etc.).   These recommendations and possible targeted chemotherapies will be discussed in  detail once genetic testing is completed.     Plan:  1) Today Gallo elected to proceed with testing of the BRCA1 and BRCA2 genes, with reflex to the Invitae Multi-Cancer Panel + Pancreatic Preliminary Evidence Genes, through [facility]. He will schedule a lab appointment at his earliest convenience.  2) The results should be available 10-21 days after Brigid receives his blood sample.  3) I will contact Gallo to discuss the results, when available. Depending on when the testing is completed, Gallo shared that he may be traveling to and/or at [facility].    Sherrill Peterson MS, [facility] [place]  Licensed, Certified Genetic Counselor  Office: [phone]  Pager: [phone]    Again, thank you for allowing me to participate in the care of your patient.        Sincerely,        Sherrill Peterson, GC

## 2024-01-15 NOTE — PATIENT INSTRUCTIONS
Assessing Cancer Risk  Only about 5-10% of cancers are thought to be due to an inherited cancer susceptibility gene. These families often have:  Several people with the same or related types of cancer  Cancers diagnosed at a young age (before age 50)  Individuals with more than one primary cancer  Multiple generations of the family affected with cancer    Some people may be candidates for genetic testing of more than one gene.  For these families, genetic testing using a cancer panel may be offered.  These panels can test many genes at once known to increase the risk for pancreatic (and other) cancers: APC, SARAH, BRCA1, BRCA2, CDKN2A, EPCAM, MLH1, MSH2, MSH6, PALB2, PMS2, STK11, and TP53. The purpose of this handout is to serve as a brief summary of the pancreatic cancer risk genes and cancer syndrome with pancreatic cancer risks.     Hereditary Breast and Ovarian Cancer Syndrome  (BRCA1 and BRCA2)    A single mutation in one of the copies of BRCA1 or BRCA2 increases the risk for breast and ovarian cancer. The risk for pancreatic cancer approximately 5-10%. BRCA2 is considered the most common gene responsible for familial pancreatic cancer.    A person s ethnic background is also important to consider, as individuals of Ashkenazi Muslim ancestry have a higher chance of having a BRCA gene mutation. There are three BRCA mutations that occur more frequently in this population.    Childers Syndrome  (MLH1, MSH2, MSH6, PMS2, and EPCAM)    Currently five genes are known to cause Childers Syndrome: MLH1, MSH2, MSH6, PMS2, and EPCAM.  A single mutation in one of the Childers Syndrome genes increases the risk for colon, endometrial, ovarian, and stomach cancers.  Other cancers that occur less commonly in Childers Syndrome include urinary tract cancers, brain cancers, and other cancers. People who have Childers Syndrome have up to a 6% risk of pancreatic cancer. MLH1 has the highest risk for pancreatic cancer amongst the Childers syndrome  genes.    *Cancer risk varies depending on Childers syndrome gene found    Familial Atypical Multiple Mole Melanoma Syndrome  (CDKN2A)    Familial Atypical Multiple Mole Melanoma Syndrome (FAMMM) is caused by a single mutation in the CDKN2A gene. This gene used to be called p16. The lifetime risk for melanoma is between 58-92%5. People with FAMMM have increased risks for pancreatic cancer, and possibly other cancers.      People with FAMMM typically have many moles (more than 50), which can be atypical. The exact risk of pancreatic cancer can depend on a person s specific CDKN2A mutation. The risk for pancreatic cancer at least 15%, but may be higher depending on a person's specific mutation.    Peutz-Jeghers Syndrome  (STK11)    Peutz-Jeghers Syndrome is caused by a mutation in the STK11 gene. The main features of Peutz-Jeghers Syndrome are multiple hamartomatous colon polyps and blue pigmentation of the lips and oral mucosa. Cancers associated with Peutz-Jeghers Syndrome include: gastrointestinal, gynecological, lung, breast, and pancreatic cancer. The risk of developing pancreatic cancer at least 15% for those with Peutz-Jeghers syndrome.     Li-Fraumeni Syndrome  (TP53)    Li-Fraumeni Syndrome (LFS) is a cancer predisposition syndrome caused by a mutation in the TP53 gene. A single mutation in one of the copies of TP53 increases the risk for multiple cancers. Individuals with LFS are at an increased risk for developing cancer at a young age. The lifetime risk for development of a LFS-associated cancer is 50% by age 30 and 90% by age 60. The risk for pancreatic cancer specifically is approximately 5-10%.  Core Cancers: Sarcomas, Breast, Brain, Lung, Leukemias/Lymphomas, Adrenocortical carcinomas  Other Cancers: Gastrointestinal (including pancreatic), Thyroid, Skin, Genitourinary    Familial Adenomatous Polyposis Syndrome  (APC)    Familial Adenomatous Polyposis syndrome (FAP) is caused by a single mutation in the  APC gene and is characterize by having over 100 adenomatous polyps in the colon and significantly increased risk for colon cancer. These typically appear during adolescence. FAP is associated with other tumors in the thyroid, stomach, and duodenum. People with FAP have an increased lifetime pancreatic cancer risk over the general population, however, the exact risk is currently not known at this time.    Additional Genes  PALB2  Mutations in the PALB2 gene have been shown to increase the risk of breast cancer up to 58% in some families. PALB2 mutations have also been associated with increased risk for pancreatic cancer. Individuals who inherit two PALB2 mutations--one from their mother and one from their father--have a condition called Fanconi Anemia. This condition is associated with short stature, developmental delay, bone marrow failure, and increased risk for childhood cancers.    SARAH  Mutations in the SARAH gene typically increase the risk of breast cancer 2-4 times higher than an average woman. SARAH mutations have also been associated with an increased risk of pancreatic cancer. People who inherit an SARAH mutation from both their mother and father have a condition called ataxia-telangiectasia. Ataxia telangiectasia is associated with ataxia in childhood (trouble with balance and walking), telangiectasias (red or purple spotty clusters on the skin), involuntary movements, frequent infections, and increased risk of leukemia and lymphoma.     Inheritance    All of the genes reviewed above are inherited in an autosomal dominant pattern.  This means that if a parent has a mutation, each of their children will have a 50% chance of inheriting that same mutation.  Every child--male or female--would have a 50% chance of inheriting the mutation and being at increased risk for developing cancer.       https://r.nlm.nih.gov/primer/inheritance/inheritancepatterns    Mutations in some genes can occur de tien, which means that  a person s mutation occurred for the first time in them and was not inherited from a parent.  Now that they have the mutation, however, it can be passed on to future generations.     Genetic Testing  Genetic testing involves a blood test and will look for any harmful mutations that are associated with increased cancer risk.  If possible, it is recommended that the person(s) who has had cancer be tested before other family members.  That person will give us the most useful information about whether or not a specific gene is associated with the cancer in the family.    Advantages and Disadvantages   There are advantages and disadvantages to genetic testing.  Advantages  May clarify your cancer risk and additional appropriate cancer screenings   Can help you make medical decisions  May explain the cancers in your family  May give useful information to your family members (if you share your results)     Disadvantages  Possible negative emotional impact of learning about inherited cancer risk  Uncertainty in interpreting a negative test result in some situations  Possible genetic discrimination concerns (see below)     Genetic Information Nondiscrimination Act (RAHEEL)  RAHEEL is a federal law that protects individuals from health insurance or employment discrimination based on a genetic test result.  There are currently no legal discrimination protections in terms of life insurance, long term care, or disability insurances.  Visit the National Human Genome Research [place] website to learn more: https://www.genome.gov/21317037/genetic-discrimination/    Questions to Think About Regarding Genetic Testing:  What effect will the test result have on me and my relationship with my family members if I have an inherited gene mutation?  If I don t have a gene mutation?  Should I share my test results, and how will my family react to this news, which may also affect them?  Are my children ready to learn new information that may  one day affect their own health?    There are three possible results of genetic testing:  Positive--a harmful mutation was identified in one or more of the genes  Negative--no mutation was identified in any of the genes on this panel  Variant of unknown significance (VUS)--a variation in one of the genes was identified, but it is unclear how this impacts cancer risk in the family  Families with VUS results can contact their genetic counselor annually for updates     Reducing Cancer Risk  Recommendations are based upon an individual s genetic test result as well as their personal and family history of cancer. Pancreatic cancer screening may be available based on family history. Talk with your physician about screening options.     Cancer Resources    National Pancreatic Cancer Foundation: npcf.   Pancreatic Cancer Action Network: pancan.org   FORCE: Facing Our Risk of Cancer Empowered: facingourrisk.org  [place]: bebrightpink.org  Li-Fraumeni Syndrome Association: lfsassociation.org  Collaborative Group of the Americas on Inherited Colorectal Cancer (CGA)   cgaicc.[employer] http://www.facingourrisPalette.org/  Cancer Care: cancercare.org  American Cancer Society (ACS): cancer.org  National Cancer Carlock (NCI): cancer.gov      Please call us if you have any questions or concerns.   Cancer Risk Management Program [phone] ([phone])  Willis Ortega, MS [facility] [place]  [phone]  Jing Romero, MS, [facility] [place] [phone]  Ally Downing, MS, [facility] [place]  [phone]  Miranda Dee, MS, [facility] [place]  [phone]  Gladys Sher, MS, [facility] [place]  [phone]  Sherrill Peterson, MS, [facility] [place] [phone]  Chitra Amezcua, MS, [facility] [place] [phone]    References  Genetic / Familial High-Risk Assessment?: Breast and Ovarian. NCCN Pract Guidel Oncol. 2017;1.2018.  Sridevi J, Miriam EATON, Nevaeh J, et al. The incidence of pancreatic cancer in BRCA1 and BRCA2 mutation carriers. Br J Cancer. 2012;107(12):9992-0079. doi:10.1038/bjc.2012.483.  Krunal NAQVI, Chalino KG, Samantha S, et al.  BRCA1, BRCA2, PALB2, and CDKN2A mutations in familial pancreatic cancer: a PACGENE study. [place]raine Med. 2015;17(7):569-577. doi:10.1038/gim.2014.153.  Hunter CHOWDARY. Genetic / Familial High-Risk Assessment?: Colorectal. NCCN Pract Guidel Oncol. 2017;2.2017.  Corky WARNER,  M, Carlos Manuel E, Carmen J. Familial Atypical Multiple Mole Melanoma Syndrome. National Center for Biotechnology Information (); 2009. http://www.ncbi.nlm.nih.gov/pubmed/98183791. Accessed October 10, 2017.  Childers HT, Fuschristiano RM, Dee J, et al. Tumour spectrum in the FAMMM syndrome. Br J Cancer. 1981;44(4):553-560. http://www.ncbi.nlm.nih.gov/pubmed/1715106. Accessed October 10, 2017.  Manda F, Alan J, Radha A, et al. Very high risk of cancer in familial Peutz-Jeghers syndrome. Gastroenterology. 2000;119(6):1734-0217. doi:10.1053/TRE.2000.90336.  Manda FM, Offerhaus GJ, Cory DH, et al. Increased risk of thyroid and pancreatic carcinoma in familial adenomatous polyposis. Gut. 1993;34(10):8703-4970. doi:10.1136/GUT.34.10.1394.

## 2024-01-16 ENCOUNTER — MYC REFILL (OUTPATIENT)
Dept: PALLIATIVE CARE | Facility: CLINIC | Age: 59
End: 2024-01-16
Payer: COMMERCIAL

## 2024-01-16 DIAGNOSIS — E11.9 TYPE 2 DIABETES MELLITUS WITHOUT COMPLICATION, WITH LONG-TERM CURRENT USE OF INSULIN (H): ICD-10-CM

## 2024-01-16 DIAGNOSIS — C25.0 MALIGNANT NEOPLASM OF HEAD OF PANCREAS (H): ICD-10-CM

## 2024-01-16 DIAGNOSIS — G89.3 CANCER ASSOCIATED PAIN: ICD-10-CM

## 2024-01-16 DIAGNOSIS — Z79.4 TYPE 2 DIABETES MELLITUS WITHOUT COMPLICATION, WITH LONG-TERM CURRENT USE OF INSULIN (H): ICD-10-CM

## 2024-01-16 DIAGNOSIS — K31.1 GASTRIC OUTLET OBSTRUCTION: ICD-10-CM

## 2024-01-16 RX ORDER — BUPRENORPHINE 20 UG/H
1 PATCH TRANSDERMAL WEEKLY
Qty: 4 PATCH | Refills: 3 | Status: SHIPPED | OUTPATIENT
Start: 2024-01-24 | End: 2024-02-13

## 2024-01-16 RX ORDER — BUPRENORPHINE 10 UG/H
1 PATCH TRANSDERMAL
Qty: 4 PATCH | Refills: 3 | Status: SHIPPED | OUTPATIENT
Start: 2024-01-19 | End: 2024-02-13

## 2024-01-16 NOTE — TELEPHONE ENCOUNTER
Received [facility] medicalt message from patient requesting refill of butrans 20 mcg/hr patch.     Last refill: 12/29/23  Last office visit: 12/5/23  Scheduled for follow up being rescheduled.     Will route request to MD for review.     Reviewed [place]  Report.

## 2024-01-22 NOTE — TELEPHONE ENCOUNTER
"Called and left to call back. \"Bora may need to be sent as DME, could clinic send to [facility] speciality.\"    Flakita Acevedo, [facility]  Adult Endocrinology  [facility], [place]    "
PA Denied for Freestyle Bora. Plan exclusion through pharmacy benefits. Pt may be able to get under DME  
PA Initiation    Medication[facility]  Insurance Company: [employer] - Phone [phone] Fax [phone]  Pharmacy Filling the Rx: [facility] - [place] - [address] [facility] AT PORTAL TO REGISTERED [facility] SITES  Filling Pharmacy Phone: [phone]  Filling Pharmacy Fax: [phone]  Start Date: 9/7/2023       
PRIOR AUTHORIZATION DENIED    Medication: FREESTYLE KAREN 2 SENSOR [facility] [place]  Insurance Company: [employer] - Phone [phone] Fax [phone]  Denial Date: 9/8/2023  Denial Rational:   Appeal Information:   Patient Notified: No      
Patient was seen by CDE on 9/19/23.    Flakita Acevedo CMA  Adult Endocrinology  [facility], [place]    
no

## 2024-01-24 LAB — SCANNED LAB RESULT: NORMAL

## 2024-01-29 NOTE — PROGRESS NOTES
Oncology/Hematology Visit Note  Jan 30, 2024    Reason for Visit: follow up of locally advanced, unresectable pancreatic cancer    History of Present Illness: Gallo Navarro is a 58 year old male with locally advanced, unresectable pancreatic cancer. 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 He then presented to the ED with worsening abdominal pain, increased jaundice, poor PO intake and weight loss admitted on 7/8/2023 for concern of biliary obstruction. Unfortunately, during this admission, biopsy of pancreatic mass returned positive for pancreatic adenocarcinoma on 7/12/23. He started on treatment with 5FU, irinotecan, and oxaliplatin (FOLFIRINOX) on 7/31/23. Please see previous notes for further details on the patient's history.     8/17/23 - ERCP/EGD, duodenal stents x2 placed, exchange of GJ tube    8/21-/8/25 hospitalized due to diarrhea, colitis, abdominal pain.      8/29 - Cycle 3 deferred due to neutropenia.   Neulasta added. Irinotecan dose reduced 20% due to symptoms including cramping, double vision and slurred speech during infusion.      10/24/23 CT CAP with similar to slight increase in size of peripancreatic and mesenteric  lymph nodes, enlargement of previous skeletal metastasis as well as new sclerotic metastasis to left posterior 5th rib, enlarging pulmonary nodule, and unchanged pancreatic head mass. Also unclear concerns for PE,  right lower lobe segmental PE confirmed on CT-PE. Started on apixaban.     10/31/23 Cycle 1 gemzar, abraxane  11/7/23 Cycle 1 Zometa  11/22/23 Removal of GJ tube.   11/29/23 Completed palliative radiation to T10   12/13/23 C3D1 gem/abraxane    12/29/23 Consults at [facility]    1/23-1/26 Consults at [facility]     Interval History: Gallo is seen in routine  "follow-up. This is my first time meeting him. His last dose of gem/abraxane was 1/2/24. He was seen end of December with imaging at [place] showing stable disease. [facility] agreed with continuing on gem/abraxane for now and next line would be a clinical trial.     Overall he has been feeling really well. He notes during the break his neuropathy abated a bit in his feet. He tolerates gem/abraxane well currently besides some previously cumulating neuropathy and fatigue mostly after the third dose. He has been eating really well. No nausea or GI issues. No recent abdominal pain. Has some discomfort in his butt when he sits a long time but this goes away completely with standing. He reports having \"no cushion\" anymore. No recent fevers/chills or infectious concerns.       Current Outpatient Medications   Medication Sig Dispense Refill    acetaminophen (TYLENOL) 32 mg/mL liquid Take 20.313 mLs (650 mg) by mouth every 8 hours 1800 mL 1    apixaban ANTICOAGULANT (ELIQUIS) 5 MG tablet Take 1 tablet (5 mg) by mouth 2 times daily 60 tablet 2    blood glucose (FREESTYLE TEST STRIPS) test strip by Other route as needed      buprenorphine (BUTRANS) 10 MCG/HR WK patch Place 1 patch onto the skin every 7 days Use with 20 mcg/hour patch for 30 mcg/hour total. 4 patch 3    buprenorphine (BUTRANS) 20 MCG/HR WK patch Place 1 patch onto the skin once a week Use with 10 mcg/hour patch for 30 mcg/hour total. 4 patch 3    dicyclomine (BENTYL) 10 MG capsule Take 1 capsule (10 mg) by mouth 4 times daily (before meals and nightly) 120 capsule 1    HYDROmorphone (DILAUDID) 2 MG tablet Take 1-2 tablets (2-4 mg) by mouth every 4 hours as needed for severe pain or breakthrough pain 180 tablet 0    lipase-protease-amylase (CREON 24) 65041-88868-644473 units CPEP per EC capsule 2-3 capsules with meals 3 times a day and 1-2 capsules with snacks max of 15 capsules a day 200 capsule 11    Multiple Vitamins-Minerals (CENTRUM SILVER 50+MEN) TABS as " directed Orally      pantoprazole (PROTONIX) 40 MG EC tablet Take 1 tablet (40 mg) by mouth 2 times daily 60 tablet 4    vitamin D3 (CHOLECALCIFEROL) 50 mcg (2000 units) tablet Take 1 tablet (50 mcg) by mouth daily 90 tablet 1    Continuous Blood Gluc Sensor (FREESTYLE KAREN 2 SENSOR) MISC Change every 14 days (Patient not taking: Reported on 1/30/2024) 6 each 3    insulin aspart (NOVOLOG PEN) 100 UNIT/ML pen Inject 1-10 Units Subcutaneous 3 times daily (with meals) (Patient not taking: Reported on 12/5/2023) 15 mL 3    lidocaine-prilocaine (EMLA) 2.5-2.5 % external cream Use 1-2 times a week or as needed prior to port access (Patient not taking: Reported on 1/30/2024) 30 g 3    prochlorperazine (COMPAZINE) 10 MG tablet Take 1 tablet (10 mg) by mouth every 6 hours as needed for nausea or vomiting (Patient not taking: Reported on 1/30/2024) 30 tablet 2     Physical Examination:  /76 (BP Location: Right arm, Patient Position: Sitting, Cuff Size: Adult Regular)   Pulse 78   Temp 97.9  F (36.6  C) (Oral)   Resp 16   Wt 73.1 kg (161 lb 1.6 oz)   SpO2 97%   BMI 21.85 kg/m    Wt Readings from Last 10 Encounters:   01/30/24 73.1 kg (161 lb 1.6 oz)   01/02/24 71.7 kg (158 lb 1.6 oz)   12/26/23 71.6 kg (157 lb 12.8 oz)   12/19/23 76 kg (167 lb 8 oz)   12/05/23 74.4 kg (164 lb)   12/05/23 74.2 kg (163 lb 9.6 oz)   11/29/23 76.4 kg (168 lb 6.4 oz)   11/28/23 77.7 kg (171 lb 4.8 oz)   11/16/23 75.3 kg (166 lb)   11/14/23 75.4 kg (166 lb 3.2 oz)   General: The patient is a pleasant male in no acute distress.  HEENT: EOMI. Sclerae are anicteric. Mucous membranes moist, no lesions or thrush.   Lymph: Neck is supple with no lymphadenopathy in the cervical or supraclavicular areas.   Heart: Regular rate and rhythm.   Lungs: Clear to auscultation bilaterally.   Abdomen: Bowel sounds present, soft, nontender with no palpable hepatosplenomegaly or masses.   Extremities: No edema noted to bilateral lower extremities.      Neuro: Cranial nerves II through XII are grossly intact.  Skin: No rashes, petechiae, or bruising noted on exposed skin.     Laboratory Data:  Most Recent 3 CBC's:  Recent Labs   Lab Test 01/30/24  0644 01/02/24  0828 12/26/23  0830 12/19/23  0958   WBC 5.6 3.3* 3.8* 4.5   HGB 10.3* 9.1* 10.0* 9.4*   MCV 94 92 92 95    96* 148* 238   ANEUTAUTO 3.2 2.0  --  2.6    Most Recent 3 BMP's:  Recent Labs   Lab Test 01/30/24  0644 12/26/23  0830 12/19/23  1116    139 140   POTASSIUM 3.8 3.8 3.7   CHLORIDE 102 103 106   CO2 26 27 26   BUN 20.5* 10.1 18.7   CR 0.62* 0.70 0.65*   ANIONGAP 11 9 8   DENISE 9.3 9.1 8.6   * 170* 180*   PROTTOTAL 6.9 6.6 6.3*   ALBUMIN 4.0 3.7 3.7    Most Recent 2 LFT's:  Recent Labs   Lab Test 01/30/24  0644 12/26/23  0830   AST 26 26   ALT 29 19   ALKPHOS 103 105   BILITOTAL 0.3 0.4   I reviewed the above labs today.          Assessment and Plan:  Locally advanced, unresectable pancreatic cancer. Patient started on treatment with palliative FOLFIRINOX on 7/31/23. He had a moderate amount of side effects following cycle 1 with dehydration, diarrhea, and nausea. Received cycle 2 on 8/15/23. ERCP/EGD on 8/17 with GJ tube exchange and duodenal stents placed. Hospitalized 8/21-8/25 due to colitis and abdominal pain. Cycle 3 was deferred due to neutropenia, Neulasta/Udenyca added on day 4.  - CT CAP 10/24/23 with progression in skeletal mets, lung nodule and lymph nodes. Stable pancreatic mass. Changed treatment to gemcitabine, Abraxane (day 1, 8, 15) and Zometa.  Tolerated cycle 1 well with the exception of a fever. Tolerated cycle 2 well. Cycle 2 day 15 skipped due to travel. Completed cycle 3 and now had a one month break for traveling.   -Proceed with cycle 4 today, labs reviewed with no concerns. With recent imaging will plan for repeat scans prior to cycle 6 (2 months) and follow-up with Dr. Haile to review. SUZETTE follow-up prior to cycle 5.     Skeletal mets.   -Worsening  skeletal mets noted on CT 10/24. Sclerosis noted on recent imaging. No recent persistent pain.   -Received Zometa with cycle 1 (11/7/23), had fever later that night, reports no other side effects or concerns. Original plan for every 6 months but we reviewed indications for bone mets being either monthly or every 3 months. Will plan to give every 3 months, will give next week.   -Radiation to T10 completed 11/29/23.     Pulmonary Embolism. Right lower segmental PE found on routine imaging. Started on apixiban, denies any bleeding concerns.     Nutrition.  Feeding tube removed. Continues to tolerate oral intake well. Gaining weight. No issues with nausea or vomiting. Continue to push oral hydration, drinking 64+ oz of fluid/day.  Continue Protonix daily.  Has compazine as needed for nausea.     Lower extremity edema. Resolved. Continue compression stockings and elevation PRN. Continue to monitor.     Diabetes. Working closely with endocrine/diabetic educator on dose adjustments/management. Minimal need for insulin lately.     Diarrhea/constipation.  Resolved. Bowel movements more regular with increased food intake. Continue Metamucil as needed.     Abdominal pain. Managed by palliative care with Butrans patch and oral Dilaudid. No pain recently.     Anemia. Normocytic. Suspect anemia of chronic disease + secondary to myelosuppression from chemo. Hemoglobin improved with treatment break. Will monitor for now.     Greater than 40 minutes was spent with this patient with greater than 20 minutes spent in counseling and coordination of care.    Yasmeen Felix PA-C

## 2024-01-30 ENCOUNTER — ONCOLOGY VISIT (OUTPATIENT)
Dept: ONCOLOGY | Facility: CLINIC | Age: 59
End: 2024-01-30
Payer: COMMERCIAL

## 2024-01-30 ENCOUNTER — INFUSION THERAPY VISIT (OUTPATIENT)
Dept: ONCOLOGY | Facility: CLINIC | Age: 59
End: 2024-01-30
Payer: COMMERCIAL

## 2024-01-30 ENCOUNTER — APPOINTMENT (OUTPATIENT)
Dept: LAB | Facility: CLINIC | Age: 59
End: 2024-01-30
Payer: COMMERCIAL

## 2024-01-30 VITALS
TEMPERATURE: 97.9 F | HEART RATE: 78 BPM | WEIGHT: 161.1 LBS | DIASTOLIC BLOOD PRESSURE: 76 MMHG | OXYGEN SATURATION: 97 % | SYSTOLIC BLOOD PRESSURE: 113 MMHG | RESPIRATION RATE: 16 BRPM | BODY MASS INDEX: 21.85 KG/M2

## 2024-01-30 DIAGNOSIS — C25.0 MALIGNANT NEOPLASM OF HEAD OF PANCREAS (H): Primary | ICD-10-CM

## 2024-01-30 DIAGNOSIS — C25.0 MALIGNANT NEOPLASM OF HEAD OF PANCREAS (H): ICD-10-CM

## 2024-01-30 DIAGNOSIS — Z51.11 ENCOUNTER FOR ANTINEOPLASTIC CHEMOTHERAPY: ICD-10-CM

## 2024-01-30 DIAGNOSIS — Z51.11 ENCOUNTER FOR ANTINEOPLASTIC CHEMOTHERAPY: Primary | ICD-10-CM

## 2024-01-30 LAB
ALBUMIN SERPL BCG-MCNC: 4 G/DL (ref 3.5–5.2)
ALP SERPL-CCNC: 103 U/L (ref 40–150)
ALT SERPL W P-5'-P-CCNC: 29 U/L (ref 0–70)
ANION GAP SERPL CALCULATED.3IONS-SCNC: 11 MMOL/L (ref 7–15)
AST SERPL W P-5'-P-CCNC: 26 U/L (ref 0–45)
BASOPHILS # BLD AUTO: 0.1 10E3/UL (ref 0–0.2)
BASOPHILS NFR BLD AUTO: 1 %
BILIRUB SERPL-MCNC: 0.3 MG/DL
BUN SERPL-MCNC: 20.5 MG/DL (ref 6–20)
CALCIUM SERPL-MCNC: 9.3 MG/DL (ref 8.6–10)
CHLORIDE SERPL-SCNC: 102 MMOL/L (ref 98–107)
CREAT SERPL-MCNC: 0.62 MG/DL (ref 0.67–1.17)
DEPRECATED HCO3 PLAS-SCNC: 26 MMOL/L (ref 22–29)
EGFRCR SERPLBLD CKD-EPI 2021: >90 ML/MIN/1.73M2
EOSINOPHIL # BLD AUTO: 0.3 10E3/UL (ref 0–0.7)
EOSINOPHIL NFR BLD AUTO: 5 %
ERYTHROCYTE [DISTWIDTH] IN BLOOD BY AUTOMATED COUNT: 15.2 % (ref 10–15)
GLUCOSE SERPL-MCNC: 166 MG/DL (ref 70–99)
HCT VFR BLD AUTO: 32.9 % (ref 40–53)
HGB BLD-MCNC: 10.3 G/DL (ref 13.3–17.7)
IMM GRANULOCYTES # BLD: 0 10E3/UL
IMM GRANULOCYTES NFR BLD: 1 %
LYMPHOCYTES # BLD AUTO: 1.4 10E3/UL (ref 0.8–5.3)
LYMPHOCYTES NFR BLD AUTO: 26 %
MCH RBC QN AUTO: 29.3 PG (ref 26.5–33)
MCHC RBC AUTO-ENTMCNC: 31.3 G/DL (ref 31.5–36.5)
MCV RBC AUTO: 94 FL (ref 78–100)
MONOCYTES # BLD AUTO: 0.6 10E3/UL (ref 0–1.3)
MONOCYTES NFR BLD AUTO: 10 %
NEUTROPHILS # BLD AUTO: 3.2 10E3/UL (ref 1.6–8.3)
NEUTROPHILS NFR BLD AUTO: 57 %
NRBC # BLD AUTO: 0 10E3/UL
NRBC BLD AUTO-RTO: 0 /100
PLATELET # BLD AUTO: 224 10E3/UL (ref 150–450)
POTASSIUM SERPL-SCNC: 3.8 MMOL/L (ref 3.4–5.3)
PROT SERPL-MCNC: 6.9 G/DL (ref 6.4–8.3)
RBC # BLD AUTO: 3.52 10E6/UL (ref 4.4–5.9)
SODIUM SERPL-SCNC: 139 MMOL/L (ref 135–145)
WBC # BLD AUTO: 5.6 10E3/UL (ref 4–11)

## 2024-01-30 PROCEDURE — 250N000011 HC RX IP 250 OP 636: Performed by: PHYSICIAN ASSISTANT

## 2024-01-30 PROCEDURE — 86301 IMMUNOASSAY TUMOR CA 19-9: CPT

## 2024-01-30 PROCEDURE — 80053 COMPREHEN METABOLIC PANEL: CPT | Performed by: PHYSICIAN ASSISTANT

## 2024-01-30 PROCEDURE — 99215 OFFICE O/P EST HI 40 MIN: CPT | Performed by: PHYSICIAN ASSISTANT

## 2024-01-30 PROCEDURE — 96413 CHEMO IV INFUSION 1 HR: CPT

## 2024-01-30 PROCEDURE — 258N000003 HC RX IP 258 OP 636: Performed by: PHYSICIAN ASSISTANT

## 2024-01-30 PROCEDURE — 85025 COMPLETE CBC W/AUTO DIFF WBC: CPT | Performed by: PHYSICIAN ASSISTANT

## 2024-01-30 PROCEDURE — 96375 TX/PRO/DX INJ NEW DRUG ADDON: CPT

## 2024-01-30 PROCEDURE — 96417 CHEMO IV INFUS EACH ADDL SEQ: CPT

## 2024-01-30 PROCEDURE — 99213 OFFICE O/P EST LOW 20 MIN: CPT | Mod: 25 | Performed by: PHYSICIAN ASSISTANT

## 2024-01-30 PROCEDURE — 36591 DRAW BLOOD OFF VENOUS DEVICE: CPT | Performed by: PHYSICIAN ASSISTANT

## 2024-01-30 RX ORDER — HEPARIN SODIUM (PORCINE) LOCK FLUSH IV SOLN 100 UNIT/ML 100 UNIT/ML
500 SOLUTION INTRAVENOUS ONCE
Status: COMPLETED | OUTPATIENT
Start: 2024-01-30 | End: 2024-01-30

## 2024-01-30 RX ORDER — ONDANSETRON 2 MG/ML
8 INJECTION INTRAMUSCULAR; INTRAVENOUS ONCE
Status: CANCELLED | OUTPATIENT
Start: 2024-02-13

## 2024-01-30 RX ORDER — ONDANSETRON 2 MG/ML
8 INJECTION INTRAMUSCULAR; INTRAVENOUS ONCE
Status: CANCELLED | OUTPATIENT
Start: 2024-01-30

## 2024-01-30 RX ORDER — METHYLPREDNISOLONE SODIUM SUCCINATE 125 MG/2ML
125 INJECTION, POWDER, LYOPHILIZED, FOR SOLUTION INTRAMUSCULAR; INTRAVENOUS
Status: CANCELLED
Start: 2024-01-30

## 2024-01-30 RX ORDER — ALBUTEROL SULFATE 0.83 MG/ML
2.5 SOLUTION RESPIRATORY (INHALATION)
Status: CANCELLED | OUTPATIENT
Start: 2024-02-06

## 2024-01-30 RX ORDER — LORAZEPAM 2 MG/ML
0.5 INJECTION INTRAMUSCULAR EVERY 4 HOURS PRN
Status: CANCELLED | OUTPATIENT
Start: 2024-01-30

## 2024-01-30 RX ORDER — PACLITAXEL 100 MG/20ML
125 INJECTION, POWDER, LYOPHILIZED, FOR SUSPENSION INTRAVENOUS ONCE
Status: CANCELLED | OUTPATIENT
Start: 2024-02-13

## 2024-01-30 RX ORDER — EPINEPHRINE 1 MG/ML
0.3 INJECTION, SOLUTION INTRAMUSCULAR; SUBCUTANEOUS EVERY 5 MIN PRN
Status: CANCELLED | OUTPATIENT
Start: 2024-02-13

## 2024-01-30 RX ORDER — MEPERIDINE HYDROCHLORIDE 25 MG/ML
25 INJECTION INTRAMUSCULAR; INTRAVENOUS; SUBCUTANEOUS EVERY 30 MIN PRN
Status: CANCELLED | OUTPATIENT
Start: 2024-01-30

## 2024-01-30 RX ORDER — ALBUTEROL SULFATE 0.83 MG/ML
2.5 SOLUTION RESPIRATORY (INHALATION)
Status: CANCELLED | OUTPATIENT
Start: 2024-02-13

## 2024-01-30 RX ORDER — DIPHENHYDRAMINE HYDROCHLORIDE 50 MG/ML
50 INJECTION INTRAMUSCULAR; INTRAVENOUS
Status: CANCELLED
Start: 2024-02-06

## 2024-01-30 RX ORDER — HEPARIN SODIUM,PORCINE 10 UNIT/ML
5-20 VIAL (ML) INTRAVENOUS DAILY PRN
Status: CANCELLED | OUTPATIENT
Start: 2024-02-06

## 2024-01-30 RX ORDER — METHYLPREDNISOLONE SODIUM SUCCINATE 125 MG/2ML
125 INJECTION, POWDER, LYOPHILIZED, FOR SOLUTION INTRAMUSCULAR; INTRAVENOUS
Status: CANCELLED
Start: 2024-02-13

## 2024-01-30 RX ORDER — ONDANSETRON 2 MG/ML
8 INJECTION INTRAMUSCULAR; INTRAVENOUS ONCE
Status: COMPLETED | OUTPATIENT
Start: 2024-01-30 | End: 2024-01-30

## 2024-01-30 RX ORDER — LORAZEPAM 2 MG/ML
0.5 INJECTION INTRAMUSCULAR EVERY 4 HOURS PRN
Status: CANCELLED | OUTPATIENT
Start: 2024-02-13

## 2024-01-30 RX ORDER — PACLITAXEL 100 MG/20ML
125 INJECTION, POWDER, LYOPHILIZED, FOR SUSPENSION INTRAVENOUS ONCE
Status: COMPLETED | OUTPATIENT
Start: 2024-01-30 | End: 2024-01-30

## 2024-01-30 RX ORDER — DIPHENHYDRAMINE HYDROCHLORIDE 50 MG/ML
50 INJECTION INTRAMUSCULAR; INTRAVENOUS
Status: CANCELLED
Start: 2024-02-13

## 2024-01-30 RX ORDER — PACLITAXEL 100 MG/20ML
125 INJECTION, POWDER, LYOPHILIZED, FOR SUSPENSION INTRAVENOUS ONCE
Status: CANCELLED | OUTPATIENT
Start: 2024-01-30

## 2024-01-30 RX ORDER — MEPERIDINE HYDROCHLORIDE 25 MG/ML
25 INJECTION INTRAMUSCULAR; INTRAVENOUS; SUBCUTANEOUS EVERY 30 MIN PRN
Status: CANCELLED | OUTPATIENT
Start: 2024-02-06

## 2024-01-30 RX ORDER — HEPARIN SODIUM,PORCINE 10 UNIT/ML
5-20 VIAL (ML) INTRAVENOUS DAILY PRN
Status: CANCELLED | OUTPATIENT
Start: 2024-02-13

## 2024-01-30 RX ORDER — ONDANSETRON 2 MG/ML
8 INJECTION INTRAMUSCULAR; INTRAVENOUS ONCE
Status: CANCELLED | OUTPATIENT
Start: 2024-02-06

## 2024-01-30 RX ORDER — PACLITAXEL 100 MG/20ML
125 INJECTION, POWDER, LYOPHILIZED, FOR SUSPENSION INTRAVENOUS ONCE
Status: CANCELLED | OUTPATIENT
Start: 2024-02-06

## 2024-01-30 RX ORDER — EPINEPHRINE 1 MG/ML
0.3 INJECTION, SOLUTION INTRAMUSCULAR; SUBCUTANEOUS EVERY 5 MIN PRN
Status: CANCELLED | OUTPATIENT
Start: 2024-02-06

## 2024-01-30 RX ORDER — HEPARIN SODIUM (PORCINE) LOCK FLUSH IV SOLN 100 UNIT/ML 100 UNIT/ML
5 SOLUTION INTRAVENOUS
Status: DISCONTINUED | OUTPATIENT
Start: 2024-01-30 | End: 2024-01-30 | Stop reason: HOSPADM

## 2024-01-30 RX ORDER — HEPARIN SODIUM (PORCINE) LOCK FLUSH IV SOLN 100 UNIT/ML 100 UNIT/ML
5 SOLUTION INTRAVENOUS
Status: CANCELLED | OUTPATIENT
Start: 2024-02-06

## 2024-01-30 RX ORDER — HEPARIN SODIUM (PORCINE) LOCK FLUSH IV SOLN 100 UNIT/ML 100 UNIT/ML
5 SOLUTION INTRAVENOUS
Status: CANCELLED | OUTPATIENT
Start: 2024-01-30

## 2024-01-30 RX ORDER — LORAZEPAM 2 MG/ML
0.5 INJECTION INTRAMUSCULAR EVERY 4 HOURS PRN
Status: CANCELLED | OUTPATIENT
Start: 2024-02-06

## 2024-01-30 RX ORDER — ALBUTEROL SULFATE 90 UG/1
1-2 AEROSOL, METERED RESPIRATORY (INHALATION)
Status: CANCELLED
Start: 2024-02-06

## 2024-01-30 RX ORDER — DIPHENHYDRAMINE HYDROCHLORIDE 50 MG/ML
50 INJECTION INTRAMUSCULAR; INTRAVENOUS
Status: CANCELLED
Start: 2024-01-30

## 2024-01-30 RX ORDER — ALBUTEROL SULFATE 90 UG/1
1-2 AEROSOL, METERED RESPIRATORY (INHALATION)
Status: CANCELLED
Start: 2024-01-30

## 2024-01-30 RX ORDER — ALBUTEROL SULFATE 90 UG/1
1-2 AEROSOL, METERED RESPIRATORY (INHALATION)
Status: CANCELLED
Start: 2024-02-13

## 2024-01-30 RX ORDER — HEPARIN SODIUM (PORCINE) LOCK FLUSH IV SOLN 100 UNIT/ML 100 UNIT/ML
5 SOLUTION INTRAVENOUS
Status: CANCELLED | OUTPATIENT
Start: 2024-02-13

## 2024-01-30 RX ORDER — MEPERIDINE HYDROCHLORIDE 25 MG/ML
25 INJECTION INTRAMUSCULAR; INTRAVENOUS; SUBCUTANEOUS EVERY 30 MIN PRN
Status: CANCELLED | OUTPATIENT
Start: 2024-02-13

## 2024-01-30 RX ORDER — EPINEPHRINE 1 MG/ML
0.3 INJECTION, SOLUTION INTRAMUSCULAR; SUBCUTANEOUS EVERY 5 MIN PRN
Status: CANCELLED | OUTPATIENT
Start: 2024-01-30

## 2024-01-30 RX ORDER — ALBUTEROL SULFATE 0.83 MG/ML
2.5 SOLUTION RESPIRATORY (INHALATION)
Status: CANCELLED | OUTPATIENT
Start: 2024-01-30

## 2024-01-30 RX ORDER — METHYLPREDNISOLONE SODIUM SUCCINATE 125 MG/2ML
125 INJECTION, POWDER, LYOPHILIZED, FOR SOLUTION INTRAMUSCULAR; INTRAVENOUS
Status: CANCELLED
Start: 2024-02-06

## 2024-01-30 RX ORDER — HEPARIN SODIUM,PORCINE 10 UNIT/ML
5-20 VIAL (ML) INTRAVENOUS DAILY PRN
Status: CANCELLED | OUTPATIENT
Start: 2024-01-30

## 2024-01-30 RX ADMIN — Medication 5 ML: at 10:05

## 2024-01-30 RX ADMIN — PACLITAXEL 250 MG: 100 INJECTION, POWDER, LYOPHILIZED, FOR SUSPENSION INTRAVENOUS at 08:50

## 2024-01-30 RX ADMIN — SODIUM CHLORIDE 250 ML: 9 INJECTION, SOLUTION INTRAVENOUS at 08:11

## 2024-01-30 RX ADMIN — Medication 500 UNITS: at 06:36

## 2024-01-30 RX ADMIN — ONDANSETRON 8 MG: 2 INJECTION INTRAMUSCULAR; INTRAVENOUS at 08:11

## 2024-01-30 RX ADMIN — GEMCITABINE 2000 MG: 38 INJECTION, SOLUTION INTRAVENOUS at 09:34

## 2024-01-30 ASSESSMENT — PAIN SCALES - GENERAL: PAINLEVEL: NO PAIN (0)

## 2024-01-30 NOTE — PATIENT INSTRUCTIONS
[facility] Triage and after hours / weekends / holidays:  [phone]    Please call the triage or after hours line if you experience a temperature greater than or equal to 100.4, shaking chills, have uncontrolled nausea, vomiting and/or diarrhea, dizziness, shortness of breath, chest pain, bleeding, unexplained bruising, or if you have any other new/concerning symptoms, questions or concerns.      If you are having any concerning symptoms or wish to speak to a provider before your next infusion visit, please call triage to notify them so we can adequately serve you.     If you need a refill on a narcotic prescription or other medication, please call before your infusion appointment.

## 2024-01-30 NOTE — LETTER
1/30/2024         RE: Gallo Navarro  [id number] [address] [place] [id number]        Dear Colleague,    Thank you for referring your patient, Gallo Navarro, to the [facility]. Please see a copy of my visit note below.    Oncology/Hematology Visit Note  Jan 30, 2024    Reason for Visit: follow up of locally advanced, unresectable pancreatic cancer    History of Present Illness: Gallo Navarro is a 58 year old male with locally advanced, unresectable pancreatic cancer. 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 He then presented to the ED with worsening abdominal pain, increased jaundice, poor PO intake and weight loss admitted on 7/8/2023 for concern of biliary obstruction. Unfortunately, during this admission, biopsy of pancreatic mass returned positive for pancreatic adenocarcinoma on 7/12/23. He started on treatment with 5FU, irinotecan, and oxaliplatin (FOLFIRINOX) on 7/31/23. Please see previous notes for further details on the patient's history.     8/17/23 - ERCP/EGD, duodenal stents x2 placed, exchange of GJ tube    8/21-/8/25 hospitalized due to diarrhea, colitis, abdominal pain.      8/29 - Cycle 3 deferred due to neutropenia.   Neulasta added. Irinotecan dose reduced 20% due to symptoms including cramping, double vision and slurred speech during infusion.      10/24/23 CT CAP with similar to slight increase in size of peripancreatic and mesenteric  lymph nodes, enlargement of previous skeletal metastasis as well as new sclerotic metastasis to left posterior 5th rib, enlarging pulmonary nodule, and unchanged pancreatic head mass. Also unclear concerns for PE,  right lower lobe segmental PE confirmed on CT-PE. Started on apixaban.     10/31/23 Cycle 1 gemzar,  "abraxane  11/7/23 Cycle 1 Zometa  11/22/23 Removal of GJ tube.   11/29/23 Completed palliative radiation to T10   12/13/23 C3D1 gem/abraxane    12/29/23 Consults at [facility]    1/23-1/26 Consults at [facility]     Interval History: Gallo is seen in routine follow-up. This is my first time meeting him. His last dose of gem/abraxane was 1/2/24. He was seen end of December with imaging at [facility] showing stable disease. [facility] agreed with continuing on gem/abraxane for now and next line would be a clinical trial.     Overall he has been feeling really well. He notes during the break his neuropathy abated a bit in his feet. He tolerates gem/abraxane well currently besides some previously cumulating neuropathy and fatigue mostly after the third dose. He has been eating really well. No nausea or GI issues. No recent abdominal pain. Has some discomfort in his butt when he sits a long time but this goes away completely with standing. He reports having \"no cushion\" anymore. No recent fevers/chills or infectious concerns.       Current Outpatient Medications   Medication Sig Dispense Refill    acetaminophen (TYLENOL) 32 mg/mL liquid Take 20.313 mLs (650 mg) by mouth every 8 hours 1800 mL 1    apixaban ANTICOAGULANT (ELIQUIS) 5 MG tablet Take 1 tablet (5 mg) by mouth 2 times daily 60 tablet 2    blood glucose (FREESTYLE TEST STRIPS) test strip by Other route as needed      buprenorphine (BUTRANS) 10 MCG/HR WK patch Place 1 patch onto the skin every 7 days Use with 20 mcg/hour patch for 30 mcg/hour total. 4 patch 3    buprenorphine (BUTRANS) 20 MCG/HR WK patch Place 1 patch onto the skin once a week Use with 10 mcg/hour patch for 30 mcg/hour total. 4 patch 3    dicyclomine (BENTYL) 10 MG capsule Take 1 capsule (10 mg) by mouth 4 times daily (before meals and nightly) 120 capsule 1    HYDROmorphone (DILAUDID) 2 MG tablet Take 1-2 tablets (2-4 mg) by mouth every 4 hours as needed for severe pain or breakthrough pain 180 tablet 0 "    lipase-protease-amylase (CREON 24) 01455-79396-901959 units CPEP per EC capsule 2-3 capsules with meals 3 times a day and 1-2 capsules with snacks max of 15 capsules a day 200 capsule 11    Multiple Vitamins-Minerals (CENTRUM SILVER 50+MEN) TABS as directed Orally      pantoprazole (PROTONIX) 40 MG EC tablet Take 1 tablet (40 mg) by mouth 2 times daily 60 tablet 4    vitamin D3 (CHOLECALCIFEROL) 50 mcg (2000 units) tablet Take 1 tablet (50 mcg) by mouth daily 90 tablet 1    Continuous Blood Gluc Sensor (FREESTYLE KAREN 2 SENSOR) MISC Change every 14 days (Patient not taking: Reported on 1/30/2024) 6 each 3    insulin aspart (NOVOLOG PEN) 100 UNIT/ML pen Inject 1-10 Units Subcutaneous 3 times daily (with meals) (Patient not taking: Reported on 12/5/2023) 15 mL 3    lidocaine-prilocaine (EMLA) 2.5-2.5 % external cream Use 1-2 times a week or as needed prior to port access (Patient not taking: Reported on 1/30/2024) 30 g 3    prochlorperazine (COMPAZINE) 10 MG tablet Take 1 tablet (10 mg) by mouth every 6 hours as needed for nausea or vomiting (Patient not taking: Reported on 1/30/2024) 30 tablet 2     Physical Examination:  /76 (BP Location: Right arm, Patient Position: Sitting, Cuff Size: Adult Regular)   Pulse 78   Temp 97.9  F (36.6  C) (Oral)   Resp 16   Wt 73.1 kg (161 lb 1.6 oz)   SpO2 97%   BMI 21.85 kg/m    Wt Readings from Last 10 Encounters:   01/30/24 73.1 kg (161 lb 1.6 oz)   01/02/24 71.7 kg (158 lb 1.6 oz)   12/26/23 71.6 kg (157 lb 12.8 oz)   12/19/23 76 kg (167 lb 8 oz)   12/05/23 74.4 kg (164 lb)   12/05/23 74.2 kg (163 lb 9.6 oz)   11/29/23 76.4 kg (168 lb 6.4 oz)   11/28/23 77.7 kg (171 lb 4.8 oz)   11/16/23 75.3 kg (166 lb)   11/14/23 75.4 kg (166 lb 3.2 oz)   General: The patient is a pleasant male in no acute distress.  HEENT: EOMI. Sclerae are anicteric. Mucous membranes moist, no lesions or thrush.   Lymph: Neck is supple with no lymphadenopathy in the cervical or supraclavicular  areas.   Heart: Regular rate and rhythm.   Lungs: Clear to auscultation bilaterally.   Abdomen: Bowel sounds present, soft, nontender with no palpable hepatosplenomegaly or masses.   Extremities: No edema noted to bilateral lower extremities.     Neuro: Cranial nerves II through XII are grossly intact.  Skin: No rashes, petechiae, or bruising noted on exposed skin.     Laboratory Data:  Most Recent 3 CBC's:  Recent Labs   Lab Test 01/30/24  0644 01/02/24  0828 12/26/23  0830 12/19/23  0958   WBC 5.6 3.3* 3.8* 4.5   HGB 10.3* 9.1* 10.0* 9.4*   MCV 94 92 92 95    96* 148* 238   ANEUTAUTO 3.2 2.0  --  2.6    Most Recent 3 BMP's:  Recent Labs   Lab Test 01/30/24  0644 12/26/23  0830 12/19/23  1116    139 140   POTASSIUM 3.8 3.8 3.7   CHLORIDE 102 103 106   CO2 26 27 26   BUN 20.5* 10.1 18.7   CR 0.62* 0.70 0.65*   ANIONGAP 11 9 8   DENISE 9.3 9.1 8.6   * 170* 180*   PROTTOTAL 6.9 6.6 6.3*   ALBUMIN 4.0 3.7 3.7    Most Recent 2 LFT's:  Recent Labs   Lab Test 01/30/24  0644 12/26/23  0830   AST 26 26   ALT 29 19   ALKPHOS 103 105   BILITOTAL 0.3 0.4   I reviewed the above labs today.          Assessment and Plan:  Locally advanced, unresectable pancreatic cancer. Patient started on treatment with palliative FOLFIRINOX on 7/31/23. He had a moderate amount of side effects following cycle 1 with dehydration, diarrhea, and nausea. Received cycle 2 on 8/15/23. ERCP/EGD on 8/17 with GJ tube exchange and duodenal stents placed. Hospitalized 8/21-8/25 due to colitis and abdominal pain. Cycle 3 was deferred due to neutropenia, Neulasta/Udenyca added on day 4.  - CT CAP 10/24/23 with progression in skeletal mets, lung nodule and lymph nodes. Stable pancreatic mass. Changed treatment to gemcitabine, Abraxane (day 1, 8, 15) and Zometa.  Tolerated cycle 1 well with the exception of a fever. Tolerated cycle 2 well. Cycle 2 day 15 skipped due to travel. Completed cycle 3 and now had a one month break for traveling.    -Proceed with cycle 4 today, labs reviewed with no concerns. With recent imaging will plan for repeat scans prior to cycle 6 (2 months) and follow-up with Dr. Haile to review. SUZETTE follow-up prior to cycle 5.     Skeletal mets.   -Worsening skeletal mets noted on CT 10/24. Sclerosis noted on recent imaging. No recent persistent pain.   -Received Zometa with cycle 1 (11/7/23), had fever later that night, reports no other side effects or concerns. Original plan for every 6 months but we reviewed indications for bone mets being either monthly or every 3 months. Will plan to give every 3 months, will give next week.   -Radiation to T10 completed 11/29/23.     Pulmonary Embolism. Right lower segmental PE found on routine imaging. Started on apixiban, denies any bleeding concerns.     Nutrition.  Feeding tube removed. Continues to tolerate oral intake well. Gaining weight. No issues with nausea or vomiting. Continue to push oral hydration, drinking 64+ oz of fluid/day.  Continue Protonix daily.  Has compazine as needed for nausea.     Lower extremity edema. Resolved. Continue compression stockings and elevation PRN. Continue to monitor.     Diabetes. Working closely with endocrine/diabetic educator on dose adjustments/management. Minimal need for insulin lately.     Diarrhea/constipation.  Resolved. Bowel movements more regular with increased food intake. Continue Metamucil as needed.     Abdominal pain. Managed by palliative care with Butrans patch and oral Dilaudid. No pain recently.     Anemia. Normocytic. Suspect anemia of chronic disease + secondary to myelosuppression from chemo. Hemoglobin improved with treatment break. Will monitor for now.     Greater than 40 minutes was spent with this patient with greater than 20 minutes spent in counseling and coordination of care.    Yasmeen Felix PA-C

## 2024-01-30 NOTE — NURSING NOTE
Chief Complaint   Patient presents with    Oncology Clinic Visit     Malignant neoplasm of head of pancreas    Port Draw     Labs drawn via port by RN in lab     Port accessed with 20 gauge, 3/4 inch flat needle by RN, labs collected, line flushed with saline and heparin.  Vitals taken. Pt checked in for appointment(s).     Erica Baez RN

## 2024-01-30 NOTE — NURSING NOTE
Oncology Rooming Note    January 30, 2024 6:57 AM   Gallo Navarro is a 58 year old male who presents for:    Chief Complaint   Patient presents with    Oncology Clinic Visit     Malignant neoplasm of head of pancreas    Port Draw     Labs drawn via port by RN in lab     Initial Vitals: /76 (BP Location: Right arm, Patient Position: Sitting, Cuff Size: Adult Regular)   Pulse 78   Temp 97.9  F (36.6  C) (Oral)   Resp 16   Wt 73.1 kg (161 lb 1.6 oz)   SpO2 97%   BMI 21.85 kg/m   Estimated body mass index is 21.85 kg/m  as calculated from the following:    Height as of 12/5/23: 1.829 m (6').    Weight as of this encounter: 73.1 kg (161 lb 1.6 oz). Body surface area is 1.93 meters squared.  No Pain (0) Comment: Data Unavailable   No LMP for male patient.  Allergies reviewed: Yes  Medications reviewed: Yes    Medications: Medication refills not needed today.  Pharmacy name entered into [employer]:    [facility] - [place], [place] - [address] [facility] [place], [place] - [phone]  [facility] [place] [facility] [address] [facility] [address]AL TO REGISTERED [facility] SITES  [facility] - [place], [place] - [address] [place] [id number] [facility] [employer] - [place], [place] - [address]    Frailty Screening:   Is the patient here for a new oncology consult visit in cancer care? 2. No      Clinical concerns: None       Martha Hernandez, CMA

## 2024-01-30 NOTE — PROGRESS NOTES
Infusion Nursing Note:  Gallo Navarro presents today for Cycle 4 Day 1 Abraxane and Gemcitabine.    Patient seen by provider today: Yes: Yasmeen Felix PA-C   present during visit today: Not Applicable.    Note: Patient was seen and assessed by Yasmeen Felix PA-C in clinic prior to infusion. Patient offers no additional complaints or concerns. Patient agreeable to treatment plan today.    Plan for patient to receive Zometa every 3 months instead of every 6 months. Patient will receive Zometa next week per Yasmeen Felix PA-C.     Intravenous Access:  Implanted Port.    Treatment Conditions:  Lab Results   Component Value Date    HGB 10.3 (L) 01/30/2024    WBC 5.6 01/30/2024    ANEU 2.2 12/26/2023    ANEUTAUTO 3.2 01/30/2024     01/30/2024        Lab Results   Component Value Date     01/30/2024    POTASSIUM 3.8 01/30/2024    MAG 1.9 08/23/2023    CR 0.62 (L) 01/30/2024    DENISE 9.3 01/30/2024    BILITOTAL 0.3 01/30/2024    ALBUMIN 4.0 01/30/2024    ALT 29 01/30/2024    AST 26 01/30/2024     Results reviewed, labs MET treatment parameters, ok to proceed with treatment.    Post Infusion Assessment:  Patient tolerated infusion without incident.  Blood return noted pre and post infusion.  Site patent and intact, free from redness, edema or discomfort.  No evidence of extravasations.  Access discontinued per protocol.     Discharge Plan:   Patient declined prescription refills.  Discharge instructions reviewed with: Patient.  Patient and/or family verbalized understanding of discharge instructions and all questions answered.  AVS to patient via [employer].  Patient will return 02/06/24 for next appointment.   Patient discharged in stable condition accompanied by: self.  Departure Mode: Ambulatory.      Cindy Cortez RN

## 2024-01-31 ENCOUNTER — TELEPHONE (OUTPATIENT)
Dept: ONCOLOGY | Facility: CLINIC | Age: 59
End: 2024-01-31
Payer: COMMERCIAL

## 2024-01-31 NOTE — TELEPHONE ENCOUNTER
"1/31/2024    Referring Provider: Luis Haile MD    Presenting Information:  I spoke to Gallo by phone today to discuss his genetic testing results. We last met on 1/15/2024 and his blood was drawn on 1/15/2024. The [employer] Multi-Cancer Panel + Pancreatic Preliminary Evidence Genes was ordered from [employer]. This testing was done because of Gallo's personal and family history of cancer.    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 No other variants or mutations were found in the PALLD gene. Given the uncertain significance of this result, medical management decisions should NOT be made based on this test result alone.    Of note, Gallo tested negative for variants and mutations in the following genes by sequencing and deletion/duplication analysis (unless otherwise specified in the test report): AIP, ALK, APC, SARAH, AXIN2, BAP1, BARD1, BLM, BMPR1A, BRCA1, BRCA2, BRIP1, CDC73, CDH1, CDK4, CDKN1B, CDKN2A, CHEK2, CTNNA1, DICER1, EGFR, EPCAM, FANCC, FH, FLCN, GREM1, HOXB13, KIT, LZTR1, MAX, MBD4, MEN1, MET, MITF, MLH1, MSH2, MSH3, MSH6, MUTYH, NF1, NF2, NTHL1, PALB2, PDGFRA, PMS2, POLD1, POLE, POT1, CRTZN4I, PTCH1, PTEN, RAD51C, RAD51D, RB1, RET, SDHA, SDHAF2, SDHB, SDHC, SDHD, SMAD4, SMARCA4, SMARCB1, SMARCE1, STK11, SUFU, VDLS465, TP53, TSC1, TSC2, VHL.  We reviewed the autosomal dominant inheritance of these genes.   Gallo cannot pass on a mutation in any of these genes to his children based on this test result. Mutations in these genes do not skip generations.      A copy of the test report can be found in the Laboratory tab, dated 1/15/2024, and named \"LABORATORY MISCELLANEOUS ORDER\". The report is scanned in as a linked document.    Interpretation:  We discussed several different interpretations of this inconclusive test result. It is not clear if this variant in " the PALLD gene is associated with increased cancer risk.  1. This variant may be a benign change that does not increase cancer risk.  2. This variant may be a harmful mutation and potentially associated with increased risk for pancreatic cancer.    Genetic testing labs are working to collect evidence to determine if this variant is harmful or benign, and Brigid will contact me if it is reclassified. If this variant is determined to be a benign change, there may be a different gene or combination of genes and environment that are associated with the cancers in this family that are not identifiable using this test. As such, Gallo is encouraged to contact me regularly to review any new genetic testing options that may be appropriate for him.    It is also important to consider that his other relatives with cancer may have had a mutation in one of the genes tested and Gallo did not inherit it. If that is the case, [place] pancreatic cancer may be sporadic and caused by random cellular changes.    Inheritance:  We reviewed the autosomal dominant inheritance of this variant in the PALLD gene. We discussed that Gallo has a 50% chance to pass this variant to each of his children. Likewise, each of his siblings has a 50% risk of having the same variant. Other relatives may carry the variant, as well. Because it is unclear what, if any, risk is associated with this variant, clinical genetic testing for this PALLD variant alone is not recommended for relatives.    Screening:  Based on this inconclusive test result, it is important for Gallo and his relatives to refer back to the family history for appropriate cancer screening.    Gallo should continue to follow all pancreatic cancer treatment and follow-up recommendations as made by his medical providers.  Based on Gallo's history, his close relatives likely remain at some increased risk for pancreatic cancer. Though his relatives would not currently meet National  Comprehensive Cancer Network (NCCN) guidelines for pancreatic cancer screening, they are encouraged to share the family history and any concerning symptoms with their medical providers. Their medical providers may have individualized recommendations.   Per current NCCN guidelines, individuals with a first degree relative with colorectal cancer diagnosed at any age should begin colonoscopy at age 40, or 10 years before the earliest diagnosis of colorectal cancer, whichever is first. In this family, colonoscopy should start by at least age 40, as his brother's age at diagnosis of rectal cancer is uncertain. Colonoscopy should then be repeated every 5 years, or based on colonoscopy findings. This would apply to Gallo and his sisters. These recommendations may change based on personal and family history as well as personal preference, and should be discussed with an individual's physician.      Gallo's close maternal female relatives remain at increased risk for breast cancer given their family history. Breast cancer screening is generally recommended to begin approximately 10 years younger than the earliest age of breast cancer diagnosis in the family, or at age 40, whichever comes first. In this family, screening may begin at age 30. Breast screening options should be discussed with an individual's primary care provider and a genetic counselor, to determine at what age to begin screening, what screening is appropriate, and if additional screening (such as breast MRI) is necessary based on personal/family history factors.  Other population cancer screening options, such as those recommended by the American Cancer Society and NCCN, are also appropriate for Gallo and his family. These screening recommendations may change if there are changes to Gallo's personal and/or family history of cancer. Final screening recommendations should be made by each individual's primary care provider.      Additional Testing  Considerations:  Although Gallo's genetic testing result is inconclusive, other relatives may still carry a harmful gene mutation associated with hereditary cancer. Genetic counseling is recommended for his brother, paternal aunt with colon cancer, and the close relatives (children, siblings) of his other paternal relatives with cancer to discuss their genetic testing options. Gallo's sisters, mother, and extended maternal relatives should consider meeting with a genetic counselor, as well. If any of these relatives do pursue genetic testing, Gallo is encouraged to contact me so that we may review the impact of their test results on him.    Gallo shared that his sister is currently working to collect his brother's genetic testing and/or cancer records. Gallo will send me a copy of these records to review, when available.    Summary:  We do not have an explanation for Gallo's personal or family history of cancer. While no obviously harmful genetic changes were identified, Gallo may still be at risk for certain cancers due to family history, environmental factors, or other genetic causes not identified by this test. Because of that, it is important that he continue with cancer screening based on his personal and family history as discussed above.    Genetic testing is rapidly advancing, and new cancer susceptibility genes will most likely be identified in the future. Therefore, I encouraged Gallo to contact me periodically or if there are changes in his personal or family history. This may change how we assess his cancer risk, screening, and the testing we would offer.    Plan:  1. I provided Gallo with a copy of his test results via [employer]'s patient portal.    2. He plans to follow-up with his medical providers, as needed.  3. He should contact me regularly, or sooner if his family history changes.  4. I will attempt to contact Gallo if the laboratory informs me that this PALLD variant has been reclassified.  This  may change screening and testing recommendations for Gallo and his relatives.    If Gallo has any further questions, I encouraged him to contact me at [phone].    Sherrill Peterson MS, [facility]  Licensed, Certified Genetic Counselor  Office: [phone]  Pager: [phone]

## 2024-02-01 ENCOUNTER — MYC MEDICAL ADVICE (OUTPATIENT)
Dept: ONCOLOGY | Facility: CLINIC | Age: 59
End: 2024-02-01
Payer: COMMERCIAL

## 2024-02-01 LAB — CANCER AG19-9 SERPL IA-ACNC: 6 U/ML

## 2024-02-01 NOTE — TELEPHONE ENCOUNTER
"Oncology Nurse Triage - Reporting Symptoms  Situation:   Gallo reporting the following symptoms: R great toe redness, swelling, and warmth x 24 hours.    Background:   Treating Provider: Dr. Haile  Date of last office visit: 1/30/24 with [facility]  DX: locally advanced unresectable pancreatic cancer  Recent treatments: Yes: 1/30/24 Cycle 4 Day 1 Abraxane and Gemcitabine.     Assessment  Onset of symptoms: 1 day ago; in last 24 hours it started. States he may have stubbed his toe but hard to remember because he has neuropathy  Redness, swelling, and warmth; throbbing but does not feel \"pain\"  Redness has not extended beyond first joint  Location: R great toe  No fever or chills  No open areas, no bleeding or oozing.  Duration/Frequency:constant  Quality: throbbing  Current med(s) used:putting ointment on it.    Recommendations:   Informed pt that this writer will reach out to Care Team for their recommendations and will call him back to discuss.    Pt voiced understanding of this information and is in agreement with this plan.    Routed to Yasmeen to review and advise.    1200: Per Yasmeen: pt can go to  for this.  Called pt. [facility] asking him to call back.  Will send recommendation from Yasmeen via [facility] as well.          " [place] Counseling:  I discussed with the patient the risks of etanercept including but not limited to myelosuppression, immunosuppression, autoimmune hepatitis, demyelinating diseases, lymphoma, and infections.  The patient understands that monitoring is required including a PPD at baseline and must alert us or the primary physician if symptoms of infection or other concerning signs are noted.

## 2024-02-02 ENCOUNTER — OFFICE VISIT (OUTPATIENT)
Dept: FAMILY MEDICINE | Facility: CLINIC | Age: 59
End: 2024-02-02
Payer: COMMERCIAL

## 2024-02-02 VITALS
HEART RATE: 101 BPM | RESPIRATION RATE: 16 BRPM | DIASTOLIC BLOOD PRESSURE: 74 MMHG | OXYGEN SATURATION: 97 % | HEIGHT: 71 IN | SYSTOLIC BLOOD PRESSURE: 120 MMHG | WEIGHT: 161.2 LBS | BODY MASS INDEX: 22.57 KG/M2 | TEMPERATURE: 98.1 F

## 2024-02-02 DIAGNOSIS — L03.031 PARONYCHIA OF TOE, RIGHT: Primary | ICD-10-CM

## 2024-02-02 DIAGNOSIS — C79.51 MALIGNANT NEOPLASM METASTATIC TO BONE (H): ICD-10-CM

## 2024-02-02 DIAGNOSIS — E11.9 TYPE 2 DIABETES MELLITUS WITHOUT COMPLICATION, WITH LONG-TERM CURRENT USE OF INSULIN (H): ICD-10-CM

## 2024-02-02 DIAGNOSIS — Z79.4 TYPE 2 DIABETES MELLITUS WITHOUT COMPLICATION, WITH LONG-TERM CURRENT USE OF INSULIN (H): ICD-10-CM

## 2024-02-02 DIAGNOSIS — C25.0 MALIGNANT NEOPLASM OF HEAD OF PANCREAS (H): ICD-10-CM

## 2024-02-02 PROBLEM — K85.90 ACUTE PANCREATITIS WITHOUT NECROSIS OR INFECTION, UNSPECIFIED: Status: ACTIVE | Noted: 2023-04-16

## 2024-02-02 PROBLEM — K85.20 ALCOHOL-INDUCED ACUTE PANCREATITIS: Status: RESOLVED | Noted: 2023-04-10 | Resolved: 2024-02-02

## 2024-02-02 PROCEDURE — 99207 PR FOOT EXAM NO CHARGE: CPT

## 2024-02-02 PROCEDURE — 99214 OFFICE O/P EST MOD 30 MIN: CPT | Mod: 25

## 2024-02-02 RX ORDER — CLINDAMYCIN HCL 300 MG
300 CAPSULE ORAL 4 TIMES DAILY
Qty: 28 CAPSULE | Refills: 0 | Status: SHIPPED | OUTPATIENT
Start: 2024-02-02 | End: 2024-02-09

## 2024-02-02 NOTE — PATIENT INSTRUCTIONS
At [facility], we strive to deliver an exceptional experience to you, every time we see you. If you receive a survey, please complete it as we do value your feedback.  If you have MyChart, you can expect to receive results automatically within 24 hours of their completion.  Your provider will send a note interpreting your results as well.   If you do not have MyChart, you should receive your results in about a week by mail.    Your care team:                            Family Medicine Internal Medicine   MD Sohan Haas, MD Jess Perera, MD Mai Frey, PA-C    Mushtaq Urrutia, MD Pediatrics   Reid Rogers, PA-C  Diamante Robins, MD Katarzyna Perez APRESVIN Rodriguez CNP, CNP, MD Jaz Montelongo, MD Erendira Williamson, CNP  Same-Day (No follow up visit)   Orion Vaughn, JESSIKA Thompson, PA-C    Milly Christiansen PA-C     Clinic hours: Monday - Thursday 7 am-6 pm; Fridays 7 am-5 pm.   Urgent care: Monday - Friday 10 am- 8 pm; Saturday and Sunday 9 am-5 pm.    Clinic: [phone]       [facility]: Monday - Thursday 8 am - 7 pm; Friday 8 am - 6 pm  [facility]: [phone]

## 2024-02-02 NOTE — PROGRESS NOTES
Assessment & Plan     Paronychia of toe, right  - physical examination consistent with parychnia of right big toe  - will treat with:  - clindamycin (CLEOCIN) 300 MG capsule  Dispense: 28 capsule; Refill: 0  - The uses and side effects, including black box warnings as appropriate, were discussed in detail.  All patient questions were answered.  The patient was instructed to call immediately if any side effects developed. Advised to take with a glass of water. Advised to take pro-biotic to reduce risk of diarrheal infection  - reviewed worrisome symptoms to watch for  - reviewed supportive measures such as soaking    Type 2 diabetes mellitus without complication, with long-term current use of insulin (H)  Malignant neoplasm of head of pancreas (H)  Malignant neoplasm metastatic to bone (H)  - followed by oncology, currently on chemotherapy   - on insulin for diabetes  - took diagnoses into consideration with MDM today    Advised close follow up. If symptoms worsen, including worsening pain, fevers/chills, abscess formation, spreading erythema - present to UC/ER over the weekend. With no improvement, return to clinic on Monday to see primary care provider. The patient verbalized understanding and agreement with the plan today and has no additional questions or concerns at this time.    Orion Vaughn, ESVIN Ayala   Gallo is a 58 year old, presenting for the following health issues:  Toe Pain (Has neuropathy in feet and is having toe problems )        2/2/2024     8:03 AM   Additional Questions   Roomed by rosmery     History of Present Illness       Reason for visit:  Right toe agravated  Symptom onset:  1-3 days ago  Symptoms include:  Red and sore  Symptom intensity:  Moderate  Symptom progression:  Improving  Had these symptoms before:  No  What makes it worse:  Shoe on  What makes it better:  Antara Relief [place] 800mg CBD    He eats 2-3 servings of fruits and vegetables daily.He consumes 1 sweetened  "beverage(s) daily.He exercises with enough effort to increase his heart rate 10 to 19 minutes per day.  He exercises with enough effort to increase his heart rate 5 days per week.   He is taking medications regularly.     Reports 1-3d history of erythema and tenderness of the right big toe. Location is proximal area to nail and medial area to the side of the nail. No pus or drainage. No fluctuant mass. Area is tender to the touch.  Has history of some neuropathy that was worse after his last chemo round. The numbness causes some wobbling with ambulation which causes him to stub toe occasionally, but he can't remember a specific injury to the area or incidence of stubbing it. History of T2DM s/p pancreatitis / pancreatic cancer. No open lesions of the area. Does not remember when he last cut his toe nail.     Treatment 2 of chemo started 12 weeks ago. Just started the 4th round of chemo after a 28d break last week. Does have history of swelling feet after 3rd chemo round and worse neuropathy. The edema improved during his last chemo break.     Denies fevers or chills, decreased ROM or inability to move the toe, edema, streaking redness, ecchymosis, or radiating/throbbing pain.     Review of Systems  Constitutional, HEENT, cardiovascular, pulmonary, GI, , musculoskeletal, neuro, skin, endocrine and psych systems are negative, except as otherwise noted.      Objective    /74 (BP Location: Right arm, Patient Position: Sitting, Cuff Size: Adult Regular)   Pulse 101   Temp 98.1  F (36.7  C) (Oral)   Resp 16   Ht 1.803 m (5' 11\")   Wt 73.1 kg (161 lb 3.2 oz)   SpO2 97%   BMI 22.48 kg/m    Body mass index is 22.48 kg/m .  Physical Exam     General:  awake, alert, NAD. Non-toxic  HEENT:  normocephalic. No conjunctivitis or rhinorrhea  Neck:  supple, FROM  Pulm: Unlabored, regular rate. CTAB, full air movement  CV: S1/S2, rrr, no m/r/g  MSK: steady gait, FROM  Neuro: clear and logical thought and speech  Psych: " calm mood and congruent affect    Foot exam: no edema or ecchymosis bilaterally. Right big toe +erythema at proximal nail base and medial side of nail. No obvious ingrown nail. No ecchymosis. No fluctuation or mass. No pus/drainage. +tenderness to palpation. No streaking. ROM intact.     Signed Electronically by: ESVIN Arnold CNP

## 2024-02-06 ENCOUNTER — APPOINTMENT (OUTPATIENT)
Dept: LAB | Facility: CLINIC | Age: 59
End: 2024-02-06
Payer: COMMERCIAL

## 2024-02-06 ENCOUNTER — INFUSION THERAPY VISIT (OUTPATIENT)
Dept: ONCOLOGY | Facility: CLINIC | Age: 59
End: 2024-02-06
Payer: COMMERCIAL

## 2024-02-06 VITALS
RESPIRATION RATE: 18 BRPM | BODY MASS INDEX: 22.76 KG/M2 | SYSTOLIC BLOOD PRESSURE: 122 MMHG | HEART RATE: 90 BPM | DIASTOLIC BLOOD PRESSURE: 73 MMHG | WEIGHT: 163.2 LBS | OXYGEN SATURATION: 98 % | TEMPERATURE: 97.8 F

## 2024-02-06 DIAGNOSIS — C25.0 MALIGNANT NEOPLASM OF HEAD OF PANCREAS (H): ICD-10-CM

## 2024-02-06 DIAGNOSIS — C79.51 MALIGNANT NEOPLASM METASTATIC TO BONE (H): ICD-10-CM

## 2024-02-06 DIAGNOSIS — Z51.11 ENCOUNTER FOR ANTINEOPLASTIC CHEMOTHERAPY: Primary | ICD-10-CM

## 2024-02-06 LAB
ALBUMIN SERPL BCG-MCNC: 3.9 G/DL (ref 3.5–5.2)
ALP SERPL-CCNC: 86 U/L (ref 40–150)
ALT SERPL W P-5'-P-CCNC: 24 U/L (ref 0–70)
ANION GAP SERPL CALCULATED.3IONS-SCNC: 9 MMOL/L (ref 7–15)
AST SERPL W P-5'-P-CCNC: 23 U/L (ref 0–45)
BASOPHILS # BLD AUTO: 0 10E3/UL (ref 0–0.2)
BASOPHILS NFR BLD AUTO: 1 %
BILIRUB SERPL-MCNC: 0.3 MG/DL
BUN SERPL-MCNC: 14.2 MG/DL (ref 6–20)
CALCIUM SERPL-MCNC: 9.3 MG/DL (ref 8.6–10)
CHLORIDE SERPL-SCNC: 106 MMOL/L (ref 98–107)
CREAT SERPL-MCNC: 0.71 MG/DL (ref 0.67–1.17)
DEPRECATED HCO3 PLAS-SCNC: 26 MMOL/L (ref 22–29)
EGFRCR SERPLBLD CKD-EPI 2021: >90 ML/MIN/1.73M2
EOSINOPHIL # BLD AUTO: 0.2 10E3/UL (ref 0–0.7)
EOSINOPHIL NFR BLD AUTO: 6 %
ERYTHROCYTE [DISTWIDTH] IN BLOOD BY AUTOMATED COUNT: 14.7 % (ref 10–15)
GLUCOSE SERPL-MCNC: 224 MG/DL (ref 70–99)
HCT VFR BLD AUTO: 31.9 % (ref 40–53)
HGB BLD-MCNC: 10.1 G/DL (ref 13.3–17.7)
IMM GRANULOCYTES # BLD: 0 10E3/UL
IMM GRANULOCYTES NFR BLD: 0 %
LYMPHOCYTES # BLD AUTO: 0.9 10E3/UL (ref 0.8–5.3)
LYMPHOCYTES NFR BLD AUTO: 29 %
MCH RBC QN AUTO: 29.4 PG (ref 26.5–33)
MCHC RBC AUTO-ENTMCNC: 31.7 G/DL (ref 31.5–36.5)
MCV RBC AUTO: 93 FL (ref 78–100)
MONOCYTES # BLD AUTO: 0.3 10E3/UL (ref 0–1.3)
MONOCYTES NFR BLD AUTO: 11 %
NEUTROPHILS # BLD AUTO: 1.7 10E3/UL (ref 1.6–8.3)
NEUTROPHILS NFR BLD AUTO: 53 %
NRBC # BLD AUTO: 0 10E3/UL
NRBC BLD AUTO-RTO: 0 /100
PLATELET # BLD AUTO: 131 10E3/UL (ref 150–450)
POTASSIUM SERPL-SCNC: 4 MMOL/L (ref 3.4–5.3)
PROT SERPL-MCNC: 6.8 G/DL (ref 6.4–8.3)
RBC # BLD AUTO: 3.43 10E6/UL (ref 4.4–5.9)
SODIUM SERPL-SCNC: 141 MMOL/L (ref 135–145)
WBC # BLD AUTO: 3.1 10E3/UL (ref 4–11)

## 2024-02-06 PROCEDURE — 96417 CHEMO IV INFUS EACH ADDL SEQ: CPT

## 2024-02-06 PROCEDURE — 250N000011 HC RX IP 250 OP 636

## 2024-02-06 PROCEDURE — 82947 ASSAY GLUCOSE BLOOD QUANT: CPT

## 2024-02-06 PROCEDURE — 82374 ASSAY BLOOD CARBON DIOXIDE: CPT

## 2024-02-06 PROCEDURE — 96375 TX/PRO/DX INJ NEW DRUG ADDON: CPT

## 2024-02-06 PROCEDURE — 96413 CHEMO IV INFUSION 1 HR: CPT

## 2024-02-06 PROCEDURE — 85025 COMPLETE CBC W/AUTO DIFF WBC: CPT | Performed by: PHYSICIAN ASSISTANT

## 2024-02-06 PROCEDURE — 36591 DRAW BLOOD OFF VENOUS DEVICE: CPT | Performed by: PHYSICIAN ASSISTANT

## 2024-02-06 PROCEDURE — 258N000003 HC RX IP 258 OP 636: Performed by: PHYSICIAN ASSISTANT

## 2024-02-06 PROCEDURE — 96367 TX/PROPH/DG ADDL SEQ IV INF: CPT

## 2024-02-06 PROCEDURE — 250N000011 HC RX IP 250 OP 636: Performed by: PHYSICIAN ASSISTANT

## 2024-02-06 PROCEDURE — 86301 IMMUNOASSAY TUMOR CA 19-9: CPT | Performed by: PHYSICIAN ASSISTANT

## 2024-02-06 RX ORDER — HEPARIN SODIUM (PORCINE) LOCK FLUSH IV SOLN 100 UNIT/ML 100 UNIT/ML
5 SOLUTION INTRAVENOUS
Status: DISCONTINUED | OUTPATIENT
Start: 2024-02-06 | End: 2024-02-06 | Stop reason: HOSPADM

## 2024-02-06 RX ORDER — DIPHENHYDRAMINE HYDROCHLORIDE 50 MG/ML
50 INJECTION INTRAMUSCULAR; INTRAVENOUS
Status: CANCELLED
Start: 2024-05-05

## 2024-02-06 RX ORDER — ALBUTEROL SULFATE 90 UG/1
1-2 AEROSOL, METERED RESPIRATORY (INHALATION)
Status: CANCELLED
Start: 2024-05-05

## 2024-02-06 RX ORDER — METHYLPREDNISOLONE SODIUM SUCCINATE 125 MG/2ML
125 INJECTION, POWDER, LYOPHILIZED, FOR SOLUTION INTRAMUSCULAR; INTRAVENOUS
Status: CANCELLED
Start: 2024-05-05

## 2024-02-06 RX ORDER — HEPARIN SODIUM,PORCINE 10 UNIT/ML
5-20 VIAL (ML) INTRAVENOUS DAILY PRN
Status: CANCELLED | OUTPATIENT
Start: 2024-05-05

## 2024-02-06 RX ORDER — ONDANSETRON 2 MG/ML
8 INJECTION INTRAMUSCULAR; INTRAVENOUS ONCE
Status: COMPLETED | OUTPATIENT
Start: 2024-02-06 | End: 2024-02-06

## 2024-02-06 RX ORDER — ZOLEDRONIC ACID 0.04 MG/ML
4 INJECTION, SOLUTION INTRAVENOUS ONCE
Status: CANCELLED | OUTPATIENT
Start: 2024-05-05 | End: 2024-05-05

## 2024-02-06 RX ORDER — ALBUTEROL SULFATE 0.83 MG/ML
2.5 SOLUTION RESPIRATORY (INHALATION)
Status: CANCELLED | OUTPATIENT
Start: 2024-05-05

## 2024-02-06 RX ORDER — MEPERIDINE HYDROCHLORIDE 25 MG/ML
25 INJECTION INTRAMUSCULAR; INTRAVENOUS; SUBCUTANEOUS EVERY 30 MIN PRN
Status: CANCELLED | OUTPATIENT
Start: 2024-05-05

## 2024-02-06 RX ORDER — HEPARIN SODIUM (PORCINE) LOCK FLUSH IV SOLN 100 UNIT/ML 100 UNIT/ML
5 SOLUTION INTRAVENOUS ONCE
Status: COMPLETED | OUTPATIENT
Start: 2024-02-06 | End: 2024-02-06

## 2024-02-06 RX ORDER — PACLITAXEL 100 MG/20ML
125 INJECTION, POWDER, LYOPHILIZED, FOR SUSPENSION INTRAVENOUS ONCE
Status: COMPLETED | OUTPATIENT
Start: 2024-02-06 | End: 2024-02-06

## 2024-02-06 RX ORDER — HEPARIN SODIUM (PORCINE) LOCK FLUSH IV SOLN 100 UNIT/ML 100 UNIT/ML
5 SOLUTION INTRAVENOUS
Status: CANCELLED | OUTPATIENT
Start: 2024-05-05

## 2024-02-06 RX ORDER — ZOLEDRONIC ACID 0.04 MG/ML
4 INJECTION, SOLUTION INTRAVENOUS ONCE
Status: COMPLETED | OUTPATIENT
Start: 2024-02-06 | End: 2024-02-06

## 2024-02-06 RX ORDER — EPINEPHRINE 1 MG/ML
0.3 INJECTION, SOLUTION, CONCENTRATE INTRAVENOUS EVERY 5 MIN PRN
Status: CANCELLED | OUTPATIENT
Start: 2024-05-05

## 2024-02-06 RX ADMIN — SODIUM CHLORIDE 250 ML: 9 INJECTION, SOLUTION INTRAVENOUS at 08:48

## 2024-02-06 RX ADMIN — GEMCITABINE 2000 MG: 38 INJECTION, SOLUTION INTRAVENOUS at 10:38

## 2024-02-06 RX ADMIN — Medication 5 ML: at 11:32

## 2024-02-06 RX ADMIN — PACLITAXEL 250 MG: 100 INJECTION, POWDER, LYOPHILIZED, FOR SUSPENSION INTRAVENOUS at 09:44

## 2024-02-06 RX ADMIN — ZOLEDRONIC ACID 4 MG: 0.04 INJECTION, SOLUTION INTRAVENOUS at 11:11

## 2024-02-06 RX ADMIN — ONDANSETRON 8 MG: 2 INJECTION INTRAMUSCULAR; INTRAVENOUS at 08:49

## 2024-02-06 RX ADMIN — Medication 5 ML: at 08:19

## 2024-02-06 ASSESSMENT — PAIN SCALES - GENERAL: PAINLEVEL: NO PAIN (0)

## 2024-02-06 NOTE — PATIENT INSTRUCTIONS
[facility] Triage and after hours / weekends / holidays:  [phone]    Please call the triage or after hours line if you experience a temperature greater than or equal to 100.4, shaking chills, have uncontrolled nausea, vomiting and/or diarrhea, dizziness, shortness of breath, chest pain, bleeding, unexplained bruising, or if you have any other new/concerning symptoms, questions or concerns.      If you are having any concerning symptoms or wish to speak to a provider before your next infusion visit, please call triage to notify them so we can adequately serve you.     If you need a refill on a narcotic prescription or other medication, please call before your infusion appointment.                February 2024 Sunday Monday Tuesday Wednesday Thursday Friday Saturday                       1     2    OFFICE VISIT   8:10 AM   (30 min.)   Orion Vaughn APRN CNP   [facility] 3       4     5     6    LAB CENTRAL   8:00 AM   (15 min.)   [facility] LAB DRAW   [facility]    ONC INFUSION 2 HR (120 MIN)   8:30 AM   (120 min.)    ONC INFUSION NURSE   [facility] 7     8     9     10       11     12     13    LAB CENTRAL   8:00 AM   (15 min.)   [facility] LAB DRAW   [facility]    ONC INFUSION 2 HR (120 MIN)   8:30 AM   (120 min.)    ONC INFUSION NURSE   [facility] 14     15     16     17       18     19     20     21     22    RETURN PALLIATIVE   9:35 AM   (40 min.)   Jeremy Hurt MD   [facility] Radiation Oncology [place] 23     24       25     26     27     28     29                           March 2024 Sunday Monday Tuesday Wednesday Thursday Friday Saturday                            1     2       3     4    MYC OFFICE VISIT   10:40 AM   (30 min.)   Akil Hernandez MD   [facility] 5     6     7     8     9       10     11      12     13     14     15     16       17     18     19     20     21     22     23       24     25     26     27     28     29     30       31                                                  Recent Results (from the past 24 hour(s))   Comprehensive metabolic panel    Collection Time: 02/06/24  8:14 AM   Result Value Ref Range    Sodium 141 135 - 145 mmol/L    Potassium 4.0 3.4 - 5.3 mmol/L    Carbon Dioxide (CO2) 26 22 - 29 mmol/L    Anion Gap 9 7 - 15 mmol/L    Urea Nitrogen 14.2 6.0 - 20.0 mg/dL    Creatinine 0.71 0.67 - 1.17 mg/dL    GFR Estimate >90 >60 mL/min/1.73m2    Calcium 9.3 8.6 - 10.0 mg/dL    Chloride 106 98 - 107 mmol/L    Glucose 224 (H) 70 - 99 mg/dL    Alkaline Phosphatase 86 40 - 150 U/L    AST 23 0 - 45 U/L    ALT 24 0 - 70 U/L    Protein Total 6.8 6.4 - 8.3 g/dL    Albumin 3.9 3.5 - 5.2 g/dL    Bilirubin Total 0.3 <=1.2 mg/dL   CBC with platelets and differential    Collection Time: 02/06/24  8:14 AM   Result Value Ref Range    WBC Count 3.1 (L) 4.0 - 11.0 10e3/uL    RBC Count 3.43 (L) 4.40 - 5.90 10e6/uL    Hemoglobin 10.1 (L) 13.3 - 17.7 g/dL    Hematocrit 31.9 (L) 40.0 - 53.0 %    MCV 93 78 - 100 fL    MCH 29.4 26.5 - 33.0 pg    MCHC 31.7 31.5 - 36.5 g/dL    RDW 14.7 10.0 - 15.0 %    Platelet Count 131 (L) 150 - 450 10e3/uL    % Neutrophils 53 %    % Lymphocytes 29 %    % Monocytes 11 %    % Eosinophils 6 %    % Basophils 1 %    % Immature Granulocytes 0 %    NRBCs per 100 WBC 0 <1 /100    Absolute Neutrophils 1.7 1.6 - 8.3 10e3/uL    Absolute Lymphocytes 0.9 0.8 - 5.3 10e3/uL    Absolute Monocytes 0.3 0.0 - 1.3 10e3/uL    Absolute Eosinophils 0.2 0.0 - 0.7 10e3/uL    Absolute Basophils 0.0 0.0 - 0.2 10e3/uL    Absolute Immature Granulocytes 0.0 <=0.4 10e3/uL    Absolute NRBCs 0.0 10e3/uL

## 2024-02-06 NOTE — PROGRESS NOTES
Infusion Nursing Note:  Gallo Navarro presents today for Cycle 4 Day 8 Abraxane and Gemcitabine + Zometa.    Patient seen by provider today: No   present during visit today: Not Applicable.    Note: Pt presents to infusion today feeling well. Pt denies having any fevers/chills, cough, congestion, chest pain, sob, nausea/vomiting, bladder or bowel concerns.    Pt confirms he is taking Vit D/Calcium as prescribed.  Pt denies having any dental concerns or upcoming dental procedures.    Intravenous Access:  Implanted Port.    Treatment Conditions:  Lab Results   Component Value Date    HGB 10.1 (L) 02/06/2024    WBC 3.1 (L) 02/06/2024    ANEU 2.2 12/26/2023    ANEUTAUTO 1.7 02/06/2024     (L) 02/06/2024        Lab Results   Component Value Date     02/06/2024    POTASSIUM 4.0 02/06/2024    MAG 1.9 08/23/2023    CR 0.71 02/06/2024    DENISE 9.3 02/06/2024    BILITOTAL 0.3 02/06/2024    ALBUMIN 3.9 02/06/2024    ALT 24 02/06/2024    AST 23 02/06/2024       Results reviewed, labs MET treatment parameters, ok to proceed with treatment.      Post Infusion Assessment:  Patient tolerated infusion without incident.  Blood return noted pre and post infusion.  Site patent and intact, free from redness, edema or discomfort.  No evidence of extravasations.  Access discontinued per protocol.       Discharge Plan:   Patient declined prescription refills.  Discharge instructions reviewed with: Patient  Patient and/or family verbalized understanding of discharge instructions and all questions answered.  AVS to patient via [employer].  Patient will return 2/13/24 for next appointment.   Patient discharged in stable condition accompanied by: self and friend.  Departure Mode: Ambulatory.      Sachi Eldridge RN

## 2024-02-07 LAB — CANCER AG19-9 SERPL IA-ACNC: 5 U/ML

## 2024-02-09 ENCOUNTER — MYC REFILL (OUTPATIENT)
Dept: RADIATION ONCOLOGY | Facility: HOSPITAL | Age: 59
End: 2024-02-09
Payer: COMMERCIAL

## 2024-02-09 DIAGNOSIS — C25.9 PANCREATIC ADENOCARCINOMA (H): ICD-10-CM

## 2024-02-09 RX ORDER — HYDROMORPHONE HYDROCHLORIDE 2 MG/1
2-4 TABLET ORAL EVERY 4 HOURS PRN
Qty: 180 TABLET | Refills: 0 | Status: SHIPPED | OUTPATIENT
Start: 2024-02-09 | End: 2024-03-04

## 2024-02-09 NOTE — TELEPHONE ENCOUNTER
Received [employer] message from patient requesting refill of hydromorphone.     Last refill: 1/13/24  Last office visit: 12/5/23  Scheduled for follow up 2/22/24     Will route request to MD for review.     Reviewed [place]  Report.

## 2024-02-13 ENCOUNTER — MYC REFILL (OUTPATIENT)
Dept: RADIATION ONCOLOGY | Facility: HOSPITAL | Age: 59
End: 2024-02-13

## 2024-02-13 ENCOUNTER — APPOINTMENT (OUTPATIENT)
Dept: LAB | Facility: CLINIC | Age: 59
End: 2024-02-13
Payer: COMMERCIAL

## 2024-02-13 ENCOUNTER — INFUSION THERAPY VISIT (OUTPATIENT)
Dept: ONCOLOGY | Facility: CLINIC | Age: 59
End: 2024-02-13
Payer: COMMERCIAL

## 2024-02-13 VITALS
OXYGEN SATURATION: 98 % | WEIGHT: 165 LBS | HEART RATE: 66 BPM | BODY MASS INDEX: 23.01 KG/M2 | TEMPERATURE: 98 F | RESPIRATION RATE: 16 BRPM | SYSTOLIC BLOOD PRESSURE: 107 MMHG | DIASTOLIC BLOOD PRESSURE: 55 MMHG

## 2024-02-13 DIAGNOSIS — C25.9 PANCREATIC ADENOCARCINOMA (H): ICD-10-CM

## 2024-02-13 DIAGNOSIS — G89.3 CANCER ASSOCIATED PAIN: ICD-10-CM

## 2024-02-13 DIAGNOSIS — C25.0 MALIGNANT NEOPLASM OF HEAD OF PANCREAS (H): ICD-10-CM

## 2024-02-13 DIAGNOSIS — Z79.4 TYPE 2 DIABETES MELLITUS WITHOUT COMPLICATION, WITH LONG-TERM CURRENT USE OF INSULIN (H): ICD-10-CM

## 2024-02-13 DIAGNOSIS — Z51.11 ENCOUNTER FOR ANTINEOPLASTIC CHEMOTHERAPY: Primary | ICD-10-CM

## 2024-02-13 DIAGNOSIS — K31.1 GASTRIC OUTLET OBSTRUCTION: ICD-10-CM

## 2024-02-13 DIAGNOSIS — E11.9 TYPE 2 DIABETES MELLITUS WITHOUT COMPLICATION, WITH LONG-TERM CURRENT USE OF INSULIN (H): ICD-10-CM

## 2024-02-13 LAB
BASOPHILS # BLD AUTO: 0 10E3/UL (ref 0–0.2)
BASOPHILS NFR BLD AUTO: 1 %
EOSINOPHIL # BLD AUTO: 0 10E3/UL (ref 0–0.7)
EOSINOPHIL NFR BLD AUTO: 1 %
ERYTHROCYTE [DISTWIDTH] IN BLOOD BY AUTOMATED COUNT: 14.4 % (ref 10–15)
HCT VFR BLD AUTO: 28.3 % (ref 40–53)
HGB BLD-MCNC: 9 G/DL (ref 13.3–17.7)
IMM GRANULOCYTES # BLD: 0 10E3/UL
IMM GRANULOCYTES NFR BLD: 0 %
LYMPHOCYTES # BLD AUTO: 0.7 10E3/UL (ref 0.8–5.3)
LYMPHOCYTES NFR BLD AUTO: 21 %
MCH RBC QN AUTO: 29.2 PG (ref 26.5–33)
MCHC RBC AUTO-ENTMCNC: 31.8 G/DL (ref 31.5–36.5)
MCV RBC AUTO: 92 FL (ref 78–100)
MONOCYTES # BLD AUTO: 0.3 10E3/UL (ref 0–1.3)
MONOCYTES NFR BLD AUTO: 10 %
NEUTROPHILS # BLD AUTO: 2.2 10E3/UL (ref 1.6–8.3)
NEUTROPHILS NFR BLD AUTO: 67 %
NRBC # BLD AUTO: 0 10E3/UL
NRBC BLD AUTO-RTO: 0 /100
PLATELET # BLD AUTO: 104 10E3/UL (ref 150–450)
RBC # BLD AUTO: 3.08 10E6/UL (ref 4.4–5.9)
WBC # BLD AUTO: 3.3 10E3/UL (ref 4–11)

## 2024-02-13 PROCEDURE — 96375 TX/PRO/DX INJ NEW DRUG ADDON: CPT

## 2024-02-13 PROCEDURE — 36591 DRAW BLOOD OFF VENOUS DEVICE: CPT | Performed by: PHYSICIAN ASSISTANT

## 2024-02-13 PROCEDURE — 250N000011 HC RX IP 250 OP 636

## 2024-02-13 PROCEDURE — 96413 CHEMO IV INFUSION 1 HR: CPT

## 2024-02-13 PROCEDURE — 250N000011 HC RX IP 250 OP 636: Performed by: PHYSICIAN ASSISTANT

## 2024-02-13 PROCEDURE — 96417 CHEMO IV INFUS EACH ADDL SEQ: CPT

## 2024-02-13 PROCEDURE — 85025 COMPLETE CBC W/AUTO DIFF WBC: CPT | Performed by: PHYSICIAN ASSISTANT

## 2024-02-13 PROCEDURE — 258N000003 HC RX IP 258 OP 636: Performed by: PHYSICIAN ASSISTANT

## 2024-02-13 RX ORDER — BUPRENORPHINE 10 UG/H
1 PATCH TRANSDERMAL
Qty: 4 PATCH | Refills: 3 | Status: SHIPPED | OUTPATIENT
Start: 2024-02-13 | End: 2024-03-15

## 2024-02-13 RX ORDER — HEPARIN SODIUM (PORCINE) LOCK FLUSH IV SOLN 100 UNIT/ML 100 UNIT/ML
5 SOLUTION INTRAVENOUS ONCE
Status: COMPLETED | OUTPATIENT
Start: 2024-02-13 | End: 2024-02-13

## 2024-02-13 RX ORDER — HEPARIN SODIUM (PORCINE) LOCK FLUSH IV SOLN 100 UNIT/ML 100 UNIT/ML
5 SOLUTION INTRAVENOUS
Status: DISCONTINUED | OUTPATIENT
Start: 2024-02-13 | End: 2024-02-13 | Stop reason: HOSPADM

## 2024-02-13 RX ORDER — PACLITAXEL 100 MG/20ML
125 INJECTION, POWDER, LYOPHILIZED, FOR SUSPENSION INTRAVENOUS ONCE
Status: COMPLETED | OUTPATIENT
Start: 2024-02-13 | End: 2024-02-13

## 2024-02-13 RX ORDER — ONDANSETRON 2 MG/ML
8 INJECTION INTRAMUSCULAR; INTRAVENOUS ONCE
Status: COMPLETED | OUTPATIENT
Start: 2024-02-13 | End: 2024-02-13

## 2024-02-13 RX ORDER — BUPRENORPHINE 20 UG/H
1 PATCH TRANSDERMAL WEEKLY
Qty: 4 PATCH | Refills: 3 | Status: SHIPPED | OUTPATIENT
Start: 2024-02-13 | End: 2024-03-15

## 2024-02-13 RX ADMIN — GEMCITABINE 2000 MG: 38 INJECTION, SOLUTION INTRAVENOUS at 10:51

## 2024-02-13 RX ADMIN — ONDANSETRON 8 MG: 2 INJECTION INTRAMUSCULAR; INTRAVENOUS at 08:50

## 2024-02-13 RX ADMIN — SODIUM CHLORIDE 250 ML: 9 INJECTION, SOLUTION INTRAVENOUS at 08:48

## 2024-02-13 RX ADMIN — Medication 5 ML: at 11:25

## 2024-02-13 RX ADMIN — PACLITAXEL 250 MG: 100 INJECTION, POWDER, LYOPHILIZED, FOR SUSPENSION INTRAVENOUS at 10:13

## 2024-02-13 RX ADMIN — Medication 5 ML: at 08:34

## 2024-02-13 ASSESSMENT — PAIN SCALES - GENERAL: PAINLEVEL: NO PAIN (0)

## 2024-02-13 NOTE — PROGRESS NOTES
Infusion Nursing Note:  Gallo Navarro presents today for cycle 4, day 15 abraxane, gemzar.    Patient seen by provider today: No   present during visit today: Not Applicable.    Note: Patient states he is feeling well today. Denies any fevers or chills. Received zometa last week with his infusion and felt wiped out for 1-2 days following but now back to baseline. Has stable neuropathy in his feet and states he is starting to get a little in his fingertips. Denies any nausea or vomiting.      Intravenous Access:  Implanted Port.    Treatment Conditions:  Lab Results   Component Value Date    HGB 9.0 (L) 02/13/2024    WBC 3.3 (L) 02/13/2024    ANEU 2.2 12/26/2023    ANEUTAUTO 2.2 02/13/2024     (L) 02/13/2024        Results reviewed, labs MET treatment parameters, ok to proceed with treatment.      Post Infusion Assessment:  Patient tolerated infusion without incident.  Blood return noted pre and post infusion.  Site patent and intact, free from redness, edema or discomfort.  No evidence of extravasations.  Access discontinued per protocol.       Discharge Plan:   Patient declined prescription refills.  Discharge instructions reviewed with: Patient.  Patient and/or family verbalized understanding of discharge instructions and all questions answered.  AVS to patient via [employer].  Patient will return in 2 weeks for next appointment. Message sent to scheduling to complete Yasmeen Felix checkout orders from 1/30/24   Patient discharged in stable condition accompanied by: self and friend.  Departure Mode: Ambulatory.      Yajaira Gutierrez RN

## 2024-02-13 NOTE — NURSING NOTE
Chief Complaint   Patient presents with    Port Draw     Labs collected from port by RN. Vitals taken. Checked in for appointment(s).      Port accessed with 20 gauge 3/4 inch flat needle by RN, labs collected, line flushed with saline and heparin.  Vitals taken. Pt checked in for appointment(s).    Dana Briscoe RN

## 2024-02-13 NOTE — PATIENT INSTRUCTIONS
[facility] Triage and after hours / weekends / holidays:  [phone]    Please call the triage or after hours line if you experience a temperature greater than or equal to 100.4, shaking chills, have uncontrolled nausea, vomiting and/or diarrhea, dizziness, shortness of breath, chest pain, bleeding, unexplained bruising, or if you have any other new/concerning symptoms, questions or concerns.      If you are having any concerning symptoms or wish to speak to a provider before your next infusion visit, please call triage to notify them so we can adequately serve you.     If you need a refill on a narcotic prescription or other medication, please call before your infusion appointment.           February 2024 Sunday Monday Tuesday Wednesday Thursday Friday Saturday                       1     2    OFFICE VISIT   8:10 AM   (30 min.)   Orion Vaughn APRN CNP   [facility] 3       4     5     6    LAB CENTRAL   8:00 AM   (15 min.)   [place] LAB DRAW   [facility]    ONC INFUSION 2 HR (120 MIN)   8:30 AM   (120 min.)    ONC INFUSION NURSE   [facility] 7     8     9     10       11     12     13    LAB CENTRAL   8:00 AM   (15 min.)   [facility] LAB DRAW   [facility]    ONC INFUSION 2 HR (120 MIN)   8:30 AM   (120 min.)    ONC INFUSION NURSE   [facility] 14     15     16     17       18     19     20     21     22    RETURN PALLIATIVE   9:35 AM   (40 min.)   Jeremy Hurt MD   [facility] Radiation Oncology [place] 23     24       25     26     27     28     29                           March 2024 Sunday Monday Tuesday Wednesday Thursday Friday Saturday                            1     2       3     4    MYC OFFICE VISIT   10:40 AM   (30 min.)   Akil Hernandez MD   [facility] 5     6     7     8     9       10     11     12      13     14     15     16       17     18     19     20     21     22     23       24     25     26     27     28     29     30       31                                                  Recent Results (from the past 24 hour(s))   CBC with platelets and differential    Collection Time: 02/13/24  8:28 AM   Result Value Ref Range    WBC Count 3.3 (L) 4.0 - 11.0 10e3/uL    RBC Count 3.08 (L) 4.40 - 5.90 10e6/uL    Hemoglobin 9.0 (L) 13.3 - 17.7 g/dL    Hematocrit 28.3 (L) 40.0 - 53.0 %    MCV 92 78 - 100 fL    MCH 29.2 26.5 - 33.0 pg    MCHC 31.8 31.5 - 36.5 g/dL    RDW 14.4 10.0 - 15.0 %    Platelet Count 104 (L) 150 - 450 10e3/uL    % Neutrophils 67 %    % Lymphocytes 21 %    % Monocytes 10 %    % Eosinophils 1 %    % Basophils 1 %    % Immature Granulocytes 0 %    NRBCs per 100 WBC 0 <1 /100    Absolute Neutrophils 2.2 1.6 - 8.3 10e3/uL    Absolute Lymphocytes 0.7 (L) 0.8 - 5.3 10e3/uL    Absolute Monocytes 0.3 0.0 - 1.3 10e3/uL    Absolute Eosinophils 0.0 0.0 - 0.7 10e3/uL    Absolute Basophils 0.0 0.0 - 0.2 10e3/uL    Absolute Immature Granulocytes 0.0 <=0.4 10e3/uL    Absolute NRBCs 0.0 10e3/uL

## 2024-02-13 NOTE — TELEPHONE ENCOUNTER
Received [employer] message from patient requesting refill of butrans.     Last refill: 1/19/24  Last office visit: 12/5/24  Scheduled for follow up 2/22/24     Will route request to MD for review.     Reviewed [place]  Report.

## 2024-02-22 ENCOUNTER — MYC REFILL (OUTPATIENT)
Dept: ONCOLOGY | Facility: CLINIC | Age: 59
End: 2024-02-22

## 2024-02-22 ENCOUNTER — VIRTUAL VISIT (OUTPATIENT)
Dept: RADIATION ONCOLOGY | Facility: CLINIC | Age: 59
End: 2024-02-22
Attending: FAMILY MEDICINE
Payer: COMMERCIAL

## 2024-02-22 VITALS — BODY MASS INDEX: 23.38 KG/M2 | HEIGHT: 71 IN | WEIGHT: 167 LBS

## 2024-02-22 DIAGNOSIS — C25.0 MALIGNANT NEOPLASM OF HEAD OF PANCREAS (H): ICD-10-CM

## 2024-02-22 DIAGNOSIS — E11.9 TYPE 2 DIABETES MELLITUS WITHOUT COMPLICATION, WITH LONG-TERM CURRENT USE OF INSULIN (H): ICD-10-CM

## 2024-02-22 DIAGNOSIS — C25.9 PANCREATIC ADENOCARCINOMA (H): ICD-10-CM

## 2024-02-22 DIAGNOSIS — G89.3 CANCER ASSOCIATED PAIN: ICD-10-CM

## 2024-02-22 DIAGNOSIS — K31.1 GASTRIC OUTLET OBSTRUCTION: ICD-10-CM

## 2024-02-22 DIAGNOSIS — Z79.4 TYPE 2 DIABETES MELLITUS WITHOUT COMPLICATION, WITH LONG-TERM CURRENT USE OF INSULIN (H): ICD-10-CM

## 2024-02-22 DIAGNOSIS — C79.51 MALIGNANT NEOPLASM METASTATIC TO BONE (H): ICD-10-CM

## 2024-02-22 DIAGNOSIS — Z51.5 PALLIATIVE CARE PATIENT: Primary | ICD-10-CM

## 2024-02-22 PROCEDURE — 99215 OFFICE O/P EST HI 40 MIN: CPT | Mod: 95 | Performed by: FAMILY MEDICINE

## 2024-02-22 ASSESSMENT — PAIN SCALES - GENERAL: PAINLEVEL: NO PAIN (0)

## 2024-02-22 NOTE — PATIENT INSTRUCTIONS
It was good to see you today, Gallo and Violet.  It was good to see Ryan in the background.  Good luck with [employer].  I'm glad you got to [facility] and made some connections there.    Here are the things we talked about:  Let's not change any medications.  Over the next couple of months see if the dilaudid is helping with pain or relaxing because I may have some other medications that might help even more with relaxing.    Don't be shy to ask the oncology team about the long term plan with the current treatment:  is it this schedule and dosing forever until it stops working or the tumor is gone?  Or will there be some modifications at some point.?    Someone from the team will reach out to schedule a follow up appointment in 2 months and we can always see you sooner, if needed.     How to get a hold of us:  For non-urgent matters, MyChart works best.    For more urgent matters, or if you prefer not to use MyChart, call our clinic nurse coordinator Megan Funez RN at [phone]    We have an on-call number for evenings and weekends. Please call this only if you are having uncontrolled symptoms or serious side effects from your medicines: [phone].     For refills, please give us a week (5 working days) notice. We don't always have providers available everyday to do refills. If you call the day you run out of your medicine, we may not be able to refill it in time, so call 5 days in advance!    Jeremy Hurt MD MS FAAFP CAQHPM  [facility]  Office [phone]  Fax [phone]

## 2024-02-22 NOTE — NURSING NOTE
Is the patient currently in the state of [place]? YES    Visit mode:VIDEO    If the visit is dropped, the patient can be reconnected by: VIDEO VISIT: Send to e-mail at: [email or web address]    Will anyone else be joining the visit? NO  (If patient encounters technical issues they should call [phone]    How would you like to obtain your AVS? MyChart    Are changes needed to the allergy or medication list? No    Reason for visit: LO NICOLE

## 2024-02-22 NOTE — CONFIDENTIAL NOTE
"Pantoprazole 40mg EC tablet  Last prescribing provider: Dr. Luis Haile    Last clinic visit date: 1/30/24 w/ Yasmeen Felix    Recommendations for requested medication (if none, N/A): 1/30/24, \"Nutrition.  Feeding tube removed. Continues to tolerate oral intake well. Gaining weight. No issues with nausea or vomiting. Continue to push oral hydration, drinking 64+ oz of fluid/day.  Continue Protonix daily.  Has compazine as needed for nausea.\"    Any other pertinent information (if none, N/A): routing to Dr. Haile, as Yasmeen is out of office the rest of this week.    Refilled: Y/N, if NO, why?    "

## 2024-02-22 NOTE — PROGRESS NOTES
Virtual Visit Details    Type of service:  Video Visit     Originating Location (pt. Location): Home    Distant Location (provider location):  On-site  Platform used for Video Visit: [facility]    Palliative Care Outpatient Clinic Progress Note    Patient Name: Gallo Navarro  Primary Provider: Akil Hernandez    Impressions:  ECO  Decision Making Capacity:  VERY PRESENT  PDMP review:  yes, no concerns     Locally extensive non-resectable pancreatic cancer  Cancer associated pain  Lymphedema in BLE     GOALS OF CARE:  2024 Life prolonging without limits at this time and willingness to consider clinical trials.     Recommendations & Counseling:  CONTINUE buprenorphine 30 mcg/hour patch (across 2 patches) and replace weekly  Stop gabapentin one 300 capsule three times a day  Continue dilaudid 4 mg po q 3 hours prn--this is usually three times a day  Tylenol suspension 650 mg po TID  Narcan also prescribed for safety  Follow up by video in 6 weeks, sooner prn.   Try a full spectrum light for irritability and SAD  Will consider Ritalin trial at next visit if his fatigue is persisting.     Counseling: All of the above was explained to the patient in lay language. The patient has verbalized a clear understanding of the discussion, asked appropriate questions, which have been answered to patient's apparent satisfaction. The patient is in agreement with the above plan.        Chief Complaint/Patient ID: Gallo Navarro 57 year old male with PMHx of unresectable localized pancreatic cancer    Last Palliative care appointment: 2023 with me     Reviewed: yes, no concerns    Interim History:  Gallo Navarro is a 58 year old male who is seen today for follow up with Palliative Care via billable video visit. He is joined by his wife, Violet. His son was in the background. He had a second opinion at the [facility] who felt he should continue with his Greene/Abraxane and the team at [facility] said the same and holding the notion  of a clinical trial as a last option and he would prefer to do it through [facility].  Gallo is tolerating his gem/abraxane well with some neuropathy.   They have identified a [id number] mutation    Pain:  none in his T spine (he had previous XRT at T7)    Appetite/Nausea: good--gaining some weight, getting more than 2k calories/day and 60 grams protein /day.     Bowels: no concerns     Sleep: excessively--sometimes 12 hours a night;      Mood: optimistic and he feels encouraged by his trip to [place]    Recent skin injury in his little toe resolved with topical antibiotic.     Coping:  overall well; he feels reassured about his current cancer care program after his visit to [facility]  Family History- Reviewed in Epic.    No Known Allergies    Social History:  Pertinent changes to social history/social situation since last visit: he had a nice visit to [facility]  Key support resources: wife Violet, children, sister who is a nurse, friends and neighbors  Advance Directive Status:  no ACP documents    Social History     Tobacco Use    Smoking status: Never     Passive exposure: Never    Smokeless tobacco: Never   Vaping Use    Vaping Use: Never used   Substance Use Topics    Alcohol use: Not Currently    Drug use: Never         No Known Allergies  Current Outpatient Medications   Medication Sig Dispense Refill    acetaminophen (TYLENOL) 32 mg/mL liquid Take 20.313 mLs (650 mg) by mouth every 8 hours 1800 mL 1    apixaban ANTICOAGULANT (ELIQUIS) 5 MG tablet Take 1 tablet (5 mg) by mouth 2 times daily 60 tablet 2    blood glucose (FREESTYLE TEST STRIPS) test strip by Other route as needed      buprenorphine (BUTRANS) 10 MCG/HR WK patch Place 1 patch onto the skin every 7 days Use with 20 mcg/hour patch for 30 mcg/hour total. 4 patch 3    buprenorphine (BUTRANS) 20 MCG/HR WK patch Place 1 patch onto the skin once a week Use with 10 mcg/hour patch for 30 mcg/hour total. 4 patch 3    Continuous Blood Gluc Sensor  (FREESTYLE KAREN 2 SENSOR) MISC Change every 14 days 6 each 3    dicyclomine (BENTYL) 10 MG capsule Take 1 capsule (10 mg) by mouth 4 times daily (before meals and nightly) 120 capsule 1    HYDROmorphone (DILAUDID) 2 MG tablet Take 1-2 tablets (2-4 mg) by mouth every 4 hours as needed for severe pain or breakthrough pain 180 tablet 0    lidocaine-prilocaine (EMLA) 2.5-2.5 % external cream Use 1-2 times a week or as needed prior to port access 30 g 3    lipase-protease-amylase (CREON 24) 07312-86324-395785 units CPEP per EC capsule 2-3 capsules with meals 3 times a day and 1-2 capsules with snacks max of 15 capsules a day 200 capsule 11    Multiple Vitamins-Minerals (CENTRUM SILVER 50+MEN) TABS as directed Orally      pantoprazole (PROTONIX) 40 MG EC tablet Take 1 tablet (40 mg) by mouth 2 times daily 60 tablet 4    prochlorperazine (COMPAZINE) 10 MG tablet Take 1 tablet (10 mg) by mouth every 6 hours as needed for nausea or vomiting 30 tablet 2    vitamin D3 (CHOLECALCIFEROL) 50 mcg (2000 units) tablet Take 1 tablet (50 mcg) by mouth daily 90 tablet 1    insulin aspart (NOVOLOG PEN) 100 UNIT/ML pen Inject 1-10 Units Subcutaneous 3 times daily (with meals) (Patient not taking: Reported on 2/13/2024) 15 mL 3     Past Medical History:   Diagnosis Date    Acute pancreatitis 04/16/2023    Alcohol-induced acute pancreatitis 04/10/2023    Gastric outlet obstruction     Metastasis from pancreatic cancer (H)     Personal history of chemotherapy     Gemzar + Abraxane started on 10/31/2023    Recurrent acute pancreatitis      Past Surgical History:   Procedure Laterality Date    AS OPEN TREATMENT CLAVICULAR FRACTURE INTERNAL FX      ENDOSCOPIC RETROGRADE CHOLANGIOPANCREATOGRAM N/A 7/11/2023    Procedure: ENDOSCOPIC RETROGRADE CHOLANGIOPANCREATOGRAPHY;  Surgeon: Khadar Pickett MD;  Location: [facility] OR    ENDOSCOPIC RETROGRADE CHOLANGIOPANCREATOGRAM N/A 8/17/2023    Procedure: ENDOSCOPIC RETROGRADE  with stent removal x1, balloon  sweep;  Surgeon: Christos Greenberg MD;  Location: [facility] OR    ENDOSCOPIC ULTRASOUND UPPER GASTROINTESTINAL TRACT (GI) N/A 2023    Procedure: Endoscopic ultrasound upper gastrointestinal tract (GI), with biposy, GJ tube repositioning, stent placement;  Surgeon: Khadar Pickett MD;  Location: [facility] OR    ENDOSCOPIC ULTRASOUND UPPER GASTROINTESTINAL TRACT (GI) N/A 2023    Procedure: ENDOSCOPIC ULTRASOUND, ESOPHAGOSCOPY, EUS guided gastrojejunostomy;  Surgeon: Tre York MD;  Location: [facility] OR    INSERT PICC LINE  2023    INSERT PORT VASCULAR ACCESS Right 2023    Procedure: Insert port vascular access;  Surgeon: Tom العلي MD;  Location: [facility] OR    IR CHEST PORT PLACEMENT > 5 YRS OF AGE  2023    IR NG TUBE PLACEMENT REQ RAD & FLUORO  2023    JG tube    REPLACE GASTROJEJUNOSTOMY TUBE, PERCUTANEOUS N/A 2023    Procedure: possible REPLACEMENT, GASTROJEJUNOSTOMY TUBE, PERCUTANEOUS;  Surgeon: Christos Greenberg MD;  Location: [facility] OR       Physical Exam:   GENERAL APPEARANCE: bald, alert and no distress; neatly groomed  EYES: Eyes grossly normal to inspection, PERRLA, conjunctivae and sclerae without injection or discharge, EOM intact   RESP:  no increased work of breathing; speaks in complete sentences;   MS: No musculoskeletal defects are noted  SKIN: No suspicious lesions or rashes, hydration status appears adequate with normal skin turgor   PSYCH: Alert and oriented x3; speech- coherent , normal rate and volume; able to articulate logical thoughts, able to abstract reason, no tangential thoughts, no hallucinations or delusions, mentation appears normal, Mood is euthymic. Affect is appropriate for this mood state and bright. Thought content is free of suicidal ideation, hallucinations, and delusions.  Eye contact is good during conversation.       Key Data Reviewed:  LABS: 2024- Cr 0.71, Albumin 3.9,  Hgb 9,      IMAGIN2023 CT AP W/CONTRAST  1. No  significant change in the appearance of the infiltrative, hypoenhancing pancreatic malignancy compared to 10/24/2023.   2. Multiple indeterminant peripancreatic and mesenteric root lymph nodes. No soft tissue parenchymal metastases.   3. Multiple sclerotic osseous metastases, many of which are larger compared to 08/21/2023 and one of which is larger compared to 10/24/23, either due to interval progression or increased sclerosis related to treatment.     This examination was performed in conjunction with a CT of the chest, which will be reported separately.     12/21/2023 ct chest with contrast  1. Sclerosis of the right half of the T10 vertebral body which is stable since 10/31/2023 but increased compared to 07/12/2023. Significance of this finding is indeterminate. No other concerning osseous lesions. Could consider further evaluation with   nuclear medicine bone scan or thoracic MR.   2. Resolution of prior right lower lobe acute pulmonary emboli and right heart strain.   3. No new or enlarging pulmonary nodules or adenopathy to suggest metastatic disease.     47 minutes spent on the date of the encounter doing chart review, history and exam, patient education & counseling, documentation and other activities as noted above.    Jeremy Hurt MD MS FAAFP CAQHPM  [facility] Palliative Care Service  Office [phone]  Fax [phone]

## 2024-02-23 ENCOUNTER — MYC REFILL (OUTPATIENT)
Dept: ONCOLOGY | Facility: CLINIC | Age: 59
End: 2024-02-23
Payer: COMMERCIAL

## 2024-02-23 DIAGNOSIS — R10.30 LOWER ABDOMINAL PAIN: ICD-10-CM

## 2024-02-23 RX ORDER — PANTOPRAZOLE SODIUM 40 MG/1
40 TABLET, DELAYED RELEASE ORAL 2 TIMES DAILY
Qty: 60 TABLET | Refills: 4 | Status: SHIPPED | OUTPATIENT
Start: 2024-02-23 | End: 2024-04-16

## 2024-02-23 NOTE — CONFIDENTIAL NOTE
Acetaminophen 32mg/mL liquid  Last prescribing provider: Megan Farrell    Last clinic visit date: 1/30/24 w/ Yasmeen Felix    Recommendations for requested medication (if none, N/A): NA    Any other pertinent information (if none, N/A): routing to Megan Farrell, as Yasmeen is out of office today.    Refilled: Y/N, if NO, why?

## 2024-02-27 ENCOUNTER — APPOINTMENT (OUTPATIENT)
Dept: LAB | Facility: CLINIC | Age: 59
End: 2024-02-27
Payer: COMMERCIAL

## 2024-02-27 ENCOUNTER — INFUSION THERAPY VISIT (OUTPATIENT)
Dept: ONCOLOGY | Facility: CLINIC | Age: 59
End: 2024-02-27
Payer: COMMERCIAL

## 2024-02-27 ENCOUNTER — VIRTUAL VISIT (OUTPATIENT)
Dept: ONCOLOGY | Facility: CLINIC | Age: 59
End: 2024-02-27
Attending: PHYSICIAN ASSISTANT
Payer: COMMERCIAL

## 2024-02-27 VITALS
RESPIRATION RATE: 16 BRPM | SYSTOLIC BLOOD PRESSURE: 114 MMHG | HEART RATE: 70 BPM | TEMPERATURE: 97.8 F | OXYGEN SATURATION: 98 % | BODY MASS INDEX: 22.11 KG/M2 | WEIGHT: 163.2 LBS | HEIGHT: 72 IN | DIASTOLIC BLOOD PRESSURE: 63 MMHG

## 2024-02-27 VITALS — BODY MASS INDEX: 23.8 KG/M2 | WEIGHT: 170 LBS | HEIGHT: 71 IN

## 2024-02-27 DIAGNOSIS — R53.81 PHYSICAL DECONDITIONING: ICD-10-CM

## 2024-02-27 DIAGNOSIS — Z51.11 ENCOUNTER FOR ANTINEOPLASTIC CHEMOTHERAPY: Primary | ICD-10-CM

## 2024-02-27 DIAGNOSIS — C25.0 MALIGNANT NEOPLASM OF HEAD OF PANCREAS (H): ICD-10-CM

## 2024-02-27 DIAGNOSIS — C79.51 MALIGNANT NEOPLASM METASTATIC TO BONE (H): ICD-10-CM

## 2024-02-27 DIAGNOSIS — C25.0 MALIGNANT NEOPLASM OF HEAD OF PANCREAS (H): Primary | ICD-10-CM

## 2024-02-27 LAB
ALBUMIN SERPL BCG-MCNC: 4.1 G/DL (ref 3.5–5.2)
ALP SERPL-CCNC: 96 U/L (ref 40–150)
ALT SERPL W P-5'-P-CCNC: 16 U/L (ref 0–70)
ANION GAP SERPL CALCULATED.3IONS-SCNC: 10 MMOL/L (ref 7–15)
AST SERPL W P-5'-P-CCNC: 20 U/L (ref 0–45)
BASOPHILS # BLD AUTO: 0 10E3/UL (ref 0–0.2)
BASOPHILS NFR BLD AUTO: 1 %
BILIRUB SERPL-MCNC: 0.4 MG/DL
BUN SERPL-MCNC: 16 MG/DL (ref 6–20)
CALCIUM SERPL-MCNC: 8.8 MG/DL (ref 8.6–10)
CHLORIDE SERPL-SCNC: 104 MMOL/L (ref 98–107)
CREAT SERPL-MCNC: 0.71 MG/DL (ref 0.67–1.17)
DEPRECATED HCO3 PLAS-SCNC: 25 MMOL/L (ref 22–29)
EGFRCR SERPLBLD CKD-EPI 2021: >90 ML/MIN/1.73M2
EOSINOPHIL # BLD AUTO: 0.1 10E3/UL (ref 0–0.7)
EOSINOPHIL NFR BLD AUTO: 2 %
ERYTHROCYTE [DISTWIDTH] IN BLOOD BY AUTOMATED COUNT: 15 % (ref 10–15)
GLUCOSE SERPL-MCNC: 107 MG/DL (ref 70–99)
HCT VFR BLD AUTO: 32 % (ref 40–53)
HGB BLD-MCNC: 10.2 G/DL (ref 13.3–17.7)
IMM GRANULOCYTES # BLD: 0 10E3/UL
IMM GRANULOCYTES NFR BLD: 0 %
LYMPHOCYTES # BLD AUTO: 1.1 10E3/UL (ref 0.8–5.3)
LYMPHOCYTES NFR BLD AUTO: 22 %
MCH RBC QN AUTO: 29.9 PG (ref 26.5–33)
MCHC RBC AUTO-ENTMCNC: 31.9 G/DL (ref 31.5–36.5)
MCV RBC AUTO: 94 FL (ref 78–100)
MONOCYTES # BLD AUTO: 0.6 10E3/UL (ref 0–1.3)
MONOCYTES NFR BLD AUTO: 11 %
NEUTROPHILS # BLD AUTO: 3.1 10E3/UL (ref 1.6–8.3)
NEUTROPHILS NFR BLD AUTO: 64 %
NRBC # BLD AUTO: 0 10E3/UL
NRBC BLD AUTO-RTO: 0 /100
PLATELET # BLD AUTO: 259 10E3/UL (ref 150–450)
POTASSIUM SERPL-SCNC: 4.2 MMOL/L (ref 3.4–5.3)
PROT SERPL-MCNC: 6.8 G/DL (ref 6.4–8.3)
RBC # BLD AUTO: 3.41 10E6/UL (ref 4.4–5.9)
SODIUM SERPL-SCNC: 139 MMOL/L (ref 135–145)
WBC # BLD AUTO: 4.9 10E3/UL (ref 4–11)

## 2024-02-27 PROCEDURE — 96375 TX/PRO/DX INJ NEW DRUG ADDON: CPT

## 2024-02-27 PROCEDURE — 250N000011 HC RX IP 250 OP 636: Performed by: PHYSICIAN ASSISTANT

## 2024-02-27 PROCEDURE — 86301 IMMUNOASSAY TUMOR CA 19-9: CPT

## 2024-02-27 PROCEDURE — 36591 DRAW BLOOD OFF VENOUS DEVICE: CPT

## 2024-02-27 PROCEDURE — 258N000003 HC RX IP 258 OP 636: Performed by: PHYSICIAN ASSISTANT

## 2024-02-27 PROCEDURE — 80053 COMPREHEN METABOLIC PANEL: CPT | Performed by: PHYSICIAN ASSISTANT

## 2024-02-27 PROCEDURE — 96413 CHEMO IV INFUSION 1 HR: CPT

## 2024-02-27 PROCEDURE — 96417 CHEMO IV INFUS EACH ADDL SEQ: CPT

## 2024-02-27 PROCEDURE — 250N000011 HC RX IP 250 OP 636

## 2024-02-27 PROCEDURE — 85004 AUTOMATED DIFF WBC COUNT: CPT | Performed by: PHYSICIAN ASSISTANT

## 2024-02-27 PROCEDURE — 99215 OFFICE O/P EST HI 40 MIN: CPT | Mod: 95 | Performed by: PHYSICIAN ASSISTANT

## 2024-02-27 RX ORDER — LORAZEPAM 2 MG/ML
0.5 INJECTION INTRAMUSCULAR EVERY 4 HOURS PRN
Status: CANCELLED | OUTPATIENT
Start: 2024-02-27

## 2024-02-27 RX ORDER — HEPARIN SODIUM,PORCINE 10 UNIT/ML
5-20 VIAL (ML) INTRAVENOUS DAILY PRN
Status: CANCELLED | OUTPATIENT
Start: 2024-02-27

## 2024-02-27 RX ORDER — ONDANSETRON 2 MG/ML
8 INJECTION INTRAMUSCULAR; INTRAVENOUS ONCE
Status: COMPLETED | OUTPATIENT
Start: 2024-02-27 | End: 2024-02-27

## 2024-02-27 RX ORDER — HEPARIN SODIUM,PORCINE 10 UNIT/ML
5-20 VIAL (ML) INTRAVENOUS DAILY PRN
Status: CANCELLED | OUTPATIENT
Start: 2024-03-05

## 2024-02-27 RX ORDER — ONDANSETRON 2 MG/ML
8 INJECTION INTRAMUSCULAR; INTRAVENOUS ONCE
Status: CANCELLED | OUTPATIENT
Start: 2024-02-27

## 2024-02-27 RX ORDER — ALBUTEROL SULFATE 0.83 MG/ML
2.5 SOLUTION RESPIRATORY (INHALATION)
Status: CANCELLED | OUTPATIENT
Start: 2024-03-12

## 2024-02-27 RX ORDER — EPINEPHRINE 1 MG/ML
0.3 INJECTION, SOLUTION INTRAMUSCULAR; SUBCUTANEOUS EVERY 5 MIN PRN
Status: CANCELLED | OUTPATIENT
Start: 2024-03-05

## 2024-02-27 RX ORDER — METHYLPHENIDATE HYDROCHLORIDE 5 MG/1
5 TABLET ORAL 2 TIMES DAILY PRN
Qty: 10 TABLET | Refills: 0 | Status: SHIPPED | OUTPATIENT
Start: 2024-02-27

## 2024-02-27 RX ORDER — HEPARIN SODIUM (PORCINE) LOCK FLUSH IV SOLN 100 UNIT/ML 100 UNIT/ML
5 SOLUTION INTRAVENOUS
Status: CANCELLED | OUTPATIENT
Start: 2024-02-27

## 2024-02-27 RX ORDER — PACLITAXEL 100 MG/20ML
125 INJECTION, POWDER, LYOPHILIZED, FOR SUSPENSION INTRAVENOUS ONCE
Status: CANCELLED | OUTPATIENT
Start: 2024-03-12

## 2024-02-27 RX ORDER — EPINEPHRINE 1 MG/ML
0.3 INJECTION, SOLUTION INTRAMUSCULAR; SUBCUTANEOUS EVERY 5 MIN PRN
Status: CANCELLED | OUTPATIENT
Start: 2024-02-27

## 2024-02-27 RX ORDER — HEPARIN SODIUM (PORCINE) LOCK FLUSH IV SOLN 100 UNIT/ML 100 UNIT/ML
5 SOLUTION INTRAVENOUS ONCE
Status: COMPLETED | OUTPATIENT
Start: 2024-02-27 | End: 2024-02-27

## 2024-02-27 RX ORDER — METHYLPREDNISOLONE SODIUM SUCCINATE 125 MG/2ML
125 INJECTION, POWDER, LYOPHILIZED, FOR SOLUTION INTRAMUSCULAR; INTRAVENOUS
Status: CANCELLED
Start: 2024-03-05

## 2024-02-27 RX ORDER — EPINEPHRINE 1 MG/ML
0.3 INJECTION, SOLUTION INTRAMUSCULAR; SUBCUTANEOUS EVERY 5 MIN PRN
Status: CANCELLED | OUTPATIENT
Start: 2024-03-12

## 2024-02-27 RX ORDER — PACLITAXEL 100 MG/20ML
125 INJECTION, POWDER, LYOPHILIZED, FOR SUSPENSION INTRAVENOUS ONCE
Status: CANCELLED | OUTPATIENT
Start: 2024-03-05

## 2024-02-27 RX ORDER — ALBUTEROL SULFATE 90 UG/1
1-2 AEROSOL, METERED RESPIRATORY (INHALATION)
Status: CANCELLED
Start: 2024-03-12

## 2024-02-27 RX ORDER — ALBUTEROL SULFATE 90 UG/1
1-2 AEROSOL, METERED RESPIRATORY (INHALATION)
Status: CANCELLED
Start: 2024-02-27

## 2024-02-27 RX ORDER — DIPHENHYDRAMINE HYDROCHLORIDE 50 MG/ML
50 INJECTION INTRAMUSCULAR; INTRAVENOUS
Status: CANCELLED
Start: 2024-02-27

## 2024-02-27 RX ORDER — DIPHENHYDRAMINE HYDROCHLORIDE 50 MG/ML
50 INJECTION INTRAMUSCULAR; INTRAVENOUS
Status: CANCELLED
Start: 2024-03-12

## 2024-02-27 RX ORDER — HEPARIN SODIUM (PORCINE) LOCK FLUSH IV SOLN 100 UNIT/ML 100 UNIT/ML
5 SOLUTION INTRAVENOUS
Status: DISCONTINUED | OUTPATIENT
Start: 2024-02-27 | End: 2024-02-27 | Stop reason: HOSPADM

## 2024-02-27 RX ORDER — MEPERIDINE HYDROCHLORIDE 25 MG/ML
25 INJECTION INTRAMUSCULAR; INTRAVENOUS; SUBCUTANEOUS EVERY 30 MIN PRN
Status: CANCELLED | OUTPATIENT
Start: 2024-03-12

## 2024-02-27 RX ORDER — HEPARIN SODIUM (PORCINE) LOCK FLUSH IV SOLN 100 UNIT/ML 100 UNIT/ML
5 SOLUTION INTRAVENOUS
Status: CANCELLED | OUTPATIENT
Start: 2024-03-12

## 2024-02-27 RX ORDER — PACLITAXEL 100 MG/20ML
125 INJECTION, POWDER, LYOPHILIZED, FOR SUSPENSION INTRAVENOUS ONCE
Status: CANCELLED | OUTPATIENT
Start: 2024-02-27

## 2024-02-27 RX ORDER — MEPERIDINE HYDROCHLORIDE 25 MG/ML
25 INJECTION INTRAMUSCULAR; INTRAVENOUS; SUBCUTANEOUS EVERY 30 MIN PRN
Status: CANCELLED | OUTPATIENT
Start: 2024-02-27

## 2024-02-27 RX ORDER — ONDANSETRON 2 MG/ML
8 INJECTION INTRAMUSCULAR; INTRAVENOUS ONCE
Status: CANCELLED | OUTPATIENT
Start: 2024-03-05

## 2024-02-27 RX ORDER — METHYLPREDNISOLONE SODIUM SUCCINATE 125 MG/2ML
125 INJECTION, POWDER, LYOPHILIZED, FOR SOLUTION INTRAMUSCULAR; INTRAVENOUS
Status: CANCELLED
Start: 2024-03-12

## 2024-02-27 RX ORDER — ALBUTEROL SULFATE 0.83 MG/ML
2.5 SOLUTION RESPIRATORY (INHALATION)
Status: CANCELLED | OUTPATIENT
Start: 2024-02-27

## 2024-02-27 RX ORDER — PACLITAXEL 100 MG/20ML
125 INJECTION, POWDER, LYOPHILIZED, FOR SUSPENSION INTRAVENOUS ONCE
Status: COMPLETED | OUTPATIENT
Start: 2024-02-27 | End: 2024-02-27

## 2024-02-27 RX ORDER — HEPARIN SODIUM (PORCINE) LOCK FLUSH IV SOLN 100 UNIT/ML 100 UNIT/ML
5 SOLUTION INTRAVENOUS
Status: CANCELLED | OUTPATIENT
Start: 2024-03-05

## 2024-02-27 RX ORDER — ALBUTEROL SULFATE 90 UG/1
1-2 AEROSOL, METERED RESPIRATORY (INHALATION)
Status: CANCELLED
Start: 2024-03-05

## 2024-02-27 RX ORDER — LORAZEPAM 2 MG/ML
0.5 INJECTION INTRAMUSCULAR EVERY 4 HOURS PRN
Status: CANCELLED | OUTPATIENT
Start: 2024-03-12

## 2024-02-27 RX ORDER — HEPARIN SODIUM,PORCINE 10 UNIT/ML
5-20 VIAL (ML) INTRAVENOUS DAILY PRN
Status: CANCELLED | OUTPATIENT
Start: 2024-03-12

## 2024-02-27 RX ORDER — DIPHENHYDRAMINE HYDROCHLORIDE 50 MG/ML
50 INJECTION INTRAMUSCULAR; INTRAVENOUS
Status: CANCELLED
Start: 2024-03-05

## 2024-02-27 RX ORDER — MEPERIDINE HYDROCHLORIDE 25 MG/ML
25 INJECTION INTRAMUSCULAR; INTRAVENOUS; SUBCUTANEOUS EVERY 30 MIN PRN
Status: CANCELLED | OUTPATIENT
Start: 2024-03-05

## 2024-02-27 RX ORDER — HYDROMORPHONE HYDROCHLORIDE 1 MG/ML
0.4 INJECTION, SOLUTION INTRAMUSCULAR; INTRAVENOUS; SUBCUTANEOUS ONCE
Status: COMPLETED | OUTPATIENT
Start: 2024-02-27 | End: 2024-02-27

## 2024-02-27 RX ORDER — ALBUTEROL SULFATE 0.83 MG/ML
2.5 SOLUTION RESPIRATORY (INHALATION)
Status: CANCELLED | OUTPATIENT
Start: 2024-03-05

## 2024-02-27 RX ORDER — ONDANSETRON 2 MG/ML
8 INJECTION INTRAMUSCULAR; INTRAVENOUS ONCE
Status: CANCELLED | OUTPATIENT
Start: 2024-03-12

## 2024-02-27 RX ORDER — LORAZEPAM 2 MG/ML
0.5 INJECTION INTRAMUSCULAR EVERY 4 HOURS PRN
Status: CANCELLED | OUTPATIENT
Start: 2024-03-05

## 2024-02-27 RX ORDER — METHYLPREDNISOLONE SODIUM SUCCINATE 125 MG/2ML
125 INJECTION, POWDER, LYOPHILIZED, FOR SOLUTION INTRAMUSCULAR; INTRAVENOUS
Status: CANCELLED
Start: 2024-02-27

## 2024-02-27 RX ADMIN — SODIUM CHLORIDE 250 ML: 9 INJECTION, SOLUTION INTRAVENOUS at 16:24

## 2024-02-27 RX ADMIN — HYDROMORPHONE HYDROCHLORIDE 0.4 MG: 0.5 INJECTION, SOLUTION INTRAMUSCULAR; INTRAVENOUS; SUBCUTANEOUS at 16:24

## 2024-02-27 RX ADMIN — PACLITAXEL 250 MG: 100 INJECTION, POWDER, LYOPHILIZED, FOR SUSPENSION INTRAVENOUS at 16:29

## 2024-02-27 RX ADMIN — Medication 5 ML: at 14:52

## 2024-02-27 RX ADMIN — GEMCITABINE 2000 MG: 38 INJECTION, SOLUTION INTRAVENOUS at 17:05

## 2024-02-27 RX ADMIN — Medication 5 ML: at 17:37

## 2024-02-27 RX ADMIN — ONDANSETRON 8 MG: 2 INJECTION INTRAMUSCULAR; INTRAVENOUS at 15:33

## 2024-02-27 ASSESSMENT — PAIN SCALES - GENERAL
PAINLEVEL: SEVERE PAIN (6)
PAINLEVEL: NO PAIN (0)

## 2024-02-27 NOTE — PATIENT INSTRUCTIONS
[facility] Triage and after hours / weekends / holidays:  [phone]    Please call the triage or after hours line if you experience a temperature greater than or equal to 100.4, shaking chills, have uncontrolled nausea, vomiting and/or diarrhea, dizziness, shortness of breath, chest pain, bleeding, unexplained bruising, or if you have any other new/concerning symptoms, questions or concerns.      If you are having any concerning symptoms or wish to speak to a provider before your next infusion visit, please call triage to notify them so we can adequately serve you.     If you need a refill on a narcotic prescription or other medication, please call before your infusion appointment.                 February 2024 Sunday Monday Tuesday Wednesday Thursday Friday Saturday                       1     2    OFFICE VISIT   8:10 AM   (30 min.)   Orion Vaughn APRN CNP   [facility] 3       4     5     6    LAB CENTRAL   8:00 AM   (15 min.)   [facility] LAB DRAW   [facility]    ONC INFUSION 2 HR (120 MIN)   8:30 AM   (120 min.)    ONC INFUSION NURSE   [facility] 7     8     9     10       11     12     13    LAB CENTRAL   8:00 AM   (15 min.)   [facility] LAB DRAW   [facility]    ONC INFUSION 2 HR (120 MIN)   8:30 AM   (120 min.)    ONC INFUSION NURSE   [facility] 14     15     16     17       18     19     20     21     22    RETURN PALLIATIVE   9:35 AM   (40 min.)   Jeremy Hurt MD   [facility] [place] 23     24       25     26     27    RETURN CCSL   9:45 AM   (45 min.)   Yasmeen Felix PA-C   [facility]    LAB CENTRAL   2:30 PM   (15 min.)   [facility] LAB DRAW   [facility]    ONC INFUSION 2 HR (120 MIN)   3:00 PM   (120 min.)    ONC INFUSION NURSE   [facility]  [facility] 28 29 March 2024 Sunday Monday Tuesday Wednesday Thursday Friday Saturday                            1     2       3     4    MYC OFFICE VISIT   10:40 AM   (30 min.)   Akil Hernandez MD   [facility] 5    LAB CENTRAL   8:15 AM   (15 min.)    [facility] DRAW   [facility]    ONC INFUSION 2 HR (120 MIN)   8:30 AM   (120 min.)   [facility] ONC INFUSION NURSE   [facility] 6     7     8     9       10     11    CT CHEST/ABDOMEN/PELVIS W  10:15 AM   (20 min.)   [facility] IMAGING NURSE   [facility] 12    LAB CENTRAL   7:45 AM   (15 min.)   [facility] DRAW   [facility]    ONC INFUSION 2 HR (120 MIN)   8:00 AM   (120 min.)   [facility] ONC INFUSION NURSE   [facility] 13     14     15     16       17     18     19     20     21     22    RETURN CCSL   3:15 PM   (30 min.)   Luis Haile MD   [facility] 23       24     25    LAB CENTRAL   6:30 AM   (15 min.)   [facility] DRAW   [facility]    RETURN CCSL   6:45 AM   (45 min.)   Megan Farrell APRN CNP   [facility]    ONC INFUSION 2 HR (120 MIN)   8:30 AM   (120 min.)   [facility] ONC INFUSION NURSE   [facility] 26     27     28     29     30       31                                                   Lab Results:  Recent Results (from the past 12 hour(s))   Comprehensive metabolic panel    Collection Time: 02/27/24  2:47 PM   Result Value Ref Range    Sodium 139 135 - 145 mmol/L    Potassium 4.2 3.4 - 5.3 mmol/L    Carbon Dioxide (CO2) 25 22 - 29 mmol/L    Anion Gap 10 7 - 15 mmol/L    Urea Nitrogen 16.0 6.0 - 20.0 mg/dL    Creatinine 0.71 0.67 - 1.17 mg/dL    GFR Estimate >90 >60 mL/min/1.73m2    Calcium 8.8 8.6 - 10.0 mg/dL    Chloride 104 98 - 107 mmol/L     Glucose 107 (H) 70 - 99 mg/dL    Alkaline Phosphatase 96 40 - 150 U/L    AST 20 0 - 45 U/L    ALT 16 0 - 70 U/L    Protein Total 6.8 6.4 - 8.3 g/dL    Albumin 4.1 3.5 - 5.2 g/dL    Bilirubin Total 0.4 <=1.2 mg/dL   CBC with platelets and differential    Collection Time: 02/27/24  2:47 PM   Result Value Ref Range    WBC Count 4.9 4.0 - 11.0 10e3/uL    RBC Count 3.41 (L) 4.40 - 5.90 10e6/uL    Hemoglobin 10.2 (L) 13.3 - 17.7 g/dL    Hematocrit 32.0 (L) 40.0 - 53.0 %    MCV 94 78 - 100 fL    MCH 29.9 26.5 - 33.0 pg    MCHC 31.9 31.5 - 36.5 g/dL    RDW 15.0 10.0 - 15.0 %    Platelet Count 259 150 - 450 10e3/uL    % Neutrophils 64 %    % Lymphocytes 22 %    % Monocytes 11 %    % Eosinophils 2 %    % Basophils 1 %    % Immature Granulocytes 0 %    NRBCs per 100 WBC 0 <1 /100    Absolute Neutrophils 3.1 1.6 - 8.3 10e3/uL    Absolute Lymphocytes 1.1 0.8 - 5.3 10e3/uL    Absolute Monocytes 0.6 0.0 - 1.3 10e3/uL    Absolute Eosinophils 0.1 0.0 - 0.7 10e3/uL    Absolute Basophils 0.0 0.0 - 0.2 10e3/uL    Absolute Immature Granulocytes 0.0 <=0.4 10e3/uL    Absolute NRBCs 0.0 10e3/uL

## 2024-02-27 NOTE — PROGRESS NOTES
Infusion Nursing Note:  Gallo Navarro presents today for  Day 1 Cycle 5 Abraxane, Gemzar. 1 dose of IV Dilaudid for missed PO dose   Patient seen by provider today: Yes: Yasmeen GONZALEZ   present during visit today: Not Applicable.    Note: Patient presents to infusion feeling ok. Pt denies new acute discomfort and states no acute complaints or concerns not addressed during virtual visit this morning. Pt states he forgot his afternoon dose of Dilaudid, therefore requesting replacement while in infusion. No PO Dilaudid available in infusion, therefore IV Dilaudid requested to [facility].    Post 0.4mg IV Dilaudid, pt states pain 2/10.       Intravenous Access:  Implanted Port.    Treatment Conditions:  Lab Results   Component Value Date    HGB 10.2 (L) 02/27/2024    WBC 4.9 02/27/2024    ANEU 2.2 12/26/2023    ANEUTAUTO 3.1 02/27/2024     02/27/2024        Lab Results   Component Value Date     02/27/2024    POTASSIUM 4.2 02/27/2024    MAG 1.9 08/23/2023    CR 0.71 02/27/2024    DENISE 8.8 02/27/2024    BILITOTAL 0.4 02/27/2024    ALBUMIN 4.1 02/27/2024    ALT 16 02/27/2024    AST 20 02/27/2024       Results reviewed, labs MET treatment parameters, ok to proceed with treatment.      Post Infusion Assessment:  Patient tolerated infusion without incident.  Blood return noted pre and post infusion.  Site patent and intact, free from redness, edema or discomfort.  No evidence of extravasations.  Access discontinued per protocol.       Discharge Plan:   Patient received Ritalin prescription refills.  Discharge instructions reviewed with: Patient.  Patient and/or family verbalized understanding of discharge instructions and all questions answered.  Copy of AVS reviewed with patient and/or family.  Patient will return 3/5 for next appointment.  Patient discharged in stable condition accompanied by: wife.  Departure Mode: Ambulatory.      Perez Acevedo RN

## 2024-02-27 NOTE — LETTER
2/27/2024         RE: Gallo Navarro  [address] [address] [place] [id number]        Dear Colleague,    Thank you for referring your patient, Gallo Navarro, to the [facility]. Please see a copy of my visit note below.    Virtual Visit Details    Type of service:  Video Visit   Video Start Time: 10:00 am  Video End Time: 10:27 am    Originating Location (pt. Location): Home    Distant Location (provider location):  On-site  Platform used for Video Visit: [facility]          Oncology/Hematology Visit Note  Feb 27, 2024    Reason for Visit: follow up of locally advanced, unresectable pancreatic cancer    History of Present Illness: Gallo Navarro is a 58 year old male with locally advanced, unresectable pancreatic cancer. 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 He then presented to the ED with worsening abdominal pain, increased jaundice, poor PO intake and weight loss admitted on 7/8/2023 for concern of biliary obstruction. Unfortunately, during this admission, biopsy of pancreatic mass returned positive for pancreatic adenocarcinoma on 7/12/23. He started on treatment with 5FU, irinotecan, and oxaliplatin (FOLFIRINOX) on 7/31/23. Please see previous notes for further details on the patient's history.     8/17/23 - ERCP/EGD, duodenal stents x2 placed, exchange of GJ tube    8/21-/8/25 hospitalized due to diarrhea, colitis, abdominal pain.      8/29 - Cycle 3 deferred due to neutropenia.   Neulasta added. Irinotecan dose reduced 20% due to symptoms including cramping, double vision and slurred speech during infusion.      10/24/23 CT CAP with similar to slight increase in size of peripancreatic and mesenteric  lymph nodes, enlargement of previous skeletal metastasis as  well as new sclerotic metastasis to left posterior 5th rib, enlarging pulmonary nodule, and unchanged pancreatic head mass. Also unclear concerns for PE,  right lower lobe segmental PE confirmed on CT-PE. Started on apixaban.     10/31/23 Cycle 1 gemzar, abraxane  11/7/23 Cycle 1 Zometa  11/22/23 Removal of GJ tube.   11/29/23 Completed palliative radiation to T10   12/13/23 C3D1 gem/abraxane    12/29/23 Consults at [place]    1/23-1/26 Cons[facility] at [facility]     1/30/24: C4D1 gem/abraxane     Interval History: Gallo is joined by his wife virtually. He has chemotherapy scheduled for later today. He is  feeling well. Tolerating gem/abraxane well. Most notable side effect is fatigue. He felt more fatigued week 1 after zometa and chemo. He notes he now has numbness in his fingertips which is mild. No pain or functional impairment. Feet feel the same but he is overall weaker and more clumsy. He did lose his hair.     Sore on his toe is improving. He does still have a little drainage. He has been using lotromin as his nail color is yellow--chronic. Soaking his feet every other day.     His edema has improved. He has been taking more breaks from his compression stockings as his skin was peeling.     No fevers.     He is sleeping 12 hours at night + 2 hour nap during the day.     Appetite is good. No abdominal pain. Taking less pain meds overalls.     Yesterday he had a firm BM and noted a little blood in his stool. This has only happened once. No other bleeding.     No diarrhea.       Current Outpatient Medications   Medication Sig Dispense Refill    methylphenidate (RITALIN) 5 MG tablet Take 1 tablet (5 mg) by mouth 2 times daily as needed 10 tablet 0    acetaminophen (TYLENOL) 32 mg/mL liquid Take 20.313 mLs (650 mg) by mouth every 8 hours 1800 mL 1    apixaban ANTICOAGULANT (ELIQUIS) 5 MG tablet Take 1 tablet (5 mg) by mouth 2 times daily 60 tablet 2    blood glucose (FREESTYLE TEST STRIPS) test strip by Other route as  needed      buprenorphine (BUTRANS) 10 MCG/HR WK patch Place 1 patch onto the skin every 7 days Use with 20 mcg/hour patch for 30 mcg/hour total. 4 patch 3    buprenorphine (BUTRANS) 20 MCG/HR WK patch Place 1 patch onto the skin once a week Use with 10 mcg/hour patch for 30 mcg/hour total. 4 patch 3    Continuous Blood Gluc Sensor (FREESTYLE KAREN 2 SENSOR) MISC Change every 14 days 6 each 3    dicyclomine (BENTYL) 10 MG capsule Take 1 capsule (10 mg) by mouth 4 times daily (before meals and nightly) 120 capsule 1    HYDROmorphone (DILAUDID) 2 MG tablet Take 1-2 tablets (2-4 mg) by mouth every 4 hours as needed for severe pain or breakthrough pain 180 tablet 0    insulin aspart (NOVOLOG PEN) 100 UNIT/ML pen Inject 1-10 Units Subcutaneous 3 times daily (with meals) (Patient not taking: Reported on 2/13/2024) 15 mL 3    lidocaine-prilocaine (EMLA) 2.5-2.5 % external cream Use 1-2 times a week or as needed prior to port access 30 g 3    lipase-protease-amylase (CREON 24) 25965-56704-230816 units CPEP per EC capsule 2-3 capsules with meals 3 times a day and 1-2 capsules with snacks max of 15 capsules a day 200 capsule 11    Multiple Vitamins-Minerals (CENTRUM SILVER 50+MEN) TABS as directed Orally      pantoprazole (PROTONIX) 40 MG EC tablet Take 1 tablet (40 mg) by mouth 2 times daily 60 tablet 4    prochlorperazine (COMPAZINE) 10 MG tablet Take 1 tablet (10 mg) by mouth every 6 hours as needed for nausea or vomiting 30 tablet 2    vitamin D3 (CHOLECALCIFEROL) 50 mcg (2000 units) tablet Take 1 tablet (50 mcg) by mouth daily 90 tablet 1     Physical Examination:  Video physical exam  General: Patient appears well in no acute distress.   Skin: No visualized rash or lesions on visualized skin  Eyes: EOMI, no erythema, sclera icterus or discharge noted  Resp: Appears to be breathing comfortably without accessory muscle usage, speaking in full sentences, no cough  MSK: Appears to have normal range of motion based on  visualized movements  Neurologic: No apparent tremors, facial movements symmetric  Psych: affect and mood congruent, alert and oriented      Laboratory Data:  Most Recent 3 CBC's:  Recent Labs   Lab Test 02/13/24  0828 02/06/24  0814 01/30/24  0644   WBC 3.3* 3.1* 5.6   HGB 9.0* 10.1* 10.3*   MCV 92 93 94   * 131* 224   ANEUTAUTO 2.2 1.7 3.2    Most Recent 3 BMP's:  Recent Labs   Lab Test 02/06/24  0814 01/30/24  0644 12/26/23  0830    139 139   POTASSIUM 4.0 3.8 3.8   CHLORIDE 106 102 103   CO2 26 26 27   BUN 14.2 20.5* 10.1   CR 0.71 0.62* 0.70   ANIONGAP 9 11 9   DENISE 9.3 9.3 9.1   * 166* 170*   PROTTOTAL 6.8 6.9 6.6   ALBUMIN 3.9 4.0 3.7    Most Recent 2 LFT's:  Recent Labs   Lab Test 02/06/24  0814 01/30/24  0644   AST 23 26   ALT 24 29   ALKPHOS 86 103   BILITOTAL 0.3 0.3   I reviewed the above labs today.          Assessment and Plan:  Locally advanced, unresectable pancreatic cancer. Patient started on treatment with palliative FOLFIRINOX on 7/31/23. He had a moderate amount of side effects following cycle 1 with dehydration, diarrhea, and nausea. Received cycle 2 on 8/15/23. ERCP/EGD on 8/17 with GJ tube exchange and duodenal stents placed. Hospitalized 8/21-8/25 due to colitis and abdominal pain. Cycle 3 was deferred due to neutropenia, Neulasta/Udenyca added on day 4.  - CT CAP 10/24/23 with progression in skeletal mets, lung nodule and lymph nodes. Stable pancreatic mass. Changed treatment to gemcitabine, Abraxane (day 1, 8, 15) and Zometa.  Tolerated cycle 1 well with the exception of a fever. Tolerated cycle 2 well. Cycle 2 day 15 skipped due to travel. Completed cycle 3 and then had a one month break for traveling. Completed cycle 4 without complications besides fatigue and a slight uptick in neuropathy.   -Proceed with cycle 5 today, pending acceptable labs. Will plan for repeat scans prior to cycle 6 and follow-up with Dr. Haile to review.     Skeletal mets.   -Worsening  skeletal mets noted on CT 10/24. Sclerosis noted on recent imaging. No recent persistent pain.   -Received Zometa with cycle 1 (11/7/23), had fever later that night, reports no other side effects or concerns. Original plan for every 6 months but we reviewed indications for bone mets being either monthly or every 3 months last visit. Last dose given 2/6/24. Will plan to give every 3 months.  -Radiation to T10 completed 11/29/23.     Pulmonary Embolism. Right lower segmental PE found on routine imaging. Started on apixiban, denies any bleeding concerns.     Nutrition.  Feeding tube removed. Continues to tolerate oral intake well. Gaining weight. No issues with nausea or vomiting. Continue to push oral hydration, drinking 64+ oz of fluid/day.  Continue Protonix daily.  Has compazine as needed for nausea.     Lower extremity edema. Resolved. Continue compression stockings and elevation PRN. Continue to monitor.     Neuropathy: Grade 1. Mostly from prior therapies. Will refer to cancer rehab.     Fatigue: Ritalin 5 mg BID PRN. Discussed gentle exercise. Cancer rehab as above.     Diarrhea/constipation. Resolved. Bowel movements more regular with increased food intake. Continue Metamucil as needed. Monitor for recurrent blood in stool.     Abdominal pain. Managed by palliative care with Butrans patch and oral Dilaudid. No pain recently. Has been taking less pain medication.     Anemia. Normocytic. Suspect anemia of chronic disease + secondary to myelosuppression from chemo. Hemoglobin improved with treatment break. Will monitor for now.     Recent paronychial infection: Saw PCP and completed 7 days of clindamycin. Improved. Discussed using triple antibiotic cream BID.     45 minutes spent on the date of the encounter doing chart review, review of test results, interpretation of tests, patient visit, and documentation       Yasmeen Felix PA-C

## 2024-02-27 NOTE — PROGRESS NOTES
Virtual Visit Details    Type of service:  Video Visit   Video Start Time: 10:00 am  Video End Time: 10:27 am    Originating Location (pt. Location): Home    Distant Location (provider location):  On-site  Platform used for Video Visit: [facility]          Oncology/Hematology Visit Note  Feb 27, 2024    Reason for Visit: follow up of locally advanced, unresectable pancreatic cancer    History of Present Illness: Gallo Navarro is a 58 year old male with locally advanced, unresectable pancreatic cancer. 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 He then presented to the ED with worsening abdominal pain, increased jaundice, poor PO intake and weight loss admitted on 7/8/2023 for concern of biliary obstruction. Unfortunately, during this admission, biopsy of pancreatic mass returned positive for pancreatic adenocarcinoma on 7/12/23. He started on treatment with 5FU, irinotecan, and oxaliplatin (FOLFIRINOX) on 7/31/23. Please see previous notes for further details on the patient's history.     8/17/23 - ERCP/EGD, duodenal stents x2 placed, exchange of GJ tube    8/21-/8/25 hospitalized due to diarrhea, colitis, abdominal pain.      8/29 - Cycle 3 deferred due to neutropenia.   Neulasta added. Irinotecan dose reduced 20% due to symptoms including cramping, double vision and slurred speech during infusion.      10/24/23 CT CAP with similar to slight increase in size of peripancreatic and mesenteric  lymph nodes, enlargement of previous skeletal metastasis as well as new sclerotic metastasis to left posterior 5th rib, enlarging pulmonary nodule, and unchanged pancreatic head mass. Also unclear concerns for PE,  right lower lobe segmental PE confirmed on CT-PE. Started on apixaban.     10/31/23 Cycle 1 gemzar,  abraxane  11/7/23 Cycle 1 Zometa  11/22/23 Removal of GJ tube.   11/29/23 Completed palliative radiation to T10   12/13/23 C3D1 gem/abraxane    12/29/23 Consults at [place]    1/23-1/26 Consults at [facility]     1/30/24: C4D1 gem/abraxane     Interval History: Gallo is joined by his wife virtually. He has chemotherapy scheduled for later today. He is  feeling well. Tolerating gem/abraxane well. Most notable side effect is fatigue. He felt more fatigued week 1 after zometa and chemo. He notes he now has numbness in his fingertips which is mild. No pain or functional impairment. Feet feel the same but he is overall weaker and more clumsy. He did lose his hair.     Sore on his toe is improving. He does still have a little drainage. He has been using lotromin as his nail color is yellow--chronic. Soaking his feet every other day.     His edema has improved. He has been taking more breaks from his compression stockings as his skin was peeling.     No fevers.     He is sleeping 12 hours at night + 2 hour nap during the day.     Appetite is good. No abdominal pain. Taking less pain meds overalls.     Yesterday he had a firm BM and noted a little blood in his stool. This has only happened once. No other bleeding.     No diarrhea.       Current Outpatient Medications   Medication Sig Dispense Refill    methylphenidate (RITALIN) 5 MG tablet Take 1 tablet (5 mg) by mouth 2 times daily as needed 10 tablet 0    acetaminophen (TYLENOL) 32 mg/mL liquid Take 20.313 mLs (650 mg) by mouth every 8 hours 1800 mL 1    apixaban ANTICOAGULANT (ELIQUIS) 5 MG tablet Take 1 tablet (5 mg) by mouth 2 times daily 60 tablet 2    blood glucose (FREESTYLE TEST STRIPS) test strip by Other route as needed      buprenorphine (BUTRANS) 10 MCG/HR WK patch Place 1 patch onto the skin every 7 days Use with 20 mcg/hour patch for 30 mcg/hour total. 4 patch 3    buprenorphine (BUTRANS) 20 MCG/HR WK patch Place 1 patch onto the skin once a week Use with 10  mcg/hour patch for 30 mcg/hour total. 4 patch 3    Continuous Blood Gluc Sensor (FREESTYLE KAREN 2 SENSOR) MISC Change every 14 days 6 each 3    dicyclomine (BENTYL) 10 MG capsule Take 1 capsule (10 mg) by mouth 4 times daily (before meals and nightly) 120 capsule 1    HYDROmorphone (DILAUDID) 2 MG tablet Take 1-2 tablets (2-4 mg) by mouth every 4 hours as needed for severe pain or breakthrough pain 180 tablet 0    insulin aspart (NOVOLOG PEN) 100 UNIT/ML pen Inject 1-10 Units Subcutaneous 3 times daily (with meals) (Patient not taking: Reported on 2/13/2024) 15 mL 3    lidocaine-prilocaine (EMLA) 2.5-2.5 % external cream Use 1-2 times a week or as needed prior to port access 30 g 3    lipase-protease-amylase (CREON 24) 70108-18610-086758 units CPEP per EC capsule 2-3 capsules with meals 3 times a day and 1-2 capsules with snacks max of 15 capsules a day 200 capsule 11    Multiple Vitamins-Minerals (CENTRUM SILVER 50+MEN) TABS as directed Orally      pantoprazole (PROTONIX) 40 MG EC tablet Take 1 tablet (40 mg) by mouth 2 times daily 60 tablet 4    prochlorperazine (COMPAZINE) 10 MG tablet Take 1 tablet (10 mg) by mouth every 6 hours as needed for nausea or vomiting 30 tablet 2    vitamin D3 (CHOLECALCIFEROL) 50 mcg (2000 units) tablet Take 1 tablet (50 mcg) by mouth daily 90 tablet 1     Physical Examination:  Video physical exam  General: Patient appears well in no acute distress.   Skin: No visualized rash or lesions on visualized skin  Eyes: EOMI, no erythema, sclera icterus or discharge noted  Resp: Appears to be breathing comfortably without accessory muscle usage, speaking in full sentences, no cough  MSK: Appears to have normal range of motion based on visualized movements  Neurologic: No apparent tremors, facial movements symmetric  Psych: affect and mood congruent, alert and oriented      Laboratory Data:  Most Recent 3 CBC's:  Recent Labs   Lab Test 02/13/24  0828 02/06/24  0814 01/30/24  0644   WBC 3.3*  3.1* 5.6   HGB 9.0* 10.1* 10.3*   MCV 92 93 94   * 131* 224   ANEUTAUTO 2.2 1.7 3.2    Most Recent 3 BMP's:  Recent Labs   Lab Test 02/06/24  0814 01/30/24  0644 12/26/23  0830    139 139   POTASSIUM 4.0 3.8 3.8   CHLORIDE 106 102 103   CO2 26 26 27   BUN 14.2 20.5* 10.1   CR 0.71 0.62* 0.70   ANIONGAP 9 11 9   DENISE 9.3 9.3 9.1   * 166* 170*   PROTTOTAL 6.8 6.9 6.6   ALBUMIN 3.9 4.0 3.7    Most Recent 2 LFT's:  Recent Labs   Lab Test 02/06/24  0814 01/30/24  0644   AST 23 26   ALT 24 29   ALKPHOS 86 103   BILITOTAL 0.3 0.3   I reviewed the above labs today.          Assessment and Plan:  Locally advanced, unresectable pancreatic cancer. Patient started on treatment with palliative FOLFIRINOX on 7/31/23. He had a moderate amount of side effects following cycle 1 with dehydration, diarrhea, and nausea. Received cycle 2 on 8/15/23. ERCP/EGD on 8/17 with GJ tube exchange and duodenal stents placed. Hospitalized 8/21-8/25 due to colitis and abdominal pain. Cycle 3 was deferred due to neutropenia, Neulasta/Udenyca added on day 4.  - CT CAP 10/24/23 with progression in skeletal mets, lung nodule and lymph nodes. Stable pancreatic mass. Changed treatment to gemcitabine, Abraxane (day 1, 8, 15) and Zometa.  Tolerated cycle 1 well with the exception of a fever. Tolerated cycle 2 well. Cycle 2 day 15 skipped due to travel. Completed cycle 3 and then had a one month break for traveling. Completed cycle 4 without complications besides fatigue and a slight uptick in neuropathy.   -Proceed with cycle 5 today, pending acceptable labs. Will plan for repeat scans prior to cycle 6 and follow-up with Dr. Haile to review.     Skeletal mets.   -Worsening skeletal mets noted on CT 10/24. Sclerosis noted on recent imaging. No recent persistent pain.   -Received Zometa with cycle 1 (11/7/23), had fever later that night, reports no other side effects or concerns. Original plan for every 6 months but we reviewed  indications for bone mets being either monthly or every 3 months last visit. Last dose given 2/6/24. Will plan to give every 3 months.  -Radiation to T10 completed 11/29/23.     Pulmonary Embolism. Right lower segmental PE found on routine imaging. Started on apixiban, denies any bleeding concerns.     Nutrition.  Feeding tube removed. Continues to tolerate oral intake well. Gaining weight. No issues with nausea or vomiting. Continue to push oral hydration, drinking 64+ oz of fluid/day.  Continue Protonix daily.  Has compazine as needed for nausea.     Lower extremity edema. Resolved. Continue compression stockings and elevation PRN. Continue to monitor.     Neuropathy: Grade 1. Mostly from prior therapies. Will refer to cancer rehab.     Fatigue: Ritalin 5 mg BID PRN. Discussed gentle exercise. Cancer rehab as above.     Diarrhea/constipation. Resolved. Bowel movements more regular with increased food intake. Continue Metamucil as needed. Monitor for recurrent blood in stool.     Abdominal pain. Managed by palliative care with Butrans patch and oral Dilaudid. No pain recently. Has been taking less pain medication.     Anemia. Normocytic. Suspect anemia of chronic disease + secondary to myelosuppression from chemo. Hemoglobin improved with treatment break. Will monitor for now.     Recent paronychial infection: Saw PCP and completed 7 days of clindamycin. Improved. Discussed using triple antibiotic cream BID.     45 minutes spent on the date of the encounter doing chart review, review of test results, interpretation of tests, patient visit, and documentation       Yasmeen Felix PA-C

## 2024-02-27 NOTE — NURSING NOTE
Is the patient currently in the state of [place]? YES    Visit mode:VIDEO    If the visit is dropped, the patient can be reconnected by: VIDEO VISIT: Text to cell phone:   Telephone Information:   Mobile [phone]       Will anyone else be joining the visit? NO  (If patient encounters technical issues they should call [phone])    How would you like to obtain your AVS? MyChart    Are changes needed to the allergy or medication list? Pt stated no changes to allergies and Pt stated no med changes    Reason for visit: LO Dhillon LPN

## 2024-02-29 LAB — CANCER AG19-9 SERPL IA-ACNC: 4 U/ML

## 2024-03-04 ENCOUNTER — MYC REFILL (OUTPATIENT)
Dept: RADIATION ONCOLOGY | Facility: HOSPITAL | Age: 59
End: 2024-03-04

## 2024-03-04 ENCOUNTER — OFFICE VISIT (OUTPATIENT)
Dept: FAMILY MEDICINE | Facility: CLINIC | Age: 59
End: 2024-03-04
Attending: INTERNAL MEDICINE
Payer: COMMERCIAL

## 2024-03-04 VITALS
WEIGHT: 160.9 LBS | HEART RATE: 76 BPM | SYSTOLIC BLOOD PRESSURE: 119 MMHG | TEMPERATURE: 98.6 F | BODY MASS INDEX: 21.79 KG/M2 | DIASTOLIC BLOOD PRESSURE: 71 MMHG | RESPIRATION RATE: 11 BRPM | HEIGHT: 72 IN | OXYGEN SATURATION: 98 %

## 2024-03-04 DIAGNOSIS — C25.9 PANCREATIC ADENOCARCINOMA (H): ICD-10-CM

## 2024-03-04 DIAGNOSIS — I26.99 OTHER PULMONARY EMBOLISM WITHOUT ACUTE COR PULMONALE, UNSPECIFIED CHRONICITY (H): ICD-10-CM

## 2024-03-04 DIAGNOSIS — Z79.4 TYPE 2 DIABETES MELLITUS WITHOUT COMPLICATION, WITH LONG-TERM CURRENT USE OF INSULIN (H): Primary | ICD-10-CM

## 2024-03-04 DIAGNOSIS — K31.1 GASTRIC OUTLET OBSTRUCTION: ICD-10-CM

## 2024-03-04 DIAGNOSIS — C79.51 MALIGNANT NEOPLASM METASTATIC TO BONE (H): ICD-10-CM

## 2024-03-04 DIAGNOSIS — K59.03 DRUG-INDUCED CONSTIPATION: ICD-10-CM

## 2024-03-04 DIAGNOSIS — E11.9 TYPE 2 DIABETES MELLITUS WITHOUT COMPLICATION, WITH LONG-TERM CURRENT USE OF INSULIN (H): Primary | ICD-10-CM

## 2024-03-04 DIAGNOSIS — G62.9 NEUROPATHY: ICD-10-CM

## 2024-03-04 PROCEDURE — 99214 OFFICE O/P EST MOD 30 MIN: CPT | Performed by: INTERNAL MEDICINE

## 2024-03-04 RX ORDER — HYDROMORPHONE HYDROCHLORIDE 2 MG/1
2-4 TABLET ORAL EVERY 4 HOURS PRN
Qty: 180 TABLET | Refills: 0 | Status: SHIPPED | OUTPATIENT
Start: 2024-03-04 | End: 2024-03-28

## 2024-03-04 ASSESSMENT — PAIN SCALES - GENERAL: PAINLEVEL: NO PAIN (0)

## 2024-03-04 NOTE — TELEPHONE ENCOUNTER
Received [facility] message from patient requesting refill of hydromorphone.     Last refill: 2/9/24  Last office visit: 2/22/24  Scheduled for follow up 4/25/24     Will route request to MD for review.     Reviewed [place]  Report.

## 2024-03-04 NOTE — PROGRESS NOTES
Assessment & Plan     Type 2 diabetes mellitus without complication, with long-term current use of insulin (H)  He is no longer diabetic  He is currently not on any insulin  His sugars have been very good  His diabetes resolved since he  came off steroids and also after he lost weight    Malignant neoplasm metastatic to bone (H)  He takes Butrans patch and Dilaudid for pain  Followed by palliative care  S/p radiation    Pancreatic adenocarcinoma (H)  Initially was on FOLFIRINOX regimen but currently he is on  gemcitabine, Abraxane   He is being followed up at [place] however he obtained second opinion from [place] and also [facility]    Gastric outlet obstruction  Status post placement of gastrojejunal stents    Other pulmonary embolism without acute cor pulmonale, unspecified chronicity (H)  On Eliquis  This was found on incidental imaging    Neuropathy  Secondary from his chemotherapy  He has a stocking fashion loss of sensation in both feet just above ankles    Drug-induced constipation  Caused by narcotics  Discussed about taking increased fiber and MiraLAX      40 minutes spent by me on the date of the encounter doing chart review, history and exam, documentation and further activities per the note            Subjective   Gallo is a 58 year old, presenting for the following health issues:  Follow Up (Follow up-cancer)        3/4/2024    10:53 AM   Additional Questions   Roomed by Zuly CASTILLO   Accompanied by Wife (Violet)         3/4/2024    10:53 AM   Patient Reported Additional Medications   Patient reports taking the following new medications None     History of Present Illness       Reason for visit:  Cancer follow up visit    He eats 2-3 servings of fruits and vegetables daily.He consumes 0 sweetened beverage(s) daily.He exercises with enough effort to increase his heart rate 10 to 19 minutes per day.  He exercises with enough effort to increase his heart rate 5 days per week.   He is taking medications  "regularly.           Cancer follow up   How many servings of fruits and vegetables do you eat daily?  2-3  On average, how many sweetened beverages do you drink each day (Examples: soda, juice, sweet tea, etc.  Do NOT count diet or artificially sweetened beverages)?   0  How many days per week do you exercise enough to make your heart beat faster? 5  How many minutes a day do you exercise enough to make your heart beat faster? 10 - 19  How many days per week do you miss taking your medication? 0      Review of Systems  Constitutional, HEENT, cardiovascular, pulmonary, gi and gu systems are negative, except as otherwise noted.      Objective    /71 (BP Location: Right arm, Patient Position: Sitting, Cuff Size: Adult Regular)   Pulse 76   Temp 98.6  F (37  C) (Oral)   Resp 11   Ht 1.835 m (6' 0.25\")   Wt 73 kg (160 lb 14.4 oz)   SpO2 98%   BMI 21.67 kg/m    Body mass index is 21.67 kg/m .  Physical Exam   GENERAL: frail and fatigued  NECK: no adenopathy, no asymmetry, masses, or scars  RESP: lungs clear to auscultation - no rales, rhonchi or wheezes  CV: regular rate and rhythm, normal S1 S2, no S3 or S4, no murmur, click or rub, no peripheral edema  ABDOMEN: soft, nontender, no hepatosplenomegaly, no masses and bowel sounds normal  MS: no gross musculoskeletal defects noted, no edema            Signed Electronically by: Akil Hernandez MD    "

## 2024-03-05 ENCOUNTER — APPOINTMENT (OUTPATIENT)
Dept: LAB | Facility: CLINIC | Age: 59
End: 2024-03-05
Payer: COMMERCIAL

## 2024-03-05 ENCOUNTER — INFUSION THERAPY VISIT (OUTPATIENT)
Dept: ONCOLOGY | Facility: CLINIC | Age: 59
End: 2024-03-05
Payer: COMMERCIAL

## 2024-03-05 VITALS
SYSTOLIC BLOOD PRESSURE: 107 MMHG | TEMPERATURE: 98.3 F | DIASTOLIC BLOOD PRESSURE: 67 MMHG | OXYGEN SATURATION: 96 % | HEART RATE: 72 BPM | WEIGHT: 161.9 LBS | RESPIRATION RATE: 18 BRPM | BODY MASS INDEX: 21.81 KG/M2

## 2024-03-05 DIAGNOSIS — Z51.11 ENCOUNTER FOR ANTINEOPLASTIC CHEMOTHERAPY: Primary | ICD-10-CM

## 2024-03-05 DIAGNOSIS — C25.0 MALIGNANT NEOPLASM OF HEAD OF PANCREAS (H): ICD-10-CM

## 2024-03-05 LAB
ALBUMIN SERPL BCG-MCNC: 4 G/DL (ref 3.5–5.2)
ALP SERPL-CCNC: 92 U/L (ref 40–150)
ALT SERPL W P-5'-P-CCNC: 19 U/L (ref 0–70)
ANION GAP SERPL CALCULATED.3IONS-SCNC: 10 MMOL/L (ref 7–15)
AST SERPL W P-5'-P-CCNC: 23 U/L (ref 0–45)
BASOPHILS # BLD AUTO: 0 10E3/UL (ref 0–0.2)
BASOPHILS NFR BLD AUTO: 1 %
BILIRUB SERPL-MCNC: 0.3 MG/DL
BUN SERPL-MCNC: 19.4 MG/DL (ref 6–20)
CALCIUM SERPL-MCNC: 9.1 MG/DL (ref 8.6–10)
CHLORIDE SERPL-SCNC: 104 MMOL/L (ref 98–107)
CREAT SERPL-MCNC: 0.68 MG/DL (ref 0.67–1.17)
DEPRECATED HCO3 PLAS-SCNC: 26 MMOL/L (ref 22–29)
EGFRCR SERPLBLD CKD-EPI 2021: >90 ML/MIN/1.73M2
EOSINOPHIL # BLD AUTO: 0 10E3/UL (ref 0–0.7)
EOSINOPHIL NFR BLD AUTO: 1 %
ERYTHROCYTE [DISTWIDTH] IN BLOOD BY AUTOMATED COUNT: 14.4 % (ref 10–15)
GLUCOSE SERPL-MCNC: 207 MG/DL (ref 70–99)
HCT VFR BLD AUTO: 31.5 % (ref 40–53)
HGB BLD-MCNC: 9.9 G/DL (ref 13.3–17.7)
IMM GRANULOCYTES # BLD: 0 10E3/UL
IMM GRANULOCYTES NFR BLD: 0 %
LYMPHOCYTES # BLD AUTO: 1.1 10E3/UL (ref 0.8–5.3)
LYMPHOCYTES NFR BLD AUTO: 37 %
MCH RBC QN AUTO: 29.4 PG (ref 26.5–33)
MCHC RBC AUTO-ENTMCNC: 31.4 G/DL (ref 31.5–36.5)
MCV RBC AUTO: 94 FL (ref 78–100)
MONOCYTES # BLD AUTO: 0.4 10E3/UL (ref 0–1.3)
MONOCYTES NFR BLD AUTO: 13 %
NEUTROPHILS # BLD AUTO: 1.4 10E3/UL (ref 1.6–8.3)
NEUTROPHILS NFR BLD AUTO: 48 %
NRBC # BLD AUTO: 0 10E3/UL
NRBC BLD AUTO-RTO: 0 /100
PLATELET # BLD AUTO: 212 10E3/UL (ref 150–450)
POTASSIUM SERPL-SCNC: 4.1 MMOL/L (ref 3.4–5.3)
PROT SERPL-MCNC: 6.7 G/DL (ref 6.4–8.3)
RBC # BLD AUTO: 3.37 10E6/UL (ref 4.4–5.9)
SODIUM SERPL-SCNC: 140 MMOL/L (ref 135–145)
WBC # BLD AUTO: 3 10E3/UL (ref 4–11)

## 2024-03-05 PROCEDURE — 85025 COMPLETE CBC W/AUTO DIFF WBC: CPT

## 2024-03-05 PROCEDURE — 250N000011 HC RX IP 250 OP 636: Performed by: PHYSICIAN ASSISTANT

## 2024-03-05 PROCEDURE — 36591 DRAW BLOOD OFF VENOUS DEVICE: CPT

## 2024-03-05 PROCEDURE — 86301 IMMUNOASSAY TUMOR CA 19-9: CPT

## 2024-03-05 PROCEDURE — 258N000003 HC RX IP 258 OP 636: Performed by: PHYSICIAN ASSISTANT

## 2024-03-05 PROCEDURE — 96417 CHEMO IV INFUS EACH ADDL SEQ: CPT

## 2024-03-05 PROCEDURE — 96375 TX/PRO/DX INJ NEW DRUG ADDON: CPT

## 2024-03-05 PROCEDURE — 80053 COMPREHEN METABOLIC PANEL: CPT

## 2024-03-05 PROCEDURE — 96413 CHEMO IV INFUSION 1 HR: CPT

## 2024-03-05 RX ORDER — HEPARIN SODIUM (PORCINE) LOCK FLUSH IV SOLN 100 UNIT/ML 100 UNIT/ML
5 SOLUTION INTRAVENOUS
Status: DISCONTINUED | OUTPATIENT
Start: 2024-03-05 | End: 2024-03-05 | Stop reason: HOSPADM

## 2024-03-05 RX ORDER — ONDANSETRON 2 MG/ML
8 INJECTION INTRAMUSCULAR; INTRAVENOUS ONCE
Status: COMPLETED | OUTPATIENT
Start: 2024-03-05 | End: 2024-03-05

## 2024-03-05 RX ORDER — PACLITAXEL 100 MG/20ML
125 INJECTION, POWDER, LYOPHILIZED, FOR SUSPENSION INTRAVENOUS ONCE
Status: COMPLETED | OUTPATIENT
Start: 2024-03-05 | End: 2024-03-05

## 2024-03-05 RX ADMIN — Medication 5 ML: at 11:19

## 2024-03-05 RX ADMIN — ONDANSETRON 8 MG: 2 INJECTION INTRAMUSCULAR; INTRAVENOUS at 09:21

## 2024-03-05 RX ADMIN — PACLITAXEL 250 MG: 100 INJECTION, POWDER, LYOPHILIZED, FOR SUSPENSION INTRAVENOUS at 10:04

## 2024-03-05 RX ADMIN — GEMCITABINE 2000 MG: 38 INJECTION, SOLUTION INTRAVENOUS at 10:43

## 2024-03-05 RX ADMIN — SODIUM CHLORIDE 250 ML: 9 INJECTION, SOLUTION INTRAVENOUS at 09:22

## 2024-03-05 ASSESSMENT — PAIN SCALES - GENERAL: PAINLEVEL: NO PAIN (0)

## 2024-03-05 NOTE — NURSING NOTE
Chief Complaint   Patient presents with    Port Draw     Labs drawn via port by RN in lab      Port accessed by RN, labs collected, line flushed with saline and heparin.  Vitals taken. Pt checked in for appointment(s).   Nga Downing RN

## 2024-03-05 NOTE — PROGRESS NOTES
Infusion Nursing Note:  Gallo Navarro presents today for C5D8 Abraxane and Gemzar.    Patient seen by provider today: No   present during visit today: Not Applicable.    Note: Gallo presents to infusion today feeling well. His neuropathy is unchanged. He denies any pain, infectious symptoms, or other concerns.    Intravenous Access:  Implanted Port.    Treatment Conditions:  Lab Results   Component Value Date    HGB 9.9 (L) 03/05/2024    WBC 3.0 (L) 03/05/2024    ANEU 2.2 12/26/2023    ANEUTAUTO 1.4 (L) 03/05/2024     03/05/2024     Lab Results   Component Value Date     03/05/2024    POTASSIUM 4.1 03/05/2024    MAG 1.9 08/23/2023    CR 0.68 03/05/2024    DENISE 9.1 03/05/2024    BILITOTAL 0.3 03/05/2024    ALBUMIN 4.0 03/05/2024    ALT 19 03/05/2024    AST 23 03/05/2024     Results reviewed, labs MET treatment parameters, ok to proceed with treatment.    Post Infusion Assessment:  Patient tolerated infusion without incident.  Blood return noted pre and post infusion.  Site patent and intact, free from redness, edema or discomfort.  No evidence of extravasations.  Access discontinued per protocol.     Discharge Plan:   Patient declined prescription refills.  Discharge instructions reviewed with: Patient.  Patient and/or family verbalized understanding of discharge instructions and all questions answered.  AVS to patient via [employer].  Patient will return 3/12 for next appointment.   Patient discharged in stable condition accompanied by: friend.  Departure Mode: Ambulatory.      Hiwot Sidhu RN

## 2024-03-06 LAB — CANCER AG19-9 SERPL IA-ACNC: 4 U/ML

## 2024-03-09 ENCOUNTER — HEALTH MAINTENANCE LETTER (OUTPATIENT)
Age: 59
End: 2024-03-09

## 2024-03-11 ENCOUNTER — ANCILLARY PROCEDURE (OUTPATIENT)
Dept: CT IMAGING | Facility: CLINIC | Age: 59
End: 2024-03-11
Attending: STUDENT IN AN ORGANIZED HEALTH CARE EDUCATION/TRAINING PROGRAM
Payer: COMMERCIAL

## 2024-03-11 PROCEDURE — 71260 CT THORAX DX C+: CPT | Mod: GC | Performed by: RADIOLOGY

## 2024-03-11 PROCEDURE — 74177 CT ABD & PELVIS W/CONTRAST: CPT | Mod: GC | Performed by: RADIOLOGY

## 2024-03-11 RX ORDER — IOPAMIDOL 755 MG/ML
89 INJECTION, SOLUTION INTRAVASCULAR ONCE
Status: COMPLETED | OUTPATIENT
Start: 2024-03-11 | End: 2024-03-11

## 2024-03-11 RX ADMIN — IOPAMIDOL 89 ML: 755 INJECTION, SOLUTION INTRAVASCULAR at 10:40

## 2024-03-12 ENCOUNTER — APPOINTMENT (OUTPATIENT)
Dept: LAB | Facility: CLINIC | Age: 59
End: 2024-03-12
Payer: COMMERCIAL

## 2024-03-12 ENCOUNTER — INFUSION THERAPY VISIT (OUTPATIENT)
Dept: ONCOLOGY | Facility: CLINIC | Age: 59
End: 2024-03-12
Payer: COMMERCIAL

## 2024-03-12 VITALS
TEMPERATURE: 98.2 F | WEIGHT: 162.2 LBS | BODY MASS INDEX: 21.85 KG/M2 | HEART RATE: 75 BPM | OXYGEN SATURATION: 96 % | SYSTOLIC BLOOD PRESSURE: 110 MMHG | RESPIRATION RATE: 18 BRPM | DIASTOLIC BLOOD PRESSURE: 69 MMHG

## 2024-03-12 DIAGNOSIS — C25.0 MALIGNANT NEOPLASM OF HEAD OF PANCREAS (H): ICD-10-CM

## 2024-03-12 DIAGNOSIS — Z51.11 ENCOUNTER FOR ANTINEOPLASTIC CHEMOTHERAPY: Primary | ICD-10-CM

## 2024-03-12 LAB
BASOPHILS # BLD AUTO: 0 10E3/UL (ref 0–0.2)
BASOPHILS NFR BLD AUTO: 1 %
EOSINOPHIL # BLD AUTO: 0 10E3/UL (ref 0–0.7)
EOSINOPHIL NFR BLD AUTO: 1 %
ERYTHROCYTE [DISTWIDTH] IN BLOOD BY AUTOMATED COUNT: 14.6 % (ref 10–15)
HCT VFR BLD AUTO: 31.5 % (ref 40–53)
HGB BLD-MCNC: 10 G/DL (ref 13.3–17.7)
IMM GRANULOCYTES # BLD: 0 10E3/UL
IMM GRANULOCYTES NFR BLD: 1 %
LYMPHOCYTES # BLD AUTO: 0.9 10E3/UL (ref 0.8–5.3)
LYMPHOCYTES NFR BLD AUTO: 24 %
MCH RBC QN AUTO: 30.2 PG (ref 26.5–33)
MCHC RBC AUTO-ENTMCNC: 31.7 G/DL (ref 31.5–36.5)
MCV RBC AUTO: 95 FL (ref 78–100)
MONOCYTES # BLD AUTO: 0.4 10E3/UL (ref 0–1.3)
MONOCYTES NFR BLD AUTO: 12 %
NEUTROPHILS # BLD AUTO: 2.3 10E3/UL (ref 1.6–8.3)
NEUTROPHILS NFR BLD AUTO: 61 %
NRBC # BLD AUTO: 0 10E3/UL
NRBC BLD AUTO-RTO: 0 /100
PLATELET # BLD AUTO: 116 10E3/UL (ref 150–450)
RBC # BLD AUTO: 3.31 10E6/UL (ref 4.4–5.9)
WBC # BLD AUTO: 3.6 10E3/UL (ref 4–11)

## 2024-03-12 PROCEDURE — 258N000003 HC RX IP 258 OP 636: Performed by: PHYSICIAN ASSISTANT

## 2024-03-12 PROCEDURE — 96417 CHEMO IV INFUS EACH ADDL SEQ: CPT

## 2024-03-12 PROCEDURE — 96375 TX/PRO/DX INJ NEW DRUG ADDON: CPT

## 2024-03-12 PROCEDURE — 250N000011 HC RX IP 250 OP 636: Mod: JW | Performed by: PHYSICIAN ASSISTANT

## 2024-03-12 PROCEDURE — 96413 CHEMO IV INFUSION 1 HR: CPT

## 2024-03-12 PROCEDURE — 36591 DRAW BLOOD OFF VENOUS DEVICE: CPT | Performed by: PHYSICIAN ASSISTANT

## 2024-03-12 PROCEDURE — 86301 IMMUNOASSAY TUMOR CA 19-9: CPT

## 2024-03-12 PROCEDURE — 85004 AUTOMATED DIFF WBC COUNT: CPT | Performed by: PHYSICIAN ASSISTANT

## 2024-03-12 RX ORDER — ONDANSETRON 2 MG/ML
8 INJECTION INTRAMUSCULAR; INTRAVENOUS ONCE
Status: COMPLETED | OUTPATIENT
Start: 2024-03-12 | End: 2024-03-12

## 2024-03-12 RX ORDER — HEPARIN SODIUM (PORCINE) LOCK FLUSH IV SOLN 100 UNIT/ML 100 UNIT/ML
5 SOLUTION INTRAVENOUS
Status: DISCONTINUED | OUTPATIENT
Start: 2024-03-12 | End: 2024-03-12 | Stop reason: HOSPADM

## 2024-03-12 RX ORDER — PACLITAXEL 100 MG/20ML
125 INJECTION, POWDER, LYOPHILIZED, FOR SUSPENSION INTRAVENOUS ONCE
Status: COMPLETED | OUTPATIENT
Start: 2024-03-12 | End: 2024-03-12

## 2024-03-12 RX ADMIN — PACLITAXEL 250 MG: 100 INJECTION, POWDER, LYOPHILIZED, FOR SUSPENSION INTRAVENOUS at 09:28

## 2024-03-12 RX ADMIN — GEMCITABINE 2000 MG: 38 INJECTION, SOLUTION INTRAVENOUS at 10:12

## 2024-03-12 RX ADMIN — ONDANSETRON 8 MG: 2 INJECTION INTRAMUSCULAR; INTRAVENOUS at 09:05

## 2024-03-12 RX ADMIN — SODIUM CHLORIDE 250 ML: 9 INJECTION, SOLUTION INTRAVENOUS at 09:04

## 2024-03-12 RX ADMIN — Medication 5 ML: at 10:46

## 2024-03-12 NOTE — PROGRESS NOTES
Infusion Nursing Note:  Gallo Navarro presents today for Cycle 5 Day 15 Abraxane/Gemzar.    Patient seen by provider today: No   present during visit today: Not Applicable.    Note: Patient arrives feeling well. About 1.5 weeks ago, he noted sid blood in his stool but he thinks it was constipation-related. He is going to start taking Miralax. He has been eating and drinking well otherwise and will modify is diet a little. This has occurred once before in 6 months ago. He will continue to monitor and let us know if this persists. Updated Yasmeen Felix CNP. Neuropathy stable, mostly in feet and a little bit in fingertips. Denies fever, chills, chest pain, SOB, dizziness/lightheadedness, or any other concerns.    Per written communication Yasmeen Felix CNP/Jing Winston, RN @0838 3/12/24:  - Okay thanks. Sounds like it was from constipation but let him know to reach out if it recurs.      Intravenous Access:  Implanted Port.    Treatment Conditions:  Lab Results   Component Value Date    HGB 10.0 (L) 03/12/2024    WBC 3.6 (L) 03/12/2024    ANEU 2.2 12/26/2023    ANEUTAUTO 2.3 03/12/2024     (L) 03/12/2024        Lab Results   Component Value Date     03/05/2024    POTASSIUM 4.1 03/05/2024    MAG 1.9 08/23/2023    CR 0.68 03/05/2024    DENISE 9.1 03/05/2024    BILITOTAL 0.3 03/05/2024    ALBUMIN 4.0 03/05/2024    ALT 19 03/05/2024    AST 23 03/05/2024       Results reviewed, labs MET treatment parameters, ok to proceed with treatment.      Post Infusion Assessment:  Patient tolerated infusion without incident.  Blood return noted pre and post infusion.  Site patent and intact, free from redness, edema or discomfort.  No evidence of extravasations.  Access discontinued per protocol.       Discharge Plan:   Patient declined prescription refills.  Discharge instructions reviewed with: Patient.  Patient and/or family verbalized understanding of discharge instructions and all questions  answered.  AVS to patient via [employer].  Patient will return 3/25 for next appointment.   Patient discharged in stable condition accompanied by: self.  Departure Mode: Ambulatory.      Jing Winston RN

## 2024-03-14 LAB — CANCER AG19-9 SERPL IA-ACNC: 4 U/ML

## 2024-03-15 ENCOUNTER — MYC REFILL (OUTPATIENT)
Dept: ONCOLOGY | Facility: CLINIC | Age: 59
End: 2024-03-15
Payer: COMMERCIAL

## 2024-03-15 ENCOUNTER — VIRTUAL VISIT (OUTPATIENT)
Dept: ONCOLOGY | Facility: CLINIC | Age: 59
End: 2024-03-15
Attending: STUDENT IN AN ORGANIZED HEALTH CARE EDUCATION/TRAINING PROGRAM
Payer: COMMERCIAL

## 2024-03-15 ENCOUNTER — MYC REFILL (OUTPATIENT)
Dept: RADIATION ONCOLOGY | Facility: HOSPITAL | Age: 59
End: 2024-03-15
Payer: COMMERCIAL

## 2024-03-15 VITALS — HEIGHT: 72 IN | BODY MASS INDEX: 21.94 KG/M2 | WEIGHT: 162 LBS

## 2024-03-15 DIAGNOSIS — R53.81 PHYSICAL DECONDITIONING: ICD-10-CM

## 2024-03-15 DIAGNOSIS — C25.0 MALIGNANT NEOPLASM OF HEAD OF PANCREAS (H): ICD-10-CM

## 2024-03-15 DIAGNOSIS — G89.3 CANCER ASSOCIATED PAIN: ICD-10-CM

## 2024-03-15 DIAGNOSIS — Z51.11 ENCOUNTER FOR ANTINEOPLASTIC CHEMOTHERAPY: Primary | ICD-10-CM

## 2024-03-15 DIAGNOSIS — Z79.4 TYPE 2 DIABETES MELLITUS WITHOUT COMPLICATION, WITH LONG-TERM CURRENT USE OF INSULIN (H): ICD-10-CM

## 2024-03-15 DIAGNOSIS — C25.9 PANCREATIC ADENOCARCINOMA (H): ICD-10-CM

## 2024-03-15 DIAGNOSIS — E11.9 TYPE 2 DIABETES MELLITUS WITHOUT COMPLICATION, WITH LONG-TERM CURRENT USE OF INSULIN (H): ICD-10-CM

## 2024-03-15 DIAGNOSIS — R10.30 LOWER ABDOMINAL PAIN: ICD-10-CM

## 2024-03-15 DIAGNOSIS — K31.1 GASTRIC OUTLET OBSTRUCTION: ICD-10-CM

## 2024-03-15 DIAGNOSIS — Z98.890 HISTORY OF BILIARY STENT INSERTION: ICD-10-CM

## 2024-03-15 PROCEDURE — 99215 OFFICE O/P EST HI 40 MIN: CPT | Mod: 95 | Performed by: STUDENT IN AN ORGANIZED HEALTH CARE EDUCATION/TRAINING PROGRAM

## 2024-03-15 RX ORDER — DIPHENHYDRAMINE HYDROCHLORIDE 50 MG/ML
50 INJECTION INTRAMUSCULAR; INTRAVENOUS
Status: CANCELLED
Start: 2024-04-23

## 2024-03-15 RX ORDER — MEPERIDINE HYDROCHLORIDE 25 MG/ML
25 INJECTION INTRAMUSCULAR; INTRAVENOUS; SUBCUTANEOUS EVERY 30 MIN PRN
Status: CANCELLED | OUTPATIENT
Start: 2024-04-23

## 2024-03-15 RX ORDER — METHYLPREDNISOLONE SODIUM SUCCINATE 125 MG/2ML
125 INJECTION, POWDER, LYOPHILIZED, FOR SOLUTION INTRAMUSCULAR; INTRAVENOUS
Status: CANCELLED
Start: 2024-07-16

## 2024-03-15 RX ORDER — METHYLPREDNISOLONE SODIUM SUCCINATE 125 MG/2ML
125 INJECTION, POWDER, LYOPHILIZED, FOR SOLUTION INTRAMUSCULAR; INTRAVENOUS
Status: CANCELLED
Start: 2024-04-09

## 2024-03-15 RX ORDER — METHYLPREDNISOLONE SODIUM SUCCINATE 125 MG/2ML
125 INJECTION, POWDER, LYOPHILIZED, FOR SOLUTION INTRAMUSCULAR; INTRAVENOUS
Status: CANCELLED
Start: 2024-04-23

## 2024-03-15 RX ORDER — PROCHLORPERAZINE MALEATE 10 MG
10 TABLET ORAL EVERY 6 HOURS PRN
Qty: 30 TABLET | Refills: 2 | Status: SHIPPED | OUTPATIENT
Start: 2024-03-15

## 2024-03-15 RX ORDER — ONDANSETRON 2 MG/ML
8 INJECTION INTRAMUSCULAR; INTRAVENOUS ONCE
Status: CANCELLED | OUTPATIENT
Start: 2024-07-16

## 2024-03-15 RX ORDER — HEPARIN SODIUM,PORCINE 10 UNIT/ML
5-20 VIAL (ML) INTRAVENOUS DAILY PRN
Status: CANCELLED | OUTPATIENT
Start: 2024-07-16

## 2024-03-15 RX ORDER — HEPARIN SODIUM (PORCINE) LOCK FLUSH IV SOLN 100 UNIT/ML 100 UNIT/ML
5 SOLUTION INTRAVENOUS
Status: CANCELLED | OUTPATIENT
Start: 2024-05-28

## 2024-03-15 RX ORDER — PACLITAXEL 100 MG/20ML
125 INJECTION, POWDER, LYOPHILIZED, FOR SUSPENSION INTRAVENOUS ONCE
Status: CANCELLED | OUTPATIENT
Start: 2024-05-28

## 2024-03-15 RX ORDER — EPINEPHRINE 1 MG/ML
0.3 INJECTION, SOLUTION INTRAMUSCULAR; SUBCUTANEOUS EVERY 5 MIN PRN
Status: CANCELLED | OUTPATIENT
Start: 2024-04-23

## 2024-03-15 RX ORDER — EPINEPHRINE 1 MG/ML
0.3 INJECTION, SOLUTION INTRAMUSCULAR; SUBCUTANEOUS EVERY 5 MIN PRN
Status: CANCELLED | OUTPATIENT
Start: 2024-05-28

## 2024-03-15 RX ORDER — HEPARIN SODIUM,PORCINE 10 UNIT/ML
5-20 VIAL (ML) INTRAVENOUS DAILY PRN
Status: CANCELLED | OUTPATIENT
Start: 2024-05-28

## 2024-03-15 RX ORDER — HEPARIN SODIUM (PORCINE) LOCK FLUSH IV SOLN 100 UNIT/ML 100 UNIT/ML
5 SOLUTION INTRAVENOUS
Status: CANCELLED | OUTPATIENT
Start: 2024-03-26

## 2024-03-15 RX ORDER — DIPHENHYDRAMINE HYDROCHLORIDE 50 MG/ML
50 INJECTION INTRAMUSCULAR; INTRAVENOUS
Status: CANCELLED
Start: 2024-03-26

## 2024-03-15 RX ORDER — LORAZEPAM 2 MG/ML
0.5 INJECTION INTRAMUSCULAR EVERY 4 HOURS PRN
Status: CANCELLED | OUTPATIENT
Start: 2024-05-28

## 2024-03-15 RX ORDER — HEPARIN SODIUM,PORCINE 10 UNIT/ML
5-20 VIAL (ML) INTRAVENOUS DAILY PRN
Status: CANCELLED | OUTPATIENT
Start: 2024-04-23

## 2024-03-15 RX ORDER — EPINEPHRINE 1 MG/ML
0.3 INJECTION, SOLUTION INTRAMUSCULAR; SUBCUTANEOUS EVERY 5 MIN PRN
Status: CANCELLED | OUTPATIENT
Start: 2024-03-26

## 2024-03-15 RX ORDER — ONDANSETRON 2 MG/ML
8 INJECTION INTRAMUSCULAR; INTRAVENOUS ONCE
Status: CANCELLED | OUTPATIENT
Start: 2024-06-25

## 2024-03-15 RX ORDER — HEPARIN SODIUM,PORCINE 10 UNIT/ML
5-20 VIAL (ML) INTRAVENOUS DAILY PRN
Status: CANCELLED | OUTPATIENT
Start: 2024-04-09

## 2024-03-15 RX ORDER — BUPRENORPHINE 20 UG/H
1 PATCH TRANSDERMAL WEEKLY
Qty: 4 PATCH | Refills: 3 | Status: SHIPPED | OUTPATIENT
Start: 2024-03-15 | End: 2024-05-16

## 2024-03-15 RX ORDER — DIPHENHYDRAMINE HYDROCHLORIDE 50 MG/ML
50 INJECTION INTRAMUSCULAR; INTRAVENOUS
Status: CANCELLED
Start: 2024-07-02

## 2024-03-15 RX ORDER — HEPARIN SODIUM,PORCINE 10 UNIT/ML
5-20 VIAL (ML) INTRAVENOUS DAILY PRN
Status: CANCELLED | OUTPATIENT
Start: 2024-06-25

## 2024-03-15 RX ORDER — LORAZEPAM 2 MG/ML
0.5 INJECTION INTRAMUSCULAR EVERY 4 HOURS PRN
Status: CANCELLED | OUTPATIENT
Start: 2024-07-16

## 2024-03-15 RX ORDER — LORAZEPAM 2 MG/ML
0.5 INJECTION INTRAMUSCULAR EVERY 4 HOURS PRN
Status: CANCELLED | OUTPATIENT
Start: 2024-04-23

## 2024-03-15 RX ORDER — DIPHENHYDRAMINE HYDROCHLORIDE 50 MG/ML
50 INJECTION INTRAMUSCULAR; INTRAVENOUS
Status: CANCELLED
Start: 2024-07-16

## 2024-03-15 RX ORDER — ALBUTEROL SULFATE 90 UG/1
1-2 AEROSOL, METERED RESPIRATORY (INHALATION)
Status: CANCELLED
Start: 2024-04-09

## 2024-03-15 RX ORDER — DIPHENHYDRAMINE HYDROCHLORIDE 50 MG/ML
50 INJECTION INTRAMUSCULAR; INTRAVENOUS
Status: CANCELLED
Start: 2024-04-09

## 2024-03-15 RX ORDER — ALBUTEROL SULFATE 90 UG/1
1-2 AEROSOL, METERED RESPIRATORY (INHALATION)
Status: CANCELLED
Start: 2024-07-02

## 2024-03-15 RX ORDER — PACLITAXEL 100 MG/20ML
125 INJECTION, POWDER, LYOPHILIZED, FOR SUSPENSION INTRAVENOUS ONCE
Status: CANCELLED | OUTPATIENT
Start: 2024-04-09

## 2024-03-15 RX ORDER — ALBUTEROL SULFATE 90 UG/1
1-2 AEROSOL, METERED RESPIRATORY (INHALATION)
Status: CANCELLED
Start: 2024-04-23

## 2024-03-15 RX ORDER — EPINEPHRINE 1 MG/ML
0.3 INJECTION, SOLUTION INTRAMUSCULAR; SUBCUTANEOUS EVERY 5 MIN PRN
Status: CANCELLED | OUTPATIENT
Start: 2024-04-09

## 2024-03-15 RX ORDER — ONDANSETRON 2 MG/ML
8 INJECTION INTRAMUSCULAR; INTRAVENOUS ONCE
Status: CANCELLED | OUTPATIENT
Start: 2024-05-28

## 2024-03-15 RX ORDER — ALBUTEROL SULFATE 90 UG/1
1-2 AEROSOL, METERED RESPIRATORY (INHALATION)
Status: CANCELLED
Start: 2024-05-28

## 2024-03-15 RX ORDER — EPINEPHRINE 1 MG/ML
0.3 INJECTION, SOLUTION INTRAMUSCULAR; SUBCUTANEOUS EVERY 5 MIN PRN
Status: CANCELLED | OUTPATIENT
Start: 2024-07-16

## 2024-03-15 RX ORDER — HEPARIN SODIUM,PORCINE 10 UNIT/ML
5-20 VIAL (ML) INTRAVENOUS DAILY PRN
Status: CANCELLED | OUTPATIENT
Start: 2024-03-26

## 2024-03-15 RX ORDER — MEPERIDINE HYDROCHLORIDE 25 MG/ML
25 INJECTION INTRAMUSCULAR; INTRAVENOUS; SUBCUTANEOUS EVERY 30 MIN PRN
Status: CANCELLED | OUTPATIENT
Start: 2024-07-02

## 2024-03-15 RX ORDER — LORAZEPAM 2 MG/ML
0.5 INJECTION INTRAMUSCULAR EVERY 4 HOURS PRN
Status: CANCELLED | OUTPATIENT
Start: 2024-04-09

## 2024-03-15 RX ORDER — METHYLPREDNISOLONE SODIUM SUCCINATE 125 MG/2ML
125 INJECTION, POWDER, LYOPHILIZED, FOR SOLUTION INTRAMUSCULAR; INTRAVENOUS
Status: CANCELLED
Start: 2024-03-26

## 2024-03-15 RX ORDER — ONDANSETRON 2 MG/ML
8 INJECTION INTRAMUSCULAR; INTRAVENOUS ONCE
Status: CANCELLED | OUTPATIENT
Start: 2024-04-09

## 2024-03-15 RX ORDER — HEPARIN SODIUM (PORCINE) LOCK FLUSH IV SOLN 100 UNIT/ML 100 UNIT/ML
5 SOLUTION INTRAVENOUS
Status: CANCELLED | OUTPATIENT
Start: 2024-04-09

## 2024-03-15 RX ORDER — ALBUTEROL SULFATE 90 UG/1
1-2 AEROSOL, METERED RESPIRATORY (INHALATION)
Status: CANCELLED
Start: 2024-03-26

## 2024-03-15 RX ORDER — ALBUTEROL SULFATE 0.83 MG/ML
2.5 SOLUTION RESPIRATORY (INHALATION)
Status: CANCELLED | OUTPATIENT
Start: 2024-04-09

## 2024-03-15 RX ORDER — ALBUTEROL SULFATE 0.83 MG/ML
2.5 SOLUTION RESPIRATORY (INHALATION)
Status: CANCELLED | OUTPATIENT
Start: 2024-05-28

## 2024-03-15 RX ORDER — HEPARIN SODIUM (PORCINE) LOCK FLUSH IV SOLN 100 UNIT/ML 100 UNIT/ML
5 SOLUTION INTRAVENOUS
Status: CANCELLED | OUTPATIENT
Start: 2024-06-25

## 2024-03-15 RX ORDER — LORAZEPAM 2 MG/ML
0.5 INJECTION INTRAMUSCULAR EVERY 4 HOURS PRN
Status: CANCELLED | OUTPATIENT
Start: 2024-03-26

## 2024-03-15 RX ORDER — MEPERIDINE HYDROCHLORIDE 25 MG/ML
25 INJECTION INTRAMUSCULAR; INTRAVENOUS; SUBCUTANEOUS EVERY 30 MIN PRN
Status: CANCELLED | OUTPATIENT
Start: 2024-04-09

## 2024-03-15 RX ORDER — ALBUTEROL SULFATE 0.83 MG/ML
2.5 SOLUTION RESPIRATORY (INHALATION)
Status: CANCELLED | OUTPATIENT
Start: 2024-03-26

## 2024-03-15 RX ORDER — LORAZEPAM 2 MG/ML
0.5 INJECTION INTRAMUSCULAR EVERY 4 HOURS PRN
Status: CANCELLED | OUTPATIENT
Start: 2024-07-02

## 2024-03-15 RX ORDER — HEPARIN SODIUM (PORCINE) LOCK FLUSH IV SOLN 100 UNIT/ML 100 UNIT/ML
5 SOLUTION INTRAVENOUS
Status: CANCELLED | OUTPATIENT
Start: 2024-07-16

## 2024-03-15 RX ORDER — DIPHENHYDRAMINE HYDROCHLORIDE 50 MG/ML
50 INJECTION INTRAMUSCULAR; INTRAVENOUS
Status: CANCELLED
Start: 2024-05-28

## 2024-03-15 RX ORDER — MEPERIDINE HYDROCHLORIDE 25 MG/ML
25 INJECTION INTRAMUSCULAR; INTRAVENOUS; SUBCUTANEOUS EVERY 30 MIN PRN
Status: CANCELLED | OUTPATIENT
Start: 2024-07-16

## 2024-03-15 RX ORDER — ONDANSETRON 2 MG/ML
8 INJECTION INTRAMUSCULAR; INTRAVENOUS ONCE
Status: CANCELLED | OUTPATIENT
Start: 2024-03-26

## 2024-03-15 RX ORDER — MEPERIDINE HYDROCHLORIDE 25 MG/ML
25 INJECTION INTRAMUSCULAR; INTRAVENOUS; SUBCUTANEOUS EVERY 30 MIN PRN
Status: CANCELLED | OUTPATIENT
Start: 2024-05-28

## 2024-03-15 RX ORDER — HEPARIN SODIUM (PORCINE) LOCK FLUSH IV SOLN 100 UNIT/ML 100 UNIT/ML
5 SOLUTION INTRAVENOUS
Status: CANCELLED | OUTPATIENT
Start: 2024-04-23

## 2024-03-15 RX ORDER — ALBUTEROL SULFATE 0.83 MG/ML
2.5 SOLUTION RESPIRATORY (INHALATION)
Status: CANCELLED | OUTPATIENT
Start: 2024-07-16

## 2024-03-15 RX ORDER — ALBUTEROL SULFATE 90 UG/1
1-2 AEROSOL, METERED RESPIRATORY (INHALATION)
Status: CANCELLED
Start: 2024-07-16

## 2024-03-15 RX ORDER — BUPRENORPHINE 10 UG/H
1 PATCH TRANSDERMAL
Qty: 4 PATCH | Refills: 3 | Status: SHIPPED | OUTPATIENT
Start: 2024-03-15 | End: 2024-05-16

## 2024-03-15 RX ORDER — PACLITAXEL 100 MG/20ML
125 INJECTION, POWDER, LYOPHILIZED, FOR SUSPENSION INTRAVENOUS ONCE
Status: CANCELLED | OUTPATIENT
Start: 2024-06-25

## 2024-03-15 RX ORDER — ALBUTEROL SULFATE 0.83 MG/ML
2.5 SOLUTION RESPIRATORY (INHALATION)
Status: CANCELLED | OUTPATIENT
Start: 2024-04-23

## 2024-03-15 RX ORDER — METHYLPREDNISOLONE SODIUM SUCCINATE 125 MG/2ML
125 INJECTION, POWDER, LYOPHILIZED, FOR SOLUTION INTRAMUSCULAR; INTRAVENOUS
Status: CANCELLED
Start: 2024-05-28

## 2024-03-15 RX ORDER — PACLITAXEL 100 MG/20ML
125 INJECTION, POWDER, LYOPHILIZED, FOR SUSPENSION INTRAVENOUS ONCE
Status: CANCELLED | OUTPATIENT
Start: 2024-07-16

## 2024-03-15 RX ORDER — ONDANSETRON 2 MG/ML
8 INJECTION INTRAMUSCULAR; INTRAVENOUS ONCE
Status: CANCELLED | OUTPATIENT
Start: 2024-04-23

## 2024-03-15 RX ORDER — ALBUTEROL SULFATE 0.83 MG/ML
2.5 SOLUTION RESPIRATORY (INHALATION)
Status: CANCELLED | OUTPATIENT
Start: 2024-07-02

## 2024-03-15 RX ORDER — PACLITAXEL 100 MG/20ML
125 INJECTION, POWDER, LYOPHILIZED, FOR SUSPENSION INTRAVENOUS ONCE
Status: CANCELLED | OUTPATIENT
Start: 2024-03-26

## 2024-03-15 RX ORDER — PACLITAXEL 100 MG/20ML
125 INJECTION, POWDER, LYOPHILIZED, FOR SUSPENSION INTRAVENOUS ONCE
Status: CANCELLED | OUTPATIENT
Start: 2024-04-23

## 2024-03-15 RX ORDER — EPINEPHRINE 1 MG/ML
0.3 INJECTION, SOLUTION INTRAMUSCULAR; SUBCUTANEOUS EVERY 5 MIN PRN
Status: CANCELLED | OUTPATIENT
Start: 2024-07-02

## 2024-03-15 RX ORDER — METHYLPREDNISOLONE SODIUM SUCCINATE 125 MG/2ML
125 INJECTION, POWDER, LYOPHILIZED, FOR SOLUTION INTRAMUSCULAR; INTRAVENOUS
Status: CANCELLED
Start: 2024-07-02

## 2024-03-15 RX ORDER — MEPERIDINE HYDROCHLORIDE 25 MG/ML
25 INJECTION INTRAMUSCULAR; INTRAVENOUS; SUBCUTANEOUS EVERY 30 MIN PRN
Status: CANCELLED | OUTPATIENT
Start: 2024-03-26

## 2024-03-15 ASSESSMENT — PAIN SCALES - GENERAL: PAINLEVEL: NO PAIN (0)

## 2024-03-15 NOTE — PROGRESS NOTES
Virtual Visit Details    Type of service:  Video Visit     Originating Location (pt. Location): Home    Distant Location (provider location):  On-site  Platform used for Video Visit: Lorna

## 2024-03-15 NOTE — NURSING NOTE
Is the patient currently in the state of [place]? YES    Visit mode:VIDEO    If the visit is dropped, the patient can be reconnected by: VIDEO VISIT: Send to e-mail at: [email or web address]    Will anyone else be joining the visit? NO  (If patient encounters technical issues they should call [phone])    How would you like to obtain your AVS? MyChart    Are changes needed to the allergy or medication list? Pt declined med review    Reason for visit: LO NICOLE

## 2024-03-15 NOTE — PROGRESS NOTES
[facility] - Medical Oncology TeleHealth Consult Note  3/15/2024    Patient Identifiers     Name: Gallo Navarro  Preferred Address: [place]  Preferred Language: English  : 1965  Gender: male    Assessment and Plan     Mr. Gallo Navarro is a 58 year old male with a history of IDDM and metastatic pancreas cancer (23) on second-line [place]/Abraxane who returns to clinic for treatment response evaluation and clinical trial counseling.    NGS: KRAS G12R  Immuno: pMMR, KAYLIE, TMB-low  Clinical Trial: MARIPOSA-186, MARIPOSA-280, TORL    Gallo is seen by video visit to follow-up on recommendations made by MD Lombardi and for further treatment response evaluation.  Patient's imaging demonstrates overall stable disease with isolated progression in potential L4 lesion.  The appearance of this L4 density is somewhat unusual for solid tumor metastasis as it is perfectly symmetrical and is progressing symmetrically.  Regardless, it is change in size in the setting of unknown malignancy raises overall concern, so we will provide a referral to radiation oncology for potential oligo progressive treatment.  As noted previously, patient is essentially on last line effective therapy for pancreas cancer, and we consider it largely a bridge to clinical trial.  We encouraged him to reach out to MD Harjit for consideration of their cellular therapy trials soon as possible, with the knowledge that this regimen remains as a potential bridge to cell transfusion in future.    We appreciate recommendations from Dr. Menjivar, and the potential for cellular therapy clinical trial for patient.  We may consider use of denosumab instead of bisphosphonate, given the evidence for potentially superior efficacy.  However, patient is tolerating this treatment extremely well now, and has well-controlled skeletal mets.  We will continue to consider his skeletal treatment options as we move forward.    Patient continues to have neuropathic issues, which  do not appear to be significantly worsened by paclitaxel.  We have provided referral to physical therapy for foot related treatment.  Patient is encouraged to reach out to our team if other referrals may be of benefit in the setting.    Plan for continuation of [place]/Abraxane plus Zometa.  SUZETTE visits monthly with lab and clinical monitoring.  Plan for MD visit in 3 months with CT scans prior for continued treatment response evaluation.    45 minutes spent on the date of the encounter doing chart review, review of test results, interpretation of tests, patient visit, and documentation     Luis Haile MD, PhD   of Medicine  Division of Hematology, Oncology and Transplantation  [facility]    -----------------------------------    Oncology Summary     Cancer Staging   Malignant neoplasm of head of pancreas (H)  Staging form: Pancreas, AJCC 8th Edition  - Clinical stage from 7/11/2023: Stage III (cT2, cN2, cM0) - Signed by Luis Haile MD on 7/14/2023      Oncology History   Malignant neoplasm of head of pancreas (H)   7/11/2023 -  Cancer Staged    Staging form: Pancreas, AJCC 8th Edition  - Clinical stage from 7/11/2023: Stage III (cT2, cN2, cM0)     7/14/2023 Initial Diagnosis    Malignant neoplasm of head of pancreas (H)     7/31/2023 - 10/21/2023 Chemotherapy    OP ONC Pancreatic Cancer - Modified FOLFIRINOX  Plan Provider: Luis Haile MD  Treatment goal: Curative  Line of treatment: [No plan line of treatment]     10/31/2023 -  Chemotherapy    OP ONC Pancreatic Cancer - PACLitaxel protein-bound (Abraxane) / Gemcitabine  Plan Provider: Luis Haile MD  Treatment goal: Curative  Line of treatment: Second Line         Subjective/Interval Events     - eating well; weight has been stable. Bowel movements are back to normal  - He has persistent neuropathic pain in his feet, now up to his ankles. He remains functional but wishes for some relief.  - Pain in his back resumes when he is  between pain med doses. Attributes waxing/waning but feels that this may be related to physical exhaustion.    Objective Data     Lab data:  I have personally reviewed the lab data, notable for:    -  4  - Hgb 10.0  - Plt 116    Radiology data:  I have personally reviewed the radiology data, notable for:  03/11/2024 CT C/A/P  IMPRESSION:   1. No significant interval change in the poorly marginated  infiltrative pancreatic head mass and prominent peripancreatic and  mesenteric lymph nodes adjacent to this mass.  2. Interval enlargement of the sclerotic lesion in the superior  endplate of L4. Other sclerotic metastatic lesions are not  significantly changed. No new osseous lesion.  3. Resolution of the 1.2 cm irregular nodule in the paramedian left  upper lobe described on prior exam. No suspicious pulmonary nodules.  4. Resolution of the right-sided pulmonary emboli visualized on  comparison prior.  5. Gastrojejunal, duodenal, biliary, and pancreatic stents in place.  No evidence of biliary or bowel obstruction.    Pathology and other data:  I have personally reviewed and interpreted the pathology data, notable for:    - no new data    Billing Stuff        No Vaccines Administered. Hep B, adolescent or pediatric; 2024 05:55; Jessica Davis (HEBER); Ovalis; 42B22 (Exp. Date: 07-Mar-2026); IntraMuscular; Ventrogluteal Left.; 0.5 milliLiter(s); VIS (VIS Published: 12-May-2023, VIS Presented: 2024);

## 2024-03-15 NOTE — CONFIDENTIAL NOTE
Compazine and Tylenol Refill   Last prescribing provider: Megan Farrell     Last clinic visit date: 3/15/24 Dr Haile     Recommendations for requested medication (if none, N/A): N/A    Any other pertinent information (if none, N/A): N/A    Refilled: Y/N, if NO, why?

## 2024-03-15 NOTE — LETTER
3/15/2024         RE: Gallo Navarro  [id number] [address] [place] [id number]        Dear Colleague,    Thank you for referring your patient, Gallo Navarro, to the [facility]. Please see a copy of my visit note below.    Virtual Visit Details    Type of service:  Video Visit     Originating Location (pt. Location): Home    Distant Location (provider location):  On-site  Platform used for Video Visit: [facility] - Medical Oncology TeleHealth Consult Note  3/15/2024    Patient Identifiers     Name: Gallo Navarro  Preferred Address: [place]  Preferred Language: English  : 1965  Gender: male    Assessment and Plan     Mr. Gallo Navarro is a 58 year old male with a history of IDDM and metastatic pancreas cancer (23) on second-line [place]/Abraxane who returns to clinic for treatment response evaluation and clinical trial counseling.    NGS: KRAS G12R  Immuno: pMMR, KAYLIE, TMB-low  Clinical Trial: MARIPOSA-186, MARIOPSA-280, SHAWANDA    Gallo is seen by video visit to follow-up on recommendations made by MD Lombardi and for further treatment response evaluation.  Patient's imaging demonstrates overall stable disease with isolated progression in potential L4 lesion.  The appearance of this L4 density is somewhat unusual for solid tumor metastasis as it is perfectly symmetrical and is progressing symmetrically.  Regardless, it is change in size in the setting of unknown malignancy raises overall concern, so we will provide a referral to radiation oncology for potential oligo progressive treatment.  As noted previously, patient is essentially on last line effective therapy for pancreas cancer, and we consider it largely a bridge to clinical trial.  We encouraged him to reach out to MD Lombardi for consideration of their cellular therapy trials soon as possible, with the knowledge that this regimen remains as a potential bridge to cell transfusion in future.    We appreciate  recommendations from Dr. Menjivar, and the potential for cellular therapy clinical trial for patient.  We may consider use of denosumab instead of bisphosphonate, given the evidence for potentially superior efficacy.  However, patient is tolerating this treatment extremely well now, and has well-controlled skeletal mets.  We will continue to consider his skeletal treatment options as we move forward.    Patient continues to have neuropathic issues, which do not appear to be significantly worsened by paclitaxel.  We have provided referral to physical therapy for foot related treatment.  Patient is encouraged to reach out to our team if other referrals may be of benefit in the setting.    Plan for continuation of [place]/Abraxane plus Zometa.  SUZETTE visits monthly with lab and clinical monitoring.  Plan for MD visit in 3 months with CT scans prior for continued treatment response evaluation.    45 minutes spent on the date of the encounter doing chart review, review of test results, interpretation of tests, patient visit, and documentation     Luis Haile MD, PhD   of Medicine  Division of Hematology, Oncology and Transplantation  [facility]    -----------------------------------    Oncology Summary     Cancer Staging   Malignant neoplasm of head of pancreas (H)  Staging form: Pancreas, AJCC 8th Edition  - Clinical stage from 7/11/2023: Stage III (cT2, cN2, cM0) - Signed by Luis Haile MD on 7/14/2023      Oncology History   Malignant neoplasm of head of pancreas (H)   7/11/2023 -  Cancer Staged    Staging form: Pancreas, AJCC 8th Edition  - Clinical stage from 7/11/2023: Stage III (cT2, cN2, cM0)     7/14/2023 Initial Diagnosis    Malignant neoplasm of head of pancreas (H)     7/31/2023 - 10/21/2023 Chemotherapy    OP ONC Pancreatic Cancer - Modified FOLFIRINOX  Plan Provider: Luis Haile MD  Treatment goal: Curative  Line of treatment: [No plan line of treatment]     10/31/2023 -   Chemotherapy    OP ONC Pancreatic Cancer - PACLitaxel protein-bound (Abraxane) / Gemcitabine  Plan Provider: Luis Haile MD  Treatment goal: Curative  Line of treatment: Second Line         Subjective/Interval Events     - eating well; weight has been stable. Bowel movements are back to normal  - He has persistent neuropathic pain in his feet, now up to his ankles. He remains functional but wishes for some relief.  - Pain in his back resumes when he is between pain med doses. Attributes waxing/waning but feels that this may be related to physical exhaustion.    Objective Data     Lab data:  I have personally reviewed the lab data, notable for:    -  4  - Hgb 10.0  - Plt 116    Radiology data:  I have personally reviewed the radiology data, notable for:  03/11/2024 CT C/A/P  IMPRESSION:   1. No significant interval change in the poorly marginated  infiltrative pancreatic head mass and prominent peripancreatic and  mesenteric lymph nodes adjacent to this mass.  2. Interval enlargement of the sclerotic lesion in the superior  endplate of L4. Other sclerotic metastatic lesions are not  significantly changed. No new osseous lesion.  3. Resolution of the 1.2 cm irregular nodule in the paramedian left  upper lobe described on prior exam. No suspicious pulmonary nodules.  4. Resolution of the right-sided pulmonary emboli visualized on  comparison prior.  5. Gastrojejunal, duodenal, biliary, and pancreatic stents in place.  No evidence of biliary or bowel obstruction.    Pathology and other data:  I have personally reviewed and interpreted the pathology data, notable for:    - no new data    Billing Stuff         Luis Haile MD

## 2024-03-15 NOTE — TELEPHONE ENCOUNTER
Received [employer] message from patient requesting refill of butrans.     Last refill: 2/13/24  Last office visit: 2/22/24  Scheduled for follow up 4/25/24     Will route request to MD for review.     Reviewed [place]  Report.

## 2024-03-18 ENCOUNTER — MYC MEDICAL ADVICE (OUTPATIENT)
Dept: ONCOLOGY | Facility: CLINIC | Age: 59
End: 2024-03-18
Payer: COMMERCIAL

## 2024-03-19 ENCOUNTER — MYC REFILL (OUTPATIENT)
Dept: ONCOLOGY | Facility: CLINIC | Age: 59
End: 2024-03-19
Payer: COMMERCIAL

## 2024-03-19 DIAGNOSIS — I26.94 MULTIPLE SUBSEGMENTAL PULMONARY EMBOLI WITHOUT ACUTE COR PULMONALE (H): ICD-10-CM

## 2024-03-19 DIAGNOSIS — E55.9 VITAMIN D DEFICIENCY: ICD-10-CM

## 2024-03-19 DIAGNOSIS — R10.30 LOWER ABDOMINAL PAIN: ICD-10-CM

## 2024-03-19 RX ORDER — DICYCLOMINE HYDROCHLORIDE 10 MG/1
10 CAPSULE ORAL
Qty: 120 CAPSULE | Refills: 1 | Status: SHIPPED | OUTPATIENT
Start: 2024-03-19 | End: 2024-05-13

## 2024-03-19 RX ORDER — CHOLECALCIFEROL (VITAMIN D3) 50 MCG
1 TABLET ORAL DAILY
Qty: 90 TABLET | Refills: 1 | Status: SHIPPED | OUTPATIENT
Start: 2024-03-19 | End: 2024-07-03

## 2024-03-19 NOTE — TELEPHONE ENCOUNTER
Medication requested: dicyclomine 10 mg capsule    Last prescribing provider: Luis Haile MD on 11/17/23    Last clinic visit date: 3/15/24 with Dr. Haile    Recommendations for requested medication (if none, N/A): NA    Any other pertinent information (if none, N/A): NA    Refilled: Y/N, if NO, why?    Pended and Routed to Luis Haile MD

## 2024-03-22 ENCOUNTER — OFFICE VISIT (OUTPATIENT)
Dept: RADIATION ONCOLOGY | Facility: CLINIC | Age: 59
End: 2024-03-22
Payer: COMMERCIAL

## 2024-03-22 ENCOUNTER — MYC MEDICAL ADVICE (OUTPATIENT)
Dept: ONCOLOGY | Facility: CLINIC | Age: 59
End: 2024-03-22

## 2024-03-22 VITALS
SYSTOLIC BLOOD PRESSURE: 126 MMHG | TEMPERATURE: 98.5 F | HEART RATE: 68 BPM | DIASTOLIC BLOOD PRESSURE: 81 MMHG | RESPIRATION RATE: 16 BRPM | OXYGEN SATURATION: 97 %

## 2024-03-22 DIAGNOSIS — C79.51 SECONDARY MALIGNANT NEOPLASM OF BONE (H): ICD-10-CM

## 2024-03-22 DIAGNOSIS — C25.0 MALIGNANT NEOPLASM OF HEAD OF PANCREAS (H): Primary | ICD-10-CM

## 2024-03-22 PROCEDURE — 99215 OFFICE O/P EST HI 40 MIN: CPT | Performed by: SURGERY

## 2024-03-22 PROCEDURE — 99417 PROLNG OP E/M EACH 15 MIN: CPT | Performed by: SURGERY

## 2024-03-22 ASSESSMENT — PAIN SCALES - GENERAL: PAINLEVEL: NO PAIN (0)

## 2024-03-22 NOTE — LETTER
3/22/2024         RE: Gallo Navarro  [address] [facility] [address]        Dear Colleague,    Thank you for referring your patient, Gallo Navarro, to the [facility] [place]. Please see a copy of my visit note below.       Department of Radiation Oncology  [facility]  [facility]  [address] [place] [address]  [phone]       Follow-up note (Re-consultation)    Name: Gallo Navarro MRN: [id number]   : 1965   Date of Service: Mar 22, 2024   Referring: Dr. Anguiano     Reason for consultation: metastatic pancreatic cancer to bone    History of Present Illness     Mr. Navarro is a 58 year old male with metastatic pancreatic cancer to bones.  He has completed palliative radiotherapy to T10, 20 Gray/5 fractions 2023.  He now presents for radiation to L4 in the setting of palliation/oligo progression.    Briefly, his oncologic history is as follows. He was seen by my colleague at the [facility], Dr. Becca Anguiano, note is referenced from that consultation note dated 23:    Patient's diagnosis came to light when he presented to [facility] ED on 2023 with worsening pre-existing abdominal pain and jaundice, weight loss and poor p.o. intake.  He has a history of acute pancreatitis with ileus and duodenal stenosis, gastric outlet obstruction requiring GJ tube (placed 2023) for feeding.  After receiving ERCP with sphincterotomy at [facility] prior to admission, the patient did not have relief of abdominal pain, and he presented to [facility] to establish care with our system.     Prior to his care at [facility] in 2023 she had initial presentation of sudden onset vomiting and found to have lipase greater than 2000, later revealed to have acquired duodenal stricture and gastric outlet obstruction.  He had PEG GJ placed for gastric venting and nutrition after weight loss of 40  pounds.  CT of the abdomen performed in June 2023 for pancreatitis follow-up revealed focal irregular hypodense masslike region in the pancreatic head measuring 2.2 x 1.5 x 1.4 cm with persistent mild main pancreatic ductal dilation and associated subcentimeter mesenteric lymph nodes.  EUS biopsy on 6/27/2020 was nondiagnostic showing duodenal inflammation.  He then proceeded with ERCP on 7/7/2023, as above.      CT abdomen pelvis on 7/8 showed pancreatic mass without pancreatic ductal dilatation, with biliary stent in position, mild pneumobilia, calcific density adjacent to biliary stent.     MRCP from 7/10/2023 showed ill-defined mass soft tissue in the pancreatic head suspicious for malignancy given biliary and pancreatic duct obstruction with vascular encasement and narrowing of SMV, less likely pancreatitis.  Mildly enlarged peripancreatic and periportal and enteric lymph nodes are present, indeterminate.     At [facility] he underwent EGD/EUS on 7/11/2023, with pathology showing adenocarcinoma.  Surgical oncology was consulted, who felt surgery should be reconsidered after 6 months of adjuvant chemotherapy.     CT chest from 7/12/2023 showed an ill-defined pancreatic head mass with upper abdominal lymphadenopathy, and a 7 mm pulmonary nodule left upper lobe, indeterminate. He underwent duodenal stenting on 7/13/2023.     He initiated systemic therapy was FOLFIRINOX on 7/31/2023.     CT chest abdomen pelvis on 10/24/2023 showed interval enlargement of scattered sclerotic osseous metastases, and enlarging subpleural 1.2 cm pulmonary nodule in paramediastinal EAMON, no substantial change in the pancreatic head mass.  Filling defects were seen in the right middle and lower lobe, concerning for emboli.  Echo follow-up CT PE was performed on 10/31/2023 which revealed a acute PE in the right lower lobe segment.  He was subsequently prescribed Eliquis twice daily by Dr. Haile.       Upon follow-up with Dr. Haile on  11/6/2023, review of imaging showed disease progression, particularly with skeletal metastatic progression.  He was recommended for treatment for the T10 lesion given its location to the right pedicle.  Dr. Haile changed systemic therapy to gemcitabine/Abraxane with Zometa for bony lesion control on 10/31/23.    Interval history:    He completed palliative radiotherapy to T10, 20 Gray/5 fractions completed on 11/29/2023.  He has since been on systemic therapy under care of Dr. Haile.      He underwent a PET CT scan on 1/30/2024, which demonstrated pancreatic head mass with FDG uptake, with metastatic adenopathy, with lung nodules suspicious for metastatic disease.  There was sclerotic disease at T10 and L4 in the left pubic bone, but these were not FDG avid on the PET scan.    CT scan on 3/11/2024 demonstrated fairly stable disease with a pancreatic head mass and lacey-pancreatic lymph nodes.  There was no additional suspicious pulmonary nodules with resolution of prior nodules in the lungs.  However there was interval enlargement of L4 lesion, now measuring 1.8 cm in size, previously 1.4 cm.  Thus, he was referred for consideration of palliative radiotherapy to L4.  He is having mild discomfort in this location, not extreme.  Overall he tolerated radiation well last time.  He wishes to pursue palliative radiotherapy as he continues systemic therapy under care of Dr. Haile, with plans for eventually enrolling in a clinical trial at [facility].     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     Gastric outlet obstruction      Metastasis from pancreatic cancer (H)      Personal history of chemotherapy     Gemzar + Abraxane started on 10/31/2023     Recurrent acute pancreatitis        Past Surgical History:   Past Surgical History:   Procedure Laterality Date     AS OPEN TREATMENT CLAVICULAR FRACTURE INTERNAL  FX       ENDOSCOPIC RETROGRADE CHOLANGIOPANCREATOGRAM N/A 7/11/2023    Procedure: ENDOSCOPIC RETROGRADE CHOLANGIOPANCREATOGRAPHY;  Surgeon: Khadar Pickett MD;  Location: [facility] OR     ENDOSCOPIC RETROGRADE CHOLANGIOPANCREATOGRAM N/A 8/17/2023    Procedure: ENDOSCOPIC RETROGRADE  with stent removal x1, balloon sweep;  Surgeon: Christos Greenberg MD;  Location: [facility] OR     ENDOSCOPIC ULTRASOUND UPPER GASTROINTESTINAL TRACT (GI) N/A 7/11/2023    Procedure: Endoscopic ultrasound upper gastrointestinal tract (GI), with biposy, GJ tube repositioning, stent placement;  Surgeon: Khadar Pickett MD;  Location: [facility] OR     ENDOSCOPIC ULTRASOUND UPPER GASTROINTESTINAL TRACT (GI) N/A 7/13/2023    Procedure: ENDOSCOPIC ULTRASOUND, ESOPHAGOSCOPY, EUS guided gastrojejunostomy;  Surgeon: Tre York MD;  Location: [facility] OR     INSERT PICC LINE  04/29/2023     INSERT PORT VASCULAR ACCESS Right 7/28/2023    Procedure: Insert port vascular access;  Surgeon: Tom العلي MD;  Location: [facility] OR     IR CHEST PORT PLACEMENT > 5 YRS OF AGE  7/28/2023     IR NG TUBE PLACEMENT REQ RAD & FLUORO  05/08/2023    JG tube     REPLACE GASTROJEJUNOSTOMY TUBE, PERCUTANEOUS N/A 8/17/2023    Procedure: possible REPLACEMENT, GASTROJEJUNOSTOMY TUBE, PERCUTANEOUS;  Surgeon: Christos Greenberg MD;  Location: [facility] OR       Chemotherapy History:  Per HPI    Radiation History:  No prior RT    Pregnant: No  Implanted Cardiac Devices: No    Medications:  Current Outpatient Medications   Medication     acetaminophen (TYLENOL) 32 mg/mL liquid     apixaban ANTICOAGULANT (ELIQUIS) 5 MG tablet     blood glucose (FREESTYLE TEST STRIPS) test strip     buprenorphine (BUTRANS) 10 MCG/HR WK patch     buprenorphine (BUTRANS) 20 MCG/HR WK patch     dicyclomine (BENTYL) 10 MG capsule     HYDROmorphone (DILAUDID) 2 MG tablet     lidocaine-prilocaine (EMLA) 2.5-2.5 % external cream     lipase-protease-amylase (CREON 24) 11330-58237-441353 units CPEP per EC capsule      methylphenidate (RITALIN) 5 MG tablet     Multiple Vitamins-Minerals (CENTRUM SILVER 50+MEN) TABS     pantoprazole (PROTONIX) 40 MG EC tablet     prochlorperazine (COMPAZINE) 10 MG tablet     vitamin D3 (CHOLECALCIFEROL) 50 mcg (2000 units) tablet     No current facility-administered medications for this visit.         Allergies:   No Known Allergies    Social History:    Social History     Tobacco Use     Smoking status: Never     Passive exposure: Never     Smokeless tobacco: Never   Vaping Use     Vaping Use: Never used   Substance Use Topics     Alcohol use: Not Currently     Drug use: Never         Family History:  Family History   Problem Relation Age of Onset     Diabetes Type 2  Father      Cirrhosis Father      Genetic Disorder Brother         missing [id number], mild retardation     Colon Polyps Brother      Pancreatic Cancer Paternal Aunt 85     Colon Cancer Paternal Uncle      Pancreatitis Cousin        Review of Systems   A 10-point review of systems was performed. Pertinent findings are noted in the HPI.    Physical Exam   ECOG Status: 2    Vitals:  /81   Pulse 68   Temp 98.5  F (36.9  C)   Resp 16   SpO2 97%     Gen: Alert, in NAD  Head: NC/AT  Eyes: PERRL, EOMI, sclera anicteric  Ears: No external auricular lesions  Nose/sinus: No rhinorrhea or epistaxis  Oral cavity/oropharynx: MMM, no visible oral cavity lesions  Neck: Full ROM, supple, no palpable adenopathy  Pulm: No wheezing, stridor or respiratory distress  CV: Extremities are warm and well-perfused, no cyanosis, no pedal edema  Abdominal: Normal bowel sounds, soft, nontender, no masses  Musculoskeletal: Normal bulk and tone, mildly tender to palpation in lower thoracic/upper lumbar location  Skin: Normal color and turgor  Neuro: A/Ox3, CN II-XII intact, normal gait    Imaging/Path/Labs   Imaging: per HPI, personally reviewed and in agreement    CT 10/24/23 (pre -RT)            CT 3/11/24          Path: per HPI, personally  reviewed and in agreement    Labs: per HPI, personally reviewed and in agreement    I have personally reviewed Dr. Haile's notes     Assessment      Mr. Navarro is a 58 year old male with metastatic pancreatic cancer to bones.  He has completed palliative radiotherapy to T10, 20 Gray/5 fractions November 29, 2023.  He now presents for radiation to L4 in the setting of palliation/oligo progression.    In general, he is having mild symptoms related to the lumbar back pain due to L4 lesion.  However he is undergone prior radiation to T10 for metastatic disease at that location and is tolerated radiation fairly well, and disease has remained fairly stable not location/slightly response.  He has now had stable disease in the pancreas as well as pink peripancreatic lymph nodes, with oligo progression at the L4 location.  Thus, he was referred for consideration of palliative radiation to L4 in the setting of oligo progression.    In general, given young age and overall performance status I would favor being aggressive about radiation to this location in the setting of oligo progressive disease.  I discussed continuing systemic therapy with short interval imaging versus pursuing palliative radiation at L4.  After discussing both with Dr. Haile and patient, we will be treating the L4 lesion with radiation.     Plan     After extensive discussion, patient wishes to pursue palliative radiotherapy to L4 in the setting of oligoprogression.     CT simulation to be performed patient patient preference, with tentative start in the near future.  We will plan for 30Gy/10fx to L4.     I have directly discussed this case with Dr. Haile who is in agreement with the plan.       All benefits and risks discussed, and patient is in agreement with the oncologic plan discussed above.     Thank you for allowing me to participate in your patient's care.  If you should require any additional information, please do not hesitate in contacting  me.     Ben Reaves MD  Department of Radiation Oncology  [facility]     A total of 70 minutes were spent on this visit, including preparation for this visit, face to face with patient, and medical decision making. Medical decision-making included consideration and possible diagnosis, management options, complex record review, review of diagnostic tests, consideration and discussion of significant complications based up medical history/comorbidities, and discussion with providers involved in care of the patient.       Again, thank you for allowing me to participate in the care of your patient.        Sincerely,        Ben Reaves MD

## 2024-03-22 NOTE — PROGRESS NOTES
Department of Radiation Oncology  [facility] Cancer Center  [facility]  [address] [place] [address] [phone]       Follow-up note (Re-consultation)    Name: Gallo Navarro MRN: [id number]   : 1965   Date of Service: Mar 22, 2024   Referring: Dr. Anguiano     Reason for consultation: metastatic pancreatic cancer to bone    History of Present Illness     Mr. Navarro is a 58 year old male with metastatic pancreatic cancer to bones.  He has completed palliative radiotherapy to T10, 20 Gray/5 fractions 2023.  He now presents for radiation to L4 in the setting of palliation/oligo progression.    Briefly, his oncologic history is as follows. He was seen by my colleague at the [facility], Dr. Becca Anguiano, note is referenced from that consultation note dated 23:    Patient's diagnosis came to light when he presented to [facility] ED on 2023 with worsening pre-existing abdominal pain and jaundice, weight loss and poor p.o. intake.  He has a history of acute pancreatitis with ileus and duodenal stenosis, gastric outlet obstruction requiring GJ tube (placed 2023) for feeding.  After receiving ERCP with sphincterotomy at [facility] prior to admission, the patient did not have relief of abdominal pain, and he presented to [facility] to establish care with our system.     Prior to his care at [facility] in 2023 she had initial presentation of sudden onset vomiting and found to have lipase greater than 2000, later revealed to have acquired duodenal stricture and gastric outlet obstruction.  He had PEG GJ placed for gastric venting and nutrition after weight loss of 40 pounds.  CT of the abdomen performed in 2023 for pancreatitis follow-up revealed focal irregular hypodense masslike region in the pancreatic head measuring 2.2 x 1.5 x 1.4 cm with persistent mild main pancreatic ductal dilation and associated subcentimeter  mesenteric lymph nodes.  EUS biopsy on 6/27/2020 was nondiagnostic showing duodenal inflammation.  He then proceeded with ERCP on 7/7/2023, as above.      CT abdomen pelvis on 7/8 showed pancreatic mass without pancreatic ductal dilatation, with biliary stent in position, mild pneumobilia, calcific density adjacent to biliary stent.     MRCP from 7/10/2023 showed ill-defined mass soft tissue in the pancreatic head suspicious for malignancy given biliary and pancreatic duct obstruction with vascular encasement and narrowing of SMV, less likely pancreatitis.  Mildly enlarged peripancreatic and periportal and enteric lymph nodes are present, indeterminate.     At [facility] he underwent EGD/EUS on 7/11/2023, with pathology showing adenocarcinoma.  Surgical oncology was consulted, who felt surgery should be reconsidered after 6 months of adjuvant chemotherapy.     CT chest from 7/12/2023 showed an ill-defined pancreatic head mass with upper abdominal lymphadenopathy, and a 7 mm pulmonary nodule left upper lobe, indeterminate. He underwent duodenal stenting on 7/13/2023.     He initiated systemic therapy was FOLFIRINOX on 7/31/2023.     CT chest abdomen pelvis on 10/24/2023 showed interval enlargement of scattered sclerotic osseous metastases, and enlarging subpleural 1.2 cm pulmonary nodule in paramediastinal EAMON, no substantial change in the pancreatic head mass.  Filling defects were seen in the right middle and lower lobe, concerning for emboli.  Echo follow-up CT PE was performed on 10/31/2023 which revealed a acute PE in the right lower lobe segment.  He was subsequently prescribed Eliquis twice daily by Dr. Haile.       Upon follow-up with Dr. Haile on 11/6/2023, review of imaging showed disease progression, particularly with skeletal metastatic progression.  He was recommended for treatment for the T10 lesion given its location to the right pedicle.  Dr. Haile changed systemic therapy to gemcitabine/Abraxane with  Zometa for bony lesion control on 10/31/23.    Interval history:    He completed palliative radiotherapy to T10, 20 Gray/5 fractions completed on 11/29/2023.  He has since been on systemic therapy under care of Dr. Haile.      He underwent a PET CT scan on 1/30/2024, which demonstrated pancreatic head mass with FDG uptake, with metastatic adenopathy, with lung nodules suspicious for metastatic disease.  There was sclerotic disease at T10 and L4 in the left pubic bone, but these were not FDG avid on the PET scan.    CT scan on 3/11/2024 demonstrated fairly stable disease with a pancreatic head mass and lacey-pancreatic lymph nodes.  There was no additional suspicious pulmonary nodules with resolution of prior nodules in the lungs.  However there was interval enlargement of L4 lesion, now measuring 1.8 cm in size, previously 1.4 cm.  Thus, he was referred for consideration of palliative radiotherapy to L4.  He is having mild discomfort in this location, not extreme.  Overall he tolerated radiation well last time.  He wishes to pursue palliative radiotherapy as he continues systemic therapy under care of Dr. Haile, with plans for eventually enrolling in a clinical trial at [facility].     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    Gastric outlet obstruction     Metastasis from pancreatic cancer (H)     Personal history of chemotherapy     Gemzar + Abraxane started on 10/31/2023    Recurrent acute pancreatitis        Past Surgical History:   Past Surgical History:   Procedure Laterality Date    AS OPEN TREATMENT CLAVICULAR FRACTURE INTERNAL FX      ENDOSCOPIC RETROGRADE CHOLANGIOPANCREATOGRAM N/A 7/11/2023    Procedure: ENDOSCOPIC RETROGRADE CHOLANGIOPANCREATOGRAPHY;  Surgeon: Khadar Pickett MD;  Location: [facility] OR    ENDOSCOPIC RETROGRADE CHOLANGIOPANCREATOGRAM N/A 8/17/2023    Procedure: ENDOSCOPIC RETROGRADE   with stent removal x1, balloon sweep;  Surgeon: Christos Greenberg MD;  Location: [facility] OR    ENDOSCOPIC ULTRASOUND UPPER GASTROINTESTINAL TRACT (GI) N/A 7/11/2023    Procedure: Endoscopic ultrasound upper gastrointestinal tract (GI), with biposy, GJ tube repositioning, stent placement;  Surgeon: Khadar Pickett MD;  Location: [facility] OR    ENDOSCOPIC ULTRASOUND UPPER GASTROINTESTINAL TRACT (GI) N/A 7/13/2023    Procedure: ENDOSCOPIC ULTRASOUND, ESOPHAGOSCOPY, EUS guided gastrojejunostomy;  Surgeon: Tre York MD;  Location: [facility] OR    INSERT PICC LINE  04/29/2023    INSERT PORT VASCULAR ACCESS Right 7/28/2023    Procedure: Insert port vascular access;  Surgeon: Tom العلي MD;  Location: [facility] OR    IR CHEST PORT PLACEMENT > 5 YRS OF AGE  7/28/2023    IR NG TUBE PLACEMENT REQ RAD & FLUORO  05/08/2023    JG tube    REPLACE GASTROJEJUNOSTOMY TUBE, PERCUTANEOUS N/A 8/17/2023    Procedure: possible REPLACEMENT, GASTROJEJUNOSTOMY TUBE, PERCUTANEOUS;  Surgeon: Christos Greenberg MD;  Location: [facility] OR       Chemotherapy History:  Per HPI    Radiation History:  No prior RT    Pregnant: No  Implanted Cardiac Devices: No    Medications:  Current Outpatient Medications   Medication    acetaminophen (TYLENOL) 32 mg/mL liquid    apixaban ANTICOAGULANT (ELIQUIS) 5 MG tablet    blood glucose (FREESTYLE TEST STRIPS) test strip    buprenorphine (BUTRANS) 10 MCG/HR WK patch    buprenorphine (BUTRANS) 20 MCG/HR WK patch    dicyclomine (BENTYL) 10 MG capsule    HYDROmorphone (DILAUDID) 2 MG tablet    lidocaine-prilocaine (EMLA) 2.5-2.5 % external cream    lipase-protease-amylase (CREON 24) 17623-09167-553400 units CPEP per EC capsule    methylphenidate (RITALIN) 5 MG tablet    Multiple Vitamins-Minerals (CENTRUM SILVER 50+MEN) TABS    pantoprazole (PROTONIX) 40 MG EC tablet    prochlorperazine (COMPAZINE) 10 MG tablet    vitamin D3 (CHOLECALCIFEROL) 50 mcg (2000 units) tablet     No current facility-administered  medications for this visit.         Allergies:   No Known Allergies    Social History:    Social History     Tobacco Use    Smoking status: Never     Passive exposure: Never    Smokeless tobacco: Never   Vaping Use    Vaping Use: Never used   Substance Use Topics    Alcohol use: Not Currently    Drug use: Never         Family History:  Family History   Problem Relation Age of Onset    Diabetes Type 2  Father     Cirrhosis Father     Genetic Disorder Brother         missing [id number], mild retardation    Colon Polyps Brother     Pancreatic Cancer Paternal Aunt 85    Colon Cancer Paternal Uncle     Pancreatitis Cousin        Review of Systems   A 10-point review of systems was performed. Pertinent findings are noted in the HPI.    Physical Exam   ECOG Status: 2    Vitals:  /81   Pulse 68   Temp 98.5  F (36.9  C)   Resp 16   SpO2 97%     Gen: Alert, in NAD  Head: NC/AT  Eyes: PERRL, EOMI, sclera anicteric  Ears: No external auricular lesions  Nose/sinus: No rhinorrhea or epistaxis  Oral cavity/oropharynx: MMM, no visible oral cavity lesions  Neck: Full ROM, supple, no palpable adenopathy  Pulm: No wheezing, stridor or respiratory distress  CV: Extremities are warm and well-perfused, no cyanosis, no pedal edema  Abdominal: Normal bowel sounds, soft, nontender, no masses  Musculoskeletal: Normal bulk and tone, mildly tender to palpation in lower thoracic/upper lumbar location  Skin: Normal color and turgor  Neuro: A/Ox3, CN II-XII intact, normal gait    Imaging/Path/Labs   Imaging: per HPI, personally reviewed and in agreement    CT 10/24/23 (pre -RT)            CT 3/11/24          Path: per HPI, personally reviewed and in agreement    Labs: per HPI, personally reviewed and in agreement    I have personally reviewed Dr. Haile's notes     Assessment      Mr. Navarro is a 58 year old male with metastatic pancreatic cancer to bones.  He has completed palliative radiotherapy to T10, 20 Gray/5 fractions November  29, 2023.  He now presents for radiation to L4 in the setting of palliation/oligo progression.    In general, he is having mild symptoms related to the lumbar back pain due to L4 lesion.  However he is undergone prior radiation to T10 for metastatic disease at that location and is tolerated radiation fairly well, and disease has remained fairly stable not location/slightly response.  He has now had stable disease in the pancreas as well as pink peripancreatic lymph nodes, with oligo progression at the L4 location.  Thus, he was referred for consideration of palliative radiation to L4 in the setting of oligo progression.    In general, given young age and overall performance status I would favor being aggressive about radiation to this location in the setting of oligo progressive disease.  I discussed continuing systemic therapy with short interval imaging versus pursuing palliative radiation at L4.  After discussing both with Dr. Haile and patient, we will be treating the L4 lesion with radiation.     Plan     After extensive discussion, patient wishes to pursue palliative radiotherapy to L4 in the setting of oligoprogression.     CT simulation to be performed patient patient preference, with tentative start in the near future.  We will plan for 30Gy/10fx to L4.     I have directly discussed this case with Dr. Haile who is in agreement with the plan.       All benefits and risks discussed, and patient is in agreement with the oncologic plan discussed above.     Thank you for allowing me to participate in your patient's care.  If you should require any additional information, please do not hesitate in contacting me.     Ben Reaves MD  Department of Radiation Oncology  [facility]     A total of 70 minutes were spent on this visit, including preparation for this visit, face to face with patient, and medical decision making. Medical decision-making included consideration and possible diagnosis,  management options, complex record review, review of diagnostic tests, consideration and discussion of significant complications based up medical history/comorbidities, and discussion with providers involved in care of the patient.

## 2024-03-25 NOTE — CONFIDENTIAL NOTE
Called patient to follow up on symptoms sent through a sliceX message on 3/25 @ 0998. Left a voicemail message requesting the patient call [phone], option 5, option 2 and speak with any Triage nurse available.

## 2024-03-25 NOTE — TELEPHONE ENCOUNTER
Oncology Nurse Triage - Reporting Symptoms  Situation:   Gallo reporting the following symptoms: fever, max 102    Background:   Treating Provider:   Luis Haile MD    Date of last office visit: 3/15/24 with Dr. Haile    Recent treatments: 3/12/24: Cycle 5 Day 15 Abraxane/Gemzar  Has apt w/Rad Onc when he gets back from [place], early April  3/12 WBC 3.6, ANC 2.3    Assessment  Onset of symptoms: Yesterday  Pt reports that he felt a little chilled. Temp was 100.4 at 9 p.m. [place] [facility]    [place] is in [place] until Friday.    Pt called in to report that temp was 100.4 and then ramona to 102.0 last night. This morning temp was 97.7. He felt that his temp was normal around 4 a.m.  Denies any vomiting or diarrhea, but was [place] nauseous in airport after eating a chicken sandwich.  No vomiting.   Routinely takes tylenol TID, 10 ml. (Pt said QID before)  Feels better today but is still tired.  No problems with food and/or fluid intake.    Recommendations:   Advised pt to push fluids and maintain hydration.  Take temp before taking tylenol to monitor for fevers.  Should go to ED if has infectious sx such as fever or 101.4 or greater, shaking chills w/o fever, n/v, diarrhea, lightheadedness, dysuria.  This writer will check with Dr. Haile to see if pt should be seen in ED or UC in [place].      Paged provider: Dr. Haile [phone]  Per Dr. Haile: If he has focal infectious symptoms, present to ER. Else just monitor and hydrate well. Dr. Haile agreed with recommendations given by Triage.    1008: Called patient and discussed Dr. Haile's recommendations. Advised pt to call Triage if he has any other questions or concerns 24/7. ED if develop's infectious sx. Reviewed sx again. Pt voiced understanding and is in agreement with this plan of care.

## 2024-03-27 ENCOUNTER — PATIENT OUTREACH (OUTPATIENT)
Dept: ONCOLOGY | Facility: CLINIC | Age: 59
End: 2024-03-27
Payer: COMMERCIAL

## 2024-03-27 DIAGNOSIS — C25.9 PANCREATIC ADENOCARCINOMA (H): Primary | ICD-10-CM

## 2024-03-28 ENCOUNTER — MYC REFILL (OUTPATIENT)
Dept: RADIATION ONCOLOGY | Facility: HOSPITAL | Age: 59
End: 2024-03-28
Payer: COMMERCIAL

## 2024-03-28 DIAGNOSIS — C25.9 PANCREATIC ADENOCARCINOMA (H): ICD-10-CM

## 2024-03-29 RX ORDER — HYDROMORPHONE HYDROCHLORIDE 2 MG/1
2-4 TABLET ORAL EVERY 4 HOURS PRN
Qty: 180 TABLET | Refills: 0 | Status: SHIPPED | OUTPATIENT
Start: 2024-03-29 | End: 2024-04-20

## 2024-03-29 NOTE — TELEPHONE ENCOUNTER
Received Asp[employer]at message from patient requesting refill of hydromorphone.     Last refill: 3/5/24  Last office visit: 2/22/24  Scheduled for follow up 4/25/24     Will route request to MD for review.     Reviewed [place]  Report.

## 2024-04-02 ENCOUNTER — APPOINTMENT (OUTPATIENT)
Dept: LAB | Facility: CLINIC | Age: 59
End: 2024-04-02
Payer: COMMERCIAL

## 2024-04-02 ENCOUNTER — INFUSION THERAPY VISIT (OUTPATIENT)
Dept: ONCOLOGY | Facility: CLINIC | Age: 59
End: 2024-04-02
Payer: COMMERCIAL

## 2024-04-02 VITALS
WEIGHT: 161.1 LBS | BODY MASS INDEX: 21.85 KG/M2 | SYSTOLIC BLOOD PRESSURE: 99 MMHG | OXYGEN SATURATION: 96 % | RESPIRATION RATE: 16 BRPM | DIASTOLIC BLOOD PRESSURE: 75 MMHG | TEMPERATURE: 98 F | HEART RATE: 78 BPM

## 2024-04-02 DIAGNOSIS — C25.0 MALIGNANT NEOPLASM OF HEAD OF PANCREAS (H): ICD-10-CM

## 2024-04-02 DIAGNOSIS — Z51.11 ENCOUNTER FOR ANTINEOPLASTIC CHEMOTHERAPY: Primary | ICD-10-CM

## 2024-04-02 LAB
ALBUMIN SERPL BCG-MCNC: 3.9 G/DL (ref 3.5–5.2)
ALP SERPL-CCNC: 99 U/L (ref 40–150)
ALT SERPL W P-5'-P-CCNC: 19 U/L (ref 0–70)
ANION GAP SERPL CALCULATED.3IONS-SCNC: 9 MMOL/L (ref 7–15)
AST SERPL W P-5'-P-CCNC: 21 U/L (ref 0–45)
BASOPHILS # BLD AUTO: 0.1 10E3/UL (ref 0–0.2)
BASOPHILS NFR BLD AUTO: 1 %
BILIRUB SERPL-MCNC: 0.2 MG/DL
BUN SERPL-MCNC: 16.9 MG/DL (ref 6–20)
CALCIUM SERPL-MCNC: 9.3 MG/DL (ref 8.6–10)
CHLORIDE SERPL-SCNC: 101 MMOL/L (ref 98–107)
CREAT SERPL-MCNC: 0.7 MG/DL (ref 0.67–1.17)
DEPRECATED HCO3 PLAS-SCNC: 28 MMOL/L (ref 22–29)
EGFRCR SERPLBLD CKD-EPI 2021: >90 ML/MIN/1.73M2
EOSINOPHIL # BLD AUTO: 0.2 10E3/UL (ref 0–0.7)
EOSINOPHIL NFR BLD AUTO: 3 %
ERYTHROCYTE [DISTWIDTH] IN BLOOD BY AUTOMATED COUNT: 14.4 % (ref 10–15)
GLUCOSE SERPL-MCNC: 189 MG/DL (ref 70–99)
HCT VFR BLD AUTO: 33.6 % (ref 40–53)
HGB BLD-MCNC: 10.5 G/DL (ref 13.3–17.7)
IMM GRANULOCYTES # BLD: 0.1 10E3/UL
IMM GRANULOCYTES NFR BLD: 2 %
LYMPHOCYTES # BLD AUTO: 1.6 10E3/UL (ref 0.8–5.3)
LYMPHOCYTES NFR BLD AUTO: 23 %
MCH RBC QN AUTO: 29.6 PG (ref 26.5–33)
MCHC RBC AUTO-ENTMCNC: 31.3 G/DL (ref 31.5–36.5)
MCV RBC AUTO: 95 FL (ref 78–100)
MONOCYTES # BLD AUTO: 0.8 10E3/UL (ref 0–1.3)
MONOCYTES NFR BLD AUTO: 13 %
NEUTROPHILS # BLD AUTO: 3.9 10E3/UL (ref 1.6–8.3)
NEUTROPHILS NFR BLD AUTO: 58 %
NRBC # BLD AUTO: 0 10E3/UL
NRBC BLD AUTO-RTO: 0 /100
PLATELET # BLD AUTO: 351 10E3/UL (ref 150–450)
POTASSIUM SERPL-SCNC: 4.3 MMOL/L (ref 3.4–5.3)
PROT SERPL-MCNC: 7.3 G/DL (ref 6.4–8.3)
RBC # BLD AUTO: 3.55 10E6/UL (ref 4.4–5.9)
SODIUM SERPL-SCNC: 138 MMOL/L (ref 135–145)
WBC # BLD AUTO: 6.7 10E3/UL (ref 4–11)

## 2024-04-02 PROCEDURE — 250N000011 HC RX IP 250 OP 636: Performed by: STUDENT IN AN ORGANIZED HEALTH CARE EDUCATION/TRAINING PROGRAM

## 2024-04-02 PROCEDURE — 36591 DRAW BLOOD OFF VENOUS DEVICE: CPT | Performed by: STUDENT IN AN ORGANIZED HEALTH CARE EDUCATION/TRAINING PROGRAM

## 2024-04-02 PROCEDURE — 250N000011 HC RX IP 250 OP 636

## 2024-04-02 PROCEDURE — 80053 COMPREHEN METABOLIC PANEL: CPT | Performed by: STUDENT IN AN ORGANIZED HEALTH CARE EDUCATION/TRAINING PROGRAM

## 2024-04-02 PROCEDURE — 85025 COMPLETE CBC W/AUTO DIFF WBC: CPT | Performed by: STUDENT IN AN ORGANIZED HEALTH CARE EDUCATION/TRAINING PROGRAM

## 2024-04-02 PROCEDURE — 96375 TX/PRO/DX INJ NEW DRUG ADDON: CPT

## 2024-04-02 PROCEDURE — 96413 CHEMO IV INFUSION 1 HR: CPT

## 2024-04-02 PROCEDURE — 96417 CHEMO IV INFUS EACH ADDL SEQ: CPT

## 2024-04-02 PROCEDURE — 258N000003 HC RX IP 258 OP 636: Performed by: STUDENT IN AN ORGANIZED HEALTH CARE EDUCATION/TRAINING PROGRAM

## 2024-04-02 PROCEDURE — 86301 IMMUNOASSAY TUMOR CA 19-9: CPT

## 2024-04-02 RX ORDER — HEPARIN SODIUM (PORCINE) LOCK FLUSH IV SOLN 100 UNIT/ML 100 UNIT/ML
5 SOLUTION INTRAVENOUS ONCE
Status: COMPLETED | OUTPATIENT
Start: 2024-04-02 | End: 2024-04-02

## 2024-04-02 RX ORDER — HEPARIN SODIUM (PORCINE) LOCK FLUSH IV SOLN 100 UNIT/ML 100 UNIT/ML
5 SOLUTION INTRAVENOUS
Status: DISCONTINUED | OUTPATIENT
Start: 2024-04-02 | End: 2024-04-02 | Stop reason: HOSPADM

## 2024-04-02 RX ORDER — PACLITAXEL 100 MG/20ML
125 INJECTION, POWDER, LYOPHILIZED, FOR SUSPENSION INTRAVENOUS ONCE
Status: COMPLETED | OUTPATIENT
Start: 2024-04-02 | End: 2024-04-02

## 2024-04-02 RX ORDER — ONDANSETRON 2 MG/ML
8 INJECTION INTRAMUSCULAR; INTRAVENOUS ONCE
Status: COMPLETED | OUTPATIENT
Start: 2024-04-02 | End: 2024-04-02

## 2024-04-02 RX ADMIN — Medication 5 ML: at 11:07

## 2024-04-02 RX ADMIN — ONDANSETRON 8 MG: 2 INJECTION INTRAMUSCULAR; INTRAVENOUS at 08:55

## 2024-04-02 RX ADMIN — PACLITAXEL 250 MG: 100 INJECTION, POWDER, LYOPHILIZED, FOR SUSPENSION INTRAVENOUS at 09:41

## 2024-04-02 RX ADMIN — Medication 5 ML: at 08:19

## 2024-04-02 RX ADMIN — GEMCITABINE 2000 MG: 38 INJECTION, SOLUTION INTRAVENOUS at 10:25

## 2024-04-02 RX ADMIN — SODIUM CHLORIDE 250 ML: 9 INJECTION, SOLUTION INTRAVENOUS at 08:54

## 2024-04-02 ASSESSMENT — PAIN SCALES - GENERAL: PAINLEVEL: NO PAIN (0)

## 2024-04-02 NOTE — PATIENT INSTRUCTIONS
[facility] Triage and after hours / weekends / holidays:  [phone]    Please call the triage or after hours line if you experience a temperature greater than or equal to 100.4, shaking chills, have uncontrolled nausea, vomiting and/or diarrhea, dizziness, shortness of breath, chest pain, bleeding, unexplained bruising, or if you have any other new/concerning symptoms, questions or concerns.      If you are having any concerning symptoms or wish to speak to a provider before your next infusion visit, please call triage to notify them so we can adequately serve you.     If you need a refill on a narcotic prescription or other medication, please call before your infusion appointment.                April 2024 Sunday Monday Tuesday Wednesday Thursday Friday Saturday        1     2    LAB CENTRAL   8:00 AM   (15 min.)   [facility] LAB DRAW   [facility]    ONC INFUSION 2 HR (120 MIN)   8:30 AM   (120 min.)    ONC INFUSION NURSE   [facility] 3    CT SIM   8:30 AM   (60 min.)   Ben Reaves MD   [facility] Radiation Oncology [place] 4     5     6       7     8     9     10     11     12     13       14     15     16    LAB CENTRAL  12:15 PM   (15 min.)   [facility] LAB DRAW   [facility]    RETURN CCSL  12:45 PM   (45 min.)   Megan Farrell APRN CNP   [facility]    ONC INFUSION 2 HR (120 MIN)   3:00 PM   (120 min.)    ONC INFUSION NURSE   [facility] 17     18     19     20       21     22     23     24     25    RETURN PALLIATIVE  11:35 AM   (40 min.)   Jeremy Hurt MD   [facility] Radiation Oncology [place] 26     27       28     29     30    LAB CENTRAL   7:15 AM   (15 min.)   [facility] LAB DRAW   [facility]    RETURN CCSL   7:45 AM   (45 min.)   Megan Farrell APRN CNP   [facility]  Clinic    ONC INFUSION 2 HR (120 MIN)   8:30 AM   (120 min.)    ONC INFUSION NURSE   [facility]                                 May 2024      [place] Monday Tuesday Wednesday Thursday Friday Saturday                  1     2     3     4       5     6     7     8     9     10     11       12     13     14    LAB CENTRAL   8:15 AM   (15 min.)   [facility] LAB DRAW   [facility]    RETURN CCSL   8:45 AM   (45 min.)   Megan Farrell APRN CNP   [facility]    ONC INFUSION 2 HR (120 MIN)  10:00 AM   (120 min.)    ONC INFUSION NURSE   [facility] 15     16     17     18       19     20     21     22     23     24     25       26     27     28     29     30     31                          Lab Results:  Recent Results (from the past 12 hour(s))   Comprehensive metabolic panel    Collection Time: 04/02/24  8:17 AM   Result Value Ref Range    Sodium 138 135 - 145 mmol/L    Potassium 4.3 3.4 - 5.3 mmol/L    Carbon Dioxide (CO2) 28 22 - 29 mmol/L    Anion Gap 9 7 - 15 mmol/L    Urea Nitrogen 16.9 6.0 - 20.0 mg/dL    Creatinine 0.70 0.67 - 1.17 mg/dL    GFR Estimate >90 >60 mL/min/1.73m2    Calcium 9.3 8.6 - 10.0 mg/dL    Chloride 101 98 - 107 mmol/L    Glucose 189 (H) 70 - 99 mg/dL    Alkaline Phosphatase 99 40 - 150 U/L    AST 21 0 - 45 U/L    ALT 19 0 - 70 U/L    Protein Total 7.3 6.4 - 8.3 g/dL    Albumin 3.9 3.5 - 5.2 g/dL    Bilirubin Total 0.2 <=1.2 mg/dL   CBC with platelets and differential    Collection Time: 04/02/24  8:17 AM   Result Value Ref Range    WBC Count 6.7 4.0 - 11.0 10e3/uL    RBC Count 3.55 (L) 4.40 - 5.90 10e6/uL    Hemoglobin 10.5 (L) 13.3 - 17.7 g/dL    Hematocrit 33.6 (L) 40.0 - 53.0 %    MCV 95 78 - 100 fL    MCH 29.6 26.5 - 33.0 pg    MCHC 31.3 (L) 31.5 - 36.5 g/dL    RDW 14.4 10.0 - 15.0 %    Platelet Count 351 150 - 450 10e3/uL    % Neutrophils 58 %    % Lymphocytes 23 %    %  Monocytes 13 %    % Eosinophils 3 %    % Basophils 1 %    % Immature Granulocytes 2 %    NRBCs per 100 WBC 0 <1 /100    Absolute Neutrophils 3.9 1.6 - 8.3 10e3/uL    Absolute Lymphocytes 1.6 0.8 - 5.3 10e3/uL    Absolute Monocytes 0.8 0.0 - 1.3 10e3/uL    Absolute Eosinophils 0.2 0.0 - 0.7 10e3/uL    Absolute Basophils 0.1 0.0 - 0.2 10e3/uL    Absolute Immature Granulocytes 0.1 <=0.4 10e3/uL    Absolute NRBCs 0.0 10e3/uL

## 2024-04-02 NOTE — NURSING NOTE
Chief Complaint   Patient presents with    Blood Draw     Port blood draw with heparin flush by lab RN. Vitals taken and appointment arrived       Berta Jay RN

## 2024-04-02 NOTE — PROGRESS NOTES
Infusion Nursing Note:  Gallo Navarro presents today for D1 C6 Abraxane and Gemzar.    Patient seen by provider today: No   present during visit today: Not Applicable.    Note: Gallo said he has been feeling well.  Denies nausea and said he has been able to eat.  Denies fevers, cough or SOB.  Has been working with [facility] with getting into a clinical trial.  His current treatment here is scheduled for every other week.  He will cancel the April 30th appt as he will be at [facility] at that time      Intravenous Access:  Implanted Port.    Treatment Conditions:  Lab Results   Component Value Date    HGB 10.5 (L) 04/02/2024    WBC 6.7 04/02/2024    ANEU 2.2 12/26/2023    ANEUTAUTO 3.9 04/02/2024     04/02/2024        Lab Results   Component Value Date     04/02/2024    POTASSIUM 4.3 04/02/2024    MAG 1.9 08/23/2023    CR 0.70 04/02/2024    DENISE 9.3 04/02/2024    BILITOTAL 0.2 04/02/2024    ALBUMIN 3.9 04/02/2024    ALT 19 04/02/2024    AST 21 04/02/2024       Results reviewed, labs MET treatment parameters, ok to proceed with treatment.      Post Infusion Assessment:  Patient tolerated infusion without incident.  Blood return noted pre and post infusion.  Site patent and intact, free from redness, edema or discomfort.  No evidence of extravasations.  Access discontinued per protocol.       Discharge Plan:   Patient declined prescription refills.  Discharge instructions reviewed with: Patient and Friend.  Patient and/or family verbalized understanding of discharge instructions and all questions answered.  AVS to patient via IngagePatient[employer].  Patient will return 4/16 for next provider and infusion appointment.   Patient discharged in stable condition accompanied by: self and friend.  Departure Mode: Ambulatory.      Anna Marie Styles RN HPI      ROS      Physical Exam

## 2024-04-04 ENCOUNTER — NURSE TRIAGE (OUTPATIENT)
Dept: ONCOLOGY | Facility: CLINIC | Age: 59
End: 2024-04-04
Payer: COMMERCIAL

## 2024-04-04 LAB — CANCER AG19-9 SERPL IA-ACNC: 4 U/ML

## 2024-04-04 NOTE — TELEPHONE ENCOUNTER
"Oncology Nurse Triage - Pain    Situation: Gallo reporting the following symptoms: Pain    Background:   Treating Provider:       Date of last office visit: 03/15/24 w/    Recent Treatments:04/02/24 D1 C6 Abraxane and Gemzar    Assessment:     Location: Mid chest    Onset: This morning    Duration/Frequency: Constant when pain medication wears off    Rates: 3/10    Quality: Describes as \"discomfort\" not pain    Current med(s) used: Butrans patches (were replaced yesterday afternoon), Dilaudid (every 4 hrs), tylenol 10 ml (every 4 hrs)    Worsens or relieves with: Seems like pain medications work for around 3 hrs.    Pt spouse states Butrans patches are not adhering as well. She is concerned she may not be applying them correctly. Maybe they are not as helpful due to this.     Pt states he has also been having more gas. Pt has hx of acid reflux but this feels different. Pt taking protonix BID.    HR , O2 sat 98%       Denies fevers/chills, cough, sore throat, SOB, n/v, bowel & bladder issues, abd pain    Recommendations:   Informed pt writer would reach out to provider for recommendations. Advised pt report to ED if worsening chest pain, SOB, F/C. Pt verbalized understanding.       Secure message to  and Megan RNCC    [id number]  responding-one thought is to apply a tegaderm over the Butrans patch--might help it stick better and thus, work better. We could also try patches from another supplier if the adhesive isn't sufficient for Gallo. I'd also suggest trying some simethicone to see if this isn't gas related. If he is on Creon he could increase his dose of those by 1/meal in case he is developing some malabsorption. Let's have him try these ideas through the weekend and touch base Monday if things aren't a lot better.    1980 Call to pt to inform him of provider recommendations. Pt and pt spouse able to read back instructions. Reviewed sx to monitor for and when to report to " ED. Pt and pt spouse verbalized understanding.

## 2024-04-07 ENCOUNTER — MYC REFILL (OUTPATIENT)
Dept: ONCOLOGY | Facility: CLINIC | Age: 59
End: 2024-04-07
Payer: COMMERCIAL

## 2024-04-07 DIAGNOSIS — R10.30 LOWER ABDOMINAL PAIN: ICD-10-CM

## 2024-04-08 NOTE — CONFIDENTIAL NOTE
Tylenol   Last prescribing provider: Dr Haile     Last clinic visit date: 3/15/24 Dr Haile     Recommendations for requested medication (if none, N/A): N/A    Any other pertinent information (if none, N/A): N/A    Refilled: Y/N, if NO, why?

## 2024-04-16 ENCOUNTER — ONCOLOGY VISIT (OUTPATIENT)
Dept: ONCOLOGY | Facility: CLINIC | Age: 59
End: 2024-04-16
Attending: STUDENT IN AN ORGANIZED HEALTH CARE EDUCATION/TRAINING PROGRAM
Payer: COMMERCIAL

## 2024-04-16 ENCOUNTER — APPOINTMENT (OUTPATIENT)
Dept: LAB | Facility: CLINIC | Age: 59
End: 2024-04-16
Attending: STUDENT IN AN ORGANIZED HEALTH CARE EDUCATION/TRAINING PROGRAM
Payer: COMMERCIAL

## 2024-04-16 ENCOUNTER — INFUSION THERAPY VISIT (OUTPATIENT)
Dept: ONCOLOGY | Facility: CLINIC | Age: 59
End: 2024-04-16
Attending: PHYSICIAN ASSISTANT
Payer: COMMERCIAL

## 2024-04-16 VITALS
RESPIRATION RATE: 16 BRPM | BODY MASS INDEX: 22.49 KG/M2 | OXYGEN SATURATION: 99 % | WEIGHT: 165.8 LBS | SYSTOLIC BLOOD PRESSURE: 110 MMHG | DIASTOLIC BLOOD PRESSURE: 66 MMHG | HEART RATE: 68 BPM | TEMPERATURE: 98.7 F

## 2024-04-16 DIAGNOSIS — Z79.4 TYPE 2 DIABETES MELLITUS WITHOUT COMPLICATION, WITH LONG-TERM CURRENT USE OF INSULIN (H): ICD-10-CM

## 2024-04-16 DIAGNOSIS — E11.9 TYPE 2 DIABETES MELLITUS WITHOUT COMPLICATION, WITH LONG-TERM CURRENT USE OF INSULIN (H): ICD-10-CM

## 2024-04-16 DIAGNOSIS — G89.3 CANCER ASSOCIATED PAIN: ICD-10-CM

## 2024-04-16 DIAGNOSIS — C25.0 MALIGNANT NEOPLASM OF HEAD OF PANCREAS (H): ICD-10-CM

## 2024-04-16 DIAGNOSIS — K31.1 GASTRIC OUTLET OBSTRUCTION: ICD-10-CM

## 2024-04-16 DIAGNOSIS — Z51.11 ENCOUNTER FOR ANTINEOPLASTIC CHEMOTHERAPY: Primary | ICD-10-CM

## 2024-04-16 LAB
BASOPHILS # BLD AUTO: 0.1 10E3/UL (ref 0–0.2)
BASOPHILS NFR BLD AUTO: 1 %
EOSINOPHIL # BLD AUTO: 0.2 10E3/UL (ref 0–0.7)
EOSINOPHIL NFR BLD AUTO: 3 %
ERYTHROCYTE [DISTWIDTH] IN BLOOD BY AUTOMATED COUNT: 14.5 % (ref 10–15)
HCT VFR BLD AUTO: 32.9 % (ref 40–53)
HGB BLD-MCNC: 10.3 G/DL (ref 13.3–17.7)
IMM GRANULOCYTES # BLD: 0 10E3/UL
IMM GRANULOCYTES NFR BLD: 0 %
LYMPHOCYTES # BLD AUTO: 1.5 10E3/UL (ref 0.8–5.3)
LYMPHOCYTES NFR BLD AUTO: 24 %
MCH RBC QN AUTO: 29.7 PG (ref 26.5–33)
MCHC RBC AUTO-ENTMCNC: 31.3 G/DL (ref 31.5–36.5)
MCV RBC AUTO: 95 FL (ref 78–100)
MONOCYTES # BLD AUTO: 0.6 10E3/UL (ref 0–1.3)
MONOCYTES NFR BLD AUTO: 10 %
NEUTROPHILS # BLD AUTO: 3.8 10E3/UL (ref 1.6–8.3)
NEUTROPHILS NFR BLD AUTO: 62 %
NRBC # BLD AUTO: 0 10E3/UL
NRBC BLD AUTO-RTO: 0 /100
PLATELET # BLD AUTO: 269 10E3/UL (ref 150–450)
RBC # BLD AUTO: 3.47 10E6/UL (ref 4.4–5.9)
WBC # BLD AUTO: 6.1 10E3/UL (ref 4–11)

## 2024-04-16 PROCEDURE — 99213 OFFICE O/P EST LOW 20 MIN: CPT | Mod: 25

## 2024-04-16 PROCEDURE — 96417 CHEMO IV INFUS EACH ADDL SEQ: CPT

## 2024-04-16 PROCEDURE — 258N000003 HC RX IP 258 OP 636: Performed by: STUDENT IN AN ORGANIZED HEALTH CARE EDUCATION/TRAINING PROGRAM

## 2024-04-16 PROCEDURE — 250N000011 HC RX IP 250 OP 636: Performed by: STUDENT IN AN ORGANIZED HEALTH CARE EDUCATION/TRAINING PROGRAM

## 2024-04-16 PROCEDURE — 85025 COMPLETE CBC W/AUTO DIFF WBC: CPT | Performed by: STUDENT IN AN ORGANIZED HEALTH CARE EDUCATION/TRAINING PROGRAM

## 2024-04-16 PROCEDURE — 36591 DRAW BLOOD OFF VENOUS DEVICE: CPT

## 2024-04-16 PROCEDURE — G2211 COMPLEX E/M VISIT ADD ON: HCPCS

## 2024-04-16 PROCEDURE — 99417 PROLNG OP E/M EACH 15 MIN: CPT

## 2024-04-16 PROCEDURE — 96375 TX/PRO/DX INJ NEW DRUG ADDON: CPT

## 2024-04-16 PROCEDURE — 99215 OFFICE O/P EST HI 40 MIN: CPT

## 2024-04-16 PROCEDURE — 86301 IMMUNOASSAY TUMOR CA 19-9: CPT

## 2024-04-16 PROCEDURE — 96413 CHEMO IV INFUSION 1 HR: CPT

## 2024-04-16 PROCEDURE — 250N000011 HC RX IP 250 OP 636

## 2024-04-16 RX ORDER — PACLITAXEL 100 MG/20ML
125 INJECTION, POWDER, LYOPHILIZED, FOR SUSPENSION INTRAVENOUS ONCE
Status: COMPLETED | OUTPATIENT
Start: 2024-04-16 | End: 2024-04-16

## 2024-04-16 RX ORDER — HEPARIN SODIUM (PORCINE) LOCK FLUSH IV SOLN 100 UNIT/ML 100 UNIT/ML
5 SOLUTION INTRAVENOUS DAILY PRN
Status: DISCONTINUED | OUTPATIENT
Start: 2024-04-16 | End: 2024-04-16 | Stop reason: HOSPADM

## 2024-04-16 RX ORDER — HEPARIN SODIUM (PORCINE) LOCK FLUSH IV SOLN 100 UNIT/ML 100 UNIT/ML
5 SOLUTION INTRAVENOUS
Status: DISCONTINUED | OUTPATIENT
Start: 2024-04-16 | End: 2024-04-16 | Stop reason: HOSPADM

## 2024-04-16 RX ORDER — PANTOPRAZOLE SODIUM 40 MG/1
40 TABLET, DELAYED RELEASE ORAL 2 TIMES DAILY
Qty: 60 TABLET | Refills: 4 | Status: SHIPPED | OUTPATIENT
Start: 2024-04-16 | End: 2024-06-04

## 2024-04-16 RX ORDER — ONDANSETRON 2 MG/ML
8 INJECTION INTRAMUSCULAR; INTRAVENOUS ONCE
Status: COMPLETED | OUTPATIENT
Start: 2024-04-16 | End: 2024-04-16

## 2024-04-16 RX ADMIN — GEMCITABINE 2000 MG: 38 INJECTION, SOLUTION INTRAVENOUS at 15:24

## 2024-04-16 RX ADMIN — Medication 5 ML: at 12:33

## 2024-04-16 RX ADMIN — SODIUM CHLORIDE 250 ML: 9 INJECTION, SOLUTION INTRAVENOUS at 14:12

## 2024-04-16 RX ADMIN — PACLITAXEL 250 MG: 100 INJECTION, POWDER, LYOPHILIZED, FOR SUSPENSION INTRAVENOUS at 14:39

## 2024-04-16 RX ADMIN — ONDANSETRON 8 MG: 2 INJECTION INTRAMUSCULAR; INTRAVENOUS at 14:14

## 2024-04-16 RX ADMIN — Medication 5 ML: at 15:56

## 2024-04-16 ASSESSMENT — PAIN SCALES - GENERAL: PAINLEVEL: NO PAIN (0)

## 2024-04-16 NOTE — Clinical Note
4/16/2024         RE: Gallo Navarro  [id number] [address] [place] [id number]        Dear Colleague,    Thank you for referring your patient, Gallo Navarro, to the [facility]. Please see a copy of my visit note below.        Oncology/Hematology Visit Note  Apr 16, 2024    Reason for Visit: follow up of locally advanced, unresectable pancreatic cancer    History of Present Illness: Gallo Navarro is a 58 year old male with locally advanced, unresectable pancreatic cancer. 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 He then presented to the ED with worsening abdominal pain, increased jaundice, poor PO intake and weight loss admitted on 7/8/2023 for concern of biliary obstruction. Unfortunately, during this admission, biopsy of pancreatic mass returned positive for pancreatic adenocarcinoma on 7/12/23. He started on treatment with 5FU, irinotecan, and oxaliplatin (FOLFIRINOX) on 7/31/23. Please see previous notes for further details on the patient's history.     8/17/23 - ERCP/EGD, duodenal stents x2 placed, exchange of GJ tube    8/21-/8/25 hospitalized due to diarrhea, colitis, abdominal pain.      8/29 - Cycle 3 deferred due to neutropenia.   Neulasta added. Irinotecan dose reduced 20% due to symptoms including cramping, double vision and slurred speech during infusion.      10/24/23 CT CAP with similar to slight increase in size of peripancreatic and mesenteric  lymph nodes, enlargement of previous skeletal metastasis as well as new sclerotic metastasis to left posterior 5th rib, enlarging pulmonary nodule, and unchanged pancreatic head mass. Also unclear concerns for PE,  right lower lobe segmental PE confirmed on CT-PE. Started on apixaban.     10/31/23 Cycle 1  gemzar, abraxane  11/7/23 Cycle 1 Zometa  11/22/23 Removal of GJ tube.   11/29/23 Completed palliative radiation to T10   12/13/23 C3D1 gem/abraxane    12/29/23 Consults at [facility]    1/23-1/26 Consults at [facility]     1/30/24: C4D1 gem/abraxane     3/11/24: CT CAP ***   ***  3/15/24: Patient's imaging demonstrates overall stable disease with isolated progression in potential L4 lesion.  *** referred to radiation oncology     Gallo presents today for follow up prior to 6 day 15 gemcitabine, Abraxane.      Interval History:     Pain      Going to [facility] 4/28-5/1 for biopsy of spine lesion. Will then come back and start radiation.   -has a trip planned the first week of June as well   -neuropathy in feet stable, up to ankle, numb. Has PT consult but hasn't scheduled yet waiting until back from [facility] after chemo.  -no nausea, eating well  -    ***  Gallo is joined by his wife virtually. He has chemotherapy scheduled for later today. He is  feeling well. Tolerating gem/abraxane well. Most notable side effect is fatigue. He felt more fatigued week 1 after zometa and chemo. He notes he now has numbness in his fingertips which is mild. No pain or functional impairment. Feet feel the same but he is overall weaker and more clumsy. He did lose his hair.     Sore on his toe is improving. He does still have a little drainage. He has been using lotromin as his nail color is yellow--chronic. Soaking his feet every other day.     His edema has improved. He has been taking more breaks from his compression stockings as his skin was peeling.     No fevers.     He is sleeping 12 hours at night + 2 hour nap during the day.     Appetite is good. No abdominal pain. Taking less pain meds overalls.     Yesterday he had a firm BM and noted a little blood in his stool. This has only happened once. No other bleeding.     No diarrhea.       Current Outpatient Medications   Medication Sig Dispense Refill    acetaminophen (TYLENOL)  32 mg/mL liquid Take 20.313 mLs (650 mg) by mouth every 8 hours 1800 mL 1    apixaban ANTICOAGULANT (ELIQUIS) 5 MG tablet Take 1 tablet (5 mg) by mouth 2 times daily 60 tablet 2    blood glucose (FREESTYLE TEST STRIPS) test strip by Other route as needed      buprenorphine (BUTRANS) 10 MCG/HR WK patch Place 1 patch onto the skin every 7 days Use with 20 mcg/hour patch for 30 mcg/hour total. 4 patch 3    buprenorphine (BUTRANS) 20 MCG/HR WK patch Place 1 patch onto the skin once a week Use with 10 mcg/hour patch for 30 mcg/hour total. 4 patch 3    dicyclomine (BENTYL) 10 MG capsule Take 1 capsule (10 mg) by mouth 4 times daily (before meals and nightly) 120 capsule 1    HYDROmorphone (DILAUDID) 2 MG tablet Take 1-2 tablets (2-4 mg) by mouth every 4 hours as needed for severe pain or breakthrough pain 180 tablet 0    lidocaine-prilocaine (EMLA) 2.5-2.5 % external cream Use 1-2 times a week or as needed prior to port access (Patient not taking: Reported on 3/12/2024) 30 g 3    lipase-protease-amylase (CREON 24) 64887-06188-438998 units CPEP per EC capsule 2-3 capsules with meals 3 times a day and 1-2 capsules with snacks max of 15 capsules a day 200 capsule 11    methylphenidate (RITALIN) 5 MG tablet Take 1 tablet (5 mg) by mouth 2 times daily as needed (Patient not taking: Reported on 3/12/2024) 10 tablet 0    Multiple Vitamins-Minerals (CENTRUM SILVER 50+MEN) TABS as directed Orally      pantoprazole (PROTONIX) 40 MG EC tablet Take 1 tablet (40 mg) by mouth 2 times daily 60 tablet 4    prochlorperazine (COMPAZINE) 10 MG tablet Take 1 tablet (10 mg) by mouth every 6 hours as needed for nausea or vomiting 30 tablet 2    vitamin D3 (CHOLECALCIFEROL) 50 mcg (2000 units) tablet Take 1 tablet (50 mcg) by mouth daily 90 tablet 1     Physical Examination:  Video physical exam  General: Patient appears well in no acute distress.   Skin: No visualized rash or lesions on visualized skin  Eyes: EOMI, no erythema, sclera icterus  or discharge noted  Resp: Appears to be breathing comfortably without accessory muscle usage, speaking in full sentences, no cough  MSK: Appears to have normal range of motion based on visualized movements  Neurologic: No apparent tremors, facial movements symmetric  Psych: affect and mood congruent, alert and oriented      Laboratory Data:  Most Recent 3 CBC's:  Recent Labs   Lab Test 04/02/24  0817 03/12/24  0815 03/05/24  0830   WBC 6.7 3.6* 3.0*   HGB 10.5* 10.0* 9.9*   MCV 95 95 94    116* 212   ANEUTAUTO 3.9 2.3 1.4*    Most Recent 3 BMP's:  Recent Labs   Lab Test 04/02/24  0817 03/05/24  0830 02/27/24  1447    140 139   POTASSIUM 4.3 4.1 4.2   CHLORIDE 101 104 104   CO2 28 26 25   BUN 16.9 19.4 16.0   CR 0.70 0.68 0.71   ANIONGAP 9 10 10   DENISE 9.3 9.1 8.8   * 207* 107*   PROTTOTAL 7.3 6.7 6.8   ALBUMIN 3.9 4.0 4.1    Most Recent 2 LFT's:  Recent Labs   Lab Test 04/02/24  0817 03/05/24  0830   AST 21 23   ALT 19 19   ALKPHOS 99 92   BILITOTAL 0.2 0.3   I reviewed the above labs today.          Assessment and Plan:  Locally advanced, unresectable pancreatic cancer. Patient started on treatment with palliative FOLFIRINOX on 7/31/23. He had a moderate amount of side effects following cycle 1 with dehydration, diarrhea, and nausea. Received cycle 2 on 8/15/23. ERCP/EGD on 8/17 with GJ tube exchange and duodenal stents placed. Hospitalized 8/21-8/25 due to colitis and abdominal pain. Cycle 3 was deferred due to neutropenia, Neulasta/Udenyca added on day 4.  - CT CAP 10/24/23 with progression in skeletal mets, lung nodule and lymph nodes. Stable pancreatic mass. Changed treatment to gemcitabine, Abraxane (day 1, 8, 15) and Zometa.  Tolerated cycle 1 well with the exception of a fever. Tolerated cycle 2 well. Cycle 2 day 15 skipped due to travel. Completed cycle 3 and then had a one month break for traveling. Completed cycle 4 without complications besides fatigue and a slight uptick in  neuropathy.   *** CT imaging  *** [facility]     Skeletal mets. ***   -Worsening skeletal mets noted on CT 10/24. Sclerosis noted on recent imaging. No recent persistent pain.   -Received Zometa with cycle 1 (11/7/23), had fever later that night, reports no other side effects or concerns. Original plan for every 6 months but we reviewed indications for bone mets being either monthly or every 3 months last visit. Last dose given 2/6/24. Will plan to give every 3 months.  -Radiation to T10 completed 11/29/23.     Pulmonary Embolism. Right lower segmental PE found on routine imaging. Started on apixiban, denies any bleeding concerns.     Nutrition.  Feeding tube removed. Continues to tolerate oral intake well. Gaining weight. No issues with nausea or vomiting. Continue to push oral hydration, drinking 64+ oz of fluid/day.  Continue Protonix daily.  Has compazine as needed for nausea.     Lower extremity edema. Resolved. Continue compression stockings and elevation PRN. Continue to monitor.     Neuropathy: *** Grade 1. Mostly from prior therapies. Will refer to cancer rehab.     Fatigue: Ritalin 5 mg BID PRN. Discussed gentle exercise. Cancer rehab as above.     Diarrhea/constipation. Resolved. Bowel movements more regular with increased food intake. Continue Metamucil as needed. Monitor for recurrent blood in stool.     Abdominal pain. Managed by palliative care with Butrans patch and oral Dilaudid. No pain recently. Has been taking less pain medication.     Anemia. Normocytic. Suspect anemia of chronic disease + secondary to myelosuppression from chemo. Hemoglobin improved with treatment break. Will monitor for now.     Recent paronychial infection: Saw PCP and completed 7 days of clindamycin. Improved. Discussed using triple antibiotic cream BID.       ***    *** minutes spent on the date of the encounter doing chart review, review of test results, interpretation of tests, patient visit, and documentation     Megan  ESVIN Farrell CNP          Oncology/Hematology Visit Note  Apr 16, 2024    Reason for Visit: follow up of locally advanced, unresectable pancreatic cancer    History of Present Illness: Gallo Navarro is a 58 year old male with locally advanced, unresectable pancreatic cancer. 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 He then presented to the ED with worsening abdominal pain, increased jaundice, poor PO intake and weight loss admitted on 7/8/2023 for concern of biliary obstruction. Unfortunately, during this admission, biopsy of pancreatic mass returned positive for pancreatic adenocarcinoma on 7/12/23. He started on treatment with 5FU, irinotecan, and oxaliplatin (FOLFIRINOX) on 7/31/23. Please see previous notes for further details on the patient's history.     8/17/23 - ERCP/EGD, duodenal stents x2 placed, exchange of GJ tube    8/21-/8/25 hospitalized due to diarrhea, colitis, abdominal pain.      8/29 - Cycle 3 deferred due to neutropenia.   Neulasta added. Irinotecan dose reduced 20% due to symptoms including cramping, double vision and slurred speech during infusion.      10/24/23 CT CAP with similar to slight increase in size of peripancreatic and mesenteric  lymph nodes, enlargement of previous skeletal metastasis as well as new sclerotic metastasis to left posterior 5th rib, enlarging pulmonary nodule, and unchanged pancreatic head mass. Also unclear concerns for PE,  right lower lobe segmental PE confirmed on CT-PE. Started on apixaban.     10/31/23 Cycle 1 gemzar, abraxane  11/7/23 Cycle 1 Zometa  11/22/23 Removal of GJ tube.   11/29/23 Completed palliative radiation to T10   12/13/23 C3D1 gem/abraxane    12/29/23 Consults at [facility]    1/23-1/26 Consults at [facility]     1/30/24: C4D1  gem/abraxane     3/24: CT CAP with overall stable disease with isolated progression in potential L4 lesion. Referral for radiation oncology.      Gallo presents today for follow up prior to 6 day 15 gemcitabine, Abraxane.      Interval History:     -Pain is well controlled on current regimen, denies any pain today.   -      Going to [facility] 4/28-5/1 for biopsy of spine lesion. Will then come back and start radiation.   -has a trip planned the first week of June as well   -neuropathy in feet stable, up to ankle, numb. Has PT consult but hasn't scheduled yet waiting until back from [facility] after chemo.  -no nausea, eating well  -    ***  Gallo is joined by his wife virtually. He has chemotherapy scheduled for later today. He is  feeling well. Tolerating gem/abraxane well. Most notable side effect is fatigue. He felt more fatigued week 1 after zometa and chemo. He notes he now has numbness in his fingertips which is mild. No pain or functional impairment. Feet feel the same but he is overall weaker and more clumsy. He did lose his hair.     Sore on his toe is improving. He does still have a little drainage. He has been using lotromin as his nail color is yellow--chronic. Soaking his feet every other day.     His edema has improved. He has been taking more breaks from his compression stockings as his skin was peeling.     No fevers.     He is sleeping 12 hours at night + 2 hour nap during the day.     Appetite is good. No abdominal pain. Taking less pain meds overalls.     Yesterday he had a firm BM and noted a little blood in his stool. This has only happened once. No other bleeding.     No diarrhea.       Current Outpatient Medications   Medication Sig Dispense Refill     acetaminophen (TYLENOL) 32 mg/mL liquid Take 20.313 mLs (650 mg) by mouth every 8 hours 1800 mL 1     apixaban ANTICOAGULANT (ELIQUIS) 5 MG tablet Take 1 tablet (5 mg) by mouth 2 times daily 60 tablet 2     blood glucose (FREESTYLE TEST  STRIPS) test strip by Other route as needed       buprenorphine (BUTRANS) 10 MCG/HR WK patch Place 1 patch onto the skin every 7 days Use with 20 mcg/hour patch for 30 mcg/hour total. 4 patch 3     buprenorphine (BUTRANS) 20 MCG/HR WK patch Place 1 patch onto the skin once a week Use with 10 mcg/hour patch for 30 mcg/hour total. 4 patch 3     dicyclomine (BENTYL) 10 MG capsule Take 1 capsule (10 mg) by mouth 4 times daily (before meals and nightly) 120 capsule 1     HYDROmorphone (DILAUDID) 2 MG tablet Take 1-2 tablets (2-4 mg) by mouth every 4 hours as needed for severe pain or breakthrough pain 180 tablet 0     lidocaine-prilocaine (EMLA) 2.5-2.5 % external cream Use 1-2 times a week or as needed prior to port access (Patient not taking: Reported on 3/12/2024) 30 g 3     lipase-protease-amylase (CREON 24) 35940-61506-931884 units CPEP per EC capsule 2-3 capsules with meals 3 times a day and 1-2 capsules with snacks max of 15 capsules a day 200 capsule 11     methylphenidate (RITALIN) 5 MG tablet Take 1 tablet (5 mg) by mouth 2 times daily as needed (Patient not taking: Reported on 3/12/2024) 10 tablet 0     Multiple Vitamins-Minerals (CENTRUM SILVER 50+MEN) TABS as directed Orally       pantoprazole (PROTONIX) 40 MG EC tablet Take 1 tablet (40 mg) by mouth 2 times daily 60 tablet 4     prochlorperazine (COMPAZINE) 10 MG tablet Take 1 tablet (10 mg) by mouth every 6 hours as needed for nausea or vomiting 30 tablet 2     vitamin D3 (CHOLECALCIFEROL) 50 mcg (2000 units) tablet Take 1 tablet (50 mcg) by mouth daily 90 tablet 1     Physical Examination:  Video physical exam  General: Patient appears well in no acute distress.   Skin: No visualized rash or lesions on visualized skin  Eyes: EOMI, no erythema, sclera icterus or discharge noted  Resp: Appears to be breathing comfortably without accessory muscle usage, speaking in full sentences, no cough  MSK: Appears to have normal range of motion based on visualized  movements  Neurologic: No apparent tremors, facial movements symmetric  Psych: affect and mood congruent, alert and oriented      Laboratory Data:  Most Recent 3 CBC's:  Recent Labs   Lab Test 04/02/24  0817 03/12/24  0815 03/05/24  0830   WBC 6.7 3.6* 3.0*   HGB 10.5* 10.0* 9.9*   MCV 95 95 94    116* 212   ANEUTAUTO 3.9 2.3 1.4*    Most Recent 3 BMP's:  Recent Labs   Lab Test 04/02/24  0817 03/05/24  0830 02/27/24  1447    140 139   POTASSIUM 4.3 4.1 4.2   CHLORIDE 101 104 104   CO2 28 26 25   BUN 16.9 19.4 16.0   CR 0.70 0.68 0.71   ANIONGAP 9 10 10   DENISE 9.3 9.1 8.8   * 207* 107*   PROTTOTAL 7.3 6.7 6.8   ALBUMIN 3.9 4.0 4.1    Most Recent 2 LFT's:  Recent Labs   Lab Test 04/02/24  0817 03/05/24  0830   AST 21 23   ALT 19 19   ALKPHOS 99 92   BILITOTAL 0.2 0.3   I reviewed the above labs today.          Assessment and Plan:  Locally advanced, unresectable pancreatic cancer. Patient started on treatment with palliative FOLFIRINOX on 7/31/23. He had a moderate amount of side effects following cycle 1 with dehydration, diarrhea, and nausea. Received cycle 2 on 8/15/23. ERCP/EGD on 8/17 with GJ tube exchange and duodenal stents placed. Hospitalized 8/21-8/25 due to colitis and abdominal pain. Cycle 3 was deferred due to neutropenia, Neulasta/Udenyca added on day 4.  - CT CAP 10/24/23 with progression in skeletal mets, lung nodule and lymph nodes. Stable pancreatic mass. Changed treatment to gemcitabine, Abraxane (day 1, 8, 15) and Zometa.  Tolerated cycle 1 well with the exception of a fever. Tolerated cycle 2 well. Cycle 2 day 15 skipped due to travel. Completed cycle 3 and then had a one month break for traveling. Completed cycle 4 without complications besides fatigue and a slight uptick in neuropathy.   *** CT imaging  *** [facility]     Skeletal mets. ***   -Worsening skeletal mets noted on CT 10/24. Sclerosis noted on recent imaging. No recent persistent pain.   -Received Zometa with cycle  1 (11/7/23), had fever later that night, reports no other side effects or concerns. Original plan for every 6 months but we reviewed indications for bone mets being either monthly or every 3 months last visit. Last dose given 2/6/24. Will plan to give every 3 months.  -Radiation to T10 completed 11/29/23.     Pulmonary Embolism. Right lower segmental PE found on routine imaging. Started on apixiban, denies any bleeding concerns.     Nutrition.  Feeding tube removed. Continues to tolerate oral intake well. Gaining weight. No issues with nausea or vomiting. Continue to push oral hydration, drinking 64+ oz of fluid/day.  Continue Protonix daily.  Has compazine as needed for nausea.     Lower extremity edema. Resolved. Continue compression stockings and elevation PRN. Continue to monitor.     Neuropathy: *** Grade 1. Mostly from prior therapies. Will refer to cancer rehab.     Fatigue: Ritalin 5 mg BID PRN. Discussed gentle exercise. Cancer rehab as above.     Diarrhea/constipation. Resolved. Bowel movements more regular with increased food intake. Continue Metamucil as needed. Monitor for recurrent blood in stool.     Abdominal pain. Managed by palliative care with Butrans patch and oral Dilaudid. No pain recently. Has been taking less pain medication.     Anemia. Normocytic. Suspect anemia of chronic disease + secondary to myelosuppression from chemo. Hemoglobin improved with treatment break. Will monitor for now.     Recent paronychial infection: Saw PCP and completed 7 days of clindamycin. Improved. Discussed using triple antibiotic cream BID.       ***    *** minutes spent on the date of the encounter doing chart review, review of test results, interpretation of tests, patient visit, and documentation     ESVIN Canales CNP        Again, thank you for allowing me to participate in the care of your patient.        Sincerely,        ESVIN Canales CNP

## 2024-04-16 NOTE — PROGRESS NOTES
Infusion Nursing Note:  Gallo Navarro presents today for Cycle 6, Day 15- Abraxane and Gemzar Infusion.    Patient seen by provider today: Yes: Megan Farrell NP   present during visit today: Not Applicable.    Note: Patient reports feeling well on arrival to infusion suite today. Denies the following signs of infection: no fever, cough, chills, rash, chest pain, or shortness of breath. No new questions or concerns since SUZETTE appointment. Wishes to proceed with treatment today.       Intravenous Access:  Implanted Port.    Treatment Conditions:  Lab Results   Component Value Date    HGB 10.3 (L) 04/16/2024    WBC 6.1 04/16/2024    ANEU 2.2 12/26/2023    ANEUTAUTO 3.8 04/16/2024     04/16/2024        Results reviewed, labs MET treatment parameters, ok to proceed with treatment.      Post Infusion Assessment:  Patient tolerated infusion without incident.  Blood return noted pre and post infusion.  Site patent and intact, free from redness, edema or discomfort.  No evidence of extravasations.  Access discontinued per protocol.       Discharge Plan:   Patient declined prescription refills.  Discharge instructions reviewed with: Patient.  Patient and/or family verbalized understanding of discharge instructions and all questions answered.  AVS to patient via [facility].  Patient will return 5/14/24 for next appointment.   Patient discharged in stable condition accompanied by: friend.  Departure Mode: Ambulatory.      Sallie Levine RN

## 2024-04-16 NOTE — PROGRESS NOTES
Oncology/Hematology Visit Note  Apr 16, 2024    Reason for Visit: follow up of locally advanced, unresectable pancreatic cancer    History of Present Illness: Gallo Navarro is a 58 year old male with locally advanced, unresectable pancreatic cancer. 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 He then presented to the ED with worsening abdominal pain, increased jaundice, poor PO intake and weight loss admitted on 7/8/2023 for concern of biliary obstruction. Unfortunately, during this admission, biopsy of pancreatic mass returned positive for pancreatic adenocarcinoma on 7/12/23. He started on treatment with 5FU, irinotecan, and oxaliplatin (FOLFIRINOX) on 7/31/23. Please see previous notes for further details on the patient's history.     8/17/23 - ERCP/EGD, duodenal stents x2 placed, exchange of GJ tube    8/21-/8/25 hospitalized due to diarrhea, colitis, abdominal pain.      8/29 - Cycle 3 deferred due to neutropenia.   Neulasta added. Irinotecan dose reduced 20% due to symptoms including cramping, double vision and slurred speech during infusion.      10/24/23 CT CAP with similar to slight increase in size of peripancreatic and mesenteric  lymph nodes, enlargement of previous skeletal metastasis as well as new sclerotic metastasis to left posterior 5th rib, enlarging pulmonary nodule, and unchanged pancreatic head mass. Also unclear concerns for PE,  right lower lobe segmental PE confirmed on CT-PE. Started on apixaban.     10/31/23 Cycle 1 gemzar, abraxane  11/7/23 Cycle 1 Zometa  11/22/23 Removal of GJ tube.   11/29/23 Completed palliative radiation to T10   12/13/23 C3D1 gem/abraxane    12/29/23 Consults at [facility]    1/23-1/26 Consults at [facility]     1/30/24: C4D1 gem/abraxane     3/24: CT  CAP with overall stable disease with isolated progression in potential L4 lesion. Referral for radiation oncology.      Gallo presents today for follow up prior to 6 day 15 gemcitabine, Abraxane.      Interval History:     -Pain is well controlled on current regimen, denies any pain today.   -Appetite is good, continues to an expanded diet of soft foods he knows won't get stuck in the duodenal stent. Denies nausea. No bowel concerns.   -Has significant fatigue for 4-5 days following chemo, appetite decreases as well  -neuropathy in his feet is stable, numbness up to his ankles bilaterally. Has a PT consult placed, but is waiting to schedule until after he returns from [facility] appointments.   -Returns to [facility] 4/28-5/1 for biopsy of spine lesion. Plans to start radiation once he returns.   -No shortness of breath, chest pain, or swelling.     Review of Systems:  Patient denies any of the following except if noted above: fevers, chills, difficulty with energy, vision or hearing changes, chest pain, dyspnea, abdominal pain, nausea, vomiting, diarrhea, constipation, urinary concerns, headaches, numbness, tingling, issues with sleep or mood. Also denies lumps, bumps, rashes or skin lesions, bleeding or bruising issues.        Current Outpatient Medications   Medication Sig Dispense Refill    acetaminophen (TYLENOL) 32 mg/mL liquid Take 20.313 mLs (650 mg) by mouth every 8 hours 1800 mL 1    apixaban ANTICOAGULANT (ELIQUIS) 5 MG tablet Take 1 tablet (5 mg) by mouth 2 times daily 60 tablet 2    blood glucose (FREESTYLE TEST STRIPS) test strip by Other route as needed      buprenorphine (BUTRANS) 10 MCG/HR WK patch Place 1 patch onto the skin every 7 days Use with 20 mcg/hour patch for 30 mcg/hour total. 4 patch 3    buprenorphine (BUTRANS) 20 MCG/HR WK patch Place 1 patch onto the skin once a week Use with 10 mcg/hour patch for 30 mcg/hour total. 4 patch 3    dicyclomine (BENTYL) 10 MG capsule Take 1 capsule (10  mg) by mouth 4 times daily (before meals and nightly) 120 capsule 1    HYDROmorphone (DILAUDID) 2 MG tablet Take 1-2 tablets (2-4 mg) by mouth every 4 hours as needed for severe pain or breakthrough pain 180 tablet 0    lipase-protease-amylase (CREON 24) 77695-89454-471258 units CPEP per EC capsule 2-3 capsules with meals 3 times a day and 1-2 capsules with snacks max of 15 capsules a day 200 capsule 11    Multiple Vitamins-Minerals (CENTRUM SILVER 50+MEN) TABS as directed Orally      pantoprazole (PROTONIX) 40 MG EC tablet Take 1 tablet (40 mg) by mouth 2 times daily 60 tablet 4    prochlorperazine (COMPAZINE) 10 MG tablet Take 1 tablet (10 mg) by mouth every 6 hours as needed for nausea or vomiting 30 tablet 2    vitamin D3 (CHOLECALCIFEROL) 50 mcg (2000 units) tablet Take 1 tablet (50 mcg) by mouth daily 90 tablet 1    lidocaine-prilocaine (EMLA) 2.5-2.5 % external cream Use 1-2 times a week or as needed prior to port access (Patient not taking: Reported on 3/12/2024) 30 g 3    methylphenidate (RITALIN) 5 MG tablet Take 1 tablet (5 mg) by mouth 2 times daily as needed (Patient not taking: Reported on 3/12/2024) 10 tablet 0       Physical Exam:  General: The patient is a pleasant male in no acute distress.  /66   Pulse 68   Temp 98.7  F (37.1  C) (Oral)   Resp 16   Wt 75.2 kg (165 lb 12.8 oz)   SpO2 99%   BMI 22.49 kg/m    Wt Readings from Last 10 Encounters:   04/16/24 75.2 kg (165 lb 12.8 oz)   04/02/24 73.1 kg (161 lb 1.6 oz)   03/15/24 73.5 kg (162 lb)   03/12/24 73.6 kg (162 lb 3.2 oz)   03/05/24 73.4 kg (161 lb 14.4 oz)   03/04/24 73 kg (160 lb 14.4 oz)   02/27/24 74 kg (163 lb 3.2 oz)   02/27/24 77.1 kg (170 lb)   02/22/24 75.8 kg (167 lb)   02/13/24 74.8 kg (165 lb)   HEENT: EOMI. Sclerae are anicteric. Oral mucosa pink and moist without lesions or thrush.   Lymph: Neck is supple with no lymphadenopathy in the cervical or supraclavicular areas.   Heart: Regular rate and rhythm.   Lungs: Clear  to auscultation bilaterally, normal work of breathing  Abdomen: Bowel sounds present, soft, nontender with no palpable hepatosplenomegaly or masses.   Extremities: No lower extremity edema noted bilaterally.   Neuro: Cranial nerves II through XII are grossly intact.  Skin: No rashes, petechiae, or bruising noted on exposed skin.      Laboratory Data:  Most Recent 3 CBC's:  Recent Labs   Lab Test 04/16/24  1238 04/02/24  0817 03/12/24  0815   WBC 6.1 6.7 3.6*   HGB 10.3* 10.5* 10.0*   MCV 95 95 95    351 116*   ANEUTAUTO 3.8 3.9 2.3    Most Recent 3 BMP's:  Recent Labs   Lab Test 04/02/24  0817 03/05/24  0830 02/27/24  1447    140 139   POTASSIUM 4.3 4.1 4.2   CHLORIDE 101 104 104   CO2 28 26 25   BUN 16.9 19.4 16.0   CR 0.70 0.68 0.71   ANIONGAP 9 10 10   DENISE 9.3 9.1 8.8   * 207* 107*   PROTTOTAL 7.3 6.7 6.8   ALBUMIN 3.9 4.0 4.1    Most Recent 2 LFT's:  Recent Labs   Lab Test 04/02/24  0817 03/05/24  0830   AST 21 23   ALT 19 19   ALKPHOS 99 92   BILITOTAL 0.2 0.3   I reviewed the above labs today.          Assessment and Plan:  Locally advanced, unresectable pancreatic cancer. Patient started on treatment with palliative FOLFIRINOX on 7/31/23. CT CAP 10/24/23 with progression in skeletal mets, lung nodule and lymph nodes. Stable pancreatic mass. Changed treatment to gemcitabine, Abraxane (day 1, 8, 15) and Zometa.  CT CAP 3/2024 with isolated progression of potential L4 lesion. Plan to continue gemcitabine/Abraxane day 1 & 15 as a bridge to clinical trial at [facility] for cellular therapy.   -Continues to tolerate treatment well, neuropathy is stable. Labs today reivewed, proceed with gemcitabine, Abraxane as scheduled.   -Traveling to [facility] 4/28 - 5/1 for biopsy of L4 lesion.   -Also has a family vacation scheduled in early June. He will update our team via [employer] with these dates to schedule future chemotherapy sessions and follow up appointments.       Skeletal mets.    -Worsening  skeletal mets noted on CT 10/24. Sclerosis noted on recent imaging.   -Radiation to T10 completed 11/29/23.  -Will start radiation therapy to L4 lesion once he returns from [facility] in May.   -Continues on Zometa every 3 months. Last dose given 2/6/24.        Pulmonary Embolism. Right lower segmental PE found on routine imaging. Started on apixiban, denies any bleeding concerns.     Nutrition.  Feeding tube removed. Continues to tolerate oral intake well. Gaining weight. No issues with nausea or vomiting. Continue to push oral hydration, drinking 64+ oz of fluid/day.  Continue Protonix daily.  Has compazine as needed for nausea.     Lower extremity edema. Resolved. Continue compression stockings and elevation PRN. Continue to monitor.     Neuropathy: Grade 1. Mostly from prior therapies but slightly increased on Abraxane lately. Previously referred to cancer rehab, plans to start therapy once he returns from [facility].     Fatigue: Ritalin 5 mg BID PRN. Discussed gentle exercise. Cancer rehab as above.     Diarrhea/constipation. Resolved. Bowel movements more regular with increased food intake. Continue Metamucil as needed. Monitor for recurrent blood in stool.     Abdominal pain. Managed by palliative care with Butrans patch and oral Dilaudid. No pain recently. Has been taking less pain medication.     Anemia. Normocytic. Suspect anemia of chronic disease + secondary to myelosuppression from chemo. Hemoglobin stable, continue to monitor.       The longitudinal plan of care for the diagnosis(es)/condition(s) as documented were addressed during this visit. Due to the added complexity in care, I will continue to support Gallo in the subsequent management and with ongoing continuity of care.      56 minutes spent on the date of the encounter doing chart review, review of test results, interpretation of tests, patient visit, and documentation     ESVIN Canales CNP

## 2024-04-16 NOTE — NURSING NOTE
Oncology Rooming Note    April 16, 2024 1:09 PM   Gallo Navarro is a 58 year old male who presents for:    Chief Complaint   Patient presents with    Port Draw     Labs drawn via port by RN in lab. VS taken.     Oncology Clinic Visit     Malignant neoplasm of head of pancreas     Initial Vitals: /66   Pulse 68   Temp 98.7  F (37.1  C) (Oral)   Resp 16   Wt 75.2 kg (165 lb 12.8 oz)   SpO2 99%   BMI 22.49 kg/m   Estimated body mass index is 22.49 kg/m  as calculated from the following:    Height as of 3/15/24: 1.829 m (6').    Weight as of this encounter: 75.2 kg (165 lb 12.8 oz). Body surface area is 1.95 meters squared.  No Pain (0) Comment: Data Unavailable   No LMP for male patient.  Allergies reviewed: Yes  Medications reviewed: Yes    Medications: MEDICATION REFILLS NEEDED TODAY. Provider was notified.  Pharmacy name entered into [employer]:    [facility] - [place], [place] - [address] [facility] - [place], [place] - [phone]  [facility] - [place] - [address] [facility] AT PORTAL TO REGISTERED [facility] SITES  [facility] - [place], [place] - [address] [place] [address] SE  [facility] [id number] [facility] - [place], [place] - [address]    Frailty Screening:   Is the patient here for a new oncology consult visit in cancer care? 2. No      Clinical concerns: None       Martha Hernandez, TRUNG

## 2024-04-16 NOTE — PATIENT INSTRUCTIONS
[facility] Triage and after hours / weekends / holidays:  [phone]    Please call the triage or after hours line if you experience a temperature greater than or equal to 100.4, shaking chills, have uncontrolled nausea, vomiting and/or diarrhea, dizziness, shortness of breath, chest pain, bleeding, unexplained bruising, or if you have any other new/concerning symptoms, questions or concerns.      If you are having any concerning symptoms or wish to speak to a provider before your next infusion visit, please call triage to notify them so we can adequately serve you.     If you need a refill on a narcotic prescription or other medication, please call before your infusion appointment.

## 2024-04-18 LAB — CANCER AG19-9 SERPL IA-ACNC: 3 U/ML

## 2024-04-20 ENCOUNTER — MYC REFILL (OUTPATIENT)
Dept: RADIATION ONCOLOGY | Facility: HOSPITAL | Age: 59
End: 2024-04-20
Payer: COMMERCIAL

## 2024-04-20 DIAGNOSIS — C25.9 PANCREATIC ADENOCARCINOMA (H): ICD-10-CM

## 2024-04-22 RX ORDER — HYDROMORPHONE HYDROCHLORIDE 2 MG/1
2-4 TABLET ORAL EVERY 4 HOURS PRN
Qty: 180 TABLET | Refills: 0 | Status: SHIPPED | OUTPATIENT
Start: 2024-04-22 | End: 2024-05-18

## 2024-04-22 NOTE — TELEPHONE ENCOUNTER
Received Hashgot message from patient requesting refill of hydromorphone.     Last refill: 3/29/24  Last office visit: 2/22/24  Scheduled for follow up 4/25/24     Will route request to MD for review.     Reviewed [place]  Report.

## 2024-04-25 ENCOUNTER — VIRTUAL VISIT (OUTPATIENT)
Dept: RADIATION ONCOLOGY | Facility: CLINIC | Age: 59
End: 2024-04-25
Attending: FAMILY MEDICINE
Payer: COMMERCIAL

## 2024-04-25 VITALS — BODY MASS INDEX: 22.49 KG/M2 | HEIGHT: 72 IN

## 2024-04-25 DIAGNOSIS — C25.9 PANCREATIC ADENOCARCINOMA (H): ICD-10-CM

## 2024-04-25 DIAGNOSIS — C79.51 SECONDARY MALIGNANT NEOPLASM OF BONE (H): ICD-10-CM

## 2024-04-25 DIAGNOSIS — G89.3 CANCER ASSOCIATED PAIN: ICD-10-CM

## 2024-04-25 DIAGNOSIS — Z51.5 PALLIATIVE CARE PATIENT: Primary | ICD-10-CM

## 2024-04-25 DIAGNOSIS — Z98.890 HISTORY OF BILIARY STENT INSERTION: ICD-10-CM

## 2024-04-25 DIAGNOSIS — K31.1 GASTRIC OUTLET OBSTRUCTION: ICD-10-CM

## 2024-04-25 PROCEDURE — 99215 OFFICE O/P EST HI 40 MIN: CPT | Mod: 95 | Performed by: FAMILY MEDICINE

## 2024-04-25 ASSESSMENT — PAIN SCALES - GENERAL: PAINLEVEL: NO PAIN (0)

## 2024-04-25 NOTE — NURSING NOTE
Is the patient currently in the state of [place]? YES    Visit mode:VIDEO    If the visit is dropped, the patient can be reconnected by: VIDEO VISIT: Text to cell phone:   Telephone Information:   Mobile [phone]       Will anyone else be joining the visit? NO  (If patient encounters technical issues they should call [phone]    How would you like to obtain your AVS? MyChart    Are changes needed to the allergy or medication list? No, Pt stated no changes to allergies, Pt declined med review, and Pt stated no med changes    Are refills needed on medications prescribed by this physician? NO    Reason for visit: RECHECK (Return Palliative )    Jennifer NICOLE

## 2024-04-25 NOTE — PROGRESS NOTES
Virtual Visit Details    Type of service:  Video Visit     Originating Location (pt. Location): Home    Distant Location (provider location):  On-site  Platform used for Video Visit: [facility]    Palliative Care Outpatient Clinic Progress Note    Patient Name: Gallo Navarro  Primary Provider: Akil Hernandez    Impressions:  ECO  Decision Making Capacity:  VERY PRESENT  PDMP review:  yes, no concerns     Locally extensive non-resectable pancreatic cancer  Cancer associated pain  Lymphedema in BLE     GOALS OF CARE:  2024  No change to GOC.  Gallo is preparing to go to [facility] to see if he qualifies for a cellular therapy trial.  2024 Life prolonging without limits at this time and willingness to consider clinical trials.     Recommendations & Counseling:  CONTINUE buprenorphine 30 mcg/hour patch (across 2 patches) and replace weekly  Stop gabapentin one 300 capsule three times a day  Continue dilaudid 4 mg po q 3 hours prn--this is usually three times a day  Tylenol suspension 650 mg po TID  Narcan also prescribed for safety  Follow up by video in 8 weeks, sooner prn.    Will hold on Ritalin for now pending his visit to [facility].     I provided emotional support through validating Gallo's feelings and open, empathic communication.    Counseling: All of the above was explained to the patient in lay language. The patient has verbalized a clear understanding of the discussion, asked appropriate questions, which have been answered to patient's apparent satisfaction. The patient is in agreement with the above plan.        Chief Complaint/Patient ID: Gallo Navarro 58 year old male with PMHx of unresectable localized pancreatic cancer    Last Palliative care appointment: 2024 with me     Reviewed: yes, no concerns    Interim History:  Gallo Navarro is a 58 year old male who is seen today for follow up with Palliative Care via billable video visit. He was unaccompanied today.    He continues his  Gregory/abraxane every other week.  This is the second week and is a 'good one.'  He is off to [facility] next week and they plan to biopsy his new L4 lesion to help with his cellular immunity study.  He is overall doing well.     Pain:  minimal on current regimen.    Appetite/Nausea: no concerns     Bowels: no concerns     Sleep: OK     Mood: good; excited to go to [facility] to see if he might qualify for this clinical trial.     Coping:  well; ivan on the off weeks from his chemo    Family History- Reviewed in Epic.    No Known Allergies    Social History:  Pertinent changes to social history/social situation since last visit: none  Key support resources: family and  wide [place] of friends  Advance Directive Status:  no ACP documents in Epic    Social History     Tobacco Use    Smoking status: Never     Passive exposure: Never    Smokeless tobacco: Never   Vaping Use    Vaping status: Never Used   Substance Use Topics    Alcohol use: Not Currently    Drug use: Never         No Known Allergies  Current Outpatient Medications   Medication Sig Dispense Refill    acetaminophen (TYLENOL) 32 mg/mL liquid Take 20.313 mLs (650 mg) by mouth every 8 hours 1800 mL 1    apixaban ANTICOAGULANT (ELIQUIS) 5 MG tablet Take 1 tablet (5 mg) by mouth 2 times daily 60 tablet 2    blood glucose (FREESTYLE TEST STRIPS) test strip by Other route as needed      buprenorphine (BUTRANS) 10 MCG/HR WK patch Place 1 patch onto the skin every 7 days Use with 20 mcg/hour patch for 30 mcg/hour total. 4 patch 3    buprenorphine (BUTRANS) 20 MCG/HR WK patch Place 1 patch onto the skin once a week Use with 10 mcg/hour patch for 30 mcg/hour total. 4 patch 3    dicyclomine (BENTYL) 10 MG capsule Take 1 capsule (10 mg) by mouth 4 times daily (before meals and nightly) 120 capsule 1    HYDROmorphone (DILAUDID) 2 MG tablet Take 1-2 tablets (2-4 mg) by mouth every 4 hours as needed for severe pain or breakthrough pain 180 tablet 0    lidocaine-prilocaine  (EMLA) 2.5-2.5 % external cream Use 1-2 times a week or as needed prior to port access (Patient not taking: Reported on 3/12/2024) 30 g 3    lipase-protease-amylase (CREON 24) 89589-85696-914825 units CPEP per EC capsule 2-3 capsules with meals 3 times a day and 1-2 capsules with snacks max of 15 capsules a day 200 capsule 11    methylphenidate (RITALIN) 5 MG tablet Take 1 tablet (5 mg) by mouth 2 times daily as needed (Patient not taking: Reported on 3/12/2024) 10 tablet 0    Multiple Vitamins-Minerals (CENTRUM SILVER 50+MEN) TABS as directed Orally      pantoprazole (PROTONIX) 40 MG EC tablet Take 1 tablet (40 mg) by mouth 2 times daily 60 tablet 4    prochlorperazine (COMPAZINE) 10 MG tablet Take 1 tablet (10 mg) by mouth every 6 hours as needed for nausea or vomiting 30 tablet 2    vitamin D3 (CHOLECALCIFEROL) 50 mcg (2000 units) tablet Take 1 tablet (50 mcg) by mouth daily 90 tablet 1     Past Medical History:   Diagnosis Date    Acute pancreatitis 04/16/2023    Alcohol-induced acute pancreatitis 04/10/2023    Gastric outlet obstruction     Metastasis from pancreatic cancer (H)     Personal history of chemotherapy     Gemzar + Abraxane started on 10/31/2023    Recurrent acute pancreatitis      Past Surgical History:   Procedure Laterality Date    AS OPEN TREATMENT CLAVICULAR FRACTURE INTERNAL FX      ENDOSCOPIC RETROGRADE CHOLANGIOPANCREATOGRAM N/A 7/11/2023    Procedure: ENDOSCOPIC RETROGRADE CHOLANGIOPANCREATOGRAPHY;  Surgeon: Khadar Pickett MD;  Location: [facility] OR    ENDOSCOPIC RETROGRADE CHOLANGIOPANCREATOGRAM N/A 8/17/2023    Procedure: ENDOSCOPIC RETROGRADE  with stent removal x1, balloon sweep;  Surgeon: Christos Greenberg MD;  Location: [facility] OR    ENDOSCOPIC ULTRASOUND UPPER GASTROINTESTINAL TRACT (GI) N/A 7/11/2023    Procedure: Endoscopic ultrasound upper gastrointestinal tract (GI), with biposy, GJ tube repositioning, stent placement;  Surgeon: Khadar Pickett MD;  Location: [facility] OR    ENDOSCOPIC  ULTRASOUND UPPER GASTROINTESTINAL TRACT (GI) N/A 2023    Procedure: ENDOSCOPIC ULTRASOUND, ESOPHAGOSCOPY, EUS guided gastrojejunostomy;  Surgeon: Tre York MD;  Location: [facility] OR    INSERT PICC LINE  2023    INSERT PORT VASCULAR ACCESS Right 2023    Procedure: Insert port vascular access;  Surgeon: Tom العلي MD;  Location: [facility] OR    IR CHEST PORT PLACEMENT > 5 YRS OF AGE  2023    IR NG TUBE PLACEMENT REQ RAD & FLUORO  2023    JG tube    REPLACE GASTROJEJUNOSTOMY TUBE, PERCUTANEOUS N/A 2023    Procedure: possible REPLACEMENT, GASTROJEJUNOSTOMY TUBE, PERCUTANEOUS;  Surgeon: Christos Greenberg MD;  Location: [facility] OR       Physical Exam:   GENERAL APPEARANCE: healthy appearing, alert and no distress; neatly groomed  EYES: Eyes grossly normal to inspection, PERRLA, conjunctivae and sclerae without injection or discharge, EOM intact   RESP:  no increased work of breathing; speaks in complete sentences;   MS: No musculoskeletal defects are noted  SKIN: No suspicious lesions or rashes, hydration status appears adequate with normal skin turgor   PSYCH: Alert and oriented x3; speech- coherent , normal rate and volume; able to articulate logical thoughts, able to abstract reason, no tangential thoughts, no hallucinations or delusions, mentation appears normal, Mood is euthymic. Affect is appropriate for this mood state and bright. Thought content is free of suicidal ideation, hallucinations, and delusions.  Eye contact is good during conversation.       Key Data Reviewed:  LABS: 2024- Cr 0.7, Albumin 3.9,  Hgb 10.5,      IMAGIN2024 CT CAP W/CONTRAST  IMPRESSION:   1. No significant interval change in the poorly marginated  infiltrative pancreatic head mass and prominent peripancreatic and  mesenteric lymph nodes adjacent to this mass.  2. Interval enlargement of the sclerotic lesion in the superior  endplate of L4. Other sclerotic metastatic lesions are  not  significantly changed. No new osseous lesion.  3. Resolution of the 1.2 cm irregular nodule in the paramedian left  upper lobe described on prior exam. No suspicious pulmonary nodules.  4. Resolution of the right-sided pulmonary emboli visualized on  comparison prior.  5. Gastrojejunal, duodenal, biliary, and pancreatic stents in place.  No evidence of biliary or bowel obstruction    52 minutes spent on the date of the encounter doing chart review, history and exam, patient education & counseling, documentation and other activities as noted above.    Jeremy Hurt MD MS FAAFP CAQHPM  [facility] Palliative [facility]  Office [phone]  Fax [phone]

## 2024-04-25 NOTE — PATIENT INSTRUCTIONS
It was good to see you today, Gallo. You look good and I hope things go well in [place] next week.    Here are the things we talked about:  No change to medications.  Keep trying to get some gentle exercise--it can help with fatigue during the bad weeks.    Someone from the team will reach out to schedule a follow up appointment in 2 months.     How to get a hold of us:  For non-urgent matters, MyChart works best.    For more urgent matters, or if you prefer not to use MyChart, call our clinic nurse coordinator Megan Funez RN at [phone]    We have an on-call number for evenings and weekends. Please call this only if you are having uncontrolled symptoms or serious side effects from your medicines: [phone].     For refills, please give us a week (5 working days) notice. We don't always have providers available everyday to do refills. If you call the day you run out of your medicine, we may not be able to refill it in time, so call 5 days in advance!    Jeremy Hurt MD MS FAAFP CAQHPM  [facility]  Office [phone]  Fax [phone]

## 2024-05-06 ENCOUNTER — MYC MEDICAL ADVICE (OUTPATIENT)
Dept: FAMILY MEDICINE | Facility: CLINIC | Age: 59
End: 2024-05-06
Payer: COMMERCIAL

## 2024-05-06 DIAGNOSIS — C25.0 MALIGNANT NEOPLASM OF HEAD OF PANCREAS (H): ICD-10-CM

## 2024-05-06 DIAGNOSIS — I31.39 PERICARDIAL EFFUSION: Primary | ICD-10-CM

## 2024-05-07 NOTE — TELEPHONE ENCOUNTER
Routing to provider to review and advise if patient would go through you or his oncologist for a cardiologist referral?    Ean Guerra RN  [facility]

## 2024-05-08 ENCOUNTER — OFFICE VISIT (OUTPATIENT)
Dept: RADIATION ONCOLOGY | Facility: CLINIC | Age: 59
End: 2024-05-08
Payer: COMMERCIAL

## 2024-05-08 VITALS
SYSTOLIC BLOOD PRESSURE: 107 MMHG | OXYGEN SATURATION: 98 % | RESPIRATION RATE: 16 BRPM | TEMPERATURE: 98.4 F | DIASTOLIC BLOOD PRESSURE: 71 MMHG | HEART RATE: 89 BPM

## 2024-05-08 DIAGNOSIS — C79.51 SECONDARY MALIGNANT NEOPLASM OF BONE (H): Primary | ICD-10-CM

## 2024-05-08 DIAGNOSIS — C25.9 PANCREATIC ADENOCARCINOMA (H): ICD-10-CM

## 2024-05-08 PROCEDURE — 99215 OFFICE O/P EST HI 40 MIN: CPT | Performed by: SURGERY

## 2024-05-08 ASSESSMENT — PAIN SCALES - GENERAL: PAINLEVEL: NO PAIN (0)

## 2024-05-08 NOTE — PATIENT INSTRUCTIONS
Please contact [facility] RN with questions or concerns following today's appointment: [phone].       Please feel free to leave a detailed message if your call is not answered.    If your call is not received before 3:00 PM, it may not be returned until the following business day.    If you are receiving radiation treatment and need assistance after 3:00 PM or on the weekends, please call [phone] and ask to speak to the radiation oncologist on-call.    Thank you!    Bessie LOZOYA

## 2024-05-08 NOTE — LETTER
2024         RE: Gallo Navarro  [id number] [address] [facility] [address]        Dear Colleague,    Thank you for referring your patient, Gallo Navarro, to the [facility] RADIATION ONCOLOGY [place]. Please see a copy of my visit note below.       Department of Radiation Oncology  [facility]  [facility]  [address] [place] [address]  [phone]       Follow-up note     Name: Gallo Navarro MRN: [id number]   : 1965   Date of Service: May 8, 2024   Referring: Dr. Anguiano     Reason for consultation: metastatic pancreatic cancer to bone    History of Present Illness     Mr. Navarro is a 58 year old male with metastatic pancreatic cancer to bones.  He has completed palliative radiotherapy to T10, 20 Gray/5 fractions 2023.  He now presents for radiation to L4 in the setting of palliation/oligo progression.    Briefly, his oncologic history is as follows. He was seen by my colleague at the [facility], Dr. Becca Anguiano, note is referenced from that consultation note dated 23:    Patient's diagnosis came to light when he presented to [facility] ED on 2023 with worsening pre-existing abdominal pain and jaundice, weight loss and poor p.o. intake.  He has a history of acute pancreatitis with ileus and duodenal stenosis, gastric outlet obstruction requiring GJ tube (placed 2023) for feeding.  After receiving ERCP with sphincterotomy at [facility] prior to admission, the patient did not have relief of abdominal pain, and he presented to [facility] to establish care with our system.     Prior to his care at [facility] in 2023 she had initial presentation of sudden onset vomiting and found to have lipase greater than 2000, later revealed to have acquired duodenal stricture and gastric outlet obstruction.  He had PEG GJ placed for gastric venting and nutrition after weight loss of 40 pounds.  CT of the  abdomen performed in June 2023 for pancreatitis follow-up revealed focal irregular hypodense masslike region in the pancreatic head measuring 2.2 x 1.5 x 1.4 cm with persistent mild main pancreatic ductal dilation and associated subcentimeter mesenteric lymph nodes.  EUS biopsy on 6/27/2020 was nondiagnostic showing duodenal inflammation.  He then proceeded with ERCP on 7/7/2023, as above.      CT abdomen pelvis on 7/8 showed pancreatic mass without pancreatic ductal dilatation, with biliary stent in position, mild pneumobilia, calcific density adjacent to biliary stent.     MRCP from 7/10/2023 showed ill-defined mass soft tissue in the pancreatic head suspicious for malignancy given biliary and pancreatic duct obstruction with vascular encasement and narrowing of SMV, less likely pancreatitis.  Mildly enlarged peripancreatic and periportal and enteric lymph nodes are present, indeterminate.     At [facility] he underwent EGD/EUS on 7/11/2023, with pathology showing adenocarcinoma.  Surgical oncology was consulted, who felt surgery should be reconsidered after 6 months of adjuvant chemotherapy.     CT chest from 7/12/2023 showed an ill-defined pancreatic head mass with upper abdominal lymphadenopathy, and a 7 mm pulmonary nodule left upper lobe, indeterminate. He underwent duodenal stenting on 7/13/2023.     He initiated systemic therapy was FOLFIRINOX on 7/31/2023.     CT chest abdomen pelvis on 10/24/2023 showed interval enlargement of scattered sclerotic osseous metastases, and enlarging subpleural 1.2 cm pulmonary nodule in paramediastinal EAMON, no substantial change in the pancreatic head mass.  Filling defects were seen in the right middle and lower lobe, concerning for emboli.  Echo follow-up CT PE was performed on 10/31/2023 which revealed a acute PE in the right lower lobe segment.  He was subsequently prescribed Eliquis twice daily by Dr. Haile.       Upon follow-up with Dr. Haile on 11/6/2023, review of  imaging showed disease progression, particularly with skeletal metastatic progression.  He was recommended for treatment for the T10 lesion given its location to the right pedicle.  Dr. Haile changed systemic therapy to gemcitabine/Abraxane with Zometa for bony lesion control on 10/31/23.    He completed palliative radiotherapy to T10, 20 Gray/5 fractions completed on 11/29/2023.  He has since been on systemic therapy under care of Dr. Haile.      He underwent a PET CT scan on 1/30/2024, which demonstrated pancreatic head mass with FDG uptake, with metastatic adenopathy, with lung nodules suspicious for metastatic disease.  There was sclerotic disease at T10 and L4 in the left pubic bone, but these were not FDG avid on the PET scan.    CT scan on 3/11/2024 demonstrated fairly stable disease with a pancreatic head mass and lacey-pancreatic lymph nodes.  There was no additional suspicious pulmonary nodules with resolution of prior nodules in the lungs.  However there was interval enlargement of L4 lesion, now measuring 1.8 cm in size, previously 1.4 cm.  Thus, he was referred for consideration of palliative radiotherapy to L4.  He is having mild discomfort in this location, not extreme.  Overall he tolerated radiation well last time.  He wishes to pursue palliative radiotherapy as he continues systemic therapy under care of Dr. Haile, with plans for eventually enrolling in a clinical trial at [facility].     Interval history:    Patient was last seen in March 2024.  At that time we had discussed palliative radiotherapy to L4 given this was a potential site of only oligo progression.  In the meantime he is also meeting with MD Harjit, particularly with Dr. Dawkins's team for possible clinical.  A CT scan was obtained on 4/30/2024 (I do not have the current images) but the official read demonstrated a new pericardial effusion, stable locally advanced pancreatic head and uncinate tumor, decreasing mesenteric lymph  nodes, relatively stable osseous lesions at the L4, T10 and left pubic symphysis.  Per patient report additional pathology for clinical trial testing was to be performed, and a biopsy of the left pubic symphysis lesion was performed which returned negative for malignancy.  He will still continue to follow with Dr. Clark and has not been on systemic therapy for over a month.    Today, he is accompanied by his wife.  He states that overall he feels quite well.  He denies any significant back pain or pubic pain at this time and states that he has adequate pain control.    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     Gastric outlet obstruction      Metastasis from pancreatic cancer (H)      Personal history of chemotherapy     Gemzar + Abraxane started on 10/31/2023     Recurrent acute pancreatitis      S/P radiation therapy     2,000 cGy to T10 Spine completed on 11/29/2023 [facility]       Past Surgical History:   Past Surgical History:   Procedure Laterality Date     AS OPEN TREATMENT CLAVICULAR FRACTURE INTERNAL FX       ENDOSCOPIC RETROGRADE CHOLANGIOPANCREATOGRAM N/A 7/11/2023    Procedure: ENDOSCOPIC RETROGRADE CHOLANGIOPANCREATOGRAPHY;  Surgeon: Khadar Pickett MD;  Location: [facility] OR     ENDOSCOPIC RETROGRADE CHOLANGIOPANCREATOGRAM N/A 8/17/2023    Procedure: ENDOSCOPIC RETROGRADE  with stent removal x1, balloon sweep;  Surgeon: Christos Greenberg MD;  Location: [facility] OR     ENDOSCOPIC ULTRASOUND UPPER GASTROINTESTINAL TRACT (GI) N/A 7/11/2023    Procedure: Endoscopic ultrasound upper gastrointestinal tract (GI), with biposy, GJ tube repositioning, stent placement;  Surgeon: Khadar Pickett MD;  Location: [facility] OR     ENDOSCOPIC ULTRASOUND UPPER GASTROINTESTINAL TRACT (GI) N/A 7/13/2023    Procedure: ENDOSCOPIC ULTRASOUND, ESOPHAGOSCOPY, EUS guided gastrojejunostomy;  Surgeon: Jaylen  Tre Malcolm MD;  Location: [facility] OR     INSERT PICC LINE  04/29/2023     INSERT PORT VASCULAR ACCESS Right 7/28/2023    Procedure: Insert port vascular access;  Surgeon: Tom العلي MD;  Location: [facility] OR     IR CHEST PORT PLACEMENT > 5 YRS OF AGE  7/28/2023     IR NG TUBE PLACEMENT REQ RAD & FLUORO  05/08/2023    JG tube     REPLACE GASTROJEJUNOSTOMY TUBE, PERCUTANEOUS N/A 8/17/2023    Procedure: possible REPLACEMENT, GASTROJEJUNOSTOMY TUBE, PERCUTANEOUS;  Surgeon: Christos Greenberg MD;  Location: [facility] OR       Chemotherapy History:  Per HPI    Radiation History:  No prior RT    Pregnant: No  Implanted Cardiac Devices: No    Medications:  Current Outpatient Medications   Medication Sig Dispense Refill     acetaminophen (TYLENOL) 32 mg/mL liquid Take 20.313 mLs (650 mg) by mouth every 8 hours 1800 mL 1     apixaban ANTICOAGULANT (ELIQUIS) 5 MG tablet Take 1 tablet (5 mg) by mouth 2 times daily 60 tablet 2     blood glucose (FREESTYLE TEST STRIPS) test strip by Other route as needed       buprenorphine (BUTRANS) 10 MCG/HR WK patch Place 1 patch onto the skin every 7 days Use with 20 mcg/hour patch for 30 mcg/hour total. 4 patch 3     buprenorphine (BUTRANS) 20 MCG/HR WK patch Place 1 patch onto the skin once a week Use with 10 mcg/hour patch for 30 mcg/hour total. 4 patch 3     dicyclomine (BENTYL) 10 MG capsule Take 1 capsule (10 mg) by mouth 4 times daily (before meals and nightly) 120 capsule 1     HYDROmorphone (DILAUDID) 2 MG tablet Take 1-2 tablets (2-4 mg) by mouth every 4 hours as needed for severe pain or breakthrough pain 180 tablet 0     lipase-protease-amylase (CREON 24) 38224-29223-039437 units CPEP per EC capsule 2-3 capsules with meals 3 times a day and 1-2 capsules with snacks max of 15 capsules a day 200 capsule 11     Multiple Vitamins-Minerals (CENTRUM SILVER 50+MEN) TABS as directed Orally       pantoprazole (PROTONIX) 40 MG EC tablet Take 1 tablet (40 mg) by mouth 2 times daily 60 tablet  4     prochlorperazine (COMPAZINE) 10 MG tablet Take 1 tablet (10 mg) by mouth every 6 hours as needed for nausea or vomiting 30 tablet 2     vitamin D3 (CHOLECALCIFEROL) 50 mcg (2000 units) tablet Take 1 tablet (50 mcg) by mouth daily 90 tablet 1     lidocaine-prilocaine (EMLA) 2.5-2.5 % external cream Use 1-2 times a week or as needed prior to port access (Patient not taking: Reported on 5/8/2024) 30 g 3     methylphenidate (RITALIN) 5 MG tablet Take 1 tablet (5 mg) by mouth 2 times daily as needed (Patient not taking: Reported on 3/12/2024) 10 tablet 0     No current facility-administered medications for this visit.         Allergies:   No Known Allergies    Social History:    Social History     Tobacco Use     Smoking status: Never     Passive exposure: Never     Smokeless tobacco: Never   Vaping Use     Vaping status: Never Used   Substance Use Topics     Alcohol use: Not Currently     Drug use: Never         Family History:  Family History   Problem Relation Age of Onset     Diabetes Type 2  Father      Cirrhosis Father      Genetic Disorder Brother         missing [id number], mild retardation     Colon Polyps Brother      Pancreatic Cancer Paternal Aunt 85     Colon Cancer Paternal Uncle      Pancreatitis Cousin        Review of Systems   A 10-point review of systems was performed. Pertinent findings are noted in the HPI.    Physical Exam   ECOG Status: 2    Vitals:  /71 (BP Location: Left arm, Patient Position: Chair, Cuff Size: Adult Regular)   Pulse 89   Temp 98.4  F (36.9  C) (Oral)   Resp 16   SpO2 98%     Gen: Alert, in NAD  Head: NC/AT  Eyes: PERRL, EOMI, sclera anicteric  Ears: No external auricular lesions  Nose/sinus: No rhinorrhea or epistaxis  Oral cavity/oropharynx: MMM, no visible oral cavity lesions  Neck: Full ROM, supple, no palpable adenopathy  Pulm: No wheezing, stridor or respiratory distress  CV: Extremities are warm and well-perfused, no cyanosis, no pedal edema  Abdominal:  Normal bowel sounds, soft, nontender, no masses  Musculoskeletal: Normal bulk and tone, mildly tender to palpation in lower thoracic/upper lumbar location  Skin: Normal color and turgor  Neuro: A/Ox3, CN II-XII intact, normal gait    Imaging/Path/Labs   Imaging: per HPI, personally reviewed and in agreement    CT 10/24/23 (pre -RT)            CT 3/11/24              Path: per HPI, personally reviewed and in agreement    Labs: per HPI, personally reviewed and in agreement    I have personally reviewed Dr. Haile's  and Dr Dawkins's notes     Assessment      Mr. Navarro is a 58 year old male with metastatic pancreatic cancer to bones.  He has completed palliative radiotherapy to T10, 20 Gray/5 fractions November 29, 2023.  He presented for  radiation to L4 in the setting of palliation/oligo progression.    He was seen in March 2024, at which point we discussed palliative radiation to L4, 30 Gy/10 fractions.  However in the meantime his pain has been relatively well-controlled and he has had a biopsy of a pubic lesion which is negative, with stable disease on an outside scan at the L4 location.      Thus, I discussed options of radiation versus continued treatment under care of Dr. Haile and to intervene with palliative radiation when necessary.  At this point, patient prefers to continue on with systemic therapy which I think is a reasonable option.  He will also be pending possible clinical trial at [facility] Dr. Dawkins.    Plan     After extensive discussion, patient wishes hold off on pursuing palliative radiotherapy to L4 at this point.  He will continue systemic therapy under care of Dr. Haile.  Follow-up with radiation oncology on as-needed basis.    All benefits and risks discussed, and patient is in agreement with the oncologic plan discussed above.     Thank you for allowing me to participate in your patient's care.  If you should require any additional information, please do not hesitate in contacting  me.     Ben Reaves MD  Department of Radiation Oncology  [facility]     A total of 40 minutes were spent on this visit, including preparation for this visit, face to face with patient, and medical decision making. Medical decision-making included consideration and possible diagnosis, management options, complex record review, review of diagnostic tests, consideration and discussion of significant complications based up medical history/comorbidities, and discussion with providers involved in care of the patient.      Again, thank you for allowing me to participate in the care of your patient.        Sincerely,        Ben Reaves MD

## 2024-05-08 NOTE — NURSING NOTE
Oncology Rooming Note    May 8, 2024 1:15 PM   Gallo Navarro is a 58 year old male who presents for:    Chief Complaint   Patient presents with    Cancer     Radiation oncology return visit with Dr. Reaves     Initial Vitals: /71 (BP Location: Left arm, Patient Position: Chair, Cuff Size: Adult Regular)   Pulse 89   Temp 98.4  F (36.9  C) (Oral)   Resp 16   SpO2 98%  Estimated body mass index is 22.49 kg/m  as calculated from the following:    Height as of 4/25/24: 1.829 m (6').    Weight as of 4/16/24: 75.2 kg (165 lb 12.8 oz). There is no height or weight on file to calculate BSA.  No Pain (0) Comment: Data Unavailable   No LMP for male patient.  Allergies reviewed: Yes  Medications reviewed: Yes    Medications: Medication refills not needed today.  Pharmacy name entered into EPIC:    [facility] - [place], [place] - [address] [facility] [place], [place] [phone]  [facility] [place] [address] [facility] AT PORTAL TO REGISTERED [facility] SITES  [facility] - [place], [place] - [address] [place] [id number] [address] [employer] - [place], [place] - [address]    Frailty Screening:   Is the patient here for a new oncology consult visit in cancer care? 2. No      Clinical concerns: patient presents to review radiation treatment with Dr. Reaves following return from clinic appointments at [facility].  Dr. Ben Reaves was notified.      Bessie Vidales, RN BSN OCN CBCN

## 2024-05-09 NOTE — PROGRESS NOTES
Department of Radiation Oncology  [facility]  [facility]  [address] [place] [id number] [phone]       Follow-up note     Name: Gallo Navarro MRN: [id number]   : 1965   Date of Service: May 8, 2024   Referring: Dr. Anguiano     Reason for consultation: metastatic pancreatic cancer to bone    History of Present Illness     Mr. Navarro is a 58 year old male with metastatic pancreatic cancer to bones.  He has completed palliative radiotherapy to T10, 20 Gray/5 fractions 2023.  He now presents for radiation to L4 in the setting of palliation/oligo progression.    Briefly, his oncologic history is as follows. He was seen by my colleague at the [facility], Dr. Becca Anguiano, note is referenced from that consultation note dated 23:    Patient's diagnosis came to light when he presented to [facility] ED on 2023 with worsening pre-existing abdominal pain and jaundice, weight loss and poor p.o. intake.  He has a history of acute pancreatitis with ileus and duodenal stenosis, gastric outlet obstruction requiring GJ tube (placed 2023) for feeding.  After receiving ERCP with sphincterotomy at [facility] prior to admission, the patient did not have relief of abdominal pain, and he presented to [facility] to establish care with our system.     Prior to his care at [facility] in 2023 she had initial presentation of sudden onset vomiting and found to have lipase greater than 2000, later revealed to have acquired duodenal stricture and gastric outlet obstruction.  He had PEG GJ placed for gastric venting and nutrition after weight loss of 40 pounds.  CT of the abdomen performed in 2023 for pancreatitis follow-up revealed focal irregular hypodense masslike region in the pancreatic head measuring 2.2 x 1.5 x 1.4 cm with persistent mild main pancreatic ductal dilation and associated subcentimeter mesenteric lymph  nodes.  EUS biopsy on 6/27/2020 was nondiagnostic showing duodenal inflammation.  He then proceeded with ERCP on 7/7/2023, as above.      CT abdomen pelvis on 7/8 showed pancreatic mass without pancreatic ductal dilatation, with biliary stent in position, mild pneumobilia, calcific density adjacent to biliary stent.     MRCP from 7/10/2023 showed ill-defined mass soft tissue in the pancreatic head suspicious for malignancy given biliary and pancreatic duct obstruction with vascular encasement and narrowing of SMV, less likely pancreatitis.  Mildly enlarged peripancreatic and periportal and enteric lymph nodes are present, indeterminate.     At [facility] he underwent EGD/EUS on 7/11/2023, with pathology showing adenocarcinoma.  Surgical oncology was consulted, who felt surgery should be reconsidered after 6 months of adjuvant chemotherapy.     CT chest from 7/12/2023 showed an ill-defined pancreatic head mass with upper abdominal lymphadenopathy, and a 7 mm pulmonary nodule left upper lobe, indeterminate. He underwent duodenal stenting on 7/13/2023.     He initiated systemic therapy was FOLFIRINOX on 7/31/2023.     CT chest abdomen pelvis on 10/24/2023 showed interval enlargement of scattered sclerotic osseous metastases, and enlarging subpleural 1.2 cm pulmonary nodule in paramediastinal EAMON, no substantial change in the pancreatic head mass.  Filling defects were seen in the right middle and lower lobe, concerning for emboli.  Echo follow-up CT PE was performed on 10/31/2023 which revealed a acute PE in the right lower lobe segment.  He was subsequently prescribed Eliquis twice daily by Dr. Haile.       Upon follow-up with Dr. Haile on 11/6/2023, review of imaging showed disease progression, particularly with skeletal metastatic progression.  He was recommended for treatment for the T10 lesion given its location to the right pedicle.  Dr. Haile changed systemic therapy to gemcitabine/Abraxane with Zometa for bony  lesion control on 10/31/23.    He completed palliative radiotherapy to T10, 20 Gray/5 fractions completed on 11/29/2023.  He has since been on systemic therapy under care of Dr. Haile.      He underwent a PET CT scan on 1/30/2024, which demonstrated pancreatic head mass with FDG uptake, with metastatic adenopathy, with lung nodules suspicious for metastatic disease.  There was sclerotic disease at T10 and L4 in the left pubic bone, but these were not FDG avid on the PET scan.    CT scan on 3/11/2024 demonstrated fairly stable disease with a pancreatic head mass and lacey-pancreatic lymph nodes.  There was no additional suspicious pulmonary nodules with resolution of prior nodules in the lungs.  However there was interval enlargement of L4 lesion, now measuring 1.8 cm in size, previously 1.4 cm.  Thus, he was referred for consideration of palliative radiotherapy to L4.  He is having mild discomfort in this location, not extreme.  Overall he tolerated radiation well last time.  He wishes to pursue palliative radiotherapy as he continues systemic therapy under care of Dr. Haile, with plans for eventually enrolling in a clinical trial at [facility].     Interval history:    Patient was last seen in March 2024.  At that time we had discussed palliative radiotherapy to L4 given this was a potential site of only oligo progression.  In the meantime he is also meeting with MD Lombardi, particularly with Dr. Dawkins's team for possible clinical.  A CT scan was obtained on 4/30/2024 (I do not have the current images) but the official read demonstrated a new pericardial effusion, stable locally advanced pancreatic head and uncinate tumor, decreasing mesenteric lymph nodes, relatively stable osseous lesions at the L4, T10 and left pubic symphysis.  He has had a recent echo with a ejection fraction of 60% plus, but will be following with a cardiologist in the near future of note, and he will be continuing to see a  cardiologist.  I discussed that this possibly could be related to his prior radiation and or combination with systemic therapy.    Per patient report additional pathology for clinical trial testing was to be performed, and a biopsy of the left pubic symphysis lesion was performed which returned negative for malignancy.  He will still continue to follow with Dr. Haile and has not been on systemic therapy for over a month.    Today, he is accompanied by his wife.  He states that overall he feels quite well.  He denies any significant back pain or pubic pain at this time and states that he has adequate pain control.    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    Gastric outlet obstruction     Metastasis from pancreatic cancer (H)     Personal history of chemotherapy     Gemzar + Abraxane started on 10/31/2023    Recurrent acute pancreatitis     S/P radiation therapy     2,000 cGy to T10 Spine completed on 11/29/2023 [facility]       Past Surgical History:   Past Surgical History:   Procedure Laterality Date    AS OPEN TREATMENT CLAVICULAR FRACTURE INTERNAL FX      ENDOSCOPIC RETROGRADE CHOLANGIOPANCREATOGRAM N/A 7/11/2023    Procedure: ENDOSCOPIC RETROGRADE CHOLANGIOPANCREATOGRAPHY;  Surgeon: Khadar Pickett MD;  Location: [facility] OR    ENDOSCOPIC RETROGRADE CHOLANGIOPANCREATOGRAM N/A 8/17/2023    Procedure: ENDOSCOPIC RETROGRADE  with stent removal x1, balloon sweep;  Surgeon: Christos Greenberg MD;  Location: [facility] OR    ENDOSCOPIC ULTRASOUND UPPER GASTROINTESTINAL TRACT (GI) N/A 7/11/2023    Procedure: Endoscopic ultrasound upper gastrointestinal tract (GI), with biposy, GJ tube repositioning, stent placement;  Surgeon: Khadar Pickett MD;  Location: [facility] OR    ENDOSCOPIC ULTRASOUND UPPER GASTROINTESTINAL TRACT (GI) N/A 7/13/2023    Procedure: ENDOSCOPIC ULTRASOUND, ESOPHAGOSCOPY, EUS guided  gastrojejunostomy;  Surgeon: Tre York MD;  Location: [facility] OR    INSERT PICC LINE  04/29/2023    INSERT PORT VASCULAR ACCESS Right 7/28/2023    Procedure: Insert port vascular access;  Surgeon: Tom العلي MD;  Location: [facility] OR    IR CHEST PORT PLACEMENT > 5 YRS OF AGE  7/28/2023    IR NG TUBE PLACEMENT REQ RAD & FLUORO  05/08/2023    JG tube    REPLACE GASTROJEJUNOSTOMY TUBE, PERCUTANEOUS N/A 8/17/2023    Procedure: possible REPLACEMENT, GASTROJEJUNOSTOMY TUBE, PERCUTANEOUS;  Surgeon: Christos Greenberg MD;  Location: [facility] OR       Chemotherapy History:  Per HPI    Radiation History:  No prior RT    Pregnant: No  Implanted Cardiac Devices: No    Medications:  Current Outpatient Medications   Medication Sig Dispense Refill    acetaminophen (TYLENOL) 32 mg/mL liquid Take 20.313 mLs (650 mg) by mouth every 8 hours 1800 mL 1    apixaban ANTICOAGULANT (ELIQUIS) 5 MG tablet Take 1 tablet (5 mg) by mouth 2 times daily 60 tablet 2    blood glucose (FREESTYLE TEST STRIPS) test strip by Other route as needed      buprenorphine (BUTRANS) 10 MCG/HR WK patch Place 1 patch onto the skin every 7 days Use with 20 mcg/hour patch for 30 mcg/hour total. 4 patch 3    buprenorphine (BUTRANS) 20 MCG/HR WK patch Place 1 patch onto the skin once a week Use with 10 mcg/hour patch for 30 mcg/hour total. 4 patch 3    dicyclomine (BENTYL) 10 MG capsule Take 1 capsule (10 mg) by mouth 4 times daily (before meals and nightly) 120 capsule 1    HYDROmorphone (DILAUDID) 2 MG tablet Take 1-2 tablets (2-4 mg) by mouth every 4 hours as needed for severe pain or breakthrough pain 180 tablet 0    lipase-protease-amylase (CREON 24) 60197-85687-155597 units CPEP per EC capsule 2-3 capsules with meals 3 times a day and 1-2 capsules with snacks max of 15 capsules a day 200 capsule 11    Multiple Vitamins-Minerals (CENTRUM SILVER 50+MEN) TABS as directed Orally      pantoprazole (PROTONIX) 40 MG EC tablet Take 1 tablet (40 mg) by mouth  2 times daily 60 tablet 4    prochlorperazine (COMPAZINE) 10 MG tablet Take 1 tablet (10 mg) by mouth every 6 hours as needed for nausea or vomiting 30 tablet 2    vitamin D3 (CHOLECALCIFEROL) 50 mcg (2000 units) tablet Take 1 tablet (50 mcg) by mouth daily 90 tablet 1    lidocaine-prilocaine (EMLA) 2.5-2.5 % external cream Use 1-2 times a week or as needed prior to port access (Patient not taking: Reported on 5/8/2024) 30 g 3    methylphenidate (RITALIN) 5 MG tablet Take 1 tablet (5 mg) by mouth 2 times daily as needed (Patient not taking: Reported on 3/12/2024) 10 tablet 0     No current facility-administered medications for this visit.         Allergies:   No Known Allergies    Social History:    Social History     Tobacco Use    Smoking status: Never     Passive exposure: Never    Smokeless tobacco: Never   Vaping Use    Vaping status: Never Used   Substance Use Topics    Alcohol use: Not Currently    Drug use: Never         Family History:  Family History   Problem Relation Age of Onset    Diabetes Type 2  Father     Cirrhosis Father     Genetic Disorder Brother         missing [id number], mild retardation    Colon Polyps Brother     Pancreatic Cancer Paternal Aunt 85    Colon Cancer Paternal Uncle     Pancreatitis Cousin        Review of Systems   A 10-point review of systems was performed. Pertinent findings are noted in the HPI.    Physical Exam   ECOG Status: 2    Vitals:  /71 (BP Location: Left arm, Patient Position: Chair, Cuff Size: Adult Regular)   Pulse 89   Temp 98.4  F (36.9  C) (Oral)   Resp 16   SpO2 98%     Gen: Alert, in NAD  Head: NC/AT  Eyes: PERRL, EOMI, sclera anicteric  Ears: No external auricular lesions  Nose/sinus: No rhinorrhea or epistaxis  Oral cavity/oropharynx: MMM, no visible oral cavity lesions  Neck: Full ROM, supple, no palpable adenopathy  Pulm: No wheezing, stridor or respiratory distress  CV: Extremities are warm and well-perfused, no cyanosis, no pedal  edema  Abdominal: Normal bowel sounds, soft, nontender, no masses  Musculoskeletal: Normal bulk and tone, mildly tender to palpation in lower thoracic/upper lumbar location  Skin: Normal color and turgor  Neuro: A/Ox3, CN II-XII intact, normal gait    Imaging/Path/Labs   Imaging: per HPI, personally reviewed and in agreement    CT 10/24/23 (pre -RT)            CT 3/11/24              Path: per HPI, personally reviewed and in agreement    Labs: per HPI, personally reviewed and in agreement    I have personally reviewed Dr. Haile's  and Dr Dawkins's notes     Assessment      Mr. Navarro is a 58 year old male with metastatic pancreatic cancer to bones.  He has completed palliative radiotherapy to T10, 20 Gray/5 fractions November 29, 2023.  He presented for  radiation to L4 in the setting of palliation/oligo progression.    He was seen in March 2024, at which point we discussed palliative radiation to L4, 30 Gy/10 fractions.  However in the meantime his pain has been relatively well-controlled and he has had a biopsy of a pubic lesion which is negative, with stable disease on an outside scan at the L4 location.      Thus, I discussed options of radiation versus continued treatment under care of Dr. Haile and to intervene with palliative radiation when necessary.  At this point, patient prefers to continue on with systemic therapy which I think is a reasonable option.  He will also be pending possible clinical trial at [facility] Dr. Dawkins.    Plan     After extensive discussion, patient wishes hold off on pursuing palliative radiotherapy to L4 at this point.  He will continue systemic therapy under care of Dr. Haile.  Follow-up with radiation oncology on as-needed basis.    All benefits and risks discussed, and patient is in agreement with the oncologic plan discussed above.     Thank you for allowing me to participate in your patient's care.  If you should require any additional information, please do not  hesitate in contacting me.     Ben Reaves MD  Department of Radiation Oncology  [facility]     A total of 40 minutes were spent on this visit, including preparation for this visit, face to face with patient, and medical decision making. Medical decision-making included consideration and possible diagnosis, management options, complex record review, review of diagnostic tests, consideration and discussion of significant complications based up medical history/comorbidities, and discussion with providers involved in care of the patient.

## 2024-05-13 ENCOUNTER — MYC REFILL (OUTPATIENT)
Dept: ONCOLOGY | Facility: CLINIC | Age: 59
End: 2024-05-13
Payer: COMMERCIAL

## 2024-05-13 DIAGNOSIS — R10.30 LOWER ABDOMINAL PAIN: ICD-10-CM

## 2024-05-13 RX ORDER — DICYCLOMINE HYDROCHLORIDE 10 MG/1
10 CAPSULE ORAL
Qty: 120 CAPSULE | Refills: 1 | Status: SHIPPED | OUTPATIENT
Start: 2024-05-13 | End: 2024-07-07

## 2024-05-13 NOTE — TELEPHONE ENCOUNTER
Medication requested: dicyclomine 10 mg capsule    Last prescribing provider: Luis Haile MD on 3/19/2024    Last clinic visit date: 4/16/24 with Megan Farrell CNP    Recommendations for requested medication (if none, N/A): NA    Any other pertinent information (if none, N/A): NA    Refilled: Y/N, if NO, why?    Pended and Routed to Megan Farrell CNP

## 2024-05-14 ENCOUNTER — ONCOLOGY VISIT (OUTPATIENT)
Dept: ONCOLOGY | Facility: CLINIC | Age: 59
End: 2024-05-14
Attending: STUDENT IN AN ORGANIZED HEALTH CARE EDUCATION/TRAINING PROGRAM
Payer: COMMERCIAL

## 2024-05-14 ENCOUNTER — APPOINTMENT (OUTPATIENT)
Dept: LAB | Facility: CLINIC | Age: 59
End: 2024-05-14
Attending: STUDENT IN AN ORGANIZED HEALTH CARE EDUCATION/TRAINING PROGRAM
Payer: COMMERCIAL

## 2024-05-14 VITALS
SYSTOLIC BLOOD PRESSURE: 120 MMHG | OXYGEN SATURATION: 98 % | RESPIRATION RATE: 18 BRPM | HEART RATE: 92 BPM | TEMPERATURE: 98 F | BODY MASS INDEX: 21.77 KG/M2 | DIASTOLIC BLOOD PRESSURE: 73 MMHG | WEIGHT: 160.5 LBS

## 2024-05-14 DIAGNOSIS — I31.39 PERICARDIAL EFFUSION: ICD-10-CM

## 2024-05-14 DIAGNOSIS — C25.0 MALIGNANT NEOPLASM OF HEAD OF PANCREAS (H): ICD-10-CM

## 2024-05-14 DIAGNOSIS — C79.51 MALIGNANT NEOPLASM METASTATIC TO BONE (H): ICD-10-CM

## 2024-05-14 DIAGNOSIS — C25.0 MALIGNANT NEOPLASM OF HEAD OF PANCREAS (H): Primary | ICD-10-CM

## 2024-05-14 DIAGNOSIS — Z51.11 ENCOUNTER FOR ANTINEOPLASTIC CHEMOTHERAPY: Primary | ICD-10-CM

## 2024-05-14 LAB
ALBUMIN SERPL BCG-MCNC: 3.7 G/DL (ref 3.5–5.2)
ALP SERPL-CCNC: 103 U/L (ref 40–150)
ALT SERPL W P-5'-P-CCNC: 12 U/L (ref 0–70)
ANION GAP SERPL CALCULATED.3IONS-SCNC: 10 MMOL/L (ref 7–15)
AST SERPL W P-5'-P-CCNC: 17 U/L (ref 0–45)
BASOPHILS # BLD AUTO: 0.1 10E3/UL (ref 0–0.2)
BASOPHILS NFR BLD AUTO: 1 %
BILIRUB SERPL-MCNC: 0.3 MG/DL
BUN SERPL-MCNC: 19.6 MG/DL (ref 6–20)
CALCIUM SERPL-MCNC: 8.9 MG/DL (ref 8.6–10)
CHLORIDE SERPL-SCNC: 99 MMOL/L (ref 98–107)
CREAT SERPL-MCNC: 0.63 MG/DL (ref 0.67–1.17)
DEPRECATED HCO3 PLAS-SCNC: 25 MMOL/L (ref 22–29)
EGFRCR SERPLBLD CKD-EPI 2021: >90 ML/MIN/1.73M2
EOSINOPHIL # BLD AUTO: 0.1 10E3/UL (ref 0–0.7)
EOSINOPHIL NFR BLD AUTO: 1 %
ERYTHROCYTE [DISTWIDTH] IN BLOOD BY AUTOMATED COUNT: 13.8 % (ref 10–15)
GLUCOSE SERPL-MCNC: 225 MG/DL (ref 70–99)
HCT VFR BLD AUTO: 27.7 % (ref 40–53)
HGB BLD-MCNC: 8.6 G/DL (ref 13.3–17.7)
IMM GRANULOCYTES # BLD: 0 10E3/UL
IMM GRANULOCYTES NFR BLD: 0 %
LYMPHOCYTES # BLD AUTO: 1.1 10E3/UL (ref 0.8–5.3)
LYMPHOCYTES NFR BLD AUTO: 16 %
MCH RBC QN AUTO: 28.7 PG (ref 26.5–33)
MCHC RBC AUTO-ENTMCNC: 31 G/DL (ref 31.5–36.5)
MCV RBC AUTO: 92 FL (ref 78–100)
MONOCYTES # BLD AUTO: 0.8 10E3/UL (ref 0–1.3)
MONOCYTES NFR BLD AUTO: 11 %
NEUTROPHILS # BLD AUTO: 5.1 10E3/UL (ref 1.6–8.3)
NEUTROPHILS NFR BLD AUTO: 71 %
NRBC # BLD AUTO: 0 10E3/UL
NRBC BLD AUTO-RTO: 0 /100
PLATELET # BLD AUTO: 304 10E3/UL (ref 150–450)
POTASSIUM SERPL-SCNC: 4.3 MMOL/L (ref 3.4–5.3)
PROT SERPL-MCNC: 7.6 G/DL (ref 6.4–8.3)
RBC # BLD AUTO: 3 10E6/UL (ref 4.4–5.9)
SODIUM SERPL-SCNC: 134 MMOL/L (ref 135–145)
WBC # BLD AUTO: 7.1 10E3/UL (ref 4–11)

## 2024-05-14 PROCEDURE — 258N000003 HC RX IP 258 OP 636: Performed by: STUDENT IN AN ORGANIZED HEALTH CARE EDUCATION/TRAINING PROGRAM

## 2024-05-14 PROCEDURE — 96375 TX/PRO/DX INJ NEW DRUG ADDON: CPT

## 2024-05-14 PROCEDURE — 96417 CHEMO IV INFUS EACH ADDL SEQ: CPT

## 2024-05-14 PROCEDURE — 36415 COLL VENOUS BLD VENIPUNCTURE: CPT | Performed by: STUDENT IN AN ORGANIZED HEALTH CARE EDUCATION/TRAINING PROGRAM

## 2024-05-14 PROCEDURE — 99215 OFFICE O/P EST HI 40 MIN: CPT

## 2024-05-14 PROCEDURE — 250N000011 HC RX IP 250 OP 636

## 2024-05-14 PROCEDURE — 96367 TX/PROPH/DG ADDL SEQ IV INF: CPT

## 2024-05-14 PROCEDURE — 250N000011 HC RX IP 250 OP 636: Performed by: STUDENT IN AN ORGANIZED HEALTH CARE EDUCATION/TRAINING PROGRAM

## 2024-05-14 PROCEDURE — 82374 ASSAY BLOOD CARBON DIOXIDE: CPT | Performed by: STUDENT IN AN ORGANIZED HEALTH CARE EDUCATION/TRAINING PROGRAM

## 2024-05-14 PROCEDURE — G2211 COMPLEX E/M VISIT ADD ON: HCPCS

## 2024-05-14 PROCEDURE — 250N000011 HC RX IP 250 OP 636: Mod: JZ | Performed by: PHYSICIAN ASSISTANT

## 2024-05-14 PROCEDURE — 82040 ASSAY OF SERUM ALBUMIN: CPT | Performed by: STUDENT IN AN ORGANIZED HEALTH CARE EDUCATION/TRAINING PROGRAM

## 2024-05-14 PROCEDURE — 99213 OFFICE O/P EST LOW 20 MIN: CPT | Mod: 25

## 2024-05-14 PROCEDURE — 85049 AUTOMATED PLATELET COUNT: CPT | Performed by: STUDENT IN AN ORGANIZED HEALTH CARE EDUCATION/TRAINING PROGRAM

## 2024-05-14 PROCEDURE — 96413 CHEMO IV INFUSION 1 HR: CPT

## 2024-05-14 RX ORDER — ALBUTEROL SULFATE 90 UG/1
1-2 AEROSOL, METERED RESPIRATORY (INHALATION)
Start: 2024-08-04

## 2024-05-14 RX ORDER — HEPARIN SODIUM,PORCINE 10 UNIT/ML
5-20 VIAL (ML) INTRAVENOUS DAILY PRN
OUTPATIENT
Start: 2024-08-04

## 2024-05-14 RX ORDER — EPINEPHRINE 1 MG/ML
0.3 INJECTION, SOLUTION, CONCENTRATE INTRAVENOUS EVERY 5 MIN PRN
OUTPATIENT
Start: 2024-08-04

## 2024-05-14 RX ORDER — ALBUTEROL SULFATE 0.83 MG/ML
2.5 SOLUTION RESPIRATORY (INHALATION)
OUTPATIENT
Start: 2024-08-04

## 2024-05-14 RX ORDER — HEPARIN SODIUM (PORCINE) LOCK FLUSH IV SOLN 100 UNIT/ML 100 UNIT/ML
5 SOLUTION INTRAVENOUS ONCE
Status: COMPLETED | OUTPATIENT
Start: 2024-05-14 | End: 2024-05-14

## 2024-05-14 RX ORDER — ZOLEDRONIC ACID 0.04 MG/ML
4 INJECTION, SOLUTION INTRAVENOUS ONCE
Status: CANCELLED | OUTPATIENT
Start: 2024-08-04 | End: 2024-08-04

## 2024-05-14 RX ORDER — MEPERIDINE HYDROCHLORIDE 25 MG/ML
25 INJECTION INTRAMUSCULAR; INTRAVENOUS; SUBCUTANEOUS EVERY 30 MIN PRN
OUTPATIENT
Start: 2024-08-04

## 2024-05-14 RX ORDER — PACLITAXEL 100 MG/20ML
125 INJECTION, POWDER, LYOPHILIZED, FOR SUSPENSION INTRAVENOUS ONCE
Status: COMPLETED | OUTPATIENT
Start: 2024-05-14 | End: 2024-05-14

## 2024-05-14 RX ORDER — ZOLEDRONIC ACID 0.04 MG/ML
4 INJECTION, SOLUTION INTRAVENOUS ONCE
Status: COMPLETED | OUTPATIENT
Start: 2024-05-14 | End: 2024-05-14

## 2024-05-14 RX ORDER — DIPHENHYDRAMINE HYDROCHLORIDE 50 MG/ML
50 INJECTION INTRAMUSCULAR; INTRAVENOUS
Start: 2024-08-04

## 2024-05-14 RX ORDER — HEPARIN SODIUM (PORCINE) LOCK FLUSH IV SOLN 100 UNIT/ML 100 UNIT/ML
5 SOLUTION INTRAVENOUS
OUTPATIENT
Start: 2024-08-04

## 2024-05-14 RX ORDER — HEPARIN SODIUM (PORCINE) LOCK FLUSH IV SOLN 100 UNIT/ML 100 UNIT/ML
5 SOLUTION INTRAVENOUS
Status: DISCONTINUED | OUTPATIENT
Start: 2024-05-14 | End: 2024-05-14 | Stop reason: HOSPADM

## 2024-05-14 RX ORDER — METHYLPREDNISOLONE SODIUM SUCCINATE 125 MG/2ML
125 INJECTION, POWDER, LYOPHILIZED, FOR SOLUTION INTRAMUSCULAR; INTRAVENOUS
Start: 2024-08-04

## 2024-05-14 RX ORDER — ONDANSETRON 2 MG/ML
8 INJECTION INTRAMUSCULAR; INTRAVENOUS ONCE
Status: COMPLETED | OUTPATIENT
Start: 2024-05-14 | End: 2024-05-14

## 2024-05-14 RX ADMIN — ZOLEDRONIC ACID 4 MG: 0.04 INJECTION, SOLUTION INTRAVENOUS at 10:29

## 2024-05-14 RX ADMIN — SODIUM CHLORIDE 250 ML: 9 INJECTION, SOLUTION INTRAVENOUS at 10:14

## 2024-05-14 RX ADMIN — Medication 5 ML: at 08:55

## 2024-05-14 RX ADMIN — GEMCITABINE 2000 MG: 38 INJECTION, SOLUTION INTRAVENOUS at 11:35

## 2024-05-14 RX ADMIN — ONDANSETRON 8 MG: 2 INJECTION INTRAMUSCULAR; INTRAVENOUS at 10:17

## 2024-05-14 RX ADMIN — Medication 5 ML: at 12:11

## 2024-05-14 RX ADMIN — PACLITAXEL 250 MG: 100 INJECTION, POWDER, LYOPHILIZED, FOR SUSPENSION INTRAVENOUS at 10:52

## 2024-05-14 NOTE — PATIENT INSTRUCTIONS
[facility] Triage and after hours / weekends / holidays:  [phone]    Please call the triage or after hours line if you experience a temperature greater than or equal to 100.4, shaking chills, have uncontrolled nausea, vomiting and/or diarrhea, dizziness, shortness of breath, chest pain, bleeding, unexplained bruising, or if you have any other new/concerning symptoms, questions or concerns.      If you are having any concerning symptoms or wish to speak to a provider before your next infusion visit, please call triage to notify them so we can adequately serve you.     If you need a refill on a narcotic prescription or other medication, please call before your infusion appointment.                May 2024      [place] Monday Tuesday Wednesday Thursday Friday Saturday                  1     2     3     4       5     6     7     8    RETURN RADIATION ONCOLOGY   1:00 PM   (30 min.)   Ben Reaves MD   [facility] Radiation Oncology [place] 9     10     11       12     13     14    LAB CENTRAL   8:15 AM   (15 min.)   [facility] LAB DRAW   [facility]    RETURN CCSL   8:45 AM   (45 min.)   Megan Farrell APRN CNP   [facility]    ONC INFUSION 2 HR (120 MIN)  10:00 AM   (120 min.)    ONC INFUSION NURSE   [facility] 15     16     17     18       19     20     21     22     23     24     25       26     27     28    LAB CENTRAL   8:15 AM   (15 min.)   [facility] LAB DRAW   [facility]    ONC INFUSION 2 HR (120 MIN)   9:00 AM   (120 min.)    ONC INFUSION NURSE   [facility] 29 30 31 June 2024 Sunday Monday Tuesday Wednesday Thursday Friday Saturday                                 1       2     3     4     5     6     7     8       9     10     11     12     13     14     15       16     17     18    LAB CENTRAL   8:00 AM   (15 min.)   [facility]  [facility] LAB DRAW   [facility]    ONC INFUSION 2 HR (120 MIN)   8:30 AM   (120 min.)   [facility] ONC INFUSION NURSE   [facility] 19     20 21     22       23     24    NEW CARDIOLOGY   1:45 PM   (30 min.)   Julita Morris MD   [facility] [place] 25     26     27    RETURN RADIATION ONCOLOGY   9:35 AM   (40 min.)   Jeremy Hurt MD   [facility] Radiation Oncology [place] 28     29       30                                                   Lab Results:  Recent Results (from the past 12 hour(s))   Comprehensive metabolic panel    Collection Time: 05/14/24  8:52 AM   Result Value Ref Range    Sodium 134 (L) 135 - 145 mmol/L    Potassium 4.3 3.4 - 5.3 mmol/L    Carbon Dioxide (CO2) 25 22 - 29 mmol/L    Anion Gap 10 7 - 15 mmol/L    Urea Nitrogen 19.6 6.0 - 20.0 mg/dL    Creatinine 0.63 (L) 0.67 - 1.17 mg/dL    GFR Estimate >90 >60 mL/min/1.73m2    Calcium 8.9 8.6 - 10.0 mg/dL    Chloride 99 98 - 107 mmol/L    Glucose 225 (H) 70 - 99 mg/dL    Alkaline Phosphatase 103 40 - 150 U/L    AST 17 0 - 45 U/L    ALT 12 0 - 70 U/L    Protein Total 7.6 6.4 - 8.3 g/dL    Albumin 3.7 3.5 - 5.2 g/dL    Bilirubin Total 0.3 <=1.2 mg/dL   CBC with platelets and differential    Collection Time: 05/14/24  8:52 AM   Result Value Ref Range    WBC Count 7.1 4.0 - 11.0 10e3/uL    RBC Count 3.00 (L) 4.40 - 5.90 10e6/uL    Hemoglobin 8.6 (L) 13.3 - 17.7 g/dL    Hematocrit 27.7 (L) 40.0 - 53.0 %    MCV 92 78 - 100 fL    MCH 28.7 26.5 - 33.0 pg    MCHC 31.0 (L) 31.5 - 36.5 g/dL    RDW 13.8 10.0 - 15.0 %    Platelet Count 304 150 - 450 10e3/uL    % Neutrophils 71 %    % Lymphocytes 16 %    % Monocytes 11 %    % Eosinophils 1 %    % Basophils 1 %    % Immature Granulocytes 0 %    NRBCs per 100 WBC 0 <1 /100    Absolute Neutrophils 5.1 1.6 - 8.3 10e3/uL    Absolute Lymphocytes 1.1 0.8 - 5.3 10e3/uL    Absolute Monocytes 0.8 0.0 - 1.3 10e3/uL    Absolute Eosinophils  0.1 0.0 - 0.7 10e3/uL    Absolute Basophils 0.1 0.0 - 0.2 10e3/uL    Absolute Immature Granulocytes 0.0 <=0.4 10e3/uL    Absolute NRBCs 0.0 10e3/uL

## 2024-05-14 NOTE — Clinical Note
5/14/2024         RE: Gallo Navarro  [address] [address] [place] [id number]        Dear Colleague,    Thank you for referring your patient, Gallo Navarro, to the [facility]. Please see a copy of my visit note below.        Oncology/Hematology Visit Note  May 14, 2024    Reason for Visit: follow up of locally advanced, unresectable pancreatic cancer    History of Present Illness: Gallo Navarro is a 58 year old male with locally advanced, unresectable pancreatic cancer. 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 He then presented to the ED with worsening abdominal pain, increased jaundice, poor PO intake and weight loss admitted on 7/8/2023 for concern of biliary obstruction. Unfortunately, during this admission, biopsy of pancreatic mass returned positive for pancreatic adenocarcinoma on 7/12/23. He started on treatment with 5FU, irinotecan, and oxaliplatin (FOLFIRINOX) on 7/31/23. Please see previous notes for further details on the patient's history.     8/17/23 - ERCP/EGD, duodenal stents x2 placed, exchange of GJ tube    8/21-/8/25 hospitalized due to diarrhea, colitis, abdominal pain.      8/29 - Cycle 3 deferred due to neutropenia.   Neulasta added. Irinotecan dose reduced 20% due to symptoms including cramping, double vision and slurred speech during infusion.      10/24/23 CT CAP with similar to slight increase in size of peripancreatic and mesenteric  lymph nodes, enlargement of previous skeletal metastasis as well as new sclerotic metastasis to left posterior 5th rib, enlarging pulmonary nodule, and unchanged pancreatic head mass. Also unclear concerns for PE,  right lower lobe segmental PE confirmed on CT-PE. Started on apixaban.     10/31/23 Cycle 1  gemzar, abraxane  11/7/23 Cycle 1 Zometa  11/22/23 Removal of GJ tube.   11/29/23 Completed palliative radiation to T10   12/13/23 C3D1 gem/abraxane    12/29/23 Consults at [facility]    1/23-1/26 Consults at [facility]     1/30/24: C4D1 gem/abraxane     3/24: CT CAP with overall stable disease with isolated progression in potential L4 lesion. Referral for radiation oncology.      Gallo presents today for follow up prior to 6 day 15 gemcitabine, Abraxane.      Interval History:     -Completely numb on bottoms of feet. Tingling. Little tips of fingers.   -been very active, lost a few pounds. Eating well, protein shakes.   -fatigue, sleeping 12-14 hours in 24 hour period  -        ***  -Pain is well controlled on current regimen, denies any pain today.   -Appetite is good, continues to an expanded diet of soft foods he knows won't get stuck in the duodenal stent. Denies nausea. No bowel concerns.   -Has significant fatigue for 4-5 days following chemo, appetite decreases as well  -neuropathy in his feet is stable, numbness up to his ankles bilaterally. Has a PT consult placed, but is waiting to schedule until after he returns from [facility] appointments.   -Returns to [facility] 4/28-5/1 for biopsy of spine lesion. Plans to start radiation once he returns.   -No shortness of breath, chest pain, or swelling.     Review of Systems:  Patient denies any of the following except if noted above: fevers, chills, difficulty with energy, vision or hearing changes, chest pain, dyspnea, abdominal pain, nausea, vomiting, diarrhea, constipation, urinary concerns, headaches, numbness, tingling, issues with sleep or mood. Also denies lumps, bumps, rashes or skin lesions, bleeding or bruising issues.        Current Outpatient Medications   Medication Sig Dispense Refill    acetaminophen (TYLENOL) 32 mg/mL liquid Take 20.313 mLs (650 mg) by mouth every 8 hours 1800 mL 1    apixaban ANTICOAGULANT (ELIQUIS) 5 MG tablet Take 1 tablet (5 mg)  by mouth 2 times daily 60 tablet 2    blood glucose (FREESTYLE TEST STRIPS) test strip by Other route as needed      buprenorphine (BUTRANS) 10 MCG/HR WK patch Place 1 patch onto the skin every 7 days Use with 20 mcg/hour patch for 30 mcg/hour total. 4 patch 3    buprenorphine (BUTRANS) 20 MCG/HR WK patch Place 1 patch onto the skin once a week Use with 10 mcg/hour patch for 30 mcg/hour total. 4 patch 3    dicyclomine (BENTYL) 10 MG capsule Take 1 capsule (10 mg) by mouth 4 times daily (before meals and nightly) 120 capsule 1    HYDROmorphone (DILAUDID) 2 MG tablet Take 1-2 tablets (2-4 mg) by mouth every 4 hours as needed for severe pain or breakthrough pain 180 tablet 0    lidocaine-prilocaine (EMLA) 2.5-2.5 % external cream Use 1-2 times a week or as needed prior to port access (Patient not taking: Reported on 5/8/2024) 30 g 3    lipase-protease-amylase (CREON 24) 69577-00387-580645 units CPEP per EC capsule 2-3 capsules with meals 3 times a day and 1-2 capsules with snacks max of 15 capsules a day 200 capsule 11    methylphenidate (RITALIN) 5 MG tablet Take 1 tablet (5 mg) by mouth 2 times daily as needed (Patient not taking: Reported on 3/12/2024) 10 tablet 0    Multiple Vitamins-Minerals (CENTRUM SILVER 50+MEN) TABS as directed Orally      pantoprazole (PROTONIX) 40 MG EC tablet Take 1 tablet (40 mg) by mouth 2 times daily 60 tablet 4    prochlorperazine (COMPAZINE) 10 MG tablet Take 1 tablet (10 mg) by mouth every 6 hours as needed for nausea or vomiting 30 tablet 2    vitamin D3 (CHOLECALCIFEROL) 50 mcg (2000 units) tablet Take 1 tablet (50 mcg) by mouth daily 90 tablet 1       Physical Exam:  General: The patient is a pleasant male in no acute distress.  There were no vitals taken for this visit.  Wt Readings from Last 10 Encounters:   04/16/24 75.2 kg (165 lb 12.8 oz)   04/02/24 73.1 kg (161 lb 1.6 oz)   03/15/24 73.5 kg (162 lb)   03/12/24 73.6 kg (162 lb 3.2 oz)   03/05/24 73.4 kg (161 lb 14.4 oz)    03/04/24 73 kg (160 lb 14.4 oz)   02/27/24 74 kg (163 lb 3.2 oz)   02/27/24 77.1 kg (170 lb)   02/22/24 75.8 kg (167 lb)   02/13/24 74.8 kg (165 lb)   HEENT: EOMI. Sclerae are anicteric. Oral mucosa pink and moist without lesions or thrush.   Lymph: Neck is supple with no lymphadenopathy in the cervical or supraclavicular areas.   Heart: Regular rate and rhythm.   Lungs: Clear to auscultation bilaterally, normal work of breathing  Abdomen: Bowel sounds present, soft, nontender with no palpable hepatosplenomegaly or masses.   Extremities: No lower extremity edema noted bilaterally.   Neuro: Cranial nerves II through XII are grossly intact.  Skin: No rashes, petechiae, or bruising noted on exposed skin.      Laboratory Data:  Most Recent 3 CBC's:  Recent Labs   Lab Test 04/16/24  1238 04/02/24  0817 03/12/24  0815   WBC 6.1 6.7 3.6*   HGB 10.3* 10.5* 10.0*   MCV 95 95 95    351 116*   ANEUTAUTO 3.8 3.9 2.3    Most Recent 3 BMP's:  Recent Labs   Lab Test 04/02/24  0817 03/05/24  0830 02/27/24  1447    140 139   POTASSIUM 4.3 4.1 4.2   CHLORIDE 101 104 104   CO2 28 26 25   BUN 16.9 19.4 16.0   CR 0.70 0.68 0.71   ANIONGAP 9 10 10   DENISE 9.3 9.1 8.8   * 207* 107*   PROTTOTAL 7.3 6.7 6.8   ALBUMIN 3.9 4.0 4.1    Most Recent 2 LFT's:  Recent Labs   Lab Test 04/02/24  0817 03/05/24  0830   AST 21 23   ALT 19 19   ALKPHOS 99 92   BILITOTAL 0.2 0.3   I reviewed the above labs today.          Assessment and Plan:  Locally advanced, unresectable pancreatic cancer. Patient started on treatment with palliative FOLFIRINOX on 7/31/23. CT CAP 10/24/23 with progression in skeletal mets, lung nodule and lymph nodes. Stable pancreatic mass. Changed treatment to gemcitabine, Abraxane (day 1, 8, 15) and Zometa.  CT CAP 3/2024 with isolated progression of potential L4 lesion. Plan to continue gemcitabine/Abraxane day 1 & 15 as a bridge to clinical trial at [facility] for cellular therapy.   -Continues to tolerate  treatment well, neuropathy is stable. Labs today reivewed, proceed with gemcitabine, Abraxane as scheduled.   -Traveling to [facility] 4/28 - 5/1 for biopsy of L4 lesion.   -Also has a family vacation scheduled in early June. He will update our team via [employer] with these dates to schedule future chemotherapy sessions and follow up appointments.     Pericardial effusion ***     Skeletal mets.    -Worsening skeletal mets noted on CT 10/24. Sclerosis noted on recent imaging.   -Radiation to T10 completed 11/29/23.  -Will start radiation therapy to L4 lesion once he returns from [facility] in May. ***   -Continues on Zometa every 3 months. Last dose given 2/6/24. ***        Pulmonary Embolism. Right lower segmental PE found on routine imaging. Started on apixiban, denies any bleeding concerns.     Nutrition.  Feeding tube removed. Continues to tolerate oral intake well. Gaining weight. No issues with nausea or vomiting. Continue to push oral hydration, drinking 64+ oz of fluid/day.  Continue Protonix daily.  Has compazine as needed for nausea.     Lower extremity edema. Resolved. Continue compression stockings and elevation PRN. Continue to monitor.     Neuropathy: Grade 1. Mostly from prior therapies but slightly increased on Abraxane lately. Previously referred to cancer rehab, plans to start therapy once he returns from [facility].     Fatigue: Ritalin 5 mg BID PRN. Discussed gentle exercise. Cancer rehab as above.     Diarrhea/constipation. Resolved. Bowel movements more regular with increased food intake. Continue Metamucil as needed. Monitor for recurrent blood in stool.     Abdominal pain. Managed by palliative care with Butrans patch and oral Dilaudid. No pain recently. Has been taking less pain medication.     Anemia. Normocytic. Suspect anemia of chronic disease + secondary to myelosuppression from chemo. Hemoglobin stable, continue to monitor.       The longitudinal plan of care for the  diagnosis(es)/condition(s) as documented were addressed during this visit. Due to the added complexity in care, I will continue to support Gallo in the subsequent management and with ongoing continuity of care.      56 minutes spent on the date of the encounter doing chart review, review of test results, interpretation of tests, patient visit, and documentation     ESVIN Canales CNP        Again, thank you for allowing me to participate in the care of your patient.        Sincerely,        ESVIN Canales CNP

## 2024-05-14 NOTE — PROGRESS NOTES
Infusion Nursing Note:  Gallo Navarro presents today for D1 C7 Abraxane, Gemzar and Zometa.    Patient seen by provider today: Yes: Megan GONZALEZ   present during visit today: Not Applicable.    Note: N/A.      Intravenous Access:  Implanted Port.    Treatment Conditions:  Lab Results   Component Value Date    HGB 8.6 (L) 05/14/2024    WBC 7.1 05/14/2024    ANEU 2.2 12/26/2023    ANEUTAUTO 5.1 05/14/2024     05/14/2024        Lab Results   Component Value Date     (L) 05/14/2024    POTASSIUM 4.3 05/14/2024    MAG 1.9 08/23/2023    CR 0.63 (L) 05/14/2024    DENISE 8.9 05/14/2024    BILITOTAL 0.3 05/14/2024    ALBUMIN 3.7 05/14/2024    ALT 12 05/14/2024    AST 17 05/14/2024       Results reviewed, labs MET treatment parameters, ok to proceed with treatment.      Post Infusion Assessment:  Patient tolerated infusion without incident.  Blood return noted pre and post infusion.  Site patent and intact, free from redness, edema or discomfort.  No evidence of extravasations.  Access discontinued per protocol.       Discharge Plan:   Patient declined prescription refills.  Discharge instructions reviewed with: Patient.  Patient and/or family verbalized understanding of discharge instructions and all questions answered.  AVS to patient via [employer].  Patient will return 5/28 for next infusion appointment. Provider appt will be made for next week   Patient discharged in stable condition accompanied by: self and friend.  Departure Mode: Ambulatory.      Anna Marie Styles RN

## 2024-05-14 NOTE — PROGRESS NOTES
Oncology/Hematology Visit Note  May 14, 2024    Reason for Visit: follow up of locally advanced, unresectable pancreatic cancer    History of Present Illness: Gallo Navarro is a 58 year old male with locally advanced, unresectable pancreatic cancer. 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 He then presented to the ED with worsening abdominal pain, increased jaundice, poor PO intake and weight loss admitted on 7/8/2023 for concern of biliary obstruction. Unfortunately, during this admission, biopsy of pancreatic mass returned positive for pancreatic adenocarcinoma on 7/12/23. He started on treatment with 5FU, irinotecan, and oxaliplatin (FOLFIRINOX) on 7/31/23. Please see previous notes for further details on the patient's history.     8/17/23 - ERCP/EGD, duodenal stents x2 placed, exchange of GJ tube    8/21-/8/25 hospitalized due to diarrhea, colitis, abdominal pain.      8/29 - Cycle 3 deferred due to neutropenia.   Neulasta added. Irinotecan dose reduced 20% due to symptoms including cramping, double vision and slurred speech during infusion.      10/24/23 CT CAP with similar to slight increase in size of peripancreatic and mesenteric  lymph nodes, enlargement of previous skeletal metastasis as well as new sclerotic metastasis to left posterior 5th rib, enlarging pulmonary nodule, and unchanged pancreatic head mass. Also unclear concerns for PE,  right lower lobe segmental PE confirmed on CT-PE. Started on apixaban.     10/31/23 Cycle 1 gemzar, abraxane  11/7/23 Cycle 1 Zometa  11/22/23 Removal of GJ tube.   11/29/23 Completed palliative radiation to T10   12/13/23 C3D1 gem/abraxane    12/29/23 Consults at [facility]    1/23-1/26 Consults at [facility]     1/30/24: C4D1 gem/abraxane     3/24: CT  CAP with overall stable disease with isolated progression in potential L4 lesion. Referral for radiation oncology.      4/29-5/1: Consults at [facility] for possible cellular therapy, CT guided biopsy of left pubic bone, recommendation to return to [facility] upon progression of current treatment     Gallo presents today for follow up prior to 6 day 15 gemcitabine, Abraxane.      Interval History:   -pericardial effusion was noted on imaging at [facility]. Follow up ECHO with confirmed effusion and normal ejection fraction. He continues to be asymptomatic. Denies any shortness of breath, cough, chest pain or difficulty breathing. No peripheral edema or ascites.   -holding off on radiation at this time   -eating well and drinking protein shakes. Still trying to increase calories, lost a few pounds which he attributes to increased activity levels  -fatigue is stable, sleeping ~12-14 hours combined of nighttime and naps  -neuropathy is stable, bottoms of feet are completely numb and mild numbness in fingertips  -expresses frustration with the events of his visit to [facility], but overall wants to continue to pursue any options available for treatment and continue to aggressively fight his cancer.       Review of Systems:  Patient denies any of the following except if noted above: fevers, chills, difficulty with energy, vision or hearing changes, chest pain, dyspnea, abdominal pain, nausea, vomiting, diarrhea, constipation, urinary concerns, headaches, numbness, tingling, issues with sleep or mood. Also denies lumps, bumps, rashes or skin lesions, bleeding or bruising issues.        Current Outpatient Medications   Medication Sig Dispense Refill    acetaminophen (TYLENOL) 32 mg/mL liquid Take 20.313 mLs (650 mg) by mouth every 8 hours 1800 mL 1    apixaban ANTICOAGULANT (ELIQUIS) 5 MG tablet Take 1 tablet (5 mg) by mouth 2 times daily 60 tablet 2    blood glucose (FREESTYLE TEST STRIPS) test strip by Other route as  needed      buprenorphine (BUTRANS) 10 MCG/HR WK patch Place 1 patch onto the skin every 7 days Use with 20 mcg/hour patch for 30 mcg/hour total. 4 patch 3    buprenorphine (BUTRANS) 20 MCG/HR WK patch Place 1 patch onto the skin once a week Use with 10 mcg/hour patch for 30 mcg/hour total. 4 patch 3    dicyclomine (BENTYL) 10 MG capsule Take 1 capsule (10 mg) by mouth 4 times daily (before meals and nightly) 120 capsule 1    HYDROmorphone (DILAUDID) 2 MG tablet Take 1-2 tablets (2-4 mg) by mouth every 4 hours as needed for severe pain or breakthrough pain 180 tablet 0    lidocaine-prilocaine (EMLA) 2.5-2.5 % external cream Use 1-2 times a week or as needed prior to port access (Patient not taking: Reported on 5/8/2024) 30 g 3    lipase-protease-amylase (CREON 24) 62229-63329-104552 units CPEP per EC capsule 2-3 capsules with meals 3 times a day and 1-2 capsules with snacks max of 15 capsules a day 200 capsule 11    methylphenidate (RITALIN) 5 MG tablet Take 1 tablet (5 mg) by mouth 2 times daily as needed (Patient not taking: Reported on 3/12/2024) 10 tablet 0    Multiple Vitamins-Minerals (CENTRUM SILVER 50+MEN) TABS as directed Orally      pantoprazole (PROTONIX) 40 MG EC tablet Take 1 tablet (40 mg) by mouth 2 times daily 60 tablet 4    prochlorperazine (COMPAZINE) 10 MG tablet Take 1 tablet (10 mg) by mouth every 6 hours as needed for nausea or vomiting 30 tablet 2    vitamin D3 (CHOLECALCIFEROL) 50 mcg (2000 units) tablet Take 1 tablet (50 mcg) by mouth daily 90 tablet 1       Physical Exam:  General: The patient is a pleasant male in no acute distress.  /73   Pulse 92   Temp 98  F (36.7  C) (Oral)   Resp 18   Wt 72.8 kg (160 lb 8 oz)   SpO2 98%   BMI 21.77 kg/m    Wt Readings from Last 10 Encounters:   05/14/24 72.8 kg (160 lb 8 oz)   04/16/24 75.2 kg (165 lb 12.8 oz)   04/02/24 73.1 kg (161 lb 1.6 oz)   03/15/24 73.5 kg (162 lb)   03/12/24 73.6 kg (162 lb 3.2 oz)   03/05/24 73.4 kg (161 lb 14.4  oz)   03/04/24 73 kg (160 lb 14.4 oz)   02/27/24 74 kg (163 lb 3.2 oz)   02/27/24 77.1 kg (170 lb)   02/22/24 75.8 kg (167 lb)   HEENT: EOMI. Sclerae are anicteric. Oral mucosa pink and moist without lesions or thrush.   Lymph: Neck is supple with no lymphadenopathy in the cervical or supraclavicular areas.   Heart: Regular rate and rhythm.   Lungs: Clear to auscultation bilaterally, normal work of breathing  Abdomen: Bowel sounds present, soft, nontender with no palpable hepatosplenomegaly or masses.   Extremities: No lower extremity edema noted bilaterally.   Neuro: Cranial nerves II through XII are grossly intact.  Skin: No rashes, petechiae, or bruising noted on exposed skin.      Laboratory Data:  Most Recent 3 CBC's:  Recent Labs   Lab Test 05/14/24  0852 04/16/24  1238 04/02/24  0817   WBC 7.1 6.1 6.7   HGB 8.6* 10.3* 10.5*   MCV 92 95 95    269 351   ANEUTAUTO 5.1 3.8 3.9    Most Recent 3 BMP's:  Recent Labs   Lab Test 05/14/24  0852 04/02/24  0817 03/05/24  0830   * 138 140   POTASSIUM 4.3 4.3 4.1   CHLORIDE 99 101 104   CO2 25 28 26   BUN 19.6 16.9 19.4   CR 0.63* 0.70 0.68   ANIONGAP 10 9 10   DENISE 8.9 9.3 9.1   * 189* 207*   PROTTOTAL 7.6 7.3 6.7   ALBUMIN 3.7 3.9 4.0    Most Recent 2 LFT's:  Recent Labs   Lab Test 05/14/24  0852 04/02/24  0817   AST 17 21   ALT 12 19   ALKPHOS 103 99   BILITOTAL 0.3 0.2   I reviewed the above labs today.      Imaging:   CT Chest/Abdomen/Pelvis w Contrast - [facility] 4/30/24  Order: [id number]  Impression    1.  New pericardial effusion with pericardial enhancement suggestive of acute/subacute pericarditis in the appropriate clinical setting.  2.  Stable appearance of locally advanced pancreatic head and uncinate process tumor with infiltration of the root of the mesentery and duodenum.  3.  Stable small left pleural effusion with atelectasis. No evidence of intrathoracic metastases.  4.  Some of the mesenteric lymph nodes appear to be decreasing in  size.  5.  Sclerotic osseous lesions as described above are most likely treated/partially treated metastases.      Echocardiographic Report - [facility] 5/1/24    Interpretation Summary   A two-dimensional transthoracic echocardiogram was performed. There is no comparison study available.     Normal left ventricular size and systolic function.   LV ejection fraction calculated using the bi-plane method of disks is 63 %.   The right ventricle is normal in size and function.   Right ventricular systolic pressure is normal.   Moderate pericardial effusion with no echo evidence of Tamponade.     I reviewed the above imaging reports today.         Assessment and Plan:  Locally advanced, unresectable pancreatic cancer. Patient started on treatment with palliative FOLFIRINOX on 7/31/23. CT CAP 10/24/23 with progression in skeletal mets, lung nodule and lymph nodes. Stable pancreatic mass. Changed treatment to gemcitabine, Abraxane (day 1, 8, 15) and Zometa.  CT CAP 3/2024 with isolated progression of potential L4 lesion. Plan to continue gemcitabine/Abraxane day 1 & 15 as a bridge to clinical trial at [facility] for cellular therapy. Had consult at [facility], recommended continuing current treatment and return upon disease progression.   -Continues to tolerate treatment well, neuropathy is stable. Labs today reivewed, proceed with gemcitabine, Abraxane as scheduled.   -Return to clinic in 2 weeks for next dose. Patient cancelled chemo on 6/11 due to family travel plans, will return as scheduled 6/18 with labs and SUZETTE follow up prior to chemo.     Pericardial effusion   Noted on CT scan completed 4/30/24 at [facility]. Cardiology follow up and ECHO also completed. Plan to repeat ECHO at [facility] at future appointments. Continues to be asymptomatic. Will request urgent cardio-oncology consult within our system. Due to lack of symptoms, will continue with gemcitabine as scheduled, discussed red flag warnings/symptoms  warranting immediate presentation to the ED including shortness of breath, chest pain or new significant edema.        Skeletal mets.    -Worsening skeletal mets noted on CT 10/24. Sclerosis noted on recent imaging.   -Radiation to T10 completed 11/29/23.  -Plan was to start radiation therapy to L4 lesion once he returns from [facility], [place], however, holding off on radiation due to lack of symptoms currently.    -Continues on Zometa every 3 months. Last dose given 2/6/24, will give again today with infusion.         Pulmonary Embolism. Right lower segmental PE found on routine imaging. Started on apixiban, denies any bleeding concerns.     Nutrition.  Feeding tube removed. Continues to tolerate oral intake well. No issues with nausea or vomiting. Continue to push oral hydration, drinking 64+ oz of fluid/day.  Continue Protonix daily.  Has compazine as needed for nausea.     Lower extremity edema. Resolved. Continue compression stockings and elevation PRN. Continue to monitor.     Neuropathy: Grade 1. Mostly from prior therapies but slightly increased on Abraxane lately. Previously referred to cancer rehab, plans to start therapy once he returns from [facility], [place].     Fatigue: Ritalin 5 mg BID PRN. Discussed gentle exercise, cancer rehab.     Diarrhea/constipation. Resolved. Bowel movements more regular with increased food intake. Continue Metamucil as needed. Monitor for recurrent blood in stool.     Abdominal pain. Managed by palliative care with Butrans patch and oral Dilaudid. No pain recently. Has been taking less pain medication.     Anemia. Normocytic. Suspect anemia of chronic disease + secondary to myelosuppression from chemo. Hemoglobin stable, continue to monitor.       The longitudinal plan of care for the diagnosis(es)/condition(s) as documented were addressed during this visit. Due to the added complexity in care, I will continue to support Gallo in the subsequent management and with ongoing continuity of  care.      44 minutes spent on the date of the encounter doing chart review, review of test results, interpretation of tests, patient visit, and documentation     ESVIN Canales CNP

## 2024-05-14 NOTE — NURSING NOTE
Chief Complaint   Patient presents with    Port Draw     Labs drawn via port accessed by RN. VS taken.     Port accessed with 20 g 3/4 inch power needle by RN, labs collected, line flushed with saline and heparin.  Vitals taken. Pt checked in for appointment(s).     Flavio De Leon RN

## 2024-05-14 NOTE — NURSING NOTE
Oncology Rooming Note    May 14, 2024 9:03 AM   Gallo Navarro is a 58 year old male who presents for:    Chief Complaint   Patient presents with    Port Draw     Labs drawn via port accessed by RN. VS taken.    Oncology Clinic Visit     Malignant neoplasm of head of pancreas     Initial Vitals: /73   Pulse 92   Temp 98  F (36.7  C) (Oral)   Resp 18   Wt 72.8 kg (160 lb 8 oz)   SpO2 98%   BMI 21.77 kg/m   Estimated body mass index is 21.77 kg/m  as calculated from the following:    Height as of 4/25/24: 1.829 m (6').    Weight as of this encounter: 72.8 kg (160 lb 8 oz). Body surface area is 1.92 meters squared.  Data Unavailable Comment: Data Unavailable   No LMP for male patient.  Allergies reviewed: Yes  Medications reviewed: Yes    Medications: Medication refills not needed today.  Pharmacy name entered into EPIC:    [facility] - [place], [place] - [address] [facility] [place], [place] [phone]  [facility] [place] [facility] [address] [facility] [address] PORTAL TO REGISTERED [facility] SITES  [facility] - [place], [place] - [address]  [facility] [address] [facility] [employer] - [place], [place] - [address]    Frailty Screening:   Is the patient here for a new oncology consult visit in cancer care? 2. No      Clinical concerns: none       Bertha Triplett

## 2024-05-16 ENCOUNTER — MYC REFILL (OUTPATIENT)
Dept: RADIATION ONCOLOGY | Facility: HOSPITAL | Age: 59
End: 2024-05-16
Payer: COMMERCIAL

## 2024-05-16 DIAGNOSIS — C25.0 MALIGNANT NEOPLASM OF HEAD OF PANCREAS (H): ICD-10-CM

## 2024-05-16 DIAGNOSIS — G89.3 CANCER ASSOCIATED PAIN: ICD-10-CM

## 2024-05-16 DIAGNOSIS — C25.9 PANCREATIC ADENOCARCINOMA (H): ICD-10-CM

## 2024-05-16 DIAGNOSIS — E11.9 TYPE 2 DIABETES MELLITUS WITHOUT COMPLICATION, WITH LONG-TERM CURRENT USE OF INSULIN (H): ICD-10-CM

## 2024-05-16 DIAGNOSIS — Z79.4 TYPE 2 DIABETES MELLITUS WITHOUT COMPLICATION, WITH LONG-TERM CURRENT USE OF INSULIN (H): ICD-10-CM

## 2024-05-16 DIAGNOSIS — K31.1 GASTRIC OUTLET OBSTRUCTION: ICD-10-CM

## 2024-05-16 RX ORDER — BUPRENORPHINE 20 UG/H
1 PATCH TRANSDERMAL WEEKLY
Qty: 4 PATCH | Refills: 3 | Status: SHIPPED | OUTPATIENT
Start: 2024-05-16 | End: 2024-05-30

## 2024-05-16 RX ORDER — BUPRENORPHINE 10 UG/H
1 PATCH TRANSDERMAL
Qty: 4 PATCH | Refills: 3 | Status: SHIPPED | OUTPATIENT
Start: 2024-05-16 | End: 2024-05-30

## 2024-05-16 NOTE — TELEPHONE ENCOUNTER
Received [employer] message from patient requesting refill of butrans.     Last refill: 4/15/24  Last office visit: 4/25/24  Scheduled for follow up 6/27/24     Will route request to MD for review.     Reviewed [place]  Report.

## 2024-05-18 ENCOUNTER — MYC REFILL (OUTPATIENT)
Dept: RADIATION ONCOLOGY | Facility: HOSPITAL | Age: 59
End: 2024-05-18
Payer: COMMERCIAL

## 2024-05-18 DIAGNOSIS — C25.9 PANCREATIC ADENOCARCINOMA (H): ICD-10-CM

## 2024-05-20 RX ORDER — HYDROMORPHONE HYDROCHLORIDE 2 MG/1
2-4 TABLET ORAL EVERY 4 HOURS PRN
Qty: 180 TABLET | Refills: 0 | Status: SHIPPED | OUTPATIENT
Start: 2024-05-20 | End: 2024-05-30

## 2024-05-20 NOTE — TELEPHONE ENCOUNTER
Received [employer] message from patient requesting refill of hydromorphone.     Last refill: 422/24  Last office visit: 4/25/24  Scheduled for follow up 6/27/24     Will route request to MD for review.     Reviewed [place]  Report.

## 2024-05-22 ENCOUNTER — DOCUMENTATION ONLY (OUTPATIENT)
Dept: CARDIOLOGY | Facility: CLINIC | Age: 59
End: 2024-05-22

## 2024-05-22 DIAGNOSIS — I31.39 PERICARDIAL EFFUSION: Primary | ICD-10-CM

## 2024-05-22 NOTE — PROGRESS NOTES
Sent fax to [facility] to send CT CAP from 4/29/24 for appointment tomorrow with Dr. Khan.     Yajaira Murdock CMA ([facility])

## 2024-05-22 NOTE — PROGRESS NOTES
Chief complaint: pericardial effusion    HPI:   Gallo Navarro is a 58 year old male with history of pancreatic cancer referred for evaluation of pericardial effusion prior to planned air travel to [place].     He was in [place] late April at [facility] and noted to have pericardial effusion on imaging prompting TTE which showed moderate  pericardial effusion without tamponade.    He has not had any lightheadedness, dyspnea, syncope or chest discomfort. No chest pain.     He denies any chest pain, dyspnea at rest or with exertion, PND, orthopnea, peripheral edema, palpitations, lightheadedness or syncope.     CT chest 5/23/24: small pericardial effusion    TTE 5/1/24 ([facility]):  LV ejection fraction calculated using the bi-plane method of disks is 63 %.   The right ventricle is normal in size and function.   Right ventricular systolic pressure is normal.   Moderate pericardial effusion with no echo evidence of Tamponade.     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    Gastric outlet obstruction     Metastasis from pancreatic cancer (H)     Personal history of chemotherapy     Gemzar + Abraxane started on 10/31/2023    Recurrent acute pancreatitis     S/P radiation therapy     2,000 cGy to T10 Spine completed on 11/29/2023 [facility]       CURRENT MEDICATIONS:  Current Outpatient Medications   Medication Sig Dispense Refill    acetaminophen (TYLENOL) 32 mg/mL liquid Take 20.313 mLs (650 mg) by mouth every 8 hours 1800 mL 1    apixaban ANTICOAGULANT (ELIQUIS) 5 MG tablet Take 1 tablet (5 mg) by mouth 2 times daily 60 tablet 2    blood glucose (FREESTYLE TEST STRIPS) test strip by Other route as needed      buprenorphine (BUTRANS) 10 MCG/HR WK patch Place 1 patch onto the skin every 7 days Use with 20 mcg/hour patch for 30 mcg/hour total. 4 patch 3    buprenorphine (BUTRANS) 20 MCG/HR WK patch  Place 1 patch onto the skin once a week Use with 10 mcg/hour patch for 30 mcg/hour total. 4 patch 3    dicyclomine (BENTYL) 10 MG capsule Take 1 capsule (10 mg) by mouth 4 times daily (before meals and nightly) 120 capsule 1    HYDROmorphone (DILAUDID) 2 MG tablet Take 1-2 tablets (2-4 mg) by mouth every 4 hours as needed for severe pain or breakthrough pain 180 tablet 0    lidocaine-prilocaine (EMLA) 2.5-2.5 % external cream Use 1-2 times a week or as needed prior to port access (Patient not taking: Reported on 5/8/2024) 30 g 3    lipase-protease-amylase (CREON 24) 00647-94324-308544 units CPEP per EC capsule 2-3 capsules with meals 3 times a day and 1-2 capsules with snacks max of 15 capsules a day 200 capsule 11    methylphenidate (RITALIN) 5 MG tablet Take 1 tablet (5 mg) by mouth 2 times daily as needed (Patient not taking: Reported on 3/12/2024) 10 tablet 0    Multiple Vitamins-Minerals (CENTRUM SILVER 50+MEN) TABS as directed Orally      pantoprazole (PROTONIX) 40 MG EC tablet Take 1 tablet (40 mg) by mouth 2 times daily 60 tablet 4    prochlorperazine (COMPAZINE) 10 MG tablet Take 1 tablet (10 mg) by mouth every 6 hours as needed for nausea or vomiting 30 tablet 2    vitamin D3 (CHOLECALCIFEROL) 50 mcg (2000 units) tablet Take 1 tablet (50 mcg) by mouth daily 90 tablet 1       ALLERGIES:   No Known Allergies      Exam:  /71 (BP Location: Right arm, Patient Position: Chair, Cuff Size: Adult Regular)   Pulse 70   Wt 72.6 kg (160 lb)   BMI 21.70 kg/m    GENERAL APPEARANCE: healthy, alert and no distress  EYES: no icterus, no xanthelasmas  ENT: normal palate, mucosa moist, no central cyanosis  NECK: JVP not elevated  RESPIRATORY: lungs clear to auscultation - no rales, rhonchi or wheezes, no use of accessory muscles, no retractions, respirations are unlabored, normal respiratory rate  CARDIOVASCULAR: regular rhythm, normal S1 with physiologic split S2, no S3 or S4 and no murmur, click or rub.  GI: soft,  non tender, bowel sounds normal,no abdominal bruits  EXTREMITIES: no edema, no bruits  NEURO: alert and oriented to person/place/time, normal speech, gait and affect  VASC: Radial, dorsalis pedis and posterior tibialis pulses 2+ bilaterally.  SKIN: no ecchymoses, no rashes.  PSYCH: cooperative, affect appropriate.     Labs:  Reviewed.     Testing/Procedures:    I personally visualized and interpreted:  CT chest 5/23/24: small pericardial effusion    Outside results of note:  Outside records from [facility] were obtained, and relevant results/notes have been incorporated into HPI.    TTE 5/1/24 ([facility]):  LV ejection fraction calculated using the bi-plane method of disks is 63 %.   The right ventricle is normal in size and function.   Right ventricular systolic pressure is normal.   Moderate pericardial effusion with no echo evidence of Tamponade.     Assessment and Plan:   Small asymptomatic pericardial effusion  CT chest today shows a small pericardial effusion. He has never experienced any symptoms or either pericarditis or pericardial tamponade. Based on available data, likelihood of rapid accumulation and tamponade appear relatively low.    I counseled him in detail regarding symptoms/signs of pericardial tamponade and advised him to be aware of nearby medical facilities and to call 911 should these symptoms occur.     With these caveats, he can safely travel as planned, including necessary air travel.    Agree with TTE as planned for serial evaluation, if this is stable it would be reasonable to repeat a surveillance TTE in 6 months through his oncologist or PCP to ensure no accumulation (sooner should he develop new symptoms suggestive of clinical pericardial disease.)    Plan/recommendations in brief:  -he can safely travel by air as planned  -TTE 7/2024 as scheduled, assuming stable small or moderate effusion and no tamponade, would repeat in 6 months or for symptoms (can be done through  oncology)  -small effusion on CT today  -follow up with me as needed    The patient states understanding and is agreeable with plan.     Tam Khan MD  Cardiology    CC  ANNIE MCHUGH

## 2024-05-23 ENCOUNTER — ANCILLARY PROCEDURE (OUTPATIENT)
Dept: CT IMAGING | Facility: CLINIC | Age: 59
End: 2024-05-23
Attending: STUDENT IN AN ORGANIZED HEALTH CARE EDUCATION/TRAINING PROGRAM
Payer: COMMERCIAL

## 2024-05-23 ENCOUNTER — ANCILLARY PROCEDURE (OUTPATIENT)
Dept: GENERAL RADIOLOGY | Facility: CLINIC | Age: 59
End: 2024-05-23
Payer: COMMERCIAL

## 2024-05-23 ENCOUNTER — OFFICE VISIT (OUTPATIENT)
Dept: CARDIOLOGY | Facility: CLINIC | Age: 59
End: 2024-05-23
Attending: STUDENT IN AN ORGANIZED HEALTH CARE EDUCATION/TRAINING PROGRAM
Payer: COMMERCIAL

## 2024-05-23 VITALS
SYSTOLIC BLOOD PRESSURE: 113 MMHG | WEIGHT: 160 LBS | BODY MASS INDEX: 21.7 KG/M2 | HEART RATE: 70 BPM | DIASTOLIC BLOOD PRESSURE: 71 MMHG

## 2024-05-23 DIAGNOSIS — I31.39 PERICARDIAL EFFUSION: Primary | ICD-10-CM

## 2024-05-23 DIAGNOSIS — C25.0 MALIGNANT NEOPLASM OF HEAD OF PANCREAS (H): Primary | ICD-10-CM

## 2024-05-23 DIAGNOSIS — I31.39 PERICARDIAL EFFUSION: ICD-10-CM

## 2024-05-23 PROCEDURE — 71260 CT THORAX DX C+: CPT | Mod: GC | Performed by: RADIOLOGY

## 2024-05-23 PROCEDURE — 99204 OFFICE O/P NEW MOD 45 MIN: CPT | Performed by: INTERNAL MEDICINE

## 2024-05-23 RX ORDER — HEPARIN SODIUM (PORCINE) LOCK FLUSH IV SOLN 100 UNIT/ML 100 UNIT/ML
5 SOLUTION INTRAVENOUS ONCE
Status: COMPLETED | OUTPATIENT
Start: 2024-05-23 | End: 2024-05-23

## 2024-05-23 RX ORDER — IOPAMIDOL 755 MG/ML
79 INJECTION, SOLUTION INTRAVASCULAR ONCE
Status: COMPLETED | OUTPATIENT
Start: 2024-05-23 | End: 2024-05-23

## 2024-05-23 RX ADMIN — HEPARIN SODIUM (PORCINE) LOCK FLUSH IV SOLN 100 UNIT/ML 5 ML: 100 SOLUTION at 10:27

## 2024-05-23 RX ADMIN — IOPAMIDOL 79 ML: 755 INJECTION, SOLUTION INTRAVASCULAR at 09:40

## 2024-05-23 NOTE — NURSING NOTE
Gallo Navarro's goals for this visit include:   Chief Complaint   Patient presents with    New Patient     Referred by Dr. Haile  Pericardial effusion  will be flying in June        He requests these members of his care team be copied on today's visit information: yes     PCP: Akil Hernandez    Referring Provider:  Luis Haile MD  [address] [place] [id number]    /71 (BP Location: Right arm, Patient Position: Chair, Cuff Size: Adult Regular)   Pulse 70   Wt 72.6 kg (160 lb)   BMI 21.70 kg/m      Do you need any medication refills at today's visit? No       STEPHANIE Alexis   Neph/Pulm [facility]

## 2024-05-23 NOTE — NURSING NOTE
To Do    -Follow up cardiology as needed basis   Echo as scheduled  Repeat echo in 6 months through pcp or oncology    Call [phone] IF    Shortness of breath  Lightheadedness  Chest pain   Abnormally low blood pressure  High heart rate    Erica Gamble RN

## 2024-05-23 NOTE — PATIENT INSTRUCTIONS
Take your medicines every day, as directed     Changes made today:  Follow up cardiology as needed basis        Cardiology Care Coordinators:      Radha HARDIN RN     Cardiology Rooming Staff:  Francesca CARDONA    Phone  [phone]      Fax [phone]    To Contact us     During Business Hours:  [phone]     If you are needing refills please contact your pharmacy.     For urgent after hour care please call the [facility] at [phone] or the [facility] at [phone] and ask to speak to the cardiologist on call.    If you are having a medical emergency, please call [phone].            HOW TO CHECK YOUR BLOOD PRESSURE AT HOME:     Avoid eating, smoking, and exercising for at least 30 minutes before taking a reading.     Be sure you have taken your BP medication at least 2-3 hours before you check it.      Sit quietly for 10 minutes before a reading.      Sit in a chair with your feet flat on the floor. Rest your  arm on a table so that the arm cuff is at the same level as your heart.     Remain still during the reading.  Record your blood pressure and pulse in a log and bring to your next appointment.       Use [employer] allows you to communicate directly with your heart team through secure messaging.  [employer] can be accessed any time on your phone, computer, or tablet.  If you need assistance, we'd be happy to help!             Keep your Heart Appointments:     Echo as scheduled  Repeat echo in 6 months through pcp or oncology         Call [phone] IF    Shortness of breath  Lightheadedness  Chest pain   Abnormally low blood pressure  High heart rate

## 2024-05-28 ENCOUNTER — DOCUMENTATION ONLY (OUTPATIENT)
Dept: CARDIOLOGY | Facility: CLINIC | Age: 59
End: 2024-05-28

## 2024-05-28 ENCOUNTER — INFUSION THERAPY VISIT (OUTPATIENT)
Dept: ONCOLOGY | Facility: CLINIC | Age: 59
End: 2024-05-28
Attending: STUDENT IN AN ORGANIZED HEALTH CARE EDUCATION/TRAINING PROGRAM
Payer: COMMERCIAL

## 2024-05-28 ENCOUNTER — APPOINTMENT (OUTPATIENT)
Dept: LAB | Facility: CLINIC | Age: 59
End: 2024-05-28
Attending: STUDENT IN AN ORGANIZED HEALTH CARE EDUCATION/TRAINING PROGRAM
Payer: COMMERCIAL

## 2024-05-28 VITALS
SYSTOLIC BLOOD PRESSURE: 107 MMHG | BODY MASS INDEX: 22.42 KG/M2 | OXYGEN SATURATION: 98 % | TEMPERATURE: 98.2 F | RESPIRATION RATE: 18 BRPM | HEART RATE: 62 BPM | WEIGHT: 165.3 LBS | DIASTOLIC BLOOD PRESSURE: 67 MMHG

## 2024-05-28 DIAGNOSIS — C25.0 MALIGNANT NEOPLASM OF HEAD OF PANCREAS (H): ICD-10-CM

## 2024-05-28 DIAGNOSIS — L53.9 ERYTHEMA OF TOE: ICD-10-CM

## 2024-05-28 DIAGNOSIS — E11.9 TYPE 2 DIABETES MELLITUS WITHOUT COMPLICATION, WITH LONG-TERM CURRENT USE OF INSULIN (H): Primary | ICD-10-CM

## 2024-05-28 DIAGNOSIS — Z79.4 TYPE 2 DIABETES MELLITUS WITHOUT COMPLICATION, WITH LONG-TERM CURRENT USE OF INSULIN (H): Primary | ICD-10-CM

## 2024-05-28 DIAGNOSIS — Z51.11 ENCOUNTER FOR ANTINEOPLASTIC CHEMOTHERAPY: Primary | ICD-10-CM

## 2024-05-28 LAB
BASOPHILS # BLD AUTO: 0 10E3/UL (ref 0–0.2)
BASOPHILS NFR BLD AUTO: 1 %
EOSINOPHIL # BLD AUTO: 0.1 10E3/UL (ref 0–0.7)
EOSINOPHIL NFR BLD AUTO: 2 %
ERYTHROCYTE [DISTWIDTH] IN BLOOD BY AUTOMATED COUNT: 15.3 % (ref 10–15)
HCT VFR BLD AUTO: 29.4 % (ref 40–53)
HGB BLD-MCNC: 8.9 G/DL (ref 13.3–17.7)
IMM GRANULOCYTES # BLD: 0 10E3/UL
IMM GRANULOCYTES NFR BLD: 0 %
LYMPHOCYTES # BLD AUTO: 1.1 10E3/UL (ref 0.8–5.3)
LYMPHOCYTES NFR BLD AUTO: 25 %
MCH RBC QN AUTO: 28.3 PG (ref 26.5–33)
MCHC RBC AUTO-ENTMCNC: 30.3 G/DL (ref 31.5–36.5)
MCV RBC AUTO: 93 FL (ref 78–100)
MONOCYTES # BLD AUTO: 0.4 10E3/UL (ref 0–1.3)
MONOCYTES NFR BLD AUTO: 10 %
NEUTROPHILS # BLD AUTO: 2.7 10E3/UL (ref 1.6–8.3)
NEUTROPHILS NFR BLD AUTO: 62 %
NRBC # BLD AUTO: 0 10E3/UL
NRBC BLD AUTO-RTO: 0 /100
PLATELET # BLD AUTO: 226 10E3/UL (ref 150–450)
RBC # BLD AUTO: 3.15 10E6/UL (ref 4.4–5.9)
WBC # BLD AUTO: 4.3 10E3/UL (ref 4–11)

## 2024-05-28 PROCEDURE — 36415 COLL VENOUS BLD VENIPUNCTURE: CPT

## 2024-05-28 PROCEDURE — 96375 TX/PRO/DX INJ NEW DRUG ADDON: CPT

## 2024-05-28 PROCEDURE — 85041 AUTOMATED RBC COUNT: CPT | Performed by: STUDENT IN AN ORGANIZED HEALTH CARE EDUCATION/TRAINING PROGRAM

## 2024-05-28 PROCEDURE — 99215 OFFICE O/P EST HI 40 MIN: CPT | Mod: 25

## 2024-05-28 PROCEDURE — 96417 CHEMO IV INFUS EACH ADDL SEQ: CPT

## 2024-05-28 PROCEDURE — 258N000003 HC RX IP 258 OP 636: Performed by: STUDENT IN AN ORGANIZED HEALTH CARE EDUCATION/TRAINING PROGRAM

## 2024-05-28 PROCEDURE — 96413 CHEMO IV INFUSION 1 HR: CPT

## 2024-05-28 PROCEDURE — 86301 IMMUNOASSAY TUMOR CA 19-9: CPT

## 2024-05-28 PROCEDURE — 250N000011 HC RX IP 250 OP 636: Performed by: STUDENT IN AN ORGANIZED HEALTH CARE EDUCATION/TRAINING PROGRAM

## 2024-05-28 RX ORDER — PACLITAXEL 100 MG/20ML
125 INJECTION, POWDER, LYOPHILIZED, FOR SUSPENSION INTRAVENOUS ONCE
Status: COMPLETED | OUTPATIENT
Start: 2024-05-28 | End: 2024-05-28

## 2024-05-28 RX ORDER — HEPARIN SODIUM (PORCINE) LOCK FLUSH IV SOLN 100 UNIT/ML 100 UNIT/ML
5 SOLUTION INTRAVENOUS ONCE
Status: COMPLETED | OUTPATIENT
Start: 2024-05-28 | End: 2024-05-28

## 2024-05-28 RX ORDER — HEPARIN SODIUM (PORCINE) LOCK FLUSH IV SOLN 100 UNIT/ML 100 UNIT/ML
5 SOLUTION INTRAVENOUS
Status: DISCONTINUED | OUTPATIENT
Start: 2024-05-28 | End: 2024-05-28 | Stop reason: HOSPADM

## 2024-05-28 RX ORDER — ONDANSETRON 2 MG/ML
8 INJECTION INTRAMUSCULAR; INTRAVENOUS ONCE
Status: COMPLETED | OUTPATIENT
Start: 2024-05-28 | End: 2024-05-28

## 2024-05-28 RX ADMIN — SODIUM CHLORIDE 250 ML: 9 INJECTION, SOLUTION INTRAVENOUS at 10:20

## 2024-05-28 RX ADMIN — ONDANSETRON 8 MG: 2 INJECTION INTRAMUSCULAR; INTRAVENOUS at 10:20

## 2024-05-28 RX ADMIN — Medication 5 ML: at 11:51

## 2024-05-28 RX ADMIN — PACLITAXEL 250 MG: 100 INJECTION, POWDER, LYOPHILIZED, FOR SUSPENSION INTRAVENOUS at 10:39

## 2024-05-28 RX ADMIN — GEMCITABINE 2000 MG: 38 INJECTION, SOLUTION INTRAVENOUS at 11:19

## 2024-05-28 RX ADMIN — Medication 5 ML: at 08:35

## 2024-05-28 ASSESSMENT — PAIN SCALES - GENERAL: PAINLEVEL: NO PAIN (0)

## 2024-05-28 NOTE — PATIENT INSTRUCTIONS
Contact Numbers    [facility] [place] [facility] [place]: [phone]    Call with chills and/or temperature greater than or equal to 100.5, uncontrolled nausea/vomiting, diarrhea, constipation, dizziness, shortness of breath, chest pain, bleeding, unexplained bruising, or any new/concerning symptoms, questions/concerns.     If you are having any concerning symptoms or wish to speak to a provider before your next infusion visit, please call your care coordinator or triage to notify them so we can adequately serve you.       After Hours: [phone]    If after hours, weekends, or holidays, call main hospital  and ask for Oncology doctor on call.      May 2024      [place] Monday Tuesday Wednesday Thursday Friday Saturday                  1     2     3     4       5     6     7     8    RETURN RADIATION ONCOLOGY   1:00 PM   (30 min.)   Ben Reaves MD   [facility] Radiation Oncology [place] 9     10     11       12     13     14    LAB CENTRAL   8:15 AM   (15 min.)   [facility] DRAW   [facility]    RETURN CCSL   8:45 AM   (45 min.)   Megan Farrell APRN CNP   [facility]    ONC INFUSION 2 HR (120 MIN)  10:00 AM   (120 min.)   [facility] ONC INFUSION NURSE   [facility] 15     16     17     18       19     20     21     22     23    NURSE ONLY   9:25 AM   (15 min.)   MG IMAGING NURSE   [facility]    CT CHEST WWO   9:25 AM   (20 min.)   MGCT1   [facility]    URGENT NEW   9:45 AM   (30 min.)   Tam Khan MD   [facility] 24     25       26     27     28    LAB CENTRAL   8:15 AM   (15 min.)   [facility] DRAW   [facility]    ONC INFUSION 2 HR (120 MIN)   9:00 AM   (120 min.)   [facility] ONC INFUSION NURSE   [facility] 29 30 31 June 2024 Sunday Monday  Tuesday Wednesday Thursday Friday Saturday                                 1       2     3     4     5     6    ECHO COMPLETE   9:15 AM   (60 min.)   [id number]   [facility] 7     8       9     10     11     12     13     14     15       16     17     18     19     20     21     22       23     24     25     26     27    RETURN RADIATION ONCOLOGY   9:35 AM   (40 min.)   Jeremy Hurt MD   [facility] Radiation Oncology [place] 28     29       30                                                   Lab Results:  Recent Results (from the past 12 hour(s))   CBC with platelets and differential    Collection Time: 05/28/24  8:42 AM   Result Value Ref Range    WBC Count 4.3 4.0 - 11.0 10e3/uL    RBC Count 3.15 (L) 4.40 - 5.90 10e6/uL    Hemoglobin 8.9 (L) 13.3 - 17.7 g/dL    Hematocrit 29.4 (L) 40.0 - 53.0 %    MCV 93 78 - 100 fL    MCH 28.3 26.5 - 33.0 pg    MCHC 30.3 (L) 31.5 - 36.5 g/dL    RDW 15.3 (H) 10.0 - 15.0 %    Platelet Count 226 150 - 450 10e3/uL    % Neutrophils 62 %    % Lymphocytes 25 %    % Monocytes 10 %    % Eosinophils 2 %    % Basophils 1 %    % Immature Granulocytes 0 %    NRBCs per 100 WBC 0 <1 /100    Absolute Neutrophils 2.7 1.6 - 8.3 10e3/uL    Absolute Lymphocytes 1.1 0.8 - 5.3 10e3/uL    Absolute Monocytes 0.4 0.0 - 1.3 10e3/uL    Absolute Eosinophils 0.1 0.0 - 0.7 10e3/uL    Absolute Basophils 0.0 0.0 - 0.2 10e3/uL    Absolute Immature Granulocytes 0.0 <=0.4 10e3/uL    Absolute NRBCs 0.0 10e3/uL

## 2024-05-28 NOTE — NURSING NOTE
Chief Complaint   Patient presents with    Port Draw     Labs drawn via port by RN.      Labs drawn via port by RN. Port accessed with 20G 3/4 power needle. Flushed with NS and heparin. Pt tolerated well. Vitals taken. Pt checked in for next appointment.    Jennifer Stevens RN

## 2024-05-28 NOTE — PROGRESS NOTES
Received CT imaging records in disc form via mail from [facility] [place], disc brought to imaging to have uploaded and put into patient's chart. Medical records will be stored in clinic until uploaded to Epic and are available to the provider upon request.

## 2024-05-28 NOTE — PROGRESS NOTES
Infusion Nursing Note:  Gallo Navarro presents today for Cycle 7, day 15 Abraxane and Gemzar.    Patient seen by provider today: No    Note: Pt presents to clinic with two concerns:  -Isolated episode of shaking chills last Friday--stated could not get warm until was under many blankets/jackets sitting next to fireplace.  Temp was 104.0.  Following morning, temp was normal.  Denies other s/s infection [aside from toe as described below]  -R great toe is red and nail discolored.  Pt states stubbed his toe about four months ago.  He took a course of antibiotics at some point, does not remember how long ago.  Pt has neuropathy; cannot feel toe.  Not warm, blanches.      Discussed concerns with NP via secure chat.  Written communication 5/28/2024 Megan Farrell NP/JUMANA Easley RN: Ok to follow up with podiatry for toe.  Contact PCP or to ED for worsening symptoms.  Ok to proceed with treatment today.    Pt verbalized understanding and comfort with POC.    Pt did not request or require any intervention for pain today.    Intravenous Access:  Implanted Port.    Treatment Conditions:  Lab Results   Component Value Date    HGB 8.9 (L) 05/28/2024    WBC 4.3 05/28/2024    ANEU 2.2 12/26/2023    ANEUTAUTO 2.7 05/28/2024     05/28/2024     Results reviewed, labs MET treatment parameters, ok to proceed with treatment.    Post Infusion Assessment:  Patient tolerated infusion without incident.  Blood return noted pre and post infusion.  Site patent and intact, free from redness, edema or discomfort.  No evidence of extravasations.  Access discontinued per protocol.    Discharge Plan:   Patient declined prescription refills.  Discharge instructions reviewed with: Patient.  Patient and/or family verbalized understanding of discharge instructions and all questions answered.  AVS to patient via [employer].  Patient will return in late June for next appointment.  He recently cancelled upcoming appts as he will be out of town.   Requested scheduling to call pt to schedule when he is back in town.     Patient discharged in stable condition accompanied by: friend.  Departure Mode: Ambulatory.    Mary Easley RN

## 2024-05-29 LAB — CANCER AG19-9 SERPL IA-ACNC: 5 U/ML

## 2024-05-30 ENCOUNTER — MYC REFILL (OUTPATIENT)
Dept: RADIATION ONCOLOGY | Facility: HOSPITAL | Age: 59
End: 2024-05-30
Payer: COMMERCIAL

## 2024-05-30 ENCOUNTER — MYC REFILL (OUTPATIENT)
Dept: ONCOLOGY | Facility: CLINIC | Age: 59
End: 2024-05-30
Payer: COMMERCIAL

## 2024-05-30 DIAGNOSIS — G89.3 CANCER ASSOCIATED PAIN: ICD-10-CM

## 2024-05-30 DIAGNOSIS — Z98.890 HISTORY OF BILIARY STENT INSERTION: ICD-10-CM

## 2024-05-30 DIAGNOSIS — K31.1 GASTRIC OUTLET OBSTRUCTION: ICD-10-CM

## 2024-05-30 DIAGNOSIS — E11.9 TYPE 2 DIABETES MELLITUS WITHOUT COMPLICATION, WITH LONG-TERM CURRENT USE OF INSULIN (H): ICD-10-CM

## 2024-05-30 DIAGNOSIS — C25.0 MALIGNANT NEOPLASM OF HEAD OF PANCREAS (H): ICD-10-CM

## 2024-05-30 DIAGNOSIS — Z79.4 TYPE 2 DIABETES MELLITUS WITHOUT COMPLICATION, WITH LONG-TERM CURRENT USE OF INSULIN (H): ICD-10-CM

## 2024-05-30 DIAGNOSIS — C25.9 PANCREATIC ADENOCARCINOMA (H): ICD-10-CM

## 2024-05-30 DIAGNOSIS — R10.30 LOWER ABDOMINAL PAIN: ICD-10-CM

## 2024-05-30 NOTE — TELEPHONE ENCOUNTER
Received [employer] message from patient requesting refill of Butrans and hydromorphone.     Last refill: Butrans 10 mcg 5/17/2024  Last refill Butrans 20 mcg 5/17/2024  Hydromorphone 5/20/2024  Last office visit: 4/25/2024  Scheduled for follow up 6/27/2024    Will route request to MD for review.     Reviewed [place]  Report.

## 2024-05-31 RX ORDER — HYDROMORPHONE HYDROCHLORIDE 2 MG/1
2-4 TABLET ORAL EVERY 4 HOURS PRN
Qty: 180 TABLET | Refills: 0 | Status: SHIPPED | OUTPATIENT
Start: 2024-06-03 | End: 2024-06-27

## 2024-05-31 RX ORDER — BUPRENORPHINE 10 UG/H
1 PATCH TRANSDERMAL
Qty: 4 PATCH | Refills: 3 | Status: SHIPPED | OUTPATIENT
Start: 2024-06-06 | End: 2024-07-13

## 2024-05-31 RX ORDER — BUPRENORPHINE 20 UG/H
1 PATCH TRANSDERMAL WEEKLY
Qty: 4 PATCH | Refills: 3 | Status: SHIPPED | OUTPATIENT
Start: 2024-06-06 | End: 2024-07-13

## 2024-06-04 ENCOUNTER — MYC REFILL (OUTPATIENT)
Dept: ONCOLOGY | Facility: CLINIC | Age: 59
End: 2024-06-04
Payer: COMMERCIAL

## 2024-06-04 DIAGNOSIS — G89.3 CANCER ASSOCIATED PAIN: ICD-10-CM

## 2024-06-04 DIAGNOSIS — Z79.4 TYPE 2 DIABETES MELLITUS WITHOUT COMPLICATION, WITH LONG-TERM CURRENT USE OF INSULIN (H): ICD-10-CM

## 2024-06-04 DIAGNOSIS — C25.0 MALIGNANT NEOPLASM OF HEAD OF PANCREAS (H): ICD-10-CM

## 2024-06-04 DIAGNOSIS — K31.1 GASTRIC OUTLET OBSTRUCTION: ICD-10-CM

## 2024-06-04 DIAGNOSIS — E11.9 TYPE 2 DIABETES MELLITUS WITHOUT COMPLICATION, WITH LONG-TERM CURRENT USE OF INSULIN (H): ICD-10-CM

## 2024-06-04 RX ORDER — PANTOPRAZOLE SODIUM 40 MG/1
40 TABLET, DELAYED RELEASE ORAL 2 TIMES DAILY
Qty: 60 TABLET | Refills: 4 | Status: SHIPPED | OUTPATIENT
Start: 2024-06-04 | End: 2024-08-08

## 2024-06-04 NOTE — CONFIDENTIAL NOTE
Protonix refill   Last prescribing provider: Megan Farrell     Last clinic visit date: 5/14/24 Megan Farrell     Recommendations for requested medication (if none, N/A): Copied from chart note    Continue Protonix daily     Any other pertinent information (if none, N/A): N./A    Refilled: Y/N, if NO, why?

## 2024-06-06 ENCOUNTER — MYC REFILL (OUTPATIENT)
Dept: ONCOLOGY | Facility: CLINIC | Age: 59
End: 2024-06-06
Payer: COMMERCIAL

## 2024-06-06 ENCOUNTER — HOSPITAL ENCOUNTER (OUTPATIENT)
Dept: CARDIOLOGY | Facility: CLINIC | Age: 59
Discharge: HOME | End: 2024-06-06
Attending: STUDENT IN AN ORGANIZED HEALTH CARE EDUCATION/TRAINING PROGRAM | Admitting: STUDENT IN AN ORGANIZED HEALTH CARE EDUCATION/TRAINING PROGRAM
Payer: COMMERCIAL

## 2024-06-06 DIAGNOSIS — I31.39 PERICARDIAL EFFUSION: ICD-10-CM

## 2024-06-06 DIAGNOSIS — I26.94 MULTIPLE SUBSEGMENTAL PULMONARY EMBOLI WITHOUT ACUTE COR PULMONALE (H): ICD-10-CM

## 2024-06-06 LAB — LVEF ECHO: NORMAL

## 2024-06-06 PROCEDURE — 93356 MYOCRD STRAIN IMG SPCKL TRCK: CPT | Performed by: STUDENT IN AN ORGANIZED HEALTH CARE EDUCATION/TRAINING PROGRAM

## 2024-06-06 PROCEDURE — 93306 TTE W/DOPPLER COMPLETE: CPT | Mod: 26 | Performed by: STUDENT IN AN ORGANIZED HEALTH CARE EDUCATION/TRAINING PROGRAM

## 2024-06-06 PROCEDURE — 93356 MYOCRD STRAIN IMG SPCKL TRCK: CPT

## 2024-06-06 NOTE — TELEPHONE ENCOUNTER
Pending Prescriptions:                       Disp   Refills    apixaban ANTICOAGULANT (ELIQUIS) 5 MG tab*60 tab*2            Sig: Take 1 tablet (5 mg) by mouth 2 times daily    Last prescribing provider:     Last clinic visit date: 05/14/24 w/Megan Farrell    Recommendations for requested medication (if none, N/A): Copied from last OV note: Pulmonary Embolism. Right lower segmental PE found on routine imaging. Started on apixiban, denies any bleeding concerns.     Any other pertinent information (if none, N/A): N/A    Refilled: Y/N, if NO, why?

## 2024-06-07 DIAGNOSIS — C25.0 MALIGNANT NEOPLASM OF HEAD OF PANCREAS (H): ICD-10-CM

## 2024-06-07 DIAGNOSIS — Z51.11 ENCOUNTER FOR ANTINEOPLASTIC CHEMOTHERAPY: Primary | ICD-10-CM

## 2024-06-07 RX ORDER — ONDANSETRON 2 MG/ML
8 INJECTION INTRAMUSCULAR; INTRAVENOUS ONCE
OUTPATIENT
Start: 2024-11-05

## 2024-06-07 RX ORDER — ALBUTEROL SULFATE 90 UG/1
1-2 AEROSOL, METERED RESPIRATORY (INHALATION)
Start: 2024-10-22

## 2024-06-07 RX ORDER — ALBUTEROL SULFATE 90 UG/1
1-2 AEROSOL, METERED RESPIRATORY (INHALATION)
Status: CANCELLED
Start: 2024-08-27

## 2024-06-07 RX ORDER — ONDANSETRON 2 MG/ML
8 INJECTION INTRAMUSCULAR; INTRAVENOUS ONCE
Status: CANCELLED | OUTPATIENT
Start: 2024-09-10

## 2024-06-07 RX ORDER — EPINEPHRINE 1 MG/ML
0.3 INJECTION, SOLUTION INTRAMUSCULAR; SUBCUTANEOUS EVERY 5 MIN PRN
Status: CANCELLED | OUTPATIENT
Start: 2024-08-13

## 2024-06-07 RX ORDER — METHYLPREDNISOLONE SODIUM SUCCINATE 125 MG/2ML
125 INJECTION, POWDER, LYOPHILIZED, FOR SOLUTION INTRAMUSCULAR; INTRAVENOUS
Start: 2024-10-08

## 2024-06-07 RX ORDER — LORAZEPAM 2 MG/ML
0.5 INJECTION INTRAMUSCULAR EVERY 4 HOURS PRN
OUTPATIENT
Start: 2024-10-22

## 2024-06-07 RX ORDER — HEPARIN SODIUM,PORCINE 10 UNIT/ML
5-20 VIAL (ML) INTRAVENOUS DAILY PRN
OUTPATIENT
Start: 2024-11-05

## 2024-06-07 RX ORDER — ALBUTEROL SULFATE 90 UG/1
1-2 AEROSOL, METERED RESPIRATORY (INHALATION)
Status: CANCELLED
Start: 2024-08-13

## 2024-06-07 RX ORDER — PACLITAXEL 100 MG/20ML
125 INJECTION, POWDER, LYOPHILIZED, FOR SUSPENSION INTRAVENOUS ONCE
Status: CANCELLED | OUTPATIENT
Start: 2024-09-10

## 2024-06-07 RX ORDER — MEPERIDINE HYDROCHLORIDE 25 MG/ML
25 INJECTION INTRAMUSCULAR; INTRAVENOUS; SUBCUTANEOUS EVERY 30 MIN PRN
Status: CANCELLED | OUTPATIENT
Start: 2024-08-27

## 2024-06-07 RX ORDER — HEPARIN SODIUM,PORCINE 10 UNIT/ML
5-20 VIAL (ML) INTRAVENOUS DAILY PRN
Status: CANCELLED | OUTPATIENT
Start: 2024-09-24

## 2024-06-07 RX ORDER — METHYLPREDNISOLONE SODIUM SUCCINATE 125 MG/2ML
125 INJECTION, POWDER, LYOPHILIZED, FOR SOLUTION INTRAMUSCULAR; INTRAVENOUS
Status: CANCELLED
Start: 2024-09-24

## 2024-06-07 RX ORDER — HEPARIN SODIUM (PORCINE) LOCK FLUSH IV SOLN 100 UNIT/ML 100 UNIT/ML
5 SOLUTION INTRAVENOUS
OUTPATIENT
Start: 2024-10-22

## 2024-06-07 RX ORDER — DIPHENHYDRAMINE HYDROCHLORIDE 50 MG/ML
50 INJECTION INTRAMUSCULAR; INTRAVENOUS
Start: 2024-11-05

## 2024-06-07 RX ORDER — METHYLPREDNISOLONE SODIUM SUCCINATE 125 MG/2ML
125 INJECTION, POWDER, LYOPHILIZED, FOR SOLUTION INTRAMUSCULAR; INTRAVENOUS
Status: CANCELLED
Start: 2024-07-30

## 2024-06-07 RX ORDER — PACLITAXEL 100 MG/20ML
125 INJECTION, POWDER, LYOPHILIZED, FOR SUSPENSION INTRAVENOUS ONCE
Status: CANCELLED | OUTPATIENT
Start: 2024-08-13

## 2024-06-07 RX ORDER — HEPARIN SODIUM (PORCINE) LOCK FLUSH IV SOLN 100 UNIT/ML 100 UNIT/ML
5 SOLUTION INTRAVENOUS
OUTPATIENT
Start: 2024-10-08

## 2024-06-07 RX ORDER — ALBUTEROL SULFATE 0.83 MG/ML
2.5 SOLUTION RESPIRATORY (INHALATION)
OUTPATIENT
Start: 2024-10-08

## 2024-06-07 RX ORDER — HEPARIN SODIUM (PORCINE) LOCK FLUSH IV SOLN 100 UNIT/ML 100 UNIT/ML
5 SOLUTION INTRAVENOUS
OUTPATIENT
Start: 2024-11-05

## 2024-06-07 RX ORDER — MEPERIDINE HYDROCHLORIDE 25 MG/ML
25 INJECTION INTRAMUSCULAR; INTRAVENOUS; SUBCUTANEOUS EVERY 30 MIN PRN
Status: CANCELLED | OUTPATIENT
Start: 2024-09-24

## 2024-06-07 RX ORDER — HEPARIN SODIUM (PORCINE) LOCK FLUSH IV SOLN 100 UNIT/ML 100 UNIT/ML
5 SOLUTION INTRAVENOUS
Status: CANCELLED | OUTPATIENT
Start: 2024-08-27

## 2024-06-07 RX ORDER — EPINEPHRINE 1 MG/ML
0.3 INJECTION, SOLUTION INTRAMUSCULAR; SUBCUTANEOUS EVERY 5 MIN PRN
Status: CANCELLED | OUTPATIENT
Start: 2024-09-10

## 2024-06-07 RX ORDER — DIPHENHYDRAMINE HYDROCHLORIDE 50 MG/ML
50 INJECTION INTRAMUSCULAR; INTRAVENOUS
Start: 2024-10-08

## 2024-06-07 RX ORDER — ALBUTEROL SULFATE 90 UG/1
1-2 AEROSOL, METERED RESPIRATORY (INHALATION)
Status: CANCELLED
Start: 2024-07-30

## 2024-06-07 RX ORDER — HEPARIN SODIUM (PORCINE) LOCK FLUSH IV SOLN 100 UNIT/ML 100 UNIT/ML
5 SOLUTION INTRAVENOUS
Status: CANCELLED | OUTPATIENT
Start: 2024-09-10

## 2024-06-07 RX ORDER — MEPERIDINE HYDROCHLORIDE 25 MG/ML
25 INJECTION INTRAMUSCULAR; INTRAVENOUS; SUBCUTANEOUS EVERY 30 MIN PRN
OUTPATIENT
Start: 2024-11-05

## 2024-06-07 RX ORDER — EPINEPHRINE 1 MG/ML
0.3 INJECTION, SOLUTION INTRAMUSCULAR; SUBCUTANEOUS EVERY 5 MIN PRN
OUTPATIENT
Start: 2024-10-22

## 2024-06-07 RX ORDER — ONDANSETRON 2 MG/ML
8 INJECTION INTRAMUSCULAR; INTRAVENOUS ONCE
Status: CANCELLED | OUTPATIENT
Start: 2024-08-13

## 2024-06-07 RX ORDER — LORAZEPAM 2 MG/ML
0.5 INJECTION INTRAMUSCULAR EVERY 4 HOURS PRN
Status: CANCELLED | OUTPATIENT
Start: 2024-07-30

## 2024-06-07 RX ORDER — ALBUTEROL SULFATE 90 UG/1
1-2 AEROSOL, METERED RESPIRATORY (INHALATION)
Start: 2024-10-08

## 2024-06-07 RX ORDER — ALBUTEROL SULFATE 0.83 MG/ML
2.5 SOLUTION RESPIRATORY (INHALATION)
Status: CANCELLED | OUTPATIENT
Start: 2024-08-13

## 2024-06-07 RX ORDER — DIPHENHYDRAMINE HYDROCHLORIDE 50 MG/ML
50 INJECTION INTRAMUSCULAR; INTRAVENOUS
Status: CANCELLED
Start: 2024-08-27

## 2024-06-07 RX ORDER — ONDANSETRON 2 MG/ML
8 INJECTION INTRAMUSCULAR; INTRAVENOUS ONCE
OUTPATIENT
Start: 2024-10-22

## 2024-06-07 RX ORDER — HEPARIN SODIUM (PORCINE) LOCK FLUSH IV SOLN 100 UNIT/ML 100 UNIT/ML
5 SOLUTION INTRAVENOUS
Status: CANCELLED | OUTPATIENT
Start: 2024-08-13

## 2024-06-07 RX ORDER — ALBUTEROL SULFATE 0.83 MG/ML
2.5 SOLUTION RESPIRATORY (INHALATION)
Status: CANCELLED | OUTPATIENT
Start: 2024-07-30

## 2024-06-07 RX ORDER — MEPERIDINE HYDROCHLORIDE 25 MG/ML
25 INJECTION INTRAMUSCULAR; INTRAVENOUS; SUBCUTANEOUS EVERY 30 MIN PRN
Status: CANCELLED | OUTPATIENT
Start: 2024-08-13

## 2024-06-07 RX ORDER — EPINEPHRINE 1 MG/ML
0.3 INJECTION, SOLUTION INTRAMUSCULAR; SUBCUTANEOUS EVERY 5 MIN PRN
Status: CANCELLED | OUTPATIENT
Start: 2024-09-24

## 2024-06-07 RX ORDER — METHYLPREDNISOLONE SODIUM SUCCINATE 125 MG/2ML
125 INJECTION, POWDER, LYOPHILIZED, FOR SOLUTION INTRAMUSCULAR; INTRAVENOUS
Start: 2024-10-22

## 2024-06-07 RX ORDER — ALBUTEROL SULFATE 0.83 MG/ML
2.5 SOLUTION RESPIRATORY (INHALATION)
Status: CANCELLED | OUTPATIENT
Start: 2024-08-27

## 2024-06-07 RX ORDER — MEPERIDINE HYDROCHLORIDE 25 MG/ML
25 INJECTION INTRAMUSCULAR; INTRAVENOUS; SUBCUTANEOUS EVERY 30 MIN PRN
OUTPATIENT
Start: 2024-10-22

## 2024-06-07 RX ORDER — PACLITAXEL 100 MG/20ML
125 INJECTION, POWDER, LYOPHILIZED, FOR SUSPENSION INTRAVENOUS ONCE
Status: CANCELLED | OUTPATIENT
Start: 2024-10-22

## 2024-06-07 RX ORDER — EPINEPHRINE 1 MG/ML
0.3 INJECTION, SOLUTION INTRAMUSCULAR; SUBCUTANEOUS EVERY 5 MIN PRN
OUTPATIENT
Start: 2024-10-08

## 2024-06-07 RX ORDER — PACLITAXEL 100 MG/20ML
125 INJECTION, POWDER, LYOPHILIZED, FOR SUSPENSION INTRAVENOUS ONCE
Status: CANCELLED | OUTPATIENT
Start: 2024-08-27

## 2024-06-07 RX ORDER — PACLITAXEL 100 MG/20ML
125 INJECTION, POWDER, LYOPHILIZED, FOR SUSPENSION INTRAVENOUS ONCE
Status: CANCELLED | OUTPATIENT
Start: 2024-09-24

## 2024-06-07 RX ORDER — EPINEPHRINE 1 MG/ML
0.3 INJECTION, SOLUTION INTRAMUSCULAR; SUBCUTANEOUS EVERY 5 MIN PRN
Status: CANCELLED | OUTPATIENT
Start: 2024-08-27

## 2024-06-07 RX ORDER — ALBUTEROL SULFATE 0.83 MG/ML
2.5 SOLUTION RESPIRATORY (INHALATION)
Status: CANCELLED | OUTPATIENT
Start: 2024-09-24

## 2024-06-07 RX ORDER — HEPARIN SODIUM (PORCINE) LOCK FLUSH IV SOLN 100 UNIT/ML 100 UNIT/ML
5 SOLUTION INTRAVENOUS
Status: CANCELLED | OUTPATIENT
Start: 2024-09-24

## 2024-06-07 RX ORDER — ONDANSETRON 2 MG/ML
8 INJECTION INTRAMUSCULAR; INTRAVENOUS ONCE
Status: CANCELLED | OUTPATIENT
Start: 2024-08-27

## 2024-06-07 RX ORDER — LORAZEPAM 2 MG/ML
0.5 INJECTION INTRAMUSCULAR EVERY 4 HOURS PRN
Status: CANCELLED | OUTPATIENT
Start: 2024-09-10

## 2024-06-07 RX ORDER — LORAZEPAM 2 MG/ML
0.5 INJECTION INTRAMUSCULAR EVERY 4 HOURS PRN
OUTPATIENT
Start: 2024-10-08

## 2024-06-07 RX ORDER — ALBUTEROL SULFATE 90 UG/1
1-2 AEROSOL, METERED RESPIRATORY (INHALATION)
Start: 2024-11-05

## 2024-06-07 RX ORDER — ALBUTEROL SULFATE 0.83 MG/ML
2.5 SOLUTION RESPIRATORY (INHALATION)
OUTPATIENT
Start: 2024-11-05

## 2024-06-07 RX ORDER — DIPHENHYDRAMINE HYDROCHLORIDE 50 MG/ML
50 INJECTION INTRAMUSCULAR; INTRAVENOUS
Status: CANCELLED
Start: 2024-09-24

## 2024-06-07 RX ORDER — METHYLPREDNISOLONE SODIUM SUCCINATE 125 MG/2ML
125 INJECTION, POWDER, LYOPHILIZED, FOR SOLUTION INTRAMUSCULAR; INTRAVENOUS
Status: CANCELLED
Start: 2024-08-27

## 2024-06-07 RX ORDER — EPINEPHRINE 1 MG/ML
0.3 INJECTION, SOLUTION INTRAMUSCULAR; SUBCUTANEOUS EVERY 5 MIN PRN
OUTPATIENT
Start: 2024-11-05

## 2024-06-07 RX ORDER — HEPARIN SODIUM,PORCINE 10 UNIT/ML
5-20 VIAL (ML) INTRAVENOUS DAILY PRN
Status: CANCELLED | OUTPATIENT
Start: 2024-07-30

## 2024-06-07 RX ORDER — MEPERIDINE HYDROCHLORIDE 25 MG/ML
25 INJECTION INTRAMUSCULAR; INTRAVENOUS; SUBCUTANEOUS EVERY 30 MIN PRN
OUTPATIENT
Start: 2024-10-08

## 2024-06-07 RX ORDER — HEPARIN SODIUM,PORCINE 10 UNIT/ML
5-20 VIAL (ML) INTRAVENOUS DAILY PRN
OUTPATIENT
Start: 2024-10-08

## 2024-06-07 RX ORDER — MEPERIDINE HYDROCHLORIDE 25 MG/ML
25 INJECTION INTRAMUSCULAR; INTRAVENOUS; SUBCUTANEOUS EVERY 30 MIN PRN
Status: CANCELLED | OUTPATIENT
Start: 2024-09-10

## 2024-06-07 RX ORDER — PACLITAXEL 100 MG/20ML
125 INJECTION, POWDER, LYOPHILIZED, FOR SUSPENSION INTRAVENOUS ONCE
Status: CANCELLED | OUTPATIENT
Start: 2024-07-30

## 2024-06-07 RX ORDER — ALBUTEROL SULFATE 90 UG/1
1-2 AEROSOL, METERED RESPIRATORY (INHALATION)
Status: CANCELLED
Start: 2024-09-24

## 2024-06-07 RX ORDER — METHYLPREDNISOLONE SODIUM SUCCINATE 125 MG/2ML
125 INJECTION, POWDER, LYOPHILIZED, FOR SOLUTION INTRAMUSCULAR; INTRAVENOUS
Status: CANCELLED
Start: 2024-08-13

## 2024-06-07 RX ORDER — ALBUTEROL SULFATE 0.83 MG/ML
2.5 SOLUTION RESPIRATORY (INHALATION)
Status: CANCELLED | OUTPATIENT
Start: 2024-09-10

## 2024-06-07 RX ORDER — MEPERIDINE HYDROCHLORIDE 25 MG/ML
25 INJECTION INTRAMUSCULAR; INTRAVENOUS; SUBCUTANEOUS EVERY 30 MIN PRN
Status: CANCELLED | OUTPATIENT
Start: 2024-07-30

## 2024-06-07 RX ORDER — DIPHENHYDRAMINE HYDROCHLORIDE 50 MG/ML
50 INJECTION INTRAMUSCULAR; INTRAVENOUS
Status: CANCELLED
Start: 2024-09-10

## 2024-06-07 RX ORDER — ALBUTEROL SULFATE 0.83 MG/ML
2.5 SOLUTION RESPIRATORY (INHALATION)
OUTPATIENT
Start: 2024-10-22

## 2024-06-07 RX ORDER — DIPHENHYDRAMINE HYDROCHLORIDE 50 MG/ML
50 INJECTION INTRAMUSCULAR; INTRAVENOUS
Status: CANCELLED
Start: 2024-08-13

## 2024-06-07 RX ORDER — LORAZEPAM 2 MG/ML
0.5 INJECTION INTRAMUSCULAR EVERY 4 HOURS PRN
OUTPATIENT
Start: 2024-11-05

## 2024-06-07 RX ORDER — EPINEPHRINE 1 MG/ML
0.3 INJECTION, SOLUTION INTRAMUSCULAR; SUBCUTANEOUS EVERY 5 MIN PRN
Status: CANCELLED | OUTPATIENT
Start: 2024-07-30

## 2024-06-07 RX ORDER — ONDANSETRON 2 MG/ML
8 INJECTION INTRAMUSCULAR; INTRAVENOUS ONCE
OUTPATIENT
Start: 2024-10-08

## 2024-06-07 RX ORDER — ONDANSETRON 2 MG/ML
8 INJECTION INTRAMUSCULAR; INTRAVENOUS ONCE
Status: CANCELLED | OUTPATIENT
Start: 2024-07-30

## 2024-06-07 RX ORDER — PACLITAXEL 100 MG/20ML
125 INJECTION, POWDER, LYOPHILIZED, FOR SUSPENSION INTRAVENOUS ONCE
Status: CANCELLED | OUTPATIENT
Start: 2024-10-08

## 2024-06-07 RX ORDER — DIPHENHYDRAMINE HYDROCHLORIDE 50 MG/ML
50 INJECTION INTRAMUSCULAR; INTRAVENOUS
Start: 2024-10-22

## 2024-06-07 RX ORDER — LORAZEPAM 2 MG/ML
0.5 INJECTION INTRAMUSCULAR EVERY 4 HOURS PRN
Status: CANCELLED | OUTPATIENT
Start: 2024-09-24

## 2024-06-07 RX ORDER — ONDANSETRON 2 MG/ML
8 INJECTION INTRAMUSCULAR; INTRAVENOUS ONCE
Status: CANCELLED | OUTPATIENT
Start: 2024-09-24

## 2024-06-07 RX ORDER — HEPARIN SODIUM (PORCINE) LOCK FLUSH IV SOLN 100 UNIT/ML 100 UNIT/ML
5 SOLUTION INTRAVENOUS
Status: CANCELLED | OUTPATIENT
Start: 2024-07-30

## 2024-06-07 RX ORDER — HEPARIN SODIUM,PORCINE 10 UNIT/ML
5-20 VIAL (ML) INTRAVENOUS DAILY PRN
Status: CANCELLED | OUTPATIENT
Start: 2024-08-13

## 2024-06-07 RX ORDER — ALBUTEROL SULFATE 90 UG/1
1-2 AEROSOL, METERED RESPIRATORY (INHALATION)
Status: CANCELLED
Start: 2024-09-10

## 2024-06-07 RX ORDER — LORAZEPAM 2 MG/ML
0.5 INJECTION INTRAMUSCULAR EVERY 4 HOURS PRN
Status: CANCELLED | OUTPATIENT
Start: 2024-08-13

## 2024-06-07 RX ORDER — HEPARIN SODIUM,PORCINE 10 UNIT/ML
5-20 VIAL (ML) INTRAVENOUS DAILY PRN
Status: CANCELLED | OUTPATIENT
Start: 2024-08-27

## 2024-06-07 RX ORDER — METHYLPREDNISOLONE SODIUM SUCCINATE 125 MG/2ML
125 INJECTION, POWDER, LYOPHILIZED, FOR SOLUTION INTRAMUSCULAR; INTRAVENOUS
Status: CANCELLED
Start: 2024-09-10

## 2024-06-07 RX ORDER — DIPHENHYDRAMINE HYDROCHLORIDE 50 MG/ML
50 INJECTION INTRAMUSCULAR; INTRAVENOUS
Status: CANCELLED
Start: 2024-07-30

## 2024-06-07 RX ORDER — METHYLPREDNISOLONE SODIUM SUCCINATE 125 MG/2ML
125 INJECTION, POWDER, LYOPHILIZED, FOR SOLUTION INTRAMUSCULAR; INTRAVENOUS
Start: 2024-11-05

## 2024-06-07 RX ORDER — HEPARIN SODIUM,PORCINE 10 UNIT/ML
5-20 VIAL (ML) INTRAVENOUS DAILY PRN
Status: CANCELLED | OUTPATIENT
Start: 2024-09-10

## 2024-06-07 RX ORDER — LORAZEPAM 2 MG/ML
0.5 INJECTION INTRAMUSCULAR EVERY 4 HOURS PRN
Status: CANCELLED | OUTPATIENT
Start: 2024-08-27

## 2024-06-07 RX ORDER — HEPARIN SODIUM,PORCINE 10 UNIT/ML
5-20 VIAL (ML) INTRAVENOUS DAILY PRN
OUTPATIENT
Start: 2024-10-22

## 2024-06-07 RX ORDER — PACLITAXEL 100 MG/20ML
125 INJECTION, POWDER, LYOPHILIZED, FOR SUSPENSION INTRAVENOUS ONCE
Status: CANCELLED | OUTPATIENT
Start: 2024-11-05

## 2024-06-12 ENCOUNTER — MYC MEDICAL ADVICE (OUTPATIENT)
Dept: FAMILY MEDICINE | Facility: CLINIC | Age: 59
End: 2024-06-12

## 2024-06-25 ENCOUNTER — LAB (OUTPATIENT)
Dept: LAB | Facility: CLINIC | Age: 59
End: 2024-06-25
Attending: STUDENT IN AN ORGANIZED HEALTH CARE EDUCATION/TRAINING PROGRAM
Payer: COMMERCIAL

## 2024-06-25 ENCOUNTER — INFUSION THERAPY VISIT (OUTPATIENT)
Dept: ONCOLOGY | Facility: CLINIC | Age: 59
End: 2024-06-25
Attending: NURSE PRACTITIONER
Payer: COMMERCIAL

## 2024-06-25 ENCOUNTER — ANCILLARY PROCEDURE (OUTPATIENT)
Dept: GENERAL RADIOLOGY | Facility: CLINIC | Age: 59
End: 2024-06-25
Payer: COMMERCIAL

## 2024-06-25 VITALS
SYSTOLIC BLOOD PRESSURE: 112 MMHG | WEIGHT: 159.2 LBS | OXYGEN SATURATION: 95 % | HEART RATE: 94 BPM | BODY MASS INDEX: 21.59 KG/M2 | DIASTOLIC BLOOD PRESSURE: 73 MMHG | RESPIRATION RATE: 16 BRPM | TEMPERATURE: 99.1 F

## 2024-06-25 DIAGNOSIS — R05.1 ACUTE COUGH: ICD-10-CM

## 2024-06-25 DIAGNOSIS — Z51.11 ENCOUNTER FOR ANTINEOPLASTIC CHEMOTHERAPY: Primary | ICD-10-CM

## 2024-06-25 DIAGNOSIS — R50.9 LOW GRADE FEVER: ICD-10-CM

## 2024-06-25 DIAGNOSIS — C25.0 MALIGNANT NEOPLASM OF HEAD OF PANCREAS (H): ICD-10-CM

## 2024-06-25 DIAGNOSIS — C25.0 MALIGNANT NEOPLASM OF HEAD OF PANCREAS (H): Primary | ICD-10-CM

## 2024-06-25 LAB
ALBUMIN SERPL BCG-MCNC: 4.2 G/DL (ref 3.5–5.2)
ALP SERPL-CCNC: 179 U/L (ref 40–150)
ALT SERPL W P-5'-P-CCNC: 38 U/L (ref 0–70)
ANION GAP SERPL CALCULATED.3IONS-SCNC: 11 MMOL/L (ref 7–15)
AST SERPL W P-5'-P-CCNC: 36 U/L (ref 0–45)
BASOPHILS # BLD AUTO: 0 10E3/UL (ref 0–0.2)
BASOPHILS NFR BLD AUTO: 0 %
BILIRUB SERPL-MCNC: 0.5 MG/DL
BUN SERPL-MCNC: 16.6 MG/DL (ref 6–20)
C PNEUM DNA SPEC QL NAA+PROBE: NOT DETECTED
CALCIUM SERPL-MCNC: 9.1 MG/DL (ref 8.6–10)
CHLORIDE SERPL-SCNC: 97 MMOL/L (ref 98–107)
CREAT SERPL-MCNC: 0.75 MG/DL (ref 0.67–1.17)
DEPRECATED HCO3 PLAS-SCNC: 27 MMOL/L (ref 22–29)
EGFRCR SERPLBLD CKD-EPI 2021: >90 ML/MIN/1.73M2
EOSINOPHIL # BLD AUTO: 0 10E3/UL (ref 0–0.7)
EOSINOPHIL NFR BLD AUTO: 0 %
ERYTHROCYTE [DISTWIDTH] IN BLOOD BY AUTOMATED COUNT: 15.7 % (ref 10–15)
FLUAV H1 2009 PAND RNA SPEC QL NAA+PROBE: NOT DETECTED
FLUAV H1 RNA SPEC QL NAA+PROBE: NOT DETECTED
FLUAV H3 RNA SPEC QL NAA+PROBE: NOT DETECTED
FLUAV RNA SPEC QL NAA+PROBE: NOT DETECTED
FLUBV RNA SPEC QL NAA+PROBE: NOT DETECTED
GLUCOSE SERPL-MCNC: 139 MG/DL (ref 70–99)
HADV DNA SPEC QL NAA+PROBE: NOT DETECTED
HCOV PNL SPEC NAA+PROBE: NOT DETECTED
HCT VFR BLD AUTO: 34.1 % (ref 40–53)
HGB BLD-MCNC: 10.9 G/DL (ref 13.3–17.7)
HMPV RNA SPEC QL NAA+PROBE: NOT DETECTED
HPIV1 RNA SPEC QL NAA+PROBE: NOT DETECTED
HPIV2 RNA SPEC QL NAA+PROBE: NOT DETECTED
HPIV3 RNA SPEC QL NAA+PROBE: NOT DETECTED
HPIV4 RNA SPEC QL NAA+PROBE: NOT DETECTED
IMM GRANULOCYTES # BLD: 0.1 10E3/UL
IMM GRANULOCYTES NFR BLD: 0 %
LYMPHOCYTES # BLD AUTO: 1.2 10E3/UL (ref 0.8–5.3)
LYMPHOCYTES NFR BLD AUTO: 10 %
M PNEUMO DNA SPEC QL NAA+PROBE: NOT DETECTED
MCH RBC QN AUTO: 28.5 PG (ref 26.5–33)
MCHC RBC AUTO-ENTMCNC: 32 G/DL (ref 31.5–36.5)
MCV RBC AUTO: 89 FL (ref 78–100)
MONOCYTES # BLD AUTO: 1.2 10E3/UL (ref 0–1.3)
MONOCYTES NFR BLD AUTO: 10 %
NEUTROPHILS # BLD AUTO: 9.4 10E3/UL (ref 1.6–8.3)
NEUTROPHILS NFR BLD AUTO: 80 %
NRBC # BLD AUTO: 0 10E3/UL
NRBC BLD AUTO-RTO: 0 /100
PLATELET # BLD AUTO: 222 10E3/UL (ref 150–450)
POTASSIUM SERPL-SCNC: 4.2 MMOL/L (ref 3.4–5.3)
PROT SERPL-MCNC: 7.7 G/DL (ref 6.4–8.3)
RBC # BLD AUTO: 3.82 10E6/UL (ref 4.4–5.9)
RSV RNA SPEC QL NAA+PROBE: NOT DETECTED
RSV RNA SPEC QL NAA+PROBE: NOT DETECTED
RV+EV RNA SPEC QL NAA+PROBE: NOT DETECTED
SODIUM SERPL-SCNC: 135 MMOL/L (ref 135–145)
WBC # BLD AUTO: 11.9 10E3/UL (ref 4–11)

## 2024-06-25 PROCEDURE — 250N000011 HC RX IP 250 OP 636: Mod: JZ | Performed by: STUDENT IN AN ORGANIZED HEALTH CARE EDUCATION/TRAINING PROGRAM

## 2024-06-25 PROCEDURE — 87633 RESP VIRUS 12-25 TARGETS: CPT

## 2024-06-25 PROCEDURE — 99213 OFFICE O/P EST LOW 20 MIN: CPT

## 2024-06-25 PROCEDURE — 80053 COMPREHEN METABOLIC PANEL: CPT | Performed by: STUDENT IN AN ORGANIZED HEALTH CARE EDUCATION/TRAINING PROGRAM

## 2024-06-25 PROCEDURE — 86301 IMMUNOASSAY TUMOR CA 19-9: CPT

## 2024-06-25 PROCEDURE — 99215 OFFICE O/P EST HI 40 MIN: CPT

## 2024-06-25 PROCEDURE — G2211 COMPLEX E/M VISIT ADD ON: HCPCS

## 2024-06-25 PROCEDURE — 71046 X-RAY EXAM CHEST 2 VIEWS: CPT | Performed by: RADIOLOGY

## 2024-06-25 PROCEDURE — 36415 COLL VENOUS BLD VENIPUNCTURE: CPT

## 2024-06-25 PROCEDURE — 87635 SARS-COV-2 COVID-19 AMP PRB: CPT

## 2024-06-25 PROCEDURE — 87040 BLOOD CULTURE FOR BACTERIA: CPT | Mod: XS

## 2024-06-25 PROCEDURE — 85049 AUTOMATED PLATELET COUNT: CPT | Performed by: STUDENT IN AN ORGANIZED HEALTH CARE EDUCATION/TRAINING PROGRAM

## 2024-06-25 PROCEDURE — 36591 DRAW BLOOD OFF VENOUS DEVICE: CPT

## 2024-06-25 RX ORDER — ONDANSETRON 2 MG/ML
8 INJECTION INTRAMUSCULAR; INTRAVENOUS ONCE
Status: DISCONTINUED | OUTPATIENT
Start: 2024-06-25 | End: 2024-06-25

## 2024-06-25 RX ORDER — HEPARIN SODIUM (PORCINE) LOCK FLUSH IV SOLN 100 UNIT/ML 100 UNIT/ML
5 SOLUTION INTRAVENOUS ONCE
Status: COMPLETED | OUTPATIENT
Start: 2024-06-25 | End: 2024-06-25

## 2024-06-25 RX ORDER — HEPARIN SODIUM,PORCINE 10 UNIT/ML
5-20 VIAL (ML) INTRAVENOUS DAILY PRN
Status: DISCONTINUED | OUTPATIENT
Start: 2024-06-25 | End: 2024-06-25

## 2024-06-25 RX ORDER — HEPARIN SODIUM (PORCINE) LOCK FLUSH IV SOLN 100 UNIT/ML 100 UNIT/ML
5 SOLUTION INTRAVENOUS
Status: DISCONTINUED | OUTPATIENT
Start: 2024-06-25 | End: 2024-06-25

## 2024-06-25 RX ORDER — PACLITAXEL 100 MG/20ML
125 INJECTION, POWDER, LYOPHILIZED, FOR SUSPENSION INTRAVENOUS ONCE
Status: DISCONTINUED | OUTPATIENT
Start: 2024-06-25 | End: 2024-06-25

## 2024-06-25 RX ADMIN — Medication 5 ML: at 15:21

## 2024-06-25 ASSESSMENT — PAIN SCALES - GENERAL: PAINLEVEL: NO PAIN (0)

## 2024-06-25 NOTE — Clinical Note
6/25/2024      Gallo Navarro  [address] [address] [place] [id number]      Dear Colleague,    Thank you for referring your patient, Gallo Navarro, to the [facility]. Please see a copy of my visit note below.        Oncology/Hematology Visit Note  Jun 25, 2024    Reason for Visit: follow up of locally advanced, unresectable pancreatic cancer    History of Present Illness: Gallo Navarro is a 58 year old male with locally advanced, unresectable pancreatic cancer. 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 He then presented to the ED with worsening abdominal pain, increased jaundice, poor PO intake and weight loss admitted on 7/8/2023 for concern of biliary obstruction. Unfortunately, during this admission, biopsy of pancreatic mass returned positive for pancreatic adenocarcinoma on 7/12/23. He started on treatment with 5FU, irinotecan, and oxaliplatin (FOLFIRINOX) on 7/31/23. Please see previous notes for further details on the patient's history.     8/17/23 - ERCP/EGD, duodenal stents x2 placed, exchange of GJ tube    8/21-/8/25 hospitalized due to diarrhea, colitis, abdominal pain.      8/29 - Cycle 3 deferred due to neutropenia.   Neulasta added. Irinotecan dose reduced 20% due to symptoms including cramping, double vision and slurred speech during infusion.      10/24/23 CT CAP with similar to slight increase in size of peripancreatic and mesenteric  lymph nodes, enlargement of previous skeletal metastasis as well as new sclerotic metastasis to left posterior 5th rib, enlarging pulmonary nodule, and unchanged pancreatic head mass. Also unclear concerns for PE,  right lower lobe segmental PE confirmed on CT-PE. Started on apixaban.     10/31/23 Cycle 1 gemzar,  abraxane  11/7/23 Cycle 1 Zometa  11/22/23 Removal of GJ tube.   11/29/23 Completed palliative radiation to T10   12/13/23 C3D1 gem/abraxane    12/29/23 Consults at [facility]    1/23-1/26 Consults at [facility]     1/30/24: C4D1 gem/abraxane     3/24: CT CAP with overall stable disease with isolated progression in potential L4 lesion. Referral for radiation oncology.      4/29-5/1: Consults at [facility] for possible cellular therapy, CT guided biopsy of left pubic bone, recommendation to return to [facility] upon progression of current treatment     Gallo presents today for follow up prior to 6 day 15 gemcitabine, Abraxane.      Interval History:     Having issues regulating temperature, gets chilled in the AC and then spikes a temp. Then resolves by next morning. Cough started yesterday. Just phlegm. Took an allergy med today.     Eating well, no GI concerns      ***   -pericardial effusion was noted on imaging at [facility]. Follow up ECHO with confirmed effusion and normal ejection fraction. He continues to be asymptomatic. Denies any shortness of breath, cough, chest pain or difficulty breathing. No peripheral edema or ascites.   -holding off on radiation at this time   -eating well and drinking protein shakes. Still trying to increase calories, lost a few pounds which he attributes to increased activity levels  -fatigue is stable, sleeping ~12-14 hours combined of nighttime and naps  -neuropathy is stable, bottoms of feet are completely numb and mild numbness in fingertips  -expresses frustration with the events of his visit to [facility], but overall wants to continue to pursue any options available for treatment and continue to aggressively fight his cancer.       Review of Systems:  Patient denies any of the following except if noted above: fevers, chills, difficulty with energy, vision or hearing changes, chest pain, dyspnea, abdominal pain, nausea, vomiting, diarrhea, constipation, urinary concerns,  headaches, numbness, tingling, issues with sleep or mood. Also denies lumps, bumps, rashes or skin lesions, bleeding or bruising issues.        Current Outpatient Medications   Medication Sig Dispense Refill    acetaminophen (TYLENOL) 32 mg/mL liquid Take 20.313 mLs (650 mg) by mouth every 8 hours 1800 mL 1    apixaban ANTICOAGULANT (ELIQUIS) 5 MG tablet Take 1 tablet (5 mg) by mouth 2 times daily 60 tablet 2    blood glucose (FREESTYLE TEST STRIPS) test strip by Other route as needed      buprenorphine (BUTRANS) 10 MCG/HR WK patch Place 1 patch onto the skin every 7 days Use with 20 mcg/hour patch for 30 mcg/hour total. 4 patch 3    buprenorphine (BUTRANS) 20 MCG/HR WK patch Place 1 patch onto the skin once a week Use with 10 mcg/hour patch for 30 mcg/hour total. 4 patch 3    dicyclomine (BENTYL) 10 MG capsule Take 1 capsule (10 mg) by mouth 4 times daily (before meals and nightly) 120 capsule 1    HYDROmorphone (DILAUDID) 2 MG tablet Take 1-2 tablets (2-4 mg) by mouth every 4 hours as needed for severe pain or breakthrough pain 180 tablet 0    lidocaine-prilocaine (EMLA) 2.5-2.5 % external cream Use 1-2 times a week or as needed prior to port access (Patient not taking: Reported on 5/8/2024) 30 g 3    lipase-protease-amylase (CREON 24) 91129-78747-272455 units CPEP per EC capsule 2-3 capsules with meals 3 times a day and 1-2 capsules with snacks max of 15 capsules a day 200 capsule 11    methylphenidate (RITALIN) 5 MG tablet Take 1 tablet (5 mg) by mouth 2 times daily as needed (Patient not taking: Reported on 3/12/2024) 10 tablet 0    Multiple Vitamins-Minerals (CENTRUM SILVER 50+MEN) TABS as directed Orally      pantoprazole (PROTONIX) 40 MG EC tablet Take 1 tablet (40 mg) by mouth 2 times daily 60 tablet 4    prochlorperazine (COMPAZINE) 10 MG tablet Take 1 tablet (10 mg) by mouth every 6 hours as needed for nausea or vomiting 30 tablet 2    vitamin D3 (CHOLECALCIFEROL) 50 mcg (2000 units) tablet Take 1 tablet  (50 mcg) by mouth daily 90 tablet 1       Physical Exam:  General: The patient is a pleasant male in no acute distress.  There were no vitals taken for this visit.  Wt Readings from Last 10 Encounters:   05/28/24 75 kg (165 lb 4.8 oz)   05/23/24 72.6 kg (160 lb)   05/14/24 72.8 kg (160 lb 8 oz)   04/16/24 75.2 kg (165 lb 12.8 oz)   04/02/24 73.1 kg (161 lb 1.6 oz)   03/15/24 73.5 kg (162 lb)   03/12/24 73.6 kg (162 lb 3.2 oz)   03/05/24 73.4 kg (161 lb 14.4 oz)   03/04/24 73 kg (160 lb 14.4 oz)   02/27/24 74 kg (163 lb 3.2 oz)   HEENT: EOMI. Sclerae are anicteric. Oral mucosa pink and moist without lesions or thrush.   Lymph: Neck is supple with no lymphadenopathy in the cervical or supraclavicular areas.   Heart: Regular rate and rhythm.   Lungs: Clear to auscultation bilaterally, normal work of breathing  Abdomen: Bowel sounds present, soft, nontender with no palpable hepatosplenomegaly or masses.   Extremities: No lower extremity edema noted bilaterally.   Neuro: Cranial nerves II through XII are grossly intact.  Skin: No rashes, petechiae, or bruising noted on exposed skin.      Laboratory Data:  Most Recent 3 CBC's:  Recent Labs   Lab Test 05/28/24  0842 05/14/24  0852 04/16/24  1238   WBC 4.3 7.1 6.1   HGB 8.9* 8.6* 10.3*   MCV 93 92 95    304 269   ANEUTAUTO 2.7 5.1 3.8    Most Recent 3 BMP's:  Recent Labs   Lab Test 05/14/24  0852 04/02/24  0817 03/05/24  0830   * 138 140   POTASSIUM 4.3 4.3 4.1   CHLORIDE 99 101 104   CO2 25 28 26   BUN 19.6 16.9 19.4   CR 0.63* 0.70 0.68   ANIONGAP 10 9 10   DENISE 8.9 9.3 9.1   * 189* 207*   PROTTOTAL 7.6 7.3 6.7   ALBUMIN 3.7 3.9 4.0    Most Recent 2 LFT's:  Recent Labs   Lab Test 05/14/24  0852 04/02/24  0817   AST 17 21   ALT 12 19   ALKPHOS 103 99   BILITOTAL 0.3 0.2   I reviewed the above labs today.      Imaging:   CT Chest/Abdomen/Pelvis w Contrast - [facility] 4/30/24  Order: [id number]  Impression    1.  New pericardial effusion with pericardial  enhancement suggestive of acute/subacute pericarditis in the appropriate clinical setting.  2.  Stable appearance of locally advanced pancreatic head and uncinate process tumor with infiltration of the root of the mesentery and duodenum.  3.  Stable small left pleural effusion with atelectasis. No evidence of intrathoracic metastases.  4.  Some of the mesenteric lymph nodes appear to be decreasing in size.  5.  Sclerotic osseous lesions as described above are most likely treated/partially treated metastases.      Echocardiographic Report - [facility] 5/1/24    Interpretation Summary   A two-dimensional transthoracic echocardiogram was performed. There is no comparison study available.     Normal left ventricular size and systolic function.   LV ejection fraction calculated using the bi-plane method of disks is 63 %.   The right ventricle is normal in size and function.   Right ventricular systolic pressure is normal.   Moderate pericardial effusion with no echo evidence of Tamponade.     I reviewed the above imaging reports today.         Assessment and Plan:  Locally advanced, unresectable pancreatic cancer. Patient started on treatment with palliative FOLFIRINOX on 7/31/23. CT CAP 10/24/23 with progression in skeletal mets, lung nodule and lymph nodes. Stable pancreatic mass. Changed treatment to gemcitabine, Abraxane (day 1, 8, 15) and Zometa.  CT CAP 3/2024 with isolated progression of potential L4 lesion. Plan to continue gemcitabine/Abraxane day 1 & 15 as a bridge to clinical trial at [facility] for cellular therapy. Had consult at [facility], recommended continuing current treatment and return upon disease progression.   -Continues to tolerate treatment well, neuropathy is stable. Labs today reivewed, proceed with gemcitabine, Abraxane as scheduled.   -Return to clinic in 2 weeks for next dose. Patient cancelled chemo on 6/11 due to family travel plans, will return as scheduled 6/18 with labs and SUZETTE  follow up prior to chemo.     Pericardial effusion   Noted on CT scan completed 4/30/24 at [facility]. Cardiology follow up and ECHO also completed. Plan to repeat ECHO at [facility] at future appointments. Continues to be asymptomatic. ***     Will request urgent cardio-oncology consult within our system. Due to lack of symptoms, will continue with gemcitabine as scheduled, discussed red flag warnings/symptoms warranting immediate presentation to the ED including shortness of breath, chest pain or new significant edema.        Skeletal mets.    -Worsening skeletal mets noted on CT 10/24. Sclerosis noted on recent imaging.   -Radiation to T10 completed 11/29/23.  -Plan was to start radiation therapy to L4 lesion once he returns from [facility], however, holding off on radiation due to lack of symptoms currently.    -Continues on Zometa every 3 months. Last dose given 2/6/24, will give again today with infusion.         Pulmonary Embolism. Right lower segmental PE found on routine imaging. Started on apixiban, denies any bleeding concerns.     Nutrition.  Feeding tube removed. Continues to tolerate oral intake well. No issues with nausea or vomiting. Continue to push oral hydration, drinking 64+ oz of fluid/day.  Continue Protonix daily.  Has compazine as needed for nausea.     Lower extremity edema. Resolved. Continue compression stockings and elevation PRN. Continue to monitor.     Neuropathy: Grade 1. Mostly from prior therapies but slightly increased on Abraxane lately. Previously referred to cancer rehab, plans to start therapy once he returns from [facility].     Fatigue: Ritalin 5 mg BID PRN. Discussed gentle exercise, cancer rehab.     Diarrhea/constipation. Resolved. Bowel movements more regular with increased food intake. Continue Metamucil as needed. Monitor for recurrent blood in stool.     Abdominal pain. Managed by palliative care with Butrans patch and oral Dilaudid. No pain recently. Has been  taking less pain medication.     Anemia. Normocytic. Suspect anemia of chronic disease + secondary to myelosuppression from chemo. Hemoglobin stable, continue to monitor.       The longitudinal plan of care for the diagnosis(es)/condition(s) as documented were addressed during this visit. Due to the added complexity in care, I will continue to support Gallo in the subsequent management and with ongoing continuity of care.      *** minutes spent on the date of the encounter doing chart review, review of test results, interpretation of tests, patient visit, and documentation     ESVIN Canales CNP          Oncology/Hematology Visit Note  Jun 25, 2024    Reason for Visit: follow up of locally advanced, unresectable pancreatic cancer    History of Present Illness: Gallo Navarro is a 58 year old male with locally advanced, unresectable pancreatic cancer. 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 He then presented to the ED with worsening abdominal pain, increased jaundice, poor PO intake and weight loss admitted on 7/8/2023 for concern of biliary obstruction. Unfortunately, during this admission, biopsy of pancreatic mass returned positive for pancreatic adenocarcinoma on 7/12/23. He started on treatment with 5FU, irinotecan, and oxaliplatin (FOLFIRINOX) on 7/31/23. Please see previous notes for further details on the patient's history.     8/17/23 - ERCP/EGD, duodenal stents x2 placed, exchange of GJ tube    8/21-/8/25 hospitalized due to diarrhea, colitis, abdominal pain.      8/29 - Cycle 3 deferred due to neutropenia.   Neulasta added. Irinotecan dose reduced 20% due to symptoms including cramping, double vision and slurred speech during infusion.      10/24/23 CT CAP with similar to slight  increase in size of peripancreatic and mesenteric  lymph nodes, enlargement of previous skeletal metastasis as well as new sclerotic metastasis to left posterior 5th rib, enlarging pulmonary nodule, and unchanged pancreatic head mass. Also unclear concerns for PE,  right lower lobe segmental PE confirmed on CT-PE. Started on apixaban.     10/31/23 Cycle 1 gemzar, abraxane  11/7/23 Cycle 1 Zometa  11/22/23 Removal of GJ tube.   11/29/23 Completed palliative radiation to T10   12/13/23 C3D1 gem/abraxane    12/29/23 Consults at [facility]    1/23-1/26 Consults at [facility]     1/30/24: C4D1 gem/abraxane     3/24: CT CAP with overall stable disease with isolated progression in potential L4 lesion. Referral for radiation oncology.      4/29-5/1: Consults at [facility] for possible cellular therapy, CT guided biopsy of left pubic bone, recommendation to return to [facility] upon progression of current treatment     *** Cardiology consult at [facility].   *** ECHO with EF of ***, no pleural effusion     Gallo presents today for follow up prior to 8 gemcitabine, Abraxane.      Interval History:     Having issues regulating temperature, gets chilled in the AC and then spikes a temp. Then resolves by next morning. Cough started yesterday. Just phlegm. Took an allergy med today.     Eating well, no GI concerns      ***   -pericardial effusion was noted on imaging at [facility]. Follow up ECHO with confirmed effusion and normal ejection fraction. He continues to be asymptomatic. Denies any shortness of breath, cough, chest pain or difficulty breathing. No peripheral edema or ascites.   -holding off on radiation at this time   -eating well and drinking protein shakes. Still trying to increase calories, lost a few pounds which he attributes to increased activity levels  -fatigue is stable, sleeping ~12-14 hours combined of nighttime and naps  -neuropathy is stable, bottoms of feet are completely numb and mild numbness in  fingertips  -expresses frustration with the events of his visit to [facility], but overall wants to continue to pursue any options available for treatment and continue to aggressively fight his cancer.       Review of Systems:  Patient denies any of the following except if noted above: fevers, chills, difficulty with energy, vision or hearing changes, chest pain, dyspnea, abdominal pain, nausea, vomiting, diarrhea, constipation, urinary concerns, headaches, numbness, tingling, issues with sleep or mood. Also denies lumps, bumps, rashes or skin lesions, bleeding or bruising issues.        Current Outpatient Medications   Medication Sig Dispense Refill     acetaminophen (TYLENOL) 32 mg/mL liquid Take 20.313 mLs (650 mg) by mouth every 8 hours 1800 mL 1     apixaban ANTICOAGULANT (ELIQUIS) 5 MG tablet Take 1 tablet (5 mg) by mouth 2 times daily 60 tablet 2     blood glucose (FREESTYLE TEST STRIPS) test strip by Other route as needed       buprenorphine (BUTRANS) 10 MCG/HR WK patch Place 1 patch onto the skin every 7 days Use with 20 mcg/hour patch for 30 mcg/hour total. 4 patch 3     buprenorphine (BUTRANS) 20 MCG/HR WK patch Place 1 patch onto the skin once a week Use with 10 mcg/hour patch for 30 mcg/hour total. 4 patch 3     dicyclomine (BENTYL) 10 MG capsule Take 1 capsule (10 mg) by mouth 4 times daily (before meals and nightly) 120 capsule 1     HYDROmorphone (DILAUDID) 2 MG tablet Take 1-2 tablets (2-4 mg) by mouth every 4 hours as needed for severe pain or breakthrough pain 180 tablet 0     lidocaine-prilocaine (EMLA) 2.5-2.5 % external cream Use 1-2 times a week or as needed prior to port access (Patient not taking: Reported on 5/8/2024) 30 g 3     lipase-protease-amylase (CREON 24) 63074-23060-894892 units CPEP per EC capsule 2-3 capsules with meals 3 times a day and 1-2 capsules with snacks max of 15 capsules a day 200 capsule 11     methylphenidate (RITALIN) 5 MG tablet Take 1 tablet (5 mg) by mouth 2  times daily as needed (Patient not taking: Reported on 3/12/2024) 10 tablet 0     Multiple Vitamins-Minerals (CENTRUM SILVER 50+MEN) TABS as directed Orally       pantoprazole (PROTONIX) 40 MG EC tablet Take 1 tablet (40 mg) by mouth 2 times daily 60 tablet 4     prochlorperazine (COMPAZINE) 10 MG tablet Take 1 tablet (10 mg) by mouth every 6 hours as needed for nausea or vomiting 30 tablet 2     vitamin D3 (CHOLECALCIFEROL) 50 mcg (2000 units) tablet Take 1 tablet (50 mcg) by mouth daily 90 tablet 1       Physical Exam:  General: The patient is a pleasant male in no acute distress.  There were no vitals taken for this visit.  Wt Readings from Last 10 Encounters:   05/28/24 75 kg (165 lb 4.8 oz)   05/23/24 72.6 kg (160 lb)   05/14/24 72.8 kg (160 lb 8 oz)   04/16/24 75.2 kg (165 lb 12.8 oz)   04/02/24 73.1 kg (161 lb 1.6 oz)   03/15/24 73.5 kg (162 lb)   03/12/24 73.6 kg (162 lb 3.2 oz)   03/05/24 73.4 kg (161 lb 14.4 oz)   03/04/24 73 kg (160 lb 14.4 oz)   02/27/24 74 kg (163 lb 3.2 oz)   HEENT: EOMI. Sclerae are anicteric. Oral mucosa pink and moist without lesions or thrush.   Lymph: Neck is supple with no lymphadenopathy in the cervical or supraclavicular areas.   Heart: Regular rate and rhythm.   Lungs: Clear to auscultation bilaterally, normal work of breathing  Abdomen: Bowel sounds present, soft, nontender with no palpable hepatosplenomegaly or masses.   Extremities: No lower extremity edema noted bilaterally.   Neuro: Cranial nerves II through XII are grossly intact.  Skin: No rashes, petechiae, or bruising noted on exposed skin.      Laboratory Data:  Most Recent 3 CBC's:  Recent Labs   Lab Test 05/28/24  0842 05/14/24  0852 04/16/24  1238   WBC 4.3 7.1 6.1   HGB 8.9* 8.6* 10.3*   MCV 93 92 95    304 269   ANEUTAUTO 2.7 5.1 3.8    Most Recent 3 BMP's:  Recent Labs   Lab Test 05/14/24  0852 04/02/24  0817 03/05/24  0830   * 138 140   POTASSIUM 4.3 4.3 4.1   CHLORIDE 99 101 104   CO2 25 28 26    BUN 19.6 16.9 19.4   CR 0.63* 0.70 0.68   ANIONGAP 10 9 10   DENISE 8.9 9.3 9.1   * 189* 207*   PROTTOTAL 7.6 7.3 6.7   ALBUMIN 3.7 3.9 4.0    Most Recent 2 LFT's:  Recent Labs   Lab Test 05/14/24  0852 04/02/24  0817   AST 17 21   ALT 12 19   ALKPHOS 103 99   BILITOTAL 0.3 0.2   I reviewed the above labs today.      Imaging:   CT Chest/Abdomen/Pelvis w Contrast - [facility] 4/30/24  Order: [id number]  Impression    1.  New pericardial effusion with pericardial enhancement suggestive of acute/subacute pericarditis in the appropriate clinical setting.  2.  Stable appearance of locally advanced pancreatic head and uncinate process tumor with infiltration of the root of the mesentery and duodenum.  3.  Stable small left pleural effusion with atelectasis. No evidence of intrathoracic metastases.  4.  Some of the mesenteric lymph nodes appear to be decreasing in size.  5.  Sclerotic osseous lesions as described above are most likely treated/partially treated metastases.      Echocardiographic Report - [facility] 5/1/24    Interpretation Summary   A two-dimensional transthoracic echocardiogram was performed. There is no comparison study available.     Normal left ventricular size and systolic function.   LV ejection fraction calculated using the bi-plane method of disks is 63 %.   The right ventricle is normal in size and function.   Right ventricular systolic pressure is normal.   Moderate pericardial effusion with no echo evidence of Tamponade.     I reviewed the above imaging reports today.         Assessment and Plan:  Locally advanced, unresectable pancreatic cancer. Patient started on treatment with palliative FOLFIRINOX on 7/31/23. CT CAP 10/24/23 with progression in skeletal mets, lung nodule and lymph nodes. Stable pancreatic mass. Changed treatment to gemcitabine, Abraxane (day 1, 8, 15) and Zometa.  CT CAP 3/2024 with isolated progression of potential L4 lesion. Plan to continue gemcitabine/Abraxane day  1 & 15 as a bridge to clinical trial at [facility] for cellular therapy. Had consult at [facility], recommended continuing current treatment and return upon disease progression.   -Continues to tolerate treatment well, neuropathy is stable. Labs today reivewed, proceed with gemcitabine, Abraxane as scheduled.   -Return to clinic in 2 weeks for next dose. Patient cancelled chemo on 6/11 due to family travel plans, will return as scheduled 6/18 with labs and SUZETTE follow up prior to chemo.     Pericardial effusion   Noted on CT scan completed 4/30/24 at [facility]. Cardiology follow up and ECHO also completed. Plan to repeat ECHO at [facility] at future appointments. Continues to be asymptomatic. ***     Will request urgent cardio-oncology consult within our system. Due to lack of symptoms, will continue with gemcitabine as scheduled, discussed red flag warnings/symptoms warranting immediate presentation to the ED including shortness of breath, chest pain or new significant edema.        Skeletal mets.    -Worsening skeletal mets noted on CT 10/24. Sclerosis noted on recent imaging.   -Radiation to T10 completed 11/29/23.  -Plan was to start radiation therapy to L4 lesion once he returns from [facility], however, holding off on radiation due to lack of symptoms currently.    -Continues on Zometa every 3 months. Last dose given 2/6/24, will give again today with infusion.         Pulmonary Embolism. Right lower segmental PE found on routine imaging. Started on apixiban, denies any bleeding concerns.     Nutrition.  Feeding tube removed. Continues to tolerate oral intake well. No issues with nausea or vomiting. Continue to push oral hydration, drinking 64+ oz of fluid/day.  Continue Protonix daily.  Has compazine as needed for nausea.     Lower extremity edema. Resolved. Continue compression stockings and elevation PRN. Continue to monitor.     Neuropathy: Grade 1. Mostly from prior therapies but slightly increased  on Abraxane lately. Previously referred to cancer rehab, plans to start therapy once he returns from [facility].     Fatigue: Ritalin 5 mg BID PRN. Discussed gentle exercise, cancer rehab.     Diarrhea/constipation. Resolved. Bowel movements more regular with increased food intake. Continue Metamucil as needed. Monitor for recurrent blood in stool.     Abdominal pain. Managed by palliative care with Butrans patch and oral Dilaudid. No pain recently. Has been taking less pain medication.     Anemia. Normocytic. Suspect anemia of chronic disease + secondary to myelosuppression from chemo. Hemoglobin stable, continue to monitor.       The longitudinal plan of care for the diagnosis(es)/condition(s) as documented were addressed during this visit. Due to the added complexity in care, I will continue to support Gallo in the subsequent management and with ongoing continuity of care.      *** minutes spent on the date of the encounter doing chart review, review of test results, interpretation of tests, patient visit, and documentation     ESVIN Canales CNP        Again, thank you for allowing me to participate in the care of your patient.        Sincerely,        ESVIN Canales CNP

## 2024-06-25 NOTE — PROGRESS NOTES
Oncology/Hematology Visit Note  Jun 25, 2024    Reason for Visit: follow up of locally advanced, unresectable pancreatic cancer    History of Present Illness: Gallo Navarro is a 58 year old male with locally advanced, unresectable pancreatic cancer. 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 He then presented to the ED with worsening abdominal pain, increased jaundice, poor PO intake and weight loss admitted on 7/8/2023 for concern of biliary obstruction. Unfortunately, during this admission, biopsy of pancreatic mass returned positive for pancreatic adenocarcinoma on 7/12/23. He started on treatment with 5FU, irinotecan, and oxaliplatin (FOLFIRINOX) on 7/31/23. Please see previous notes for further details on the patient's history.     8/17/23 - ERCP/EGD, duodenal stents x2 placed, exchange of GJ tube    8/21-8/25 hospitalized due to diarrhea, colitis, abdominal pain.      8/29 - Cycle 3 deferred due to neutropenia.   Neulasta added. Irinotecan dose reduced 20% due to symptoms including cramping, double vision and slurred speech during infusion.      10/24/23 CT CAP with similar to slight increase in size of peripancreatic and mesenteric  lymph nodes, enlargement of previous skeletal metastasis as well as new sclerotic metastasis to left posterior 5th rib, enlarging pulmonary nodule, and unchanged pancreatic head mass. Also unclear concerns for PE,  right lower lobe segmental PE confirmed on CT-PE. Started on apixaban.     10/31/23 Cycle 1 gemzar, abraxane  11/7/23 Cycle 1 Zometa  11/22/23 Removal of GJ tube.   11/29/23 Completed palliative radiation to T10   12/13/23 C3D1 gem/abraxane    12/29/23 Consults at [facility]    1/23-1/26 Consults at [facility]     1/30/24: C4D1 gem/abraxane     3/24: CT  CAP with overall stable disease with isolated progression in potential L4 lesion. Referral for radiation oncology.      4/29-5/1: Consults at [facility] for possible cellular therapy, CT guided biopsy of left pubic bone, recommendation to return to [facility] upon progression of current treatment     5/23/24: Cardiology consult at [facility]. Repeat ECHO on 6/6 with EF of 55-60% and no pleural effusion    Gallo presents today for follow up prior to cycle 8 gemcitabine, Abraxane.      Interval History:     Recently went on a trip to [place] for his daughter's volleyball. Felt well and enjoyed the trip.     Reports multiple episodes over the last few weeks of difficulty regulating his temperature. If he gets cold, his body spikes a temperature and he has difficulty warming up. The temperature resolves by the next day and he feels well with no infection symptoms. Temperatures as high as 102 reported.     Developed a cough yesterday. Feels it is just phlegm. Denies any post nasal drip or congestion. Started taking allergy medication today. No shortness of breath or chest pain. His daughter also has developed a cough recently.     Bilateral big toenails are unchanged. Nails are still yellow and skin around is still pink/red. No pain, swelling or discharge. No change in color. Neuropathy is unchanged, bilateral feet are numb. He is scheduled to see podiatry at the end of this week.     Eating well, denies nausea. Denies any bowel or bladder concerns.     Fatigue is stable. Sleeping well.      Review of Systems:  Patient denies any of the following except if noted above: fevers, chills, difficulty with energy, vision or hearing changes, chest pain, dyspnea, abdominal pain, nausea, vomiting, diarrhea, constipation, urinary concerns, headaches, numbness, tingling, issues with sleep or mood. Also denies lumps, bumps, rashes or skin lesions, bleeding or bruising issues.        Current Outpatient Medications   Medication Sig  Dispense Refill    acetaminophen (TYLENOL) 32 mg/mL liquid Take 20.313 mLs (650 mg) by mouth every 8 hours 1800 mL 1    apixaban ANTICOAGULANT (ELIQUIS) 5 MG tablet Take 1 tablet (5 mg) by mouth 2 times daily 60 tablet 2    buprenorphine (BUTRANS) 10 MCG/HR WK patch Place 1 patch onto the skin every 7 days Use with 20 mcg/hour patch for 30 mcg/hour total. 4 patch 3    dicyclomine (BENTYL) 10 MG capsule Take 1 capsule (10 mg) by mouth 4 times daily (before meals and nightly) 120 capsule 1    HYDROmorphone (DILAUDID) 2 MG tablet Take 1-2 tablets (2-4 mg) by mouth every 4 hours as needed for severe pain or breakthrough pain 180 tablet 0    lipase-protease-amylase (CREON 24) 64269-71239-391042 units CPEP per EC capsule 2-3 capsules with meals 3 times a day and 1-2 capsules with snacks max of 15 capsules a day 200 capsule 11    Multiple Vitamins-Minerals (CENTRUM SILVER 50+MEN) TABS as directed Orally      pantoprazole (PROTONIX) 40 MG EC tablet Take 1 tablet (40 mg) by mouth 2 times daily 60 tablet 4    prochlorperazine (COMPAZINE) 10 MG tablet Take 1 tablet (10 mg) by mouth every 6 hours as needed for nausea or vomiting 30 tablet 2    vitamin D3 (CHOLECALCIFEROL) 50 mcg (2000 units) tablet Take 1 tablet (50 mcg) by mouth daily 90 tablet 1    blood glucose (FREESTYLE TEST STRIPS) test strip by Other route as needed      buprenorphine (BUTRANS) 20 MCG/HR WK patch Place 1 patch onto the skin once a week Use with 10 mcg/hour patch for 30 mcg/hour total. 4 patch 3    lidocaine-prilocaine (EMLA) 2.5-2.5 % external cream Use 1-2 times a week or as needed prior to port access (Patient not taking: Reported on 5/8/2024) 30 g 3    methylphenidate (RITALIN) 5 MG tablet Take 1 tablet (5 mg) by mouth 2 times daily as needed (Patient not taking: Reported on 3/12/2024) 10 tablet 0       Physical Exam:  General: The patient is a pleasant male in no acute distress.  /73 (BP Location: Right arm, Patient Position: Right side, Cuff  Size: Adult Regular)   Pulse 94   Temp 99.1  F (37.3  C) (Oral)   Resp 16   Wt 72.2 kg (159 lb 3.2 oz)   SpO2 95%   BMI 21.59 kg/m    Wt Readings from Last 10 Encounters:   06/25/24 72.2 kg (159 lb 3.2 oz)   05/28/24 75 kg (165 lb 4.8 oz)   05/23/24 72.6 kg (160 lb)   05/14/24 72.8 kg (160 lb 8 oz)   04/16/24 75.2 kg (165 lb 12.8 oz)   04/02/24 73.1 kg (161 lb 1.6 oz)   03/15/24 73.5 kg (162 lb)   03/12/24 73.6 kg (162 lb 3.2 oz)   03/05/24 73.4 kg (161 lb 14.4 oz)   03/04/24 73 kg (160 lb 14.4 oz)   HEENT: EOMI. Sclerae are anicteric. Oral mucosa pink and moist without lesions or thrush.   Lymph: Neck is supple with no lymphadenopathy in the cervical or supraclavicular areas.   Heart: Regular rate and rhythm.   Lungs: Clear to auscultation bilaterally, normal work of breathing  Abdomen: Bowel sounds present, soft, nontender with no palpable hepatosplenomegaly or masses.   Extremities: No lower extremity edema noted bilaterally.   Neuro: Cranial nerves II through XII are grossly intact.  Skin: No rashes, petechiae, or bruising noted on exposed skin. Yellowing to bilateral big toenail,  skin around nail with darker red pigment. No swelling , warmth or drainage.       Laboratory Data:  Most Recent 3 CBC's:  Recent Labs   Lab Test 06/25/24  1505 05/28/24  0842 05/14/24  0852   WBC 11.9* 4.3 7.1   HGB 10.9* 8.9* 8.6*   MCV 89 93 92    226 304   ANEUTAUTO 9.4* 2.7 5.1    Most Recent 3 BMP's:  Recent Labs   Lab Test 06/25/24  1505 05/14/24  0852 04/02/24  0817    134* 138   POTASSIUM 4.2 4.3 4.3   CHLORIDE 97* 99 101   CO2 27 25 28   BUN 16.6 19.6 16.9   CR 0.75 0.63* 0.70   ANIONGAP 11 10 9   DENISE 9.1 8.9 9.3   * 225* 189*   PROTTOTAL 7.7 7.6 7.3   ALBUMIN 4.2 3.7 3.9    Most Recent 2 LFT's:  Recent Labs   Lab Test 06/25/24  1505 05/14/24  0852   AST 36 17   ALT 38 12   ALKPHOS 179* 103   BILITOTAL 0.5 0.3   I reviewed the above labs today.        Assessment and Plan:  Locally advanced,  unresectable pancreatic cancer. Patient started on treatment with palliative FOLFIRINOX on 7/31/23. CT CAP 10/24/23 with progression in skeletal mets, lung nodule and lymph nodes. Stable pancreatic mass. Changed treatment to gemcitabine, Abraxane (day 1, 8, 15) and Zometa.  CT CAP 3/2024 with isolated progression of potential L4 lesion. Had consult at [facility], recommended continuing current treatment and return upon disease progression.   -Continues to tolerate treatment well, neuropathy is stable.   -Labs today reivewed, notable for elevated WBC. New cough and intermittent fevers. With concern for infection, will defer chemotherapy x1 week. Return to clinic in 1 week for cycle 8 with labs and SUZETTE evaluation prior.     Concern for infection.   WBC count elevated today. Having intermittent fevers. No urinary concerns. New cough developed yesterday. Infection workup completed. Chest xray negative. Blood cultures drawn. Respiratory viral panel and covid-19 swabs completed, pending. Will defer chemo x1 week as above. Instructed Gallo to continue to monitor symptoms and notify triage for a temperature>100.4 or any symptoms/concerns.       Pericardial effusion   Noted on CT scan completed 4/30/24 at [facility]. Cardiology follow up and ECHO also completed. Plan to repeat ECHO at [facility] at future appointments. Continues to be asymptomatic. Cardiology consult at [facility], repeat ECHO with no pericardial effusion.        Skeletal mets.    -Worsening skeletal mets noted on CT 10/24. Sclerosis noted on recent imaging.   -Radiation to T10 completed 11/29/23.  -Plan was to start radiation therapy to L4 lesion once he returns from [facility], however, holding off on radiation due to lack of symptoms currently.    -Continues on Zometa every 3 months. Last dose given 5/14/24       Pulmonary Embolism. Right lower segmental PE found on routine imaging. Started on apixiban, denies any bleeding concerns.     Nutrition.  Feeding  tube removed. Continues to tolerate oral intake well. No issues with nausea or vomiting. Continue to push oral hydration, drinking 64+ oz of fluid/day.  Continue Protonix daily.  Has compazine as needed for nausea.     Lower extremity edema. Resolved. Continue compression stockings and elevation PRN. Continue to monitor.     Neuropathy: Grade 1. Mostly from prior therapies but slightly increased on Abraxane lately. Previously referred to cancer rehab, plans to start therapy once he returns from [facility].     Fatigue: Ritalin 5 mg BID PRN. Discussed gentle exercise, cancer rehab.     Diarrhea/constipation. Resolved. Bowel movements more regular with increased food intake. Continue Metamucil as needed. Monitor for recurrent blood in stool.     Abdominal pain. Managed by palliative care with Butrans patch and oral Dilaudid. No pain recently. Has been taking less pain medication.     Anemia. Normocytic. Suspect anemia of chronic disease + secondary to myelosuppression from chemo. Hemoglobin stable, continue to monitor.       The longitudinal plan of care for the diagnosis(es)/condition(s) as documented were addressed during this visit. Due to the added complexity in care, I will continue to support Gallo in the subsequent management and with ongoing continuity of care.    ESVIN Canales CNP

## 2024-06-25 NOTE — PROGRESS NOTES
Infusion Nursing Note:  Gallo Navarro presents today for C8D1 Gemzar-Abraxane-DEFER for 1 week.    Patient seen by provider today: Yes: Megan Farrell CNP   present during visit today: Not Applicable.    Note:   Pt saw provider prior to infusion.  Pt came to infusion to wait for POC.    TORB 6/25/24 @1545 Megan Farrell CNP-Divina Ludwig, RN  -defer C8D1 Gemzar-Abraxane for 1 week  -Chest Xray and OK to discharge home    Pt will try and provide his UA/UC @ [facility] lab.  Intravenous Access:  Implanted Port.    Treatment Conditions:   Latest Reference Range & Units 06/25/24 15:05   Sodium 135 - 145 mmol/L 135   Potassium 3.4 - 5.3 mmol/L 4.2   Chloride 98 - 107 mmol/L 97 (L)   Carbon Dioxide (CO2) 22 - 29 mmol/L 27   Urea Nitrogen 6.0 - 20.0 mg/dL 16.6   Creatinine 0.67 - 1.17 mg/dL 0.75   GFR Estimate >60 mL/min/1.73m2 >90   Calcium 8.6 - 10.0 mg/dL 9.1   Anion Gap 7 - 15 mmol/L 11   Albumin 3.5 - 5.2 g/dL 4.2   Protein Total 6.4 - 8.3 g/dL 7.7   Alkaline Phosphatase 40 - 150 U/L 179 (H)   ALT 0 - 70 U/L 38   AST 0 - 45 U/L 36   Bilirubin Total <=1.2 mg/dL 0.5   Glucose 70 - 99 mg/dL 139 (H)   WBC 4.0 - 11.0 10e3/uL 11.9 (H)   Hemoglobin 13.3 - 17.7 g/dL 10.9 (L)   Hematocrit 40.0 - 53.0 % 34.1 (L)   Platelet Count 150 - 450 10e3/uL 222   RBC Count 4.40 - 5.90 10e6/uL 3.82 (L)   MCV 78 - 100 fL 89   MCH 26.5 - 33.0 pg 28.5   MCHC 31.5 - 36.5 g/dL 32.0   RDW 10.0 - 15.0 % 15.7 (H)   % Neutrophils % 80   % Lymphocytes % 10   % Monocytes % 10   % Eosinophils % 0   % Basophils % 0   Absolute Basophils 0.0 - 0.2 10e3/uL 0.0   Absolute Eosinophils 0.0 - 0.7 10e3/uL 0.0   Absolute Immature Granulocytes <=0.4 10e3/uL 0.1   Absolute Lymphocytes 0.8 - 5.3 10e3/uL 1.2   Absolute Monocytes 0.0 - 1.3 10e3/uL 1.2   % Immature Granulocytes % 0   Absolute Neutrophils 1.6 - 8.3 10e3/uL 9.4 (H)   Absolute NRBCs 10e3/uL 0.0   NRBCs per 100 WBC <1 /100 0   BLOOD CULTURE  Rpt (IP)  Rpt (IP)   RESPIRATORY PANEL PCR  Rpt (IP)    RESPIRATORY PANEL PCR, NASOPHARYNGEAL  Rpt (IP)       Post Infusion Assessment:  Access discontinued per protocol.       Discharge Plan:   Patient declined prescription refills.  Discharge instructions reviewed with: Patient.  Patient and/or family verbalized understanding of discharge instructions and all questions answered.  AVS to patient via [employer].  Provider will work on follow up appts.  Patient discharged in stable condition accompanied by: self to chest xray.    Departure Mode: Ambulatory.    Divina Ludwig RN

## 2024-06-25 NOTE — NURSING NOTE
Oncology Rooming Note    June 25, 2024 1:44 PM   Gallo Navarro is a 58 year old male who presents for:    Chief Complaint   Patient presents with    Oncology Clinic Visit     RTN for Malignant of Head     Initial Vitals: /73 (BP Location: Right arm, Patient Position: Right side, Cuff Size: Adult Regular)   Pulse 94   Temp 100.2  F (37.9  C) (Oral)   Resp 16   Wt 72.2 kg (159 lb 3.2 oz)   SpO2 95%   BMI 21.59 kg/m   Estimated body mass index is 21.59 kg/m  as calculated from the following:    Height as of 4/25/24: 1.829 m (6').    Weight as of this encounter: 72.2 kg (159 lb 3.2 oz). Body surface area is 1.92 meters squared.  No Pain (0) Comment: Data Unavailable   No LMP for male patient.  Allergies reviewed: Yes  Medications reviewed: Yes    Medications: Medication refills not needed today.  Pharmacy name entered into [employer]:    [facility] - [place], [place] - [address] [facility] - [place] [phone]  [facility] [place] [facility] [address] [facility] AT PORTAL TO REGISTERED [facility] SITES  [facility] - [place], [place] - [address] [place] [address] [address]  [facility] [address] [facility] - [place], [place] - [address]    Frailty Screening:   Is the patient here for a new oncology consult visit in cancer care? 2. No      Clinical concerns: Pt has been trying to cough up some phlegm . But otherwise feels fine      Amy Paz MA

## 2024-06-26 LAB — SARS-COV-2 RNA RESP QL NAA+PROBE: POSITIVE

## 2024-06-27 ENCOUNTER — VIRTUAL VISIT (OUTPATIENT)
Dept: RADIATION ONCOLOGY | Facility: CLINIC | Age: 59
End: 2024-06-27
Attending: FAMILY MEDICINE
Payer: COMMERCIAL

## 2024-06-27 VITALS — WEIGHT: 160 LBS | HEIGHT: 72 IN | BODY MASS INDEX: 21.67 KG/M2

## 2024-06-27 DIAGNOSIS — C79.51 MALIGNANT NEOPLASM METASTATIC TO BONE (H): ICD-10-CM

## 2024-06-27 DIAGNOSIS — Z51.5 PALLIATIVE CARE PATIENT: Primary | ICD-10-CM

## 2024-06-27 DIAGNOSIS — G89.3 CANCER ASSOCIATED PAIN: ICD-10-CM

## 2024-06-27 DIAGNOSIS — C25.9 PANCREATIC ADENOCARCINOMA (H): ICD-10-CM

## 2024-06-27 DIAGNOSIS — Z98.890 HISTORY OF BILIARY STENT INSERTION: ICD-10-CM

## 2024-06-27 LAB — CANCER AG19-9 SERPL IA-ACNC: 6 U/ML

## 2024-06-27 PROCEDURE — 99214 OFFICE O/P EST MOD 30 MIN: CPT | Mod: 95 | Performed by: FAMILY MEDICINE

## 2024-06-27 RX ORDER — HYDROMORPHONE HYDROCHLORIDE 2 MG/1
2-4 TABLET ORAL EVERY 4 HOURS PRN
Qty: 180 TABLET | Refills: 0 | Status: SHIPPED | OUTPATIENT
Start: 2024-06-27 | End: 2024-07-03

## 2024-06-27 ASSESSMENT — PAIN SCALES - GENERAL: PAINLEVEL: NO PAIN (0)

## 2024-06-27 NOTE — PROGRESS NOTES
Virtual Visit Details    Type of service:  Video Visit     Originating Location (pt. Location): Home    Distant Location (provider location):  On-site  Platform used for Video Visit: [facility]    Palliative Care Outpatient Clinic Progress Note    Patient Name: Gallo Navarro  Primary Provider: Akil Hernandez    Impressions:  ECO  Decision Making Capacity:  VERY PRESENT  PDMP review:  yes, no concerns     Locally extensive non-resectable pancreatic cancer  Cancer associated pain  Lymphedema in BLE     GOALS OF CARE:  2024  No change to GOC.  Gallo is preparing to go to [facility] to see if he qualifies for a cellular therapy trial.  2024 Life prolonging without limits at this time and willingness to consider clinical trials.     Recommendations & Counseling:  CONTINUE buprenorphine 30 mcg/hour patch (across 2 patches) and replace weekly    Continue dilaudid 4 mg po q 3 hours prn--this is usually three times a day  Tylenol suspension 650 mg po TID  Narcan also prescribed for safety  Follow up by video in 8 -12 weeks, sooner prn.     Will hold on Ritalin for now pending his visit to [facility].     I provided emotional support through validating Gallo's feelings and open, empathic communication.     Counseling: All of the above was explained to the patient in lay language. The patient has verbalized a clear understanding of the discussion, asked appropriate questions, which have been answered to patient's apparent satisfaction. The patient is in agreement with the above plan.        Chief Complaint/Patient ID: Gallo Navarro 58 year old male with PMHx of unresectable localized pancreatic cancer    Last Palliative care appointment: 2024 with me     Reviewed:  Yes:   reviewed - controlled substances reflected in medication list.    Interim History:  Gallo Navarro is a 58 year old male who is seen today for follow up with Palliative Care via billable video visit. Team at [facility] suggests that since  they couldn't see his tumor in the pancreas on scans that he isn't a candidate for their immunotherapy trials (no way to monitor its effectiveness).  Recent scans here show persisting mass.     Pain:  well controlled with buprenorphine patches and hydromorphone    Appetite/Nausea: overall OK     Bowels: no concerns     Sleep: overall good     Mood: overall good     Coping:  good    Family History- Reviewed in Epic.    No Known Allergies    Social History:  Pertinent changes to social history/social situation since last visit: he enjoyed a recent trip to [place] to see some relatives and his daughter's volleyball tournament.  Key support resources: family ivan wife Violet  Advance Directive Status:  no ACP documents in Epic    Social History     Tobacco Use    Smoking status: Never     Passive exposure: Never    Smokeless tobacco: Never   Vaping Use    Vaping status: Never Used   Substance Use Topics    Alcohol use: Not Currently    Drug use: Never         No Known Allergies  Current Outpatient Medications   Medication Sig Dispense Refill    acetaminophen (TYLENOL) 32 mg/mL liquid Take 20.313 mLs (650 mg) by mouth every 8 hours 1800 mL 1    apixaban ANTICOAGULANT (ELIQUIS) 5 MG tablet Take 1 tablet (5 mg) by mouth 2 times daily 60 tablet 2    blood glucose (FREESTYLE TEST STRIPS) test strip by Other route as needed      buprenorphine (BUTRANS) 10 MCG/HR WK patch Place 1 patch onto the skin every 7 days Use with 20 mcg/hour patch for 30 mcg/hour total. 4 patch 3    buprenorphine (BUTRANS) 20 MCG/HR WK patch Place 1 patch onto the skin once a week Use with 10 mcg/hour patch for 30 mcg/hour total. 4 patch 3    dicyclomine (BENTYL) 10 MG capsule Take 1 capsule (10 mg) by mouth 4 times daily (before meals and nightly) 120 capsule 1    HYDROmorphone (DILAUDID) 2 MG tablet Take 1-2 tablets (2-4 mg) by mouth every 4 hours as needed for severe pain or breakthrough pain 180 tablet 0    lidocaine-prilocaine (EMLA) 2.5-2.5 %  external cream Use 1-2 times a week or as needed prior to port access (Patient not taking: Reported on 5/8/2024) 30 g 3    lipase-protease-amylase (CREON 24) 33947-95534-624427 units CPEP per EC capsule 2-3 capsules with meals 3 times a day and 1-2 capsules with snacks max of 15 capsules a day 200 capsule 11    methylphenidate (RITALIN) 5 MG tablet Take 1 tablet (5 mg) by mouth 2 times daily as needed (Patient not taking: Reported on 3/12/2024) 10 tablet 0    Multiple Vitamins-Minerals (CENTRUM SILVER 50+MEN) TABS as directed Orally      pantoprazole (PROTONIX) 40 MG EC tablet Take 1 tablet (40 mg) by mouth 2 times daily 60 tablet 4    prochlorperazine (COMPAZINE) 10 MG tablet Take 1 tablet (10 mg) by mouth every 6 hours as needed for nausea or vomiting 30 tablet 2    vitamin D3 (CHOLECALCIFEROL) 50 mcg (2000 units) tablet Take 1 tablet (50 mcg) by mouth daily 90 tablet 1     Past Medical History:   Diagnosis Date    Acute pancreatitis 04/16/2023    Alcohol-induced acute pancreatitis 04/10/2023    Gastric outlet obstruction     Metastasis from pancreatic cancer (H)     Personal history of chemotherapy     Gemzar + Abraxane started on 10/31/2023    Recurrent acute pancreatitis     S/P radiation therapy     2,000 cGy to T10 Spine completed on 11/29/2023 [facility]     Past Surgical History:   Procedure Laterality Date    AS OPEN TREATMENT CLAVICULAR FRACTURE INTERNAL FX      ENDOSCOPIC RETROGRADE CHOLANGIOPANCREATOGRAM N/A 7/11/2023    Procedure: ENDOSCOPIC RETROGRADE CHOLANGIOPANCREATOGRAPHY;  Surgeon: Khadar Pickett MD;  Location: [facility] OR    ENDOSCOPIC RETROGRADE CHOLANGIOPANCREATOGRAM N/A 8/17/2023    Procedure: ENDOSCOPIC RETROGRADE  with stent removal x1, balloon sweep;  Surgeon: Christos Greenberg MD;  Location: [facility] OR    ENDOSCOPIC ULTRASOUND UPPER GASTROINTESTINAL TRACT (GI) N/A 7/11/2023    Procedure: Endoscopic ultrasound upper gastrointestinal tract (GI), with biposy, GJ tube  repositioning, stent placement;  Surgeon: Khadar Pickett MD;  Location: [facility] OR    ENDOSCOPIC ULTRASOUND UPPER GASTROINTESTINAL TRACT (GI) N/A 2023    Procedure: ENDOSCOPIC ULTRASOUND, ESOPHAGOSCOPY, EUS guided gastrojejunostomy;  Surgeon: Tre York MD;  Location: [facility] OR    INSERT PICC LINE  2023    INSERT PORT VASCULAR ACCESS Right 2023    Procedure: Insert port vascular access;  Surgeon: Tom العلي MD;  Location: [facility] OR    IR CHEST PORT PLACEMENT > 5 YRS OF AGE  2023    IR NG TUBE PLACEMENT REQ RAD & FLUORO  2023    JG tube    REPLACE GASTROJEJUNOSTOMY TUBE, PERCUTANEOUS N/A 2023    Procedure: possible REPLACEMENT, GASTROJEJUNOSTOMY TUBE, PERCUTANEOUS;  Surgeon: Christos Greenberg MD;  Location: [facility] OR       Physical Exam:   GENERAL APPEARANCE: healthy appearing, alert and no distress; neatly groomed  EYES: Eyes grossly normal to inspection, PERRLA, conjunctivae and sclerae without injection or discharge, EOM intact   RESP:  no increased work of breathing; speaks in very complete sentences;   MS: No musculoskeletal defects are noted  SKIN: No suspicious lesions or rashes, hydration status appears adequate with normal skin turgor   PSYCH: Alert and oriented x3; speech- coherent , normal rate and volume; able to articulate logical thoughts, able to abstract reason, no tangential thoughts, no hallucinations or delusions, mentation appears normal, Mood is euthymic. Affect is appropriate for this mood state and bright. Thought content is free of suicidal ideation, hallucinations, and delusions.  Eye contact is good during conversation.       Key Data Reviewed:  LABS: 2024- Cr 0.75, Albumin 4.2,  Hgb 10.9,      IMAGIN2024 CT CHEST W/CONTRAST  IMPRESSION:   1. Small pericardial effusion.  2. Grossly stable pancreatic head mass.  3. Unchanged sclerotic bone metastases    35 minutes spent on the date of the encounter doing chart review, history and exam,  patient education & counseling, documentation and other activities as noted above.    Jeremy Hurt MD MS FAAFP CAQHPM  [facility]  Office [phone]  Fax [phone]

## 2024-06-27 NOTE — PATIENT INSTRUCTIONS
It was good to see you today, Gallo.  I'm glad things are going well.    Here are the things we talked about:  I refilled the dilaudid to [facility].    Someone from the team will reach out to schedule a follow up appointment in 3 months.     How to get a hold of us:  For non-urgent matters, MyChart works best.    For more urgent matters, or if you prefer not to use MyChart, call our clinic nurse coordinator Megan Funez RN at [phone]    We have an on-call number for evenings and weekends. Please call this only if you are having uncontrolled symptoms or serious side effects from your medicines: [phone].     For refills, please give us a week (5 working days) notice. We don't always have providers available everyday to do refills. If you call the day you run out of your medicine, we may not be able to refill it in time, so call 5 days in advance!    Jeremy Hurt MD MS FAAFP CAQHPM  [facility]  Office [phone]  Fax [phone]

## 2024-06-27 NOTE — NURSING NOTE
Is the patient currently in the state of [place]? YES    Visit mode:VIDEO    If the visit is dropped, the patient can be reconnected by: VIDEO VISIT: Text to cell phone:   Telephone Information:   Mobile [phone]       Will anyone else be joining the visit? NO  (If patient encounters technical issues they should call [phone]    How would you like to obtain your AVS? MyChart    Are changes needed to the allergy or medication list? No    Are refills needed on medications prescribed by this physician? YES    Reason for visit: LO NICOLE

## 2024-06-30 LAB
BACTERIA BLD CULT: NO GROWTH
BACTERIA BLD CULT: NO GROWTH

## 2024-07-02 ENCOUNTER — ONCOLOGY VISIT (OUTPATIENT)
Dept: ONCOLOGY | Facility: CLINIC | Age: 59
End: 2024-07-02
Attending: STUDENT IN AN ORGANIZED HEALTH CARE EDUCATION/TRAINING PROGRAM
Payer: COMMERCIAL

## 2024-07-02 ENCOUNTER — APPOINTMENT (OUTPATIENT)
Dept: LAB | Facility: CLINIC | Age: 59
End: 2024-07-02
Attending: STUDENT IN AN ORGANIZED HEALTH CARE EDUCATION/TRAINING PROGRAM
Payer: COMMERCIAL

## 2024-07-02 VITALS
SYSTOLIC BLOOD PRESSURE: 110 MMHG | RESPIRATION RATE: 16 BRPM | TEMPERATURE: 98.5 F | OXYGEN SATURATION: 98 % | HEART RATE: 67 BPM | DIASTOLIC BLOOD PRESSURE: 69 MMHG

## 2024-07-02 VITALS — WEIGHT: 160.8 LBS | BODY MASS INDEX: 21.81 KG/M2

## 2024-07-02 DIAGNOSIS — C25.0 MALIGNANT NEOPLASM OF HEAD OF PANCREAS (H): ICD-10-CM

## 2024-07-02 DIAGNOSIS — Z51.11 ENCOUNTER FOR ANTINEOPLASTIC CHEMOTHERAPY: Primary | ICD-10-CM

## 2024-07-02 LAB
ALBUMIN SERPL BCG-MCNC: 3.8 G/DL (ref 3.5–5.2)
ALP SERPL-CCNC: 145 U/L (ref 40–150)
ALT SERPL W P-5'-P-CCNC: 39 U/L (ref 0–70)
ANION GAP SERPL CALCULATED.3IONS-SCNC: 11 MMOL/L (ref 7–15)
AST SERPL W P-5'-P-CCNC: 32 U/L (ref 0–45)
BASOPHILS # BLD AUTO: 0.1 10E3/UL (ref 0–0.2)
BASOPHILS NFR BLD AUTO: 1 %
BILIRUB SERPL-MCNC: 0.2 MG/DL
BUN SERPL-MCNC: 14.3 MG/DL (ref 6–20)
CALCIUM SERPL-MCNC: 9 MG/DL (ref 8.6–10)
CHLORIDE SERPL-SCNC: 104 MMOL/L (ref 98–107)
CREAT SERPL-MCNC: 0.65 MG/DL (ref 0.67–1.17)
DEPRECATED HCO3 PLAS-SCNC: 26 MMOL/L (ref 22–29)
EGFRCR SERPLBLD CKD-EPI 2021: >90 ML/MIN/1.73M2
EOSINOPHIL # BLD AUTO: 0.3 10E3/UL (ref 0–0.7)
EOSINOPHIL NFR BLD AUTO: 5 %
ERYTHROCYTE [DISTWIDTH] IN BLOOD BY AUTOMATED COUNT: 15.4 % (ref 10–15)
GLUCOSE SERPL-MCNC: 171 MG/DL (ref 70–99)
HCT VFR BLD AUTO: 32.3 % (ref 40–53)
HGB BLD-MCNC: 10 G/DL (ref 13.3–17.7)
IMM GRANULOCYTES # BLD: 0 10E3/UL
IMM GRANULOCYTES NFR BLD: 1 %
LYMPHOCYTES # BLD AUTO: 1.4 10E3/UL (ref 0.8–5.3)
LYMPHOCYTES NFR BLD AUTO: 25 %
MCH RBC QN AUTO: 28.1 PG (ref 26.5–33)
MCHC RBC AUTO-ENTMCNC: 31 G/DL (ref 31.5–36.5)
MCV RBC AUTO: 91 FL (ref 78–100)
MONOCYTES # BLD AUTO: 0.6 10E3/UL (ref 0–1.3)
MONOCYTES NFR BLD AUTO: 11 %
NEUTROPHILS # BLD AUTO: 3.1 10E3/UL (ref 1.6–8.3)
NEUTROPHILS NFR BLD AUTO: 57 %
NRBC # BLD AUTO: 0 10E3/UL
NRBC BLD AUTO-RTO: 0 /100
PLATELET # BLD AUTO: 240 10E3/UL (ref 150–450)
POTASSIUM SERPL-SCNC: 4 MMOL/L (ref 3.4–5.3)
PROT SERPL-MCNC: 7 G/DL (ref 6.4–8.3)
RBC # BLD AUTO: 3.56 10E6/UL (ref 4.4–5.9)
SODIUM SERPL-SCNC: 141 MMOL/L (ref 135–145)
WBC # BLD AUTO: 5.4 10E3/UL (ref 4–11)

## 2024-07-02 PROCEDURE — 86301 IMMUNOASSAY TUMOR CA 19-9: CPT

## 2024-07-02 PROCEDURE — 99213 OFFICE O/P EST LOW 20 MIN: CPT | Mod: 25 | Performed by: NURSE PRACTITIONER

## 2024-07-02 PROCEDURE — 258N000003 HC RX IP 258 OP 636: Performed by: NURSE PRACTITIONER

## 2024-07-02 PROCEDURE — 96413 CHEMO IV INFUSION 1 HR: CPT

## 2024-07-02 PROCEDURE — 82374 ASSAY BLOOD CARBON DIOXIDE: CPT | Performed by: NURSE PRACTITIONER

## 2024-07-02 PROCEDURE — 85004 AUTOMATED DIFF WBC COUNT: CPT | Performed by: NURSE PRACTITIONER

## 2024-07-02 PROCEDURE — 96375 TX/PRO/DX INJ NEW DRUG ADDON: CPT

## 2024-07-02 PROCEDURE — 250N000011 HC RX IP 250 OP 636: Performed by: NURSE PRACTITIONER

## 2024-07-02 PROCEDURE — 82040 ASSAY OF SERUM ALBUMIN: CPT | Performed by: NURSE PRACTITIONER

## 2024-07-02 PROCEDURE — 99214 OFFICE O/P EST MOD 30 MIN: CPT | Performed by: NURSE PRACTITIONER

## 2024-07-02 PROCEDURE — 96417 CHEMO IV INFUS EACH ADDL SEQ: CPT

## 2024-07-02 PROCEDURE — 36591 DRAW BLOOD OFF VENOUS DEVICE: CPT | Performed by: NURSE PRACTITIONER

## 2024-07-02 RX ORDER — PACLITAXEL 100 MG/20ML
125 INJECTION, POWDER, LYOPHILIZED, FOR SUSPENSION INTRAVENOUS ONCE
Status: COMPLETED | OUTPATIENT
Start: 2024-07-02 | End: 2024-07-02

## 2024-07-02 RX ORDER — MEPERIDINE HYDROCHLORIDE 25 MG/ML
25 INJECTION INTRAMUSCULAR; INTRAVENOUS; SUBCUTANEOUS EVERY 30 MIN PRN
Status: CANCELLED | OUTPATIENT
Start: 2024-07-02

## 2024-07-02 RX ORDER — LORAZEPAM 2 MG/ML
0.5 INJECTION INTRAMUSCULAR EVERY 4 HOURS PRN
Status: CANCELLED | OUTPATIENT
Start: 2024-07-02

## 2024-07-02 RX ORDER — ALBUTEROL SULFATE 0.83 MG/ML
2.5 SOLUTION RESPIRATORY (INHALATION)
Status: CANCELLED | OUTPATIENT
Start: 2024-07-02

## 2024-07-02 RX ORDER — DIPHENHYDRAMINE HYDROCHLORIDE 50 MG/ML
50 INJECTION INTRAMUSCULAR; INTRAVENOUS
Status: CANCELLED
Start: 2024-07-02

## 2024-07-02 RX ORDER — ONDANSETRON 2 MG/ML
8 INJECTION INTRAMUSCULAR; INTRAVENOUS ONCE
Status: CANCELLED | OUTPATIENT
Start: 2024-07-02

## 2024-07-02 RX ORDER — ALBUTEROL SULFATE 90 UG/1
1-2 AEROSOL, METERED RESPIRATORY (INHALATION)
Status: CANCELLED
Start: 2024-07-02

## 2024-07-02 RX ORDER — HEPARIN SODIUM (PORCINE) LOCK FLUSH IV SOLN 100 UNIT/ML 100 UNIT/ML
5 SOLUTION INTRAVENOUS
Status: CANCELLED | OUTPATIENT
Start: 2024-07-02

## 2024-07-02 RX ORDER — PACLITAXEL 100 MG/20ML
125 INJECTION, POWDER, LYOPHILIZED, FOR SUSPENSION INTRAVENOUS ONCE
Status: CANCELLED | OUTPATIENT
Start: 2024-07-02

## 2024-07-02 RX ORDER — ONDANSETRON 2 MG/ML
8 INJECTION INTRAMUSCULAR; INTRAVENOUS ONCE
Status: COMPLETED | OUTPATIENT
Start: 2024-07-02 | End: 2024-07-02

## 2024-07-02 RX ORDER — HEPARIN SODIUM (PORCINE) LOCK FLUSH IV SOLN 100 UNIT/ML 100 UNIT/ML
5 SOLUTION INTRAVENOUS
Status: DISCONTINUED | OUTPATIENT
Start: 2024-07-02 | End: 2024-07-02 | Stop reason: HOSPADM

## 2024-07-02 RX ORDER — HEPARIN SODIUM (PORCINE) LOCK FLUSH IV SOLN 100 UNIT/ML 100 UNIT/ML
500 SOLUTION INTRAVENOUS ONCE
Status: COMPLETED | OUTPATIENT
Start: 2024-07-02 | End: 2024-07-02

## 2024-07-02 RX ORDER — EPINEPHRINE 1 MG/ML
0.3 INJECTION, SOLUTION INTRAMUSCULAR; SUBCUTANEOUS EVERY 5 MIN PRN
Status: CANCELLED | OUTPATIENT
Start: 2024-07-02

## 2024-07-02 RX ORDER — METHYLPREDNISOLONE SODIUM SUCCINATE 125 MG/2ML
125 INJECTION, POWDER, LYOPHILIZED, FOR SOLUTION INTRAMUSCULAR; INTRAVENOUS
Status: CANCELLED
Start: 2024-07-02

## 2024-07-02 RX ORDER — HEPARIN SODIUM,PORCINE 10 UNIT/ML
5-20 VIAL (ML) INTRAVENOUS DAILY PRN
Status: CANCELLED | OUTPATIENT
Start: 2024-07-02

## 2024-07-02 RX ADMIN — PACLITAXEL 250 MG: 100 INJECTION, POWDER, LYOPHILIZED, FOR SUSPENSION INTRAVENOUS at 10:08

## 2024-07-02 RX ADMIN — Medication 5 ML: at 11:15

## 2024-07-02 RX ADMIN — Medication 500 UNITS: at 07:12

## 2024-07-02 RX ADMIN — ONDANSETRON 8 MG: 2 INJECTION INTRAMUSCULAR; INTRAVENOUS at 09:27

## 2024-07-02 RX ADMIN — SODIUM CHLORIDE 250 ML: 9 INJECTION, SOLUTION INTRAVENOUS at 10:08

## 2024-07-02 RX ADMIN — GEMCITABINE 2000 MG: 38 INJECTION, SOLUTION INTRAVENOUS at 10:45

## 2024-07-02 ASSESSMENT — PAIN SCALES - GENERAL: PAINLEVEL: NO PAIN (0)

## 2024-07-02 NOTE — LETTER
7/2/2024      Gallo Navarro  [address] [address] [place] [id number]      Dear Colleague,    Thank you for referring your patient, Gallo Navarro, to the [facility]. Please see a copy of my visit note below.    Oncology/Hematology Visit Note  7/2/2024     Reason for Visit: follow-up of Locally Advanced- Unresectable Pancreatic Caner    Oncology HPI:   -NGS: KRAS G12R  Immuno: pMMR, KAYLIE, TMB-low  Clinical Trial: MARIPOSA-186, MARIPOSA-280, TORL    - 3/2023: presented with acute pancreatitis  c/b chronic pancreatitis with pancreatic mass causing duodenal stenosis with gastric outlet obstruction s/p GJ tube (placed 5/2023), biliary obstruction s/p stent placement on 7/7/23, pancreatic insufficiency, and IDDM2.  -  He then presented to the ED with worsening abdominal pain, increased jaundice, poor PO intake and weight loss admitted on 7/8/2023 for concern of biliary obstruction.   - 7/12/2023: Underwent biopsy of pancreatic mass returned positive for pancreatic adenocarcinoma.   - 7/31/2023: He started on treatment with 5FU, irinotecan, and oxaliplatin (FOLFIRINOX). Please see previous notes for further details on the patient's history.      8/17/23 - ERCP/EGD, duodenal stents x2 placed, exchange of GJ tube     8/21-/8/25 hospitalized due to diarrhea, colitis, abdominal pain.       8/29 - Cycle 3 deferred due to neutropenia.   Neulasta added. Irinotecan dose reduced 20% due to symptoms including cramping, double vision and slurred speech during infusion.       10/24/23 CT CAP with similar to slight increase in size of peripancreatic and mesenteric lymph nodes, enlargement of previous skeletal metastasis as well as new sclerotic metastasis to left posterior 5th rib, enlarging pulmonary nodule, and unchanged pancreatic head mass. Also unclear concerns for PE,  right lower lobe segmental PE confirmed on CT-PE. Started on apixaban.      10/31/23 Cycle 1 gemzar, abraxane  11/7/23 Cycle 1 Zometa  11/22/23  Removal of GJ tube.   11/29/23 Completed palliative radiation to T10   12/13/23 C3D1 gem/abraxane  12/29/23 Consults at [facility]  1/23-1/26 Consults at [facility]   1/30/24: C4D1 gem/abraxane   3/24: CT CAP with overall stable disease with isolated progression in potential L4 lesion. Referral for radiation oncology.    4/29-5/1: Consults at [facility] for possible cellular therapy, CT guided biopsy of left pubic bone, recommendation to return to [facility] upon progression of current treatment   5/23/24: Cardiology consult at [facility]. CT with small pericardial effusion   6/6/2024: Repeat ECHO on with EF of 55-60%, no pericardial effusion present  - Cycle 8 deferred due to infection concerns and subsequently tested positive for COVID 6/25/24, resumed treatment on 7/2/2024      Day 8 eliminated after Cycle 5 [place]/Abraxane.      4/2/2024-present: Gemcitabine/Abraxane, Days 1 and 15 only; Zometa every 3 months       Interval history:   Gallo is a 58 year old male who presents to clinic for follow up after recent COVID infection.  C8D1 has been on hold since early June due to fevers and cough, tested positive for COVID on 6/25/2024. He reports that he is feeling great today.     He reports that his symptoms began in early June with fever, and cough. Now, only  a slight cough without discolored sputum. He reports that he had symptoms/discomfort as a result of COVID illness. Denies hemoptysis, shortness of breath or chest pain.     He is eating well, and supplementing with premiere protein. Fatigue and neuropathy have improved, attributes being off of treatment for a month.     Bone pain managed with Butrans patch and hydromorphone 2 mg, taking approximately 8 tablets daily. Radiation to L4 remains on hold per patient.    He is not certain why Day 8 of his treatment cycle was dropped, but assumes it was due to his response to treatment.  He states that it was not related to neuropathy. He feels that treatment is going well. He  is interested in participating in clinical trial at [facility].     He has podiatry appointment on 7/17/2024 for bilateral great toe, toenail discoloration, ongoing problem.     Denies headaches, dizziness or vision changes. Denies abdominal pain, constipation or diarrhea. Denies dark stools or hematochezia. Denies N/V. Denies new bone pain.    Review of Systems: See interval hx. Denies fevers, chills,  changes in urination, bleeding, bruising, rash.    Current Outpatient Medications   Medication Sig Dispense Refill     acetaminophen (TYLENOL) 32 mg/mL liquid Take 20.313 mLs (650 mg) by mouth every 8 hours 1800 mL 1     apixaban ANTICOAGULANT (ELIQUIS) 5 MG tablet Take 1 tablet (5 mg) by mouth 2 times daily 60 tablet 2     blood glucose (FREESTYLE TEST STRIPS) test strip by Other route as needed       buprenorphine (BUTRANS) 10 MCG/HR WK patch Place 1 patch onto the skin every 7 days Use with 20 mcg/hour patch for 30 mcg/hour total. 4 patch 3     buprenorphine (BUTRANS) 20 MCG/HR WK patch Place 1 patch onto the skin once a week Use with 10 mcg/hour patch for 30 mcg/hour total. 4 patch 3     dicyclomine (BENTYL) 10 MG capsule Take 1 capsule (10 mg) by mouth 4 times daily (before meals and nightly) 120 capsule 1     HYDROmorphone (DILAUDID) 2 MG tablet Take 1-2 tablets (2-4 mg) by mouth every 4 hours as needed for severe pain or breakthrough pain 180 tablet 0     lidocaine-prilocaine (EMLA) 2.5-2.5 % external cream Use 1-2 times a week or as needed prior to port access (Patient not taking: Reported on 5/8/2024) 30 g 3     lipase-protease-amylase (CREON 24) 43574-11953-262047 units CPEP per EC capsule 2-3 capsules with meals 3 times a day and 1-2 capsules with snacks max of 15 capsules a day 200 capsule 11     methylphenidate (RITALIN) 5 MG tablet Take 1 tablet (5 mg) by mouth 2 times daily as needed (Patient not taking: Reported on 3/12/2024) 10 tablet 0     Multiple Vitamins-Minerals (CENTRUM SILVER 50+MEN) TABS as  directed Orally       pantoprazole (PROTONIX) 40 MG EC tablet Take 1 tablet (40 mg) by mouth 2 times daily 60 tablet 4     prochlorperazine (COMPAZINE) 10 MG tablet Take 1 tablet (10 mg) by mouth every 6 hours as needed for nausea or vomiting 30 tablet 2     vitamin D3 (CHOLECALCIFEROL) 50 mcg (2000 units) tablet Take 1 tablet (50 mcg) by mouth daily 90 tablet 1        No Known Allergies      Exam: Visit Vitals  /69 (BP Location: Right arm, Patient Position: Sitting, Cuff Size: Adult Regular)   Pulse 67   Temp 98.5  F (36.9  C) (Oral)   Resp 16       Wt Readings from Last 4 Encounters:   06/27/24 72.6 kg (160 lb)   06/25/24 72.2 kg (159 lb 3.2 oz)   05/28/24 75 kg (165 lb 4.8 oz)   05/23/24 72.6 kg (160 lb)     Constitutional: Pleasant male in no acute distress.  Eyes: No scleral icterus. No conjunctival erythema or discharge  Cardiovascular: Regular rate and rhythm. No murmurs, gallops, or rubs. No peripheral edema.  Respiratory: Clear to auscultation bilaterally. No wheezes or crackles.  Gastrointestinal: Bowel sounds present. Abdomen soft, non-tender. No palpable hepatosplenomegaly or masses.   MSK: moving all extremities freely; negative for C/T/L bony tenderness  Skin: no visible lesions, rashes or bruising. Bilateral great toe, toenails with yellow discoloration and thickening  Psych: appropriate mood and affect    Labs:    Latest Reference Range & Units 07/02/24 07:19   Sodium 135 - 145 mmol/L 141   Potassium 3.4 - 5.3 mmol/L 4.0   Chloride 98 - 107 mmol/L 104   Carbon Dioxide (CO2) 22 - 29 mmol/L 26   Urea Nitrogen 6.0 - 20.0 mg/dL 14.3   Creatinine 0.67 - 1.17 mg/dL 0.65 (L)   GFR Estimate >60 mL/min/1.73m2 >90   Calcium 8.6 - 10.0 mg/dL 9.0   Anion Gap 7 - 15 mmol/L 11   Albumin 3.5 - 5.2 g/dL 3.8   Protein Total 6.4 - 8.3 g/dL 7.0   Alkaline Phosphatase 40 - 150 U/L 145   ALT 0 - 70 U/L 39   AST 0 - 45 U/L 32   Bilirubin Total <=1.2 mg/dL 0.2   Glucose 70 - 99 mg/dL 171 (H)   WBC 4.0 - 11.0 10e3/uL  5.4   Hemoglobin 13.3 - 17.7 g/dL 10.0 (L)   Hematocrit 40.0 - 53.0 % 32.3 (L)   Platelet Count 150 - 450 10e3/uL 240   RBC Count 4.40 - 5.90 10e6/uL 3.56 (L)   MCV 78 - 100 fL 91   MCH 26.5 - 33.0 pg 28.1   MCHC 31.5 - 36.5 g/dL 31.0 (L)   RDW 10.0 - 15.0 % 15.4 (H)   % Neutrophils % 57   % Lymphocytes % 25   % Monocytes % 11   % Eosinophils % 5   % Basophils % 1   Absolute Basophils 0.0 - 0.2 10e3/uL 0.1   Absolute Eosinophils 0.0 - 0.7 10e3/uL 0.3   Absolute Immature Granulocytes <=0.4 10e3/uL 0.0   Absolute Lymphocytes 0.8 - 5.3 10e3/uL 1.4   Absolute Monocytes 0.0 - 1.3 10e3/uL 0.6   % Immature Granulocytes % 1   Absolute Neutrophils 1.6 - 8.3 10e3/uL 3.1   Absolute NRBCs 10e3/uL 0.0   NRBCs per 100 WBC <1 /100 0       Imaging:   na    Impression/plan:  Gallo is a 58 year old male with unresectable Pancreatic Cancer, currently under treatment of Gemcitabine/Abraxane. C8D1 deferred one week due to concerns of infection, COVID positive on 6/25/2024. He is here today to resume treatment, C8D1.    Unresectable Pancreatic Cancer  7/31/23 began palliative treatment on FOLFIRINOX  CT CAP 10/24/23 with progression in skeletal mets, lung nodule and lymph nodes; stable pancreatic mass  - 10/31/2023: Treatment was changed to Gemcitabine and Abraxane (Day 1, 8, 15) + Zometa every 3 months  - 03/2024 CT CAP with progression of L4 lesion.   - Day 8 eliminated after Cycle 5 [place]/Abraxane  - Currently on gemcitabine/abraxane Days 1 and 15, and Zometa every 3 months.  - In the setting of recent COVID infection would be hesitant to reinitiate treatment, however, he is feeling well, is afebrile, with no concerns for infection.  His test was positive last week, but symptoms started the week prior.  He is fully recovered.  Labs meet treatment parameters-ok to proceed with C8D1 today  - 7/16/24 C8D15 gemcitabine/abraxane as scheduled  - 7/30/24 labs, infusion and follow up with Megan as scheduled  - 8/13/24 labs and infusion as  scheduled, + Zometa  - appears to be due for restaging, will message Megan Farrell and Dr. Haile regarding this plan.    Anemia-normocytic  Hgb 10.8 today, which is stable; in setting of chemotherapy and chronic disease  -will continue to monitor    Skeletal Mets  Negative for C/T/L spine tenderness on physical exam  - Radiation to T10 completed 11/29/23  - Discussion to irradiate L4 lesion noted on CT dated 10/24/2023, however, on hold due to lack of symptoms  - CT chest 5/23/24 unchanged sclerotic bone metastases  - Managing pain with Butrans Patch and hydromorphone 2 mg, 8 tablets daily  - He has a follow up with Radiation Oncology scheduled for 9/24/2024  - will continue to monitor symptoms    Bone Health  Zometa 4 mg every 3 months, last infusion 5/24/2024  - per chart review, Dr. Haile note 3/15/2024: consider use of denosumab instead of bisphosphonate, given the evidence for potentially superior efficacy. At this time, he is tolerating this treatment extremely well now, and has well-controlled skeletal mets. Continue to consider his skeletal treatment options as we move forward.   - Next Zometa due  8/13/2024. Please note the therapy plan is entered as an endocrine plan with 6 month intervals. For bone mets, we typically give every 3 months.    Neuropathy  Reports improvement while on treatment break  Referred to PT, but uncertain if he will utilize their services    COVID  COVID positive 6/25/2024  Afebrile; Mild symptoms; cough is resolving    Pericardial Effusion  Found on CT 4/20/24 at [facility], followed by TTE with moderate pericardial effusion without tamponade.  CT 5/23/24 small pericardial effusion  5/23/24: Cardiology consult at [facility]- Echo 6/6/2024 with EF 55-60%, LV strain -20.5% which is normal, no pericardial effusion  - repeat echo at [facility] in the future, TTE scheduled for 7/2024.  - He has been cautioned by Cardiology s/s of cardiac tamponade  -following cardiology as  needed    Pulmonary Embolism  Right lower segmental PE found on routine imaging  Denies shortness of breath  Taking Apixiban daily    Nutrition  GJ tube removed 11/22/2023  Weight is stable; 160 lbs. today  Reports that he is eating multiple small meals and supplementing with Premiere Protein    LE edema  Not present on exam today; previously instructed to wear compression stockings as needed  - will continue to monitor    Fatigue  Reports that he is experiencing more energy since  treatment has been on hold for the past month    Diarrhea/Constipation  resolved    Discoloration of bilateral Great toenails  Yellow and thickened bilateral great toe nails without discharge, edema or erythema  -has podiatry appointment 7/17/2024      Sosa Grover PA-C      The patient was seen in conjunction with Sosa Grover PA-C who served as a scribe for today's visit. I have reviewed the note and agree with the above findings and plan.      Again, thank you for allowing me to participate in the care of your patient.        Sincerely,        ESVIN De Santiago CNP

## 2024-07-02 NOTE — PROGRESS NOTES
Oncology/Hematology Visit Note  7/2/2024     Reason for Visit: follow-up of Locally Advanced- Unresectable Pancreatic Caner    Oncology HPI:   -NGS: KRAS G12R  Immuno: pMMR, KAYLIE, TMB-low  Clinical Trial: MARIPOSA-186, MARIPOSA-280, TORL    - 3/2023: presented with acute pancreatitis  c/b chronic pancreatitis with pancreatic mass causing duodenal stenosis with gastric outlet obstruction s/p GJ tube (placed 5/2023), biliary obstruction s/p stent placement on 7/7/23, pancreatic insufficiency, and IDDM2.  -  He then presented to the ED with worsening abdominal pain, increased jaundice, poor PO intake and weight loss admitted on 7/8/2023 for concern of biliary obstruction.   - 7/12/2023: Underwent biopsy of pancreatic mass returned positive for pancreatic adenocarcinoma.   - 7/31/2023: He started on treatment with 5FU, irinotecan, and oxaliplatin (FOLFIRINOX). Please see previous notes for further details on the patient's history.      8/17/23 - ERCP/EGD, duodenal stents x2 placed, exchange of GJ tube     8/21-/8/25 hospitalized due to diarrhea, colitis, abdominal pain.       8/29 - Cycle 3 deferred due to neutropenia.   Neulasta added. Irinotecan dose reduced 20% due to symptoms including cramping, double vision and slurred speech during infusion.       10/24/23 CT CAP with similar to slight increase in size of peripancreatic and mesenteric lymph nodes, enlargement of previous skeletal metastasis as well as new sclerotic metastasis to left posterior 5th rib, enlarging pulmonary nodule, and unchanged pancreatic head mass. Also unclear concerns for PE,  right lower lobe segmental PE confirmed on CT-PE. Started on apixaban.      10/31/23 Cycle 1 gemzar, abraxane  11/7/23 Cycle 1 Zometa  11/22/23 Removal of GJ tube.   11/29/23 Completed palliative radiation to T10   12/13/23 C3D1 gem/abraxane  12/29/23 Consults at [facility]  1/23-1/26 Consults at [facility]   1/30/24: C4D1 gem/abraxane   3/24: CT CAP with overall stable disease with  isolated progression in potential L4 lesion. Referral for radiation oncology.    4/29-5/1: Consults at [facility] for possible cellular therapy, CT guided biopsy of left pubic bone, recommendation to return to [facility] upon progression of current treatment   5/23/24: Cardiology consult at [facility]. CT with small pericardial effusion   6/6/2024: Repeat ECHO on with EF of 55-60%, no pericardial effusion present  - Cycle 8 deferred due to infection concerns and subsequently tested positive for COVID 6/25/24, resumed treatment on 7/2/2024      Day 8 eliminated after Cycle 5 [place]/Abraxane.      4/2/2024-present: Gemcitabine/Abraxane, Days 1 and 15 only; Zometa every 3 months       Interval history:   Gallo is a 58 year old male who presents to clinic for follow up after recent COVID infection.  C8D1 has been on hold since early June due to fevers and cough, tested positive for COVID on 6/25/2024. He reports that he is feeling great today.     He reports that his symptoms began in early June with fever, and cough. Now, only  a slight cough without discolored sputum. He reports that he had symptoms/discomfort as a result of COVID illness. Denies hemoptysis, shortness of breath or chest pain.     He is eating well, and supplementing with premiere protein. Fatigue and neuropathy have improved, attributes being off of treatment for a month.     Bone pain managed with Butrans patch and hydromorphone 2 mg, taking approximately 8 tablets daily. Radiation to L4 remains on hold per patient.    He is not certain why Day 8 of his treatment cycle was dropped, but assumes it was due to his response to treatment.  He states that it was not related to neuropathy. He feels that treatment is going well. He is interested in participating in clinical trial at [facility].     He has podiatry appointment on 7/17/2024 for bilateral great toe, toenail discoloration, ongoing problem.     Denies headaches, dizziness or vision changes. Denies  abdominal pain, constipation or diarrhea. Denies dark stools or hematochezia. Denies N/V. Denies new bone pain.    Review of Systems: See interval hx. Denies fevers, chills,  changes in urination, bleeding, bruising, rash.    Current Outpatient Medications   Medication Sig Dispense Refill    acetaminophen (TYLENOL) 32 mg/mL liquid Take 20.313 mLs (650 mg) by mouth every 8 hours 1800 mL 1    apixaban ANTICOAGULANT (ELIQUIS) 5 MG tablet Take 1 tablet (5 mg) by mouth 2 times daily 60 tablet 2    blood glucose (FREESTYLE TEST STRIPS) test strip by Other route as needed      buprenorphine (BUTRANS) 10 MCG/HR WK patch Place 1 patch onto the skin every 7 days Use with 20 mcg/hour patch for 30 mcg/hour total. 4 patch 3    buprenorphine (BUTRANS) 20 MCG/HR WK patch Place 1 patch onto the skin once a week Use with 10 mcg/hour patch for 30 mcg/hour total. 4 patch 3    dicyclomine (BENTYL) 10 MG capsule Take 1 capsule (10 mg) by mouth 4 times daily (before meals and nightly) 120 capsule 1    HYDROmorphone (DILAUDID) 2 MG tablet Take 1-2 tablets (2-4 mg) by mouth every 4 hours as needed for severe pain or breakthrough pain 180 tablet 0    lidocaine-prilocaine (EMLA) 2.5-2.5 % external cream Use 1-2 times a week or as needed prior to port access (Patient not taking: Reported on 5/8/2024) 30 g 3    lipase-protease-amylase (CREON 24) 15011-24814-862137 units CPEP per EC capsule 2-3 capsules with meals 3 times a day and 1-2 capsules with snacks max of 15 capsules a day 200 capsule 11    methylphenidate (RITALIN) 5 MG tablet Take 1 tablet (5 mg) by mouth 2 times daily as needed (Patient not taking: Reported on 3/12/2024) 10 tablet 0    Multiple Vitamins-Minerals (CENTRUM SILVER 50+MEN) TABS as directed Orally      pantoprazole (PROTONIX) 40 MG EC tablet Take 1 tablet (40 mg) by mouth 2 times daily 60 tablet 4    prochlorperazine (COMPAZINE) 10 MG tablet Take 1 tablet (10 mg) by mouth every 6 hours as needed for nausea or vomiting 30  tablet 2    vitamin D3 (CHOLECALCIFEROL) 50 mcg (2000 units) tablet Take 1 tablet (50 mcg) by mouth daily 90 tablet 1        No Known Allergies      Exam: Visit Vitals  /69 (BP Location: Right arm, Patient Position: Sitting, Cuff Size: Adult Regular)   Pulse 67   Temp 98.5  F (36.9  C) (Oral)   Resp 16       Wt Readings from Last 4 Encounters:   06/27/24 72.6 kg (160 lb)   06/25/24 72.2 kg (159 lb 3.2 oz)   05/28/24 75 kg (165 lb 4.8 oz)   05/23/24 72.6 kg (160 lb)     Constitutional: Pleasant male in no acute distress.  Eyes: No scleral icterus. No conjunctival erythema or discharge  Cardiovascular: Regular rate and rhythm. No murmurs, gallops, or rubs. No peripheral edema.  Respiratory: Clear to auscultation bilaterally. No wheezes or crackles.  Gastrointestinal: Bowel sounds present. Abdomen soft, non-tender. No palpable hepatosplenomegaly or masses.   MSK: moving all extremities freely; negative for C/T/L bony tenderness  Skin: no visible lesions, rashes or bruising. Bilateral great toe, toenails with yellow discoloration and thickening  Psych: appropriate mood and affect    Labs:    Latest Reference Range & Units 07/02/24 07:19   Sodium 135 - 145 mmol/L 141   Potassium 3.4 - 5.3 mmol/L 4.0   Chloride 98 - 107 mmol/L 104   Carbon Dioxide (CO2) 22 - 29 mmol/L 26   Urea Nitrogen 6.0 - 20.0 mg/dL 14.3   Creatinine 0.67 - 1.17 mg/dL 0.65 (L)   GFR Estimate >60 mL/min/1.73m2 >90   Calcium 8.6 - 10.0 mg/dL 9.0   Anion Gap 7 - 15 mmol/L 11   Albumin 3.5 - 5.2 g/dL 3.8   Protein Total 6.4 - 8.3 g/dL 7.0   Alkaline Phosphatase 40 - 150 U/L 145   ALT 0 - 70 U/L 39   AST 0 - 45 U/L 32   Bilirubin Total <=1.2 mg/dL 0.2   Glucose 70 - 99 mg/dL 171 (H)   WBC 4.0 - 11.0 10e3/uL 5.4   Hemoglobin 13.3 - 17.7 g/dL 10.0 (L)   Hematocrit 40.0 - 53.0 % 32.3 (L)   Platelet Count 150 - 450 10e3/uL 240   RBC Count 4.40 - 5.90 10e6/uL 3.56 (L)   MCV 78 - 100 fL 91   MCH 26.5 - 33.0 pg 28.1   MCHC 31.5 - 36.5 g/dL 31.0 (L)   RDW  10.0 - 15.0 % 15.4 (H)   % Neutrophils % 57   % Lymphocytes % 25   % Monocytes % 11   % Eosinophils % 5   % Basophils % 1   Absolute Basophils 0.0 - 0.2 10e3/uL 0.1   Absolute Eosinophils 0.0 - 0.7 10e3/uL 0.3   Absolute Immature Granulocytes <=0.4 10e3/uL 0.0   Absolute Lymphocytes 0.8 - 5.3 10e3/uL 1.4   Absolute Monocytes 0.0 - 1.3 10e3/uL 0.6   % Immature Granulocytes % 1   Absolute Neutrophils 1.6 - 8.3 10e3/uL 3.1   Absolute NRBCs 10e3/uL 0.0   NRBCs per 100 WBC <1 /100 0       Imaging:   na    Impression/plan:  Gallo is a 58 year old male with unresectable Pancreatic Cancer, currently under treatment of Gemcitabine/Abraxane. C8D1 deferred one week due to concerns of infection, COVID positive on 6/25/2024. He is here today to resume treatment, C8D1.    Unresectable Pancreatic Cancer  7/31/23 began palliative treatment on FOLFIRINOX  CT CAP 10/24/23 with progression in skeletal mets, lung nodule and lymph nodes; stable pancreatic mass  - 10/31/2023: Treatment was changed to Gemcitabine and Abraxane (Day 1, 8, 15) + Zometa every 3 months  - 03/2024 CT CAP with progression of L4 lesion.   - Day 8 eliminated after Cycle 5 [place]/Abraxane  - Currently on gemcitabine/abraxane Days 1 and 15, and Zometa every 3 months.  - In the setting of recent COVID infection would be hesitant to reinitiate treatment, however, he is feeling well, is afebrile, with no concerns for infection.  His test was positive last week, but symptoms started the week prior.  He is fully recovered.  Labs meet treatment parameters-ok to proceed with C8D1 today  - 7/16/24 C8D15 gemcitabine/abraxane as scheduled  - 7/30/24 labs, infusion and follow up with Megan as scheduled  - 8/13/24 labs and infusion as scheduled, + Zometa  - appears to be due for restaging, will message Megan Farrell and Dr. aHile regarding this plan.    Anemia-normocytic  Hgb 10.8 today, which is stable; in setting of chemotherapy and chronic disease  -will continue to  monitor    Skeletal Mets  Negative for C/T/L spine tenderness on physical exam  - Radiation to T10 completed 11/29/23  - Discussion to irradiate L4 lesion noted on CT dated 10/24/2023, however, on hold due to lack of symptoms  - CT chest 5/23/24 unchanged sclerotic bone metastases  - Managing pain with Butrans Patch and hydromorphone 2 mg, 8 tablets daily  - He has a follow up with Radiation Oncology scheduled for 9/24/2024  - will continue to monitor symptoms    Bone Health  Zometa 4 mg every 3 months, last infusion 5/24/2024  - per chart review, Dr. Haile note 3/15/2024: consider use of denosumab instead of bisphosphonate, given the evidence for potentially superior efficacy. At this time, he is tolerating this treatment extremely well now, and has well-controlled skeletal mets. Continue to consider his skeletal treatment options as we move forward.   - Next Zometa due  8/13/2024. Please note the therapy plan is entered as an endocrine plan with 6 month intervals. For bone mets, we typically give every 3 months.    Neuropathy  Reports improvement while on treatment break  Referred to PT, but uncertain if he will utilize their services    COVID  COVID positive 6/25/2024  Afebrile; Mild symptoms; cough is resolving    Pericardial Effusion  Found on CT 4/20/24 at [facility], followed by TTE with moderate pericardial effusion without tamponade.  CT 5/23/24 small pericardial effusion  5/23/24: Cardiology consult at [facility]- Echo 6/6/2024 with EF 55-60%, LV strain -20.5% which is normal, no pericardial effusion  - repeat echo at [facility] in the future, TTE scheduled for 7/2024.  - He has been cautioned by Cardiology s/s of cardiac tamponade  -following cardiology as needed    Pulmonary Embolism  Right lower segmental PE found on routine imaging  Denies shortness of breath  Taking Apixiban daily    Nutrition  GJ tube removed 11/22/2023  Weight is stable; 160 lbs. today  Reports that he is eating multiple small meals  and supplementing with Premiere Protein    LE edema  Not present on exam today; previously instructed to wear compression stockings as needed  - will continue to monitor    Fatigue  Reports that he is experiencing more energy since  treatment has been on hold for the past month    Diarrhea/Constipation  resolved    Discoloration of bilateral Great toenails  Yellow and thickened bilateral great toe nails without discharge, edema or erythema  -has podiatry appointment 7/17/2024      Sosa Grover PA-C      The patient was seen in conjunction with Sosa Grover PA-C who served as a scribe for today's visit. I have reviewed the note and agree with the above findings and plan.

## 2024-07-02 NOTE — PROGRESS NOTES
Infusion Nursing Note:  Gallo Navarro presents today for Day 1 Cycle 8 Vilma Shane  Patient seen by provider today: Yes: Nola Quintanilla CNP   present during visit today: Not Applicable.    Note: Patient presents to infusion feeling ok. Pt denies new acute discomfort and states no acute complaints or concerns not addressed during SUZETTE visit today.      Intravenous Access:  Implanted Port.    Treatment Conditions:  Lab Results   Component Value Date    HGB 10.0 (L) 07/02/2024    WBC 5.4 07/02/2024    ANEU 2.2 12/26/2023    ANEUTAUTO 3.1 07/02/2024     07/02/2024        Lab Results   Component Value Date     07/02/2024    POTASSIUM 4.0 07/02/2024    MAG 1.9 08/23/2023    CR 0.65 (L) 07/02/2024    DENISE 9.0 07/02/2024    BILITOTAL 0.2 07/02/2024    ALBUMIN 3.8 07/02/2024    ALT 39 07/02/2024    AST 32 07/02/2024       Results reviewed, labs MET treatment parameters, ok to proceed with treatment.      Post Infusion Assessment:  Patient tolerated infusion without incident.  Blood return noted pre and post infusion.  Site patent and intact, free from redness, edema or discomfort.  No evidence of extravasations.  Access discontinued per protocol.       Discharge Plan:   Patient declined prescription refills.  Discharge instructions reviewed with: Patient.  Patient and/or family verbalized understanding of discharge instructions and all questions answered.  AVS to patient via [employer].  Patient will return 7/16 for next appointment.   Patient discharged in stable condition accompanied by: self.  Departure Mode: Ambulatory.      Perez Acevedo RN

## 2024-07-02 NOTE — NURSING NOTE
Oncology Rooming Note    July 2, 2024 7:31 AM   Gallo Navarro is a 58 year old male who presents for:    Chief Complaint   Patient presents with    Oncology Clinic Visit     Pancreatic Cancer    Port Draw     Labs drawn via port by RN in lab     Initial Vitals: /69 (BP Location: Right arm, Patient Position: Sitting, Cuff Size: Adult Regular)   Pulse 67   Temp 98.5  F (36.9  C) (Oral)   Resp 16   SpO2 98%  Estimated body mass index is 21.7 kg/m  as calculated from the following:    Height as of 6/27/24: 1.829 m (6').    Weight as of 6/27/24: 72.6 kg (160 lb). There is no height or weight on file to calculate BSA.  No Pain (0) Comment: Data Unavailable   No LMP for male patient.  Allergies reviewed: Yes  Medications reviewed: Yes    Medications: Medication refills not needed today.  Pharmacy name entered into EPIC:    [facility] - [place], [place] - [address] [facility] - [place], [place] - [phone]  [facility] [place] [facility] [address] [facility] AT PORTAL TO REGISTERED [facility] SITES  [facility] - [place], [place] - [address]  [facility] [id number] [facility] [employer] - [place], [place] - [address]    Frailty Screening:   Is the patient here for a new oncology consult visit in cancer care? 2. No      Clinical concerns: None       Tran Mitchell LPN  7/2/2024

## 2024-07-02 NOTE — NURSING NOTE
Chief Complaint   Patient presents with    Oncology Clinic Visit     Pancreatic Cancer    Port Draw     Labs drawn via port by RN in lab     Port accessed with 20 gauge, 3/4 inch flat needle by RN, labs collected, line flushed with saline and heparin.  Vitals taken. Pt checked in for appointment(s).     Erica Baez RN

## 2024-07-02 NOTE — PATIENT INSTRUCTIONS
[facility] Triage and after hours / weekends / holidays:  [phone]    Please call the triage or after hours line if you experience a temperature greater than or equal to 100.4, shaking chills, have uncontrolled nausea, vomiting and/or diarrhea, dizziness, shortness of breath, chest pain, bleeding, unexplained bruising, or if you have any other new/concerning symptoms, questions or concerns.      If you are having any concerning symptoms or wish to speak to a provider before your next infusion visit, please call triage to notify them so we can adequately serve you.     If you need a refill on a narcotic prescription or other medication, please call before your infusion appointment.                 July 2024 Sunday Monday Tuesday Wednesday Thursday Friday Saturday        1     2    LAB PERIPHERAL   7:00 AM   (15 min.)   [facility] LAB DRAW   [facility]    RETURN ACTIVE TREATMENT   7:15 AM   (45 min.)   Nola Quintanilla APRN CNP   [facility]    ONC INFUSION 2 HR (120 MIN)   8:00 AM   (120 min.)    ONC INFUSION NURSE   [facility] 3     4     5     6       7     8     9     10     11     12     13       14     15     16    LAB CENTRAL   8:00 AM   (15 min.)   [facility] DRAW   [facility]    ONC INFUSION 2 HR (120 MIN)   8:30 AM   (120 min.)    ONC INFUSION NURSE   [facility] 17     18     19    NEW FOOT/ANKLE   8:45 AM   (30 min.)   Torito Ware DPM   [facility] 20       21     22     23     24     25     26     27       28     29    NEW CARDIO-ONCOLOGY  12:45 PM   (30 min.)   Fanny Chanel MD   [facility] 30    LAB CENTRAL   8:15 AM   (15 min.)   [facility] LAB DRAW   [facility]    RETURN ACTIVE TREATMENT   8:45 AM   (45 min.)   Megan Farrell APRN CNP   [facility]  [facility]    ONC INFUSION 2 HR (120 MIN)  10:30 AM   (120 min.)    ONC INFUSION NURSE   [facility] 31 August 2024 Sunday Monday Tuesday Wednesday Thursday Friday Saturday                       1     2     3       4     5     6     7     8     9     10       11     12     13    LAB CENTRAL   8:00 AM   (15 min.)   [facility] DRAW   [facility]    ONC INFUSION 2 HR (120 MIN)   8:30 AM   (120 min.)   [facility] ONC INFUSION NURSE   [facility] 14     15     16     17       18     19     20     21     22     23     24       25     26     27    LAB CENTRAL   8:00 AM   (15 min.)   [facility] DRAW   [facility]    ONC INFUSION 2 HR (120 MIN)   8:30 AM   (120 min.)    ONC INFUSION NURSE   [facility] 28     29     30     31                     Lab Results:  Recent Results (from the past 12 hour(s))   Comprehensive metabolic panel    Collection Time: 07/02/24  7:19 AM   Result Value Ref Range    Sodium 141 135 - 145 mmol/L    Potassium 4.0 3.4 - 5.3 mmol/L    Carbon Dioxide (CO2) 26 22 - 29 mmol/L    Anion Gap 11 7 - 15 mmol/L    Urea Nitrogen 14.3 6.0 - 20.0 mg/dL    Creatinine 0.65 (L) 0.67 - 1.17 mg/dL    GFR Estimate >90 >60 mL/min/1.73m2    Calcium 9.0 8.6 - 10.0 mg/dL    Chloride 104 98 - 107 mmol/L    Glucose 171 (H) 70 - 99 mg/dL    Alkaline Phosphatase 145 40 - 150 U/L    AST 32 0 - 45 U/L    ALT 39 0 - 70 U/L    Protein Total 7.0 6.4 - 8.3 g/dL    Albumin 3.8 3.5 - 5.2 g/dL    Bilirubin Total 0.2 <=1.2 mg/dL   CBC with platelets and differential    Collection Time: 07/02/24  7:19 AM   Result Value Ref Range    WBC Count 5.4 4.0 - 11.0 10e3/uL    RBC Count 3.56 (L) 4.40 - 5.90 10e6/uL    Hemoglobin 10.0 (L) 13.3 - 17.7 g/dL    Hematocrit 32.3 (L) 40.0 - 53.0 %    MCV 91 78 - 100 fL    MCH 28.1 26.5 - 33.0 pg    MCHC 31.0 (L)  31.5 - 36.5 g/dL    RDW 15.4 (H) 10.0 - 15.0 %    Platelet Count 240 150 - 450 10e3/uL    % Neutrophils 57 %    % Lymphocytes 25 %    % Monocytes 11 %    % Eosinophils 5 %    % Basophils 1 %    % Immature Granulocytes 1 %    NRBCs per 100 WBC 0 <1 /100    Absolute Neutrophils 3.1 1.6 - 8.3 10e3/uL    Absolute Lymphocytes 1.4 0.8 - 5.3 10e3/uL    Absolute Monocytes 0.6 0.0 - 1.3 10e3/uL    Absolute Eosinophils 0.3 0.0 - 0.7 10e3/uL    Absolute Basophils 0.1 0.0 - 0.2 10e3/uL    Absolute Immature Granulocytes 0.0 <=0.4 10e3/uL    Absolute NRBCs 0.0 10e3/uL

## 2024-07-03 ENCOUNTER — MYC REFILL (OUTPATIENT)
Dept: ONCOLOGY | Facility: CLINIC | Age: 59
End: 2024-07-03
Payer: COMMERCIAL

## 2024-07-03 ENCOUNTER — MYC REFILL (OUTPATIENT)
Dept: RADIATION ONCOLOGY | Facility: CLINIC | Age: 59
End: 2024-07-03
Payer: COMMERCIAL

## 2024-07-03 DIAGNOSIS — C25.9 PANCREATIC ADENOCARCINOMA (H): ICD-10-CM

## 2024-07-03 DIAGNOSIS — E55.9 VITAMIN D DEFICIENCY: ICD-10-CM

## 2024-07-03 DIAGNOSIS — R10.30 LOWER ABDOMINAL PAIN: ICD-10-CM

## 2024-07-03 LAB — CANCER AG19-9 SERPL IA-ACNC: 7 U/ML

## 2024-07-03 RX ORDER — CHOLECALCIFEROL (VITAMIN D3) 50 MCG
1 TABLET ORAL DAILY
Qty: 90 TABLET | Refills: 1 | Status: SHIPPED | OUTPATIENT
Start: 2024-07-03

## 2024-07-03 RX ORDER — HYDROMORPHONE HYDROCHLORIDE 2 MG/1
2-4 TABLET ORAL EVERY 4 HOURS PRN
Qty: 180 TABLET | Refills: 0 | Status: SHIPPED | OUTPATIENT
Start: 2024-07-03 | End: 2024-07-26

## 2024-07-03 NOTE — CONFIDENTIAL NOTE
Vitamin D3 and tylenol Refill   Last prescribing provider: Dr Haile     Last clinic visit date: 7/2/24 Nola Quintanilla     Recommendations for requested medication (if none, N/A): Copied from chart note   Vitamin D3- N/A  Tylenol -N/A    Any other pertinent information (if none, N/A): N/A    Refilled: Y/N, if NO, why?

## 2024-07-03 NOTE — TELEPHONE ENCOUNTER
Received CR2t message from patient requesting refill of hydromorphone.     Last refill: 6/3/24  Last office visit: 6/27/24  Scheduled for follow up 9/24/24     Will route request to MD for review.     Reviewed [place]  Report.

## 2024-07-07 ENCOUNTER — MYC REFILL (OUTPATIENT)
Dept: ONCOLOGY | Facility: CLINIC | Age: 59
End: 2024-07-07
Payer: COMMERCIAL

## 2024-07-07 DIAGNOSIS — R10.30 LOWER ABDOMINAL PAIN: ICD-10-CM

## 2024-07-09 RX ORDER — DICYCLOMINE HYDROCHLORIDE 10 MG/1
10 CAPSULE ORAL
Qty: 120 CAPSULE | Refills: 1 | Status: SHIPPED | OUTPATIENT
Start: 2024-07-09 | End: 2024-09-09

## 2024-07-09 NOTE — TELEPHONE ENCOUNTER
Pending Prescriptions:                       Disp   Refills    dicyclomine (BENTYL) 10 MG capsule        120 ca*1            Sig: Take 1 capsule (10 mg) by mouth 4 times daily           (before meals and nightly)    Last prescribing provider: Megan Farrell    Last clinic visit date: 07/02/24 w/Nola Quintanilla    Recommendations for requested medication (if none, N/A): N/A    Any other pertinent information (if none, N/A): N/A    Refilled: Y/N, if NO, why?

## 2024-07-13 ENCOUNTER — MYC REFILL (OUTPATIENT)
Dept: RADIATION ONCOLOGY | Facility: HOSPITAL | Age: 59
End: 2024-07-13
Payer: COMMERCIAL

## 2024-07-13 DIAGNOSIS — E11.9 TYPE 2 DIABETES MELLITUS WITHOUT COMPLICATION, WITH LONG-TERM CURRENT USE OF INSULIN (H): ICD-10-CM

## 2024-07-13 DIAGNOSIS — K31.1 GASTRIC OUTLET OBSTRUCTION: ICD-10-CM

## 2024-07-13 DIAGNOSIS — C25.0 MALIGNANT NEOPLASM OF HEAD OF PANCREAS (H): ICD-10-CM

## 2024-07-13 DIAGNOSIS — Z79.4 TYPE 2 DIABETES MELLITUS WITHOUT COMPLICATION, WITH LONG-TERM CURRENT USE OF INSULIN (H): ICD-10-CM

## 2024-07-13 DIAGNOSIS — G89.3 CANCER ASSOCIATED PAIN: ICD-10-CM

## 2024-07-13 DIAGNOSIS — C25.9 PANCREATIC ADENOCARCINOMA (H): ICD-10-CM

## 2024-07-15 RX ORDER — BUPRENORPHINE 20 UG/H
1 PATCH TRANSDERMAL WEEKLY
Qty: 4 PATCH | Refills: 3 | Status: SHIPPED | OUTPATIENT
Start: 2024-07-15 | End: 2024-08-08

## 2024-07-15 RX ORDER — BUPRENORPHINE 10 UG/H
1 PATCH TRANSDERMAL
Qty: 4 PATCH | Refills: 3 | Status: SHIPPED | OUTPATIENT
Start: 2024-07-15 | End: 2024-08-08

## 2024-07-15 NOTE — TELEPHONE ENCOUNTER
Received [employer] message from patient requesting refill of Butrans.     Last refill: 6/8/24  Last office visit: 6/27/24  Scheduled for follow up 9/24/24     Will route request to MD for review.     Reviewed [place]  Report.

## 2024-07-16 ENCOUNTER — APPOINTMENT (OUTPATIENT)
Dept: LAB | Facility: CLINIC | Age: 59
End: 2024-07-16
Attending: STUDENT IN AN ORGANIZED HEALTH CARE EDUCATION/TRAINING PROGRAM
Payer: COMMERCIAL

## 2024-07-16 ENCOUNTER — INFUSION THERAPY VISIT (OUTPATIENT)
Dept: ONCOLOGY | Facility: CLINIC | Age: 59
End: 2024-07-16
Attending: STUDENT IN AN ORGANIZED HEALTH CARE EDUCATION/TRAINING PROGRAM
Payer: COMMERCIAL

## 2024-07-16 VITALS
TEMPERATURE: 98.2 F | SYSTOLIC BLOOD PRESSURE: 109 MMHG | OXYGEN SATURATION: 99 % | RESPIRATION RATE: 16 BRPM | HEART RATE: 64 BPM | WEIGHT: 161 LBS | BODY MASS INDEX: 21.84 KG/M2 | DIASTOLIC BLOOD PRESSURE: 70 MMHG

## 2024-07-16 DIAGNOSIS — Z51.11 ENCOUNTER FOR ANTINEOPLASTIC CHEMOTHERAPY: Primary | ICD-10-CM

## 2024-07-16 DIAGNOSIS — C25.0 MALIGNANT NEOPLASM OF HEAD OF PANCREAS (H): ICD-10-CM

## 2024-07-16 LAB
BASOPHILS # BLD AUTO: 0.1 10E3/UL (ref 0–0.2)
BASOPHILS NFR BLD AUTO: 1 %
EOSINOPHIL # BLD AUTO: 0.2 10E3/UL (ref 0–0.7)
EOSINOPHIL NFR BLD AUTO: 3 %
ERYTHROCYTE [DISTWIDTH] IN BLOOD BY AUTOMATED COUNT: 15.8 % (ref 10–15)
HCT VFR BLD AUTO: 31.4 % (ref 40–53)
HGB BLD-MCNC: 10 G/DL (ref 13.3–17.7)
IMM GRANULOCYTES # BLD: 0 10E3/UL
IMM GRANULOCYTES NFR BLD: 0 %
LYMPHOCYTES # BLD AUTO: 1.3 10E3/UL (ref 0.8–5.3)
LYMPHOCYTES NFR BLD AUTO: 26 %
MCH RBC QN AUTO: 28.5 PG (ref 26.5–33)
MCHC RBC AUTO-ENTMCNC: 31.8 G/DL (ref 31.5–36.5)
MCV RBC AUTO: 90 FL (ref 78–100)
MONOCYTES # BLD AUTO: 0.6 10E3/UL (ref 0–1.3)
MONOCYTES NFR BLD AUTO: 12 %
NEUTROPHILS # BLD AUTO: 2.8 10E3/UL (ref 1.6–8.3)
NEUTROPHILS NFR BLD AUTO: 58 %
NRBC # BLD AUTO: 0 10E3/UL
NRBC BLD AUTO-RTO: 0 /100
PLATELET # BLD AUTO: 226 10E3/UL (ref 150–450)
RBC # BLD AUTO: 3.51 10E6/UL (ref 4.4–5.9)
WBC # BLD AUTO: 5 10E3/UL (ref 4–11)

## 2024-07-16 PROCEDURE — 85025 COMPLETE CBC W/AUTO DIFF WBC: CPT | Performed by: STUDENT IN AN ORGANIZED HEALTH CARE EDUCATION/TRAINING PROGRAM

## 2024-07-16 PROCEDURE — 96375 TX/PRO/DX INJ NEW DRUG ADDON: CPT

## 2024-07-16 PROCEDURE — 258N000003 HC RX IP 258 OP 636: Performed by: STUDENT IN AN ORGANIZED HEALTH CARE EDUCATION/TRAINING PROGRAM

## 2024-07-16 PROCEDURE — 36591 DRAW BLOOD OFF VENOUS DEVICE: CPT | Performed by: STUDENT IN AN ORGANIZED HEALTH CARE EDUCATION/TRAINING PROGRAM

## 2024-07-16 PROCEDURE — 96413 CHEMO IV INFUSION 1 HR: CPT

## 2024-07-16 PROCEDURE — 250N000011 HC RX IP 250 OP 636: Performed by: STUDENT IN AN ORGANIZED HEALTH CARE EDUCATION/TRAINING PROGRAM

## 2024-07-16 PROCEDURE — 96417 CHEMO IV INFUS EACH ADDL SEQ: CPT

## 2024-07-16 RX ORDER — HEPARIN SODIUM,PORCINE 10 UNIT/ML
5-20 VIAL (ML) INTRAVENOUS DAILY PRN
Status: DISCONTINUED | OUTPATIENT
Start: 2024-07-16 | End: 2024-07-16 | Stop reason: HOSPADM

## 2024-07-16 RX ORDER — PACLITAXEL 100 MG/20ML
125 INJECTION, POWDER, LYOPHILIZED, FOR SUSPENSION INTRAVENOUS ONCE
Status: COMPLETED | OUTPATIENT
Start: 2024-07-16 | End: 2024-07-16

## 2024-07-16 RX ORDER — ONDANSETRON 2 MG/ML
8 INJECTION INTRAMUSCULAR; INTRAVENOUS ONCE
Status: COMPLETED | OUTPATIENT
Start: 2024-07-16 | End: 2024-07-16

## 2024-07-16 RX ORDER — HEPARIN SODIUM (PORCINE) LOCK FLUSH IV SOLN 100 UNIT/ML 100 UNIT/ML
5 SOLUTION INTRAVENOUS ONCE
Status: COMPLETED | OUTPATIENT
Start: 2024-07-16 | End: 2024-07-16

## 2024-07-16 RX ORDER — HEPARIN SODIUM (PORCINE) LOCK FLUSH IV SOLN 100 UNIT/ML 100 UNIT/ML
5 SOLUTION INTRAVENOUS
Status: DISCONTINUED | OUTPATIENT
Start: 2024-07-16 | End: 2024-07-16 | Stop reason: HOSPADM

## 2024-07-16 RX ADMIN — ONDANSETRON 8 MG: 2 INJECTION INTRAMUSCULAR; INTRAVENOUS at 09:03

## 2024-07-16 RX ADMIN — SODIUM CHLORIDE 250 ML: 9 INJECTION, SOLUTION INTRAVENOUS at 09:02

## 2024-07-16 RX ADMIN — HEPARIN 5 ML: 100 SYRINGE at 10:37

## 2024-07-16 RX ADMIN — GEMCITABINE 2000 MG: 38 INJECTION, SOLUTION INTRAVENOUS at 10:37

## 2024-07-16 RX ADMIN — PACLITAXEL 250 MG: 100 INJECTION, POWDER, LYOPHILIZED, FOR SUSPENSION INTRAVENOUS at 10:02

## 2024-07-16 RX ADMIN — Medication 5 ML: at 08:26

## 2024-07-16 ASSESSMENT — PAIN SCALES - GENERAL: PAINLEVEL: NO PAIN (0)

## 2024-07-16 NOTE — PROGRESS NOTES
Infusion Nursing Note:  Gallo Navarro presents today for C8D15 Abraxane-Gemzar.    Patient seen by provider today: No   present during visit today: Not Applicable.    Note: Patient denies the following: fevers, body aches, chills, headaches, vision changes, dizziness, chest pain, shortness of breath, nausea, vomiting, constipation, abdominal pain, rashes, bruising/bleeding, mouth sores, swelling or pain in the legs. Gallo agrees to treatment today.    -last Zometa 5/14/24  -given every 3 months per order, due 8/12    Intravenous Access:  Implanted Port.    Treatment Conditions:   Latest Reference Range & Units 07/16/24 08:35   WBC 4.0 - 11.0 10e3/uL 5.0   Hemoglobin 13.3 - 17.7 g/dL 10.0 (L)   Hematocrit 40.0 - 53.0 % 31.4 (L)   Platelet Count 150 - 450 10e3/uL 226   RBC Count 4.40 - 5.90 10e6/uL 3.51 (L)   MCV 78 - 100 fL 90   MCH 26.5 - 33.0 pg 28.5   MCHC 31.5 - 36.5 g/dL 31.8   RDW 10.0 - 15.0 % 15.8 (H)   % Neutrophils % 58   % Lymphocytes % 26   % Monocytes % 12   % Eosinophils % 3   % Basophils % 1   Absolute Basophils 0.0 - 0.2 10e3/uL 0.1   Absolute Eosinophils 0.0 - 0.7 10e3/uL 0.2   Absolute Immature Granulocytes <=0.4 10e3/uL 0.0   Absolute Lymphocytes 0.8 - 5.3 10e3/uL 1.3   Absolute Monocytes 0.0 - 1.3 10e3/uL 0.6   % Immature Granulocytes % 0   Absolute Neutrophils 1.6 - 8.3 10e3/uL 2.8   Absolute NRBCs 10e3/uL 0.0   NRBCs per 100 WBC <1 /100 0       Post Infusion Assessment:  Patient tolerated infusion without incident.  Blood return noted pre and post infusion.  Site patent and intact, free from redness, edema or discomfort.  No evidence of extravasations.  Access discontinued per protocol.       Discharge Plan:   Patient declined prescription refills.  Discharge instructions reviewed with: Patient.  Patient and/or family verbalized understanding of discharge instructions and all questions answered.  AVS to patient via MYCHART.  Patient will return 7/30 for next appointment.   Patient  discharged in stable condition accompanied by: self.  Departure Mode: Ambulatory.    Divina Ludwig RN

## 2024-07-16 NOTE — NURSING NOTE
Chief Complaint   Patient presents with    Port Draw     Labs drawn via port by RN in lab      Port accessed with 20g flat needle by RN, labs collected, line flushed with saline and heparin.  Vitals taken. Pt checked in for appointment(s).     Ilene FABIAN RN PHN BSN  BMT/Oncology Lab

## 2024-07-19 ENCOUNTER — OFFICE VISIT (OUTPATIENT)
Dept: PODIATRY | Facility: CLINIC | Age: 59
End: 2024-07-19
Payer: COMMERCIAL

## 2024-07-19 DIAGNOSIS — L60.1 ONYCHOLYSIS: ICD-10-CM

## 2024-07-19 DIAGNOSIS — B35.1 ONYCHOMYCOSIS: Primary | ICD-10-CM

## 2024-07-19 PROCEDURE — 99203 OFFICE O/P NEW LOW 30 MIN: CPT | Performed by: PODIATRIST

## 2024-07-19 RX ORDER — ECONAZOLE NITRATE 10 MG/G
CREAM TOPICAL DAILY
Qty: 85 G | Refills: 5 | Status: SHIPPED | OUTPATIENT
Start: 2024-07-19

## 2024-07-19 NOTE — NURSING NOTE
Gallo Navarro's chief complaint for this visit includes:  Chief Complaint   Patient presents with    Consult     Toe issues, stubbed toe in January     PCP: Akil Hernandez    Referring Provider:  ESVIN Canales [facility]  [address] [place] [address]    There were no vitals taken for this visit.  Data Unavailable     Do you need any medication refills at today's visit? NO    No Known Allergies    Karlie Rodrigues LPN

## 2024-07-19 NOTE — PROGRESS NOTES
Past Medical History:   Diagnosis Date    Acute pancreatitis 04/16/2023    Alcohol-induced acute pancreatitis 04/10/2023    Gastric outlet obstruction     Metastasis from pancreatic cancer (H)     Personal history of chemotherapy     Gemzar + Abraxane started on 10/31/2023    Recurrent acute pancreatitis     S/P radiation therapy     2,000 cGy to T10 Spine completed on 11/29/[facility]     Patient Active Problem List   Diagnosis    Hyperglycemia    Pancreatic mass    Lower abdominal pain    History of biliary stent insertion    Malignant neoplasm of head of pancreas (H)    Encounter for antineoplastic chemotherapy    Gastric outlet obstruction    Type 2 diabetes mellitus without complication, with long-term current use of insulin (H)    Malignant neoplasm metastatic to bone (H)    Acute pancreatitis without necrosis or infection, unspecified    Other pulmonary embolism without acute cor pulmonale, unspecified chronicity (H)     Past Surgical History:   Procedure Laterality Date    AS OPEN TREATMENT CLAVICULAR FRACTURE INTERNAL FX      ENDOSCOPIC RETROGRADE CHOLANGIOPANCREATOGRAM N/A 7/11/2023    Procedure: ENDOSCOPIC RETROGRADE CHOLANGIOPANCREATOGRAPHY;  Surgeon: Khadar Pickett MD;  Location: [facility] OR    ENDOSCOPIC RETROGRADE CHOLANGIOPANCREATOGRAM N/A 8/17/2023    Procedure: ENDOSCOPIC RETROGRADE  with stent removal x1, balloon sweep;  Surgeon: Christos Greenberg MD;  Location: [facility] OR    ENDOSCOPIC ULTRASOUND UPPER GASTROINTESTINAL TRACT (GI) N/A 7/11/2023    Procedure: Endoscopic ultrasound upper gastrointestinal tract (GI), with biposy, GJ tube repositioning, stent placement;  Surgeon: Khadar Pickett MD;  Location: [facility] OR    ENDOSCOPIC ULTRASOUND UPPER GASTROINTESTINAL TRACT (GI) N/A 7/13/2023    Procedure: ENDOSCOPIC ULTRASOUND, ESOPHAGOSCOPY, EUS guided gastrojejunostomy;  Surgeon: Tre York MD;  Location: [facility] OR    INSERT PICC LINE  04/29/2023    INSERT PORT VASCULAR ACCESS  Right 7/28/2023    Procedure: Insert port vascular access;  Surgeon: Tom العلي MD;  Location: [facility] OR    IR CHEST PORT PLACEMENT > 5 YRS OF AGE  7/28/2023    IR NG TUBE PLACEMENT REQ RAD & FLUORO  05/08/2023    JG tube    REPLACE GASTROJEJUNOSTOMY TUBE, PERCUTANEOUS N/A 8/17/2023    Procedure: possible REPLACEMENT, GASTROJEJUNOSTOMY TUBE, PERCUTANEOUS;  Surgeon: Christos Greenberg MD;  Location: [facility] OR     Social History     Socioeconomic History    Marital status:      Spouse name: Not on file    Number of children: Not on file    Years of education: Not on file    Highest education level: Not on file   Occupational History    Not on file   Tobacco Use    Smoking status: Never     Passive exposure: Never    Smokeless tobacco: Never   Vaping Use    Vaping status: Never Used   Substance and Sexual Activity    Alcohol use: Not Currently    Drug use: Never    Sexual activity: Not on file   Other Topics Concern    Not on file   Social History Narrative    Not on file     Social Determinants of Health     Financial Resource Strain: Low Risk  (2/2/2024)    Financial Resource Strain     Within the past 12 months, have you or your family members you live with been unable to get utilities (heat, electricity) when it was really needed?: No   Food Insecurity: Low Risk  (2/2/2024)    Food Insecurity     Within the past 12 months, did you worry that your food would run out before you got money to buy more?: No     Within the past 12 months, did the food you bought just not last and you didn t have money to get more?: No   Transportation Needs: Low Risk  (2/2/2024)    Transportation Needs     Within the past 12 months, has lack of transportation kept you from medical appointments, getting your medicines, non-medical meetings or appointments, work, or from getting things that you need?: No   Physical Activity: Not on file   Stress: Not on file   Social Connections: Not on file   Interpersonal Safety: Low Risk   "(3/4/2024)    Interpersonal Safety     Do you feel physically and emotionally safe where you currently live?: Yes     Within the past 12 months, have you been hit, slapped, kicked or otherwise physically hurt by someone?: No     Within the past 12 months, have you been humiliated or emotionally abused in other ways by your partner or ex-partner?: No   Housing Stability: Low Risk  (2/2/2024)    Housing Stability     Do you have housing? : Yes     Are you worried about losing your housing?: No     Family History   Problem Relation Age of Onset    Diabetes Type 2  Father     Cirrhosis Father     Genetic Disorder Brother         missing [id number], mild retardation    Colon Polyps Brother     Pancreatic Cancer Paternal Aunt 85    Colon Cancer Paternal Uncle     Pancreatitis Cousin          Lab Results   Component Value Date    A1C 6.8 10/04/2023    A1C 7.9 07/08/2023     Lab Results   Component Value Date    WBC 5.0 07/16/2024     Lab Results   Component Value Date    RBC 3.51 07/16/2024     Lab Results   Component Value Date    HGB 10.0 07/16/2024     Lab Results   Component Value Date    HCT 31.4 07/16/2024     No components found for: \"MCT\"  Lab Results   Component Value Date    MCV 90 07/16/2024     Lab Results   Component Value Date    MCH 28.5 07/16/2024     Lab Results   Component Value Date    MCHC 31.8 07/16/2024     Lab Results   Component Value Date    RDW 15.8 07/16/2024     Lab Results   Component Value Date     07/16/2024     Lab Results   Component Value Date    AST 32 07/02/2024     Lab Results   Component Value Date    ALT 39 07/02/2024     No results found for: \"BILICONJ\"   Lab Results   Component Value Date    BILITOTAL 0.2 07/02/2024     Lab Results   Component Value Date    ALBUMIN 3.8 07/02/2024     Lab Results   Component Value Date    PROTTOTAL 7.0 07/02/2024      Lab Results   Component Value Date    ALKPHOS 145 07/02/2024   Last Comprehensive Metabolic Panel:  Lab Results   Component " Value Date     07/02/2024    POTASSIUM 4.0 07/02/2024    CHLORIDE 104 07/02/2024    CO2 26 07/02/2024    ANIONGAP 11 07/02/2024     (H) 07/02/2024    BUN 14.3 07/02/2024    CR 0.65 (L) 07/02/2024    GFRESTIMATED >90 07/02/2024    DENISE 9.0 07/02/2024             Subjective findings- 58-year-old presents for right hallux nail.  Relates in January he stubbed the nail, relates it does not hurt now, relates he has neuropathy numbness and tingling in his feet, relates his toenails have become discolored over time, relates he has been through 2 different chemotherapies and he had diabetes secondary to the first 1 but does not have it now, relates he has compression socks that he wears as needed.    Objective findings- DP and PT are 2-4 bilaterally.  He has mild peripheral edema bilaterally.  Has dystrophic thickened discolored nails with subungual debris dystrophy bilateral hallux nails minimally on the fifth toenails, has right hallux nail subungual ecchymosis.  There is no erythema, no drainage, no odor, no calor, no pain on palpation bilaterally.    Assessment and plan- Onychomycosis bilaterally, Onycholysis right Hallux nail, peripheral sensory Neuropathy bilaterally.  Diagnosis and treatment options discussed with them.  Prescription for Econazole cream given to use discussed with them.  Return to clinic and see me as needed.                                        Low level of medical decision making.

## 2024-07-19 NOTE — LETTER
7/19/2024      Gallo Navarro  [id number] [address] [place] [id number]      Dear Colleague,    Thank you for referring your patient, Gallo Navarro, to the [facility]. Please see a copy of my visit note below.    Past Medical History:   Diagnosis Date     Acute pancreatitis 04/16/2023     Alcohol-induced acute pancreatitis 04/10/2023     Gastric outlet obstruction      Metastasis from pancreatic cancer (H)      Personal history of chemotherapy     Gemzar + Abraxane started on 10/31/2023     Recurrent acute pancreatitis      S/P radiation therapy     2,000 cGy to T10 Spine completed on 11/29/2023 - [facility]     Patient Active Problem List   Diagnosis     Hyperglycemia     Pancreatic mass     Lower abdominal pain     History of biliary stent insertion     Malignant neoplasm of head of pancreas (H)     Encounter for antineoplastic chemotherapy     Gastric outlet obstruction     Type 2 diabetes mellitus without complication, with long-term current use of insulin (H)     Malignant neoplasm metastatic to bone (H)     Acute pancreatitis without necrosis or infection, unspecified     Other pulmonary embolism without acute cor pulmonale, unspecified chronicity (H)     Past Surgical History:   Procedure Laterality Date     AS OPEN TREATMENT CLAVICULAR FRACTURE INTERNAL FX       ENDOSCOPIC RETROGRADE CHOLANGIOPANCREATOGRAM N/A 7/11/2023    Procedure: ENDOSCOPIC RETROGRADE CHOLANGIOPANCREATOGRAPHY;  Surgeon: Khadar Pickett MD;  Location: [facility]     ENDOSCOPIC RETROGRADE CHOLANGIOPANCREATOGRAM N/A 8/17/2023    Procedure: ENDOSCOPIC RETROGRADE  with stent removal x1, balloon sweep;  Surgeon: Christos Greenberg MD;  Location: [facility] OR     ENDOSCOPIC ULTRASOUND UPPER GASTROINTESTINAL TRACT (GI) N/A 7/11/2023    Procedure: Endoscopic ultrasound upper gastrointestinal tract (GI), with biposy, GJ tube repositioning, stent placement;  Surgeon: Khadar Pickett MD;  Location: [facility] OR      ENDOSCOPIC ULTRASOUND UPPER GASTROINTESTINAL TRACT (GI) N/A 7/13/2023    Procedure: ENDOSCOPIC ULTRASOUND, ESOPHAGOSCOPY, EUS guided gastrojejunostomy;  Surgeon: Tre York MD;  Location: [facility]     INSERT PICC LINE  04/29/2023     INSERT PORT VASCULAR ACCESS Right 7/28/2023    Procedure: Insert port vascular access;  Surgeon: Tom العلي MD;  Location: [facility] OR     IR CHEST PORT PLACEMENT > 5 YRS OF AGE  7/28/2023     IR NG TUBE PLACEMENT REQ RAD & FLUORO  05/08/2023    JG tube     REPLACE GASTROJEJUNOSTOMY TUBE, PERCUTANEOUS N/A 8/17/2023    Procedure: possible REPLACEMENT, GASTROJEJUNOSTOMY TUBE, PERCUTANEOUS;  Surgeon: Christos Greenberg MD;  Location: [facility] OR     Social History     Socioeconomic History     Marital status:      Spouse name: Not on file     Number of children: Not on file     Years of education: Not on file     Highest education level: Not on file   Occupational History     Not on file   Tobacco Use     Smoking status: Never     Passive exposure: Never     Smokeless tobacco: Never   Vaping Use     Vaping status: Never Used   Substance and Sexual Activity     Alcohol use: Not Currently     Drug use: Never     Sexual activity: Not on file   Other Topics Concern     Not on file   Social History Narrative     Not on file     Social Determinants of Health     Financial Resource Strain: Low Risk  (2/2/2024)    Financial Resource Strain      Within the past 12 months, have you or your family members you live with been unable to get utilities (heat, electricity) when it was really needed?: No   Food Insecurity: Low Risk  (2/2/2024)    Food Insecurity      Within the past 12 months, did you worry that your food would run out before you got money to buy more?: No      Within the past 12 months, did the food you bought just not last and you didn t have money to get more?: No   Transportation Needs: Low Risk  (2/2/2024)    Transportation Needs      Within the past 12 months, has  "lack of transportation kept you from medical appointments, getting your medicines, non-medical meetings or appointments, work, or from getting things that you need?: No   Physical Activity: Not on file   Stress: Not on file   Social Connections: Not on file   Interpersonal Safety: Low Risk  (3/4/2024)    Interpersonal Safety      Do you feel physically and emotionally safe where you currently live?: Yes      Within the past 12 months, have you been hit, slapped, kicked or otherwise physically hurt by someone?: No      Within the past 12 months, have you been humiliated or emotionally abused in other ways by your partner or ex-partner?: No   Housing Stability: Low Risk  (2/2/2024)    Housing Stability      Do you have housing? : Yes      Are you worried about losing your housing?: No     Family History   Problem Relation Age of Onset     Diabetes Type 2  Father      Cirrhosis Father      Genetic Disorder Brother         missing [id number], mild retardation     Colon Polyps Brother      Pancreatic Cancer Paternal Aunt 85     Colon Cancer Paternal Uncle      Pancreatitis Cousin          Lab Results   Component Value Date    A1C 6.8 10/04/2023    A1C 7.9 07/08/2023     Lab Results   Component Value Date    WBC 5.0 07/16/2024     Lab Results   Component Value Date    RBC 3.51 07/16/2024     Lab Results   Component Value Date    HGB 10.0 07/16/2024     Lab Results   Component Value Date    HCT 31.4 07/16/2024     No components found for: \"MCT\"  Lab Results   Component Value Date    MCV 90 07/16/2024     Lab Results   Component Value Date    MCH 28.5 07/16/2024     Lab Results   Component Value Date    MCHC 31.8 07/16/2024     Lab Results   Component Value Date    RDW 15.8 07/16/2024     Lab Results   Component Value Date     07/16/2024     Lab Results   Component Value Date    AST 32 07/02/2024     Lab Results   Component Value Date    ALT 39 07/02/2024     No results found for: \"BILICONJ\"   Lab Results "   Component Value Date    BILITOTAL 0.2 07/02/2024     Lab Results   Component Value Date    ALBUMIN 3.8 07/02/2024     Lab Results   Component Value Date    PROTTOTAL 7.0 07/02/2024      Lab Results   Component Value Date    ALKPHOS 145 07/02/2024   Last Comprehensive Metabolic Panel:  Lab Results   Component Value Date     07/02/2024    POTASSIUM 4.0 07/02/2024    CHLORIDE 104 07/02/2024    CO2 26 07/02/2024    ANIONGAP 11 07/02/2024     (H) 07/02/2024    BUN 14.3 07/02/2024    CR 0.65 (L) 07/02/2024    GFRESTIMATED >90 07/02/2024    DENISE 9.0 07/02/2024             Subjective findings- 58-year-old presents for right hallux nail.  Relates in January he stubbed the nail, relates it does not hurt now, relates he has neuropathy numbness and tingling in his feet, relates his toenails have become discolored over time, relates he has been through 2 different chemotherapies and he had diabetes secondary to the first 1 but does not have it now, relates he has compression socks that he wears as needed.    Objective findings- DP and PT are 2-4 bilaterally.  He has mild peripheral edema bilaterally.  Has dystrophic thickened discolored nails with subungual debris dystrophy bilateral hallux nails minimally on the fifth toenails, has right hallux nail subungual ecchymosis.  There is no erythema, no drainage, no odor, no calor, no pain on palpation bilaterally.    Assessment and plan- Onychomycosis bilaterally, Onycholysis right Hallux nail, peripheral sensory Neuropathy bilaterally.  Diagnosis and treatment options discussed with them.  Prescription for Econazole cream given to use discussed with them.  Return to clinic and see me as needed.                                        Low level of medical decision making.      Again, thank you for allowing me to participate in the care of your patient.        Sincerely,        Torito Ware DPM

## 2024-07-26 ENCOUNTER — MYC REFILL (OUTPATIENT)
Dept: RADIATION ONCOLOGY | Facility: CLINIC | Age: 59
End: 2024-07-26
Payer: COMMERCIAL

## 2024-07-26 DIAGNOSIS — C25.9 PANCREATIC ADENOCARCINOMA (H): ICD-10-CM

## 2024-07-27 ENCOUNTER — HEALTH MAINTENANCE LETTER (OUTPATIENT)
Age: 59
End: 2024-07-27

## 2024-07-29 RX ORDER — HYDROMORPHONE HYDROCHLORIDE 2 MG/1
2-4 TABLET ORAL EVERY 4 HOURS PRN
Qty: 180 TABLET | Refills: 0 | Status: SHIPPED | OUTPATIENT
Start: 2024-07-29 | End: 2024-08-06

## 2024-07-29 NOTE — PROGRESS NOTES
Oncology/Hematology Visit Note  7/30/24    Reason for Visit: follow-up of Locally Advanced- Unresectable Pancreatic Caner    Oncology HPI:   -NGS: KRAS G12R  Immuno: pMMR, KAYLIE, TMB-low  Clinical Trial: MARIPOSA-186, MARIPOSA-280, TORL    - 3/2023: presented with acute pancreatitis  c/b chronic pancreatitis with pancreatic mass causing duodenal stenosis with gastric outlet obstruction s/p GJ tube (placed 5/2023), biliary obstruction s/p stent placement on 7/7/23, pancreatic insufficiency, and IDDM2.  -  He then presented to the ED with worsening abdominal pain, increased jaundice, poor PO intake and weight loss admitted on 7/8/2023 for concern of biliary obstruction.   - 7/12/2023: Underwent biopsy of pancreatic mass returned positive for pancreatic adenocarcinoma.   - 7/31/2023: He started on treatment with 5FU, irinotecan, and oxaliplatin (FOLFIRINOX). Please see previous notes for further details on the patient's history.      8/17/23 - ERCP/EGD, duodenal stents x2 placed, exchange of GJ tube     8/21-/8/25 hospitalized due to diarrhea, colitis, abdominal pain.       8/29 - Cycle 3 deferred due to neutropenia.   Neulasta added. Irinotecan dose reduced 20% due to symptoms including cramping, double vision and slurred speech during infusion.       10/24/23 CT CAP with similar to slight increase in size of peripancreatic and mesenteric lymph nodes, enlargement of previous skeletal metastasis as well as new sclerotic metastasis to left posterior 5th rib, enlarging pulmonary nodule, and unchanged pancreatic head mass. Also unclear concerns for PE,  right lower lobe segmental PE confirmed on CT-PE. Started on apixaban.      10/31/23 Cycle 1 gemzar, abraxane  11/7/23 Cycle 1 Zometa  11/22/23 Removal of GJ tube.   11/29/23 Completed palliative radiation to T10   12/13/23 C3D1 gem/abraxane  12/29/23 Consults at [place]  1/23-1/26 Consults at [facility]   1/30/24: C4D1 gem/abraxane   3/24: CT CAP with overall stable disease with  isolated progression in potential L4 lesion. Referral for radiation oncology.    4/29-5/1: Consults at [facility] for possible cellular therapy, CT guided biopsy of left pubic bone, recommendation to return to [facility] upon progression of current treatment   5/23/24: Cardiology consult at [facility]. CT with small pericardial effusion   6/6/2024: Repeat ECHO on with EF of 55-60%, no pericardial effusion present  - Cycle 8 deferred due to infection concerns and subsequently tested positive for COVID 6/25/24, resumed treatment on 7/2/2024      Day 8 eliminated after Cycle 5 [place]/Abraxane.      4/2/2024-present: Gemcitabine/Abraxane, Days 1 and 15 only; Zometa every 3 months     Interval history:   Gallo is seen today prior to cycle 9 gemcitabine/Abraxane.  -Continues to tolerated chemo well. Typically experiences fatigue on the day of the infusion. Often takes a 4 hour nap when he gets home. Experiences decent recovery in his energy level in the weeks between treatment  -Denies new bone pain  -Denies recurrent lower extremity swelling  -Peripheral neuropathy symptoms are unchanged  -No nausea with infusions  -Appetite has been good   -Denies shortness of breath or cough   -Saw podiatry for toenail issues. Applying antifungal cream      Denies headaches, dizziness or vision changes. Denies abdominal pain, constipation or diarrhea. Denies dark stools or hematochezia. Denies N/V. Denies new bone pain.    Review of Systems: See interval hx. Denies fevers, chills,  changes in urination, bleeding, bruising, rash.    Current Outpatient Medications   Medication Sig Dispense Refill    apixaban ANTICOAGULANT (ELIQUIS) 5 MG tablet Take 1 tablet (5 mg) by mouth 2 times daily 60 tablet 2    buprenorphine (BUTRANS) 10 MCG/HR WK patch Place 1 patch onto the skin every 7 days Use with 20 mcg/hour patch for 30 mcg/hour total. 4 patch 3    buprenorphine (BUTRANS) 20 MCG/HR WK patch Place 1 patch onto the skin once a week Use with 10  mcg/hour patch for 30 mcg/hour total. 4 patch 3    dicyclomine (BENTYL) 10 MG capsule Take 1 capsule (10 mg) by mouth 4 times daily (before meals and nightly) 120 capsule 1    econazole nitrate 1 % external cream Apply topically daily To affected toenails. 85 g 5    HYDROmorphone (DILAUDID) 2 MG tablet Take 1-2 tablets (2-4 mg) by mouth every 4 hours as needed for severe pain or breakthrough pain 180 tablet 0    lidocaine-prilocaine (EMLA) 2.5-2.5 % external cream Use 1-2 times a week or as needed prior to port access 30 g 3    lipase-protease-amylase (CREON 24) 82537-38508-021561 units CPEP per EC capsule 2-3 capsules with meals 3 times a day and 1-2 capsules with snacks max of 15 capsules a day 200 capsule 11    Multiple Vitamins-Minerals (CENTRUM SILVER 50+MEN) TABS as directed Orally      pantoprazole (PROTONIX) 40 MG EC tablet Take 1 tablet (40 mg) by mouth 2 times daily 60 tablet 4    vitamin D3 (CHOLECALCIFEROL) 50 mcg (2000 units) tablet Take 1 tablet (50 mcg) by mouth daily 90 tablet 1    acetaminophen (TYLENOL) 32 mg/mL liquid Take 20.313 mLs (650 mg) by mouth every 8 hours 1800 mL 1    methylphenidate (RITALIN) 5 MG tablet Take 1 tablet (5 mg) by mouth 2 times daily as needed (Patient not taking: Reported on 7/30/2024) 10 tablet 0    naloxone (NARCAN) 4 MG/0.1ML nasal spray Instill 1 spray (4 mg) into one nostril alternating nostrils as needed for opioid reversal every 2-3 minutes until assistance arrives* (Patient not taking: Reported on 7/30/2024)      prochlorperazine (COMPAZINE) 10 MG tablet Take 1 tablet (10 mg) by mouth every 6 hours as needed for nausea or vomiting (Patient not taking: Reported on 7/30/2024) 30 tablet 2        No Known Allergies      Exam:   /62   Pulse 73   Temp 98.3  F (36.8  C) (Oral)   Resp 16   Wt 73.4 kg (161 lb 14.4 oz)   SpO2 96%   BMI 21.96 kg/m          Wt Readings from Last 4 Encounters:   07/30/24 73.4 kg (161 lb 14.4 oz)   07/16/24 73 kg (161 lb)   07/02/24  72.9 kg (160 lb 12.8 oz)   06/27/24 72.6 kg (160 lb)     Constitutional: Pleasant male, NAD  Eyes: No scleral icterus. No conjunctival erythema or discharge  Cardiovascular: RRR, no m/g/r. No peripheral edema.  Respiratory: CTA bilaterally. No wheezes or crackles.  MSK: moving all extremities freely; negative for C/T/L bony tenderness  Skin: no visible lesions, rashes or bruising  Psych: appropriate mood and affect    Labs:    Most Recent 3 CBC's:  Recent Labs   Lab Test 07/30/24  0904 07/16/24  0835 07/02/24  0719   WBC 5.2 5.0 5.4   HGB 10.4* 10.0* 10.0*   MCV 91 90 91    226 240   ANEUTAUTO 3.1 2.8 3.1    Most Recent 3 BMP's:  Recent Labs   Lab Test 07/30/24  0904 07/02/24  0719 06/25/24  1505    141 135   POTASSIUM 4.3 4.0 4.2   CHLORIDE 104 104 97*   CO2 27 26 27   BUN 13.9 14.3 16.6   CR 0.70 0.65* 0.75   ANIONGAP 8 11 11   DENISE 9.0 9.0 9.1   * 171* 139*   PROTTOTAL 6.8 7.0 7.7   ALBUMIN 4.0 3.8 4.2    Most Recent 2 LFT's:  Recent Labs   Lab Test 07/30/24  0904 07/02/24  0719   AST 33 32   ALT 38 39   ALKPHOS 129 145   BILITOTAL 0.3 0.2    Most Recent TSH and T4:No lab results found.  Phos/Mag:  Lab Results   Component Value Date    PHOS 3.2 08/11/2023    PHOS 3.4 07/11/2023    PHOS 2.8 07/10/2023    MAG 1.9 08/23/2023    MAG 1.9 08/11/2023    MAG 2.1 07/16/2023        I reviewed the above labs today.      Imaging:   N/A    Impression/plan:  Gallo is a 58 year old male with unresectable pancreatic cancer, currently under treatment of Gemcitabine/Abraxane.     Unresectable Pancreatic Cancer  7/31/23 began palliative treatment on FOLFIRINOX  CT CAP 10/24/23 with progression in skeletal mets, lung nodule and lymph nodes; stable pancreatic mass  - 10/31/2023: Treatment was changed to Gemcitabine and Abraxane (Day 1, 8, 15) + Zometa every 3 months  - 03/2024 CT CAP with progression of L4 lesion  - Day 8 eliminated after Cycle 5 gem/abraxane  - C8D1 deferred d/t COVID infection, now fully  recovered  - Proceed with C9D1 chemo today  - RTC 8/13/24 labs and infusion as scheduled, + Zometa  - appears to be due for restaging, will message primary care team/Dr. Haile to inquire    Anemia-normocytic  Hgb 10.4 today, stable; in setting of chemotherapy and chronic disease  -Monitor    Skeletal Mets  Negative for C/T/L spine tenderness on physical exam  - Radiation to T10 completed 11/29/23  - Discussion to irradiate L4 lesion noted on CT dated 10/24/2023, however, on hold due to lack of symptoms  - CT chest 5/23/24 unchanged sclerotic bone metastases  - Managing pain with Butrans patch and hydromorphone 2 mg, 8 tablets daily  - He has a follow up with Radiation Oncology scheduled for 9/24/2024  - will continue to monitor symptoms    Bone Health  Zometa 4 mg every 3 months, last infusion 5/24/2024  - per chart review, Dr. Haile note 3/15/2024: consider use of denosumab instead of bisphosphonate, given the evidence for potentially superior efficacy. At this time, he is tolerating this treatment extremely well now, and has well-controlled skeletal mets. Continue to consider his skeletal treatment options as we move forward.   - Next Zometa due  8/13/2024. Please note the therapy plan is entered as an endocrine plan with 6 month intervals. For bone mets, we typically give every 3 months.    Neuropathy  Reports improvement while on treatment break  Referred to PT previously but not following  Symptoms unchanged on current treatment-monitor for progression    COVID  COVID positive 6/25/2024  Symptomatically improved    Pericardial Effusion  Found on CT 4/20/24 at [facility], followed by [facility]TE with moderate pericardial effusion without tamponade.  CT 5/23/24 small pericardial effusion  5/23/24: Cardiology consult at [facility]- Echo 6/6/2024 with EF 55-60%, LV strain -20.5% which is normal, no pericardial effusion  - repeat echo at [facility] in the future, TTE scheduled for 7/2024  - he has been cautioned by  Cardiology s/s of cardiac tamponade  -following cardiology as needed    Pulmonary Embolism  Right lower segmental PE found on routine imaging  Denies shortness of breath  Taking Apixiban daily    Nutrition  GJ tube removed 11/22/2023  Weight is stable  Continue small meals/protein supplements    LE edema  Not present on exam today; previously instructed to wear compression stockings as needed  - will continue to monitor    Fatigue  Reports that he is experiencing more energy since treatment has been on hold for the past month    Diarrhea/Constipation  resolved    Discoloration of bilateral great toenails  Met with podiatry, Dr. Ware, 7/19/24. Econazole prescribed for bilateral onychomycosis.    Berta Proctor CNP  ---  30 minutes spent on the date of the encounter doing chart review, review of test results, interpretation of tests, patient visit, and documentation.

## 2024-07-29 NOTE — TELEPHONE ENCOUNTER
[facility] [place] [facility] message from patient requesting refill of hydromorphone.     Last refill: 7/5/24  Last office visit: 6/27/24  Scheduled for follow up 9/24/24     Will route request to MD for review.     Reviewed [place]  Report.

## 2024-07-30 ENCOUNTER — INFUSION THERAPY VISIT (OUTPATIENT)
Dept: ONCOLOGY | Facility: CLINIC | Age: 59
End: 2024-07-30
Attending: STUDENT IN AN ORGANIZED HEALTH CARE EDUCATION/TRAINING PROGRAM
Payer: COMMERCIAL

## 2024-07-30 ENCOUNTER — APPOINTMENT (OUTPATIENT)
Dept: LAB | Facility: CLINIC | Age: 59
End: 2024-07-30
Attending: STUDENT IN AN ORGANIZED HEALTH CARE EDUCATION/TRAINING PROGRAM
Payer: COMMERCIAL

## 2024-07-30 VITALS
RESPIRATION RATE: 16 BRPM | BODY MASS INDEX: 21.96 KG/M2 | SYSTOLIC BLOOD PRESSURE: 103 MMHG | HEART RATE: 73 BPM | TEMPERATURE: 98.3 F | DIASTOLIC BLOOD PRESSURE: 62 MMHG | WEIGHT: 161.9 LBS | OXYGEN SATURATION: 96 %

## 2024-07-30 DIAGNOSIS — Z51.11 ENCOUNTER FOR ANTINEOPLASTIC CHEMOTHERAPY: Primary | ICD-10-CM

## 2024-07-30 DIAGNOSIS — G89.3 CANCER ASSOCIATED PAIN: ICD-10-CM

## 2024-07-30 DIAGNOSIS — C25.0 MALIGNANT NEOPLASM OF HEAD OF PANCREAS (H): Primary | ICD-10-CM

## 2024-07-30 DIAGNOSIS — C25.0 MALIGNANT NEOPLASM OF HEAD OF PANCREAS (H): ICD-10-CM

## 2024-07-30 LAB
ALBUMIN SERPL BCG-MCNC: 4 G/DL (ref 3.5–5.2)
ALP SERPL-CCNC: 129 U/L (ref 40–150)
ALT SERPL W P-5'-P-CCNC: 38 U/L (ref 0–70)
ANION GAP SERPL CALCULATED.3IONS-SCNC: 8 MMOL/L (ref 7–15)
AST SERPL W P-5'-P-CCNC: 33 U/L (ref 0–45)
BASOPHILS # BLD AUTO: 0 10E3/UL (ref 0–0.2)
BASOPHILS NFR BLD AUTO: 1 %
BILIRUB SERPL-MCNC: 0.3 MG/DL
BUN SERPL-MCNC: 13.9 MG/DL (ref 6–20)
CALCIUM SERPL-MCNC: 9 MG/DL (ref 8.8–10.4)
CHLORIDE SERPL-SCNC: 104 MMOL/L (ref 98–107)
CREAT SERPL-MCNC: 0.7 MG/DL (ref 0.67–1.17)
EGFRCR SERPLBLD CKD-EPI 2021: >90 ML/MIN/1.73M2
EOSINOPHIL # BLD AUTO: 0.1 10E3/UL (ref 0–0.7)
EOSINOPHIL NFR BLD AUTO: 3 %
ERYTHROCYTE [DISTWIDTH] IN BLOOD BY AUTOMATED COUNT: 16.7 % (ref 10–15)
GLUCOSE SERPL-MCNC: 165 MG/DL (ref 70–99)
HCO3 SERPL-SCNC: 27 MMOL/L (ref 22–29)
HCT VFR BLD AUTO: 32.7 % (ref 40–53)
HGB BLD-MCNC: 10.4 G/DL (ref 13.3–17.7)
IMM GRANULOCYTES # BLD: 0 10E3/UL
IMM GRANULOCYTES NFR BLD: 0 %
LYMPHOCYTES # BLD AUTO: 1.3 10E3/UL (ref 0.8–5.3)
LYMPHOCYTES NFR BLD AUTO: 26 %
MCH RBC QN AUTO: 28.9 PG (ref 26.5–33)
MCHC RBC AUTO-ENTMCNC: 31.8 G/DL (ref 31.5–36.5)
MCV RBC AUTO: 91 FL (ref 78–100)
MONOCYTES # BLD AUTO: 0.6 10E3/UL (ref 0–1.3)
MONOCYTES NFR BLD AUTO: 12 %
NEUTROPHILS # BLD AUTO: 3.1 10E3/UL (ref 1.6–8.3)
NEUTROPHILS NFR BLD AUTO: 58 %
NRBC # BLD AUTO: 0 10E3/UL
NRBC BLD AUTO-RTO: 0 /100
PLATELET # BLD AUTO: 197 10E3/UL (ref 150–450)
POTASSIUM SERPL-SCNC: 4.3 MMOL/L (ref 3.4–5.3)
PROT SERPL-MCNC: 6.8 G/DL (ref 6.4–8.3)
RBC # BLD AUTO: 3.6 10E6/UL (ref 4.4–5.9)
SODIUM SERPL-SCNC: 139 MMOL/L (ref 135–145)
WBC # BLD AUTO: 5.2 10E3/UL (ref 4–11)

## 2024-07-30 PROCEDURE — 36591 DRAW BLOOD OFF VENOUS DEVICE: CPT | Performed by: STUDENT IN AN ORGANIZED HEALTH CARE EDUCATION/TRAINING PROGRAM

## 2024-07-30 PROCEDURE — 99213 OFFICE O/P EST LOW 20 MIN: CPT | Performed by: REGISTERED NURSE

## 2024-07-30 PROCEDURE — 99215 OFFICE O/P EST HI 40 MIN: CPT | Performed by: REGISTERED NURSE

## 2024-07-30 PROCEDURE — 82040 ASSAY OF SERUM ALBUMIN: CPT | Performed by: STUDENT IN AN ORGANIZED HEALTH CARE EDUCATION/TRAINING PROGRAM

## 2024-07-30 PROCEDURE — 250N000011 HC RX IP 250 OP 636: Performed by: STUDENT IN AN ORGANIZED HEALTH CARE EDUCATION/TRAINING PROGRAM

## 2024-07-30 PROCEDURE — 258N000003 HC RX IP 258 OP 636: Performed by: STUDENT IN AN ORGANIZED HEALTH CARE EDUCATION/TRAINING PROGRAM

## 2024-07-30 PROCEDURE — 250N000011 HC RX IP 250 OP 636: Performed by: REGISTERED NURSE

## 2024-07-30 PROCEDURE — 85004 AUTOMATED DIFF WBC COUNT: CPT | Performed by: STUDENT IN AN ORGANIZED HEALTH CARE EDUCATION/TRAINING PROGRAM

## 2024-07-30 PROCEDURE — 96417 CHEMO IV INFUS EACH ADDL SEQ: CPT

## 2024-07-30 PROCEDURE — 96375 TX/PRO/DX INJ NEW DRUG ADDON: CPT

## 2024-07-30 PROCEDURE — 96413 CHEMO IV INFUSION 1 HR: CPT

## 2024-07-30 RX ORDER — PACLITAXEL 100 MG/20ML
125 INJECTION, POWDER, LYOPHILIZED, FOR SUSPENSION INTRAVENOUS ONCE
Status: COMPLETED | OUTPATIENT
Start: 2024-07-30 | End: 2024-07-30

## 2024-07-30 RX ORDER — ONDANSETRON 2 MG/ML
8 INJECTION INTRAMUSCULAR; INTRAVENOUS ONCE
Status: COMPLETED | OUTPATIENT
Start: 2024-07-30 | End: 2024-07-30

## 2024-07-30 RX ORDER — HEPARIN SODIUM (PORCINE) LOCK FLUSH IV SOLN 100 UNIT/ML 100 UNIT/ML
5 SOLUTION INTRAVENOUS
Status: DISCONTINUED | OUTPATIENT
Start: 2024-07-30 | End: 2024-07-30 | Stop reason: HOSPADM

## 2024-07-30 RX ORDER — HEPARIN SODIUM (PORCINE) LOCK FLUSH IV SOLN 100 UNIT/ML 100 UNIT/ML
500 SOLUTION INTRAVENOUS ONCE
Status: COMPLETED | OUTPATIENT
Start: 2024-07-30 | End: 2024-07-30

## 2024-07-30 RX ADMIN — PACLITAXEL 250 MG: 100 INJECTION, POWDER, LYOPHILIZED, FOR SUSPENSION INTRAVENOUS at 10:53

## 2024-07-30 RX ADMIN — Medication 5 ML: at 12:10

## 2024-07-30 RX ADMIN — SODIUM CHLORIDE 250 ML: 9 INJECTION, SOLUTION INTRAVENOUS at 10:53

## 2024-07-30 RX ADMIN — ONDANSETRON 8 MG: 2 INJECTION INTRAMUSCULAR; INTRAVENOUS at 09:53

## 2024-07-30 RX ADMIN — Medication 500 UNITS: at 09:05

## 2024-07-30 RX ADMIN — GEMCITABINE 2000 MG: 38 INJECTION, SOLUTION INTRAVENOUS at 11:31

## 2024-07-30 ASSESSMENT — PAIN SCALES - GENERAL: PAINLEVEL: NO PAIN (0)

## 2024-07-30 NOTE — PATIENT INSTRUCTIONS
[facility] Triage and after hours / weekends / holidays:  [phone]    Please call the triage or after hours line if you experience a temperature greater than or equal to 100.4, shaking chills, have uncontrolled nausea, vomiting and/or diarrhea, dizziness, shortness of breath, chest pain, bleeding, unexplained bruising, or if you have any other new/concerning symptoms, questions or concerns.      If you are having any concerning symptoms or wish to speak to a provider before your next infusion visit, please call triage to notify them so we can adequately serve you.     If you need a refill on a narcotic prescription or other medication, please call before your infusion appointment.               July 2024 Sunday Monday Tuesday Wednesday Thursday Friday Saturday        1     2    LAB PERIPHERAL   7:00 AM   (15 min.)   [facility]   [facility]    RETURN ACTIVE TREATMENT   7:15 AM   (45 min.)   Nola Quintanilla APRN CNP   [facility]    ONC INFUSION 2 HR (120 MIN)   8:00 AM   (120 min.)    ONC INFUSION NURSE   [facility] 3     4     5     6       7     8     9     10     11     12     13       14     15     16    LAB CENTRAL   8:00 AM   (15 min.)   [facility]   [facility]    ONC INFUSION 2 HR (120 MIN)   8:30 AM   (120 min.)    ONC INFUSION NURSE   [facility] 17     18     19    NEW FOOT/ANKLE   8:45 AM   (30 min.)   Torito Ware DPM   [facility] 20       21     22     23     24     25     26     27       28     29     30    LAB CENTRAL   8:15 AM   (15 min.)   [facility]   [facility]    RETURN ACTIVE TREATMENT   8:45 AM   (45 min.)   Berta Proctor CNP   [facility]    ONC INFUSION 2 HR (120 MIN)  10:30 AM   (120 min.)    ONC INFUSION NURSE   [facility]  [facility] 31 August 2024 Sunday Monday Tuesday Wednesday Thursday Friday Saturday                       1     2     3       4     5     6     7     8     9     10       11     12     13    LAB CENTRAL   8:00 AM   (15 min.)    [facility]   [facility]    ONC INFUSION 2 HR (120 MIN)   8:30 AM   (120 min.)    ONC INFUSION NURSE   [facility] 14     15     16     17       18     19     20     21     22     23     24       25     26     27    LAB CENTRAL   8:00 AM   (15 min.)    [facility]   [facility]    ONC INFUSION 2 HR (120 MIN)   8:30 AM   (120 min.)    ONC INFUSION NURSE   [facility] 28     29     30     31                     Lab Results:  Recent Results (from the past 12 hour(s))   Comprehensive metabolic panel    Collection Time: 07/30/24  9:04 AM   Result Value Ref Range    Sodium 139 135 - 145 mmol/L    Potassium 4.3 3.4 - 5.3 mmol/L    Carbon Dioxide (CO2) 27 22 - 29 mmol/L    Anion Gap 8 7 - 15 mmol/L    Urea Nitrogen 13.9 6.0 - 20.0 mg/dL    Creatinine 0.70 0.67 - 1.17 mg/dL    GFR Estimate >90 >60 mL/min/1.73m2    Calcium 9.0 8.8 - 10.4 mg/dL    Chloride 104 98 - 107 mmol/L    Glucose 165 (H) 70 - 99 mg/dL    Alkaline Phosphatase 129 40 - 150 U/L    AST 33 0 - 45 U/L    ALT 38 0 - 70 U/L    Protein Total 6.8 6.4 - 8.3 g/dL    Albumin 4.0 3.5 - 5.2 g/dL    Bilirubin Total 0.3 <=1.2 mg/dL   CBC with platelets and differential    Collection Time: 07/30/24  9:04 AM   Result Value Ref Range    WBC Count 5.2 4.0 - 11.0 10e3/uL    RBC Count 3.60 (L) 4.40 - 5.90 10e6/uL    Hemoglobin 10.4 (L) 13.3 - 17.7 g/dL    Hematocrit 32.7 (L) 40.0 - 53.0 %    MCV 91 78 - 100 fL    MCH 28.9 26.5 - 33.0 pg    MCHC 31.8 31.5 - 36.5 g/dL    RDW 16.7 (H) 10.0 - 15.0 %    Platelet Count 197 150 - 450 10e3/uL    % Neutrophils 58 %    %  Lymphocytes 26 %    % Monocytes 12 %    % Eosinophils 3 %    % Basophils 1 %    % Immature Granulocytes 0 %    NRBCs per 100 WBC 0 <1 /100    Absolute Neutrophils 3.1 1.6 - 8.3 10e3/uL    Absolute Lymphocytes 1.3 0.8 - 5.3 10e3/uL    Absolute Monocytes 0.6 0.0 - 1.3 10e3/uL    Absolute Eosinophils 0.1 0.0 - 0.7 10e3/uL    Absolute Basophils 0.0 0.0 - 0.2 10e3/uL    Absolute Immature Granulocytes 0.0 <=0.4 10e3/uL    Absolute NRBCs 0.0 10e3/uL

## 2024-07-30 NOTE — PROGRESS NOTES
Infusion Nursing Note:  Gallo Navarro presents today for Day 1 Cycle 9 Abraxane, Gemzar  Patient seen by provider today: Yes: Berta Proctor CNP   present during visit today: Not Applicable.    Note: Patient presents to infusion feeling ok. Pt denies new acute discomfort and states no acute complaints or concerns not addressed by SUZETTE today. IB sent to scheduling to have patients appts rescheduled for [facility].       Intravenous Access:  Implanted Port.    Treatment Conditions:  Lab Results   Component Value Date    HGB 10.4 (L) 07/30/2024    WBC 5.2 07/30/2024    ANEU 2.2 12/26/2023    ANEUTAUTO 3.1 07/30/2024     07/30/2024        Lab Results   Component Value Date     07/30/2024    POTASSIUM 4.3 07/30/2024    MAG 1.9 08/23/2023    CR 0.70 07/30/2024    DENISE 9.0 07/30/2024    BILITOTAL 0.3 07/30/2024    ALBUMIN 4.0 07/30/2024    ALT 38 07/30/2024    AST 33 07/30/2024   Results reviewed, labs MET treatment parameters, ok to proceed with treatment.      Post Infusion Assessment:  Patient tolerated infusion without incident.  Blood return noted pre and post infusion.  Site patent and intact, free from redness, edema or discomfort.  No evidence of extravasations.  Access discontinued per protocol.       Discharge Plan:   Patient declined prescription refills.  Discharge instructions reviewed with: Patient.  Patient and/or family verbalized understanding of discharge instructions and all questions answered.  AVS to patient via [employer].  Patient will return 8/13 for next appointment.   Patient discharged in stable condition accompanied by: self.  Departure Mode: Ambulatory.      Perez Acevedo RN

## 2024-07-30 NOTE — NURSING NOTE
Oncology Rooming Note    July 30, 2024 9:12 AM   Gallo Navarro is a 58 year old male who presents for:    Chief Complaint   Patient presents with    Port Draw     Labs drawn via port by RN in lab.  VS taken     Initial Vitals: /62   Pulse 73   Temp 98.3  F (36.8  C) (Oral)   Resp 16   Wt 73.4 kg (161 lb 14.4 oz)   SpO2 96%   BMI 21.96 kg/m   Estimated body mass index is 21.96 kg/m  as calculated from the following:    Height as of 6/27/24: 1.829 m (6').    Weight as of this encounter: 73.4 kg (161 lb 14.4 oz). Body surface area is 1.93 meters squared.  No Pain (0) Comment: Data Unavailable   No LMP for male patient.  Allergies reviewed: Yes  Medications reviewed: Yes    Medications: Medication refills not needed today.  Pharmacy name entered into EPIC:    [facility] - [place], [place] - [address] [facility] - [place], [place] [phone]  [facility] [place] [address] [facility] [address] PORTAL TO REGISTERED [facility] SITES  [facility] - [place], [place] - [address]  [facility] [id number] [facility] [employer] - [place], [place] - [address]    Frailty Screening:   Is the patient here for a new oncology consult visit in cancer care? 2. No      Clinical concerns: Patient states there are no new concerns to discuss with provider.  Berta was not notified.         Linda Valero CMA

## 2024-07-30 NOTE — Clinical Note
7/30/2024      Gallo Navarro  [address] [address] [place] [id number]      Dear Colleague,    Thank you for referring your patient, Gallo Navarro, to the [facility]. Please see a copy of my visit note below.    Oncology/Hematology Visit Note  7/30/24    Reason for Visit: follow-up of Locally Advanced- Unresectable Pancreatic Caner    Oncology HPI:   -NGS: KRAS G12R  Immuno: pMMR, KAYLIE, TMB-low  Clinical Trial: MARIPOSA-186, MARIPOSA-280, TORL    - 3/2023: presented with acute pancreatitis  c/b chronic pancreatitis with pancreatic mass causing duodenal stenosis with gastric outlet obstruction s/p GJ tube (placed 5/2023), biliary obstruction s/p stent placement on 7/7/23, pancreatic insufficiency, and IDDM2.  -  He then presented to the ED with worsening abdominal pain, increased jaundice, poor PO intake and weight loss admitted on 7/8/2023 for concern of biliary obstruction.   - 7/12/2023: Underwent biopsy of pancreatic mass returned positive for pancreatic adenocarcinoma.   - 7/31/2023: He started on treatment with 5FU, irinotecan, and oxaliplatin (FOLFIRINOX). Please see previous notes for further details on the patient's history.      8/17/23 - ERCP/EGD, duodenal stents x2 placed, exchange of GJ tube     8/21-/8/25 hospitalized due to diarrhea, colitis, abdominal pain.       8/29 - Cycle 3 deferred due to neutropenia.   Neulasta added. Irinotecan dose reduced 20% due to symptoms including cramping, double vision and slurred speech during infusion.       10/24/23 CT CAP with similar to slight increase in size of peripancreatic and mesenteric lymph nodes, enlargement of previous skeletal metastasis as well as new sclerotic metastasis to left posterior 5th rib, enlarging pulmonary nodule, and unchanged pancreatic head mass. Also unclear concerns for PE,  right lower lobe segmental PE confirmed on CT-PE. Started on apixaban.      10/31/23 Cycle 1 gemzar, abraxane  11/7/23 Cycle 1 Zometa  11/22/23  Removal of GJ tube.   11/29/23 Completed palliative radiation to T10   12/13/23 C3D1 gem/abraxane  12/29/23 Consults at [place]  1/23-1/26 Consults at [facility]   1/30/24: C4D1 gem/abraxane   3/24: CT CAP with overall stable disease with isolated progression in potential L4 lesion. Referral for radiation oncology.    4/29-5/1: Consults at [facility] for possible cellular therapy, CT guided biopsy of left pubic bone, recommendation to return to [facility] upon progression of current treatment   5/23/24: Cardiology consult at [facility]. CT with small pericardial effusion   6/6/2024: Repeat ECHO on with EF of 55-60%, no pericardial effusion present  - Cycle 8 deferred due to infection concerns and subsequently tested positive for COVID 6/25/24, resumed treatment on 7/2/2024      Day 8 eliminated after Cycle 5 [place]/Abraxane.      4/2/2024-present: Gemcitabine/Abraxane, Days 1 and 15 only; Zometa every 3 months     Interval history:   Gallo is seen today prior to cycle 9 gemcitabine/Abraxane.  -Continues to tolerated chemo well. Typically experiences fatigue on the day of the infusion. Often takes a 4 hour nap when he gets home. Experiences decent recovery in his energy level in the weeks between treatment  -Denies new bone pain  -Denies recurrent lower extremity swelling  -Peripheral neuropathy symptoms are unchanged  -No nausea with infusions  -Appetite has been good   -Denies shortness of breath or cough   -Saw podiatry for toenail issues. Applying antifungal cream      Denies headaches, dizziness or vision changes. Denies abdominal pain, constipation or diarrhea. Denies dark stools or hematochezia. Denies N/V. Denies new bone pain.    Review of Systems: See interval hx. Denies fevers, chills,  changes in urination, bleeding, bruising, rash.    Current Outpatient Medications   Medication Sig Dispense Refill    apixaban ANTICOAGULANT (ELIQUIS) 5 MG tablet Take 1 tablet (5 mg) by mouth 2 times daily 60 tablet 2     buprenorphine (BUTRANS) 10 MCG/HR WK patch Place 1 patch onto the skin every 7 days Use with 20 mcg/hour patch for 30 mcg/hour total. 4 patch 3    buprenorphine (BUTRANS) 20 MCG/HR WK patch Place 1 patch onto the skin once a week Use with 10 mcg/hour patch for 30 mcg/hour total. 4 patch 3    dicyclomine (BENTYL) 10 MG capsule Take 1 capsule (10 mg) by mouth 4 times daily (before meals and nightly) 120 capsule 1    econazole nitrate 1 % external cream Apply topically daily To affected toenails. 85 g 5    HYDROmorphone (DILAUDID) 2 MG tablet Take 1-2 tablets (2-4 mg) by mouth every 4 hours as needed for severe pain or breakthrough pain 180 tablet 0    lidocaine-prilocaine (EMLA) 2.5-2.5 % external cream Use 1-2 times a week or as needed prior to port access 30 g 3    lipase-protease-amylase (CREON 24) 38367-529[address]-047716 units CPEP per EC capsule 2-3 capsules with meals 3 times a day and 1-2 capsules with snacks max of 15 capsules a day 200 capsule 11    Multiple Vitamins-Minerals (CENTRUM SILVER 50+MEN) TABS as directed Orally      pantoprazole (PROTONIX) 40 MG EC tablet Take 1 tablet (40 mg) by mouth 2 times daily 60 tablet 4    vitamin D3 (CHOLECALCIFEROL) 50 mcg (2000 units) tablet Take 1 tablet (50 mcg) by mouth daily 90 tablet 1    acetaminophen (TYLENOL) 32 mg/mL liquid Take 20.313 mLs (650 mg) by mouth every 8 hours 1800 mL 1    methylphenidate (RITALIN) 5 MG tablet Take 1 tablet (5 mg) by mouth 2 times daily as needed (Patient not taking: Reported on 7/30/2024) 10 tablet 0    naloxone (NARCAN) 4 MG/0.1ML nasal spray Instill 1 spray (4 mg) into one nostril alternating nostrils as needed for opioid reversal every 2-3 minutes until assistance arrives* (Patient not taking: Reported on 7/30/2024)      prochlorperazine (COMPAZINE) 10 MG tablet Take 1 tablet (10 mg) by mouth every 6 hours as needed for nausea or vomiting (Patient not taking: Reported on 7/30/2024) 30 tablet 2        No Known Allergies      Exam:   BP  103/62   Pulse 73   Temp 98.3  F (36.8  C) (Oral)   Resp 16   Wt 73.4 kg (161 lb 14.4 oz)   SpO2 96%   BMI 21.96 kg/m          Wt Readings from Last 4 Encounters:   07/30/24 73.4 kg (161 lb 14.4 oz)   07/16/24 73 kg (161 lb)   07/02/24 72.9 kg (160 lb 12.8 oz)   06/27/24 72.6 kg (160 lb)     Constitutional: Pleasant male, NAD  Eyes: No scleral icterus. No conjunctival erythema or discharge  Cardiovascular: RRR, no m/g/r. No peripheral edema.  Respiratory: CTA bilaterally. No wheezes or crackles.  MSK: moving all extremities freely; negative for C/T/L bony tenderness  Skin: no visible lesions, rashes or bruising  Psych: appropriate mood and affect    Labs:    Most Recent 3 CBC's:  Recent Labs   Lab Test 07/30/24  0904 07/16/24  0835 07/02/24  0719   WBC 5.2 5.0 5.4   HGB 10.4* 10.0* 10.0*   MCV 91 90 91    226 240   ANEUTAUTO 3.1 2.8 3.1    Most Recent 3 BMP's:  Recent Labs   Lab Test 07/30/24  0904 07/02/24  0719 06/25/24  1505    141 135   POTASSIUM 4.3 4.0 4.2   CHLORIDE 104 104 97*   CO2 27 26 27   BUN 13.9 14.3 16.6   CR 0.70 0.65* 0.75   ANIONGAP 8 11 11   DENISE 9.0 9.0 9.1   * 171* 139*   PROTTOTAL 6.8 7.0 7.7   ALBUMIN 4.0 3.8 4.2    Most Recent 2 LFT's:  Recent Labs   Lab Test 07/30/24  0904 07/02/24  0719   AST 33 32   ALT 38 39   ALKPHOS 129 145   BILITOTAL 0.3 0.2    Most Recent TSH and T4:No lab results found.  Phos/Mag:  Lab Results   Component Value Date    PHOS 3.2 08/11/2023    PHOS 3.4 07/11/2023    PHOS 2.8 07/10/2023    MAG 1.9 08/23/2023    MAG 1.9 08/11/2023    MAG 2.1 07/16/2023        I reviewed the above labs today.      Imaging:   N/A    Impression/plan:  Gallo is a 58 year old male with unresectable pancreatic cancer, currently under treatment of Gemcitabine/Abraxane.     Unresectable Pancreatic Cancer  7/31/23 began palliative treatment on FOLFIRINOX  CT CAP 10/24/23 with progression in skeletal mets, lung nodule and lymph nodes; stable pancreatic mass  -  10/31/2023: Treatment was changed to Gemcitabine and Abraxane (Day 1, 8, 15) + Zometa every 3 months  - 03/2024 CT CAP with progression of L4 lesion  - Day 8 eliminated after Cycle 5 gem/abraxane  - C8D1 deferred d/t COVID infection, now fully recovered  - Proceed with C9D1 chemo today  - RTC 8/13/24 labs and infusion as scheduled, + Zometa  - appears to be due for restaging, will message primary care team/Dr. Haile to inquire    Anemia-normocytic  Hgb 10.4 today, stable; in setting of chemotherapy and chronic disease  -Monitor    Skeletal Mets  Negative for C/T/L spine tenderness on physical exam  - Radiation to T10 completed 11/29/23  - Discussion to irradiate L4 lesion noted on CT dated 10/24/2023, however, on hold due to lack of symptoms  - CT chest 5/23/24 unchanged sclerotic bone metastases  - Managing pain with Butrans patch and hydromorphone 2 mg, 8 tablets daily  - He has a follow up with Radiation Oncology scheduled for 9/24/2024  - will continue to monitor symptoms    Bone Health  Zometa 4 mg every 3 months, last infusion 5/24/2024  - per chart review, Dr. Haile note 3/15/2024: consider use of denosumab instead of bisphosphonate, given the evidence for potentially superior efficacy. At this time, he is tolerating this treatment extremely well now, and has well-controlled skeletal mets. Continue to consider his skeletal treatment options as we move forward.   - Next Zometa due  8/13/2024. Please note the therapy plan is entered as an endocrine plan with 6 month intervals. For bone mets, we typically give every 3 months.    Neuropathy  Reports improvement while on treatment break  Referred to PT previously but not following  Symptoms unchanged on current treatment-monitor for progression    COVID  COVID positive 6/25/2024  Symptomatically improved    Pericardial Effusion  Found on CT 4/20/24 at [facility], followed by TTE with moderate pericardial effusion without tamponade.  CT 5/23/24 small  pericardial effusion  5/23/24: Cardiology consult at [facility]- Echo 6/6/2024 with EF 55-60%, LV strain -20.5% which is normal, no pericardial effusion  - repeat echo at [facility] in the future, TTE scheduled for 7/2024  - he has been cautioned by Cardiology s/s of cardiac tamponade  -following cardiology as needed    Pulmonary Embolism  Right lower segmental PE found on routine imaging  Denies shortness of breath  Taking Apixiban daily    Nutrition  GJ tube removed 11/22/2023  Weight is stable  Continue small meals/protein supplements    LE edema  Not present on exam today; previously instructed to wear compression stockings as needed  - will continue to monitor    Fatigue  Reports that he is experiencing more energy since treatment has been on hold for the past month    Diarrhea/Constipation  resolved    Discoloration of bilateral great toenails  Met with podiatry, Dr. Ware, 7/19/24. Econazole prescribed for bilateral onychomycosis.    Berta Proctor CNP  ---  30 minutes spent on the date of the encounter doing chart review, review of test results, interpretation of tests, patient visit, and documentation.            Again, thank you for allowing me to participate in the care of your patient.        Sincerely,        Berta Proctor CNP

## 2024-07-30 NOTE — NURSING NOTE
"Chief Complaint   Patient presents with    Port Draw     Labs drawn via port by RN in lab.  VS taken       Port accessed with 20 gauge 3/4\" Power needle by RN, labs collected, line flushed with saline and heparin.  Vitals taken. Pt checked in for appointment(s).    Senait Dunlap RN    "

## 2024-08-01 ENCOUNTER — TELEPHONE (OUTPATIENT)
Facility: CLINIC | Age: 59
End: 2024-08-01

## 2024-08-06 ENCOUNTER — MYC REFILL (OUTPATIENT)
Dept: RADIATION ONCOLOGY | Facility: CLINIC | Age: 59
End: 2024-08-06
Payer: COMMERCIAL

## 2024-08-06 DIAGNOSIS — C25.9 PANCREATIC ADENOCARCINOMA (H): ICD-10-CM

## 2024-08-06 RX ORDER — HYDROMORPHONE HYDROCHLORIDE 2 MG/1
2-4 TABLET ORAL EVERY 4 HOURS PRN
Qty: 180 TABLET | Refills: 0 | Status: SHIPPED | OUTPATIENT
Start: 2024-08-06 | End: 2024-08-30

## 2024-08-06 NOTE — TELEPHONE ENCOUNTER
Received [employer] message from patient requesting refill of hydromorphone. Pt paid cash for #60 tabs on 8/1 while waiting for approval for insurance.     Last refill: 8/1/24 - #60 tabs  Last office visit: 6/27/24  Scheduled for follow up 9/24/24     Will route request to MD for review.     Reviewed [place]  Report.

## 2024-08-06 NOTE — TELEPHONE ENCOUNTER
Prior Authorization Approval    Medication: HYDROMORPHONE HCL 2 MG PO TABS  Authorization Effective Date: 8/5/2024  Authorization Expiration Date: 8/5/2025  Approved Dose/Quantity:   Reference #:     Insurance Company: [employer] [phone] Fax [phone]  Expected CoPay: $    CoPay Card Available:      Financial Assistance Needed:   Which Pharmacy is filling the prescription: [facility] [address] [place] [address] [facility]  Pharmacy Notified: Yes  Patient Notified:

## 2024-08-08 ENCOUNTER — MYC REFILL (OUTPATIENT)
Dept: RADIATION ONCOLOGY | Facility: HOSPITAL | Age: 59
End: 2024-08-08

## 2024-08-08 ENCOUNTER — MYC REFILL (OUTPATIENT)
Dept: ONCOLOGY | Facility: CLINIC | Age: 59
End: 2024-08-08

## 2024-08-08 ENCOUNTER — ANCILLARY PROCEDURE (OUTPATIENT)
Dept: CT IMAGING | Facility: CLINIC | Age: 59
End: 2024-08-08
Attending: STUDENT IN AN ORGANIZED HEALTH CARE EDUCATION/TRAINING PROGRAM
Payer: COMMERCIAL

## 2024-08-08 DIAGNOSIS — C25.0 MALIGNANT NEOPLASM OF HEAD OF PANCREAS (H): ICD-10-CM

## 2024-08-08 DIAGNOSIS — Z98.890 HISTORY OF BILIARY STENT INSERTION: ICD-10-CM

## 2024-08-08 DIAGNOSIS — K31.1 GASTRIC OUTLET OBSTRUCTION: ICD-10-CM

## 2024-08-08 DIAGNOSIS — R10.30 LOWER ABDOMINAL PAIN: ICD-10-CM

## 2024-08-08 DIAGNOSIS — Z79.4 TYPE 2 DIABETES MELLITUS WITHOUT COMPLICATION, WITH LONG-TERM CURRENT USE OF INSULIN (H): ICD-10-CM

## 2024-08-08 DIAGNOSIS — G89.3 CANCER ASSOCIATED PAIN: ICD-10-CM

## 2024-08-08 DIAGNOSIS — C25.9 PANCREATIC ADENOCARCINOMA (H): ICD-10-CM

## 2024-08-08 DIAGNOSIS — E11.9 TYPE 2 DIABETES MELLITUS WITHOUT COMPLICATION, WITH LONG-TERM CURRENT USE OF INSULIN (H): ICD-10-CM

## 2024-08-08 PROCEDURE — 71260 CT THORAX DX C+: CPT | Mod: GC | Performed by: RADIOLOGY

## 2024-08-08 PROCEDURE — 74177 CT ABD & PELVIS W/CONTRAST: CPT | Mod: GC | Performed by: RADIOLOGY

## 2024-08-08 RX ORDER — PANTOPRAZOLE SODIUM 40 MG/1
40 TABLET, DELAYED RELEASE ORAL 2 TIMES DAILY
Qty: 60 TABLET | Refills: 4 | Status: SHIPPED | OUTPATIENT
Start: 2024-08-08

## 2024-08-08 RX ORDER — HEPARIN SODIUM (PORCINE) LOCK FLUSH IV SOLN 100 UNIT/ML 100 UNIT/ML
5 SOLUTION INTRAVENOUS ONCE
Status: COMPLETED | OUTPATIENT
Start: 2024-08-08 | End: 2024-08-08

## 2024-08-08 RX ORDER — IOPAMIDOL 755 MG/ML
89 INJECTION, SOLUTION INTRAVASCULAR ONCE
Status: COMPLETED | OUTPATIENT
Start: 2024-08-08 | End: 2024-08-08

## 2024-08-08 RX ADMIN — IOPAMIDOL 89 ML: 755 INJECTION, SOLUTION INTRAVASCULAR at 08:40

## 2024-08-08 RX ADMIN — HEPARIN SODIUM (PORCINE) LOCK FLUSH IV SOLN 100 UNIT/ML 5 ML: 100 SOLUTION at 08:47

## 2024-08-08 NOTE — DISCHARGE INSTRUCTIONS

## 2024-08-08 NOTE — TELEPHONE ENCOUNTER
Call to pharmacy to verify they still have active refills from last prescription of Creon sent in on 03/15/24 w/ 11 refills.   Pharmacy tech stating they do still have refills and will dispense for pt.

## 2024-08-08 NOTE — TELEPHONE ENCOUNTER
Pending Prescriptions:                       Disp   Refills    pantoprazole (PROTONIX) 40 MG EC tablet   60 tab*4            Sig: Take 1 tablet (40 mg) by mouth 2 times daily    Last prescribing provider: Megan Farrell    Last clinic visit date: 07/30/24 w/Berta Proctor    Recommendations for requested medication (if none, N/A): N/A    Any other pertinent information (if none, N/A): N/A    Refilled: Y/N, if NO, why?

## 2024-08-09 LAB — RADIOLOGIST FLAGS: ABNORMAL

## 2024-08-09 RX ORDER — BUPRENORPHINE 10 UG/H
1 PATCH TRANSDERMAL
Qty: 4 PATCH | Refills: 3 | Status: SHIPPED | OUTPATIENT
Start: 2024-08-09 | End: 2024-09-05

## 2024-08-09 RX ORDER — BUPRENORPHINE 20 UG/H
1 PATCH TRANSDERMAL WEEKLY
Qty: 4 PATCH | Refills: 3 | Status: SHIPPED | OUTPATIENT
Start: 2024-08-09 | End: 2024-09-05

## 2024-08-09 NOTE — CONFIDENTIAL NOTE
Tylenol Refill   Last prescribing provider: Nola garsia     Last clinic visit date: 7/30/24 Berta Proctor     Recommendations for requested medication (if none, N/A): Copied from chart note   acetaminophen (TYLENOL) 32 mg/mL liquid Take 20.313 mLs (650 mg) by mouth every 8 hours 1800 mL 1     Any other pertinent information (if none, N/A): N/A    Refilled: Y/N, if NO, why?

## 2024-08-09 NOTE — TELEPHONE ENCOUNTER
Received [employer] message from patient requesting refill of Butrans.     Last refill: 7/15/24  Last office visit: 6/27/24  Scheduled for follow up 9/24/24     Will route request to MD for review.     Reviewed [place]  Report.

## 2024-08-12 ENCOUNTER — VIRTUAL VISIT (OUTPATIENT)
Dept: ONCOLOGY | Facility: CLINIC | Age: 59
End: 2024-08-12
Attending: STUDENT IN AN ORGANIZED HEALTH CARE EDUCATION/TRAINING PROGRAM
Payer: COMMERCIAL

## 2024-08-12 DIAGNOSIS — C25.9 PANCREATIC ADENOCARCINOMA (H): Primary | ICD-10-CM

## 2024-08-12 DIAGNOSIS — C79.51 MALIGNANT NEOPLASM METASTATIC TO BONE (H): ICD-10-CM

## 2024-08-12 DIAGNOSIS — C25.0 MALIGNANT NEOPLASM OF HEAD OF PANCREAS (H): ICD-10-CM

## 2024-08-12 PROCEDURE — G2211 COMPLEX E/M VISIT ADD ON: HCPCS | Mod: 95 | Performed by: STUDENT IN AN ORGANIZED HEALTH CARE EDUCATION/TRAINING PROGRAM

## 2024-08-12 PROCEDURE — 99215 OFFICE O/P EST HI 40 MIN: CPT | Mod: 95 | Performed by: STUDENT IN AN ORGANIZED HEALTH CARE EDUCATION/TRAINING PROGRAM

## 2024-08-12 RX ORDER — HEPARIN SODIUM (PORCINE) LOCK FLUSH IV SOLN 100 UNIT/ML 100 UNIT/ML
5 SOLUTION INTRAVENOUS
OUTPATIENT
Start: 2024-11-10

## 2024-08-12 RX ORDER — ALBUTEROL SULFATE 90 UG/1
1-2 AEROSOL, METERED RESPIRATORY (INHALATION)
Start: 2024-11-10

## 2024-08-12 RX ORDER — PACLITAXEL 100 MG/20ML
75 INJECTION, POWDER, LYOPHILIZED, FOR SUSPENSION INTRAVENOUS ONCE
Status: CANCELLED | OUTPATIENT
Start: 2024-08-13

## 2024-08-12 RX ORDER — ALBUTEROL SULFATE 0.83 MG/ML
2.5 SOLUTION RESPIRATORY (INHALATION)
OUTPATIENT
Start: 2024-11-10

## 2024-08-12 RX ORDER — METHYLPREDNISOLONE SODIUM SUCCINATE 125 MG/2ML
125 INJECTION, POWDER, LYOPHILIZED, FOR SOLUTION INTRAMUSCULAR; INTRAVENOUS
Start: 2024-11-10

## 2024-08-12 RX ORDER — ZOLEDRONIC ACID 0.04 MG/ML
4 INJECTION, SOLUTION INTRAVENOUS ONCE
OUTPATIENT
Start: 2024-11-10 | End: 2024-11-10

## 2024-08-12 RX ORDER — PACLITAXEL 100 MG/20ML
75 INJECTION, POWDER, LYOPHILIZED, FOR SUSPENSION INTRAVENOUS ONCE
Status: CANCELLED | OUTPATIENT
Start: 2024-09-10

## 2024-08-12 RX ORDER — HEPARIN SODIUM,PORCINE 10 UNIT/ML
5-20 VIAL (ML) INTRAVENOUS DAILY PRN
OUTPATIENT
Start: 2024-11-10

## 2024-08-12 RX ORDER — EPINEPHRINE 1 MG/ML
0.3 INJECTION, SOLUTION, CONCENTRATE INTRAVENOUS EVERY 5 MIN PRN
OUTPATIENT
Start: 2024-11-10

## 2024-08-12 RX ORDER — DIPHENHYDRAMINE HYDROCHLORIDE 50 MG/ML
50 INJECTION INTRAMUSCULAR; INTRAVENOUS
Start: 2024-11-10

## 2024-08-12 RX ORDER — PACLITAXEL 100 MG/20ML
75 INJECTION, POWDER, LYOPHILIZED, FOR SUSPENSION INTRAVENOUS ONCE
Status: CANCELLED | OUTPATIENT
Start: 2024-08-27

## 2024-08-12 RX ORDER — MEPERIDINE HYDROCHLORIDE 25 MG/ML
25 INJECTION INTRAMUSCULAR; INTRAVENOUS; SUBCUTANEOUS EVERY 30 MIN PRN
OUTPATIENT
Start: 2024-11-10

## 2024-08-12 NOTE — LETTER
2024      Gallo Navarro  [address] [address] [place] [id number]      Dear Colleague,    Thank you for referring your patient, Gallo Navarro, to the [facility]. Please see a copy of my visit note below.    [facility] - Medical Oncology TeleHealth Consult Note  2024    Patient Identifiers     Name: Gallo Navarro  Preferred Address: [place]  Preferred Language: English  : 1965  Gender: male    Assessment and Plan     Mr. Gallo Navarro is a 58 year old male with a history of IDDM and metastatic pancreas cancer (23) on second-line [place]/Abraxane who returns to clinic for treatment response evaluation.    NGS: KRAS G12R  Immuno: pMMR, KAYLIE, TMB-low  Clinical Trial: MARIPOSA-186, MARIPOSA-280, SHAWANDA    Gallo's most recent CT scan demonstrates progression of the L4 lesion with fracture of that spinous process.  In review with radiology there is no evidence of impingement of either nerve root or the canal itself.  In addition, he has no neurologic symptoms at all.  He only notes some worsening low back pain over the course of the past 1 to 2 weeks.  Given this is an isolated site of progression, it suggests that his overall disease remains relatively well-controlled on [place]/Abraxane and Zometa.  We will plan to continue this regimen while recommending that he follow-up with Dr. Reaves for treatment of the L4 lesion.    While Gallo noted that he is tolerating treatment relatively well, his description of symptoms suggest otherwise.  He notes that he is down for nearly 60 hours after each infusion and has persistent stocking neuropathy.  The symptoms argue for significant dose reduction of the Abraxane, which we will provide.  Plan for 50% dose reduction for all future cycles.  If his symptoms do not improve, we will discontinue the Abraxane entirely.    Continue [place]/Abraxane treatment tomorrow.  Plan for addition of Zometa with infusion.  SUZETTE monitoring at least monthly throughout  treatment.  Plan for MD visit in 3 months with CT scans prior.    - decrease abraxane dose by ~50%  - zometa tomorrow; can consider RANKL-mAb later    45 minutes spent on the date of the encounter doing chart review, review of test results, interpretation of tests, patient visit, documentation, and discussion with other provider(s)     Luis Haile MD, PhD   of Medicine  Division of Hematology, Oncology and Transplantation  [facility]    -----------------------------------    Oncology Summary     Cancer Staging   Malignant neoplasm of head of pancreas (H)  Staging form: Pancreas, AJCC 8th Edition  - Clinical stage from 7/11/2023: Stage III (cT2, cN2, cM0) - Signed by Luis Haile MD on 7/14/2023      Oncology History   Malignant neoplasm of head of pancreas (H)   7/11/2023 -  Cancer Staged    Staging form: Pancreas, AJCC 8th Edition  - Clinical stage from 7/11/2023: Stage III (cT2, cN2, cM0)     7/14/2023 Initial Diagnosis    Malignant neoplasm of head of pancreas (H)     7/31/2023 - 10/21/2023 Chemotherapy    OP ONC Pancreatic Cancer - Modified FOLFIRINOX  Plan Provider: Luis Haile MD  Treatment goal: Curative  Line of treatment: [No plan line of treatment]     10/31/2023 -  Chemotherapy    OP ONC Pancreatic Cancer - PACLitaxel protein-bound (Abraxane) / Gemcitabine  Plan Provider: Luis Haile MD  Treatment goal: Curative  Line of treatment: Second Line         Subjective/Interval Events     - feeling some numbness from his ankles down  - he has been having episodes of hypothermia associated with wind, but has also had episodes of fever    - taking children's tylenol with dilaudid; he takes another dose with febrile episodes    Objective Data     Lab data:  I have personally reviewed the lab data, notable for:    - no significant notables; reviewed in chart    Radiology data:  I have personally reviewed the radiology data, notable for:  08/08/2024 CT C/A/P  IMPRESSION:   1.   No significant change in poorly marginated infiltrative pancreatic  head mass with prominent peripancreatic and mesenteric  lymphadenopathy.  2.  Continued enlargement of sclerotic lesion within the vertebral  body of L4 with new atelectatic fracture of the inferior endplate.  Other scattered stable sclerotic lesions as described above.  3.  Unchanged appearance of gastrojejunal, gastroduodenal, biliary,  and pancreatic stents.    Pathology and other data:  I have personally reviewed and interpreted the pathology data, notable for:    - no new data    Billing Stuff     Virtual Visit Details    Type of service:  Video Visit     Originating Location (pt. Location): Home    Distant Location (provider location):  On-site  Platform used for Video Visit: Lorna      Again, thank you for allowing me to participate in the care of your patient.        Sincerely,        Luis Haile MD

## 2024-08-12 NOTE — PROGRESS NOTES
[facility] - Medical Oncology TeleHealth Consult Note  2024    Patient Identifiers     Name: Gallo Navarro  Preferred Address: [place]  Preferred Language: English  : 1965  Gender: male    Assessment and Plan     Mr. Gallo Navarro is a 58 year old male with a history of IDDM and metastatic pancreas cancer (23) on second-line [place]/Abraxane who returns to clinic for treatment response evaluation.    NGS: KRAS G12R  Immuno: pMMR, KAYLIE, TMB-low  Clinical Trial: MARIPOSA-186, MARIPOSA-280, TORMAYCOL    Gallo's most recent CT scan demonstrates progression of the L4 lesion with fracture of that spinous process.  In review with radiology there is no evidence of impingement of either nerve root or the canal itself.  In addition, he has no neurologic symptoms at all.  He only notes some worsening low back pain over the course of the past 1 to 2 weeks.  Given this is an isolated site of progression, it suggests that his overall disease remains relatively well-controlled on [place]/Abraxane and Zometa.  We will plan to continue this regimen while recommending that he follow-up with Dr. Reaves for treatment of the L4 lesion.    While Gallo noted that he is tolerating treatment relatively well, his description of symptoms suggest otherwise.  He notes that he is down for nearly 60 hours after each infusion and has persistent stocking neuropathy.  The symptoms argue for significant dose reduction of the Abraxane, which we will provide.  Plan for 50% dose reduction for all future cycles.  If his symptoms do not improve, we will discontinue the Abraxane entirely.    Continue [place]/Abraxane treatment tomorrow.  Plan for addition of Zometa with infusion.  SUZETTE monitoring at least monthly throughout treatment.  Plan for MD visit in 3 months with CT scans prior.    45 minutes spent on the date of the encounter doing chart review, review of test results, interpretation of tests, patient visit, documentation, and discussion with other  provider(s)     Luis Haile MD, PhD   of Medicine  Division of Hematology, Oncology and Transplantation  [facility]    -----------------------------------    Oncology Summary     Cancer Staging   Malignant neoplasm of head of pancreas (H)  Staging form: Pancreas, AJCC 8th Edition  - Clinical stage from 7/11/2023: Stage III (cT2, cN2, cM0) - Signed by Luis Haile MD on 7/14/2023      Oncology History   Malignant neoplasm of head of pancreas (H)   7/11/2023 -  Cancer Staged    Staging form: Pancreas, AJCC 8th Edition  - Clinical stage from 7/11/2023: Stage III (cT2, cN2, cM0)     7/14/2023 Initial Diagnosis    Malignant neoplasm of head of pancreas (H)     7/31/2023 - 10/21/2023 Chemotherapy    OP ONC Pancreatic Cancer - Modified FOLFIRINOX  Plan Provider: Luis Haile MD  Treatment goal: Curative  Line of treatment: [No plan line of treatment]     10/31/2023 -  Chemotherapy    OP ONC Pancreatic Cancer - PACLitaxel protein-bound (Abraxane) / Gemcitabine  Plan Provider: Luis Haile MD  Treatment goal: Curative  Line of treatment: Second Line         Subjective/Interval Events     - feeling some numbness from his ankles down  - he has been having episodes of hypothermia associated with wind, but has also had episodes of fever    - taking children's tylenol with dilaudid; he takes another dose with febrile episodes    Objective Data     Lab data:  I have personally reviewed the lab data, notable for:    - no significant notables; reviewed in chart    Radiology data:  I have personally reviewed the radiology data, notable for:  08/08/2024 CT C/A/P  IMPRESSION:   1.  No significant change in poorly marginated infiltrative pancreatic  head mass with prominent peripancreatic and mesenteric  lymphadenopathy.  2.  Continued enlargement of sclerotic lesion within the vertebral  body of L4 with new atelectatic fracture of the inferior endplate.  Other scattered stable sclerotic lesions as  described above.  3.  Unchanged appearance of gastrojejunal, gastroduodenal, biliary,  and pancreatic stents.    Pathology and other data:  I have personally reviewed and interpreted the pathology data, notable for:    - no new data    Billing Stuff     Virtual Visit Details    Type of service:  Video Visit     Originating Location (pt. Location): Home    Distant Location (provider location):  On-site  Platform used for Video Visit: TanWell

## 2024-08-12 NOTE — NURSING NOTE
Current patient location: [address] [place] [id number]    Is the patient currently in the state of [place]? YES    Visit mode:VIDEO    If the visit is dropped, the patient can be reconnected by: VIDEO VISIT: Text to cell phone:   Telephone Information:   Mobile [phone]       Will anyone else be joining the visit? NO  (If patient encounters technical issues they should call [phone])    How would you like to obtain your AVS? MyChart    Are changes needed to the allergy or medication list? Pt stated no changes to allergies and Pt stated no med changes    Are refills needed on medications prescribed by this physician? NO    Rooming Documentation:  Assigned questionnaire(s) completed      Reason for visit: RECHECK    Patricia NICOLE

## 2024-08-13 ENCOUNTER — APPOINTMENT (OUTPATIENT)
Dept: LAB | Facility: CLINIC | Age: 59
End: 2024-08-13
Attending: STUDENT IN AN ORGANIZED HEALTH CARE EDUCATION/TRAINING PROGRAM
Payer: COMMERCIAL

## 2024-08-13 ENCOUNTER — INFUSION THERAPY VISIT (OUTPATIENT)
Dept: ONCOLOGY | Facility: CLINIC | Age: 59
End: 2024-08-13
Attending: STUDENT IN AN ORGANIZED HEALTH CARE EDUCATION/TRAINING PROGRAM
Payer: COMMERCIAL

## 2024-08-13 VITALS
BODY MASS INDEX: 21.35 KG/M2 | SYSTOLIC BLOOD PRESSURE: 104 MMHG | TEMPERATURE: 98.3 F | HEART RATE: 64 BPM | RESPIRATION RATE: 16 BRPM | DIASTOLIC BLOOD PRESSURE: 67 MMHG | OXYGEN SATURATION: 98 % | WEIGHT: 157.4 LBS

## 2024-08-13 DIAGNOSIS — Z51.11 ENCOUNTER FOR ANTINEOPLASTIC CHEMOTHERAPY: Primary | ICD-10-CM

## 2024-08-13 DIAGNOSIS — C25.0 MALIGNANT NEOPLASM OF HEAD OF PANCREAS (H): ICD-10-CM

## 2024-08-13 DIAGNOSIS — C79.51 MALIGNANT NEOPLASM METASTATIC TO BONE (H): ICD-10-CM

## 2024-08-13 LAB
ALBUMIN SERPL BCG-MCNC: 3.9 G/DL (ref 3.5–5.2)
BASOPHILS # BLD AUTO: 0 10E3/UL (ref 0–0.2)
BASOPHILS NFR BLD AUTO: 1 %
CALCIUM SERPL-MCNC: 9.1 MG/DL (ref 8.8–10.4)
CREAT SERPL-MCNC: 0.68 MG/DL (ref 0.67–1.17)
EGFRCR SERPLBLD CKD-EPI 2021: >90 ML/MIN/1.73M2
EOSINOPHIL # BLD AUTO: 0.2 10E3/UL (ref 0–0.7)
EOSINOPHIL NFR BLD AUTO: 4 %
ERYTHROCYTE [DISTWIDTH] IN BLOOD BY AUTOMATED COUNT: 15.9 % (ref 10–15)
HCT VFR BLD AUTO: 33.6 % (ref 40–53)
HGB BLD-MCNC: 10.4 G/DL (ref 13.3–17.7)
IMM GRANULOCYTES # BLD: 0 10E3/UL
IMM GRANULOCYTES NFR BLD: 0 %
LYMPHOCYTES # BLD AUTO: 1.4 10E3/UL (ref 0.8–5.3)
LYMPHOCYTES NFR BLD AUTO: 30 %
MCH RBC QN AUTO: 28.3 PG (ref 26.5–33)
MCHC RBC AUTO-ENTMCNC: 31 G/DL (ref 31.5–36.5)
MCV RBC AUTO: 91 FL (ref 78–100)
MONOCYTES # BLD AUTO: 0.6 10E3/UL (ref 0–1.3)
MONOCYTES NFR BLD AUTO: 13 %
NEUTROPHILS # BLD AUTO: 2.3 10E3/UL (ref 1.6–8.3)
NEUTROPHILS NFR BLD AUTO: 52 %
NRBC # BLD AUTO: 0 10E3/UL
NRBC BLD AUTO-RTO: 0 /100
PLATELET # BLD AUTO: 265 10E3/UL (ref 150–450)
RBC # BLD AUTO: 3.68 10E6/UL (ref 4.4–5.9)
WBC # BLD AUTO: 4.5 10E3/UL (ref 4–11)

## 2024-08-13 PROCEDURE — 82040 ASSAY OF SERUM ALBUMIN: CPT

## 2024-08-13 PROCEDURE — 258N000003 HC RX IP 258 OP 636: Performed by: STUDENT IN AN ORGANIZED HEALTH CARE EDUCATION/TRAINING PROGRAM

## 2024-08-13 PROCEDURE — 82310 ASSAY OF CALCIUM: CPT

## 2024-08-13 PROCEDURE — 96375 TX/PRO/DX INJ NEW DRUG ADDON: CPT

## 2024-08-13 PROCEDURE — 36591 DRAW BLOOD OFF VENOUS DEVICE: CPT

## 2024-08-13 PROCEDURE — 85048 AUTOMATED LEUKOCYTE COUNT: CPT | Performed by: STUDENT IN AN ORGANIZED HEALTH CARE EDUCATION/TRAINING PROGRAM

## 2024-08-13 PROCEDURE — 96413 CHEMO IV INFUSION 1 HR: CPT

## 2024-08-13 PROCEDURE — 96367 TX/PROPH/DG ADDL SEQ IV INF: CPT

## 2024-08-13 PROCEDURE — 250N000011 HC RX IP 250 OP 636: Performed by: STUDENT IN AN ORGANIZED HEALTH CARE EDUCATION/TRAINING PROGRAM

## 2024-08-13 PROCEDURE — 96417 CHEMO IV INFUS EACH ADDL SEQ: CPT

## 2024-08-13 PROCEDURE — 82565 ASSAY OF CREATININE: CPT

## 2024-08-13 RX ORDER — ZOLEDRONIC ACID 0.04 MG/ML
4 INJECTION, SOLUTION INTRAVENOUS ONCE
Status: COMPLETED | OUTPATIENT
Start: 2024-08-13 | End: 2024-08-13

## 2024-08-13 RX ORDER — HEPARIN SODIUM (PORCINE) LOCK FLUSH IV SOLN 100 UNIT/ML 100 UNIT/ML
5 SOLUTION INTRAVENOUS ONCE
Status: COMPLETED | OUTPATIENT
Start: 2024-08-13 | End: 2024-08-13

## 2024-08-13 RX ORDER — ONDANSETRON 2 MG/ML
8 INJECTION INTRAMUSCULAR; INTRAVENOUS ONCE
Status: COMPLETED | OUTPATIENT
Start: 2024-08-13 | End: 2024-08-13

## 2024-08-13 RX ORDER — PACLITAXEL 100 MG/20ML
75 INJECTION, POWDER, LYOPHILIZED, FOR SUSPENSION INTRAVENOUS ONCE
Status: COMPLETED | OUTPATIENT
Start: 2024-08-13 | End: 2024-08-13

## 2024-08-13 RX ORDER — HEPARIN SODIUM (PORCINE) LOCK FLUSH IV SOLN 100 UNIT/ML 100 UNIT/ML
5 SOLUTION INTRAVENOUS
Status: DISCONTINUED | OUTPATIENT
Start: 2024-08-13 | End: 2024-08-13 | Stop reason: HOSPADM

## 2024-08-13 RX ADMIN — Medication 5 ML: at 08:10

## 2024-08-13 RX ADMIN — Medication 5 ML: at 11:05

## 2024-08-13 RX ADMIN — GEMCITABINE 2000 MG: 38 INJECTION, SOLUTION INTRAVENOUS at 10:29

## 2024-08-13 RX ADMIN — SODIUM CHLORIDE 250 ML: 9 INJECTION, SOLUTION INTRAVENOUS at 09:13

## 2024-08-13 RX ADMIN — ZOLEDRONIC ACID 4 MG: 0.04 INJECTION, SOLUTION INTRAVENOUS at 09:26

## 2024-08-13 RX ADMIN — PACLITAXEL 150 MG: 100 INJECTION, POWDER, LYOPHILIZED, FOR SUSPENSION INTRAVENOUS at 09:53

## 2024-08-13 RX ADMIN — ONDANSETRON 8 MG: 2 INJECTION INTRAMUSCULAR; INTRAVENOUS at 09:13

## 2024-08-13 ASSESSMENT — PAIN SCALES - GENERAL: PAINLEVEL: NO PAIN (0)

## 2024-08-13 NOTE — PROGRESS NOTES
Infusion Nursing Note:  Gallo Navarro presents today for [id number] Abraxane/Gemzar/Zometa.    Patient seen by provider today: No. Dr. Haile 8/12   present during visit today: Not Applicable.    Note: Patient reports no changes overnight. Provider visit 8/12. No new complaints. Patient will do future infusion at home thru [facility].       Intravenous Access:  Implanted Port.    Treatment Conditions:  Lab Results   Component Value Date    HGB 10.4 (L) 08/13/2024    WBC 4.5 08/13/2024    ANEU 2.2 12/26/2023    ANEUTAUTO 2.3 08/13/2024     08/13/2024        Lab Results   Component Value Date     07/30/2024    POTASSIUM 4.3 07/30/2024    MAG 1.9 08/23/2023    CR 0.68 08/13/2024    DENISE 9.1 08/13/2024    BILITOTAL 0.3 07/30/2024    ALBUMIN 3.9 08/13/2024    ALT 38 07/30/2024    AST 33 07/30/2024       Results reviewed, labs MET treatment parameters, ok to proceed with treatment.      Post Infusion Assessment:  Patient tolerated infusion without incident.  Blood return noted pre and post infusion.  Site patent and intact, free from redness, edema or discomfort.  No evidence of extravasations.  Access discontinued per protocol.       Discharge Plan:   Patient declined prescription refills.  Discharge instructions reviewed with: Patient and Family.  Patient and/or family verbalized understanding of discharge instructions and all questions answered.  AVS to patient via Mi[employer].  Patient will return 8/23 for next appointment.   Patient discharged in stable condition accompanied by: self.  Departure Mode: Ambulatory.      GREG HINOJOSA RN

## 2024-08-13 NOTE — NURSING NOTE
Chief Complaint   Patient presents with    Blood Draw     Port blood draw by lab RN       Port accessed with blood draw and heparin flush by lab RN. Vitals taken and next appointment arrived.    Berta Jay RN

## 2024-08-20 NOTE — TELEPHONE ENCOUNTER
RECORDS STATUS - ALL OTHER DIAGNOSIS      RECORDS RECEIVED FROM: [facility] - Internal records   DATE RECEIVED: 8/20

## 2024-08-22 ENCOUNTER — OFFICE VISIT (OUTPATIENT)
Dept: RADIATION ONCOLOGY | Facility: CLINIC | Age: 59
End: 2024-08-22
Payer: COMMERCIAL

## 2024-08-22 ENCOUNTER — PRE VISIT (OUTPATIENT)
Dept: RADIATION ONCOLOGY | Facility: CLINIC | Age: 59
End: 2024-08-22

## 2024-08-22 VITALS
RESPIRATION RATE: 18 BRPM | OXYGEN SATURATION: 94 % | SYSTOLIC BLOOD PRESSURE: 109 MMHG | HEART RATE: 73 BPM | TEMPERATURE: 98.6 F | DIASTOLIC BLOOD PRESSURE: 66 MMHG

## 2024-08-22 DIAGNOSIS — C25.0 MALIGNANT NEOPLASM OF HEAD OF PANCREAS (H): Primary | ICD-10-CM

## 2024-08-22 DIAGNOSIS — C79.51 SECONDARY MALIGNANT NEOPLASM OF BONE (H): ICD-10-CM

## 2024-08-22 PROCEDURE — 99215 OFFICE O/P EST HI 40 MIN: CPT | Performed by: SURGERY

## 2024-08-22 PROCEDURE — G2211 COMPLEX E/M VISIT ADD ON: HCPCS | Performed by: SURGERY

## 2024-08-22 ASSESSMENT — PAIN SCALES - GENERAL: PAINLEVEL: NO PAIN (1)

## 2024-08-22 NOTE — PROGRESS NOTES
Department of Radiation Oncology  [facility] Cancer Center  [facility]  [address] [place] [address] [id number] [phone]       Follow-up note     Name: Gallo Navarro MRN: [id number]   : 1965   Date of Service: Aug 22, 2024   Referring: Dr. Anguiano     Reason for consultation: metastatic pancreatic cancer to bone    History of Present Illness     Mr. Navarro is a 58 year old male with metastatic pancreatic cancer to bones.  He has completed palliative radiotherapy to T10, 20 Gray/5 fractions 2023.  He now presents for radiation to L4 in the setting of palliation/oligo progression.    Briefly, his oncologic history is as follows. He was seen by my colleague at the [facility], Dr. Becca Anguiano, note is referenced from that consultation note dated 23:    Patient's diagnosis came to light when he presented to [facility] ED on 2023 with worsening pre-existing abdominal pain and jaundice, weight loss and poor p.o. intake.  He has a history of acute pancreatitis with ileus and duodenal stenosis, gastric outlet obstruction requiring GJ tube (placed 2023) for feeding.  After receiving ERCP with sphincterotomy at [facility] prior to admission, the patient did not have relief of abdominal pain, and he presented to [facility] to establish care with our system.     Prior to his care at [facility] in 2023 she had initial presentation of sudden onset vomiting and found to have lipase greater than 2000, later revealed to have acquired duodenal stricture and gastric outlet obstruction.  He had PEG GJ placed for gastric venting and nutrition after weight loss of 40 pounds.  CT of the abdomen performed in 2023 for pancreatitis follow-up revealed focal irregular hypodense masslike region in the pancreatic head measuring 2.2 x 1.5 x 1.4 cm with persistent mild main pancreatic ductal dilation and associated subcentimeter mesenteric lymph  nodes.  EUS biopsy on 6/27/2020 was nondiagnostic showing duodenal inflammation.  He then proceeded with ERCP on 7/7/2023, as above.      CT abdomen pelvis on 7/8 showed pancreatic mass without pancreatic ductal dilatation, with biliary stent in position, mild pneumobilia, calcific density adjacent to biliary stent.     MRCP from 7/10/2023 showed ill-defined mass soft tissue in the pancreatic head suspicious for malignancy given biliary and pancreatic duct obstruction with vascular encasement and narrowing of SMV, less likely pancreatitis.  Mildly enlarged peripancreatic and periportal and enteric lymph nodes are present, indeterminate.     At [facility] he underwent EGD/EUS on 7/11/2023, with pathology showing adenocarcinoma.  Surgical oncology was consulted, who felt surgery should be reconsidered after 6 months of adjuvant chemotherapy.     CT chest from 7/12/2023 showed an ill-defined pancreatic head mass with upper abdominal lymphadenopathy, and a 7 mm pulmonary nodule left upper lobe, indeterminate. He underwent duodenal stenting on 7/13/2023.     He initiated systemic therapy was FOLFIRINOX on 7/31/2023.     CT chest abdomen pelvis on 10/24/2023 showed interval enlargement of scattered sclerotic osseous metastases, and enlarging subpleural 1.2 cm pulmonary nodule in paramediastinal EAMON, no substantial change in the pancreatic head mass.  Filling defects were seen in the right middle and lower lobe, concerning for emboli.  Echo follow-up CT PE was performed on 10/31/2023 which revealed a acute PE in the right lower lobe segment.  He was subsequently prescribed Eliquis twice daily by Dr. Haile.       Upon follow-up with Dr. Haile on 11/6/2023, review of imaging showed disease progression, particularly with skeletal metastatic progression.  He was recommended for treatment for the T10 lesion given its location to the right pedicle.  Dr. Haile changed systemic therapy to gemcitabine/Abraxane with Zometa for bony  lesion control on 10/31/23.    He completed palliative radiotherapy to T10, 20 Gray/5 fractions completed on 11/29/2023.  He has since been on systemic therapy under care of Dr. Haile.      He underwent a PET CT scan on 1/30/2024, which demonstrated pancreatic head mass with FDG uptake, with metastatic adenopathy, with lung nodules suspicious for metastatic disease.  There was sclerotic disease at T10 and L4 in the left pubic bone, but these were not FDG avid on the PET scan.    CT scan on 3/11/2024 demonstrated fairly stable disease with a pancreatic head mass and lacey-pancreatic lymph nodes.  There was no additional suspicious pulmonary nodules with resolution of prior nodules in the lungs.  However there was interval enlargement of L4 lesion, now measuring 1.8 cm in size, previously 1.4 cm.  Thus, he was referred for consideration of palliative radiotherapy to L4.  He is having mild discomfort in this location, not extreme.  Overall he tolerated radiation well last time.  He wishes to pursue palliative radiotherapy as he continues systemic therapy under care of Dr. Haile, with plans for eventually enrolling in a clinical trial at [facility].     Patient was last seen in March 2024.  At that time we had discussed palliative radiotherapy to L4 given this was a potential site of only oligo progression.  In the meantime he is also meeting with MD Lombardi, particularly with Dr. Dawkins's team for possible clinical.  A CT scan was obtained on 4/30/2024 (I do not have the current images) but the official read demonstrated a new pericardial effusion, stable locally advanced pancreatic head and uncinate tumor, decreasing mesenteric lymph nodes, relatively stable osseous lesions at the L4, T10 and left pubic symphysis.  He has had a recent echo with a ejection fraction of 60% plus, but will be following with a cardiologist in the near future of note, and he will be continuing to see a cardiologist.  I discussed that  this possibly could be related to his prior radiation and or combination with systemic therapy.    Per patient report additional pathology for clinical trial testing was to be performed, and a biopsy of the left pubic symphysis lesion was performed which returned negative for malignancy.  He will still continue to follow with Dr. Haile and has not been on systemic therapy for over a month.    Today, he is accompanied by his wife.  He states that overall he feels quite well.  He denies any significant back pain or pubic pain at this time and states that he has adequate pain control.    Interval history:    He has been under care of Dr Haile, currently on gem/ abraxane. Recent imaging on 8/8/2024 demonstrates fairly stable disease in primary with prominent lacey pancreatic lymph nodes, with progression of sclerotic lesion at L4 now associated with a fracture of the inferior endplate.  He is now having pain in this location and presents for palliative radiotherapy.  His pain is up to a 3 out of 10, he is currently using Dilaudid and Tylenol,.  Given the pain, he has changed his activity level. He denies any focal neurologic deficit.    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    Gastric outlet obstruction     Metastasis from pancreatic cancer (H)     Personal history of chemotherapy     Gemzar + Abraxane started on 10/31/2023    Recurrent acute pancreatitis     S/P radiation therapy     2,000 cGy to T10 Spine completed on 11/29/2023 [facility]       Past Surgical History:   Past Surgical History:   Procedure Laterality Date    AS OPEN TREATMENT CLAVICULAR FRACTURE INTERNAL FX      ENDOSCOPIC RETROGRADE CHOLANGIOPANCREATOGRAM N/A 7/11/2023    Procedure: ENDOSCOPIC RETROGRADE CHOLANGIOPANCREATOGRAPHY;  Surgeon: Khadar Pickett MD;  Location: [facility] OR    ENDOSCOPIC RETROGRADE CHOLANGIOPANCREATOGRAM  N/A 8/17/2023    Procedure: ENDOSCOPIC RETROGRADE  with stent removal x1, balloon sweep;  Surgeon: Christos Greenberg MD;  Location: [facility] OR    ENDOSCOPIC ULTRASOUND UPPER GASTROINTESTINAL TRACT (GI) N/A 7/11/2023    Procedure: Endoscopic ultrasound upper gastrointestinal tract (GI), with biposy, GJ tube repositioning, stent placement;  Surgeon: Khadar Pickett MD;  Location: [facility] OR    ENDOSCOPIC ULTRASOUND UPPER GASTROINTESTINAL TRACT (GI) N/A 7/13/2023    Procedure: ENDOSCOPIC ULTRASOUND, ESOPHAGOSCOPY, EUS guided gastrojejunostomy;  Surgeon: Tre York MD;  Location: [facility] OR    INSERT PICC LINE  04/29/2023    INSERT PORT VASCULAR ACCESS Right 7/28/2023    Procedure: Insert port vascular access;  Surgeon: Tom العلي MD;  Location: [facility] OR    IR CHEST PORT PLACEMENT > 5 YRS OF AGE  7/28/2023    IR NG TUBE PLACEMENT REQ RAD & FLUORO  05/08/2023    JG tube    REPLACE GASTROJEJUNOSTOMY TUBE, PERCUTANEOUS N/A 8/17/2023    Procedure: possible REPLACEMENT, GASTROJEJUNOSTOMY TUBE, PERCUTANEOUS;  Surgeon: Christos Greenberg MD;  Location: [facility] OR       Chemotherapy History:  Per HPI    Radiation History:  No prior RT    Pregnant: No  Implanted Cardiac Devices: No    Medications:  Current Outpatient Medications   Medication Sig Dispense Refill    acetaminophen (TYLENOL) 32 mg/mL liquid Take 20.313 mLs (650 mg) by mouth every 8 hours 1800 mL 1    apixaban ANTICOAGULANT (ELIQUIS) 5 MG tablet Take 1 tablet (5 mg) by mouth 2 times daily 60 tablet 2    buprenorphine (BUTRANS) 10 MCG/HR WK patch Place 1 patch onto the skin every 7 days Use with 20 mcg/hour patch for 30 mcg/hour total. 4 patch 3    buprenorphine (BUTRANS) 20 MCG/HR WK patch Place 1 patch onto the skin once a week Use with 10 mcg/hour patch for 30 mcg/hour total. 4 patch 3    dicyclomine (BENTYL) 10 MG capsule Take 1 capsule (10 mg) by mouth 4 times daily (before meals and nightly) 120 capsule 1    econazole nitrate 1 % external cream Apply  topically daily To affected toenails. 85 g 5    HYDROmorphone (DILAUDID) 2 MG tablet Take 1-2 tablets (2-4 mg) by mouth every 4 hours as needed for severe pain or breakthrough pain 180 tablet 0    lidocaine-prilocaine (EMLA) 2.5-2.5 % external cream Use 1-2 times a week or as needed prior to port access 30 g 3    lipase-protease-amylase (CREON 24) 00926-12274-050475 units CPEP per EC capsule 2-3 capsules with meals 3 times a day and 1-2 capsules with snacks max of 15 capsules a day 200 capsule 11    Multiple Vitamins-Minerals (CENTRUM SILVER 50+MEN) TABS as directed Orally      pantoprazole (PROTONIX) 40 MG EC tablet Take 1 tablet (40 mg) by mouth 2 times daily 60 tablet 4    vitamin D3 (CHOLECALCIFEROL) 50 mcg (2000 units) tablet Take 1 tablet (50 mcg) by mouth daily 90 tablet 1    methylphenidate (RITALIN) 5 MG tablet Take 1 tablet (5 mg) by mouth 2 times daily as needed (Patient not taking: Reported on 7/30/2024) 10 tablet 0    naloxone (NARCAN) 4 MG/0.1ML nasal spray Instill 1 spray (4 mg) into one nostril alternating nostrils as needed for opioid reversal every 2-3 minutes until assistance arrives* (Patient not taking: Reported on 7/30/2024)      prochlorperazine (COMPAZINE) 10 MG tablet Take 1 tablet (10 mg) by mouth every 6 hours as needed for nausea or vomiting (Patient not taking: Reported on 7/30/2024) 30 tablet 2     No current facility-administered medications for this visit.         Allergies:   No Known Allergies    Social History:    Social History     Tobacco Use    Smoking status: Never     Passive exposure: Never    Smokeless tobacco: Never   Vaping Use    Vaping status: Never Used   Substance Use Topics    Alcohol use: Not Currently    Drug use: Never         Family History:  Family History   Problem Relation Age of Onset    Diabetes Type 2  Father     Cirrhosis Father     Genetic Disorder Brother         missing [id number], mild retardation    Colon Polyps Brother     Pancreatic Cancer Paternal  Aunt 85    Colon Cancer Paternal Uncle     Pancreatitis Cousin        Review of Systems   A 10-point review of systems was performed. Pertinent findings are noted in the HPI.    Physical Exam   ECOG Status: 2    Vitals:  /66 (BP Location: Left arm, Patient Position: Chair, Cuff Size: Adult Regular)   Pulse 73   Temp 98.6  F (37  C) (Oral)   Resp 18   SpO2 94%     Gen: Alert, in NAD  Head: NC/AT  Eyes: PERRL, EOMI, sclera anicteric  Ears: No external auricular lesions  Nose/sinus: No rhinorrhea or epistaxis  Oral cavity/oropharynx: MMM, no visible oral cavity lesions  Neck: Full ROM, supple, no palpable adenopathy  Pulm: No wheezing, stridor or respiratory distress  CV: Extremities are warm and well-perfused, no cyanosis, no pedal edema  Abdominal: Normal bowel sounds, soft, nontender, no masses  Musculoskeletal: Normal bulk and tone, mildly tender to palpation in lower lumbar location  Skin: Normal color and turgor  Neuro: A/Ox3, CN II-XII intact, normal gait    Imaging/Path/Labs   Imaging: per HPI, personally reviewed and in agreement    CT 10/24/23 (pre -RT)            CT 3/11/24                        Path: per HPI, personally reviewed and in agreement    Labs: per HPI, personally reviewed and in agreement    I have personally reviewed Dr. Haile's  and Dr Dawkins's notes     Assessment      Mr. Navarro is a 58 year old male with metastatic pancreatic cancer to bones.  He has completed palliative radiotherapy to T10, 20 Gray/5 fractions November 29, 2023.  He presented for  radiation to L4 in the setting of palliation/oligo progression.    He was seen in March 2024, at which point we discussed palliative radiation to L4, 30 Gy/10 fractions.  His pain has now progressed and he has a fracture with inferior endplate loss.     Thus, I discussed options of radiation versus continued treatment under care of Dr. Haile and to intervene with palliative radiation when necessary. He wishes to proceed with  palliative RT. However, prior to palliative RT, I will refer to IR for kyphoplasty prior to RT.     Plan     After extensive discussion, patient he wishes to proceed with palliative RT. I will plan for 30Gy/10fx.    However, prior to palliative RT, I will refer to IR for kyphoplasty prior to RT, and will discuss if this is feasible with my IR colleagues.     All benefits and risks discussed, and patient is in agreement with the oncologic plan discussed above.     Thank you for allowing me to participate in your patient's care.  If you should require any additional information, please do not hesitate in contacting me.     Ben Reaves MD  Department of Radiation Oncology  [facility]     A total of 40 minutes were spent on this visit, including preparation for this visit, face to face with patient, and medical decision making. Medical decision-making included consideration and possible diagnosis, management options, complex record review, review of diagnostic tests, consideration and discussion of significant complications based up medical history/comorbidities, and discussion with providers involved in care of the patient.

## 2024-08-22 NOTE — PATIENT INSTRUCTIONS
Please contact [facility] RN with questions or concerns following today's appointment: [phone].       Please feel free to leave a detailed message if your call is not answered.    If your call is not received before 3:00 PM, it may not be returned until the following business day.    If you are receiving radiation treatment and need assistance after 3:00 PM or on the weekends, please call [phone] and ask to speak to the radiation oncologist on-call.    Thank you!    [facility] Radiation Oncology Nursing

## 2024-08-22 NOTE — PROGRESS NOTES
Virtual Visit Details    Type of service:  Video Visit     Originating Location (pt. Location): Home  Distant Location (provider location):  On-site  Platform used for Video Visit: [facility]      Oncology/Hematology Visit Note  Aug 23, 2024      Reason for Visit: follow-up of metastatic Pancreatic Caner    Oncology HPI:   -NGS: KRAS G12R  Immuno: pMMR, KAYLIE, TMB-low  Clinical Trial: MARIPOSA-186, MARIPOSA-280, TORL    - 3/2023: presented with acute pancreatitis  c/b chronic pancreatitis with pancreatic mass causing duodenal stenosis with gastric outlet obstruction s/p GJ tube (placed 5/2023), biliary obstruction s/p stent placement on 7/7/23, pancreatic insufficiency, and IDDM2.  -  He then presented to the ED with worsening abdominal pain, increased jaundice, poor PO intake and weight loss admitted on 7/8/2023 for concern of biliary obstruction.   - 7/12/2023: Underwent biopsy of pancreatic mass returned positive for pancreatic adenocarcinoma.   - 7/31/2023: He started on treatment with 5FU, irinotecan, and oxaliplatin (FOLFIRINOX). Please see previous notes for further details on the patient's history.      8/17/23 - ERCP/EGD, duodenal stents x2 placed, exchange of GJ tube     8/21-/8/25 hospitalized due to diarrhea, colitis, abdominal pain.       8/29 - Cycle 3 deferred due to neutropenia.   Neulasta added. Irinotecan dose reduced 20% due to symptoms including cramping, double vision and slurred speech during infusion.       10/24/23 CT CAP with similar to slight increase in size of peripancreatic and mesenteric lymph nodes, enlargement of previous skeletal metastasis as well as new sclerotic metastasis to left posterior 5th rib, enlarging pulmonary nodule, and unchanged pancreatic head mass. Also unclear concerns for PE,  right lower lobe segmental PE confirmed on CT-PE. Started on apixaban.      10/31/23 Cycle 1 gemzar, abraxane  11/7/23 Cycle 1 Zometa  11/22/23 Removal of GJ tube.   11/29/23 Completed palliative radiation  to T10   12/13/23 C3D1 gem/abraxane  12/29/23 Consults at [place]  1/23-1/26 Consults at [facility]   1/30/24: C4D1 gem/abraxane   3/24: CT CAP with overall stable disease with isolated progression in potential L4 lesion. Referral for radiation oncology.    4/29-5/1: Consults at [facility] for possible cellular therapy, CT guided biopsy of left pubic bone, recommendation to return to [facility] upon progression of current treatment   5/23/24: Cardiology consult at [facility]. CT with small pericardial effusion   6/6/2024: Repeat ECHO on with EF of 55-60%, no pericardial effusion present  - Cycle 8 deferred due to infection concerns and subsequently tested positive for COVID 6/25/24, resumed treatment on 7/2/2024    -Day 8 eliminated after Cycle 5 [place]/Abraxane.    4/2/2024-present: Gemcitabine/Abraxane, Days 1 and 15 only; Zometa every 3 months     8/2024 repeat imaging with isolated progression to L4 lesion with fracture of that spinous process. Referred to radiation therapy. Decreased Abraxane dose reduced to 75mg/m2 with cycle 9 day 15 for overall treatment tolerance.     Interval history:   Gallo is seen today prior to cycle 10 gemcitabine/Abraxane.  -he will be starting chemotherapy infusions at home, he is excited to not have to travel down to the [facility] since he has been needing neighbors and friends to drive him to appointments  -reports he is overall feeling well andoffers no new concerns today   -he previously has been very active around the house completing projects including painting, now is trying to take it easy and doing easier/less physical projects  -met with Dr. Reaves in radiation who is checking with providers regarding procedure to add cement to L4.  -has mild back pain upon standing if he sits for prolonged periods. Pain meds are controlling his pain well  -denies any leg weakness, no incontinence, no neurological changes  -appetite is good, denies nausea or bowel concerns  -has not had any temperature  episodes where in the past he has had chills/temp spikes  -didn't notice a difference with dose reduction since he got Zometa on the same day and typically has more side effects from Zometa        Review of Systems:  Patient denies any of the following except if noted above: fevers, chills, difficulty with energy, vision or hearing changes, chest pain, dyspnea, abdominal pain, nausea, vomiting, diarrhea, constipation, urinary concerns, headaches, numbness, tingling, issues with sleep or mood. Also denies lumps, bumps, rashes or skin lesions, bleeding or bruising issues.        Current Outpatient Medications   Medication Sig Dispense Refill    acetaminophen (TYLENOL) 32 mg/mL liquid Take 20.313 mLs (650 mg) by mouth every 8 hours 1800 mL 1    apixaban ANTICOAGULANT (ELIQUIS) 5 MG tablet Take 1 tablet (5 mg) by mouth 2 times daily 60 tablet 2    buprenorphine (BUTRANS) 10 MCG/HR WK patch Place 1 patch onto the skin every 7 days Use with 20 mcg/hour patch for 30 mcg/hour total. 4 patch 3    buprenorphine (BUTRANS) 20 MCG/HR WK patch Place 1 patch onto the skin once a week Use with 10 mcg/hour patch for 30 mcg/hour total. 4 patch 3    dicyclomine (BENTYL) 10 MG capsule Take 1 capsule (10 mg) by mouth 4 times daily (before meals and nightly) 120 capsule 1    econazole nitrate 1 % external cream Apply topically daily To affected toenails. 85 g 5    HYDROmorphone (DILAUDID) 2 MG tablet Take 1-2 tablets (2-4 mg) by mouth every 4 hours as needed for severe pain or breakthrough pain 180 tablet 0    lidocaine-prilocaine (EMLA) 2.5-2.5 % external cream Use 1-2 times a week or as needed prior to port access 30 g 3    lipase-protease-amylase (CREON 24) 00850-90162-463885 units CPEP per EC capsule 2-3 capsules with meals 3 times a day and 1-2 capsules with snacks max of 15 capsules a day 200 capsule 11    methylphenidate (RITALIN) 5 MG tablet Take 1 tablet (5 mg) by mouth 2 times daily as needed (Patient not taking: Reported on  7/30/2024) 10 tablet 0    Multiple Vitamins-Minerals (CENTRUM SILVER 50+MEN) TABS as directed Orally      naloxone (NARCAN) 4 MG/0.1ML nasal spray Instill 1 spray (4 mg) into one nostril alternating nostrils as needed for opioid reversal every 2-3 minutes until assistance arrives* (Patient not taking: Reported on 7/30/2024)      pantoprazole (PROTONIX) 40 MG EC tablet Take 1 tablet (40 mg) by mouth 2 times daily 60 tablet 4    prochlorperazine (COMPAZINE) 10 MG tablet Take 1 tablet (10 mg) by mouth every 6 hours as needed for nausea or vomiting (Patient not taking: Reported on 7/30/2024) 30 tablet 2    vitamin D3 (CHOLECALCIFEROL) 50 mcg (2000 units) tablet Take 1 tablet (50 mcg) by mouth daily 90 tablet 1        No Known Allergies      Exam:   General: patient appears well in no acute distress, alert and oriented, speech clear and fluid  Skin: no visualized rash or lesions on visualized skin  Resp: Appears to be breathing comfortably without accessory muscle usage, speaking in full sentences, no audible wheezes or cough.  Psych: Coherent speech, normal rate and volume, able to articulate logical thoughts, able to abstract reason, no tangential thoughts, no hallucinations or delusions  Patient's affect is appropriate.      Labs:    Most Recent 3 CBC's:  Recent Labs   Lab Test 08/13/24  0809 07/30/24  0904 07/16/24  0835   WBC 4.5 5.2 5.0   HGB 10.4* 10.4* 10.0*   MCV 91 91 90    197 226   ANEUTAUTO 2.3 3.1 2.8    Most Recent 3 BMP's:  Recent Labs   Lab Test 08/13/24  0809 07/30/24  0904 07/02/24  0719 06/25/24  1505   NA  --  139 141 135   POTASSIUM  --  4.3 4.0 4.2   CHLORIDE  --  104 104 97*   CO2  --  27 26 27   BUN  --  13.9 14.3 16.6   CR 0.68 0.70 0.65* 0.75   ANIONGAP  --  8 11 11   DENISE 9.1 9.0 9.0 9.1   GLC  --  165* 171* 139*   PROTTOTAL  --  6.8 7.0 7.7   ALBUMIN 3.9 4.0 3.8 4.2    Most Recent 2 LFT's:  Recent Labs   Lab Test 07/30/24  0904 07/02/24  0719   AST 33 32   ALT 38 39   ALKPHOS 129 145    BILITOTAL 0.3 0.2    Most Recent TSH and T4:No lab results found.    I reviewed the above labs today. Scheduled for lab draw on 8/26 prior to 8/28 chemotherapy.         Impression/plan:  Gallo is a 58 year old male with unresectable pancreatic cancer, currently under treatment of Gemcitabine/Abraxane.     Unresectable Pancreatic Cancer  7/31/23 began palliative treatment on FOLFIRINOX  CT CAP 10/24/23 with progression in skeletal mets, lung nodule and lymph nodes; stable pancreatic mass  - 10/31/2023: Treatment was changed to Gemcitabine and Abraxane (Day 1, 8, 15) + Zometa every 3 months  - 03/2024 CT CAP with progression of L4 lesion  - Day 8 eliminated after Cycle 5 gem/abraxane  - 8/2024 repeat imaging with isolated progression of L4 lesion  -plan to continue gem/Abraxane with dose reduced Abraxane for improved tolerance  -continues to tolerate treatment well, will now be receiving infusions at home via [facility]  -proceed with cycle 10 as scheduled pending labs are WNL  -continue monthly SUZETTE follow up, patient to call sooner with any concerns. Plan follow up in 3 months with Dr. Haile with imaging prior    Anemia-normocytic  Hgb stable; in setting of chemotherapy and chronic disease  -Monitor    Skeletal Mets   - Radiation to T10 completed 11/29/23  - Discussion to irradiate L4 lesion noted on CT dated 10/24/2023, however, on hold due to lack of symptoms  - CT chest 5/23/24 unchanged sclerotic bone metastases  -imaging 8/2024 with isolated progression in L4, consult to radiation oncology. Pursuing possibly kyphoplasty prior to potiential palliative radiation.  -minimal back pain, managing with Butrans patch and hydromorphine  - will continue to monitor symptoms    Bone Health  Zometa 4 mg every 3 months, last infusion 8/13/2024  - per chart review, Dr. Haile note 3/15/2024: consider use of denosumab instead of bisphosphonate, given the evidence for potentially superior efficacy. At this time, he is tolerating  this treatment extremely well now, and has well-controlled skeletal mets. Continue to consider his skeletal treatment options as we move forward.   -Please note the therapy plan is entered as an endocrine plan with 6 month intervals. For bone mets, we typically give every 3 months.    Neuropathy  Reports improvement while on treatment break  Referred to PT previously but not following  Symptoms unchanged on current treatment-monitor for progression      Pericardial Effusion  Found on CT 4/20/24 at [facility], followed by TTE with moderate pericardial effusion without tamponade.  CT 5/23/24 small pericardial effusion  5/23/24: Cardiology consult at [facility]- Echo 6/6/2024 with EF 55-60%, LV strain -20.5% which is normal, no pericardial effusion  - repeat echo at [facility] in the future, TTE scheduled for 7/2024  - he has been cautioned by Cardiology s/s of cardiac tamponade  -following cardiology as needed    Pulmonary Embolism  Right lower segmental PE found on routine imaging  Denies shortness of breath  Taking Apixiban daily    Nutrition  GJ tube removed 11/22/2023  Weight is stable  Continue small meals/protein supplements    LE edema  previously instructed to wear compression stockings as needed  - will continue to monitor    Diarrhea/Constipation  resolved    Discoloration of bilateral great toenails  Met with podiatry, Dr. Ware, 7/19/24. Econazole prescribed for bilateral onychomycosis.      The longitudinal plan of care for the diagnosis(es)/condition(s) as documented were addressed during this visit. Due to the added complexity in care, I will continue to support Gallo in the subsequent management and with ongoing continuity of care.        ESVIN Canales CNP

## 2024-08-22 NOTE — LETTER
2024      Gallo Navarro  [address] [address] [place] [id number]      Dear Colleague,    Thank you for referring your patient, Gallo Navarro, to the [facility] RADIATION [facility]COLOGY [place]. Please see a copy of my visit note below.       Department of Radiation Oncology  [facility]: Cancer Center  [facility]  [address] [address], [place] [address]  [phone]       Follow-up note     Name: Gallo Navarro MRN: [id number]   : 1965   Date of Service: Aug 22, 2024   Referring: Dr. Anguiano     Reason for consultation: metastatic pancreatic cancer to bone    History of Present Illness     Mr. Navarro is a 58 year old male with metastatic pancreatic cancer to bones.  He has completed palliative radiotherapy to T10, 20 Gray/5 fractions 2023.  He now presents for radiation to L4 in the setting of palliation/oligo progression.    Briefly, his oncologic history is as follows. He was seen by my colleague at the [facility], Dr. Becca Anguiano, note is referenced from that consultation note dated 23:    Patient's diagnosis came to light when he presented to [facility] ED on 2023 with worsening pre-existing abdominal pain and jaundice, weight loss and poor p.o. intake.  He has a history of acute pancreatitis with ileus and duodenal stenosis, gastric outlet obstruction requiring GJ tube (placed 2023) for feeding.  After receiving ERCP with sphincterotomy at [facility] prior to admission, the patient did not have relief of abdominal pain, and he presented to [facility] to establish care with our system.     Prior to his care at [facility] in 2023 she had initial presentation of sudden onset vomiting and found to have lipase greater than 2000, later revealed to have acquired duodenal stricture and gastric outlet obstruction.  He had PEG GJ placed for gastric venting and nutrition after weight loss of 40 pounds.  CT of the abdomen  performed in June 2023 for pancreatitis follow-up revealed focal irregular hypodense masslike region in the pancreatic head measuring 2.2 x 1.5 x 1.4 cm with persistent mild main pancreatic ductal dilation and associated subcentimeter mesenteric lymph nodes.  EUS biopsy on 6/27/2020 was nondiagnostic showing duodenal inflammation.  He then proceeded with ERCP on 7/7/2023, as above.      CT abdomen pelvis on 7/8 showed pancreatic mass without pancreatic ductal dilatation, with biliary stent in position, mild pneumobilia, calcific density adjacent to biliary stent.     MRCP from 7/10/2023 showed ill-defined mass soft tissue in the pancreatic head suspicious for malignancy given biliary and pancreatic duct obstruction with vascular encasement and narrowing of SMV, less likely pancreatitis.  Mildly enlarged peripancreatic and periportal and enteric lymph nodes are present, indeterminate.     At [facility] he underwent EGD/EUS on 7/11/2023, with pathology showing adenocarcinoma.  Surgical oncology was consulted, who felt surgery should be reconsidered after 6 months of adjuvant chemotherapy.     CT chest from 7/12/2023 showed an ill-defined pancreatic head mass with upper abdominal lymphadenopathy, and a 7 mm pulmonary nodule left upper lobe, indeterminate. He underwent duodenal stenting on 7/13/2023.     He initiated systemic therapy was FOLFIRINOX on 7/31/2023.     CT chest abdomen pelvis on 10/24/2023 showed interval enlargement of scattered sclerotic osseous metastases, and enlarging subpleural 1.2 cm pulmonary nodule in paramediastinal EAMON, no substantial change in the pancreatic head mass.  Filling defects were seen in the right middle and lower lobe, concerning for emboli.  Echo follow-up CT PE was performed on 10/31/2023 which revealed a acute PE in the right lower lobe segment.  He was subsequently prescribed Eliquis twice daily by Dr. Haile.       Upon follow-up with Dr. Haile on 11/6/2023, review of imaging  showed disease progression, particularly with skeletal metastatic progression.  He was recommended for treatment for the T10 lesion given its location to the right pedicle.  Dr. Haile changed systemic therapy to gemcitabine/Abraxane with Zometa for bony lesion control on 10/31/23.    He completed palliative radiotherapy to T10, 20 Gray/5 fractions completed on 11/29/2023.  He has since been on systemic therapy under care of Dr. Haile.      He underwent a PET CT scan on 1/30/2024, which demonstrated pancreatic head mass with FDG uptake, with metastatic adenopathy, with lung nodules suspicious for metastatic disease.  There was sclerotic disease at T10 and L4 in the left pubic bone, but these were not FDG avid on the PET scan.    CT scan on 3/11/2024 demonstrated fairly stable disease with a pancreatic head mass and lacey-pancreatic lymph nodes.  There was no additional suspicious pulmonary nodules with resolution of prior nodules in the lungs.  However there was interval enlargement of L4 lesion, now measuring 1.8 cm in size, previously 1.4 cm.  Thus, he was referred for consideration of palliative radiotherapy to L4.  He is having mild discomfort in this location, not extreme.  Overall he tolerated radiation well last time.  He wishes to pursue palliative radiotherapy as he continues systemic therapy under care of Dr. Haile, with plans for eventually enrolling in a clinical trial at [facility].     Patient was last seen in March 2024.  At that time we had discussed palliative radiotherapy to L4 given this was a potential site of only oligo progression.  In the meantime he is also meeting with [facility], particularly with Dr. Dawkins's team for possible clinical.  A CT scan was obtained on 4/30/2024 (I do not have the current images) but the official read demonstrated a new pericardial effusion, stable locally advanced pancreatic head and uncinate tumor, decreasing mesenteric lymph nodes, relatively stable  osseous lesions at the L4, T10 and left pubic symphysis.  He has had a recent echo with a ejection fraction of 60% plus, but will be following with a cardiologist in the near future of note, and he will be continuing to see a cardiologist.  I discussed that this possibly could be related to his prior radiation and or combination with systemic therapy.    Per patient report additional pathology for clinical trial testing was to be performed, and a biopsy of the left pubic symphysis lesion was performed which returned negative for malignancy.  He will still continue to follow with Dr. Haile and has not been on systemic therapy for over a month.    Today, he is accompanied by his wife.  He states that overall he feels quite well.  He denies any significant back pain or pubic pain at this time and states that he has adequate pain control.    Interval history:    He has been under care of Dr Haile, currently on gem/ abraxane. Recent imaging on 8/8/2024 demonstrates fairly stable disease in primary with prominent lacey pancreatic lymph nodes, with progression of sclerotic lesion at L4 now associated with a fracture of the inferior endplate.  He is now having pain in this location and presents for palliative radiotherapy.  His pain is up to a 3 out of 10, he is currently using Dilaudid and Tylenol,.  Given the pain, he has changed his activity level. He denies any focal neurologic deficit.    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     Gastric outlet obstruction      Metastasis from pancreatic cancer (H)      Personal history of chemotherapy     Gemzar + Abraxane started on 10/31/2023     Recurrent acute pancreatitis      S/P radiation therapy     2,000 cGy to T10 Spine completed on 11/29/2023 [facility]       Past Surgical History:   Past Surgical History:   Procedure Laterality Date     AS  OPEN TREATMENT CLAVICULAR FRACTURE INTERNAL FX       ENDOSCOPIC RETROGRADE CHOLANGIOPANCREATOGRAM N/A 7/11/2023    Procedure: ENDOSCOPIC RETROGRADE CHOLANGIOPANCREATOGRAPHY;  Surgeon: Khadar Pickett MD;  Location: [facility] OR     ENDOSCOPIC RETROGRADE CHOLANGIOPANCREATOGRAM N/A 8/17/2023    Procedure: ENDOSCOPIC RETROGRADE  with stent removal x1, balloon sweep;  Surgeon: Christos Greenberg MD;  Location: [facility] OR     ENDOSCOPIC ULTRASOUND UPPER GASTROINTESTINAL TRACT (GI) N/A 7/11/2023    Procedure: Endoscopic ultrasound upper gastrointestinal tract (GI), with biposy, GJ tube repositioning, stent placement;  Surgeon: Khadar Pickett MD;  Location: [facility] OR     ENDOSCOPIC ULTRASOUND UPPER GASTROINTESTINAL TRACT (GI) N/A 7/13/2023    Procedure: ENDOSCOPIC ULTRASOUND, ESOPHAGOSCOPY, EUS guided gastrojejunostomy;  Surgeon: Tre York MD;  Location: [facility] OR     INSERT PICC LINE  04/29/2023     INSERT PORT VASCULAR ACCESS Right 7/28/2023    Procedure: Insert port vascular access;  Surgeon: Tom العلي MD;  Location: [facility] OR     IR CHEST PORT PLACEMENT > 5 YRS OF AGE  7/28/2023     IR NG TUBE PLACEMENT REQ RAD & FLUORO  05/08/2023    JG tube     REPLACE GASTROJEJUNOSTOMY TUBE, PERCUTANEOUS N/A 8/17/2023    Procedure: possible REPLACEMENT, GASTROJEJUNOSTOMY TUBE, PERCUTANEOUS;  Surgeon: Christos Greenberg MD;  Location: [facility] OR       Chemotherapy History:  Per HPI    Radiation History:  No prior RT    Pregnant: No  Implanted Cardiac Devices: No    Medications:  Current Outpatient Medications   Medication Sig Dispense Refill     acetaminophen (TYLENOL) 32 mg/mL liquid Take 20.313 mLs (650 mg) by mouth every 8 hours 1800 mL 1     apixaban ANTICOAGULANT (ELIQUIS) 5 MG tablet Take 1 tablet (5 mg) by mouth 2 times daily 60 tablet 2     buprenorphine (BUTRANS) 10 MCG/HR WK patch Place 1 patch onto the skin every 7 days Use with 20 mcg/hour patch for 30 mcg/hour total. 4 patch 3     buprenorphine (BUTRANS) 20 MCG/HR WK  patch Place 1 patch onto the skin once a week Use with 10 mcg/hour patch for 30 mcg/hour total. 4 patch 3     dicyclomine (BENTYL) 10 MG capsule Take 1 capsule (10 mg) by mouth 4 times daily (before meals and nightly) 120 capsule 1     econazole nitrate 1 % external cream Apply topically daily To affected toenails. 85 g 5     HYDROmorphone (DILAUDID) 2 MG tablet Take 1-2 tablets (2-4 mg) by mouth every 4 hours as needed for severe pain or breakthrough pain 180 tablet 0     lidocaine-prilocaine (EMLA) 2.5-2.5 % external cream Use 1-2 times a week or as needed prior to port access 30 g 3     lipase-protease-amylase (CREON 24) 97806-87771-525363 units CPEP per EC capsule 2-3 capsules with meals 3 times a day and 1-2 capsules with snacks max of 15 capsules a day 200 capsule 11     Multiple Vitamins-Minerals (CENTRUM SILVER 50+MEN) TABS as directed Orally       pantoprazole (PROTONIX) 40 MG EC tablet Take 1 tablet (40 mg) by mouth 2 times daily 60 tablet 4     vitamin D3 (CHOLECALCIFEROL) 50 mcg (2000 units) tablet Take 1 tablet (50 mcg) by mouth daily 90 tablet 1     methylphenidate (RITALIN) 5 MG tablet Take 1 tablet (5 mg) by mouth 2 times daily as needed (Patient not taking: Reported on 7/30/2024) 10 tablet 0     naloxone (NARCAN) 4 MG/0.1ML nasal spray Instill 1 spray (4 mg) into one nostril alternating nostrils as needed for opioid reversal every 2-3 minutes until assistance arrives* (Patient not taking: Reported on 7/30/2024)       prochlorperazine (COMPAZINE) 10 MG tablet Take 1 tablet (10 mg) by mouth every 6 hours as needed for nausea or vomiting (Patient not taking: Reported on 7/30/2024) 30 tablet 2     No current facility-administered medications for this visit.         Allergies:   No Known Allergies    Social History:    Social History     Tobacco Use     Smoking status: Never     Passive exposure: Never     Smokeless tobacco: Never   Vaping Use     Vaping status: Never Used   Substance Use Topics      Alcohol use: Not Currently     Drug use: Never         Family History:  Family History   Problem Relation Age of Onset     Diabetes Type 2  Father      Cirrhosis Father      Genetic Disorder Brother         missing [id number], mild retardation     Colon Polyps Brother      Pancreatic Cancer Paternal Aunt 85     Colon Cancer Paternal Uncle      Pancreatitis Cousin        Review of Systems   A 10-point review of systems was performed. Pertinent findings are noted in the HPI.    Physical Exam   ECOG Status: 2    Vitals:  /66 (BP Location: Left arm, Patient Position: Chair, Cuff Size: Adult Regular)   Pulse 73   Temp 98.6  F (37  C) (Oral)   Resp 18   SpO2 94%     Gen: Alert, in NAD  Head: NC/AT  Eyes: PERRL, EOMI, sclera anicteric  Ears: No external auricular lesions  Nose/sinus: No rhinorrhea or epistaxis  Oral cavity/oropharynx: MMM, no visible oral cavity lesions  Neck: Full ROM, supple, no palpable adenopathy  Pulm: No wheezing, stridor or respiratory distress  CV: Extremities are warm and well-perfused, no cyanosis, no pedal edema  Abdominal: Normal bowel sounds, soft, nontender, no masses  Musculoskeletal: Normal bulk and tone, mildly tender to palpation in lower lumbar location  Skin: Normal color and turgor  Neuro: A/Ox3, CN II-XII intact, normal gait    Imaging/Path/Labs   Imaging: per HPI, personally reviewed and in agreement    CT 10/24/23 (pre -RT)            CT 3/11/24                        Path: per HPI, personally reviewed and in agreement    Labs: per HPI, personally reviewed and in agreement    I have personally reviewed Dr. Haile's  and Dr Dawkins's notes     Assessment      Mr. Navarro is a 58 year old male with metastatic pancreatic cancer to bones.  He has completed palliative radiotherapy to T10, 20 Gray/5 fractions November 29, 2023.  He presented for  radiation to L4 in the setting of palliation/oligo progression.    He was seen in March 2024, at which point we discussed palliative  radiation to L4, 30 Gy/10 fractions.  His pain has now progressed and he has a fracture with inferior endplate loss.     Thus, I discussed options of radiation versus continued treatment under care of Dr. Haile and to intervene with palliative radiation when necessary. He wishes to proceed with palliative RT. However, prior to palliative RT, I will refer to IR for kyphoplasty prior to RT.     Plan     After extensive discussion, patient he wishes to proceed with palliative RT. I will plan for 30Gy/10fx.    However, prior to palliative RT, I will refer to IR for kyphoplasty prior to RT, and will discuss if this is feasible with my IR colleagues.     All benefits and risks discussed, and patient is in agreement with the oncologic plan discussed above.     Thank you for allowing me to participate in your patient's care.  If you should require any additional information, please do not hesitate in contacting me.     Ben Reaves MD  Department of Radiation Oncology  [facility]     A total of 40 minutes were spent on this visit, including preparation for this visit, face to face with patient, and medical decision making. Medical decision-making included consideration and possible diagnosis, management options, complex record review, review of diagnostic tests, consideration and discussion of significant complications based up medical history/comorbidities, and discussion with providers involved in care of the patient.      Again, thank you for allowing me to participate in the care of your patient.        Sincerely,        Ben Reaves MD

## 2024-08-22 NOTE — NURSING NOTE
"Oncology Rooming Note    August 22, 2024 2:11 PM   Gallo Navarro is a 58 year old male who presents for:    Chief Complaint   Patient presents with    Cancer     Radiation oncology return consultation with Dr. Ben Reaves     Initial Vitals: /66 (BP Location: Left arm, Patient Position: Chair, Cuff Size: Adult Regular)   Pulse 73   Temp 98.6  F (37  C) (Oral)   Resp 18   SpO2 94%  Estimated body mass index is 21.35 kg/m  as calculated from the following:    Height as of 6/27/24: 1.829 m (6').    Weight as of 8/13/24: 71.4 kg (157 lb 6.4 oz). There is no height or weight on file to calculate BSA.  No Pain (1) Comment: Data Unavailable   No LMP for male patient.  Allergies reviewed: Yes  Medications reviewed: Yes    Medications: Medication refills not needed today.  Pharmacy name entered into EPIC:    [facility] - [place], [place] - [address] [facility] [place] [id number]  [facility] [place] [facility] [address] [facility] AT PORTAL TO REGISTERED [facility] SITES  [facility] - [place], [place] - [address] [place] [id number] [address] [employer] - [place], [place] - [address] [facility] [address]    Frailty Screening:   Is the patient here for a new oncology consult visit in cancer care? 2. No      Clinical concerns: patient reports pain \"1/10\" of low back, denies radiation of pain, reports wearing Butran's patch, taking Tylenol 650 mg po TID and Dilaudid 4 mg po QID.   Dr. Ben Reaves was notified.    Bessie Vidales, RN BSN OCN CBCN                  "

## 2024-08-23 ENCOUNTER — VIRTUAL VISIT (OUTPATIENT)
Dept: ONCOLOGY | Facility: CLINIC | Age: 59
End: 2024-08-23
Payer: COMMERCIAL

## 2024-08-23 VITALS
BODY MASS INDEX: 21.67 KG/M2 | WEIGHT: 160 LBS | SYSTOLIC BLOOD PRESSURE: 109 MMHG | HEIGHT: 72 IN | DIASTOLIC BLOOD PRESSURE: 66 MMHG | HEART RATE: 73 BPM

## 2024-08-23 DIAGNOSIS — C25.0 MALIGNANT NEOPLASM OF HEAD OF PANCREAS (H): Primary | ICD-10-CM

## 2024-08-23 DIAGNOSIS — C79.51 MALIGNANT NEOPLASM METASTATIC TO BONE (H): ICD-10-CM

## 2024-08-23 DIAGNOSIS — G89.3 CANCER ASSOCIATED PAIN: ICD-10-CM

## 2024-08-23 PROCEDURE — 99214 OFFICE O/P EST MOD 30 MIN: CPT | Mod: 95

## 2024-08-23 PROCEDURE — G2211 COMPLEX E/M VISIT ADD ON: HCPCS | Mod: 95

## 2024-08-23 ASSESSMENT — PAIN SCALES - GENERAL: PAINLEVEL: NO PAIN (0)

## 2024-08-23 NOTE — NURSING NOTE
Current patient location: [address] [place] [address]    Is the patient currently in the state of [place]? YES    Visit mode:VIDEO    If the visit is dropped, the patient can be reconnected by: VIDEO VISIT: Text to cell phone:   Telephone Information:   Mobile [phone]       Will anyone else be joining the visit? NO  (If patient encounters technical issues they should call [phone]    How would you like to obtain your AVS? MyChart    Are changes needed to the allergy or medication list? Pt stated no changes to allergies and Pt stated no med changes    Are refills needed on medications prescribed by this physician? NO    Rooming Documentation:  Not applicable      Reason for visit: LO NICOLE

## 2024-08-23 NOTE — Clinical Note
"8/23/2024      Gallo Navarro  [id number] [address] [place] [id number]      Dear Colleague,    Thank you for referring your patient, Gallo Navarro, to the [facility]. Please see a copy of my visit note below.    Virtual Visit Details    Type of service:  Video Visit     Originating Location (pt. Location): {video visit patient location:405143::\"Home\"}  {PROVIDER LOCATION On-site should be selected for visits conducted from your clinic location or adjoining [facility] hospital, academic office, or other nearby [facility] building. Off-site should be selected for all other provider locations, including home:[address]}  Distant Location (provider location):  {virtual location provider:630490}  Platform used for Video Visit: {Virtual Visit Platforms:786714::\"Passworks\"}      Oncology/Hematology Visit Note  7/30/24    Reason for Visit: follow-up of Locally Advanced- Unresectable Pancreatic Caner    Oncology HPI:   -NGS: KRAS G12R  Immuno: pMMR, KAYLIE, TMB-low  Clinical Trial: MARIPOSA-186, MARIPOSA-280, TORL    - 3/2023: presented with acute pancreatitis  c/b chronic pancreatitis with pancreatic mass causing duodenal stenosis with gastric outlet obstruction s/p GJ tube (placed 5/2023), biliary obstruction s/p stent placement on 7/7/23, pancreatic insufficiency, and IDDM2.  -  He then presented to the ED with worsening abdominal pain, increased jaundice, poor PO intake and weight loss admitted on 7/8/2023 for concern of biliary obstruction.   - 7/12/2023: Underwent biopsy of pancreatic mass returned positive for pancreatic adenocarcinoma.   - 7/31/2023: He started on treatment with 5FU, irinotecan, and oxaliplatin (FOLFIRINOX). Please see previous notes for further details on the patient's history.      8/17/23 - ERCP/EGD, duodenal stents x2 placed, exchange of GJ tube     8/21-/8/25 hospitalized due to diarrhea, colitis, abdominal pain.       8/29 - Cycle 3 deferred due to neutropenia.   Neulasta added. Irinotecan dose " reduced 20% due to symptoms including cramping, double vision and slurred speech during infusion.       10/24/23 CT CAP with similar to slight increase in size of peripancreatic and mesenteric lymph nodes, enlargement of previous skeletal metastasis as well as new sclerotic metastasis to left posterior 5th rib, enlarging pulmonary nodule, and unchanged pancreatic head mass. Also unclear concerns for PE,  right lower lobe segmental PE confirmed on CT-PE. Started on apixaban.      10/31/23 Cycle 1 gemzar, abraxane  11/7/23 Cycle 1 Zometa  11/22/23 Removal of GJ tube.   11/29/23 Completed palliative radiation to T10   12/13/23 C3D1 gem/abraxane  12/29/23 Consults at [place]  1/23-1/26 Consults at [facility]   1/30/24: C4D1 gem/abraxane   3/24: CT CAP with overall stable disease with isolated progression in potential L4 lesion. Referral for radiation oncology.    4/29-5/1: Consults at [facility] for possible cellular therapy, CT guided biopsy of left pubic bone, recommendation to return to [facility] upon progression of current treatment   5/23/24: Cardiology consult at [facility]. CT with small pericardial effusion   6/6/2024: Repeat ECHO on with EF of 55-60%, no pericardial effusion present  - Cycle 8 deferred due to infection concerns and subsequently tested positive for COVID 6/25/24, resumed treatment on 7/2/2024    -Day 8 eliminated after Cycle 5 [place]/Abraxane.    4/2/2024-present: Gemcitabine/Abraxane, Days 1 and 15 only; Zometa every 3 months     *** Imaging, decreased Abraxane dose 50%.     Interval history:   Gallo is seen today prior to cycle ***  gemcitabine/Abraxane.    Will be having infusions at home  Back pain/bone pain   Fatigue     Fractured L4, had been doing more painting and such but now is taking is easy and doing easier projects   -Dr. Reaves is checking on having procedure to add cement to L4  -mild back pain - if he sits too long then when getting up, pain meds are helping anyways - getting better daily    -no incontinence, no weakness   -appetite is good, bowels are good  -no temperature episodes  -didn't notice a difference with dose reduction since he got zometa and typically has more side effects from Zometa        Review of Systems:  Patient denies any of the following except if noted above: fevers, chills, difficulty with energy, vision or hearing changes, chest pain, dyspnea, abdominal pain, nausea, vomiting, diarrhea, constipation, urinary concerns, headaches, numbness, tingling, issues with sleep or mood. Also denies lumps, bumps, rashes or skin lesions, bleeding or bruising issues.        Current Outpatient Medications   Medication Sig Dispense Refill    acetaminophen (TYLENOL) 32 mg/mL liquid Take 20.313 mLs (650 mg) by mouth every 8 hours 1800 mL 1    apixaban ANTICOAGULANT (ELIQUIS) 5 MG tablet Take 1 tablet (5 mg) by mouth 2 times daily 60 tablet 2    buprenorphine (BUTRANS) 10 MCG/HR WK patch Place 1 patch onto the skin every 7 days Use with 20 mcg/hour patch for 30 mcg/hour total. 4 patch 3    buprenorphine (BUTRANS) 20 MCG/HR WK patch Place 1 patch onto the skin once a week Use with 10 mcg/hour patch for 30 mcg/hour total. 4 patch 3    dicyclomine (BENTYL) 10 MG capsule Take 1 capsule (10 mg) by mouth 4 times daily (before meals and nightly) 120 capsule 1    econazole nitrate 1 % external cream Apply topically daily To affected toenails. 85 g 5    HYDROmorphone (DILAUDID) 2 MG tablet Take 1-2 tablets (2-4 mg) by mouth every 4 hours as needed for severe pain or breakthrough pain 180 tablet 0    lidocaine-prilocaine (EMLA) 2.5-2.5 % external cream Use 1-2 times a week or as needed prior to port access 30 g 3    lipase-protease-amylase (CREON 24) 26597-57524-390933 units CPEP per EC capsule 2-3 capsules with meals 3 times a day and 1-2 capsules with snacks max of 15 capsules a day 200 capsule 11    methylphenidate (RITALIN) 5 MG tablet Take 1 tablet (5 mg) by mouth 2 times daily as needed (Patient not  taking: Reported on 7/30/2024) 10 tablet 0    Multiple Vitamins-Minerals (CENTRUM SILVER 50+MEN) TABS as directed Orally      naloxone (NARCAN) 4 MG/0.1ML nasal spray Instill 1 spray (4 mg) into one nostril alternating nostrils as needed for opioid reversal every 2-3 minutes until assistance arrives* (Patient not taking: Reported on 7/30/2024)      pantoprazole (PROTONIX) 40 MG EC tablet Take 1 tablet (40 mg) by mouth 2 times daily 60 tablet 4    prochlorperazine (COMPAZINE) 10 MG tablet Take 1 tablet (10 mg) by mouth every 6 hours as needed for nausea or vomiting (Patient not taking: Reported on 7/30/2024) 30 tablet 2    vitamin D3 (CHOLECALCIFEROL) 50 mcg (2000 units) tablet Take 1 tablet (50 mcg) by mouth daily 90 tablet 1        No Known Allergies      Exam:   There were no vitals taken for this visit.        Wt Readings from Last 4 Encounters:   08/13/24 71.4 kg (157 lb 6.4 oz)   07/30/24 73.4 kg (161 lb 14.4 oz)   07/16/24 73 kg (161 lb)   07/02/24 72.9 kg (160 lb 12.8 oz)     Constitutional: Pleasant male, NAD  Eyes: No scleral icterus. No conjunctival erythema or discharge  Cardiovascular: RRR, no m/g/r. No peripheral edema.  Respiratory: CTA bilaterally. No wheezes or crackles.  MSK: moving all extremities freely; negative for C/T/L bony tenderness  Skin: no visible lesions, rashes or bruising  Psych: appropriate mood and affect    Labs:    Most Recent 3 CBC's:  Recent Labs   Lab Test 08/13/24  0809 07/30/24  0904 07/16/24  0835   WBC 4.5 5.2 5.0   HGB 10.4* 10.4* 10.0*   MCV 91 91 90    197 226   ANEUTAUTO 2.3 3.1 2.8    Most Recent 3 BMP's:  Recent Labs   Lab Test 08/13/24  0809 07/30/24  0904 07/02/24  0719 06/25/24  1505   NA  --  139 141 135   POTASSIUM  --  4.3 4.0 4.2   CHLORIDE  --  104 104 97*   CO2  --  27 26 27   BUN  --  13.9 14.3 16.6   CR 0.68 0.70 0.65* 0.75   ANIONGAP  --  8 11 11   DENISE 9.1 9.0 9.0 9.1   GLC  --  165* 171* 139*   PROTTOTAL  --  6.8 7.0 7.7   ALBUMIN 3.9 4.0 3.8 4.2     Most Recent 2 LFT's:  Recent Labs   Lab Test 07/30/24  0904 07/02/24  0719   AST 33 32   ALT 38 39   ALKPHOS 129 145   BILITOTAL 0.3 0.2    Most Recent TSH and T4:No lab results found.  Phos/Mag:  Lab Results   Component Value Date    PHOS 3.2 08/11/2023    PHOS 3.4 07/11/2023    PHOS 2.8 07/10/2023    MAG 1.9 08/23/2023    MAG 1.9 08/11/2023    MAG 2.1 07/16/2023        I reviewed the above labs today.      Imaging:   N/A    Impression/plan:  Gallo is a 58 year old male with unresectable pancreatic cancer, currently under treatment of Gemcitabine/Abraxane.     Unresectable Pancreatic Cancer  7/31/23 began palliative treatment on FOLFIRINOX  CT CAP 10/24/23 with progression in skeletal mets, lung nodule and lymph nodes; stable pancreatic mass  - 10/31/2023: Treatment was changed to Gemcitabine and Abraxane (Day 1, 8, 15) + Zometa every 3 months  - 03/2024 CT CAP with progression of L4 lesion  - Day 8 eliminated after Cycle 5 gem/abraxane  - C8D1 deferred d/t COVID infection, now fully recovered  - Proceed with C9D1 chemo today  - RTC 8/13/24 labs and infusion as scheduled, + Zometa  - appears to be due for restaging, will message primary care team/Dr. Haile to inquire    -repeat imaging, reduced Abraxane dose, plan follow up in 3 months ***     Anemia-normocytic  Hgb 10.4 today, stable; in setting of chemotherapy and chronic disease  -Monitor    Skeletal Mets ***   Negative for C/T/L spine tenderness on physical exam  - Radiation to T10 completed 11/29/23  - Discussion to irradiate L4 lesion noted on CT dated 10/24/2023, however, on hold due to lack of symptoms  - CT chest 5/23/24 unchanged sclerotic bone metastases  - Managing pain with Butrans patch and hydromorphone 2 mg, 8 tablets daily  - He has a follow up with Radiation Oncology scheduled for 9/24/2024  - will continue to monitor symptoms    Bone Health  Zometa 4 mg every 3 months, last infusion 5/24/2024  - per chart review, Dr. Haile note 3/15/2024:  consider use of denosumab instead of bisphosphonate, given the evidence for potentially superior efficacy. At this time, he is tolerating this treatment extremely well now, and has well-controlled skeletal mets. Continue to consider his skeletal treatment options as we move forward.   - Next Zometa due  8/13/2024. Please note the therapy plan is entered as an endocrine plan with 6 month intervals. For bone mets, we typically give every 3 months.    Neuropathy  Reports improvement while on treatment break  Referred to PT previously but not following  Symptoms unchanged on current treatment-monitor for progression    COVID  COVID positive 6/25/2024  Symptomatically improved    Pericardial Effusion  Found on CT 4/20/24 at [facility], followed by TTE with moderate pericardial effusion without tamponade.  CT 5/23/24 small pericardial effusion  5/23/24: Cardiology consult at [facility]- Echo 6/6/2024 with EF 55-60%, LV strain -20.5% which is normal, no pericardial effusion  - repeat echo at [facility] in the future, TTE scheduled for 7/2024  - he has been cautioned by Cardiology s/s of cardiac tamponade  -following cardiology as needed    Pulmonary Embolism  Right lower segmental PE found on routine imaging  Denies shortness of breath  Taking Apixiban daily    Nutrition  GJ tube removed 11/22/2023  Weight is stable  Continue small meals/protein supplements    LE edema  Not present on exam today; previously instructed to wear compression stockings as needed  - will continue to monitor    Fatigue  Reports that he is experiencing more energy since treatment has been on hold for the past month    Diarrhea/Constipation  resolved    Discoloration of bilateral great toenails  Met with podiatry, Dr. Ware, 7/19/24. Econazole prescribed for bilateral onychomycosis.      The longitudinal plan of care for the diagnosis(es)/condition(s) as documented were addressed during this visit. Due to the added complexity in care, I will  continue to support Gallo in the subsequent management and with ongoing continuity of care.      *** minutes spent on the date of the encounter doing chart review, review of test results, interpretation of tests, patient visit, and documentation       ESVIN Canales CNP          Again, thank you for allowing me to participate in the care of your patient.        Sincerely,        ESVIN Canales CNP

## 2024-08-26 ENCOUNTER — LAB REQUISITION (OUTPATIENT)
Dept: LAB | Facility: CLINIC | Age: 59
End: 2024-08-26
Payer: COMMERCIAL

## 2024-08-26 DIAGNOSIS — C25.0 MALIGNANT NEOPLASM OF HEAD OF PANCREAS (H): ICD-10-CM

## 2024-08-26 DIAGNOSIS — C79.51 MALIGNANT NEOPLASM METASTATIC TO BONE (H): Primary | ICD-10-CM

## 2024-08-26 LAB
ALBUMIN SERPL BCG-MCNC: 4 G/DL (ref 3.5–5.2)
ALP SERPL-CCNC: 251 U/L (ref 40–150)
ALT SERPL W P-5'-P-CCNC: 97 U/L (ref 0–70)
ANION GAP SERPL CALCULATED.3IONS-SCNC: 10 MMOL/L (ref 7–15)
AST SERPL W P-5'-P-CCNC: 70 U/L (ref 0–45)
BASOPHILS # BLD AUTO: 0 10E3/UL (ref 0–0.2)
BASOPHILS NFR BLD AUTO: 1 %
BILIRUB SERPL-MCNC: 0.5 MG/DL
BUN SERPL-MCNC: 17.4 MG/DL (ref 6–20)
CALCIUM SERPL-MCNC: 8.5 MG/DL (ref 8.8–10.4)
CHLORIDE SERPL-SCNC: 100 MMOL/L (ref 98–107)
CREAT SERPL-MCNC: 0.76 MG/DL (ref 0.67–1.17)
EGFRCR SERPLBLD CKD-EPI 2021: >90 ML/MIN/1.73M2
EOSINOPHIL # BLD AUTO: 0.2 10E3/UL (ref 0–0.7)
EOSINOPHIL NFR BLD AUTO: 3 %
ERYTHROCYTE [DISTWIDTH] IN BLOOD BY AUTOMATED COUNT: 15.9 % (ref 10–15)
GLUCOSE SERPL-MCNC: 120 MG/DL (ref 70–99)
HCO3 SERPL-SCNC: 26 MMOL/L (ref 22–29)
HCT VFR BLD AUTO: 33.8 % (ref 40–53)
HGB BLD-MCNC: 10.9 G/DL (ref 13.3–17.7)
IMM GRANULOCYTES # BLD: 0 10E3/UL
IMM GRANULOCYTES NFR BLD: 0 %
LYMPHOCYTES # BLD AUTO: 1.1 10E3/UL (ref 0.8–5.3)
LYMPHOCYTES NFR BLD AUTO: 19 %
MCH RBC QN AUTO: 29.5 PG (ref 26.5–33)
MCHC RBC AUTO-ENTMCNC: 32.2 G/DL (ref 31.5–36.5)
MCV RBC AUTO: 91 FL (ref 78–100)
MONOCYTES # BLD AUTO: 0.9 10E3/UL (ref 0–1.3)
MONOCYTES NFR BLD AUTO: 15 %
NEUTROPHILS # BLD AUTO: 3.8 10E3/UL (ref 1.6–8.3)
NEUTROPHILS NFR BLD AUTO: 62 %
NRBC # BLD AUTO: 0 10E3/UL
NRBC BLD AUTO-RTO: 0 /100
PLATELET # BLD AUTO: 202 10E3/UL (ref 150–450)
POTASSIUM SERPL-SCNC: 3.9 MMOL/L (ref 3.4–5.3)
PROT SERPL-MCNC: 7.1 G/DL (ref 6.4–8.3)
RBC # BLD AUTO: 3.7 10E6/UL (ref 4.4–5.9)
SODIUM SERPL-SCNC: 136 MMOL/L (ref 135–145)
WBC # BLD AUTO: 6.1 10E3/UL (ref 4–11)

## 2024-08-26 PROCEDURE — 85025 COMPLETE CBC W/AUTO DIFF WBC: CPT | Performed by: STUDENT IN AN ORGANIZED HEALTH CARE EDUCATION/TRAINING PROGRAM

## 2024-08-26 PROCEDURE — 82040 ASSAY OF SERUM ALBUMIN: CPT | Performed by: STUDENT IN AN ORGANIZED HEALTH CARE EDUCATION/TRAINING PROGRAM

## 2024-08-26 PROCEDURE — 86301 IMMUNOASSAY TUMOR CA 19-9: CPT | Performed by: STUDENT IN AN ORGANIZED HEALTH CARE EDUCATION/TRAINING PROGRAM

## 2024-08-27 ENCOUNTER — VIRTUAL VISIT (OUTPATIENT)
Dept: VASCULAR SURGERY | Facility: CLINIC | Age: 59
End: 2024-08-27
Payer: COMMERCIAL

## 2024-08-27 VITALS — HEIGHT: 72 IN | WEIGHT: 150 LBS | BODY MASS INDEX: 20.32 KG/M2

## 2024-08-27 DIAGNOSIS — S32.040A CLOSED WEDGE COMPRESSION FRACTURE OF L4 VERTEBRA, INITIAL ENCOUNTER (H): ICD-10-CM

## 2024-08-27 DIAGNOSIS — C25.0 MALIGNANT NEOPLASM OF HEAD OF PANCREAS (H): ICD-10-CM

## 2024-08-27 DIAGNOSIS — C79.51 MALIGNANT NEOPLASM METASTATIC TO BONE (H): Primary | ICD-10-CM

## 2024-08-27 LAB — CANCER AG19-9 SERPL IA-ACNC: 12 U/ML

## 2024-08-27 PROCEDURE — 99204 OFFICE O/P NEW MOD 45 MIN: CPT | Mod: 95 | Performed by: RADIOLOGY

## 2024-08-27 ASSESSMENT — PAIN SCALES - GENERAL: PAINLEVEL: MILD PAIN (2)

## 2024-08-27 NOTE — NURSING NOTE
Patient reviewed medications and allergies in Mychart during e-check in and said everything looked correct.      Current patient location: [address] [place] [address]    Is the patient currently in the state of [place]? YES    Visit mode:VIDEO    If the visit is dropped, the patient can be reconnected by: VIDEO VISIT: Text to cell phone:   Telephone Information:   Mobile [phone]    and VIDEO VISIT: Send to e-mail at: [email or web address]    Will anyone else be joining the visit? Violet-Pt's Wife   (If patient encounters technical issues they should call [phone])    How would you like to obtain your AVS? MyChart    Are changes needed to the allergy or medication list? Pt declined med review and Pt stated no med changes    Are refills needed on medications prescribed by this physician? NO    Rooming Documentation:  Not applicable      Reason for visit: Consult      Iris NICOLE

## 2024-08-27 NOTE — PROGRESS NOTES
INTERVENTIONAL RADIOLOGY CONSULTATION    Name: Gallo Navarro  Age: 58 year old   Referring Physician: Dr. Reaves and Dr. Haile  REASON FOR REFERRAL: compression fracture of L4     HPI: Mr Navarro is a 58 year old male with a history of metastatic pancreatic cancer metastatic to bone involving both the T10 and L4 vertebral bodies. He has a history of prior SBRT to the T10 vertebral body. He developed pain in the lumbar spine and was found to have a compression fracture of the lumbar spin in L4 with discordant disease progression in L4.     Today the patient and his wife are seen in video visit consultation for L4 RFA ablation and vertebral augmentation. He rates his pain as a 3-4/10 when taking his pain medications and a 7 when off his pain medications. This pain wakes him up at night and is lifestyle limiting. The majority of his pain is in his lower spine and he denies additional significant cancer related pain at this time. He presently is taking both buprenorphine (patch) and dilaudid for his pain. He did take his apixiban the morning of 8/27.    PAST MEDICAL HISTORY:   Past Medical History:   Diagnosis Date    Acute pancreatitis 04/16/2023    Alcohol-induced acute pancreatitis 04/10/2023    Gastric outlet obstruction     Metastasis from pancreatic cancer (H)     Personal history of chemotherapy     Gemzar + Abraxane started on 10/31/2023    Recurrent acute pancreatitis     S/P radiation therapy     2,000 cGy to T10 Spine completed on 11/29/2023 - [facility]       PAST SURGICAL HISTORY:   Past Surgical History:   Procedure Laterality Date    AS OPEN TREATMENT CLAVICULAR FRACTURE INTERNAL FX      ENDOSCOPIC RETROGRADE CHOLANGIOPANCREATOGRAM N/A 7/11/2023    Procedure: ENDOSCOPIC RETROGRADE CHOLANGIOPANCREATOGRAPHY;  Surgeon: Khadar Pickett MD;  Location: [facility] OR    ENDOSCOPIC RETROGRADE CHOLANGIOPANCREATOGRAM N/A 8/17/2023    Procedure: ENDOSCOPIC RETROGRADE  with stent removal x1, balloon sweep;   Surgeon: Christos Greenberg MD;  Location: [facility] OR    ENDOSCOPIC ULTRASOUND UPPER GASTROINTESTINAL TRACT (GI) N/A 7/11/2023    Procedure: Endoscopic ultrasound upper gastrointestinal tract (GI), with biposy, GJ tube repositioning, stent placement;  Surgeon: Khadar Pickett MD;  Location: [facility] OR    ENDOSCOPIC ULTRASOUND UPPER GASTROINTESTINAL TRACT (GI) N/A 7/13/2023    Procedure: ENDOSCOPIC ULTRASOUND, ESOPHAGOSCOPY, EUS guided gastrojejunostomy;  Surgeon: Tre York MD;  Location: [facility] OR    INSERT PICC LINE  04/29/2023    INSERT PORT VASCULAR ACCESS Right 7/28/2023    Procedure: Insert port vascular access;  Surgeon: Tom العلي MD;  Location: [facility] OR    IR CHEST PORT PLACEMENT > 5 YRS OF AGE  7/28/2023    IR NG TUBE PLACEMENT REQ RAD & FLUORO  05/08/2023    JG tube    REPLACE GASTROJEJUNOSTOMY TUBE, PERCUTANEOUS N/A 8/17/2023    Procedure: possible REPLACEMENT, GASTROJEJUNOSTOMY TUBE, PERCUTANEOUS;  Surgeon: Christos Greenberg MD;  Location: [facility] OR       FAMILY HISTORY:   Family History   Problem Relation Age of Onset    Diabetes Type 2  Father     Cirrhosis Father     Genetic Disorder Brother         missing [id number], mild retardation    Colon Polyps Brother     Pancreatic Cancer Paternal Aunt 85    Colon Cancer Paternal Uncle     Pancreatitis Cousin        SOCIAL HISTORY:   Social History     Tobacco Use    Smoking status: Never     Passive exposure: Never    Smokeless tobacco: Never   Substance Use Topics    Alcohol use: Not Currently       PROBLEM LIST:   Patient Active Problem List    Diagnosis Date Noted    Other pulmonary embolism without acute cor pulmonale, unspecified chronicity (H) 03/04/2024     Priority: Medium    Malignant neoplasm metastatic to bone (H) 11/07/2023     Priority: Medium    Gastric outlet obstruction 08/07/2023     Priority: Medium    Type 2 diabetes mellitus without complication, with long-term current use of insulin (H) 08/07/2023     Priority: Medium     Encounter for antineoplastic chemotherapy 08/03/2023     Priority: Medium    Malignant neoplasm of head of pancreas (H) 07/14/2023     Priority: Medium    Hyperglycemia 07/08/2023     Priority: Medium    Pancreatic mass 07/08/2023     Priority: Medium    Lower abdominal pain 07/08/2023     Priority: Medium    History of biliary stent insertion 07/08/2023     Priority: Medium    Acute pancreatitis without necrosis or infection, unspecified 04/16/2023     Priority: Medium       MEDICATIONS:   Prescription Medications as of 8/27/2024         Rx Number Disp Refills Start End Last Dispensed Date Next Fill Date Owning Pharmacy    acetaminophen (TYLENOL) 32 mg/mL liquid  1800 mL 1 8/9/2024 --   [facility] [address] [place] [address] Ln    Sig: Take 20.313 mLs (650 mg) by mouth every 8 hours    Class: E-Prescribe    Route: Oral    apixaban ANTICOAGULANT (ELIQUIS) 5 MG tablet  60 tablet 2 6/6/2024 --   [address]    Sig: Take 1 tablet (5 mg) by mouth 2 times daily    Class: E-Prescribe    Route: Oral    buprenorphine (BUTRANS) 10 MCG/HR WK patch  4 patch 3 8/9/2024 --   [address]    Sig: Place 1 patch onto the skin every 7 days Use with 20 mcg/hour patch for 30 mcg/hour total.    Class: E-Prescribe    Route: Transdermal    buprenorphine (BUTRANS) 20 MCG/HR WK patch  4 patch 3 8/9/2024 --   [address]    Sig: Place 1 patch onto the skin once a week Use with 10 mcg/hour patch for 30 mcg/hour total.    Class: E-Prescribe    Route: Transdermal    No prior authorization was found for this prescription.    Found prior authorization for another prescription for the same medication: Closed - Other    dicyclomine (BENTYL) 10 MG capsule  120 capsule 1 7/9/2024 --   [facility] [address] [place] [address] Ln    Sig: Take 1 capsule (10 mg) by mouth 4 times daily (before meals  and nightly)    Class: E-Prescribe    Route: Oral    econazole nitrate 1 % external cream  85 g 5 2024 --   [facility] [address] [place] [address]    Sig: Apply topically daily To affected toenails.    Class: E-Prescribe    Route: Topical    HYDROmorphone (DILAUDID) 2 MG tablet  180 tablet 0 2024 --   [address]    Sig: Take 1-2 tablets (2-4 mg) by mouth every 4 hours as needed for severe pain or breakthrough pain    Class: E-Prescribe    Earliest Fill Date: 2024    Route: Oral    No prior authorization was found for this prescription.    Found prior authorization for another prescription for the same medication: Closed - Prior Authorization not required for patient/medication    lidocaine-prilocaine (EMLA) 2.5-2.5 % external cream  30 g 3 2023 --   [address]    Sig: Use 1-2 times a week or as needed prior to port access    Class: E-Prescribe    lipase-protease-amylase (CREON 24) 71380-61827-909890 units CPEP per EC capsule  200 capsule 11 3/15/2024 --   [address]    Si-3 capsules with meals 3 times a day and 1-2 capsules with snacks max of 15 capsules a day    Class: E-Prescribe    methylphenidate (RITALIN) 5 MG tablet  10 tablet 0 2024 --   [facility] [id number]    Sig: Take 1 tablet (5 mg) by mouth 2 times daily as needed    Class: E-Prescribe    Earliest Fill Date: 2024    Route: Oral    Multiple Vitamins-Minerals (CENTRUM SILVER 50+MEN) TABS  -- --  --       Sig: as directed Orally    Class: Historical    naloxone (NARCAN) 4 MG/0.1ML nasal spray  -- -- 2023 --       Sig: Instill 1 spray (4 mg) into one nostril alternating nostrils as needed for opioid reversal every 2-3 minutes until assistance arrives*    Class: Historical    pantoprazole (PROTONIX) 40 MG EC  tablet  60 tablet 4 8/8/2024 --   [facility] [address] [place] [address] [address] [place] [address]    Sig: Take 1 tablet (40 mg) by mouth 2 times daily    Class: E-Prescribe    Route: Oral    prochlorperazine (COMPAZINE) 10 MG tablet  30 tablet 2 3/15/2024 --   [address]    Sig: Take 1 tablet (10 mg) by mouth every 6 hours as needed for nausea or vomiting    Class: E-Prescribe    Route: Oral    vitamin D3 (CHOLECALCIFEROL) 50 mcg (2000 units) tablet  90 tablet 1 7/3/2024 --   [address]    Sig: Take 1 tablet (50 mcg) by mouth daily    Class: E-Prescribe    Route: Oral            ALLERGIES:   Patient has no known allergies.    ROS:  Negative unless otherwise stated in HPI.      Physical Examination:   VITALS:   Ht 1.829 m (6')   Wt 68 kg (150 lb)   BMI 20.34 kg/m      Billable telephone visit  Gen: alert and oriented  HEENT: normocephalic and atraumatic, wearing necklace.      Labs:    BMP RESULTS:  Lab Results   Component Value Date     08/26/2024    POTASSIUM 3.9 08/26/2024    CHLORIDE 100 08/26/2024    CO2 26 08/26/2024    ANIONGAP 10 08/26/2024     (H) 08/26/2024     (H) 09/05/2023    BUN 17.4 08/26/2024    CR 0.76 08/26/2024    GFRESTIMATED >90 08/26/2024    DENISE 8.5 (L) 08/26/2024        CBC RESULTS:  Lab Results   Component Value Date    WBC 6.1 08/26/2024    RBC 3.70 (L) 08/26/2024    HGB 10.9 (L) 08/26/2024    HCT 33.8 (L) 08/26/2024    MCV 91 08/26/2024    MCH 29.5 08/26/2024    MCHC 32.2 08/26/2024    RDW 15.9 (H) 08/26/2024     08/26/2024       INR/PTT:  Lab Results   Component Value Date    INR 1.46 (H) 07/13/2023       Diagnostic studies:   CT chest abdomen and pelvis 8/8/2024 with pathologic compression fracture of the inferior endplate of L4 with approximately 20% height loss central while height appears preserved anteriorly.        Assessment     Mr Navarro is a 58 year old male with a history  of metastatic pancreatic cancer metastatic to bone who was found to have a compression fracture of the lumbar spin in L4 with discordant disease progression in L4. This pain is lifestyle limiting and wakes him up at night requiring significant pain medication doses to manage. He is a candidate for RFA ablation and vertebral augmentation to L4. The procedure was described to the patient with the goal benefit of reduction in pain and progression of fracture. The risks were also discussed which included but were not limited to bleeding, infection, neurologic injury, and cement migration. The patient understands the risks and benefits and wishes to proceed. He has 5 lumbar type vertebra and 12 rib bearing vertebra.       Plan   - RFA ablation and vertebral augmentation to L4 (vertebroplasty versus kyphoplasty) on 8/28/2024  - Patient did take apixiban morning of 8/27 so only 2 doses will be held at time of procedure 8/28, patient understands elevated bleeding risk and wishes to proceed. Patient did acknowledge message to hold 4 doses on mychart on 8/26 sent by Yazmin LOZOYA.          CC  Patient Care Team:  Akil Hernandez MD as PCP - General (Internal Medicine)  Akil Hernandez MD as Assigned PCP  Efra Lynn RN as Specialty Care Coordinator (Hematology & Oncology)  Tre York MD as MD (Gastroenterology)  Jeremy Hurt MD as Assigned Palliative Care Provider  Christos Greenberg MD as MD (Gastroenterology)  Megan Farrell APRN CNP as Assigned Cancer Care Provider  Becca Anguiano as Physician (Radiation Oncology)  Luis Haiel MD as Berta Alex PA-C as Assigned Endocrinology Provider  Zen Sawyer MD as MD (Cardiovascular Disease)  Zen Sawyer MD as MD (Cardiovascular Disease)  Fanny Chanel MD as MD (Cardiovascular Disease)  Tam Khan MD as MD (Cardiovascular Disease)  Tam Khan MD as Assigned Heart and Vascular Provider  Torito Ware DPM  as Assigned Musculoskeletal Provider  STACI CORNEJO      ----- Service Performed and Documented by Resident or Fellow ------        There are no diagnoses linked to this encounter.     Review of external notes as documented above   Review of prior external note(s) from - Hematology/oncology and radiation oncology  Review of the result(s) of each unique test - CT CAP 8/8/2024  Independent interpretation of a test performed by another physician/other qualified health care professional (not separately reported) - CT 8/8/2024                  Video-Visit Details     Type of service:  Video Visit     Video Start and End Time: 9:15 am - 9:41 am    Originating Location (pt. Location): Home     Distant Location (provider location):  [facility] [place]      Platform used for Video Visit: [facility]     Liam Gordon MD    I, Brice Seymour, was present with the resident/fellow during the history and exam. I discussed the case with the resident/fellow and agree with the findings as documented in the assessment and plan.    Brice Seymour MD    Vascular and Interventional Radiolgy  [facility]

## 2024-08-27 NOTE — PATIENT INSTRUCTIONS
Gallo you have had your consult today with Dr Seymour regarding your spine lesion.     Plan:    You are scheduled for your procedure RFA and cement augmentation on 8/28/24.     Thanks,     ALF Arce RN, BSN  Interventional Radiology Care Coordinator   Phone:  [phone]

## 2024-08-27 NOTE — LETTER
8/27/2024       RE: Gallo Navarro  [address] [address] [place] [id number]     Dear Colleague,    Thank you for referring your patient, Gallo Navarro, to the [facility] [place] at [facility]. Please see a copy of my visit note below.        INTERVENTIONAL RADIOLOGY CONSULTATION    Name: Gallo Navarro  Age: 58 year old   Referring Physician: Dr. Reaves and Dr. Haile  REASON FOR REFERRAL: compression fracture of L4     HPI: Mr Navarro is a 58 year old male with a history of metastatic pancreatic cancer metastatic to bone involving both the T10 and L4 vertebral bodies. He has a history of prior SBRT to the T10 vertebral body. He developed pain in the lumbar spine and was found to have a compression fracture of the lumbar spin in L4 with discordant disease progression in L4.     Today the patient and his wife are seen in video visit consultation for L4 RFA ablation and vertebral augmentation. He rates his pain as a 3-4/10 when taking his pain medications and a 7 when off his pain medications. This pain wakes him up at night and is lifestyle limiting. The majority of his pain is in his lower spine and he denies additional significant cancer related pain at this time. He presently is taking both buprenorphine (patch) and dilaudid for his pain. He did take his apixiban the morning of 8/27.    PAST MEDICAL HISTORY:   Past Medical History:   Diagnosis Date     Acute pancreatitis 04/16/2023     Alcohol-induced acute pancreatitis 04/10/2023     Gastric outlet obstruction      Metastasis from pancreatic cancer (H)      Personal history of chemotherapy     Gemzar + Abraxane started on 10/31/2023     Recurrent acute pancreatitis      S/P radiation therapy     2,000 cGy to T10 Spine completed on 11/29/2023 - [facility]       PAST SURGICAL HISTORY:   Past Surgical History:   Procedure Laterality Date     AS OPEN TREATMENT CLAVICULAR FRACTURE  INTERNAL FX       ENDOSCOPIC RETROGRADE CHOLANGIOPANCREATOGRAM N/A 7/11/2023    Procedure: ENDOSCOPIC RETROGRADE CHOLANGIOPANCREATOGRAPHY;  Surgeon: Khadar Pickett MD;  Location: [facility] OR     ENDOSCOPIC RETROGRADE CHOLANGIOPANCREATOGRAM N/A 8/17/2023    Procedure: ENDOSCOPIC RETROGRADE  with stent removal x1, balloon sweep;  Surgeon: Christos Greenberg MD;  Location: [facility] OR     ENDOSCOPIC ULTRASOUND UPPER GASTROINTESTINAL TRACT (GI) N/A 7/11/2023    Procedure: Endoscopic ultrasound upper gastrointestinal tract (GI), with biposy, GJ tube repositioning, stent placement;  Surgeon: Khadar Pickett MD;  Location: [facility] OR     ENDOSCOPIC ULTRASOUND UPPER GASTROINTESTINAL TRACT (GI) N/A 7/13/2023    Procedure: ENDOSCOPIC ULTRASOUND, ESOPHAGOSCOPY, EUS guided gastrojejunostomy;  Surgeon: Tre York MD;  Location: [facility] OR     INSERT PICC LINE  04/29/2023     INSERT PORT VASCULAR ACCESS Right 7/28/2023    Procedure: Insert port vascular access;  Surgeon: Tom العلي MD;  Location: [facility] OR     IR CHEST PORT PLACEMENT > 5 YRS OF AGE  7/28/2023     IR NG TUBE PLACEMENT REQ RAD & FLUORO  05/08/2023    JG tube     REPLACE GASTROJEJUNOSTOMY TUBE, PERCUTANEOUS N/A 8/17/2023    Procedure: possible REPLACEMENT, GASTROJEJUNOSTOMY TUBE, PERCUTANEOUS;  Surgeon: Christos Greenberg MD;  Location: [facility] OR       FAMILY HISTORY:   Family History   Problem Relation Age of Onset     Diabetes Type 2  Father      Cirrhosis Father      Genetic Disorder Brother         missing [id number], mild retardation     Colon Polyps Brother      Pancreatic Cancer Paternal Aunt 85     Colon Cancer Paternal Uncle      Pancreatitis Cousin        SOCIAL HISTORY:   Social History     Tobacco Use     Smoking status: Never     Passive exposure: Never     Smokeless tobacco: Never   Substance Use Topics     Alcohol use: Not Currently       PROBLEM LIST:   Patient Active Problem List    Diagnosis Date Noted     Other pulmonary embolism without acute  cor pulmonale, unspecified chronicity (H) 03/04/2024     Priority: Medium     Malignant neoplasm metastatic to bone (H) 11/07/2023     Priority: Medium     Gastric outlet obstruction 08/07/2023     Priority: Medium     Type 2 diabetes mellitus without complication, with long-term current use of insulin (H) 08/07/2023     Priority: Medium     Encounter for antineoplastic chemotherapy 08/03/2023     Priority: Medium     Malignant neoplasm of head of pancreas (H) 07/14/2023     Priority: Medium     Hyperglycemia 07/08/2023     Priority: Medium     Pancreatic mass 07/08/2023     Priority: Medium     Lower abdominal pain 07/08/2023     Priority: Medium     History of biliary stent insertion 07/08/2023     Priority: Medium     Acute pancreatitis without necrosis or infection, unspecified 04/16/2023     Priority: Medium       MEDICATIONS:   Prescription Medications as of 8/27/2024         Rx Number Disp Refills Start End Last Dispensed Date Next Fill Date Owning Pharmacy    acetaminophen (TYLENOL) 32 mg/mL liquid  1800 mL 1 8/9/2024 --   [facility] [address] [place] [address] Ln    Sig: Take 20.313 mLs (650 mg) by mouth every 8 hours    Class: E-Prescribe    Route: Oral    apixaban ANTICOAGULANT (ELIQUIS) 5 MG tablet  60 tablet 2 6/6/2024 --   [facility] [address] [place] [address] Ln    Sig: Take 1 tablet (5 mg) by mouth 2 times daily    Class: E-Prescribe    Route: Oral    buprenorphine (BUTRANS) 10 MCG/HR WK patch  4 patch 3 8/9/2024 --   [facility] [address] [place] [address] Ln    Sig: Place 1 patch onto the skin every 7 days Use with 20 mcg/hour patch for 30 mcg/hour total.    Class: E-Prescribe    Route: Transdermal    buprenorphine (BUTRANS) 20 MCG/HR WK patch  4 patch 3 8/9/2024 --   [address]    Sig: Place 1 patch onto the skin once a week Use with 10 mcg/hour patch for 30 mcg/hour total.    Class: E-Prescribe    Route:  Transdermal    No prior authorization was found for this prescription.    Found prior authorization for another prescription for the same medication: Closed - Other    dicyclomine (BENTYL) 10 MG capsule  120 capsule 1 2024 --   [address]    Sig: Take 1 capsule (10 mg) by mouth 4 times daily (before meals and nightly)    Class: E-Prescribe    Route: Oral    econazole nitrate 1 % external cream  85 g 5 2024 --   [address]    Sig: Apply topically daily To affected toenails.    Class: E-Prescribe    Route: Topical    HYDROmorphone (DILAUDID) 2 MG tablet  180 tablet 0 2024 --   [address]    Sig: Take 1-2 tablets (2-4 mg) by mouth every 4 hours as needed for severe pain or breakthrough pain    Class: E-Prescribe    Earliest Fill Date: 2024    Route: Oral    No prior authorization was found for this prescription.    Found prior authorization for another prescription for the same medication: Closed - Prior Authorization not required for patient/medication    lidocaine-prilocaine (EMLA) 2.5-2.5 % external cream  30 g 3 2023 --   [address]    Sig: Use 1-2 times a week or as needed prior to port access    Class: E-Prescribe    lipase-protease-amylase (CREON 24) 01130-95[address]-650945 units CPEP per EC capsule  200 capsule 11 3/15/2024 --   [address]    Si-3 capsules with meals 3 times a day and 1-2 capsules with snacks max of 15 capsules a day    Class: E-Prescribe    methylphenidate (RITALIN) 5 MG tablet  10 tablet 0 2024 --   [place], [place] - [address] [place] [address] [phone]    Sig: Take 1 tablet (5 mg) by mouth 2 times daily as needed    Class: E-Prescribe    Earliest Fill Date: 2024    Route: Oral    Multiple Vitamins-Minerals  (CENTRUM SILVER 50+MEN) TABS  -- --  --       Sig: as directed Orally    Class: Historical    naloxone (NARCAN) 4 MG/0.1ML nasal spray  -- -- 7/27/2023 --       Sig: Instill 1 spray (4 mg) into one nostril alternating nostrils as needed for opioid reversal every 2-3 minutes until assistance arrives*    Class: Historical    pantoprazole (PROTONIX) 40 MG EC tablet  60 tablet 4 8/8/2024 --   [address]    Sig: Take 1 tablet (40 mg) by mouth 2 times daily    Class: E-Prescribe    Route: Oral    prochlorperazine (COMPAZINE) 10 MG tablet  30 tablet 2 3/15/2024 --   [address]    Sig: Take 1 tablet (10 mg) by mouth every 6 hours as needed for nausea or vomiting    Class: E-Prescribe    Route: Oral    vitamin D3 (CHOLECALCIFEROL) 50 mcg (2000 units) tablet  90 tablet 1 7/3/2024 --   [address]    Sig: Take 1 tablet (50 mcg) by mouth daily    Class: E-Prescribe    Route: Oral            ALLERGIES:   Patient has no known allergies.    ROS:  Negative unless otherwise stated in HPI.      Physical Examination:   VITALS:   Ht 1.829 m (6')   Wt 68 kg (150 lb)   BMI 20.34 kg/m      Billable telephone visit  Gen: alert and oriented  HEENT: normocephalic and atraumatic, wearing necklace.      Labs:    BMP RESULTS:  Lab Results   Component Value Date     08/26/2024    POTASSIUM 3.9 08/26/2024    CHLORIDE 100 08/26/2024    CO2 26 08/26/2024    ANIONGAP 10 08/26/2024     (H) 08/26/2024     (H) 09/05/2023    BUN 17.4 08/26/2024    CR 0.76 08/26/2024    GFRESTIMATED >90 08/26/2024    DENISE 8.5 (L) 08/26/2024        CBC RESULTS:  Lab Results   Component Value Date    WBC 6.1 08/26/2024    RBC 3.70 (L) 08/26/2024    HGB 10.9 (L) 08/26/2024    HCT 33.8 (L) 08/26/2024    MCV 91 08/26/2024    MCH 29.5 08/26/2024    MCHC 32.2 08/26/2024    RDW 15.9 (H) 08/26/2024     08/26/2024        INR/PTT:  Lab Results   Component Value Date    INR 1.46 (H) 07/13/2023       Diagnostic studies:   CT chest abdomen and pelvis 8/8/2024 with pathologic compression fracture of the inferior endplate of L4 with approximately 20% height loss central while height appears preserved anteriorly.        Assessment     Mr Navarro is a 58 year old male with a history of metastatic pancreatic cancer metastatic to bone who was found to have a compression fracture of the lumbar spin in L4 with discordant disease progression in L4. This pain is lifestyle limiting and wakes him up at night requiring significant pain medication doses to manage. He is a candidate for RFA ablation and vertebral augmentation to L4. The procedure was described to the patient with the goal benefit of reduction in pain and progression of fracture. The risks were also discussed which included but were not limited to bleeding, infection, neurologic injury, and cement migration. The patient understands the risks and benefits and wishes to proceed. He has 5 lumbar type vertebra and 12 rib bearing vertebra.       Plan   - RFA ablation and vertebral augmentation to L4 (vertebroplasty versus kyphoplasty) on 8/28/2024  - Patient did take apixiban morning of 8/27 so only 2 doses will be held at time of procedure 8/28, patient understands elevated bleeding risk and wishes to proceed. Patient did acknowledge message to hold 4 doses on mychart on 8/26 sent by Yazmin LOZOYA.          CC  Patient Care Team:  Akil Hernandez MD as PCP - General (Internal Medicine)  Akil Hernandez MD as Assigned PCP  Efra Lynn, RN as Specialty Care Coordinator (Hematology & Oncology)  Tre York MD as MD (Gastroenterology)  Jeremy Hurt MD as Assigned Palliative Care Provider  Christos Greenberg MD as MD (Gastroenterology)  Megan Farrell APRN CNP as Assigned Cancer Care Provider  Becca Anguiano as Physician (Radiation Oncology)  Luis Haile MD as  Berta Alex PA-C as Assigned Endocrinology Provider  Zen Sawyer MD as MD (Cardiovascular Disease)  Zen Sawyer MD as MD (Cardiovascular Disease)  Fanny Chanel MD as MD (Cardiovascular Disease)  Tam Khan MD as MD (Cardiovascular Disease)  Tam Khan MD as Assigned Heart and Vascular Provider  Torito Ware DPM as Assigned Musculoskeletal Provider  STACI CORNEJO      ----- Service Performed and Documented by Resident or Fellow ------        There are no diagnoses linked to this encounter.     Review of external notes as documented above   Review of prior external note(s) from - Hematology/oncology and radiation oncology  Review of the result(s) of each unique test - CT CAP 8/8/2024  Independent interpretation of a test performed by another physician/other qualified health care professional (not separately reported) - CT 8/8/2024                  Video-Visit Details     Type of service:  Video Visit     Video Start and End Time: 9:15 am - 9:41 am    Originating Location (pt. Location): Home     Distant Location (provider location):  [facility]ASCULAR CLINIC [place]      Platform used for Video Visit: [employer]     Liam Gordon MD    I, Brice Seymour, was present with the resident/fellow during the history and exam. I discussed the case with the resident/fellow and agree with the findings as documented in the assessment and plan.    Brice Seymour MD    Vascular and Interventional Radiolgy  [facility]         Again, thank you for allowing me to participate in the care of your patient.      Sincerely,    Brice Seymour MD

## 2024-08-28 ENCOUNTER — HOSPITAL ENCOUNTER (OUTPATIENT)
Facility: CLINIC | Age: 59
Discharge: HOME | End: 2024-08-28
Attending: RADIOLOGY | Admitting: RADIOLOGY
Payer: COMMERCIAL

## 2024-08-28 ENCOUNTER — APPOINTMENT (OUTPATIENT)
Dept: INTERVENTIONAL RADIOLOGY/VASCULAR | Facility: CLINIC | Age: 59
End: 2024-08-28
Attending: RADIOLOGY
Payer: COMMERCIAL

## 2024-08-28 ENCOUNTER — APPOINTMENT (OUTPATIENT)
Dept: MEDSURG UNIT | Facility: CLINIC | Age: 59
End: 2024-08-28
Attending: RADIOLOGY
Payer: COMMERCIAL

## 2024-08-28 VITALS
BODY MASS INDEX: 21.18 KG/M2 | DIASTOLIC BLOOD PRESSURE: 71 MMHG | TEMPERATURE: 97.8 F | RESPIRATION RATE: 16 BRPM | WEIGHT: 156.2 LBS | SYSTOLIC BLOOD PRESSURE: 104 MMHG | HEART RATE: 73 BPM | OXYGEN SATURATION: 100 %

## 2024-08-28 DIAGNOSIS — C79.51 MALIGNANT NEOPLASM METASTATIC TO BONE (H): ICD-10-CM

## 2024-08-28 LAB
INR BLD: 1.2 (ref 2–3)
INR PPP: 1.19 (ref 0.85–1.15)

## 2024-08-28 PROCEDURE — 250N000013 HC RX MED GY IP 250 OP 250 PS 637: Performed by: RADIOLOGY

## 2024-08-28 PROCEDURE — 250N000011 HC RX IP 250 OP 636

## 2024-08-28 PROCEDURE — 22511 PERQ LUMBOSACRAL INJECTION: CPT

## 2024-08-28 PROCEDURE — 99152 MOD SED SAME PHYS/QHP 5/>YRS: CPT

## 2024-08-28 PROCEDURE — 272N000193 HC ACCESSORY CR20

## 2024-08-28 PROCEDURE — 85610 PROTHROMBIN TIME: CPT

## 2024-08-28 PROCEDURE — 999N000142 HC STATISTIC PROCEDURE PREP ONLY

## 2024-08-28 PROCEDURE — 272N000718 HC KIT CR34

## 2024-08-28 PROCEDURE — 20982 ABLATE BONE TUMOR(S) PERQ: CPT | Mod: GC | Performed by: RADIOLOGY

## 2024-08-28 PROCEDURE — 272N000495 HC NEEDLE CR14

## 2024-08-28 PROCEDURE — 258N000003 HC RX IP 258 OP 636

## 2024-08-28 PROCEDURE — 272N000497 HC NEEDLE CR16

## 2024-08-28 PROCEDURE — 96374 THER/PROPH/DIAG INJ IV PUSH: CPT

## 2024-08-28 PROCEDURE — 22511 PERQ LUMBOSACRAL INJECTION: CPT | Mod: GC | Performed by: RADIOLOGY

## 2024-08-28 PROCEDURE — 36415 COLL VENOUS BLD VENIPUNCTURE: CPT

## 2024-08-28 PROCEDURE — 250N000011 HC RX IP 250 OP 636: Performed by: RADIOLOGY

## 2024-08-28 PROCEDURE — 272N000157

## 2024-08-28 PROCEDURE — 99152 MOD SED SAME PHYS/QHP 5/>YRS: CPT | Mod: GC | Performed by: RADIOLOGY

## 2024-08-28 PROCEDURE — 999N000134 HC STATISTIC PP CARE STAGE 3

## 2024-08-28 PROCEDURE — 250N000013 HC RX MED GY IP 250 OP 250 PS 637

## 2024-08-28 PROCEDURE — 272N000199 HC ACCESSORY CR8

## 2024-08-28 PROCEDURE — C1889 IMPLANT/INSERT DEVICE, NOC: HCPCS

## 2024-08-28 RX ORDER — HYDROMORPHONE HCL IN WATER/PF 6 MG/30 ML
0.4 PATIENT CONTROLLED ANALGESIA SYRINGE INTRAVENOUS ONCE
Status: COMPLETED | OUTPATIENT
Start: 2024-08-28 | End: 2024-08-28

## 2024-08-28 RX ORDER — CEFAZOLIN SODIUM 2 G/100ML
2 INJECTION, SOLUTION INTRAVENOUS
Status: COMPLETED | OUTPATIENT
Start: 2024-08-28 | End: 2024-08-28

## 2024-08-28 RX ORDER — HYDROMORPHONE HYDROCHLORIDE 2 MG/1
4 TABLET ORAL ONCE
Status: COMPLETED | OUTPATIENT
Start: 2024-08-28 | End: 2024-08-28

## 2024-08-28 RX ORDER — NALOXONE HYDROCHLORIDE 0.4 MG/ML
0.2 INJECTION, SOLUTION INTRAMUSCULAR; INTRAVENOUS; SUBCUTANEOUS
Status: DISCONTINUED | OUTPATIENT
Start: 2024-08-28 | End: 2024-08-28 | Stop reason: HOSPADM

## 2024-08-28 RX ORDER — NALOXONE HYDROCHLORIDE 0.4 MG/ML
0.4 INJECTION, SOLUTION INTRAMUSCULAR; INTRAVENOUS; SUBCUTANEOUS
Status: DISCONTINUED | OUTPATIENT
Start: 2024-08-28 | End: 2024-08-28 | Stop reason: HOSPADM

## 2024-08-28 RX ORDER — HEPARIN SODIUM (PORCINE) LOCK FLUSH IV SOLN 100 UNIT/ML 100 UNIT/ML
300 SOLUTION INTRAVENOUS ONCE
Status: COMPLETED | OUTPATIENT
Start: 2024-08-28 | End: 2024-08-28

## 2024-08-28 RX ORDER — FENTANYL CITRATE 50 UG/ML
25-50 INJECTION, SOLUTION INTRAMUSCULAR; INTRAVENOUS EVERY 5 MIN PRN
Status: DISCONTINUED | OUTPATIENT
Start: 2024-08-28 | End: 2024-08-28 | Stop reason: HOSPADM

## 2024-08-28 RX ORDER — SODIUM CHLORIDE 9 MG/ML
INJECTION, SOLUTION INTRAVENOUS CONTINUOUS
Status: DISCONTINUED | OUTPATIENT
Start: 2024-08-28 | End: 2024-08-28 | Stop reason: HOSPADM

## 2024-08-28 RX ORDER — DIPHENHYDRAMINE HYDROCHLORIDE 50 MG/ML
25-50 INJECTION INTRAMUSCULAR; INTRAVENOUS
Status: COMPLETED | OUTPATIENT
Start: 2024-08-28 | End: 2024-08-28

## 2024-08-28 RX ORDER — BUPIVACAINE HYDROCHLORIDE 5 MG/ML
30 INJECTION, SOLUTION PERINEURAL ONCE
Status: COMPLETED | OUTPATIENT
Start: 2024-08-28 | End: 2024-08-28

## 2024-08-28 RX ORDER — HYDROMORPHONE HYDROCHLORIDE 2 MG/1
2 TABLET ORAL EVERY 6 HOURS PRN
Qty: 10 TABLET | Refills: 0 | Status: SHIPPED | OUTPATIENT
Start: 2024-08-28 | End: 2024-08-31

## 2024-08-28 RX ORDER — KETOROLAC TROMETHAMINE 30 MG/ML
30 INJECTION, SOLUTION INTRAMUSCULAR; INTRAVENOUS ONCE
Status: COMPLETED | OUTPATIENT
Start: 2024-08-28 | End: 2024-08-28

## 2024-08-28 RX ORDER — FLUMAZENIL 0.1 MG/ML
0.2 INJECTION, SOLUTION INTRAVENOUS
Status: DISCONTINUED | OUTPATIENT
Start: 2024-08-28 | End: 2024-08-28 | Stop reason: HOSPADM

## 2024-08-28 RX ORDER — LIDOCAINE 40 MG/G
CREAM TOPICAL
Status: DISCONTINUED | OUTPATIENT
Start: 2024-08-28 | End: 2024-08-28 | Stop reason: HOSPADM

## 2024-08-28 RX ADMIN — FENTANYL CITRATE 25 MCG: 50 INJECTION, SOLUTION INTRAMUSCULAR; INTRAVENOUS at 10:30

## 2024-08-28 RX ADMIN — HYDROMORPHONE HYDROCHLORIDE 0.4 MG: 0.2 INJECTION, SOLUTION INTRAMUSCULAR; INTRAVENOUS; SUBCUTANEOUS at 12:49

## 2024-08-28 RX ADMIN — SODIUM CHLORIDE: 9 INJECTION, SOLUTION INTRAVENOUS at 08:34

## 2024-08-28 RX ADMIN — KETOROLAC TROMETHAMINE 30 MG: 30 INJECTION, SOLUTION INTRAMUSCULAR at 12:43

## 2024-08-28 RX ADMIN — BUPIVACAINE HYDROCHLORIDE 150 MG: 5 INJECTION, SOLUTION EPIDURAL; INTRACAUDAL; PERINEURAL at 09:47

## 2024-08-28 RX ADMIN — FENTANYL CITRATE 50 MCG: 50 INJECTION, SOLUTION INTRAMUSCULAR; INTRAVENOUS at 10:20

## 2024-08-28 RX ADMIN — FENTANYL CITRATE 50 MCG: 50 INJECTION, SOLUTION INTRAMUSCULAR; INTRAVENOUS at 09:50

## 2024-08-28 RX ADMIN — CEFAZOLIN SODIUM 2 G: 2 INJECTION, SOLUTION INTRAVENOUS at 08:37

## 2024-08-28 RX ADMIN — MIDAZOLAM 1 MG: 1 INJECTION INTRAMUSCULAR; INTRAVENOUS at 09:45

## 2024-08-28 RX ADMIN — FENTANYL CITRATE 25 MCG: 50 INJECTION, SOLUTION INTRAMUSCULAR; INTRAVENOUS at 10:08

## 2024-08-28 RX ADMIN — MIDAZOLAM 0.5 MG: 1 INJECTION INTRAMUSCULAR; INTRAVENOUS at 10:30

## 2024-08-28 RX ADMIN — FENTANYL CITRATE 25 MCG: 50 INJECTION, SOLUTION INTRAMUSCULAR; INTRAVENOUS at 09:56

## 2024-08-28 RX ADMIN — MIDAZOLAM 1 MG: 1 INJECTION INTRAMUSCULAR; INTRAVENOUS at 09:50

## 2024-08-28 RX ADMIN — MIDAZOLAM 0.5 MG: 1 INJECTION INTRAMUSCULAR; INTRAVENOUS at 10:21

## 2024-08-28 RX ADMIN — MIDAZOLAM 0.5 MG: 1 INJECTION INTRAMUSCULAR; INTRAVENOUS at 10:08

## 2024-08-28 RX ADMIN — HYDROMORPHONE HYDROCHLORIDE 4 MG: 2 TABLET ORAL at 11:26

## 2024-08-28 RX ADMIN — PANCRELIPASE 3 CAPSULE: 120000; 24000; 76000 CAPSULE, DELAYED RELEASE PELLETS ORAL at 13:05

## 2024-08-28 RX ADMIN — FENTANYL CITRATE 25 MCG: 50 INJECTION, SOLUTION INTRAMUSCULAR; INTRAVENOUS at 10:36

## 2024-08-28 RX ADMIN — MIDAZOLAM 0.5 MG: 1 INJECTION INTRAMUSCULAR; INTRAVENOUS at 09:56

## 2024-08-28 RX ADMIN — FENTANYL CITRATE 50 MCG: 50 INJECTION, SOLUTION INTRAMUSCULAR; INTRAVENOUS at 09:45

## 2024-08-28 RX ADMIN — FENTANYL CITRATE 50 MCG: 50 INJECTION, SOLUTION INTRAMUSCULAR; INTRAVENOUS at 09:39

## 2024-08-28 RX ADMIN — MIDAZOLAM 1 MG: 1 INJECTION INTRAMUSCULAR; INTRAVENOUS at 09:39

## 2024-08-28 RX ADMIN — SODIUM CHLORIDE, PRESERVATIVE FREE 300 UNITS: 5 INJECTION INTRAVENOUS at 13:45

## 2024-08-28 RX ADMIN — DIPHENHYDRAMINE HYDROCHLORIDE 50 MG: 50 INJECTION, SOLUTION INTRAMUSCULAR; INTRAVENOUS at 09:43

## 2024-08-28 RX ADMIN — MIDAZOLAM 0.5 MG: 1 INJECTION INTRAMUSCULAR; INTRAVENOUS at 10:36

## 2024-08-28 ASSESSMENT — ACTIVITIES OF DAILY LIVING (ADL)
ADLS_ACUITY_SCORE: 35

## 2024-08-28 NOTE — DISCHARGE INSTRUCTIONS
Discharge Instructions for Vertebroplasty   You had a vertebroplasty on the bones in your spine (vertebrae). That means a healthcare provider injected surgical cement into the fractured vertebrae of your spine. This was done to help ease back pain caused by fractured vertebrae. The procedure will also help prevent the fracture from getting worse. Here are some home care instructions for you to follow after the surgery.     Activity:     Take short walks. Start by walking for 5 minutes and slowly build up your time and distance.     Don't drive for 2 days after the procedure, or as directed by your healthcare provider. And never drive while you are taking narcotic pain medicine.     Don't do any heavy lifting for 3 months (nothing heavier than 5 pounds). After 3 months, you can slowly increase your lifting to normal unless directed otherwise by your provider.     Home care:    Take your medicine exactly as directed. Call your healthcare provider if you have side effects.     Remove the small bandages on your cut (incision) after 24 to 48 hours or as directed by your provider. Often there are no stitches to be removed.     Wait 1 to 2 days before showering or taking a bath. And don't swim in a pool or sit in a hot tub until your provider tells you it's OK.     Have someone help apply an ice pack to ease the pain around the incisions. Leave the ice pack in place for 20 minutes, then leave it off for 20 minutes. Pain at the incision sites may last for a few days. To make an ice pack, put ice cubes in a plastic bag that seals at the top. Wrap the bag in a clean, thin towel or cloth. Never put ice or an ice pack directly on the skin.     Keep your head raised 30  when lying down for 1 to 2 days after the surgery.     Follow-up care:  Follow up with your healthcare provide, or as advised.     Call 911:  Call 911 right away if you have:     -Chest pain     -Shortness of breath     -Trouble controlling your bladder or  bowels     -Trouble walking      When to call your healthcare provider:  Call your healthcare provider right away if you have:     -Fever of 100.4  (38 C) or higher, or as directed by your provider     -Shaking chills     -Severe pain or more redness, swelling, drainage, or warmth around the incision sites     -Weakness, numbness, or tingling in your legs    Take Ativan 600mg four times a day (every 6 hours) for 3-5 days.  Start Samira tomorrow      [facility] INTERVENTIONAL RADIOLOGY DEPARTMENT        Procedure Physician:      Dr. Seymour                                Date of procedure: 2/28/2024        Telephone numbers:     [phone]      Monday-Friday 7:30 am to 4:00 pm                                              [phone]       After 4:00 pm Monday-Friday, Weekends & Holidays. Ask for the Interventional Radiologist on call. Someone is  available 24 hours/day        [facility] toll free number: [phone]  Monday-Friday 8:00 am to 4:30 pm

## 2024-08-28 NOTE — PROGRESS NOTES
Pt pain down to 1/10.  Dr. Seymour here to see patient.  Dilaudid script picked up at discharge pharmacy.  Pt knows per MD to take Advil 600mg QID for 3-5 days and restart eliquist tomorrow.  Pt discharge instructions went over with patient.  Port remains accessed for home chemo today at 1500.  Pt wife here and drove patient home.

## 2024-08-28 NOTE — PROGRESS NOTES
Pt admitted to 2A for a L4 radiofrequency ablation and verteboplasty.  INR pending.  Port accessed.  Consent and H and P up to date.  Pt wife will come later to pick patient up.

## 2024-08-28 NOTE — IR NOTE
Patient Name: Gallo Navarro  Medical Record Number: [id number]  Today's Date: 8/28/2024    Procedure: L4 Radio-Frequency Ablation and Vertebroplasty.  Proceduralist: Dr. Seymour and Dr. Gay  Pathology present: SAMMY    Procedure Start: 0933  Procedure end: 1040  Sedation medications administered: Midazolam 5.5 mg, Fentanyl 300 mcg, and 50 mg Benadryl.       Report given to:  RN  : SAMMY    Other Notes: Pt arrived to IR room 3 from . Consent reviewed. Pt denies any questions or concerns regarding procedure. Pt positioned prone and monitored per protocol. Pt tolerated procedure without any noted complications. Pt transferred back to .

## 2024-08-28 NOTE — PRE-PROCEDURE
GENERAL PRE-PROCEDURE:   Procedure:  L4 RFA and vertebroplasty    Written consent obtained?: Yes    Risks and benefits: Risks, benefits and alternatives were discussed    Consent given by:  Patient  Patient states understanding of procedure being performed: Yes    Patient's understanding of procedure matches consent: Yes    Procedure consent matches procedure scheduled: Yes    Expected level of sedation:  Moderate  Appropriately NPO:  Yes  ASA Class:  2  Mallampati  :  Grade 2- soft palate, base of uvula, tonsillar pillars, and portion of posterior pharyngeal wall visible  Lungs:  Lungs clear with good breath sounds bilaterally  Heart:  Normal heart sounds and rate  History & Physical reviewed:  History and physical reviewed and no updates needed  Statement of review:  I have reviewed the lab findings, diagnostic data, medications, and the plan for sedation

## 2024-08-28 NOTE — PROGRESS NOTES
Pt arrived back to unit 2a at 1035.  VSS.  Denies pain.  Site x2 intact.  Pt alert and oriented.  Pt on bedrest for 2 hours.

## 2024-08-28 NOTE — PROGRESS NOTES
Pt having pain 6/10.  MD notified.  Toradol and IV dilaudid given with good relief.  MD here to see patient.

## 2024-08-28 NOTE — PROCEDURES
[facility]    Procedure: L4 RFA and Vertebroplasty    Date/Time: 8/28/2024 11:03 AM    Performed by: Breana Gay MD  Authorized by: Brice Seymour MD  IR Fellow Physician:    Pre Procedure Diagnosis: Pancreatic cancer  Post Procedure Diagnosis: Pancreatic cancer    UNIVERSAL PROTOCOL   Site Marked: NA  Prior Images Obtained and Reviewed:  Yes  Required items: Required blood products, implants, devices and special equipment available    Patient identity confirmed:  Verbally with patient, arm band, provided demographic data and hospital-assigned identification number  Patient was reevaluated immediately before administering moderate or deep sedation or anesthesia  Confirmation Checklist:  Patient's identity using two indicators, relevant allergies, procedure was appropriate and matched the consent or emergent situation and correct equipment/implants were available  Time out: Immediately prior to the procedure a time out was called    Universal Protocol: the Joint Commission Universal Protocol was followed    Preparation: Patient was prepped and draped in usual sterile fashion       ANESTHESIA    Anesthesia:  Local infiltration  Local Anesthetic:  Lidocaine 1% without epinephrine      SEDATION  Patient Sedated: Yes    Sedation Type:  Moderate (conscious) sedation  Vital signs: Vital signs monitored during sedation    See dictated procedure note for full details.  Findings: 15 minutes RFA   Cement vertebroplasty    Specimens: none    Procedural Complications: None    Condition: Stable    Plan: Bed rest for 2 hours      PROCEDURE    Patient Tolerance:  Patient tolerated the procedure well with no immediate complications  Length of time physician/provider present for 1:1 monitoring during sedation:  68-82 min

## 2024-08-30 ENCOUNTER — MYC REFILL (OUTPATIENT)
Dept: RADIATION ONCOLOGY | Facility: CLINIC | Age: 59
End: 2024-08-30
Payer: COMMERCIAL

## 2024-08-30 ENCOUNTER — MYC REFILL (OUTPATIENT)
Dept: ONCOLOGY | Facility: CLINIC | Age: 59
End: 2024-08-30
Payer: COMMERCIAL

## 2024-08-30 DIAGNOSIS — C25.9 PANCREATIC ADENOCARCINOMA (H): ICD-10-CM

## 2024-08-30 DIAGNOSIS — R10.30 LOWER ABDOMINAL PAIN: ICD-10-CM

## 2024-09-02 ENCOUNTER — MYC MEDICAL ADVICE (OUTPATIENT)
Dept: ONCOLOGY | Facility: CLINIC | Age: 59
End: 2024-09-02
Payer: COMMERCIAL

## 2024-09-03 RX ORDER — HYDROMORPHONE HYDROCHLORIDE 2 MG/1
2-4 TABLET ORAL EVERY 4 HOURS PRN
Qty: 180 TABLET | Refills: 0 | Status: SHIPPED | OUTPATIENT
Start: 2024-09-03 | End: 2024-09-27

## 2024-09-03 NOTE — TELEPHONE ENCOUNTER
Received [employer] message from patient requesting refill of hydromorphone.     Last refill: 8/6/24 (180 tabs), 8/28/24 (10 tabs)  Last office visit: 6/27/24  Scheduled for follow up 9/24/24     Will route request to MD/DO for review.     Reviewed [place]  [place].

## 2024-09-04 NOTE — TELEPHONE ENCOUNTER
Medication:liquid tylenol  Last prescribing provider:Berta Proctor CNP  Last clinic visit date: 8/23/2024 Megan MCALLISTER  Recommendations for requested medication:pain  Any other pertinent information:routed to last provider

## 2024-09-05 ENCOUNTER — MYC REFILL (OUTPATIENT)
Dept: RADIATION ONCOLOGY | Facility: HOSPITAL | Age: 59
End: 2024-09-05
Payer: COMMERCIAL

## 2024-09-05 ENCOUNTER — ENROLLMENT (OUTPATIENT)
Dept: HOME HEALTH SERVICES | Facility: HOME HEALTH | Age: 59
End: 2024-09-05
Payer: COMMERCIAL

## 2024-09-05 DIAGNOSIS — C25.0 MALIGNANT NEOPLASM OF HEAD OF PANCREAS (H): ICD-10-CM

## 2024-09-05 DIAGNOSIS — G89.3 CANCER ASSOCIATED PAIN: ICD-10-CM

## 2024-09-05 DIAGNOSIS — Z79.4 TYPE 2 DIABETES MELLITUS WITHOUT COMPLICATION, WITH LONG-TERM CURRENT USE OF INSULIN (H): ICD-10-CM

## 2024-09-05 DIAGNOSIS — K31.1 GASTRIC OUTLET OBSTRUCTION: ICD-10-CM

## 2024-09-05 DIAGNOSIS — E11.9 TYPE 2 DIABETES MELLITUS WITHOUT COMPLICATION, WITH LONG-TERM CURRENT USE OF INSULIN (H): ICD-10-CM

## 2024-09-05 DIAGNOSIS — C25.9 PANCREATIC ADENOCARCINOMA (H): ICD-10-CM

## 2024-09-06 RX ORDER — BUPRENORPHINE 10 UG/H
1 PATCH TRANSDERMAL
Qty: 4 PATCH | Refills: 3 | Status: SHIPPED | OUTPATIENT
Start: 2024-09-06 | End: 2024-10-03

## 2024-09-06 RX ORDER — BUPRENORPHINE 20 UG/H
1 PATCH TRANSDERMAL WEEKLY
Qty: 4 PATCH | Refills: 3 | Status: SHIPPED | OUTPATIENT
Start: 2024-09-06 | End: 2024-10-03

## 2024-09-06 NOTE — TELEPHONE ENCOUNTER
Received [employer] message from patient requesting refill of Butrans.     Last refill: 8/13/24  Last office visit: 6/27/24  Scheduled for follow up 9/24/24     Will route request to NP for review.     Reviewed [place]  Report.

## 2024-09-09 ENCOUNTER — LAB REQUISITION (OUTPATIENT)
Dept: LAB | Facility: CLINIC | Age: 59
End: 2024-09-09
Payer: COMMERCIAL

## 2024-09-09 ENCOUNTER — TELEPHONE (OUTPATIENT)
Dept: VASCULAR SURGERY | Facility: CLINIC | Age: 59
End: 2024-09-09
Payer: COMMERCIAL

## 2024-09-09 ENCOUNTER — MYC REFILL (OUTPATIENT)
Dept: ONCOLOGY | Facility: CLINIC | Age: 59
End: 2024-09-09

## 2024-09-09 DIAGNOSIS — C25.0 MALIGNANT NEOPLASM OF HEAD OF PANCREAS (H): ICD-10-CM

## 2024-09-09 DIAGNOSIS — R10.30 LOWER ABDOMINAL PAIN: ICD-10-CM

## 2024-09-09 LAB
BASOPHILS # BLD AUTO: 0 10E3/UL (ref 0–0.2)
BASOPHILS NFR BLD AUTO: 1 %
EOSINOPHIL # BLD AUTO: 0.2 10E3/UL (ref 0–0.7)
EOSINOPHIL NFR BLD AUTO: 3 %
ERYTHROCYTE [DISTWIDTH] IN BLOOD BY AUTOMATED COUNT: 14.7 % (ref 10–15)
HCT VFR BLD AUTO: 31.9 % (ref 40–53)
HGB BLD-MCNC: 10.3 G/DL (ref 13.3–17.7)
IMM GRANULOCYTES # BLD: 0 10E3/UL
IMM GRANULOCYTES NFR BLD: 0 %
LYMPHOCYTES # BLD AUTO: 1.2 10E3/UL (ref 0.8–5.3)
LYMPHOCYTES NFR BLD AUTO: 21 %
MCH RBC QN AUTO: 29.7 PG (ref 26.5–33)
MCHC RBC AUTO-ENTMCNC: 32.3 G/DL (ref 31.5–36.5)
MCV RBC AUTO: 92 FL (ref 78–100)
MONOCYTES # BLD AUTO: 0.8 10E3/UL (ref 0–1.3)
MONOCYTES NFR BLD AUTO: 15 %
NEUTROPHILS # BLD AUTO: 3.3 10E3/UL (ref 1.6–8.3)
NEUTROPHILS NFR BLD AUTO: 60 %
NRBC # BLD AUTO: 0 10E3/UL
NRBC BLD AUTO-RTO: 0 /100
PLATELET # BLD AUTO: 247 10E3/UL (ref 150–450)
RBC # BLD AUTO: 3.47 10E6/UL (ref 4.4–5.9)
WBC # BLD AUTO: 5.6 10E3/UL (ref 4–11)

## 2024-09-09 PROCEDURE — 85041 AUTOMATED RBC COUNT: CPT | Performed by: STUDENT IN AN ORGANIZED HEALTH CARE EDUCATION/TRAINING PROGRAM

## 2024-09-09 NOTE — TELEPHONE ENCOUNTER
----- Message from Yulissa COTTON sent at 2024 10:50 AM CDT -----  Regardin month follow up L4 RFA and cement augmentation  HI please reach out to patient to schedule a return visit virtual or in person with IR PA    Due approx 24    Thanks,     ALF Arce, RN, BSN  Interventional Radiology Care Coordinator   Phone:  [phone]

## 2024-09-09 NOTE — TELEPHONE ENCOUNTER
Left Voicemail (1st Attempt) and Sent Mychart (1st Attempt) for the patient to call back and schedule the followin month follow up L4 RFA and cement augmentation     Location: [facility]  Provider: [facility]  Appointment type: Return Vascular Patient  Appointment mode: Virtual Visit  Return date: Late 2024    Specialty phone number: [phone] or [phone]    Is Imaging Needed: No    Additional Notes: SAMMY Harrison on 2024 at 1:01 PM

## 2024-09-10 ENCOUNTER — TELEPHONE (OUTPATIENT)
Dept: PALLIATIVE MEDICINE | Facility: CLINIC | Age: 59
End: 2024-09-10

## 2024-09-10 RX ORDER — DICYCLOMINE HYDROCHLORIDE 10 MG/1
10 CAPSULE ORAL
Qty: 120 CAPSULE | Refills: 1 | Status: SHIPPED | OUTPATIENT
Start: 2024-09-10

## 2024-09-10 NOTE — TELEPHONE ENCOUNTER
Pending Prescriptions:                       Disp   Refills    dicyclomine (BENTYL) 10 MG capsule        120 ca*1            Sig: Take 1 capsule (10 mg) by mouth 4 times daily           (before meals and nightly).    Last prescribing provider: Nola Quintanilla    Last clinic visit date: 08/23/24 w/Megan Farrell    Recommendations for requested medication (if none, N/A): N/A    Any other pertinent information (if none, N/A): N/A    Refilled: Y/N, if NO, why?

## 2024-09-10 NOTE — TELEPHONE ENCOUNTER
Retail Pharmacy Prior Authorization Team   Phone: [phone]    PA Initiation    Medication: BUPRENORPHINE 20 MCG/HR TD PTWK  Insurance Company: [employer] - Phone [phone] Fax [phone]  Pharmacy Filling the Rx: [facility] - [place], [place] - [address]  Filling Pharmacy Phone: [phone]  Filling Pharmacy Fax: [phone]  Start Date: 9/10/2024

## 2024-09-10 NOTE — TELEPHONE ENCOUNTER
Prior Authorization Approval    Medication: BUPRENORPHINE 20 MCG/HR TD PTWK  Authorization Effective Date: 9/10/2024  Authorization Expiration Date: 9/10/2025  Approved Dose/Quantity:   Reference #:     Insurance Company: [employer] - Phone [phone] Fax [phone]  Expected CoPay: $    CoPay Card Available:      Financial Assistance Needed:   Which Pharmacy is filling the prescription: [facility] [address] [place] [address] [facility]  Pharmacy Notified: Yes  Patient Notified:

## 2024-09-11 ENCOUNTER — PATIENT OUTREACH (OUTPATIENT)
Dept: ONCOLOGY | Facility: CLINIC | Age: 59
End: 2024-09-11

## 2024-09-12 ENCOUNTER — TELEPHONE (OUTPATIENT)
Dept: VASCULAR SURGERY | Facility: CLINIC | Age: 59
End: 2024-09-12
Payer: COMMERCIAL

## 2024-09-12 ENCOUNTER — PATIENT OUTREACH (OUTPATIENT)
Dept: ONCOLOGY | Facility: CLINIC | Age: 59
End: 2024-09-12
Payer: COMMERCIAL

## 2024-09-12 DIAGNOSIS — C25.0 MALIGNANT NEOPLASM OF HEAD OF PANCREAS (H): ICD-10-CM

## 2024-09-12 DIAGNOSIS — Z51.11 ENCOUNTER FOR ANTINEOPLASTIC CHEMOTHERAPY: Primary | ICD-10-CM

## 2024-09-12 RX ORDER — ONDANSETRON 2 MG/ML
8 INJECTION INTRAMUSCULAR; INTRAVENOUS ONCE
OUTPATIENT
Start: 2024-12-17

## 2024-09-12 RX ORDER — METHYLPREDNISOLONE SODIUM SUCCINATE 125 MG/2ML
125 INJECTION, POWDER, LYOPHILIZED, FOR SOLUTION INTRAMUSCULAR; INTRAVENOUS
Start: 2024-12-17

## 2024-09-12 RX ORDER — EPINEPHRINE 1 MG/ML
0.3 INJECTION, SOLUTION INTRAMUSCULAR; SUBCUTANEOUS EVERY 5 MIN PRN
OUTPATIENT
Start: 2024-12-31

## 2024-09-12 RX ORDER — EPINEPHRINE 1 MG/ML
0.3 INJECTION, SOLUTION INTRAMUSCULAR; SUBCUTANEOUS EVERY 5 MIN PRN
OUTPATIENT
Start: 2024-12-17

## 2024-09-12 RX ORDER — METHYLPREDNISOLONE SODIUM SUCCINATE 125 MG/2ML
125 INJECTION, POWDER, LYOPHILIZED, FOR SOLUTION INTRAMUSCULAR; INTRAVENOUS
Start: 2024-12-03

## 2024-09-12 RX ORDER — ALBUTEROL SULFATE 0.83 MG/ML
2.5 SOLUTION RESPIRATORY (INHALATION)
OUTPATIENT
Start: 2024-12-31

## 2024-09-12 RX ORDER — LORAZEPAM 2 MG/ML
0.5 INJECTION INTRAMUSCULAR EVERY 4 HOURS PRN
OUTPATIENT
Start: 2024-12-03

## 2024-09-12 RX ORDER — DIPHENHYDRAMINE HYDROCHLORIDE 50 MG/ML
50 INJECTION INTRAMUSCULAR; INTRAVENOUS
Start: 2024-12-17

## 2024-09-12 RX ORDER — HEPARIN SODIUM (PORCINE) LOCK FLUSH IV SOLN 100 UNIT/ML 100 UNIT/ML
5 SOLUTION INTRAVENOUS
OUTPATIENT
Start: 2024-12-03

## 2024-09-12 RX ORDER — HEPARIN SODIUM (PORCINE) LOCK FLUSH IV SOLN 100 UNIT/ML 100 UNIT/ML
5 SOLUTION INTRAVENOUS
OUTPATIENT
Start: 2024-12-31

## 2024-09-12 RX ORDER — ALBUTEROL SULFATE 90 UG/1
1-2 AEROSOL, METERED RESPIRATORY (INHALATION)
Start: 2024-11-19

## 2024-09-12 RX ORDER — PACLITAXEL 100 MG/20ML
75 INJECTION, POWDER, LYOPHILIZED, FOR SUSPENSION INTRAVENOUS ONCE
Status: CANCELLED | OUTPATIENT
Start: 2024-10-08

## 2024-09-12 RX ORDER — DIPHENHYDRAMINE HYDROCHLORIDE 50 MG/ML
50 INJECTION INTRAMUSCULAR; INTRAVENOUS
Start: 2024-12-03

## 2024-09-12 RX ORDER — HEPARIN SODIUM (PORCINE) LOCK FLUSH IV SOLN 100 UNIT/ML 100 UNIT/ML
5 SOLUTION INTRAVENOUS
OUTPATIENT
Start: 2024-11-19

## 2024-09-12 RX ORDER — HEPARIN SODIUM,PORCINE 10 UNIT/ML
5-20 VIAL (ML) INTRAVENOUS DAILY PRN
OUTPATIENT
Start: 2024-11-19

## 2024-09-12 RX ORDER — DIPHENHYDRAMINE HYDROCHLORIDE 50 MG/ML
50 INJECTION INTRAMUSCULAR; INTRAVENOUS
Start: 2024-11-19

## 2024-09-12 RX ORDER — ALBUTEROL SULFATE 90 UG/1
1-2 AEROSOL, METERED RESPIRATORY (INHALATION)
Start: 2024-12-03

## 2024-09-12 RX ORDER — PACLITAXEL 100 MG/20ML
75 INJECTION, POWDER, LYOPHILIZED, FOR SUSPENSION INTRAVENOUS ONCE
Status: CANCELLED | OUTPATIENT
Start: 2024-12-17

## 2024-09-12 RX ORDER — LORAZEPAM 2 MG/ML
0.5 INJECTION INTRAMUSCULAR EVERY 4 HOURS PRN
OUTPATIENT
Start: 2024-11-19

## 2024-09-12 RX ORDER — LORAZEPAM 2 MG/ML
0.5 INJECTION INTRAMUSCULAR EVERY 4 HOURS PRN
OUTPATIENT
Start: 2024-12-31

## 2024-09-12 RX ORDER — MEPERIDINE HYDROCHLORIDE 25 MG/ML
25 INJECTION INTRAMUSCULAR; INTRAVENOUS; SUBCUTANEOUS EVERY 30 MIN PRN
OUTPATIENT
Start: 2024-12-03

## 2024-09-12 RX ORDER — ONDANSETRON 2 MG/ML
8 INJECTION INTRAMUSCULAR; INTRAVENOUS ONCE
OUTPATIENT
Start: 2024-11-19

## 2024-09-12 RX ORDER — MEPERIDINE HYDROCHLORIDE 25 MG/ML
25 INJECTION INTRAMUSCULAR; INTRAVENOUS; SUBCUTANEOUS EVERY 30 MIN PRN
OUTPATIENT
Start: 2024-12-31

## 2024-09-12 RX ORDER — PACLITAXEL 100 MG/20ML
75 INJECTION, POWDER, LYOPHILIZED, FOR SUSPENSION INTRAVENOUS ONCE
Status: CANCELLED | OUTPATIENT
Start: 2024-11-19

## 2024-09-12 RX ORDER — ALBUTEROL SULFATE 0.83 MG/ML
2.5 SOLUTION RESPIRATORY (INHALATION)
OUTPATIENT
Start: 2024-12-17

## 2024-09-12 RX ORDER — MEPERIDINE HYDROCHLORIDE 25 MG/ML
25 INJECTION INTRAMUSCULAR; INTRAVENOUS; SUBCUTANEOUS EVERY 30 MIN PRN
OUTPATIENT
Start: 2024-12-17

## 2024-09-12 RX ORDER — HEPARIN SODIUM,PORCINE 10 UNIT/ML
5-20 VIAL (ML) INTRAVENOUS DAILY PRN
OUTPATIENT
Start: 2024-12-17

## 2024-09-12 RX ORDER — ALBUTEROL SULFATE 90 UG/1
1-2 AEROSOL, METERED RESPIRATORY (INHALATION)
Start: 2024-12-31

## 2024-09-12 RX ORDER — ALBUTEROL SULFATE 0.83 MG/ML
2.5 SOLUTION RESPIRATORY (INHALATION)
OUTPATIENT
Start: 2024-11-19

## 2024-09-12 RX ORDER — HEPARIN SODIUM,PORCINE 10 UNIT/ML
5-20 VIAL (ML) INTRAVENOUS DAILY PRN
OUTPATIENT
Start: 2024-12-03

## 2024-09-12 RX ORDER — EPINEPHRINE 1 MG/ML
0.3 INJECTION, SOLUTION INTRAMUSCULAR; SUBCUTANEOUS EVERY 5 MIN PRN
OUTPATIENT
Start: 2024-12-03

## 2024-09-12 RX ORDER — EPINEPHRINE 1 MG/ML
0.3 INJECTION, SOLUTION INTRAMUSCULAR; SUBCUTANEOUS EVERY 5 MIN PRN
OUTPATIENT
Start: 2024-11-19

## 2024-09-12 RX ORDER — HEPARIN SODIUM (PORCINE) LOCK FLUSH IV SOLN 100 UNIT/ML 100 UNIT/ML
5 SOLUTION INTRAVENOUS
OUTPATIENT
Start: 2024-12-17

## 2024-09-12 RX ORDER — DIPHENHYDRAMINE HYDROCHLORIDE 50 MG/ML
50 INJECTION INTRAMUSCULAR; INTRAVENOUS
Start: 2024-12-31

## 2024-09-12 RX ORDER — ALBUTEROL SULFATE 0.83 MG/ML
2.5 SOLUTION RESPIRATORY (INHALATION)
OUTPATIENT
Start: 2024-12-03

## 2024-09-12 RX ORDER — PACLITAXEL 100 MG/20ML
75 INJECTION, POWDER, LYOPHILIZED, FOR SUSPENSION INTRAVENOUS ONCE
Status: CANCELLED | OUTPATIENT
Start: 2024-12-03

## 2024-09-12 RX ORDER — LORAZEPAM 2 MG/ML
0.5 INJECTION INTRAMUSCULAR EVERY 4 HOURS PRN
OUTPATIENT
Start: 2024-12-17

## 2024-09-12 RX ORDER — PACLITAXEL 100 MG/20ML
75 INJECTION, POWDER, LYOPHILIZED, FOR SUSPENSION INTRAVENOUS ONCE
Status: CANCELLED | OUTPATIENT
Start: 2024-10-22

## 2024-09-12 RX ORDER — PACLITAXEL 100 MG/20ML
75 INJECTION, POWDER, LYOPHILIZED, FOR SUSPENSION INTRAVENOUS ONCE
Status: CANCELLED | OUTPATIENT
Start: 2024-11-05

## 2024-09-12 RX ORDER — ONDANSETRON 2 MG/ML
8 INJECTION INTRAMUSCULAR; INTRAVENOUS ONCE
OUTPATIENT
Start: 2024-12-03

## 2024-09-12 RX ORDER — MEPERIDINE HYDROCHLORIDE 25 MG/ML
25 INJECTION INTRAMUSCULAR; INTRAVENOUS; SUBCUTANEOUS EVERY 30 MIN PRN
OUTPATIENT
Start: 2024-11-19

## 2024-09-12 RX ORDER — METHYLPREDNISOLONE SODIUM SUCCINATE 125 MG/2ML
125 INJECTION, POWDER, LYOPHILIZED, FOR SOLUTION INTRAMUSCULAR; INTRAVENOUS
Start: 2024-12-31

## 2024-09-12 RX ORDER — ONDANSETRON 2 MG/ML
8 INJECTION INTRAMUSCULAR; INTRAVENOUS ONCE
OUTPATIENT
Start: 2024-12-31

## 2024-09-12 RX ORDER — METHYLPREDNISOLONE SODIUM SUCCINATE 125 MG/2ML
125 INJECTION, POWDER, LYOPHILIZED, FOR SOLUTION INTRAMUSCULAR; INTRAVENOUS
Start: 2024-11-19

## 2024-09-12 RX ORDER — PACLITAXEL 100 MG/20ML
75 INJECTION, POWDER, LYOPHILIZED, FOR SUSPENSION INTRAVENOUS ONCE
Status: CANCELLED | OUTPATIENT
Start: 2024-12-31

## 2024-09-12 RX ORDER — PACLITAXEL 100 MG/20ML
75 INJECTION, POWDER, LYOPHILIZED, FOR SUSPENSION INTRAVENOUS ONCE
Status: CANCELLED | OUTPATIENT
Start: 2024-09-24

## 2024-09-12 RX ORDER — ALBUTEROL SULFATE 90 UG/1
1-2 AEROSOL, METERED RESPIRATORY (INHALATION)
Start: 2024-12-17

## 2024-09-12 RX ORDER — HEPARIN SODIUM,PORCINE 10 UNIT/ML
5-20 VIAL (ML) INTRAVENOUS DAILY PRN
OUTPATIENT
Start: 2024-12-31

## 2024-09-12 NOTE — TELEPHONE ENCOUNTER
"Patient confirmed scheduled appointment:     Date: 9/25/24  Time: 9 AM  [facility]: [facility]  Provider[facility]  Appointment type: Return Vascular Patient  Appointment mode: Virtual visit - Pt confirmed will be in [place] for appt    Specialty phone number: [phone] or [phone]    Is Imaging or labs needed?: No    Additional Notes:   Scheduling per in-basket message from Isela Arce ,     \"HI please reach out to patient to schedule a return visit virtual or in person with HAYDEE GONZALEZ. Due approx 9/28/24.  1 month follow up L4 RFA and cement augmentation\"    -Cindy Bradley on 9/12/2024 at 1:16 PM          "

## 2024-09-18 ENCOUNTER — OFFICE VISIT (OUTPATIENT)
Dept: RADIATION ONCOLOGY | Facility: CLINIC | Age: 59
End: 2024-09-18
Payer: COMMERCIAL

## 2024-09-18 VITALS
RESPIRATION RATE: 18 BRPM | TEMPERATURE: 98.3 F | DIASTOLIC BLOOD PRESSURE: 79 MMHG | HEART RATE: 75 BPM | OXYGEN SATURATION: 99 % | SYSTOLIC BLOOD PRESSURE: 113 MMHG

## 2024-09-18 DIAGNOSIS — C79.51 MALIGNANT NEOPLASM METASTATIC TO BONE (H): Primary | ICD-10-CM

## 2024-09-18 DIAGNOSIS — C25.0 MALIGNANT NEOPLASM OF HEAD OF PANCREAS (H): Primary | ICD-10-CM

## 2024-09-18 DIAGNOSIS — C79.51 SECONDARY MALIGNANT NEOPLASM OF BONE (H): ICD-10-CM

## 2024-09-18 PROCEDURE — 77334 RADIATION TREATMENT AID(S): CPT | Performed by: SURGERY

## 2024-09-18 PROCEDURE — 77263 THER RADIOLOGY TX PLNG CPLX: CPT | Performed by: SURGERY

## 2024-09-18 PROCEDURE — 99207 PR NO CHARGE LOS: CPT | Performed by: SURGERY

## 2024-09-18 PROCEDURE — 77290 THER RAD SIMULAJ FIELD CPLX: CPT | Performed by: SURGERY

## 2024-09-18 PROCEDURE — 99214 OFFICE O/P EST MOD 30 MIN: CPT | Mod: 25 | Performed by: SURGERY

## 2024-09-18 ASSESSMENT — PAIN SCALES - GENERAL: PAINLEVEL: NO PAIN (1)

## 2024-09-18 NOTE — PROGRESS NOTES
Department of Radiation Oncology  [facility]    Health: Cancer Center  [facility]  [address] [place] [id number] [phone]       Follow-up note     Name: Gallo Navarro MRN: [id number]   : 1965   Date of Service: Sep 18, 2024   Referring: Dr. Anguiano     Reason for consultation: metastatic pancreatic cancer to bone    History of Present Illness     Mr. Navarro is a 58 year old male with metastatic pancreatic cancer to bones.  He has completed palliative radiotherapy to T10, 20 Gray/5 fractions 2023.  He now presents for radiation to L4 in the setting of palliation/oligo progression.    Briefly, his oncologic history is as follows. He was seen by my colleague at the [facility], Dr. Becca Anguiano, note is referenced from that consultation note dated 23:    Patient's diagnosis came to light when he presented to [facility] ED on 2023 with worsening pre-existing abdominal pain and jaundice, weight loss and poor p.o. intake.  He has a history of acute pancreatitis with ileus and duodenal stenosis, gastric outlet obstruction requiring GJ tube (placed 2023) for feeding.  After receiving ERCP with sphincterotomy at [facility] prior to admission, the patient did not have relief of abdominal pain, and he presented to [facility] to establish care with our system.     Prior to his care at [facility] in 2023 she had initial presentation of sudden onset vomiting and found to have lipase greater than 2000, later revealed to have acquired duodenal stricture and gastric outlet obstruction.  He had PEG GJ placed for gastric venting and nutrition after weight loss of 40 pounds.  CT of the abdomen performed in 2023 for pancreatitis follow-up revealed focal irregular hypodense masslike region in the pancreatic head measuring 2.2 x 1.5 x 1.4 cm with persistent mild main pancreatic ductal dilation and associated subcentimeter mesenteric lymph  This is a chronic condition.   Latest Labs:   Lab Results   Component Value Date/Time    CHOLSTRLTOT 248 (H) 12/09/2020 11:41 AM     (H) 12/09/2020 11:41 AM    HDL 61 12/09/2020 11:41 AM    TRIGLYCERIDE 81 12/09/2020 11:41 AM      Medications: crestor 5 mg  Medication side effects: none    Risk calculator: The 10-year ASCVD risk score (Ar[place] JORGENSEN Jr., et al., 2013) is: 1.1%      nodes.  EUS biopsy on 6/27/2020 was nondiagnostic showing duodenal inflammation.  He then proceeded with ERCP on 7/7/2023, as above.      CT abdomen pelvis on 7/8 showed pancreatic mass without pancreatic ductal dilatation, with biliary stent in position, mild pneumobilia, calcific density adjacent to biliary stent.     MRCP from 7/10/2023 showed ill-defined mass soft tissue in the pancreatic head suspicious for malignancy given biliary and pancreatic duct obstruction with vascular encasement and narrowing of SMV, less likely pancreatitis.  Mildly enlarged peripancreatic and periportal and enteric lymph nodes are present, indeterminate.     At [facility] he underwent EGD/EUS on 7/11/2023, with pathology showing adenocarcinoma.  Surgical oncology was consulted, who felt surgery should be reconsidered after 6 months of adjuvant chemotherapy.     CT chest from 7/12/2023 showed an ill-defined pancreatic head mass with upper abdominal lymphadenopathy, and a 7 mm pulmonary nodule left upper lobe, indeterminate. He underwent duodenal stenting on 7/13/2023.     He initiated systemic therapy was FOLFIRINOX on 7/31/2023.     CT chest abdomen pelvis on 10/24/2023 showed interval enlargement of scattered sclerotic osseous metastases, and enlarging subpleural 1.2 cm pulmonary nodule in paramediastinal EAMON, no substantial change in the pancreatic head mass.  Filling defects were seen in the right middle and lower lobe, concerning for emboli.  Echo follow-up CT PE was performed on 10/31/2023 which revealed a acute PE in the right lower lobe segment.  He was subsequently prescribed Eliquis twice daily by Dr. Haile.       Upon follow-up with Dr. Haile on 11/6/2023, review of imaging showed disease progression, particularly with skeletal metastatic progression.  He was recommended for treatment for the T10 lesion given its location to the right pedicle.  Dr. Haile changed systemic therapy to gemcitabine/Abraxane with Zometa for bony  lesion control on 10/31/23.    He completed palliative radiotherapy to T10, 20 Gray/5 fractions completed on 11/29/2023.  He has since been on systemic therapy under care of Dr. Haile.      He underwent a PET CT scan on 1/30/2024, which demonstrated pancreatic head mass with FDG uptake, with metastatic adenopathy, with lung nodules suspicious for metastatic disease.  There was sclerotic disease at T10 and L4 in the left pubic bone, but these were not FDG avid on the PET scan.    CT scan on 3/11/2024 demonstrated fairly stable disease with a pancreatic head mass and lacey-pancreatic lymph nodes.  There was no additional suspicious pulmonary nodules with resolution of prior nodules in the lungs.  However there was interval enlargement of L4 lesion, now measuring 1.8 cm in size, previously 1.4 cm.  Thus, he was referred for consideration of palliative radiotherapy to L4.  He is having mild discomfort in this location, not extreme.  Overall he tolerated radiation well last time.  He wishes to pursue palliative radiotherapy as he continues systemic therapy under care of Dr. Haile, with plans for eventually enrolling in a clinical trial at [facility].     Patient was last seen in March 2024.  At that time we had discussed palliative radiotherapy to L4 given this was a potential site of only oligo progression.  In the meantime he is also meeting with MD Lombardi, particularly with Dr. Dawkins's team for possible clinical.  A CT scan was obtained on 4/30/2024 (I do not have the current images) but the official read demonstrated a new pericardial effusion, stable locally advanced pancreatic head and uncinate tumor, decreasing mesenteric lymph nodes, relatively stable osseous lesions at the L4, T10 and left pubic symphysis.  He has had a recent echo with a ejection fraction of 60% plus, but will be following with a cardiologist in the near future of note, and he will be continuing to see a cardiologist.  I discussed that  this possibly could be related to his prior radiation and or combination with systemic therapy.    Per patient report additional pathology for clinical trial testing was to be performed, and a biopsy of the left pubic symphysis lesion was performed which returned negative for malignancy.  He will still continue to follow with Dr. Haile and has not been on systemic therapy for over a month.    Today, he is accompanied by his wife.  He states that overall he feels quite well.  He denies any significant back pain or pubic pain at this time and states that he has adequate pain control.    He has been under care of Dr Haile, currently on gem/ abraxane. Recent imaging on 8/8/2024 demonstrates fairly stable disease in primary with prominent lacey pancreatic lymph nodes, with progression of sclerotic lesion at L4 now associated with a fracture of the inferior endplate.  He is now having pain in this location and presents for palliative radiotherapy.  His pain is up to a 3 out of 10, he is currently using Dilaudid and Tylenol,.  Given the pain, he has changed his activity level. He denies any focal neurologic deficit.    Interval history:    He has gone RFA ablation and vertebral augmentation to L4 on on 8/28/2024 with Dr. Seymour.  After the procedure, he endorsed improvement in pain control significantly, now between a 0-1 out of 10 at the location.  He has not changed his pain medication requirement due to taking these medications due to additional sources of pain related to pancreatic cancer. He is ready to proceed with RT to L4.    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    Gastric outlet obstruction     Metastasis from pancreatic cancer (H)     Personal history of chemotherapy     Gemzar + Abraxane started on 10/31/2023    Recurrent acute pancreatitis     S/P radiation therapy     2,000 cGy to T10 Spine completed  on 11/29/2023 [facility]       Past Surgical History:   Past Surgical History:   Procedure Laterality Date    AS OPEN TREATMENT CLAVICULAR FRACTURE INTERNAL FX      ENDOSCOPIC RETROGRADE CHOLANGIOPANCREATOGRAM N/A 7/11/2023    Procedure: ENDOSCOPIC RETROGRADE CHOLANGIOPANCREATOGRAPHY;  Surgeon: Khadar Pickett MD;  Location: [facility] OR    ENDOSCOPIC RETROGRADE CHOLANGIOPANCREATOGRAM N/A 8/17/2023    Procedure: ENDOSCOPIC RETROGRADE  with stent removal x1, balloon sweep;  Surgeon: Christos Greenberg MD;  Location: [facility] OR    ENDOSCOPIC ULTRASOUND UPPER GASTROINTESTINAL TRACT (GI) N/A 7/11/2023    Procedure: Endoscopic ultrasound upper gastrointestinal tract (GI), with biposy, GJ tube repositioning, stent placement;  Surgeon: Khadar Pickett MD;  Location: [facility] OR    ENDOSCOPIC ULTRASOUND UPPER GASTROINTESTINAL TRACT (GI) N/A 7/13/2023    Procedure: ENDOSCOPIC ULTRASOUND, ESOPHAGOSCOPY, EUS guided gastrojejunostomy;  Surgeon: Tre York MD;  Location: [facility] OR    INSERT PICC LINE  04/29/2023    INSERT PORT VASCULAR ACCESS Right 7/28/2023    Procedure: Insert port vascular access;  Surgeon: Tom العلي MD;  Location: [facility] OR    IR BONE ABLATION RFA  8/28/2024    IR CHEST PORT PLACEMENT > 5 YRS OF AGE  7/28/2023    IR LUMBAR VERTEBROPLASTY  8/28/2024    IR NG TUBE PLACEMENT REQ RAD & FLUORO  05/08/2023    JG tube    REPLACE GASTROJEJUNOSTOMY TUBE, PERCUTANEOUS N/A 8/17/2023    Procedure: possible REPLACEMENT, GASTROJEJUNOSTOMY TUBE, PERCUTANEOUS;  Surgeon: Christos Greenberg MD;  Location: [facility] OR       Chemotherapy History:  Per HPI    Radiation History:  No prior RT    Pregnant: No  Implanted Cardiac Devices: No    Medications:  Current Outpatient Medications   Medication Sig Dispense Refill    acetaminophen (TYLENOL) 32 mg/mL liquid Take 20.313 mLs (650 mg) by mouth every 8 hours. 1800 mL 1    apixaban ANTICOAGULANT (ELIQUIS) 5 MG tablet Take 1 tablet (5 mg) by mouth 2 times daily 60  tablet 2    buprenorphine (BUTRANS) 10 MCG/HR WK patch Place 1 patch onto the skin every 7 days. Use with 20 mcg/hour patch for 30 mcg/hour total. 4 patch 3    buprenorphine (BUTRANS) 20 MCG/HR WK patch Place 1 patch onto the skin once a week. Use with 10 mcg/hour patch for 30 mcg/hour total. 4 patch 3    dicyclomine (BENTYL) 10 MG capsule Take 1 capsule (10 mg) by mouth 4 times daily (before meals and nightly). (Patient taking differently: Take 10 mg by mouth 2 times daily.) 120 capsule 1    econazole nitrate 1 % external cream Apply topically daily To affected toenails. 85 g 5    HYDROmorphone (DILAUDID) 2 MG tablet Take 1-2 tablets (2-4 mg) by mouth every 4 hours as needed for severe pain or breakthrough pain. 180 tablet 0    lipase-protease-amylase (CREON 24) 33937-88738-837791 units CPEP per EC capsule 2-3 capsules with meals 3 times a day and 1-2 capsules with snacks max of 15 capsules a day 200 capsule 11    Multiple Vitamins-Minerals (CENTRUM SILVER 50+MEN) TABS as directed Orally      pantoprazole (PROTONIX) 40 MG EC tablet Take 1 tablet (40 mg) by mouth 2 times daily 60 tablet 4    prochlorperazine (COMPAZINE) 10 MG tablet Take 1 tablet (10 mg) by mouth every 6 hours as needed for nausea or vomiting 30 tablet 2    vitamin D3 (CHOLECALCIFEROL) 50 mcg (2000 units) tablet Take 1 tablet (50 mcg) by mouth daily 90 tablet 1    lidocaine-prilocaine (EMLA) 2.5-2.5 % external cream Use 1-2 times a week or as needed prior to port access (Patient not taking: Reported on 9/11/2024) 30 g 3    methylphenidate (RITALIN) 5 MG tablet Take 1 tablet (5 mg) by mouth 2 times daily as needed (Patient not taking: Reported on 7/30/2024) 10 tablet 0    naloxone (NARCAN) 4 MG/0.1ML nasal spray Instill 1 spray (4 mg) into one nostril alternating nostrils as needed for opioid reversal every 2-3 minutes until assistance arrives* (Patient not taking: Reported on 7/30/2024)       No current facility-administered medications for this  visit.         Allergies:   No Known Allergies    Social History:    Social History     Tobacco Use    Smoking status: Never     Passive exposure: Never    Smokeless tobacco: Never   Vaping Use    Vaping status: Never Used   Substance Use Topics    Alcohol use: Not Currently    Drug use: Never         Family History:  Family History   Problem Relation Age of Onset    Diabetes Type 2  Father     Cirrhosis Father     Genetic Disorder Brother         missing [id number], mild retardation    Colon Polyps Brother     Pancreatic Cancer Paternal Aunt 85    Colon Cancer Paternal Uncle     Pancreatitis Cousin        Review of Systems   A 10-point review of systems was performed. Pertinent findings are noted in the HPI.    Physical Exam   ECOG Status: 2    Vitals:  /79 (BP Location: Left arm, Patient Position: Chair, Cuff Size: Adult Regular)   Pulse 75   Temp 98.3  F (36.8  C) (Oral)   Resp 18   SpO2 99%     Gen: Alert, in NAD  Head: NC/AT  Eyes: PERRL, EOMI, sclera anicteric  Ears: No external auricular lesions  Nose/sinus: No rhinorrhea or epistaxis  Oral cavity/oropharynx: MMM, no visible oral cavity lesions  Neck: Full ROM, supple, no palpable adenopathy  Pulm: No wheezing, stridor or respiratory distress  CV: Extremities are warm and well-perfused, no cyanosis, no pedal edema  Abdominal: Normal bowel sounds, soft, nontender, no masses  Musculoskeletal: Normal bulk and tone, mildly tender to palpation in lower lumbar location  Skin: Normal color and turgor  Neuro: A/Ox3, CN II-XII intact, normal gait    Imaging/Path/Labs   Imaging: per HPI, personally reviewed and in agreement    CT 10/24/23 (pre -RT)            CT 3/11/24                        Path: per HPI, personally reviewed and in agreement    Labs: per HPI, personally reviewed and in agreement    I have personally reviewed Dr. Haile's  and Dr Dawkisn's notes, and Dr. Seymour's notes     Assessment      Mr. Navarro is a 58 year old male with metastatic  pancreatic cancer to bones.  He has completed palliative radiotherapy to T10, 20 Gray/5 fractions November 29, 2023.  He presented for  radiation to L4 in the setting of palliation/oligo progression. He is now status post RFA and augmentation to L4.     He was seen in March 2024, at which point we discussed palliative radiation to L4, 30 Gy/10 fractions.  His pain has now progressed and he has a fracture with inferior endplate loss.     Thus, I discussed options of radiation versus continued treatment under care of Dr. Haile and to intervene with palliative radiation when necessary. He wishes to proceed with palliative RT. However, prior to palliative RT, I will refer to IR for kyphoplasty prior to RT.     Plan     After extensive discussion, patient he wishes to proceed with palliative RT. I will plan for 30Gy/10fx.    He is now status post L4 RFA and augmentation and ready to proceed with RT.   CT simulation was performed today, with plan to start in the near future.    All benefits and risks discussed, and patient is in agreement with the oncologic plan discussed above.     Thank you for allowing me to participate in your patient's care.  If you should require any additional information, please do not hesitate in contacting me.     Ben Reaves MD  Department of Radiation Oncology  [facility]     A total of 30 minutes were spent on this visit, including preparation for this visit, face to face with patient, and medical decision making. Medical decision-making included consideration and possible diagnosis, management options, complex record review, review of diagnostic tests, consideration and discussion of significant complications based up medical history/comorbidities, and discussion with providers involved in care of the patient.

## 2024-09-18 NOTE — PROGRESS NOTES
A CT simulation was performed today for radiation therapy planning. See Mosaic for additional details.     Ben Reaves M.D.  Department of Radiation Oncology  [facility]

## 2024-09-18 NOTE — LETTER
9/18/2024      Gallo Navarro  [id number] [address] [place] [id number]      Dear Colleague,    Thank you for referring your patient, Gallo Navarro, to the [facility] [place]. Please see a copy of my visit note below.    A CT simulation was performed today for radiation therapy planning. See Mosaic for additional details.     Ben Reaves M.D.  Department of Radiation Oncology  [facility]       Again, thank you for allowing me to participate in the care of your patient.        Sincerely,        Ben Reaves MD

## 2024-09-18 NOTE — PATIENT INSTRUCTIONS
Please contact [facility] RN with questions or concerns following today's appointment.    If you are a patient of Dr. Reaves call: [phone]   If you are a patient of Dr. Palumbo call: [phone]    Please feel free to leave a detailed message if your call is not answered.    If your call is not received before 3:00 PM, it may not be returned until the following business day.    If you are receiving radiation treatment and need assistance after 3:00 PM or on the weekends, please call [phone] and ask to speak to the radiation oncologist on-call.    Thank you!    [facility]

## 2024-09-18 NOTE — NURSING NOTE
"Oncology Rooming Note    September 18, 2024 12:44 PM   Gallo Navarro is a 58 year old male who presents for:    Chief Complaint   Patient presents with    Cancer     Radiation oncology return visit with Dr. Reaves - review treatment plan, sign consent and CT Simulation     Initial Vitals: /79 (BP Location: Left arm, Patient Position: Chair, Cuff Size: Adult Regular)   Pulse 75   Temp 98.3  F (36.8  C) (Oral)   Resp 18   SpO2 99%  Estimated body mass index is 21.7 kg/m  as calculated from the following:    Height as of 8/27/24: 1.829 m (6').    Weight as of 8/28/24: 72.6 kg (160 lb). There is no height or weight on file to calculate BSA.  No Pain (1) Comment: Data Unavailable   No LMP for male patient.  Allergies reviewed: Yes  Medications reviewed: Yes    Medications: Medication refills not needed today.  Pharmacy name entered into EPIC:    [facility] - [place], [place] - [address] [facility] [address] [facility] [place] [phone]  [facility] [address] [facility] [address] PORTAL TO REGISTERED [facility] SITES  [place] [facility] - [place], [place] - [address]  [facility] [id number] [facility] [employer] - [place], [place] - [address]    Frailty Screening:   Is the patient here for a new oncology consult visit in cancer care? 2. No      Considerations for radiation treatment   Pregnancy status: Male   Implanted Cardiac Devices: No   Any previous radiation therapy: Yes: 2,000 cGy to T10 Spine completed on 11/29/2023    Clinical concerns: patient presents to clinic per self, reports left low back pain \"1/10\" due to pulled muscle, reports \"it's getting better everyday\".    Dr. Ben Reaves was notified.      Bessie Vidales RN BSN OCN [facility]                "

## 2024-09-18 NOTE — LETTER
2024      Gallo Navarro  [id number] [address] [place] [id number]      Dear Colleague,    Thank you for referring your patient, Gallo Navarro, to the [facility] [place]. Please see a copy of my visit note below.       Department of Radiation Oncology  [facility]  [facility]  [address] [address], [place] [address]  [phone]       Follow-up note     Name: Gallo Navarro MRN: [id number]   : 1965   Date of Service: Sep 18, 2024   Referring: Dr. Anguiano     Reason for consultation: metastatic pancreatic cancer to bone    History of Present Illness     Mr. Navarro is a 58 year old male with metastatic pancreatic cancer to bones.  He has completed palliative radiotherapy to T10, 20 Gray/5 fractions 2023.  He now presents for radiation to L4 in the setting of palliation/oligo progression.    Briefly, his oncologic history is as follows. He was seen by my colleague at the [facility], Dr. Becca Anguiano, note is referenced from that consultation note dated 23:    Patient's diagnosis came to light when he presented to [facility] ED on 2023 with worsening pre-existing abdominal pain and jaundice, weight loss and poor p.o. intake.  He has a history of acute pancreatitis with ileus and duodenal stenosis, gastric outlet obstruction requiring GJ tube (placed 2023) for feeding.  After receiving ERCP with sphincterotomy at [facility] prior to admission, the patient did not have relief of abdominal pain, and he presented to [facility] to establish care with our system.     Prior to his care at [facility] in 2023 she had initial presentation of sudden onset vomiting and found to have lipase greater than 2000, later revealed to have acquired duodenal stricture and gastric outlet obstruction.  He had PEG GJ placed for gastric venting and nutrition after weight loss of 40 pounds.  CT of the abdomen  performed in June 2023 for pancreatitis follow-up revealed focal irregular hypodense masslike region in the pancreatic head measuring 2.2 x 1.5 x 1.4 cm with persistent mild main pancreatic ductal dilation and associated subcentimeter mesenteric lymph nodes.  EUS biopsy on 6/27/2020 was nondiagnostic showing duodenal inflammation.  He then proceeded with ERCP on 7/7/2023, as above.      CT abdomen pelvis on 7/8 showed pancreatic mass without pancreatic ductal dilatation, with biliary stent in position, mild pneumobilia, calcific density adjacent to biliary stent.     MRCP from 7/10/2023 showed ill-defined mass soft tissue in the pancreatic head suspicious for malignancy given biliary and pancreatic duct obstruction with vascular encasement and narrowing of SMV, less likely pancreatitis.  Mildly enlarged peripancreatic and periportal and enteric lymph nodes are present, indeterminate.     At [facility] he underwent EGD/EUS on 7/11/2023, with pathology showing adenocarcinoma.  Surgical oncology was consulted, who felt surgery should be reconsidered after 6 months of adjuvant chemotherapy.     CT chest from 7/12/2023 showed an ill-defined pancreatic head mass with upper abdominal lymphadenopathy, and a 7 mm pulmonary nodule left upper lobe, indeterminate. He underwent duodenal stenting on 7/13/2023.     He initiated systemic therapy was FOLFIRINOX on 7/31/2023.     CT chest abdomen pelvis on 10/24/2023 showed interval enlargement of scattered sclerotic osseous metastases, and enlarging subpleural 1.2 cm pulmonary nodule in paramediastinal EAMON, no substantial change in the pancreatic head mass.  Filling defects were seen in the right middle and lower lobe, concerning for emboli.  Echo follow-up CT PE was performed on 10/31/2023 which revealed a acute PE in the right lower lobe segment.  He was subsequently prescribed Eliquis twice daily by Dr. Haile.       Upon follow-up with Dr. Haile on 11/6/2023, review of imaging  showed disease progression, particularly with skeletal metastatic progression.  He was recommended for treatment for the T10 lesion given its location to the right pedicle.  Dr. Haile changed systemic therapy to gemcitabine/Abraxane with Zometa for bony lesion control on 10/31/23.    He completed palliative radiotherapy to T10, 20 Gray/5 fractions completed on 11/29/2023.  He has since been on systemic therapy under care of Dr. Haile.      He underwent a PET CT scan on 1/30/2024, which demonstrated pancreatic head mass with FDG uptake, with metastatic adenopathy, with lung nodules suspicious for metastatic disease.  There was sclerotic disease at T10 and L4 in the left pubic bone, but these were not FDG avid on the PET scan.    CT scan on 3/11/2024 demonstrated fairly stable disease with a pancreatic head mass and lacey-pancreatic lymph nodes.  There was no additional suspicious pulmonary nodules with resolution of prior nodules in the lungs.  However there was interval enlargement of L4 lesion, now measuring 1.8 cm in size, previously 1.4 cm.  Thus, he was referred for consideration of palliative radiotherapy to L4.  He is having mild discomfort in this location, not extreme.  Overall he tolerated radiation well last time.  He wishes to pursue palliative radiotherapy as he continues systemic therapy under care of Dr. Haile, with plans for eventually enrolling in a clinical trial at [facility].     Patient was last seen in March 2024.  At that time we had discussed palliative radiotherapy to L4 given this was a potential site of only oligo progression.  In the meantime he is also meeting with [facility], particularly with Dr. Dawkins's team for possible clinical.  A CT scan was obtained on 4/30/2024 (I do not have the current images) but the official read demonstrated a new pericardial effusion, stable locally advanced pancreatic head and uncinate tumor, decreasing mesenteric lymph nodes, relatively stable  osseous lesions at the L4, T10 and left pubic symphysis.  He has had a recent echo with a ejection fraction of 60% plus, but will be following with a cardiologist in the near future of note, and he will be continuing to see a cardiologist.  I discussed that this possibly could be related to his prior radiation and or combination with systemic therapy.    Per patient report additional pathology for clinical trial testing was to be performed, and a biopsy of the left pubic symphysis lesion was performed which returned negative for malignancy.  He will still continue to follow with Dr. Haile and has not been on systemic therapy for over a month.    Today, he is accompanied by his wife.  He states that overall he feels quite well.  He denies any significant back pain or pubic pain at this time and states that he has adequate pain control.    He has been under care of Dr Haile, currently on gem/ abraxane. Recent imaging on 8/8/2024 demonstrates fairly stable disease in primary with prominent lacey pancreatic lymph nodes, with progression of sclerotic lesion at L4 now associated with a fracture of the inferior endplate.  He is now having pain in this location and presents for palliative radiotherapy.  His pain is up to a 3 out of 10, he is currently using Dilaudid and Tylenol,.  Given the pain, he has changed his activity level. He denies any focal neurologic deficit.    Interval history:    He has gone RFA ablation and vertebral augmentation to L4 on on 8/28/2024 with Dr. Seymour.  After the procedure, he endorsed improvement in pain control significantly, now between a 0-1 out of 10 at the location.  He has not changed his pain medication requirement due to taking these medications due to additional sources of pain related to pancreatic cancer. He is ready to proceed with RT to L4.    Past Medical History:   Past Medical History:   Diagnosis Date     Acute pancreatitis 04/16/2023     Alcohol-induced acute pancreatitis  04/10/2023     Gastric outlet obstruction      Metastasis from pancreatic cancer (H)      Personal history of chemotherapy     Gemzar + Abraxane started on 10/31/2023     Recurrent acute pancreatitis      S/P radiation therapy     2,000 cGy to T10 Spine completed on 11/29/2023 [facility]       Past Surgical History:   Past Surgical History:   Procedure Laterality Date     AS OPEN TREATMENT CLAVICULAR FRACTURE INTERNAL FX       ENDOSCOPIC RETROGRADE CHOLANGIOPANCREATOGRAM N/A 7/11/2023    Procedure: ENDOSCOPIC RETROGRADE CHOLANGIOPANCREATOGRAPHY;  Surgeon: Khadar Pickett MD;  Location: [facility] OR     ENDOSCOPIC RETROGRADE CHOLANGIOPANCREATOGRAM N/A 8/17/2023    Procedure: ENDOSCOPIC RETROGRADE  with stent removal x1, balloon sweep;  Surgeon: Christos Greenberg MD;  Location: [facility] OR     ENDOSCOPIC ULTRASOUND UPPER GASTROINTESTINAL TRACT (GI) N/A 7/11/2023    Procedure: Endoscopic ultrasound upper gastrointestinal tract (GI), with biposy, GJ tube repositioning, stent placement;  Surgeon: Khadar Pickett MD;  Location: [facility] OR     ENDOSCOPIC ULTRASOUND UPPER GASTROINTESTINAL TRACT (GI) N/A 7/13/2023    Procedure: ENDOSCOPIC ULTRASOUND, ESOPHAGOSCOPY, EUS guided gastrojejunostomy;  Surgeon: Tre York MD;  Location: [facility] OR     INSERT PICC LINE  04/29/2023     INSERT PORT VASCULAR ACCESS Right 7/28/2023    Procedure: Insert port vascular access;  Surgeon: Tom العلي MD;  Location: [facility] OR     IR BONE ABLATION RFA  8/28/2024     IR CHEST PORT PLACEMENT > 5 YRS OF AGE  7/28/2023     IR LUMBAR VERTEBROPLASTY  8/28/2024     IR NG TUBE PLACEMENT REQ RAD & FLUORO  05/08/2023    JG tube     REPLACE GASTROJEJUNOSTOMY TUBE, PERCUTANEOUS N/A 8/17/2023    Procedure: possible REPLACEMENT, GASTROJEJUNOSTOMY TUBE, PERCUTANEOUS;  Surgeon: Christos Greenberg MD;  Location: [facility] OR       Chemotherapy History:  Per HPI    Radiation History:  No prior RT    Pregnant: No  Implanted Cardiac Devices:  No    Medications:  Current Outpatient Medications   Medication Sig Dispense Refill     acetaminophen (TYLENOL) 32 mg/mL liquid Take 20.313 mLs (650 mg) by mouth every 8 hours. 1800 mL 1     apixaban ANTICOAGULANT (ELIQUIS) 5 MG tablet Take 1 tablet (5 mg) by mouth 2 times daily 60 tablet 2     buprenorphine (BUTRANS) 10 MCG/HR WK patch Place 1 patch onto the skin every 7 days. Use with 20 mcg/hour patch for 30 mcg/hour total. 4 patch 3     buprenorphine (BUTRANS) 20 MCG/HR WK patch Place 1 patch onto the skin once a week. Use with 10 mcg/hour patch for 30 mcg/hour total. 4 patch 3     dicyclomine (BENTYL) 10 MG capsule Take 1 capsule (10 mg) by mouth 4 times daily (before meals and nightly). (Patient taking differently: Take 10 mg by mouth 2 times daily.) 120 capsule 1     econazole nitrate 1 % external cream Apply topically daily To affected toenails. 85 g 5     HYDROmorphone (DILAUDID) 2 MG tablet Take 1-2 tablets (2-4 mg) by mouth every 4 hours as needed for severe pain or breakthrough pain. 180 tablet 0     lipase-protease-amylase (CREON 24) 70447-10507-595290 units CPEP per EC capsule 2-3 capsules with meals 3 times a day and 1-2 capsules with snacks max of 15 capsules a day 200 capsule 11     Multiple Vitamins-Minerals (CENTRUM SILVER 50+MEN) TABS as directed Orally       pantoprazole (PROTONIX) 40 MG EC tablet Take 1 tablet (40 mg) by mouth 2 times daily 60 tablet 4     prochlorperazine (COMPAZINE) 10 MG tablet Take 1 tablet (10 mg) by mouth every 6 hours as needed for nausea or vomiting 30 tablet 2     vitamin D3 (CHOLECALCIFEROL) 50 mcg (2000 units) tablet Take 1 tablet (50 mcg) by mouth daily 90 tablet 1     lidocaine-prilocaine (EMLA) 2.5-2.5 % external cream Use 1-2 times a week or as needed prior to port access (Patient not taking: Reported on 9/11/2024) 30 g 3     methylphenidate (RITALIN) 5 MG tablet Take 1 tablet (5 mg) by mouth 2 times daily as needed (Patient not taking: Reported on 7/30/2024) 10  tablet 0     naloxone (NARCAN) 4 MG/0.1ML nasal spray Instill 1 spray (4 mg) into one nostril alternating nostrils as needed for opioid reversal every 2-3 minutes until assistance arrives* (Patient not taking: Reported on 7/30/2024)       No current facility-administered medications for this visit.         Allergies:   No Known Allergies    Social History:    Social History     Tobacco Use     Smoking status: Never     Passive exposure: Never     Smokeless tobacco: Never   Vaping Use     Vaping status: Never Used   Substance Use Topics     Alcohol use: Not Currently     Drug use: Never         Family History:  Family History   Problem Relation Age of Onset     Diabetes Type 2  Father      Cirrhosis Father      Genetic Disorder Brother         missing [id number], mild retardation     Colon Polyps Brother      Pancreatic Cancer Paternal Aunt 85     Colon Cancer Paternal Uncle      Pancreatitis Cousin        Review of Systems   A 10-point review of systems was performed. Pertinent findings are noted in the HPI.    Physical Exam   ECOG Status: 2    Vitals:  /79 (BP Location: Left arm, Patient Position: Chair, Cuff Size: Adult Regular)   Pulse 75   Temp 98.3  F (36.8  C) (Oral)   Resp 18   SpO2 99%     Gen: Alert, in NAD  Head: NC/AT  Eyes: PERRL, EOMI, sclera anicteric  Ears: No external auricular lesions  Nose/sinus: No rhinorrhea or epistaxis  Oral cavity/oropharynx: MMM, no visible oral cavity lesions  Neck: Full ROM, supple, no palpable adenopathy  Pulm: No wheezing, stridor or respiratory distress  CV: Extremities are warm and well-perfused, no cyanosis, no pedal edema  Abdominal: Normal bowel sounds, soft, nontender, no masses  Musculoskeletal: Normal bulk and tone, mildly tender to palpation in lower lumbar location  Skin: Normal color and turgor  Neuro: A/Ox3, CN II-XII intact, normal gait    Imaging/Path/Labs   Imaging: per HPI, personally reviewed and in agreement    CT 10/24/23 (pre  -RT)            CT 3/11/24                        Path: per HPI, personally reviewed and in agreement    Labs: per HPI, personally reviewed and in agreement    I have personally reviewed Dr. Haile's  and Dr Dawkins's notes, and Dr. Seymour's notes     Assessment      Mr. Navarro is a 58 year old male with metastatic pancreatic cancer to bones.  He has completed palliative radiotherapy to T10, 20 Gray/5 fractions November 29, 2023.  He presented for  radiation to L4 in the setting of palliation/oligo progression. He is now status post RFA and augmentation to L4.     He was seen in March 2024, at which point we discussed palliative radiation to L4, 30 Gy/10 fractions.  His pain has now progressed and he has a fracture with inferior endplate loss.     Thus, I discussed options of radiation versus continued treatment under care of Dr. Haile and to intervene with palliative radiation when necessary. He wishes to proceed with palliative RT. However, prior to palliative RT, I will refer to IR for kyphoplasty prior to RT.     Plan     After extensive discussion, patient he wishes to proceed with palliative RT. I will plan for 30Gy/10fx.    He is now status post L4 RFA and augmentation and ready to proceed with RT.   CT simulation was performed today, with plan to start in the near future.    All benefits and risks discussed, and patient is in agreement with the oncologic plan discussed above.     Thank you for allowing me to participate in your patient's care.  If you should require any additional information, please do not hesitate in contacting me.     Ben Reaves MD  Department of Radiation Oncology  [facility]     A total of 30 minutes were spent on this visit, including preparation for this visit, face to face with patient, and medical decision making. Medical decision-making included consideration and possible diagnosis, management options, complex record review, review of diagnostic tests,  consideration and discussion of significant complications based up medical history/comorbidities, and discussion with providers involved in care of the patient.      Again, thank you for allowing me to participate in the care of your patient.        Sincerely,        Ben Reaves MD

## 2024-09-19 ENCOUNTER — APPOINTMENT (OUTPATIENT)
Dept: RADIATION ONCOLOGY | Facility: CLINIC | Age: 59
End: 2024-09-19
Payer: COMMERCIAL

## 2024-09-19 PROCEDURE — 77306 TELETHX ISODOSE PLAN SIMPLE: CPT | Performed by: SURGERY

## 2024-09-19 PROCEDURE — 77332 RADIATION TREATMENT AID(S): CPT | Performed by: SURGERY

## 2024-09-20 ENCOUNTER — PATIENT OUTREACH (OUTPATIENT)
Dept: ONCOLOGY | Facility: CLINIC | Age: 59
End: 2024-09-20
Payer: COMMERCIAL

## 2024-09-20 VITALS — BODY MASS INDEX: 21.29 KG/M2 | WEIGHT: 157 LBS

## 2024-09-20 RX ORDER — PACLITAXEL 100 MG/20ML
75 INJECTION, POWDER, LYOPHILIZED, FOR SUSPENSION INTRAVENOUS ONCE
Status: CANCELLED | OUTPATIENT
Start: 2024-11-19

## 2024-09-20 RX ORDER — PACLITAXEL 100 MG/20ML
75 INJECTION, POWDER, LYOPHILIZED, FOR SUSPENSION INTRAVENOUS ONCE
Status: CANCELLED | OUTPATIENT
Start: 2024-11-05

## 2024-09-20 RX ORDER — PACLITAXEL 100 MG/20ML
75 INJECTION, POWDER, LYOPHILIZED, FOR SUSPENSION INTRAVENOUS ONCE
Status: CANCELLED | OUTPATIENT
Start: 2024-10-22

## 2024-09-20 RX ORDER — PACLITAXEL 100 MG/20ML
75 INJECTION, POWDER, LYOPHILIZED, FOR SUSPENSION INTRAVENOUS ONCE
Status: CANCELLED | OUTPATIENT
Start: 2024-10-08

## 2024-09-20 RX ORDER — PACLITAXEL 100 MG/20ML
75 INJECTION, POWDER, LYOPHILIZED, FOR SUSPENSION INTRAVENOUS ONCE
Status: CANCELLED | OUTPATIENT
Start: 2024-09-24

## 2024-09-20 RX ORDER — PACLITAXEL 100 MG/20ML
75 INJECTION, POWDER, LYOPHILIZED, FOR SUSPENSION INTRAVENOUS ONCE
Status: CANCELLED | OUTPATIENT
Start: 2024-12-03

## 2024-09-20 NOTE — PROGRESS NOTES
INTERVENTIONAL RADIOLOGY Virtual Video CLINIC NOTE    Patient Name:  Gallo Navarro  Date of Visit: 9/25/2024  Referring Provider:  Dr. Luis Haile (oncology) and Dr. Ben Reaves (radiation oncology).    REASON FOR VISIT:   s/p L4 Radiofrequency ablation and lumbar vertebroplasty on 8/28/2024 for one month follow-up    ASSESSMENT:  #Status post L4 Radiofrequency ablation and lumbar vetebroplasty on 8/28/2024 in setting of pancreatic cancer metastatic to bone with compression fracture   -doing well.  Reviewed 5-10 pound lifting restriction for 3 months and then slowly advance activities.     Plan:  -discharge back to Dr Luis Haile and Dr. Ben Reaves for followup  -return to see IR with any worsening concerns or complaints related to procedure  -The patient's medical data, including pertinent imaging, was reviewed.    -All of the patient's questions were answered to their satisfaction.    =====================================================    HISTORY OF PRESENT ILLNESS  Gallo Navarrois a 58 year old male with history of metastatic pancreatic cancer to bone involving T10 and L4 vertebral bodies with prior history of SBRT to T10 developed lifestyle limiting pain refractory to medical treatment who underwent L4 Radiofrequency ablation and vertebroplasty on 8/28/2024 here for one month followup.     8/27/2024:  Initial consultation Dr. Liam Gordon with Dr. Lion BELTRE after being referred from Dr. Luis Haile and Dr. Ben Reaves.      Doing well.  Pt reports his pain pre-procedure was previously rated 3-4/10 in severity even while on dilaudid.  He is on dilaudid every day and now has no pain.  Denies numbness or tingling, bowel or urinary dysfunction.  He is a very active person.  But is currently retired.  Used to work as a teacher on line. Denies any redness or pain at procedure site.      PAST MEDICAL HISTORY:  Past Medical History:   Diagnosis Date    Acute pancreatitis 04/16/2023    Alcohol-induced acute pancreatitis  04/10/2023    Gastric outlet obstruction     Metastasis from pancreatic cancer (H)     Personal history of chemotherapy     Gemzar + Abraxane started on 10/31/2023    Recurrent acute pancreatitis     S/P radiation therapy     2,000 cGy to T10 Spine completed on 11/29/2023 [facility]      PAST SURGICAL HISTORY:  Past Surgical History:   Procedure Laterality Date    AS OPEN TREATMENT CLAVICULAR FRACTURE INTERNAL FX      ENDOSCOPIC RETROGRADE CHOLANGIOPANCREATOGRAM N/A 7/11/2023    Procedure: ENDOSCOPIC RETROGRADE CHOLANGIOPANCREATOGRAPHY;  Surgeon: Khadar Pickett MD;  Location: [facility] OR    ENDOSCOPIC RETROGRADE CHOLANGIOPANCREATOGRAM N/A 8/17/2023    Procedure: ENDOSCOPIC RETROGRADE  with stent removal x1, balloon sweep;  Surgeon: Christos Greenberg MD;  Location: [facility] OR    ENDOSCOPIC ULTRASOUND UPPER GASTROINTESTINAL TRACT (GI) N/A 7/11/2023    Procedure: Endoscopic ultrasound upper gastrointestinal tract (GI), with biposy, GJ tube repositioning, stent placement;  Surgeon: Khadar Pickett MD;  Location: [facility] OR    ENDOSCOPIC ULTRASOUND UPPER GASTROINTESTINAL TRACT (GI) N/A 7/13/2023    Procedure: ENDOSCOPIC ULTRASOUND, ESOPHAGOSCOPY, EUS guided gastrojejunostomy;  Surgeon: Tre Yokr MD;  Location: [facility] OR    INSERT PICC LINE  04/29/2023    INSERT PORT VASCULAR ACCESS Right 7/28/2023    Procedure: Insert port vascular access;  Surgeon: Tom العلي MD;  Location: [facility] OR    IR BONE ABLATION RFA  8/28/2024    IR CHEST PORT PLACEMENT > 5 YRS OF AGE  7/28/2023    IR LUMBAR VERTEBROPLASTY  8/28/2024    IR NG TUBE PLACEMENT REQ RAD & FLUORO  05/08/2023    JG tube    REPLACE GASTROJEJUNOSTOMY TUBE, PERCUTANEOUS N/A 8/17/2023    Procedure: possible REPLACEMENT, GASTROJEJUNOSTOMY TUBE, PERCUTANEOUS;  Surgeon: Christos Greenberg MD;  Location: [facility] OR       MEDICATIONS:  Current Outpatient Medications   Medication Sig Dispense Refill    acetaminophen (TYLENOL) 32 mg/mL liquid Take 20.313  mLs (650 mg) by mouth every 8 hours. 1800 mL 1    apixaban ANTICOAGULANT (ELIQUIS) 5 MG tablet Take 1 tablet (5 mg) by mouth 2 times daily 60 tablet 2    buprenorphine (BUTRANS) 10 MCG/HR WK patch Place 1 patch onto the skin every 7 days. Use with 20 mcg/hour patch for 30 mcg/hour total. 4 patch 3    buprenorphine (BUTRANS) 20 MCG/HR WK patch Place 1 patch onto the skin once a week. Use with 10 mcg/hour patch for 30 mcg/hour total. 4 patch 3    dicyclomine (BENTYL) 10 MG capsule Take 1 capsule (10 mg) by mouth 4 times daily (before meals and nightly). (Patient taking differently: Take 10 mg by mouth 2 times daily.) 120 capsule 1    econazole nitrate 1 % external cream Apply topically daily To affected toenails. 85 g 5    HYDROmorphone (DILAUDID) 2 MG tablet Take 1-2 tablets (2-4 mg) by mouth every 4 hours as needed for severe pain or breakthrough pain. 180 tablet 0    lidocaine-prilocaine (EMLA) 2.5-2.5 % external cream Use 1-2 times a week or as needed prior to port access (Patient not taking: Reported on 9/11/2024) 30 g 3    lipase-protease-amylase (CREON 24) 40709-46768-495943 units CPEP per EC capsule 2-3 capsules with meals 3 times a day and 1-2 capsules with snacks max of 15 capsules a day 200 capsule 11    methylphenidate (RITALIN) 5 MG tablet Take 1 tablet (5 mg) by mouth 2 times daily as needed (Patient not taking: Reported on 7/30/2024) 10 tablet 0    Multiple Vitamins-Minerals (CENTRUM SILVER 50+MEN) TABS as directed Orally      naloxone (NARCAN) 4 MG/0.1ML nasal spray Instill 1 spray (4 mg) into one nostril alternating nostrils as needed for opioid reversal every 2-3 minutes until assistance arrives* (Patient not taking: Reported on 7/30/2024)      pantoprazole (PROTONIX) 40 MG EC tablet Take 1 tablet (40 mg) by mouth 2 times daily 60 tablet 4    prochlorperazine (COMPAZINE) 10 MG tablet Take 1 tablet (10 mg) by mouth every 6 hours as needed for nausea or vomiting 30 tablet 2    vitamin D3  (CHOLECALCIFEROL) 50 mcg (2000 units) tablet Take 1 tablet (50 mcg) by mouth daily 90 tablet 1     No current facility-administered medications for this visit.       ALLERGIES:   No Known Allergies    PHYSICAL EXAMINATION:  There were no vitals taken for this visit.  General:  alert and oriented times 3 in  fully ambulatory, walking up stairs with coffee mug in NAD  Chest:  non labored breathing on room air  Skin:  surgical site without TTP by patient or redness.    Spine:  Appears FROM, ability to turn around without difficulties.      DIAGNOSTIC IMAGING:  Narrative & Impression   PROCEDURE: Radiofrequency ablation and lumbar Vertebroplasty      Procedural Personnel  Attending physician(s): Brice Seymour MD  Fellow physician(s): Breana Gay MD  Resident physician(s): None  Advanced practice provider(s): None     Procedure Date (mm/dd/yyyy): 8/28/2024     Pre-procedure diagnosis: Pancreatic cancer  Post-procedure diagnosis: Same  Indication: Pathologic fracture (malignant or metastatic disease)  Additional clinical history: None     Complications: No immediate complications.                                                                      IMPRESSION:     Vertebroplasty and radiofrequency ablation of L4 vertebra.     A bone biopsy was not performed.     Plan:     Bed rest for 2 hours  ______________________________________________________________________  _________________     PROCEDURE SUMMARY:  - Fluoroscopic guided vertebroplasty  - Additional procedure(s): Radiofrequency ablation     PROCEDURE DETAILS:     Pre-procedure  Consent: Informed consent for the procedure including risks, benefits  and alternatives was obtained and time-out was performed prior to the  procedure.  Preparation: The site was prepared and draped using maximal sterile  barrier technique including cutaneous antisepsis.     Anesthesia/sedation  Level of anesthesia/sedation: Moderate sedation (conscious sedation)  Anesthesia/sedation  administered by: Independent trained observer  under attending supervision with continuous monitoring of the  patient?s level of consciousness and physiologic status  Total intra-service sedation time (minutes): 70     Heat-based ablation  Under Fluoroscopy guidance, the ablation applicator(s) were advanced  and positioned within the target(s). For each target lesion the  applicators were placed and repositioned as necessary to achieve the  desired ablation zone. The ablation applicator(s) were removed.  Ablation applicator: Medtronic Kyphon     ~Target  ~Target number:1  ~Maximal diameter (cm): 2  ~Location: Right posterior aspect of the L4      ~~Ablation position:  ~~Number of applicators: 2  ~~Duration (minutes): 15   ~~Temperature: 70C   ~~Tract cauterization performed with applicator removal: Yes  ~~Intraprocedural imaging findings: No acute findings     Vertebroplasty  Target fracture level(s): L4   Pedicle access: Bipedicular  Access trocar size (gauge): 10  : Medtronic/Kyphon  Cement type: Polymethyl methacrylate (PMMA)  Cement volume (mL): 5.5     Closure  The devices were removed and hemostasis was achieved with manual  compression. A sterile bandage was applied.     Radiation Dose  Fluoroscopy time (minutes): 13    Reference air kerma (mGy): 472   Kerma area product (uGy-m2): 6140.8      Additional Details  Additional description of procedure: None  Registry event: V/3/g  Device used: None  Equipment details: None  Unique Device Identifiers: Not available  Specimens removed: None  Estimated blood loss (mL): Less than 10  Standardized report: SIR_Vertebroplasty_v3.1     DEJA FLOYD MD, attest that I was present in the procedure room  for the entire procedure.         LABORATORY RESULTS:    CBC  Lab Results   Component Value Date    WBC 5.6 09/09/2024     Lab Results   Component Value Date    RBC 3.47 09/09/2024     Lab Results   Component Value Date    HGB 10.3 09/09/2024     Lab Results    Component Value Date    HCT 31.9 09/09/2024     Lab Results   Component Value Date    MCV 92 09/09/2024     Lab Results   Component Value Date    MCH 29.7 09/09/2024     Lab Results   Component Value Date    MCHC 32.3 09/09/2024     Lab Results   Component Value Date    RDW 14.7 09/09/2024     Lab Results   Component Value Date     09/09/2024    INR  INR   Date Value Ref Range Status   08/28/2024 1.19 (H) 0.85 - 1.15 Final     INR POCT   Date Value Ref Range Status   08/28/2024 1.2 (L) 2.0 - 3.0 Final                   ======    Carmen Jaramillo PA-C  Interventional Radiology  IR Nurse Triage: [phone]  =====    Type of Visit:  Benita Vera  Joined the call at 09/25/24 9:03:08A   Left the call 09/25/24 9:10:37  On Call for:  7 minutes 29 secs  Originating Pt location:  Home  Distant Location (provider location):  On site. Benita    CC:   1) Brice Seymuor MD    2) Primary Care Provider  Akil Hernandez MD  [address]  [facility] [place]  [place] [address]    3) Dr. Luis Haile  4) Dr. Contreras Jean Paul    =====================================================================

## 2024-09-23 ENCOUNTER — VIRTUAL VISIT (OUTPATIENT)
Dept: ONCOLOGY | Facility: CLINIC | Age: 59
End: 2024-09-23
Attending: STUDENT IN AN ORGANIZED HEALTH CARE EDUCATION/TRAINING PROGRAM
Payer: COMMERCIAL

## 2024-09-23 ENCOUNTER — LAB REQUISITION (OUTPATIENT)
Dept: LAB | Facility: CLINIC | Age: 59
End: 2024-09-23
Payer: COMMERCIAL

## 2024-09-23 VITALS — WEIGHT: 154 LBS | BODY MASS INDEX: 20.86 KG/M2 | HEIGHT: 72 IN

## 2024-09-23 DIAGNOSIS — C25.0 MALIGNANT NEOPLASM OF HEAD OF PANCREAS (H): ICD-10-CM

## 2024-09-23 DIAGNOSIS — C25.9 PANCREATIC ADENOCARCINOMA (H): Primary | ICD-10-CM

## 2024-09-23 DIAGNOSIS — Z98.890 HISTORY OF BILIARY STENT INSERTION: ICD-10-CM

## 2024-09-23 LAB
ALBUMIN SERPL BCG-MCNC: 3.8 G/DL (ref 3.5–5.2)
ALP SERPL-CCNC: 264 U/L (ref 40–150)
ALT SERPL W P-5'-P-CCNC: 88 U/L (ref 0–70)
ANION GAP SERPL CALCULATED.3IONS-SCNC: 11 MMOL/L (ref 7–15)
AST SERPL W P-5'-P-CCNC: 55 U/L (ref 0–45)
BASOPHILS # BLD AUTO: 0 10E3/UL (ref 0–0.2)
BASOPHILS NFR BLD AUTO: 1 %
BILIRUB SERPL-MCNC: 0.3 MG/DL
BUN SERPL-MCNC: 16.2 MG/DL (ref 6–20)
CALCIUM SERPL-MCNC: 8.8 MG/DL (ref 8.8–10.4)
CHLORIDE SERPL-SCNC: 104 MMOL/L (ref 98–107)
CREAT SERPL-MCNC: 0.72 MG/DL (ref 0.67–1.17)
EGFRCR SERPLBLD CKD-EPI 2021: >90 ML/MIN/1.73M2
EOSINOPHIL # BLD AUTO: 0.1 10E3/UL (ref 0–0.7)
EOSINOPHIL NFR BLD AUTO: 2 %
ERYTHROCYTE [DISTWIDTH] IN BLOOD BY AUTOMATED COUNT: 14.5 % (ref 10–15)
GLUCOSE SERPL-MCNC: 153 MG/DL (ref 70–99)
HCO3 SERPL-SCNC: 25 MMOL/L (ref 22–29)
HCT VFR BLD AUTO: 30.9 % (ref 40–53)
HGB BLD-MCNC: 10 G/DL (ref 13.3–17.7)
IMM GRANULOCYTES # BLD: 0 10E3/UL
IMM GRANULOCYTES NFR BLD: 1 %
LYMPHOCYTES # BLD AUTO: 1.2 10E3/UL (ref 0.8–5.3)
LYMPHOCYTES NFR BLD AUTO: 18 %
MCH RBC QN AUTO: 29.7 PG (ref 26.5–33)
MCHC RBC AUTO-ENTMCNC: 32.4 G/DL (ref 31.5–36.5)
MCV RBC AUTO: 92 FL (ref 78–100)
MONOCYTES # BLD AUTO: 0.8 10E3/UL (ref 0–1.3)
MONOCYTES NFR BLD AUTO: 12 %
NEUTROPHILS # BLD AUTO: 4.5 10E3/UL (ref 1.6–8.3)
NEUTROPHILS NFR BLD AUTO: 67 %
NRBC # BLD AUTO: 0 10E3/UL
NRBC BLD AUTO-RTO: 0 /100
PLATELET # BLD AUTO: 186 10E3/UL (ref 150–450)
POTASSIUM SERPL-SCNC: 4.2 MMOL/L (ref 3.4–5.3)
PROT SERPL-MCNC: 6.6 G/DL (ref 6.4–8.3)
RBC # BLD AUTO: 3.37 10E6/UL (ref 4.4–5.9)
SODIUM SERPL-SCNC: 140 MMOL/L (ref 135–145)
WBC # BLD AUTO: 6.7 10E3/UL (ref 4–11)

## 2024-09-23 PROCEDURE — 86301 IMMUNOASSAY TUMOR CA 19-9: CPT | Performed by: STUDENT IN AN ORGANIZED HEALTH CARE EDUCATION/TRAINING PROGRAM

## 2024-09-23 PROCEDURE — G2211 COMPLEX E/M VISIT ADD ON: HCPCS | Mod: 95

## 2024-09-23 PROCEDURE — 80053 COMPREHEN METABOLIC PANEL: CPT | Performed by: STUDENT IN AN ORGANIZED HEALTH CARE EDUCATION/TRAINING PROGRAM

## 2024-09-23 PROCEDURE — 99214 OFFICE O/P EST MOD 30 MIN: CPT | Mod: 95

## 2024-09-23 PROCEDURE — 85025 COMPLETE CBC W/AUTO DIFF WBC: CPT | Performed by: STUDENT IN AN ORGANIZED HEALTH CARE EDUCATION/TRAINING PROGRAM

## 2024-09-23 RX ORDER — PACLITAXEL 100 MG/20ML
75 INJECTION, POWDER, LYOPHILIZED, FOR SUSPENSION INTRAVENOUS ONCE
OUTPATIENT
Start: 2024-10-08

## 2024-09-23 RX ORDER — PACLITAXEL 100 MG/20ML
75 INJECTION, POWDER, LYOPHILIZED, FOR SUSPENSION INTRAVENOUS ONCE
Status: CANCELLED | OUTPATIENT
Start: 2024-09-24

## 2024-09-23 ASSESSMENT — PAIN SCALES - GENERAL: PAINLEVEL: NO PAIN (0)

## 2024-09-23 NOTE — PROGRESS NOTES
Virtual Visit Details    Type of service:  Video Visit     Originating Location (pt. Location): Home  Distant Location (provider location):  On-site  Platform used for Video Visit: [facility]      Oncology/Hematology Visit Note  Sep 23, 2024      Reason for Visit: follow-up of metastatic Pancreatic Caner    Oncology HPI:   -NGS: KRAS G12R  Immuno: pMMR, KAYLIE, TMB-low  Clinical Trial: MARIPOSA-186, MARIPOSA-280, TORL    - 3/2023: presented with acute pancreatitis  c/b chronic pancreatitis with pancreatic mass causing duodenal stenosis with gastric outlet obstruction s/p GJ tube (placed 5/2023), biliary obstruction s/p stent placement on 7/7/23, pancreatic insufficiency, and IDDM2.  -  He then presented to the ED with worsening abdominal pain, increased jaundice, poor PO intake and weight loss admitted on 7/8/2023 for concern of biliary obstruction.   - 7/12/2023: Underwent biopsy of pancreatic mass returned positive for pancreatic adenocarcinoma.   - 7/31/2023: He started on treatment with 5FU, irinotecan, and oxaliplatin (FOLFIRINOX). Please see previous notes for further details on the patient's history.      8/17/23 - ERCP/EGD, duodenal stents x2 placed, exchange of GJ tube     8/21-/8/25 hospitalized due to diarrhea, colitis, abdominal pain.       8/29 - Cycle 3 deferred due to neutropenia.   Neulasta added. Irinotecan dose reduced 20% due to symptoms including cramping, double vision and slurred speech during infusion.       10/24/23 CT CAP with similar to slight increase in size of peripancreatic and mesenteric lymph nodes, enlargement of previous skeletal metastasis as well as new sclerotic metastasis to left posterior 5th rib, enlarging pulmonary nodule, and unchanged pancreatic head mass. Also unclear concerns for PE,  right lower lobe segmental PE confirmed on CT-PE. Started on apixaban.      10/31/23 Cycle 1 gemzar, abraxane  11/7/23 Cycle 1 Zometa  11/22/23 Removal of GJ tube.   11/29/23 Completed palliative radiation  to T10   12/13/23 C3D1 gem/abraxane  12/29/23 Consults at [facility]  1/23-1/26 Consults at [facility]   1/30/24: C4D1 gem/abraxane   3/24: CT CAP with overall stable disease with isolated progression in potential L4 lesion. Referral for radiation oncology.    4/29-5/1: Consults at [facility] for possible cellular therapy, CT guided biopsy of left pubic bone, recommendation to return to [facility] upon progression of current treatment   5/23/24: Cardiology consult at [facility]. CT with small pericardial effusion   6/6/2024: Repeat ECHO on with EF of 55-60%, no pericardial effusion present  - Cycle 8 deferred due to infection concerns and subsequently tested positive for COVID 6/25/24, resumed treatment on 7/2/2024    -Day 8 eliminated after Cycle 5 Tama/Abraxane.    4/2/2024-present: Gemcitabine/Abraxane, Days 1 and 15 only; Zometa every 3 months     8/2024 repeat imaging with isolated progression to L4 lesion with fracture of that spinous process. Referred to radiation therapy. Decreased Abraxane dose reduced to 75mg/m2 with cycle 9 day 15 for overall treatment tolerance.     8/28/24 RFA ablation and verteral augmentation to L4.   L4 Radiation to start 10/1.        Interval history:   Gallo is seen today prior to cycle 11 gemcitabine/Abraxane.    -felt fatigued following last infusion, took ~2 days before he started feeling like himself again  -eating well, appetite is good. Denies nausea. Weight on home scale is stable   -had scant bright red blood in stool last week, was slightly constipated and had a difficult bowel movement. Has not had any since  -his back is feeling better, his pain is currently zero, now being more careful to follow activity restrictions  -had one elevated temperature. He had been outside and overdid activity, came inside to the air conditioning and was cold. Temperature was 101 and came down to normal over the next 3 hours. Denies any other signs/symptoms of infection.         Review of  Systems:  Patient denies any of the following except if noted above: fevers, chills, difficulty with energy, vision or hearing changes, chest pain, dyspnea, abdominal pain, nausea, vomiting, diarrhea, constipation, urinary concerns, headaches, numbness, tingling, issues with sleep or mood. Also denies lumps, bumps, rashes or skin lesions, bleeding or bruising issues.        Current Outpatient Medications   Medication Sig Dispense Refill    acetaminophen (TYLENOL) 32 mg/mL liquid Take 20.313 mLs (650 mg) by mouth every 8 hours. 1800 mL 1    apixaban ANTICOAGULANT (ELIQUIS) 5 MG tablet Take 1 tablet (5 mg) by mouth 2 times daily 60 tablet 2    buprenorphine (BUTRANS) 10 MCG/HR WK patch Place 1 patch onto the skin every 7 days. Use with 20 mcg/hour patch for 30 mcg/hour total. 4 patch 3    buprenorphine (BUTRANS) 20 MCG/HR WK patch Place 1 patch onto the skin once a week. Use with 10 mcg/hour patch for 30 mcg/hour total. 4 patch 3    dicyclomine (BENTYL) 10 MG capsule Take 1 capsule (10 mg) by mouth 4 times daily (before meals and nightly). (Patient taking differently: Take 10 mg by mouth 2 times daily.) 120 capsule 1    econazole nitrate 1 % external cream Apply topically daily To affected toenails. 85 g 5    HYDROmorphone (DILAUDID) 2 MG tablet Take 1-2 tablets (2-4 mg) by mouth every 4 hours as needed for severe pain or breakthrough pain. 180 tablet 0    lidocaine-prilocaine (EMLA) 2.5-2.5 % external cream Use 1-2 times a week or as needed prior to port access (Patient not taking: Reported on 9/11/2024) 30 g 3    lipase-protease-amylase (CREON 24) [id number] units CPEP per EC capsule 2-3 capsules with meals 3 times a day and 1-2 capsules with snacks max of 15 capsules a day 200 capsule 11    methylphenidate (RITALIN) 5 MG tablet Take 1 tablet (5 mg) by mouth 2 times daily as needed (Patient not taking: Reported on 7/30/2024) 10 tablet 0    Multiple Vitamins-Minerals (CENTRUM SILVER 50+MEN) TABS as directed  Orally      naloxone (NARCAN) 4 MG/0.1ML nasal spray Instill 1 spray (4 mg) into one nostril alternating nostrils as needed for opioid reversal every 2-3 minutes until assistance arrives* (Patient not taking: Reported on 7/30/2024)      pantoprazole (PROTONIX) 40 MG EC tablet Take 1 tablet (40 mg) by mouth 2 times daily 60 tablet 4    prochlorperazine (COMPAZINE) 10 MG tablet Take 1 tablet (10 mg) by mouth every 6 hours as needed for nausea or vomiting 30 tablet 2    vitamin D3 (CHOLECALCIFEROL) 50 mcg (2000 units) tablet Take 1 tablet (50 mcg) by mouth daily 90 tablet 1        No Known Allergies      Exam:   General: patient appears well in no acute distress, alert and oriented, speech clear and fluid  Skin: no visualized rash or lesions on visualized skin  Resp: Appears to be breathing comfortably without accessory muscle usage, speaking in full sentences, no audible wheezes or cough.  Psych: Coherent speech, normal rate and volume, able to articulate logical thoughts, able to abstract reason, no tangential thoughts, no hallucinations or delusions  Patient's affect is appropriate.      Labs:    Most Recent 3 CBC's:  Recent Labs   Lab Test 09/23/24  1145 09/09/24  1450 08/26/24  1220   WBC 6.7 5.6 6.1   HGB 10.0* 10.3* 10.9*   MCV 92 92 91    247 202   ANEUTAUTO 4.5 3.3 3.8    Most Recent 3 BMP's:  Recent Labs   Lab Test 09/23/24  1145 08/26/24  1220 08/13/24  0809 07/30/24  0904    136  --  139   POTASSIUM 4.2 3.9  --  4.3   CHLORIDE 104 100  --  104   CO2 25 26  --  27   BUN 16.2 17.4  --  13.9   CR 0.72 0.76 0.68 0.70   ANIONGAP 11 10  --  8   DENISE 8.8 8.5* 9.1 9.0   * 120*  --  165*   PROTTOTAL 6.6 7.1  --  6.8   ALBUMIN 3.8 4.0 3.9 4.0    Most Recent 2 LFT's:  Recent Labs   Lab Test 09/23/24  1145 08/26/24  1220   AST 55* 70*   ALT 88* 97*   ALKPHOS 264* 251*   BILITOTAL 0.3 0.5    Most Recent TSH and T4:No lab results found.    I reviewed the above labs today.          Impression/plan:   Gallo is a 58 year old male with unresectable pancreatic cancer, currently under treatment of Gemcitabine/Abraxane.     Unresectable Pancreatic Cancer  7/31/23 began palliative treatment on FOLFIRINOX  CT CAP 10/24/23 with progression in skeletal mets, lung nodule and lymph nodes; stable pancreatic mass  - 10/31/2023: Treatment was changed to Gemcitabine and Abraxane (Day 1, 8, 15) + Zometa every 3 months  - 03/2024 CT CAP with progression of L4 lesion  - Day 8 eliminated after Cycle 5 gem/abraxane  - 8/2024 repeat imaging with isolated progression of L4 lesion, see below   -plan to continue gem/Abraxane with dose reduced Abraxane for improved tolerance  -continues to tolerate treatment well, now receiving infusions at home via [facility]  -labs reviewed, proceed with cycle 11 as scheduled  -continue monthly SUZETTE follow up, patient to call sooner with any concerns. Plan follow up in 3 months with Dr. Haile with imaging prior, scheduled in November.     Anemia-normocytic  Hgb stable; in setting of chemotherapy and chronic disease  -Monitor    Skeletal Mets   - Radiation to T10 completed 11/29/23  - Discussion to irradiate L4 lesion noted on CT dated 10/24/2023, however, on hold due to lack of symptoms  - CT chest 5/23/24 unchanged sclerotic bone metastases  -imaging 8/2024 with isolated progression in L4, consult to radiation oncology.   -8/28/24 RFA ablation and verteral augmentation to L4. Scheduled to start radiation to L4 on 10/1.  -minimal back pain, managing with Butrans patch and hydromorphine  - will continue to monitor symptoms    Bone Health  Zometa 4 mg every 3 months, last infusion 8/13/2024  - per chart review, Dr. Haile note 3/15/2024: consider use of denosumab instead of bisphosphonate, given the evidence for potentially superior efficacy. At this time, he is tolerating this treatment extremely well now, and has well-controlled skeletal mets. Continue to consider his skeletal treatment options as we move  forward.   -Please note the therapy plan is entered as an endocrine plan with 6 month intervals. For bone mets, we typically give every 3 months.    Neuropathy  Referred to PT previously but not following  Symptoms unchanged on current treatment-monitor for progression      Pericardial Effusion  Found on CT 4/20/24 at [facility], followed by [facility]TE with moderate pericardial effusion without tamponade.  CT 5/23/24 small pericardial effusion  5/23/24: Cardiology consult at [facility]- Echo 6/6/2024 with EF 55-60%, LV strain -20.5% which is normal, no pericardial effusion  - repeat echo at [facility] in the future,   - he has been cautioned by Cardiology s/s of cardiac tamponade  -following cardiology as needed    Pulmonary Embolism  Right lower segmental PE found on routine imaging  Denies shortness of breath  Taking Apixiban daily    Nutrition  GJ tube removed 11/22/2023  Weight is stable, recent changes in weight likely due to scale at home versus in clinic. Reports weight on home scale is stable, eating well.   Continue small meals/protein supplements    LE edema  previously instructed to wear compression stockings as needed  - will continue to monitor    Diarrhea/Constipation  Continue stool softeners/imodium as needed.         The longitudinal plan of care for the diagnosis(es)/condition(s) as documented were addressed during this visit. Due to the added complexity in care, I will continue to support Gallo in the subsequent management and with ongoing continuity of care.        ESVIN Canales CNP

## 2024-09-23 NOTE — Clinical Note
9/23/2024      Gallo Navarro  [address] [address] [place] [id number]      Dear Colleague,    Thank you for referring your patient, Gallo Navarro, to the [facility]. Please see a copy of my visit note below.    Virtual Visit Details    Type of service:  Video Visit     Originating Location (pt. Location): Home  Distant Location (provider location):  On-site  Platform used for Video Visit: [facility]      Oncology/Hematology Visit Note  Sep 23, 2024      Reason for Visit: follow-up of metastatic Pancreatic Caner    Oncology HPI:   -NGS: KRAS G12R  Immuno: pMMR, KAYLIE, TMB-low  Clinical Trial: MARIPOSA-186, MARIPOSA-280, TORL    - 3/2023: presented with acute pancreatitis  c/b chronic pancreatitis with pancreatic mass causing duodenal stenosis with gastric outlet obstruction s/p GJ tube (placed 5/2023), biliary obstruction s/p stent placement on 7/7/23, pancreatic insufficiency, and IDDM2.  -  He then presented to the ED with worsening abdominal pain, increased jaundice, poor PO intake and weight loss admitted on 7/8/2023 for concern of biliary obstruction.   - 7/12/2023: Underwent biopsy of pancreatic mass returned positive for pancreatic adenocarcinoma.   - 7/31/2023: He started on treatment with 5FU, irinotecan, and oxaliplatin (FOLFIRINOX). Please see previous notes for further details on the patient's history.      8/17/23 - ERCP/EGD, duodenal stents x2 placed, exchange of GJ tube     8/21-/8/25 hospitalized due to diarrhea, colitis, abdominal pain.       8/29 - Cycle 3 deferred due to neutropenia.   Neulasta added. Irinotecan dose reduced 20% due to symptoms including cramping, double vision and slurred speech during infusion.       10/24/23 CT CAP with similar to slight increase in size of peripancreatic and mesenteric lymph nodes, enlargement of previous skeletal metastasis as well as new sclerotic metastasis to left posterior 5th rib, enlarging pulmonary nodule, and unchanged pancreatic head  mass. Also unclear concerns for PE,  right lower lobe segmental PE confirmed on CT-PE. Started on apixaban.      10/31/23 Cycle 1 gemzar, abraxane  11/7/23 Cycle 1 Zometa  11/22/23 Removal of GJ tube.   11/29/23 Completed palliative radiation to T10   12/13/23 C3D1 gem/abraxane  12/29/23 Consults at [facility]  1/23-1/26 Consults at [facility]   1/30/24: C4D1 gem/abraxane   3/24: CT CAP with overall stable disease with isolated progression in potential L4 lesion. Referral for radiation oncology.    4/29-5/1: Consults at [facility] for possible cellular therapy, CT guided biopsy of left pubic bone, recommendation to return to [facility] upon progression of current treatment   5/23/24: Cardiology consult at [facility]. CT with small pericardial effusion   6/6/2024: Repeat ECHO on with EF of 55-60%, no pericardial effusion present  - Cycle 8 deferred due to infection concerns and subsequently tested positive for COVID 6/25/24, resumed treatment on 7/2/2024    -Day 8 eliminated after Cycle 5 [place]/Abraxane.    4/2/2024-present: Gemcitabine/Abraxane, Days 1 and 15 only; Zometa every 3 months     8/2024 repeat imaging with isolated progression to L4 lesion with fracture of that spinous process. Referred to radiation therapy. Decreased Abraxane dose reduced to 75mg/m2 with cycle 9 day 15 for overall treatment tolerance.     8/28/24 RFA ablation and verteral augmentation to L4.   Radiation ***       Interval history:   Gallo is seen today prior to cycle *** gemcitabine/Abraxane.    Weight loss  Radiation  Back pain     Back is better. Pain is zero.   Starts radiation on 10/1.     -Still pretty tired after last infusion. Started feeling like himself after 2 days.   -had weird temperature event, was outside overdid activity and was sweating. Came in to AC and was cold, temp was 101, down to 99, then started to feel normal - lasted 3-4 hours.   -reports he is eating well, loses appetite after chemo for 1-2 days, but then appetite recovers    - no GI issues   -had scant bright red in stool last week once, difficult bowel movement, none since           Review of Systems:  Patient denies any of the following except if noted above: fevers, chills, difficulty with energy, vision or hearing changes, chest pain, dyspnea, abdominal pain, nausea, vomiting, diarrhea, constipation, urinary concerns, headaches, numbness, tingling, issues with sleep or mood. Also denies lumps, bumps, rashes or skin lesions, bleeding or bruising issues.        Current Outpatient Medications   Medication Sig Dispense Refill    acetaminophen (TYLENOL) 32 mg/mL liquid Take 20.313 mLs (650 mg) by mouth every 8 hours. 1800 mL 1    apixaban ANTICOAGULANT (ELIQUIS) 5 MG tablet Take 1 tablet (5 mg) by mouth 2 times daily 60 tablet 2    buprenorphine (BUTRANS) 10 MCG/HR WK patch Place 1 patch onto the skin every 7 days. Use with 20 mcg/hour patch for 30 mcg/hour total. 4 patch 3    buprenorphine (BUTRANS) 20 MCG/HR WK patch Place 1 patch onto the skin once a week. Use with 10 mcg/hour patch for 30 mcg/hour total. 4 patch 3    dicyclomine (BENTYL) 10 MG capsule Take 1 capsule (10 mg) by mouth 4 times daily (before meals and nightly). (Patient taking differently: Take 10 mg by mouth 2 times daily.) 120 capsule 1    econazole nitrate 1 % external cream Apply topically daily To affected toenails. 85 g 5    HYDROmorphone (DILAUDID) 2 MG tablet Take 1-2 tablets (2-4 mg) by mouth every 4 hours as needed for severe pain or breakthrough pain. 180 tablet 0    lidocaine-prilocaine (EMLA) 2.5-2.5 % external cream Use 1-2 times a week or as needed prior to port access (Patient not taking: Reported on 9/11/2024) 30 g 3    lipase-protease-amylase (CREON 24) 14026-72212-530109 units CPEP per EC capsule 2-3 capsules with meals 3 times a day and 1-2 capsules with snacks max of 15 capsules a day 200 capsule 11    methylphenidate (RITALIN) 5 MG tablet Take 1 tablet (5 mg) by mouth 2 times daily as needed  (Patient not taking: Reported on 7/30/2024) 10 tablet 0    Multiple Vitamins-Minerals (CENTRUM SILVER 50+MEN) TABS as directed Orally      naloxone (NARCAN) 4 MG/0.1ML nasal spray Instill 1 spray (4 mg) into one nostril alternating nostrils as needed for opioid reversal every 2-3 minutes until assistance arrives* (Patient not taking: Reported on 7/30/2024)      pantoprazole (PROTONIX) 40 MG EC tablet Take 1 tablet (40 mg) by mouth 2 times daily 60 tablet 4    prochlorperazine (COMPAZINE) 10 MG tablet Take 1 tablet (10 mg) by mouth every 6 hours as needed for nausea or vomiting 30 tablet 2    vitamin D3 (CHOLECALCIFEROL) 50 mcg (2000 units) tablet Take 1 tablet (50 mcg) by mouth daily 90 tablet 1        No Known Allergies      Exam:   General: patient appears well in no acute distress, alert and oriented, speech clear and fluid  Skin: no visualized rash or lesions on visualized skin  Resp: Appears to be breathing comfortably without accessory muscle usage, speaking in full sentences, no audible wheezes or cough.  Psych: Coherent speech, normal rate and volume, able to articulate logical thoughts, able to abstract reason, no tangential thoughts, no hallucinations or delusions  Patient's affect is appropriate.      Labs:    Most Recent 3 CBC's:  Recent Labs   Lab Test 09/09/24  1450 08/26/24  1220 08/13/24  0809   WBC 5.6 6.1 4.5   HGB 10.3* 10.9* 10.4*   MCV 92 91 91    202 265   ANEUTAUTO 3.3 3.8 2.3    Most Recent 3 BMP's:  Recent Labs   Lab Test 08/26/24  1220 08/13/24  0809 07/30/24  0904 07/02/24  0719     --  139 141   POTASSIUM 3.9  --  4.3 4.0   CHLORIDE 100  --  104 104   CO2 26  --  27 26   BUN 17.4  --  13.9 14.3   CR 0.76 0.68 0.70 0.65*   ANIONGAP 10  --  8 11   DENISE 8.5* 9.1 9.0 9.0   *  --  165* 171*   PROTTOTAL 7.1  --  6.8 7.0   ALBUMIN 4.0 3.9 4.0 3.8    Most Recent 2 LFT's:  Recent Labs   Lab Test 08/26/24  1220 07/30/24  0904   AST 70* 33   ALT 97* 38   ALKPHOS 251* 129    BILITOTAL 0.5 0.3    Most Recent TSH and T4:No lab results found.    I reviewed the above labs today. Scheduled for lab draw on 8/26 prior to 8/28 chemotherapy.         Impression/plan:  Gallo is a 58 year old male with unresectable pancreatic cancer, currently under treatment of Gemcitabine/Abraxane.     Unresectable Pancreatic Cancer  7/31/23 began palliative treatment on FOLFIRINOX  CT CAP 10/24/23 with progression in skeletal mets, lung nodule and lymph nodes; stable pancreatic mass  - 10/31/2023: Treatment was changed to Gemcitabine and Abraxane (Day 1, 8, 15) + Zometa every 3 months  - 03/2024 CT CAP with progression of L4 lesion  - Day 8 eliminated after Cycle 5 gem/abraxane  - 8/2024 repeat imaging with isolated progression of L4 lesion  -plan to continue gem/Abraxane with dose reduced Abraxane for improved tolerance  -continues to tolerate treatment well, will now be receiving infusions at home via [facility]  -proceed with cycle 10 as scheduled pending labs are WNL  -continue monthly SUZETTE follow up, patient to call sooner with any concerns. Plan follow up in 3 months with Dr. Haile with imaging prior    Anemia-normocytic  Hgb stable; in setting of chemotherapy and chronic disease  -Monitor    Skeletal Mets   - Radiation to T10 completed 11/29/23  - Discussion to irradiate L4 lesion noted on CT dated 10/24/2023, however, on hold due to lack of symptoms  - CT chest 5/23/24 unchanged sclerotic bone metastases  -imaging 8/2024 with isolated progression in L4, consult to radiation oncology. Pursuing possibly kyphoplasty prior to potiential palliative radiation.  -minimal back pain, managing with Butrans patch and hydromorphine  - will continue to monitor symptoms    Bone Health  Zometa 4 mg every 3 months, last infusion 8/13/2024  - per chart review, Dr. Haile note 3/15/2024: consider use of denosumab instead of bisphosphonate, given the evidence for potentially superior efficacy. At this time, he is tolerating  this treatment extremely well now, and has well-controlled skeletal mets. Continue to consider his skeletal treatment options as we move forward.   -Please note the therapy plan is entered as an endocrine plan with 6 month intervals. For bone mets, we typically give every 3 months.    Neuropathy  Reports improvement while on treatment break  Referred to PT previously but not following  Symptoms unchanged on current treatment-monitor for progression      Pericardial Effusion  Found on CT 4/20/24 at [facility], followed by TTE with moderate pericardial effusion without tamponade.  CT 5/23/24 small pericardial effusion  5/23/24: Cardiology consult at [facility]- Echo 6/6/2024 with EF 55-60%, LV strain -20.5% which is normal, no pericardial effusion  - repeat echo at [facility] in the future, TTE scheduled for 7/2024  - he has been cautioned by Cardiology s/s of cardiac tamponade  -following cardiology as needed    Pulmonary Embolism  Right lower segmental PE found on routine imaging  Denies shortness of breath  Taking Apixiban daily    Nutrition  GJ tube removed 11/22/2023  Weight is stable  Continue small meals/protein supplements    LE edema  previously instructed to wear compression stockings as needed  - will continue to monitor    Diarrhea/Constipation  resolved    Discoloration of bilateral great toenails  Met with podiatry, Dr. Ware, 7/19/24. Econazole prescribed for bilateral onychomycosis.      The longitudinal plan of care for the diagnosis(es)/condition(s) as documented were addressed during this visit. Due to the added complexity in care, I will continue to support Gallo in the subsequent management and with ongoing continuity of care.        ESVIN Canales CNP          Again, thank you for allowing me to participate in the care of your patient.        Sincerely,        ESVIN Canales CNP

## 2024-09-23 NOTE — NURSING NOTE
Current patient location: [address] [place] [address]    Is the patient currently in the state of [place]? YES    Visit mode:VIDEO    If the visit is dropped, the patient can be reconnected by: VIDEO VISIT: Text to cell phone:   Telephone Information:   Mobile [phone]       Will anyone else be joining the visit? NO  (If patient encounters technical issues they should call [phone])    How would you like to obtain your AVS? MyChart    Are changes needed to the allergy or medication list? Pt stated no changes to allergies and Pt stated no med changes    Are refills needed on medications prescribed by this physician? YES, both prescribed by other providers, see below:  HYDROmorphone (DILAUDID) 2 MG tablet   lipase-protease-amylase (CREON 24) 69946-06809-504719 units CPEP per EC capsule       Rooming Documentation:  Questionnaire(s) completed    Reason for visit: RECHECK    Noreen Dhillon LPN

## 2024-09-24 ENCOUNTER — VIRTUAL VISIT (OUTPATIENT)
Dept: RADIATION ONCOLOGY | Facility: HOSPITAL | Age: 59
End: 2024-09-24
Attending: FAMILY MEDICINE
Payer: COMMERCIAL

## 2024-09-24 ENCOUNTER — TELEPHONE (OUTPATIENT)
Dept: VASCULAR SURGERY | Facility: CLINIC | Age: 59
End: 2024-09-24
Payer: COMMERCIAL

## 2024-09-24 VITALS — HEIGHT: 72 IN | BODY MASS INDEX: 20.86 KG/M2 | WEIGHT: 154 LBS

## 2024-09-24 DIAGNOSIS — G89.3 CANCER ASSOCIATED PAIN: ICD-10-CM

## 2024-09-24 DIAGNOSIS — Z51.5 PALLIATIVE CARE PATIENT: Primary | ICD-10-CM

## 2024-09-24 DIAGNOSIS — C79.51 MALIGNANT NEOPLASM METASTATIC TO BONE (H): ICD-10-CM

## 2024-09-24 DIAGNOSIS — C25.0 MALIGNANT NEOPLASM OF HEAD OF PANCREAS (H): ICD-10-CM

## 2024-09-24 LAB — CANCER AG19-9 SERPL IA-ACNC: 12 U/ML

## 2024-09-24 PROCEDURE — G2211 COMPLEX E/M VISIT ADD ON: HCPCS | Mod: 95 | Performed by: FAMILY MEDICINE

## 2024-09-24 PROCEDURE — 99215 OFFICE O/P EST HI 40 MIN: CPT | Mod: 95 | Performed by: FAMILY MEDICINE

## 2024-09-24 ASSESSMENT — PAIN SCALES - GENERAL: PAINLEVEL: NO PAIN (0)

## 2024-09-24 NOTE — TELEPHONE ENCOUNTER
Called pt to fup on him s/p vertebroplasty and Bone ablation with Dr Seymour    Getting better daily    Is on a 5lb restriction, would like to inquire if he can come off of it-I informed him that I'll pose the question and then have Dr Seymour respond.     Pt did Pull a muscle in his back while turning left awhile ago, still recovering form it.    Starts Radiation next week.    Doing much better overall.     Julianna ASENCIO RN, BSN  Interventional Radiology/Vascular  Nurse Coordinator   Phone: [phone]

## 2024-09-24 NOTE — PROGRESS NOTES
Virtual Visit Details    Type of service:  Video Visit     Originating Location (pt. Location): Home    Distant Location (provider location):  On-site  Platform used for Video Visit: [facility]    Palliative Care Outpatient Clinic Progress Note    Patient Name: Gallo Navarro  Primary Provider: Akil Hernandez    Impressions:  ECO  Decision Making Capacity:  VERY PRESENT  PDMP review:  yes, no concerns     Locally extensive non-resectable pancreatic cancer  Cancer associated pain  Lymphedema in BLE  L4 MET S/P VERTEBROPLASTY and RFA     GOALS OF CARE:  2024   No change  2024  No change to GO.  Gallo is preparing to go to [facility] to see if he qualifies for a cellular therapy trial.  2024 Life prolonging without limits at this time and willingness to consider clinical trials.     Recommendations & Counseling:  CONTINUE buprenorphine 30 mcg/hour patch (across 2 patches) and replace weekly     Continue dilaudid 4 mg po q 3 hours prn--this is usually three times a day  Tylenol suspension 650 mg po TID  Narcan also prescribed for safety  Follow up by video in early January, sooner prn.     I provided emotional support through validating Gallo's feelings and open, empathic communication and his love for the [employer].     Counseling: All of the above was explained to the patient in lay language. The patient has verbalized a clear understanding of the discussion, asked appropriate questions, which have been answered to patient's apparent satisfaction. The patient is in agreement with the above plan.        Chief Complaint/Patient ID: Gallo Navarro 58 year old male with PMHx of unresectable localized pancreatic cancer    Last Palliative care appointment: 2024 with me     Reviewed:  Yes:   reviewed - controlled substances reflected in medication list.    Interim History:  Gallo Navarro is a 58 year old male who is seen today for follow up with Palliative Care via billable video visit. He was alone  for the visit.  He has completed 11 cycles of gemcitabine/Abraxane.  He finds it 'rough' for the first 48 hours even with dose reduction.     8/2024 repeat imaging with isolated progression to L4 lesion with fracture of that spinous process. Referred to radiation therapy. Decreased Abraxane dose reduced to 75mg/m2 with cycle 9 day 15 for overall treatment tolerance.      8/28/24 RFA ablation and vertebral augmentation to L4.   L4 Radiation to start 10/1 for 10 planned treatments.  No pain in this area since his vertebroplasty.  His overall pain is 0/10.      Pain:  none at present    Appetite/Nausea: no concerns-using whey protein supplements     Bowels: a little constipation     Sleep: no concerns     Mood: no depression or anxiety     Coping:  doing well    Family History- Reviewed in Epic.    No Known Allergies    Social History:  Pertinent changes to social history/social situation since last visit: none  Key support resources: family and wide [place] of friends  Advance Directive Status:  no ACP documents      Social History     Tobacco Use    Smoking status: Never     Passive exposure: Never    Smokeless tobacco: Never   Vaping Use    Vaping status: Never Used   Substance Use Topics    Alcohol use: Not Currently    Drug use: Never         No Known Allergies  Current Outpatient Medications   Medication Sig Dispense Refill    acetaminophen (TYLENOL) 32 mg/mL liquid Take 20.313 mLs (650 mg) by mouth every 8 hours. 1800 mL 1    apixaban ANTICOAGULANT (ELIQUIS) 5 MG tablet Take 1 tablet (5 mg) by mouth 2 times daily 60 tablet 2    buprenorphine (BUTRANS) 10 MCG/HR WK patch Place 1 patch onto the skin every 7 days. Use with 20 mcg/hour patch for 30 mcg/hour total. 4 patch 3    buprenorphine (BUTRANS) 20 MCG/HR WK patch Place 1 patch onto the skin once a week. Use with 10 mcg/hour patch for 30 mcg/hour total. 4 patch 3    dicyclomine (BENTYL) 10 MG capsule Take 1 capsule (10 mg) by mouth 4 times daily (before meals  and nightly). (Patient taking differently: Take 10 mg by mouth 2 times daily.) 120 capsule 1    econazole nitrate 1 % external cream Apply topically daily To affected toenails. 85 g 5    HYDROmorphone (DILAUDID) 2 MG tablet Take 1-2 tablets (2-4 mg) by mouth every 4 hours as needed for severe pain or breakthrough pain. 180 tablet 0    lidocaine-prilocaine (EMLA) 2.5-2.5 % external cream Use 1-2 times a week or as needed prior to port access (Patient not taking: Reported on 9/11/2024) 30 g 3    lipase-protease-amylase (CREON 24) [id number] units CPEP per EC capsule 2-3 capsules with meals 3 times a day and 1-2 capsules with snacks max of 15 capsules a day 200 capsule 11    methylphenidate (RITALIN) 5 MG tablet Take 1 tablet (5 mg) by mouth 2 times daily as needed (Patient not taking: Reported on 7/30/2024) 10 tablet 0    Multiple Vitamins-Minerals (CENTRUM SILVER 50+MEN) TABS as directed Orally      naloxone (NARCAN) 4 MG/0.1ML nasal spray Instill 1 spray (4 mg) into one nostril alternating nostrils as needed for opioid reversal every 2-3 minutes until assistance arrives* (Patient not taking: Reported on 7/30/2024)      pantoprazole (PROTONIX) 40 MG EC tablet Take 1 tablet (40 mg) by mouth 2 times daily 60 tablet 4    prochlorperazine (COMPAZINE) 10 MG tablet Take 1 tablet (10 mg) by mouth every 6 hours as needed for nausea or vomiting 30 tablet 2    vitamin D3 (CHOLECALCIFEROL) 50 mcg (2000 units) tablet Take 1 tablet (50 mcg) by mouth daily 90 tablet 1     Past Medical History:   Diagnosis Date    Acute pancreatitis 04/16/2023    Alcohol-induced acute pancreatitis 04/10/2023    Gastric outlet obstruction     Metastasis from pancreatic cancer (H)     Personal history of chemotherapy     Gemzar + Abraxane started on 10/31/2023    Recurrent acute pancreatitis     S/P radiation therapy     2,000 cGy to T10 Spine completed on 11/29/[facility]23 [facility]     Past Surgical History:   Procedure  Laterality Date    AS OPEN TREATMENT CLAVICULAR FRACTURE INTERNAL FX      ENDOSCOPIC RETROGRADE CHOLANGIOPANCREATOGRAM N/A 7/11/2023    Procedure: ENDOSCOPIC RETROGRADE CHOLANGIOPANCREATOGRAPHY;  Surgeon: Khadar Pickett MD;  Location: [facility] OR    ENDOSCOPIC RETROGRADE CHOLANGIOPANCREATOGRAM N/A 8/17/2023    Procedure: ENDOSCOPIC RETROGRADE  with stent removal x1, balloon sweep;  Surgeon: Christos Greenberg MD;  Location: [facility] OR    ENDOSCOPIC ULTRASOUND UPPER GASTROINTESTINAL TRACT (GI) N/A 7/11/2023    Procedure: Endoscopic ultrasound upper gastrointestinal tract (GI), with biposy, GJ tube repositioning, stent placement;  Surgeon: Khadar Pickett MD;  Location: [facility] OR    ENDOSCOPIC ULTRASOUND UPPER GASTROINTESTINAL TRACT (GI) N/A 7/13/2023    Procedure: ENDOSCOPIC ULTRASOUND, ESOPHAGOSCOPY, EUS guided gastrojejunostomy;  Surgeon: Tre York MD;  Location: [facility] OR    INSERT PICC LINE  04/29/2023    INSERT PORT VASCULAR ACCESS Right 7/28/2023    Procedure: Insert port vascular access;  Surgeon: Tom العلي MD;  Location: [facility] OR    IR BONE ABLATION RFA  8/28/2024    IR CHEST PORT PLACEMENT > 5 YRS OF AGE  7/28/2023    IR LUMBAR VERTEBROPLASTY  8/28/2024    IR NG TUBE PLACEMENT REQ RAD & FLUORO  05/08/2023    JG tube    REPLACE GASTROJEJUNOSTOMY TUBE, PERCUTANEOUS N/A 8/17/2023    Procedure: possible REPLACEMENT, GASTROJEJUNOSTOMY TUBE, PERCUTANEOUS;  Surgeon: Christos Greenberg MD;  Location: [facility] OR       Physical Exam:   GENERAL APPEARANCE: healthy appearing, alert and no distress; neatly groomed  EYES: Eyes grossly normal to inspection, PERRLA, conjunctivae and sclerae without injection or discharge, EOM intact   RESP:  no increased work of breathing; speaks in complete sentences;   MS: No musculoskeletal defects are noted  SKIN: No suspicious lesions or rashes, hydration status appears adequate with normal skin turgor   PSYCH: Alert and oriented x3; speech- coherent , normal rate and volume; able  to articulate logical thoughts, able to abstract reason, no tangential thoughts, no hallucinations or delusions, mentation appears normal, Mood is euthymic. Affect is appropriate for this mood state and bright. Thought content is free of suicidal ideation, hallucinations, and delusions.  Eye contact is good during conversation.       Key Data Reviewed:  LABS: 2024- Cr 0.72, Albumin 3.8,  Hgb 10,  Alk phos 264; ALT 88; AST 55    IMAGIN2024 CT CAP W/CONTRAST  IMPRESSION:   1.  No significant change in poorly marginated infiltrative pancreatic  head mass with prominent peripancreatic and mesenteric  lymphadenopathy.  2.  Continued enlargement of sclerotic lesion within the vertebral  body of L4 with new atelectatic fracture of the inferior endplate.  Other scattered stable sclerotic lesions as described above.  3.  Unchanged appearance of gastrojejunal, gastroduodenal, biliary,  and pancreatic stents.    40 minutes spent on the date of the encounter doing chart review, history and exam, patient education & counseling, documentation and other activities as noted above.    The longitudinal plan of care for the diagnosis(es)/condition(s) as documented were addressed during this visit. Due to the added complexity in care, I will continue to support Gallo in the subsequent management and with ongoing continuity of care.    Jeremy Hurt MD MS FAAFP CAQHPM  [facility]  Office [phone]  Fax [phone]

## 2024-09-24 NOTE — PATIENT INSTRUCTIONS
It was good to see you today, Gallo.  I'm glad the low back pain is better.  GO PACK GO!    Here are the things we talked about:  No change in medications--use you constipation meds with a goal of an easy to pass BM at least every other day without causing any diarrhea.    Someone from the team will reach out to schedule a follow up appointment in early January.     How to get a hold of us:  For non-urgent matters, MyChart works best.    For more urgent matters, or if you prefer not to use MyChart, call our clinic nurse coordinator Megan Funez RN at [phone]    We have an on-call number for evenings and weekends. Please call this only if you are having uncontrolled symptoms or serious side effects from your medicines: [phone].     For refills, please give us a week (5 working days) notice. We don't always have providers available everyday to do refills. If you call the day you run out of your medicine, we may not be able to refill it in time, so call 5 days in advance!    Jeremy Hurt MD MS FAAFP CAQHPM  [facility]  Office [phone]  Fax [phone]

## 2024-09-24 NOTE — NURSING NOTE
Current patient location: [address] [place] [id number]    Is the patient currently in the state of [place]? YES    Visit mode:VIDEO    If the visit is dropped, the patient can be reconnected by: VIDEO VISIT: Text to cell phone:   Telephone Information:   Mobile [phone]       Will anyone else be joining the visit? NO  (If patient encounters technical issues they should call [phone]    How would you like to obtain your AVS? MyChart    Are changes needed to the allergy or medication list? No    Are refills needed on medications prescribed by this physician? NO    Rooming Documentation:  Questionnaire(s) not done per department protocol    Reason for visit: LO NICOLE

## 2024-09-25 ENCOUNTER — VIRTUAL VISIT (OUTPATIENT)
Dept: VASCULAR SURGERY | Facility: CLINIC | Age: 59
End: 2024-09-25
Payer: COMMERCIAL

## 2024-09-25 ENCOUNTER — OFFICE VISIT (OUTPATIENT)
Dept: PHARMACY | Facility: CLINIC | Age: 59
End: 2024-09-25
Payer: COMMERCIAL

## 2024-09-25 VITALS — WEIGHT: 154 LBS | BODY MASS INDEX: 20.86 KG/M2 | HEIGHT: 72 IN

## 2024-09-25 VITALS
WEIGHT: 154 LBS | RESPIRATION RATE: 18 BRPM | BODY MASS INDEX: 20.89 KG/M2 | SYSTOLIC BLOOD PRESSURE: 122 MMHG | DIASTOLIC BLOOD PRESSURE: 62 MMHG | HEART RATE: 84 BPM | TEMPERATURE: 97.3 F | OXYGEN SATURATION: 96 %

## 2024-09-25 DIAGNOSIS — S32.040A CLOSED WEDGE COMPRESSION FRACTURE OF L4 VERTEBRA, INITIAL ENCOUNTER (H): ICD-10-CM

## 2024-09-25 DIAGNOSIS — C25.0 MALIGNANT NEOPLASM OF HEAD OF PANCREAS (H): Primary | ICD-10-CM

## 2024-09-25 DIAGNOSIS — Z51.11 ENCOUNTER FOR ANTINEOPLASTIC CHEMOTHERAPY: Primary | ICD-10-CM

## 2024-09-25 DIAGNOSIS — C25.0 MALIGNANT NEOPLASM OF HEAD OF PANCREAS (H): ICD-10-CM

## 2024-09-25 PROCEDURE — 99213 OFFICE O/P EST LOW 20 MIN: CPT | Mod: 95

## 2024-09-25 RX ORDER — PACLITAXEL 100 MG/20ML
75 INJECTION, POWDER, LYOPHILIZED, FOR SUSPENSION INTRAVENOUS ONCE
Status: COMPLETED | OUTPATIENT
Start: 2024-09-25 | End: 2024-09-25

## 2024-09-25 RX ORDER — ONDANSETRON 2 MG/ML
8 INJECTION INTRAMUSCULAR; INTRAVENOUS ONCE
Status: COMPLETED | OUTPATIENT
Start: 2024-09-25 | End: 2024-09-25

## 2024-09-25 RX ADMIN — PACLITAXEL 140 MG: 100 INJECTION, POWDER, LYOPHILIZED, FOR SUSPENSION INTRAVENOUS at 11:00

## 2024-09-25 RX ADMIN — ONDANSETRON 8 MG: 2 INJECTION INTRAMUSCULAR; INTRAVENOUS at 10:58

## 2024-09-25 ASSESSMENT — PAIN SCALES - GENERAL
PAINLEVEL: NO PAIN (0)
PAINLEVEL: NO PAIN (0)

## 2024-09-25 NOTE — NURSING NOTE
Current patient location: [address] [place] [address]    Is the patient currently in the state of [place]? YES    Visit mode:VIDEO    If the visit is dropped, the patient can be reconnected by: VIDEO VISIT: Text to cell phone:   Telephone Information:   Mobile [phone]       Will anyone else be joining the visit? Yes, pt's wife is with the pt and will be joining the video visit per pt   (If patient encounters technical issues they should call [phone]    How would you like to obtain your AVS? MyChart    Are changes needed to the allergy or medication list? Pt stated no changes to allergies and Pt stated no med changes    Are refills needed on medications prescribed by this physician? NO    Rooming Documentation:  Not applicable    Reason for visit: RECHECK    No other vitals to report per pt    Carolyn NICOLE

## 2024-09-25 NOTE — PATIENT INSTRUCTIONS
INTERVENTIONAL RADIOLOGY CLINIC AFTER VISIT SUMMARY:    Dear Gallo Navarro,    Thank you for choosing the [facility]. I would like to highlight some of the important information from our visit.     5-10 pound lifting restriction for 3 months.    Follow-up with IR as needed.  Discharge back to Dr. Luis Haile and Dr. Ben Reaves.  Have oncology/primary care provider contact IR if pain/symptoms recur.     Please do not hesitate to contact our department if you have any further questions or concerns.     Sincerely,      Carmen Jaramillo PA-C    [facility]  [address] [place] [address], [id number] [phone] (IR Nurse Triage Line)

## 2024-09-25 NOTE — PROGRESS NOTES
Infusion Nursing Note:  Gallo Navarro presents today for D1C11 Abraxane/Gemzar.    Patient seen by provider today: No-- pt had visit yesterday with Megan Farrell DNP   present during visit today: Not Applicable.    Note: pt feeling well today, no new issues or concerns to report. Had visit with SUZETTE yesterday and states no changes overnight.    Intravenous Access:  Implanted Port.    Treatment Conditions:  Lab Results   Component Value Date    HGB 10.0 (L) 09/23/2024    WBC 6.7 09/23/2024    ANEU 2.2 12/26/2023    ANEUTAUTO 4.5 09/23/2024     09/23/2024        Lab Results   Component Value Date     09/23/2024    POTASSIUM 4.2 09/23/2024    MAG 1.9 08/23/2023    CR 0.72 09/23/2024    DENISE 8.8 09/23/2024    BILITOTAL 0.3 09/23/2024    ALBUMIN 3.8 09/23/2024    ALT 88 (H) 09/23/2024    AST 55 (H) 09/23/2024       Results reviewed, labs MET treatment parameters, ok to proceed with treatment.      Post Infusion Assessment:  Patient tolerated infusion without incident.  Blood return noted pre and post infusion.  No evidence of extravasations.  Access discontinued per protocol.       Discharge Plan:   Patient declined prescription refills.  Discharge instructions reviewed with: Patient and Family.  Patient and/or family verbalized understanding of discharge instructions and all questions answered.    Archana Davey, RN, RN

## 2024-09-25 NOTE — PROGRESS NOTES
08/08/2024  AUTH REQUIRED DAY ORDERS (or CHANGE IN ORDERS) ARE RECEIVED. PLEASE NOTIFY PA TEAM ONCE ORDERS RECEIVED.     PT HAS COVERAGE FOR TPA, LINE CARE, NEULASTA (AUTH REQUIRED), HOME DISCONNECTS AND HYDRATION. AJ

## 2024-09-27 DIAGNOSIS — I26.94 MULTIPLE SUBSEGMENTAL PULMONARY EMBOLI WITHOUT ACUTE COR PULMONALE (H): ICD-10-CM

## 2024-09-27 DIAGNOSIS — C25.9 PANCREATIC ADENOCARCINOMA (H): ICD-10-CM

## 2024-09-27 RX ORDER — HYDROMORPHONE HYDROCHLORIDE 2 MG/1
2-4 TABLET ORAL EVERY 4 HOURS PRN
Qty: 180 TABLET | Refills: 0 | Status: SHIPPED | OUTPATIENT
Start: 2024-09-27

## 2024-09-27 NOTE — TELEPHONE ENCOUNTER
Received telephone call from patient requesting refill of hydromorphone.     Last refill: 9/3/24  Last office visit: 9/24/24  Scheduled for follow up per check out request.     Will route request to NP for review.     Reviewed [place].

## 2024-09-27 NOTE — TELEPHONE ENCOUNTER
Eliquis Refill   Last prescribing provider: DR Haile     Last clinic visit date: 9/23/24 Megan Farrell     Recommendations for requested medication (if none, N/A): Copied from chart note   apixaban ANTICOAGULANT (ELIQUIS) 5 MG tablet Take 1 tablet (5 mg) by mouth 2 times daily 60 tablet 2     Any other pertinent information (if none, N/A): N/A    Refilled: Y/N, if NO, why?

## 2024-10-03 ENCOUNTER — MYC REFILL (OUTPATIENT)
Dept: RADIATION ONCOLOGY | Facility: HOSPITAL | Age: 59
End: 2024-10-03

## 2024-10-03 DIAGNOSIS — E11.9 TYPE 2 DIABETES MELLITUS WITHOUT COMPLICATION, WITH LONG-TERM CURRENT USE OF INSULIN (H): ICD-10-CM

## 2024-10-03 DIAGNOSIS — K31.1 GASTRIC OUTLET OBSTRUCTION: ICD-10-CM

## 2024-10-03 DIAGNOSIS — C25.9 PANCREATIC ADENOCARCINOMA (H): ICD-10-CM

## 2024-10-03 DIAGNOSIS — G89.3 CANCER ASSOCIATED PAIN: ICD-10-CM

## 2024-10-03 DIAGNOSIS — Z79.4 TYPE 2 DIABETES MELLITUS WITHOUT COMPLICATION, WITH LONG-TERM CURRENT USE OF INSULIN (H): ICD-10-CM

## 2024-10-03 DIAGNOSIS — C25.0 MALIGNANT NEOPLASM OF HEAD OF PANCREAS (H): ICD-10-CM

## 2024-10-04 RX ORDER — BUPRENORPHINE 20 UG/H
1 PATCH TRANSDERMAL WEEKLY
Qty: 4 PATCH | Refills: 3 | Status: SHIPPED | OUTPATIENT
Start: 2024-10-13 | End: 2024-11-11

## 2024-10-04 RX ORDER — BUPRENORPHINE 10 UG/H
1 PATCH TRANSDERMAL
Qty: 4 PATCH | Refills: 3 | Status: SHIPPED | OUTPATIENT
Start: 2024-10-06 | End: 2024-11-11

## 2024-10-04 NOTE — TELEPHONE ENCOUNTER
Received Merust message from patient requesting refill of Butrans.     Last refill Butrans 20 mcg/hr patch: 9/17/24  Last refill Butrans 10 mcg/hr patch: 9/10/24  Last office visit: 9/24/24  Scheduled for follow up pending, msg sent to scheduling.     Will route request to NP for review.     Reviewed [place].

## 2024-10-07 ENCOUNTER — LAB REQUISITION (OUTPATIENT)
Dept: LAB | Facility: CLINIC | Age: 59
End: 2024-10-07
Payer: COMMERCIAL

## 2024-10-07 DIAGNOSIS — C25.0 MALIGNANT NEOPLASM OF HEAD OF PANCREAS (H): ICD-10-CM

## 2024-10-07 LAB
BASOPHILS # BLD AUTO: 0 10E3/UL (ref 0–0.2)
BASOPHILS NFR BLD AUTO: 1 %
EOSINOPHIL # BLD AUTO: 0.1 10E3/UL (ref 0–0.7)
EOSINOPHIL NFR BLD AUTO: 2 %
ERYTHROCYTE [DISTWIDTH] IN BLOOD BY AUTOMATED COUNT: 14.8 % (ref 10–15)
HCT VFR BLD AUTO: 32.8 % (ref 40–53)
HGB BLD-MCNC: 10.7 G/DL (ref 13.3–17.7)
IMM GRANULOCYTES # BLD: 0 10E3/UL
IMM GRANULOCYTES NFR BLD: 1 %
LYMPHOCYTES # BLD AUTO: 1.3 10E3/UL (ref 0.8–5.3)
LYMPHOCYTES NFR BLD AUTO: 21 %
MCH RBC QN AUTO: 29.7 PG (ref 26.5–33)
MCHC RBC AUTO-ENTMCNC: 32.6 G/DL (ref 31.5–36.5)
MCV RBC AUTO: 91 FL (ref 78–100)
MONOCYTES # BLD AUTO: 0.8 10E3/UL (ref 0–1.3)
MONOCYTES NFR BLD AUTO: 12 %
NEUTROPHILS # BLD AUTO: 4.1 10E3/UL (ref 1.6–8.3)
NEUTROPHILS NFR BLD AUTO: 64 %
NRBC # BLD AUTO: 0 10E3/UL
NRBC BLD AUTO-RTO: 0 /100
PLATELET # BLD AUTO: 244 10E3/UL (ref 150–450)
RBC # BLD AUTO: 3.6 10E6/UL (ref 4.4–5.9)
WBC # BLD AUTO: 6.4 10E3/UL (ref 4–11)

## 2024-10-07 PROCEDURE — 85004 AUTOMATED DIFF WBC COUNT: CPT | Performed by: STUDENT IN AN ORGANIZED HEALTH CARE EDUCATION/TRAINING PROGRAM

## 2024-10-07 PROCEDURE — 85014 HEMATOCRIT: CPT | Performed by: STUDENT IN AN ORGANIZED HEALTH CARE EDUCATION/TRAINING PROGRAM

## 2024-10-08 ENCOUNTER — APPOINTMENT (OUTPATIENT)
Dept: RADIATION ONCOLOGY | Facility: CLINIC | Age: 59
End: 2024-10-08
Payer: COMMERCIAL

## 2024-10-08 PROCEDURE — 77402 RADIATION TX DELIVERY LVL 1: CPT | Performed by: SURGERY

## 2024-10-08 PROCEDURE — 77280 THER RAD SIMULAJ FIELD SMPL: CPT | Performed by: SURGERY

## 2024-10-09 ENCOUNTER — APPOINTMENT (OUTPATIENT)
Dept: RADIATION ONCOLOGY | Facility: CLINIC | Age: 59
End: 2024-10-09
Payer: COMMERCIAL

## 2024-10-09 ENCOUNTER — OFFICE VISIT (OUTPATIENT)
Dept: RADIATION ONCOLOGY | Facility: CLINIC | Age: 59
End: 2024-10-09
Payer: COMMERCIAL

## 2024-10-09 VITALS — BODY MASS INDEX: 21.43 KG/M2 | WEIGHT: 158 LBS

## 2024-10-09 DIAGNOSIS — C79.51 SECONDARY MALIGNANT NEOPLASM OF BONE (H): Primary | ICD-10-CM

## 2024-10-09 PROCEDURE — 99207 PR DROP WITH A PROCEDURE: CPT | Performed by: SURGERY

## 2024-10-09 PROCEDURE — 77402 RADIATION TX DELIVERY LVL 1: CPT | Performed by: SURGERY

## 2024-10-09 ASSESSMENT — PAIN SCALES - GENERAL: PAINLEVEL: NO PAIN (0)

## 2024-10-09 NOTE — PATIENT INSTRUCTIONS
Please contact [facility] RN with questions or concerns following today's appointment.    If you are a patient of Dr. Reaves call: [phone]   If you are a patient of Dr. Palumbo call: [phone]    Please feel free to leave a detailed message if your call is not answered.    If your call is not received before 3:00 PM, it may not be returned until the following business day.    If you are receiving radiation treatment and need assistance after 3:00 PM or on the weekends, please call [phone] and ask to speak to the radiation oncologist on-call.    Thank you!    [facility] Radiation Oncology Nursing

## 2024-10-09 NOTE — PROGRESS NOTES
[facility]  SPECIALIZING IN BREAKTHROUGHS  Radiation Oncology    On Treatment Visit Note      Gallo Navarro      Date: Oct 9, 2024   MRN: [id number]   : 1965  Diagnosis: metastatic pancreatic cancer to bone        ID: Metastatic pancreatic cancer to L4 s/p augmentation/ RFA    Reason for Visit:  On Radiation Treatment Visit       Treatment Summary to Date  Treatment Site: L4 Current Dose: 600/3000 cGy Fractions: 2/10        Chemotherapy  Chemo concurrent with radx?:  (Dr. Haile)    Subjective:   No complaints, tolerating RT well overall. Pain 0/10.      Objective:   Wt 71.7 kg (158 lb)   BMI 21.43 kg/m    Gen: Appears well, in no acute distress  Skin: No erythema  CV/Resp: rrr, breathing comfortably on room air  Neuro: CN 2-12 grossly intact, UE/LE full strength     Labs:  CBC RESULTS:   Recent Labs   Lab Test 10/07/24  1213   WBC 6.4   RBC 3.60*   HGB 10.7*   HCT 32.8*   MCV 91   MCH 29.7   MCHC 32.6   RDW 14.8        ELECTROLYTES:  Recent Labs   Lab Test 24  1145      POTASSIUM 4.2   CHLORIDE 104   DENISE 8.8   CO2 25   BUN 16.2   CR 0.72   *       Assessment:    Tolerating radiation therapy well.  All questions and concerns addressed.      Plan:   Continue current therapy.        Mosaiq chart and setup information reviewed  Ports checked and MVCT/IGRT images checked         Educational Topic Discussed  Additional Instructions: follow-up with JOHN Farrell on 10/21/2024  Education Instructions: radiation therapy side effects reviewed      Ben Reaves MD  Radiation Oncology   [facility]  Clinic: [phone]

## 2024-10-09 NOTE — LETTER
10/9/2024      Gallo Navarro  [address] [address] [place] [id number]      Dear Colleague,    Thank you for referring your patient, Gallo Navarro, to the [facility] [place]. Please see a copy of my visit note below.    [facility]  SPECIALIZING IN BREAKTHROUGHS  Radiation Oncology    On Treatment Visit Note      Gallo Navarro      Date: Oct 9, 2024   MRN: [id number]   : 1965  Diagnosis: metastatic pancreatic cancer to bone        ID: Metastatic pancreatic cancer to L4 s/p augmentation/ RFA    Reason for Visit:  On Radiation Treatment Visit       Treatment Summary to Date  Treatment Site: L4 Current Dose: 600/3000 cGy Fractions: 2/10        Chemotherapy  Chemo concurrent with radx?:  (Dr. Haile)    Subjective:   No complaints, tolerating RT well overall. Pain 0/10.      Objective:   Wt 71.7 kg (158 lb)   BMI 21.43 kg/m    Gen: Appears well, in no acute distress  Skin: No erythema  CV/Resp: rrr, breathing comfortably on room air  Neuro: CN 2-12 grossly intact, UE/LE full strength     Labs:  CBC RESULTS:   Recent Labs   Lab Test 10/07/24  1213   WBC 6.4   RBC 3.60*   HGB 10.7*   HCT 32.8*   MCV 91   MCH 29.7   MCHC 32.6   RDW 14.8        ELECTROLYTES:  Recent Labs   Lab Test 24  1145      POTASSIUM 4.2   CHLORIDE 104   DENISE 8.8   CO2 25   BUN 16.2   CR 0.72   *       Assessment:    Tolerating radiation therapy well.  All questions and concerns addressed.      Plan:   Continue current therapy.        Mindscapeiq chart and setup information reviewed  Ports checked and MVCT/IGRT images checked         Educational Topic Discussed  Additional Instructions: follow-up with JOHN Farrell on 10/21/2024  Education Instructions: radiation therapy side effects reviewed      Ben Reaves MD  Radiation Oncology   [facility]  Clinic: [phone]        Again, thank you for allowing me to participate in the care of your patient.         Sincerely,        Ben Reaves MD

## 2024-10-10 ENCOUNTER — APPOINTMENT (OUTPATIENT)
Dept: RADIATION ONCOLOGY | Facility: CLINIC | Age: 59
End: 2024-10-10
Payer: COMMERCIAL

## 2024-10-10 PROCEDURE — 77402 RADIATION TX DELIVERY LVL 1: CPT | Performed by: SURGERY

## 2024-10-11 ENCOUNTER — APPOINTMENT (OUTPATIENT)
Dept: RADIATION ONCOLOGY | Facility: CLINIC | Age: 59
End: 2024-10-11
Payer: COMMERCIAL

## 2024-10-11 PROCEDURE — 77402 RADIATION TX DELIVERY LVL 1: CPT | Performed by: SURGERY

## 2024-10-11 PROCEDURE — 77417 THER RADIOLOGY PORT IMAGE(S): CPT | Performed by: SURGERY

## 2024-10-12 ENCOUNTER — MYC REFILL (OUTPATIENT)
Dept: RADIATION ONCOLOGY | Facility: HOSPITAL | Age: 59
End: 2024-10-12
Payer: COMMERCIAL

## 2024-10-12 DIAGNOSIS — G89.3 CANCER ASSOCIATED PAIN: ICD-10-CM

## 2024-10-12 DIAGNOSIS — C25.9 PANCREATIC ADENOCARCINOMA (H): ICD-10-CM

## 2024-10-12 RX ORDER — BUPRENORPHINE 10 UG/H
1 PATCH TRANSDERMAL
Qty: 4 PATCH | Refills: 3 | Status: CANCELLED | OUTPATIENT
Start: 2024-10-12

## 2024-10-14 ENCOUNTER — APPOINTMENT (OUTPATIENT)
Dept: RADIATION ONCOLOGY | Facility: CLINIC | Age: 59
End: 2024-10-14
Payer: COMMERCIAL

## 2024-10-14 ENCOUNTER — MYC REFILL (OUTPATIENT)
Dept: ONCOLOGY | Facility: CLINIC | Age: 59
End: 2024-10-14

## 2024-10-14 DIAGNOSIS — R10.30 LOWER ABDOMINAL PAIN: ICD-10-CM

## 2024-10-14 DIAGNOSIS — E55.9 VITAMIN D DEFICIENCY: ICD-10-CM

## 2024-10-14 PROCEDURE — 77336 RADIATION PHYSICS CONSULT: CPT | Performed by: SURGERY

## 2024-10-14 PROCEDURE — 77427 RADIATION TX MANAGEMENT X5: CPT | Performed by: SURGERY

## 2024-10-14 PROCEDURE — 77402 RADIATION TX DELIVERY LVL 1: CPT | Performed by: RADIOLOGY

## 2024-10-15 ENCOUNTER — APPOINTMENT (OUTPATIENT)
Dept: RADIATION ONCOLOGY | Facility: CLINIC | Age: 59
End: 2024-10-15
Payer: COMMERCIAL

## 2024-10-15 PROCEDURE — 77417 THER RADIOLOGY PORT IMAGE(S): CPT | Performed by: SURGERY

## 2024-10-15 PROCEDURE — 77402 RADIATION TX DELIVERY LVL 1: CPT | Performed by: SURGERY

## 2024-10-15 NOTE — TELEPHONE ENCOUNTER
Medication:liquid Tylenol  Last prescribing provider:megan farrell  Last clinic visit date: 9/23/2024 Megan Farrell  Recommendations for requested medication:for pain  Any other pertinent information:routed to last provider Megan Farrell

## 2024-10-15 NOTE — TELEPHONE ENCOUNTER
Medication:vitamin D3  Last prescribing provider:Nola Quintanilla  Last clinic visit date: 9/23/2024 Megan Farrell  Recommendations for requested medication:supplement  Any other pertinent information:routed to last provider Megan Farrell

## 2024-10-16 ENCOUNTER — OFFICE VISIT (OUTPATIENT)
Dept: RADIATION ONCOLOGY | Facility: CLINIC | Age: 59
End: 2024-10-16
Payer: COMMERCIAL

## 2024-10-16 ENCOUNTER — APPOINTMENT (OUTPATIENT)
Dept: RADIATION ONCOLOGY | Facility: CLINIC | Age: 59
End: 2024-10-16
Payer: COMMERCIAL

## 2024-10-16 ENCOUNTER — HOME INFUSION (OUTPATIENT)
Dept: HOME HEALTH SERVICES | Facility: HOME HEALTH | Age: 59
End: 2024-10-16
Payer: COMMERCIAL

## 2024-10-16 VITALS
WEIGHT: 159 LBS | HEART RATE: 75 BPM | BODY MASS INDEX: 21.56 KG/M2 | OXYGEN SATURATION: 99 % | SYSTOLIC BLOOD PRESSURE: 123 MMHG | DIASTOLIC BLOOD PRESSURE: 76 MMHG | TEMPERATURE: 98.1 F | RESPIRATION RATE: 18 BRPM

## 2024-10-16 DIAGNOSIS — Z51.11 ENCOUNTER FOR ANTINEOPLASTIC CHEMOTHERAPY: Primary | ICD-10-CM

## 2024-10-16 DIAGNOSIS — C79.51 MALIGNANT NEOPLASM METASTATIC TO BONE (H): Primary | ICD-10-CM

## 2024-10-16 PROCEDURE — 99207 PR DROP WITH A PROCEDURE: CPT | Performed by: SURGERY

## 2024-10-16 PROCEDURE — 77402 RADIATION TX DELIVERY LVL 1: CPT | Performed by: SURGERY

## 2024-10-16 RX ORDER — CHOLECALCIFEROL (VITAMIN D3) 50 MCG
1 TABLET ORAL DAILY
Qty: 90 TABLET | Refills: 1 | Status: SHIPPED | OUTPATIENT
Start: 2024-10-16

## 2024-10-16 ASSESSMENT — PAIN SCALES - GENERAL: PAINLEVEL: NO PAIN (0)

## 2024-10-16 NOTE — PATIENT INSTRUCTIONS
Please contact [facility] RN with questions or concerns following today's appointment.    If you are a patient of Dr. Reaves call: [phone]   If you are a patient of Dr. Palumbo call: [phone]    Please feel free to leave a detailed message if your call is not answered.    If your call is not received before 3:00 PM, it may not be returned until the following business day.    If you are receiving radiation treatment and need assistance after 3:00 PM or on the weekends, please call [phone] and ask to speak to the radiation oncologist on-call.    Thank you!    [facility] Radiation Oncology Nursing

## 2024-10-16 NOTE — LETTER
"10/16/2024      Gallo Navarro  [address] [address] [place] [address]      Dear Colleague,    Thank you for referring your patient, Gallo Navarro, to the [facility]IATION [facility] [place]. Please see a copy of my visit note below.    [facility]  SPECIALIZING IN BREAKTHROUGHS  Radiation Oncology    On Treatment Visit Note      Gallo Navarro      Date: Oct 16, 2024   MRN: [id number]   : 1965  Diagnosis: metastatic pancreatic cancer to bone        ID: Metastatic pancreatic cancer to L4 s/p augmentation/ RFA    Reason for Visit:  On Radiation Treatment Visit       Treatment Summary to Date  Treatment Site: L4 Current Dose: 2100/3000 cGy Fractions: 7/10        Chemotherapy  Chemo concurrent with radx?:  (Dr. Haile)    Subjective:   No complaints, tolerating RT well overall. Pain 0/10. Mild nausea, improved with anti emetic. Feels \"great\".      Objective:   /76 (BP Location: Left arm, Patient Position: Chair, Cuff Size: Adult Regular)   Pulse 75   Temp 98.1  F (36.7  C) (Oral)   Resp 18   Wt 72.1 kg (159 lb)   SpO2 99%   BMI 21.56 kg/m    Gen: Appears well, in no acute distress  Skin: No erythema  CV/Resp: rrr, breathing comfortably on room air  Neuro: CN 2-12 grossly intact, UE/LE full strength     Labs:  CBC RESULTS:   Recent Labs   Lab Test 10/07/24  1213   WBC 6.4   RBC 3.60*   HGB 10.7*   HCT 32.8*   MCV 91   MCH 29.7   MCHC 32.6   RDW 14.8        ELECTROLYTES:  Recent Labs   Lab Test 24  1145      POTASSIUM 4.2   CHLORIDE 104   DENISE 8.8   CO2 25   BUN 16.2   CR 0.72   *       Assessment:    Tolerating radiation therapy well.  All questions and concerns addressed.      Plan:   Continue current therapy.    Follow up with radiation oncology on as needed basis  Follow up with Dr. Haile 2024 with imaging       Mosaiq chart and setup information reviewed  Ports checked and MVCT/IGRT images checked    Medication Review  Med list " reviewed with patient?: Yes  Med list printed and given: Offered and declined    Educational Topic Discussed  Additional Instructions: follow-up with CT chest/abdomen/pelvis on 11/12/2024 and visit with Dr. Haile on 11/15/2024  Education Instructions: radiation therapy side effects reviewed      Ben Reaves MD  Radiation Oncology   [facility]: [phone]        Again, thank you for allowing me to participate in the care of your patient.        Sincerely,        Ben Reaves MD

## 2024-10-16 NOTE — PROGRESS NOTES
"[facility]YS[facility]IANS  SPECIALIZING IN BREAKTHROUGHS  Radiation Oncology    On Treatment Visit Note      Gallo Navarro      Date: Oct 16, 2024   MRN: [id number]   : 1965  Diagnosis: metastatic pancreatic cancer to bone        ID: Metastatic pancreatic cancer to L4 s/p augmentation/ RFA    Reason for Visit:  On Radiation Treatment Visit       Treatment Summary to Date  Treatment Site: L4 Current Dose: 2100/3000 cGy Fractions: 7/10        Chemotherapy  Chemo concurrent with radx?:  (Dr. Haile)    Subjective:   No complaints, tolerating RT well overall. Pain 0/10. Mild nausea, improved with anti emetic. Feels \"great\".      Objective:   /76 (BP Location: Left arm, Patient Position: Chair, Cuff Size: Adult Regular)   Pulse 75   Temp 98.1  F (36.7  C) (Oral)   Resp 18   Wt 72.1 kg (159 lb)   SpO2 99%   BMI 21.56 kg/m    Gen: Appears well, in no acute distress  Skin: No erythema  CV/Resp: rrr, breathing comfortably on room air  Neuro: CN 2-12 grossly intact, UE/LE full strength     Labs:  CBC RESULTS:   Recent Labs   Lab Test 10/07/24  1213   WBC 6.4   RBC 3.60*   HGB 10.7*   HCT 32.8*   MCV 91   MCH 29.7   MCHC 32.6   RDW 14.8        ELECTROLYTES:  Recent Labs   Lab Test 24  1145      POTASSIUM 4.2   CHLORIDE 104   DENISE 8.8   CO2 25   BUN 16.2   CR 0.72   *       Assessment:    Tolerating radiation therapy well.  All questions and concerns addressed.      Plan:   Continue current therapy.    Follow up with radiation oncology on as needed basis  Follow up with Dr. Haile 2024 with imaging       Mosaiq chart and setup information reviewed  Ports checked and MVCT/IGRT images checked    Medication Review  Med list reviewed with patient?: Yes  Med list printed and given: Offered and declined    Educational Topic Discussed  Additional Instructions: follow-up with CT chest/abdomen/pelvis on 2024 and visit with Dr. Haile on 11/15/2024  Education Instructions: " radiation therapy side effects reviewed      Ben Reaves MD  Radiation Oncology   [facility]: [phone]

## 2024-10-17 ENCOUNTER — APPOINTMENT (OUTPATIENT)
Dept: RADIATION ONCOLOGY | Facility: CLINIC | Age: 59
End: 2024-10-17
Payer: COMMERCIAL

## 2024-10-17 PROCEDURE — 77402 RADIATION TX DELIVERY LVL 1: CPT | Performed by: SURGERY

## 2024-10-18 ENCOUNTER — APPOINTMENT (OUTPATIENT)
Dept: RADIATION ONCOLOGY | Facility: CLINIC | Age: 59
End: 2024-10-18
Payer: COMMERCIAL

## 2024-10-18 PROCEDURE — 77402 RADIATION TX DELIVERY LVL 1: CPT | Performed by: SURGERY

## 2024-10-21 ENCOUNTER — APPOINTMENT (OUTPATIENT)
Dept: RADIATION ONCOLOGY | Facility: CLINIC | Age: 59
End: 2024-10-21
Payer: COMMERCIAL

## 2024-10-21 ENCOUNTER — DOCUMENTATION ONLY (OUTPATIENT)
Dept: RADIATION ONCOLOGY | Facility: CLINIC | Age: 59
End: 2024-10-21

## 2024-10-21 ENCOUNTER — LAB REQUISITION (OUTPATIENT)
Dept: LAB | Facility: CLINIC | Age: 59
End: 2024-10-21
Payer: COMMERCIAL

## 2024-10-21 ENCOUNTER — VIRTUAL VISIT (OUTPATIENT)
Dept: ONCOLOGY | Facility: CLINIC | Age: 59
End: 2024-10-21
Attending: STUDENT IN AN ORGANIZED HEALTH CARE EDUCATION/TRAINING PROGRAM
Payer: COMMERCIAL

## 2024-10-21 VITALS — WEIGHT: 155 LBS | BODY MASS INDEX: 20.99 KG/M2 | HEIGHT: 72 IN

## 2024-10-21 DIAGNOSIS — C79.51 SECONDARY MALIGNANT NEOPLASM OF BONE (H): ICD-10-CM

## 2024-10-21 DIAGNOSIS — C25.0 MALIGNANT NEOPLASM OF HEAD OF PANCREAS (H): ICD-10-CM

## 2024-10-21 DIAGNOSIS — C79.51 MALIGNANT NEOPLASM METASTATIC TO BONE (H): Primary | ICD-10-CM

## 2024-10-21 DIAGNOSIS — C25.9 PANCREATIC ADENOCARCINOMA (H): ICD-10-CM

## 2024-10-21 DIAGNOSIS — C25.9 PANCREATIC ADENOCARCINOMA (H): Primary | ICD-10-CM

## 2024-10-21 DIAGNOSIS — G89.3 CANCER ASSOCIATED PAIN: ICD-10-CM

## 2024-10-21 LAB
ALBUMIN SERPL BCG-MCNC: 4 G/DL (ref 3.5–5.2)
ALP SERPL-CCNC: 261 U/L (ref 40–150)
ALT SERPL W P-5'-P-CCNC: 52 U/L (ref 0–70)
ANION GAP SERPL CALCULATED.3IONS-SCNC: 11 MMOL/L (ref 7–15)
AST SERPL W P-5'-P-CCNC: 36 U/L (ref 0–45)
BASOPHILS # BLD AUTO: 0 10E3/UL (ref 0–0.2)
BASOPHILS NFR BLD AUTO: 1 %
BILIRUB SERPL-MCNC: 0.4 MG/DL
BUN SERPL-MCNC: 21.3 MG/DL (ref 6–20)
CALCIUM SERPL-MCNC: 9.3 MG/DL (ref 8.8–10.4)
CHLORIDE SERPL-SCNC: 102 MMOL/L (ref 98–107)
CREAT SERPL-MCNC: 0.74 MG/DL (ref 0.67–1.17)
EGFRCR SERPLBLD CKD-EPI 2021: >90 ML/MIN/1.73M2
EOSINOPHIL # BLD AUTO: 0.1 10E3/UL (ref 0–0.7)
EOSINOPHIL NFR BLD AUTO: 2 %
ERYTHROCYTE [DISTWIDTH] IN BLOOD BY AUTOMATED COUNT: 14.8 % (ref 10–15)
GLUCOSE SERPL-MCNC: 213 MG/DL (ref 70–99)
HCO3 SERPL-SCNC: 25 MMOL/L (ref 22–29)
HCT VFR BLD AUTO: 32.7 % (ref 40–53)
HGB BLD-MCNC: 10.4 G/DL (ref 13.3–17.7)
IMM GRANULOCYTES # BLD: 0 10E3/UL
IMM GRANULOCYTES NFR BLD: 1 %
LYMPHOCYTES # BLD AUTO: 1 10E3/UL (ref 0.8–5.3)
LYMPHOCYTES NFR BLD AUTO: 20 %
MCH RBC QN AUTO: 29.2 PG (ref 26.5–33)
MCHC RBC AUTO-ENTMCNC: 31.8 G/DL (ref 31.5–36.5)
MCV RBC AUTO: 92 FL (ref 78–100)
MONOCYTES # BLD AUTO: 0.7 10E3/UL (ref 0–1.3)
MONOCYTES NFR BLD AUTO: 13 %
NEUTROPHILS # BLD AUTO: 3.4 10E3/UL (ref 1.6–8.3)
NEUTROPHILS NFR BLD AUTO: 64 %
NRBC # BLD AUTO: 0 10E3/UL
NRBC BLD AUTO-RTO: 0 /100
PLATELET # BLD AUTO: 199 10E3/UL (ref 150–450)
POTASSIUM SERPL-SCNC: 4.2 MMOL/L (ref 3.4–5.3)
PROT SERPL-MCNC: 7.2 G/DL (ref 6.4–8.3)
RBC # BLD AUTO: 3.56 10E6/UL (ref 4.4–5.9)
SODIUM SERPL-SCNC: 138 MMOL/L (ref 135–145)
WBC # BLD AUTO: 5.2 10E3/UL (ref 4–11)

## 2024-10-21 PROCEDURE — 77427 RADIATION TX MANAGEMENT X5: CPT | Performed by: SURGERY

## 2024-10-21 PROCEDURE — 99213 OFFICE O/P EST LOW 20 MIN: CPT | Mod: 95

## 2024-10-21 PROCEDURE — 85004 AUTOMATED DIFF WBC COUNT: CPT | Performed by: STUDENT IN AN ORGANIZED HEALTH CARE EDUCATION/TRAINING PROGRAM

## 2024-10-21 PROCEDURE — 82040 ASSAY OF SERUM ALBUMIN: CPT | Performed by: STUDENT IN AN ORGANIZED HEALTH CARE EDUCATION/TRAINING PROGRAM

## 2024-10-21 PROCEDURE — 77402 RADIATION TX DELIVERY LVL 1: CPT | Performed by: RADIOLOGY

## 2024-10-21 PROCEDURE — 86301 IMMUNOASSAY TUMOR CA 19-9: CPT | Performed by: STUDENT IN AN ORGANIZED HEALTH CARE EDUCATION/TRAINING PROGRAM

## 2024-10-21 PROCEDURE — 77417 THER RADIOLOGY PORT IMAGE(S): CPT | Performed by: RADIOLOGY

## 2024-10-21 PROCEDURE — G2211 COMPLEX E/M VISIT ADD ON: HCPCS | Mod: 95

## 2024-10-21 PROCEDURE — 77336 RADIATION PHYSICS CONSULT: CPT | Performed by: SURGERY

## 2024-10-21 RX ORDER — PACLITAXEL 100 MG/20ML
75 INJECTION, POWDER, LYOPHILIZED, FOR SUSPENSION INTRAVENOUS ONCE
Status: CANCELLED | OUTPATIENT
Start: 2024-10-22

## 2024-10-21 RX ORDER — HYDROMORPHONE HYDROCHLORIDE 2 MG/1
2-4 TABLET ORAL EVERY 4 HOURS PRN
Qty: 180 TABLET | Refills: 0 | Status: SHIPPED | OUTPATIENT
Start: 2024-10-21 | End: 2024-11-04

## 2024-10-21 RX ORDER — WATER 10 ML/10ML
2.2 INJECTION INTRAMUSCULAR; INTRAVENOUS; SUBCUTANEOUS PRN
Qty: 999999 ML | Refills: 0 | Status: ACTIVE | OUTPATIENT
Start: 2024-10-23 | End: 2025-08-28

## 2024-10-21 ASSESSMENT — PAIN SCALES - GENERAL: PAINLEVEL: NO PAIN (0)

## 2024-10-21 NOTE — NURSING NOTE
Patient confirms medications and allergies are accurate via patients echeck in completion, and or denies any changes since last reviewed/verified.       Current patient location: [address] [place] [address]    Is the patient currently in the state of [place]? NO    Visit mode:VIDEO    If the visit is dropped, the patient can be reconnected by: VIDEO VISIT: Text to cell phone:   Telephone Information:   Mobile [phone]       Will anyone else be joining the visit? NO  (If patient encounters technical issues they should call [phone]    Are changes needed to the allergy or medication list? No    Are refills needed on medications prescribed by this physician? NO    Rooming Documentation:  Questionnaire(s) not done per department protocol    Reason for visit: RECHECK    Joan NICOLE

## 2024-10-21 NOTE — TELEPHONE ENCOUNTER
Received [employer] message from patient requesting refill of hydromorphone.     Last refill: 9/27/24  Last office visit: 9/24/24  Scheduled for follow up planned for early January. Msg sent to scheduling.     Will route request to MD/ for review.     Reviewed [place]  Report.

## 2024-10-21 NOTE — PROGRESS NOTES
Virtual Visit Details    Type of service:  Video Visit     Originating Location (pt. Location): Home    Distant Location (provider location):  On-site  Platform used for Video Visit: [facility]      Oncology/Hematology Visit Note  Oct 21, 2024      Reason for Visit: follow-up of metastatic Pancreatic Caner    Oncology HPI:   -NGS: KRAS G12R  Immuno: pMMR, KAYLIE, TMB-low  Clinical Trial: MARIPOSA-186, MARIPOSA-280, TORL    - 3/2023: presented with acute pancreatitis  c/b chronic pancreatitis with pancreatic mass causing duodenal stenosis with gastric outlet obstruction s/p GJ tube (placed 5/2023), biliary obstruction s/p stent placement on 7/7/23, pancreatic insufficiency, and IDDM2.  -  He then presented to the ED with worsening abdominal pain, increased jaundice, poor PO intake and weight loss admitted on 7/8/2023 for concern of biliary obstruction.   - 7/12/2023: Underwent biopsy of pancreatic mass returned positive for pancreatic adenocarcinoma.   - 7/31/2023: He started on treatment with 5FU, irinotecan, and oxaliplatin (FOLFIRINOX). Please see previous notes for further details on the patient's history.      8/17/23 - ERCP/EGD, duodenal stents x2 placed, exchange of GJ tube     8/21-/8/25 hospitalized due to diarrhea, colitis, abdominal pain.       8/29 - Cycle 3 deferred due to neutropenia.   Neulasta added. Irinotecan dose reduced 20% due to symptoms including cramping, double vision and slurred speech during infusion.       10/24/23 CT CAP with similar to slight increase in size of peripancreatic and mesenteric lymph nodes, enlargement of previous skeletal metastasis as well as new sclerotic metastasis to left posterior 5th rib, enlarging pulmonary nodule, and unchanged pancreatic head mass. Also unclear concerns for PE,  right lower lobe segmental PE confirmed on CT-PE. Started on apixaban.      10/31/23 Cycle 1 gemzar, abraxane  11/7/23 Cycle 1 Zometa  11/22/23 Removal of GJ tube.   11/29/23 Completed palliative radiation  to T10   12/13/23 C3D1 gem/abraxane  12/29/23 Consults at [place]  1/23-1/26 Consults at [facility]   1/30/24: C4D1 gem/abraxane   3/24: CT CAP with overall stable disease with isolated progression in potential L4 lesion. Referral for radiation oncology.    4/29-5/1: Consults at [facility] for possible cellular therapy, CT guided biopsy of left pubic bone, recommendation to return to [facility] upon progression of current treatment   5/23/24: Cardiology consult at [facility]. CT with small pericardial effusion   6/6/2024: Repeat ECHO on with EF of 55-60%, no pericardial effusion present  - Cycle 8 deferred due to infection concerns and subsequently tested positive for COVID 6/25/24, resumed treatment on 7/2/2024    -Day 8 eliminated after Cycle 5 Delta/Abraxane.    4/2/2024-present: Gemcitabine/Abraxane, Days 1 and 15 only; Zometa every 3 months     8/2024 repeat imaging with isolated progression to L4 lesion with fracture of that spinous process. Referred to radiation therapy. Decreased Abraxane dose reduced to 75mg/m2 with cycle 9 day 15 for overall treatment tolerance.     8/28/24 RFA ablation and verteral augmentation to L4.   10/1-10/21 Radiation to L4         Interval history:   Gallo presents today via video visit prior to cycle 12 gemcitabine/Abraxane.  -finished radiation today. Mild increase in fatigue, notices in the afternoon but overall maintaining his activity level   -having a little more nausea during radiation. Taking antiemetics as needed, often finds he needs to eat when nauseated and then symptom resolves   -Appetite is good, eating well   -minimal pain in back, depends on how he sits and how long, controlled well with Butrans and Dilaudid  - reports he continues to tolerate chemo well and feels he bounces back after a couple of days  -plans to travel to [place] in November for a [employer]ish [employer], needs to reschedule 11/6 chemo           Review of Systems:  Patient denies any of the following except if  noted above: fevers, chills, difficulty with energy, vision or hearing changes, chest pain, dyspnea, abdominal pain, nausea, vomiting, diarrhea, constipation, urinary concerns, headaches, numbness, tingling, issues with sleep or mood. Also denies lumps, bumps, rashes or skin lesions, bleeding or bruising issues.        Current Outpatient Medications   Medication Sig Dispense Refill    acetaminophen (TYLENOL) 32 mg/mL liquid Take 20.313 mLs (650 mg) by mouth every 8 hours. 1800 mL 3    [START ON 10/23/2024] alteplase (CATHFLO ACTIVASE) injection Inject 2 mLs (2 mg) into catheter as needed (catheter occlusion). Reconstitute vial. Draw up alteplase 1 mg/mL in a syringe and instill into IV catheter. Dwell for at least 20 min to 24 hours, then aspirate. May repeat dose once in 24 hours if catheter function not restored after at least 2 hours. Discard remainder of vial. [id number] each 0    apixaban ANTICOAGULANT (ELIQUIS) 5 MG tablet Take 1 tablet (5 mg) by mouth 2 times daily. 60 tablet 2    buprenorphine (BUTRANS) 10 MCG/HR WK patch Place 1 patch onto the skin every 7 days. Use with 20 mcg/hour patch for 30 mcg/hour total. 4 patch 3    buprenorphine (BUTRANS) 20 MCG/HR WK patch Place 1 patch onto the skin once a week. Use with 10 mcg/hour patch for 30 mcg/hour total. 4 patch 3    dicyclomine (BENTYL) 10 MG capsule Take 1 capsule (10 mg) by mouth 4 times daily (before meals and nightly). (Patient taking differently: Take 10 mg by mouth 2 times daily.) 120 capsule 1    econazole nitrate 1 % external cream Apply topically daily To affected toenails. 85 g 5    HYDROmorphone (DILAUDID) 2 MG tablet Take 1-2 tablets (2-4 mg) by mouth every 4 hours as needed for severe pain or breakthrough pain. 180 tablet 0    lidocaine-prilocaine (EMLA) 2.5-2.5 % external cream Use 1-2 times a week or as needed prior to port access (Patient not taking: Reported on 9/11/2024) 30 g 3    lipase-protease-amylase (CREON 24) 78635-59518-112705 units  CPEP per EC capsule 2-3 capsules with meals 3 times a day and 1-2 capsules with snacks max of 15 capsules a day 200 capsule 11    methylphenidate (RITALIN) 5 MG tablet Take 1 tablet (5 mg) by mouth 2 times daily as needed (Patient not taking: Reported on 7/30/2024) 10 tablet 0    Multiple Vitamins-Minerals (CENTRUM SILVER 50+MEN) TABS as directed Orally      naloxone (NARCAN) 4 MG/0.1ML nasal spray Instill 1 spray (4 mg) into one nostril alternating nostrils as needed for opioid reversal every 2-3 minutes until assistance arrives* (Patient not taking: Reported on 7/30/2024)      pantoprazole (PROTONIX) 40 MG EC tablet Take 1 tablet (40 mg) by mouth 2 times daily 60 tablet 4    prochlorperazine (COMPAZINE) 10 MG tablet Take 1 tablet (10 mg) by mouth every 6 hours as needed for nausea or vomiting 30 tablet 2    [START ON 10/23/2024] sterile water, preservative free, injection Use 2.2 mLs for reconstitution as needed (with alteplase for catheter occlusion). Direct diluent stream into powder. Mix by gently swirling until dissolved. DO NOT SHAKE. Discard remainder of vial. [id number] mL 0    vitamin D3 (CHOLECALCIFEROL) 50 mcg (2000 units) tablet Take 1 tablet (50 mcg) by mouth daily. 90 tablet 1        No Known Allergies      Exam:   General: patient appears well in no acute distress, alert and oriented, speech clear and fluid  Skin: no visualized rash or lesions on visualized skin  Resp: Appears to be breathing comfortably without accessory muscle usage, speaking in full sentences, no audible wheezes or cough.  Psych: Coherent speech, normal rate and volume, able to articulate logical thoughts, able to abstract reason, no tangential thoughts, no hallucinations or delusions  Patient's affect is appropriate.      Labs:    Most Recent 3 CBC's:  Recent Labs   Lab Test 10/21/24  1145 10/07/24  1213 09/23/24  1145   WBC 5.2 6.4 6.7   HGB 10.4* 10.7* 10.0*   MCV 92 91 92    244 186   ANEUTAUTO 3.4 4.1 4.5    Most Recent 3  BMP's:  Recent Labs   Lab Test 10/21/24  1145 09/23/24  1145 08/26/24  1220    140 136   POTASSIUM 4.2 4.2 3.9   CHLORIDE 102 104 100   CO2 25 25 26   BUN 21.3* 16.2 17.4   CR 0.74 0.72 0.76   ANIONGAP 11 11 10   DENISE 9.3 8.8 8.5*   * 153* 120*   PROTTOTAL 7.2 6.6 7.1   ALBUMIN 4.0 3.8 4.0    Most Recent 2 LFT's:  Recent Labs   Lab Test 10/21/24  1145 09/23/24  1145   AST 36 55*   ALT 52 88*   ALKPHOS 261* 264*   BILITOTAL 0.4 0.3    Most Recent TSH and T4:No lab results found.    I reviewed the above labs today.          Impression/plan:  Gallo is a 58 year old male with unresectable pancreatic cancer, currently under treatment of Gemcitabine/Abraxane.     Unresectable Pancreatic Cancer  7/31/23 began palliative treatment on FOLFIRINOX  CT CAP 10/24/23 with progression in skeletal mets, lung nodule and lymph nodes; stable pancreatic mass  - 10/31/2023: Treatment was changed to Gemcitabine and Abraxane (Day 1, 8, 15) + Zometa every 3 months  - 03/2024 CT CAP with progression of L4 lesion  - Day 8 eliminated after Cycle 5 gem/abraxane  - 8/2024 repeat imaging with isolated progression of L4 lesion, see below   -plan to continue gem/Abraxane with dose reduced Abraxane for improved tolerance (day 1 & 15)  -continues to tolerate treatment well, now receiving infusions at home via [facility]  -labs reviewed, proceed with cycle 12 as scheduled  -continue monthly SUZETTE follow up, patient to call sooner with any concerns. He requested his next infusion be pushed out x1 week to allow for travel for an important aashish event, message sent to scheduling. Plan follow up in 3 months with Dr. Haile with imaging prior, scheduled in November.     Anemia-normocytic  Hgb stable; in setting of chemotherapy and chronic disease  -Monitor    Skeletal Mets    - Radiation to T10 completed 11/29/23  - Discussion to irradiate L4 lesion noted on CT dated 10/24/2023, however, on hold due to lack of symptoms  - CT chest 5/23/24 unchanged  sclerotic bone metastases  -imaging 8/2024 with isolated progression in L4, consult to radiation oncology.   -8/28/24 RFA ablation and verteral augmentation to L4. Completed radiation to L4 today (started 10/1). Tolerated well with mild fatigue.   -minimal back pain, managing with Butrans patch and hydromorphine  - will continue to monitor symptoms    Bone Health  Zometa 4 mg every 3 months, last infusion 8/13/2024, next due November 2024  - per chart review, Dr. Haile note 3/15/2024: consider use of denosumab instead of bisphosphonate, given the evidence for potentially superior efficacy. At this time, he is tolerating this treatment extremely well now, and has well-controlled skeletal mets. Continue to consider his skeletal treatment options as we move forward.   -Please note the therapy plan is entered as an endocrine plan with 6 month intervals. For bone mets, we typically give every 3 months.    Neuropathy  Referred to PT previously but not following  Symptoms unchanged on current treatment-monitor for progression      Pericardial Effusion  Found on CT 4/20/24 at [facility], followed by TTE with moderate pericardial effusion without tamponade.  CT 5/23/24 small pericardial effusion  5/23/24: Cardiology consult at [facility]- Echo 6/6/2024 with EF 55-60%, LV strain -20.5% which is normal, no pericardial effusion  - repeat echo at [facility] in the future,   - he has been cautioned by Cardiology s/s of cardiac tamponade  -following cardiology as needed    Pulmonary Embolism  Right lower segmental PE found on routine imaging  Denies shortness of breath  Taking Apixiban daily, no bleeding concerns      Nutrition  GJ tube removed 11/22/2023  Weight is stable, recent changes in weight likely due to scale at home versus in clinic. Reports weight on home scale is stable, eating well.   Continue small meals/protein supplements    LE edema  previously instructed to wear compression stockings as needed  - will continue to  monitor    Diarrhea/Constipation  Continue stool softeners/imodium as needed.         The longitudinal plan of care for the diagnosis(es)/condition(s) as documented were addressed during this visit. Due to the added complexity in care, I will continue to support Gallo in the subsequent management and with ongoing continuity of care.    23 minutes spent on the date of the encounter doing chart review, review of test results, interpretation of tests, patient visit, and documentation       ESVIN Canales CNP

## 2024-10-21 NOTE — Clinical Note
10/21/2024      Gallo Navarro  [address] [address] [place] [id number]      Dear Colleague,    Thank you for referring your patient, Gallo Navarro, to the [facility]. Please see a copy of my visit note below.    Virtual Visit Details    Type of service:  Video Visit     Originating Location (pt. Location): Home    Distant Location (provider location):  On-site  Platform used for Video Visit: [facility]      Oncology/Hematology Visit Note  Oct 21, 2024      Reason for Visit: follow-up of metastatic Pancreatic Caner    Oncology HPI:   -NGS: KRAS G12R  Immuno: pMMR, KAYLIE, TMB-low  Clinical Trial: MARIPOSA-186, MARIPOSA-280, TORL    - 3/2023: presented with acute pancreatitis  c/b chronic pancreatitis with pancreatic mass causing duodenal stenosis with gastric outlet obstruction s/p GJ tube (placed 5/2023), biliary obstruction s/p stent placement on 7/7/23, pancreatic insufficiency, and IDDM2.  -  He then presented to the ED with worsening abdominal pain, increased jaundice, poor PO intake and weight loss admitted on 7/8/2023 for concern of biliary obstruction.   - 7/12/2023: Underwent biopsy of pancreatic mass returned positive for pancreatic adenocarcinoma.   - 7/31/2023: He started on treatment with 5FU, irinotecan, and oxaliplatin (FOLFIRINOX). Please see previous notes for further details on the patient's history.      8/17/23 - ERCP/EGD, duodenal stents x2 placed, exchange of GJ tube     8/21-/8/25 hospitalized due to diarrhea, colitis, abdominal pain.       8/29 - Cycle 3 deferred due to neutropenia.   Neulasta added. Irinotecan dose reduced 20% due to symptoms including cramping, double vision and slurred speech during infusion.       10/24/23 CT CAP with similar to slight increase in size of peripancreatic and mesenteric lymph nodes, enlargement of previous skeletal metastasis as well as new sclerotic metastasis to left posterior 5th rib, enlarging pulmonary nodule, and unchanged pancreatic head  mass. Also unclear concerns for PE,  right lower lobe segmental PE confirmed on CT-PE. Started on apixaban.      10/31/23 Cycle 1 gemzar, abraxane  11/7/23 Cycle 1 Zometa  11/22/23 Removal of GJ tube.   11/29/23 Completed palliative radiation to T10   12/13/23 C3D1 gem/abraxane  12/29/23 Consults at [place]  1/23-1/26 Consults at [facility]   1/30/24: C4D1 gem/abraxane   3/24: CT CAP with overall stable disease with isolated progression in potential L4 lesion. Referral for radiation oncology.    4/29-5/1: Consults at [facility] for possible cellular therapy, CT guided biopsy of left pubic bone, recommendation to return to [facility] upon progression of current treatment   5/23/24: Cardiology consult at [facility]. CT with small pericardial effusion   6/6/2024: Repeat ECHO on with EF of 55-60%, no pericardial effusion present  - Cycle 8 deferred due to infection concerns and subsequently tested positive for COVID 6/25/24, resumed treatment on 7/2/2024    -Day 8 eliminated after Cycle 5 [place]/Abraxane.    4/2/2024-present: Gemcitabine/Abraxane, Days 1 and 15 only; Zometa every 3 months     8/2024 repeat imaging with isolated progression to L4 lesion with fracture of that spinous process. Referred to radiation therapy. Decreased Abraxane dose reduced to 75mg/m2 with cycle 9 day 15 for overall treatment tolerance.     8/28/24 RFA ablation and verteral augmentation to L4.   L4 Radiation to start 10/1.        Interval history:   Gallo is seen today prior to cycle *** gemcitabine/Abraxane.    Just finished radiation today, little more tired, notices it in the later afternoon  -little more nausea, taking antiemetics, usually needs to eat and then feels better   -appetite is good, eating well   -minimal pain in back, depends on how he sits and how long,   -still tolerating chemo well   -had one temperature epidose up to 99, dissipated right away , had been busy with errands and was a windy/chilly day and got chills   -    ***          Review of Systems:  Patient denies any of the following except if noted above: fevers, chills, difficulty with energy, vision or hearing changes, chest pain, dyspnea, abdominal pain, nausea, vomiting, diarrhea, constipation, urinary concerns, headaches, numbness, tingling, issues with sleep or mood. Also denies lumps, bumps, rashes or skin lesions, bleeding or bruising issues.        Current Outpatient Medications   Medication Sig Dispense Refill    acetaminophen (TYLENOL) 32 mg/mL liquid Take 20.313 mLs (650 mg) by mouth every 8 hours. 1800 mL 3    [START ON 10/23/2024] alteplase (CATHFLO ACTIVASE) injection Inject 2 mLs (2 mg) into catheter as needed (catheter occlusion). Reconstitute vial. Draw up alteplase 1 mg/mL in a syringe and instill into IV catheter. Dwell for at least 20 min to 24 hours, then aspirate. May repeat dose once in 24 hours if catheter function not restored after at least 2 hours. Discard remainder of vial. [id number] each 0    apixaban ANTICOAGULANT (ELIQUIS) 5 MG tablet Take 1 tablet (5 mg) by mouth 2 times daily. 60 tablet 2    buprenorphine (BUTRANS) 10 MCG/HR WK patch Place 1 patch onto the skin every 7 days. Use with 20 mcg/hour patch for 30 mcg/hour total. 4 patch 3    buprenorphine (BUTRANS) 20 MCG/HR WK patch Place 1 patch onto the skin once a week. Use with 10 mcg/hour patch for 30 mcg/hour total. 4 patch 3    dicyclomine (BENTYL) 10 MG capsule Take 1 capsule (10 mg) by mouth 4 times daily (before meals and nightly). (Patient taking differently: Take 10 mg by mouth 2 times daily.) 120 capsule 1    econazole nitrate 1 % external cream Apply topically daily To affected toenails. 85 g 5    HYDROmorphone (DILAUDID) 2 MG tablet Take 1-2 tablets (2-4 mg) by mouth every 4 hours as needed for severe pain or breakthrough pain. 180 tablet 0    lidocaine-prilocaine (EMLA) 2.5-2.5 % external cream Use 1-2 times a week or as needed prior to port access (Patient not taking: Reported on  9/11/2024) 30 g 3    lipase-protease-amylase (CREON 24) 27236-19354-070114 units CPEP per EC capsule 2-3 capsules with meals 3 times a day and 1-2 capsules with snacks max of 15 capsules a day 200 capsule 11    methylphenidate (RITALIN) 5 MG tablet Take 1 tablet (5 mg) by mouth 2 times daily as needed (Patient not taking: Reported on 7/30/2024) 10 tablet 0    Multiple Vitamins-Minerals (CENTRUM SILVER 50+MEN) TABS as directed Orally      naloxone (NARCAN) 4 MG/0.1ML nasal spray Instill 1 spray (4 mg) into one nostril alternating nostrils as needed for opioid reversal every 2-3 minutes until assistance arrives* (Patient not taking: Reported on 7/30/2024)      pantoprazole (PROTONIX) 40 MG EC tablet Take 1 tablet (40 mg) by mouth 2 times daily 60 tablet 4    prochlorperazine (COMPAZINE) 10 MG tablet Take 1 tablet (10 mg) by mouth every 6 hours as needed for nausea or vomiting 30 tablet 2    [START ON 10/23/2024] sterile water, preservative free, injection Use 2.2 mLs for reconstitution as needed (with alteplase for catheter occlusion). Direct diluent stream into powder. Mix by gently swirling until dissolved. DO NOT SHAKE. Discard remainder of vial. 953177 mL 0    vitamin D3 (CHOLECALCIFEROL) 50 mcg (2000 units) tablet Take 1 tablet (50 mcg) by mouth daily. 90 tablet 1        No Known Allergies      Exam:   General: patient appears well in no acute distress, alert and oriented, speech clear and fluid  Skin: no visualized rash or lesions on visualized skin  Resp: Appears to be breathing comfortably without accessory muscle usage, speaking in full sentences, no audible wheezes or cough.  Psych: Coherent speech, normal rate and volume, able to articulate logical thoughts, able to abstract reason, no tangential thoughts, no hallucinations or delusions  Patient's affect is appropriate.      Labs:    Most Recent 3 CBC's:  Recent Labs   Lab Test 10/21/24  1145 10/07/24  1213 09/23/24  1145   WBC 5.2 6.4 6.7   HGB 10.4* 10.7*  10.0*   MCV 92 91 92    244 186   ANEUTAUTO 3.4 4.1 4.5    Most Recent 3 BMP's:  Recent Labs   Lab Test 10/21/24  1145 09/23/24  1145 08/26/24  1220    140 136   POTASSIUM 4.2 4.2 3.9   CHLORIDE 102 104 100   CO2 25 25 26   BUN 21.3* 16.2 17.4   CR 0.74 0.72 0.76   ANIONGAP 11 11 10   DENISE 9.3 8.8 8.5*   * 153* 120*   PROTTOTAL 7.2 6.6 7.1   ALBUMIN 4.0 3.8 4.0    Most Recent 2 LFT's:  Recent Labs   Lab Test 10/21/24  1145 09/23/24  1145   AST 36 55*   ALT 52 88*   ALKPHOS 261* 264*   BILITOTAL 0.4 0.3    Most Recent TSH and T4:No lab results found.    I reviewed the above labs today.          Impression/plan:  Gallo is a 58 year old male with unresectable pancreatic cancer, currently under treatment of Gemcitabine/Abraxane.     Unresectable Pancreatic Cancer  7/31/23 began palliative treatment on FOLFIRINOX  CT CAP 10/24/23 with progression in skeletal mets, lung nodule and lymph nodes; stable pancreatic mass  - 10/31/2023: Treatment was changed to Gemcitabine and Abraxane (Day 1, 8, 15) + Zometa every 3 months  - 03/2024 CT CAP with progression of L4 lesion  - Day 8 eliminated after Cycle 5 gem/abraxane  - 8/2024 repeat imaging with isolated progression of L4 lesion, see below   -plan to continue gem/Abraxane with dose reduced Abraxane for improved tolerance  -continues to tolerate treatment well, now receiving infusions at home via [facility]  -labs reviewed, proceed with cycle 11 as scheduled  -continue monthly SUZETTE follow up, patient to call sooner with any concerns. Plan follow up in 3 months with Dr. Haile with imaging prior, scheduled in November.     Anemia-normocytic  Hgb stable; in setting of chemotherapy and chronic disease  -Monitor    Skeletal Mets ***   - Radiation to T10 completed 11/29/23  - Discussion to irradiate L4 lesion noted on CT dated 10/24/2023, however, on hold due to lack of symptoms  - CT chest 5/23/24 unchanged sclerotic bone metastases  -imaging 8/2024 with isolated  progression in L4, consult to radiation oncology.   -8/28/24 RFA ablation and verteral augmentation to L4. Scheduled to start radiation to L4 on 10/1.  -minimal back pain, managing with Butrans patch and hydromorphine  - will continue to monitor symptoms    Bone Health  Zometa 4 mg every 3 months, last infusion 8/13/2024   - per chart review, Dr. Haile note 3/15/2024: consider use of denosumab instead of bisphosphonate, given the evidence for potentially superior efficacy. At this time, he is tolerating this treatment extremely well now, and has well-controlled skeletal mets. Continue to consider his skeletal treatment options as we move forward.   -Please note the therapy plan is entered as an endocrine plan with 6 month intervals. For bone mets, we typically give every 3 months.    Neuropathy  Referred to PT previously but not following  Symptoms unchanged on current treatment-monitor for progression      Pericardial Effusion  Found on CT 4/20/24 at [facility], followed by TTE with moderate pericardial effusion without tamponade.  CT 5/23/24 small pericardial effusion  5/23/24: Cardiology consult at [facility]- Echo 6/6/2024 with EF 55-60%, LV strain -20.5% which is normal, no pericardial effusion  - repeat echo at [facility] in the future,   - he has been cautioned by Cardiology s/s of cardiac tamponade  -following cardiology as needed    Pulmonary Embolism  Right lower segmental PE found on routine imaging  Denies shortness of breath  Taking Apixiban daily    Nutrition  GJ tube removed 11/22/2023  Weight is stable, recent changes in weight likely due to scale at home versus in clinic. Reports weight on home scale is stable, eating well.   Continue small meals/protein supplements    LE edema  previously instructed to wear compression stockings as needed  - will continue to monitor    Diarrhea/Constipation  Continue stool softeners/imodium as needed.         The longitudinal plan of care for the  diagnosis(es)/condition(s) as documented were addressed during this visit. Due to the added complexity in care, I will continue to support Gallo in the subsequent management and with ongoing continuity of care.        ESVIN Canales CNP        Virtual Visit Details    Type of service:  Video Visit     Originating Location (pt. Location): Home    Distant Location (provider location):  On-site  Platform used for Video Visit: [facility]      Oncology/Hematology Visit Note  Oct 21, 2024      Reason for Visit: follow-up of metastatic Pancreatic Caner    Oncology HPI:   -NGS: KRAS G12R  Immuno: pMMR, KAYLIE, TMB-low  Clinical Trial: MARIPOSA-186, MARIPOSA-280, TORL    - 3/2023: presented with acute pancreatitis  c/b chronic pancreatitis with pancreatic mass causing duodenal stenosis with gastric outlet obstruction s/p GJ tube (placed 5/2023), biliary obstruction s/p stent placement on 7/7/23, pancreatic insufficiency, and IDDM2.  -  He then presented to the ED with worsening abdominal pain, increased jaundice, poor PO intake and weight loss admitted on 7/8/2023 for concern of biliary obstruction.   - 7/12/2023: Underwent biopsy of pancreatic mass returned positive for pancreatic adenocarcinoma.   - 7/31/2023: He started on treatment with 5FU, irinotecan, and oxaliplatin (FOLFIRINOX). Please see previous notes for further details on the patient's history.      8/17/23 - ERCP/EGD, duodenal stents x2 placed, exchange of GJ tube     8/21-/8/25 hospitalized due to diarrhea, colitis, abdominal pain.       8/29 - Cycle 3 deferred due to neutropenia.   Neulasta added. Irinotecan dose reduced 20% due to symptoms including cramping, double vision and slurred speech during infusion.       10/24/23 CT CAP with similar to slight increase in size of peripancreatic and mesenteric lymph nodes, enlargement of previous skeletal metastasis as well as new sclerotic metastasis to left posterior 5th rib, enlarging pulmonary nodule, and unchanged  pancreatic head mass. Also unclear concerns for PE,  right lower lobe segmental PE confirmed on CT-PE. Started on apixaban.      10/31/23 Cycle 1 gemzar, abraxane  11/7/23 Cycle 1 Zometa  11/22/23 Removal of GJ tube.   11/29/23 Completed palliative radiation to T10   12/13/23 C3D1 gem/abraxane  12/29/23 Consults at [place]  1/23-1/26 Consults at [facility]   1/30/24: C4D1 gem/abraxane   3/24: CT CAP with overall stable disease with isolated progression in potential L4 lesion. Referral for radiation oncology.    4/29-5/1: Consults at [facility] for possible cellular therapy, CT guided biopsy of left pubic bone, recommendation to return to [facility] upon progression of current treatment   5/23/24: Cardiology consult at [facility]. CT with small pericardial effusion   6/6/2024: Repeat ECHO on with EF of 55-60%, no pericardial effusion present  - Cycle 8 deferred due to infection concerns and subsequently tested positive for COVID 6/25/24, resumed treatment on 7/2/2024    -Day 8 eliminated after Cycle 5 [place]/Abraxane.    4/2/2024-present: Gemcitabine/Abraxane, Days 1 and 15 only; Zometa every 3 months     8/2024 repeat imaging with isolated progression to L4 lesion with fracture of that spinous process. Referred to radiation therapy. Decreased Abraxane dose reduced to 75mg/m2 with cycle 9 day 15 for overall treatment tolerance.     8/28/24 RFA ablation and verteral augmentation to L4.   L4 Radiation to start 10/1.        Interval history:   Gallo is seen today prior to cycle 12 gemcitabine/Abraxane.    Just finished radiation today, little more tired, notices it in the later afternoon  -little more nausea, taking antiemetics, usually needs to eat and then feels better   -appetite is good, eating well   -minimal pain in back, depends on how he sits and how long,   -still tolerating chemo well   -had one temperature epidose up to 99, dissipated right away , had been busy with errands and was a windy/chilly day and got chills    -    ***         Review of Systems:  Patient denies any of the following except if noted above: fevers, chills, difficulty with energy, vision or hearing changes, chest pain, dyspnea, abdominal pain, nausea, vomiting, diarrhea, constipation, urinary concerns, headaches, numbness, tingling, issues with sleep or mood. Also denies lumps, bumps, rashes or skin lesions, bleeding or bruising issues.        Current Outpatient Medications   Medication Sig Dispense Refill     acetaminophen (TYLENOL) 32 mg/mL liquid Take 20.313 mLs (650 mg) by mouth every 8 hours. 1800 mL 3     [START ON 10/23/2024] alteplase (CATHFLO ACTIVASE) injection Inject 2 mLs (2 mg) into catheter as needed (catheter occlusion). Reconstitute vial. Draw up alteplase 1 mg/mL in a syringe and instill into IV catheter. Dwell for at least 20 min to 24 hours, then aspirate. May repeat dose once in 24 hours if catheter function not restored after at least 2 hours. Discard remainder of vial. [id number] each 0     apixaban ANTICOAGULANT (ELIQUIS) 5 MG tablet Take 1 tablet (5 mg) by mouth 2 times daily. 60 tablet 2     buprenorphine (BUTRANS) 10 MCG/HR WK patch Place 1 patch onto the skin every 7 days. Use with 20 mcg/hour patch for 30 mcg/hour total. 4 patch 3     buprenorphine (BUTRANS) 20 MCG/HR WK patch Place 1 patch onto the skin once a week. Use with 10 mcg/hour patch for 30 mcg/hour total. 4 patch 3     dicyclomine (BENTYL) 10 MG capsule Take 1 capsule (10 mg) by mouth 4 times daily (before meals and nightly). (Patient taking differently: Take 10 mg by mouth 2 times daily.) 120 capsule 1     econazole nitrate 1 % external cream Apply topically daily To affected toenails. 85 g 5     HYDROmorphone (DILAUDID) 2 MG tablet Take 1-2 tablets (2-4 mg) by mouth every 4 hours as needed for severe pain or breakthrough pain. 180 tablet 0     lidocaine-prilocaine (EMLA) 2.5-2.5 % external cream Use 1-2 times a week or as needed prior to port access (Patient not taking:  Reported on 9/11/2024) 30 g 3     lipase-protease-amylase (CREON 24) 06368-99251-301196 units CPEP per EC capsule 2-3 capsules with meals 3 times a day and 1-2 capsules with snacks max of 15 capsules a day 200 capsule 11     methylphenidate (RITALIN) 5 MG tablet Take 1 tablet (5 mg) by mouth 2 times daily as needed (Patient not taking: Reported on 7/30/2024) 10 tablet 0     Multiple Vitamins-Minerals (CENTRUM SILVER 50+MEN) TABS as directed Orally       naloxone (NARCAN) 4 MG/0.1ML nasal spray Instill 1 spray (4 mg) into one nostril alternating nostrils as needed for opioid reversal every 2-3 minutes until assistance arrives* (Patient not taking: Reported on 7/30/2024)       pantoprazole (PROTONIX) 40 MG EC tablet Take 1 tablet (40 mg) by mouth 2 times daily 60 tablet 4     prochlorperazine (COMPAZINE) 10 MG tablet Take 1 tablet (10 mg) by mouth every 6 hours as needed for nausea or vomiting 30 tablet 2     [START ON 10/23/2024] sterile water, preservative free, injection Use 2.2 mLs for reconstitution as needed (with alteplase for catheter occlusion). Direct diluent stream into powder. Mix by gently swirling until dissolved. DO NOT SHAKE. Discard remainder of vial. 152653 mL 0     vitamin D3 (CHOLECALCIFEROL) 50 mcg (2000 units) tablet Take 1 tablet (50 mcg) by mouth daily. 90 tablet 1        No Known Allergies      Exam:   General: patient appears well in no acute distress, alert and oriented, speech clear and fluid  Skin: no visualized rash or lesions on visualized skin  Resp: Appears to be breathing comfortably without accessory muscle usage, speaking in full sentences, no audible wheezes or cough.  Psych: Coherent speech, normal rate and volume, able to articulate logical thoughts, able to abstract reason, no tangential thoughts, no hallucinations or delusions  Patient's affect is appropriate.      Labs:    Most Recent 3 CBC's:  Recent Labs   Lab Test 10/21/24  1145 10/07/24  1213 09/23/24  1145   WBC 5.2 6.4  6.7   HGB 10.4* 10.7* 10.0*   MCV 92 91 92    244 186   ANEUTAUTO 3.4 4.1 4.5    Most Recent 3 BMP's:  Recent Labs   Lab Test 10/21/24  1145 09/23/24  1145 08/26/24  1220    140 136   POTASSIUM 4.2 4.2 3.9   CHLORIDE 102 104 100   CO2 25 25 26   BUN 21.3* 16.2 17.4   CR 0.74 0.72 0.76   ANIONGAP 11 11 10   DENISE 9.3 8.8 8.5*   * 153* 120*   PROTTOTAL 7.2 6.6 7.1   ALBUMIN 4.0 3.8 4.0    Most Recent 2 LFT's:  Recent Labs   Lab Test 10/21/24  1145 09/23/24  1145   AST 36 55*   ALT 52 88*   ALKPHOS 261* 264*   BILITOTAL 0.4 0.3    Most Recent TSH and T4:No lab results found.    I reviewed the above labs today.          Impression/plan:  Gallo is a 58 year old male with unresectable pancreatic cancer, currently under treatment of Gemcitabine/Abraxane.     Unresectable Pancreatic Cancer  7/31/23 began palliative treatment on FOLFIRINOX  CT CAP 10/24/23 with progression in skeletal mets, lung nodule and lymph nodes; stable pancreatic mass  - 10/31/2023: Treatment was changed to Gemcitabine and Abraxane (Day 1, 8, 15) + Zometa every 3 months  - 03/2024 CT CAP with progression of L4 lesion  - Day 8 eliminated after Cycle 5 gem/abraxane  - 8/2024 repeat imaging with isolated progression of L4 lesion, see below   -plan to continue gem/Abraxane with dose reduced Abraxane for improved tolerance (day 1 & 15)  -continues to tolerate treatment well, now receiving infusions at home via [facility]  -labs reviewed, proceed with cycle 12 as scheduled  -continue monthly SUZETTE follow up, patient to call sooner with any concerns. He requested his next infusion be pushed out x1 week to allow for travel for an important aashish event, message sent to scheduling. Plan follow up in 3 months with Dr. Haile with imaging prior, scheduled in November.     Anemia-normocytic  Hgb stable; in setting of chemotherapy and chronic disease  -Monitor    Skeletal Mets    - Radiation to T10 completed 11/29/23  - Discussion to irradiate L4  lesion noted on CT dated 10/24/2023, however, on hold due to lack of symptoms  - CT chest 5/23/24 unchanged sclerotic bone metastases  -imaging 8/2024 with isolated progression in L4, consult to radiation oncology.   -8/28/24 RFA ablation and verteral augmentation to L4. Completed radiation to L4 today (started 10/1). Tolerated well with mild fatigue.   -minimal back pain, managing with Butrans patch and hydromorphine  - will continue to monitor symptoms    Bone Health  Zometa 4 mg every 3 months, last infusion 8/13/2024, next due November 2024  - per chart review, Dr. Haile note 3/15/2024: consider use of denosumab instead of bisphosphonate, given the evidence for potentially superior efficacy. At this time, he is tolerating this treatment extremely well now, and has well-controlled skeletal mets. Continue to consider his skeletal treatment options as we move forward.   -Please note the therapy plan is entered as an endocrine plan with 6 month intervals. For bone mets, we typically give every 3 months.    Neuropathy  Referred to PT previously but not following  Symptoms unchanged on current treatment-monitor for progression      Pericardial Effusion  Found on CT 4/20/24 at [facility], followed by TTE with moderate pericardial effusion without tamponade.  CT 5/23/24 small pericardial effusion  5/23/24: Cardiology consult at [facility]- Echo 6/6/2024 with EF 55-60%, LV strain -20.5% which is normal, no pericardial effusion  - repeat echo at [facility] in the future,   - he has been cautioned by Cardiology s/s of cardiac tamponade  -following cardiology as needed    Pulmonary Embolism  Right lower segmental PE found on routine imaging  Denies shortness of breath  Taking Apixiban daily    Nutrition  GJ tube removed 11/22/2023  Weight is stable, recent changes in weight likely due to scale at home versus in clinic. Reports weight on home scale is stable, eating well.   Continue small meals/protein supplements    LE  edema  previously instructed to wear compression stockings as needed  - will continue to monitor    Diarrhea/Constipation  Continue stool softeners/imodium as needed.         The longitudinal plan of care for the diagnosis(es)/condition(s) as documented were addressed during this visit. Due to the added complexity in care, I will continue to support Gallo in the subsequent management and with ongoing continuity of care.        ESVIN Canales CNP          Again, thank you for allowing me to participate in the care of your patient.        Sincerely,        ESVIN Canales CNP

## 2024-10-22 LAB — CANCER AG19-9 SERPL IA-ACNC: 9 U/ML

## 2024-10-22 RX ORDER — EPINEPHRINE 0.3 MG/.3ML
0.3 INJECTION SUBCUTANEOUS PRN
Qty: 999999 ML | Refills: 0 | OUTPATIENT
Start: 2024-10-23 | End: 2025-08-28

## 2024-10-22 RX ORDER — DIPHENHYDRAMINE HYDROCHLORIDE 50 MG/ML
50 INJECTION INTRAMUSCULAR; INTRAVENOUS PRN
Qty: 99999 ML | Refills: 0 | OUTPATIENT
Start: 2024-10-23 | End: 2025-08-28

## 2024-10-22 RX ORDER — SODIUM CHLORIDE 9 MG/ML
500 INJECTION, SOLUTION INTRAVENOUS PRN
Qty: 999999 ML | Refills: 0 | OUTPATIENT
Start: 2024-10-23 | End: 2025-08-28

## 2024-10-22 RX ORDER — ONDANSETRON 2 MG/ML
8 INJECTION INTRAMUSCULAR; INTRAVENOUS ONCE
Qty: 4 ML | Refills: 0 | OUTPATIENT
Start: 2024-10-22 | End: 2024-10-22

## 2024-10-23 ENCOUNTER — HOME CARE VISIT (OUTPATIENT)
Dept: HOME HEALTH SERVICES | Facility: HOME HEALTH | Age: 59
End: 2024-10-23
Payer: COMMERCIAL

## 2024-10-23 VITALS
WEIGHT: 157.38 LBS | BODY MASS INDEX: 21.34 KG/M2 | HEART RATE: 70 BPM | DIASTOLIC BLOOD PRESSURE: 66 MMHG | OXYGEN SATURATION: 96 % | TEMPERATURE: 97.5 F | RESPIRATION RATE: 16 BRPM | SYSTOLIC BLOOD PRESSURE: 106 MMHG

## 2024-10-23 DIAGNOSIS — C25.0 MALIGNANT NEOPLASM OF HEAD OF PANCREAS (H): ICD-10-CM

## 2024-10-23 DIAGNOSIS — Z51.11 ENCOUNTER FOR ANTINEOPLASTIC CHEMOTHERAPY: Primary | ICD-10-CM

## 2024-10-23 RX ORDER — ONDANSETRON 2 MG/ML
8 INJECTION INTRAMUSCULAR; INTRAVENOUS ONCE
Status: COMPLETED | OUTPATIENT
Start: 2024-10-23 | End: 2024-10-23

## 2024-10-23 RX ORDER — PACLITAXEL 100 MG/20ML
75 INJECTION, POWDER, LYOPHILIZED, FOR SUSPENSION INTRAVENOUS ONCE
Status: COMPLETED | OUTPATIENT
Start: 2024-10-23 | End: 2024-10-23

## 2024-10-23 RX ADMIN — Medication 250 ML: at 12:40

## 2024-10-23 RX ADMIN — PACLITAXEL 140 MG: 100 INJECTION, POWDER, LYOPHILIZED, FOR SUSPENSION INTRAVENOUS at 12:59

## 2024-10-23 RX ADMIN — ONDANSETRON 8 MG: 2 INJECTION INTRAMUSCULAR; INTRAVENOUS at 12:43

## 2024-10-23 ASSESSMENT — PAIN SCALES - GENERAL: PAINLEVEL: NO PAIN (1)

## 2024-10-23 NOTE — PROGRESS NOTES
"Infusion Nursing Note:  Skilled nurse visit today for administration of Abraxane and Gemzar for Gallo Navarro.   present during visit today: Not Applicable.    Pre-infusion Checklist:   Pre-infusion Checklist    Have you had any delayed reaction since last infusion?   No    Have you recently had an elevated temperature, fever, chills productive cough, coughing for 3 weeks or longer or hemoptysis, abnormal vital signs, night sweats, chest pain, or have you noticed a decrease in your appetite, or noted unexplained weight loss or fatigue?   No    Do you have any open wounds or new incisions?  No    Do you have any recent or upcoming hospitalizations, surgeries, or dental procedures?   No    Do you currently have or recently have had any signs of illness or infection or are you on any antibiotics?   No    Have you had any new, sudden , or worsening abdominal pain?   No    Have you or anyone in your household received a live vaccination in the past 4 weeks?   No    Have you recently been diagnosed with any new nervous system diseases or cancer diagnosis? (i.e., Multiple Sclerosis, Guillain [place], sezures, neurological changes) Are you receiving any form of radiation or chemotherapy?   Yes, radiation completed two days ago, currently on chemotherapy (Abraxane and Gemzar)    Are you pregnant or breastfeeding, or do you have plans of pregnancy in the future?   No    Have you been having any signs of worsening depression or suicidal ideation?  No    Have there been any other new onset medical symptoms?  No    Did the patient answer \"YES\" to any of the questions above?  No    Will the patient receive a medication that has an order for infusion reaction management?  Yes, and all drugs and supplies are available and none have .    If ordered, has the patient taken pre-medications?  Yes    Plan:   Therapy is appropriate, will proceed with treatment.     Chemotherapy Treatment Conditions:   Results reviewed, labs " MET treatment parameters, ok to proceed with treatment.    Intravenous Access:  Implanted Port.    Post Infusion Assessment:  Patient tolerated infusion without incident.  Blood return noted pre and post infusion.  Site patent and intact, free from redness, edema or discomfort.  No evidence of extravasations.  Access discontinued per protocol.       Note: Patient alert and oriented in no acute distress. Reports no change in status since last assessment. Pain under control, most of the time with one pill. Rating pain at 0/10 today. denies nausea, vomiting, constipation, or diarrhea, mouth, sores, fevers, or chills, shortness of breath, or cough.  Peripheral neuropathy stable. Tolerated infusion well. No further concerns today.    Next Visit Plan:   Per oncology, in three weeks for labs on Monday 11/11 and chemo on Wednesday 11/13 d/t pt travel plans the previous week.       Deloris Ambrose RN 10/23/2024

## 2024-11-04 ENCOUNTER — MYC REFILL (OUTPATIENT)
Dept: PALLIATIVE CARE | Facility: CLINIC | Age: 59
End: 2024-11-04
Payer: COMMERCIAL

## 2024-11-04 DIAGNOSIS — C25.9 PANCREATIC ADENOCARCINOMA (H): ICD-10-CM

## 2024-11-04 RX ORDER — HYDROMORPHONE HYDROCHLORIDE 2 MG/1
2-4 TABLET ORAL EVERY 4 HOURS PRN
Qty: 180 TABLET | Refills: 0 | Status: SHIPPED | OUTPATIENT
Start: 2024-11-04

## 2024-11-04 NOTE — TELEPHONE ENCOUNTER
Received [employer] message from patient requesting refill of hydromorphone.     Last refill: 10/22/24  Last office visit: 9/24/24  Scheduled for follow up 1/21/24     Will route request to MD/ for review.     [place]ed [place].

## 2024-11-09 ENCOUNTER — MYC REFILL (OUTPATIENT)
Dept: ONCOLOGY | Facility: CLINIC | Age: 59
End: 2024-11-09
Payer: COMMERCIAL

## 2024-11-09 DIAGNOSIS — R10.30 LOWER ABDOMINAL PAIN: ICD-10-CM

## 2024-11-11 ENCOUNTER — HOME CARE VISIT (OUTPATIENT)
Dept: HOME HEALTH SERVICES | Facility: HOME HEALTH | Age: 59
End: 2024-11-11
Payer: COMMERCIAL

## 2024-11-11 ENCOUNTER — LAB REQUISITION (OUTPATIENT)
Dept: LAB | Facility: CLINIC | Age: 59
End: 2024-11-11
Payer: COMMERCIAL

## 2024-11-11 ENCOUNTER — MYC REFILL (OUTPATIENT)
Dept: RADIATION ONCOLOGY | Facility: HOSPITAL | Age: 59
End: 2024-11-11

## 2024-11-11 VITALS
HEART RATE: 97 BPM | TEMPERATURE: 97.3 F | BODY MASS INDEX: 20.89 KG/M2 | SYSTOLIC BLOOD PRESSURE: 112 MMHG | RESPIRATION RATE: 16 BRPM | DIASTOLIC BLOOD PRESSURE: 64 MMHG | WEIGHT: 154 LBS | OXYGEN SATURATION: 95 %

## 2024-11-11 DIAGNOSIS — C25.0 MALIGNANT NEOPLASM OF HEAD OF PANCREAS (H): ICD-10-CM

## 2024-11-11 DIAGNOSIS — K31.1 GASTRIC OUTLET OBSTRUCTION: ICD-10-CM

## 2024-11-11 DIAGNOSIS — G89.3 CANCER ASSOCIATED PAIN: ICD-10-CM

## 2024-11-11 DIAGNOSIS — C25.9 PANCREATIC ADENOCARCINOMA (H): ICD-10-CM

## 2024-11-11 DIAGNOSIS — Z79.4 TYPE 2 DIABETES MELLITUS WITHOUT COMPLICATION, WITH LONG-TERM CURRENT USE OF INSULIN (H): ICD-10-CM

## 2024-11-11 DIAGNOSIS — E11.9 TYPE 2 DIABETES MELLITUS WITHOUT COMPLICATION, WITH LONG-TERM CURRENT USE OF INSULIN (H): ICD-10-CM

## 2024-11-11 LAB
BASOPHILS # BLD AUTO: 0.1 10E3/UL (ref 0–0.2)
BASOPHILS NFR BLD AUTO: 1 %
EOSINOPHIL # BLD AUTO: 0.1 10E3/UL (ref 0–0.7)
EOSINOPHIL NFR BLD AUTO: 2 %
ERYTHROCYTE [DISTWIDTH] IN BLOOD BY AUTOMATED COUNT: 14.7 % (ref 10–15)
HCT VFR BLD AUTO: 33.3 % (ref 40–53)
HGB BLD-MCNC: 10.5 G/DL (ref 13.3–17.7)
IMM GRANULOCYTES # BLD: 0 10E3/UL
IMM GRANULOCYTES NFR BLD: 0 %
LYMPHOCYTES # BLD AUTO: 1.4 10E3/UL (ref 0.8–5.3)
LYMPHOCYTES NFR BLD AUTO: 22 %
MCH RBC QN AUTO: 29 PG (ref 26.5–33)
MCHC RBC AUTO-ENTMCNC: 31.5 G/DL (ref 31.5–36.5)
MCV RBC AUTO: 92 FL (ref 78–100)
MONOCYTES # BLD AUTO: 0.7 10E3/UL (ref 0–1.3)
MONOCYTES NFR BLD AUTO: 11 %
NEUTROPHILS # BLD AUTO: 4.1 10E3/UL (ref 1.6–8.3)
NEUTROPHILS NFR BLD AUTO: 65 %
NRBC # BLD AUTO: 0 10E3/UL
NRBC BLD AUTO-RTO: 0 /100
PLATELET # BLD AUTO: 384 10E3/UL (ref 150–450)
RBC # BLD AUTO: 3.62 10E6/UL (ref 4.4–5.9)
WBC # BLD AUTO: 6.3 10E3/UL (ref 4–11)

## 2024-11-11 PROCEDURE — 85025 COMPLETE CBC W/AUTO DIFF WBC: CPT | Performed by: STUDENT IN AN ORGANIZED HEALTH CARE EDUCATION/TRAINING PROGRAM

## 2024-11-11 RX ORDER — PACLITAXEL 100 MG/20ML
75 INJECTION, POWDER, LYOPHILIZED, FOR SUSPENSION INTRAVENOUS ONCE
Status: CANCELLED | OUTPATIENT
Start: 2024-11-11

## 2024-11-11 RX ORDER — BUPRENORPHINE 20 UG/H
1 PATCH TRANSDERMAL WEEKLY
Qty: 4 PATCH | Refills: 3 | Status: SHIPPED | OUTPATIENT
Start: 2024-11-11

## 2024-11-11 RX ORDER — BUPRENORPHINE 10 UG/H
1 PATCH TRANSDERMAL
Qty: 4 PATCH | Refills: 3 | Status: SHIPPED | OUTPATIENT
Start: 2024-11-11

## 2024-11-11 NOTE — PROGRESS NOTES
Lab-Only Nursing Note    Labs obtained via port right chest     [facility] Tracking number: [id number]    Facility sent to: [facility]    Next Visit Plan:   11/13    Jennie Joaquin, RN 11/11/2024

## 2024-11-11 NOTE — TELEPHONE ENCOUNTER
Received [employer] message from patient requesting refill of Butrans.     Last refill: 10/13/24  Last office visit: 9/24/24  Scheduled for follow up 1/21/24     Will route request to MD/ for review.     Reviewed [place].

## 2024-11-12 ENCOUNTER — ANCILLARY PROCEDURE (OUTPATIENT)
Dept: CT IMAGING | Facility: CLINIC | Age: 59
End: 2024-11-12
Attending: STUDENT IN AN ORGANIZED HEALTH CARE EDUCATION/TRAINING PROGRAM
Payer: COMMERCIAL

## 2024-11-12 ENCOUNTER — HOME INFUSION BILLING (OUTPATIENT)
Dept: HOME HEALTH SERVICES | Facility: HOME HEALTH | Age: 59
End: 2024-11-12
Payer: COMMERCIAL

## 2024-11-12 DIAGNOSIS — C25.9 PANCREATIC ADENOCARCINOMA (H): ICD-10-CM

## 2024-11-12 PROCEDURE — A4215 STERILE NEEDLE: HCPCS

## 2024-11-12 PROCEDURE — E1399 DURABLE MEDICAL EQUIPMENT MI: HCPCS

## 2024-11-12 PROCEDURE — 71260 CT THORAX DX C+: CPT | Performed by: STUDENT IN AN ORGANIZED HEALTH CARE EDUCATION/TRAINING PROGRAM

## 2024-11-12 PROCEDURE — S1015 IV TUBING EXTENSION SET: HCPCS

## 2024-11-12 PROCEDURE — 74177 CT ABD & PELVIS W/CONTRAST: CPT | Performed by: STUDENT IN AN ORGANIZED HEALTH CARE EDUCATION/TRAINING PROGRAM

## 2024-11-12 RX ORDER — DICYCLOMINE HYDROCHLORIDE 10 MG/1
10 CAPSULE ORAL
Qty: 120 CAPSULE | Refills: 1 | Status: SHIPPED | OUTPATIENT
Start: 2024-11-12

## 2024-11-12 RX ORDER — IOPAMIDOL 755 MG/ML
85 INJECTION, SOLUTION INTRAVASCULAR ONCE
Status: COMPLETED | OUTPATIENT
Start: 2024-11-12 | End: 2024-11-12

## 2024-11-12 RX ORDER — HEPARIN SODIUM (PORCINE) LOCK FLUSH IV SOLN 100 UNIT/ML 100 UNIT/ML
5 SOLUTION INTRAVENOUS ONCE
Status: COMPLETED | OUTPATIENT
Start: 2024-11-12 | End: 2024-11-12

## 2024-11-12 RX ADMIN — HEPARIN SODIUM (PORCINE) LOCK FLUSH IV SOLN 100 UNIT/ML 5 ML: 100 SOLUTION at 12:39

## 2024-11-12 RX ADMIN — IOPAMIDOL 85 ML: 755 INJECTION, SOLUTION INTRAVASCULAR at 10:21

## 2024-11-12 NOTE — TELEPHONE ENCOUNTER
Dicyclomine 10mg capsule  Last prescribing provider: Megan Farrell    Last clinic visit date: 10/21/24    Recommendations for requested medication (if none, N/A): NA    Any other pertinent information (if none, N/A): NA    Refilled: Y/N, if NO, why?

## 2024-11-13 ENCOUNTER — HOME CARE VISIT (OUTPATIENT)
Dept: HOME HEALTH SERVICES | Facility: HOME HEALTH | Age: 59
End: 2024-11-13
Attending: STUDENT IN AN ORGANIZED HEALTH CARE EDUCATION/TRAINING PROGRAM
Payer: COMMERCIAL

## 2024-11-13 VITALS
DIASTOLIC BLOOD PRESSURE: 64 MMHG | TEMPERATURE: 97.8 F | RESPIRATION RATE: 16 BRPM | HEART RATE: 64 BPM | OXYGEN SATURATION: 96 % | SYSTOLIC BLOOD PRESSURE: 106 MMHG

## 2024-11-13 DIAGNOSIS — Z51.11 ENCOUNTER FOR ANTINEOPLASTIC CHEMOTHERAPY: Primary | ICD-10-CM

## 2024-11-13 DIAGNOSIS — C25.0 MALIGNANT NEOPLASM OF HEAD OF PANCREAS (H): ICD-10-CM

## 2024-11-13 PROCEDURE — S9331 HIT INTERMIT CHEMO DIEM: HCPCS

## 2024-11-13 RX ORDER — PACLITAXEL 100 MG/20ML
75 INJECTION, POWDER, LYOPHILIZED, FOR SUSPENSION INTRAVENOUS ONCE
Status: COMPLETED | OUTPATIENT
Start: 2024-11-13 | End: 2024-11-13

## 2024-11-13 RX ORDER — ONDANSETRON 2 MG/ML
8 INJECTION INTRAMUSCULAR; INTRAVENOUS ONCE
Status: COMPLETED | OUTPATIENT
Start: 2024-11-13 | End: 2024-11-13

## 2024-11-13 RX ADMIN — PACLITAXEL 140 MG: 100 INJECTION, POWDER, LYOPHILIZED, FOR SUSPENSION INTRAVENOUS at 10:42

## 2024-11-13 RX ADMIN — ONDANSETRON 8 MG: 2 INJECTION INTRAMUSCULAR; INTRAVENOUS at 10:33

## 2024-11-13 ASSESSMENT — PAIN SCALES - GENERAL: PAINLEVEL_OUTOF10: NO PAIN (0)

## 2024-11-13 NOTE — PROGRESS NOTES
Infusion Nursing Note:  Gallo Navarro presents today for Cycle 12 Day 15 Abraxane and Gemzar.        Note: Pt denies any new or acute issues today; he slept well last night as he was busy working outside yesterday. Wishes to proceed with treatment.     Intravenous Access:  Implanted Port.    Treatment Conditions:  Lab Results   Component Value Date    HGB 10.5 (L) 11/11/2024    WBC 6.3 11/11/2024    ANEU 2.2 12/26/2023    ANEUTAUTO 4.1 11/11/2024     11/11/2024        Results reviewed, labs MET treatment parameters, ok to proceed with treatment.      Post Infusion Assessment:  Patient tolerated infusion without incident.  Blood return noted pre and post infusion.  Site patent and intact, free from redness, edema or discomfort.  No evidence of extravasations.  Access discontinued per protocol.     Discharge Plan:   Discharge instructions reviewed with: Patient.  Patient and/or family verbalized understanding of discharge instructions and all questions answered.  Patient will see Mic on Friday; he is due for Cycle 13 the week of thanksgiving, but will discuss with MD about delaying this because he has a vacation scheduled. Gallo will update [facility] with plan.       Jennie Joaquin RN

## 2024-11-15 ENCOUNTER — VIRTUAL VISIT (OUTPATIENT)
Dept: ONCOLOGY | Facility: CLINIC | Age: 59
End: 2024-11-15
Attending: STUDENT IN AN ORGANIZED HEALTH CARE EDUCATION/TRAINING PROGRAM
Payer: COMMERCIAL

## 2024-11-15 ENCOUNTER — MYC REFILL (OUTPATIENT)
Dept: ONCOLOGY | Facility: CLINIC | Age: 59
End: 2024-11-15
Payer: COMMERCIAL

## 2024-11-15 VITALS
HEIGHT: 72 IN | WEIGHT: 159 LBS | DIASTOLIC BLOOD PRESSURE: 70 MMHG | SYSTOLIC BLOOD PRESSURE: 116 MMHG | BODY MASS INDEX: 21.54 KG/M2

## 2024-11-15 DIAGNOSIS — C25.0 MALIGNANT NEOPLASM OF HEAD OF PANCREAS (H): Primary | ICD-10-CM

## 2024-11-15 DIAGNOSIS — Z51.11 ENCOUNTER FOR ANTINEOPLASTIC CHEMOTHERAPY: ICD-10-CM

## 2024-11-15 DIAGNOSIS — R10.30 LOWER ABDOMINAL PAIN: ICD-10-CM

## 2024-11-15 PROCEDURE — 99215 OFFICE O/P EST HI 40 MIN: CPT | Mod: 95 | Performed by: STUDENT IN AN ORGANIZED HEALTH CARE EDUCATION/TRAINING PROGRAM

## 2024-11-15 RX ORDER — DICYCLOMINE HYDROCHLORIDE 10 MG/1
10 CAPSULE ORAL
Qty: 120 CAPSULE | Refills: 1 | OUTPATIENT
Start: 2024-11-15

## 2024-11-15 ASSESSMENT — PAIN SCALES - GENERAL: PAINLEVEL_OUTOF10: NO PAIN (0)

## 2024-11-15 NOTE — PROGRESS NOTES
[facility] - Medical Oncology TeleHealth Consult Note  11/15/2024    Patient Identifiers     Name: Gallo Navarro  Preferred Address: [place]  Preferred Language: English  : 1965  Gender: male    Assessment and Plan     Mr. Gallo Navarro is a 58 year old male with a history of  IDDM and metastatic pancreas cancer (23) on second-line [place]/Abraxane (10/31/2023 - present) who returns to clinic for treatment response evaluation.     NGS: KRAS G12R  Immuno: pMMR, KAYLIE, TMB-low  Clinical Trial: MARIPOSA-186, MARIPOSA-280, TORL    I reviewed Gallo's treatment response with him and his wife. He looked well on video visit, appearing to have increased weight over the past 3 months. His CT scans demonstrate growth of two ossesous lesions, which are concerning for progression. We reviewed these locations with him and recommended that he review with Dr. Reaves for potential palliative treatment. We also emphasized the importance of zometa resumption, which we will plan with his next infusion. Finally, we recommend change of systemic treatment as XRT alone may be insufficient for extended disease control. Despite progression on the first-two lines of treatment, he has several options available. The best among these is the JEFFERY regimen which includes nal-IRIFOX. 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 We will plan to initiate treatment following return from vacation in early December. He expressed an understanding of these recommendations and consents to proceed with treatment.    Plan for transition to 5FU-nalIRI + monthly Zometa with SUZETTE monitoring. Plan for MD visit in 3mo with CT scans prior.    45 minutes spent on the date of the encounter doing chart review, review of test results, interpretation of tests, patient visit, and documentation     Luis Haile MD, PhD   of Medicine  Division of Hematology, Oncology and  Transplantation  [facility]    -----------------------------------    Oncology Summary     Cancer Staging   Malignant neoplasm of head of pancreas (H)  Staging form: Pancreas, AJCC 8th Edition  - Clinical stage from 7/11/2023: Stage III (cT2, cN2, cM0) - Signed by Luis Haile MD on 7/14/2023      Oncology History   Malignant neoplasm of head of pancreas (H)   7/11/2023 -  Cancer Staged    Staging form: Pancreas, AJCC 8th Edition  - Clinical stage from 7/11/2023: Stage III (cT2, cN2, cM0)     7/14/2023 Initial Diagnosis    Malignant neoplasm of head of pancreas (H)     7/31/2023 - 10/21/2023 Chemotherapy    OP ONC Pancreatic Cancer - Modified FOLFIRINOX  Plan Provider: Luis Haile MD  Treatment goal: Curative  Line of treatment: [No plan line of treatment]     10/31/2023 -  Chemotherapy    OP ONC Pancreatic Cancer - PACLitaxel protein-bound (Abraxane) / Gemcitabine  Plan Provider: Luis Haile MD  Treatment goal: Curative  Line of treatment: Second Line         Subjective/Interval Events     - spoke with Gallo and his wife by video visit  - he is generally feeling well, with increased PO intake over the past few months; also has increased energy for activity  - no fever/chills, diarrhea, or constipation    Objective Data     Lab data:  I have personally reviewed the lab data, notable for:    -  9  - Hgb 10.5    Radiology data:  I have personally reviewed the radiology data, notable for:  11/12/2024 CT C/A/P  IMPRESSION:  1.  Redemonstration of ill-defined hypoenhancing infiltrating mass at  the head of pancreas. Continued involvement of the superior mesenteric  artery and proximal branches. Adjacent mesenteric nodule is increased  in size suggesting worsening of disease.  2.  Osseous metastasis appear slightly progressed, for example a new  lytic lesion at L5, and increased sclerosis at the pubic symphysis.  3.  Grossly stable biliary, gastrojejunal, and gastroduodenal stents.    Pathology  and other data:  I have personally reviewed and interpreted the pathology data, notable for:    - no new data    Billing Stuff     Virtual Visit Details    Type of service:  Video Visit     Originating Location (pt. Location): Home    Distant Location (provider location):  On-site  Platform used for Video Visit: Lorna

## 2024-11-15 NOTE — NURSING NOTE
Current patient location: [address] [place] [id number]    Is the patient currently in the state of [place]? YES    Visit mode:VIDEO    If the visit is dropped, the patient can be reconnected by:VIDEO VISIT: Text to cell phone:   Telephone Information:   Mobile [phone]       Will anyone else be joining the visit? NO  (If patient encounters technical issues they should call [phone]    Are changes needed to the allergy or medication list? No    Are refills needed on medications prescribed by this physician? NO    Rooming Documentation:  Questionnaire(s) completed    Reason for visit: LO Reeves VVF

## 2024-11-15 NOTE — LETTER
11/15/2024      Gallo Navarro  [address] [address] [place] [id number]      Dear Colleague,    Thank you for referring your patient, Gallo Navarro, to the [facility]. Please see a copy of my visit note below.    [facility] - Medical Oncology TeleHealth Consult Note  11/15/2024    Patient Identifiers     Name: Gallo Navarro  Preferred Address: [place]  Preferred Language: English  : 1965  Gender: male    Assessment and Plan     Mr. Gallo Navarro is a 58 year old male with a history of  IDDM and metastatic pancreas cancer (23) on second-line [place]/Abraxane (10/31/2023 - present) who returns to clinic for treatment response evaluation.     NGS: KRAS G12R  Immuno: pMMR, KAYLIE, TMB-low  Clinical Trial: MARIPOSA-186, MARIPOSA-280, TORL    I reviewed Gallo's treatment response with him and his wife. He looked well on video visit, appearing to have increased weight over the past 3 months. His CT scans demonstrate overall stable disease, though his two ossesous lesions have evidence of progression. We reviewed these locations with him and recommended that he review with Dr. Reaves for potential palliative treatment. We also emphasized the importance of zometa resumption, which we will plan with his next infusion. He expressed an understanding of these recommendations and consents to proceed with treatment.    Plan for continued [place]/Abraxane + Zometa with SUZETTE monitoring. Plan for MD visit in 3mo w/ CT scans prior.    45 minutes spent on the date of the encounter doing chart review, review of test results, interpretation of tests, patient visit, and documentation     Luis Haile MD, PhD   of Medicine  Division of Hematology, Oncology and Transplantation  [facility]    -----------------------------------    Oncology Summary     Cancer Staging   Malignant neoplasm of head of pancreas (H)  Staging form: Pancreas, AJCC 8th Edition  - Clinical stage from 2023:  Stage III (cT2, cN2, cM0) - Signed by Luis Haile MD on 7/14/2023      Oncology History   Malignant neoplasm of head of pancreas (H)   7/11/2023 -  Cancer Staged    Staging form: Pancreas, AJCC 8th Edition  - Clinical stage from 7/11/2023: Stage III (cT2, cN2, cM0)     7/14/2023 Initial Diagnosis    Malignant neoplasm of head of pancreas (H)     7/31/2023 - 10/21/2023 Chemotherapy    OP ONC Pancreatic Cancer - Modified FOLFIRINOX  Plan Provider: Luis Haile MD  Treatment goal: Curative  Line of treatment: [No plan line of treatment]     10/31/2023 -  Chemotherapy    OP ONC Pancreatic Cancer - PACLitaxel protein-bound (Abraxane) / Gemcitabine  Plan Provider: Luis Haile MD  Treatment goal: Curative  Line of treatment: Second Line         Subjective/Interval Events     - spoke with Gallo and his wife by video visit  - he is generally feeling well, with increased PO intake over the past few months; also has increased energy for activity  - no fever/chills, diarrhea, or constipation    Objective Data     Lab data:  I have personally reviewed the lab data, notable for:    -  9  - Hgb 10.5    Radiology data:  I have personally reviewed the radiology data, notable for:  11/12/2024 CT C/A/P  IMPRESSION:  1.  Redemonstration of ill-defined hypoenhancing infiltrating mass at  the head of pancreas. Continued involvement of the superior mesenteric  artery and proximal branches. Adjacent mesenteric nodule is increased  in size suggesting worsening of disease.  2.  Osseous metastasis appear slightly progressed, for example a new  lytic lesion at L5, and increased sclerosis at the pubic symphysis.  3.  Grossly stable biliary, gastrojejunal, and gastroduodenal stents.    Pathology and other data:  I have personally reviewed and interpreted the pathology data, notable for:    - no new data    Billing Stuff     Virtual Visit Details    Type of service:  Video Visit     Originating Location (pt. Location): Home    Distant  Location (provider location):  On-site  Platform used for Video Visit: AmWell      Again, thank you for allowing me to participate in the care of your patient.        Sincerely,        Luis Haiel MD

## 2024-11-16 NOTE — TELEPHONE ENCOUNTER
Received [employer] message from patient requesting refill of butrans.     Last refill: 12/24/23  Last office visit: 12/5/23  Scheduled for follow up being rescheduled.    Will route request to MD for review.     Reviewed [place].     Sepsis called at 1415

## 2024-11-19 ENCOUNTER — MYC REFILL (OUTPATIENT)
Dept: ONCOLOGY | Facility: CLINIC | Age: 59
End: 2024-11-19
Payer: COMMERCIAL

## 2024-11-19 ENCOUNTER — PATIENT OUTREACH (OUTPATIENT)
Dept: ONCOLOGY | Facility: CLINIC | Age: 59
End: 2024-11-19
Payer: COMMERCIAL

## 2024-11-19 ENCOUNTER — MYC REFILL (OUTPATIENT)
Dept: PALLIATIVE CARE | Facility: CLINIC | Age: 59
End: 2024-11-19
Payer: COMMERCIAL

## 2024-11-19 ENCOUNTER — ENROLLMENT (OUTPATIENT)
Dept: HOME HEALTH SERVICES | Facility: HOME HEALTH | Age: 59
End: 2024-11-19
Payer: COMMERCIAL

## 2024-11-19 DIAGNOSIS — Z98.890 HISTORY OF BILIARY STENT INSERTION: ICD-10-CM

## 2024-11-19 DIAGNOSIS — I26.94 MULTIPLE SUBSEGMENTAL PULMONARY EMBOLI WITHOUT ACUTE COR PULMONALE (H): ICD-10-CM

## 2024-11-19 DIAGNOSIS — C25.9 PANCREATIC ADENOCARCINOMA (H): ICD-10-CM

## 2024-11-19 RX ORDER — METHYLPREDNISOLONE SODIUM SUCCINATE 40 MG/ML
40 INJECTION INTRAMUSCULAR; INTRAVENOUS
Start: 2025-01-28

## 2024-11-19 RX ORDER — ALBUTEROL SULFATE 90 UG/1
1-2 INHALANT RESPIRATORY (INHALATION)
Start: 2024-11-19

## 2024-11-19 RX ORDER — HEPARIN SODIUM (PORCINE) LOCK FLUSH IV SOLN 100 UNIT/ML 100 UNIT/ML
5 SOLUTION INTRAVENOUS
OUTPATIENT
Start: 2025-01-01

## 2024-11-19 RX ORDER — LORAZEPAM 2 MG/ML
0.5 INJECTION INTRAMUSCULAR EVERY 4 HOURS PRN
OUTPATIENT
Start: 2024-12-03

## 2024-11-19 RX ORDER — ATROPINE SULFATE 0.4 MG/ML
0.4 AMPUL (ML) INJECTION
OUTPATIENT
Start: 2024-12-17

## 2024-11-19 RX ORDER — HYDROMORPHONE HYDROCHLORIDE 2 MG/1
2-4 TABLET ORAL EVERY 4 HOURS PRN
Qty: 180 TABLET | Refills: 0 | Status: SHIPPED | OUTPATIENT
Start: 2024-11-22

## 2024-11-19 RX ORDER — ATROPINE SULFATE 0.4 MG/ML
0.4 AMPUL (ML) INJECTION
OUTPATIENT
Start: 2024-12-31

## 2024-11-19 RX ORDER — ALBUTEROL SULFATE 0.83 MG/ML
2.5 SOLUTION RESPIRATORY (INHALATION)
OUTPATIENT
Start: 2024-12-17

## 2024-11-19 RX ORDER — DIPHENHYDRAMINE HYDROCHLORIDE 50 MG/ML
50 INJECTION INTRAMUSCULAR; INTRAVENOUS
Start: 2024-12-03

## 2024-11-19 RX ORDER — DIPHENHYDRAMINE HYDROCHLORIDE 50 MG/ML
50 INJECTION INTRAMUSCULAR; INTRAVENOUS
Start: 2025-01-14

## 2024-11-19 RX ORDER — HEPARIN SODIUM,PORCINE 10 UNIT/ML
5-20 VIAL (ML) INTRAVENOUS DAILY PRN
OUTPATIENT
Start: 2024-11-19

## 2024-11-19 RX ORDER — HEPARIN SODIUM (PORCINE) LOCK FLUSH IV SOLN 100 UNIT/ML 100 UNIT/ML
5 SOLUTION INTRAVENOUS
OUTPATIENT
Start: 2025-01-28

## 2024-11-19 RX ORDER — DIPHENHYDRAMINE HYDROCHLORIDE 50 MG/ML
50 INJECTION INTRAMUSCULAR; INTRAVENOUS
Start: 2024-12-31

## 2024-11-19 RX ORDER — METHYLPREDNISOLONE SODIUM SUCCINATE 40 MG/ML
40 INJECTION INTRAMUSCULAR; INTRAVENOUS
Start: 2025-01-14

## 2024-11-19 RX ORDER — ALBUTEROL SULFATE 0.83 MG/ML
2.5 SOLUTION RESPIRATORY (INHALATION)
OUTPATIENT
Start: 2025-01-28

## 2024-11-19 RX ORDER — DIPHENHYDRAMINE HYDROCHLORIDE 50 MG/ML
50 INJECTION INTRAMUSCULAR; INTRAVENOUS
Start: 2025-01-28

## 2024-11-19 RX ORDER — ATROPINE SULFATE 0.4 MG/ML
0.4 AMPUL (ML) INJECTION
OUTPATIENT
Start: 2024-12-03

## 2024-11-19 RX ORDER — HEPARIN SODIUM,PORCINE 10 UNIT/ML
5-20 VIAL (ML) INTRAVENOUS DAILY PRN
OUTPATIENT
Start: 2025-01-14

## 2024-11-19 RX ORDER — HEPARIN SODIUM (PORCINE) LOCK FLUSH IV SOLN 100 UNIT/ML 100 UNIT/ML
5 SOLUTION INTRAVENOUS
OUTPATIENT
Start: 2025-01-29

## 2024-11-19 RX ORDER — ALBUTEROL SULFATE 0.83 MG/ML
2.5 SOLUTION RESPIRATORY (INHALATION)
OUTPATIENT
Start: 2024-11-19

## 2024-11-19 RX ORDER — LORAZEPAM 2 MG/ML
0.5 INJECTION INTRAMUSCULAR EVERY 4 HOURS PRN
OUTPATIENT
Start: 2024-11-19

## 2024-11-19 RX ORDER — ATROPINE SULFATE 0.4 MG/ML
0.4 AMPUL (ML) INJECTION
OUTPATIENT
Start: 2025-01-28

## 2024-11-19 RX ORDER — METHYLPREDNISOLONE SODIUM SUCCINATE 40 MG/ML
40 INJECTION INTRAMUSCULAR; INTRAVENOUS
Start: 2024-12-17

## 2024-11-19 RX ORDER — HEPARIN SODIUM (PORCINE) LOCK FLUSH IV SOLN 100 UNIT/ML 100 UNIT/ML
5 SOLUTION INTRAVENOUS
OUTPATIENT
Start: 2024-12-04

## 2024-11-19 RX ORDER — HEPARIN SODIUM,PORCINE 10 UNIT/ML
5-20 VIAL (ML) INTRAVENOUS DAILY PRN
OUTPATIENT
Start: 2025-01-15

## 2024-11-19 RX ORDER — EPINEPHRINE 1 MG/ML
0.3 INJECTION, SOLUTION INTRAMUSCULAR; SUBCUTANEOUS EVERY 5 MIN PRN
OUTPATIENT
Start: 2024-11-19

## 2024-11-19 RX ORDER — DIPHENHYDRAMINE HYDROCHLORIDE 50 MG/ML
25 INJECTION INTRAMUSCULAR; INTRAVENOUS
Start: 2025-01-28

## 2024-11-19 RX ORDER — EPINEPHRINE 1 MG/ML
0.3 INJECTION, SOLUTION INTRAMUSCULAR; SUBCUTANEOUS EVERY 5 MIN PRN
OUTPATIENT
Start: 2024-12-17

## 2024-11-19 RX ORDER — METHYLPREDNISOLONE SODIUM SUCCINATE 40 MG/ML
40 INJECTION INTRAMUSCULAR; INTRAVENOUS
Start: 2024-11-19

## 2024-11-19 RX ORDER — DIPHENHYDRAMINE HYDROCHLORIDE 50 MG/ML
25 INJECTION INTRAMUSCULAR; INTRAVENOUS
Start: 2024-11-19

## 2024-11-19 RX ORDER — DIPHENHYDRAMINE HYDROCHLORIDE 50 MG/ML
25 INJECTION INTRAMUSCULAR; INTRAVENOUS
Start: 2024-12-17

## 2024-11-19 RX ORDER — ATROPINE SULFATE 0.4 MG/ML
0.4 AMPUL (ML) INJECTION
OUTPATIENT
Start: 2025-01-14

## 2024-11-19 RX ORDER — HEPARIN SODIUM,PORCINE 10 UNIT/ML
5-20 VIAL (ML) INTRAVENOUS DAILY PRN
OUTPATIENT
Start: 2025-01-28

## 2024-11-19 RX ORDER — ATROPINE SULFATE 0.4 MG/ML
0.4 AMPUL (ML) INJECTION
OUTPATIENT
Start: 2024-11-19

## 2024-11-19 RX ORDER — DIPHENHYDRAMINE HYDROCHLORIDE 50 MG/ML
25 INJECTION INTRAMUSCULAR; INTRAVENOUS
Start: 2024-12-03

## 2024-11-19 RX ORDER — DIPHENHYDRAMINE HYDROCHLORIDE 50 MG/ML
25 INJECTION INTRAMUSCULAR; INTRAVENOUS
Start: 2024-12-31

## 2024-11-19 RX ORDER — LORAZEPAM 2 MG/ML
0.5 INJECTION INTRAMUSCULAR EVERY 4 HOURS PRN
OUTPATIENT
Start: 2025-01-28

## 2024-11-19 RX ORDER — HEPARIN SODIUM,PORCINE 10 UNIT/ML
5-20 VIAL (ML) INTRAVENOUS DAILY PRN
OUTPATIENT
Start: 2024-12-04

## 2024-11-19 RX ORDER — MEPERIDINE HYDROCHLORIDE 25 MG/ML
25 INJECTION INTRAMUSCULAR; INTRAVENOUS; SUBCUTANEOUS
OUTPATIENT
Start: 2024-11-19

## 2024-11-19 RX ORDER — HEPARIN SODIUM,PORCINE 10 UNIT/ML
5-20 VIAL (ML) INTRAVENOUS DAILY PRN
OUTPATIENT
Start: 2024-12-17

## 2024-11-19 RX ORDER — ALBUTEROL SULFATE 90 UG/1
1-2 INHALANT RESPIRATORY (INHALATION)
Start: 2025-01-14

## 2024-11-19 RX ORDER — ALBUTEROL SULFATE 0.83 MG/ML
2.5 SOLUTION RESPIRATORY (INHALATION)
OUTPATIENT
Start: 2024-12-31

## 2024-11-19 RX ORDER — MEPERIDINE HYDROCHLORIDE 25 MG/ML
25 INJECTION INTRAMUSCULAR; INTRAVENOUS; SUBCUTANEOUS
OUTPATIENT
Start: 2025-01-28

## 2024-11-19 RX ORDER — ALBUTEROL SULFATE 0.83 MG/ML
2.5 SOLUTION RESPIRATORY (INHALATION)
OUTPATIENT
Start: 2024-12-03

## 2024-11-19 RX ORDER — LORAZEPAM 2 MG/ML
0.5 INJECTION INTRAMUSCULAR EVERY 4 HOURS PRN
OUTPATIENT
Start: 2024-12-31

## 2024-11-19 RX ORDER — HEPARIN SODIUM,PORCINE 10 UNIT/ML
5-20 VIAL (ML) INTRAVENOUS DAILY PRN
OUTPATIENT
Start: 2024-12-18

## 2024-11-19 RX ORDER — METHYLPREDNISOLONE SODIUM SUCCINATE 40 MG/ML
40 INJECTION INTRAMUSCULAR; INTRAVENOUS
Start: 2024-12-31

## 2024-11-19 RX ORDER — HEPARIN SODIUM (PORCINE) LOCK FLUSH IV SOLN 100 UNIT/ML 100 UNIT/ML
5 SOLUTION INTRAVENOUS
OUTPATIENT
Start: 2024-12-31

## 2024-11-19 RX ORDER — LORAZEPAM 2 MG/ML
0.5 INJECTION INTRAMUSCULAR EVERY 4 HOURS PRN
OUTPATIENT
Start: 2025-01-14

## 2024-11-19 RX ORDER — LORAZEPAM 2 MG/ML
0.5 INJECTION INTRAMUSCULAR EVERY 4 HOURS PRN
OUTPATIENT
Start: 2024-12-17

## 2024-11-19 RX ORDER — EPINEPHRINE 1 MG/ML
0.3 INJECTION, SOLUTION INTRAMUSCULAR; SUBCUTANEOUS EVERY 5 MIN PRN
OUTPATIENT
Start: 2024-12-31

## 2024-11-19 RX ORDER — EPINEPHRINE 1 MG/ML
0.3 INJECTION, SOLUTION INTRAMUSCULAR; SUBCUTANEOUS EVERY 5 MIN PRN
OUTPATIENT
Start: 2024-12-03

## 2024-11-19 RX ORDER — EPINEPHRINE 1 MG/ML
0.3 INJECTION, SOLUTION INTRAMUSCULAR; SUBCUTANEOUS EVERY 5 MIN PRN
OUTPATIENT
Start: 2025-01-28

## 2024-11-19 RX ORDER — DIPHENHYDRAMINE HYDROCHLORIDE 50 MG/ML
50 INJECTION INTRAMUSCULAR; INTRAVENOUS
Start: 2024-11-19

## 2024-11-19 RX ORDER — ALBUTEROL SULFATE 90 UG/1
1-2 INHALANT RESPIRATORY (INHALATION)
Start: 2024-12-31

## 2024-11-19 RX ORDER — DIPHENHYDRAMINE HYDROCHLORIDE 50 MG/ML
25 INJECTION INTRAMUSCULAR; INTRAVENOUS
Start: 2025-01-14

## 2024-11-19 RX ORDER — ALBUTEROL SULFATE 90 UG/1
1-2 INHALANT RESPIRATORY (INHALATION)
Start: 2025-01-28

## 2024-11-19 RX ORDER — HEPARIN SODIUM,PORCINE 10 UNIT/ML
5-20 VIAL (ML) INTRAVENOUS DAILY PRN
OUTPATIENT
Start: 2024-11-20

## 2024-11-19 RX ORDER — HEPARIN SODIUM,PORCINE 10 UNIT/ML
5-20 VIAL (ML) INTRAVENOUS DAILY PRN
OUTPATIENT
Start: 2024-12-03

## 2024-11-19 RX ORDER — MEPERIDINE HYDROCHLORIDE 25 MG/ML
25 INJECTION INTRAMUSCULAR; INTRAVENOUS; SUBCUTANEOUS
OUTPATIENT
Start: 2024-12-17

## 2024-11-19 RX ORDER — EPINEPHRINE 1 MG/ML
0.3 INJECTION, SOLUTION INTRAMUSCULAR; SUBCUTANEOUS EVERY 5 MIN PRN
OUTPATIENT
Start: 2025-01-14

## 2024-11-19 RX ORDER — HEPARIN SODIUM,PORCINE 10 UNIT/ML
5-20 VIAL (ML) INTRAVENOUS DAILY PRN
OUTPATIENT
Start: 2025-01-01

## 2024-11-19 RX ORDER — ALBUTEROL SULFATE 0.83 MG/ML
2.5 SOLUTION RESPIRATORY (INHALATION)
OUTPATIENT
Start: 2025-01-14

## 2024-11-19 RX ORDER — HEPARIN SODIUM (PORCINE) LOCK FLUSH IV SOLN 100 UNIT/ML 100 UNIT/ML
5 SOLUTION INTRAVENOUS
OUTPATIENT
Start: 2024-12-18

## 2024-11-19 RX ORDER — HEPARIN SODIUM (PORCINE) LOCK FLUSH IV SOLN 100 UNIT/ML 100 UNIT/ML
5 SOLUTION INTRAVENOUS
OUTPATIENT
Start: 2024-11-19

## 2024-11-19 RX ORDER — HEPARIN SODIUM (PORCINE) LOCK FLUSH IV SOLN 100 UNIT/ML 100 UNIT/ML
5 SOLUTION INTRAVENOUS
OUTPATIENT
Start: 2025-01-14

## 2024-11-19 RX ORDER — DIPHENHYDRAMINE HYDROCHLORIDE 50 MG/ML
50 INJECTION INTRAMUSCULAR; INTRAVENOUS
Start: 2024-12-17

## 2024-11-19 RX ORDER — HEPARIN SODIUM (PORCINE) LOCK FLUSH IV SOLN 100 UNIT/ML 100 UNIT/ML
5 SOLUTION INTRAVENOUS
OUTPATIENT
Start: 2024-11-20

## 2024-11-19 RX ORDER — HEPARIN SODIUM (PORCINE) LOCK FLUSH IV SOLN 100 UNIT/ML 100 UNIT/ML
5 SOLUTION INTRAVENOUS
OUTPATIENT
Start: 2024-12-03

## 2024-11-19 RX ORDER — ALBUTEROL SULFATE 90 UG/1
1-2 INHALANT RESPIRATORY (INHALATION)
Start: 2024-12-17

## 2024-11-19 RX ORDER — MEPERIDINE HYDROCHLORIDE 25 MG/ML
25 INJECTION INTRAMUSCULAR; INTRAVENOUS; SUBCUTANEOUS
OUTPATIENT
Start: 2024-12-03

## 2024-11-19 RX ORDER — MEPERIDINE HYDROCHLORIDE 25 MG/ML
25 INJECTION INTRAMUSCULAR; INTRAVENOUS; SUBCUTANEOUS
OUTPATIENT
Start: 2025-01-14

## 2024-11-19 RX ORDER — HEPARIN SODIUM (PORCINE) LOCK FLUSH IV SOLN 100 UNIT/ML 100 UNIT/ML
5 SOLUTION INTRAVENOUS
OUTPATIENT
Start: 2024-12-17

## 2024-11-19 RX ORDER — HEPARIN SODIUM,PORCINE 10 UNIT/ML
5-20 VIAL (ML) INTRAVENOUS DAILY PRN
OUTPATIENT
Start: 2024-12-31

## 2024-11-19 RX ORDER — ALBUTEROL SULFATE 90 UG/1
1-2 INHALANT RESPIRATORY (INHALATION)
Start: 2024-12-03

## 2024-11-19 RX ORDER — HEPARIN SODIUM (PORCINE) LOCK FLUSH IV SOLN 100 UNIT/ML 100 UNIT/ML
5 SOLUTION INTRAVENOUS
OUTPATIENT
Start: 2025-01-15

## 2024-11-19 RX ORDER — HEPARIN SODIUM,PORCINE 10 UNIT/ML
5-20 VIAL (ML) INTRAVENOUS DAILY PRN
OUTPATIENT
Start: 2025-01-29

## 2024-11-19 RX ORDER — MEPERIDINE HYDROCHLORIDE 25 MG/ML
25 INJECTION INTRAMUSCULAR; INTRAVENOUS; SUBCUTANEOUS
OUTPATIENT
Start: 2024-12-31

## 2024-11-19 RX ORDER — METHYLPREDNISOLONE SODIUM SUCCINATE 40 MG/ML
40 INJECTION INTRAMUSCULAR; INTRAVENOUS
Start: 2024-12-03

## 2024-11-19 NOTE — TELEPHONE ENCOUNTER
Received [employer] message from patient requesting refill of hydromorphone.     Last refill: 11/6/24  Last office visit: 9/24/24  Scheduled for follow up 1/21/25     Will route request to MD/ for review.     Reviewed [place].

## 2024-11-19 NOTE — TELEPHONE ENCOUNTER
[facility]: Cancer Care                                                                                          Pt and spouse Violet called to clarify a few things following 11/15 visit with Dr. Haile:    Pt met with Dr. Haile 11/15 and discussed skipping chemo scheduled for 11/27. IB sent to [facility] to cancel visits and resume 12/4.    Dr. Haile changed Zometa to monthly instead of every 3 months, IB to [facility] to check if insurance covers this being given at home and to give with next chemo due 12/4.    Potentially switching chemo regimen to NAL-IRI-5FU, pending tumor board on 11/25. Will update when confirmed. Pt is aware and expecting to resume chemo on 12/4, whether it's still [place]/Abraxane or Nal/iri/5fu. Writer will follow-up after TB 11/25.    Pt is out of town for Thanksgiving and planning a trip to [place] around [place], will update writer if plans change.    Signature:  Efra Lynn RN

## 2024-11-20 ENCOUNTER — DOCUMENTATION ONLY (OUTPATIENT)
Dept: HOME HEALTH SERVICES | Facility: HOME HEALTH | Age: 59
End: 2024-11-20
Payer: COMMERCIAL

## 2024-11-21 ENCOUNTER — DOCUMENTATION ONLY (OUTPATIENT)
Dept: HOME HEALTH SERVICES | Facility: HOME HEALTH | Age: 59
End: 2024-11-21
Payer: COMMERCIAL

## 2024-11-21 ENCOUNTER — ENROLLMENT (OUTPATIENT)
Dept: HOME HEALTH SERVICES | Facility: HOME HEALTH | Age: 59
End: 2024-11-21
Payer: COMMERCIAL

## 2024-11-21 NOTE — PROGRESS NOTES
T/C to Gallo  Reviewed that [facility] does not infuse Onivyde in the home.  We are checking his insurance for coverage of his Fluorouracil with home disconnects . He will receive his chemo and Zometa in the clinic with home disconnects of his Fluourouracil infusion  Gallo verbalizes understanding of this information and is in agreement of this plan

## 2024-11-21 NOTE — CONFIDENTIAL NOTE
Philomena Refill   Last prescribing provider: DR Haile     Last clinic visit date: 11/15/24 DR Haile     Recommendations for requested medication (if none, N/A): N/A    Any other pertinent information (if none, N/A): N/A    Refilled: Y/N, if NO, why?

## 2024-11-21 NOTE — CONFIDENTIAL NOTE
Eliquis Refill   Last prescribing provider: Megan Farrell     Last clinic visit date: 11/15/24 DR Haile     Recommendations for requested medication (if none, N/A): N/A    Any other pertinent information (if none, N/A): N/A    Refilled: Y/N, if NO, why?

## 2024-11-26 ENCOUNTER — PATIENT OUTREACH (OUTPATIENT)
Dept: ONCOLOGY | Facility: CLINIC | Age: 59
End: 2024-11-26
Payer: COMMERCIAL

## 2024-11-26 NOTE — TELEPHONE ENCOUNTER
[facility]: Cancer Care                                                                                          Pt currently at the [facility], he will be out of state until 12/2. Informed him Dr. Haile recommends take a few weeks off for blood counts to recover before starting Nal-iri/5fu, scheduled for 12/18. [facility] may still give Zometa in the home along with pump disconnect. Pt stated he woke up yesterday with acute lower back pain. He increased his dilaudid to 6 mg every 4 hours and has cannabis infused cream to apply. He stated pain was worse after being on the plane for 4 hours and had to be wheeled to the terminal. He will continue to monitor and seek care at local ER if pain does not subside in next few days.    He would like [facility] to give Zometa infusion when he gets home next week, IB sent to [facility] to schedule. Pt denied any other concerns at this time.    Signature:  Efra Lynn RN

## 2024-11-27 RX ORDER — ZOLEDRONIC ACID 0.04 MG/ML
4 INJECTION, SOLUTION INTRAVENOUS ONCE
OUTPATIENT
Start: 2024-11-27 | End: 2024-11-27

## 2024-12-02 ENCOUNTER — LAB REQUISITION (OUTPATIENT)
Dept: LAB | Facility: CLINIC | Age: 59
End: 2024-12-02
Payer: COMMERCIAL

## 2024-12-02 ENCOUNTER — HOME INFUSION BILLING (OUTPATIENT)
Dept: HOME HEALTH SERVICES | Facility: HOME HEALTH | Age: 59
End: 2024-12-02
Payer: COMMERCIAL

## 2024-12-02 ENCOUNTER — HOME CARE VISIT (OUTPATIENT)
Dept: HOME HEALTH SERVICES | Facility: HOME HEALTH | Age: 59
End: 2024-12-02
Payer: COMMERCIAL

## 2024-12-02 VITALS
DIASTOLIC BLOOD PRESSURE: 76 MMHG | BODY MASS INDEX: 20.89 KG/M2 | WEIGHT: 154 LBS | HEART RATE: 92 BPM | RESPIRATION RATE: 16 BRPM | SYSTOLIC BLOOD PRESSURE: 116 MMHG | OXYGEN SATURATION: 96 % | TEMPERATURE: 97.1 F

## 2024-12-02 DIAGNOSIS — C25.0 MALIGNANT NEOPLASM OF HEAD OF PANCREAS (H): ICD-10-CM

## 2024-12-02 LAB
ALBUMIN SERPL BCG-MCNC: 3.9 G/DL (ref 3.5–5.2)
CALCIUM SERPL-MCNC: 9.3 MG/DL (ref 8.8–10.4)
CREAT SERPL-MCNC: 0.67 MG/DL (ref 0.67–1.17)
EGFRCR SERPLBLD CKD-EPI 2021: >90 ML/MIN/1.73M2
HOLD SPECIMEN: NORMAL

## 2024-12-02 PROCEDURE — 82565 ASSAY OF CREATININE: CPT | Performed by: STUDENT IN AN ORGANIZED HEALTH CARE EDUCATION/TRAINING PROGRAM

## 2024-12-02 PROCEDURE — 82310 ASSAY OF CALCIUM: CPT | Performed by: STUDENT IN AN ORGANIZED HEALTH CARE EDUCATION/TRAINING PROGRAM

## 2024-12-02 PROCEDURE — 82040 ASSAY OF SERUM ALBUMIN: CPT | Performed by: STUDENT IN AN ORGANIZED HEALTH CARE EDUCATION/TRAINING PROGRAM

## 2024-12-02 NOTE — PROGRESS NOTES
Nursing Visit Note:  Nurse visit today for port access, lab draw for Gallo Navarro.     present during visit today: Not Applicable.    Lab-Only Nursing Note    Labs obtained via PAC    [id number]    [facility]ility [facility] to: [facility]    Saline administered (in mLs): 30    Next Visit Plan:   Dec 4th for Ashwini Ambrose RN 12/2/2024      Deloris Ambrose RN 12/2/2024

## 2024-12-03 ENCOUNTER — HOME INFUSION BILLING (OUTPATIENT)
Dept: HOME HEALTH SERVICES | Facility: HOME HEALTH | Age: 59
End: 2024-12-03
Payer: COMMERCIAL

## 2024-12-03 ENCOUNTER — MYC REFILL (OUTPATIENT)
Dept: ONCOLOGY | Facility: CLINIC | Age: 59
End: 2024-12-03
Payer: COMMERCIAL

## 2024-12-03 DIAGNOSIS — G89.3 CANCER ASSOCIATED PAIN: ICD-10-CM

## 2024-12-03 DIAGNOSIS — E11.9 TYPE 2 DIABETES MELLITUS WITHOUT COMPLICATION, WITH LONG-TERM CURRENT USE OF INSULIN (H): ICD-10-CM

## 2024-12-03 DIAGNOSIS — Z79.4 TYPE 2 DIABETES MELLITUS WITHOUT COMPLICATION, WITH LONG-TERM CURRENT USE OF INSULIN (H): ICD-10-CM

## 2024-12-03 DIAGNOSIS — C25.0 MALIGNANT NEOPLASM OF HEAD OF PANCREAS (H): ICD-10-CM

## 2024-12-03 DIAGNOSIS — R10.30 LOWER ABDOMINAL PAIN: ICD-10-CM

## 2024-12-03 DIAGNOSIS — K31.1 GASTRIC OUTLET OBSTRUCTION: ICD-10-CM

## 2024-12-03 RX ORDER — BUPRENORPHINE 20 UG/H
2 PATCH TRANSDERMAL WEEKLY
Qty: 8 PATCH | Refills: 3 | Status: SHIPPED | OUTPATIENT
Start: 2024-12-03

## 2024-12-03 RX ORDER — DEXAMETHASONE 4 MG/1
4 TABLET ORAL
Qty: 4 TABLET | Refills: 0 | Status: SHIPPED | OUTPATIENT
Start: 2024-12-03 | End: 2024-12-07

## 2024-12-04 ENCOUNTER — HOME CARE VISIT (OUTPATIENT)
Dept: HOME HEALTH SERVICES | Facility: HOME HEALTH | Age: 59
End: 2024-12-04
Payer: COMMERCIAL

## 2024-12-04 VITALS
TEMPERATURE: 97.5 F | DIASTOLIC BLOOD PRESSURE: 74 MMHG | RESPIRATION RATE: 16 BRPM | SYSTOLIC BLOOD PRESSURE: 118 MMHG | OXYGEN SATURATION: 98 % | HEART RATE: 84 BPM

## 2024-12-04 NOTE — PROGRESS NOTES
"Infusion Nursing Note:  Skilled nurse visit today for Dasiameta for Gallo Navarro.   present during visit today: Not Applicable.    Pre-infusion Checklist:   Pre-infusion Checklist    Have you had any delayed reaction since last infusion?   No    Have you recently had an elevated temperature, fever, chills productive cough, coughing for 3 weeks or longer or hemoptysis, abnormal vital signs, night sweats, chest pain, or have you noticed a decrease in your appetite, or noted unexplained weight loss or fatigue?   No    Do you have any open wounds or new incisions?  No    Do you have any recent or upcoming hospitalizations, surgeries, or dental procedures?   No    Do you currently have or recently have had any signs of illness or infection or are you on any antibiotics?   No    Have you had any new, sudden , or worsening abdominal pain?   No    Have you or anyone in your household received a live vaccination in the past 4 weeks?   No    Have you recently been diagnosed with any new nervous system diseases or cancer diagnosis? (i.e., Multiple Sclerosis, Guillain [place], sezures, neurological changes) Are you receiving any form of radiation or chemotherapy?   No    Are you pregnant or breastfeeding, or do you have plans of pregnancy in the future?   No    Have you been having any signs of worsening depression or suicidal ideation?  No    Have there been any other new onset medical symptoms?  No    Did the patient answer \"YES\" to any of the questions above?  No    Will the patient receive a medication that has an order for infusion reaction management?  Yes, and all drugs and supplies are available and none have .    If ordered, has the patient taken pre-medications?  N/A    Plan:   Therapy is appropriate, will proceed with treatment.     Chemotherapy Treatment Conditions:   Not Applicable    Intravenous Access:  Implanted Port.    Post Infusion Assessment:  Patient tolerated infusion without incident.  Blood " return noted pre and post infusion.  Site patent and intact, free from redness, edema or discomfort.  No evidence of extravasations.  Access discontinued per protocol.       Note: Pt alert and oriented, in NAD. Buprenorphine increased to 40mcg/h total per day to mitigate low back pain at the L4.  Right shoulder still sore from getting in and out of his vehicle over this past weekend. Did lift some grocery bags yesterday, which he understands not to do due to the L4 weakness. Currently rating pain at 3/10. Remainder of assessment is negative or stable. Zometa infused over 25 minutes, along with 250mL concurrent IVF NS per pt request. No further concerns today.     Saline administered (in mLs): 30    Next Visit Plan:   per oncology      Deloris Ambrose RN 12/4/2024

## 2024-12-04 NOTE — TELEPHONE ENCOUNTER
Pending Prescriptions:                       Disp   Refills    acetaminophen (TYLENOL) 32 mg/mL liquid   1800 mL3            Sig: Take 20.313 mLs (650 mg) by mouth every 8 hours.    Last prescribing provider: Megan Farrell    Last clinic visit date: 11/15/24 w/    Recommendations for requested medication (if none, N/A): N/A    Any other pertinent information (if none, N/A): N/A    Refilled: Y/N, if NO, why?

## 2024-12-10 ENCOUNTER — MYC REFILL (OUTPATIENT)
Dept: PALLIATIVE CARE | Facility: CLINIC | Age: 59
End: 2024-12-10
Payer: COMMERCIAL

## 2024-12-10 ENCOUNTER — MYC MEDICAL ADVICE (OUTPATIENT)
Dept: ONCOLOGY | Facility: CLINIC | Age: 59
End: 2024-12-10
Payer: COMMERCIAL

## 2024-12-10 DIAGNOSIS — C25.0 MALIGNANT NEOPLASM OF HEAD OF PANCREAS (H): ICD-10-CM

## 2024-12-10 DIAGNOSIS — E11.9 TYPE 2 DIABETES MELLITUS WITHOUT COMPLICATION, WITH LONG-TERM CURRENT USE OF INSULIN (H): ICD-10-CM

## 2024-12-10 DIAGNOSIS — Z79.4 TYPE 2 DIABETES MELLITUS WITHOUT COMPLICATION, WITH LONG-TERM CURRENT USE OF INSULIN (H): ICD-10-CM

## 2024-12-10 DIAGNOSIS — K31.1 GASTRIC OUTLET OBSTRUCTION: ICD-10-CM

## 2024-12-10 DIAGNOSIS — G89.3 CANCER ASSOCIATED PAIN: ICD-10-CM

## 2024-12-10 DIAGNOSIS — C25.9 PANCREATIC ADENOCARCINOMA (H): ICD-10-CM

## 2024-12-10 RX ORDER — BUPRENORPHINE 20 UG/H
2 PATCH TRANSDERMAL WEEKLY
Qty: 8 PATCH | Refills: 3 | Status: CANCELLED | OUTPATIENT
Start: 2024-12-10

## 2024-12-10 RX ORDER — HYDROMORPHONE HYDROCHLORIDE 2 MG/1
2-4 TABLET ORAL EVERY 4 HOURS PRN
Qty: 180 TABLET | Refills: 0 | Status: SHIPPED | OUTPATIENT
Start: 2024-12-10 | End: 2024-12-12

## 2024-12-10 NOTE — TELEPHONE ENCOUNTER
"Oncology Nurse Triage - Reporting Symptoms    Situation:   Gallo reporting increase pain via MyC message. Triage RN call to pt to follow up.     Background:   Treating Provider:   Dr. Haile     Date of last office visit: 11/15/24    Recent treatments: Yes: 11/27/24 Zoledronic acid     Assessment  See patient's original message for details on back pain.     Pt denies any numbness/tingling in legs or pelvis, LE weakness or bowel/bladder issues, states \"just the lower back pain\".     Palliative care also advised pt be evaluated/imaged.     Recommendations:   Per Secure Chat to Megan Farrell, she agrees pt should be evaluated. If he can have MR lumbar done today, could potentially see pt in clinic tomorrow. If he is having any numbness/tingling in legs/pelvis, LE weakness, or bowel or bladder issues, he should go to ED. ED will be fastest way to guarantee imagining.   Writer reviewed recommendations from Megan above. Pt states he will not come in today for MRI, as it is his daughter's birthday. Advised pt to go to ED tomorrow as he already planned. Pt voiced understanding.        "

## 2024-12-10 NOTE — TELEPHONE ENCOUNTER
Received [employer] message from patient requesting refill of hydromorphone.     Last refill: 11/22/24   Last office visit: 9/24/24  Scheduled for follow up 1/21/25     Will route request to MD/ for review.     Reviewed [place].

## 2024-12-11 ENCOUNTER — PATIENT OUTREACH (OUTPATIENT)
Dept: ONCOLOGY | Facility: CLINIC | Age: 59
End: 2024-12-11
Payer: COMMERCIAL

## 2024-12-11 DIAGNOSIS — C25.9 PANCREATIC ADENOCARCINOMA (H): Primary | ICD-10-CM

## 2024-12-11 NOTE — TELEPHONE ENCOUNTER
[facility]: Cancer Care                                                                                          [phone] scheduling line  Dr. Ben Reaves at ; [phone]    11/12/24 CT CAP will be reviewed with Dr. Reaves, if additional imaging recommended will have writer set up. If no additional imaging needed, will reach out to pt to schedule return visit.    Above response to pt's mychart.    Signature:  Efra Lynn RN

## 2024-12-11 NOTE — TELEPHONE ENCOUNTER
Per Radiation, recommend CT CAP and MR spine for review.    Stat orders placed by Dr. Haile, called pt and reached vm, lmcb.

## 2024-12-11 NOTE — TELEPHONE ENCOUNTER
"Pt stating he did not go to ED last night. He reports he is managing pain well enough at home with pain meds. Pt requesting MRI today.     0845 Secure message to Megan Farrell    0900 Megan recommending writer reach out to  for recommendation.     0924 Vocera page to     0929  stating \"We had deferred radiation because he was asymptomatic. Time to tag them in.\" Requesting writer reach out to [facility] Nanci to let her know we're going to do radiation.     0935 Call to pt and notified him of provider recommendation. Informed pt he would hear more about the radiation from team. Reviewed red flag symptoms that would warrant seeking care in ER. Pt verbalized understanding.    "

## 2024-12-12 ENCOUNTER — APPOINTMENT (OUTPATIENT)
Dept: CT IMAGING | Facility: CLINIC | Age: 59
End: 2024-12-12
Attending: EMERGENCY MEDICINE
Payer: COMMERCIAL

## 2024-12-12 ENCOUNTER — HOSPITAL ENCOUNTER (EMERGENCY)
Facility: CLINIC | Age: 59
Discharge: HOME | End: 2024-12-12
Attending: EMERGENCY MEDICINE | Admitting: EMERGENCY MEDICINE
Payer: COMMERCIAL

## 2024-12-12 VITALS
HEIGHT: 72 IN | OXYGEN SATURATION: 100 % | WEIGHT: 150 LBS | SYSTOLIC BLOOD PRESSURE: 95 MMHG | HEART RATE: 85 BPM | RESPIRATION RATE: 10 BRPM | TEMPERATURE: 97.5 F | DIASTOLIC BLOOD PRESSURE: 66 MMHG | BODY MASS INDEX: 20.32 KG/M2

## 2024-12-12 DIAGNOSIS — S22.31XA FRACTURE OF ONE RIB, RIGHT SIDE, INITIAL ENCOUNTER FOR CLOSED FRACTURE: ICD-10-CM

## 2024-12-12 DIAGNOSIS — G89.3 CANCER RELATED PAIN: ICD-10-CM

## 2024-12-12 DIAGNOSIS — D72.829 LEUKOCYTOSIS, UNSPECIFIED TYPE: ICD-10-CM

## 2024-12-12 DIAGNOSIS — C25.9 PANCREATIC ADENOCARCINOMA (H): ICD-10-CM

## 2024-12-12 LAB
ALBUMIN SERPL BCG-MCNC: 3.5 G/DL (ref 3.5–5.2)
ALBUMIN UR-MCNC: 30 MG/DL
ALP SERPL-CCNC: 363 U/L (ref 40–150)
ALT SERPL W P-5'-P-CCNC: 55 U/L (ref 0–70)
ANION GAP SERPL CALCULATED.3IONS-SCNC: 8 MMOL/L (ref 7–15)
APPEARANCE UR: CLEAR
AST SERPL W P-5'-P-CCNC: 84 U/L (ref 0–45)
BASOPHILS # BLD AUTO: 0 10E3/UL (ref 0–0.2)
BASOPHILS NFR BLD AUTO: 0 %
BILIRUB SERPL-MCNC: 0.5 MG/DL
BILIRUB UR QL STRIP: NEGATIVE
BUN SERPL-MCNC: 23.5 MG/DL (ref 6–20)
CALCIUM SERPL-MCNC: 8.2 MG/DL (ref 8.8–10.4)
CHLORIDE SERPL-SCNC: 101 MMOL/L (ref 98–107)
COLOR UR AUTO: YELLOW
CREAT SERPL-MCNC: 0.67 MG/DL (ref 0.67–1.17)
EGFRCR SERPLBLD CKD-EPI 2021: >90 ML/MIN/1.73M2
EOSINOPHIL # BLD AUTO: 0.1 10E3/UL (ref 0–0.7)
EOSINOPHIL NFR BLD AUTO: 0 %
ERYTHROCYTE [DISTWIDTH] IN BLOOD BY AUTOMATED COUNT: 15.2 % (ref 10–15)
GLUCOSE SERPL-MCNC: 184 MG/DL (ref 70–99)
GLUCOSE UR STRIP-MCNC: NEGATIVE MG/DL
HCO3 SERPL-SCNC: 24 MMOL/L (ref 22–29)
HCT VFR BLD AUTO: 31.6 % (ref 40–53)
HGB BLD-MCNC: 10 G/DL (ref 13.3–17.7)
HGB UR QL STRIP: NEGATIVE
IMM GRANULOCYTES # BLD: 0.1 10E3/UL
IMM GRANULOCYTES NFR BLD: 1 %
KETONES UR STRIP-MCNC: NEGATIVE MG/DL
LEUKOCYTE ESTERASE UR QL STRIP: NEGATIVE
LIPASE SERPL-CCNC: 7 U/L (ref 13–60)
LYMPHOCYTES # BLD AUTO: 0.7 10E3/UL (ref 0.8–5.3)
LYMPHOCYTES NFR BLD AUTO: 5 %
MCH RBC QN AUTO: 27.9 PG (ref 26.5–33)
MCHC RBC AUTO-ENTMCNC: 31.6 G/DL (ref 31.5–36.5)
MCV RBC AUTO: 88 FL (ref 78–100)
MONOCYTES # BLD AUTO: 0.9 10E3/UL (ref 0–1.3)
MONOCYTES NFR BLD AUTO: 7 %
MUCOUS THREADS #/AREA URNS LPF: PRESENT /LPF
NEUTROPHILS # BLD AUTO: 12.1 10E3/UL (ref 1.6–8.3)
NEUTROPHILS NFR BLD AUTO: 87 %
NITRATE UR QL: NEGATIVE
NRBC # BLD AUTO: 0 10E3/UL
NRBC BLD AUTO-RTO: 0 /100
PH UR STRIP: 6 [PH] (ref 5–7)
PLATELET # BLD AUTO: 199 10E3/UL (ref 150–450)
POTASSIUM SERPL-SCNC: 4.1 MMOL/L (ref 3.4–5.3)
PROT SERPL-MCNC: 6.8 G/DL (ref 6.4–8.3)
RADIOLOGIST FLAGS: ABNORMAL
RBC # BLD AUTO: 3.59 10E6/UL (ref 4.4–5.9)
RBC URINE: 3 /HPF
SODIUM SERPL-SCNC: 133 MMOL/L (ref 135–145)
SP GR UR STRIP: 1.03 (ref 1–1.03)
TROPONIN T SERPL HS-MCNC: 10 NG/L
TROPONIN T SERPL HS-MCNC: 12 NG/L
UROBILINOGEN UR STRIP-MCNC: 2 MG/DL
WBC # BLD AUTO: 14 10E3/UL (ref 4–11)
WBC URINE: 4 /HPF

## 2024-12-12 PROCEDURE — 83690 ASSAY OF LIPASE: CPT | Performed by: EMERGENCY MEDICINE

## 2024-12-12 PROCEDURE — 96361 HYDRATE IV INFUSION ADD-ON: CPT | Performed by: EMERGENCY MEDICINE

## 2024-12-12 PROCEDURE — 250N000009 HC RX 250: Performed by: EMERGENCY MEDICINE

## 2024-12-12 PROCEDURE — 81001 URINALYSIS AUTO W/SCOPE: CPT | Performed by: EMERGENCY MEDICINE

## 2024-12-12 PROCEDURE — 96374 THER/PROPH/DIAG INJ IV PUSH: CPT | Mod: 59 | Performed by: EMERGENCY MEDICINE

## 2024-12-12 PROCEDURE — 258N000003 HC RX IP 258 OP 636: Performed by: EMERGENCY MEDICINE

## 2024-12-12 PROCEDURE — 85041 AUTOMATED RBC COUNT: CPT | Performed by: EMERGENCY MEDICINE

## 2024-12-12 PROCEDURE — 74177 CT ABD & PELVIS W/CONTRAST: CPT

## 2024-12-12 PROCEDURE — 71275 CT ANGIOGRAPHY CHEST: CPT

## 2024-12-12 PROCEDURE — 250N000011 HC RX IP 250 OP 636: Performed by: EMERGENCY MEDICINE

## 2024-12-12 PROCEDURE — 72131 CT LUMBAR SPINE W/O DYE: CPT | Mod: 26 | Performed by: RADIOLOGY

## 2024-12-12 PROCEDURE — 36415 COLL VENOUS BLD VENIPUNCTURE: CPT | Performed by: EMERGENCY MEDICINE

## 2024-12-12 PROCEDURE — 74177 CT ABD & PELVIS W/CONTRAST: CPT | Mod: 26 | Performed by: RADIOLOGY

## 2024-12-12 PROCEDURE — 999N000104 CT LUMBAR SPINE RECONSTRUCTED

## 2024-12-12 PROCEDURE — 85004 AUTOMATED DIFF WBC COUNT: CPT | Performed by: EMERGENCY MEDICINE

## 2024-12-12 PROCEDURE — 99285 EMERGENCY DEPT VISIT HI MDM: CPT | Mod: 25 | Performed by: EMERGENCY MEDICINE

## 2024-12-12 PROCEDURE — 81003 URINALYSIS AUTO W/O SCOPE: CPT | Performed by: EMERGENCY MEDICINE

## 2024-12-12 PROCEDURE — 99284 EMERGENCY DEPT VISIT MOD MDM: CPT | Performed by: EMERGENCY MEDICINE

## 2024-12-12 PROCEDURE — 84484 ASSAY OF TROPONIN QUANT: CPT | Performed by: EMERGENCY MEDICINE

## 2024-12-12 PROCEDURE — 84075 ASSAY ALKALINE PHOSPHATASE: CPT | Performed by: EMERGENCY MEDICINE

## 2024-12-12 PROCEDURE — 82947 ASSAY GLUCOSE BLOOD QUANT: CPT | Performed by: EMERGENCY MEDICINE

## 2024-12-12 PROCEDURE — 93005 ELECTROCARDIOGRAM TRACING: CPT | Performed by: EMERGENCY MEDICINE

## 2024-12-12 PROCEDURE — 250N000013 HC RX MED GY IP 250 OP 250 PS 637: Performed by: EMERGENCY MEDICINE

## 2024-12-12 PROCEDURE — 93010 ELECTROCARDIOGRAM REPORT: CPT | Performed by: EMERGENCY MEDICINE

## 2024-12-12 PROCEDURE — 84450 TRANSFERASE (AST) (SGOT): CPT | Performed by: EMERGENCY MEDICINE

## 2024-12-12 PROCEDURE — 71275 CT ANGIOGRAPHY CHEST: CPT | Mod: 26 | Performed by: RADIOLOGY

## 2024-12-12 RX ORDER — ONDANSETRON 2 MG/ML
4 INJECTION INTRAMUSCULAR; INTRAVENOUS EVERY 30 MIN PRN
Status: DISCONTINUED | OUTPATIENT
Start: 2024-12-12 | End: 2024-12-12 | Stop reason: HOSPADM

## 2024-12-12 RX ORDER — METHOCARBAMOL 750 MG/1
750 TABLET, FILM COATED ORAL ONCE
Status: COMPLETED | OUTPATIENT
Start: 2024-12-12 | End: 2024-12-12

## 2024-12-12 RX ORDER — HYDROMORPHONE HYDROCHLORIDE 2 MG/1
2-4 TABLET ORAL EVERY 4 HOURS PRN
Qty: 60 TABLET | Refills: 0 | Status: SHIPPED | OUTPATIENT
Start: 2024-12-12 | End: 2024-12-13

## 2024-12-12 RX ORDER — HEPARIN SODIUM (PORCINE) LOCK FLUSH IV SOLN 100 UNIT/ML 100 UNIT/ML
5-10 SOLUTION INTRAVENOUS
Status: DISCONTINUED | OUTPATIENT
Start: 2024-12-12 | End: 2024-12-12 | Stop reason: HOSPADM

## 2024-12-12 RX ORDER — LIDOCAINE 50 MG/G
1 PATCH TOPICAL EVERY 24 HOURS
Qty: 10 PATCH | Refills: 0 | Status: SHIPPED | OUTPATIENT
Start: 2024-12-12 | End: 2024-12-18

## 2024-12-12 RX ORDER — IOPAMIDOL 755 MG/ML
92 INJECTION, SOLUTION INTRAVASCULAR ONCE
Status: COMPLETED | OUTPATIENT
Start: 2024-12-12 | End: 2024-12-12

## 2024-12-12 RX ORDER — HEPARIN SODIUM,PORCINE 10 UNIT/ML
5-10 VIAL (ML) INTRAVENOUS EVERY 24 HOURS
Status: DISCONTINUED | OUTPATIENT
Start: 2024-12-12 | End: 2024-12-12 | Stop reason: HOSPADM

## 2024-12-12 RX ORDER — METHOCARBAMOL 750 MG/1
750 TABLET, FILM COATED ORAL 4 TIMES DAILY PRN
Qty: 60 TABLET | Refills: 0 | Status: SHIPPED | OUTPATIENT
Start: 2024-12-12 | End: 2024-12-27

## 2024-12-12 RX ORDER — KETOROLAC TROMETHAMINE 15 MG/ML
15 INJECTION, SOLUTION INTRAMUSCULAR; INTRAVENOUS ONCE
Status: COMPLETED | OUTPATIENT
Start: 2024-12-12 | End: 2024-12-12

## 2024-12-12 RX ORDER — HEPARIN SODIUM,PORCINE 10 UNIT/ML
5-10 VIAL (ML) INTRAVENOUS
Status: DISCONTINUED | OUTPATIENT
Start: 2024-12-12 | End: 2024-12-12 | Stop reason: HOSPADM

## 2024-12-12 RX ORDER — LIDOCAINE 4 G/G
1 PATCH TOPICAL ONCE
Status: DISCONTINUED | OUTPATIENT
Start: 2024-12-12 | End: 2024-12-12 | Stop reason: HOSPADM

## 2024-12-12 RX ADMIN — HEPARIN 5 ML: 100 SYRINGE at 17:46

## 2024-12-12 RX ADMIN — SODIUM CHLORIDE 1000 ML: 9 INJECTION, SOLUTION INTRAVENOUS at 13:58

## 2024-12-12 RX ADMIN — METHOCARBAMOL TABLETS 750 MG: 750 TABLET, COATED ORAL at 14:01

## 2024-12-12 RX ADMIN — METHOCARBAMOL 750 MG: 750 TABLET ORAL at 18:01

## 2024-12-12 RX ADMIN — KETOROLAC TROMETHAMINE 15 MG: 15 INJECTION, SOLUTION INTRAMUSCULAR; INTRAVENOUS at 14:01

## 2024-12-12 RX ADMIN — SODIUM CHLORIDE 1000 ML: 9 INJECTION, SOLUTION INTRAVENOUS at 16:56

## 2024-12-12 RX ADMIN — IOPAMIDOL 92 ML: 755 INJECTION, SOLUTION INTRAVENOUS at 15:24

## 2024-12-12 RX ADMIN — SODIUM CHLORIDE 90 ML: 9 INJECTION, SOLUTION INTRAVENOUS at 15:24

## 2024-12-12 ASSESSMENT — ACTIVITIES OF DAILY LIVING (ADL)
ADLS_ACUITY_SCORE: 54

## 2024-12-12 ASSESSMENT — COLUMBIA-SUICIDE SEVERITY RATING SCALE - C-SSRS
1. IN THE PAST MONTH, HAVE YOU WISHED YOU WERE DEAD OR WISHED YOU COULD GO TO SLEEP AND NOT WAKE UP?: NO
2. HAVE YOU ACTUALLY HAD ANY THOUGHTS OF KILLING YOURSELF IN THE PAST MONTH?: NO
6. HAVE YOU EVER DONE ANYTHING, STARTED TO DO ANYTHING, OR PREPARED TO DO ANYTHING TO END YOUR LIFE?: NO

## 2024-12-12 NOTE — ED PROVIDER NOTES
History     Chief Complaint   Patient presents with    Back Pain     Pt with c/o lower back pain and spasms. Pt is a cancer pt  who takes pain medications at home with no relief. Pt reports cancer in his L5.    Abdominal Pain     Pt reports b/l upper quad pain with nausea.    Shoulder Injury     B/l shoulder pain. Pt denies pain/injury.     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  The patient reports that he has known metastases to L5.  He has previously required radiation and surgery for an L4 fracture.  He is now experiencing lower back pain and spasms.  No recent injuries or overuse activities.  He is taking his home pain medications without relief.  He has been taking oral Dilaudid, Tylenol, and using THC Gummies and CBD cream.  This has helped somewhat, but has failed to alleviate his pain fully.  His abdominal pain is located in the bilateral upper abdomen and is associated with nausea.  Patient also has bilateral shoulder pain.  No recent injuries.    The patient presents to the emergency department accompanied by his wife.    I have reviewed the Medications, Allergies, Past Medical and Surgical History, and Social History in the [employer] system.    Past Medical History:   Diagnosis Date    Acute pancreatitis 04/16/2023    Alcohol-induced acute pancreatitis 04/10/2023    Gastric outlet obstruction     Metastasis from pancreatic cancer (H)     Personal history of chemotherapy     Gemzar + Abraxane started on 10/31/2023    Recurrent acute pancreatitis     S/P radiation therapy     2,000 cGy to T10 Spine completed on 11/29/2023 [facility]    S/P radiation therapy     3,000 cGy to L4 Spine completed on 10/21/2024 - [facility]     Past Surgical  History:   Procedure Laterality Date    AS OPEN TREATMENT CLAVICULAR FRACTURE INTERNAL FX      ENDOSCOPIC RETROGRADE CHOLANGIOPANCREATOGRAM N/A 7/11/2023    Procedure: ENDOSCOPIC RETROGRADE CHOLANGIOPANCREATOGRAPHY;  Surgeon: Khadar Pickett MD;  Location: [facility] OR    ENDOSCOPIC RETROGRADE CHOLANGIOPANCREATOGRAM N/A 8/17/2023    Procedure: ENDOSCOPIC RETROGRADE  with stent removal x1, balloon sweep;  Surgeon: Christos Greenberg MD;  Location: [facility] OR    ENDOSCOPIC ULTRASOUND UPPER GASTROINTESTINAL TRACT (GI) N/A 7/11/2023    Procedure: Endoscopic ultrasound upper gastrointestinal tract (GI), with biposy, GJ tube repositioning, stent placement;  Surgeon: Khadar Pickett MD;  Location: [facility] OR    ENDOSCOPIC ULTRASOUND UPPER GASTROINTESTINAL TRACT (GI) N/A 7/13/2023    Procedure: ENDOSCOPIC ULTRASOUND, ESOPHAGOSCOPY, EUS guided gastrojejunostomy;  Surgeon: Tre York MD;  Location: [facility] OR    INSERT PICC LINE  04/29/2023    INSERT PORT VASCULAR ACCESS Right 7/28/2023    Procedure: Insert port vascular access;  Surgeon: Tom العلي MD;  Location: [facility] OR    IR BONE ABLATION RFA  8/28/2024    IR CHEST PORT PLACEMENT > 5 YRS OF AGE  7/28/2023    IR LUMBAR VERTEBROPLASTY  8/28/2024    IR NG TUBE PLACEMENT REQ RAD & FLUORO  05/08/2023    JG tube    REPLACE GASTROJEJUNOSTOMY TUBE, PERCUTANEOUS N/A 8/17/2023    Procedure: possible REPLACEMENT, GASTROJEJUNOSTOMY TUBE, PERCUTANEOUS;  Surgeon: Christos Greenberg MD;  Location: [facility] OR     No current facility-administered medications for this encounter.     Current Outpatient Medications   Medication Sig Dispense Refill    acetaminophen (TYLENOL) 32 mg/mL liquid Take 20.313 mLs (650 mg) by mouth every 8 hours. 1800 mL 3    alteplase (CATHFLO ACTIVASE) injection Inject 2 mLs (2 mg) into catheter as needed (catheter occlusion). Reconstitute vial. Draw up alteplase 1 mg/mL in a syringe and instill into IV catheter. Dwell for at least 20 min to 24 hours, then aspirate.  "May repeat dose once in 24 hours if catheter function not restored after at least 2 hours. Discard remainder of vial. [id number] each 0    apixaban ANTICOAGULANT (ELIQUIS) 5 MG tablet Take 1 tablet (5 mg) by mouth 2 times daily. 60 tablet 2    buprenorphine (BUTRANS) 20 MCG/HR WK patch Place 2 patches onto the skin once a week. Use with 10 mcg/hour patch for 30 mcg/hour total. 8 patch 3    Chemo Kit For RN use only. Do not remove items from bag. Contents: 1  sodium chloride 0.9% flush, 4 medium gloves, 1 chemo gown, 1/4 chemo mat, 1 connector female, 1 chemo bag. [id number] kit 0    dexAMETHasone (DECADRON) 4 MG tablet Take 1 tablet (4 mg) by mouth daily (with breakfast) for 4 doses. 4 tablet 0    dicyclomine (BENTYL) 10 MG capsule Take 1 capsule (10 mg) by mouth 4 times daily (before meals and nightly). 120 capsule 1    diphenhydrAMINE (BENADRYL) 50 MG/ML injection Inject 1 mL (50 mg) over 3-5 minutes into the vein via push as needed for other (infusion reaction). For RN use only.  Draw up in a syringe and administer IV push.  Discard remainder of vial. 47785 mL 0    econazole nitrate 1 % external cream Apply topically daily To affected toenails. 85 g 5    Emergency Supply Kit, Central, Patient use for emergency only. Contents: 3 sodium chloride 0.9% flushes, 1 dressing kit, 1 microclave ext set 14\", 4 nitrile gloves (med), 6 alcohol prep pads, 1 bacitracin, 1 syringe (10 cc 20 G 1\"). Call [phone] to reorder. [id number] kit 0    EPINEPHrine (ANY BX GENERIC EQUIV) 0.3 MG/0.3ML injection 2-pack Inject 0.3 mLs (0.3 mg) into the muscle as needed for anaphylaxis (infusion reaction). Administer into the mid-thigh in case of severe anaphylaxis (wheezing, throat tightening, mouth swelling, difficulty breathing). May repeat dose one time in 5-15 minutes if symptoms persist. 642717 mL 0    HYDROmorphone (DILAUDID) 2 MG tablet Take 1-2 tablets (2-4 mg) by mouth every 4 hours as needed for severe pain or breakthrough pain. 180 " tablet 0    lidocaine-prilocaine (EMLA) 2.5-2.5 % external cream Use 1-2 times a week or as needed prior to port access (Patient not taking: Reported on 9/11/2024) 30 g 3    lipase-protease-amylase (CREON 24) 99774-58852-733949 units CPEP per EC capsule 2-3 capsules with meals 3 times a day and 1-2 capsules with snacks max of 15 capsules a day 200 capsule 11    methylphenidate (RITALIN) 5 MG tablet Take 1 tablet (5 mg) by mouth 2 times daily as needed (Patient not taking: Reported on 7/30/2024) 10 tablet 0    methylPREDNISolone Na Suc, PF, (SOLU-MEDROL) 125 mg/2 mL injection Inject 2 mLs (125 mg) over 3-5 minutes into the vein via push as needed (infusion reaction). For RN use only.  Reconstitute vial. Draw up methylPREDNISolone in a syringe and administer.  Discard remainder of vial. 970472 mL 0    Multiple Vitamins-Minerals (CENTRUM SILVER 50+MEN) TABS as directed Orally      naloxone (NARCAN) 4 MG/0.1ML nasal spray Instill 1 spray (4 mg) into one nostril alternating nostrils as needed for opioid reversal every 2-3 minutes until assistance arrives* (Patient not taking: Reported on 7/30/2024)      pantoprazole (PROTONIX) 40 MG EC tablet Take 1 tablet (40 mg) by mouth 2 times daily 60 tablet 4    Port Access Kit For nurse use only.  Do not remove items from bag.  Use for port access.  Do not place syringe on sterile field. [id number] kit 0    prochlorperazine (COMPAZINE) 10 MG tablet Take 1 tablet (10 mg) by mouth every 6 hours as needed for nausea or vomiting 30 tablet 2    sodium chloride 0.9% infusion Infuse 250 mLs over 30 minutes into the vein every 28 (twenty-eight) days. zometa concomitant fluids. Given per therapy plan orders 750 mL 2    sodium chloride 0.9% infusion Infuse 500 mLs into the vein as needed for other (infusion reaction). In case of mild reaction, administer via gravity at 20 mL/hr to keep vein open. In case of severe reaction, administer via gravity wide open on prime setting. 682353 mL 0     sodium chloride, PF, 0.9% PF flush Inject 10 mLs into the vein as needed for other (infusion reaction). For RN use only as needed for infusion reaction 157980 mL 0    sodium chloride, PF, 0.9% PF flush Inject 10 mLs into the vein as needed for line flush. Flush IV before and after med administration as directed and/or at least every 24 hours, or prior to deaccessing for no further use and/or at least every 4 weeks when not accessed. 828099 mL 0    sterile water, preservative free, injection Use 2.2 mLs for reconstitution as needed (with alteplase for catheter occlusion). Direct diluent stream into powder. Mix by gently swirling until dissolved. DO NOT SHAKE. Discard remainder of vial. 331727 mL 0    vitamin D3 (CHOLECALCIFEROL) 50 mcg (2000 units) tablet Take 1 tablet (50 mcg) by mouth daily. 90 tablet 1    zoledronic acid (ZOMETA) 4 MG/100ML infusion Infuse 100 mLs (4 mg) into the vein every 28 days. Infuse via gravity. Given per therapy plan orders 300 mL 2     No Known Allergies  Past medical history, past surgical history, medications, and allergies were reviewed with the patient. Additional pertinent items: None    Social History     Socioeconomic History    Marital status:      Spouse name: Not on file    Number of children: Not on file    Years of education: Not on file    Highest education level: Not on file   Occupational History    Not on file   Tobacco Use    Smoking status: Never     Passive exposure: Never    Smokeless tobacco: Never   Vaping Use    Vaping status: Never Used   Substance and Sexual Activity    Alcohol use: Not Currently    Drug use: Never    Sexual activity: Not on file   Other Topics Concern    Not on file   Social History Narrative    Not on file     Social Drivers of Health     Financial Resource Strain: Low Risk  (2/2/2024)    Financial Resource Strain     Within the past 12 months, have you or your family members you live with been unable to get utilities (heat, electricity)  when it was really needed?: No   Food Insecurity: Low Risk  (2/2/2024)    Food Insecurity     Within the past 12 months, did you worry that your food would run out before you got money to buy more?: No     Within the past 12 months, did the food you bought just not last and you didn t have money to get more?: No   Transportation Needs: Low Risk  (2/2/2024)    Transportation Needs     Within the past 12 months, has lack of transportation kept you from medical appointments, getting your medicines, non-medical meetings or appointments, work, or from getting things that you need?: No   Physical Activity: Not on file   Stress: Not on file   Social Connections: Not on file   Interpersonal Safety: Low Risk  (8/28/2024)    Interpersonal Safety     Do you feel physically and emotionally safe where you currently live?: Yes     Within the past 12 months, have you been hit, slapped, kicked or otherwise physically hurt by someone?: No     Within the past 12 months, have you been humiliated or emotionally abused in other ways by your partner or ex-partner?: No   Housing Stability: Low Risk  (2/2/2024)    Housing Stability     Do you have housing? : Yes     Are you worried about losing your housing?: No     Social history was reviewed with the patient. Additional pertinent items: None    Review of Systems  A medically appropriate review of systems was performed with pertinent positives and negatives noted in the HPI, and all other systems negative.    Physical Exam   BP: 98/58  Pulse: 112  Temp: 98.4  F (36.9  C)  Resp: 18  Height: 182.9 cm (6')  Weight: 68 kg (150 lb)  SpO2: 96 %      General: Well nourished, well developed, NAD  HEENT: EOMI, anicteric. NCAT, MMM  Neck: no jugular venous distension, supple, nl ROM  Cardiac: Regular rate and rhythm. No murmurs, rubs, or gallops. Normal S1, S2.  Intact peripheral pulses  Pulm: CTAB, no stridor, wheezes, rales, rhonchi.  Tenderness overlying anterior bilateral ribs  Abd: Soft,  newness to palpation in the upper abdomen, particularly the right upper quadrant, nondistended.  No masses palpated.  Back: Tenderness to palpation over the lumbar spine, no step-off  Skin: Warm and dry to the touch.  No rash  Extremities: No LE edema, no cyanosis, w/w/p  Neuro: A&Ox3, no gross focal deficits    ED Course        Procedures                EKG Interpretation:      Interpreted by Diane Clay MD  Time reviewed: 6003  Symptoms at time of EKG: fever, back pain, abdominal pain  Rhythm: Sinus tachycardia  Rate: Tachycardic, 106 bpm  Axis: NORMAL  Ectopy: none  Conduction: normal  ST Segments/ T Waves: Nonspecific T wave abnormality in V2 and V3  Q Waves: none  Comparison to prior: Compared to EKG dated July 28, 2023, ST changes and tachycardia are new    Clinical Impression: abnormal EKG               Labs Ordered and Resulted from Time of ED Arrival to Time of ED Departure   COMPREHENSIVE METABOLIC PANEL - Abnormal       Result Value    Sodium 133 (*)     Potassium 4.1      Carbon Dioxide (CO2) 24      Anion Gap 8      Urea Nitrogen 23.5 (*)     Creatinine 0.67      GFR Estimate >90      Calcium 8.2 (*)     Chloride 101      Glucose 184 (*)     Alkaline Phosphatase 363 (*)     AST 84 (*)     ALT 55      Protein Total 6.8      Albumin 3.5      Bilirubin Total 0.5     LIPASE - Abnormal    Lipase 7 (*)    ROUTINE UA WITH MICROSCOPIC REFLEX TO CULTURE - Abnormal    Color Urine Yellow      Appearance Urine Clear      Glucose Urine Negative      Bilirubin Urine Negative      Ketones Urine Negative      Specific Gravity Urine 1.034      Blood Urine Negative      pH Urine 6.0      Protein Albumin Urine 30 (*)     Urobilinogen Urine 2.0      Nitrite Urine Negative      Leukocyte Esterase Urine Negative      Mucus Urine Present (*)     RBC Urine 3 (*)     WBC Urine 4     CBC WITH PLATELETS AND DIFFERENTIAL - Abnormal    WBC Count 14.0 (*)     RBC Count 3.59 (*)     Hemoglobin 10.0 (*)     Hematocrit 31.6  (*)     MCV 88      MCH 27.9      MCHC 31.6      RDW 15.2 (*)     Platelet Count 199      % Neutrophils 87      % Lymphocytes 5      % Monocytes 7      % Eosinophils 0      % Basophils 0      % Immature Granulocytes 1      NRBCs per 100 WBC 0      Absolute Neutrophils 12.1 (*)     Absolute Lymphocytes 0.7 (*)     Absolute Monocytes 0.9      Absolute Eosinophils 0.1      Absolute Basophils 0.0      Absolute Immature Granulocytes 0.1      Absolute NRBCs 0.0     TROPONIN T, HIGH SENSITIVITY - Normal    Troponin T, High Sensitivity 10     TROPONIN T, HIGH SENSITIVITY - Normal    Troponin T, High Sensitivity 12              Results for orders placed or performed during the hospital encounter of 12/12/24 (from the past 24 hours)   EKG 12-lead, tracing only   Result Value Ref Range    Systolic Blood Pressure  mmHg    Diastolic Blood Pressure  mmHg    Ventricular Rate 106 BPM    Atrial Rate 106 BPM    TN Interval 124 ms    QRS Duration 90 ms     ms    QTc 451 ms    P Axis 33 degrees    R AXIS 28 degrees    T Axis 42 degrees    Interpretation ECG       Sinus tachycardia  Nonspecific T wave abnormality  Abnormal ECG    Unconfirmed report - interpretation of this ECG is computer generated - see medical record for final interpretation  Confirmed by - EMERGENCY ROOM, PHYSICIAN ([id number]),  GLEN SCHWARTZ ([id number]) on 12/13/2024 8:10:13 AM     CBC with platelets differential    Narrative    The following orders were created for panel order CBC with platelets differential.  Procedure                               Abnormality         Status                     ---------                               -----------         ------                     CBC with platelets and d...[[id number]]  Abnormal            Final result                 Please view results for these tests on the individual orders.   Comprehensive metabolic panel   Result Value Ref Range    Sodium 133 (L) 135 - 145 mmol/L    Potassium 4.1 3.4 - 5.3 mmol/L     Carbon Dioxide (CO2) 24 22 - 29 mmol/L    Anion Gap 8 7 - 15 mmol/L    Urea Nitrogen 23.5 (H) 6.0 - 20.0 mg/dL    Creatinine 0.67 0.67 - 1.17 mg/dL    GFR Estimate >90 >60 mL/min/1.73m2    Calcium 8.2 (L) 8.8 - 10.4 mg/dL    Chloride 101 98 - 107 mmol/L    Glucose 184 (H) 70 - 99 mg/dL    Alkaline Phosphatase 363 (H) 40 - 150 U/L    AST 84 (H) 0 - 45 U/L    ALT 55 0 - 70 U/L    Protein Total 6.8 6.4 - 8.3 g/dL    Albumin 3.5 3.5 - 5.2 g/dL    Bilirubin Total 0.5 <=1.2 mg/dL   Lipase   Result Value Ref Range    Lipase 7 (L) 13 - 60 U/L   Troponin T, High Sensitivity   Result Value Ref Range    Troponin T, High Sensitivity 10 <=22 ng/L   CBC with platelets and differential   Result Value Ref Range    WBC Count 14.0 (H) 4.0 - 11.0 10e3/uL    RBC Count 3.59 (L) 4.40 - 5.90 10e6/uL    Hemoglobin 10.0 (L) 13.3 - 17.7 g/dL    Hematocrit 31.6 (L) 40.0 - 53.0 %    MCV 88 78 - 100 fL    MCH 27.9 26.5 - 33.0 pg    MCHC 31.6 31.5 - 36.5 g/dL    RDW 15.2 (H) 10.0 - 15.0 %    Platelet Count 199 150 - 450 10e3/uL    % Neutrophils 87 %    % Lymphocytes 5 %    % Monocytes 7 %    % Eosinophils 0 %    % Basophils 0 %    % Immature Granulocytes 1 %    NRBCs per 100 WBC 0 <1 /100    Absolute Neutrophils 12.1 (H) 1.6 - 8.3 10e3/uL    Absolute Lymphocytes 0.7 (L) 0.8 - 5.3 10e3/uL    Absolute Monocytes 0.9 0.0 - 1.3 10e3/uL    Absolute Eosinophils 0.1 0.0 - 0.7 10e3/uL    Absolute Basophils 0.0 0.0 - 0.2 10e3/uL    Absolute Immature Granulocytes 0.1 <=0.4 10e3/uL    Absolute NRBCs 0.0 10e3/uL   UA with Microscopic reflex to Culture    Specimen: Urine, Midstream   Result Value Ref Range    Color Urine Yellow Colorless, Straw, Light Yellow, Yellow    Appearance Urine Clear Clear    Glucose Urine Negative Negative mg/dL    Bilirubin Urine Negative Negative    Ketones Urine Negative Negative mg/dL    Specific Gravity Urine 1.034 1.003 - 1.035    Blood Urine Negative Negative    pH Urine 6.0 5.0 - 7.0    Protein Albumin Urine 30 (A) Negative  mg/dL    Urobilinogen Urine 2.0 Normal, 2.0 mg/dL    Nitrite Urine Negative Negative    Leukocyte Esterase Urine Negative Negative    Mucus Urine Present (A) None Seen /LPF    RBC Urine 3 (H) <=2 /HPF    WBC Urine 4 <=5 /HPF    Narrative    Urine Culture not indicated   CT Chest Pulmonary Embolism w Contrast    Narrative    EXAMINATION: CTA Pulmonary Angiogram, 12/12/2024 3:59 PM     COMPARISON: CT 11/12/2024    HISTORY: chest pain, cancer history; Male sex; No prior imaging in the  last 24 hours; Pulmonary Embolism Rule-Out Criteria (PERC) score > 0;  Revised Alabaster Score (RGS) not >= 11; No D-dimer result available;  D-dimer not ordered    TECHNIQUE: Volumetric helical acquisition of CT images of the chest  from the lung apices to the kidneys were acquired after the  administration of iopamidol (ISOVUE-370) solution 92mL.mL of  Isovue-370 IV contrast. Flash technique with free breathing  acquisition. Post-processed multiplanar and/or MIP reformations were  obtained, archived to PACS and used in interpretation of this study.     FINDINGS:    Contrast bolus is: suboptimal. No definite acute pulmonary embolism.  Exam is somewhat limited by suboptimal contrast bolus. No evidence of  right heart strain.    : Unremarkable.    Lines and tubes: Port-A-Cath in the right chest with tip in the SVC.    Airways: Central airways are patent.     Lungs: No suspicious pulmonary nodules or masses. No focal airspace  consolidation. Trace dependent atelectasis.     Pleura: No pleural effusions. No pneumothorax.    Thyroid: The visualized portions of the thyroid is unremarkable.    Lymph nodes: No enlarged axillary, mediastinal, or hilar lymph nodes  by short axis criteria.     Cardiac: The heart is not enlarged. No pericardial effusion.     Vessels: Thoracic aorta and main pulmonary artery are normal in  caliber.    Esophagus: The esophagus appears unremarkable.    Upper abdomen: Partially visualized pneumobilia which is  grossly  unchanged from 11/12/2024. Redemonstration of partially visualized  mass in the pancreatic head. Partially visualized gastroduodenal stent  and the gastrojejunal stent. Please see same day CT abdomen and pelvis  with contrast for further details.    Bones/soft tissue: Degenerative changes of the spine. Sclerosis of the  T10 vertebral body, similar when compared to 11/12/2024. Schmorl's  node at L2.      Impression    IMPRESSION:   1. No definite acute pulmonary embolism however exam is somewhat  limited by suboptimal contrast bolus. No evidence of right heart  strain.    2. Trace dependent atelectasis. No focal consolidations. No suspicious  pulmonary masses or nodules.    3. Partially visualized pneumobilia and pancreatic head mass with  gastroduodenal stent and gastroduodenal stent. Please see same day CT  abdomen and pelvis with contrast for further details.    In the event of a positive result for acute pulmonary embolism  resulting in right heart strain, consider calling the   [facility] patient placement ([phone]) for PERT (Pulmonary Embolism  Response Team) Activation?    PERT -- Pulmonary Embolism Response Team (Multidisciplinary team  including cardiology, interventional radiology, critical care,  hematology)    I have personally reviewed the examination and initial interpretation  and I agree with the findings.    ELIE WAN MD         SYSTEM ID:  [id number]   Troponin T, High Sensitivity   Result Value Ref Range    Troponin T, High Sensitivity 12 <=22 ng/L   CT Abdomen Pelvis w Contrast    Narrative    EXAM: CT abdomen and pelvis with intravenous contrast. 12/12/2024 4:02  PM    HISTORY: abd pain, nausea, pancreatic cancer       TECHNIQUE: Helical acquisition of image data was performed for the  abdomen and pelvis with intravenous contrast.    COMPARISON: CT chest, abdomen and pelvis 11/12/2024.    FINDINGS:    Lower thorax: Subpleural curvilinear lines consistent with  atelectasis. No focal  consolidation, pleural effusion or pneumothorax.  The heart is normal size without pericardial effusion.    Liver: Unchanged pneumobilia with hepatic biliary drain in place. No  focal lesion.    Gallbladder/biliary tree: The common bile duct stented. Gallbladder is  distended with dependent air. Normal wall thickness without  pericholecystic inflammation.    Pancreas: Similar ill-defined, hypoenhancing mass in the pancreatic  head. Similar abutment of the superior mesenteric, splenic and  proximal portal vein. Atrophic pancreatic body and tail. Encasement of  the SMA series 4 image 65. Encasement of the hepatic artery series 4  image 44.     Spleen: The spleen is not enlarged.    Adrenal glands: No adrenal nodules.    Kidneys/ureters: No hydronephrosis. No calculi.    Stomach: Postprocedural changes of stent from the proximal gastric  body to the jejunum.    Bladder/pelvic organs: Prostate intraparenchymal calcifications.    Bowel/mesentery: Stable position of stent extending from the duodenal  bulb to the proximal jejunum. No abnormally dilated loops of small or  large bowel. Moderate to large proximal colonic stool burden through  the transverse colon. There is bowel significant thickening throughout  the sigmoid and rectum. No significant adjacent fat stranding. The  appendix is unremarkable.    Peritoneum/retroperitoneum: No extraluminal bowel gas. No free fluid  in the abdomen or pelvis.    Lymph nodes: No enlarged abdominal or pelvic lymph nodes by short axis  criteria.    Major vessels: Scattered atherosclerotic calcified arterial plaque.  The superior mesenteric and portal veins are patent.    Soft tissues: No soft tissue abnormality.    Osseous structures: Multilevel degenerative changes including Schmorl  node deformities. Similar-appearing sclerotic L4 vertebral body with  vertebroplasty changes. Stable sclerotic lesion in the left pubic  ramus had. No acute osseous abnormality.      Impression     IMPRESSION:  1. Bowel wall thickening in the sigmoid and rectum. Differential  edema, colitis, incomplete distention. No fat stranding to suggest  high-grade inflammation.  2. Endoscopic stenting of the duodenum, gastrojejunal stent,  hepatobiliary stent and common bile duct stent are stable in position.  3. Stable pneumobilia given the above findings.  4. Similar infiltrating hypoattenuating mass in the pancreatic head  that abuts the superior mesenteric, splenic and proximal portal vein.  5. No significant change in sclerotic L4, T10, T11 lesions.      I have personally reviewed the examination and initial interpretation  and I agree with the findings.    SHANTI PIÑA MD         SYSTEM ID:  [id number]   CT Lumbar Spine Reconstructed   Result Value Ref Range    Radiologist flags Right-sided T11 posterior medial rib fracture (Urgent)     Narrative    CT LUMBAR SPINE RECONSTRUCTED 12/12/2024 4:02 PM    History: back pain, known mets.  ICD-10:    Comparison: Chest abdomen and pelvis from 8/8/2024.    Technique: Using multidetector thin collimation helical acquisition  technique, axial, coronal and sagittal CT images through the lumbar  spine were obtained without intravenous contrast.     Findings: There are 5 lumbar type vertebrae. Regarding alignment, the  lumbar spine alignment appears preserved. Sclerotic focus of L4 with  changes of vertebroplasty. Sclerotic focus within the posterior  inferior T11 vertebral body and anterior L3 vertebral body. Schmorl  node deformity L2 superior endplate and L3 inferior endplate.  Sclerosis of L5 inferior and S1 superior endplates. There is no  significant disc space narrowing at any level. No definite lesions are  noted within the vertebra. There is osseous protrusion from the  posterior right aspect of the L4 vertebral segment suggestive of  metastatic invasion of the epidural space, not significantly changed  since 11/12/2024. Increased conspicuity of a superior  endplate  Schmorl's node at L2 and new inferior endplate Schmorl's node  deformity of L3. Findings on a level by level basis are as follows:    L1-L2: No spinal canal or neuroforaminal stenosis.    L2-L3: No spinal canal or neuroforaminal stenosis.    L3-L4: Central disc protrusion resulting in mild bilateral neural  foraminal narrowing.     L4-L5: Posterior disc protrusion with moderate right neural foraminal  narrowing and mild left neural foraminal narrowing. No significant  spinal canal narrowing.    L5-S1: Central disc protrusion resulting in mild bilateral foraminal  narrowing. No significant spinal canal narrowing.    There is a nondisplaced posterior medial right 11th rib (series 4,  image 45 through 61) The visualized adjacent paraspinous tissues are  grossly within normal limits.      Impression    Impression:  1. Multilevel osseous metastatic disease within the visualized  thoracic and lumbar spine. There are new Schmorl's node endplate  deformity on the inferior endplate of L3 and enlarged superior  endplate Schmorl's node at L2.  2. Multilevel spondylosis as detailed above without high-grade spinal  canal or neural foraminal narrowing.  3. Nondisplaced right T11 posterior medial rib fracture    [Urgent Result: Right-sided T11 posterior medial rib fracture]    Finding was identified on 12/12/2024 4:09 PM.     Dr. Clay was contacted by Dr. Burton at 12/12/2024 4:44 PM and  verbalized understanding of the urgent finding.     I have personally reviewed the examination and initial interpretation  and I agree with the findings.    DU BURTON MD         SYSTEM ID:  [id number]       Labs, vital signs, and imaging studies were reviewed by me.    Medications   sodium chloride 0.9% BOLUS 1,000 mL (0 mLs Intravenous Stopped 12/12/24 4779)   methocarbamol (ROBAXIN) tablet 750 mg (750 mg Oral $Given 12/12/24 1401)   ketorolac (TORADOL) injection 15 mg (15 mg Intravenous $Given 12/12/24 1401)   iopamidol  (ISOVUE-370) solution 92 mL (92 mLs Intravenous $Given 12/12/24 1524)   CT saline (90 mLs Intravenous $Given 12/12/24 1524)   sodium chloride 0.9% BOLUS 1,000 mL (0 mLs Intravenous Stopped 12/12/24 1727)   methocarbamol (ROBAXIN) tablet 750 mg (750 mg Oral $Given 12/12/24 1801)       Assessments & Plan (with Medical Decision Making)   Gallo aNvarro is a 59 year old male who presents to the emergency department with back and abdominal pain in the setting of known pancreatic cancer.  Differential diagnosis includes worsening tumor burden, pathologic fracture, pancreatitis, bowel obstruction.  Labs, CTs ordered to further evaluate patient in the emergency department.  Medications ordered for symptomatic relief including Toradol, IV Dilaudid, and Robaxin.  IV Dilaudid was not administered as patient unfortunately boarded in the waiting room for a prolonged period of time due to ER capacity issues.  However, patient did take some of his home oral Dilaudid in addition to the medications given here with significant improvement in his pain.  In particular, he felt there was significant relief after taking Robaxin.    EKG shows sinus tachycardia    Laboratory workup is remarkable for elevated white blood cell count of 14.0, troponin within normal limits, lipase low, hemoglobin low but stable at 10.0, urinalysis not consistent with UTI.    CTs show known pancreatic mass, previous stents, mild bowel wall thickening consistent with edema versus colitis versus incomplete distention, no fat stranding.  CT also shows nondisplaced right 11th posterior medial rib fracture.    Critical care was not performed.     Medical Decision Making  The patient's presentation was of high complexity (a chronic illness severe exacerbation, progression, or side effect of treatment).    The patient's evaluation involved:  ordering and/or review of 3+ test(s) in this encounter (see separate area of note for details)  independent interpretation of  testing performed by another health professional (see separate area of note for details)    The patient's management necessitated moderate risk (prescription drug management including medications given in the ED), moderate risk (IV contrast administration), high risk (a parenteral controlled substance), and high risk (a decision regarding hospitalization).    CT images were personally reviewed by me, I agree with the radiology reads.    I have reviewed the nursing notes.    I have reviewed the findings, diagnosis, plan and need for follow up with the patient.    Patient was offered hospital admission for additional pain control, however, he feels that his pain is significantly improved after medications were given here.  He would like to be discharged home with a prescription for Robaxin with a plan to return should his pain worsen.    Patient to be discharged home. Advised to follow up with PCP within 1 week. To return to ER immediately with any new/worsening symptoms. Plan of care discussed with patient who expresses understanding and agrees with plan of care.    Discharge Medication List as of 12/12/2024  5:27 PM        START taking these medications    Details   lidocaine (LIDODERM) 5 % patch Place 1 patch over 12 hours onto the skin every 24 hours for 10 days.Disp-10 patch, [id number]      methocarbamol (ROBAXIN) 750 MG tablet Take 1 tablet (750 mg) by mouth 4 times daily as needed for muscle spasms., Disp-60 tablet, R-0, E-Prescribe             Final diagnoses:   Cancer related pain   Leukocytosis, unspecified type   Fracture of one rib, right side, initial encounter for closed fracture       DANA CLAY MD  12/12/2024   [facility] EMERGENCY DEPARTMENT       Dana Clay MD  12/13/24 7016

## 2024-12-12 NOTE — TELEPHONE ENCOUNTER
Spouse Violet called to update care team that she has tried encouraging pt to come to the ER, she believes pt's pain was 7/10 last week but certainly 10/10 this week. Pt wanted to attend daughter's 16th birthday on Wednesday which he was able to so he may be willing to go to the ER after his CT today. Ct is scheduled for 12:20 at the [facility], MR thoracic/spine tomorrow. Discussed pain management with palliative care yesterday and was told pt's Butrans was just increased to 40 mcg last week, pt is taking hydromorphone 6 mg written for every 4 hours and Violet reports pt's been taking every 3 hours as it doesn't last him past 3 hours. Difficulty even sitting up and he report he has not had a BM in several days as well. A friend was scheduled to bring him to CT today. Informed Violet if pt is unable to sit up in bed or has difficulty transferring for CT, staff will most likely send him to the ER as well. Will discuss with Dr. Haile and writer will follow-up with patient.

## 2024-12-12 NOTE — ED TRIAGE NOTES
Pt with c/o lower back pain and spasms. Pt is a cancer pt  who takes pain medications at home with no relief. Pt reports cancer in his L5.   Additionally, pt reports abdominal pain (b/l upper quad) as well as b/l shoulder pain. Pt denies recent injury/trauma.     Triage Assessment (Adult)       Row Name 12/12/24 1226          Triage Assessment    Airway WDL WDL        Respiratory WDL    Respiratory WDL WDL        Skin Circulation/Temperature WDL    Skin Circulation/Temperature WDL WDL        Cardiac WDL    Cardiac WDL WDL        Peripheral/Neurovascular WDL    Peripheral Neurovascular WDL WDL        Cognitive/Neuro/Behavioral WDL    Cognitive/Neuro/Behavioral WDL WDL

## 2024-12-12 NOTE — DISCHARGE INSTRUCTIONS
TODAY'S VISIT:  You were seen today for back pain, abdominal pain   -   - If you had any labs or imaging/radiology tests performed today, you should also discuss these tests with your usual provider.     FOLLOW-UP:  Please make an appointment to follow up with:  - Your Primary Care Provider. If you do not have a PCP, please call the Primary Care Center (phone: [phone] for an appointment  -  your oncology team as directed    - Have your provider review the results from today's visit with you again to make sure no further follow-up or additional testing is needed based on those results.     PRESCRIPTIONS / MEDICATIONS:  - continue dilaudid as needed  - lidoderm patches as needed to painful areas  - robaxin as needed for muscle spasm    RETURN TO THE EMERGENCY DEPARTMENT  Return to the Emergency Department at any time for any new or worsening symptoms or any concerns.

## 2024-12-12 NOTE — TELEPHONE ENCOUNTER
[facility]: Cancer Care                                                                                          Report called to [facility] ER; pt on his way. Will cancel CT.    Signature:  Efra Lynn RN

## 2024-12-13 ENCOUNTER — TELEPHONE (OUTPATIENT)
Dept: PALLIATIVE CARE | Facility: CLINIC | Age: 59
End: 2024-12-13

## 2024-12-13 ENCOUNTER — ANCILLARY PROCEDURE (OUTPATIENT)
Dept: MRI IMAGING | Facility: CLINIC | Age: 59
End: 2024-12-13
Attending: STUDENT IN AN ORGANIZED HEALTH CARE EDUCATION/TRAINING PROGRAM
Payer: COMMERCIAL

## 2024-12-13 DIAGNOSIS — C25.9 PANCREATIC ADENOCARCINOMA (H): ICD-10-CM

## 2024-12-13 LAB
ATRIAL RATE - MUSE: 106 BPM
DIASTOLIC BLOOD PRESSURE - MUSE: NORMAL MMHG
INTERPRETATION ECG - MUSE: NORMAL
P AXIS - MUSE: 33 DEGREES
PR INTERVAL - MUSE: 124 MS
QRS DURATION - MUSE: 90 MS
QT - MUSE: 340 MS
QTC - MUSE: 451 MS
R AXIS - MUSE: 28 DEGREES
SYSTOLIC BLOOD PRESSURE - MUSE: NORMAL MMHG
T AXIS - MUSE: 42 DEGREES
VENTRICULAR RATE- MUSE: 106 BPM

## 2024-12-13 PROCEDURE — 72158 MRI LUMBAR SPINE W/O & W/DYE: CPT | Performed by: RADIOLOGY

## 2024-12-13 PROCEDURE — A9585 GADOBUTROL INJECTION: HCPCS | Performed by: RADIOLOGY

## 2024-12-13 PROCEDURE — 72157 MRI CHEST SPINE W/O & W/DYE: CPT | Performed by: RADIOLOGY

## 2024-12-13 RX ORDER — GADOBUTROL 604.72 MG/ML
7.5 INJECTION INTRAVENOUS ONCE
Status: COMPLETED | OUTPATIENT
Start: 2024-12-13 | End: 2024-12-13

## 2024-12-13 RX ADMIN — GADOBUTROL 7 ML: 604.72 INJECTION INTRAVENOUS at 18:18

## 2024-12-13 NOTE — TELEPHONE ENCOUNTER
Prior Authorization Retail Medication Request    Medication/Dose: Buprenorphine HCl (BELBUCA) 600 MCG FILM buccal film   Diagnosis and ICD code (if different than what is on RX):    Cancer associated pain [G89.3]  - Primary      Pancreatic adenocarcinoma (H) [C25.9]      Metastasis to bone (H) [C79.51]        New/renewal/insurance change PA/secondary ins. PA: New  Previously Tried and Failed:  Butrans patch  Rationale:  Pt is needing an increased dose of buprenorphine for improved pain control. He is at maximum dose with the Butrans patch. Now needing to change to belbuca films.    Insurance   Primary: [place]   Insurance ID:  [id number]      Pharmacy Information (if different than what is on RX)  Correct pharmacy information on RX    Clinic Information  Preferred routing pool for dept communication: Mary Funez

## 2024-12-13 NOTE — TELEPHONE ENCOUNTER
Prior Authorization Retail Medication Request    Medication/Dose: HYDROmorphone (DILAUDID) 4 MG tablet   Diagnosis and ICD code (if different than what is on RX):    Cancer associated pain [G89.3]  - Primary      Pancreatic adenocarcinoma (H) [C25.9]      Metastasis to bone (H) [C79.51]        New/renewal/insurance change PA/secondary ins. PA: New/dose increase  Previously Tried and Failed:  Lower dose of hydromorphone  Rationale:  Pt has worsening pain from known bone metastasis requiring higher doses of hydromorphone    Insurance   Primary: [place]   Insurance ID:  [id number]      Pharmacy Information (if different than what is on RX)  Correct pharmacy information on RX    Clinic Information  Preferred routing pool for dept communication: Mary Funez

## 2024-12-14 ENCOUNTER — HEALTH MAINTENANCE LETTER (OUTPATIENT)
Age: 59
End: 2024-12-14

## 2024-12-16 ENCOUNTER — HOME INFUSION (OUTPATIENT)
Dept: HOME HEALTH SERVICES | Facility: HOME HEALTH | Age: 59
End: 2024-12-16
Payer: COMMERCIAL

## 2024-12-16 ENCOUNTER — PATIENT OUTREACH (OUTPATIENT)
Dept: FAMILY MEDICINE | Facility: CLINIC | Age: 59
End: 2024-12-16
Payer: COMMERCIAL

## 2024-12-16 ENCOUNTER — NURSE TRIAGE (OUTPATIENT)
Dept: ONCOLOGY | Facility: CLINIC | Age: 59
End: 2024-12-16
Payer: COMMERCIAL

## 2024-12-16 DIAGNOSIS — C25.9 PANCREATIC ADENOCARCINOMA (H): ICD-10-CM

## 2024-12-16 DIAGNOSIS — G89.3 CANCER ASSOCIATED PAIN: Primary | ICD-10-CM

## 2024-12-16 DIAGNOSIS — C25.0 MALIGNANT NEOPLASM OF HEAD OF PANCREAS (H): Primary | ICD-10-CM

## 2024-12-16 RX ORDER — ONDANSETRON 2 MG/ML
8 INJECTION INTRAMUSCULAR; INTRAVENOUS ONCE
Start: 2025-01-29

## 2024-12-16 RX ORDER — DEXAMETHASONE SODIUM PHOSPHATE 4 MG/ML
12 INJECTION, SOLUTION INTRA-ARTICULAR; INTRALESIONAL; INTRAMUSCULAR; INTRAVENOUS; SOFT TISSUE ONCE
Start: 2025-02-26

## 2024-12-16 RX ORDER — DEXAMETHASONE 4 MG/1
TABLET ORAL
Qty: 20 TABLET | Refills: 0 | Status: SHIPPED | OUTPATIENT
Start: 2024-12-16 | End: 2025-01-05

## 2024-12-16 RX ORDER — ONDANSETRON 2 MG/ML
8 INJECTION INTRAMUSCULAR; INTRAVENOUS ONCE
Start: 2025-01-15

## 2024-12-16 RX ORDER — DEXAMETHASONE SODIUM PHOSPHATE 4 MG/ML
12 INJECTION, SOLUTION INTRA-ARTICULAR; INTRALESIONAL; INTRAMUSCULAR; INTRAVENOUS; SOFT TISSUE ONCE
Status: CANCELLED
Start: 2024-12-18

## 2024-12-16 RX ORDER — DEXAMETHASONE SODIUM PHOSPHATE 4 MG/ML
12 INJECTION, SOLUTION INTRA-ARTICULAR; INTRALESIONAL; INTRAMUSCULAR; INTRAVENOUS; SOFT TISSUE ONCE
Start: 2025-01-01

## 2024-12-16 RX ORDER — ONDANSETRON 2 MG/ML
8 INJECTION INTRAMUSCULAR; INTRAVENOUS ONCE
Status: CANCELLED
Start: 2024-12-18

## 2024-12-16 RX ORDER — ONDANSETRON 2 MG/ML
8 INJECTION INTRAMUSCULAR; INTRAVENOUS ONCE
Start: 2025-02-26

## 2024-12-16 RX ORDER — DEXAMETHASONE SODIUM PHOSPHATE 4 MG/ML
12 INJECTION, SOLUTION INTRA-ARTICULAR; INTRALESIONAL; INTRAMUSCULAR; INTRAVENOUS; SOFT TISSUE ONCE
Start: 2025-01-29

## 2024-12-16 RX ORDER — ONDANSETRON 2 MG/ML
8 INJECTION INTRAMUSCULAR; INTRAVENOUS ONCE
Start: 2025-01-01

## 2024-12-16 RX ORDER — ONDANSETRON 2 MG/ML
8 INJECTION INTRAMUSCULAR; INTRAVENOUS ONCE
Start: 2025-02-12

## 2024-12-16 RX ORDER — HYDROMORPHONE HYDROCHLORIDE 4 MG/1
4-6 TABLET ORAL
COMMUNITY
Start: 2024-12-16

## 2024-12-16 RX ORDER — DEXAMETHASONE SODIUM PHOSPHATE 4 MG/ML
12 INJECTION, SOLUTION INTRA-ARTICULAR; INTRALESIONAL; INTRAMUSCULAR; INTRAVENOUS; SOFT TISSUE ONCE
Start: 2025-02-12

## 2024-12-16 RX ORDER — DEXAMETHASONE SODIUM PHOSPHATE 4 MG/ML
12 INJECTION, SOLUTION INTRA-ARTICULAR; INTRALESIONAL; INTRAMUSCULAR; INTRAVENOUS; SOFT TISSUE ONCE
Start: 2025-01-15

## 2024-12-16 NOTE — TELEPHONE ENCOUNTER
Transitions of Care Outreach  Chief Complaint   Patient presents with    hospital follow up       Most Recent Admission Date: 12/12/2024   Most Recent Admission Diagnosis:      Most Recent Discharge Date: 12/12/2024   Most Recent Discharge Diagnosis: Cancer related pain - G89.3  Leukocytosis, unspecified type - D72.829  Fracture of one rib, right side, initial encounter for closed fracture - S22.31XA  Pancreatic adenocarcinoma (H) - C25.9     Transitions of Care Assessment    Discharge Assessment  How are you doing now that you are home?: still in a lot of pain, is being followed by oncology  How are your symptoms? (Red Flag symptoms escalate to triage hotline per guidelines): Unchanged  Do you know how to contact your clinic care team if you have future questions or changes to your health status? : Yes  Does the patient have their discharge instructions? : Yes  Does the patient have questions regarding their discharge instructions? : No  Were you started on any new medications or were there changes to any of your previous medications? : Yes  Does the patient have all of their medications?: Yes  Do you have questions regarding any of your medications? : No  Do you have all of your needed medical supplies or equipment (DME)?  (i.e. oxygen tank, CPAP, cane, etc.): Yes    Follow up Plan     Discharge Follow-Up  Discharge follow up appointment scheduled in alignment with recommended follow up timeframe or Transitions of Risk Category? (Low = within 30 days; Moderate= within 14 days; High= within 7 days): No (pt is following up with Oncology)  Patient's follow up appointment not scheduled: Patient declined scheduling support. Education on the importance of transitions of care follow up. Provided scheduling phone number.    Future Appointments   Date Time Provider Department Center   12/18/2024 11:30 AM E[facility]KIEL [facility] DRAW [facility]   12/18/2024 12:15 PM Berta Anglin PA-C [facility]   12/18/2024  1:30 PM EZEKIEL  ONC INFUSION NURSE [facility]   1/2/2025  7:45 AM  [facility] LAB DRAW [facility]   1/2/2025  8:30 AM  ONC INFUSION NURSE [facility]   1/15/2025 12:00 PM  [facility] LAB DRAW [facility]   1/15/2025 12:30 PM Megan Farrell APRN CNP [facility]   1/15/2025  1:30 PM  ONC INFUSION NURSE [facility]   1/21/2025  2:40 PM Jeremy Hurt MD [facility]   1/29/2025  8:00 AM  [facility]ON[facility] LAB DRAW [facility]   1/29/2025  8:30 AM  ONC INFUSION NURSE [facility]   2/12/2025  9:15 AM  MASONIC LAB DRAW [facility]   2/12/2025 10:00 AM Berta Anglin PA-C [facility]   2/12/2025 11:00 AM  ONC INFUSION NURSE [facility]   2/26/2025  8:00 AM  MASON[facility] LAB DRAW [facility]   2/26/2025  8:30 AM  ONC INFUSION NURSE [facility]       Outpatient Plan as outlined on AVS reviewed with patient.    For any urgent concerns, please contact our 24 hour nurse triage line: [phone] ([phone])       Nataliya Gibson RN

## 2024-12-16 NOTE — TELEPHONE ENCOUNTER
Wife called reporting pt is in significant pain. MRI shows extensive metastatic in vertebrae. Spoke with Dr. Hurt. Will add in dexamethasone. Will increase frequency of dilaudid to 6 mg Q 3 hr prn. Will message oncology re: status of radiation referral. [facility].    MICHAEL SladeN, RN  Palliative Care Nurse Clinician    [phone] (Direct)  [phone] (Main)  [phone] (Appointment Scheduling)

## 2024-12-16 NOTE — TELEPHONE ENCOUNTER
Retail Pharmacy Prior Authorization Team   Phone: [phone]    PA Initiation    Medication: belbuca  Insurance Company: [employer] - Phone [phone] Fax [phone]  Pharmacy Filling the Rx: [facility] [address] [place] [address] - [address] [facility]  Filling Pharmacy Phone: [phone]  Filling Pharmacy Fax: [phone]  Start Date: 12/16/2024

## 2024-12-16 NOTE — TELEPHONE ENCOUNTER
Is in so much pain. Pain becomes a 10/10 for 10-15 mins. Any movement creates pain.   [facility] has not authorized the butrans patch for him yet.     Wondering if radiation is still something that is yet being considered.     Pain seems to be escalating. Also using delta 8 CBD gummy. She is distressed because she knows how much pain he is in every time he moves.     She wants DR Haile's team to work with Palliative care to come up with a good pain management plan.     So he can continue with his chemo which is what he wants to do but he needs to get through this pain.

## 2024-12-16 NOTE — TELEPHONE ENCOUNTER
Prior Authorization Approval    Authorization Effective Date: 12/16/2024  Authorization Expiration Date: 12/16/2025  Medication: belbuca  Approved Dose/Quantity:    Reference #:     Insurance Company: agreement24 [id number] - Phone [phone] Fax [phone]  Expected CoPay:       CoPay Card Available:      Foundation Assistance Needed:    Which Pharmacy is filling the prescription (Not needed for infusion/clinic administered): [facility] [address] [place] [address] [facility]  Pharmacy Notified:  YES  Patient Notified:  YES

## 2024-12-17 ENCOUNTER — VIRTUAL VISIT (OUTPATIENT)
Dept: RADIATION ONCOLOGY | Facility: HOSPITAL | Age: 59
End: 2024-12-17
Attending: FAMILY MEDICINE
Payer: COMMERCIAL

## 2024-12-17 VITALS — BODY MASS INDEX: 20.32 KG/M2 | HEIGHT: 72 IN | WEIGHT: 150 LBS

## 2024-12-17 DIAGNOSIS — Z51.5 PALLIATIVE CARE PATIENT: Primary | ICD-10-CM

## 2024-12-17 DIAGNOSIS — G89.3 CANCER ASSOCIATED PAIN: ICD-10-CM

## 2024-12-17 DIAGNOSIS — C25.0 MALIGNANT NEOPLASM OF HEAD OF PANCREAS (H): ICD-10-CM

## 2024-12-17 DIAGNOSIS — M62.830 BACK MUSCLE SPASM: ICD-10-CM

## 2024-12-17 DIAGNOSIS — M48.50XD PATHOLOGICAL COMPRESSION FRACTURE OF VERTEBRA WITH ROUTINE HEALING, SUBSEQUENT ENCOUNTER: ICD-10-CM

## 2024-12-17 PROCEDURE — 99215 OFFICE O/P EST HI 40 MIN: CPT | Mod: 95 | Performed by: FAMILY MEDICINE

## 2024-12-17 PROCEDURE — G2211 COMPLEX E/M VISIT ADD ON: HCPCS | Mod: 95 | Performed by: FAMILY MEDICINE

## 2024-12-17 RX ORDER — LORAZEPAM 0.5 MG/1
.5-1 TABLET ORAL EVERY 6 HOURS PRN
Qty: 45 TABLET | Refills: 2 | Status: SHIPPED | OUTPATIENT
Start: 2024-12-17

## 2024-12-17 RX ORDER — PROCHLORPERAZINE MALEATE 10 MG
10 TABLET ORAL EVERY 6 HOURS PRN
Qty: 30 TABLET | Refills: 2 | Status: SHIPPED | OUTPATIENT
Start: 2024-12-17

## 2024-12-17 RX ORDER — LOPERAMIDE HYDROCHLORIDE 2 MG/1
CAPSULE ORAL
Qty: 30 CAPSULE | Refills: 0 | Status: SHIPPED | OUTPATIENT
Start: 2024-12-17

## 2024-12-17 RX ORDER — METHOCARBAMOL 1000 MG/1
1000 TABLET, FILM COATED ORAL 4 TIMES DAILY PRN
Qty: 180 TABLET | Refills: 3 | Status: SHIPPED | OUTPATIENT
Start: 2024-12-17

## 2024-12-17 ASSESSMENT — PAIN SCALES - GENERAL: PAINLEVEL_OUTOF10: MILD PAIN (2)

## 2024-12-17 NOTE — PROGRESS NOTES
Oncology/Hematology Visit Note  Dec 18, 2024    Reason for Visit: follow-up of metastatic Pancreatic [place]    Oncology HPI:   -NGS: KRAS G12R  Immuno: pMMR, KAYLIE, TMB-low  Clinical Trial: MARIPOSA-186, MARIPOSA-280, TORL    - 3/2023: presented with acute pancreatitis  c/b chronic pancreatitis with pancreatic mass causing duodenal stenosis with gastric outlet obstruction s/p GJ tube (placed 5/2023), biliary obstruction s/p stent placement on 7/7/23, pancreatic insufficiency, and IDDM2.  -  He then presented to the ED with worsening abdominal pain, increased jaundice, poor PO intake and weight loss admitted on 7/8/2023 for concern of biliary obstruction.   - 7/12/2023: Underwent biopsy of pancreatic mass returned positive for pancreatic adenocarcinoma.   - 7/31/2023: He started on treatment with 5FU, irinotecan, and oxaliplatin (FOLFIRINOX). Please see previous notes for further details on the patient's history.      8/17/23 - ERCP/EGD, duodenal stents x2 placed, exchange of GJ tube     8/21-8/25/24 hospitalized due to diarrhea, colitis, abdominal pain.       8/29 - Cycle 3 deferred due to neutropenia.   Neulasta added. Irinotecan dose reduced 20% due to symptoms including cramping, double vision and slurred speech during infusion.       10/24/23 CT CAP with similar to slight increase in size of peripancreatic and mesenteric lymph nodes, enlargement of previous skeletal metastasis as well as new sclerotic metastasis to left posterior 5th rib, enlarging pulmonary nodule, and unchanged pancreatic head mass. Also unclear concerns for PE,  right lower lobe segmental PE confirmed on CT-PE. Started on apixaban.      10/31/23 Cycle 1 gemzar, abraxane  11/7/23 Cycle 1 Zometa  11/22/23 Removal of GJ tube.   11/29/23 Completed palliative radiation to T10   12/13/23 C3D1 gem/abraxane  12/29/23 Consults at [facility]  1/23-1/26 Consults at [facility]   1/30/24: C4D1 gem/abraxane   3/24: CT CAP with overall stable disease with isolated  progression in potential L4 lesion. Referral for radiation oncology.    4/29-5/1: Consults at [facility] for possible cellular therapy, CT guided biopsy of left pubic bone, recommendation to return to [facility] upon progression of current treatment   5/23/24: Cardiology consult at [facility]. CT with small pericardial effusion   6/6/2024: Repeat ECHO on with EF of 55-60%, no pericardial effusion present  - Cycle 8 deferred due to infection concerns and subsequently tested positive for COVID 6/25/24, resumed treatment on 7/2/2024    -Day 8 eliminated after Cycle 5 [place]/Abraxane.    4/2/2024-present: Gemcitabine/Abraxane, Days 1 and 15 only; Zometa every 3 months     8/2024 repeat imaging with isolated progression to L4 lesion with fracture of that spinous process. Referred to radiation therapy. Decreased Abraxane dose reduced to 75mg/m2 with cycle 9 day 15 for overall treatment tolerance.     8/28/24 RFA ablation and verteral augmentation to L4.   10/1-10/21 Radiation to L4     11/12/24 CT CAP shows disease progression, plan to change to 5FU and liposomal irinotecan after a vacation  12/12/24 ED visit for pain, secondary to cancer, managed with IV pain medication    Interval history:   Patient reports that he initially had cement put in his spine and it seemed to help with his pain.  However when he got off of the plane recently and was pushed up the ramp in a wheelchair, this was very jarring and his pain dramatically increased.  He has been working closely with palliative care and feels like he is doing okay today in terms of his pain control.  He does feel a little more out of it today which she attributes to the muscle relaxant.  He finds the steroid has been helpful with keeping him awake.  He reports normal stools.  He denies any change to his neuropathy.  He reports eating and drinking okay.  He denies any chest pain, shortness of breath, or bleeding issues.  He does have intermittent swelling in his feet.  He is  unable to cut his own toenails and does not have anyone else to help him.  He also notes that his big toenails are both lifted up.  He would like assistance with this.  He reports feeling sad about his cancer diagnosis and pain, but feels well supported by family and friends.  He denies other concerns.    Current Outpatient Medications   Medication Sig Dispense Refill    acetaminophen (TYLENOL) 32 mg/mL liquid Take 20.313 mLs (650 mg) by mouth every 8 hours. 1800 mL 3    alteplase (CATHFLO ACTIVASE) injection Inject 2 mLs (2 mg) into catheter as needed (catheter occlusion). Reconstitute vial. Draw up alteplase 1 mg/mL in a syringe and instill into IV catheter. Dwell for at least 20 min to 24 hours, then aspirate. May repeat dose once in 24 hours if catheter function not restored after at least 2 hours. Discard remainder of vial. [id number] each 0    apixaban ANTICOAGULANT (ELIQUIS) 5 MG tablet Take 1 tablet (5 mg) by mouth 2 times daily. 60 tablet 2    Buprenorphine HCl (BELBUCA) 600 MCG FILM buccal film Place 1 Film (600 mcg) inside cheek every 12 hours. May also place 1 Film (600 mcg) daily as needed. For severe pain not relieved with 6 mg dilaudid dose. 75 Film 3    Chemo Kit For RN use only. Do not remove items from bag. Contents: 1  sodium chloride 0.9% flush, 4 medium gloves, 1 chemo gown, 1/4 chemo mat, 1 connector female, 1 chemo bag. [id number] kit 0    Chemo Kit For RN use only. Do not remove items from bag. Contents: 1  sodium chloride 0.9% flush, 4 medium gloves, 1 chemo gown, 1/4 chemo mat, 1 connector female, 1 chemo bag. [id number] kit 0    dexAMETHasone (DECADRON) 4 MG tablet Take 2 tablets (8 mg) by mouth daily (with breakfast) for 5 days, THEN 1 tablet (4 mg) daily (with breakfast) for 5 days, THEN 1 tablet (4 mg) every 48 hours for 5 days, THEN 0.5 tablets (2 mg) daily (with breakfast) for 5 days. 20 tablet 0    dicyclomine (BENTYL) 10 MG capsule Take 1 capsule (10 mg) by mouth 4 times daily (before meals and  "nightly). 120 capsule 1    diphenhydrAMINE (BENADRYL) 50 MG/ML injection Inject 1 mL (50 mg) over 3-5 minutes into the vein via push as needed for other (infusion reaction). For RN use only.  Draw up in a syringe and administer IV push.  Discard remainder of vial. 90708 mL 0    econazole nitrate 1 % external cream Apply topically daily To affected toenails. 85 g 5    Emergency Supply Kit, Central, Patient use for emergency only. Contents: 3 sodium chloride 0.9% flushes, 1 dressing kit, 1 microclave ext set 14\", 4 nitrile gloves (med), 6 alcohol prep pads, 1 bacitracin, 1 syringe (10 cc 20 G 1\"). Call [phone] to reorder. [id number] kit 0    Emergency Supply Kit, Central, Patient use for emergency only. Contents: 3 sodium chloride 0.9% flushes, 1 dressing kit, 1 microclave ext set 14\", 4 nitrile gloves (med), 6 alcohol prep pads, 1 bacitracin, 1 syringe (10 cc 20 G 1\"). Call [phone] to reorder. 488642 kit 0    EPINEPHrine (ANY BX GENERIC EQUIV) 0.3 MG/0.3ML injection 2-pack Inject 0.3 mLs (0.3 mg) into the muscle as needed for anaphylaxis (infusion reaction). Administer into the mid-thigh in case of severe anaphylaxis (wheezing, throat tightening, mouth swelling, difficulty breathing). May repeat dose one time in 5-15 minutes if symptoms persist. [id number] mL 0    Fluorouracil (ADRUCIL) 4,585 mg in sodium chloride 0.9 % 241 mL via HOMEPUMP C-Series Infuse 4,585 mg at 5.2 mL/hr over 46 hours into the vein once for 1 dose. [id number] mL 0    HYDROmorphone (DILAUDID) 4 MG tablet Take 1-1.5 tablets (4-6 mg) by mouth every 3 hours as needed for severe pain or breakthrough pain.      lipase-protease-amylase (CREON 24) 34300-35035-496423 units CPEP per EC capsule 2-3 capsules with meals 3 times a day and 1-2 capsules with snacks max of 15 capsules a day 200 capsule 11    loperamide (IMODIUM) 2 MG capsule 2 caps at 1st sign of diarrhea & 1 cap every 2hrs until 12hrs diarrhea free. During night, 2 caps at bedtime & 2 caps " every 4hrs until AM 30 capsule 0    LORazepam (ATIVAN) 0.5 MG tablet Take 1-2 tablets (0.5-1 mg) by mouth every 6 hours as needed for muscle spasms. 45 tablet 2    methocarbamol (ROBAXIN) 750 MG tablet Take 1 tablet (750 mg) by mouth 4 times daily as needed for muscle spasms. 60 tablet 0    methocarbamol 1000 MG TABS Take 1,000 mg by mouth 4 times daily as needed for muscle spasms. 180 tablet 3    methylphenidate (RITALIN) 5 MG tablet Take 1 tablet (5 mg) by mouth 2 times daily as needed 10 tablet 0    methylPREDNISolone Na Suc, PF, (SOLU-MEDROL) 125 mg/2 mL injection Inject 2 mLs (125 mg) over 3-5 minutes into the vein via push as needed (infusion reaction). For RN use only.  Reconstitute vial. Draw up methylPREDNISolone in a syringe and administer.  Discard remainder of vial. 651217 mL 0    Multiple Vitamins-Minerals (CENTRUM SILVER 50+MEN) TABS as directed Orally      naloxone (NARCAN) 4 MG/0.1ML nasal spray       pantoprazole (PROTONIX) 40 MG EC tablet Take 1 tablet (40 mg) by mouth 2 times daily 60 tablet 4    Port Access Kit For nurse use only.  Do not remove items from bag.  Use for port access.  Do not place syringe on sterile field. [id number] kit 0    Port Access Kit For nurse use only.  Do not remove items from bag.  Use for port access.  Do not place syringe on sterile field. [id number] kit 0    prochlorperazine (COMPAZINE) 10 MG tablet Take 1 tablet (10 mg) by mouth every 6 hours as needed for nausea or vomiting. 30 tablet 2    prochlorperazine (COMPAZINE) 10 MG tablet Take 1 tablet (10 mg) by mouth every 6 hours as needed for nausea or vomiting 30 tablet 2    sodium chloride 0.9% infusion Infuse 250 mLs over 30 minutes into the vein every 28 (twenty-eight) days. zometa concomitant fluids. Given per therapy plan orders 750 mL 2    sodium chloride 0.9% infusion Infuse 500 mLs into the vein as needed for other (infusion reaction). In case of mild reaction, administer via gravity at 20 mL/hr to keep vein open. In  "case of severe reaction, administer via gravity wide open on prime setting. 771224 mL 0    sodium chloride, PF, 0.9% PF flush Inject 10 mLs into the vein as needed for line flush. Flush IV before and after med administration as directed and/or at least every 24 hours, or prior to deaccessing for no further use and/or at least every 4 weeks when not accessed. 582045 mL 0    sodium chloride, PF, 0.9% PF flush Inject 10 mLs into the vein as needed for other (infusion reaction). For RN use only as needed for infusion reaction 326114 mL 0    sodium chloride, PF, 0.9% PF flush Inject 10 mLs into the vein as needed for line flush. Flush IV before and after med administration as directed and/or at least every 24 hours, or prior to deaccessing for no further use and/or at least every 4 weeks when not accessed. 112830 mL 0    sterile water, preservative free, injection Use 2.2 mLs for reconstitution as needed (with alteplase for catheter occlusion). Direct diluent stream into powder. Mix by gently swirling until dissolved. DO NOT SHAKE. Discard remainder of vial. 322824 mL 0    vitamin D3 (CHOLECALCIFEROL) 50 mcg (2000 units) tablet Take 1 tablet (50 mcg) by mouth daily. 90 tablet 1    zoledronic acid (ZOMETA) 4 MG/100ML infusion Infuse 100 mLs (4 mg) into the vein every 28 days. Infuse via gravity. Given per therapy plan orders 300 mL 2    lidocaine (LIDODERM) 5 % patch Place 2 patches over 12 hours onto the skin every 24 hours. 60 patch 3    lidocaine-prilocaine (EMLA) 2.5-2.5 % external cream Use 1-2 times a week or as needed prior to port access (Patient not taking: Reported on 9/11/2024) 30 g 3     Physical Exam:  General: The patient is a pleasant slender male in no acute distress. Here today in a wheelchair.  /76   Pulse 85   Temp 98.2  F (36.8  C)   Resp 16   Ht 1.829 m (6' 0.01\")   Wt 69.7 kg (153 lb 9.6 oz)   SpO2 95%   BMI 20.83 kg/m    Wt Readings from Last 10 Encounters:   12/18/24 69.7 kg (153 lb " 9.6 oz)   12/17/24 68 kg (150 lb)   12/12/24 68 kg (150 lb)   12/02/24 69.9 kg (154 lb)   11/15/24 72.1 kg (159 lb)   11/11/24 69.9 kg (154 lb)   10/23/24 71.4 kg (157 lb 6 oz)   10/21/24 70.3 kg (155 lb)   10/16/24 72.1 kg (159 lb)   10/09/24 71.7 kg (158 lb)   HEENT: EOMI. Sclerae are anicteric.   Lungs: Breathing is not labored.   Extremities: No lower extremity edema noted bilaterally. Great toenails with onychomycosis. Remaining toenails extended beyond the skin.  Neuro: Cranial nerves II through XII are grossly intact.  Skin: No rashes, petechiae, or bruising noted on exposed skin.    Labs:    Most Recent 3 CBC's:  Recent Labs   Lab Test 12/18/24  1228 12/12/24  1355 11/11/24  1330   WBC 14.5* 14.0* 6.3   HGB 9.4* 10.0* 10.5*   MCV 88 88 92    199 384   ANEUTAUTO 13.1* 12.1* 4.1    Most Recent 3 BMP's:  Recent Labs   Lab Test 12/18/24  1228 12/12/24  1355 12/02/24  1000 10/21/24  1145   * 133*  --  138   POTASSIUM 4.4 4.1  --  4.2   CHLORIDE 99 101  --  102   CO2 24 24  --  25   BUN 24.7* 23.5*  --  21.3*   CR 0.62* 0.67 0.67 0.74   ANIONGAP 11 8  --  11   DENISE 8.5* 8.2* 9.3 9.3   * 184*  --  213*   PROTTOTAL 6.9 6.8  --  7.2   ALBUMIN 3.4* 3.5 3.9 4.0    Most Recent 2 LFT's:  Recent Labs   Lab Test 12/18/24  1228 12/12/24  1355   AST 30 84*   ALT 49 55   ALKPHOS 327* 363*   BILITOTAL 0.3  0.3 0.5   I reviewed the above labs today.      Impression/plan:     Unresectable Pancreatic Cancer  7/31/23 began palliative treatment on FOLFIRINOX  CT CAP 10/24/23 with progression in skeletal mets, lung nodule and lymph nodes; stable pancreatic mass  - 10/31/2023: Treatment was changed to Gemcitabine and Abraxane (Day 1, 8, 15) + Zometa every 3 months  - 03/2024 CT CAP with progression of L4 lesion  - Day 8 eliminated after Cycle 5 gem/abraxane  - 8/2024 repeat imaging with isolated progression of L4 lesion, see below   - November 2024 imaging showed disease progression.  - He is now starting on 5FU  and liposomal irinotecan. We reviewed side effects and their management in detail. Given increase in symptoms, will plan to have follow-up with us prior to each cycle of chemotherapy. Will repeat imaging after 4-6 cycles, depending on clinical course. We discussed that the bigger picture of his disease and the pro's and con's of treatment will continue to be an ongoing discussion. I am hopeful we can get some better pain control for him.     Anemia-normocytic  Hgb mildly worse. Likely related to disease progression. Denies bleeding issues. Will continue to monitor.     Skeletal Mets    - Radiation to T10 completed 11/29/23  - CT chest 5/23/24 unchanged sclerotic bone metastases  -imaging 8/2024 with isolated progression in L4, consult to radiation oncology.   -8/28/24 RFA ablation and vertebral augmentation to L4. Completed radiation to L4 in October 2024   -Pain management by palliative care, as below, from 12/17/24 visit.  -Will also plan for possible additional kyphoplasty.   -Given rx for a walker.     CONTINUE belbuca 600 mcg films BID with a 3rd available to use daily prn severe pain not relieved with dilaudid  Continue dilaudid 4-6 mg po q 3 hours prn and record how often it is needed  INCREASE methocarbamol  FROM 750 MG TO 1000 MG TABS PO Q 6 HOURS PRN  START Ativan 0.5 mg tabs prescribed to use sparingly 1-2 tablets q 6 hours prn severe spasm not relieved with methocarbamol  CONTINUE lidocaine patches  Tylenol suspension 650 mg po TID  CONTINUE dexamethasone taper    Bone Health  -Zometa 4 mg every 3 months, last infusion 12/4/2024.  -Recheck of calcium today is low. He remains on vitamin D 50 mcg daily and a multivitamin. Will start calcium 500 mg bid.     Neuropathy  -Secondary to prior chemotherapy. No acute change.    Pericardial Effusion  Found on CT 4/20/24 at [facility], followed by [facility] with moderate pericardial effusion without tamponade.  CT 5/23/24 small pericardial effusion  5/23/24: Cardiology  consult at [facility]- Echo 6/6/2024 with EF 55-60%, LV strain -20.5% which is normal, no pericardial effusion  - repeat echo at [facility] in the future,   - he has been cautioned by Cardiology s/s of cardiac tamponade  -following cardiology as needed  -no acute concerns today    Pulmonary Embolism  Right lower segmental PE found on routine imaging  Taking Apixiban daily, no bleeding concerns    Onychomycosis  -Will request home care to cut his toenails.    Berta Anglin PA-C  [facility] [address] [address]  [phone]    46 minutes spent on the date of the encounter doing chart review, review of test results, interpretation of tests, patient visit, and documentation     The longitudinal plan of care for the diagnosis(es)/condition(s) as documented were addressed during this visit. Due to the added complexity in care, I will continue to support Gallo Navarro in the subsequent management and with ongoing continuity of care.    Addendum: I'm reaching out to endocrine to assist with management of steroid induced hyperglycemia.

## 2024-12-17 NOTE — PROGRESS NOTES
Virtual Visit Details    Type of service:  Video Visit     Originating Location (pt. Location): Home    Distant Location (provider location):  On-site  Platform used for Video Visit: [facility]    Palliative Care Outpatient Clinic Progress Note    Patient Name: Gallo Navarro  Primary Provider: Akil Hernandez    Impressions:  ECO  Decision Making Capacity:  VERY PRESENT  PDMP review:  yes, no concerns     Locally extensive non-resectable pancreatic cancer  Cancer associated pain  Lymphedema in BLE  L4 MET S/P VERTEBROPLASTY and RFA  Extensive bony mets T and L spine with L2,3,4 pathological compression fractures      GOALS OF CARE:   no change to GOC  2024   No change  2024  No change to GO. Gallo is preparing to go to [facility] to see if he qualifies for a cellular therapy trial.  2024 Life prolonging without limits at this time and willingness to consider clinical trials.     Recommendations & Counseling:  CONTINUE belbuca 600 mcg films BID with a 3rd available to use daily prn severe pain not relieved with dilaudid  Continue dilaudid 4-6 mg po q 3 hours prn and record how often it is needed  INCREASE methocarbamol  FROM 750 MG TO 1000 MG TABS PO Q 6 HOURS PRN  START Ativan 0.5 mg  tabs prescribed to use sparingly 1-2 tablets q 6 hours prn severe spasm not relieved with methocarbamol  CONTINUE lidocaine patches  Tylenol suspension 650 mg po TID  Narcan also prescribed for safety  RNCC  symptom check on Friday and video Follow-up in 4 weeks       ADDENDUM:  Dr. Haile was OK with referral to IR for possible kyphoplasty and I placed the referral.    I provided emotional support through validating Gallo's feelings and open, empathic communication and his love for the [employer].     Counseling: All of the above was explained to the patient in lay language. The patient has verbalized a clear understanding of the discussion, asked appropriate questions, which have been answered to patient's  apparent satisfaction. The patient is in agreement with the above plan.        Chief Complaint/Patient ID: Gallo Navarro 58 year old male with PMHx of unresectable localized pancreatic cancer    Last Palliative care appointment: 09/24/2024 with me     Reviewed:  Yes:   reviewed - controlled substances reflected in medication list.    Interim History:  Gallo Navarro is a 59 year old male who is seen today for follow up with Palliative Care via  billable video visit. He is joined by his wife, Violet.     Pain:  the rotation to the [facility] is making a difference.  Gallo rates his pain now as 6-7/10 and he slept for 6 hours last night--he feels he woke up because his methocarbamol wore off. He is also using Delta 8 CBD oil.  He does not feel sedated.  He and Violet are using dilaudid prn pain exacerbation and before incidental pain. He is also on a dex taper.    Appetite/Nausea: OK     Bowels: no concerns expressed     Sleep: improved with better pain control     Mood: not anxious or depressed now; he felt anxious when the pain was worse.     Coping:  overall OK; Gallo is hoping chemo will help reduce his tumor burden and that there is a possibility for more XRT or pain interventions, we discussed possible additional kyphoplasty and he might be an intrathecal pain pump candidate.    Family History- Reviewed in Epic.    No Known Allergies    Social History:  Pertinent changes to social history/social situation since last visit: --his daughter just got her 's license  Key support resources: wife and family  Advance Directive Status:  no ACP documents    Social History     Tobacco Use    Smoking status: Never     Passive exposure: Never    Smokeless tobacco: Never   Vaping Use    Vaping status: Never Used   Substance Use Topics    Alcohol use: Not Currently    Drug use: Never         No Known Allergies  Current Outpatient Medications   Medication Sig Dispense Refill    acetaminophen (TYLENOL) 32 mg/mL liquid Take  20.313 mLs (650 mg) by mouth every 8 hours. 1800 mL 3    alteplase (CATHFLO ACTIVASE) injection Inject 2 mLs (2 mg) into catheter as needed (catheter occlusion). Reconstitute vial. Draw up alteplase 1 mg/mL in a syringe and instill into IV catheter. Dwell for at least 20 min to 24 hours, then aspirate. May repeat dose once in 24 hours if catheter function not restored after at least 2 hours. Discard remainder of vial. 249085 each 0    apixaban ANTICOAGULANT (ELIQUIS) 5 MG tablet Take 1 tablet (5 mg) by mouth 2 times daily. 60 tablet 2    Buprenorphine HCl (BELBUCA) 600 MCG FILM buccal film Place 1 Film (600 mcg) inside cheek every 12 hours. May also place 1 Film (600 mcg) daily as needed. For severe pain not relieved with 6 mg dilaudid dose. 75 Film 3    [START ON 12/18/2024] Chemo Kit For RN use only. Do not remove items from bag. Contents: 1  sodium chloride 0.9% flush, 4 medium gloves, 1 chemo gown, 1/4 chemo mat, 1 connector female, 1 chemo bag. [id number] kit 0    Chemo Kit For RN use only. Do not remove items from bag. Contents: 1  sodium chloride 0.9% flush, 4 medium gloves, 1 chemo gown, 1/4 chemo mat, 1 connector female, 1 chemo bag. [id number] kit 0    dexAMETHasone (DECADRON) 4 MG tablet Take 2 tablets (8 mg) by mouth daily (with breakfast) for 5 days, THEN 1 tablet (4 mg) daily (with breakfast) for 5 days, THEN 1 tablet (4 mg) every 48 hours for 5 days, THEN 0.5 tablets (2 mg) daily (with breakfast) for 5 days. 20 tablet 0    dicyclomine (BENTYL) 10 MG capsule Take 1 capsule (10 mg) by mouth 4 times daily (before meals and nightly). 120 capsule 1    diphenhydrAMINE (BENADRYL) 50 MG/ML injection Inject 1 mL (50 mg) over 3-5 minutes into the vein via push as needed for other (infusion reaction). For RN use only.  Draw up in a syringe and administer IV push.  Discard remainder of vial. 38630 mL 0    econazole nitrate 1 % external cream Apply topically daily To affected toenails. 85 g 5    [START ON 12/18/2024]  "Emergency Supply Kit, Central, Patient use for emergency only. Contents: 3 sodium chloride 0.9% flushes, 1 dressing kit, 1 microclave ext set 14\", 4 nitrile gloves (med), 6 alcohol prep pads, 1 bacitracin, 1 syringe (10 cc 20 G 1\"). Call [phone] to reorder. [id number] kit 0    Emergency Supply Kit, Central, Patient use for emergency only. Contents: 3 sodium chloride 0.9% flushes, 1 dressing kit, 1 microclave ext set 14\", 4 nitrile gloves (med), 6 alcohol prep pads, 1 bacitracin, 1 syringe (10 cc 20 G 1\"). Call [phone] to reorder. [id number] kit 0    EPINEPHrine (ANY BX GENERIC EQUIV) 0.3 MG/0.3ML injection 2-pack Inject 0.3 mLs (0.3 mg) into the muscle as needed for anaphylaxis (infusion reaction). Administer into the mid-thigh in case of severe anaphylaxis (wheezing, throat tightening, mouth swelling, difficulty breathing). May repeat dose one time in 5-15 minutes if symptoms persist. 801795 mL 0    [START ON 12/18/2024] Fluorouracil (ADRUCIL) 4,585 mg in sodium chloride 0.9 % 241 mL via HOMEPUMP C-Series Infuse 4,585 mg at 5.2 mL/hr over 46 hours into the vein once for 1 dose. 877263 mL 0    HYDROmorphone (DILAUDID) 4 MG tablet Take 1-1.5 tablets (4-6 mg) by mouth every 3 hours as needed for severe pain or breakthrough pain.      lidocaine (LIDODERM) 5 % patch Place 1 patch over 12 hours onto the skin every 24 hours for 10 days. 10 patch 0    lidocaine-prilocaine (EMLA) 2.5-2.5 % external cream Use 1-2 times a week or as needed prior to port access (Patient not taking: Reported on 9/11/2024) 30 g 3    lipase-protease-amylase (CREON 24) 97981-75642-117890 units CPEP per EC capsule 2-3 capsules with meals 3 times a day and 1-2 capsules with snacks max of 15 capsules a day 200 capsule 11    loperamide (IMODIUM) 2 MG capsule 2 caps at 1st sign of diarrhea & 1 cap every 2hrs until 12hrs diarrhea free. During night, 2 caps at bedtime & 2 caps every 4hrs until AM 30 capsule 0    methocarbamol (ROBAXIN) 750 MG " tablet Take 1 tablet (750 mg) by mouth 4 times daily as needed for muscle spasms. 60 tablet 0    methylphenidate (RITALIN) 5 MG tablet Take 1 tablet (5 mg) by mouth 2 times daily as needed (Patient not taking: Reported on 7/30/2024) 10 tablet 0    methylPREDNISolone Na Suc, PF, (SOLU-MEDROL) 125 mg/2 mL injection Inject 2 mLs (125 mg) over 3-5 minutes into the vein via push as needed (infusion reaction). For RN use only.  Reconstitute vial. Draw up methylPREDNISolone in a syringe and administer.  Discard remainder of vial. 571815 mL 0    Multiple Vitamins-Minerals (CENTRUM SILVER 50+MEN) TABS as directed Orally      naloxone (NARCAN) 4 MG/0.1ML nasal spray Instill 1 spray (4 mg) into one nostril alternating nostrils as needed for opioid reversal every 2-3 minutes until assistance arrives* (Patient not taking: Reported on 7/30/2024)      pantoprazole (PROTONIX) 40 MG EC tablet Take 1 tablet (40 mg) by mouth 2 times daily 60 tablet 4    [START ON 12/18/2024] Port Access Kit For nurse use only.  Do not remove items from bag.  Use for port access.  Do not place syringe on sterile field. [id number] kit 0    Port Access Kit For nurse use only.  Do not remove items from bag.  Use for port access.  Do not place syringe on sterile field. [id number] kit 0    prochlorperazine (COMPAZINE) 10 MG tablet Take 1 tablet (10 mg) by mouth every 6 hours as needed for nausea or vomiting. 30 tablet 2    prochlorperazine (COMPAZINE) 10 MG tablet Take 1 tablet (10 mg) by mouth every 6 hours as needed for nausea or vomiting 30 tablet 2    sodium chloride 0.9% infusion Infuse 250 mLs over 30 minutes into the vein every 28 (twenty-eight) days. zometa concomitant fluids. Given per therapy plan orders 750 mL 2    sodium chloride 0.9% infusion Infuse 500 mLs into the vein as needed for other (infusion reaction). In case of mild reaction, administer via gravity at 20 mL/hr to keep vein open. In case of severe reaction, administer via gravity wide open on  prime setting. 281323 mL 0    [START ON 12/18/2024] sodium chloride, PF, 0.9% PF flush Inject 10 mLs into the vein as needed for line flush. Flush IV before and after med administration as directed and/or at least every 24 hours, or prior to deaccessing for no further use and/or at least every 4 weeks when not accessed. 158135 mL 0    sodium chloride, PF, 0.9% PF flush Inject 10 mLs into the vein as needed for other (infusion reaction). For RN use only as needed for infusion reaction 762876 mL 0    sodium chloride, PF, 0.9% PF flush Inject 10 mLs into the vein as needed for line flush. Flush IV before and after med administration as directed and/or at least every 24 hours, or prior to deaccessing for no further use and/or at least every 4 weeks when not accessed. 653213 mL 0    sterile water, preservative free, injection Use 2.2 mLs for reconstitution as needed (with alteplase for catheter occlusion). Direct diluent stream into powder. Mix by gently swirling until dissolved. DO NOT SHAKE. Discard remainder of vial. 306144 mL 0    vitamin D3 (CHOLECALCIFEROL) 50 mcg (2000 units) tablet Take 1 tablet (50 mcg) by mouth daily. 90 tablet 1    zoledronic acid (ZOMETA) 4 MG/100ML infusion Infuse 100 mLs (4 mg) into the vein every 28 days. Infuse via gravity. Given per therapy plan orders 300 mL 2     Past Medical History:   Diagnosis Date    Acute pancreatitis 04/16/2023    Alcohol-induced acute pancreatitis 04/10/2023    Gastric outlet obstruction     Metastasis from pancreatic cancer (H)     Personal history of chemotherapy     Gemzar + Abraxane started on 10/31/2023    Recurrent acute pancreatitis     S/P radiation therapy     2,000 cGy to T10 Spine completed on 11/29/2023 [facility]    S/P radiation therapy     3,000 cGy to L4 Spine completed on 10/21/2024 [facility]     Past Surgical History:   Procedure Laterality Date    AS OPEN TREATMENT CLAVICULAR FRACTURE INTERNAL FX       ENDOSCOPIC RETROGRADE CHOLANGIOPANCREATOGRAM N/A 7/11/2023    Procedure: ENDOSCOPIC RETROGRADE CHOLANGIOPANCREATOGRAPHY;  Surgeon: Khadar Pickett MD;  Location: [facility] OR    ENDOSCOPIC RETROGRADE CHOLANGIOPANCREATOGRAM N/A 8/17/2023    Procedure: ENDOSCOPIC RETROGRADE  with stent removal x1, balloon sweep;  Surgeon: Christos Greenberg MD;  Location: [facility] OR    ENDOSCOPIC ULTRASOUND UPPER GASTROINTESTINAL TRACT (GI) N/A 7/11/2023    Procedure: Endoscopic ultrasound upper gastrointestinal tract (GI), with biposy, GJ tube repositioning, stent placement;  Surgeon: Khadar Pickett MD;  Location: [facility] OR    ENDOSCOPIC ULTRASOUND UPPER GASTROINTESTINAL TRACT (GI) N/A 7/13/2023    Procedure: ENDOSCOPIC ULTRASOUND, ESOPHAGOSCOPY, EUS guided gastrojejunostomy;  Surgeon: Tre York MD;  Location: [facility] OR    INSERT PICC LINE  04/29/2023    INSERT PORT VASCULAR ACCESS Right 7/28/2023    Procedure: Insert port vascular access;  Surgeon: Tom العلي MD;  Location: [facility] OR    IR BONE ABLATION RFA  8/28/2024    IR CHEST PORT PLACEMENT > 5 YRS OF AGE  7/28/2023    IR LUMBAR VERTEBROPLASTY  8/28/2024    IR NG TUBE PLACEMENT REQ RAD & FLUORO  05/08/2023    JG tube    REPLACE GASTROJEJUNOSTOMY TUBE, PERCUTANEOUS N/A 8/17/2023    Procedure: possible REPLACEMENT, GASTROJEJUNOSTOMY TUBE, PERCUTANEOUS;  Surgeon: Christos Greenberg MD;  Location: [facility] OR       Physical Exam:   GENERAL APPEARANCE: ill appearing sitting in recliner, alert and no distress; neatly groomed  EYES: Eyes grossly normal to inspection, PERRLA, conjunctivae and sclerae without injection or discharge, EOM intact   RESP:  no increased work of breathing; speaks in complete sentences;   MS: No musculoskeletal defects are noted  SKIN: No suspicious lesions or rashes, hydration status appears adequate with normal skin turgor   PSYCH: Alert and oriented x3; speech- coherent , normal rate and volume; able to articulate logical thoughts, able to abstract reason,  no tangential thoughts, no hallucinations or delusions, mentation appears normal, Mood is euthymic. Affect is appropriate for this mood state and bright. Thought content is free of suicidal ideation, hallucinations, and delusions.  Eye contact is good during conversation.     Key Data Reviewed:  LABS: Cr 0.67, Albumin 3.5,  Hgb 10,  Alk Phos 363    IMAGIN2024 MR THORACIC AND LUMBAR SPINE WO & W/CONTRAST  IMPRESSION:  THORACIC SPINE MRI:  1.  Extensive thoracic vertebral body metastatic disease.  2.  Mild presumed pathologic fracture T7.  3.  No cord compression or tumor extension into spinal canal.     LUMBAR SPINE MRI:  1.  Extensive lumbar vertebral body metastatic disease.  2.  Pathologic compression fractures L2, L3 and L4 with mild to moderate loss of height, most pronounced at L4.  3.  No cord compression or tumor extension into spinal canal.  4.  Visualized pelvis demonstrates extensive metastatic changes.    50 minutes spent on the date of the encounter doing chart review, history and exam, patient education & counseling, documentation and other activities as noted above.    The longitudinal plan of care for the diagnosis(es)/condition(s) as documented were addressed during this visit. Due to the added complexity in care, I will continue to support Gallo in the subsequent management and with ongoing continuity of care.    Jeremy Hurt MD MS FAAFP CAQHPM  [facility]  Office [phone]  Fax [phone]

## 2024-12-17 NOTE — NURSING NOTE
Current patient location: [address] [place] [address]    Is the patient currently in the state of [place]? YES    Visit mode:VIDEO    If the visit is dropped, the patient can be reconnected by:VIDEO VISIT: Text to cell phone:   Telephone Information:   Mobile [phone]       Will anyone else be joining the visit? NO  (If patient encounters technical issues they should call [phone])    Are changes needed to the allergy or medication list? Pt stated no changes to allergies and Pt stated no med changes    Are refills needed on medications prescribed by this physician? NO    Rooming Documentation:  Unable to complete questionnaire(s) due to time    Reason for visit: LO NICOLE

## 2024-12-17 NOTE — PATIENT INSTRUCTIONS
It was good to see you today, Gallo and Violet.  I'm sorry the past few weeks have been so difficult.    Here are the things we talked about:  Keep using the cheek films routinely twice a day  Use the dilaudid as need and record when you need it.  I sent in a new prescription for 1000 mg methocarbamol and you should use it 2-4 times a day for spasms  I sent in a prescription for Ativan 0.5 -1 mg po q 6 hours prn severe spasms not relived with methocarbamol  Continue the lidocaine patches -two of them at a ti me  If you can sleep on your side with a pillow between your knees that might help    Someone from the team will reach out to schedule a follow up appointment in 4 weeks and megan is calling on Friday to check in.     How to get a hold of us:  For non-urgent matters, MyChart works best.    For more urgent matters, or if you prefer not to use MyChart, call our clinic nurse coordinator Megan Funez RN at [phone]    We have an on-call number for evenings and weekends. Please call this only if you are having uncontrolled symptoms or serious side effects from your medicines: [phone].     For refills, please give us a week (5 working days) notice. We don't always have providers available everyday to do refills. If you call the day you run out of your medicine, we may not be able to refill it in time, so call 5 days in advance!    Jeremy Hurt MD MS FAAFP CAQHPM  [facility]  Office [phone]  Fax [phone]

## 2024-12-18 ENCOUNTER — DOCUMENTATION ONLY (OUTPATIENT)
Dept: HOME HEALTH SERVICES | Facility: HOME HEALTH | Age: 59
End: 2024-12-18
Payer: COMMERCIAL

## 2024-12-18 ENCOUNTER — TELEPHONE (OUTPATIENT)
Dept: PALLIATIVE CARE | Facility: CLINIC | Age: 59
End: 2024-12-18

## 2024-12-18 ENCOUNTER — TELEPHONE (OUTPATIENT)
Dept: RADIOLOGY | Facility: CLINIC | Age: 59
End: 2024-12-18

## 2024-12-18 ENCOUNTER — HOME INFUSION BILLING (OUTPATIENT)
Dept: HOME HEALTH SERVICES | Facility: HOME HEALTH | Age: 59
End: 2024-12-18
Payer: COMMERCIAL

## 2024-12-18 ENCOUNTER — INFUSION THERAPY VISIT (OUTPATIENT)
Dept: ONCOLOGY | Facility: CLINIC | Age: 59
End: 2024-12-18
Payer: COMMERCIAL

## 2024-12-18 ENCOUNTER — ONCOLOGY VISIT (OUTPATIENT)
Dept: ONCOLOGY | Facility: CLINIC | Age: 59
End: 2024-12-18
Attending: PHYSICIAN ASSISTANT
Payer: COMMERCIAL

## 2024-12-18 VITALS
TEMPERATURE: 98.2 F | RESPIRATION RATE: 16 BRPM | HEART RATE: 85 BPM | OXYGEN SATURATION: 95 % | DIASTOLIC BLOOD PRESSURE: 76 MMHG | WEIGHT: 153.6 LBS | HEIGHT: 72 IN | SYSTOLIC BLOOD PRESSURE: 111 MMHG | BODY MASS INDEX: 20.8 KG/M2

## 2024-12-18 DIAGNOSIS — Z79.4 TYPE 2 DIABETES MELLITUS WITHOUT COMPLICATION, WITH LONG-TERM CURRENT USE OF INSULIN (H): Primary | ICD-10-CM

## 2024-12-18 DIAGNOSIS — M84.58XG PATHOLOGICAL FRACTURE OF VERTEBRAE IN NEOPLASTIC DISEASE WITH DELAYED HEALING: Primary | ICD-10-CM

## 2024-12-18 DIAGNOSIS — B35.1 ONYCHOMYCOSIS: ICD-10-CM

## 2024-12-18 DIAGNOSIS — C25.0 MALIGNANT NEOPLASM OF HEAD OF PANCREAS (H): ICD-10-CM

## 2024-12-18 DIAGNOSIS — C79.51 MALIGNANT NEOPLASM METASTATIC TO BONE (H): ICD-10-CM

## 2024-12-18 DIAGNOSIS — G89.3 CANCER ASSOCIATED PAIN: Primary | ICD-10-CM

## 2024-12-18 DIAGNOSIS — E11.9 TYPE 2 DIABETES MELLITUS WITHOUT COMPLICATION, WITH LONG-TERM CURRENT USE OF INSULIN (H): Primary | ICD-10-CM

## 2024-12-18 DIAGNOSIS — R26.89 IMPAIRED GAIT AND MOBILITY: ICD-10-CM

## 2024-12-18 DIAGNOSIS — Z51.11 ENCOUNTER FOR ANTINEOPLASTIC CHEMOTHERAPY: Primary | ICD-10-CM

## 2024-12-18 DIAGNOSIS — C25.0 MALIGNANT NEOPLASM OF HEAD OF PANCREAS (H): Primary | ICD-10-CM

## 2024-12-18 LAB
ALBUMIN SERPL BCG-MCNC: 3.4 G/DL (ref 3.5–5.2)
ALP SERPL-CCNC: 327 U/L (ref 40–150)
ALT SERPL W P-5'-P-CCNC: 49 U/L (ref 0–70)
ANION GAP SERPL CALCULATED.3IONS-SCNC: 11 MMOL/L (ref 7–15)
AST SERPL W P-5'-P-CCNC: 30 U/L (ref 0–45)
BASOPHILS # BLD AUTO: 0 10E3/UL (ref 0–0.2)
BASOPHILS NFR BLD AUTO: 0 %
BILIRUB SERPL-MCNC: 0.3 MG/DL
BILIRUB SERPL-MCNC: 0.3 MG/DL
BUN SERPL-MCNC: 24.7 MG/DL (ref 6–20)
CALCIUM SERPL-MCNC: 8.5 MG/DL (ref 8.8–10.4)
CHLORIDE SERPL-SCNC: 99 MMOL/L (ref 98–107)
CREAT SERPL-MCNC: 0.62 MG/DL (ref 0.67–1.17)
EGFRCR SERPLBLD CKD-EPI 2021: >90 ML/MIN/1.73M2
EOSINOPHIL # BLD AUTO: 0 10E3/UL (ref 0–0.7)
EOSINOPHIL NFR BLD AUTO: 0 %
ERYTHROCYTE [DISTWIDTH] IN BLOOD BY AUTOMATED COUNT: 14.5 % (ref 10–15)
GLUCOSE SERPL-MCNC: 330 MG/DL (ref 70–99)
HCO3 SERPL-SCNC: 24 MMOL/L (ref 22–29)
HCT VFR BLD AUTO: 30.7 % (ref 40–53)
HGB BLD-MCNC: 9.4 G/DL (ref 13.3–17.7)
IMM GRANULOCYTES # BLD: 0.2 10E3/UL
IMM GRANULOCYTES NFR BLD: 1 %
LYMPHOCYTES # BLD AUTO: 0.7 10E3/UL (ref 0.8–5.3)
LYMPHOCYTES NFR BLD AUTO: 5 %
MCH RBC QN AUTO: 26.8 PG (ref 26.5–33)
MCHC RBC AUTO-ENTMCNC: 30.6 G/DL (ref 31.5–36.5)
MCV RBC AUTO: 88 FL (ref 78–100)
MONOCYTES # BLD AUTO: 0.5 10E3/UL (ref 0–1.3)
MONOCYTES NFR BLD AUTO: 4 %
NEUTROPHILS # BLD AUTO: 13.1 10E3/UL (ref 1.6–8.3)
NEUTROPHILS NFR BLD AUTO: 91 %
NRBC # BLD AUTO: 0 10E3/UL
NRBC BLD AUTO-RTO: 0 /100
PLATELET # BLD AUTO: 226 10E3/UL (ref 150–450)
POTASSIUM SERPL-SCNC: 4.4 MMOL/L (ref 3.4–5.3)
PROT SERPL-MCNC: 6.9 G/DL (ref 6.4–8.3)
RBC # BLD AUTO: 3.51 10E6/UL (ref 4.4–5.9)
SODIUM SERPL-SCNC: 134 MMOL/L (ref 135–145)
WBC # BLD AUTO: 14.5 10E3/UL (ref 4–11)

## 2024-12-18 PROCEDURE — 82310 ASSAY OF CALCIUM: CPT | Performed by: PHYSICIAN ASSISTANT

## 2024-12-18 PROCEDURE — 96375 TX/PRO/DX INJ NEW DRUG ADDON: CPT

## 2024-12-18 PROCEDURE — 36591 DRAW BLOOD OFF VENOUS DEVICE: CPT | Performed by: STUDENT IN AN ORGANIZED HEALTH CARE EDUCATION/TRAINING PROGRAM

## 2024-12-18 PROCEDURE — 250N000011 HC RX IP 250 OP 636: Performed by: STUDENT IN AN ORGANIZED HEALTH CARE EDUCATION/TRAINING PROGRAM

## 2024-12-18 PROCEDURE — G0498 CHEMO EXTEND IV INFUS W/PUMP: HCPCS

## 2024-12-18 PROCEDURE — 85004 AUTOMATED DIFF WBC COUNT: CPT | Performed by: STUDENT IN AN ORGANIZED HEALTH CARE EDUCATION/TRAINING PROGRAM

## 2024-12-18 PROCEDURE — G2211 COMPLEX E/M VISIT ADD ON: HCPCS | Performed by: PHYSICIAN ASSISTANT

## 2024-12-18 PROCEDURE — 250N000011 HC RX IP 250 OP 636: Performed by: PHYSICIAN ASSISTANT

## 2024-12-18 PROCEDURE — 258N000003 HC RX IP 258 OP 636: Performed by: STUDENT IN AN ORGANIZED HEALTH CARE EDUCATION/TRAINING PROGRAM

## 2024-12-18 PROCEDURE — 85041 AUTOMATED RBC COUNT: CPT | Performed by: STUDENT IN AN ORGANIZED HEALTH CARE EDUCATION/TRAINING PROGRAM

## 2024-12-18 PROCEDURE — 96415 CHEMO IV INFUSION ADDL HR: CPT

## 2024-12-18 PROCEDURE — S9330 HIT CONT CHEM DIEM: HCPCS

## 2024-12-18 PROCEDURE — 82247 BILIRUBIN TOTAL: CPT | Performed by: STUDENT IN AN ORGANIZED HEALTH CARE EDUCATION/TRAINING PROGRAM

## 2024-12-18 PROCEDURE — A4216 STERILE WATER/SALINE, 10 ML: HCPCS

## 2024-12-18 PROCEDURE — 99213 OFFICE O/P EST LOW 20 MIN: CPT | Performed by: PHYSICIAN ASSISTANT

## 2024-12-18 PROCEDURE — 99215 OFFICE O/P EST HI 40 MIN: CPT | Performed by: PHYSICIAN ASSISTANT

## 2024-12-18 PROCEDURE — 96413 CHEMO IV INFUSION 1 HR: CPT

## 2024-12-18 PROCEDURE — 96368 THER/DIAG CONCURRENT INF: CPT

## 2024-12-18 RX ORDER — ZOLEDRONIC ACID 0.04 MG/ML
4 INJECTION, SOLUTION INTRAVENOUS ONCE
Status: CANCELLED | OUTPATIENT
Start: 2025-01-01 | End: 2025-01-01

## 2024-12-18 RX ORDER — LIDOCAINE 50 MG/G
2 PATCH TOPICAL EVERY 24 HOURS
Qty: 60 PATCH | Refills: 3 | Status: SHIPPED | OUTPATIENT
Start: 2024-12-18

## 2024-12-18 RX ORDER — HEPARIN SODIUM (PORCINE) LOCK FLUSH IV SOLN 100 UNIT/ML 100 UNIT/ML
5 SOLUTION INTRAVENOUS ONCE
Status: COMPLETED | OUTPATIENT
Start: 2024-12-18 | End: 2024-12-18

## 2024-12-18 RX ORDER — ZOLEDRONIC ACID 0.04 MG/ML
4 INJECTION, SOLUTION INTRAVENOUS ONCE
OUTPATIENT
Start: 2025-02-26 | End: 2025-02-26

## 2024-12-18 RX ORDER — DIPHENHYDRAMINE HYDROCHLORIDE 50 MG/ML
50 INJECTION INTRAMUSCULAR; INTRAVENOUS
Start: 2025-01-01

## 2024-12-18 RX ORDER — ALBUTEROL SULFATE 90 UG/1
1-2 INHALANT RESPIRATORY (INHALATION)
Start: 2025-01-01

## 2024-12-18 RX ORDER — HEPARIN SODIUM (PORCINE) LOCK FLUSH IV SOLN 100 UNIT/ML 100 UNIT/ML
5 SOLUTION INTRAVENOUS
OUTPATIENT
Start: 2025-01-01

## 2024-12-18 RX ORDER — ZOLEDRONIC ACID 0.04 MG/ML
4 INJECTION, SOLUTION INTRAVENOUS ONCE
Status: DISCONTINUED | OUTPATIENT
Start: 2024-12-18 | End: 2024-12-18 | Stop reason: HOSPADM

## 2024-12-18 RX ORDER — EPINEPHRINE 1 MG/ML
0.3 INJECTION, SOLUTION, CONCENTRATE INTRAVENOUS EVERY 5 MIN PRN
OUTPATIENT
Start: 2025-01-01

## 2024-12-18 RX ORDER — ONDANSETRON 2 MG/ML
8 INJECTION INTRAMUSCULAR; INTRAVENOUS ONCE
Status: COMPLETED | OUTPATIENT
Start: 2024-12-18 | End: 2024-12-18

## 2024-12-18 RX ORDER — METHYLPREDNISOLONE SODIUM SUCCINATE 125 MG/2ML
125 INJECTION INTRAMUSCULAR; INTRAVENOUS
Start: 2025-01-01

## 2024-12-18 RX ORDER — DEXAMETHASONE SODIUM PHOSPHATE 4 MG/ML
12 INJECTION, SOLUTION INTRA-ARTICULAR; INTRALESIONAL; INTRAMUSCULAR; INTRAVENOUS; SOFT TISSUE ONCE
Status: COMPLETED | OUTPATIENT
Start: 2024-12-18 | End: 2024-12-18

## 2024-12-18 RX ORDER — HEPARIN SODIUM,PORCINE 10 UNIT/ML
5-20 VIAL (ML) INTRAVENOUS DAILY PRN
OUTPATIENT
Start: 2025-01-01

## 2024-12-18 RX ORDER — MEPERIDINE HYDROCHLORIDE 25 MG/ML
25 INJECTION INTRAMUSCULAR; INTRAVENOUS; SUBCUTANEOUS EVERY 30 MIN PRN
OUTPATIENT
Start: 2025-01-01

## 2024-12-18 RX ORDER — ALBUTEROL SULFATE 0.83 MG/ML
2.5 SOLUTION RESPIRATORY (INHALATION)
OUTPATIENT
Start: 2025-01-01

## 2024-12-18 RX ADMIN — IRINOTECAN HYDROCHLORIDE 129 MG: 4.3 INJECTION, POWDER, FOR SOLUTION INTRAVENOUS at 14:55

## 2024-12-18 RX ADMIN — HEPARIN 5 ML: 100 SYRINGE at 12:33

## 2024-12-18 RX ADMIN — LEUCOVORIN CALCIUM 750 MG: 200 INJECTION, POWDER, LYOPHILIZED, FOR SOLUTION INTRAMUSCULAR; INTRAVENOUS at 14:57

## 2024-12-18 RX ADMIN — ONDANSETRON 8 MG: 2 INJECTION INTRAMUSCULAR; INTRAVENOUS at 14:01

## 2024-12-18 RX ADMIN — DEXAMETHASONE SODIUM PHOSPHATE 12 MG: 4 INJECTION, SOLUTION INTRA-ARTICULAR; INTRALESIONAL; INTRAMUSCULAR; INTRAVENOUS; SOFT TISSUE at 14:01

## 2024-12-18 ASSESSMENT — PAIN SCALES - GENERAL: PAINLEVEL_OUTOF10: MODERATE PAIN (5)

## 2024-12-18 NOTE — NURSING NOTE
"Oncology Rooming Note    December 18, 2024 12:42 PM   Gallo Navarro is a 59 year old male who presents for:    Chief Complaint   Patient presents with    Port Draw     Labs collected from port by RN. Vitals taken. Checked in for appointment(s).     Oncology Clinic Visit     Malignant neoplasm of head of pancreas      Initial Vitals: /76   Pulse 85   Temp 98.2  F (36.8  C)   Resp 16   Ht 1.829 m (6' 0.01\")   Wt 69.7 kg (153 lb 9.6 oz)   SpO2 95%   BMI 20.83 kg/m   Estimated body mass index is 20.83 kg/m  as calculated from the following:    Height as of this encounter: 1.829 m (6' 0.01\").    Weight as of this encounter: 69.7 kg (153 lb 9.6 oz). Body surface area is 1.88 meters squared.  Moderate Pain (5) Comment: Data Unavailable   No LMP for male patient.  Allergies reviewed: Yes  Medications reviewed: Yes    Medications: Medication refills not needed today.  Pharmacy name entered into [employer]:    [facility] - [place], [place] - [address] [facility] - [place], [place] - [id number]  [facility] [place] [facility] [address] [facility] [address] PORTAL TO REGISTERED [facility] SITES  [facility] - [place], [place] - [address] [place] [address] [id number] [employer] - [place], [place] - [address]    Frailty Screening:   Is the patient here for a new oncology consult visit in cancer care? 2. No      Clinical concerns:        Carolyn Mena              "

## 2024-12-18 NOTE — TELEPHONE ENCOUNTER
Received fax from pharmacy requesting refill of  lidocaine patches .     Last office visit: 12/17/24  Scheduled for follow up 1/21/25     Will route request to NP for review.

## 2024-12-18 NOTE — PROGRESS NOTES
"Infusion Nursing Note:  Gallo Navarro presents today for Cycle 1 Day 1 Liposomal Irinotecan + Flurouracil pump connection.    Patient seen by provider today: Yes: Berta Anglin   present during visit today: Not Applicable.    Note: Pt arrives to infusion today feeling okay. Pt has no further questions or concerns following his appointment with Berta Anglin. Pt will proceed with treatment today.  Zometa completed at home already.     Patient will start Lantus tonight due to hyperglycemia. He is familiar with this and education was reinforced by RN.     Intravenous Access:  Implanted Port.    Treatment Conditions:  Lab Results   Component Value Date    HGB 9.4 (L) 12/18/2024    WBC 14.5 (H) 12/18/2024    ANEU 2.2 12/26/2023    ANEUTAUTO 13.1 (H) 12/18/2024     12/18/2024        Lab Results   Component Value Date     (L) 12/12/2024    POTASSIUM 4.1 12/12/2024    MAG 1.9 08/23/2023    CR 0.67 12/12/2024    DENISE 8.2 (L) 12/12/2024    BILITOTAL 0.3 12/18/2024    ALBUMIN 3.5 12/12/2024    ALT 55 12/12/2024    AST 84 (H) 12/12/2024       Results reviewed, labs MET treatment parameters, ok to proceed with treatment.      Post Infusion Assessment:  Patient tolerated infusion without incident.  Blood return noted pre and post infusion.  Site patent and intact, free from redness, edema or discomfort.  No evidence of extravasations.    Prior to discharge: Port is secured in place with tegaderm and flushed with 10cc NS with positive blood return noted.    Continuous home infusion Dosi-Fuser pump connected.    All connectors secured in place and clamps taped open.    Pump started, \"running\" noted on display (CADD): Not Applicable.   Capillary element taped to pt's skin per protocol.  Pump Connection double checked with Hiwot CASTANO RN and Nusrat KWAN RN.  Patient instructed to call our clinic or [facility] with any questions or concerns at home.  Patient verbalized understanding.    Patient set up for " pump disconnect at home with [facility] on 12/20/24 @1200.        Discharge Plan:   Patient declined prescription refills. He will  Lantus downstairs and hopefully get Freestyle Bora delivered tomorrow.   Discharge instructions reviewed with: Patient and Friend.  Patient and/or family verbalized understanding of discharge instructions and all questions answered.  AVS to patient via [employer].  Patient will return 1/2/24 for next appointment.   Patient discharged in stable condition accompanied by: self and friend.  Departure Mode: Wheelchair.      Ibeth Villafana RN

## 2024-12-18 NOTE — TELEPHONE ENCOUNTER
Received call from pt's wife Violet. Pt's pain control has improved and he's able to sleep for 6 hours solid at night. She is working out a medication schedule and wanted to clarify when to give the PRNs she has on hand. Instructed her to be sure to separate the dilaudid and ativan by at least one hour. Pt also has methocarbamol and CBD products that she is working into the schedule.     She seems to have a clear understanding of the role of the PRN medications and will work to achieve a reliable routine.    She knows to call with questions and also has the on call palliative number if needed.    MICHAEL SladeN, RN  Palliative Care Nurse Clinician    [phone] (Direct)  [phone] (Main)  [phone] (Appointment Scheduling)

## 2024-12-18 NOTE — PROGRESS NOTES
[facility] d/c of Fluorouracil continuous infusion over 46 hours via C series pump.   Scheduled in [facility] for home disconnect on 12/20/24 at 12pm.  Changing treatment plans from home administered gem/abraxane.  Has been on Fluorouracil pump previously.  No Neulasta OnPro/IV fluids needed at this visit.

## 2024-12-18 NOTE — LETTER
12/18/2024      Gallo Navarro  [address] [address] [place] [address]      Dear Colleague,    Thank you for referring your patient, Gallo Navarro, to the [facility]. Please see a copy of my visit note below.    Oncology/Hematology Visit Note  Dec 18, 2024    Reason for Visit: follow-up of metastatic Pancreatic Caner    Oncology HPI:   -NGS: KRAS G12R  Immuno: pMMR, KAYLIE, TMB-low  Clinical Trial: MARIPOSA-186, MARIPOSA-280, TORL    - 3/2023: presented with acute pancreatitis  c/b chronic pancreatitis with pancreatic mass causing duodenal stenosis with gastric outlet obstruction s/p GJ tube (placed 5/2023), biliary obstruction s/p stent placement on 7/7/23, pancreatic insufficiency, and IDDM2.  -  He then presented to the ED with worsening abdominal pain, increased jaundice, poor PO intake and weight loss admitted on 7/8/2023 for concern of biliary obstruction.   - 7/12/2023: Underwent biopsy of pancreatic mass returned positive for pancreatic adenocarcinoma.   - 7/31/2023: He started on treatment with 5FU, irinotecan, and oxaliplatin (FOLFIRINOX). Please see previous notes for further details on the patient's history.      8/17/23 - ERCP/EGD, duodenal stents x2 placed, exchange of GJ tube     8/21-8/25/24 hospitalized due to diarrhea, colitis, abdominal pain.       8/29 - Cycle 3 deferred due to neutropenia.   Neulasta added. Irinotecan dose reduced 20% due to symptoms including cramping, double vision and slurred speech during infusion.       10/24/23 CT CAP with similar to slight increase in size of peripancreatic and mesenteric lymph nodes, enlargement of previous skeletal metastasis as well as new sclerotic metastasis to left posterior 5th rib, enlarging pulmonary nodule, and unchanged pancreatic head mass. Also unclear concerns for PE,  right lower lobe segmental PE confirmed on CT-PE. Started on apixaban.      10/31/23 Cycle 1 gemzar, abraxane  11/7/23 Cycle 1 Zometa  11/22/23 Removal of GJ  tube.   11/29/23 Completed palliative radiation to T10   12/13/23 C3D1 gem/abraxane  12/29/23 Consults at [place]  1/23-1/26 Consults at [facility]   1/30/24: C4D1 gem/abraxane   3/24: CT CAP with overall stable disease with isolated progression in potential L4 lesion. Referral for radiation oncology.    4/29-5/1: Consults at [facility] for possible cellular therapy, CT guided biopsy of left pubic bone, recommendation to return to [facility] upon progression of current treatment   5/23/24: Cardiology consult at [facility]. CT with small pericardial effusion   6/6/2024: Repeat ECHO on with EF of 55-60%, no pericardial effusion present  - Cycle 8 deferred due to infection concerns and subsequently tested positive for COVID 6/25/24, resumed treatment on 7/2/2024    -Day 8 eliminated after Cycle 5 Person/Abraxane.    4/2/2024-present: Gemcitabine/Abraxane, Days 1 and 15 only; Zometa every 3 months     8/2024 repeat imaging with isolated progression to L4 lesion with fracture of that spinous process. Referred to radiation therapy. Decreased Abraxane dose reduced to 75mg/m2 with cycle 9 day 15 for overall treatment tolerance.     8/28/24 RFA ablation and verteral augmentation to L4.   10/1-10/21 Radiation to L4     11/12/24 CT CAP shows disease progression, plan to change to 5FU and liposomal irinotecan after a vacation  12/12/24 ED visit for pain, secondary to cancer, managed with IV pain medication    Interval history:   Patient reports that he initially had cement put in his spine and it seemed to help with his pain.  However when he got off of the plane recently and was pushed up the ramp in a wheelchair, this was very jarring and his pain dramatically increased.  He has been working closely with palliative care and feels like he is doing okay today in terms of his pain control.  He does feel a little more out of it today which she attributes to the muscle relaxant.  He finds the steroid has been helpful with keeping him awake.   He reports normal stools.  He denies any change to his neuropathy.  He reports eating and drinking okay.  He denies any chest pain, shortness of breath, or bleeding issues.  He does have intermittent swelling in his feet.  He is unable to cut his own toenails and does not have anyone else to help him.  He also notes that his big toenails are both lifted up.  He would like assistance with this.  He reports feeling sad about his cancer diagnosis and pain, but feels well supported by family and friends.  He denies other concerns.    Current Outpatient Medications   Medication Sig Dispense Refill     acetaminophen (TYLENOL) 32 mg/mL liquid Take 20.313 mLs (650 mg) by mouth every 8 hours. 1800 mL 3     alteplase (CATHFLO ACTIVASE) injection Inject 2 mLs (2 mg) into catheter as needed (catheter occlusion). Reconstitute vial. Draw up alteplase 1 mg/mL in a syringe and instill into IV catheter. Dwell for at least 20 min to 24 hours, then aspirate. May repeat dose once in 24 hours if catheter function not restored after at least 2 hours. Discard remainder of vial. 944737 each 0     apixaban ANTICOAGULANT (ELIQUIS) 5 MG tablet Take 1 tablet (5 mg) by mouth 2 times daily. 60 tablet 2     Buprenorphine HCl (BELBUCA) 600 MCG FILM buccal film Place 1 Film (600 mcg) inside cheek every 12 hours. May also place 1 Film (600 mcg) daily as needed. For severe pain not relieved with 6 mg dilaudid dose. 75 Film 3     Chemo Kit For RN use only. Do not remove items from bag. Contents: 1  sodium chloride 0.9% flush, 4 medium gloves, 1 chemo gown, 1/4 chemo mat, 1 connector female, 1 chemo bag. [id number] kit 0     Chemo Kit For RN use only. Do not remove items from bag. Contents: 1  sodium chloride 0.9% flush, 4 medium gloves, 1 chemo gown, 1/4 chemo mat, 1 connector female, 1 chemo bag. [id number] kit 0     dexAMETHasone (DECADRON) 4 MG tablet Take 2 tablets (8 mg) by mouth daily (with breakfast) for 5 days, THEN 1 tablet (4 mg) daily (with  "breakfast) for 5 days, THEN 1 tablet (4 mg) every 48 hours for 5 days, THEN 0.5 tablets (2 mg) daily (with breakfast) for 5 days. 20 tablet 0     dicyclomine (BENTYL) 10 MG capsule Take 1 capsule (10 mg) by mouth 4 times daily (before meals and nightly). 120 capsule 1     diphenhydrAMINE (BENADRYL) 50 MG/ML injection Inject 1 mL (50 mg) over 3-5 minutes into the vein via push as needed for other (infusion reaction). For RN use only.  Draw up in a syringe and administer IV push.  Discard remainder of vial. 63307 mL 0     econazole nitrate 1 % external cream Apply topically daily To affected toenails. 85 g 5     Emergency Supply Kit, Central, Patient use for emergency only. Contents: 3 sodium chloride 0.9% flushes, 1 dressing kit, 1 microclave ext set 14\", 4 nitrile gloves (med), 6 alcohol prep pads, 1 bacitracin, 1 syringe (10 cc 20 G 1\"). Call [phone] to reorder. [id number] kit 0     Emergency Supply Kit, Secret Recipe, Patient use for emergency only. Contents: 3 sodium chloride 0.9% flushes, 1 dressing kit, 1 microclave ext set 14\", 4 nitrile gloves (med), 6 alcohol prep pads, 1 bacitracin, 1 syringe (10 cc 20 G 1\"). Call [phone] to reorder. [id number] kit 0     EPINEPHrine (ANY BX GENERIC EQUIV) 0.3 MG/0.3ML injection 2-pack Inject 0.3 mLs (0.3 mg) into the muscle as needed for anaphylaxis (infusion reaction). Administer into the mid-thigh in case of severe anaphylaxis (wheezing, throat tightening, mouth swelling, difficulty breathing). May repeat dose one time in 5-15 minutes if symptoms persist. 665972 mL 0     Fluorouracil (ADRUCIL) 4,585 mg in sodium chloride 0.9 % 241 mL via HOMEPUMP C-Series Infuse 4,585 mg at 5.2 mL/hr over 46 hours into the vein once for 1 dose. 204518 mL 0     HYDROmorphone (DILAUDID) 4 MG tablet Take 1-1.5 tablets (4-6 mg) by mouth every 3 hours as needed for severe pain or breakthrough pain.       lidocaine (LIDODERM) 5 % patch Place 1 patch over 12 hours onto the skin every 24 hours " for 10 days. 10 patch 0     lipase-protease-amylase (CREON 24) 27069-38271-279239 units CPEP per EC capsule 2-3 capsules with meals 3 times a day and 1-2 capsules with snacks max of 15 capsules a day 200 capsule 11     loperamide (IMODIUM) 2 MG capsule 2 caps at 1st sign of diarrhea & 1 cap every 2hrs until 12hrs diarrhea free. During night, 2 caps at bedtime & 2 caps every 4hrs until AM 30 capsule 0     LORazepam (ATIVAN) 0.5 MG tablet Take 1-2 tablets (0.5-1 mg) by mouth every 6 hours as needed for muscle spasms. 45 tablet 2     methocarbamol (ROBAXIN) 750 MG tablet Take 1 tablet (750 mg) by mouth 4 times daily as needed for muscle spasms. 60 tablet 0     methocarbamol 1000 MG TABS Take 1,000 mg by mouth 4 times daily as needed for muscle spasms. 180 tablet 3     methylphenidate (RITALIN) 5 MG tablet Take 1 tablet (5 mg) by mouth 2 times daily as needed 10 tablet 0     methylPREDNISolone Na Suc, PF, (SOLU-MEDROL) 125 mg/2 mL injection Inject 2 mLs (125 mg) over 3-5 minutes into the vein via push as needed (infusion reaction). For RN use only.  Reconstitute vial. Draw up methylPREDNISolone in a syringe and administer.  Discard remainder of vial. 119436 mL 0     Multiple Vitamins-Minerals (CENTRUM SILVER 50+MEN) TABS as directed Orally       naloxone (NARCAN) 4 MG/0.1ML nasal spray        pantoprazole (PROTONIX) 40 MG EC tablet Take 1 tablet (40 mg) by mouth 2 times daily 60 tablet 4     Port Access Kit For nurse use only.  Do not remove items from bag.  Use for port access.  Do not place syringe on sterile field. [id number] kit 0     Port Access Kit For nurse use only.  Do not remove items from bag.  Use for port access.  Do not place syringe on sterile field. [id number] kit 0     prochlorperazine (COMPAZINE) 10 MG tablet Take 1 tablet (10 mg) by mouth every 6 hours as needed for nausea or vomiting. 30 tablet 2     prochlorperazine (COMPAZINE) 10 MG tablet Take 1 tablet (10 mg) by mouth every 6 hours as needed for nausea  or vomiting 30 tablet 2     sodium chloride 0.9% infusion Infuse 250 mLs over 30 minutes into the vein every 28 (twenty-eight) days. zometa concomitant fluids. Given per therapy plan orders 750 mL 2     sodium chloride 0.9% infusion Infuse 500 mLs into the vein as needed for other (infusion reaction). In case of mild reaction, administer via gravity at 20 mL/hr to keep vein open. In case of severe reaction, administer via gravity wide open on prime setting. 491175 mL 0     sodium chloride, PF, 0.9% PF flush Inject 10 mLs into the vein as needed for line flush. Flush IV before and after med administration as directed and/or at least every 24 hours, or prior to deaccessing for no further use and/or at least every 4 weeks when not accessed. 262750 mL 0     sodium chloride, PF, 0.9% PF flush Inject 10 mLs into the vein as needed for other (infusion reaction). For RN use only as needed for infusion reaction 769862 mL 0     sodium chloride, PF, 0.9% PF flush Inject 10 mLs into the vein as needed for line flush. Flush IV before and after med administration as directed and/or at least every 24 hours, or prior to deaccessing for no further use and/or at least every 4 weeks when not accessed. 386523 mL 0     sterile water, preservative free, injection Use 2.2 mLs for reconstitution as needed (with alteplase for catheter occlusion). Direct diluent stream into powder. Mix by gently swirling until dissolved. DO NOT SHAKE. Discard remainder of vial. 305445 mL 0     vitamin D3 (CHOLECALCIFEROL) 50 mcg (2000 units) tablet Take 1 tablet (50 mcg) by mouth daily. 90 tablet 1     zoledronic acid (ZOMETA) 4 MG/100ML infusion Infuse 100 mLs (4 mg) into the vein every 28 days. Infuse via gravity. Given per therapy plan orders 300 mL 2     lidocaine-prilocaine (EMLA) 2.5-2.5 % external cream Use 1-2 times a week or as needed prior to port access (Patient not taking: Reported on 9/11/2024) 30 g 3     Physical Exam:  General: The patient is  "a pleasant slender male in no acute distress. Here today in a wheelchair.  /76   Pulse 85   Temp 98.2  F (36.8  C)   Resp 16   Ht 1.829 m (6' 0.01\")   Wt 69.7 kg (153 lb 9.6 oz)   SpO2 95%   BMI 20.83 kg/m    Wt Readings from Last 10 Encounters:   12/18/24 69.7 kg (153 lb 9.6 oz)   12/17/24 68 kg (150 lb)   12/12/24 68 kg (150 lb)   12/02/24 69.9 kg (154 lb)   11/15/24 72.1 kg (159 lb)   11/11/24 69.9 kg (154 lb)   10/23/24 71.4 kg (157 lb 6 oz)   10/21/24 70.3 kg (155 lb)   10/16/24 72.1 kg (159 lb)   10/09/24 71.7 kg (158 lb)   HEENT: EOMI. Sclerae are anicteric.   Lungs: Breathing is not labored.   Extremities: No lower extremity edema noted bilaterally. Great toenails with onychomycosis. Remaining toenails extended beyond the skin.  Neuro: Cranial nerves II through XII are grossly intact.  Skin: No rashes, petechiae, or bruising noted on exposed skin.    Labs:    Most Recent 3 CBC's:  Recent Labs   Lab Test 12/18/24  1228 12/12/24  1355 11/11/24  1330   WBC 14.5* 14.0* 6.3   HGB 9.4* 10.0* 10.5*   MCV 88 88 92    199 384   ANEUTAUTO 13.1* 12.1* 4.1    Most Recent 3 BMP's:  Recent Labs   Lab Test 12/12/24  1355 12/02/24  1000 10/21/24  1145 09/23/24  1145   *  --  138 140   POTASSIUM 4.1  --  4.2 4.2   CHLORIDE 101  --  102 104   CO2 24  --  25 25   BUN 23.5*  --  21.3* 16.2   CR 0.67 0.67 0.74 0.72   ANIONGAP 8  --  11 11   DENISE 8.2* 9.3 9.3 8.8   *  --  213* 153*   PROTTOTAL 6.8  --  7.2 6.6   ALBUMIN 3.5 3.9 4.0 3.8    Most Recent 2 LFT's:  Recent Labs   Lab Test 12/18/24  1228 12/12/24  1355 10/21/24  1145   AST  --  84* 36   ALT  --  55 52   ALKPHOS  --  363* 261*   BILITOTAL 0.3 0.5 0.4   I reviewed the above labs today.      Impression/plan:     Unresectable Pancreatic Cancer  7/31/23 began palliative treatment on FOLFIRINOX  CT CAP 10/24/23 with progression in skeletal mets, lung nodule and lymph nodes; stable pancreatic mass  - 10/31/2023: Treatment was changed to " Gemcitabine and Abraxane (Day 1, 8, 15) + Zometa every 3 months  - 03/2024 CT CAP with progression of L4 lesion  - Day 8 eliminated after Cycle 5 gem/abraxane  - 8/2024 repeat imaging with isolated progression of L4 lesion, see below   - November 2024 imaging showed disease progression.  - He is now starting on 5FU and liposomal irinotecan. We reviewed side effects and their management in detail. Given increase in symptoms, will plan to have follow-up with us prior to each cycle of chemotherapy. Will repeat imaging after 4-6 cycles, depending on clinical course. We discussed that the bigger picture of his disease and the pro's and con's of treatment will continue to be an ongoing discussion. I am hopeful we can get some better pain control for him.     Anemia-normocytic  Hgb mildly worse. Likely related to disease progression. Denies bleeding issues. Will continue to monitor.     Skeletal Mets    - Radiation to T10 completed 11/29/23  - CT chest 5/23/24 unchanged sclerotic bone metastases  -imaging 8/2024 with isolated progression in L4, consult to radiation oncology.   -8/28/24 RFA ablation and vertebral augmentation to L4. Completed radiation to L4 in October 2024   -Pain management by palliative care, as below, from 12/17/24 visit.  -Will also plan for possible additional kyphoplasty.   -Given rx for a walker.     CONTINUE belbuca 600 mcg films BID with a 3rd available to use daily prn severe pain not relieved with dilaudid  Continue dilaudid 4-6 mg po q 3 hours prn and record how often it is needed  INCREASE methocarbamol  FROM 750 MG TO 1000 MG TABS PO Q 6 HOURS PRN  START Ativan 0.5 mg tabs prescribed to use sparingly 1-2 tablets q 6 hours prn severe spasm not relieved with methocarbamol  CONTINUE lidocaine patches  Tylenol suspension 650 mg po TID  CONTINUE dexamethasone taper    Bone Health  -Zometa 4 mg every 3 months, last infusion 12/4/2024.  -Recheck of calcium today is low. He remains on vitamin D 50  mcg daily and a multivitamin. Will start calcium 500 mg bid.     Neuropathy  -Secondary to prior chemotherapy. No acute change.    Pericardial Effusion  Found on CT 4/20/24 at [facility], followed by TTE with moderate pericardial effusion without tamponade.  CT 5/23/24 small pericardial effusion  5/23/24: Cardiology consult at [facility]- Echo 6/6/2024 with EF 55-60%, LV strain -20.5% which is normal, no pericardial effusion  - repeat echo at [facility] in the future,   - he has been cautioned by Cardiology s/s of cardiac tamponade  -following cardiology as needed  -no acute concerns today    Pulmonary Embolism  Right lower segmental PE found on routine imaging  Taking Apixiban daily, no bleeding concerns    Onychomycosis  -Will request home care to cut his toenails.    Berta Anglin PA-C  [facility] [address] [address]  [phone]    46 minutes spent on the date of the encounter doing chart review, review of test results, interpretation of tests, patient visit, and documentation     The longitudinal plan of care for the diagnosis(es)/condition(s) as documented were addressed during this visit. Due to the added complexity in care, I will continue to support Gallo Navarro in the subsequent management and with ongoing continuity of care.        Again, thank you for allowing me to participate in the care of your patient.        Sincerely,        Berta Anglin PA-C

## 2024-12-18 NOTE — TELEPHONE ENCOUNTER
I called and spoke with Gallo.  Plan is for VCF treatment with Dr Seymour and clinic on 1/7/25 clinic with Dr Seymour.   Thanks,     ALF Arce, RN, BSN  Interventional Radiology Care Coordinator   Phone:  [phone]

## 2024-12-19 ENCOUNTER — TELEPHONE (OUTPATIENT)
Dept: ENDOCRINOLOGY | Facility: CLINIC | Age: 59
End: 2024-12-19
Payer: COMMERCIAL

## 2024-12-19 ENCOUNTER — PATIENT OUTREACH (OUTPATIENT)
Dept: ONCOLOGY | Facility: CLINIC | Age: 59
End: 2024-12-19

## 2024-12-19 DIAGNOSIS — Z79.4 TYPE 2 DIABETES MELLITUS WITHOUT COMPLICATION, WITH LONG-TERM CURRENT USE OF INSULIN (H): Primary | ICD-10-CM

## 2024-12-19 DIAGNOSIS — E11.9 TYPE 2 DIABETES MELLITUS WITHOUT COMPLICATION, WITH LONG-TERM CURRENT USE OF INSULIN (H): Primary | ICD-10-CM

## 2024-12-19 PROCEDURE — S9330 HIT CONT CHEM DIEM: HCPCS

## 2024-12-19 NOTE — PROGRESS NOTES
Radiotherapy Treatment Summary              PATIENT: Gallo Navarro  MEDICAL RECORD NO: [id number]   : 1965    DIAGNOSIS / ID: Mr. Navarro is a 58 year old male with metastatic pancreatic cancer to bones.  He has completed palliative radiotherapy to T10, 20 Gray/5 fractions 2023.  He presented for  radiation to L4 in the setting of palliation/oligo progression. He is now status post RFA and augmentation to L4.      INTENT OF RADIOTHERAPY: Palliative    CONCURRENT SYSTEMIC THERAPY: No             SITE OF TREATMENT: L4    DATES  OF TREATMENT: 10/8/24-10/21/24    TOTAL DOSE OF TREATMENT / FRACTIONS: 30Gy/10fx                                         FOLLOW UP PLAN:  Follow up with Radiation Oncology on as needed basis  Follow up with Dr. Haile/Medical Oncology team    CC  Patient Care Team:  Akil Hernandez MD as PCP - General (Internal Medicine)  Akil Hernandez MD as Assigned PCP  Efra Lynn RN as Specialty Care Coordinator (Hematology & Oncology)  Tre York MD as MD (Gastroenterology)  Jeremy Hurt MD as Assigned Palliative Care Provider  Christos Greenberg MD as MD (Gastroenterology)  Megan Farrell APRN CNP as Assigned Cancer Care Provider  Becca Anguiano as Physician (Radiation Oncology)  Luis Haile MD as Berta Alex PA-C as Assigned Endocrinology Provider  Zen Sawyer MD as MD (Cardiovascular Disease)  Zen Sawyer MD as MD (Cardiovascular Disease)  Fanny Chanel MD as MD (Cardiovascular Disease)  Tam Khan MD as MD (Cardiovascular Disease)  Tam Khan MD as Assigned Heart and Vascular Provider  Torito Ware DPM as Assigned Musculoskeletal Provider  Deloris Ambrose RN as Home Infusion   Luis Haile MD as Home Infusion Following Provider  Jeremy Hurt MD as MD (Hospice And Palliative Care)       Ben Reaves M.D.  Department of Radiation Oncology  [facility]

## 2024-12-19 NOTE — TELEPHONE ENCOUNTER
[facility]: Cancer Care                                                                                          Home care referral to [facility] denied, spoke with pt who had been a client of [facility] before, he would like referral faxed to them. Faxed to [phone] with most recent progress note from Berta GONZALEZ 12/18/24.    Signature:  Efra Lynn RN

## 2024-12-20 ENCOUNTER — TELEPHONE (OUTPATIENT)
Dept: PALLIATIVE CARE | Facility: CLINIC | Age: 59
End: 2024-12-20
Payer: COMMERCIAL

## 2024-12-20 RX ORDER — ACYCLOVIR 400 MG/1
TABLET ORAL
Qty: 3 EACH | Refills: 5 | Status: SHIPPED | OUTPATIENT
Start: 2024-12-20 | End: 2024-12-25

## 2024-12-20 NOTE — TELEPHONE ENCOUNTER
Per Berta Cid PA-C:  Spoke to Gallo today. He has restarted Lantus and he is currently using fingerstick testing to monitor his blood sugars. I am going to send in a new prescription for the Dexcom G7 sensor. He knows how to download the royal associated with the Dexcom.    Also, patient would like to schedule follow-up in Jan. Nola had placed a 3 pm appt on hold.    Thanks,  Berta

## 2024-12-20 NOTE — TELEPHONE ENCOUNTER
"At the request of Dr. Hurt, patient called to check in on how he is doing.  Patient reports he is doing better.  He is still having pain \"here and there.\"  Reports he and his wife are trying to be proactive with pain medications but states it is a lot because he is also on his 46-hour chemo.  Reports he is sleeping better.    Patient's wife was not home so patient asked writer to call back at 2 PM today.  Writer will call back then.    Patient verbalized understanding and had no further questions.      Rosa Maria Urrutia RN  Palliative Care Nurse Clinician    [phone] (Direct)  [phone] (Main)  [phone] (Appointment Scheduling)    ADDENDUM:  Writer called patient back at his request.  Reports he and his wife are continuing to work on the pain medication schedule.  They are going to try to be more proactive to prevent peaks and valleys.  They plan to set an alarm in the middle of the night to take a dose of Dilaudid to help with his morning pain.  Reports that toughest hours are at night.  Patient reports he is taking Belbuca, methocarbamol, hydrocodone more phone and lidocaine patches.    Will update Dr. Hurt  "

## 2024-12-20 NOTE — TELEPHONE ENCOUNTER
Received call from [facility] that they are out of network with patient's insurance this year, unable to accept. Epic pool messages sent to intake at home care agencies [facility] and [facility].

## 2024-12-23 NOTE — TELEPHONE ENCOUNTER
1/23 Called and spoke to patient, appointment is currently scheduled.     Oralia poon Complex   Orthopedics, Podiatry, Sports Medicine, Ent ,Eye , Audiology, Adult Endocrine & Diabetes, Nutrition & Medication Therapy Management Specialties   [facility]

## 2024-12-24 ENCOUNTER — MYC REFILL (OUTPATIENT)
Dept: FAMILY MEDICINE | Facility: CLINIC | Age: 59
End: 2024-12-24
Payer: COMMERCIAL

## 2024-12-24 DIAGNOSIS — C25.9 PANCREATIC ADENOCARCINOMA (H): ICD-10-CM

## 2024-12-24 RX ORDER — HYDROMORPHONE HYDROCHLORIDE 4 MG/1
4-6 TABLET ORAL
Qty: 120 TABLET | Refills: 0 | Status: SHIPPED | OUTPATIENT
Start: 2024-12-24

## 2024-12-25 ENCOUNTER — MYC REFILL (OUTPATIENT)
Dept: ENDOCRINOLOGY | Facility: CLINIC | Age: 59
End: 2024-12-25
Payer: COMMERCIAL

## 2024-12-25 ENCOUNTER — MYC MEDICAL ADVICE (OUTPATIENT)
Dept: ONCOLOGY | Facility: CLINIC | Age: 59
End: 2024-12-25
Payer: COMMERCIAL

## 2024-12-25 DIAGNOSIS — E11.9 TYPE 2 DIABETES MELLITUS WITHOUT COMPLICATION, WITH LONG-TERM CURRENT USE OF INSULIN (H): Primary | ICD-10-CM

## 2024-12-25 DIAGNOSIS — Z79.4 TYPE 2 DIABETES MELLITUS WITHOUT COMPLICATION, WITH LONG-TERM CURRENT USE OF INSULIN (H): ICD-10-CM

## 2024-12-25 DIAGNOSIS — E11.9 TYPE 2 DIABETES MELLITUS WITHOUT COMPLICATION, WITH LONG-TERM CURRENT USE OF INSULIN (H): ICD-10-CM

## 2024-12-25 DIAGNOSIS — Z79.4 TYPE 2 DIABETES MELLITUS WITHOUT COMPLICATION, WITH LONG-TERM CURRENT USE OF INSULIN (H): Primary | ICD-10-CM

## 2024-12-26 ENCOUNTER — PATIENT OUTREACH (OUTPATIENT)
Dept: ONCOLOGY | Facility: CLINIC | Age: 59
End: 2024-12-26
Payer: COMMERCIAL

## 2024-12-26 RX ORDER — BLOOD SUGAR DIAGNOSTIC
1 STRIP MISCELLANEOUS 3 TIMES DAILY
Qty: 100 STRIP | Refills: 2 | Status: SHIPPED | OUTPATIENT
Start: 2024-12-26

## 2024-12-26 RX ORDER — ACYCLOVIR 400 MG/1
TABLET ORAL
Qty: 3 EACH | Refills: 0 | Status: SHIPPED | OUTPATIENT
Start: 2024-12-26

## 2024-12-26 NOTE — TELEPHONE ENCOUNTER
[facility]: Cancer Care                                                                                          Returned call to pt regarding refills of dexcom sensors, glucose test strips and steroids. Endo did fill the former 2 just today and pt stated spouse is on her way to the pharmacy to . He also wanted to know if he was supposed to continue the dexa and to send refill. Informed him instructions are for a taper with end date of Jan 5th so it doesn't sound like Dr. Ward meant for this to be continuous. Will forward request to palliative care. He stated pain is manageable, sometimes he forgets to take off his pain patch and leaves it on an extra day. He will start setting phone alarms to remind him to switch daily. He is only using dilaudid 1-2 times a day. Reports blood sugars are in the 300-400s but did have sweets at [place] time. Has both short acting and long acting insulin at home and knows directions to use. Will send updated [email or web address] message to spouse per pt request that steroid refill request has been forwarded to palliative care.    Signature:  Efra Lynn RN

## 2024-12-26 NOTE — TELEPHONE ENCOUNTER
Refill approved today by Berta Cid.    Pricila Augirre, RN, [facility]  Diabetes Education Department  [facility], [place]

## 2024-12-26 NOTE — TELEPHONE ENCOUNTER
Blood glucose test strips  Last prescribing provider: [facility]    Follows Endocrinology for sensors.     Any other pertinent information (if none, N/A): pt requesting to go to [facility] in [place]. Routed to endocrinology team.     Refilled: Y/N, if NO, why?

## 2024-12-30 DIAGNOSIS — G89.3 CANCER ASSOCIATED PAIN: ICD-10-CM

## 2024-12-30 DIAGNOSIS — C25.9 PANCREATIC ADENOCARCINOMA (H): ICD-10-CM

## 2024-12-30 RX ORDER — DEXAMETHASONE 4 MG/1
6 TABLET ORAL
Qty: 30 TABLET | Refills: 0 | Status: SHIPPED | OUTPATIENT
Start: 2024-12-30

## 2024-12-30 RX ORDER — HYDROMORPHONE HYDROCHLORIDE 4 MG/1
4-8 TABLET ORAL
Qty: 180 TABLET | Refills: 0 | Status: SHIPPED | OUTPATIENT
Start: 2024-12-30

## 2024-12-30 NOTE — TELEPHONE ENCOUNTER
Received telephone call from patient's wife reporting worsening pain starting yesterday. She's been giving him the max doses of belbuca, dilaudid, lorazepam and methocarbamol. He is scheduled for an ablation on 1/10. Dexamethasone was scheduled for a taper, however this has been helpful for his pain. It is causing high blood sugars. Dr. Hurt is ok with him continuing this through the ablation, then will discuss a taper on 1/13. Dilaudid will also be increased to 4-8 mg Q 3 hr PRN.    Last refill of dilaudid: 12/26/24  Last office visit: 12/17/24  Scheduled for follow up 1/21/25     Will route request to MD/ for review.     Reviewed [place]  Report.

## 2024-12-31 ENCOUNTER — HOME INFUSION (OUTPATIENT)
Dept: HOME HEALTH SERVICES | Facility: HOME HEALTH | Age: 59
End: 2024-12-31
Payer: COMMERCIAL

## 2024-12-31 ENCOUNTER — TELEPHONE (OUTPATIENT)
Dept: PALLIATIVE CARE | Facility: CLINIC | Age: 59
End: 2024-12-31
Payer: COMMERCIAL

## 2024-12-31 ENCOUNTER — VIRTUAL VISIT (OUTPATIENT)
Dept: ONCOLOGY | Facility: CLINIC | Age: 59
End: 2024-12-31
Payer: COMMERCIAL

## 2024-12-31 VITALS — WEIGHT: 145 LBS | HEIGHT: 72 IN | BODY MASS INDEX: 19.64 KG/M2

## 2024-12-31 DIAGNOSIS — C25.0 MALIGNANT NEOPLASM OF HEAD OF PANCREAS (H): ICD-10-CM

## 2024-12-31 DIAGNOSIS — G89.3 CANCER ASSOCIATED PAIN: ICD-10-CM

## 2024-12-31 DIAGNOSIS — Z79.4 TYPE 2 DIABETES MELLITUS WITHOUT COMPLICATION, WITH LONG-TERM CURRENT USE OF INSULIN (H): ICD-10-CM

## 2024-12-31 DIAGNOSIS — C25.0 MALIGNANT NEOPLASM OF HEAD OF PANCREAS (H): Primary | ICD-10-CM

## 2024-12-31 DIAGNOSIS — E11.9 TYPE 2 DIABETES MELLITUS WITHOUT COMPLICATION, WITH LONG-TERM CURRENT USE OF INSULIN (H): ICD-10-CM

## 2024-12-31 ASSESSMENT — PAIN SCALES - GENERAL: PAINLEVEL_OUTOF10: MILD PAIN (2)

## 2024-12-31 NOTE — TELEPHONE ENCOUNTER
Faxed homecare referral to the following agencies:    [facility]    Emailed to:    [email or web address]  [email or web address]  [email or web address]  [email or web address]  [email or web address]    [facility]:    [facility]

## 2024-12-31 NOTE — TELEPHONE ENCOUNTER
[facility] declined d/t not servicing Stefanie Alan.  Email to Julianna CARROLL came back as undeliverable  [facility] called back for facesheet, emailed.

## 2024-12-31 NOTE — Clinical Note
12/31/2024      Gallo Navarro  [address] [address] [place] [id number]      Dear Colleague,    Thank you for referring your patient, Gallo Navarro, to the [facility]. Please see a copy of my visit note below.    Virtual Visit Details    Type of service:  Video Visit     Originating Location (pt. Location): Home  Distant Location (provider location):  On-site  Platform used for Video Visit: [facility]      Oncology/Hematology Visit Note  Dec 31, 2024    Reason for Visit: follow-up of metastatic Pancreatic Caner    Oncology HPI:   -NGS: KRAS G12R  Immuno: pMMR, KAYLIE, TMB-low  Clinical Trial: MARIPOSA-186, MARIPOSA-280, TORL    - 3/2023: presented with acute pancreatitis  c/b chronic pancreatitis with pancreatic mass causing duodenal stenosis with gastric outlet obstruction s/p GJ tube (placed 5/2023), biliary obstruction s/p stent placement on 7/7/23, pancreatic insufficiency, and IDDM2.  -  He then presented to the ED with worsening abdominal pain, increased jaundice, poor PO intake and weight loss admitted on 7/8/2023 for concern of biliary obstruction.   - 7/12/2023: Underwent biopsy of pancreatic mass returned positive for pancreatic adenocarcinoma.   - 7/31/2023: He started on treatment with 5FU, irinotecan, and oxaliplatin (FOLFIRINOX). Please see previous notes for further details on the patient's history.      8/17/23 - ERCP/EGD, duodenal stents x2 placed, exchange of GJ tube     8/21-8/25/24 hospitalized due to diarrhea, colitis, abdominal pain.       8/29 - Cycle 3 deferred due to neutropenia.   Neulasta added. Irinotecan dose reduced 20% due to symptoms including cramping, double vision and slurred speech during infusion.       10/24/23 CT CAP with similar to slight increase in size of peripancreatic and mesenteric lymph nodes, enlargement of previous skeletal metastasis as well as new sclerotic metastasis to left posterior 5th rib, enlarging pulmonary nodule, and unchanged pancreatic head  mass. Also unclear concerns for PE,  right lower lobe segmental PE confirmed on CT-PE. Started on apixaban.      10/31/23 Cycle 1 gemzar, abraxane  11/7/23 Cycle 1 Zometa  11/22/23 Removal of GJ tube.   11/29/23 Completed palliative radiation to T10   12/13/23 C3D1 gem/abraxane  12/29/23 Consults at [place]  1/23-1/26 Consults at [facility]   1/30/24: C4D1 gem/abraxane   3/24: CT CAP with overall stable disease with isolated progression in potential L4 lesion. Referral for radiation oncology.    4/29-5/1: Consults at [facility] for possible cellular therapy, CT guided biopsy of left pubic bone, recommendation to return to [facility] upon progression of current treatment   5/23/24: Cardiology consult [facility]. CT with small pericardial effusion   6/6/2024: Repeat ECHO on with EF of 55-60%, no pericardial effusion present  - Cycle 8 deferred due to infection concerns and subsequently tested positive for COVID 6/25/24, resumed treatment on 7/2/2024    -Day 8 eliminated after Cycle 5 [place]/Abraxane.    4/2/2024-present: Gemcitabine/Abraxane, Days 1 and 15 only; Zometa every 3 months     8/2024 repeat imaging with isolated progression to L4 lesion with fracture of that spinous process. Referred to radiation therapy. Decreased Abraxane dose reduced to 75mg/m2 with cycle 9 day 15 for overall treatment tolerance.     8/28/24 RFA ablation and verteral augmentation to L4.   10/1-10/21 Radiation to L4     11/12/24 CT CAP shows disease progression, plan to change to 5FU and liposomal irinotecan after a vacation  12/12/24 ED visit for pain, secondary to cancer, managed with IV pain medication    Cycle 1 nal-FOLFIRI, ***       ***       Interval history:     -Back is not good, lots of pain, still trying to get a good management plan  -minimally mobile due to the pain  -working closely with palliative,   -pain and then spasms on top of pain  -scheduling pain in the middle of the night to stay on top of it   -feels like he's always  chasing pain and trying to get things under control   -having difficulty sleeping due to lack of ability to change position, pain   -maxing out muscle relaxers at times, sometimes can be very intoxicating , usually not   -minimal activity due to pain- getting up maybe twice per day for bathroom   -pain in ribs and low back   -can be controlled on full regimen while relaxing, but as soon as he moves it flares and then spasms     -had some richer foods with the holidays, taking Miralax as needed  -first cycle of chemo went well, didn't feel as tired as he has in the past with chemo   -has increased emotions after chemo, same as previously  -has increased burping/hiccupping   -eating well, limited with time of day versus sleeping, careful what he eats with duodenal stent, adding protein shakes and powder   -steroids making blood sugars out of control   -    -alternating muscle relaxers   -dilaudid q3h, 8mg - plan to start tapering after ablation   -palliative upped dex to 6mg   -      ***   Patient reports that he initially had cement put in his spine and it seemed to help with his pain.  However when he got off of the plane recently and was pushed up the ramp in a wheelchair, this was very jarring and his pain dramatically increased.  He has been working closely with palliative care and feels like he is doing okay today in terms of his pain control.  He does feel a little more out of it today which she attributes to the muscle relaxant.  He finds the steroid has been helpful with keeping him awake.  He reports normal stools.  He denies any change to his neuropathy.  He reports eating and drinking okay.  He denies any chest pain, shortness of breath, or bleeding issues.  He does have intermittent swelling in his feet.  He is unable to cut his own toenails and does not have anyone else to help him.  He also notes that his big toenails are both lifted up.  He would like assistance with this.  He reports feeling sad about  his cancer diagnosis and pain, but feels well supported by family and friends.  He denies other concerns.    Current Outpatient Medications   Medication Sig Dispense Refill    acetaminophen (TYLENOL) 32 mg/mL liquid Take 20.313 mLs (650 mg) by mouth every 8 hours. 1800 mL 3    alteplase (CATHFLO ACTIVASE) injection Inject 2 mLs (2 mg) into catheter as needed (catheter occlusion). Reconstitute vial. Draw up alteplase 1 mg/mL in a syringe and instill into IV catheter. Dwell for at least 20 min to 24 hours, then aspirate. May repeat dose once in 24 hours if catheter function not restored after at least 2 hours. Discard remainder of vial. [id number] each 0    apixaban ANTICOAGULANT (ELIQUIS) 5 MG tablet Take 1 tablet (5 mg) by mouth 2 times daily. 60 tablet 2    blood glucose (FREESTYLE TEST STRIPS) test strip 1 strip by In Vitro route 3 times daily. 100 strip 2    Buprenorphine HCl (BELBUCA) 600 MCG FILM buccal film Place 1 Film (600 mcg) inside cheek every 12 hours. May also place 1 Film (600 mcg) daily as needed. For severe pain not relieved with 6 mg dilaudid dose. 75 Film 3    Chemo Kit For RN use only. Do not remove items from bag. Contents: 1  sodium chloride 0.9% flush, 4 medium gloves, 1 chemo gown, 1/4 chemo mat, 1 connector female, 1 chemo bag. [id number] kit 0    Chemo Kit For RN use only. Do not remove items from bag. Contents: 1  sodium chloride 0.9% flush, 4 medium gloves, 1 chemo gown, 1/4 chemo mat, 1 connector female, 1 chemo bag. [id number] kit 0    Continuous Glucose Sensor (DEXCOM G7 SENSOR) MISC Change every 10 days. 3 each 0    Continuous Glucose Sensor (FREESTYLE KAREN 2 SENSOR) MISC Change every 14 days. 2 each 1    dexAMETHasone (DECADRON) 4 MG tablet Take 1.5 tablets (6 mg) by mouth daily (with breakfast). 30 tablet 0    dicyclomine (BENTYL) 10 MG capsule Take 1 capsule (10 mg) by mouth 4 times daily (before meals and nightly). 120 capsule 1    diphenhydrAMINE (BENADRYL) 50 MG/ML injection Inject 1 mL (50  "mg) over 3-5 minutes into the vein via push as needed for other (infusion reaction). For RN use only.  Draw up in a syringe and administer IV push.  Discard remainder of vial. 57555 mL 0    econazole nitrate 1 % external cream Apply topically daily To affected toenails. 85 g 5    Emergency Supply Kit, Central, Patient use for emergency only. Contents: 3 sodium chloride 0.9% flushes, 1 dressing kit, 1 microclave ext set 14\", 4 nitrile gloves (med), 6 alcohol prep pads, 1 bacitracin, 1 syringe (10 cc 20 G 1\"). Call [phone] to reorder. 600441 kit 0    Emergency Supply Kit, Central, Patient use for emergency only. Contents: 3 sodium chloride 0.9% flushes, 1 dressing kit, 1 microclave ext set 14\", 4 nitrile gloves (med), 6 alcohol prep pads, 1 bacitracin, 1 syringe (10 cc 20 G 1\"). Call [phone] to reorder. 543142 kit 0    EPINEPHrine (ANY BX GENERIC EQUIV) 0.3 MG/0.3ML injection 2-pack Inject 0.3 mLs (0.3 mg) into the muscle as needed for anaphylaxis (infusion reaction). Administer into the mid-thigh in case of severe anaphylaxis (wheezing, throat tightening, mouth swelling, difficulty breathing). May repeat dose one time in 5-15 minutes if symptoms persist. 476731 mL 0    Fluorouracil (ADRUCIL) 4,585 mg in sodium chloride 0.9 % 241 mL via HOMEPUMP C-Series Infuse 4,585 mg at 5.2 mL/hr over 46 hours into the vein once for 1 dose. 422409 mL 0    HYDROmorphone (DILAUDID) 4 MG tablet Take 1-2 tablets (4-8 mg) by mouth every 3 hours as needed for severe pain or breakthrough pain. 180 tablet 0    insulin glargine (LANTUS PEN) 100 UNIT/ML pen Inject 8 Units subcutaneously at bedtime. 15 mL 0    insulin pen needle (31G X 8 MM) 31G X 8 MM miscellaneous Use 1 pen needles daily or as directed. 50 each 0    lidocaine (LIDODERM) 5 % patch Place 2 patches over 12 hours onto the skin every 24 hours. 60 patch 3    lidocaine-prilocaine (EMLA) 2.5-2.5 % external cream Use 1-2 times a week or as needed prior to port access " (Patient not taking: Reported on 9/11/2024) 30 g 3    lipase-protease-amylase (CREON 24) 64395-50052-715766 units CPEP per EC capsule 2-3 capsules with meals 3 times a day and 1-2 capsules with snacks max of 15 capsules a day 200 capsule 11    loperamide (IMODIUM) 2 MG capsule 2 caps at 1st sign of diarrhea & 1 cap every 2hrs until 12hrs diarrhea free. During night, 2 caps at bedtime & 2 caps every 4hrs until AM 30 capsule 0    LORazepam (ATIVAN) 0.5 MG tablet Take 1-2 tablets (0.5-1 mg) by mouth every 6 hours as needed for muscle spasms. 45 tablet 2    methocarbamol 1000 MG TABS Take 1,000 mg by mouth 4 times daily as needed for muscle spasms. 180 tablet 3    methylphenidate (RITALIN) 5 MG tablet Take 1 tablet (5 mg) by mouth 2 times daily as needed 10 tablet 0    methylPREDNISolone Na Suc, PF, (SOLU-MEDROL) 125 mg/2 mL injection Inject 2 mLs (125 mg) over 3-5 minutes into the vein via push as needed (infusion reaction). For RN use only.  Reconstitute vial. Draw up methylPREDNISolone in a syringe and administer.  Discard remainder of vial. 929399 mL 0    Multiple Vitamins-Minerals (CENTRUM SILVER 50+MEN) TABS as directed Orally      naloxone (NARCAN) 4 MG/0.1ML nasal spray       pantoprazole (PROTONIX) 40 MG EC tablet Take 1 tablet (40 mg) by mouth 2 times daily 60 tablet 4    Port Access Kit For nurse use only.  Do not remove items from bag.  Use for port access.  Do not place syringe on sterile field. [id number] kit 0    Port Access Kit For nurse use only.  Do not remove items from bag.  Use for port access.  Do not place syringe on sterile field. [id number] kit 0    prochlorperazine (COMPAZINE) 10 MG tablet Take 1 tablet (10 mg) by mouth every 6 hours as needed for nausea or vomiting. 30 tablet 2    prochlorperazine (COMPAZINE) 10 MG tablet Take 1 tablet (10 mg) by mouth every 6 hours as needed for nausea or vomiting 30 tablet 2    sodium chloride 0.9% infusion Infuse 250 mLs over 30 minutes into the vein every 28  (twenty-eight) days. zometa concomitant fluids. Given per therapy plan orders 750 mL 2    sodium chloride 0.9% infusion Infuse 500 mLs into the vein as needed for other (infusion reaction). In case of mild reaction, administer via gravity at 20 mL/hr to keep vein open. In case of severe reaction, administer via gravity wide open on prime setting. 447025 mL 0    sodium chloride, PF, 0.9% PF flush Inject 10 mLs into the vein as needed for line flush. Flush IV before and after med administration as directed and/or at least every 24 hours, or prior to deaccessing for no further use and/or at least every 4 weeks when not accessed. 269621 mL 0    sodium chloride, PF, 0.9% PF flush Inject 10 mLs into the vein as needed for other (infusion reaction). For RN use only as needed for infusion reaction 218118 mL 0    sodium chloride, PF, 0.9% PF flush Inject 10 mLs into the vein as needed for line flush. Flush IV before and after med administration as directed and/or at least every 24 hours, or prior to deaccessing for no further use and/or at least every 4 weeks when not accessed. 218668 mL 0    sterile water, preservative free, injection Use 2.2 mLs for reconstitution as needed (with alteplase for catheter occlusion). Direct diluent stream into powder. Mix by gently swirling until dissolved. DO NOT SHAKE. Discard remainder of vial. 601230 mL 0    vitamin D3 (CHOLECALCIFEROL) 50 mcg (2000 units) tablet Take 1 tablet (50 mcg) by mouth daily. 90 tablet 1    zoledronic acid (ZOMETA) 4 MG/100ML infusion Infuse 100 mLs (4 mg) into the vein every 28 days. Infuse via gravity. Given per therapy plan orders 300 mL 2     Physical Exam:  General: patient appears well in no acute distress, alert and oriented, speech clear and fluid  Skin: no visualized rash or lesions on visualized skin  Resp: Appears to be breathing comfortably without accessory muscle usage, speaking in full sentences, no audible wheezes or cough.  Psych: Coherent  speech, normal rate and volume, able to articulate logical thoughts, able to abstract reason, no tangential thoughts, no hallucinations or delusions  Patient's affect is appropriate.      Labs:    Most Recent 3 CBC's:  Recent Labs   Lab Test 12/18/24  1228 12/12/24  1355 11/11/24  1330   WBC 14.5* 14.0* 6.3   HGB 9.4* 10.0* 10.5*   MCV 88 88 92    199 384   ANEUTAUTO 13.1* 12.1* 4.1    Most Recent 3 BMP's:  Recent Labs   Lab Test 12/18/24  1228 12/12/24  1355 12/02/24  1000 10/21/24  1145   * 133*  --  138   POTASSIUM 4.4 4.1  --  4.2   CHLORIDE 99 101  --  102   CO2 24 24  --  25   BUN 24.7* 23.5*  --  21.3*   CR 0.62* 0.67 0.67 0.74   ANIONGAP 11 8  --  11   DENISE 8.5* 8.2* 9.3 9.3   * 184*  --  213*   PROTTOTAL 6.9 6.8  --  7.2   ALBUMIN 3.4* 3.5 3.9 4.0    Most Recent 2 LFT's:  Recent Labs   Lab Test 12/18/24  1228 12/12/24  1355   AST 30 84*   ALT 49 55   ALKPHOS 327* 363*   BILITOTAL 0.3  0.3 0.5   I reviewed the above labs today.      Impression/plan:     Unresectable Pancreatic Cancer  7/31/23 began palliative treatment on FOLFIRINOX  CT CAP 10/24/23 with progression in skeletal mets, lung nodule and lymph nodes; stable pancreatic mass  - 10/31/2023: Treatment was changed to Gemcitabine and Abraxane (Day 1, 8, 15) + Zometa every 3 months  - 03/2024 CT CAP with progression of L4 lesion  - Day 8 eliminated after Cycle 5 gem/abraxane  - 8/2024 repeat imaging with isolated progression of L4 lesion, see below   - November 2024 imaging showed disease progression.  - He is now starting on 5FU and liposomal irinotecan. We reviewed side effects and their management in detail. Given increase in symptoms, will plan to have follow-up with us prior to each cycle of chemotherapy. Will repeat imaging after 4-6 cycles, depending on clinical course. We discussed that the bigger picture of his disease and the pro's and con's of treatment will continue to be an ongoing discussion. I am hopeful we can get  some better pain control for him.     Anemia-normocytic  Hgb mildly worse. Likely related to disease progression. Denies bleeding issues. Will continue to monitor.     Skeletal Mets    - Radiation to T10 completed 11/29/23  - CT chest 5/23/24 unchanged sclerotic bone metastases  -imaging 8/2024 with isolated progression in L4, consult to radiation oncology.   -8/28/24 RFA ablation and vertebral augmentation to L4. Completed radiation to L4 in October 2024   -Pain management by palliative care, as below, from 12/17/24 visit.  -Will also plan for possible additional kyphoplasty.   -Given rx for a walker.       Pain ***   CONTINUE belbuca 600 mcg films BID with a 3rd available to use daily prn severe pain not relieved with dilaudid  Continue dilaudid 4-6 mg po q 3 hours prn and record how often it is needed  INCREASE methocarbamol  FROM 750 MG TO 1000 MG TABS PO Q 6 HOURS PRN  START Ativan 0.5 mg tabs prescribed to use sparingly 1-2 tablets q 6 hours prn severe spasm not relieved with methocarbamol  CONTINUE lidocaine patches  Tylenol suspension 650 mg po TID  CONTINUE dexamethasone taper    Bone Health  -Zometa 4 mg every 3 months, last infusion 12/4/2024. ***  -Recheck of calcium today is low. He remains on vitamin D 50 mcg daily and a multivitamin. Will start calcium 500 mg bid.     Neuropathy  -Secondary to prior chemotherapy. No acute change.    Pericardial Effusion  Found on CT 4/20/24 at [facility], followed by [facility]TE with moderate pericardial effusion without tamponade.  CT 5/23/24 small pericardial effusion  5/23/24: Cardiology consult at [facility]- Echo 6/6/2024 with EF 55-60%, LV strain -20.5% which is normal, no pericardial effusion  - repeat echo at [facility] in the future,   - he has been cautioned by Cardiology s/s of cardiac tamponade  -following cardiology as needed  -no acute concerns today    Pulmonary Embolism  Right lower segmental PE found on routine imaging  Taking Apixiban daily, no bleeding  concerns    Onychomycosis  -Will request home care to cut his toenails.    ***    Addendum: I'm reaching out to endocrine to assist with management of steroid induced hyperglycemia.    Addendum: Will change Zometa to monthly.    Addendum: Patient also needs home care for medication management.    Virtual Visit Details    Type of service:  Video Visit     Originating Location (pt. Location): Home  Distant Location (provider location):  On-site  Platform used for Video Visit: [facility]      Oncology/Hematology Visit Note  Dec 31, 2024    Reason for Visit: follow-up of metastatic Pancreatic Caner    Oncology HPI:   -NGS: KRAS G12R  Immuno: pMMR, KAYLIE, TMB-low  Clinical Trial: MARIPOSA-186, MARIPOSA-280, TORL    - 3/2023: presented with acute pancreatitis  c/b chronic pancreatitis with pancreatic mass causing duodenal stenosis with gastric outlet obstruction s/p GJ tube (placed 5/2023), biliary obstruction s/p stent placement on 7/7/23, pancreatic insufficiency, and IDDM2.  -  He then presented to the ED with worsening abdominal pain, increased jaundice, poor PO intake and weight loss admitted on 7/8/2023 for concern of biliary obstruction.   - 7/12/2023: Underwent biopsy of pancreatic mass returned positive for pancreatic adenocarcinoma.   - 7/31/2023: He started on treatment with 5FU, irinotecan, and oxaliplatin (FOLFIRINOX). Please see previous notes for further details on the patient's history.      8/17/23 - ERCP/EGD, duodenal stents x2 placed, exchange of GJ tube     8/21-8/25/24 hospitalized due to diarrhea, colitis, abdominal pain.       8/29 - Cycle 3 deferred due to neutropenia.   Neulasta added. Irinotecan dose reduced 20% due to symptoms including cramping, double vision and slurred speech during infusion.       10/24/23 CT CAP with similar to slight increase in size of peripancreatic and mesenteric lymph nodes, enlargement of previous skeletal metastasis as well as new sclerotic metastasis to left posterior 5th rib,  enlarging pulmonary nodule, and unchanged pancreatic head mass. Also unclear concerns for PE,  right lower lobe segmental PE confirmed on CT-PE. Started on apixaban.      10/31/23 Cycle 1 gemzar, abraxane  11/7/23 Cycle 1 Zometa  11/22/23 Removal of GJ tube.   11/29/23 Completed palliative radiation to T10   12/13/23 C3D1 gem/abraxane  12/29/23 Consults at [place]  1/23-1/26 Consults at [facility]   1/30/24: C4D1 gem/abraxane   3/24: CT CAP with overall stable disease with isolated progression in potential L4 lesion. Referral for radiation oncology.    4/29-5/1: Consults at [facility] for possible cellular therapy, CT guided biopsy of left pubic bone, recommendation to return to [facility] upon progression of current treatment   5/23/24: Cardiology consult at [facility]. CT with small pericardial effusion   6/6/2024: Repeat ECHO on with EF of 55-60%, no pericardial effusion present  - Cycle 8 deferred due to infection concerns and subsequently tested positive for COVID 6/25/24, resumed treatment on 7/2/2024    -Day 8 eliminated after Cycle 5 [place]/Abraxane.    4/2/2024-present: Gemcitabine/Abraxane, Days 1 and 15 only; Zometa every 3 months     8/2024 repeat imaging with isolated progression to L4 lesion with fracture of that spinous process. Referred to radiation therapy. Decreased Abraxane dose reduced to 75mg/m2 with cycle 9 day 15 for overall treatment tolerance.     8/28/24 RFA ablation and verteral augmentation to L4.   10/1-10/21 Radiation to L4     11/12/24 CT CAP shows disease progression, plan to change to 5FU and liposomal irinotecan after a vacation  12/12/24 ED visit for pain, secondary to cancer, managed with IV pain medication    12/18/24 Cycle 1 5FU and liposomal irinotecan    Gallo presents today via video visit for follow up prior to cycle 2. He is accompanied by his wife.       Interval history:     -continues to struggle with significant back pain and spasms. Following closely with palliative care,  adjusted meds yesterday including increased dose of Dilaudid and dexamethasone. They are often feeling very overwhelmed with his medication schedule to control the pain. If he is resting and staying on top of his pain medications, he is comfortable but any movement causes flares of pain and subsequent spasms.   -Limited mobility due to pain, only getting out of the chair a couple times per day to use the bathroom  -Gallo and Abigail express concerns about what they will do when she needs to return to work as he is dependent on someone to help bring him food/meds, etc. throughout the day due to his lack of mobility   -having a lot of difficulty sleeping due to steroids, spasms and pain, he also can't adjust positions to get comfortable either     -feels he tolerated cycle 1 5FU/liposomal irinotecan well  -denies nausea or decreased appetite  -had some richer foods with the holidays which has slowed down his bowels, taking Miralax as needed  -didn't feel as tired following chemotherapy as he has in the past   -has had an increase in burping/hiccups which aggravate his back spasms   -steroids have made his blood sugars more difficult to control, working with endocrinology     Remainder of 12 point ROS reviewed and negative except as in interval history.       Current Outpatient Medications   Medication Sig Dispense Refill     acetaminophen (TYLENOL) 32 mg/mL liquid Take 20.313 mLs (650 mg) by mouth every 8 hours. 1800 mL 3     alteplase (CATHFLO ACTIVASE) injection Inject 2 mLs (2 mg) into catheter as needed (catheter occlusion). Reconstitute vial. Draw up alteplase 1 mg/mL in a syringe and instill into IV catheter. Dwell for at least 20 min to 24 hours, then aspirate. May repeat dose once in 24 hours if catheter function not restored after at least 2 hours. Discard remainder of vial. [id number] each 0     apixaban ANTICOAGULANT (ELIQUIS) 5 MG tablet Take 1 tablet (5 mg) by mouth 2 times daily. 60 tablet 2     blood glucose  "(FREESTYLE TEST STRIPS) test strip 1 strip by In Vitro route 3 times daily. 100 strip 2     Buprenorphine HCl (BELBUCA) 600 MCG FILM buccal film Place 1 Film (600 mcg) inside cheek every 12 hours. May also place 1 Film (600 mcg) daily as needed. For severe pain not relieved with 6 mg dilaudid dose. 75 Film 3     Chemo Kit For RN use only. Do not remove items from bag. Contents: 1  sodium chloride 0.9% flush, 4 medium gloves, 1 chemo gown, 1/4 chemo mat, 1 connector female, 1 chemo bag. [id number] kit 0     Chemo Kit For RN use only. Do not remove items from bag. Contents: 1  sodium chloride 0.9% flush, 4 medium gloves, 1 chemo gown, 1/4 chemo mat, 1 connector female, 1 chemo bag. [id number] kit 0     Continuous Glucose Sensor (DEXCOM G7 SENSOR) MISC Change every 10 days. 3 each 0     Continuous Glucose Sensor (FREESTYLE KAREN 2 SENSOR) MISC Change every 14 days. 2 each 1     dexAMETHasone (DECADRON) 4 MG tablet Take 1.5 tablets (6 mg) by mouth daily (with breakfast). 30 tablet 0     dicyclomine (BENTYL) 10 MG capsule Take 1 capsule (10 mg) by mouth 4 times daily (before meals and nightly). 120 capsule 1     diphenhydrAMINE (BENADRYL) 50 MG/ML injection Inject 1 mL (50 mg) over 3-5 minutes into the vein via push as needed for other (infusion reaction). For RN use only.  Draw up in a syringe and administer IV push.  Discard remainder of vial. 14703 mL 0     econazole nitrate 1 % external cream Apply topically daily To affected toenails. 85 g 5     Emergency Supply Kit, Central, Patient use for emergency only. Contents: 3 sodium chloride 0.9% flushes, 1 dressing kit, 1 microclave ext set 14\", 4 nitrile gloves (med), 6 alcohol prep pads, 1 bacitracin, 1 syringe (10 cc 20 G 1\"). Call [phone] to reorder. [id number] kit 0     Emergency Supply Kit, Central, Patient use for emergency only. Contents: 3 sodium chloride 0.9% flushes, 1 dressing kit, 1 microclave ext set 14\", 4 nitrile gloves (med), 6 alcohol prep pads, 1 bacitracin, " "1 syringe (10 cc 20 G 1\"). Call [phone] to reorder. [id number] kit 0     EPINEPHrine (ANY BX GENERIC EQUIV) 0.3 MG/0.3ML injection 2-pack Inject 0.3 mLs (0.3 mg) into the muscle as needed for anaphylaxis (infusion reaction). Administer into the mid-thigh in case of severe anaphylaxis (wheezing, throat tightening, mouth swelling, difficulty breathing). May repeat dose one time in 5-15 minutes if symptoms persist. 180208 mL 0     Fluorouracil (ADRUCIL) 4,585 mg in sodium chloride 0.9 % 241 mL via HOMEPUMP C-Series Infuse 4,585 mg at 5.2 mL/hr over 46 hours into the vein once for 1 dose. 706019 mL 0     HYDROmorphone (DILAUDID) 4 MG tablet Take 1-2 tablets (4-8 mg) by mouth every 3 hours as needed for severe pain or breakthrough pain. 180 tablet 0     insulin glargine (LANTUS PEN) 100 UNIT/ML pen Inject 8 Units subcutaneously at bedtime. 15 mL 0     insulin pen needle (31G X 8 MM) 31G X 8 MM miscellaneous Use 1 pen needles daily or as directed. 50 each 0     lidocaine (LIDODERM) 5 % patch Place 2 patches over 12 hours onto the skin every 24 hours. 60 patch 3     lidocaine-prilocaine (EMLA) 2.5-2.5 % external cream Use 1-2 times a week or as needed prior to port access (Patient not taking: Reported on 9/11/2024) 30 g 3     lipase-protease-amylase (CREON 24) 46530-66421-361891 units CPEP per EC capsule 2-3 capsules with meals 3 times a day and 1-2 capsules with snacks max of 15 capsules a day 200 capsule 11     loperamide (IMODIUM) 2 MG capsule 2 caps at 1st sign of diarrhea & 1 cap every 2hrs until 12hrs diarrhea free. During night, 2 caps at bedtime & 2 caps every 4hrs until AM 30 capsule 0     LORazepam (ATIVAN) 0.5 MG tablet Take 1-2 tablets (0.5-1 mg) by mouth every 6 hours as needed for muscle spasms. 45 tablet 2     methocarbamol 1000 MG TABS Take 1,000 mg by mouth 4 times daily as needed for muscle spasms. 180 tablet 3     methylphenidate (RITALIN) 5 MG tablet Take 1 tablet (5 mg) by mouth 2 times daily as " needed 10 tablet 0     methylPREDNISolone Na Suc, PF, (SOLU-MEDROL) 125 mg/2 mL injection Inject 2 mLs (125 mg) over 3-5 minutes into the vein via push as needed (infusion reaction). For RN use only.  Reconstitute vial. Draw up methylPREDNISolone in a syringe and administer.  Discard remainder of vial. 729118 mL 0     Multiple Vitamins-Minerals (CENTRUM SILVER 50+MEN) TABS as directed Orally       naloxone (NARCAN) 4 MG/0.1ML nasal spray        pantoprazole (PROTONIX) 40 MG EC tablet Take 1 tablet (40 mg) by mouth 2 times daily 60 tablet 4     Port Access Kit For nurse use only.  Do not remove items from bag.  Use for port access.  Do not place syringe on sterile field. [id number] kit 0     Port Access Kit For nurse use only.  Do not remove items from bag.  Use for port access.  Do not place syringe on sterile field. [id number] kit 0     prochlorperazine (COMPAZINE) 10 MG tablet Take 1 tablet (10 mg) by mouth every 6 hours as needed for nausea or vomiting. 30 tablet 2     prochlorperazine (COMPAZINE) 10 MG tablet Take 1 tablet (10 mg) by mouth every 6 hours as needed for nausea or vomiting 30 tablet 2     sodium chloride 0.9% infusion Infuse 250 mLs over 30 minutes into the vein every 28 (twenty-eight) days. zometa concomitant fluids. Given per therapy plan orders 750 mL 2     sodium chloride 0.9% infusion Infuse 500 mLs into the vein as needed for other (infusion reaction). In case of mild reaction, administer via gravity at 20 mL/hr to keep vein open. In case of severe reaction, administer via gravity wide open on prime setting. 325492 mL 0     sodium chloride, PF, 0.9% PF flush Inject 10 mLs into the vein as needed for line flush. Flush IV before and after med administration as directed and/or at least every 24 hours, or prior to deaccessing for no further use and/or at least every 4 weeks when not accessed. 359563 mL 0     sodium chloride, PF, 0.9% PF flush Inject 10 mLs into the vein as needed for other (infusion  reaction). For RN use only as needed for infusion reaction 605567 mL 0     sodium chloride, PF, 0.9% PF flush Inject 10 mLs into the vein as needed for line flush. Flush IV before and after med administration as directed and/or at least every 24 hours, or prior to deaccessing for no further use and/or at least every 4 weeks when not accessed. 127541 mL 0     sterile water, preservative free, injection Use 2.2 mLs for reconstitution as needed (with alteplase for catheter occlusion). Direct diluent stream into powder. Mix by gently swirling until dissolved. DO NOT SHAKE. Discard remainder of vial. 905012 mL 0     vitamin D3 (CHOLECALCIFEROL) 50 mcg (2000 units) tablet Take 1 tablet (50 mcg) by mouth daily. 90 tablet 1     zoledronic acid (ZOMETA) 4 MG/100ML infusion Infuse 100 mLs (4 mg) into the vein every 28 days. Infuse via gravity. Given per therapy plan orders 300 mL 2     Physical Exam:  General: patient appears well in no acute distress, alert and oriented, speech clear and fluid  Skin: no visualized rash or lesions on visualized skin  Resp: Appears to be breathing comfortably without accessory muscle usage, speaking in full sentences, no audible wheezes or cough.  Psych: Coherent speech, normal rate and volume, able to articulate logical thoughts, able to abstract reason, no tangential thoughts, no hallucinations or delusions  Patient's affect is appropriate.      Labs:    Most Recent 3 CBC's:  Recent Labs   Lab Test 12/18/24  1228 12/12/24  1355 11/11/24  1330   WBC 14.5* 14.0* 6.3   HGB 9.4* 10.0* 10.5*   MCV 88 88 92    199 384   ANEUTAUTO 13.1* 12.1* 4.1    Most Recent 3 BMP's:  Recent Labs   Lab Test 12/18/24  1228 12/12/24  1355 12/02/24  1000 10/21/24  1145   * 133*  --  138   POTASSIUM 4.4 4.1  --  4.2   CHLORIDE 99 101  --  102   CO2 24 24  --  25   BUN 24.7* 23.5*  --  21.3*   CR 0.62* 0.67 0.67 0.74   ANIONGAP 11 8  --  11   DENISE 8.5* 8.2* 9.3 9.3   * 184*  --  213*   PROTTOTAL  6.9 6.8  --  7.2   ALBUMIN 3.4* 3.5 3.9 4.0    Most Recent 2 LFT's:  Recent Labs   Lab Test 12/18/24  1228 12/12/24  1355   AST 30 84*   ALT 49 55   ALKPHOS 327* 363*   BILITOTAL 0.3  0.3 0.5   I reviewed the above labs today.  New labs will be drawn 1/3 prior to chemotherapy.     Impression/plan:     Unresectable Pancreatic Cancer  7/31/23 began palliative treatment on FOLFIRINOX  CT CAP 10/24/23 with progression in skeletal mets, lung nodule and lymph nodes; stable pancreatic mass  - 10/31/2023: Treatment was changed to Gemcitabine and Abraxane (Day 1, 8, 15) + Zometa every 3 months  - 03/2024 CT CAP with progression of L4 lesion  - Day 8 eliminated after Cycle 5 gem/abraxane  - 8/2024 repeat imaging with isolated progression of L4 lesion, see below   - November 2024 imaging showed disease progression.  - Started 5FU/ liposomal irinotecan on 12/18. Tolerated cycle 1 well. Proceed with cycle 2 as scheduled 1/3 pending labs are WNL. Continue SUZETTE follow up prior to each cycle. Patient to call sooner with concerns.  Will repeat imaging after 4-6 cycles, depending on clinical course.   -will have RNCC follow up with patient/wife regarding home care, possibility of PCA to help while wife returns to work.     Anemia-normocytic  Hgb mildly worse. Likely related to disease progression. Denies bleeding issues. Will continue to monitor.     Skeletal Mets    - Radiation to T10 completed 11/29/23  - CT chest 5/23/24 unchanged sclerotic bone metastases  -imaging 8/2024 with isolated progression in L4, consult to radiation oncology.   -8/28/24 RFA ablation and vertebral augmentation to L4. Completed radiation to L4 in October 2024   -Pain management by palliative care, as below, from 12/17/24 visit and recent updates.   -scheduled for kyphoplasty 1/10.   -previously given Rx for a walker, encouraged him to use this for stability/safety with increasing weakness       Pain    -belbuca 600 mcg films BID with a 3rd available to use  daily prn severe pain not relieved with dilaudid  -dilaudid 4-8 mg po q 3 hours prn and record how often it is needed  -methocarbamol  750-1000mg PO Q6H PRN  -Ativan 0.5 mg tabs prescribed to use sparingly 1-2 tablets q 6 hours prn severe spasm not relieved with methocarbamol  -lidocaine patches  -Tylenol suspension 650 mg po TID  -dexamethasone 6mg daily     Encouraged him to follow up with palliative for ongoing pain management/medication adjustments     Bone Health  -Zometa 4 mg every 3 months, last infusion 12/4/2024.   -Recheck of calcium on 12/18 low. He remains on vitamin D 50 mcg daily and a multivitamin, started  calcium 500 mg bid. Continue to monitor.     Neuropathy  -Secondary to prior chemotherapy. No acute change.    Pericardial Effusion  Found on CT 4/20/24 at [facility], followed by TTE with moderate pericardial effusion without tamponade.  CT 5/23/24 small pericardial effusion  5/23/24: Cardiology consult at [facility]- Echo 6/6/2024 with EF 55-60%, LV strain -20.5% which is normal, no pericardial effusion  - repeat echo at [facility] in the future,   - he has been cautioned by Cardiology s/s of cardiac tamponade  -following cardiology as needed  -no acute concerns today    Pulmonary Embolism  Right lower segmental PE found on routine imaging  Taking Apixiban daily, no bleeding concerns. Has instructions for holding prior to kyphoplasty.     Onychomycosis  -Will request home care to cut his toenails.      The longitudinal plan of care for the diagnosis(es)/condition(s) as documented were addressed during this visit. Due to the added complexity in care, I will continue to support Gallo in the subsequent management and with ongoing continuity of care.    *** minutes spent on the date of the encounter doing chart review, review of test results, interpretation of tests, patient visit, and documentation     ESVIN Canales CNP        Again, thank you for allowing me to participate in the care of your  patient.        Sincerely,        ESVIN Canales CNP    Electronically signed

## 2024-12-31 NOTE — TELEPHONE ENCOUNTER
referral placed.    Rosa Maria Urrutia RN  Palliative Care Nurse Clinician    [phone] (Direct)  [phone] (Main)  [phone] (Appointment Scheduling)

## 2024-12-31 NOTE — NURSING NOTE
Patient confirms medications and allergies are accurate via patients echeck in completion, and or denies any changes since last reviewed/verified.     Current patient location: [address] [place] [address]    Is the patient currently in the state of [place]? YES    Visit mode:VIDEO    If the visit is dropped, the patient can be reconnected by:VIDEO VISIT: Text to cell phone:   Telephone Information:   Mobile [phone]       Will anyone else be joining the visit? Abigail Wife  (If patient encounters technical issues they should call [phone])    Are changes needed to the allergy or medication list? No    Are refills needed on medications prescribed by this physician? NO    Rooming Documentation:  Questionnaire(s) completed    Reason for visit: RECHECK    Joan NICOLE

## 2024-12-31 NOTE — PROGRESS NOTES
Virtual Visit Details    Type of service:  Video Visit     Originating Location (pt. Location): Home  Distant Location (provider location):  On-site  Platform used for Video Visit: [facility]      Oncology/Hematology Visit Note  Dec 31, 2024    Reason for Visit: follow-up of metastatic Pancreatic Caner    Oncology HPI:   -NGS: KRAS G12R  Immuno: pMMR, KAYLIE, TMB-low  Clinical Trial: MARIPOSA-186, MARIPOSA-280, TORL    - 3/2023: presented with acute pancreatitis  c/b chronic pancreatitis with pancreatic mass causing duodenal stenosis with gastric outlet obstruction s/p GJ tube (placed 5/2023), biliary obstruction s/p stent placement on 7/7/23, pancreatic insufficiency, and IDDM2.  -  He then presented to the ED with worsening abdominal pain, increased jaundice, poor PO intake and weight loss admitted on 7/8/2023 for concern of biliary obstruction.   - 7/12/2023: Underwent biopsy of pancreatic mass returned positive for pancreatic adenocarcinoma.   - 7/31/2023: He started on treatment with 5FU, irinotecan, and oxaliplatin (FOLFIRINOX). Please see previous notes for further details on the patient's history.      8/17/23 - ERCP/EGD, duodenal stents x2 placed, exchange of GJ tube     8/21-8/25/24 hospitalized due to diarrhea, colitis, abdominal pain.       8/29 - Cycle 3 deferred due to neutropenia.   Neulasta added. Irinotecan dose reduced 20% due to symptoms including cramping, double vision and slurred speech during infusion.       10/24/23 CT CAP with similar to slight increase in size of peripancreatic and mesenteric lymph nodes, enlargement of previous skeletal metastasis as well as new sclerotic metastasis to left posterior 5th rib, enlarging pulmonary nodule, and unchanged pancreatic head mass. Also unclear concerns for PE,  right lower lobe segmental PE confirmed on CT-PE. Started on apixaban.      10/31/23 Cycle 1 gemzar, abraxane  11/7/23 Cycle 1 Zometa  11/22/23 Removal of GJ tube.   11/29/23 Completed palliative radiation  to T10   12/13/23 C3D1 gem/abraxane  12/29/23 Consults at [place]  1/23-1/26 Consults at [facility]   1/30/24: C4D1 gem/abraxane   3/24: CT CAP with overall stable disease with isolated progression in potential L4 lesion. Referral for radiation oncology.    4/29-5/1: Consults at [facility] for possible cellular therapy, CT guided biopsy of left pubic bone, recommendation to return to [facility] upon progression of current treatment   5/23/24: Cardiology consult at [facility]. CT with small pericardial effusion   6/6/2024: Repeat ECHO on with EF of 55-60%, no pericardial effusion present  - Cycle 8 deferred due to infection concerns and subsequently tested positive for COVID 6/25/24, resumed treatment on 7/2/2024    -Day 8 eliminated after Cycle 5 [place]/Abraxane.    4/2/2024-present: Gemcitabine/Abraxane, Days 1 and 15 only; Zometa every 3 months     8/2024 repeat imaging with isolated progression to L4 lesion with fracture of that spinous process. Referred to radiation therapy. Decreased Abraxane dose reduced to 75mg/m2 with cycle 9 day 15 for overall treatment tolerance.     8/28/24 RFA ablation and verteral augmentation to L4.   10/1-10/21 Radiation to L4     11/12/24 CT CAP shows disease progression, plan to change to 5FU and liposomal irinotecan after a vacation  12/12/24 ED visit for pain, secondary to cancer, managed with IV pain medication    12/18/24 Cycle 1 5FU and liposomal irinotecan    Gallo presents today via video visit for follow up prior to cycle 2. He is accompanied by his wife.       Interval history:     -continues to struggle with significant back pain and spasms. Following closely with palliative care, adjusted meds yesterday including increased dose of Dilaudid and dexamethasone. They are often feeling very overwhelmed with his medication schedule to control the pain. If he is resting and staying on top of his pain medications, he is comfortable but any movement causes flares of pain and subsequent  spasms.   -Limited mobility due to pain, only getting out of the chair a couple times per day to use the bathroom  -Gallo and Abigail express concerns about what they will do when she needs to return to work as he is dependent on someone to help bring him food/meds, etc. throughout the day due to his lack of mobility   -having a lot of difficulty sleeping due to steroids, spasms and pain, he also can't adjust positions to get comfortable either     -feels he tolerated cycle 1 5FU/liposomal irinotecan well  -denies nausea or decreased appetite  -had some richer foods with the holidays which has slowed down his bowels, taking Miralax as needed  -didn't feel as tired following chemotherapy as he has in the past   -has had an increase in burping/hiccups which aggravate his back spasms   -steroids have made his blood sugars more difficult to control, working with endocrinology     Remainder of 12 point ROS reviewed and negative except as in interval history.       Current Outpatient Medications   Medication Sig Dispense Refill    acetaminophen (TYLENOL) 32 mg/mL liquid Take 20.313 mLs (650 mg) by mouth every 8 hours. 1800 mL 3    alteplase (CATHFLO ACTIVASE) injection Inject 2 mLs (2 mg) into catheter as needed (catheter occlusion). Reconstitute vial. Draw up alteplase 1 mg/mL in a syringe and instill into IV catheter. Dwell for at least 20 min to 24 hours, then aspirate. May repeat dose once in 24 hours if catheter function not restored after at least 2 hours. Discard remainder of vial. [id number] each 0    apixaban ANTICOAGULANT (ELIQUIS) 5 MG tablet Take 1 tablet (5 mg) by mouth 2 times daily. 60 tablet 2    blood glucose (FREESTYLE TEST STRIPS) test strip 1 strip by In Vitro route 3 times daily. 100 strip 2    Buprenorphine HCl (BELBUCA) 600 MCG FILM buccal film Place 1 Film (600 mcg) inside cheek every 12 hours. May also place 1 Film (600 mcg) daily as needed. For severe pain not relieved with 6 mg dilaudid dose. 75 Film  "3    Chemo Kit For RN use only. Do not remove items from bag. Contents: 1  sodium chloride 0.9% flush, 4 medium gloves, 1 chemo gown, 1/4 chemo mat, 1 connector female, 1 chemo bag. [id number] kit 0    Chemo Kit For RN use only. Do not remove items from bag. Contents: 1  sodium chloride 0.9% flush, 4 medium gloves, 1 chemo gown, 1/4 chemo mat, 1 connector female, 1 chemo bag. [id number] kit 0    Continuous Glucose Sensor (DEXCOM G7 SENSOR) MISC Change every 10 days. 3 each 0    Continuous Glucose Sensor (FREESTYLE KAREN 2 SENSOR) MISC Change every 14 days. 2 each 1    dexAMETHasone (DECADRON) 4 MG tablet Take 1.5 tablets (6 mg) by mouth daily (with breakfast). 30 tablet 0    dicyclomine (BENTYL) 10 MG capsule Take 1 capsule (10 mg) by mouth 4 times daily (before meals and nightly). 120 capsule 1    diphenhydrAMINE (BENADRYL) 50 MG/ML injection Inject 1 mL (50 mg) over 3-5 minutes into the vein via push as needed for other (infusion reaction). For RN use only.  Draw up in a syringe and administer IV push.  Discard remainder of vial. 50020 mL 0    econazole nitrate 1 % external cream Apply topically daily To affected toenails. 85 g 5    Emergency Supply Kit, Central, Patient use for emergency only. Contents: 3 sodium chloride 0.9% flushes, 1 dressing kit, 1 microclave ext set 14\", 4 nitrile gloves (med), 6 alcohol prep pads, 1 bacitracin, 1 syringe (10 cc 20 G 1\"). Call [phone] to reorder. [id number] kit 0    Emergency Supply Kit, Central, Patient use for emergency only. Contents: 3 sodium chloride 0.9% flushes, 1 dressing kit, 1 microclave ext set 14\", 4 nitrile gloves (med), 6 alcohol prep pads, 1 bacitracin, 1 syringe (10 cc 20 G 1\"). Call [phone] to reorder. [id number] kit 0    EPINEPHrine (ANY BX GENERIC EQUIV) 0.3 MG/0.3ML injection 2-pack Inject 0.3 mLs (0.3 mg) into the muscle as needed for anaphylaxis (infusion reaction). Administer into the mid-thigh in case of severe anaphylaxis (wheezing, throat tightening, " mouth swelling, difficulty breathing). May repeat dose one time in 5-15 minutes if symptoms persist. [id number] mL 0    Fluorouracil (ADRUCIL) 4,585 mg in sodium chloride 0.9 % 241 mL via HOMEPUMP C-Series Infuse 4,585 mg at 5.2 mL/hr over 46 hours into the vein once for 1 dose. 594405 mL 0    HYDROmorphone (DILAUDID) 4 MG tablet Take 1-2 tablets (4-8 mg) by mouth every 3 hours as needed for severe pain or breakthrough pain. 180 tablet 0    insulin glargine (LANTUS PEN) 100 UNIT/ML pen Inject 8 Units subcutaneously at bedtime. 15 mL 0    insulin pen needle (31G X 8 MM) 31G X 8 MM miscellaneous Use 1 pen needles daily or as directed. 50 each 0    lidocaine (LIDODERM) 5 % patch Place 2 patches over 12 hours onto the skin every 24 hours. 60 patch 3    lidocaine-prilocaine (EMLA) 2.5-2.5 % external cream Use 1-2 times a week or as needed prior to port access (Patient not taking: Reported on 9/11/2024) 30 g 3    lipase-protease-amylase (CREON 24) 69144-90743-539982 units CPEP per EC capsule 2-3 capsules with meals 3 times a day and 1-2 capsules with snacks max of 15 capsules a day 200 capsule 11    loperamide (IMODIUM) 2 MG capsule 2 caps at 1st sign of diarrhea & 1 cap every 2hrs until 12hrs diarrhea free. During night, 2 caps at bedtime & 2 caps every 4hrs until AM 30 capsule 0    LORazepam (ATIVAN) 0.5 MG tablet Take 1-2 tablets (0.5-1 mg) by mouth every 6 hours as needed for muscle spasms. 45 tablet 2    methocarbamol 1000 MG TABS Take 1,000 mg by mouth 4 times daily as needed for muscle spasms. 180 tablet 3    methylphenidate (RITALIN) 5 MG tablet Take 1 tablet (5 mg) by mouth 2 times daily as needed 10 tablet 0    methylPREDNISolone Na Suc, PF, (SOLU-MEDROL) 125 mg/2 mL injection Inject 2 mLs (125 mg) over 3-5 minutes into the vein via push as needed (infusion reaction). For RN use only.  Reconstitute vial. Draw up methylPREDNISolone in a syringe and administer.  Discard remainder of vial. 179109 mL 0    Multiple  Vitamins-Minerals (CENTRUM SILVER 50+MEN) TABS as directed Orally      naloxone (NARCAN) 4 MG/0.1ML nasal spray       pantoprazole (PROTONIX) 40 MG EC tablet Take 1 tablet (40 mg) by mouth 2 times daily 60 tablet 4    Port Access Kit For nurse use only.  Do not remove items from bag.  Use for port access.  Do not place syringe on sterile field. [id number] kit 0    Port Access Kit For nurse use only.  Do not remove items from bag.  Use for port access.  Do not place syringe on sterile field. [id number] kit 0    prochlorperazine (COMPAZINE) 10 MG tablet Take 1 tablet (10 mg) by mouth every 6 hours as needed for nausea or vomiting. 30 tablet 2    prochlorperazine (COMPAZINE) 10 MG tablet Take 1 tablet (10 mg) by mouth every 6 hours as needed for nausea or vomiting 30 tablet 2    sodium chloride 0.9% infusion Infuse 250 mLs over 30 minutes into the vein every 28 (twenty-eight) days. zometa concomitant fluids. Given per therapy plan orders 750 mL 2    sodium chloride 0.9% infusion Infuse 500 mLs into the vein as needed for other (infusion reaction). In case of mild reaction, administer via gravity at 20 mL/hr to keep vein open. In case of severe reaction, administer via gravity wide open on prime setting. 943786 mL 0    sodium chloride, PF, 0.9% PF flush Inject 10 mLs into the vein as needed for line flush. Flush IV before and after med administration as directed and/or at least every 24 hours, or prior to deaccessing for no further use and/or at least every 4 weeks when not accessed. 803525 mL 0    sodium chloride, PF, 0.9% PF flush Inject 10 mLs into the vein as needed for other (infusion reaction). For RN use only as needed for infusion reaction 951051 mL 0    sodium chloride, PF, 0.9% PF flush Inject 10 mLs into the vein as needed for line flush. Flush IV before and after med administration as directed and/or at least every 24 hours, or prior to deaccessing for no further use and/or at least every 4 weeks when not accessed.  079596 mL 0    sterile water, preservative free, injection Use 2.2 mLs for reconstitution as needed (with alteplase for catheter occlusion). Direct diluent stream into powder. Mix by gently swirling until dissolved. DO NOT SHAKE. Discard remainder of vial. 255728 mL 0    vitamin D3 (CHOLECALCIFEROL) 50 mcg (2000 units) tablet Take 1 tablet (50 mcg) by mouth daily. 90 tablet 1    zoledronic acid (ZOMETA) 4 MG/100ML infusion Infuse 100 mLs (4 mg) into the vein every 28 days. Infuse via gravity. Given per therapy plan orders 300 mL 2     Physical Exam:  General: patient appears well in no acute distress, alert and oriented, speech clear and fluid  Skin: no visualized rash or lesions on visualized skin  Resp: Appears to be breathing comfortably without accessory muscle usage, speaking in full sentences, no audible wheezes or cough.  Psych: Coherent speech, normal rate and volume, able to articulate logical thoughts, able to abstract reason, no tangential thoughts, no hallucinations or delusions  Patient's affect is appropriate.      Labs:    Most Recent 3 CBC's:  Recent Labs   Lab Test 12/18/24  1228 12/12/24  1355 11/11/24  1330   WBC 14.5* 14.0* 6.3   HGB 9.4* 10.0* 10.5*   MCV 88 88 92    199 384   ANEUTAUTO 13.1* 12.1* 4.1    Most Recent 3 BMP's:  Recent Labs   Lab Test 12/18/24  1228 12/12/24  1355 12/02/24  1000 10/21/24  1145   * 133*  --  138   POTASSIUM 4.4 4.1  --  4.2   CHLORIDE 99 101  --  102   CO2 24 24  --  25   BUN 24.7* 23.5*  --  21.3*   CR 0.62* 0.67 0.67 0.74   ANIONGAP 11 8  --  11   DENISE 8.5* 8.2* 9.3 9.3   * 184*  --  213*   PROTTOTAL 6.9 6.8  --  7.2   ALBUMIN 3.4* 3.5 3.9 4.0    Most Recent 2 LFT's:  Recent Labs   Lab Test 12/18/24  1228 12/12/24  1355   AST 30 84*   ALT 49 55   ALKPHOS 327* 363*   BILITOTAL 0.3  0.3 0.5   I reviewed the above labs today.  New labs will be drawn 1/3 prior to chemotherapy.     Impression/plan:     Unresectable Pancreatic Cancer  7/31/23 began  palliative treatment on FOLFIRINOX  CT CAP 10/24/23 with progression in skeletal mets, lung nodule and lymph nodes; stable pancreatic mass  - 10/31/2023: Treatment was changed to Gemcitabine and Abraxane (Day 1, 8, 15) + Zometa every 3 months  - 03/2024 CT CAP with progression of L4 lesion  - Day 8 eliminated after Cycle 5 gem/abraxane  - 8/2024 repeat imaging with isolated progression of L4 lesion, see below   - November 2024 imaging showed disease progression.  - Started 5FU/ liposomal irinotecan on 12/18. Tolerated cycle 1 well. Proceed with cycle 2 as scheduled 1/3 pending labs are WNL. Continue SUZETTE follow up prior to each cycle. Patient to call sooner with concerns.  Will repeat imaging after 4-6 cycles, depending on clinical course.   -will have RNCC follow up with patient/wife regarding home care, possibility of PCA to help while wife returns to work.     Anemia-normocytic  Hgb mildly worse. Likely related to disease progression. Denies bleeding issues. Will continue to monitor.     Skeletal Mets    - Radiation to T10 completed 11/29/23  - CT chest 5/23/24 unchanged sclerotic bone metastases  -imaging 8/2024 with isolated progression in L4, consult to radiation oncology.   -8/28/24 RFA ablation and vertebral augmentation to L4. Completed radiation to L4 in October 2024   -Pain management by palliative care, as below, from 12/17/24 visit and recent updates.   -scheduled for kyphoplasty 1/10. Discussed potential PT after procedure to help with mobilty/strength.   -previously given Rx for a walker, encouraged him to use this for stability/safety with increasing weakness       Pain    -belbuca 600 mcg films BID with a 3rd available to use daily prn severe pain not relieved with dilaudid  -dilaudid 4-8 mg po q 3 hours prn and record how often it is needed  -methocarbamol  750-1000mg PO Q6H PRN  -Ativan 0.5 mg tabs prescribed to use sparingly 1-2 tablets q 6 hours prn severe spasm not relieved with  methocarbamol  -lidocaine patches  -Tylenol suspension 650 mg po TID  -dexamethasone 6mg daily     Encouraged him to follow up with palliative for ongoing pain management/medication adjustments     Bone Health  -Zometa 4 mg every 3 months, last infusion 12/4/2024.   -Recheck of calcium on 12/18 low. He remains on vitamin D 50 mcg daily and a multivitamin, started  calcium 500 mg bid. Continue to monitor.     Neuropathy  -Secondary to prior chemotherapy. No acute change.    Pericardial Effusion  Found on CT 4/20/24 at [facility], followed by TTE with moderate pericardial effusion without tamponade.  CT 5/23/24 small pericardial effusion  5/23/24: Cardiology consult at [facility]- Echo 6/6/2024 with EF 55-60%, LV strain -20.5% which is normal, no pericardial effusion  - repeat echo at [facility] in the future,   - he has been cautioned by Cardiology s/s of cardiac tamponade  -following cardiology as needed  -no acute concerns today    Pulmonary Embolism  Right lower segmental PE found on routine imaging  Taking Apixiban daily, no bleeding concerns. Has instructions for holding prior to kyphoplasty.     Onychomycosis  -Will request home care to cut his toenails.      The longitudinal plan of care for the diagnosis(es)/condition(s) as documented were addressed during this visit. Due to the added complexity in care, I will continue to support Gallo in the subsequent management and with ongoing continuity of care.    50 minutes spent on the date of the encounter doing chart review, review of test results, interpretation of tests, patient visit, and documentation     ESVIN Canales CNP

## 2025-01-01 ENCOUNTER — MEDICAL CORRESPONDENCE (OUTPATIENT)
Dept: HEALTH INFORMATION MANAGEMENT | Facility: CLINIC | Age: 60
End: 2025-01-01
Payer: COMMERCIAL

## 2025-01-01 ENCOUNTER — TELEPHONE (OUTPATIENT)
Dept: FAMILY MEDICINE | Facility: CLINIC | Age: 60
End: 2025-01-01

## 2025-01-03 ENCOUNTER — APPOINTMENT (OUTPATIENT)
Dept: LAB | Facility: CLINIC | Age: 60
End: 2025-01-03
Attending: STUDENT IN AN ORGANIZED HEALTH CARE EDUCATION/TRAINING PROGRAM
Payer: COMMERCIAL

## 2025-01-03 ENCOUNTER — HOME INFUSION BILLING (OUTPATIENT)
Dept: HOME HEALTH SERVICES | Facility: HOME HEALTH | Age: 60
End: 2025-01-03
Payer: COMMERCIAL

## 2025-01-03 PROCEDURE — A4216 STERILE WATER/SALINE, 10 ML: HCPCS

## 2025-01-03 PROCEDURE — S9330 HIT CONT CHEM DIEM: HCPCS

## 2025-01-04 PROCEDURE — S9330 HIT CONT CHEM DIEM: HCPCS

## 2025-01-05 ENCOUNTER — HOME CARE VISIT (OUTPATIENT)
Dept: HOME HEALTH SERVICES | Facility: HOME HEALTH | Age: 60
End: 2025-01-05
Payer: COMMERCIAL

## 2025-01-05 VITALS
HEART RATE: 87 BPM | OXYGEN SATURATION: 97 % | RESPIRATION RATE: 16 BRPM | SYSTOLIC BLOOD PRESSURE: 110 MMHG | DIASTOLIC BLOOD PRESSURE: 70 MMHG | TEMPERATURE: 98.9 F

## 2025-01-05 PROCEDURE — S9330 HIT CONT CHEM DIEM: HCPCS

## 2025-01-05 PROCEDURE — 99601 HOME NFS VISIT <2 HRS: CPT

## 2025-01-05 NOTE — PROGRESS NOTES
Nursing Visit Note:  Nurse visit today for chemo disconnect, assessment  for Gallo Navarro.     present during visit today: Not Applicable.    Pt denies any new effects related to chemotherapy.      Juliana Courtney 1/5/2025

## 2025-01-06 DIAGNOSIS — E11.9 TYPE 2 DIABETES MELLITUS WITHOUT COMPLICATION, WITH LONG-TERM CURRENT USE OF INSULIN (H): Primary | ICD-10-CM

## 2025-01-06 DIAGNOSIS — Z79.4 TYPE 2 DIABETES MELLITUS WITHOUT COMPLICATION, WITH LONG-TERM CURRENT USE OF INSULIN (H): Primary | ICD-10-CM

## 2025-01-06 PROCEDURE — A9270 NON-COVERED ITEM OR SERVICE: HCPCS

## 2025-01-06 PROCEDURE — S9330 HIT CONT CHEM DIEM: HCPCS

## 2025-01-06 NOTE — PROGRESS NOTES
"    INTERVENTIONAL RADIOLOGY CONSULTATION    Name: Gallo Navarro  Age: 59 year old   Referring Physician: Dr. Hurt   REASON FOR REFERRAL: kyphoplasty L2/3--had previous L4 kyphoplasty done      HPI:   58 yo w/ metastatic pancreatic adenocarcinoma to the bone with prior L4 kyphoplasty done for palliation/pain control now with new compression fractures at L2 and L3 with significant pain here today for evaluation. He is on palliative chemotherapy. He also follows closely with palliative care providers who help manage his pain.    The worst pain he has is primarily in his \"lower back.\" The pain wakes him up at night. It does worsen if he exerts himself. When he wakes up in the morning and taking his pain medications it starts at maybe a 1-2 and if he \"exerts himself maybe a 3-4.\" A few weeks ago the pain was frequently 8-10/10. His wife clarifies that he doesn't really exert himself because when he does the pain is more severe. He does overall feel like his pain is improved compared to a few weeks ago.    His pain medications are \"maxed out\", his wife says. Wife reports he takes 8 mg Hydromorphone every 3-4 hours and with tylenol. He also takes Methocarbamol and Lorazepam. Buprenorphine patch also applied daily.    He had significant improvement when we treated the L4 vertebral body and so they are optimistic about treatments at L2 and L3.    PAST MEDICAL HISTORY:   Past Medical History:   Diagnosis Date    Acute pancreatitis 04/16/2023    Alcohol-induced acute pancreatitis 04/10/2023    Gastric outlet obstruction     Metastasis from pancreatic cancer (H)     Personal history of chemotherapy     Gemzar + Abraxane started on 10/31/2023    Recurrent acute pancreatitis     S/P radiation therapy     2,000 cGy to T10 Spine completed on 11/29/2023 - [facility]    S/P radiation therapy     3,000 cGy to L4 Spine completed on 10/21/2024 - [facility]       PAST SURGICAL HISTORY:   Past " Surgical History:   Procedure Laterality Date    AS OPEN TREATMENT CLAVICULAR FRACTURE INTERNAL FX      ENDOSCOPIC RETROGRADE CHOLANGIOPANCREATOGRAM N/A 7/11/2023    Procedure: ENDOSCOPIC RETROGRADE CHOLANGIOPANCREATOGRAPHY;  Surgeon: Khadar Pickett MD;  Location: [facility] OR    ENDOSCOPIC RETROGRADE CHOLANGIOPANCREATOGRAM N/A 8/17/2023    Procedure: ENDOSCOPIC RETROGRADE  with stent removal x1, balloon sweep;  Surgeon: Christos Greenberg MD;  Location: [facility] OR    ENDOSCOPIC ULTRASOUND UPPER GASTROINTESTINAL TRACT (GI) N/A 7/11/2023    Procedure: Endoscopic ultrasound upper gastrointestinal tract (GI), with biposy, GJ tube repositioning, stent placement;  Surgeon: Khadar Pickett MD;  Location: [facility] OR    ENDOSCOPIC ULTRASOUND UPPER GASTROINTESTINAL TRACT (GI) N/A 7/13/2023    Procedure: ENDOSCOPIC ULTRASOUND, ESOPHAGOSCOPY, EUS guided gastrojejunostomy;  Surgeon: Tre York MD;  Location: [facility] OR    INSERT PICC LINE  04/29/2023    INSERT PORT VASCULAR ACCESS Right 7/28/2023    Procedure: Insert port vascular access;  Surgeon: Tom العلي MD;  Location: [facility] OR    IR BONE ABLATION RFA  8/28/2024    IR CHEST PORT PLACEMENT > 5 YRS OF AGE  7/28/2023    IR LUMBAR VERTEBROPLASTY  8/28/2024    IR NG TUBE PLACEMENT REQ RAD & FLUORO  05/08/2023    JG tube    REPLACE GASTROJEJUNOSTOMY TUBE, PERCUTANEOUS N/A 8/17/2023    Procedure: possible REPLACEMENT, GASTROJEJUNOSTOMY TUBE, PERCUTANEOUS;  Surgeon: Christos Greenberg MD;  Location: [facility] OR       FAMILY HISTORY:   Family History   Problem Relation Age of Onset    Diabetes Type 2  Father     Cirrhosis Father     Genetic Disorder Brother         missing [id number], mild retardation    Colon Polyps Brother     Pancreatic Cancer Paternal Aunt 85    Colon Cancer Paternal Uncle     Pancreatitis Cousin        SOCIAL HISTORY:   Social History     Tobacco Use    Smoking status: Never     Passive exposure: Never    Smokeless tobacco: Never   Substance Use Topics     Alcohol use: Not Currently       PROBLEM LIST:   Patient Active Problem List    Diagnosis Date Noted    Other pulmonary embolism without acute cor pulmonale, unspecified chronicity (H) 03/04/2024     Priority: Medium    Malignant neoplasm metastatic to bone (H) 11/07/2023     Priority: Medium    Gastric outlet obstruction 08/07/2023     Priority: Medium    Type 2 diabetes mellitus without complication, with long-term current use of insulin (H) 08/07/2023     Priority: Medium    Encounter for antineoplastic chemotherapy 08/03/2023     Priority: Medium    Malignant neoplasm of head of pancreas (H) 07/14/2023     Priority: Medium    Hyperglycemia 07/08/2023     Priority: Medium    Pancreatic mass 07/08/2023     Priority: Medium    Lower abdominal pain 07/08/2023     Priority: Medium    History of biliary stent insertion 07/08/2023     Priority: Medium    Acute pancreatitis without necrosis or infection, unspecified 04/16/2023     Priority: Medium       MEDICATIONS:   Prescription Medications as of 1/6/2025         Rx Number Disp Refills Start End Last Dispensed Date Next Fill Date Owning Pharmacy    acetaminophen (TYLENOL) 32 mg/mL liquid  1800 mL 3 12/9/2024 --   [facility] [address] [place] [address]    Sig: Take 20.313 mLs (650 mg) by mouth every 8 hours.    Class: E-Prescribe    Route: Oral    alteplase (CATHFLO ACTIVASE) injection [id number] each 0 10/23/2024 8/28/2025   [facility]    Sig: Inject 2 mLs (2 mg) into catheter as needed (catheter occlusion). Reconstitute vial. Draw up alteplase 1 mg/mL in a syringe and instill into IV catheter. Dwell for at least 20 min to 24 hours, then aspirate. May repeat dose once in 24 hours if catheter function not restored after at least 2 hours. Discard remainder of vial.    Route: Intracatheter    apixaban ANTICOAGULANT (ELIQUIS) 5 MG tablet  60 tablet 2 11/28/2024 --   [facility] [address] [place] [address]     Sig: Take 1 tablet (5 mg) by mouth 2 times daily.    Class: E-Prescribe    Route: Oral    blood glucose (FREESTYLE TEST STRIPS) test strip  100 strip 2 2024 --   [address]    Si strip by In Vitro route 3 times daily.    Class: E-Prescribe    Route: In Vitro    Buprenorphine HCl (BELBUCA) 600 MCG FILM buccal film  75 Film 3 2024 --   [address]    Sig: Place 1 Film (600 mcg) inside cheek every 12 hours. May also place 1 Film (600 mcg) daily as needed. For severe pain not relieved with 6 mg dilaudid dose.    Class: E-Prescribe    Route: Buccal    Chemo Kit [id number] kit 0 2024 (Fill in progress)  [facility]    Sig: For RN use only. Do not remove items from bag. Contents: 1  sodium chloride 0.9% flush, 4 medium gloves, 1 chemo gown, 1/4 chemo mat, 1 connector female, 1 chemo bag.    Route: Does not apply    Chemo Kit [id number] kit 0 2024 (Last dispensed before transfer)  [facility]    Sig: For RN use only. Do not remove items from bag. Contents: 1  sodium chloride 0.9% flush, 4 medium gloves, 1 chemo gown, 1/4 chemo mat, 1 connector female, 1 chemo bag.    Route: Does not apply    Continuous Glucose Sensor (DEXCOM G7 SENSOR) MISC  6 each 3 1/3/2025 --   [address]    Sig: Change every 10 days.    Class: E-Prescribe    Continuous Glucose Sensor (FREESTYLE KAREN 2 SENSOR) [facility]  2 each 1 2024 --   [place], [place] - [address] [place] [address] [address]    Sig: Change every 14 days.    Class: Local Print    Notes to Pharmacy: Please deliver to patient.    dexAMETHasone (DECADRON) 4 MG tablet  30 tablet 0 2024 --   [facility] [address] [place] [address], [place] - [address]    Sig: Take 1.5 tablets (6 mg) by mouth daily (with breakfast).  "   Class: E-Prescribe    Route: Oral    dicyclomine (BENTYL) 10 MG capsule  120 capsule 1 11/12/2024 --   [facility] [address] [place] [address] [address] [place] [address]    Sig: Take 1 capsule (10 mg) by mouth 4 times daily (before meals and nightly).    Class: E-Prescribe    Route: Oral    diphenhydrAMINE (BENADRYL) 50 MG/ML injection [id number] mL 0 11/13/2024 3/30/2026   [facility]    Sig: Inject 1 mL (50 mg) over 3-5 minutes into the vein via push as needed for other (infusion reaction). For RN use only.  Draw up in a syringe and administer IV push.  Discard remainder of vial.    Notes to Pharmacy: NOTE:*Pt home emergency benadryl expires 3/2026    Route: Intravenous Push    econazole nitrate 1 % external cream  85 g 5 7/19/2024 --   [facility] [address] [place] [address]    Sig: Apply topically daily To affected toenails.    Class: E-Prescribe    Route: Topical    Emergency Supply Kit, Central, [id number] kit 0 12/18/2024 2/18/2025   [facility] ([facility]    Sig: Patient use for emergency only. Contents: 3 sodium chloride 0.9% flushes, 1 dressing kit, 1 microclave ext set 14\", 4 nitrile gloves (med), 6 alcohol prep pads, 1 bacitracin, 1 syringe (10 cc 20 G 1\"). Call [phone] to reorder.    Route: Does not apply    Emergency Supply Kit, Central, [id number] kit 0 11/13/2024 1/28/2025   [facility]    Sig: Patient use for emergency only. Contents: 3 sodium chloride 0.9% flushes, 1 dressing kit, 1 microclave ext set 14\", 4 nitrile gloves (med), 6 alcohol prep pads, 1 bacitracin, 1 syringe (10 cc 20 G 1\"). Call [phone] to reorder.    Route: Does not apply    EPINEPHrine (ANY BX GENERIC EQUIV) 0.3 MG/0.3ML injection 2-pack [id number] mL 0 11/13/2024 4/30/2026   [facility]    Sig: Inject 0.3 mLs (0.3 mg) into the muscle as needed for anaphylaxis (infusion reaction). Administer into the mid-thigh in " case of severe anaphylaxis (wheezing, throat tightening, mouth swelling, difficulty breathing). May repeat dose one time in 5-15 minutes if symptoms persist.    Notes to Pharmacy: Pt home epi pen expires on 4/2026    Route: Intramuscular    Fluorouracil (ADRUCIL) 4,585 mg in sodium chloride 0.9 % 241 mL via HOMEPUMP C-Series [id number] mL 0 1/3/2025 1/16/2025 12/18/2024 (Fill in progress)  [facility] ([facility]    Sig: Infuse 4,585 mg at 5.2 mL/hr over 46 hours into the vein once for 1 dose.    Route: Infuse    HYDROmorphone (DILAUDID) 4 MG tablet  180 tablet 0 12/30/2024 --   [facility] [address] [place] [address]    Sig: Take 1-2 tablets (4-8 mg) by mouth every 3 hours as needed for severe pain or breakthrough pain.    Class: E-Prescribe    Earliest Fill Date: 12/30/2024    Notes to Pharmacy: Dose increase since last fill    Route: Oral    No prior authorization was found for this prescription.    Found prior authorization for another prescription for the same medication: Approved    insulin glargine (LANTUS PEN) 100 UNIT/ML pen  15 mL 0 12/18/2024 --   [address]    Sig: Inject 8 Units subcutaneously at bedtime.    Class: E-Prescribe    Notes to Pharmacy: If Lantus is not covered by insurance, may substitute Basaglar or Semglee or other insulin glargine product per insurance preference at same dose and frequency.      Route: Subcutaneous    insulin  UNIT/ML injection  15 mL 3 1/3/2025 --   [facility] [address] [place] [address]    Sig: 10 units around 9 am (with steroids) 10 units around 9 pm    Class: E-Prescribe    insulin pen needle (31G X 8 MM) 31G X 8 MM miscellaneous  50 each 0 12/18/2024 --   [address]    Sig: Use 1 pen needles daily or as directed.    Class: E-Prescribe    lidocaine (LIDODERM) 5 % patch  60 patch 3  2024 --   [address]    Sig: Place 2 patches over 12 hours onto the skin every 24 hours.    Class: E-Prescribe    Route: Transdermal    lidocaine-prilocaine (EMLA) 2.5-2.5 % external cream  30 g 3 2023 --   [address]    Sig: Use 1-2 times a week or as needed prior to port access    Class: E-Prescribe    lipase-protease-amylase (CREON 24) [id number] units CPEP per EC capsule  200 capsule 11 2024 --   [address]    Si-3 capsules with meals 3 times a day and 1-2 capsules with snacks max of 15 capsules a day    Class: E-Prescribe    loperamide (IMODIUM) 2 MG capsule  30 capsule 0 2024 --   [address]    Si caps at 1st sign of diarrhea & 1 cap every 2hrs until 12hrs diarrhea free. During night, 2 caps at bedtime & 2 caps every 4hrs until AM    Class: E-Prescribe    Non-formulary Exception Code: Specific indication for non-formulary alternative    LORazepam (ATIVAN) 0.5 MG tablet  45 tablet 2 2024 --   [address]    Sig: Take 1-2 tablets (0.5-1 mg) by mouth every 6 hours as needed for muscle spasms.    Class: E-Prescribe    Route: Oral    methocarbamol 1000 MG TABS  180 tablet 3 2024 --   [address]    Sig: Take 1,000 mg by mouth 4 times daily as needed for muscle spasms.    Class: E-Prescribe    Route: Oral    methylphenidate (RITALIN) 5 MG tablet  10 tablet 0 2024 --   [address]    Sig: Take 1 tablet (5 mg) by mouth 2 times daily as needed    Class: E-Prescribe    Earliest Fill Date: 2024    Route: Oral    methylPREDNISolone Na Suc, PF, (SOLU-MEDROL) 125 mg/2 mL injection [id number] mL  0 11/13/2024 11/30/2026   [facility]    Sig: Inject 2 mLs (125 mg) over 3-5 minutes into the vein via push as needed (infusion reaction). For RN use only.  Reconstitute vial. Draw up methylPREDNISolone in a syringe and administer.  Discard remainder of vial.    Notes to Pharmacy: Pt home emergency methyl pred expires on 11/2026    Route: Intravenous Push    Multiple Vitamins-Minerals (CENTRUM SILVER 50+MEN) TABS  -- --  --       Sig: as directed Orally    Class: Historical    naloxone (NARCAN) 4 MG/0.1ML nasal spray  -- -- 7/27/2023 --       Class: Historical    pantoprazole (PROTONIX) 40 MG EC tablet  60 tablet 4 8/8/2024 --   [facility] [address] [place] [address]    Sig: Take 1 tablet (40 mg) by mouth 2 times daily    Class: E-Prescribe    Route: Oral    Port Access Kit [id number] kit 0 12/18/2024 2/18/2025 12/3/2024 (Last dispensed before transfer)  [facility]    Sig: For nurse use only.  Do not remove items from bag.  Use for port access.  Do not place syringe on sterile field.    Route: Does not apply    Port Access Kit [id number] kit 0 11/13/2024 1/28/2025 12/3/2024 (Last dispensed before transfer)  [facility]    Sig: For nurse use only.  Do not remove items from bag.  Use for port access.  Do not place syringe on sterile field.    Route: Does not apply    prochlorperazine (COMPAZINE) 10 MG tablet  30 tablet 2 12/17/2024 --   [place], [place] - [address] [facility] [address]    Sig: Take 1 tablet (10 mg) by mouth every 6 hours as needed for nausea or vomiting.    Class: E-Prescribe    Route: Oral    Non-formulary Exception Code: Specific indication for non-formulary alternative    prochlorperazine (COMPAZINE) 10 MG tablet  30 tablet 2 3/15/2024 --   [address]    Sig: Take 1 tablet (10 mg) by mouth every 6 hours as needed for nausea or vomiting     Class: E-Prescribe    Route: Oral    sodium chloride 0.9% infusion [id number] mL 2 12/5/2024 8/13/2025 12/3/2024 (Last dispensed before transfer)  [facility]    Sig: Infuse 250 mLs over 30 minutes into the vein every 28 (twenty-eight) days. zometa concomitant fluids. Given per therapy plan orders    Route: Infuse    sodium chloride 0.9% infusion [id number] mL 0 11/13/2024 8/30/2025 12/3/2024 (Last dispensed before transfer)  [facility]    Sig: Infuse 500 mLs into the vein as needed for other (infusion reaction). In case of mild reaction, administer via gravity at 20 mL/hr to keep vein open. In case of severe reaction, administer via gravity wide open on prime setting.    Notes to Pharmacy: Pt home emergency bag expires 8/2025    Route: Infuse    sodium chloride, PF, 0.9% PF flush [id number] mL 0 12/18/2024 2/18/2025 12/18/2024 (Fill in progress)  [facility]    Sig: Inject 10 mLs into the vein as needed for line flush. Flush IV before and after med administration as directed and/or at least every 24 hours, or prior to deaccessing for no further use and/or at least every 4 weeks when not accessed.    Route: Intravenous    sodium chloride, PF, 0.9% PF flush [id number] mL 0 11/13/2024 8/28/2025 12/18/2024 (Last dispensed before transfer)  [facility]    Sig: Inject 10 mLs into the vein as needed for other (infusion reaction). For RN use only as needed for infusion reaction    Notes to Pharmacy: Replace inga home kit on DT anually (sent last on 8/28/24)    Route: Intravenous    sodium chloride, PF, 0.9% PF flush [id number] mL 0 11/13/2024 1/28/2025 12/18/2024 (Last dispensed before transfer)  [facility]    Sig: Inject 10 mLs into the vein as needed for line flush. Flush IV before and after med administration as directed and/or at least every 24 hours, or prior to deaccessing for no  further use and/or at least every 4 weeks when not accessed.    Route: Intravenous    sterile water, preservative free, injection [id number] mL 0 10/23/2024 8/28/2025   [facility]    Sig: Use 2.2 mLs for reconstitution as needed (with alteplase for catheter occlusion). Direct diluent stream into powder. Mix by gently swirling until dissolved. DO NOT SHAKE. Discard remainder of vial.    Route: for reconstitution    vitamin D3 (CHOLECALCIFEROL) 50 mcg (2000 units) tablet  90 tablet 1 10/16/2024 --   [facility] - [place], [place] - [address] [place] [address]    Sig: Take 1 tablet (50 mcg) by mouth daily.    Class: E-Prescribe    Route: Oral    zoledronic acid (ZOMETA) 4 MG/100ML infusion [id number] mL 2 12/5/2024 8/13/2025 12/3/2024 (Last dispensed before transfer)  [facility]    Sig: Infuse 100 mLs (4 mg) into the vein every 28 days. Infuse via gravity. Given per therapy plan orders    Route: Infuse            ALLERGIES:   Patient has no known allergies.    ROS:  Negative unless otherwise stated in HPI.      Physical Examination:   VITALS:   There were no vitals taken for this visit.    CONSTITUTIONAL: healthy, alert and no distress.   PSYCHIATRIC: mentation appears normal and affect normal.   EYES: No jaundice or pallor.   RESP: No audible cough or wheeze.     Labs:    BMP RESULTS:  Lab Results   Component Value Date     01/03/2025    POTASSIUM 3.9 01/03/2025    CHLORIDE 102 01/03/2025    CO2 24 01/03/2025    ANIONGAP 11 01/03/2025     (H) 01/03/2025     (H) 09/05/2023    BUN 19.2 01/03/2025    CR 0.54 (L) 01/03/2025    GFRESTIMATED >90 01/03/2025    DENISE 8.8 01/03/2025        CBC RESULTS:  Lab Results   Component Value Date    WBC 4.1 01/03/2025    RBC 3.50 (L) 01/03/2025    HGB 9.8 (L) 01/03/2025    HCT 30.5 (L) 01/03/2025    MCV 87 01/03/2025    MCH 28.0 01/03/2025    MCHC 32.1 01/03/2025    RDW 17.0 (H) 01/03/2025     01/03/2025        INR/PTT:  Lab Results   Component Value Date    INR 1.2 (L) 08/28/2024    INR 1.19 (H) 08/28/2024       Diagnostic studies:   Lumbar MRI 12/13/24 - reviewed and interpreted by me. New compression fracture of inferior endplate L3. Compression fracture of superior endplate L2. Old compression fracture at L4 with changes of vertebral augmentation. Diffuse osseous metastases are noted.    L3 inferior endplate fx below      L2 superior endplate fx below      Radiology interpretation of thoracic spine:  IMPRESSION:  THORACIC SPINE MRI:  1.  Extensive thoracic vertebral body metastatic disease.  2.  Mild presumed pathologic fracture T7.  3.  No cord compression or tumor extension into spinal canal.    Assessment   58 yo w/ metastatic pancreatic cancer with diffuse osseous metastases throughout the spine, new pathologic fractures involving L2 and L3 vertebral bodies and with corresponding pain in this area that has been refractory to medical management. More specifically, although he rates his pain at worst as a 3-4/10 most days, he has modified his lifestyle and primarily stays home and sits on the couch. Furthermore, that improvement in pain control has been achieved with an aggressive pain regimen, which ideally could be scaled back with intervention. He previously had an excellent response at L4. I recommended treatment of L2 and L3 levels with bone ablation and vertebral augmentation.     He does also have mild irregularlity of the endplate at T7 but give his pain is most bothersome in the low back I would not recommend treating T7. Furthermore, there is minimal height loss at T7 on MRI.    I discussed the benefits, risks, and alternative treatment options. I recommended RF ablation and vertebroplasty of L2 and L3. Alternative treatment option would include continued optimal medical therapy. Benefits of the procedure include potentially resolution/improvement in pain, stabilization of the vertebral body, and some  degree of local tumor control. The risks of the procedure discussed included but are not limited to thermal injury, failure to respond/incomplete improvement in pain, cement migration/leakage, fracture, bleeding, infection, and nerve root irritation and/or injury at worst including a spinal cord injury. Patient's questions answered. He elected to proceed with the intervention. I shared/disclosed to the patient that I have been a paid consultant for the company ([employer]) that I use their product for this treatment. I also offered to the patient to receive treatment by another physician who does not not have this conflict of interest. The patient expressed understanding and requested treatment to be performed by the author.    He is on Eliquis which will need to be held.    Plan   - Schedule for RF ablation and vertebroplasty of L2 and L3.      I, Brice Seymour, was present with the resident/fellow/SUZETTE during the history and exam. I discussed the case with the resident/fellow/SUZETTE and agree with the findings as documented in the assessment and plan.    Brice Seymour MD    Vascular and Interventional Radiolgy  [facility]    I spent a total of 20 minutes face-to-face with Gallo Navarro during today's office visit. Over 50% of this time was spent counseling the patient and/or coordinating care. See note for details.     An additional 20 minutes spent on the date of the encounter doing chart review, history and exam, discussion of the care plan with other providers, documentation and further activities as noted above        CC  Patient Care Team:  Akil Hernandez MD as PCP - General (Internal Medicine)  Akil Hernandez MD as Assigned PCP  Efra Lynn RN as Specialty Care Coordinator (Hematology & Oncology)  Tre York MD as MD (Gastroenterology)  Jeremy Hurt MD as Assigned Palliative Care Provider  Christos Greenberg MD as MD (Gastroenterology)  Megan Farrell APRN  CNP as Assigned Cancer Care Provider  Becca Anguiano as Physician (Radiation Oncology)  Luis Haile MD as Berta Alex PA-C as Assigned Endocrinology Provider  Zen Sawyer MD as MD (Cardiovascular Disease)  Zen Sawyer MD as MD (Cardiovascular Disease)  Fanny Chanel MD as MD (Cardiovascular Disease)  Tam Khan MD as MD (Cardiovascular Disease)  Tam Khan MD as Assigned Heart and Vascular Provider  Torito Ware DPM as Assigned Musculoskeletal Provider  Deloris Ambrose RN as Home Infusion   Luis Haile MD as Home Infusion Following Provider  Nellie Hurt MD as MD ([facility])  Megan Houston RD as Diabetes Educator (Dietitian, Registered)  NELLIE HURT        Video-Visit Details     Type of service:  Video Visit     Video Start and End Time:  Joined the call at 1/7/2025, 9:16:42 am.  Left the call at 1/7/2025, 9:36:21 am.  You were on the call for 19 minutes 38 seconds .    Originating Location (pt. Location): Home     Distant Location (provider location):  [facility] [place]      Platform used for Video Visit: [employer]

## 2025-01-07 ENCOUNTER — VIRTUAL VISIT (OUTPATIENT)
Dept: VASCULAR SURGERY | Facility: CLINIC | Age: 60
End: 2025-01-07
Payer: COMMERCIAL

## 2025-01-07 VITALS — HEIGHT: 72 IN | BODY MASS INDEX: 20.59 KG/M2 | WEIGHT: 152 LBS

## 2025-01-07 DIAGNOSIS — C25.0 MALIGNANT NEOPLASM OF HEAD OF PANCREAS (H): ICD-10-CM

## 2025-01-07 DIAGNOSIS — G89.3 CANCER ASSOCIATED PAIN: ICD-10-CM

## 2025-01-07 DIAGNOSIS — M48.50XD PATHOLOGICAL COMPRESSION FRACTURE OF VERTEBRA WITH ROUTINE HEALING, SUBSEQUENT ENCOUNTER: ICD-10-CM

## 2025-01-07 PROCEDURE — S9330 HIT CONT CHEM DIEM: HCPCS

## 2025-01-07 ASSESSMENT — PAIN SCALES - GENERAL: PAINLEVEL_OUTOF10: NO PAIN (0)

## 2025-01-07 NOTE — NURSING NOTE
Current patient location: [address] [place] [id number]    Is the patient currently in the state of [place]? YES    Visit mode:VIDEO    If the visit is dropped, the patient can be reconnected by:VIDEO VISIT: Text to cell phone:   Telephone Information:   Mobile [phone]       Will anyone else be joining the visit? Pts spouse   (If patient encounters technical issues they should call [phone])    Are changes needed to the allergy or medication list?  Bora flagged for removal, please review/remove    Patient denies any changes since echeck-in completion and states all information entered during echeck-in remains accurate.    Are refills needed on medications prescribed by this physician? NA    Rooming Documentation:  Questionnaire(s) completed    Pt states vitals were checked last Friday     Reason for visit: Consult    Monet Pantoja MA VVF

## 2025-01-07 NOTE — PATIENT INSTRUCTIONS
Gallo you have had your consult today with Dr Seymour regarding your spine lesion RFA procedure.     Plan:    You have been scheduled for your procedure on Friday and [employer] message was sent with procedure details.     Thanks,     ASofie Arce RN, BSN  Interventional Radiology Care Coordinator   Phone:  [phone]

## 2025-01-07 NOTE — TELEPHONE ENCOUNTER
Referral forwarded to [facility] link pools:    [facility] Jamal Diaz  Home Health Care

## 2025-01-07 NOTE — LETTER
"1/7/2025       RE: Gallo Navarro  [id number] [address] [place] [id number]     Dear Colleague,    Thank you for referring your patient, Gallo Navarro, to the [facility] [place] at [facility]. Please see a copy of my visit note below.        INTERVENTIONAL RADIOLOGY CONSULTATION    Name: Gallo Navarro  Age: 59 year old   Referring Physician: Dr. Hurt   REASON FOR REFERRAL: kyphoplasty L2/3--had previous L4 kyphoplasty done      HPI:   58 yo w/ metastatic pancreatic adenocarcinoma to the bone with prior L4 kyphoplasty done for palliation/pain control now with new compression fractures at L2 and L3 with significant pain here today for evaluation. He is on palliative chemotherapy. He also follows closely with palliative care providers who help manage his pain.    The worst pain he has is primarily in his \"lower back.\" The pain wakes him up at night. It does worsen if he exerts himself. When he wakes up in the morning and taking his pain medications it starts at maybe a 1-2 and if he \"exerts himself maybe a 3-4.\" A few weeks ago the pain was frequently 8-10/10. His wife clarifies that he doesn't really exert himself because when he does the pain is more severe. He does overall feel like his pain is improved compared to a few weeks ago.    His pain medications are \"maxed out\", his wife says. Wife reports he takes 8 mg Hydromorphone every 3-4 hours and with tylenol. He also takes Methocarbamol and Lorazepam. Buprenorphine patch also applied daily.    He had significant improvement when we treated the L4 vertebral body and so they are optimistic about treatments at L2 and L3.    PAST MEDICAL HISTORY:   Past Medical History:   Diagnosis Date     Acute pancreatitis 04/16/2023     Alcohol-induced acute pancreatitis 04/10/2023     Gastric outlet obstruction      Metastasis from pancreatic cancer (H)      Personal history of chemotherapy     Gemzar + Abraxane " started on 10/31/2023     Recurrent acute pancreatitis      S/P radiation therapy     2,000 cGy to T10 Spine completed on 11/29/2023 [facility]     S/P radiation therapy     3,000 cGy to L4 Spine completed on 10/21/2024 - [facility]       PAST SURGICAL HISTORY:   Past Surgical History:   Procedure Laterality Date     AS OPEN TREATMENT CLAVICULAR FRACTURE INTERNAL FX       ENDOSCOPIC RETROGRADE CHOLANGIOPANCREATOGRAM N/A 7/11/2023    Procedure: ENDOSCOPIC RETROGRADE CHOLANGIOPANCREATOGRAPHY;  Surgeon: Khadar Pickett MD;  Location: [facility] OR     ENDOSCOPIC RETROGRADE CHOLANGIOPANCREATOGRAM N/A 8/17/2023    Procedure: ENDOSCOPIC RETROGRADE  with stent removal x1, balloon sweep;  Surgeon: Christos Greenberg MD;  Location: [facility] OR     ENDOSCOPIC ULTRASOUND UPPER GASTROINTESTINAL TRACT (GI) N/A 7/11/2023    Procedure: Endoscopic ultrasound upper gastrointestinal tract (GI), with biposy, GJ tube repositioning, stent placement;  Surgeon: Khadar Pickett MD;  Location: [facility] OR     ENDOSCOPIC ULTRASOUND UPPER GASTROINTESTINAL TRACT (GI) N/A 7/13/2023    Procedure: ENDOSCOPIC ULTRASOUND, ESOPHAGOSCOPY, EUS guided gastrojejunostomy;  Surgeon: Tre York MD;  Location: [facility] OR     INSERT PICC LINE  04/29/2023     INSERT PORT VASCULAR ACCESS Right 7/28/2023    Procedure: Insert port vascular access;  Surgeon: Tom العلي MD;  Location: [facility] OR     IR BONE ABLATION RFA  8/28/2024     IR CHEST PORT PLACEMENT > 5 YRS OF AGE  7/28/2023     IR LUMBAR VERTEBROPLASTY  8/28/2024     IR NG TUBE PLACEMENT REQ RAD & FLUORO  05/08/2023    JG tube     REPLACE GASTROJEJUNOSTOMY TUBE, PERCUTANEOUS N/A 8/17/2023    Procedure: possible REPLACEMENT, GASTROJEJUNOSTOMY TUBE, PERCUTANEOUS;  Surgeon: Christos Greenberg MD;  Location: [facility] OR       FAMILY HISTORY:   Family History   Problem Relation Age of Onset     Diabetes Type 2  Father      Cirrhosis Father      Genetic Disorder Brother          missing [id number], mild retardation     Colon Polyps Brother      Pancreatic Cancer Paternal Aunt 85     Colon Cancer Paternal Uncle      Pancreatitis Cousin        SOCIAL HISTORY:   Social History     Tobacco Use     Smoking status: Never     Passive exposure: Never     Smokeless tobacco: Never   Substance Use Topics     Alcohol use: Not Currently       PROBLEM LIST:   Patient Active Problem List    Diagnosis Date Noted     Other pulmonary embolism without acute cor pulmonale, unspecified chronicity (H) 03/04/2024     Priority: Medium     Malignant neoplasm metastatic to bone (H) 11/07/2023     Priority: Medium     Gastric outlet obstruction 08/07/2023     Priority: Medium     Type 2 diabetes mellitus without complication, with long-term current use of insulin (H) 08/07/2023     Priority: Medium     Encounter for antineoplastic chemotherapy 08/03/2023     Priority: Medium     Malignant neoplasm of head of pancreas (H) 07/14/2023     Priority: Medium     Hyperglycemia 07/08/2023     Priority: Medium     Pancreatic mass 07/08/2023     Priority: Medium     Lower abdominal pain 07/08/2023     Priority: Medium     History of biliary stent insertion 07/08/2023     Priority: Medium     Acute pancreatitis without necrosis or infection, unspecified 04/16/2023     Priority: Medium       MEDICATIONS:   Prescription Medications as of 1/6/2025         Rx Number Disp Refills Start End Last Dispensed Date Next Fill Date Owning Pharmacy    acetaminophen (TYLENOL) 32 mg/mL liquid  1800 mL 3 12/9/2024 --   [facility] [address]    Sig: Take 20.313 mLs (650 mg) by mouth every 8 hours.    Class: E-Prescribe    Route: Oral    alteplase (CATHFLO ACTIVASE) injection [id number] each 0 10/23/2024 8/28/2025   [facility]    Sig: Inject 2 mLs (2 mg) into catheter as needed (catheter occlusion). Reconstitute vial. Draw up alteplase 1 mg/mL in a syringe and instill into IV  catheter. Dwell for at least 20 min to 24 hours, then aspirate. May repeat dose once in 24 hours if catheter function not restored after at least 2 hours. Discard remainder of vial.    Route: Intracatheter    apixaban ANTICOAGULANT (ELIQUIS) 5 MG tablet  60 tablet 2 2024 --   [facility] [address] [place] [address]    Sig: Take 1 tablet (5 mg) by mouth 2 times daily.    Class: E-Prescribe    Route: Oral    blood glucose (FREESTYLE TEST STRIPS) test strip  100 strip 2 2024 --   [address]    Si strip by In Vitro route 3 times daily.    Class: E-Prescribe    Route: In Vitro    Buprenorphine HCl (BELBUCA) 600 MCG FILM buccal film  75 Film 3 2024 --   [address]    Sig: Place 1 Film (600 mcg) inside cheek every 12 hours. May also place 1 Film (600 mcg) daily as needed. For severe pain not relieved with 6 mg dilaudid dose.    Class: E-Prescribe    Route: Buccal    Chemo Kit [id number] kit 0 2024 (Fill in progress)  [facility]    Sig: For RN use only. Do not remove items from bag. Contents: 1  sodium chloride 0.9% flush, 4 medium gloves, 1 chemo gown, 1/4 chemo mat, 1 connector female, 1 chemo bag.    Route: Does not apply    Chemo Kit [id number] kit 0 2024 (Last dispensed before transfer)  [facility]    Sig: For RN use only. Do not remove items from bag. Contents: 1  sodium chloride 0.9% flush, 4 medium gloves, 1 chemo gown, 1/4 chemo mat, 1 connector female, 1 chemo bag.    Route: Does not apply    Continuous Glucose Sensor (DEXCOM G7 SENSOR) MISC  6 each 3 1/3/2025 --   [facility] [address] [place] [address]    Sig: Change every 10 days.    Class: E-Prescribe    Continuous Glucose Sensor (FREESTYLE KAREN 2 SENSOR) [facility]  2 each 1 2024 --   [facility]  "[facility] - [address] 0[address]    Sig: Change every 14 days.    Class: Local Print    Notes to Pharmacy: Please deliver to patient.    dexAMETHasone (DECADRON) 4 MG tablet  30 tablet 0 12/30/2024 --   [facility] [address] [place] [address]    Sig: Take 1.5 tablets (6 mg) by mouth daily (with breakfast).    Class: E-Prescribe    Route: Oral    dicyclomine (BENTYL) 10 MG capsule  120 capsule 1 11/12/2024 --   [facility] [address] [place] [address]    Sig: Take 1 capsule (10 mg) by mouth 4 times daily (before meals and nightly).    Class: E-Prescribe    Route: Oral    diphenhydrAMINE (BENADRYL) 50 MG/ML injection [id number] mL 0 11/13/2024 3/30/2026   [facility]    Sig: Inject 1 mL (50 mg) over 3-5 minutes into the vein via push as needed for other (infusion reaction). For RN use only.  Draw up in a syringe and administer IV push.  Discard remainder of vial.    Notes to Pharmacy: NOTE:*Pt home emergency benadryl expires 3/2026    Route: Intravenous Push    econazole nitrate 1 % external cream  85 g 5 7/19/2024 --   [facility] [address] [place] [address]    Sig: Apply topically daily To affected toenails.    Class: E-Prescribe    Route: Topical    Emergency Supply Kit, [place], [id number] kit 0 12/18/2024 2/18/2025   [facility] (\[facility]")    Sig: Patient use for emergency only. Contents: 3 sodium chloride 0.9% flushes, 1 dressing kit, 1 microclave ext set 14\", 4 nitrile gloves (med), 6 alcohol prep pads, 1 bacitracin, 1 syringe (10 cc 20 G 1\"). Call [phone] to reorder.    Route: Does not apply    Emergency Supply Kit, [place], [id number] kit 0 11/13/2024 1/28/2025   [facility]    Sig: Patient use for emergency only. Contents: 3 sodium chloride 0.9% flushes, 1 dressing kit, 1 microclave ext set 14\", 4 nitrile gloves (med), 6 alcohol prep pads, 1 bacitracin, 1 syringe (10 cc " "20 G 1\"). Call [phone] to reorder.    Route: Does not apply    EPINEPHrine (ANY BX GENERIC EQUIV) 0.3 MG/0.3ML injection 2-pack [id number] mL 0 11/13/2024 4/30/2026   [facility]    Sig: Inject 0.3 mLs (0.3 mg) into the muscle as needed for anaphylaxis (infusion reaction). Administer into the mid-thigh in case of severe anaphylaxis (wheezing, throat tightening, mouth swelling, difficulty breathing). May repeat dose one time in 5-15 minutes if symptoms persist.    Notes to Pharmacy: Pt home epi pen expires on 4/2026    Route: Intramuscular    Fluorouracil (ADRUCIL) 4,585 mg in sodium chloride 0.9 % 241 mL via HOMEPUMP C-Series [id number] mL 0 1/3/2025 1/16/2025 12/18/2024 (Fill in progress)  [facility] ([facility])    Sig: Infuse 4,585 mg at 5.2 mL/hr over 46 hours into the vein once for 1 dose.    Route: Infuse    HYDROmorphone (DILAUDID) 4 MG tablet  180 tablet 0 12/30/2024 --   [facility] [address] [place] [address]    Sig: Take 1-2 tablets (4-8 mg) by mouth every 3 hours as needed for severe pain or breakthrough pain.    Class: E-Prescribe    Earliest Fill Date: 12/30/2024    Notes to Pharmacy: Dose increase since last fill    Route: Oral    No prior authorization was found for this prescription.    Found prior authorization for another prescription for the same medication: Approved    insulin glargine (LANTUS PEN) 100 UNIT/ML pen  15 mL 0 12/18/2024 --   [facility] [place], [place] - [address] [facility] [address] [phone]    Sig: Inject 8 Units subcutaneously at bedtime.    Class: E-Prescribe    Notes to Pharmacy: If Lantus is not covered by insurance, may substitute Basaglar or Semglee or other insulin glargine product per insurance preference at same dose and frequency.      Route: Subcutaneous    insulin  UNIT/ML injection  15 mL 3 1/3/2025 --   [facility] [address] [place] [address] Ln    Sig: 10 units around 9 am " (with steroids) 10 units around 9 pm    Class: E-Prescribe    insulin pen needle (31G X 8 MM) 31G X 8 MM miscellaneous  50 each 0 2024 --   [address]    Sig: Use 1 pen needles daily or as directed.    Class: E-Prescribe    lidocaine (LIDODERM) 5 % patch  60 patch 3 2024 --   [address]    Sig: Place 2 patches over 12 hours onto the skin every 24 hours.    Class: E-Prescribe    Route: Transdermal    lidocaine-prilocaine (EMLA) 2.5-2.5 % external cream  30 g 3 2023 --   [address]    Sig: Use 1-2 times a week or as needed prior to port access    Class: E-Prescribe    lipase-protease-amylase (CREON 24) [id number] units CPEP per EC capsule  200 capsule 11 2024 --   [address]    Si-3 capsules with meals 3 times a day and 1-2 capsules with snacks max of 15 capsules a day    Class: E-Prescribe    loperamide (IMODIUM) 2 MG capsule  30 capsule 0 2024 --   [address] 5[address]125    Si caps at 1st sign of diarrhea & 1 cap every 2hrs until 12hrs diarrhea free. During night, 2 caps at bedtime & 2 caps every 4hrs until AM    Class: E-Prescribe    Non-formulary Exception Code: Specific indication for non-formulary alternative    LORazepam (ATIVAN) 0.5 MG tablet  45 tablet 2 2024 --   [address]    Sig: Take 1-2 tablets (0.5-1 mg) by mouth every 6 hours as needed for muscle spasms.    Class: E-Prescribe    Route: Oral    methocarbamol 1000 MG TABS  180 tablet 3 2024 --   [address]    Sig: Take 1,000 mg by mouth 4 times daily as needed for muscle spasms.    Class: E-Prescribe    Route: Oral    methylphenidate (RITALIN) 5 MG  tablet  10 tablet 0 2/27/2024 --   [place], [place] - [address] [place] [address] 1-555    Sig: Take 1 tablet (5 mg) by mouth 2 times daily as needed    Class: E-Prescribe    Earliest Fill Date: 2/27/2024    Route: Oral    methylPREDNISolone Na Suc, PF, (SOLU-MEDROL) 125 mg/2 mL injection [id number] mL 0 11/13/2024 11/30/2026   [facility]    Sig: Inject 2 mLs (125 mg) over 3-5 minutes into the vein via push as needed (infusion reaction). For RN use only.  Reconstitute vial. Draw up methylPREDNISolone in a syringe and administer.  Discard remainder of vial.    Notes to Pharmacy: Pt home emergency methyl pred expires on 11/2026    Route: Intravenous Push    Multiple Vitamins-Minerals (CENTRUM SILVER 50+MEN) TABS  -- --  --       Sig: as directed Orally    Class: Historical    naloxone (NARCAN) 4 MG/0.1ML nasal spray  -- -- 7/27/2023 --       Class: Historical    pantoprazole (PROTONIX) 40 MG EC tablet  60 tablet 4 8/8/2024 --   [address] - [address] [place] [address]    Sig: Take 1 tablet (40 mg) by mouth 2 times daily    Class: E-Prescribe    Route: Oral    Port Access Kit [id number] kit 0 12/18/2024 2/18/2025 12/3/2024 (Last dispensed before transfer)  [facility]    Sig: For nurse use only.  Do not remove items from bag.  Use for port access.  Do not place syringe on sterile field.    Route: Does not apply    Port Access Kit [id number] kit 0 11/13/2024 1/28/2025 12/3/2024 (Last dispensed before transfer)  [facility]    Sig: For nurse use only.  Do not remove items from bag.  Use for port access.  Do not place syringe on sterile field.    Route: Does not apply    prochlorperazine (COMPAZINE) 10 MG tablet  30 tablet 2 12/17/2024 --   [place], [place] - [address] [facility] 1[address]812    Sig: Take 1 tablet (10 mg) by mouth every 6 hours as needed for nausea or  vomiting.    Class: E-Prescribe    Route: Oral    Non-formulary Exception Code: Specific indication for non-formulary alternative    prochlorperazine (COMPAZINE) 10 MG tablet  30 tablet 2 3/15/2024 --   [facility] [place] [place] - [address] [facility]    Sig: Take 1 tablet (10 mg) by mouth every 6 hours as needed for nausea or vomiting    Class: E-Prescribe    Route: Oral    sodium chloride 0.9% infusion [id number] mL 2 12/5/2024 8/13/2025 12/3/2024 (Last dispensed before transfer)  [facility]    Sig: Infuse 250 mLs over 30 minutes into the vein every 28 (twenty-eight) days. zometa concomitant fluids. Given per therapy plan orders    Route: Infuse    sodium chloride 0.9% infusion [id number] mL 0 11/13/2024 8/30/2025 12/3/2024 (Last dispensed before transfer)  [facility]    Sig: Infuse 500 mLs into the vein as needed for other (infusion reaction). In case of mild reaction, administer via gravity at 20 mL/hr to keep vein open. In case of severe reaction, administer via gravity wide open on prime setting.    Notes to Pharmacy: Pt home emergency bag expires 8/2025    Route: Infuse    sodium chloride, PF, 0.9% PF flush [id number] mL 0 12/18/2024 2/18/2025 12/18/2024 (Fill in progress)  [facility]    Sig: Inject 10 mLs into the vein as needed for line flush. Flush IV before and after med administration as directed and/or at least every 24 hours, or prior to deaccessing for no further use and/or at least every 4 weeks when not accessed.    Route: Intravenous    sodium chloride, PF, 0.9% PF flush [id number] mL 0 11/13/2024 8/28/2025 12/18/2024 (Last dispensed before transfer)  [facility]    Sig: Inject 10 mLs into the vein as needed for other (infusion reaction). For RN use only as needed for infusion reaction    Notes to Pharmacy: Replace inga home kit on DT anually (sent last on 8/28/24)    Route: Intravenous     sodium chloride, PF, 0.9% PF flush [id number] mL 0 11/13/2024 1/28/2025 12/18/2024 (Last dispensed before transfer)  [facility]    Sig: Inject 10 mLs into the vein as needed for line flush. Flush IV before and after med administration as directed and/or at least every 24 hours, or prior to deaccessing for no further use and/or at least every 4 weeks when not accessed.    Route: Intravenous    sterile water, preservative free, injection [id number] mL 0 10/23/2024 8/28/2025   [facility]    Sig: Use 2.2 mLs for reconstitution as needed (with alteplase for catheter occlusion). Direct diluent stream into powder. Mix by gently swirling until dissolved. DO NOT SHAKE. Discard remainder of vial.    Route: for reconstitution    vitamin D3 (CHOLECALCIFEROL) 50 mcg (2000 units) tablet  90 tablet 1 10/16/2024 --   [address]    Sig: Take 1 tablet (50 mcg) by mouth daily.    Class: E-Prescribe    Route: Oral    zoledronic acid (ZOMETA) 4 MG/100ML infusion [id number] mL 2 12/5/2024 8/13/2025 12/3/2024 (Last dispensed before transfer)  [facility]    Sig: Infuse 100 mLs (4 mg) into the vein every 28 days. Infuse via gravity. Given per therapy plan orders    Route: Infuse            ALLERGIES:   Patient has no known allergies.    ROS:  Negative unless otherwise stated in HPI.      Physical Examination:   VITALS:   There were no vitals taken for this visit.    CONSTITUTIONAL: healthy, alert and no distress.   PSYCHIATRIC: mentation appears normal and affect normal.   EYES: No jaundice or pallor.   RESP: No audible cough or wheeze.     Labs:    BMP RESULTS:  Lab Results   Component Value Date     01/03/2025    POTASSIUM 3.9 01/03/2025    CHLORIDE 102 01/03/2025    CO2 24 01/03/2025    ANIONGAP 11 01/03/2025     (H) 01/03/2025     (H) 09/05/2023    BUN 19.2 01/03/2025    CR 0.54  (L) 01/03/2025    GFRESTIMATED >90 01/03/2025    DENISE 8.8 01/03/2025        CBC RESULTS:  Lab Results   Component Value Date    WBC 4.1 01/03/2025    RBC 3.50 (L) 01/03/2025    HGB 9.8 (L) 01/03/2025    HCT 30.5 (L) 01/03/2025    MCV 87 01/03/2025    MCH 28.0 01/03/2025    MCHC 32.1 01/03/2025    RDW 17.0 (H) 01/03/2025     01/03/2025       INR/PTT:  Lab Results   Component Value Date    INR 1.2 (L) 08/28/2024    INR 1.19 (H) 08/28/2024       Diagnostic studies:   Lumbar MRI 12/13/24 - reviewed and interpreted by me. New compression fracture of inferior endplate L3. Compression fracture of superior endplate L2. Old compression fracture at L4 with changes of vertebral augmentation. Diffuse osseous metastases are noted.    L3 inferior endplate fx below      L2 superior endplate fx below      Radiology interpretation of thoracic spine:  IMPRESSION:  THORACIC SPINE MRI:  1.  Extensive thoracic vertebral body metastatic disease.  2.  Mild presumed pathologic fracture T7.  3.  No cord compression or tumor extension into spinal canal.    Assessment   60 yo w/ metastatic pancreatic cancer with diffuse osseous metastases throughout the spine, new pathologic fractures involving L2 and L3 vertebral bodies and with corresponding pain in this area that has been refractory to medical management. More specifically, although he rates his pain at worst as a 3-4/10 most days, he has modified his lifestyle and primarily stays home and sits on the couch. Furthermore, that improvement in pain control has been achieved with an aggressive pain regimen, which ideally could be scaled back with intervention. He previously had an excellent response at L4. I recommended treatment of L2 and L3 levels with bone ablation and vertebral augmentation.     He does also have mild irregularlity of the endplate at T7 but give his pain is most bothersome in the low back I would not recommend treating T7. Furthermore, there is minimal height loss at  T7 on MRI.    I discussed the benefits, risks, and alternative treatment options. I recommended RF ablation and vertebroplasty of L2 and L3. Alternative treatment option would include continued optimal medical therapy. Benefits of the procedure include potentially resolution/improvement in pain, stabilization of the vertebral body, and some degree of local tumor control. The risks of the procedure discussed included but are not limited to thermal injury, failure to respond/incomplete improvement in pain, cement migration/leakage, fracture, bleeding, infection, and nerve root irritation and/or injury at worst including a spinal cord injury. Patient's questions answered. He elected to proceed with the intervention. I shared/disclosed to the patient that I have been a paid consultant for the company ([employer]) that I use their product for this treatment. I also offered to the patient to receive treatment by another physician who does not not have this conflict of interest. The patient expressed understanding and requested treatment to be performed by the author.    He is on Eliquis which will need to be held.    Plan   - Schedule for RF ablation and vertebroplasty of L2 and L3.      I, Brice Seymour, was present with the resident/fellow/SUZETTE during the history and exam. I discussed the case with the resident/fellow/SUZETTE and agree with the findings as documented in the assessment and plan.    Brice Seymour MD    Vascular and Interventional Radiolgy  [facility]    I spent a total of 20 minutes face-to-face with Gallo Navarro during today's office visit. Over 50% of this time was spent counseling the patient and/or coordinating care. See note for details.     An additional 20 minutes spent on the date of the encounter doing chart review, history and exam, discussion of the care plan with other providers, documentation and further activities as noted above        CC  Patient Care Team:  David  MD Akil as PCP - General (Internal Medicine)  Akil Hernandez MD as Assigned PCP  Efra Lynn, DARELL as Specialty Care Coordinator (Hematology & Oncology)  Tre York MD as MD (Gastroenterology)  Nellie Hurt MD as Assigned Palliative Care Provider  Christos Greenberg MD as MD (Gastroenterology)  Megan Farrell APRN CNP as Assigned Cancer Care Provider  Becca Anguiano as Physician (Radiation Oncology)  Luis Haile MD as Berta Alex PA-C as Assigned Endocrinology Provider  Zen Sawyer MD as MD (Cardiovascular Disease)  Zen Sawyer MD as MD (Cardiovascular Disease)  Fanny Chanel MD as MD (Cardiovascular Disease)  Tam Khan MD as MD (Cardiovascular Disease)  Tam Khan MD as Assigned Heart and Vascular Provider  Torito Ware DPM as Assigned Musculoskeletal Provider  Delorsi Ambrose RN as Home Infusion   Luis Haile MD as Home Infusion Following Provider  Nellie Hurt MD as MD (Hospice And Palliative Care)  Megan Houston RD as Diabetes Educator (Dietitian, Registered)  NELLIE HURT        Video-Visit Details     Type of service:  Video Visit     Video Start and End Time:  Joined the call at 1/7/2025, 9:16:42 am.  Left the call at 1/7/2025, 9:36:21 am.  You were on the call for 19 minutes 38 seconds .    Originating Location (pt. Location): Home     Distant Location (provider location):  [facility] [place]      Platform used for Video Visit: Lorna              Again, thank you for allowing me to participate in the care of your patient.      Sincerely,    Brice Seymour MD

## 2025-01-08 PROCEDURE — S9330 HIT CONT CHEM DIEM: HCPCS

## 2025-01-08 NOTE — TELEPHONE ENCOUNTER
Declined by Annie. [facility] requested facesheet and provider info, asked for fax number and will fax when received.

## 2025-01-09 ENCOUNTER — VIRTUAL VISIT (OUTPATIENT)
Dept: EDUCATION SERVICES | Facility: CLINIC | Age: 60
End: 2025-01-09
Payer: COMMERCIAL

## 2025-01-09 DIAGNOSIS — Z79.4 TYPE 2 DIABETES MELLITUS WITHOUT COMPLICATION, WITH LONG-TERM CURRENT USE OF INSULIN (H): ICD-10-CM

## 2025-01-09 DIAGNOSIS — E11.9 TYPE 2 DIABETES MELLITUS WITHOUT COMPLICATION, WITH LONG-TERM CURRENT USE OF INSULIN (H): ICD-10-CM

## 2025-01-09 PROCEDURE — S9330 HIT CONT CHEM DIEM: HCPCS

## 2025-01-09 NOTE — Clinical Note
TIA Holder will see you next on , I told them to reach out sooner if he is having any issues or with low blood sugars.  SHANTANU Shell [facility] Diabetes Education Department [facility] [place] Phone: [phone] [phone]

## 2025-01-09 NOTE — PROGRESS NOTES
Virtual Visit Details    Type of service:  Telephone Visit   Phone call duration: 18 minutes   Originating Location (pt. Location): Home    Distant Location (provider location):  Off-site  Telephone visit completed due to the patient did not consent to a video visit.    Diabetes Self-Management Education & Support    Presents for: CGM Review    Type of Service: Telephone Visit    Originating Location (Patient Location): Home  Distant Location (Provider Location): Offsite  Mode of Communication:  Telephone    Telephone Visit Start Time:  9:30 am  Telephone Visit End Time (telephone visit stop time): 9:48 am    How would patient like to obtain AVS? MyChart      Assessment  Gallo and Violet report that BG has been a little bit better since switching from Lantus to NPH. Still seeing high BG during the day.  Gallo has a procedure tomorrow in which he will have to fast, we discussed the insulin plan that was provided by his vascular surgeon which is to take 80% of NPH dose tonight and skip NPH in the morning.     We discussed a plan (approved by Berta Cid) to increase NPH in the morning from 10 units to 20 units. Gallo and Violet are in agreement.     See CGM Reports in Monitoring section below.      Glucose Patterns & Trends:  Daytime hyperglycemia with steroid use    Care Plan     Patient verbalized understanding of diabetes self-management education concepts discussed, opportunities for ongoing education and support, and recommendations provided today.    Plan    Increase NPH: 10-0-0-10 --> 20-0-0-10 (approved by Berta Cid)    Pre-op plan for insulin before procedure tomorrow:  Take 80% of NPH tonight  No insulin morning of procedure    Topics to cover at upcoming visits: Monitoring and Taking Medication    Follow-up:  Upcoming Diabetes Ed Appointments     Visit Type Date Time Department    DIABETES ED 1/9/2025  9:30 AM [facility] DIABETES ED        See Care Plan for co-developed, patient-state behavior change  goals.    Education Materials Provided:  No new materials provided today      Subjective/Objective  Gallo is an 59 year old year old, presenting for the following diabetes education related to: Presents for: CGM Review  Accompanied by: Self, Spouse  Diabetes education in the past 24mo: (Patient-Rptd) Yes  Focus of Visit: Taking Medication  Diabetes type: (Patient-Rptd) Other (see Comments)  Please elaborate:: (Patient-Rptd) Steroid effects on chemo  Disease course: (Patient-Rptd) Other (see Comments)  Please elaborate:: (Patient-Rptd) Easier due to CGM  How confident are you filling out medical forms by yourself:: (Patient-Rptd) Quite a bit  Diabetes management related comments/concerns: (Patient-Rptd) Yes getting recommendatios when im above 400 at night.  Transportation concerns: Yes  Other concerns:: None, Physical impairment  Cultural Influences/Ethnic Background:  Not  or     Diabetes Symptoms & Complications:  Diabetes Related Symptoms: (Patient-Rptd) Fatigue, Polydipsia (increased thirst), Visual change  Weight trend: (Patient-Rptd) Fluctuating  Symptom course: (Patient-Rptd) Stable  Disease course: (Patient-Rptd) Other (see Comments)  Please elaborate:: (Patient-Rptd) Easier due to CGM  Complications assessed today?: No    Patient Problem List and Family Medical History reviewed for relevant medical history, current medical status, and diabetes risk factors.    Vitals:  There were no vitals taken for this visit.  Estimated body mass index is 20.61 kg/m  as calculated from the following:    Height as of 1/7/25: 1.829 m (6').    Weight as of 1/7/25: 68.9 kg (152 lb).   Last 3 BP:   BP Readings from Last 3 Encounters:   01/05/25 110/70   01/03/25 114/77   12/20/24 114/76       History   Smoking Status    Never   Smokeless Tobacco    Never       Labs:  Lab Results   Component Value Date    A1C 6.8 10/04/2023     Lab Results   Component Value Date     01/03/2025     09/05/2023     No  "results found for: \"LDL\"  No results found for: \"HDL\"]  GFR Estimate   Date Value Ref Range Status   01/03/2025 >90 >60 mL/min/1.73m2 Final     Comment:     eGFR calculated using 2021 CKD-EPI equation.     No results found for: \"GFRESTBLACK\"  Lab Results   Component Value Date    CR 0.54 01/03/2025     No results found for: \"MICROALBUMIN\"      Monitoring:  Monitoring Assessed Today: Yes  Did patient bring glucose meter to appointment? : Yes  Blood Glucose Meter: (Patient-Rptd) CGM, Other  Times checking blood sugar at home (number): (Patient-Rptd) 5+  Times checking blood sugar at home (per): (Patient-Rptd) Day  Blood glucose trend: Fluctuating                        Taking Medications:  Diabetes Medication(s)       Insulin       insulin glargine (LANTUS PEN) 100 UNIT/ML pen Inject 8 Units subcutaneously at bedtime.     insulin  UNIT/ML injection 10 units around 9 am (with steroids) 10 units around 9 pm          Taking Medication Assessed Today: Yes  Current Treatments: (Patient-Rptd) Insulin Injections, Oral Medication (taken by mouth)  Problems taking diabetes medications regularly?: No  Diabetes medication side effects?: No    Problem Solving:  Problem Solving Assessed Today: Yes  Is the patient at risk for hypoglycemia?: Yes  Hypoglycemia Frequency: Rarely  Hypoglycemia Treatment: Glucose (tablets or gel)  Does patient have glucagon emergency kit?: No  Is the patient at risk for DKA?: No          Megan Houston RD  Time Spent: 18 minutes  Encounter Type: Individual    Any diabetes medication dose changes were made via the CDCES Standing Orders under the patient's referring provider.  "

## 2025-01-09 NOTE — LETTER
1/9/2025      Gallo Navarro  [address] [address] [place] [id number]      Dear Colleague,    Thank you for referring your patient, Gallo Navarro, to the [facility]. Please see a copy of my visit note below.    Virtual Visit Details    Type of service:  Telephone Visit   Phone call duration: 18 minutes   Originating Location (pt. Location): Home    Distant Location (provider location):  Off-site  Telephone visit completed due to the patient did not consent to a video visit.    Diabetes Self-Management Education & Support    Presents for: CGM Review    Type of Service: Telephone Visit    Originating Location (Patient Location): Home  Distant Location (Provider Location): Offsite  Mode of Communication:  Telephone    Telephone Visit Start Time:  9:30 am  Telephone Visit End Time (telephone visit stop time): 9:48 am    How would patient like to obtain AVS? MyChart      Assessment  Gallo and Violet report that BG has been a little bit better since switching from Lantus to NPH. Still seeing high BG during the day.  Gallo has a procedure tomorrow in which he will have to fast, we discussed the insulin plan that was provided by his vascular surgeon which is to take 80% of NPH dose tonight and skip NPH in the morning.     We discussed a plan (approved by Berta Cid) to increase NPH in the morning from 10 units to 20 units. Gallo and Violet are in agreement.     See CGM Reports in Monitoring section below.      Glucose Patterns & Trends:  Daytime hyperglycemia with steroid use    Care Plan     Patient verbalized understanding of diabetes self-management education concepts discussed, opportunities for ongoing education and support, and recommendations provided today.    Plan    Increase NPH: 10-0-0-10 --> 20-0-0-10 (approved by Berta Cid)    Pre-op plan for insulin before procedure tomorrow:  Take 80% of NPH tonight  No insulin morning of procedure    Topics to cover at upcoming visits: Monitoring  and Taking Medication    Follow-up:  Upcoming Diabetes Ed Appointments     Visit Type Date Time Department    DIABETES ED 1/9/2025  9:30 AM  DIABETES ED        See Care Plan for co-developed, patient-state behavior change goals.    Education Materials Provided:  No new materials provided today      Subjective/Objective  Gallo is an 59 year old year old, presenting for the following diabetes education related to: Presents for: CGM Review  Accompanied by: Self, Spouse  Diabetes education in the past 24mo: (Patient-Rptd) Yes  Focus of Visit: Taking Medication  Diabetes type: (Patient-Rptd) Other (see Comments)  Please elaborate:: (Patient-Rptd) Steroid effects on chemo  Disease course: (Patient-Rptd) Other (see Comments)  Please elaborate:: (Patient-Rptd) Easier due to CGM  How confident are you filling out medical forms by yourself:: (Patient-Rptd) Quite a bit  Diabetes management related comments/concerns: (Patient-Rptd) Yes getting recommendatios when im above 400 at night.  Transportation concerns: Yes  Other concerns:: None, Physical impairment  Cultural Influences/Ethnic Background:  Not  or     Diabetes Symptoms & Complications:  Diabetes Related Symptoms: (Patient-Rptd) Fatigue, Polydipsia (increased thirst), Visual change  Weight trend: (Patient-Rptd) Fluctuating  Symptom course: (Patient-Rptd) Stable  Disease course: (Patient-Rptd) Other (see Comments)  Please elaborate:: (Patient-Rptd) Easier due to CGM  Complications assessed today?: No    Patient Problem List and Family Medical History reviewed for relevant medical history, current medical status, and diabetes risk factors.    Vitals:  There were no vitals taken for this visit.  Estimated body mass index is 20.61 kg/m  as calculated from the following:    Height as of 1/7/25: 1.829 m (6').    Weight as of 1/7/25: 68.9 kg (152 lb).   Last 3 BP:   BP Readings from Last 3 Encounters:   01/05/25 110/70   01/03/25 114/77   12/20/24 114/76  "      History   Smoking Status     Never   Smokeless Tobacco     Never       Labs:  Lab Results   Component Value Date    A1C 6.8 10/04/2023     Lab Results   Component Value Date     01/03/2025     09/05/2023     No results found for: \"LDL\"  No results found for: \"HDL\"]  GFR Estimate   Date Value Ref Range Status   01/03/2025 >90 >60 mL/min/1.73m2 Final     Comment:     eGFR calculated using 2021 CKD-EPI equation.     No results found for: \"GFRESTBLACK\"  Lab Results   Component Value Date    CR 0.54 01/03/2025     No results found for: \"MICROALBUMIN\"      Monitoring:  Monitoring Assessed Today: Yes  Did patient bring glucose meter to appointment? : Yes  Blood Glucose Meter: (Patient-Rptd) CGM, Other  Times checking blood sugar at home (number): (Patient-Rptd) 5+  Times checking blood sugar at home (per): (Patient-Rptd) Day  Blood glucose trend: Fluctuating                        Taking Medications:  Diabetes Medication(s)       Insulin       insulin glargine (LANTUS PEN) 100 UNIT/ML pen Inject 8 Units subcutaneously at bedtime.     insulin  UNIT/ML injection 10 units around 9 am (with steroids) 10 units around 9 pm          Taking Medication Assessed Today: Yes  Current Treatments: (Patient-Rptd) Insulin Injections, Oral Medication (taken by mouth)  Problems taking diabetes medications regularly?: No  Diabetes medication side effects?: No    Problem Solving:  Problem Solving Assessed Today: Yes  Is the patient at risk for hypoglycemia?: Yes  Hypoglycemia Frequency: Rarely  Hypoglycemia Treatment: Glucose (tablets or gel)  Does patient have glucagon emergency kit?: No  Is the patient at risk for DKA?: No          Megan Houston RD  Time Spent: 18 minutes  Encounter Type: Individual    Any diabetes medication dose changes were made via the CDCES Standing Orders under the patient's referring provider.      Again, thank you for allowing me to participate in the care of your patient.  "       Sincerely,        Megan Houston RD    Electronically signed

## 2025-01-10 ENCOUNTER — HOSPITAL ENCOUNTER (OUTPATIENT)
Facility: CLINIC | Age: 60
Discharge: HOME | End: 2025-01-10
Attending: RADIOLOGY | Admitting: RADIOLOGY
Payer: COMMERCIAL

## 2025-01-10 ENCOUNTER — APPOINTMENT (OUTPATIENT)
Dept: MEDSURG UNIT | Facility: CLINIC | Age: 60
End: 2025-01-10
Attending: RADIOLOGY
Payer: COMMERCIAL

## 2025-01-10 ENCOUNTER — HOME INFUSION (OUTPATIENT)
Dept: HOME HEALTH SERVICES | Facility: HOME HEALTH | Age: 60
End: 2025-01-10
Payer: COMMERCIAL

## 2025-01-10 ENCOUNTER — APPOINTMENT (OUTPATIENT)
Dept: INTERVENTIONAL RADIOLOGY/VASCULAR | Facility: CLINIC | Age: 60
End: 2025-01-10
Attending: RADIOLOGY
Payer: COMMERCIAL

## 2025-01-10 VITALS
RESPIRATION RATE: 14 BRPM | HEIGHT: 72 IN | WEIGHT: 152 LBS | OXYGEN SATURATION: 95 % | TEMPERATURE: 98.4 F | DIASTOLIC BLOOD PRESSURE: 76 MMHG | SYSTOLIC BLOOD PRESSURE: 108 MMHG | BODY MASS INDEX: 20.59 KG/M2 | HEART RATE: 110 BPM

## 2025-01-10 DIAGNOSIS — C25.0 MALIGNANT NEOPLASM OF HEAD OF PANCREAS (H): ICD-10-CM

## 2025-01-10 DIAGNOSIS — M84.58XG PATHOLOGICAL FRACTURE OF VERTEBRAE IN NEOPLASTIC DISEASE WITH DELAYED HEALING: ICD-10-CM

## 2025-01-10 LAB
GLUCOSE BLDC GLUCOMTR-MCNC: 112 MG/DL (ref 70–99)
INR PPP: 1.61 (ref 0.85–1.15)

## 2025-01-10 PROCEDURE — 99152 MOD SED SAME PHYS/QHP 5/>YRS: CPT

## 2025-01-10 PROCEDURE — 250N000013 HC RX MED GY IP 250 OP 250 PS 637: Performed by: RADIOLOGY

## 2025-01-10 PROCEDURE — 272N000718 HC KIT CR34

## 2025-01-10 PROCEDURE — 999N000133 HC STATISTIC PP CARE STAGE 2

## 2025-01-10 PROCEDURE — 250N000011 HC RX IP 250 OP 636: Mod: JZ | Performed by: NURSE PRACTITIONER

## 2025-01-10 PROCEDURE — 82962 GLUCOSE BLOOD TEST: CPT

## 2025-01-10 PROCEDURE — 36415 COLL VENOUS BLD VENIPUNCTURE: CPT | Performed by: NURSE PRACTITIONER

## 2025-01-10 PROCEDURE — S9330 HIT CONT CHEM DIEM: HCPCS

## 2025-01-10 PROCEDURE — 85610 PROTHROMBIN TIME: CPT | Performed by: NURSE PRACTITIONER

## 2025-01-10 PROCEDURE — 27299 UNLISTED PX PELVIS/HIP JOINT: CPT

## 2025-01-10 PROCEDURE — 20982 ABLATE BONE TUMOR(S) PERQ: CPT | Mod: GC | Performed by: RADIOLOGY

## 2025-01-10 PROCEDURE — C1889 IMPLANT/INSERT DEVICE, NOC: HCPCS

## 2025-01-10 PROCEDURE — 99152 MOD SED SAME PHYS/QHP 5/>YRS: CPT | Mod: GC | Performed by: RADIOLOGY

## 2025-01-10 PROCEDURE — 999N000142 HC STATISTIC PROCEDURE PREP ONLY

## 2025-01-10 PROCEDURE — 272N000497 HC NEEDLE CR16

## 2025-01-10 PROCEDURE — 22514 PERQ VERTEBRAL AUGMENTATION: CPT | Mod: GC | Performed by: RADIOLOGY

## 2025-01-10 PROCEDURE — 272N000495 HC NEEDLE CR14

## 2025-01-10 PROCEDURE — 22515 PERQ VERTEBRAL AUGMENTATION: CPT | Mod: GC | Performed by: RADIOLOGY

## 2025-01-10 PROCEDURE — 99153 MOD SED SAME PHYS/QHP EA: CPT

## 2025-01-10 PROCEDURE — 20982 ABLATE BONE TUMOR(S) PERQ: CPT

## 2025-01-10 PROCEDURE — 250N000011 HC RX IP 250 OP 636: Performed by: RADIOLOGY

## 2025-01-10 RX ORDER — NALOXONE HYDROCHLORIDE 0.4 MG/ML
0.4 INJECTION, SOLUTION INTRAMUSCULAR; INTRAVENOUS; SUBCUTANEOUS
Status: DISCONTINUED | OUTPATIENT
Start: 2025-01-10 | End: 2025-01-10 | Stop reason: HOSPADM

## 2025-01-10 RX ORDER — HEPARIN SODIUM (PORCINE) LOCK FLUSH IV SOLN 100 UNIT/ML 100 UNIT/ML
500 SOLUTION INTRAVENOUS EVERY 8 HOURS
Status: DISCONTINUED | OUTPATIENT
Start: 2025-01-10 | End: 2025-01-10 | Stop reason: HOSPADM

## 2025-01-10 RX ORDER — LIDOCAINE 40 MG/G
CREAM TOPICAL
Status: DISCONTINUED | OUTPATIENT
Start: 2025-01-10 | End: 2025-01-10 | Stop reason: HOSPADM

## 2025-01-10 RX ORDER — OXYCODONE HYDROCHLORIDE 10 MG/1
10 TABLET ORAL EVERY 4 HOURS PRN
Status: DISCONTINUED | OUTPATIENT
Start: 2025-01-10 | End: 2025-01-10 | Stop reason: HOSPADM

## 2025-01-10 RX ORDER — ONDANSETRON 2 MG/ML
4 INJECTION INTRAMUSCULAR; INTRAVENOUS
Status: DISCONTINUED | OUTPATIENT
Start: 2025-01-10 | End: 2025-01-10 | Stop reason: HOSPADM

## 2025-01-10 RX ORDER — BUPIVACAINE HYDROCHLORIDE 5 MG/ML
1-30 INJECTION, SOLUTION EPIDURAL; INTRACAUDAL ONCE
Status: COMPLETED | OUTPATIENT
Start: 2025-01-10 | End: 2025-01-10

## 2025-01-10 RX ORDER — CEFAZOLIN SODIUM 2 G/100ML
2 INJECTION, SOLUTION INTRAVENOUS
Status: COMPLETED | OUTPATIENT
Start: 2025-01-10 | End: 2025-01-10

## 2025-01-10 RX ORDER — FLUMAZENIL 0.1 MG/ML
0.2 INJECTION, SOLUTION INTRAVENOUS
Status: DISCONTINUED | OUTPATIENT
Start: 2025-01-10 | End: 2025-01-10 | Stop reason: HOSPADM

## 2025-01-10 RX ORDER — NICOTINE POLACRILEX 4 MG
15-30 LOZENGE BUCCAL
Status: DISCONTINUED | OUTPATIENT
Start: 2025-01-10 | End: 2025-01-10 | Stop reason: HOSPADM

## 2025-01-10 RX ORDER — NALOXONE HYDROCHLORIDE 0.4 MG/ML
0.2 INJECTION, SOLUTION INTRAMUSCULAR; INTRAVENOUS; SUBCUTANEOUS
Status: DISCONTINUED | OUTPATIENT
Start: 2025-01-10 | End: 2025-01-10 | Stop reason: HOSPADM

## 2025-01-10 RX ORDER — DEXTROSE MONOHYDRATE 25 G/50ML
25-50 INJECTION, SOLUTION INTRAVENOUS
Status: DISCONTINUED | OUTPATIENT
Start: 2025-01-10 | End: 2025-01-10 | Stop reason: HOSPADM

## 2025-01-10 RX ORDER — CALCIUM CARBONATE 500(1250)
1 TABLET ORAL 2 TIMES DAILY
COMMUNITY

## 2025-01-10 RX ORDER — FENTANYL CITRATE 50 UG/ML
25-50 INJECTION, SOLUTION INTRAMUSCULAR; INTRAVENOUS EVERY 5 MIN PRN
Status: DISCONTINUED | OUTPATIENT
Start: 2025-01-10 | End: 2025-01-10 | Stop reason: HOSPADM

## 2025-01-10 RX ADMIN — FENTANYL CITRATE 50 MCG: 50 INJECTION, SOLUTION INTRAMUSCULAR; INTRAVENOUS at 11:21

## 2025-01-10 RX ADMIN — FENTANYL CITRATE 50 MCG: 50 INJECTION, SOLUTION INTRAMUSCULAR; INTRAVENOUS at 11:24

## 2025-01-10 RX ADMIN — FENTANYL CITRATE 50 MCG: 50 INJECTION, SOLUTION INTRAMUSCULAR; INTRAVENOUS at 11:32

## 2025-01-10 RX ADMIN — FENTANYL CITRATE 25 MCG: 50 INJECTION, SOLUTION INTRAMUSCULAR; INTRAVENOUS at 12:34

## 2025-01-10 RX ADMIN — FENTANYL CITRATE 50 MCG: 50 INJECTION, SOLUTION INTRAMUSCULAR; INTRAVENOUS at 12:02

## 2025-01-10 RX ADMIN — MIDAZOLAM 2 MG: 1 INJECTION INTRAMUSCULAR; INTRAVENOUS at 11:21

## 2025-01-10 RX ADMIN — FENTANYL CITRATE 50 MCG: 50 INJECTION, SOLUTION INTRAMUSCULAR; INTRAVENOUS at 11:36

## 2025-01-10 RX ADMIN — OXYCODONE HYDROCHLORIDE 10 MG: 10 TABLET ORAL at 10:22

## 2025-01-10 RX ADMIN — MIDAZOLAM 0.5 MG: 1 INJECTION INTRAMUSCULAR; INTRAVENOUS at 12:16

## 2025-01-10 RX ADMIN — FENTANYL CITRATE 100 MCG: 50 INJECTION, SOLUTION INTRAMUSCULAR; INTRAVENOUS at 11:10

## 2025-01-10 RX ADMIN — BUPIVACAINE HYDROCHLORIDE 50 ML: 5 INJECTION, SOLUTION EPIDURAL; INTRACAUDAL at 12:41

## 2025-01-10 RX ADMIN — CEFAZOLIN SODIUM 2 G: 2 INJECTION, SOLUTION INTRAVENOUS at 10:03

## 2025-01-10 RX ADMIN — MIDAZOLAM 0.5 MG: 1 INJECTION INTRAMUSCULAR; INTRAVENOUS at 11:32

## 2025-01-10 RX ADMIN — MIDAZOLAM 0.5 MG: 1 INJECTION INTRAMUSCULAR; INTRAVENOUS at 11:36

## 2025-01-10 RX ADMIN — MIDAZOLAM 1 MG: 1 INJECTION INTRAMUSCULAR; INTRAVENOUS at 11:25

## 2025-01-10 RX ADMIN — FENTANYL CITRATE 50 MCG: 50 INJECTION, SOLUTION INTRAMUSCULAR; INTRAVENOUS at 11:40

## 2025-01-10 RX ADMIN — HEPARIN SODIUM (PORCINE) LOCK FLUSH IV SOLN 100 UNIT/ML 500 UNITS: 100 SOLUTION at 15:36

## 2025-01-10 ASSESSMENT — ACTIVITIES OF DAILY LIVING (ADL)
ADLS_ACUITY_SCORE: 54

## 2025-01-10 NOTE — IR NOTE
Patient Name: Gallo Navarro  Medical Record Number: [id number]  Today's Date: 1/10/2025    Procedure: L2 and L3 RFA and Vertebral Augmentation  Proceduralist: Dr. ILEANA Seymour, Dr. ILEANA Alexander  Pathology present: No    Procedure Start: 1125  Procedure end: 1242  Sedation medications administered: 4.5 mg midazolam, 425 mcg fentanyl    Report given to: ADWOA Hampton RN  : No    Other Notes: Pt arrived to IR room 3 from . Consent reviewed. Pt denies any questions or concerns regarding procedure. Pt positioned prone and monitored per protocol. Pt tolerated procedure without any noted complications. Pt transferred back to . 2 hour bedrest per Dr. Seymour.

## 2025-01-10 NOTE — DISCHARGE INSTRUCTIONS
Discharge Instructions for Vertebroplasty   You had a vertebroplasty on the bones in your spine (vertebrae). That means a healthcare provider injected surgical cement into the fractured vertebrae of your spine. This was done to help ease back pain caused by fractured vertebrae. The procedure will also help prevent the fracture from getting worse. Here are some home care instructions for you to follow after the surgery.     Activity:  If a brace was prescribed to you, wear it as directed. You usually don't need to wear the brace at night. And only bend within the limits of your brace.     Take short walks. Start by walking for 5 minutes and slowly build up your time and distance.     Don't drive for 2 days after the procedure, or as directed by your healthcare provider. And never drive while you are taking narcotic pain medicine.     Don't do any heavy lifting for 3 months (nothing heavier than 5 pounds). After 3 months, you can slowly increase your lifting to normal unless directed otherwise by your provider.     Home care:  Take your medicine exactly as directed. Call your healthcare provider if you have side effects.     Remove the small bandages on your cut (incision) after 24 to 48 hours or as directed by your provider. Often there are no stitches to be removed.     Wait 1 to 2 days before showering or taking a bath. And don't swim in a pool or sit in a hot tub until your provider tells you it's OK.     Have someone help apply an ice pack to ease the pain around the incisions. Leave the ice pack in place for 20 minutes, then leave it off for 20 minutes. Pain at the incision sites may last for a few days. To make an ice pack, put ice cubes in a plastic bag that seals at the top. Wrap the bag in a clean, thin towel or cloth. Never put ice or an ice pack directly on the skin.     Keep your head raised 30  when lying down for 1 to 2 days after the surgery.     Follow-up care:  Follow up with your healthcare provide,  or as advised.     Call 911:  Call 911 right away if you have:     -Chest pain     -Shortness of breath     -Trouble controlling your bladder or bowels     -Trouble walking      When to call your healthcare provider:  Call your healthcare provider right away if you have:     -Fever of 100.4  (38 C) or higher, or as directed by your provider     -Shaking chills     -Severe pain or more redness, swelling, drainage, or warmth around the incision sites     -Weakness, numbness, or tingling in your legs      *IF PERFORMED BY [facility] INTERVENTIONAL RADIOLOGY DEPARTMENT*    Procedure Physician: Dr. Seymour  Date of procedure: 1/10/2025    Telephone Numbers: [phone] Monday- Friday 7:30am-4:00pm  [phone] After 4:00pm Mon-Fri, Weekends, & Holidays. Ask for the Interventional Radiologist On Call. Someone is available 24hrs/day.  [facility] Toll Free Number: [phone] Monday-Friday 8:00am-4:30pm

## 2025-01-10 NOTE — PRE-PROCEDURE
GENERAL PRE-PROCEDURE:   Procedure:  L2/L3 RFA and augmentation  Date/Time:  1/10/2025 10:55 AM    Verbal consent obtained?: Yes    Written consent obtained?: Yes    Risks and benefits: Risks, benefits and alternatives were discussed    Consent given by:  Patient  Patient states understanding of procedure being performed: Yes    Patient's understanding of procedure matches consent: Yes    Procedure consent matches procedure scheduled: Yes    Expected level of sedation:  Moderate  Appropriately NPO:  Yes  ASA Class:  2  Mallampati  :  Grade 2- soft palate, base of uvula, tonsillar pillars, and portion of posterior pharyngeal wall visible  Lungs:  Lungs clear with good breath sounds bilaterally  Heart:  Normal heart sounds and rate  History & Physical reviewed:  History and physical reviewed and no updates needed  Statement of review:  I have reviewed the lab findings, diagnostic data, medications, and the plan for sedation

## 2025-01-10 NOTE — DISCHARGE SUMMARY
Pt discharged to home. VSS on RA. Mid lower back site x4 C/D/I. At one point developed a hematoma, pressure dressing applied by Alysha Alexander. Up ambulating w difficulty d/t pain. Port HL. Tolerating PO intake. Discharge instructions reviewed and all questions answered. Pt escorted to the main entrance via wheelchair where him and his wife waited for .

## 2025-01-10 NOTE — PROGRESS NOTES
Pt arrived back from lumbar ablation. VSS on RA, c/o pain as 10/10. Pt took home hydromorphone and liquid tylenol. 4 puncture sites to mid lower back, C/D/I, negative for a hematoma. Tolerating PO intake. BS 76, taking apple juice. Two hour bedrest, off bedrest at 1445.

## 2025-01-11 PROCEDURE — S9330 HIT CONT CHEM DIEM: HCPCS

## 2025-01-12 PROCEDURE — S9330 HIT CONT CHEM DIEM: HCPCS

## 2025-01-13 ENCOUNTER — TELEPHONE (OUTPATIENT)
Dept: RADIOLOGY | Facility: CLINIC | Age: 60
End: 2025-01-13
Payer: COMMERCIAL

## 2025-01-13 DIAGNOSIS — G89.3 CANCER ASSOCIATED PAIN: ICD-10-CM

## 2025-01-13 DIAGNOSIS — E11.9 TYPE 2 DIABETES MELLITUS WITHOUT COMPLICATION, WITH LONG-TERM CURRENT USE OF INSULIN (H): ICD-10-CM

## 2025-01-13 DIAGNOSIS — Z79.4 TYPE 2 DIABETES MELLITUS WITHOUT COMPLICATION, WITH LONG-TERM CURRENT USE OF INSULIN (H): ICD-10-CM

## 2025-01-13 DIAGNOSIS — K31.1 GASTRIC OUTLET OBSTRUCTION: ICD-10-CM

## 2025-01-13 DIAGNOSIS — C25.0 MALIGNANT NEOPLASM OF HEAD OF PANCREAS (H): ICD-10-CM

## 2025-01-13 PROCEDURE — S9330 HIT CONT CHEM DIEM: HCPCS

## 2025-01-13 RX ORDER — PANTOPRAZOLE SODIUM 40 MG/1
40 TABLET, DELAYED RELEASE ORAL 2 TIMES DAILY
Qty: 60 TABLET | Refills: 3 | Status: SHIPPED | OUTPATIENT
Start: 2025-01-13

## 2025-01-13 NOTE — TELEPHONE ENCOUNTER
Pending Prescriptions:                       Disp   Refills    pantoprazole (PROTONIX) 40 MG EC tablet [*60 tab*0            Sig: Take 1 tablet (40 mg) by mouth 2 times daily    Last prescribing provider: Berta Proctor    Last clinic visit date: 12/31/24 w/Megan Farrell    Recommendations for requested medication (if none, N/A): N/A    Any other pertinent information (if none, N/A): N/A    Refilled: Y/N, if NO, why?

## 2025-01-13 NOTE — TELEPHONE ENCOUNTER
I called and spoke with Gallo.  He said his skin was itchy after his procedure.  The dressing was removed and itching was better.  No drainage on the dressing.  His pain seems better.  Per wife she thinks was considerably better.  He is now able to get out of bed on his own this morning.  He is still taking scheduled pain medications, they will did not take lidocaine patches last few nights.  They will slowly remove some of the medications.  Plan is for follow up in one month with IR PA clinic.   Thanks,     A. Isela Arce, RN, BSN  Interventional Radiology Care Coordinator   Phone:  [phone]

## 2025-01-14 PROCEDURE — S9330 HIT CONT CHEM DIEM: HCPCS

## 2025-01-15 ENCOUNTER — HOME INFUSION BILLING (OUTPATIENT)
Dept: HOME HEALTH SERVICES | Facility: HOME HEALTH | Age: 60
End: 2025-01-15
Payer: COMMERCIAL

## 2025-01-15 ENCOUNTER — APPOINTMENT (OUTPATIENT)
Dept: LAB | Facility: CLINIC | Age: 60
End: 2025-01-15
Payer: COMMERCIAL

## 2025-01-15 ENCOUNTER — ONCOLOGY VISIT (OUTPATIENT)
Dept: ONCOLOGY | Facility: CLINIC | Age: 60
End: 2025-01-15
Payer: COMMERCIAL

## 2025-01-15 ENCOUNTER — DOCUMENTATION ONLY (OUTPATIENT)
Dept: HOME HEALTH SERVICES | Facility: HOME HEALTH | Age: 60
End: 2025-01-15
Payer: COMMERCIAL

## 2025-01-15 ENCOUNTER — INFUSION THERAPY VISIT (OUTPATIENT)
Dept: ONCOLOGY | Facility: CLINIC | Age: 60
End: 2025-01-15
Payer: COMMERCIAL

## 2025-01-15 VITALS
WEIGHT: 145.6 LBS | SYSTOLIC BLOOD PRESSURE: 127 MMHG | BODY MASS INDEX: 19.75 KG/M2 | OXYGEN SATURATION: 96 % | TEMPERATURE: 98.3 F | DIASTOLIC BLOOD PRESSURE: 80 MMHG | RESPIRATION RATE: 20 BRPM | HEART RATE: 125 BPM

## 2025-01-15 DIAGNOSIS — E11.9 TYPE 2 DIABETES MELLITUS WITHOUT COMPLICATION, WITH LONG-TERM CURRENT USE OF INSULIN (H): ICD-10-CM

## 2025-01-15 DIAGNOSIS — G89.3 CANCER ASSOCIATED PAIN: ICD-10-CM

## 2025-01-15 DIAGNOSIS — R26.89 IMPAIRED GAIT AND MOBILITY: ICD-10-CM

## 2025-01-15 DIAGNOSIS — C25.0 MALIGNANT NEOPLASM OF HEAD OF PANCREAS (H): Primary | ICD-10-CM

## 2025-01-15 DIAGNOSIS — C25.0 MALIGNANT NEOPLASM OF HEAD OF PANCREAS (H): ICD-10-CM

## 2025-01-15 DIAGNOSIS — Z79.4 TYPE 2 DIABETES MELLITUS WITHOUT COMPLICATION, WITH LONG-TERM CURRENT USE OF INSULIN (H): ICD-10-CM

## 2025-01-15 DIAGNOSIS — Z51.11 ENCOUNTER FOR ANTINEOPLASTIC CHEMOTHERAPY: Primary | ICD-10-CM

## 2025-01-15 DIAGNOSIS — C79.51 MALIGNANT NEOPLASM METASTATIC TO BONE (H): ICD-10-CM

## 2025-01-15 LAB
ALBUMIN SERPL BCG-MCNC: 3.4 G/DL (ref 3.5–5.2)
ALP SERPL-CCNC: 383 U/L (ref 40–150)
ALT SERPL W P-5'-P-CCNC: 14 U/L (ref 0–70)
ANION GAP SERPL CALCULATED.3IONS-SCNC: 9 MMOL/L (ref 7–15)
AST SERPL W P-5'-P-CCNC: 17 U/L (ref 0–45)
BASOPHILS # BLD AUTO: 0 10E3/UL (ref 0–0.2)
BASOPHILS NFR BLD AUTO: 0 %
BILIRUB SERPL-MCNC: 0.4 MG/DL
BUN SERPL-MCNC: 24.1 MG/DL (ref 6–20)
CALCIUM SERPL-MCNC: 8.6 MG/DL (ref 8.8–10.4)
CHLORIDE SERPL-SCNC: 106 MMOL/L (ref 98–107)
CREAT SERPL-MCNC: 0.56 MG/DL (ref 0.67–1.17)
EGFRCR SERPLBLD CKD-EPI 2021: >90 ML/MIN/1.73M2
EOSINOPHIL # BLD AUTO: 0 10E3/UL (ref 0–0.7)
EOSINOPHIL NFR BLD AUTO: 0 %
ERYTHROCYTE [DISTWIDTH] IN BLOOD BY AUTOMATED COUNT: 19 % (ref 10–15)
GLUCOSE SERPL-MCNC: 129 MG/DL (ref 70–99)
HCO3 SERPL-SCNC: 23 MMOL/L (ref 22–29)
HCT VFR BLD AUTO: 29.3 % (ref 40–53)
HGB BLD-MCNC: 9.1 G/DL (ref 13.3–17.7)
IMM GRANULOCYTES # BLD: 0 10E3/UL
IMM GRANULOCYTES NFR BLD: 0 %
LYMPHOCYTES # BLD AUTO: 1 10E3/UL (ref 0.8–5.3)
LYMPHOCYTES NFR BLD AUTO: 21 %
MCH RBC QN AUTO: 28 PG (ref 26.5–33)
MCHC RBC AUTO-ENTMCNC: 31.1 G/DL (ref 31.5–36.5)
MCV RBC AUTO: 90 FL (ref 78–100)
MONOCYTES # BLD AUTO: 0.3 10E3/UL (ref 0–1.3)
MONOCYTES NFR BLD AUTO: 8 %
NEUTROPHILS # BLD AUTO: 3.2 10E3/UL (ref 1.6–8.3)
NEUTROPHILS NFR BLD AUTO: 71 %
NRBC # BLD AUTO: 0 10E3/UL
NRBC BLD AUTO-RTO: 0 /100
PLATELET # BLD AUTO: 165 10E3/UL (ref 150–450)
POTASSIUM SERPL-SCNC: 3.6 MMOL/L (ref 3.4–5.3)
PROT SERPL-MCNC: 6 G/DL (ref 6.4–8.3)
RBC # BLD AUTO: 3.25 10E6/UL (ref 4.4–5.9)
SODIUM SERPL-SCNC: 138 MMOL/L (ref 135–145)
WBC # BLD AUTO: 4.6 10E3/UL (ref 4–11)

## 2025-01-15 PROCEDURE — 250N000011 HC RX IP 250 OP 636

## 2025-01-15 PROCEDURE — 84155 ASSAY OF PROTEIN SERUM: CPT | Performed by: STUDENT IN AN ORGANIZED HEALTH CARE EDUCATION/TRAINING PROGRAM

## 2025-01-15 PROCEDURE — 85014 HEMATOCRIT: CPT | Performed by: STUDENT IN AN ORGANIZED HEALTH CARE EDUCATION/TRAINING PROGRAM

## 2025-01-15 PROCEDURE — 250N000011 HC RX IP 250 OP 636: Performed by: PHYSICIAN ASSISTANT

## 2025-01-15 PROCEDURE — 250N000011 HC RX IP 250 OP 636: Performed by: STUDENT IN AN ORGANIZED HEALTH CARE EDUCATION/TRAINING PROGRAM

## 2025-01-15 PROCEDURE — 99213 OFFICE O/P EST LOW 20 MIN: CPT | Mod: 25

## 2025-01-15 PROCEDURE — 85004 AUTOMATED DIFF WBC COUNT: CPT | Performed by: STUDENT IN AN ORGANIZED HEALTH CARE EDUCATION/TRAINING PROGRAM

## 2025-01-15 PROCEDURE — S9330 HIT CONT CHEM DIEM: HCPCS

## 2025-01-15 PROCEDURE — 258N000003 HC RX IP 258 OP 636: Performed by: STUDENT IN AN ORGANIZED HEALTH CARE EDUCATION/TRAINING PROGRAM

## 2025-01-15 PROCEDURE — 82310 ASSAY OF CALCIUM: CPT | Performed by: STUDENT IN AN ORGANIZED HEALTH CARE EDUCATION/TRAINING PROGRAM

## 2025-01-15 PROCEDURE — 99214 OFFICE O/P EST MOD 30 MIN: CPT

## 2025-01-15 PROCEDURE — G2211 COMPLEX E/M VISIT ADD ON: HCPCS

## 2025-01-15 PROCEDURE — 36591 DRAW BLOOD OFF VENOUS DEVICE: CPT | Performed by: STUDENT IN AN ORGANIZED HEALTH CARE EDUCATION/TRAINING PROGRAM

## 2025-01-15 RX ORDER — ZOLEDRONIC ACID 0.04 MG/ML
4 INJECTION, SOLUTION INTRAVENOUS ONCE
OUTPATIENT
Start: 2025-01-30 | End: 2025-01-30

## 2025-01-15 RX ORDER — DEXAMETHASONE SODIUM PHOSPHATE 4 MG/ML
6 INJECTION, SOLUTION INTRA-ARTICULAR; INTRALESIONAL; INTRAMUSCULAR; INTRAVENOUS; SOFT TISSUE ONCE
Status: CANCELLED
Start: 2025-01-17 | End: 2025-01-17

## 2025-01-15 RX ORDER — DIPHENHYDRAMINE HYDROCHLORIDE 50 MG/ML
50 INJECTION INTRAMUSCULAR; INTRAVENOUS
Start: 2025-01-30

## 2025-01-15 RX ORDER — METHYLPREDNISOLONE SODIUM SUCCINATE 125 MG/2ML
125 INJECTION INTRAMUSCULAR; INTRAVENOUS
Start: 2025-01-30

## 2025-01-15 RX ORDER — HEPARIN SODIUM (PORCINE) LOCK FLUSH IV SOLN 100 UNIT/ML 100 UNIT/ML
5 SOLUTION INTRAVENOUS
OUTPATIENT
Start: 2025-01-30

## 2025-01-15 RX ORDER — HEPARIN SODIUM,PORCINE 10 UNIT/ML
5-20 VIAL (ML) INTRAVENOUS DAILY PRN
OUTPATIENT
Start: 2025-01-30

## 2025-01-15 RX ORDER — ALBUTEROL SULFATE 90 UG/1
1-2 INHALANT RESPIRATORY (INHALATION)
Start: 2025-01-30

## 2025-01-15 RX ORDER — HEPARIN SODIUM (PORCINE) LOCK FLUSH IV SOLN 100 UNIT/ML 100 UNIT/ML
5 SOLUTION INTRAVENOUS ONCE
Status: COMPLETED | OUTPATIENT
Start: 2025-01-15 | End: 2025-01-15

## 2025-01-15 RX ORDER — EPINEPHRINE 1 MG/ML
0.3 INJECTION, SOLUTION, CONCENTRATE INTRAVENOUS EVERY 5 MIN PRN
OUTPATIENT
Start: 2025-01-30

## 2025-01-15 RX ORDER — MEPERIDINE HYDROCHLORIDE 25 MG/ML
25 INJECTION INTRAMUSCULAR; INTRAVENOUS; SUBCUTANEOUS EVERY 30 MIN PRN
OUTPATIENT
Start: 2025-01-30

## 2025-01-15 RX ORDER — DEXAMETHASONE SODIUM PHOSPHATE 4 MG/ML
6 INJECTION, SOLUTION INTRA-ARTICULAR; INTRALESIONAL; INTRAMUSCULAR; INTRAVENOUS; SOFT TISSUE ONCE
Status: COMPLETED | OUTPATIENT
Start: 2025-01-15 | End: 2025-01-15

## 2025-01-15 RX ORDER — ALBUTEROL SULFATE 0.83 MG/ML
2.5 SOLUTION RESPIRATORY (INHALATION)
OUTPATIENT
Start: 2025-01-30

## 2025-01-15 RX ORDER — ZOLEDRONIC ACID 0.04 MG/ML
4 INJECTION, SOLUTION INTRAVENOUS ONCE
Status: COMPLETED | OUTPATIENT
Start: 2025-01-15 | End: 2025-01-15

## 2025-01-15 RX ORDER — ONDANSETRON 2 MG/ML
8 INJECTION INTRAMUSCULAR; INTRAVENOUS ONCE
Status: COMPLETED | OUTPATIENT
Start: 2025-01-15 | End: 2025-01-15

## 2025-01-15 RX ADMIN — LEUCOVORIN CALCIUM 750 MG: 200 INJECTION, POWDER, LYOPHILIZED, FOR SOLUTION INTRAMUSCULAR; INTRAVENOUS at 16:26

## 2025-01-15 RX ADMIN — DEXAMETHASONE SODIUM PHOSPHATE 6 MG: 4 INJECTION, SOLUTION INTRA-ARTICULAR; INTRALESIONAL; INTRAMUSCULAR; INTRAVENOUS; SOFT TISSUE at 13:58

## 2025-01-15 RX ADMIN — IRINOTECAN HYDROCHLORIDE 129 MG: 4.3 INJECTION, POWDER, FOR SOLUTION INTRAVENOUS at 14:26

## 2025-01-15 RX ADMIN — ONDANSETRON 8 MG: 2 INJECTION INTRAMUSCULAR; INTRAVENOUS at 13:57

## 2025-01-15 RX ADMIN — Medication 5 ML: at 12:51

## 2025-01-15 RX ADMIN — ZOLEDRONIC ACID 4 MG: 0.04 INJECTION, SOLUTION INTRAVENOUS at 16:00

## 2025-01-15 ASSESSMENT — PAIN SCALES - GENERAL: PAINLEVEL_OUTOF10: NO PAIN (0)

## 2025-01-15 NOTE — NURSING NOTE
Vital signs and weight obtained. Port accessed using sterile technique. Labs drawn as ordered. Port saline flushed and heparin locked. Patient checked in to next appointment.

## 2025-01-15 NOTE — PROGRESS NOTES
[facility] d/c of Fluorouracil continuous infusion over 46 hours via C series pump.   Scheduled in [facility] for home disconnect on 1/17/25 at 10am.  No Neulasta OnPro/IV fluids needed at this visit.

## 2025-01-15 NOTE — NURSING NOTE
Oncology Rooming Note    January 15, 2025 1:18 PM   Gallo Navarro is a 59 year old male who presents for:    Chief Complaint   Patient presents with    Blood Draw     History of pancreatic cancer. Presents to lab for blood draw via port.    Oncology Clinic Visit     Malignant neoplasm of head of pancreas      Initial Vitals: /80 (BP Location: Right arm, Patient Position: Sitting, Cuff Size: Adult Regular)   Pulse (!) 125   Temp 98.3  F (36.8  C) (Oral)   Resp 20   Wt 66 kg (145 lb 9.6 oz)   SpO2 96%   BMI 19.75 kg/m   Estimated body mass index is 19.75 kg/m  as calculated from the following:    Height as of 1/10/25: 1.829 m (6').    Weight as of this encounter: 66 kg (145 lb 9.6 oz). Body surface area is 1.83 meters squared.  No Pain (0) Comment: Data Unavailable   No LMP for male patient.  Allergies reviewed: Yes  Medications reviewed: Yes    Medications: Medication refills not needed today.  Pharmacy name entered into [facility]:    [facility] - [place], [place] - [address] [facility] [place] [address]  [facility] [place] [facility] [address] [address] PORTAL TO REGISTERED [facility] SITES  [facility] - [place], [place] - [address] [place] [id number] [facility] [employer] - [place], [place] - [address] [address]    Frailty Screening:   Is the patient here for a new oncology consult visit in cancer care? 2. No      Clinical concerns:       Carolyn Mena

## 2025-01-15 NOTE — Clinical Note
1/15/2025      Gallo Navarro  [id number] [address] [place] [id number]      Dear Colleague,    Thank you for referring your patient, Gallo Navarro, to the [facility]. Please see a copy of my visit note below.    Oncology/Hematology Visit Note  Tio 15, 2025    Reason for Visit: follow-up of metastatic Pancreatic Caner    Oncology HPI:   -NGS: KRAS G12R  Immuno: pMMR, KAYLIE, TMB-low  Clinical Trial: MARIPOSA-186, MARIPOSA-280, TORL    - 3/2023: presented with acute pancreatitis  c/b chronic pancreatitis with pancreatic mass causing duodenal stenosis with gastric outlet obstruction s/p GJ tube (placed 5/2023), biliary obstruction s/p stent placement on 7/7/23, pancreatic insufficiency, and IDDM2.  -  He then presented to the ED with worsening abdominal pain, increased jaundice, poor PO intake and weight loss admitted on 7/8/2023 for concern of biliary obstruction.   - 7/12/2023: Underwent biopsy of pancreatic mass returned positive for pancreatic adenocarcinoma.   - 7/31/2023: He started on treatment with 5FU, irinotecan, and oxaliplatin (FOLFIRINOX). Please see previous notes for further details on the patient's history.      8/17/23 - ERCP/EGD, duodenal stents x2 placed, exchange of GJ tube     8/21-8/25/24 hospitalized due to diarrhea, colitis, abdominal pain.       8/29 - Cycle 3 deferred due to neutropenia.   Neulasta added. Irinotecan dose reduced 20% due to symptoms including cramping, double vision and slurred speech during infusion.       10/24/23 CT CAP with similar to slight increase in size of peripancreatic and mesenteric lymph nodes, enlargement of previous skeletal metastasis as well as new sclerotic metastasis to left posterior 5th rib, enlarging pulmonary nodule, and unchanged pancreatic head mass. Also unclear concerns for PE,  right lower lobe segmental PE confirmed on CT-PE. Started on apixaban.      10/31/23 Cycle 1 gemzar, abraxane  11/7/23 Cycle 1 Zometa  11/22/23 Removal of GJ  tube.   11/29/23 Completed palliative radiation to T10   12/13/23 C3D1 gem/abraxane  12/29/23 Consults at [place]  1/23-1/26 Consults at [facility]   1/30/24: C4D1 gem/abraxane   3/24: CT CAP with overall stable disease with isolated progression in potential L4 lesion. Referral for radiation oncology.    4/29-5/1: Consults at [facility] for possible cellular therapy, CT guided biopsy of left pubic bone, recommendation to return to [facility] upon progression of current treatment   5/23/24: Cardiology consult at [facility]. CT with small pericardial effusion   6/6/2024: Repeat ECHO on with EF of 55-60%, no pericardial effusion present  - Cycle 8 deferred due to infection concerns and subsequently tested positive for COVID 6/25/24, resumed treatment on 7/2/2024    -Day 8 eliminated after Cycle 5 [place]/Abraxane.    4/2/2024-present: Gemcitabine/Abraxane, Days 1 and 15 only; Zometa every 3 months     8/2024 repeat imaging with isolated progression to L4 lesion with fracture of that spinous process. Referred to radiation therapy. Decreased Abraxane dose reduced to 75mg/m2 with cycle 9 day 15 for overall treatment tolerance.     8/28/24 RFA ablation and verteral augmentation to L4.   10/1-10/21 Radiation to L4     11/12/24 CT CAP shows disease progression, plan to change to 5FU and liposomal irinotecan after a vacation  12/12/24 ED visit for pain, secondary to cancer, managed with IV pain medication    12/18/24 Cycle 1 5FU and liposomal irinotecan    1/10/25: Kyphoplasty ***     Gallo presents today for follow up prior to cycle 3.       Interval history:     Pain is improving since procedure  -eating, weight is down which he attributes to pain/mobility but he is eating - no GI concerns    -reduced oral steroids, weaning off   -blood sugars have been all over the place, high with steroids but also having lows   -no fevers   -using walker around the house but ambulating ok, feels unsteady at times  -pain is currently 1/10   -hasn't  taken any insulin today since sugar was low this morning, brought it with him - currently 144 on sensor in clinic  -no major side effects  from chemo, feels he's tolerating it well overall   -overall just feels weak, lost a few pounds and has lost a lot of muscle mass due to pain   -        ***   -continues to struggle with significant back pain and spasms. Following closely with palliative care, adjusted meds yesterday including increased dose of Dilaudid and dexamethasone. They are often feeling very overwhelmed with his medication schedule to control the pain. If he is resting and staying on top of his pain medications, he is comfortable but any movement causes flares of pain and subsequent spasms.   -Limited mobility due to pain, only getting out of the chair a couple times per day to use the bathroom  -Gallo and Abigail express concerns about what they will do when she needs to return to work as he is dependent on someone to help bring him food/meds, etc. throughout the day due to his lack of mobility   -having a lot of difficulty sleeping due to steroids, spasms and pain, he also can't adjust positions to get comfortable either     -feels he tolerated cycle 1 5FU/liposomal irinotecan well  -denies nausea or decreased appetite  -had some richer foods with the holidays which has slowed down his bowels, taking Miralax as needed  -didn't feel as tired following chemotherapy as he has in the past   -has had an increase in burping/hiccups which aggravate his back spasms   -steroids have made his blood sugars more difficult to control, working with endocrinology     Remainder of 12 point ROS reviewed and negative except as in interval history.       Current Outpatient Medications   Medication Sig Dispense Refill    acetaminophen (TYLENOL) 32 mg/mL liquid Take 20.313 mLs (650 mg) by mouth every 8 hours. 1800 mL 3    alteplase (CATHFLO ACTIVASE) injection Inject 2 mLs (2 mg) into catheter as needed (catheter occlusion).  Reconstitute vial. Draw up alteplase 1 mg/mL in a syringe and instill into IV catheter. Dwell for at least 20 min to 24 hours, then aspirate. May repeat dose once in 24 hours if catheter function not restored after at least 2 hours. Discard remainder of vial. 368097 each 0    apixaban ANTICOAGULANT (ELIQUIS) 5 MG tablet Take 1 tablet (5 mg) by mouth 2 times daily. 60 tablet 2    blood glucose (FREESTYLE TEST STRIPS) test strip 1 strip by In Vitro route 3 times daily. 100 strip 2    Buprenorphine HCl (BELBUCA) 600 MCG FILM buccal film Place 1 Film (600 mcg) inside cheek every 12 hours. May also place 1 Film (600 mcg) daily as needed. For severe pain not relieved with 6 mg dilaudid dose. 75 Film 3    calcium carbonate (OS-DENISE) 500 MG tablet Take 1 tablet by mouth 2 times daily.      Chemo Kit For RN use only. Do not remove items from bag. Contents: 1  sodium chloride 0.9% flush, 4 medium gloves, 1 chemo gown, 1/4 chemo mat, 1 connector female, 1 chemo bag. [id number] kit 0    Chemo Kit For RN use only. Do not remove items from bag. Contents: 1  sodium chloride 0.9% flush, 4 medium gloves, 1 chemo gown, 1/4 chemo mat, 1 connector female, 1 chemo bag. [id number] kit 0    Continuous Glucose Sensor (DEXCOM G7 SENSOR) MISC Change every 10 days. 6 each 3    Continuous Glucose Sensor (FREESTYLE KAREN 2 SENSOR) MISC Change every 14 days. 2 each 1    dexAMETHasone (DECADRON) 4 MG tablet Take 1.5 tablets (6 mg) by mouth daily (with breakfast). 30 tablet 0    dicyclomine (BENTYL) 10 MG capsule Take 1 capsule (10 mg) by mouth 4 times daily (before meals and nightly). 120 capsule 1    diphenhydrAMINE (BENADRYL) 50 MG/ML injection Inject 1 mL (50 mg) over 3-5 minutes into the vein via push as needed for other (infusion reaction). For RN use only.  Draw up in a syringe and administer IV push.  Discard remainder of vial. 83116 mL 0    econazole nitrate 1 % external cream Apply topically daily To affected toenails. 85 g 5    Emergency Supply  "Kit, Central, Patient use for emergency only. Contents: 3 sodium chloride 0.9% flushes, 1 dressing kit, 1 microclave ext set 14\", 4 nitrile gloves (med), 6 alcohol prep pads, 1 bacitracin, 1 syringe (10 cc 20 G 1\"). Call [phone] to reorder. [id number] kit 0    Emergency Supply Kit, Central, Patient use for emergency only. Contents: 3 sodium chloride 0.9% flushes, 1 dressing kit, 1 microclave ext set 14\", 4 nitrile gloves (med), 6 alcohol prep pads, 1 bacitracin, 1 syringe (10 cc 20 G 1\"). Call [phone] to reorder. [id number] kit 0    EPINEPHrine (ANY BX GENERIC EQUIV) 0.3 MG/0.3ML injection 2-pack Inject 0.3 mLs (0.3 mg) into the muscle as needed for anaphylaxis (infusion reaction). Administer into the mid-thigh in case of severe anaphylaxis (wheezing, throat tightening, mouth swelling, difficulty breathing). May repeat dose one time in 5-15 minutes if symptoms persist. 304898 mL 0    [START ON 1/15/2025] Fluorouracil (ADRUCIL) 4,585 mg in sodium chloride 0.9 % 241 mL via HOMEPUMP C-Series Infuse 4,585 mg at 5.2 mL/hr over 46 hours into the vein once for 1 dose. 186101 mL 0    HYDROmorphone (DILAUDID) 4 MG tablet Take 1-2 tablets (4-8 mg) by mouth every 3 hours as needed for severe pain or breakthrough pain. 180 tablet 0    insulin glargine (LANTUS PEN) 100 UNIT/ML pen Inject 8 Units subcutaneously at bedtime. 15 mL 0    insulin  UNIT/ML injection 20 units around 9 am (with steroids) 10 units around 9 pm 15 mL 3    insulin pen needle (31G X 8 MM) 31G X 8 MM miscellaneous Use 1 pen needles daily or as directed. 50 each 0    lidocaine (LIDODERM) 5 % patch Place 2 patches over 12 hours onto the skin every 24 hours. 60 patch 3    lidocaine-prilocaine (EMLA) 2.5-2.5 % external cream Use 1-2 times a week or as needed prior to port access (Patient not taking: Reported on 9/11/2024) 30 g 3    lipase-protease-amylase (CREON 24) 64214-84721-100384 units CPEP per EC capsule 2-3 capsules with meals 3 times a day and " 1-2 capsules with snacks max of 15 capsules a day 200 capsule 11    loperamide (IMODIUM) 2 MG capsule 2 caps at 1st sign of diarrhea & 1 cap every 2hrs until 12hrs diarrhea free. During night, 2 caps at bedtime & 2 caps every 4hrs until AM 30 capsule 0    LORazepam (ATIVAN) 0.5 MG tablet Take 1-2 tablets (0.5-1 mg) by mouth every 6 hours as needed for muscle spasms. 45 tablet 2    methocarbamol 1000 MG TABS Take 1,000 mg by mouth 4 times daily as needed for muscle spasms. 180 tablet 3    methylphenidate (RITALIN) 5 MG tablet Take 1 tablet (5 mg) by mouth 2 times daily as needed 10 tablet 0    methylPREDNISolone Na Suc, PF, (SOLU-MEDROL) 125 mg/2 mL injection Inject 2 mLs (125 mg) over 3-5 minutes into the vein via push as needed (infusion reaction). For RN use only.  Reconstitute vial. Draw up methylPREDNISolone in a syringe and administer.  Discard remainder of vial. 748896 mL 0    Multiple Vitamins-Minerals (CENTRUM SILVER 50+MEN) TABS as directed Orally      naloxone (NARCAN) 4 MG/0.1ML nasal spray       pantoprazole (PROTONIX) 40 MG EC tablet Take 1 tablet (40 mg) by mouth 2 times daily 60 tablet 3    Port Access Kit For nurse use only.  Do not remove items from bag.  Use for port access.  Do not place syringe on sterile field. [id number] kit 0    Port Access Kit For nurse use only.  Do not remove items from bag.  Use for port access.  Do not place syringe on sterile field. [id number] kit 0    prochlorperazine (COMPAZINE) 10 MG tablet Take 1 tablet (10 mg) by mouth every 6 hours as needed for nausea or vomiting. 30 tablet 2    prochlorperazine (COMPAZINE) 10 MG tablet Take 1 tablet (10 mg) by mouth every 6 hours as needed for nausea or vomiting 30 tablet 2    sodium chloride 0.9% infusion Infuse 250 mLs over 30 minutes into the vein every 28 (twenty-eight) days. zometa concomitant fluids. Given per therapy plan orders 750 mL 2    sodium chloride 0.9% infusion Infuse 500 mLs into the vein as needed for other (infusion  reaction). In case of mild reaction, administer via gravity at 20 mL/hr to keep vein open. In case of severe reaction, administer via gravity wide open on prime setting. 292672 mL 0    sodium chloride, PF, 0.9% PF flush Inject 10 mLs into the vein as needed for line flush. Flush IV before and after med administration as directed and/or at least every 24 hours, or prior to deaccessing for no further use and/or at least every 4 weeks when not accessed. 773225 mL 0    sodium chloride, PF, 0.9% PF flush Inject 10 mLs into the vein as needed for other (infusion reaction). For RN use only as needed for infusion reaction [id number] mL 0    sodium chloride, PF, 0.9% PF flush Inject 10 mLs into the vein as needed for line flush. Flush IV before and after med administration as directed and/or at least every 24 hours, or prior to deaccessing for no further use and/or at least every 4 weeks when not accessed. 296458 mL 0    sterile water, preservative free, injection Use 2.2 mLs for reconstitution as needed (with alteplase for catheter occlusion). Direct diluent stream into powder. Mix by gently swirling until dissolved. DO NOT SHAKE. Discard remainder of vial. 217483 mL 0    vitamin D3 (CHOLECALCIFEROL) 50 mcg (2000 units) tablet Take 1 tablet (50 mcg) by mouth daily. 90 tablet 1    zoledronic acid (ZOMETA) 4 MG/100ML infusion Infuse 100 mLs (4 mg) into the vein every 28 days. Infuse via gravity. Given per therapy plan orders 300 mL 2     Physical Exam: ***   General: The patient is a pleasant male in no acute distress.  There were no vitals taken for this visit.  Wt Readings from Last 10 Encounters:   01/10/25 68.9 kg (152 lb)   01/07/25 68.9 kg (152 lb)   01/03/25 67.5 kg (148 lb 14.4 oz)   12/31/24 65.8 kg (145 lb)   12/18/24 69.7 kg (153 lb 9.6 oz)   12/17/24 68 kg (150 lb)   12/12/24 68 kg (150 lb)   12/02/24 69.9 kg (154 lb)   11/15/24 72.1 kg (159 lb)   11/11/24 69.9 kg (154 lb)   HEENT: EOMI. Sclerae are anicteric.    Lymph: Neck is supple with no lymphadenopathy in the cervical or supraclavicular areas.   Heart: Regular rate and rhythm.   Lungs: Clear to auscultation bilaterally.   Abdomen: Bowel sounds present, soft, nontender with no palpable hepatosplenomegaly or masses.   Extremities: No lower extremity edema noted bilaterally.   Neuro: Cranial nerves II through XII are grossly intact.  Skin: No rashes, petechiae, or bruising noted on exposed skin. ***          Labs:    Most Recent 3 CBC's:  Recent Labs   Lab Test 01/03/25  0804 12/18/24  1228 12/12/24  1355   WBC 4.1 14.5* 14.0*   HGB 9.8* 9.4* 10.0*   MCV 87 88 88    226 199   ANEUTAUTO 2.6 13.1* 12.1*    Most Recent 3 BMP's:  Recent Labs   Lab Test 01/10/25  1405 01/03/25  0804 12/18/24  1228 12/12/24  1355   NA  --  137 134* 133*   POTASSIUM  --  3.9 4.4 4.1   CHLORIDE  --  102 99 101   CO2  --  24 24 24   BUN  --  19.2 24.7* 23.5*   CR  --  0.54* 0.62* 0.67   ANIONGAP  --  11 11 8   DENISE  --  8.8 8.5* 8.2*   * 148* 330* 184*   PROTTOTAL  --  6.7 6.9 6.8   ALBUMIN  --  3.6 3.4* 3.5    Most Recent 2 LFT's:  Recent Labs   Lab Test 01/03/25  0804 12/18/24  1228   AST 15 30   ALT 19 49   ALKPHOS 420* 327*   BILITOTAL 0.4 0.3  0.3   I reviewed the above labs today.  New labs will be drawn 1/3 prior to chemotherapy.     Impression/plan:     Unresectable Pancreatic Cancer  7/31/23 began palliative treatment on FOLFIRINOX  CT CAP 10/24/23 with progression in skeletal mets, lung nodule and lymph nodes; stable pancreatic mass  - 10/31/2023: Treatment was changed to Gemcitabine and Abraxane (Day 1, 8, 15) + Zometa every 3 months  - 03/2024 CT CAP with progression of L4 lesion  - Day 8 eliminated after Cycle 5 gem/abraxane  - 8/2024 repeat imaging with isolated progression of L4 lesion, see below   - November 2024 imaging showed disease progression.  - Started 5FU/ liposomal irinotecan on 12/18. Tolerated cycle 1 well. Proceed with cycle 2 as scheduled 1/3 pending  labs are WNL. Continue SUZETTE follow up prior to each cycle. Patient to call sooner with concerns.  Will repeat imaging after 4-6 cycles, depending on clinical course.   -will have RNCC follow up with patient/wife regarding home care, possibility of PCA to help while wife returns to work.     Anemia-normocytic  Hgb mildly worse. Likely related to disease progression. Denies bleeding issues. Will continue to monitor.     Skeletal Mets    - Radiation to T10 completed 11/29/23  - CT chest 5/23/24 unchanged sclerotic bone metastases  -imaging 8/2024 with isolated progression in L4, consult to radiation oncology.   -8/28/24 RFA ablation and vertebral augmentation to L4. Completed radiation to L4 in October 2024   -Pain management by palliative care, as below, from 12/17/24 visit and recent updates.   -scheduled for kyphoplasty 1/10. Discussed potential PT after procedure to help with mobilty/strength.   -previously given Rx for a walker, encouraged him to use this for stability/safety with increasing weakness   *** discussed PT, revisit in 2 weeks     Pain    -belbuca 600 mcg films BID with a 3rd available to use daily prn severe pain not relieved with dilaudid  -dilaudid 4-8 mg po q 3 hours prn and record how often it is needed  -methocarbamol  750-1000mg PO Q6H PRN  -Ativan 0.5 mg tabs prescribed to use sparingly 1-2 tablets q 6 hours prn severe spasm not relieved with methocarbamol  -lidocaine patches  -Tylenol suspension 650 mg po TID  -dexamethasone 6mg daily     Encouraged him to follow up with palliative for ongoing pain management/medication adjustments     Bone Health  -Zometa 4 mg every 3 months, last infusion 12/4/2024.   -Recheck of calcium on 12/18 low. He remains on vitamin D 50 mcg daily and a multivitamin, started  calcium 500 mg bid. Continue to monitor.     Neuropathy  -Secondary to prior chemotherapy. No acute change.    Pericardial Effusion  Found on CT 4/20/24 at [facility], followed by TTE with  moderate pericardial effusion without tamponade.  CT 5/23/24 small pericardial effusion  5/23/24: Cardiology consult at [facility]- Echo 6/6/2024 with EF 55-60%, LV strain -20.5% which is normal, no pericardial effusion  - repeat echo at [facility] in the future,   - he has been cautioned by Cardiology s/s of cardiac tamponade  -following cardiology as needed  -no acute concerns today    Pulmonary Embolism  Right lower segmental PE found on routine imaging  Taking Apixiban daily, no bleeding concerns. Has instructions for holding prior to kyphoplasty.     Onychomycosis  -Will request home care to cut his toenails.      The longitudinal plan of care for the diagnosis(es)/condition(s) as documented were addressed during this visit. Due to the added complexity in care, I will continue to support Gallo in the subsequent management and with ongoing continuity of care.    *** minutes spent on the date of the encounter doing chart review, review of test results, interpretation of tests, patient visit, and documentation     ESVIN Canales CNP        Again, thank you for allowing me to participate in the care of your patient.        Sincerely,        ESVIN Canales CNP    Electronically signed

## 2025-01-15 NOTE — PROGRESS NOTES
Oncology/Hematology Visit Note  Tio 15, 2025    Reason for Visit: follow-up of metastatic Pancreatic Caner    Oncology HPI:   -NGS: KRAS G12R  Immuno: pMMR, KAYLIE, TMB-low  Clinical Trial: MARIPOSA-186, MARIPOSA-280, TORL    - 3/2023: presented with acute pancreatitis  c/b chronic pancreatitis with pancreatic mass causing duodenal stenosis with gastric outlet obstruction s/p GJ tube (placed 5/2023), biliary obstruction s/p stent placement on 7/7/23, pancreatic insufficiency, and IDDM2.  -  He then presented to the ED with worsening abdominal pain, increased jaundice, poor PO intake and weight loss admitted on 7/8/2023 for concern of biliary obstruction.   - 7/12/2023: Underwent biopsy of pancreatic mass returned positive for pancreatic adenocarcinoma.   - 7/31/2023: He started on treatment with 5FU, irinotecan, and oxaliplatin (FOLFIRINOX). Please see previous notes for further details on the patient's history.      8/17/23 - ERCP/EGD, duodenal stents x2 placed, exchange of GJ tube     8/21-8/25/24 hospitalized due to diarrhea, colitis, abdominal pain.       8/29 - Cycle 3 deferred due to neutropenia.   Neulasta added. Irinotecan dose reduced 20% due to symptoms including cramping, double vision and slurred speech during infusion.       10/24/23 CT CAP with similar to slight increase in size of peripancreatic and mesenteric lymph nodes, enlargement of previous skeletal metastasis as well as new sclerotic metastasis to left posterior 5th rib, enlarging pulmonary nodule, and unchanged pancreatic head mass. Also unclear concerns for PE,  right lower lobe segmental PE confirmed on CT-PE. Started on apixaban.      10/31/23 Cycle 1 gemzar, abraxane  11/7/23 Cycle 1 Zometa  11/22/23 Removal of GJ tube.   11/29/23 Completed palliative radiation to T10   12/13/23 C3D1 gem/abraxane  12/29/23 Consults at [facility]  1/23-1/26 Consults at [facility]   1/30/24: C4D1 gem/abraxane   3/24: CT CAP with overall stable disease with isolated  progression in potential L4 lesion. Referral for radiation oncology.    4/29-5/1: Consults at [facility] for possible cellular therapy, CT guided biopsy of left pubic bone, recommendation to return to [facility] upon progression of current treatment   5/23/24: Cardiology consult at [facility]. CT with small pericardial effusion   6/6/2024: Repeat ECHO on with EF of 55-60%, no pericardial effusion present  - Cycle 8 deferred due to infection concerns and subsequently tested positive for COVID 6/25/24, resumed treatment on 7/2/2024    -Day 8 eliminated after Cycle 5 [place]/Abraxane.    4/2/2024-present: Gemcitabine/Abraxane, Days 1 and 15 only; Zometa every 3 months     8/2024 repeat imaging with isolated progression to L4 lesion with fracture of that spinous process. Referred to radiation therapy. Decreased Abraxane dose reduced to 75mg/m2 with cycle 9 day 15 for overall treatment tolerance.     8/28/24 RFA ablation and verteral augmentation to L4.   10/1-10/21 Radiation to L4     11/12/24 CT CAP shows disease progression, plan to change to 5FU and liposomal irinotecan after a vacation  12/12/24 ED visit for pain, secondary to cancer, managed with IV pain medication    12/18/24 Cycle 1 5FU and liposomal irinotecan    1/10/25: Kyphoplasty ***     Gallo presents today for follow up prior to cycle 3.       Interval history:     Pain is improving since procedure  -eating, weight is down which he attributes to pain/mobility but he is eating - no GI concerns    -reduced oral steroids, weaning off   -blood sugars have been all over the place, high with steroids but also having lows   -no fevers   -using walker around the house but ambulating ok, feels unsteady at times  -pain is currently 1/10   -hasn't taken any insulin today since sugar was low this morning, brought it with him - currently 144 on sensor in clinic  -no major side effects  from chemo, feels he's tolerating it well overall   -overall just feels weak, lost a few  pounds and has lost a lot of muscle mass due to pain   -        ***   -continues to struggle with significant back pain and spasms. Following closely with palliative care, adjusted meds yesterday including increased dose of Dilaudid and dexamethasone. They are often feeling very overwhelmed with his medication schedule to control the pain. If he is resting and staying on top of his pain medications, he is comfortable but any movement causes flares of pain and subsequent spasms.   -Limited mobility due to pain, only getting out of the chair a couple times per day to use the bathroom  -Stephen express concerns about what they will do when she needs to return to work as he is dependent on someone to help bring him food/meds, etc. throughout the day due to his lack of mobility   -having a lot of difficulty sleeping due to steroids, spasms and pain, he also can't adjust positions to get comfortable either     -feels he tolerated cycle 1 5FU/liposomal irinotecan well  -denies nausea or decreased appetite  -had some richer foods with the holidays which has slowed down his bowels, taking Miralax as needed  -didn't feel as tired following chemotherapy as he has in the past   -has had an increase in burping/hiccups which aggravate his back spasms   -steroids have made his blood sugars more difficult to control, working with endocrinology     Remainder of 12 point ROS reviewed and negative except as in interval history.       Current Outpatient Medications   Medication Sig Dispense Refill    acetaminophen (TYLENOL) 32 mg/mL liquid Take 20.313 mLs (650 mg) by mouth every 8 hours. 1800 mL 3    alteplase (CATHFLO ACTIVASE) injection Inject 2 mLs (2 mg) into catheter as needed (catheter occlusion). Reconstitute vial. Draw up alteplase 1 mg/mL in a syringe and instill into IV catheter. Dwell for at least 20 min to 24 hours, then aspirate. May repeat dose once in 24 hours if catheter function not restored after at least 2  "hours. Discard remainder of vial. 694239 each 0    apixaban ANTICOAGULANT (ELIQUIS) 5 MG tablet Take 1 tablet (5 mg) by mouth 2 times daily. 60 tablet 2    blood glucose (FREESTYLE TEST STRIPS) test strip 1 strip by In Vitro route 3 times daily. 100 strip 2    Buprenorphine HCl (BELBUCA) 600 MCG FILM buccal film Place 1 Film (600 mcg) inside cheek every 12 hours. May also place 1 Film (600 mcg) daily as needed. For severe pain not relieved with 6 mg dilaudid dose. 75 Film 3    calcium carbonate (OS-DENISE) 500 MG tablet Take 1 tablet by mouth 2 times daily.      Chemo Kit For RN use only. Do not remove items from bag. Contents: 1  sodium chloride 0.9% flush, 4 medium gloves, 1 chemo gown, 1/4 chemo mat, 1 connector female, 1 chemo bag. [id number] kit 0    Chemo Kit For RN use only. Do not remove items from bag. Contents: 1  sodium chloride 0.9% flush, 4 medium gloves, 1 chemo gown, 1/4 chemo mat, 1 connector female, 1 chemo bag. [id number] kit 0    Continuous Glucose Sensor (DEXCOM G7 SENSOR) MISC Change every 10 days. 6 each 3    Continuous Glucose Sensor (FREESTYLE KAREN 2 SENSOR) MISC Change every 14 days. 2 each 1    dexAMETHasone (DECADRON) 4 MG tablet Take 1.5 tablets (6 mg) by mouth daily (with breakfast). 30 tablet 0    dicyclomine (BENTYL) 10 MG capsule Take 1 capsule (10 mg) by mouth 4 times daily (before meals and nightly). 120 capsule 1    diphenhydrAMINE (BENADRYL) 50 MG/ML injection Inject 1 mL (50 mg) over 3-5 minutes into the vein via push as needed for other (infusion reaction). For RN use only.  Draw up in a syringe and administer IV push.  Discard remainder of vial. 82194 mL 0    econazole nitrate 1 % external cream Apply topically daily To affected toenails. 85 g 5    Emergency Supply Kit, Central, Patient use for emergency only. Contents: 3 sodium chloride 0.9% flushes, 1 dressing kit, 1 microclave ext set 14\", 4 nitrile gloves (med), 6 alcohol prep pads, 1 bacitracin, 1 syringe (10 cc 20 G 1\"). Call " "[phone] to reorder. [id number] kit 0    Emergency Supply Kit, Central, Patient use for emergency only. Contents: 3 sodium chloride 0.9% flushes, 1 dressing kit, 1 microclave ext set 14\", 4 nitrile gloves (med), 6 alcohol prep pads, 1 bacitracin, 1 syringe (10 cc 20 G 1\"). Call [phone] to reorder. [id number] kit 0    EPINEPHrine (ANY BX GENERIC EQUIV) 0.3 MG/0.3ML injection 2-pack Inject 0.3 mLs (0.3 mg) into the muscle as needed for anaphylaxis (infusion reaction). Administer into the mid-thigh in case of severe anaphylaxis (wheezing, throat tightening, mouth swelling, difficulty breathing). May repeat dose one time in 5-15 minutes if symptoms persist. 054110 mL 0    [START ON 1/15/2025] Fluorouracil (ADRUCIL) 4,585 mg in sodium chloride 0.9 % 241 mL via HOMEPUMP C-Series Infuse 4,585 mg at 5.2 mL/hr over 46 hours into the vein once for 1 dose. 201831 mL 0    HYDROmorphone (DILAUDID) 4 MG tablet Take 1-2 tablets (4-8 mg) by mouth every 3 hours as needed for severe pain or breakthrough pain. 180 tablet 0    insulin glargine (LANTUS PEN) 100 UNIT/ML pen Inject 8 Units subcutaneously at bedtime. 15 mL 0    insulin  UNIT/ML injection 20 units around 9 am (with steroids) 10 units around 9 pm 15 mL 3    insulin pen needle (31G X 8 MM) 31G X 8 MM miscellaneous Use 1 pen needles daily or as directed. 50 each 0    lidocaine (LIDODERM) 5 % patch Place 2 patches over 12 hours onto the skin every 24 hours. 60 patch 3    lidocaine-prilocaine (EMLA) 2.5-2.5 % external cream Use 1-2 times a week or as needed prior to port access (Patient not taking: Reported on 9/11/2024) 30 g 3    lipase-protease-amylase (CREON 24) 94611-40715-038902 units CPEP per EC capsule 2-3 capsules with meals 3 times a day and 1-2 capsules with snacks max of 15 capsules a day 200 capsule 11    loperamide (IMODIUM) 2 MG capsule 2 caps at 1st sign of diarrhea & 1 cap every 2hrs until 12hrs diarrhea free. During night, 2 caps at bedtime & 2 caps " every 4hrs until AM 30 capsule 0    LORazepam (ATIVAN) 0.5 MG tablet Take 1-2 tablets (0.5-1 mg) by mouth every 6 hours as needed for muscle spasms. 45 tablet 2    methocarbamol 1000 MG TABS Take 1,000 mg by mouth 4 times daily as needed for muscle spasms. 180 tablet 3    methylphenidate (RITALIN) 5 MG tablet Take 1 tablet (5 mg) by mouth 2 times daily as needed 10 tablet 0    methylPREDNISolone Na Suc, PF, (SOLU-MEDROL) 125 mg/2 mL injection Inject 2 mLs (125 mg) over 3-5 minutes into the vein via push as needed (infusion reaction). For RN use only.  Reconstitute vial. Draw up methylPREDNISolone in a syringe and administer.  Discard remainder of vial. 973724 mL 0    Multiple Vitamins-Minerals (CENTRUM SILVER 50+MEN) TABS as directed Orally      naloxone (NARCAN) 4 MG/0.1ML nasal spray       pantoprazole (PROTONIX) 40 MG EC tablet Take 1 tablet (40 mg) by mouth 2 times daily 60 tablet 3    Port Access Kit For nurse use only.  Do not remove items from bag.  Use for port access.  Do not place syringe on sterile field. [id number] kit 0    Port Access Kit For nurse use only.  Do not remove items from bag.  Use for port access.  Do not place syringe on sterile field. [id number] kit 0    prochlorperazine (COMPAZINE) 10 MG tablet Take 1 tablet (10 mg) by mouth every 6 hours as needed for nausea or vomiting. 30 tablet 2    prochlorperazine (COMPAZINE) 10 MG tablet Take 1 tablet (10 mg) by mouth every 6 hours as needed for nausea or vomiting 30 tablet 2    sodium chloride 0.9% infusion Infuse 250 mLs over 30 minutes into the vein every 28 (twenty-eight) days. zometa concomitant fluids. Given per therapy plan orders 750 mL 2    sodium chloride 0.9% infusion Infuse 500 mLs into the vein as needed for other (infusion reaction). In case of mild reaction, administer via gravity at 20 mL/hr to keep vein open. In case of severe reaction, administer via gravity wide open on prime setting. 285967 mL 0    sodium chloride, PF, 0.9% PF flush  Inject 10 mLs into the vein as needed for line flush. Flush IV before and after med administration as directed and/or at least every 24 hours, or prior to deaccessing for no further use and/or at least every 4 weeks when not accessed. 682153 mL 0    sodium chloride, PF, 0.9% PF flush Inject 10 mLs into the vein as needed for other (infusion reaction). For RN use only as needed for infusion reaction 032728 mL 0    sodium chloride, PF, 0.9% PF flush Inject 10 mLs into the vein as needed for line flush. Flush IV before and after med administration as directed and/or at least every 24 hours, or prior to deaccessing for no further use and/or at least every 4 weeks when not accessed. 663187 mL 0    sterile water, preservative free, injection Use 2.2 mLs for reconstitution as needed (with alteplase for catheter occlusion). Direct diluent stream into powder. Mix by gently swirling until dissolved. DO NOT SHAKE. Discard remainder of vial. 464264 mL 0    vitamin D3 (CHOLECALCIFEROL) 50 mcg (2000 units) tablet Take 1 tablet (50 mcg) by mouth daily. 90 tablet 1    zoledronic acid (ZOMETA) 4 MG/100ML infusion Infuse 100 mLs (4 mg) into the vein every 28 days. Infuse via gravity. Given per therapy plan orders 300 mL 2     Physical Exam: ***   General: The patient is a pleasant male in no acute distress.  There were no vitals taken for this visit.  Wt Readings from Last 10 Encounters:   01/10/25 68.9 kg (152 lb)   01/07/25 68.9 kg (152 lb)   01/03/25 67.5 kg (148 lb 14.4 oz)   12/31/24 65.8 kg (145 lb)   12/18/24 69.7 kg (153 lb 9.6 oz)   12/17/24 68 kg (150 lb)   12/12/24 68 kg (150 lb)   12/02/24 69.9 kg (154 lb)   11/15/24 72.1 kg (159 lb)   11/11/24 69.9 kg (154 lb)   HEENT: EOMI. Sclerae are anicteric.   Lymph: Neck is supple with no lymphadenopathy in the cervical or supraclavicular areas.   Heart: Regular rate and rhythm.   Lungs: Clear to auscultation bilaterally.   Abdomen: Bowel sounds present, soft, nontender with no  palpable hepatosplenomegaly or masses.   Extremities: No lower extremity edema noted bilaterally.   Neuro: Cranial nerves II through XII are grossly intact.  Skin: No rashes, petechiae, or bruising noted on exposed skin. ***          Labs:    Most Recent 3 CBC's:  Recent Labs   Lab Test 01/03/25  0804 12/18/24  1228 12/12/24  1355   WBC 4.1 14.5* 14.0*   HGB 9.8* 9.4* 10.0*   MCV 87 88 88    226 199   ANEUTAUTO 2.6 13.1* 12.1*    Most Recent 3 BMP's:  Recent Labs   Lab Test 01/10/25  1405 01/03/25  0804 12/18/24  1228 12/12/24  1355   NA  --  137 134* 133*   POTASSIUM  --  3.9 4.4 4.1   CHLORIDE  --  102 99 101   CO2  --  24 24 24   BUN  --  19.2 24.7* 23.5*   CR  --  0.54* 0.62* 0.67   ANIONGAP  --  11 11 8   DENISE  --  8.8 8.5* 8.2*   * 148* 330* 184*   PROTTOTAL  --  6.7 6.9 6.8   ALBUMIN  --  3.6 3.4* 3.5    Most Recent 2 LFT's:  Recent Labs   Lab Test 01/03/25  0804 12/18/24  1228   AST 15 30   ALT 19 49   ALKPHOS 420* 327*   BILITOTAL 0.4 0.3  0.3   I reviewed the above labs today.  New labs will be drawn 1/3 prior to chemotherapy.     Impression/plan:     Unresectable Pancreatic Cancer  7/31/23 began palliative treatment on FOLFIRINOX  CT CAP 10/24/23 with progression in skeletal mets, lung nodule and lymph nodes; stable pancreatic mass  - 10/31/2023: Treatment was changed to Gemcitabine and Abraxane (Day 1, 8, 15) + Zometa every 3 months  - 03/2024 CT CAP with progression of L4 lesion  - Day 8 eliminated after Cycle 5 gem/abraxane  - 8/2024 repeat imaging with isolated progression of L4 lesion, see below   - November 2024 imaging showed disease progression.  - Started 5FU/ liposomal irinotecan on 12/18. Tolerated cycle 1 well. Proceed with cycle 2 as scheduled 1/3 pending labs are WNL. Continue SUZETTE follow up prior to each cycle. Patient to call sooner with concerns.  Will repeat imaging after 4-6 cycles, depending on clinical course.   -will have RNCC follow up with patient/wife regarding home  care, possibility of PCA to help while wife returns to work.     Anemia-normocytic  Hgb mildly worse. Likely related to disease progression. Denies bleeding issues. Will continue to monitor.     Skeletal Mets    - Radiation to T10 completed 11/29/23  - CT chest 5/23/24 unchanged sclerotic bone metastases  -imaging 8/2024 with isolated progression in L4, consult to radiation oncology.   -8/28/24 RFA ablation and vertebral augmentation to L4. Completed radiation to L4 in October 2024   -Pain management by palliative care, as below, from 12/17/24 visit and recent updates.   -scheduled for kyphoplasty 1/10. Discussed potential PT after procedure to help with mobilty/strength.   -previously given Rx for a walker, encouraged him to use this for stability/safety with increasing weakness   *** discussed PT, revisit in 2 weeks     Pain    -belbuca 600 mcg films BID with a 3rd available to use daily prn severe pain not relieved with dilaudid  -dilaudid 4-8 mg po q 3 hours prn and record how often it is needed  -methocarbamol  750-1000mg PO Q6H PRN  -Ativan 0.5 mg tabs prescribed to use sparingly 1-2 tablets q 6 hours prn severe spasm not relieved with methocarbamol  -lidocaine patches  -Tylenol suspension 650 mg po TID  -dexamethasone 6mg daily     Encouraged him to follow up with palliative for ongoing pain management/medication adjustments     Bone Health  -Zometa 4 mg every 3 months, last infusion 12/4/2024.   -Recheck of calcium on 12/18 low. He remains on vitamin D 50 mcg daily and a multivitamin, started  calcium 500 mg bid. Continue to monitor.     Neuropathy  -Secondary to prior chemotherapy. No acute change.    Pericardial Effusion  Found on CT 4/20/24 at [facility], followed by [facility] with moderate pericardial effusion without tamponade.  CT 5/23/24 small pericardial effusion  5/23/24: Cardiology consult at [facility]- Echo 6/6/2024 with EF 55-60%, LV strain -20.5% which is normal, no pericardial effusion  - repeat echo at  [facility] in the future,   - he has been cautioned by Cardiology s/s of cardiac tamponade  -following cardiology as needed  -no acute concerns today    Pulmonary Embolism  Right lower segmental PE found on routine imaging  Taking Apixiban daily, no bleeding concerns. Has instructions for holding prior to kyphoplasty.     Onychomycosis  -Will request home care to cut his toenails.      The longitudinal plan of care for the diagnosis(es)/condition(s) as documented were addressed during this visit. Due to the added complexity in care, I will continue to support Gallo in the subsequent management and with ongoing continuity of care.    *** minutes spent on the date of the encounter doing chart review, review of test results, interpretation of tests, patient visit, and documentation     ESVIN Canales CNP

## 2025-01-15 NOTE — PATIENT INSTRUCTIONS
Contact Numbers  [facility]: [phone] (for symptom and scheduling needs)    Please call the [facility] Triage line if you experience a temperature greater than or equal to 100.4, shaking chills, have uncontrolled nausea, vomiting and/or diarrhea, dizziness, shortness of breath, chest pain, bleeding, unexplained bruising, or if you have any other new/concerning symptoms, questions or concerns.     If you are having any concerning symptoms or wish to speak to a provider before your next infusion visit, please call your care coordinator or triage to notify them so we can adequately serve you.     If you need a refill on a narcotic prescription or other medication, please call triage before your infusion appointment.

## 2025-01-15 NOTE — PROGRESS NOTES
"Infusion Nursing Note:  Gallo Navarro presents today for Cycle 3 Day 1 Liposomal Irinotecan, Fluorouracil pump connect + Zometa.    Patient seen by provider today: Yes: Megan Farrell   present during visit today: Not Applicable.    Note: Pt arrives to infusion today feeling okay. Pt has no further questions or concerns following his appointment with Megan Farrell. Pt will proceed with treatment today.       Intravenous Access:  Implanted Port.    Treatment Conditions:  Lab Results   Component Value Date    HGB 9.1 (L) 01/15/2025    WBC 4.6 01/15/2025    ANEU 2.2 12/26/2023    ANEUTAUTO 3.2 01/15/2025     01/15/2025        Lab Results   Component Value Date     01/15/2025    POTASSIUM 3.6 01/15/2025    MAG 1.9 08/23/2023    CR 0.56 (L) 01/15/2025    DENISE 8.6 (L) 01/15/2025    BILITOTAL 0.4 01/15/2025    ALBUMIN 3.4 (L) 01/15/2025    ALT 14 01/15/2025    AST 17 01/15/2025       Results reviewed, labs MET treatment parameters, ok to proceed with treatment.      Post Infusion Assessment:  Patient tolerated infusion without incident.  Blood return noted pre and post infusion.  Site patent and intact, free from redness, edema or discomfort.  No evidence of extravasations.  Access discontinued per protocol.     Prior to discharge: Port is secured in place with tegaderm and flushed with 10cc NS with positive blood return noted.    Continuous home infusion Dosi-Fuser pump connected.    All connectors secured in place and clamps taped open.    Pump started, \"running\" noted on display (CADD): Not Applicable.   Capillary element taped to pt's skin per protocol.  Pump Connection double checked with Sachi Eldridge RN.  Patient instructed to call our clinic or [facility] with any questions or concerns at home.  Patient verbalized understanding.    Patient set up for pump disconnect at home with [facility] on 1/17 @1000        Discharge Plan:   Patient declined prescription " refills.  Discharge instructions reviewed with: Patient.  Patient and/or family verbalized understanding of discharge instructions and all questions answered.  AVS to patient via [employer].  Patient will return 1/29 for next appointment.   Patient discharged in stable condition accompanied by: self.  Departure Mode: Wheelchair.      Ibeth Villafana RN

## 2025-01-16 PROCEDURE — S9330 HIT CONT CHEM DIEM: HCPCS

## 2025-01-17 ENCOUNTER — MYC REFILL (OUTPATIENT)
Dept: RADIATION ONCOLOGY | Facility: HOSPITAL | Age: 60
End: 2025-01-17
Payer: COMMERCIAL

## 2025-01-17 ENCOUNTER — MYC REFILL (OUTPATIENT)
Dept: ONCOLOGY | Facility: CLINIC | Age: 60
End: 2025-01-17
Payer: COMMERCIAL

## 2025-01-17 DIAGNOSIS — M48.50XD PATHOLOGICAL COMPRESSION FRACTURE OF VERTEBRA WITH ROUTINE HEALING, SUBSEQUENT ENCOUNTER: ICD-10-CM

## 2025-01-17 DIAGNOSIS — C25.9 PANCREATIC ADENOCARCINOMA (H): ICD-10-CM

## 2025-01-17 DIAGNOSIS — R10.30 LOWER ABDOMINAL PAIN: ICD-10-CM

## 2025-01-17 DIAGNOSIS — C25.0 MALIGNANT NEOPLASM OF HEAD OF PANCREAS (H): ICD-10-CM

## 2025-01-17 DIAGNOSIS — Z98.890 HISTORY OF BILIARY STENT INSERTION: ICD-10-CM

## 2025-01-17 DIAGNOSIS — G89.3 CANCER ASSOCIATED PAIN: ICD-10-CM

## 2025-01-17 PROCEDURE — A4216 STERILE WATER/SALINE, 10 ML: HCPCS

## 2025-01-17 PROCEDURE — S9330 HIT CONT CHEM DIEM: HCPCS

## 2025-01-18 PROCEDURE — S9330 HIT CONT CHEM DIEM: HCPCS

## 2025-01-19 PROCEDURE — S9330 HIT CONT CHEM DIEM: HCPCS

## 2025-01-19 RX ORDER — HYDROMORPHONE HYDROCHLORIDE 4 MG/1
4-8 TABLET ORAL
Qty: 180 TABLET | Refills: 0 | Status: SHIPPED | OUTPATIENT
Start: 2025-01-19

## 2025-01-19 RX ORDER — LORAZEPAM 0.5 MG/1
.5-1 TABLET ORAL EVERY 6 HOURS PRN
Qty: 45 TABLET | Refills: 2 | Status: SHIPPED | OUTPATIENT
Start: 2025-01-19

## 2025-01-20 DIAGNOSIS — C25.9 PANCREATIC ADENOCARCINOMA (H): ICD-10-CM

## 2025-01-20 DIAGNOSIS — G89.3 CANCER ASSOCIATED PAIN: ICD-10-CM

## 2025-01-20 DIAGNOSIS — C25.0 MALIGNANT NEOPLASM OF HEAD OF PANCREAS (H): Primary | ICD-10-CM

## 2025-01-20 PROCEDURE — A9270 NON-COVERED ITEM OR SERVICE: HCPCS

## 2025-01-20 PROCEDURE — S9330 HIT CONT CHEM DIEM: HCPCS

## 2025-01-20 RX ORDER — DEXAMETHASONE 2 MG/1
2 TABLET ORAL
Qty: 10 TABLET | Refills: 0 | Status: SHIPPED | OUTPATIENT
Start: 2025-01-20

## 2025-01-20 NOTE — TELEPHONE ENCOUNTER
Last prescriptions for Creon sent in on 11/28/24 with 11 refills and tylenol sent in on 12/09/24 with 3 refills. Call to [facility] to verify refills still active in system. Pharmacists stating pt does have refills remaining and they will dispense medication.    Call to pt and updated him on status of refill request. Pt verbalized understanding.

## 2025-01-20 NOTE — TELEPHONE ENCOUNTER
Pt needing 10 more 2 mg tabs of dexamethasone to complete taper.    Megan Funez, MICAHELN, RN  Palliative Care Nurse Clinician    [phone] (Direct)  [phone] (Main)  [phone] (Appointment Scheduling)

## 2025-01-21 ENCOUNTER — VIRTUAL VISIT (OUTPATIENT)
Dept: RADIATION ONCOLOGY | Facility: HOSPITAL | Age: 60
End: 2025-01-21
Attending: FAMILY MEDICINE
Payer: COMMERCIAL

## 2025-01-21 VITALS — HEIGHT: 72 IN | WEIGHT: 148 LBS | BODY MASS INDEX: 20.05 KG/M2

## 2025-01-21 DIAGNOSIS — Z51.5 PALLIATIVE CARE PATIENT: Primary | ICD-10-CM

## 2025-01-21 DIAGNOSIS — M48.50XD PATHOLOGICAL COMPRESSION FRACTURE OF VERTEBRA WITH ROUTINE HEALING, SUBSEQUENT ENCOUNTER: ICD-10-CM

## 2025-01-21 DIAGNOSIS — C25.0 MALIGNANT NEOPLASM OF HEAD OF PANCREAS (H): ICD-10-CM

## 2025-01-21 DIAGNOSIS — C79.51 SECONDARY MALIGNANT NEOPLASM OF BONE (H): ICD-10-CM

## 2025-01-21 DIAGNOSIS — G89.3 CANCER ASSOCIATED PAIN: ICD-10-CM

## 2025-01-21 PROCEDURE — S9330 HIT CONT CHEM DIEM: HCPCS

## 2025-01-21 RX ORDER — BUPRENORPHINE 20 UG/H
1 PATCH TRANSDERMAL
Qty: 4 PATCH | Refills: 4 | Status: SHIPPED | OUTPATIENT
Start: 2025-01-21

## 2025-01-21 RX ORDER — BUPRENORPHINE 10 UG/H
1 PATCH TRANSDERMAL
Qty: 4 PATCH | Refills: 4 | Status: SHIPPED | OUTPATIENT
Start: 2025-01-21

## 2025-01-21 ASSESSMENT — PAIN SCALES - GENERAL: PAINLEVEL_OUTOF10: NO PAIN (0)

## 2025-01-21 NOTE — PROGRESS NOTES
Is the patient currently in the state of [place]? YES    Visit mode: VIDEO    If the visit is dropped, the patient can be reconnected by:VIDEO VISIT: Send to e-mail at: [email or web address]    Will anyone else be joining the visit? NO  (If patient encounters technical issues they should call [phone])    Are changes needed to the allergy or medication list? No    Are refills needed on medications prescribed by this physician? NO    Rooming Documentation:  Questionnaire(s) completed    Reason for visit: Video Visit (Follow Up )    Brittany NICOLE

## 2025-01-21 NOTE — PROGRESS NOTES
Virtual Visit Details    Type of service:  Video Visit     Originating Location (pt. 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 Gallo is preparing to go to [facility] to see if he qualifies for a cellular therapy trial.  2024 Life prolonging without limits at this time and willingness to consider clinical trials.     Recommendations & Counseling:  Stay off Belbucca films.  Resume Butrans 30 mcg/hours total--new rx sent today  Continue dilaudid 4-6 mg po q 3 hours prn and record how often it is needed  OK to use methocarbamol  FROM 750 MG TO 1000 MG TABS PO Q 6 HOURS PRN  CONTINUE Ativan 0.5 mg  tabs prescribed to use sparingly 1-2 tablets q 6 hours prn severe spasm not relieved with methocarbamol  CONTINUE lidocaine patches  Tylenol suspension 650 mg po TID  Narcan also prescribed for safety  RNCC  symptom check 1 week and video follow-up in 4-6 weeks   Gallo is encouraged to follow through on PT order Megan Farrell placed    I provided emotional support through validating Gallo's feelings and open, empathic communication and his love for the [employer].     Counseling: All of the above was explained to the patient in lay language. The patient has verbalized a clear understanding of the discussion, asked appropriate questions, which have been answered  to patient's apparent satisfaction. The patient is in agreement with the above plan.        Chief Complaint/Patient ID: Gallo Navarro 58 year old male with PMHx of unresectable localized pancreatic cancer and spinal mets    Last Palliative care appointment: 12/17/2024 with me     Reviewed:  Yes:   reviewed - controlled substances reflected in medication list.    Interim History:  Gallo Navarro is a 59 year old male who is seen today for follow up with Palliative Care via billable video visit.      Pain:  back pain is much better post kyphoplasty. He feels weaker due to immobility; he stopped using Belbucca 600 mcg films BID and is only on Hydromorphone 8 mg q 4 hours; Has been tapering dexamethasone    Appetite/Nausea: recovering some from before Ruby     Bowels: a little loose from last chemo with one accident;      Sleep: no concerns     Mood: much less anxious with pain under better control.     Coping:  better;     Family History- Reviewed in Epic.    No Known Allergies    Social History:  Pertinent changes to social history/social situation since last visit: none  Key support resources: family and friends  Advance Directive Status:  no ACP documents in Epic    Social History     Tobacco Use    Smoking status: Never     Passive exposure: Never    Smokeless tobacco: Never   Vaping Use    Vaping status: Never Used   Substance Use Topics    Alcohol use: Not Currently    Drug use: Never         No Known Allergies  Current Outpatient Medications   Medication Sig Dispense Refill    acetaminophen (TYLENOL) 32 mg/mL liquid Take 20.313 mLs (650 mg) by mouth every 8 hours. 1800 mL 3    alteplase (CATHFLO ACTIVASE) injection Inject 2 mLs (2 mg) into catheter as needed (catheter occlusion). Reconstitute vial. Draw up alteplase 1 mg/mL in a syringe and instill into IV catheter. Dwell for at least 20 min to 24 hours, then aspirate. May repeat dose once in 24 hours if catheter function not restored after at least 2  "hours. Discard remainder of vial. 223165 each 0    apixaban ANTICOAGULANT (ELIQUIS) 5 MG tablet Take 1 tablet (5 mg) by mouth 2 times daily. 60 tablet 2    blood glucose (FREESTYLE TEST STRIPS) test strip 1 strip by In Vitro route 3 times daily. 100 strip 2    Buprenorphine HCl (BELBUCA) 600 MCG FILM buccal film Place 1 Film (600 mcg) inside cheek every 12 hours. May also place 1 Film (600 mcg) daily as needed. For severe pain not relieved with 6 mg dilaudid dose. 75 Film 3    calcium carbonate (OS-DENISE) 500 MG tablet Take 1 tablet by mouth 2 times daily.      Chemo Kit For RN use only. Do not remove items from bag. Contents: 1  sodium chloride 0.9% flush, 4 medium gloves, 1 chemo gown, 1/4 chemo mat, 1 connector female, 1 chemo bag. [id number] kit 0    Chemo Kit For RN use only. Do not remove items from bag. Contents: 1  sodium chloride 0.9% flush, 4 medium gloves, 1 chemo gown, 1/4 chemo mat, 1 connector female, 1 chemo bag. [id number] kit 0    Continuous Glucose Sensor (DEXCOM G7 SENSOR) MISC Change every 10 days. 6 each 3    dexAMETHasone (DECADRON) 2 MG tablet Take 1 tablet (2 mg) by mouth daily (with breakfast). 10 tablet 0    dicyclomine (BENTYL) 10 MG capsule Take 1 capsule (10 mg) by mouth 4 times daily (before meals and nightly). 120 capsule 1    diphenhydrAMINE (BENADRYL) 50 MG/ML injection Inject 1 mL (50 mg) over 3-5 minutes into the vein via push as needed for other (infusion reaction). For RN use only.  Draw up in a syringe and administer IV push.  Discard remainder of vial. 80215 mL 0    econazole nitrate 1 % external cream Apply topically daily To affected toenails. 85 g 5    Emergency Supply Kit, Central, Patient use for emergency only. Contents: 3 sodium chloride 0.9% flushes, 1 dressing kit, 1 microclave ext set 14\", 4 nitrile gloves (med), 6 alcohol prep pads, 1 bacitracin, 1 syringe (10 cc 20 G 1\"). Call [phone] to reorder. [id number] kit 0    Emergency Supply Kit, Central, Patient use for emergency " "only. Contents: 3 sodium chloride 0.9% flushes, 1 dressing kit, 1 microclave ext set 14\", 4 nitrile gloves (med), 6 alcohol prep pads, 1 bacitracin, 1 syringe (10 cc 20 G 1\"). Call [phone] to reorder. 225330 kit 0    EPINEPHrine (ANY BX GENERIC EQUIV) 0.3 MG/0.3ML injection 2-pack Inject 0.3 mLs (0.3 mg) into the muscle as needed for anaphylaxis (infusion reaction). Administer into the mid-thigh in case of severe anaphylaxis (wheezing, throat tightening, mouth swelling, difficulty breathing). May repeat dose one time in 5-15 minutes if symptoms persist. 494717 mL 0    Fluorouracil (ADRUCIL) 4,585 mg in sodium chloride 0.9 % 241 mL via HOMEPUMP C-Series Infuse 4,585 mg at 5.2 mL/hr over 46 hours into the vein once for 1 dose. 878267 mL 0    HYDROmorphone (DILAUDID) 4 MG tablet Take 1-2 tablets (4-8 mg) by mouth every 3 hours as needed for severe pain or breakthrough pain. 180 tablet 0    insulin glargine (LANTUS PEN) 100 UNIT/ML pen Inject 8 Units subcutaneously at bedtime. 15 mL 0    insulin  UNIT/ML injection 20 units around 9 am (with steroids) 10 units around 9 pm 15 mL 3    insulin pen needle (31G X 8 MM) 31G X 8 MM miscellaneous Use 1 pen needles daily or as directed. 50 each 0    lidocaine (LIDODERM) 5 % patch Place 2 patches over 12 hours onto the skin every 24 hours. 60 patch 3    lidocaine-prilocaine (EMLA) 2.5-2.5 % external cream Use 1-2 times a week or as needed prior to port access 30 g 3    lipase-protease-amylase (CREON 24) 04822-87963-956111 units CPEP per EC capsule 2-3 capsules with meals 3 times a day and 1-2 capsules with snacks max of 15 capsules a day 200 capsule 11    loperamide (IMODIUM) 2 MG capsule 2 caps at 1st sign of diarrhea & 1 cap every 2hrs until 12hrs diarrhea free. During night, 2 caps at bedtime & 2 caps every 4hrs until AM 30 capsule 0    LORazepam (ATIVAN) 0.5 MG tablet Take 1-2 tablets (0.5-1 mg) by mouth every 6 hours as needed for muscle spasms. 45 tablet 2    " methocarbamol 1000 MG TABS Take 1,000 mg by mouth 4 times daily as needed for muscle spasms. 180 tablet 3    methylphenidate (RITALIN) 5 MG tablet Take 1 tablet (5 mg) by mouth 2 times daily as needed 10 tablet 0    methylPREDNISolone Na Suc, PF, (SOLU-MEDROL) 125 mg/2 mL injection Inject 2 mLs (125 mg) over 3-5 minutes into the vein via push as needed (infusion reaction). For RN use only.  Reconstitute vial. Draw up methylPREDNISolone in a syringe and administer.  Discard remainder of vial. 942671 mL 0    Multiple Vitamins-Minerals (CENTRUM SILVER 50+MEN) TABS as directed Orally      naloxone (NARCAN) 4 MG/0.1ML nasal spray       pantoprazole (PROTONIX) 40 MG EC tablet Take 1 tablet (40 mg) by mouth 2 times daily 60 tablet 3    Port Access Kit For nurse use only.  Do not remove items from bag.  Use for port access.  Do not place syringe on sterile field. [id number] kit 0    Port Access Kit For nurse use only.  Do not remove items from bag.  Use for port access.  Do not place syringe on sterile field. 116885 kit 0    prochlorperazine (COMPAZINE) 10 MG tablet Take 1 tablet (10 mg) by mouth every 6 hours as needed for nausea or vomiting. 30 tablet 2    prochlorperazine (COMPAZINE) 10 MG tablet Take 1 tablet (10 mg) by mouth every 6 hours as needed for nausea or vomiting 30 tablet 2    sodium chloride 0.9% infusion Infuse 250 mLs over 30 minutes into the vein every 28 (twenty-eight) days. zometa concomitant fluids. Given per therapy plan orders 750 mL 2    sodium chloride 0.9% infusion Infuse 500 mLs into the vein as needed for other (infusion reaction). In case of mild reaction, administer via gravity at 20 mL/hr to keep vein open. In case of severe reaction, administer via gravity wide open on prime setting. 519383 mL 0    sodium chloride, PF, 0.9% PF flush Inject 10 mLs into the vein as needed for line flush. Flush IV before and after med administration as directed and/or at least every 24 hours, or prior to deaccessing  for no further use and/or at least every 4 weeks when not accessed. 287165 mL 0    sodium chloride, PF, 0.9% PF flush Inject 10 mLs into the vein as needed for other (infusion reaction). For RN use only as needed for infusion reaction 074486 mL 0    sodium chloride, PF, 0.9% PF flush Inject 10 mLs into the vein as needed for line flush. Flush IV before and after med administration as directed and/or at least every 24 hours, or prior to deaccessing for no further use and/or at least every 4 weeks when not accessed. 784356 mL 0    sterile water, preservative free, injection Use 2.2 mLs for reconstitution as needed (with alteplase for catheter occlusion). Direct diluent stream into powder. Mix by gently swirling until dissolved. DO NOT SHAKE. Discard remainder of vial. 015308 mL 0    vitamin D3 (CHOLECALCIFEROL) 50 mcg (2000 units) tablet Take 1 tablet (50 mcg) by mouth daily. 90 tablet 1    zoledronic acid (ZOMETA) 4 MG/100ML infusion Infuse 100 mLs (4 mg) into the vein every 28 days. Infuse via gravity. Given per therapy plan orders 300 mL 2    Continuous Glucose Sensor (FREESTYLE KAREN 2 SENSOR) MISC Change every 14 days. 2 each 1     Past Medical History:   Diagnosis Date    Acute pancreatitis 04/16/2023    Alcohol-induced acute pancreatitis 04/10/2023    Gastric outlet obstruction     Metastasis from pancreatic cancer (H)     Personal history of chemotherapy     Gemzar + Abraxane started on 10/31/2023    Recurrent acute pancreatitis     S/P radiation therapy     2,000 cGy to T10 Spine completed on 11/29/2023 - [facility]    S/P radiation therapy     3,000 cGy to L4 Spine completed on 10/21/2024 - [facility]     Past Surgical History:   Procedure Laterality Date    AS OPEN TREATMENT CLAVICULAR FRACTURE INTERNAL FX      ENDOSCOPIC RETROGRADE CHOLANGIOPANCREATOGRAM N/A 7/11/2023    Procedure: ENDOSCOPIC RETROGRADE CHOLANGIOPANCREATOGRAPHY;  Surgeon: Khadar Pickett MD;  Location:  [facility] OR    ENDOSCOPIC RETROGRADE CHOLANGIOPANCREATOGRAM N/A 8/17/2023    Procedure: ENDOSCOPIC RETROGRADE  with stent removal x1, balloon sweep;  Surgeon: Christos Greenberg MD;  Location: [facility] OR    ENDOSCOPIC ULTRASOUND UPPER GASTROINTESTINAL TRACT (GI) N/A 7/11/2023    Procedure: Endoscopic ultrasound upper gastrointestinal tract (GI), with biposy, GJ tube repositioning, stent placement;  Surgeon: Khadar Pickett MD;  Location: [facility] OR    ENDOSCOPIC ULTRASOUND UPPER GASTROINTESTINAL TRACT (GI) N/A 7/13/2023    Procedure: ENDOSCOPIC ULTRASOUND, ESOPHAGOSCOPY, EUS guided gastrojejunostomy;  Surgeon: Tre York MD;  Location: [facility] OR    INSERT PICC LINE  04/29/2023    INSERT PORT VASCULAR ACCESS Right 7/28/2023    Procedure: Insert port vascular access;  Surgeon: Tom العلي MD;  Location: [facility] OR    IR BONE ABLATION RFA  8/28/2024    IR CHEST PORT PLACEMENT > 5 YRS OF AGE  7/28/2023    IR LUMBAR VERTEBROPLASTY  8/28/2024    IR NG TUBE PLACEMENT REQ RAD & FLUORO  05/08/2023    JG tube    REPLACE GASTROJEJUNOSTOMY TUBE, PERCUTANEOUS N/A 8/17/2023    Procedure: possible REPLACEMENT, GASTROJEJUNOSTOMY TUBE, PERCUTANEOUS;  Surgeon: Christos Greenberg MD;  Location: [facility] OR       Physical Exam:   GENERAL APPEARANCE: chronically ill appearing; tearful at times, alert and no distress; neatly groomed  EYES: Eyes grossly normal to inspection, PERRLA, conjunctivae and sclerae without injection or discharge, EOM intact   RESP:  no increased work of breathing; speaks in complete sentences;   MS: No musculoskeletal defects are noted  SKIN: No suspicious lesions or rashes, hydration status appears adequate with normal skin turgor   PSYCH: Alert and oriented x3; speech- coherent , normal rate and volume; able to articulate logical thoughts, able to abstract reason, no tangential thoughts, no hallucinations or delusions, mentation appears normal, Mood is euthymic. Affect is appropriate for this mood state and  bright. Thought content is free of suicidal ideation, hallucinations, and delusions.  Eye contact is good during conversation.       Key Data Reviewed:  LABS: 1/15/2025- Cr 0.56, Albumin 3.4,  Hgb 9.1,      IMAGING: no new imaging beyond RFA's and kyphoplasty    40 minutes spent on the date of the encounter doing chart review, history and exam, patient education & counseling, documentation and other activities as noted above.    The longitudinal plan of care for the diagnosis(es)/condition(s) as documented were addressed during this visit. Due to the added complexity in care, I will continue to support Gallo in the subsequent management and with ongoing continuity of care.    Jeremy Hurt MD MS FAAFP CAQHPM  [facility]  Office [phone]  Fax [phone]

## 2025-01-21 NOTE — PATIENT INSTRUCTIONS
It was good to see you today, Gallo.    Here are the things we talked about:  I reordered the Butrans patches (use a 20 mg and a 10 mg patch and wear them for a week at a time);  And this should allow you to cut back on the diluadid --perhaps to 1 pill every 4-6 hours as needed. And that should you feel less fuzzy in your head.    Continue the dexamethasone (steroids) taper that Megan outlined    Someone from the team will reach out to schedule a follow up appointment in 4-6 weeks.  Megan will call in a month     How to get a hold of us:  For non-urgent matters, MyChart works best.    For more urgent matters, or if you prefer not to use MyChart, call our clinic nurse coordinator Megan Funez RN at [phone]    We have an on-call number for evenings and weekends. Please call this only if you are having uncontrolled symptoms or serious side effects from your medicines: [phone].     For refills, please give us a week (5 working days) notice. We don't always have providers available everyday to do refills. If you call the day you run out of your medicine, we may not be able to refill it in time, so call 5 days in advance!    Jeremy Hurt MD MS FAAFP CAQHPM  [facility]  Office [phone]  Fax [phone]

## 2025-01-22 PROCEDURE — S9330 HIT CONT CHEM DIEM: HCPCS

## 2025-01-23 PROCEDURE — S9330 HIT CONT CHEM DIEM: HCPCS

## 2025-01-23 NOTE — TELEPHONE ENCOUNTER
IB back to [facility] to look at availability for home care skilled nursing services.    [facility] link pool  [facility] link [place]  Kim [facility]ron[place] Fax: [phone]  [facility] Email: [email or web address]  [facility] link [place]  [facility] Email: [email or web address]  [facility]: fax [phone]

## 2025-01-24 PROCEDURE — S9330 HIT CONT CHEM DIEM: HCPCS

## 2025-01-25 PROCEDURE — S9330 HIT CONT CHEM DIEM: HCPCS

## 2025-01-26 PROCEDURE — S9330 HIT CONT CHEM DIEM: HCPCS

## 2025-01-27 ENCOUNTER — MYC REFILL (OUTPATIENT)
Dept: ONCOLOGY | Facility: CLINIC | Age: 60
End: 2025-01-27
Payer: COMMERCIAL

## 2025-01-27 DIAGNOSIS — I26.94 MULTIPLE SUBSEGMENTAL PULMONARY EMBOLI WITHOUT ACUTE COR PULMONALE (H): ICD-10-CM

## 2025-01-27 DIAGNOSIS — C25.9 PANCREATIC ADENOCARCINOMA (H): Primary | ICD-10-CM

## 2025-01-27 PROCEDURE — S9330 HIT CONT CHEM DIEM: HCPCS

## 2025-01-27 NOTE — TELEPHONE ENCOUNTER
Declined again by [facility].    New urgent home care referral entered by Dr. Haile; resent to Gray.

## 2025-01-27 NOTE — TELEPHONE ENCOUNTER
Called [facility] homecare intake as previous attempt came back as undeliverable.  Email: [email or web address]  Cell: Johanna [phone]

## 2025-01-28 ENCOUNTER — HOME INFUSION (OUTPATIENT)
Dept: HOME HEALTH SERVICES | Facility: HOME HEALTH | Age: 60
End: 2025-01-28
Payer: COMMERCIAL

## 2025-01-28 DIAGNOSIS — C25.0 MALIGNANT NEOPLASM OF HEAD OF PANCREAS (H): ICD-10-CM

## 2025-01-28 PROCEDURE — S9330 HIT CONT CHEM DIEM: HCPCS

## 2025-01-28 NOTE — TELEPHONE ENCOUNTER
HEMAL perez messaged, faxed and emailed the following agencies again with updated homecare referral and referred to Palliative Care 1/21 OV note:    [facility]  [facility]  [facility]  [facility]  [place]  [facility]  [facility]  [facility]  [facility]  [facility]  [facility] ([place])  [facility]  [facility]  [facility]  [facility]  [facility]  [facility]

## 2025-01-28 NOTE — TELEPHONE ENCOUNTER
"Apixaban 5mg tab  Last prescribing provider: Dr. Haile     Last clinic visit date: 1/15/25 w/ Megan Farrell     Recommendations for requested medication (if none, N/A): 1/15/25, \"Right lower segmental PE found on routine imaging  Taking Apixiban daily, no bleeding concerns. Has instructions for holding prior to kyphoplasty.\"    Any other pertinent information (if none, N/A): NA    Refilled: Y/N, if NO, why?    "

## 2025-01-29 ENCOUNTER — DOCUMENTATION ONLY (OUTPATIENT)
Dept: HOME HEALTH SERVICES | Facility: HOME HEALTH | Age: 60
End: 2025-01-29
Payer: COMMERCIAL

## 2025-01-29 ENCOUNTER — HOME INFUSION BILLING (OUTPATIENT)
Dept: HOME HEALTH SERVICES | Facility: HOME HEALTH | Age: 60
End: 2025-01-29
Payer: COMMERCIAL

## 2025-01-29 ENCOUNTER — INFUSION THERAPY VISIT (OUTPATIENT)
Dept: ONCOLOGY | Facility: CLINIC | Age: 60
End: 2025-01-29
Attending: STUDENT IN AN ORGANIZED HEALTH CARE EDUCATION/TRAINING PROGRAM
Payer: COMMERCIAL

## 2025-01-29 ENCOUNTER — ONCOLOGY VISIT (OUTPATIENT)
Dept: ONCOLOGY | Facility: CLINIC | Age: 60
End: 2025-01-29
Payer: COMMERCIAL

## 2025-01-29 VITALS
BODY MASS INDEX: 19.56 KG/M2 | TEMPERATURE: 98.8 F | WEIGHT: 144.2 LBS | DIASTOLIC BLOOD PRESSURE: 69 MMHG | HEART RATE: 127 BPM | OXYGEN SATURATION: 97 % | RESPIRATION RATE: 17 BRPM | SYSTOLIC BLOOD PRESSURE: 115 MMHG

## 2025-01-29 DIAGNOSIS — Z51.11 ENCOUNTER FOR ANTINEOPLASTIC CHEMOTHERAPY: Primary | ICD-10-CM

## 2025-01-29 DIAGNOSIS — Z79.4 TYPE 2 DIABETES MELLITUS WITHOUT COMPLICATION, WITH LONG-TERM CURRENT USE OF INSULIN (H): ICD-10-CM

## 2025-01-29 DIAGNOSIS — C25.0 MALIGNANT NEOPLASM OF HEAD OF PANCREAS (H): ICD-10-CM

## 2025-01-29 DIAGNOSIS — E11.9 TYPE 2 DIABETES MELLITUS WITHOUT COMPLICATION, WITH LONG-TERM CURRENT USE OF INSULIN (H): ICD-10-CM

## 2025-01-29 DIAGNOSIS — C25.0 MALIGNANT NEOPLASM OF HEAD OF PANCREAS (H): Primary | ICD-10-CM

## 2025-01-29 DIAGNOSIS — E83.51 HYPOCALCEMIA: ICD-10-CM

## 2025-01-29 LAB
ALBUMIN SERPL BCG-MCNC: 3.3 G/DL (ref 3.5–5.2)
ALP SERPL-CCNC: 292 U/L (ref 40–150)
ALT SERPL W P-5'-P-CCNC: 12 U/L (ref 0–70)
ANION GAP SERPL CALCULATED.3IONS-SCNC: 9 MMOL/L (ref 7–15)
AST SERPL W P-5'-P-CCNC: 18 U/L (ref 0–45)
BASOPHILS # BLD AUTO: 0 10E3/UL (ref 0–0.2)
BASOPHILS NFR BLD AUTO: 1 %
BILIRUB SERPL-MCNC: 0.4 MG/DL
BUN SERPL-MCNC: 17.9 MG/DL (ref 6–20)
CALCIUM SERPL-MCNC: 8.4 MG/DL (ref 8.8–10.4)
CHLORIDE SERPL-SCNC: 106 MMOL/L (ref 98–107)
CREAT SERPL-MCNC: 0.52 MG/DL (ref 0.67–1.17)
EGFRCR SERPLBLD CKD-EPI 2021: >90 ML/MIN/1.73M2
EOSINOPHIL # BLD AUTO: 0 10E3/UL (ref 0–0.7)
EOSINOPHIL NFR BLD AUTO: 1 %
ERYTHROCYTE [DISTWIDTH] IN BLOOD BY AUTOMATED COUNT: 20.7 % (ref 10–15)
GLUCOSE SERPL-MCNC: 116 MG/DL (ref 70–99)
HCO3 SERPL-SCNC: 23 MMOL/L (ref 22–29)
HCT VFR BLD AUTO: 26.2 % (ref 40–53)
HGB BLD-MCNC: 8.2 G/DL (ref 13.3–17.7)
IMM GRANULOCYTES # BLD: 0 10E3/UL
IMM GRANULOCYTES NFR BLD: 1 %
LYMPHOCYTES # BLD AUTO: 0.8 10E3/UL (ref 0.8–5.3)
LYMPHOCYTES NFR BLD AUTO: 35 %
MCH RBC QN AUTO: 28.7 PG (ref 26.5–33)
MCHC RBC AUTO-ENTMCNC: 31.3 G/DL (ref 31.5–36.5)
MCV RBC AUTO: 92 FL (ref 78–100)
MONOCYTES # BLD AUTO: 0.4 10E3/UL (ref 0–1.3)
MONOCYTES NFR BLD AUTO: 17 %
NEUTROPHILS # BLD AUTO: 1 10E3/UL (ref 1.6–8.3)
NEUTROPHILS NFR BLD AUTO: 45 %
NRBC # BLD AUTO: 0 10E3/UL
NRBC BLD AUTO-RTO: 1 /100
PLATELET # BLD AUTO: 195 10E3/UL (ref 150–450)
POTASSIUM SERPL-SCNC: 3.7 MMOL/L (ref 3.4–5.3)
PROT SERPL-MCNC: 5.7 G/DL (ref 6.4–8.3)
RBC # BLD AUTO: 2.86 10E6/UL (ref 4.4–5.9)
SODIUM SERPL-SCNC: 138 MMOL/L (ref 135–145)
WBC # BLD AUTO: 2.2 10E3/UL (ref 4–11)

## 2025-01-29 PROCEDURE — 96361 HYDRATE IV INFUSION ADD-ON: CPT

## 2025-01-29 PROCEDURE — 250N000011 HC RX IP 250 OP 636: Performed by: STUDENT IN AN ORGANIZED HEALTH CARE EDUCATION/TRAINING PROGRAM

## 2025-01-29 PROCEDURE — 99215 OFFICE O/P EST HI 40 MIN: CPT

## 2025-01-29 PROCEDURE — 85004 AUTOMATED DIFF WBC COUNT: CPT | Performed by: STUDENT IN AN ORGANIZED HEALTH CARE EDUCATION/TRAINING PROGRAM

## 2025-01-29 PROCEDURE — 96367 TX/PROPH/DG ADDL SEQ IV INF: CPT

## 2025-01-29 PROCEDURE — 250N000011 HC RX IP 250 OP 636

## 2025-01-29 PROCEDURE — A4216 STERILE WATER/SALINE, 10 ML: HCPCS

## 2025-01-29 PROCEDURE — G2211 COMPLEX E/M VISIT ADD ON: HCPCS

## 2025-01-29 PROCEDURE — 258N000003 HC RX IP 258 OP 636

## 2025-01-29 PROCEDURE — 84075 ASSAY ALKALINE PHOSPHATASE: CPT

## 2025-01-29 PROCEDURE — 258N000003 HC RX IP 258 OP 636: Performed by: STUDENT IN AN ORGANIZED HEALTH CARE EDUCATION/TRAINING PROGRAM

## 2025-01-29 PROCEDURE — 96413 CHEMO IV INFUSION 1 HR: CPT

## 2025-01-29 PROCEDURE — S9330 HIT CONT CHEM DIEM: HCPCS | Mod: SH

## 2025-01-29 PROCEDURE — 96375 TX/PRO/DX INJ NEW DRUG ADDON: CPT

## 2025-01-29 PROCEDURE — 99213 OFFICE O/P EST LOW 20 MIN: CPT | Mod: 25

## 2025-01-29 PROCEDURE — 83036 HEMOGLOBIN GLYCOSYLATED A1C: CPT

## 2025-01-29 PROCEDURE — G0498 CHEMO EXTEND IV INFUS W/PUMP: HCPCS

## 2025-01-29 PROCEDURE — S9330 HIT CONT CHEM DIEM: HCPCS

## 2025-01-29 PROCEDURE — 84155 ASSAY OF PROTEIN SERUM: CPT

## 2025-01-29 PROCEDURE — 36591 DRAW BLOOD OFF VENOUS DEVICE: CPT

## 2025-01-29 RX ORDER — CALCIUM CARBONATE 500(1250)
2 TABLET ORAL 2 TIMES DAILY
Qty: 60 TABLET | Refills: 3 | Status: SHIPPED | OUTPATIENT
Start: 2025-01-29

## 2025-01-29 RX ORDER — DEXAMETHASONE SODIUM PHOSPHATE 4 MG/ML
10 INJECTION, SOLUTION INTRA-ARTICULAR; INTRALESIONAL; INTRAMUSCULAR; INTRAVENOUS; SOFT TISSUE ONCE
Status: CANCELLED
Start: 2025-01-31 | End: 2025-01-31

## 2025-01-29 RX ORDER — DEXAMETHASONE SODIUM PHOSPHATE 4 MG/ML
10 INJECTION, SOLUTION INTRA-ARTICULAR; INTRALESIONAL; INTRAMUSCULAR; INTRAVENOUS; SOFT TISSUE ONCE
Status: COMPLETED | OUTPATIENT
Start: 2025-01-29 | End: 2025-01-29

## 2025-01-29 RX ORDER — ONDANSETRON 2 MG/ML
8 INJECTION INTRAMUSCULAR; INTRAVENOUS ONCE
Status: COMPLETED | OUTPATIENT
Start: 2025-01-29 | End: 2025-01-29

## 2025-01-29 RX ORDER — HEPARIN SODIUM (PORCINE) LOCK FLUSH IV SOLN 100 UNIT/ML 100 UNIT/ML
5 SOLUTION INTRAVENOUS ONCE
Status: COMPLETED | OUTPATIENT
Start: 2025-01-29 | End: 2025-01-29

## 2025-01-29 RX ADMIN — SODIUM CHLORIDE 1000 ML: 9 INJECTION, SOLUTION INTRAVENOUS at 08:02

## 2025-01-29 RX ADMIN — ONDANSETRON 8 MG: 2 INJECTION INTRAMUSCULAR; INTRAVENOUS at 08:12

## 2025-01-29 RX ADMIN — DEXTROSE MONOHYDRATE 750 MG: 50 INJECTION, SOLUTION INTRAVENOUS at 10:25

## 2025-01-29 RX ADMIN — IRINOTECAN HYDROCHLORIDE 129 MG: 4.3 INJECTION, POWDER, FOR SOLUTION INTRAVENOUS at 08:57

## 2025-01-29 RX ADMIN — Medication 5 ML: at 06:58

## 2025-01-29 RX ADMIN — DEXAMETHASONE SODIUM PHOSPHATE 10 MG: 4 INJECTION, SOLUTION INTRA-ARTICULAR; INTRALESIONAL; INTRAMUSCULAR; INTRAVENOUS; SOFT TISSUE at 08:12

## 2025-01-29 ASSESSMENT — PAIN SCALES - GENERAL: PAINLEVEL_OUTOF10: MILD PAIN (2)

## 2025-01-29 NOTE — Clinical Note
1/29/2025      Gallo Navarro  [address] [address] [place] [id number]      Dear Colleague,    Thank you for referring your patient, Gallo Navarro, to the [facility]. Please see a copy of my visit note below.    Oncology/Hematology Visit Note  Jan 29, 2025    Reason for Visit: follow-up of metastatic Pancreatic Caner    Oncology HPI:   -NGS: KRAS G12R  Immuno: pMMR, KAYLIE, TMB-low  Clinical Trial: MARIPOSA-186, MARIPOSA-280, TORL    - 3/2023: presented with acute pancreatitis  c/b chronic pancreatitis with pancreatic mass causing duodenal stenosis with gastric outlet obstruction s/p GJ tube (placed 5/2023), biliary obstruction s/p stent placement on 7/7/23, pancreatic insufficiency, and IDDM2.  -  He then presented to the ED with worsening abdominal pain, increased jaundice, poor PO intake and weight loss admitted on 7/8/2023 for concern of biliary obstruction.   - 7/12/2023: Underwent biopsy of pancreatic mass returned positive for pancreatic adenocarcinoma.   - 7/31/2023: He started on treatment with 5FU, irinotecan, and oxaliplatin (FOLFIRINOX). Please see previous notes for further details on the patient's history.      8/17/23 - ERCP/EGD, duodenal stents x2 placed, exchange of GJ tube     8/21-8/25/24 hospitalized due to diarrhea, colitis, abdominal pain.       8/29 - Cycle 3 deferred due to neutropenia.   Neulasta added. Irinotecan dose reduced 20% due to symptoms including cramping, double vision and slurred speech during infusion.       10/24/23 CT CAP with similar to slight increase in size of peripancreatic and mesenteric lymph nodes, enlargement of previous skeletal metastasis as well as new sclerotic metastasis to left posterior 5th rib, enlarging pulmonary nodule, and unchanged pancreatic head mass. Also unclear concerns for PE,  right lower lobe segmental PE confirmed on CT-PE. Started on apixaban.      10/31/23 Cycle 1 gemzar, abraxane  11/7/23 Cycle 1 Zometa  11/22/23 Removal of GJ  tube.   11/29/23 Completed palliative radiation to T10   12/13/23 C3D1 gem/abraxane  12/29/23 Consults at [facility]  1/23-1/26 Consults at [facility]   1/30/24: C4D1 gem/abraxane   3/24: CT CAP with overall stable disease with isolated progression in potential L4 lesion. Referral for radiation oncology.    4/29-5/1: Consults at [facility] for possible cellular therapy, CT guided biopsy of left pubic bone, recommendation to return to [facility] upon progression of current treatment   5/23/24: Cardiology consult at [facility]. CT with small pericardial effusion   6/6/2024: Repeat ECHO on with EF of 55-60%, no pericardial effusion present  - Cycle 8 deferred due to infection concerns and subsequently tested positive for COVID 6/25/24, resumed treatment on 7/2/2024    -Day 8 eliminated after Cycle 5 [place]/Abraxane.    4/2/2024-present: Gemcitabine/Abraxane, Days 1 and 15 only; Zometa every 3 months     8/2024 repeat imaging with isolated progression to L4 lesion with fracture of that spinous process. Referred to radiation therapy. Decreased Abraxane dose reduced to 75mg/m2 with cycle 9 day 15 for overall treatment tolerance.     8/28/24 RFA ablation and verteral augmentation to L4.   10/1-10/21 Radiation to L4     11/12/24 CT CAP shows disease progression, plan to change to 5FU and liposomal irinotecan after a vacation  12/12/24 ED visit for pain, secondary to cancer, managed with IV pain medication    12/18/24 Cycle 1 5FU and liposomal irinotecan    1/10/25: Kyphoplasty ***     Gallo presents today for follow up prior to cycle 4.       Interval history:     Doing more rehab at home, starts PT at home next week   -tolerating chemo well, no noted chagnes after   -no GI concerns   -appetiet is good, eating well - wants to start workingon gaining back weight/muscle  -energy level is good, no change after chemo   -no bowel concerns, taking a fiber pill   -pain currently 1/10, continues to improve, regimen is controlling - starting to  wean on dex, on 2mg daily , taking diluadid, belbuca, lorazapem  -skin/incisions ***   -drinking - ~32oz          Remainder of 12 point ROS reviewed and negative except as in interval history.       Current Outpatient Medications   Medication Sig Dispense Refill    acetaminophen (TYLENOL) 32 mg/mL liquid Take 20.313 mLs (650 mg) by mouth every 8 hours. 1800 mL 3    alteplase (CATHFLO ACTIVASE) injection Inject 2 mLs (2 mg) into catheter as needed (catheter occlusion). Reconstitute vial. Draw up alteplase 1 mg/mL in a syringe and instill into IV catheter. Dwell for at least 20 min to 24 hours, then aspirate. May repeat dose once in 24 hours if catheter function not restored after at least 2 hours. Discard remainder of vial. [id number] each 0    apixaban ANTICOAGULANT (ELIQUIS) 5 MG tablet Take 1 tablet (5 mg) by mouth 2 times daily. 60 tablet 2    blood glucose (FREESTYLE TEST STRIPS) test strip 1 strip by In Vitro route 3 times daily. 100 strip 2    buprenorphine (BUTRANS) 10 MCG/HR WK patch Place 1 patch over 168 hours onto the skin every 7 days. 4 patch 4    buprenorphine (BUTRANS) 20 MCG/HR WK patch Place 1 patch over 168 hours onto the skin every 7 days. 4 patch 4    calcium carbonate (OS-DENISE) 500 MG tablet Take 1 tablet by mouth 2 times daily.      Chemo Kit For RN use only. Do not remove items from bag. Contents: 1  sodium chloride 0.9% flush, 4 medium gloves, 1 chemo gown, 1/4 chemo mat, 1 connector female, 1 chemo bag. [id number] kit 0    Continuous Glucose Sensor (DEXCOM G7 SENSOR) MISC Change every 10 days. 6 each 3    dexAMETHasone (DECADRON) 2 MG tablet Take 1 tablet (2 mg) by mouth daily (with breakfast). 10 tablet 0    dicyclomine (BENTYL) 10 MG capsule Take 1 capsule (10 mg) by mouth 4 times daily (before meals and nightly). 120 capsule 1    diphenhydrAMINE (BENADRYL) 50 MG/ML injection Inject 1 mL (50 mg) over 3-5 minutes into the vein via push as needed for other (infusion reaction). For RN use only.  Draw  "up in a syringe and administer IV push.  Discard remainder of vial. 41162 mL 0    econazole nitrate 1 % external cream Apply topically daily To affected toenails. 85 g 5    Emergency Supply Kit, Central, Patient use for emergency only. Contents: 3 sodium chloride 0.9% flushes, 1 dressing kit, 1 microclave ext set 14\", 4 nitrile gloves (med), 6 alcohol prep pads, 1 bacitracin, 1 syringe (10 cc 20 G 1\"). Call [phone] to reorder. [id number] kit 0    EPINEPHrine (ANY BX GENERIC EQUIV) 0.3 MG/0.3ML injection 2-pack Inject 0.3 mLs (0.3 mg) into the muscle as needed for anaphylaxis (infusion reaction). Administer into the mid-thigh in case of severe anaphylaxis (wheezing, throat tightening, mouth swelling, difficulty breathing). May repeat dose one time in 5-15 minutes if symptoms persist. 825625 mL 0    Fluorouracil (ADRUCIL) 4,585 mg in sodium chloride 0.9 % 241 mL via HOMEPUMP C-Series Infuse 4,585 mg at 5.2 mL/hr over 46 hours into the vein once for 1 dose. 971857 mL 0    HYDROmorphone (DILAUDID) 4 MG tablet Take 1-2 tablets (4-8 mg) by mouth every 3 hours as needed for severe pain or breakthrough pain. 180 tablet 0    insulin  UNIT/ML injection 20 units around 9 am (with steroids) 10 units around 9 pm 15 mL 3    insulin pen needle (31G X 8 MM) 31G X 8 MM miscellaneous Use 1 pen needles daily or as directed. 50 each 0    lidocaine (LIDODERM) 5 % patch Place 2 patches over 12 hours onto the skin every 24 hours. 60 patch 3    lidocaine-prilocaine (EMLA) 2.5-2.5 % external cream Use 1-2 times a week or as needed prior to port access 30 g 3    lipase-protease-amylase (CREON 24) 19476-42603-902934 units CPEP per EC capsule 2-3 capsules with meals 3 times a day and 1-2 capsules with snacks max of 15 capsules a day 200 capsule 11    loperamide (IMODIUM) 2 MG capsule 2 caps at 1st sign of diarrhea & 1 cap every 2hrs until 12hrs diarrhea free. During night, 2 caps at bedtime & 2 caps every 4hrs until AM 30 capsule 0 "    LORazepam (ATIVAN) 0.5 MG tablet Take 1-2 tablets (0.5-1 mg) by mouth every 6 hours as needed for muscle spasms. 45 tablet 2    methocarbamol 1000 MG TABS Take 1,000 mg by mouth 4 times daily as needed for muscle spasms. 180 tablet 3    methylphenidate (RITALIN) 5 MG tablet Take 1 tablet (5 mg) by mouth 2 times daily as needed 10 tablet 0    methylPREDNISolone Na Suc, PF, (SOLU-MEDROL) 125 mg/2 mL injection Inject 2 mLs (125 mg) over 3-5 minutes into the vein via push as needed (infusion reaction). For RN use only.  Reconstitute vial. Draw up methylPREDNISolone in a syringe and administer.  Discard remainder of vial. 244566 mL 0    Multiple Vitamins-Minerals (CENTRUM SILVER 50+MEN) TABS as directed Orally      naloxone (NARCAN) 4 MG/0.1ML nasal spray       pantoprazole (PROTONIX) 40 MG EC tablet Take 1 tablet (40 mg) by mouth 2 times daily 60 tablet 3    Port Access Kit For nurse use only.  Do not remove items from bag.  Use for port access.  Do not place syringe on sterile field. [id number] kit 0    prochlorperazine (COMPAZINE) 10 MG tablet Take 1 tablet (10 mg) by mouth every 6 hours as needed for nausea or vomiting. 30 tablet 2    prochlorperazine (COMPAZINE) 10 MG tablet Take 1 tablet (10 mg) by mouth every 6 hours as needed for nausea or vomiting 30 tablet 2    sodium chloride 0.9% infusion Infuse 250 mLs over 30 minutes into the vein every 28 (twenty-eight) days. zometa concomitant fluids. Given per therapy plan orders 750 mL 2    sodium chloride 0.9% infusion Infuse 500 mLs into the vein as needed for other (infusion reaction). In case of mild reaction, administer via gravity at 20 mL/hr to keep vein open. In case of severe reaction, administer via gravity wide open on prime setting. 986995 mL 0    sodium chloride, PF, 0.9% PF flush Inject 10 mLs into the vein as needed for line flush. Flush IV before and after med administration as directed and/or at least every 24 hours, or prior to deaccessing for no  further use and/or at least every 4 weeks when not accessed. 438627 mL 0    sodium chloride, PF, 0.9% PF flush Inject 10 mLs into the vein as needed for other (infusion reaction). For RN use only as needed for infusion reaction 818339 mL 0    sterile water, preservative free, injection Use 2.2 mLs for reconstitution as needed (with alteplase for catheter occlusion). Direct diluent stream into powder. Mix by gently swirling until dissolved. DO NOT SHAKE. Discard remainder of vial. 541220 mL 0    vitamin D3 (CHOLECALCIFEROL) 50 mcg (2000 units) tablet Take 1 tablet (50 mcg) by mouth daily. 90 tablet 1    zoledronic acid (ZOMETA) 4 MG/100ML infusion Infuse 100 mLs (4 mg) into the vein every 28 days. Infuse via gravity. Given per therapy plan orders 300 mL 2     Physical Exam: ***   General: The patient is a pleasant male in no acute distress.  /69   Pulse (!) 127   Temp 98.8  F (37.1  C) (Oral)   Resp 17   Wt 65.4 kg (144 lb 3.2 oz)   SpO2 97%   BMI 19.56 kg/m    Wt Readings from Last 10 Encounters:   01/29/25 65.4 kg (144 lb 3.2 oz)   01/21/25 67.1 kg (148 lb)   01/15/25 66 kg (145 lb 9.6 oz)   01/10/25 68.9 kg (152 lb)   01/07/25 68.9 kg (152 lb)   01/03/25 67.5 kg (148 lb 14.4 oz)   12/31/24 65.8 kg (145 lb)   12/18/24 69.7 kg (153 lb 9.6 oz)   12/17/24 68 kg (150 lb)   12/12/24 68 kg (150 lb)   HEENT: EOMI. Sclerae are anicteric.   Lymph: Neck is supple with no lymphadenopathy in the cervical or supraclavicular areas.   Heart: Regular rate and rhythm.   Lungs: Clear to auscultation bilaterally.   Abdomen: Bowel sounds present, soft, nontender with no palpable hepatosplenomegaly or masses.   Extremities: No lower extremity edema noted bilaterally.   Neuro: Cranial nerves II through XII are grossly intact.  Skin: No rashes, petechiae, or bruising noted on exposed skin. ***        Labs:    Most Recent 3 CBC's:  Recent Labs   Lab Test 01/29/25  0658 01/15/25  1251 01/03/25  0804   WBC 2.2* 4.6 4.1   HGB  8.2* 9.1* 9.8*   MCV 92 90 87    165 190   ANEUTAUTO 1.0* 3.2 2.6    Most Recent 3 BMP's:  Recent Labs   Lab Test 01/15/25  1251 01/10/25  1405 01/03/25  0804 12/18/24  1228     --  137 134*   POTASSIUM 3.6  --  3.9 4.4   CHLORIDE 106  --  102 99   CO2 23  --  24 24   BUN 24.1*  --  19.2 24.7*   CR 0.56*  --  0.54* 0.62*   ANIONGAP 9  --  11 11   DENISE 8.6*  --  8.8 8.5*   * 112* 148* 330*   PROTTOTAL 6.0*  --  6.7 6.9   ALBUMIN 3.4*  --  3.6 3.4*    Most Recent 2 LFT's:  Recent Labs   Lab Test 01/15/25  1251 01/03/25  0804   AST 17 15   ALT 14 19   ALKPHOS 383* 420*   BILITOTAL 0.4 0.4   I reviewed the above labs today.  New labs will be drawn 1/3 prior to chemotherapy.     Impression/plan:     Unresectable Pancreatic Cancer  7/31/23 began palliative treatment on FOLFIRINOX  CT CAP 10/24/23 with progression in skeletal mets, lung nodule and lymph nodes; stable pancreatic mass  - 10/31/2023: Treatment was changed to Gemcitabine and Abraxane (Day 1, 8, 15) + Zometa every 3 months  - 03/2024 CT CAP with progression of L4 lesion  - Day 8 eliminated after Cycle 5 gem/abraxane  - 8/2024 repeat imaging with isolated progression of L4 lesion, see below   - November 2024 imaging showed disease progression.  - Started 5FU/ liposomal irinotecan on 12/18. Tolerated cycle 1 well. Proceed with cycle 2 as scheduled 1/3 pending labs are WNL. Continue SUZETTE follow up prior to each cycle. Patient to call sooner with concerns.  Will repeat imaging after 4-6 cycles, depending on clinical course.   -will have RNCC follow up with patient/wife regarding home care, possibility of PCA to help while wife returns to work.     Anemia-normocytic  Hgb mildly worse. Likely related to disease progression. Denies bleeding issues. Will continue to monitor.     Skeletal Mets    - Radiation to T10 completed 11/29/23  - CT chest 5/23/24 unchanged sclerotic bone metastases  -imaging 8/2024 with isolated progression in L4, consult to  radiation oncology.   -8/28/24 RFA ablation and vertebral augmentation to L4. Completed radiation to L4 in October 2024   -Pain management by palliative care, as below, from 12/17/24 visit and recent updates.   -scheduled for kyphoplasty 1/10. Discussed potential PT after procedure to help with mobilty/strength.   -previously given Rx for a walker, encouraged him to use this for stability/safety with increasing weakness   *** discussed PT, revisit in 2 weeks     Pain    -belbuca 600 mcg films BID with a 3rd available to use daily prn severe pain not relieved with dilaudid  -dilaudid 4-8 mg po q 3 hours prn and record how often it is needed  -methocarbamol  750-1000mg PO Q6H PRN  -Ativan 0.5 mg tabs prescribed to use sparingly 1-2 tablets q 6 hours prn severe spasm not relieved with methocarbamol  -lidocaine patches  -Tylenol suspension 650 mg po TID  -dexamethasone 6mg daily     Encouraged him to follow up with palliative for ongoing pain management/medication adjustments     Bone Health  -Zometa 4 mg every 3 months, last infusion 12/4/2024.   -Recheck of calcium on 12/18 low. He remains on vitamin D 50 mcg daily and a multivitamin, started  calcium 500 mg bid. Continue to monitor.     Neuropathy  -Secondary to prior chemotherapy. No acute change.    Pericardial Effusion  Found on CT 4/20/24 at [facility], followed by [facility] with moderate pericardial effusion without tamponade.  CT 5/23/24 small pericardial effusion  5/23/24: Cardiology consult at [facility]- Echo 6/6/2024 with EF 55-60%, LV strain -20.5% which is normal, no pericardial effusion  - repeat echo at [facility] in the future,   - he has been cautioned by Cardiology s/s of cardiac tamponade  -following cardiology as needed  -no acute concerns today    Pulmonary Embolism  Right lower segmental PE found on routine imaging  Taking Apixiban daily, no bleeding concerns. Has instructions for holding prior to kyphoplasty.     Onychomycosis  -Will request home care to  cut his toenails.      The longitudinal plan of care for the diagnosis(es)/condition(s) as documented were addressed during this visit. Due to the added complexity in care, I will continue to support Gallo in the subsequent management and with ongoing continuity of care.    *** minutes spent on the date of the encounter doing chart review, review of test results, interpretation of tests, patient visit, and documentation     ESVIN Canales CNP      Oncology/Hematology Visit Note  Jan 29, 2025    Reason for Visit: follow-up of metastatic Pancreatic Caner    Oncology HPI:   -NGS: KRAS G12R  Immuno: pMMR, KAYLIE, TMB-low  Clinical Trial: MARIPOSA-186, MARIPOSA-280, TORL    - 3/2023: presented with acute pancreatitis  c/b chronic pancreatitis with pancreatic mass causing duodenal stenosis with gastric outlet obstruction s/p GJ tube (placed 5/2023), biliary obstruction s/p stent placement on 7/7/23, pancreatic insufficiency, and IDDM2.  -  He then presented to the ED with worsening abdominal pain, increased jaundice, poor PO intake and weight loss admitted on 7/8/2023 for concern of biliary obstruction.   - 7/12/2023: Underwent biopsy of pancreatic mass returned positive for pancreatic adenocarcinoma.   - 7/31/2023: He started on treatment with 5FU, irinotecan, and oxaliplatin (FOLFIRINOX). Please see previous notes for further details on the patient's history.      8/17/23 - ERCP/EGD, duodenal stents x2 placed, exchange of GJ tube     8/21-8/25/24 hospitalized due to diarrhea, colitis, abdominal pain.       8/29 - Cycle 3 deferred due to neutropenia.   Neulasta added. Irinotecan dose reduced 20% due to symptoms including cramping, double vision and slurred speech during infusion.       10/24/23 CT CAP with similar to slight increase in size of peripancreatic and mesenteric lymph nodes, enlargement of previous skeletal metastasis as well as new sclerotic metastasis to left posterior 5th rib, enlarging pulmonary nodule, and  unchanged pancreatic head mass. Also unclear concerns for PE,  right lower lobe segmental PE confirmed on CT-PE. Started on apixaban.      10/31/23 Cycle 1 gemzar, abraxane  11/7/23 Cycle 1 Zometa  11/22/23 Removal of GJ tube.   11/29/23 Completed palliative radiation to T10   12/13/23 C3D1 gem/abraxane  12/29/23 Consults at [place]  1/23-1/26 Consults at [facility]   1/30/24: C4D1 gem/abraxane   3/24: CT CAP with overall stable disease with isolated progression in potential L4 lesion. Referral for radiation oncology.    4/29-5/1: Consults at [facility] for possible cellular therapy, CT guided biopsy of left pubic bone, recommendation to return to [facility] upon progression of current treatment   5/23/24: Cardiology consult at [facility]. CT with small pericardial effusion   6/6/2024: Repeat ECHO on with EF of 55-60%, no pericardial effusion present  - Cycle 8 deferred due to infection concerns and subsequently tested positive for COVID 6/25/24, resumed treatment on 7/2/2024    -Day 8 eliminated after Cycle 5 [place]/Abraxane.    4/2/2024-present: Gemcitabine/Abraxane, Days 1 and 15 only; Zometa every 3 months     8/2024 repeat imaging with isolated progression to L4 lesion with fracture of that spinous process. Referred to radiation therapy. Decreased Abraxane dose reduced to 75mg/m2 with cycle 9 day 15 for overall treatment tolerance.     8/28/24 RFA ablation and verteral augmentation to L4.   10/1-10/21 Radiation to L4     11/12/24 CT CAP shows disease progression, plan to change to 5FU and liposomal irinotecan after a vacation  12/12/24 ED visit for pain, secondary to cancer, managed with IV pain medication    12/18/24 Cycle 1 5FU and liposomal irinotecan    1/10/25: Kyphoplasty ***     Gallo presents today for follow up prior to cycle 4.       Interval history:     Doing more rehab at home, starts PT at home next week   -tolerating chemo well, no noted chagnes after   -no GI concerns   -appetiet is good, eating well -  wants to start workingon gaining back weight/muscle  -energy level is good, no change after chemo   -no bowel concerns, taking a fiber pill   -pain currently 1/10, continues to improve, regimen is controlling - starting to wean on dex, on 2mg daily , taking diluadid, belbuca, lorazapem  -skin/incisions ***   -drinking - ~32oz          Remainder of 12 point ROS reviewed and negative except as in interval history.       Current Outpatient Medications   Medication Sig Dispense Refill     acetaminophen (TYLENOL) 32 mg/mL liquid Take 20.313 mLs (650 mg) by mouth every 8 hours. 1800 mL 3     alteplase (CATHFLO ACTIVASE) injection Inject 2 mLs (2 mg) into catheter as needed (catheter occlusion). Reconstitute vial. Draw up alteplase 1 mg/mL in a syringe and instill into IV catheter. Dwell for at least 20 min to 24 hours, then aspirate. May repeat dose once in 24 hours if catheter function not restored after at least 2 hours. Discard remainder of vial. 228129 each 0     apixaban ANTICOAGULANT (ELIQUIS) 5 MG tablet Take 1 tablet (5 mg) by mouth 2 times daily. 60 tablet 2     blood glucose (FREESTYLE TEST STRIPS) test strip 1 strip by In Vitro route 3 times daily. 100 strip 2     buprenorphine (BUTRANS) 10 MCG/HR WK patch Place 1 patch over 168 hours onto the skin every 7 days. 4 patch 4     buprenorphine (BUTRANS) 20 MCG/HR WK patch Place 1 patch over 168 hours onto the skin every 7 days. 4 patch 4     calcium carbonate (OS-DENISE) 500 MG tablet Take 1 tablet by mouth 2 times daily.       Chemo Kit For RN use only. Do not remove items from bag. Contents: 1  sodium chloride 0.9% flush, 4 medium gloves, 1 chemo gown, 1/4 chemo mat, 1 connector female, 1 chemo bag. [id number] kit 0     Continuous Glucose Sensor (DEXCOM G7 SENSOR) MISC Change every 10 days. 6 each 3     dexAMETHasone (DECADRON) 2 MG tablet Take 1 tablet (2 mg) by mouth daily (with breakfast). 10 tablet 0     dicyclomine (BENTYL) 10 MG capsule Take 1 capsule (10 mg) by  "mouth 4 times daily (before meals and nightly). 120 capsule 1     diphenhydrAMINE (BENADRYL) 50 MG/ML injection Inject 1 mL (50 mg) over 3-5 minutes into the vein via push as needed for other (infusion reaction). For RN use only.  Draw up in a syringe and administer IV push.  Discard remainder of vial. 33769 mL 0     econazole nitrate 1 % external cream Apply topically daily To affected toenails. 85 g 5     Emergency Supply Kit, Central, Patient use for emergency only. Contents: 3 sodium chloride 0.9% flushes, 1 dressing kit, 1 microclave ext set 14\", 4 nitrile gloves (med), 6 alcohol prep pads, 1 bacitracin, 1 syringe (10 cc 20 G 1\"). Call [phone] to reorder. [id number] kit 0     EPINEPHrine (ANY BX GENERIC EQUIV) 0.3 MG/0.3ML injection 2-pack Inject 0.3 mLs (0.3 mg) into the muscle as needed for anaphylaxis (infusion reaction). Administer into the mid-thigh in case of severe anaphylaxis (wheezing, throat tightening, mouth swelling, difficulty breathing). May repeat dose one time in 5-15 minutes if symptoms persist. 598531 mL 0     Fluorouracil (ADRUCIL) 4,585 mg in sodium chloride 0.9 % 241 mL via HOMEPUMP C-Series Infuse 4,585 mg at 5.2 mL/hr over 46 hours into the vein once for 1 dose. 402203 mL 0     HYDROmorphone (DILAUDID) 4 MG tablet Take 1-2 tablets (4-8 mg) by mouth every 3 hours as needed for severe pain or breakthrough pain. 180 tablet 0     insulin  UNIT/ML injection 20 units around 9 am (with steroids) 10 units around 9 pm 15 mL 3     insulin pen needle (31G X 8 MM) 31G X 8 MM miscellaneous Use 1 pen needles daily or as directed. 50 each 0     lidocaine (LIDODERM) 5 % patch Place 2 patches over 12 hours onto the skin every 24 hours. 60 patch 3     lidocaine-prilocaine (EMLA) 2.5-2.5 % external cream Use 1-2 times a week or as needed prior to port access 30 g 3     lipase-protease-amylase (CREON 24) [id number] units CPEP per EC capsule 2-3 capsules with meals 3 times a day and 1-2 " capsules with snacks max of 15 capsules a day 200 capsule 11     loperamide (IMODIUM) 2 MG capsule 2 caps at 1st sign of diarrhea & 1 cap every 2hrs until 12hrs diarrhea free. During night, 2 caps at bedtime & 2 caps every 4hrs until AM 30 capsule 0     LORazepam (ATIVAN) 0.5 MG tablet Take 1-2 tablets (0.5-1 mg) by mouth every 6 hours as needed for muscle spasms. 45 tablet 2     methocarbamol 1000 MG TABS Take 1,000 mg by mouth 4 times daily as needed for muscle spasms. 180 tablet 3     methylphenidate (RITALIN) 5 MG tablet Take 1 tablet (5 mg) by mouth 2 times daily as needed 10 tablet 0     methylPREDNISolone Na Suc, PF, (SOLU-MEDROL) 125 mg/2 mL injection Inject 2 mLs (125 mg) over 3-5 minutes into the vein via push as needed (infusion reaction). For RN use only.  Reconstitute vial. Draw up methylPREDNISolone in a syringe and administer.  Discard remainder of vial. 266826 mL 0     Multiple Vitamins-Minerals (CENTRUM SILVER 50+MEN) TABS as directed Orally       naloxone (NARCAN) 4 MG/0.1ML nasal spray        pantoprazole (PROTONIX) 40 MG EC tablet Take 1 tablet (40 mg) by mouth 2 times daily 60 tablet 3     Port Access Kit For nurse use only.  Do not remove items from bag.  Use for port access.  Do not place syringe on sterile field. [id number] kit 0     prochlorperazine (COMPAZINE) 10 MG tablet Take 1 tablet (10 mg) by mouth every 6 hours as needed for nausea or vomiting. 30 tablet 2     prochlorperazine (COMPAZINE) 10 MG tablet Take 1 tablet (10 mg) by mouth every 6 hours as needed for nausea or vomiting 30 tablet 2     sodium chloride 0.9% infusion Infuse 250 mLs over 30 minutes into the vein every 28 (twenty-eight) days. zometa concomitant fluids. Given per therapy plan orders 750 mL 2     sodium chloride 0.9% infusion Infuse 500 mLs into the vein as needed for other (infusion reaction). In case of mild reaction, administer via gravity at 20 mL/hr to keep vein open. In case of severe reaction, administer via  gravity wide open on prime setting. [id number] mL 0     sodium chloride, PF, 0.9% PF flush Inject 10 mLs into the vein as needed for line flush. Flush IV before and after med administration as directed and/or at least every 24 hours, or prior to deaccessing for no further use and/or at least every 4 weeks when not accessed. [id number] mL 0     sodium chloride, PF, 0.9% PF flush Inject 10 mLs into the vein as needed for other (infusion reaction). For RN use only as needed for infusion reaction [id number] mL 0     sterile water, preservative free, injection Use 2.2 mLs for reconstitution as needed (with alteplase for catheter occlusion). Direct diluent stream into powder. Mix by gently swirling until dissolved. DO NOT SHAKE. Discard remainder of vial. [id number] mL 0     vitamin D3 (CHOLECALCIFEROL) 50 mcg (2000 units) tablet Take 1 tablet (50 mcg) by mouth daily. 90 tablet 1     zoledronic acid (ZOMETA) 4 MG/100ML infusion Infuse 100 mLs (4 mg) into the vein every 28 days. Infuse via gravity. Given per therapy plan orders 300 mL 2     Physical Exam: ***   General: The patient is a pleasant male in no acute distress.  /69   Pulse (!) 127   Temp 98.8  F (37.1  C) (Oral)   Resp 17   Wt 65.4 kg (144 lb 3.2 oz)   SpO2 97%   BMI 19.56 kg/m    Wt Readings from Last 10 Encounters:   01/29/25 65.4 kg (144 lb 3.2 oz)   01/21/25 67.1 kg (148 lb)   01/15/25 66 kg (145 lb 9.6 oz)   01/10/25 68.9 kg (152 lb)   01/07/25 68.9 kg (152 lb)   01/03/25 67.5 kg (148 lb 14.4 oz)   12/31/24 65.8 kg (145 lb)   12/18/24 69.7 kg (153 lb 9.6 oz)   12/17/24 68 kg (150 lb)   12/12/24 68 kg (150 lb)   HEENT: EOMI. Sclerae are anicteric.   Lymph: Neck is supple with no lymphadenopathy in the cervical or supraclavicular areas.   Heart: Regular rate and rhythm.   Lungs: Clear to auscultation bilaterally.   Abdomen: Bowel sounds present, soft, nontender with no palpable hepatosplenomegaly or masses.   Extremities: No lower extremity edema noted  bilaterally.   Neuro: Cranial nerves II through XII are grossly intact.  Skin: No rashes, petechiae, or bruising noted on exposed skin. ***        Labs:    Most Recent 3 CBC's:  Recent Labs   Lab Test 01/29/25  0658 01/15/25  1251 01/03/25  0804   WBC 2.2* 4.6 4.1   HGB 8.2* 9.1* 9.8*   MCV 92 90 87    165 190   ANEUTAUTO 1.0* 3.2 2.6    Most Recent 3 BMP's:  Recent Labs   Lab Test 01/15/25  1251 01/10/25  1405 01/03/25  0804 12/18/24  1228     --  137 134*   POTASSIUM 3.6  --  3.9 4.4   CHLORIDE 106  --  102 99   CO2 23  --  24 24   BUN 24.1*  --  19.2 24.7*   CR 0.56*  --  0.54* 0.62*   ANIONGAP 9  --  11 11   DENISE 8.6*  --  8.8 8.5*   * 112* 148* 330*   PROTTOTAL 6.0*  --  6.7 6.9   ALBUMIN 3.4*  --  3.6 3.4*    Most Recent 2 LFT's:  Recent Labs   Lab Test 01/15/25  1251 01/03/25  0804   AST 17 15   ALT 14 19   ALKPHOS 383* 420*   BILITOTAL 0.4 0.4   I reviewed the above labs today.  New labs will be drawn 1/3 prior to chemotherapy.     Impression/plan:     Unresectable Pancreatic Cancer  7/31/23 began palliative treatment on FOLFIRINOX  CT CAP 10/24/23 with progression in skeletal mets, lung nodule and lymph nodes; stable pancreatic mass  - 10/31/2023: Treatment was changed to Gemcitabine and Abraxane (Day 1, 8, 15) + Zometa every 3 months  - 03/2024 CT CAP with progression of L4 lesion  - Day 8 eliminated after Cycle 5 gem/abraxane  - 8/2024 repeat imaging with isolated progression of L4 lesion, see below   - November 2024 imaging showed disease progression.  - Started 5FU/ liposomal irinotecan on 12/18. Tolerated cycle 1 well. Proceed with cycle 2 as scheduled 1/3 pending labs are WNL. Continue SUZETTE follow up prior to each cycle. Patient to call sooner with concerns.  Will repeat imaging after 4-6 cycles, depending on clinical course.   -will have RNCC follow up with patient/wife regarding home care, possibility of PCA to help while wife returns to work.     Anemia-normocytic  Hgb mildly  worse. Likely related to disease progression. Denies bleeding issues. Will continue to monitor.     Skeletal Mets    - Radiation to T10 completed 11/29/23  - CT chest 5/23/24 unchanged sclerotic bone metastases  -imaging 8/2024 with isolated progression in L4, consult to radiation oncology.   -8/28/24 RFA ablation and vertebral augmentation to L4. Completed radiation to L4 in October 2024   -Pain management by palliative care, as below, from 12/17/24 visit and recent updates.   -scheduled for kyphoplasty 1/10. Discussed potential PT after procedure to help with mobilty/strength.   -previously given Rx for a walker, encouraged him to use this for stability/safety with increasing weakness   *** discussed PT, revisit in 2 weeks     *** increased calcium supplement       Pain    -belbuca 600 mcg films BID with a 3rd available to use daily prn severe pain not relieved with dilaudid  -dilaudid 4-8 mg po q 3 hours prn and record how often it is needed  -methocarbamol  750-1000mg PO Q6H PRN  -Ativan 0.5 mg tabs prescribed to use sparingly 1-2 tablets q 6 hours prn severe spasm not relieved with methocarbamol  -lidocaine patches  -Tylenol suspension 650 mg po TID  -dexamethasone 6mg daily     Encouraged him to follow up with palliative for ongoing pain management/medication adjustments     Bone Health  -Zometa 4 mg every 3 months, last infusion 12/4/2024.   -Recheck of calcium on 12/18 low. He remains on vitamin D 50 mcg daily and a multivitamin, started  calcium 500 mg bid. Continue to monitor.     Neuropathy  -Secondary to prior chemotherapy. No acute change.    Pericardial Effusion  Found on CT 4/20/24 at [facility], followed by TTE with moderate pericardial effusion without tamponade.  CT 5/23/24 small pericardial effusion  5/23/24: Cardiology consult at [facility]- Echo 6/6/2024 with EF 55-60%, LV strain -20.5% which is normal, no pericardial effusion  - repeat echo at [facility] in the future,   - he has been cautioned by  Cardiology s/s of cardiac tamponade  -following cardiology as needed  -no acute concerns today    Pulmonary Embolism  Right lower segmental PE found on routine imaging  Taking Apixiban daily, no bleeding concerns. Has instructions for holding prior to kyphoplasty.     Onychomycosis  -Will request home care to cut his toenails.      The longitudinal plan of care for the diagnosis(es)/condition(s) as documented were addressed during this visit. Due to the added complexity in care, I will continue to support Gallo in the subsequent management and with ongoing continuity of care.    *** minutes spent on the date of the encounter doing chart review, review of test results, interpretation of tests, patient visit, and documentation     ESVIN Canales CNP        Again, thank you for allowing me to participate in the care of your patient.        Sincerely,        ESVIN Canales CNP    Electronically signed

## 2025-01-29 NOTE — NURSING NOTE
"Oncology Rooming Note    January 29, 2025 7:12 AM   Gallo Navarro is a 59 year old male who presents for:    Chief Complaint   Patient presents with    Port Draw     Labs drawn via port by RN. Port accessed with 20g 3/4\" power needle and vitals taken. Flushed with saline and heparin. Pt tolerated well. Patient checked into next appointment.      Oncology Clinic Visit     Pancreatic Cancer     Initial Vitals: /69   Pulse (!) 127   Temp 98.8  F (37.1  C) (Oral)   Resp 17   Wt 65.4 kg (144 lb 3.2 oz)   SpO2 97%   BMI 19.56 kg/m   Estimated body mass index is 19.56 kg/m  as calculated from the following:    Height as of 1/21/25: 1.829 m (6').    Weight as of this encounter: 65.4 kg (144 lb 3.2 oz). Body surface area is 1.82 meters squared.  Mild Pain (2) Comment: Data Unavailable   No LMP for male patient.  Allergies reviewed: Yes  Medications reviewed: Yes    Medications: Medication refills not needed today.  Pharmacy name entered into [facility]:    [facility] - [place], [place] - [address] [facility] - [place], [place] [phone]  [facility] [place] [address] [facility] [address]AL TO [facility] SITES  [facility] - [place], [place] - [address] [place] [id number] [address] [employer] - [place], [place] - [address]    Frailty Screening:   Is the patient here for a new oncology consult visit in cancer care? 2. No      Clinical concerns: None       Tran Mitchell LPN  1/29/2025              "

## 2025-01-29 NOTE — PROGRESS NOTES
[facility] d/c of Fluorouracil continuous infusion over 46 hours via C series pump.   Scheduled in [facility] for home disconnect on 1/31/25 at 9:30am.  No Neulasta OnPro/IV fluids needed at this visit.   Fulphila injection needed Day 4.  Teach to self-administer.

## 2025-01-29 NOTE — PROGRESS NOTES
Oncology/Hematology Visit Note  Jan 29, 2025    Reason for Visit: follow-up of metastatic Pancreatic Caner    Oncology HPI:   -NGS: KRAS G12R  Immuno: pMMR, KAYLIE, TMB-low  Clinical Trial: MARIPOSA-186, MARIPOSA-280, TORL    - 3/2023: presented with acute pancreatitis  c/b chronic pancreatitis with pancreatic mass causing duodenal stenosis with gastric outlet obstruction s/p GJ tube (placed 5/2023), biliary obstruction s/p stent placement on 7/7/23, pancreatic insufficiency, and IDDM2.  -  He then presented to the ED with worsening abdominal pain, increased jaundice, poor PO intake and weight loss admitted on 7/8/2023 for concern of biliary obstruction.   - 7/12/2023: Underwent biopsy of pancreatic mass returned positive for pancreatic adenocarcinoma.   - 7/31/2023: He started on treatment with 5FU, irinotecan, and oxaliplatin (FOLFIRINOX). Please see previous notes for further details on the patient's history.      8/17/23 - ERCP/EGD, duodenal stents x2 placed, exchange of GJ tube     8/21-8/25/24 hospitalized due to diarrhea, colitis, abdominal pain.       8/29 - Cycle 3 deferred due to neutropenia.   Neulasta added. Irinotecan dose reduced 20% due to symptoms including cramping, double vision and slurred speech during infusion.       10/24/23 CT CAP with similar to slight increase in size of peripancreatic and mesenteric lymph nodes, enlargement of previous skeletal metastasis as well as new sclerotic metastasis to left posterior 5th rib, enlarging pulmonary nodule, and unchanged pancreatic head mass. Also unclear concerns for PE,  right lower lobe segmental PE confirmed on CT-PE. Started on apixaban.      10/31/23 Cycle 1 gemzar, abraxane  11/7/23 Cycle 1 Zometa  11/22/23 Removal of GJ tube.   11/29/23 Completed palliative radiation to T10   12/13/23 C3D1 gem/abraxane  12/29/23 Consults at [place]  1/23-1/26 Consults at [facility]   1/30/24: C4D1 gem/abraxane   3/24: CT CAP with overall stable disease with isolated  progression in potential L4 lesion. Referral for radiation oncology.    4/29-5/1: Consults at [facility] for possible cellular therapy, CT guided biopsy of left pubic bone, recommendation to return to [facility] upon progression of current treatment   5/23/24: Cardiology consult at [facility]. CT with small pericardial effusion   6/6/2024: Repeat ECHO on with EF of 55-60%, no pericardial effusion present  - Cycle 8 deferred due to infection concerns and subsequently tested positive for COVID 6/25/24, resumed treatment on 7/2/2024    -Day 8 eliminated after Cycle 5 [place]/Abraxane.    4/2/2024-present: Gemcitabine/Abraxane, Days 1 and 15 only; Zometa every 3 months     8/2024 repeat imaging with isolated progression to L4 lesion with fracture of that spinous process. Referred to radiation therapy. Decreased Abraxane dose reduced to 75mg/m2 with cycle 9 day 15 for overall treatment tolerance.     8/28/24 RFA ablation and verteral augmentation to L4.   10/1-10/21 Radiation to L4     11/12/24 CT CAP shows disease progression, plan to change to 5FU and liposomal irinotecan after a vacation  12/12/24 ED visit for pain, secondary to cancer, managed with IV pain medication    12/18/24 Cycle 1 5FU and liposomal irinotecan    1/10/25: L2 and L3 RFA and vertebroplasty    Gallo presents today for follow up prior to cycle 4.       Interval history:     -continues to work on rehab/strength exercises at home. Formally starts home PT next week and is excited to start this to continue his progress  -back pain continues to improve, weaning down on dexamethasone. He is currently taking 2mg daily. Continues on PO Dilaudid, Belbuca and lorazepam   -continues to tolerate chemo well, doesn't note any changes or side effects following chemo  -appetite is good, he is focusing on increasing calories to gain back weight/muscle mass. Denies any nausea, reflux, abdominal pain or bowel concerns. Taking a fiber supplement daily. Drinking ~32oz of fluid  daily  -energy level is good, increasing his activity level   -incisions and bruising on back have healed, denies any other skin concerns     Remainder of 12 point ROS reviewed and negative except as in interval history.       Current Outpatient Medications   Medication Sig Dispense Refill    calcium carbonate (OS-DENISE) 500 MG tablet Take 2 tablets (1,000 mg) by mouth 2 times daily. 60 tablet 3    acetaminophen (TYLENOL) 32 mg/mL liquid Take 20.313 mLs (650 mg) by mouth every 8 hours. 1800 mL 3    alteplase (CATHFLO ACTIVASE) injection Inject 2 mLs (2 mg) into catheter as needed (catheter occlusion). Reconstitute vial. Draw up alteplase 1 mg/mL in a syringe and instill into IV catheter. Dwell for at least 20 min to 24 hours, then aspirate. May repeat dose once in 24 hours if catheter function not restored after at least 2 hours. Discard remainder of vial. [id number] each 0    apixaban ANTICOAGULANT (ELIQUIS) 5 MG tablet Take 1 tablet (5 mg) by mouth 2 times daily. 60 tablet 2    blood glucose (FREESTYLE TEST STRIPS) test strip 1 strip by In Vitro route 3 times daily. 100 strip 2    buprenorphine (BUTRANS) 10 MCG/HR WK patch Place 1 patch over 168 hours onto the skin every 7 days. 4 patch 4    buprenorphine (BUTRANS) 20 MCG/HR WK patch Place 1 patch over 168 hours onto the skin every 7 days. 4 patch 4    Chemo Kit For RN use only. Do not remove items from bag. Contents: 1  sodium chloride 0.9% flush, 4 medium gloves, 1 chemo gown, 1/4 chemo mat, 1 connector female, 1 chemo bag. [id number] kit 0    Continuous Glucose Sensor (DEXCOM G7 SENSOR) MISC Change every 10 days. 6 each 3    dexAMETHasone (DECADRON) 2 MG tablet Take 1 tablet (2 mg) by mouth daily (with breakfast). 10 tablet 0    dicyclomine (BENTYL) 10 MG capsule Take 1 capsule (10 mg) by mouth 4 times daily (before meals and nightly). 120 capsule 1    diphenhydrAMINE (BENADRYL) 50 MG/ML injection Inject 1 mL (50 mg) over 3-5 minutes into the vein via push as needed for  "other (infusion reaction). For RN use only.  Draw up in a syringe and administer IV push.  Discard remainder of vial. 58360 mL 0    econazole nitrate 1 % external cream Apply topically daily To affected toenails. 85 g 5    Emergency Supply Kit, Central, Patient use for emergency only. Contents: 3 sodium chloride 0.9% flushes, 1 dressing kit, 1 microclave ext set 14\", 4 nitrile gloves (med), 6 alcohol prep pads, 1 bacitracin, 1 syringe (10 cc 20 G 1\"). Call [phone] to reorder. [id number] kit 0    EPINEPHrine (ANY BX GENERIC EQUIV) 0.3 MG/0.3ML injection 2-pack Inject 0.3 mLs (0.3 mg) into the muscle as needed for anaphylaxis (infusion reaction). Administer into the mid-thigh in case of severe anaphylaxis (wheezing, throat tightening, mouth swelling, difficulty breathing). May repeat dose one time in 5-15 minutes if symptoms persist. 915040 mL 0    Fluorouracil (ADRUCIL) 4,585 mg in sodium chloride 0.9 % 241 mL via HOMEPUMP C-Series Infuse 4,585 mg at 5.2 mL/hr over 46 hours into the vein once for 1 dose. 318748 mL 0    HYDROmorphone (DILAUDID) 4 MG tablet Take 1-2 tablets (4-8 mg) by mouth every 3 hours as needed for severe pain or breakthrough pain. 180 tablet 0    insulin  UNIT/ML injection 20 units around 9 am (with steroids) 10 units around 9 pm 15 mL 3    insulin pen needle (31G X 8 MM) 31G X 8 MM miscellaneous Use 1 pen needles daily or as directed. 50 each 0    lidocaine (LIDODERM) 5 % patch Place 2 patches over 12 hours onto the skin every 24 hours. 60 patch 3    lidocaine-prilocaine (EMLA) 2.5-2.5 % external cream Use 1-2 times a week or as needed prior to port access 30 g 3    lipase-protease-amylase (CREON 24) 67669-31789-554451 units CPEP per EC capsule 2-3 capsules with meals 3 times a day and 1-2 capsules with snacks max of 15 capsules a day 200 capsule 11    loperamide (IMODIUM) 2 MG capsule 2 caps at 1st sign of diarrhea & 1 cap every 2hrs until 12hrs diarrhea free. During night, 2 caps at " bedtime & 2 caps every 4hrs until AM 30 capsule 0    LORazepam (ATIVAN) 0.5 MG tablet Take 1-2 tablets (0.5-1 mg) by mouth every 6 hours as needed for muscle spasms. 45 tablet 2    methocarbamol 1000 MG TABS Take 1,000 mg by mouth 4 times daily as needed for muscle spasms. 180 tablet 3    methylphenidate (RITALIN) 5 MG tablet Take 1 tablet (5 mg) by mouth 2 times daily as needed 10 tablet 0    methylPREDNISolone Na Suc, PF, (SOLU-MEDROL) 125 mg/2 mL injection Inject 2 mLs (125 mg) over 3-5 minutes into the vein via push as needed (infusion reaction). For RN use only.  Reconstitute vial. Draw up methylPREDNISolone in a syringe and administer.  Discard remainder of vial. 122699 mL 0    Multiple Vitamins-Minerals (CENTRUM SILVER 50+MEN) TABS as directed Orally      naloxone (NARCAN) 4 MG/0.1ML nasal spray       pantoprazole (PROTONIX) 40 MG EC tablet Take 1 tablet (40 mg) by mouth 2 times daily 60 tablet 3    [START ON 2/1/2025] pegfilgrastim-jmdb (FULPHILA) 6 MG/0.6ML injection Inject 0.6 mLs (6 mg) subcutaneously every 2 weeks as needed (per [facility] orders on day 4 of cycle). Remove from fridge for at least 30  minutes to allow syringe to reach room temperature. 8 mL 0    Port Access Kit For nurse use only.  Do not remove items from bag.  Use for port access.  Do not place syringe on sterile field. [id number] kit 0    prochlorperazine (COMPAZINE) 10 MG tablet Take 1 tablet (10 mg) by mouth every 6 hours as needed for nausea or vomiting. 30 tablet 2    prochlorperazine (COMPAZINE) 10 MG tablet Take 1 tablet (10 mg) by mouth every 6 hours as needed for nausea or vomiting 30 tablet 2    sodium chloride 0.9% infusion Infuse 250 mLs over 30 minutes into the vein every 28 (twenty-eight) days. zometa concomitant fluids. Given per therapy plan orders 750 mL 2    sodium chloride 0.9% infusion Infuse 500 mLs into the vein as needed for other (infusion reaction). In case of mild reaction, administer via gravity at 20 mL/hr to  keep vein open. In case of severe reaction, administer via gravity wide open on prime setting. 606832 mL 0    sodium chloride, PF, 0.9% PF flush Inject 10 mLs into the vein as needed for line flush. Flush IV before and after med administration as directed and/or at least every 24 hours, or prior to deaccessing for no further use and/or at least every 4 weeks when not accessed. 266061 mL 0    sodium chloride, PF, 0.9% PF flush Inject 10 mLs into the vein as needed for other (infusion reaction). For RN use only as needed for infusion reaction 978911 mL 0    sterile water, preservative free, injection Use 2.2 mLs for reconstitution as needed (with alteplase for catheter occlusion). Direct diluent stream into powder. Mix by gently swirling until dissolved. DO NOT SHAKE. Discard remainder of vial. 017603 mL 0    vitamin D3 (CHOLECALCIFEROL) 50 mcg (2000 units) tablet Take 1 tablet (50 mcg) by mouth daily. 90 tablet 1    zoledronic acid (ZOMETA) 4 MG/100ML infusion Infuse 100 mLs (4 mg) into the vein every 28 days. Infuse via gravity. Given per therapy plan orders 300 mL 2     Physical Exam:    General: The patient is a pleasant male in no acute distress.  /69   Pulse (!) 127   Temp 98.8  F (37.1  C) (Oral)   Resp 17   Wt 65.4 kg (144 lb 3.2 oz)   SpO2 97%   BMI 19.56 kg/m    Wt Readings from Last 10 Encounters:   01/29/25 65.4 kg (144 lb 3.2 oz)   01/21/25 67.1 kg (148 lb)   01/15/25 66 kg (145 lb 9.6 oz)   01/10/25 68.9 kg (152 lb)   01/07/25 68.9 kg (152 lb)   01/03/25 67.5 kg (148 lb 14.4 oz)   12/31/24 65.8 kg (145 lb)   12/18/24 69.7 kg (153 lb 9.6 oz)   12/17/24 68 kg (150 lb)   12/12/24 68 kg (150 lb)   HEENT: EOMI. Sclerae are anicteric. Oral mucosa pink and moist without lesions or thrush.   Lymph: Neck is supple with no lymphadenopathy in the cervical or supraclavicular areas.   Heart: Regular rate and rhythm.   Lungs: Clear to auscultation bilaterally.   Abdomen: Bowel sounds present, soft,  nontender with no palpable hepatosplenomegaly or masses.   Extremities: No lower extremity edema noted bilaterally.   Neuro: Cranial nerves II through XII are grossly intact.  Skin: No rashes, petechiae, or bruising noted on exposed skin. Back incisions with minimal scabbing, no redness or swelling. Raised nodule proximal to incisions, left of midline has reduced from exam 2 weeks ago. Bruising to back has resolved.         Labs:    Most Recent 3 CBC's:  Recent Labs   Lab Test 01/29/25  0658 01/15/25  1251 01/03/25  0804   WBC 2.2* 4.6 4.1   HGB 8.2* 9.1* 9.8*   MCV 92 90 87    165 190   ANEUTAUTO 1.0* 3.2 2.6    Most Recent 3 BMP's:  Recent Labs   Lab Test 01/29/25  0658 01/15/25  1251 01/10/25  1405 01/03/25  0804    138  --  137   POTASSIUM 3.7 3.6  --  3.9   CHLORIDE 106 106  --  102   CO2 23 23  --  24   BUN 17.9 24.1*  --  19.2   CR 0.52* 0.56*  --  0.54*   ANIONGAP 9 9  --  11   DENISE 8.4* 8.6*  --  8.8   * 129* 112* 148*   PROTTOTAL 5.7* 6.0*  --  6.7   ALBUMIN 3.3* 3.4*  --  3.6    Most Recent 2 LFT's:  Recent Labs   Lab Test 01/29/25  0658 01/15/25  1251   AST 18 17   ALT 12 14   ALKPHOS 292* 383*   BILITOTAL 0.4 0.4   I reviewed the above labs today.  New labs will be drawn 1/3 prior to chemotherapy.     Impression/plan:     Unresectable Pancreatic Cancer  7/31/23 began palliative treatment on FOLFIRINOX  CT CAP 10/24/23 with progression in skeletal mets, lung nodule and lymph nodes; stable pancreatic mass  - 10/31/2023: Treatment was changed to Gemcitabine and Abraxane (Day 1, 8, 15) + Zometa every 3 months  - 03/2024 CT CAP with progression of L4 lesion  - 8/2024 repeat imaging with isolated progression of L4 lesion, see below   - November 2024 imaging showed disease progression.  - Started 5FU/ liposomal irinotecan on 12/18. Tolerating well.   -Labs reviewed today, ANC 1.0. No infection concerns today. Proceed with cycle 4 as scheduled, will add Neulasta. Reviewed infection  precautions and calling triage with any infection symptoms or temperature >100.4.   -Return to clinic in 2 weeks for next cycle with labs + SUZETTE prior. Can proceed with monthly SUZETTE follow up if he continues to tolerate chemo well. Plan to repeat imaging with MD follow up after 4-6 cycles.       Anemia-normocytic  Hgb mildly worse. Likely related to disease progression. Denies bleeding issues. Will continue to monitor.     Skeletal Mets    - Radiation to T10 completed 11/29/23  - CT chest 5/23/24 unchanged sclerotic bone metastases  -imaging 8/2024 with isolated progression in L4, consult to radiation oncology.   -8/28/24 RFA ablation and vertebral augmentation to L4. Completed radiation to L4 in October 2024   -Pain management by palliative care.  -Back pain and mobility greatly improving since verterberoplasty on 1/10.   -starting PT next week to continue his rehab progress.        Pain    Managed by palliative care. Pain continues to improve post-procedure. Weaning off dexamethasone, currently on 2mg daily.   -belbuca 600 mcg films BID with a 3rd available to use daily prn severe pain not relieved with dilaudid  -dilaudid 4-8 mg po q 3 hours prn and record how often it is needed  -methocarbamol  750-1000mg PO Q6H PRN  -Ativan 0.5 mg tabs prescribed to use sparingly 1-2 tablets q 6 hours prn severe spasm not relieved with methocarbamol  -lidocaine patches  -Tylenol suspension 650 mg po TID    Encouraged him to follow up with palliative for ongoing pain management/medication adjustments     Bone Health  -Zometa 4 mg every 3 months, last infusion 12/4/2024.   -Recheck of calcium on 12/18 low. He remains on vitamin D 50 mcg daily and a multivitamin, started  calcium 500 mg bid. Calcium low today, will increase calcium supplement to 1000mg BID.     Neuropathy  -Secondary to prior chemotherapy. No acute change.    Pericardial Effusion  Found on CT 4/20/24 at [facility], followed by [facility] with moderate pericardial effusion  without tamponade.  CT 5/23/24 small pericardial effusion  5/23/24: Cardiology consult at [facility]- Echo 6/6/2024 with EF 55-60%, LV strain -20.5% which is normal, no pericardial effusion  - repeat echo at [facility] in the future,   - he has been cautioned by Cardiology s/s of cardiac tamponade  -following cardiology as needed  -no acute concerns today    Pulmonary Embolism  Right lower segmental PE found on routine imaging  Taking Apixiban daily, no bleeding concerns. Has instructions for holding prior to kyphoplasty.     Onychomycosis  -Will request home care to cut his toenails.      The longitudinal plan of care for the diagnosis(es)/condition(s) as documented were addressed during this visit. Due to the added complexity in care, I will continue to support Gallo in the subsequent management and with ongoing continuity of care.    48  minutes spent on the date of the encounter doing chart review, review of test results, interpretation of tests, patient visit, and documentation     ESVIN Canales CNP

## 2025-01-29 NOTE — PROGRESS NOTES
"Infusion Nursing Note:  Gallo Navarro presents today for Cycle 4, Day 1 Irinotecan liposome, leucovorin, Fluorouracil pump connect.    Patient seen by provider today: Yes: Megan Farrell CNP   present during visit today: Not Applicable.    Note: Pt had provider visit prior to infusion appointment.    Secure message 1/29/25 Megan Farrell CNP/Nan Krishna RN - Pt is good to go for treatment today with ANC 1.0, I added Neulasta. I will message [facility]. I signed the liter of fluids, hoping to help his HR a bit, I think he's a bit dry. He knows to push more fluids. Refills were sent to his local [facility], will you let him know I sent a new order for calcium too, I increased his dose.       Intravenous Access:  Implanted Port.    Treatment Conditions:  Lab Results   Component Value Date    HGB 8.2 (L) 01/29/2025    WBC 2.2 (L) 01/29/2025    ANEU 2.2 12/26/2023    ANEUTAUTO 1.0 (L) 01/29/2025     01/29/2025        Lab Results   Component Value Date     01/29/2025    POTASSIUM 3.7 01/29/2025    MAG 1.9 08/23/2023    CR 0.52 (L) 01/29/2025    DENISE 8.4 (L) 01/29/2025    BILITOTAL 0.4 01/29/2025    ALBUMIN 3.3 (L) 01/29/2025    ALT 12 01/29/2025    AST 18 01/29/2025   Results reviewed, labs MET treatment parameters, ok to proceed with treatment. See message above.       Post Infusion Assessment:  Patient tolerated infusion without incident.  Blood return noted pre and post infusion.  Site patent and intact, free from redness, edema or discomfort.  No evidence of extravasations.     Prior to discharge: Port is secured in place with tegaderm and flushed with 10cc NS with positive blood return noted.  Continuous home infusion pump connected.    All connectors secured in place and clamps taped open.    Pump started, \"running\" noted on display (CADD): Not Applicable.  Patient instructed to call our clinic or [facility] with any questions or concerns at home.  Patient verbalized understanding.    Patient set " up for pump disconnect at home with [facility] on Friday 1/31 @ 0930.        Discharge Plan:   Patient declined prescription refills.  AVS to patient via [employer].  Patient will return 2/12/25 for next appointment.   Patient discharged in stable condition accompanied by: friend.  Departure Mode: Wheelchair.      Noreen Krishna RN

## 2025-01-29 NOTE — NURSING NOTE
"Chief Complaint   Patient presents with    Port Draw     Labs drawn via port by RN. Port accessed with 20g 3/4\" power needle and vitals taken. Flushed with saline and heparin. Pt tolerated well. Patient checked into next appointment.       Carolyn Caro RN    "

## 2025-01-30 ENCOUNTER — TELEPHONE (OUTPATIENT)
Dept: FAMILY MEDICINE | Facility: CLINIC | Age: 60
End: 2025-01-30

## 2025-01-30 ENCOUNTER — VIRTUAL VISIT (OUTPATIENT)
Dept: ENDOCRINOLOGY | Facility: CLINIC | Age: 60
End: 2025-01-30
Payer: COMMERCIAL

## 2025-01-30 ENCOUNTER — HOME INFUSION BILLING (OUTPATIENT)
Dept: HOME HEALTH SERVICES | Facility: HOME HEALTH | Age: 60
End: 2025-01-30
Payer: COMMERCIAL

## 2025-01-30 DIAGNOSIS — Z79.4 TYPE 2 DIABETES MELLITUS WITHOUT COMPLICATION, WITH LONG-TERM CURRENT USE OF INSULIN (H): Primary | ICD-10-CM

## 2025-01-30 DIAGNOSIS — E11.9 TYPE 2 DIABETES MELLITUS WITHOUT COMPLICATION, WITH LONG-TERM CURRENT USE OF INSULIN (H): Primary | ICD-10-CM

## 2025-01-30 LAB
EST. AVERAGE GLUCOSE BLD GHB EST-MCNC: 160 MG/DL
HBA1C MFR BLD: 7.2 %

## 2025-01-30 PROCEDURE — A9270 NON-COVERED ITEM OR SERVICE: HCPCS

## 2025-01-30 PROCEDURE — S9330 HIT CONT CHEM DIEM: HCPCS

## 2025-01-30 PROCEDURE — S9330 HIT CONT CHEM DIEM: HCPCS | Mod: SH

## 2025-01-30 NOTE — PROGRESS NOTES
Assessment/Plan :   Steroid induced hyperglycemia. Gallo feels like things are going well. He is in the process of weaning off of the chemotherapy and steroids. He has been skipping the insulin on days when he doesn't take the chemotherapy and steroids. This seems to be working. In a couple weeks, he will start a new chemotherapy schedule, every two weeks. When he starts the chemotherapy, he will take 15 unit(s) of NPH the first day of the chemotherapy. I will have him follow-up with our CDE team the day after the therapy. We will develop a more consistent plan, once we see how his blood sugars respond. I will plan on seeing him back in about a month.     Due to the COVID 19 pandemic this visit was a telephone/video visit in order to help prevent spread of infection in this patient and the general population. The patient gave verbal consent for the visit today. I have independently reviewed and interpreted labs, imaging as indicated.       Distant Location (provider location):  On-site  Mode of Communication:  Video Conference via [employer]  Chart review/prep time 5    Joined the call at 1/30/2025, 2:53:25 pm.  Left the call at 1/30/2025, 3:05:18 pm.  You were on the call for 11 minutes 53 seconds .  20 minutes spent on the date of the encounter doing chart review, history and exam, documentation and further activities as noted above.      Chief complaint:  Gallo is a 59 year old male who returns for follow-up of steroid induced diabetes.    I have reviewed Care Everywhere including [facility], [facility], [facility], [facility], [facility] , [facility], [place] lab reports, imaging reports and provider notes as indicated.      HISTORY OF PRESENT ILLNESS  Gallo was recently started on a new palliative care chemotherapy about a month ago. The chemotherapy is administered with steroids, which led to another spike in his blood sugars. He reached out to our office on January 3rd for further  instructions. We restarted NPH insulin at 8 unit(s) nightly and have slowly increased to 20 unit(s) at 9 am and 10 unit(s) at 9 pm. He was still experiencing overnight highs, until recently.     He is now in the process of weaning off the daily chemotherapy and steroids. On the off days, his blood sugars are primarily within target range without the need for insulin. The days when he takes the steroids he is taking 15 unit(s) of NPH at night. The plan is to wean off of daily steroids and then restart at every two weeks.     Gallo was diagnosed with diabetes during a hospital admission in April of 2023. He was hospitalized due to severe abdominal pain and recurrent pancreatitis. He was later found to have pancreatic adenocarcinoma. Insulin was used during the first episodes of pancreatitis and later continued due to the need for steroids during chemotherapy. Towards the end of 2023, chemotherapy was completed and he was able to wean off insulin until recently.     Endocrine relevant labs are as follows:   Latest Reference Range & Units 10/04/23 14:33   Hemoglobin A1C 0.0 - 5.6 % 6.8 (H)   (H): Data is abnormally high    REVIEW OF SYSTEMS    Endocrine: positive for diabetes  Skin: negative  Eyes: negative for, visual blurring, redness, tearing  Ears/Nose/Throat: negative  Respiratory: No shortness of breath, dyspnea on exertion, cough, or hemoptysis  Cardiovascular: negative for, chest pain, dyspnea on exertion, lower extremity edema, and exercise intolerance  Gastrointestinal: negative for, nausea, vomiting, constipation, and diarrhea  Genitourinary: negative for, nocturia, dysuria, frequency, and urgency  Musculoskeletal: negative for, muscular weakness, nocturnal cramping, and foot pain  Neurologic: negative for, local weakness, numbness or tingling of hands, and numbness or tingling of feet  Psychiatric: negative  Hematologic/Lymphatic/Immunologic: negative    Past Medical History  Past Medical History:    Diagnosis Date    Acute pancreatitis 04/16/2023    Alcohol-induced acute pancreatitis 04/10/2023    Gastric outlet obstruction     Metastasis from pancreatic cancer (H)     Personal history of chemotherapy     Gemzar + Abraxane started on 10/31/2023    Recurrent acute pancreatitis     S/P radiation therapy     2,000 cGy to T10 Spine completed on 11/29/2023 [facility]    S/P radiation therapy     3,000 cGy to L4 Spine completed on 10/21/2024 [facility]       Medications  Current Outpatient Medications   Medication Sig Dispense Refill    acetaminophen (TYLENOL) 32 mg/mL liquid Take 20.313 mLs (650 mg) by mouth every 8 hours. 1800 mL 3    alteplase (CATHFLO ACTIVASE) injection Inject 2 mLs (2 mg) into catheter as needed (catheter occlusion). Reconstitute vial. Draw up alteplase 1 mg/mL in a syringe and instill into IV catheter. Dwell for at least 20 min to 24 hours, then aspirate. May repeat dose once in 24 hours if catheter function not restored after at least 2 hours. Discard remainder of vial. [id number] each 0    apixaban ANTICOAGULANT (ELIQUIS) 5 MG tablet Take 1 tablet (5 mg) by mouth 2 times daily. 60 tablet 2    blood glucose (FREESTYLE TEST STRIPS) test strip 1 strip by In Vitro route 3 times daily. 100 strip 2    buprenorphine (BUTRANS) 10 MCG/HR WK patch Place 1 patch over 168 hours onto the skin every 7 days. 4 patch 4    buprenorphine (BUTRANS) 20 MCG/HR WK patch Place 1 patch over 168 hours onto the skin every 7 days. 4 patch 4    calcium carbonate (OS-DENISE) 500 MG tablet Take 2 tablets (1,000 mg) by mouth 2 times daily. 60 tablet 3    Chemo Kit For RN use only. Do not remove items from bag. Contents: 1  sodium chloride 0.9% flush, 4 medium gloves, 1 chemo gown, 1/4 chemo mat, 1 connector female, 1 chemo bag. [id number] kit 0    Continuous Glucose Sensor (DEXCOM G7 SENSOR) MISC Change every 10 days. 6 each 3    dexAMETHasone (DECADRON) 2 MG tablet Take 1 tablet (2 mg) by  "mouth daily (with breakfast). 10 tablet 0    dicyclomine (BENTYL) 10 MG capsule Take 1 capsule (10 mg) by mouth 4 times daily (before meals and nightly). 120 capsule 1    diphenhydrAMINE (BENADRYL) 50 MG/ML injection Inject 1 mL (50 mg) over 3-5 minutes into the vein via push as needed for other (infusion reaction). For RN use only.  Draw up in a syringe and administer IV push.  Discard remainder of vial. [id number] mL 0    econazole nitrate 1 % external cream Apply topically daily To affected toenails. 85 g 5    Emergency Supply Kit, Central, Patient use for emergency only. Contents: 3 sodium chloride 0.9% flushes, 1 dressing kit, 1 microclave ext set 14\", 4 nitrile gloves (med), 6 alcohol prep pads, 1 bacitracin, 1 syringe (10 cc 20 G 1\"). Call [phone] to reorder. [id number] kit 0    EPINEPHrine (ANY BX GENERIC EQUIV) 0.3 MG/0.3ML injection 2-pack Inject 0.3 mLs (0.3 mg) into the muscle as needed for anaphylaxis (infusion reaction). Administer into the mid-thigh in case of severe anaphylaxis (wheezing, throat tightening, mouth swelling, difficulty breathing). May repeat dose one time in 5-15 minutes if symptoms persist. [id number] mL 0    Fluorouracil (ADRUCIL) 4,585 mg in sodium chloride 0.9 % 241 mL via HOMEPUMP C-Series Infuse 4,585 mg at 5.2 mL/hr over 46 hours into the vein once for 1 dose. [id number] mL 0    HYDROmorphone (DILAUDID) 4 MG tablet Take 1-2 tablets (4-8 mg) by mouth every 3 hours as needed for severe pain or breakthrough pain. 180 tablet 0    insulin  UNIT/ML injection 20 units around 9 am (with steroids) 10 units around 9 pm 15 mL 3    insulin pen needle (31G X 8 MM) 31G X 8 MM miscellaneous Use 1 pen needles daily or as directed. 50 each 0    lidocaine (LIDODERM) 5 % patch Place 2 patches over 12 hours onto the skin every 24 hours. 60 patch 3    lidocaine-prilocaine (EMLA) 2.5-2.5 % external cream Use 1-2 times a week or as needed prior to port access 30 g 3    lipase-protease-amylase (CREON 24) " 71292-86854-564852 units CPEP per EC capsule 2-3 capsules with meals 3 times a day and 1-2 capsules with snacks max of 15 capsules a day 200 capsule 11    loperamide (IMODIUM) 2 MG capsule 2 caps at 1st sign of diarrhea & 1 cap every 2hrs until 12hrs diarrhea free. During night, 2 caps at bedtime & 2 caps every 4hrs until AM 30 capsule 0    LORazepam (ATIVAN) 0.5 MG tablet Take 1-2 tablets (0.5-1 mg) by mouth every 6 hours as needed for muscle spasms. 45 tablet 2    methocarbamol 1000 MG TABS Take 1,000 mg by mouth 4 times daily as needed for muscle spasms. 180 tablet 3    methylphenidate (RITALIN) 5 MG tablet Take 1 tablet (5 mg) by mouth 2 times daily as needed 10 tablet 0    methylPREDNISolone Na Suc, PF, (SOLU-MEDROL) 125 mg/2 mL injection Inject 2 mLs (125 mg) over 3-5 minutes into the vein via push as needed (infusion reaction). For RN use only.  Reconstitute vial. Draw up methylPREDNISolone in a syringe and administer.  Discard remainder of vial. [id number] mL 0    Multiple Vitamins-Minerals (CENTRUM SILVER 50+MEN) TABS as directed Orally      naloxone (NARCAN) 4 MG/0.1ML nasal spray       pantoprazole (PROTONIX) 40 MG EC tablet Take 1 tablet (40 mg) by mouth 2 times daily 60 tablet 3    [START ON 2/1/2025] pegfilgrastim-jmdb (FULPHILA) 6 MG/0.6ML injection Inject 0.6 mLs (6 mg) subcutaneously every 2 weeks as needed (per [facility] orders on day 4 of cycle). Remove from fridge for at least 30  minutes to allow syringe to reach room temperature. 8 mL 0    Port Access Kit For nurse use only.  Do not remove items from bag.  Use for port access.  Do not place syringe on sterile field. [id number] kit 0    prochlorperazine (COMPAZINE) 10 MG tablet Take 1 tablet (10 mg) by mouth every 6 hours as needed for nausea or vomiting. 30 tablet 2    prochlorperazine (COMPAZINE) 10 MG tablet Take 1 tablet (10 mg) by mouth every 6 hours as needed for nausea or vomiting 30 tablet 2    sodium chloride 0.9% infusion Infuse 250 mLs over  30 minutes into the vein every 28 (twenty-eight) days. zometa concomitant fluids. Given per therapy plan orders 750 mL 2    sodium chloride 0.9% infusion Infuse 500 mLs into the vein as needed for other (infusion reaction). In case of mild reaction, administer via gravity at 20 mL/hr to keep vein open. In case of severe reaction, administer via gravity wide open on prime setting. [id number] mL 0    sodium chloride, PF, 0.9% PF flush Inject 10 mLs into the vein as needed for line flush. Flush IV before and after med administration as directed and/or at least every 24 hours, or prior to deaccessing for no further use and/or at least every 4 weeks when not accessed. 351008 mL 0    sodium chloride, PF, 0.9% PF flush Inject 10 mLs into the vein as needed for other (infusion reaction). For RN use only as needed for infusion reaction [id number] mL 0    sterile water, preservative free, injection Use 2.2 mLs for reconstitution as needed (with alteplase for catheter occlusion). Direct diluent stream into powder. Mix by gently swirling until dissolved. DO NOT SHAKE. Discard remainder of vial. [id number] mL 0    vitamin D3 (CHOLECALCIFEROL) 50 mcg (2000 units) tablet Take 1 tablet (50 mcg) by mouth daily. 90 tablet 1    zoledronic acid (ZOMETA) 4 MG/100ML infusion Infuse 100 mLs (4 mg) into the vein every 28 days. Infuse via gravity. Given per therapy plan orders 300 mL 2       Allergies  No Known Allergies    Family History  family history includes Cirrhosis in his father; Colon Cancer in his paternal uncle; Colon Polyps in his brother; Diabetes Type 2  in his father; Genetic Disorder in his brother; Pancreatic Cancer (age of onset: 85) in his paternal aunt; Pancreatitis in his cousin.    Social History  Social History     Tobacco Use    Smoking status: Never     Passive exposure: Never    Smokeless tobacco: Never   Vaping Use    Vaping status: Never Used   Substance Use Topics    Alcohol use: Not Currently    Drug use: Never        Physical Exam  There is no height or weight on file to calculate BMI.  GENERAL: no distress  SKIN: Visible skin clear. No significant rash, abnormal pigmentation or lesions.  EYES: Eyes grossly normal to inspection.  No discharge or erythema, or obvious scleral/conjunctival abnormalities.  NECK: visible goiter is not present; no visible neck masses  RESP: No audible wheeze, cough, or visible cyanosis.  No visible retractions or increased work of breathing.    NEURO: Awake, alert, responds appropriately to questions.  Mentation and speech fluent.  PSYCH:affect normal and appearance well-groomed.      DATA REVIEW  Dexcom Clarity  Time in target range 46%  Very Low<1%, High 29%, Very High 25%  Current Ave  mg/dl

## 2025-01-30 NOTE — PATIENT INSTRUCTIONS
The plan is to continue to use insulin every other day as you wean off of the chemo and steroids.     When you start on the new every two week chemo, we will restart NPH 15 unit(s) the evening of Feb 12th (starting date). I will then have our CDE meet with you on Feb 13th to review your blood sugars.     I'll see you back in about a month.

## 2025-01-30 NOTE — LETTER
1/30/2025      Gallo Navarro  [address] [address] [place] [address]      Dear Colleague,    Thank you for referring your patient, Gallo Navarro, to the [facility]. Please see a copy of my visit note below.        Assessment/Plan :   Steroid induced hyperglycemia. Gallo feels like things are going well. He is in the process of weaning off of the chemotherapy and steroids. He has been skipping the insulin on days when he doesn't take the chemotherapy and steroids. This seems to be working. In a couple weeks, he will start a new chemotherapy schedule, every two weeks. When he starts the chemotherapy, he will take 15 unit(s) of NPH the first day of the chemotherapy. I will have him follow-up with our CDE team the day after the therapy. We will develop a more consistent plan, once we see how his blood sugars respond. I will plan on seeing him back in about a month.     Due to the COVID 19 pandemic this visit was a telephone/video visit in order to help prevent spread of infection in this patient and the general population. The patient gave verbal consent for the visit today. I have independently reviewed and interpreted labs, imaging as indicated.     {PROVIDER LOCATION On-site should be selected for visits conducted from your clinic location or adjoining [facility] hospital, academic office, or other nearby [facility] building. Off-site should be selected for all other provider locations, including home:[id number]}  Distant Location (provider location):  On-site  Mode of Communication:  Video Conference via [employer]  Chart review/prep time 5    Joined the call at 1/30/2025, 2:53:25 pm.  Left the call at 1/30/2025, 3:05:18 pm.  You were on the call for 11 minutes 53 seconds .  20 minutes spent on the date of the encounter doing chart review, history and exam, documentation and further activities as noted above.      Chief complaint:  Gallo is a 59 year old male who returns for follow-up of steroid  induced diabetes.    I have reviewed Care Everywhere including [facility], [facility], [facility], [facility], [facility], [facility], [facility] , [facility], [place] lab reports, imaging reports and provider notes as indicated.      HISTORY OF PRESENT ILLNESS  Gallo was recently started on a new palliative care chemotherapy about a month ago. The chemotherapy is administered with steroids, which led to another spike in his blood sugars. He reached out to our office on January 3rd for further instructions. We restarted NPH insulin at 8 unit(s) nightly and have slowly increased to 20 unit(s) at 9 am and 10 unit(s) at 9 pm. He was still experiencing overnight highs, until recently.     He is now in the process of weaning off the daily chemotherapy and steroids. On the off days, his blood sugars are primarily within target range without the need for insulin. The days when he takes the steroids he is taking 15 unit(s) of NPH at night. The plan is to wean off of daily steroids and then restart at every two weeks.     Gallo was diagnosed with diabetes during a hospital admission in April of 2023. He was hospitalized due to severe abdominal pain and recurrent pancreatitis. He was later found to have pancreatic adenocarcinoma. Insulin was used during the first episodes of pancreatitis and later continued due to the need for steroids during chemotherapy. Towards the end of 2023, chemotherapy was completed and he was able to wean off insulin until recently.     Endocrine relevant labs are as follows:   Latest Reference Range & Units 10/04/23 14:33   Hemoglobin A1C 0.0 - 5.6 % 6.8 (H)   (H): Data is abnormally high    REVIEW OF SYSTEMS    Endocrine: positive for diabetes  Skin: negative  Eyes: negative for, visual blurring, redness, tearing  Ears/Nose/Throat: negative  Respiratory: No shortness of breath, dyspnea on exertion, cough, or hemoptysis  Cardiovascular: negative for, chest pain, dyspnea on exertion, lower extremity  edema, and exercise intolerance  Gastrointestinal: negative for, nausea, vomiting, constipation, and diarrhea  Genitourinary: negative for, nocturia, dysuria, frequency, and urgency  Musculoskeletal: negative for, muscular weakness, nocturnal cramping, and foot pain  Neurologic: negative for, local weakness, numbness or tingling of hands, and numbness or tingling of feet  Psychiatric: negative  Hematologic/Lymphatic/Immunologic: negative    Past Medical History  Past Medical History:   Diagnosis Date     Acute pancreatitis 04/16/2023     Alcohol-induced acute pancreatitis 04/10/2023     Gastric outlet obstruction      Metastasis from pancreatic cancer (H)      Personal history of chemotherapy     Gemzar + Abraxane started on 10/31/2023     Recurrent acute pancreatitis      S/P radiation therapy     2,000 cGy to T10 Spine completed on 11/29/2023 [facility]     S/P radiation therapy     3,000 cGy to L4 Spine completed on 10/21/2024 [facility]       Medications  Current Outpatient Medications   Medication Sig Dispense Refill     acetaminophen (TYLENOL) 32 mg/mL liquid Take 20.313 mLs (650 mg) by mouth every 8 hours. 1800 mL 3     alteplase (CATHFLO ACTIVASE) injection Inject 2 mLs (2 mg) into catheter as needed (catheter occlusion). Reconstitute vial. Draw up alteplase 1 mg/mL in a syringe and instill into IV catheter. Dwell for at least 20 min to 24 hours, then aspirate. May repeat dose once in 24 hours if catheter function not restored after at least 2 hours. Discard remainder of vial. 282829 each 0     apixaban ANTICOAGULANT (ELIQUIS) 5 MG tablet Take 1 tablet (5 mg) by mouth 2 times daily. 60 tablet 2     blood glucose (FREESTYLE TEST STRIPS) test strip 1 strip by In Vitro route 3 times daily. 100 strip 2     buprenorphine (BUTRANS) 10 MCG/HR WK patch Place 1 patch over 168 hours onto the skin every 7 days. 4 patch 4     buprenorphine (BUTRANS) 20 MCG/HR WK patch Place 1  "patch over 168 hours onto the skin every 7 days. 4 patch 4     calcium carbonate (OS-DENISE) 500 MG tablet Take 2 tablets (1,000 mg) by mouth 2 times daily. 60 tablet 3     Chemo Kit For RN use only. Do not remove items from bag. Contents: 1  sodium chloride 0.9% flush, 4 medium gloves, 1 chemo gown, 1/4 chemo mat, 1 connector female, 1 chemo bag. [id number] kit 0     Continuous Glucose Sensor (DEXCOM G7 SENSOR) MISC Change every 10 days. 6 each 3     dexAMETHasone (DECADRON) 2 MG tablet Take 1 tablet (2 mg) by mouth daily (with breakfast). 10 tablet 0     dicyclomine (BENTYL) 10 MG capsule Take 1 capsule (10 mg) by mouth 4 times daily (before meals and nightly). 120 capsule 1     diphenhydrAMINE (BENADRYL) 50 MG/ML injection Inject 1 mL (50 mg) over 3-5 minutes into the vein via push as needed for other (infusion reaction). For RN use only.  Draw up in a syringe and administer IV push.  Discard remainder of vial. 45050 mL 0     econazole nitrate 1 % external cream Apply topically daily To affected toenails. 85 g 5     Emergency Supply Kit, Central, Patient use for emergency only. Contents: 3 sodium chloride 0.9% flushes, 1 dressing kit, 1 microclave ext set 14\", 4 nitrile gloves (med), 6 alcohol prep pads, 1 bacitracin, 1 syringe (10 cc 20 G 1\"). Call [phone] to reorder. [id number] kit 0     EPINEPHrine (ANY BX GENERIC EQUIV) 0.3 MG/0.3ML injection 2-pack Inject 0.3 mLs (0.3 mg) into the muscle as needed for anaphylaxis (infusion reaction). Administer into the mid-thigh in case of severe anaphylaxis (wheezing, throat tightening, mouth swelling, difficulty breathing). May repeat dose one time in 5-15 minutes if symptoms persist. 662695 mL 0     Fluorouracil (ADRUCIL) 4,585 mg in sodium chloride 0.9 % 241 mL via HOMEPUMP C-Series Infuse 4,585 mg at 5.2 mL/hr over 46 hours into the vein once for 1 dose. 326985 mL 0     HYDROmorphone (DILAUDID) 4 MG tablet Take 1-2 tablets (4-8 mg) by mouth every 3 hours as needed for " severe pain or breakthrough pain. 180 tablet 0     insulin  UNIT/ML injection 20 units around 9 am (with steroids) 10 units around 9 pm 15 mL 3     insulin pen needle (31G X 8 MM) 31G X 8 MM miscellaneous Use 1 pen needles daily or as directed. 50 each 0     lidocaine (LIDODERM) 5 % patch Place 2 patches over 12 hours onto the skin every 24 hours. 60 patch 3     lidocaine-prilocaine (EMLA) 2.5-2.5 % external cream Use 1-2 times a week or as needed prior to port access 30 g 3     lipase-protease-amylase (CREON 24) 57296-00855-552504 units CPEP per EC capsule 2-3 capsules with meals 3 times a day and 1-2 capsules with snacks max of 15 capsules a day 200 capsule 11     loperamide (IMODIUM) 2 MG capsule 2 caps at 1st sign of diarrhea & 1 cap every 2hrs until 12hrs diarrhea free. During night, 2 caps at bedtime & 2 caps every 4hrs until AM 30 capsule 0     LORazepam (ATIVAN) 0.5 MG tablet Take 1-2 tablets (0.5-1 mg) by mouth every 6 hours as needed for muscle spasms. 45 tablet 2     methocarbamol 1000 MG TABS Take 1,000 mg by mouth 4 times daily as needed for muscle spasms. 180 tablet 3     methylphenidate (RITALIN) 5 MG tablet Take 1 tablet (5 mg) by mouth 2 times daily as needed 10 tablet 0     methylPREDNISolone Na Suc, PF, (SOLU-MEDROL) 125 mg/2 mL injection Inject 2 mLs (125 mg) over 3-5 minutes into the vein via push as needed (infusion reaction). For RN use only.  Reconstitute vial. Draw up methylPREDNISolone in a syringe and administer.  Discard remainder of vial. 271576 mL 0     Multiple Vitamins-Minerals (CENTRUM SILVER 50+MEN) TABS as directed Orally       naloxone (NARCAN) 4 MG/0.1ML nasal spray        pantoprazole (PROTONIX) 40 MG EC tablet Take 1 tablet (40 mg) by mouth 2 times daily 60 tablet 3     [START ON 2/1/2025] pegfilgrastim-jmdb (FULPHILA) 6 MG/0.6ML injection Inject 0.6 mLs (6 mg) subcutaneously every 2 weeks as needed (per [facility] orders on day 4 of cycle). Remove from fridge for at  least 30  minutes to allow syringe to reach room temperature. 8 mL 0     Port Access Kit For nurse use only.  Do not remove items from bag.  Use for port access.  Do not place syringe on sterile field. [id number] kit 0     prochlorperazine (COMPAZINE) 10 MG tablet Take 1 tablet (10 mg) by mouth every 6 hours as needed for nausea or vomiting. 30 tablet 2     prochlorperazine (COMPAZINE) 10 MG tablet Take 1 tablet (10 mg) by mouth every 6 hours as needed for nausea or vomiting 30 tablet 2     sodium chloride 0.9% infusion Infuse 250 mLs over 30 minutes into the vein every 28 (twenty-eight) days. zometa concomitant fluids. Given per therapy plan orders 750 mL 2     sodium chloride 0.9% infusion Infuse 500 mLs into the vein as needed for other (infusion reaction). In case of mild reaction, administer via gravity at 20 mL/hr to keep vein open. In case of severe reaction, administer via gravity wide open on prime setting. 690544 mL 0     sodium chloride, PF, 0.9% PF flush Inject 10 mLs into the vein as needed for line flush. Flush IV before and after med administration as directed and/or at least every 24 hours, or prior to deaccessing for no further use and/or at least every 4 weeks when not accessed. 556586 mL 0     sodium chloride, PF, 0.9% PF flush Inject 10 mLs into the vein as needed for other (infusion reaction). For RN use only as needed for infusion reaction 022007 mL 0     sterile water, preservative free, injection Use 2.2 mLs for reconstitution as needed (with alteplase for catheter occlusion). Direct diluent stream into powder. Mix by gently swirling until dissolved. DO NOT SHAKE. Discard remainder of vial. 566888 mL 0     vitamin D3 (CHOLECALCIFEROL) 50 mcg (2000 units) tablet Take 1 tablet (50 mcg) by mouth daily. 90 tablet 1     zoledronic acid (ZOMETA) 4 MG/100ML infusion Infuse 100 mLs (4 mg) into the vein every 28 days. Infuse via gravity. Given per therapy plan orders 300 mL 2       Allergies  No Known  Allergies    Family History  family history includes Cirrhosis in his father; Colon Cancer in his paternal uncle; Colon Polyps in his brother; Diabetes Type 2  in his father; Genetic Disorder in his brother; Pancreatic Cancer (age of onset: 85) in his paternal aunt; Pancreatitis in his cousin.    Social History  Social History     Tobacco Use     Smoking status: Never     Passive exposure: Never     Smokeless tobacco: Never   Vaping Use     Vaping status: Never Used   Substance Use Topics     Alcohol use: Not Currently     Drug use: Never       Physical Exam  There is no height or weight on file to calculate BMI.  GENERAL: no distress  SKIN: Visible skin clear. No significant rash, abnormal pigmentation or lesions.  EYES: Eyes grossly normal to inspection.  No discharge or erythema, or obvious scleral/conjunctival abnormalities.  NECK: visible goiter is not present; no visible neck masses  RESP: No audible wheeze, cough, or visible cyanosis.  No visible retractions or increased work of breathing.    NEURO: Awake, alert, responds appropriately to questions.  Mentation and speech fluent.  PSYCH:affect normal and appearance well-groomed.      DATA REVIEW  Dexcom Clarity  Time in target range 46%  Very Low<1%, High 29%, Very High 25%  Current Ave  mg/dl        Again, thank you for allowing me to participate in the care of your patient.        Sincerely,        Berta Cid PA-C    Electronically signed

## 2025-01-30 NOTE — TELEPHONE ENCOUNTER
Please call homecare and give the following orders    Okay to delay skilled nursing and PT till 2/3/2025

## 2025-01-30 NOTE — NURSING NOTE
Current patient location: [address] [place] [address]    Is the patient currently in the state of [place]? YES    Visit mode: VIDEO    If the visit is dropped, the patient can be reconnected by:VIDEO VISIT: Text to cell phone:   Telephone Information:   Mobile [phone]       Will anyone else be joining the visit? NO  (If patient encounters technical issues they should call [phone]    Are changes needed to the allergy or medication list? Pt stated no med changes    Are refills needed on medications prescribed by this physician? YES-  pt requests refill on Acetominophen     Rooming Documentation:  Not applicable    Reason for visit: RECHECK (Follow-up )    Hilary Zepeda VVF    Pt states he has been told the steroids can cause him to feel down some days.

## 2025-01-30 NOTE — TELEPHONE ENCOUNTER
Returned call to Vandana and left voicemail approving requested orders.  Advised to call clinic back if any questions.     Siri RODRIGUEZN, RN

## 2025-01-30 NOTE — TELEPHONE ENCOUNTER
Home Care is calling regarding an established patient with [facility].       Requesting orders from: Akil Hernandez  Provider is following patient: Yes  Is this a 60-day recertification request?  No    Orders Requested    Skilled Nursing  Request for delay in care, service is not able to be provided within same scheduled day.   Delay start of care to 2/3.    Physical Therapy  Request for delay in care, service is not able to be provided within same scheduled day.   Delay start of care to 2/3.        Information was gathered and will be sent to provider for review.  RN will contact Home Care with information after provider review.  Confirmed ok to leave a detailed message with call back.  Contact information confirmed and updated as needed.    Lenore Dunlap RN

## 2025-01-31 PROCEDURE — S9330 HIT CONT CHEM DIEM: HCPCS

## 2025-02-01 ENCOUNTER — MYC REFILL (OUTPATIENT)
Dept: RADIATION ONCOLOGY | Facility: HOSPITAL | Age: 60
End: 2025-02-01
Payer: COMMERCIAL

## 2025-02-01 DIAGNOSIS — M48.50XD PATHOLOGICAL COMPRESSION FRACTURE OF VERTEBRA WITH ROUTINE HEALING, SUBSEQUENT ENCOUNTER: ICD-10-CM

## 2025-02-01 DIAGNOSIS — C25.0 MALIGNANT NEOPLASM OF HEAD OF PANCREAS (H): ICD-10-CM

## 2025-02-01 DIAGNOSIS — G89.3 CANCER ASSOCIATED PAIN: ICD-10-CM

## 2025-02-01 PROCEDURE — S9330 HIT CONT CHEM DIEM: HCPCS

## 2025-02-02 PROCEDURE — S9330 HIT CONT CHEM DIEM: HCPCS

## 2025-02-03 PROCEDURE — S9330 HIT CONT CHEM DIEM: HCPCS

## 2025-02-03 RX ORDER — LORAZEPAM 0.5 MG/1
.5-1 TABLET ORAL EVERY 6 HOURS PRN
Qty: 45 TABLET | Refills: 2 | Status: SHIPPED | OUTPATIENT
Start: 2025-02-03

## 2025-02-03 NOTE — TELEPHONE ENCOUNTER
Received [employer] message from patient requesting refill of  lorazepam .     Last refill: 1/22/25  Last office visit: 1/21/25  Scheduled for follow up per check out request     Will route request to MD/ for review.     Reviewed [place]  Report.

## 2025-02-04 ENCOUNTER — TELEPHONE (OUTPATIENT)
Dept: FAMILY MEDICINE | Facility: CLINIC | Age: 60
End: 2025-02-04
Payer: COMMERCIAL

## 2025-02-04 PROCEDURE — S9330 HIT CONT CHEM DIEM: HCPCS

## 2025-02-04 NOTE — TELEPHONE ENCOUNTER
Home Care is calling regarding an established patient with [facility].       Requesting orders from: Akil Hernandez  Provider is following patient: Yes  Is this a 60-day recertification request?  No    Orders Requested    Skilled Nursing  Request for initial certification (first set of orders)   Frequency:  1x/wk for 8 wks 3 pressure ulcers    Occupational Therapy  Request for initial evaluation and treatment (one time)     Verbal orders given for OT eval and treat.  Information was gathered for [facility] first set of orders.  Provider review needed.  RN will contact Home Care with information after provider review.  Confirmed ok to leave a detailed message with call back.  Contact information confirmed and updated as needed.    Lenore Dunlap RN

## 2025-02-05 PROCEDURE — S9330 HIT CONT CHEM DIEM: HCPCS

## 2025-02-05 NOTE — TELEPHONE ENCOUNTER
Called to relay provider approval of requested orders.    No answer, so left message on DARELL Leon's confidential VM with callback number [phone].    Berta Owen RN, BSN  [facility] Triage

## 2025-02-05 NOTE — TELEPHONE ENCOUNTER
Please call homecare and give the following orders    Skilled Nursing  initial certification (first set of orders)   Frequency:  1x/wk for 8 wks 3 pressure ulcers     Occupational Therapy   initial evaluation and treatment (one time)      OT eval and treat

## 2025-02-06 PROCEDURE — S9330 HIT CONT CHEM DIEM: HCPCS

## 2025-02-07 ENCOUNTER — TELEPHONE (OUTPATIENT)
Dept: FAMILY MEDICINE | Facility: CLINIC | Age: 60
End: 2025-02-07
Payer: COMMERCIAL

## 2025-02-07 PROCEDURE — S9330 HIT CONT CHEM DIEM: HCPCS

## 2025-02-07 NOTE — TELEPHONE ENCOUNTER
Forms/Letter Request    Type of form/letter: Nursing Home/Assisted Living Orders      Do we have the form/letter: Yes: [facility], Physician verbal order, Order No: [id number], Order Date: 01/30/2025 05:38PM    Who is the form from? Home care    Where did/will the form come from? form was faxed in    When is form/letter needed by:     How would you like the form/letter returned: Fax : [phone]      Forms/Letter Request     Do we have the form/letter: Yes: Will place form on providers desk for review/signature

## 2025-02-08 PROCEDURE — S9330 HIT CONT CHEM DIEM: HCPCS

## 2025-02-09 PROCEDURE — S9330 HIT CONT CHEM DIEM: HCPCS

## 2025-02-10 ENCOUNTER — MEDICAL CORRESPONDENCE (OUTPATIENT)
Dept: HEALTH INFORMATION MANAGEMENT | Facility: CLINIC | Age: 60
End: 2025-02-10
Payer: COMMERCIAL

## 2025-02-10 ENCOUNTER — PATIENT OUTREACH (OUTPATIENT)
Dept: ONCOLOGY | Facility: CLINIC | Age: 60
End: 2025-02-10
Payer: COMMERCIAL

## 2025-02-10 LAB
ABO + RH BLD: NORMAL
BLD GP AB SCN SERPL QL: NEGATIVE
SPECIMEN EXP DATE BLD: NORMAL

## 2025-02-10 PROCEDURE — S9330 HIT CONT CHEM DIEM: HCPCS

## 2025-02-10 NOTE — TELEPHONE ENCOUNTER
"[facility]: Cancer Care                                                                                          Returned call to spouse Kit reporting pt is continuing to decline \"atrophy is worse, very fatigued\".    2/12 Berta GONZALEZ with chemo    1/29 5fu/onivyde, with pump disconnect 1/31 and Fuphila injection, spouse reported HR 120s to writer that day. This has continued,  this morning. Pt says \"here and there\" he can feel \"rushing\". Was unable to get compression stockings on, toe nails too long and homecare nursing advised podiatry to remove under sedation. Feet \"numb and huge\", no walker or cane, able to ambulate on his own. RN came out 2/3 \"confirmed the gallop in the heart\".    Pt thinks he walks \"1000 feet\" which spouse disputes saying he can't make it to the kitchen. Reported 2/3 during nurse visit, pt fell backwards trying to go up a step and squatted so he fell on his right shoulder/hip. Spouse, son and nurse assisted pt to car and to [facility] urgent care; xrays negative for fractures. Sore for several days, bruising on L leg.    Pt winded, feels very thirsty last 48 hours, poor appetite. Pressure sores on coccyx noted by homecare, using Aloe, diaper rash ointment, zinc, anti-itch spray. Painful to sit over the weekend, feels it was worse all week then this morning on the toilet, area felt better. No drainage or bleeding noted on underwear. Also noted bright red blood in stool, stools are soft. Painful to pass but once stool passes, not experiencing pain. Pt received hydrocolloid dressing but has not applied, thinks maybe home care RN ordered this. Advised to ask today when RN comes. Scheduled at 4 pm.    Ate 2 packets of oatmeal this morning. BS \"steady\", Dexa completed last Friday (prescribed by Dr. Hurt palliative care).     Offered in person visit with Berta GONZALEZ Tuesday at 11 am, 10:30 for labs, possible IVF. May cancel chemo on Wed, forwarded to Berta.    Signature:  Efra" DARELL Lynn

## 2025-02-10 NOTE — TELEPHONE ENCOUNTER
Completed From Faxed To:[phone] And [facility]  Right-Fax reviewed to confirm form was sent without error.

## 2025-02-11 ENCOUNTER — ONCOLOGY VISIT (OUTPATIENT)
Dept: ONCOLOGY | Facility: CLINIC | Age: 60
End: 2025-02-11
Attending: PHYSICIAN ASSISTANT
Payer: COMMERCIAL

## 2025-02-11 ENCOUNTER — TELEPHONE (OUTPATIENT)
Dept: FAMILY MEDICINE | Facility: CLINIC | Age: 60
End: 2025-02-11

## 2025-02-11 ENCOUNTER — APPOINTMENT (OUTPATIENT)
Dept: LAB | Facility: CLINIC | Age: 60
End: 2025-02-11
Attending: PHYSICIAN ASSISTANT
Payer: COMMERCIAL

## 2025-02-11 VITALS
OXYGEN SATURATION: 97 % | SYSTOLIC BLOOD PRESSURE: 116 MMHG | TEMPERATURE: 100.4 F | HEART RATE: 78 BPM | RESPIRATION RATE: 16 BRPM | DIASTOLIC BLOOD PRESSURE: 69 MMHG | BODY MASS INDEX: 19.86 KG/M2 | WEIGHT: 146.4 LBS

## 2025-02-11 DIAGNOSIS — C25.0 MALIGNANT NEOPLASM OF HEAD OF PANCREAS (H): Primary | ICD-10-CM

## 2025-02-11 DIAGNOSIS — R63.0 ANOREXIA: ICD-10-CM

## 2025-02-11 DIAGNOSIS — R11.0 CHEMOTHERAPY-INDUCED NAUSEA: ICD-10-CM

## 2025-02-11 DIAGNOSIS — D64.9 ANEMIA, UNSPECIFIED TYPE: ICD-10-CM

## 2025-02-11 DIAGNOSIS — T45.1X5A CHEMOTHERAPY-INDUCED NAUSEA: ICD-10-CM

## 2025-02-11 LAB
ALBUMIN SERPL BCG-MCNC: 2.9 G/DL (ref 3.5–5.2)
ALP SERPL-CCNC: 318 U/L (ref 40–150)
ALT SERPL W P-5'-P-CCNC: 9 U/L (ref 0–70)
ANION GAP SERPL CALCULATED.3IONS-SCNC: 9 MMOL/L (ref 7–15)
AST SERPL W P-5'-P-CCNC: 21 U/L (ref 0–45)
BASOPHILS # BLD MANUAL: 0 10E3/UL (ref 0–0.2)
BASOPHILS NFR BLD MANUAL: 0 %
BILIRUB SERPL-MCNC: 0.4 MG/DL
BLD PROD TYP BPU: NORMAL
BLOOD COMPONENT TYPE: NORMAL
BUN SERPL-MCNC: 11.4 MG/DL (ref 6–20)
CALCIUM SERPL-MCNC: 7.3 MG/DL (ref 8.8–10.4)
CALCIUM SERPL-MCNC: 7.3 MG/DL (ref 8.8–10.4)
CHLORIDE SERPL-SCNC: 102 MMOL/L (ref 98–107)
CODING SYSTEM: NORMAL
CREAT SERPL-MCNC: 0.6 MG/DL (ref 0.67–1.17)
CROSSMATCH: NORMAL
DACRYOCYTES BLD QL SMEAR: SLIGHT
EGFRCR SERPLBLD CKD-EPI 2021: >90 ML/MIN/1.73M2
EOSINOPHIL # BLD MANUAL: 0 10E3/UL (ref 0–0.7)
EOSINOPHIL NFR BLD MANUAL: 0 %
ERYTHROCYTE [DISTWIDTH] IN BLOOD BY AUTOMATED COUNT: 19.2 % (ref 10–15)
FRAGMENTS BLD QL SMEAR: SLIGHT
GLUCOSE SERPL-MCNC: 138 MG/DL (ref 70–99)
HCO3 SERPL-SCNC: 22 MMOL/L (ref 22–29)
HCT VFR BLD AUTO: 21.8 % (ref 40–53)
HGB BLD-MCNC: 6.9 G/DL (ref 13.3–17.7)
ISSUE DATE AND TIME: NORMAL
LYMPHOCYTES # BLD MANUAL: 0.8 10E3/UL (ref 0.8–5.3)
LYMPHOCYTES NFR BLD MANUAL: 10 %
MCH RBC QN AUTO: 28.6 PG (ref 26.5–33)
MCHC RBC AUTO-ENTMCNC: 31.7 G/DL (ref 31.5–36.5)
MCV RBC AUTO: 91 FL (ref 78–100)
MONOCYTES # BLD MANUAL: 0.7 10E3/UL (ref 0–1.3)
MONOCYTES NFR BLD MANUAL: 9 %
NEUTROPHILS # BLD MANUAL: 6.5 10E3/UL (ref 1.6–8.3)
NEUTROPHILS NFR BLD MANUAL: 80 %
NRBC # BLD AUTO: 0.1 10E3/UL
NRBC BLD MANUAL-RTO: 1 %
PLAT MORPH BLD: ABNORMAL
PLATELET # BLD AUTO: 252 10E3/UL (ref 150–450)
POLYCHROMASIA BLD QL SMEAR: SLIGHT
POTASSIUM SERPL-SCNC: 3.6 MMOL/L (ref 3.4–5.3)
PROMYELOCYTES # BLD MANUAL: 0.1 10E3/UL
PROMYELOCYTES NFR BLD MANUAL: 1 %
PROT SERPL-MCNC: 5.1 G/DL (ref 6.4–8.3)
RBC # BLD AUTO: 2.41 10E6/UL (ref 4.4–5.9)
RBC MORPH BLD: ABNORMAL
SODIUM SERPL-SCNC: 133 MMOL/L (ref 135–145)
UNIT ABO/RH: NORMAL
UNIT NUMBER: NORMAL
UNIT STATUS: NORMAL
UNIT TYPE ISBT: 8400
WBC # BLD AUTO: 8.2 10E3/UL (ref 4–11)

## 2025-02-11 PROCEDURE — 85027 COMPLETE CBC AUTOMATED: CPT

## 2025-02-11 PROCEDURE — 99213 OFFICE O/P EST LOW 20 MIN: CPT | Performed by: PHYSICIAN ASSISTANT

## 2025-02-11 PROCEDURE — 250N000011 HC RX IP 250 OP 636: Performed by: PHYSICIAN ASSISTANT

## 2025-02-11 PROCEDURE — 96360 HYDRATION IV INFUSION INIT: CPT

## 2025-02-11 PROCEDURE — 86923 COMPATIBILITY TEST ELECTRIC: CPT | Performed by: PHYSICIAN ASSISTANT

## 2025-02-11 PROCEDURE — 99215 OFFICE O/P EST HI 40 MIN: CPT | Performed by: PHYSICIAN ASSISTANT

## 2025-02-11 PROCEDURE — G2211 COMPLEX E/M VISIT ADD ON: HCPCS | Performed by: PHYSICIAN ASSISTANT

## 2025-02-11 PROCEDURE — 80053 COMPREHEN METABOLIC PANEL: CPT

## 2025-02-11 PROCEDURE — 86901 BLOOD TYPING SEROLOGIC RH(D): CPT | Performed by: PHYSICIAN ASSISTANT

## 2025-02-11 PROCEDURE — 258N000003 HC RX IP 258 OP 636: Performed by: PHYSICIAN ASSISTANT

## 2025-02-11 PROCEDURE — 85007 BL SMEAR W/DIFF WBC COUNT: CPT

## 2025-02-11 PROCEDURE — S9330 HIT CONT CHEM DIEM: HCPCS

## 2025-02-11 PROCEDURE — 36591 DRAW BLOOD OFF VENOUS DEVICE: CPT | Performed by: STUDENT IN AN ORGANIZED HEALTH CARE EDUCATION/TRAINING PROGRAM

## 2025-02-11 RX ORDER — ALBUTEROL SULFATE 90 UG/1
1-2 INHALANT RESPIRATORY (INHALATION)
Start: 2025-02-12

## 2025-02-11 RX ORDER — METHOCARBAMOL 500 MG/1
TABLET, FILM COATED ORAL
COMMUNITY
Start: 2024-12-17

## 2025-02-11 RX ORDER — HEPARIN SODIUM (PORCINE) LOCK FLUSH IV SOLN 100 UNIT/ML 100 UNIT/ML
5 SOLUTION INTRAVENOUS
OUTPATIENT
Start: 2025-02-11

## 2025-02-11 RX ORDER — HEPARIN SODIUM,PORCINE 10 UNIT/ML
5-20 VIAL (ML) INTRAVENOUS DAILY PRN
Status: CANCELLED | OUTPATIENT
Start: 2025-02-11

## 2025-02-11 RX ORDER — HEPARIN SODIUM (PORCINE) LOCK FLUSH IV SOLN 100 UNIT/ML 100 UNIT/ML
5 SOLUTION INTRAVENOUS
Status: CANCELLED | OUTPATIENT
Start: 2025-02-11

## 2025-02-11 RX ORDER — OLANZAPINE 5 MG/1
5 TABLET ORAL AT BEDTIME
Qty: 30 TABLET | Refills: 3 | Status: SHIPPED | OUTPATIENT
Start: 2025-02-11

## 2025-02-11 RX ORDER — DIPHENHYDRAMINE HYDROCHLORIDE 50 MG/ML
50 INJECTION INTRAMUSCULAR; INTRAVENOUS
Status: CANCELLED
Start: 2025-02-11

## 2025-02-11 RX ORDER — HEPARIN SODIUM,PORCINE 10 UNIT/ML
5-20 VIAL (ML) INTRAVENOUS DAILY PRN
OUTPATIENT
Start: 2025-02-11

## 2025-02-11 RX ORDER — ALBUTEROL SULFATE 0.83 MG/ML
2.5 SOLUTION RESPIRATORY (INHALATION)
OUTPATIENT
Start: 2025-02-12

## 2025-02-11 RX ORDER — HEPARIN SODIUM (PORCINE) LOCK FLUSH IV SOLN 100 UNIT/ML 100 UNIT/ML
5 SOLUTION INTRAVENOUS
OUTPATIENT
Start: 2025-02-12

## 2025-02-11 RX ORDER — DIPHENHYDRAMINE HYDROCHLORIDE 50 MG/ML
50 INJECTION INTRAMUSCULAR; INTRAVENOUS
Start: 2025-02-11

## 2025-02-11 RX ORDER — EPINEPHRINE 1 MG/ML
0.3 INJECTION, SOLUTION INTRAMUSCULAR; SUBCUTANEOUS EVERY 5 MIN PRN
OUTPATIENT
Start: 2025-02-12

## 2025-02-11 RX ORDER — ZOLEDRONIC ACID 0.04 MG/ML
4 INJECTION, SOLUTION INTRAVENOUS ONCE
OUTPATIENT
Start: 2025-02-12 | End: 2025-02-12

## 2025-02-11 RX ORDER — EPINEPHRINE 1 MG/ML
0.3 INJECTION, SOLUTION INTRAMUSCULAR; SUBCUTANEOUS EVERY 5 MIN PRN
Status: CANCELLED | OUTPATIENT
Start: 2025-02-11

## 2025-02-11 RX ORDER — HEPARIN SODIUM (PORCINE) LOCK FLUSH IV SOLN 100 UNIT/ML 100 UNIT/ML
5 SOLUTION INTRAVENOUS ONCE
Status: COMPLETED | OUTPATIENT
Start: 2025-02-11 | End: 2025-02-11

## 2025-02-11 RX ORDER — MEPERIDINE HYDROCHLORIDE 25 MG/ML
25 INJECTION INTRAMUSCULAR; INTRAVENOUS; SUBCUTANEOUS EVERY 30 MIN PRN
OUTPATIENT
Start: 2025-02-12

## 2025-02-11 RX ORDER — EPINEPHRINE 1 MG/ML
0.3 INJECTION, SOLUTION, CONCENTRATE INTRAVENOUS EVERY 5 MIN PRN
OUTPATIENT
Start: 2025-02-11

## 2025-02-11 RX ORDER — METHYLPREDNISOLONE SODIUM SUCCINATE 125 MG/2ML
125 INJECTION INTRAMUSCULAR; INTRAVENOUS
Start: 2025-02-12

## 2025-02-11 RX ORDER — HEPARIN SODIUM,PORCINE 10 UNIT/ML
5-20 VIAL (ML) INTRAVENOUS DAILY PRN
OUTPATIENT
Start: 2025-02-12

## 2025-02-11 RX ORDER — DIPHENHYDRAMINE HYDROCHLORIDE 50 MG/ML
50 INJECTION INTRAMUSCULAR; INTRAVENOUS
Start: 2025-02-12

## 2025-02-11 RX ADMIN — SODIUM CHLORIDE 1000 ML: 9 INJECTION, SOLUTION INTRAVENOUS at 12:54

## 2025-02-11 RX ADMIN — Medication 5 ML: at 10:55

## 2025-02-11 ASSESSMENT — PAIN SCALES - GENERAL: PAINLEVEL_OUTOF10: NO PAIN (0)

## 2025-02-11 NOTE — LETTER
2/11/2025      Gallo Navarro  [address] [address] [place] [id number]      Dear Colleague,    Thank you for referring your patient, Gallo Navarro, to the [facility]. Please see a copy of my visit note below.    Oncology/Hematology Visit Note  Feb 11, 2025    Reason for Visit: follow-up of metastatic Pancreatic Caner    Oncology HPI:   -NGS: KRAS G12R  Immuno: pMMR, KAYLIE, TMB-low  Clinical Trial: MARIPOSA-186, MARIPOSA-280, TORL    - 3/2023: presented with acute pancreatitis  c/b chronic pancreatitis with pancreatic mass causing duodenal stenosis with gastric outlet obstruction s/p GJ tube (placed 5/2023), biliary obstruction s/p stent placement on 7/7/23, pancreatic insufficiency, and IDDM2.  -  He then presented to the ED with worsening abdominal pain, increased jaundice, poor PO intake and weight loss admitted on 7/8/2023 for concern of biliary obstruction.   - 7/12/2023: Underwent biopsy of pancreatic mass returned positive for pancreatic adenocarcinoma.   - 7/31/2023: He started on treatment with 5FU, irinotecan, and oxaliplatin (FOLFIRINOX). Please see previous notes for further details on the patient's history.      8/17/23 - ERCP/EGD, duodenal stents x2 placed, exchange of GJ tube     8/21-8/25/24 hospitalized due to diarrhea, colitis, abdominal pain.       8/29 - Cycle 3 deferred due to neutropenia.   Neulasta added. Irinotecan dose reduced 20% due to symptoms including cramping, double vision and slurred speech during infusion.       10/24/23 CT CAP with similar to slight increase in size of peripancreatic and mesenteric lymph nodes, enlargement of previous skeletal metastasis as well as new sclerotic metastasis to left posterior 5th rib, enlarging pulmonary nodule, and unchanged pancreatic head mass. Also unclear concerns for PE,  right lower lobe segmental PE confirmed on CT-PE. Started on apixaban.      10/31/23 Cycle 1 gemzar, abraxane  11/7/23 Cycle 1 Zometa  11/22/23 Removal of GJ  tube.   11/29/23 Completed palliative radiation to T10   12/13/23 C3D1 gem/abraxane  12/29/23 Consults at [facility]  1/23-1/26 Consults at [facility]   1/30/24: C4D1 gem/abraxane   3/24: CT CAP with overall stable disease with isolated progression in potential L4 lesion. Referral for radiation oncology.    4/29-5/1: Consults at [facility] for possible cellular therapy, CT guided biopsy of left pubic bone, recommendation to return to [facility] upon progression of current treatment   5/23/24: Cardiology consult at [facility]. CT with small pericardial effusion   6/6/2024: Repeat ECHO on with EF of 55-60%, no pericardial effusion present  - Cycle 8 deferred due to infection concerns and subsequently tested positive for COVID 6/25/24, resumed treatment on 7/2/2024    -Day 8 eliminated after Cycle 5 [place]/Abraxane.    4/2/2024-present: Gemcitabine/Abraxane, Days 1 and 15 only; Zometa every 3 months     8/2024 repeat imaging with isolated progression to L4 lesion with fracture of that spinous process. Referred to radiation therapy. Decreased Abraxane dose reduced to 75mg/m2 with cycle 9 day 15 for overall treatment tolerance.     8/28/24 RFA ablation and verteral augmentation to L4.   10/1-10/21 Radiation to L4     11/12/24 CT CAP shows disease progression, plan to change to 5FU and liposomal irinotecan after a vacation  12/12/24 ED visit for pain, secondary to cancer, managed with IV pain medication    12/18/24 Cycle 1 5FU and liposomal irinotecan    1/10/25: L2 and L3 RFA and vertebroplasty    Gallo presents today for follow up prior to cycle 4.       Interval history:     -continues to work on rehab/strength exercises at home. Formally starts home PT next week and is excited to start this to continue his progress  -back pain continues to improve, weaning down on dexamethasone. He is currently taking 2mg daily. Continues on PO Dilaudid, Belbuca and lorazepam   -continues to tolerate chemo well, doesn't note any changes or side  effects following chemo  -appetite is good, he is focusing on increasing calories to gain back weight/muscle mass. Denies any nausea, reflux, abdominal pain or bowel concerns. Taking a fiber supplement daily. Drinking ~32oz of fluid daily  -energy level is good, increasing his activity level   -incisions and bruising on back have healed, denies any other skin concerns     Remainder of 12 point ROS reviewed and negative except as in interval history.       Current Outpatient Medications   Medication Sig Dispense Refill    acetaminophen (TYLENOL) 32 mg/mL liquid Take 20.313 mLs (650 mg) by mouth every 8 hours. (Patient taking differently: Take 650 mg by mouth every 8 hours as needed.) 1800 mL 3    alteplase (CATHFLO ACTIVASE) injection Inject 2 mLs (2 mg) into catheter as needed (catheter occlusion). Reconstitute vial. Draw up alteplase 1 mg/mL in a syringe and instill into IV catheter. Dwell for at least 20 min to 24 hours, then aspirate. May repeat dose once in 24 hours if catheter function not restored after at least 2 hours. Discard remainder of vial. [id number] each 0    apixaban ANTICOAGULANT (ELIQUIS) 5 MG tablet Take 1 tablet (5 mg) by mouth 2 times daily. 60 tablet 2    buprenorphine (BUTRANS) 10 MCG/HR WK patch Place 1 patch over 168 hours onto the skin every 7 days. 4 patch 4    buprenorphine (BUTRANS) 20 MCG/HR WK patch Place 1 patch over 168 hours onto the skin every 7 days. 4 patch 4    calcium carbonate (OS-DENISE) 500 MG tablet Take 2 tablets (1,000 mg) by mouth 2 times daily. 60 tablet 3    Chemo Kit For RN use only. Do not remove items from bag. Contents: 1  sodium chloride 0.9% flush, 4 medium gloves, 1 chemo gown, 1/4 chemo mat, 1 connector female, 1 chemo bag. [id number] kit 0    Continuous Glucose Sensor (DEXCOM G7 SENSOR) MISC Change every 10 days. 6 each 3    dexAMETHasone (DECADRON) 2 MG tablet Take 1 tablet (2 mg) by mouth daily (with breakfast). 10 tablet 0    dicyclomine (BENTYL) 10 MG capsule Take 1  "capsule (10 mg) by mouth 4 times daily (before meals and nightly). 120 capsule 1    diphenhydrAMINE (BENADRYL) 50 MG/ML injection Inject 1 mL (50 mg) over 3-5 minutes into the vein via push as needed for other (infusion reaction). For RN use only.  Draw up in a syringe and administer IV push.  Discard remainder of vial. 48267 mL 0    Emergency Supply Kit, Central, Patient use for emergency only. Contents: 3 sodium chloride 0.9% flushes, 1 dressing kit, 1 microclave ext set 14\", 4 nitrile gloves (med), 6 alcohol prep pads, 1 bacitracin, 1 syringe (10 cc 20 G 1\"). Call [phone] to reorder. [id number] kit 0    EPINEPHrine (ANY BX GENERIC EQUIV) 0.3 MG/0.3ML injection 2-pack Inject 0.3 mLs (0.3 mg) into the muscle as needed for anaphylaxis (infusion reaction). Administer into the mid-thigh in case of severe anaphylaxis (wheezing, throat tightening, mouth swelling, difficulty breathing). May repeat dose one time in 5-15 minutes if symptoms persist. 999137 mL 0    [START ON 2/12/2025] Fluorouracil (ADRUCIL) 4,585 mg in sodium chloride 0.9 % 241 mL via HOMEPUMP C-Series Infuse 4,585 mg at 5.2 mL/hr over 46 hours into the vein once for 1 dose. 276420 mL 0    HYDROmorphone (DILAUDID) 4 MG tablet Take 1-2 tablets (4-8 mg) by mouth every 3 hours as needed for severe pain or breakthrough pain. 180 tablet 0    insulin  UNIT/ML injection 20 units around 9 am (with steroids) 10 units around 9 pm 15 mL 3    insulin pen needle (31G X 8 MM) 31G X 8 MM miscellaneous Use 1 pen needles daily or as directed. 50 each 0    lipase-protease-amylase (CREON 24) 48642-14718-686785 units CPEP per EC capsule 2-3 capsules with meals 3 times a day and 1-2 capsules with snacks max of 15 capsules a day 200 capsule 11    loperamide (IMODIUM) 2 MG capsule 2 caps at 1st sign of diarrhea & 1 cap every 2hrs until 12hrs diarrhea free. During night, 2 caps at bedtime & 2 caps every 4hrs until AM (Patient taking differently: as needed. 2 caps at 1st " sign of diarrhea & 1 cap every 2hrs until 12hrs diarrhea free. During night, 2 caps at bedtime & 2 caps every 4hrs until AM) 30 capsule 0    LORazepam (ATIVAN) 0.5 MG tablet Take 1-2 tablets (0.5-1 mg) by mouth every 6 hours as needed for muscle spasms. 45 tablet 2    methocarbamol (ROBAXIN) 500 MG tablet TAKE 2 TABLETS BY MOUTH 4 TIMES DAILY AS NEEDED FOR MUSCLE SPASMS.*      methylphenidate (RITALIN) 5 MG tablet Take 1 tablet (5 mg) by mouth 2 times daily as needed 10 tablet 0    methylPREDNISolone Na Suc, PF, (SOLU-MEDROL) 125 mg/2 mL injection Inject 2 mLs (125 mg) over 3-5 minutes into the vein via push as needed (infusion reaction). For RN use only.  Reconstitute vial. Draw up methylPREDNISolone in a syringe and administer.  Discard remainder of vial. 485228 mL 0    Multiple Vitamins-Minerals (CENTRUM SILVER 50+MEN) TABS as directed Orally      naloxone (NARCAN) 4 MG/0.1ML nasal spray Spray 4 mg into one nostril alternating nostrils once as needed.      pantoprazole (PROTONIX) 40 MG EC tablet Take 1 tablet (40 mg) by mouth 2 times daily 60 tablet 3    pegfilgrastim-jmdb (FULPHILA) 6 MG/0.6ML injection Inject 0.6 mLs (6 mg) subcutaneously every 2 weeks as needed (per [facility] orders on day 4 of cycle). Remove from fridge for at least 30  minutes to allow syringe to reach room temperature. 8 mL 0    Port Access Kit For nurse use only.  Do not remove items from bag.  Use for port access.  Do not place syringe on sterile field. [id number] kit 0    prochlorperazine (COMPAZINE) 10 MG tablet Take 1 tablet (10 mg) by mouth every 6 hours as needed for nausea or vomiting. 30 tablet 2    prochlorperazine (COMPAZINE) 10 MG tablet Take 1 tablet (10 mg) by mouth every 6 hours as needed for nausea or vomiting 30 tablet 2    sodium chloride 0.9% infusion Infuse 250 mLs over 30 minutes into the vein every 28 (twenty-eight) days. zometa concomitant fluids. Given per therapy plan orders 750 mL 2    sodium chloride 0.9% infusion  Infuse 500 mLs into the vein as needed for other (infusion reaction). In case of mild reaction, administer via gravity at 20 mL/hr to keep vein open. In case of severe reaction, administer via gravity wide open on prime setting. 914053 mL 0    sodium chloride, PF, 0.9% PF flush Inject 10 mLs into the vein as needed for line flush. Flush IV before and after med administration as directed and/or at least every 24 hours, or prior to deaccessing for no further use and/or at least every 4 weeks when not accessed. 934864 mL 0    sodium chloride, PF, 0.9% PF flush Inject 10 mLs into the vein as needed for other (infusion reaction). For RN use only as needed for infusion reaction 025996 mL 0    sterile water, preservative free, injection Use 2.2 mLs for reconstitution as needed (with alteplase for catheter occlusion). Direct diluent stream into powder. Mix by gently swirling until dissolved. DO NOT SHAKE. Discard remainder of vial. 697965 mL 0    vitamin D3 (CHOLECALCIFEROL) 50 mcg (2000 units) tablet Take 1 tablet (50 mcg) by mouth daily. 90 tablet 1    zoledronic acid (ZOMETA) 4 MG/100ML infusion Infuse 100 mLs (4 mg) into the vein every 28 days. Infuse via gravity. Given per therapy plan orders 300 mL 2    blood glucose (FREESTYLE TEST STRIPS) test strip 1 strip by In Vitro route 3 times daily. (Patient not taking: Reported on 2/11/2025) 100 strip 2    econazole nitrate 1 % external cream Apply topically daily To affected toenails. (Patient not taking: Reported on 2/11/2025) 85 g 5    lidocaine (LIDODERM) 5 % patch Place 2 patches over 12 hours onto the skin every 24 hours. (Patient not taking: Reported on 2/11/2025) 60 patch 3    lidocaine-prilocaine (EMLA) 2.5-2.5 % external cream Use 1-2 times a week or as needed prior to port access (Patient not taking: Reported on 2/11/2025) 30 g 3    methocarbamol 1000 MG TABS Take 1,000 mg by mouth 4 times daily as needed for muscle spasms. (Patient not taking: Reported on  2/11/2025) 180 tablet 3     Physical Exam:    General: The patient is a pleasant male in no acute distress.  /69   Pulse 78   Temp 100.4  F (38  C) (Oral)   Resp 16   Wt 66.4 kg (146 lb 6.4 oz)   SpO2 97%   BMI 19.86 kg/m    Wt Readings from Last 10 Encounters:   02/11/25 66.4 kg (146 lb 6.4 oz)   01/31/25 65.5 kg (144 lb 6.4 oz)   01/29/25 65.4 kg (144 lb 3.2 oz)   01/21/25 67.1 kg (148 lb)   01/15/25 66 kg (145 lb 9.6 oz)   01/10/25 68.9 kg (152 lb)   01/07/25 68.9 kg (152 lb)   01/03/25 67.5 kg (148 lb 14.4 oz)   12/31/24 65.8 kg (145 lb)   12/18/24 69.7 kg (153 lb 9.6 oz)   HEENT: EOMI. Sclerae are anicteric. Oral mucosa pink and moist without lesions or thrush.   Lymph: Neck is supple with no lymphadenopathy in the cervical or supraclavicular areas.   Heart: Regular rate and rhythm.   Lungs: Clear to auscultation bilaterally.   Abdomen: Bowel sounds present, soft, nontender with no palpable hepatosplenomegaly or masses.   Extremities: No lower extremity edema noted bilaterally.   Neuro: Cranial nerves II through XII are grossly intact.  Skin: No rashes, petechiae, or bruising noted on exposed skin. Back incisions with minimal scabbing, no redness or swelling. Raised nodule proximal to incisions, left of midline has reduced from exam 2 weeks ago. Bruising to back has resolved.         Labs:    Most Recent 3 CBC's:  Recent Labs   Lab Test 01/29/25  0658 01/15/25  1251 01/03/25  0804   WBC 2.2* 4.6 4.1   HGB 8.2* 9.1* 9.8*   MCV 92 90 87    165 190   ANEUTAUTO 1.0* 3.2 2.6    Most Recent 3 BMP's:  Recent Labs   Lab Test 01/29/25  0658 01/15/25  1251 01/10/25  1405 01/03/25  0804    138  --  137   POTASSIUM 3.7 3.6  --  3.9   CHLORIDE 106 106  --  102   CO2 23 23  --  24   BUN 17.9 24.1*  --  19.2   CR 0.52* 0.56*  --  0.54*   ANIONGAP 9 9  --  11   DENISE 8.4* 8.6*  --  8.8   * 129* 112* 148*   PROTTOTAL 5.7* 6.0*  --  6.7   ALBUMIN 3.3* 3.4*  --  3.6    Most Recent 2 LFT's:  Recent  Labs   Lab Test 01/29/25  0658 01/15/25  1251   AST 18 17   ALT 12 14   ALKPHOS 292* 383*   BILITOTAL 0.4 0.4   I reviewed the above labs today.  New labs will be drawn 1/3 prior to chemotherapy.     Impression/plan:     Unresectable Pancreatic Cancer  7/31/23 began palliative treatment on FOLFIRINOX  CT CAP 10/24/23 with progression in skeletal mets, lung nodule and lymph nodes; stable pancreatic mass  - 10/31/2023: Treatment was changed to Gemcitabine and Abraxane (Day 1, 8, 15) + Zometa every 3 months  - 03/2024 CT CAP with progression of L4 lesion  - 8/2024 repeat imaging with isolated progression of L4 lesion, see below   - November 2024 imaging showed disease progression.  - Started 5FU/ liposomal irinotecan on 12/18. Tolerating well.   -Labs reviewed today, ANC 1.0. No infection concerns today. Proceed with cycle 4 as scheduled, will add Neulasta. Reviewed infection precautions and calling triage with any infection symptoms or temperature >100.4.   -Return to clinic in 2 weeks for next cycle with labs + SUZETTE prior. Can proceed with monthly SUZETTE follow up if he continues to tolerate chemo well. Plan to repeat imaging with MD follow up after 4-6 cycles.       Anemia-normocytic  Hgb mildly worse. Likely related to disease progression. Denies bleeding issues. Will continue to monitor.     Skeletal Mets    - Radiation to T10 completed 11/29/23  - CT chest 5/23/24 unchanged sclerotic bone metastases  -imaging 8/2024 with isolated progression in L4, consult to radiation oncology.   -8/28/24 RFA ablation and vertebral augmentation to L4. Completed radiation to L4 in October 2024   -Pain management by palliative care.  -Back pain and mobility greatly improving since verterberoplasty on 1/10.   -starting PT next week to continue his rehab progress.        Pain    Managed by palliative care. Pain continues to improve post-procedure. Weaning off dexamethasone, currently on 2mg daily.   -belbuca 600 mcg films BID with a 3rd  available to use daily prn severe pain not relieved with dilaudid  -dilaudid 4-8 mg po q 3 hours prn and record how often it is needed  -methocarbamol  750-1000mg PO Q6H PRN  -Ativan 0.5 mg tabs prescribed to use sparingly 1-2 tablets q 6 hours prn severe spasm not relieved with methocarbamol  -lidocaine patches  -Tylenol suspension 650 mg po TID    Encouraged him to follow up with palliative for ongoing pain management/medication adjustments     Bone Health  -Zometa 4 mg every 3 months, last infusion 12/4/2024.   -Recheck of calcium on 12/18 low. He remains on vitamin D 50 mcg daily and a multivitamin, started  calcium 500 mg bid. Calcium low today, will increase calcium supplement to 1000mg BID.     Neuropathy  -Secondary to prior chemotherapy. No acute change.    Pericardial Effusion  Found on CT 4/20/24 at [facility], followed by TTE with moderate pericardial effusion without tamponade.  CT 5/23/24 small pericardial effusion  5/23/24: Cardiology consult at [facility]- Echo 6/6/2024 with EF 55-60%, LV strain -20.5% which is normal, no pericardial effusion  - repeat echo at [facility] in the future,   - he has been cautioned by Cardiology s/s of cardiac tamponade  -following cardiology as needed  -no acute concerns today    Pulmonary Embolism  Right lower segmental PE found on routine imaging  Taking Apixiban daily, no bleeding concerns. Has instructions for holding prior to kyphoplasty.     Onychomycosis  -Will request home care to cut his toenails.    Anemia- Fatigue, weakness, heart gallop- blood  Anorexia- Zyprexa?  Stools with pain- sitz baths  Lost taste-    Berta Anglin PA-C  [facility]  [address] [place] [address]  [phone]    *** minutes spent on the date of the encounter doing chart review, review of test results, interpretation of tests, patient visit, and documentation     The longitudinal plan of care for the diagnosis(es)/condition(s) as documented were addressed during  this visit. Due to the added complexity in care, I will continue to support Gallo Hainesier in the subsequent management and with ongoing continuity of care.      Again, thank you for allowing me to participate in the care of your patient.        Sincerely,        Berta Anglin PA-C    Electronically signed

## 2025-02-11 NOTE — PROGRESS NOTES
Oncology/Hematology Visit Note  Feb 11, 2025    Reason for Visit: follow-up of metastatic Pancreatic Cancer, evaluate multiple symptoms    Oncology HPI:   -NGS: KRAS G12R  Immuno: pMMR, KAYLIE, TMB-low  Clinical Trial: MARIPOSA-186, MARIPOSA-280, TORL    - 3/2023: presented with acute pancreatitis  c/b chronic pancreatitis with pancreatic mass causing duodenal stenosis with gastric outlet obstruction s/p GJ tube (placed 5/2023), biliary obstruction s/p stent placement on 7/7/23, pancreatic insufficiency, and IDDM2.  -  He then presented to the ED with worsening abdominal pain, increased jaundice, poor PO intake and weight loss admitted on 7/8/2023 for concern of biliary obstruction.   - 7/12/2023: Underwent biopsy of pancreatic mass returned positive for pancreatic adenocarcinoma.   - 7/31/2023: He started on treatment with 5FU, irinotecan, and oxaliplatin (FOLFIRINOX). Please see previous notes for further details on the patient's history.      8/17/23 - ERCP/EGD, duodenal stents x2 placed, exchange of GJ tube     8/21-8/25/24 hospitalized due to diarrhea, colitis, abdominal pain.       8/29 - Cycle 3 deferred due to neutropenia.   Neulasta added. Irinotecan dose reduced 20% due to symptoms including cramping, double vision and slurred speech during infusion.       10/24/23 CT CAP with similar to slight increase in size of peripancreatic and mesenteric lymph nodes, enlargement of previous skeletal metastasis as well as new sclerotic metastasis to left posterior 5th rib, enlarging pulmonary nodule, and unchanged pancreatic head mass. Also unclear concerns for PE,  right lower lobe segmental PE confirmed on CT-PE. Started on apixaban.      10/31/23 Cycle 1 gemzar, abraxane  11/7/23 Cycle 1 Zometa  11/22/23 Removal of GJ tube.   11/29/23 Completed palliative radiation to T10   12/13/23 C3D1 gem/abraxane  12/29/23 Consults at [place]  1/23-1/26 Consults at [facility]   1/30/24: C4D1 gem/abraxane   3/24: CT CAP with overall stable  disease with isolated progression in potential L4 lesion. Referral for radiation oncology.    4/29-5/1: Consults at [facility] for possible cellular therapy, CT guided biopsy of left pubic bone, recommendation to return to [facility] upon progression of current treatment   5/23/24: Cardiology consult at [facility]. CT with small pericardial effusion   6/6/2024: Repeat ECHO on with EF of 55-60%, no pericardial effusion present  - Cycle 8 deferred due to infection concerns and subsequently tested positive for COVID 6/25/24, resumed treatment on 7/2/2024    -Day 8 eliminated after Cycle 5 [place]/Abraxane.    4/2/2024-present: Gemcitabine/Abraxane, Days 1 and 15 only; Zometa every 3 months     8/2024 repeat imaging with isolated progression to L4 lesion with fracture of that spinous process. Referred to radiation therapy. Decreased Abraxane dose reduced to 75mg/m2 with cycle 9 day 15 for overall treatment tolerance.     8/28/24 RFA ablation and verteral augmentation to L4.   10/1-10/21 Radiation to L4     11/12/24 CT CAP shows disease progression, plan to change to 5FU and liposomal irinotecan after a vacation  12/12/24 ED visit for pain, secondary to cancer, managed with IV pain medication    12/18/24 Cycle 1 5FU and liposomal irinotecan    1/10/25: L2 and L3 RFA and vertebroplasty    Gallo presents today for evaluation of multiple symptoms.    Interval history:   Patient reports that his blood sugars are easier to manage when he is off the steroids.  He has had more difficulty with this last cycle of chemotherapy.  He notes a decline in his appetite over the last week as well as a fast heart rate.  He also has intermittently lost his taste sensation.  He notes some blood when he passes a bowel movement over the last few weeks.  He is having 1-3 small stools per day.  He thinks the bleeding is coming from the skin on his buttock with bedsores.  He has been applying a triple paste twice a day.  He did fall on February 3 due to  leg weakness.  He feels his pain is generally well-controlled with the Butrans at 30 mcg an hour as well as 8 mg of Dilaudid every 3-4 hours.  He continues to feel tired and is taking naps.  He has ongoing swelling in his feet and is considering trying some ace wraps.  He notes that periodically after being out in the cold and coming back inside he will develop a fever and attributes his elevated temperature today to that.  He denies other concerns.    Current Outpatient Medications   Medication Sig Dispense Refill    acetaminophen (TYLENOL) 32 mg/mL liquid Take 20.313 mLs (650 mg) by mouth every 8 hours. (Patient taking differently: Take 650 mg by mouth every 8 hours as needed.) 1800 mL 3    alteplase (CATHFLO ACTIVASE) injection Inject 2 mLs (2 mg) into catheter as needed (catheter occlusion). Reconstitute vial. Draw up alteplase 1 mg/mL in a syringe and instill into IV catheter. Dwell for at least 20 min to 24 hours, then aspirate. May repeat dose once in 24 hours if catheter function not restored after at least 2 hours. Discard remainder of vial. [id number] each 0    apixaban ANTICOAGULANT (ELIQUIS) 5 MG tablet Take 1 tablet (5 mg) by mouth 2 times daily. 60 tablet 2    buprenorphine (BUTRANS) 10 MCG/HR WK patch Place 1 patch over 168 hours onto the skin every 7 days. 4 patch 4    buprenorphine (BUTRANS) 20 MCG/HR WK patch Place 1 patch over 168 hours onto the skin every 7 days. 4 patch 4    calcium carbonate (OS-DENISE) 500 MG tablet Take 2 tablets (1,000 mg) by mouth 2 times daily. 60 tablet 3    Chemo Kit For RN use only. Do not remove items from bag. Contents: 1  sodium chloride 0.9% flush, 4 medium gloves, 1 chemo gown, 1/4 chemo mat, 1 connector female, 1 chemo bag. [id number] kit 0    Continuous Glucose Sensor (DEXCOM G7 SENSOR) MISC Change every 10 days. 6 each 3    dexAMETHasone (DECADRON) 2 MG tablet Take 1 tablet (2 mg) by mouth daily (with breakfast). 10 tablet 0    diphenhydrAMINE (BENADRYL) 50 MG/ML injection  "Inject 1 mL (50 mg) over 3-5 minutes into the vein via push as needed for other (infusion reaction). For RN use only.  Draw up in a syringe and administer IV push.  Discard remainder of vial. 46117 mL 0    Emergency Supply Kit, Central, Patient use for emergency only. Contents: 3 sodium chloride 0.9% flushes, 1 dressing kit, 1 microclave ext set 14\", 4 nitrile gloves (med), 6 alcohol prep pads, 1 bacitracin, 1 syringe (10 cc 20 G 1\"). Call [phone] to reorder. [id number] kit 0    EPINEPHrine (ANY BX GENERIC EQUIV) 0.3 MG/0.3ML injection 2-pack Inject 0.3 mLs (0.3 mg) into the muscle as needed for anaphylaxis (infusion reaction). Administer into the mid-thigh in case of severe anaphylaxis (wheezing, throat tightening, mouth swelling, difficulty breathing). May repeat dose one time in 5-15 minutes if symptoms persist. 869477 mL 0    HYDROmorphone (DILAUDID) 4 MG tablet Take 1-2 tablets (4-8 mg) by mouth every 3 hours as needed for severe pain or breakthrough pain. 180 tablet 0    insulin  UNIT/ML injection 20 units around 9 am (with steroids) 10 units around 9 pm 15 mL 3    insulin pen needle (31G X 8 MM) 31G X 8 MM miscellaneous Use 1 pen needles daily or as directed. 50 each 0    lipase-protease-amylase (CREON 24) 78759-61578-883136 units CPEP per EC capsule 2-3 capsules with meals 3 times a day and 1-2 capsules with snacks max of 15 capsules a day 200 capsule 11    loperamide (IMODIUM) 2 MG capsule 2 caps at 1st sign of diarrhea & 1 cap every 2hrs until 12hrs diarrhea free. During night, 2 caps at bedtime & 2 caps every 4hrs until AM (Patient taking differently: as needed. 2 caps at 1st sign of diarrhea & 1 cap every 2hrs until 12hrs diarrhea free. During night, 2 caps at bedtime & 2 caps every 4hrs until AM) 30 capsule 0    LORazepam (ATIVAN) 0.5 MG tablet Take 1-2 tablets (0.5-1 mg) by mouth every 6 hours as needed for muscle spasms. 45 tablet 2    methocarbamol (ROBAXIN) 500 MG tablet TAKE 2 TABLETS BY " MOUTH 4 TIMES DAILY AS NEEDED FOR MUSCLE SPASMS.*      methylphenidate (RITALIN) 5 MG tablet Take 1 tablet (5 mg) by mouth 2 times daily as needed 10 tablet 0    methylPREDNISolone Na Suc, PF, (SOLU-MEDROL) 125 mg/2 mL injection Inject 2 mLs (125 mg) over 3-5 minutes into the vein via push as needed (infusion reaction). For RN use only.  Reconstitute vial. Draw up methylPREDNISolone in a syringe and administer.  Discard remainder of vial. 487257 mL 0    Multiple Vitamins-Minerals (CENTRUM SILVER 50+MEN) TABS as directed Orally      naloxone (NARCAN) 4 MG/0.1ML nasal spray Spray 4 mg into one nostril alternating nostrils once as needed.      OLANZapine (ZYPREXA) 5 MG tablet Take 1 tablet (5 mg) by mouth at bedtime. 30 tablet 3    pantoprazole (PROTONIX) 40 MG EC tablet Take 1 tablet (40 mg) by mouth 2 times daily 60 tablet 3    pegfilgrastim-jmdb (FULPHILA) 6 MG/0.6ML injection Inject 0.6 mLs (6 mg) subcutaneously every 2 weeks as needed (per [facility] orders on day 4 of cycle). Remove from fridge for at least 30  minutes to allow syringe to reach room temperature. 8 mL 0    Port Access Kit For nurse use only.  Do not remove items from bag.  Use for port access.  Do not place syringe on sterile field. [id number] kit 0    prochlorperazine (COMPAZINE) 10 MG tablet Take 1 tablet (10 mg) by mouth every 6 hours as needed for nausea or vomiting. 30 tablet 2    prochlorperazine (COMPAZINE) 10 MG tablet Take 1 tablet (10 mg) by mouth every 6 hours as needed for nausea or vomiting 30 tablet 2    sodium chloride 0.9% infusion Infuse 250 mLs over 30 minutes into the vein every 28 (twenty-eight) days. zometa concomitant fluids. Given per therapy plan orders 750 mL 2    sodium chloride 0.9% infusion Infuse 500 mLs into the vein as needed for other (infusion reaction). In case of mild reaction, administer via gravity at 20 mL/hr to keep vein open. In case of severe reaction, administer via gravity wide open on prime setting. 393217  mL 0    sodium chloride, PF, 0.9% PF flush Inject 10 mLs into the vein as needed for line flush. Flush IV before and after med administration as directed and/or at least every 24 hours, or prior to deaccessing for no further use and/or at least every 4 weeks when not accessed. 347039 mL 0    sodium chloride, PF, 0.9% PF flush Inject 10 mLs into the vein as needed for other (infusion reaction). For RN use only as needed for infusion reaction [id number] mL 0    sterile water, preservative free, injection Use 2.2 mLs for reconstitution as needed (with alteplase for catheter occlusion). Direct diluent stream into powder. Mix by gently swirling until dissolved. DO NOT SHAKE. Discard remainder of vial. 682843 mL 0    vitamin D3 (CHOLECALCIFEROL) 50 mcg (2000 units) tablet Take 1 tablet (50 mcg) by mouth daily. 90 tablet 1    zoledronic acid (ZOMETA) 4 MG/100ML infusion Infuse 100 mLs (4 mg) into the vein every 28 days. Infuse via gravity. Given per therapy plan orders 300 mL 2    blood glucose (FREESTYLE TEST STRIPS) test strip 1 strip by In Vitro route 3 times daily. (Patient not taking: Reported on 2/11/2025) 100 strip 2    dicyclomine (BENTYL) 10 MG capsule Take 1 capsule (10 mg) by mouth 4 times daily (before meals and nightly). 120 capsule 1    econazole nitrate 1 % external cream Apply topically daily To affected toenails. (Patient not taking: Reported on 2/11/2025) 85 g 5    lidocaine (LIDODERM) 5 % patch Place 2 patches over 12 hours onto the skin every 24 hours. (Patient not taking: Reported on 2/11/2025) 60 patch 3    lidocaine-prilocaine (EMLA) 2.5-2.5 % external cream Use 1-2 times a week or as needed prior to port access (Patient not taking: Reported on 2/11/2025) 30 g 3    methocarbamol 1000 MG TABS Take 1,000 mg by mouth 4 times daily as needed for muscle spasms. (Patient not taking: Reported on 2/11/2025) 180 tablet 3     Physical Exam:    General: The patient is a pleasant slender male in no acute distress.  Here today in a wheelchair. Here today with his wife.   /69   Pulse 78   Temp 100.4  F (38  C) (Oral)   Resp 16   Wt 66.4 kg (146 lb 6.4 oz)   SpO2 97%   BMI 19.86 kg/m    Wt Readings from Last 10 Encounters:   02/11/25 66.4 kg (146 lb 6.4 oz)   01/31/25 65.5 kg (144 lb 6.4 oz)   01/29/25 65.4 kg (144 lb 3.2 oz)   01/21/25 67.1 kg (148 lb)   01/15/25 66 kg (145 lb 9.6 oz)   01/10/25 68.9 kg (152 lb)   01/07/25 68.9 kg (152 lb)   01/03/25 67.5 kg (148 lb 14.4 oz)   12/31/24 65.8 kg (145 lb)   12/18/24 69.7 kg (153 lb 9.6 oz)   HEENT: EOMI. Sclerae are anicteric.  Lymph: Neck is supple with no lymphadenopathy in the cervical or supraclavicular areas.   Heart: Regular rate and rhythm.   Lungs: Clear to auscultation bilaterally.   Abdomen: Bowel sounds present, soft, nontender with no palpable masses.   Extremities: 2+ lower extremity edema noted bilaterally.   Neuro: Cranial nerves II through XII are grossly intact.  Skin: Skin breakdown noted on buttock near rectum, left greater than right. No anal fissures noted. No active bleeding noted. No rashes, petechiae, or bruising noted on exposed skin.     Labs:    Most Recent 3 CBC's:  Recent Labs   Lab Test 02/12/25  1132 02/11/25  1054 01/29/25  0658 01/15/25  1251 01/03/25  0804   WBC 8.8 8.2 2.2* 4.6 4.1   HGB 6.4* 6.9* 8.2* 9.1* 9.8*   MCV 91 91 92 90 87    252 195 165 190   ANEUTAUTO  --   --  1.0* 3.2 2.6    Most Recent 3 BMP's:  Recent Labs   Lab Test 02/11/25  1054 01/29/25  0658 01/15/25  1251   * 138 138   POTASSIUM 3.6 3.7 3.6   CHLORIDE 102 106 106   CO2 22 23 23   BUN 11.4 17.9 24.1*   CR 0.60* 0.52* 0.56*   ANIONGAP 9 9 9   DENISE 7.3*  7.3* 8.4* 8.6*   * 116* 129*   PROTTOTAL 5.1* 5.7* 6.0*   ALBUMIN 2.9* 3.3* 3.4*    Most Recent 2 LFT's:  Recent Labs   Lab Test 02/11/25  1054 01/29/25  0658   AST 21 18   ALT 9 12   ALKPHOS 318* 292*   BILITOTAL 0.4 0.4   I reviewed the above labs today.      Impression/plan:    Anemia  -Normocytic. Hemoglobin is worse today and likely related to his rectal bleeding. The anemia is likely contributing to his tachycardia at home, fatigue, and weakness. Will plan to give 1 unit pRBC's tomorrow, as unable to obtain an outpatient appointment today.     Dehydration  -Will give 1L IV NS today. He will try to push fluids orally.    Hgb mildly worse. Likely related to disease progression. Denies bleeding issues. Will continue to monitor.     Unresectable Pancreatic Cancer  7/31/23 began palliative treatment on FOLFIRINOX  CT CAP 10/24/23 with progression in skeletal mets, lung nodule and lymph nodes; stable pancreatic mass  - 10/31/2023: Treatment was changed to Gemcitabine and Abraxane (Day 1, 8, 15) + Zometa every 3 months  - 03/2024 CT CAP with progression of L4 lesion  - 8/2024 repeat imaging with isolated progression of L4 lesion, see below   - November 2024 imaging showed disease progression.  - Started 5FU/ liposomal irinotecan on 12/18. Tolerating well.   -Labs reviewed today, ANC 1.0. No infection concerns today. Proceed with cycle 4 as scheduled, will add Neulasta. Reviewed infection precautions and calling triage with any infection symptoms or temperature >100.4.   -Return to clinic in 2 weeks for next cycle with labs + SUZETTE prior. Can proceed with monthly SUZETTE follow up if he continues to tolerate chemo well. Plan to repeat imaging with MD follow up after 4-6 cycles.         Skeletal Mets    - Radiation to T10 completed 11/29/23  - CT chest 5/23/24 unchanged sclerotic bone metastases  -imaging 8/2024 with isolated progression in L4, consult to radiation oncology.   -8/28/24 RFA ablation and vertebral augmentation to L4. Completed radiation to L4 in October 2024   -Pain management by palliative care.  -Back pain and mobility greatly improving since verterberoplasty on 1/10.   -starting PT next week to continue his rehab progress.        Pain    Managed by palliative care. Pain continues  to improve post-procedure. Weaning off dexamethasone, currently on 2mg daily.   -belbuca 600 mcg films BID with a 3rd available to use daily prn severe pain not relieved with dilaudid  -dilaudid 4-8 mg po q 3 hours prn and record how often it is needed  -methocarbamol  750-1000mg PO Q6H PRN  -Ativan 0.5 mg tabs prescribed to use sparingly 1-2 tablets q 6 hours prn severe spasm not relieved with methocarbamol  -lidocaine patches  -Tylenol suspension 650 mg po TID    Encouraged him to follow up with palliative for ongoing pain management/medication adjustments     Bone Health  -Zometa 4 mg every 3 months, last infusion 12/4/2024.   -Recheck of calcium on 12/18 low. He remains on vitamin D 50 mcg daily and a multivitamin, started  calcium 500 mg bid. Calcium low today, will increase calcium supplement to 1000mg BID.     Neuropathy  -Secondary to prior chemotherapy. No acute change.    Pericardial Effusion  Found on CT 4/20/24 at [facility], followed by TTE with moderate pericardial effusion without tamponade.  CT 5/23/24 small pericardial effusion  5/23/24: Cardiology consult at [facility]- Echo 6/6/2024 with EF 55-60%, LV strain -20.5% which is normal, no pericardial effusion  - repeat echo at [facility] in the future,   - he has been cautioned by Cardiology s/s of cardiac tamponade  -following cardiology as needed  -no acute concerns today    Pulmonary Embolism  Right lower segmental PE found on routine imaging  Taking Apixiban daily, no bleeding concerns. Has instructions for holding prior to kyphoplasty.     Onychomycosis  -Will request home care to cut his toenails.    Anemia- Fatigue, weakness, heart gallop- blood  Anorexia- Zyprexa?  Stools with pain- sitz baths  Lost taste-    Berta Anglin PA-C  [facility]  [address], [place] [id number] [phone]    *** minutes spent on the date of the encounter doing chart review, review of test results, interpretation of tests, patient visit, and  documentation     The longitudinal plan of care for the diagnosis(es)/condition(s) as documented were addressed during this visit. Due to the added complexity in care, I will continue to support Gallo Navarro in the subsequent management and with ongoing continuity of care.    Addendum: Given ongoing bleeding, we rechecked his hemoglobin, which has declined further. Will hold Eliquis for now.

## 2025-02-11 NOTE — TELEPHONE ENCOUNTER
Forms/Letter Request    Type of form/letter: Home Health Certification      Do we have the form/letter: Yes: [facility], Physician Verbal Order, Order Number: [id number], Order Date: 02/04/2025, PT Evaluation Order#: [id number] Order Date: 02/05/2025    Who is the form from? Home care    Where did/will the form come from? form was faxed in    When is form/letter needed by:     How would you like the form/letter returned: Fax : [phone]      Forms/Letter Request     Do we have the form/letter: Yes: Will place form on providers desk for review/signature

## 2025-02-11 NOTE — NURSING NOTE
Oncology Rooming Note    February 11, 2025 11:12 AM   Gallo Navarro is a 59 year old male who presents for:    Chief Complaint   Patient presents with    Blood Draw     Port blood draw by lab RN    Oncology Clinic Visit     Malignant neoplasm of head of pancreas     Initial Vitals: /69   Pulse 78   Temp 100.4  F (38  C) (Oral)   Resp 16   Wt 66.4 kg (146 lb 6.4 oz)   SpO2 97%   BMI 19.86 kg/m   Estimated body mass index is 19.86 kg/m  as calculated from the following:    Height as of 1/21/25: 1.829 m (6').    Weight as of this encounter: 66.4 kg (146 lb 6.4 oz). Body surface area is 1.84 meters squared.  No Pain (0) Comment: Data Unavailable   No LMP for male patient.  Allergies reviewed: Yes  Medications reviewed: Yes    Medications: Medication refills not needed today.  Pharmacy name entered into EPIC:    [facility] - [place], [place] - [address] [facility] [place], [place] [phone]  [facility] [place] [facility] AT PORTAL TO REGISTERED [facility] SITES  [facility] - [place], [place] - [address] [place] [id number] [facility] [employer] - [place], [place] - [address] [facility] [address]    Frailty Screening:   Is the patient here for a new oncology consult visit in cancer care? 2. No      Clinical concerns: Patient states no new concerns to discuss with provider.       Yuly Warren, EMT

## 2025-02-11 NOTE — NURSING NOTE
1L NS given per Berta Anglin verbal order with read back. Patient tolerated well. Upon completion of fluids, patient requested to use the restroom. Required an assist of 2 and bedside commode. Small amount of blood in patients stool, per patient and patients wife, Violet, this is his baseline. Per their request, Berta Anglin and BONITA Christine notified as an FYI. Patient discharged in stable condition.

## 2025-02-12 ENCOUNTER — HOME INFUSION BILLING (OUTPATIENT)
Dept: HOME HEALTH SERVICES | Facility: HOME HEALTH | Age: 60
End: 2025-02-12
Payer: COMMERCIAL

## 2025-02-12 ENCOUNTER — INFUSION THERAPY VISIT (OUTPATIENT)
Dept: ONCOLOGY | Facility: CLINIC | Age: 60
End: 2025-02-12
Attending: STUDENT IN AN ORGANIZED HEALTH CARE EDUCATION/TRAINING PROGRAM
Payer: COMMERCIAL

## 2025-02-12 ENCOUNTER — MYC REFILL (OUTPATIENT)
Dept: ONCOLOGY | Facility: CLINIC | Age: 60
End: 2025-02-12

## 2025-02-12 ENCOUNTER — PATIENT OUTREACH (OUTPATIENT)
Dept: ONCOLOGY | Facility: CLINIC | Age: 60
End: 2025-02-12

## 2025-02-12 VITALS
SYSTOLIC BLOOD PRESSURE: 94 MMHG | OXYGEN SATURATION: 95 % | HEART RATE: 95 BPM | TEMPERATURE: 98.7 F | DIASTOLIC BLOOD PRESSURE: 61 MMHG | RESPIRATION RATE: 16 BRPM

## 2025-02-12 DIAGNOSIS — C25.0 MALIGNANT NEOPLASM OF HEAD OF PANCREAS (H): Primary | ICD-10-CM

## 2025-02-12 DIAGNOSIS — C79.51 MALIGNANT NEOPLASM METASTATIC TO BONE (H): ICD-10-CM

## 2025-02-12 DIAGNOSIS — D64.9 ANEMIA, UNSPECIFIED TYPE: ICD-10-CM

## 2025-02-12 DIAGNOSIS — R10.30 LOWER ABDOMINAL PAIN: ICD-10-CM

## 2025-02-12 LAB
BASOPHILS # BLD MANUAL: 0 10E3/UL (ref 0–0.2)
BASOPHILS NFR BLD MANUAL: 0 %
DACRYOCYTES BLD QL SMEAR: SLIGHT
EOSINOPHIL # BLD MANUAL: 0.1 10E3/UL (ref 0–0.7)
EOSINOPHIL NFR BLD MANUAL: 1 %
ERYTHROCYTE [DISTWIDTH] IN BLOOD BY AUTOMATED COUNT: 19.2 % (ref 10–15)
FRAGMENTS BLD QL SMEAR: SLIGHT
HCT VFR BLD AUTO: 20.5 % (ref 40–53)
HGB BLD-MCNC: 6.4 G/DL (ref 13.3–17.7)
LYMPHOCYTES # BLD MANUAL: 0.4 10E3/UL (ref 0.8–5.3)
LYMPHOCYTES NFR BLD MANUAL: 4 %
MCH RBC QN AUTO: 28.4 PG (ref 26.5–33)
MCHC RBC AUTO-ENTMCNC: 31.2 G/DL (ref 31.5–36.5)
MCV RBC AUTO: 91 FL (ref 78–100)
METAMYELOCYTES # BLD MANUAL: 0.2 10E3/UL
METAMYELOCYTES NFR BLD MANUAL: 3 %
MONOCYTES # BLD MANUAL: 0.4 10E3/UL (ref 0–1.3)
MONOCYTES NFR BLD MANUAL: 5 %
MYELOCYTES # BLD MANUAL: 0.1 10E3/UL
MYELOCYTES NFR BLD MANUAL: 2 %
NEUTROPHILS # BLD MANUAL: 7.5 10E3/UL (ref 1.6–8.3)
NEUTROPHILS NFR BLD MANUAL: 85 %
PLAT MORPH BLD: ABNORMAL
PLATELET # BLD AUTO: 245 10E3/UL (ref 150–450)
POLYCHROMASIA BLD QL SMEAR: SLIGHT
RBC # BLD AUTO: 2.25 10E6/UL (ref 4.4–5.9)
RBC MORPH BLD: ABNORMAL
WBC # BLD AUTO: 8.8 10E3/UL (ref 4–11)

## 2025-02-12 PROCEDURE — 85007 BL SMEAR W/DIFF WBC COUNT: CPT | Performed by: PHYSICIAN ASSISTANT

## 2025-02-12 PROCEDURE — 36591 DRAW BLOOD OFF VENOUS DEVICE: CPT | Performed by: PHYSICIAN ASSISTANT

## 2025-02-12 PROCEDURE — P9016 RBC LEUKOCYTES REDUCED: HCPCS | Performed by: PHYSICIAN ASSISTANT

## 2025-02-12 PROCEDURE — 250N000011 HC RX IP 250 OP 636: Performed by: PHYSICIAN ASSISTANT

## 2025-02-12 PROCEDURE — 85027 COMPLETE CBC AUTOMATED: CPT | Performed by: PHYSICIAN ASSISTANT

## 2025-02-12 RX ORDER — HEPARIN SODIUM (PORCINE) LOCK FLUSH IV SOLN 100 UNIT/ML 100 UNIT/ML
5 SOLUTION INTRAVENOUS EVERY 8 HOURS
Status: DISCONTINUED | OUTPATIENT
Start: 2025-02-12 | End: 2025-02-12 | Stop reason: HOSPADM

## 2025-02-12 RX ADMIN — HEPARIN 5 ML: 100 SYRINGE at 13:35

## 2025-02-12 NOTE — PROGRESS NOTES
Infusion Nursing Note:  Gallo Navarro presents today for 1 unit RBCs.    Patient seen by provider today: No   present during visit today: Not Applicable.    Note: Patient presents to the infusion center via w/c with his wife. He was able to stand and shuffle to the cart without assistance. States he is feeling better than yesterday. No pain, fevers, chills, or chest pain. Ongoing sob with exertion.     TIARRA Anglin PA-C/Jazz Carpio RN 2/12/25 1206  -Let's wait to give Zometa until next week (when he comes back for chemo)  -Give blood today  -hold Eliquis d/t having ongoing rectal bleeding and losing significant blood   -recheck a CBC on 2/14; through homecare is Nanci hernandez to set up for early Friday morning in case he needs blood over the wknd    Intravenous Access:  Implanted Port.    Treatment Conditions:   Latest Reference Range & Units 02/12/25 11:32   WBC 4.0 - 11.0 10e3/uL 8.8   Hemoglobin 13.3 - 17.7 g/dL 6.4 (LL)   Hematocrit 40.0 - 53.0 % 20.5 (L)   Platelet Count 150 - 450 10e3/uL 245   RBC Count 4.40 - 5.90 10e6/uL 2.25 (L)   MCV 78 - 100 fL 91   MCH 26.5 - 33.0 pg 28.4   MCHC 31.5 - 36.5 g/dL 31.2 (L)   RDW 10.0 - 15.0 % 19.2 (H)   % Neutrophils % 85   % Lymphocytes % 4   % Monocytes % 5   % Eosinophils % 1   % Basophils % 0   % Metamyelocytes % 3   % Myelocytes % 2   Absolute Basophils 0.0 - 0.2 10e3/uL 0.0   Absolute Neutrophil 1.6 - 8.3 10e3/uL 7.5   Absolute Lymphocytes 0.8 - 5.3 10e3/uL 0.4 (L)   Absolute Monocytes 0.0 - 1.3 10e3/uL 0.4   Absolute Eosinophils 0.0 - 0.7 10e3/uL 0.1   Absolute Metamyelocytes <=0.0 10e3/uL 0.2 (H)   Absolute Myelocytes <=0.0 10e3/uL 0.1 (H)     Blood transfusion consent signed 2/11/25.    Results reviewed, labs MET treatment parameters, ok to proceed with treatment.    Post Infusion Assessment:  Patient tolerated infusion without incident.  Blood return noted pre and post infusion.  No evidence of extravasations.  Access discontinued per  protocol.     Discharge Plan:   Patient declined prescription refills.  Discharge instructions reviewed with: Patient and Family.  Patient and/or family verbalized understanding of discharge instructions and all questions answered.  AVS to patient via [employer].  Patient will return 2/14 for next appointment.   Patient discharged in stable condition accompanied by: wife.  Departure Mode: Wheelchair.      Jazz Carpio RN

## 2025-02-12 NOTE — PATIENT INSTRUCTIONS
After Your Blood Transfusion  Discharge Instructions after you leave  After you have a blood transfusion,* watch for signs of a transfusion reaction for the next 48 hours.   Signs to watch for:  Shaking or chills  Fever above 100.4 F  Headache  Nausea (feeling sich to your stomach)  Hives  Itching  Swelling of the face or feeling flushed  Ongoing dry cough (nothing is coughed up)  Trouble breathing, or wheezing      Some signs of a reaction won't show up for a few days or up to 4 weeks.   These may include:  Fatigue (feeling very tired)  Dizziness  Pink or red urine    *A blood transfusion is when you receive red blood cells, platelets, plasma or cryoprecipitate.

## 2025-02-12 NOTE — TELEPHONE ENCOUNTER
[facility]: Cancer Care                                                                                          Pt received 1u PRBC in clinic today, hgb today was 6.4 Berta GONZALEZ ordered a CBC diff/platelets recheck on 2/14, pt would like home care nurse to draw.    Called [facility] and reached , lmcb if nursing can draw CBC and type/screen early Friday morning so if another transfusion is needed, this can be arranged for the weekend. Asked home care to call back and confirm that they can bring blood sample to a [facility] lab. Evoleen sent to patient asking for a direct number to home care if he has one.    Signature:  Efra Lynn RN

## 2025-02-13 RX ORDER — DICYCLOMINE HYDROCHLORIDE 10 MG/1
10 CAPSULE ORAL
Qty: 120 CAPSULE | Refills: 1 | Status: SHIPPED | OUTPATIENT
Start: 2025-02-13

## 2025-02-13 NOTE — TELEPHONE ENCOUNTER
Pending Prescriptions:                       Disp   Refills    dicyclomine (BENTYL) 10 MG capsule        120 ca*1            Sig: Take 1 capsule (10 mg) by mouth 4 times daily           (before meals and nightly).    Last prescribing provider: Berta Anglin    Last clinic visit date: 02/11/25 w/Breta Anglin    Recommendations for requested medication (if none, N/A): N/A    Any other pertinent information (if none, N/A): N/A    Refilled: Y/N, if NO, why?

## 2025-02-14 ENCOUNTER — LAB (OUTPATIENT)
Dept: LAB | Facility: HOSPITAL | Age: 60
End: 2025-02-14
Payer: COMMERCIAL

## 2025-02-14 PROCEDURE — 86901 BLOOD TYPING SEROLOGIC RH(D): CPT | Performed by: STUDENT IN AN ORGANIZED HEALTH CARE EDUCATION/TRAINING PROGRAM

## 2025-02-16 ENCOUNTER — MYC REFILL (OUTPATIENT)
Dept: RADIATION ONCOLOGY | Facility: HOSPITAL | Age: 60
End: 2025-02-16
Payer: COMMERCIAL

## 2025-02-16 DIAGNOSIS — C25.9 PANCREATIC ADENOCARCINOMA (H): ICD-10-CM

## 2025-02-17 LAB
ABO + RH BLD: NORMAL
BLD GP AB SCN SERPL QL: NEGATIVE
SPECIMEN EXP DATE BLD: NORMAL

## 2025-02-17 RX ORDER — HYDROMORPHONE HYDROCHLORIDE 4 MG/1
4-8 TABLET ORAL
Qty: 180 TABLET | Refills: 0 | Status: SHIPPED | OUTPATIENT
Start: 2025-02-17

## 2025-02-17 NOTE — TELEPHONE ENCOUNTER
Received [employer] message from patient requesting refill of hydromorphone.     Last refill: 1/22/25  Last office visit: 1/21/25  Scheduled for follow up pending, msg sent to scheduling.     Will route request to MD/ for review.     Reviewed [place]  Report.

## 2025-02-18 ENCOUNTER — PATIENT OUTREACH (OUTPATIENT)
Dept: CARE COORDINATION | Facility: CLINIC | Age: 60
End: 2025-02-18

## 2025-02-18 ENCOUNTER — NURSE TRIAGE (OUTPATIENT)
Dept: ONCOLOGY | Facility: CLINIC | Age: 60
End: 2025-02-18

## 2025-02-18 ENCOUNTER — ANCILLARY PROCEDURE (OUTPATIENT)
Dept: CT IMAGING | Facility: CLINIC | Age: 60
End: 2025-02-18
Attending: STUDENT IN AN ORGANIZED HEALTH CARE EDUCATION/TRAINING PROGRAM
Payer: COMMERCIAL

## 2025-02-18 DIAGNOSIS — C25.0 MALIGNANT NEOPLASM OF HEAD OF PANCREAS (H): ICD-10-CM

## 2025-02-18 DIAGNOSIS — C25.9 PANCREATIC ADENOCARCINOMA (H): ICD-10-CM

## 2025-02-18 LAB
ALBUMIN SERPL BCG-MCNC: 2.5 G/DL (ref 3.5–5.2)
ALP SERPL-CCNC: 577 U/L (ref 40–150)
ALT SERPL W P-5'-P-CCNC: 17 U/L (ref 0–70)
ANION GAP SERPL CALCULATED.3IONS-SCNC: 7 MMOL/L (ref 7–15)
AST SERPL W P-5'-P-CCNC: 29 U/L (ref 0–45)
BASOPHILS # BLD MANUAL: 0 10E3/UL (ref 0–0.2)
BASOPHILS NFR BLD MANUAL: 0 %
BILIRUB SERPL-MCNC: 0.5 MG/DL
BLD PROD TYP BPU: NORMAL
BLD PROD TYP BPU: NORMAL
BLOOD COMPONENT TYPE: NORMAL
BLOOD COMPONENT TYPE: NORMAL
BUN SERPL-MCNC: 9.2 MG/DL (ref 6–20)
CALCIUM SERPL-MCNC: 7.6 MG/DL (ref 8.8–10.4)
CHLORIDE SERPL-SCNC: 105 MMOL/L (ref 98–107)
CODING SYSTEM: NORMAL
CODING SYSTEM: NORMAL
CREAT SERPL-MCNC: 0.47 MG/DL (ref 0.67–1.17)
CROSSMATCH: NORMAL
CROSSMATCH: NORMAL
DACRYOCYTES BLD QL SMEAR: SLIGHT
EGFRCR SERPLBLD CKD-EPI 2021: >90 ML/MIN/1.73M2
ELLIPTOCYTES BLD QL SMEAR: SLIGHT
EOSINOPHIL # BLD MANUAL: 0.3 10E3/UL (ref 0–0.7)
EOSINOPHIL NFR BLD MANUAL: 3 %
ERYTHROCYTE [DISTWIDTH] IN BLOOD BY AUTOMATED COUNT: 19.7 % (ref 10–15)
GLUCOSE SERPL-MCNC: 144 MG/DL (ref 70–99)
HCO3 SERPL-SCNC: 26 MMOL/L (ref 22–29)
HCT VFR BLD AUTO: 24.7 % (ref 40–53)
HGB BLD-MCNC: 7.5 G/DL (ref 13.3–17.7)
LYMPHOCYTES # BLD MANUAL: 1.8 10E3/UL (ref 0.8–5.3)
LYMPHOCYTES NFR BLD MANUAL: 16 %
MCH RBC QN AUTO: 28.2 PG (ref 26.5–33)
MCHC RBC AUTO-ENTMCNC: 30.4 G/DL (ref 31.5–36.5)
MCV RBC AUTO: 93 FL (ref 78–100)
METAMYELOCYTES # BLD MANUAL: 0.2 10E3/UL
METAMYELOCYTES NFR BLD MANUAL: 2 %
MONOCYTES # BLD MANUAL: 1.1 10E3/UL (ref 0–1.3)
MONOCYTES NFR BLD MANUAL: 10 %
MYELOCYTES # BLD MANUAL: 0.2 10E3/UL
MYELOCYTES NFR BLD MANUAL: 2 %
NEUTROPHILS # BLD MANUAL: 7.4 10E3/UL (ref 1.6–8.3)
NEUTROPHILS NFR BLD MANUAL: 67 %
NRBC # BLD AUTO: 0.1 10E3/UL
NRBC BLD MANUAL-RTO: 1 %
PLAT MORPH BLD: ABNORMAL
PLATELET # BLD AUTO: 262 10E3/UL (ref 150–450)
POLYCHROMASIA BLD QL SMEAR: SLIGHT
POTASSIUM SERPL-SCNC: 3.2 MMOL/L (ref 3.4–5.3)
PROT SERPL-MCNC: 4.9 G/DL (ref 6.4–8.3)
RBC # BLD AUTO: 2.66 10E6/UL (ref 4.4–5.9)
RBC MORPH BLD: ABNORMAL
SODIUM SERPL-SCNC: 138 MMOL/L (ref 135–145)
UNIT ABO/RH: NORMAL
UNIT ABO/RH: NORMAL
UNIT NUMBER: NORMAL
UNIT NUMBER: NORMAL
UNIT STATUS: NORMAL
UNIT STATUS: NORMAL
UNIT TYPE ISBT: 7300
UNIT TYPE ISBT: 8400
WBC # BLD AUTO: 11.1 10E3/UL (ref 4–11)

## 2025-02-18 PROCEDURE — 86900 BLOOD TYPING SEROLOGIC ABO: CPT

## 2025-02-18 PROCEDURE — 85007 BL SMEAR W/DIFF WBC COUNT: CPT

## 2025-02-18 PROCEDURE — 86901 BLOOD TYPING SEROLOGIC RH(D): CPT

## 2025-02-18 PROCEDURE — 86850 RBC ANTIBODY SCREEN: CPT

## 2025-02-18 PROCEDURE — 74177 CT ABD & PELVIS W/CONTRAST: CPT | Mod: GC | Performed by: RADIOLOGY

## 2025-02-18 PROCEDURE — 85027 COMPLETE CBC AUTOMATED: CPT

## 2025-02-18 PROCEDURE — 71260 CT THORAX DX C+: CPT | Mod: GC | Performed by: RADIOLOGY

## 2025-02-18 RX ORDER — DIPHENHYDRAMINE HYDROCHLORIDE 50 MG/ML
50 INJECTION INTRAMUSCULAR; INTRAVENOUS
Start: 2025-02-18

## 2025-02-18 RX ORDER — EPINEPHRINE 1 MG/ML
0.3 INJECTION, SOLUTION, CONCENTRATE INTRAVENOUS EVERY 5 MIN PRN
OUTPATIENT
Start: 2025-02-18

## 2025-02-18 RX ORDER — HEPARIN SODIUM,PORCINE 10 UNIT/ML
5-20 VIAL (ML) INTRAVENOUS DAILY PRN
OUTPATIENT
Start: 2025-02-18

## 2025-02-18 RX ORDER — SODIUM CHLORIDE 9 MG/ML
INJECTION, SOLUTION INTRAVENOUS ONCE
Status: COMPLETED | OUTPATIENT
Start: 2025-02-18 | End: 2025-02-18

## 2025-02-18 RX ORDER — HEPARIN SODIUM (PORCINE) LOCK FLUSH IV SOLN 100 UNIT/ML 100 UNIT/ML
5 SOLUTION INTRAVENOUS
OUTPATIENT
Start: 2025-02-18

## 2025-02-18 RX ORDER — IOPAMIDOL 755 MG/ML
80 INJECTION, SOLUTION INTRAVASCULAR ONCE
Status: COMPLETED | OUTPATIENT
Start: 2025-02-18 | End: 2025-02-18

## 2025-02-18 RX ADMIN — SODIUM CHLORIDE: 9 INJECTION, SOLUTION INTRAVENOUS at 11:17

## 2025-02-18 RX ADMIN — IOPAMIDOL 80 ML: 755 INJECTION, SOLUTION INTRAVASCULAR at 11:24

## 2025-02-18 NOTE — PROGRESS NOTES
Social Work - Follow-Up  [facility]    Data/Intervention:    Patient Name: Gallo Navarro Goes By: Gallo CURRIE/Age: 1965 (59 year old)    Reason for Follow-Up:  cancer support/resources      Collaborated With:    Pt    Intervention/Education/Resources Provided:  SW followed up with pt regarding previous cancer support/resources given to pt.  Pt has those resources and does not have any questions about them.  Pt now has [facility] RN, OT, and PT, which pt reports helps a lot.  SW inquired regarding additional needs and pt denies any at this time.     Assessment/Plan:  Pt is doing well as far as cancer support and resources,  Pt will reach out to SW should new needs arise.    Previously provided patient/family with writer's contact information and availability.    Tio Marshall, Casual , for Joan Singh,   [facility]  [phone]

## 2025-02-18 NOTE — TELEPHONE ENCOUNTER
DATE/TIME OF CALL RECEIVED FROM LAB:  02/18/25 at 11:58 AM     LAB TEST & LAB VALUE:  Critical Hgb 7.5  Other Plts 262    Previous Labs from Feb 14th 2025     PROVIDER NOTIFIED?: Yes    PROVIDER NAME: Berta Anglin PA-C    TIME LAB VALUE REPORTED TO PROVIDER:   [id number]    MECHANISM OF PROVIDER NOTIFICATION:  secure chat    PROVIDER RESPONSE:   [id number] Berta Anglin PA-C acknowledge, continue current plan for pt/provider visit scheduled for tomorrow 2/19/2025 with Berta, who will discuss lab results with pt at visit.

## 2025-02-18 NOTE — PROGRESS NOTES
Oncology/Hematology Visit Note  Feb 19, 2025    Reason for Visit: follow-up of metastatic Pancreatic Cancer, follow-up of symptoms    Oncology HPI:   -NGS: KRAS G12R  Immuno: pMMR, KAYLIE, TMB-low  Clinical Trial: MARIPOSA-186, MARIPOSA-280, TORL    - 3/2023: presented with acute pancreatitis  c/b chronic pancreatitis with pancreatic mass causing duodenal stenosis with gastric outlet obstruction s/p GJ tube (placed 5/2023), biliary obstruction s/p stent placement on 7/7/23, pancreatic insufficiency, and IDDM2.  -  He then presented to the ED with worsening abdominal pain, increased jaundice, poor PO intake and weight loss admitted on 7/8/2023 for concern of biliary obstruction.   - 7/12/2023: Underwent biopsy of pancreatic mass returned positive for pancreatic adenocarcinoma.   - 7/31/2023: He started on treatment with 5FU, irinotecan, and oxaliplatin (FOLFIRINOX). Please see previous notes for further details on the patient's history.      8/17/23 - ERCP/EGD, duodenal stents x2 placed, exchange of GJ tube     8/21-8/25/24 hospitalized due to diarrhea, colitis, abdominal pain.       8/29 - Cycle 3 deferred due to neutropenia.   Neulasta added. Irinotecan dose reduced 20% due to symptoms including cramping, double vision and slurred speech during infusion.       10/24/23 CT CAP with similar to slight increase in size of peripancreatic and mesenteric lymph nodes, enlargement of previous skeletal metastasis as well as new sclerotic metastasis to left posterior 5th rib, enlarging pulmonary nodule, and unchanged pancreatic head mass. Also unclear concerns for PE,  right lower lobe segmental PE confirmed on CT-PE. Started on apixaban.      10/31/23 Cycle 1 gemzar, abraxane  11/7/23 Cycle 1 Zometa  11/22/23 Removal of GJ tube.   11/29/23 Completed palliative radiation to T10   12/13/23 C3D1 gem/abraxane  12/29/23 Consults at [facility]  1/23-1/26 Consults at [facility]   1/30/24: C4D1 gem/abraxane   3/24: CT CAP with overall stable disease  with isolated progression in potential L4 lesion. Referral for radiation oncology.    4/29-5/1: Consults at [facility] for possible cellular therapy, CT guided biopsy of left pubic bone, recommendation to return to [facility] upon progression of current treatment   5/23/24: Cardiology consult at [facility]. CT with small pericardial effusion   6/6/2024: Repeat ECHO on with EF of 55-60%, no pericardial effusion present  - Cycle 8 deferred due to infection concerns and subsequently tested positive for COVID 6/25/24, resumed treatment on 7/2/2024    -Day 8 eliminated after Cycle 5 [place]/Abraxane.    4/2/2024-present: Gemcitabine/Abraxane, Days 1 and 15 only; Zometa every 3 months     8/2024 repeat imaging with isolated progression to L4 lesion with fracture of that spinous process. Referred to radiation therapy. Decreased Abraxane dose reduced to 75mg/m2 with cycle 9 day 15 for overall treatment tolerance.     8/28/24 RFA ablation and verteral augmentation to L4.   10/1-10/21 Radiation to L4     11/12/24 CT CAP shows disease progression, plan to change to 5FU and liposomal irinotecan after a vacation  12/12/24 ED visit for pain, secondary to cancer, managed with IV pain medication    12/18/24 Cycle 1 5FU and liposomal irinotecan    1/10/25: L2 and L3 RFA and vertebroplasty    2/11/24: Cycle 4 5FU and liposomal irinotecan held due to rectal bleeding with anemia and fatigue with weakness.    Gallo presents today for routine follow-up prior to consideration of delayed cycle 4 5FU and liposomal irinotecan.     Interval history:   Patient feels he is doing a bit better since last week with some increase in his mobility.  He is working with physical and Occupational Therapy.  He notes no further issues with bleeding over the last week and had a normal bowel movement today.  He has remained off of the Eliquis.  He notes the sores on his bottom are similar to previous.  He has some dyspnea on exertion which he attributes to  inactivity.  He reports eating a bit better though still not great.  He also has been trying to push fluids.  He feels his legs are a little less swollen and he has been trying to elevate them.  He also has had his legs wrapped.  He reports improvement in his pain since his surgery.  He has had some intermittent pain under his left ribs over the last month.  He did not yet try the Zyprexa since his appetite was doing a bit better.  His mood has also been doing a bit better.  Of note, 22 year old son had absence seizures while snow boarding recently and was seen in the ED. He is doing okay, but this was an added stressor for the family. He denies other concerns.    Current Outpatient Medications   Medication Sig Dispense Refill    acetaminophen (TYLENOL) 32 mg/mL liquid Take 20.313 mLs (650 mg) by mouth every 8 hours. (Patient taking differently: Take 650 mg by mouth every 8 hours as needed.) 1800 mL 3    alteplase (CATHFLO ACTIVASE) injection Inject 2 mLs (2 mg) into catheter as needed (catheter occlusion). Reconstitute vial. Draw up alteplase 1 mg/mL in a syringe and instill into IV catheter. Dwell for at least 20 min to 24 hours, then aspirate. May repeat dose once in 24 hours if catheter function not restored after at least 2 hours. Discard remainder of vial. [id number] each 0    apixaban ANTICOAGULANT (ELIQUIS) 5 MG tablet Take 1 tablet (5 mg) by mouth 2 times daily. 60 tablet 2    blood glucose (FREESTYLE TEST STRIPS) test strip 1 strip by In Vitro route 3 times daily. (Patient not taking: Reported on 2/11/2025) 100 strip 2    buprenorphine (BUTRANS) 10 MCG/HR WK patch Place 1 patch over 168 hours onto the skin every 7 days. 4 patch 4    buprenorphine (BUTRANS) 20 MCG/HR WK patch Place 1 patch over 168 hours onto the skin every 7 days. 4 patch 4    calcium carbonate (OS-DENISE) 500 MG tablet Take 2 tablets (1,000 mg) by mouth 2 times daily. 60 tablet 3    Chemo Kit For RN use only. Do not remove items from bag.  Contents: 1  sodium chloride 0.9% flush, 4 medium gloves, 1 chemo gown, 1/4 chemo mat, 1 connector female, 1 chemo bag. [id number] kit 0    Continuous Glucose Sensor (DEXCOM G7 SENSOR) MISC Change every 10 days. 6 each 3    dexAMETHasone (DECADRON) 2 MG tablet Take 1 tablet (2 mg) by mouth daily (with breakfast). 10 tablet 0    dicyclomine (BENTYL) 10 MG capsule Take 1 capsule (10 mg) by mouth 4 times daily (before meals and nightly). 120 capsule 1    diphenhydrAMINE (BENADRYL) 50 MG/ML injection Inject 1 mL (50 mg) over 3-5 minutes into the vein via push as needed for other (infusion reaction). For RN use only.  Draw up in a syringe and administer IV push.  Discard remainder of vial. 48448 mL 0    econazole nitrate 1 % external cream Apply topically daily To affected toenails. (Patient not taking: Reported on 2/11/2025) 85 g 5    EPINEPHrine (ANY BX GENERIC EQUIV) 0.3 MG/0.3ML injection 2-pack Inject 0.3 mLs (0.3 mg) into the muscle as needed for anaphylaxis (infusion reaction). Administer into the mid-thigh in case of severe anaphylaxis (wheezing, throat tightening, mouth swelling, difficulty breathing). May repeat dose one time in 5-15 minutes if symptoms persist. 098242 mL 0    Fluorouracil (ADRUCIL) 4,585 mg in sodium chloride 0.9 % 241 mL via HOMEPUMP C-Series Infuse 4,585 mg at 5.2 mL/hr over 46 hours into the vein once for 1 dose. 718782 mL 0    HYDROmorphone (DILAUDID) 4 MG tablet Take 1-2 tablets (4-8 mg) by mouth every 3 hours as needed for severe pain or breakthrough pain. 180 tablet 0    insulin  UNIT/ML injection 20 units around 9 am (with steroids) 10 units around 9 pm 15 mL 3    insulin pen needle (31G X 8 MM) 31G X 8 MM miscellaneous Use 1 pen needles daily or as directed. 50 each 0    lidocaine (LIDODERM) 5 % patch Place 2 patches over 12 hours onto the skin every 24 hours. (Patient not taking: Reported on 2/11/2025) 60 patch 3    lidocaine-prilocaine (EMLA) 2.5-2.5 % external cream Use 1-2  times a week or as needed prior to port access (Patient not taking: Reported on 2/11/2025) 30 g 3    lipase-protease-amylase (CREON 24) 53702-16712-498001 units CPEP per EC capsule 2-3 capsules with meals 3 times a day and 1-2 capsules with snacks max of 15 capsules a day 200 capsule 11    loperamide (IMODIUM) 2 MG capsule 2 caps at 1st sign of diarrhea & 1 cap every 2hrs until 12hrs diarrhea free. During night, 2 caps at bedtime & 2 caps every 4hrs until AM (Patient taking differently: as needed. 2 caps at 1st sign of diarrhea & 1 cap every 2hrs until 12hrs diarrhea free. During night, 2 caps at bedtime & 2 caps every 4hrs until AM) 30 capsule 0    LORazepam (ATIVAN) 0.5 MG tablet Take 1-2 tablets (0.5-1 mg) by mouth every 6 hours as needed for muscle spasms. 45 tablet 2    methocarbamol (ROBAXIN) 500 MG tablet TAKE 2 TABLETS BY MOUTH 4 TIMES DAILY AS NEEDED FOR MUSCLE SPASMS.*      methocarbamol 1000 MG TABS Take 1,000 mg by mouth 4 times daily as needed for muscle spasms. (Patient not taking: Reported on 2/11/2025) 180 tablet 3    methylphenidate (RITALIN) 5 MG tablet Take 1 tablet (5 mg) by mouth 2 times daily as needed 10 tablet 0    methylPREDNISolone Na Suc, PF, (SOLU-MEDROL) 125 mg/2 mL injection Inject 2 mLs (125 mg) over 3-5 minutes into the vein via push as needed (infusion reaction). For RN use only.  Reconstitute vial. Draw up methylPREDNISolone in a syringe and administer.  Discard remainder of vial. 777393 mL 0    Multiple Vitamins-Minerals (CENTRUM SILVER 50+MEN) TABS as directed Orally      naloxone (NARCAN) 4 MG/0.1ML nasal spray Spray 4 mg into one nostril alternating nostrils once as needed.      OLANZapine (ZYPREXA) 5 MG tablet Take 1 tablet (5 mg) by mouth at bedtime. 30 tablet 3    pantoprazole (PROTONIX) 40 MG EC tablet Take 1 tablet (40 mg) by mouth 2 times daily 60 tablet 3    pegfilgrastim-jmdb (FULPHILA) 6 MG/0.6ML injection Inject 0.6 mLs (6 mg) subcutaneously every 2 weeks as needed  (per [facility] orders on day 4 of cycle). Remove from fridge for at least 30  minutes to allow syringe to reach room temperature. 8 mL 0    prochlorperazine (COMPAZINE) 10 MG tablet Take 1 tablet (10 mg) by mouth every 6 hours as needed for nausea or vomiting. 30 tablet 2    prochlorperazine (COMPAZINE) 10 MG tablet Take 1 tablet (10 mg) by mouth every 6 hours as needed for nausea or vomiting 30 tablet 2    sodium chloride 0.9% infusion Infuse 250 mLs over 30 minutes into the vein every 28 (twenty-eight) days. zometa concomitant fluids. Given per therapy plan orders 750 mL 2    sodium chloride 0.9% infusion Infuse 500 mLs into the vein as needed for other (infusion reaction). In case of mild reaction, administer via gravity at 20 mL/hr to keep vein open. In case of severe reaction, administer via gravity wide open on prime setting. 716753 mL 0    sodium chloride, PF, 0.9% PF flush Inject 10 mLs into the vein as needed for line flush. Flush IV before and after med administration as directed and/or at least every 24 hours, or prior to deaccessing for no further use and/or at least every 4 weeks when not accessed. 991822 mL 0    sodium chloride, PF, 0.9% PF flush Inject 10 mLs into the vein as needed for other (infusion reaction). For RN use only as needed for infusion reaction 843807 mL 0    sterile water, preservative free, injection Use 2.2 mLs for reconstitution as needed (with alteplase for catheter occlusion). Direct diluent stream into powder. Mix by gently swirling until dissolved. DO NOT SHAKE. Discard remainder of vial. 627187 mL 0    vitamin D3 (CHOLECALCIFEROL) 50 mcg (2000 units) tablet Take 1 tablet (50 mcg) by mouth daily. 90 tablet 1    zoledronic acid (ZOMETA) 4 MG/100ML infusion Infuse 100 mLs (4 mg) into the vein every 28 days. Infuse via gravity. Given per therapy plan orders 300 mL 2     Physical Exam:    General: The patient is a pleasant slender male in no acute distress. Here today in a  wheelchair.   /68 (BP Location: Right arm, Patient Position: Sitting, Cuff Size: Adult Regular)   Pulse 78   Temp 98.3  F (36.8  C) (Oral)   Wt 69 kg (152 lb 3.2 oz)   SpO2 96%   BMI 20.64 kg/m    Wt Readings from Last 10 Encounters:   02/19/25 69 kg (152 lb 3.2 oz)   02/11/25 66.4 kg (146 lb 6.4 oz)   01/31/25 65.5 kg (144 lb 6.4 oz)   01/29/25 65.4 kg (144 lb 3.2 oz)   01/21/25 67.1 kg (148 lb)   01/15/25 66 kg (145 lb 9.6 oz)   01/10/25 68.9 kg (152 lb)   01/07/25 68.9 kg (152 lb)   01/03/25 67.5 kg (148 lb 14.4 oz)   12/31/24 65.8 kg (145 lb)   HEENT: EOMI. Sclerae are anicteric.  Lymph: Neck is supple with no lymphadenopathy in the cervical or supraclavicular areas.   Heart: Regular rate and rhythm.   Lungs: Clear to auscultation bilaterally.   Abdomen: Bowel sounds present, soft, nontender with no palpable masses.   Extremities: 1+ lower extremity edema noted bilaterally.   Neuro: Cranial nerves II through XII are grossly intact.  Skin: No rashes, petechiae, or bruising noted on exposed skin.     Labs:    Most Recent 3 CBC's:  Recent Labs   Lab Test 02/18/25  1115 02/14/25  0930 02/12/25  1132 02/11/25  1054 01/29/25  0658 01/15/25  1251 01/03/25  0804   WBC 11.1* 9.0 8.8   < > 2.2* 4.6 4.1   HGB 7.5* 8.0* 6.4*   < > 8.2* 9.1* 9.8*   MCV 93 92 91   < > 92 90 87    296 245   < > 195 165 190   ANEUTAUTO  --   --   --   --  1.0* 3.2 2.6    < > = values in this interval not displayed.    Most Recent 3 BMP's:  Recent Labs   Lab Test 02/18/25  1115 02/11/25  1054 01/29/25  0658    133* 138   POTASSIUM 3.2* 3.6 3.7   CHLORIDE 105 102 106   CO2 26 22 23   BUN 9.2 11.4 17.9   CR 0.47* 0.60* 0.52*   ANIONGAP 7 9 9   DENISE 7.6* 7.3*  7.3* 8.4*   * 138* 116*   PROTTOTAL 4.9* 5.1* 5.7*   ALBUMIN 2.5* 2.9* 3.3*    Most Recent 2 LFT's:  Recent Labs   Lab Test 02/18/25  1115 02/11/25  1054   AST 29 21   ALT 17 9   ALKPHOS 577* 318*   BILITOTAL 0.5 0.4   I reviewed the above labs today.       Imaging:  CT Chest/Abdomen/Pelvis w Contrast  Narrative: Exam: Chest/abdomen/pelvis CT with contrast 2/18/2025 11:36 AM      History: Pancreatic adenocarcinoma.    Comparison: Multiple prior CTs most recent dated 12/12/2024.     Technique: Helical CT from the thoracic inlet through the symphysis  pubis was performed with intravenous contrast, per the multiphasic  pancreatic mass protocol.     Findings:    Devices: Right chest wall Port-A-Cath tip terminates in the distal  SVC. Intrahepatic and extrahepatic biliary stent in place. Common bile  duct stent in stable position. Duodenal stent in stable position.  Stable gastrojejunal stent.     Chest:   Mediastinum: The thyroid, pulmonary trunk, thoracic aorta, heart, and  esophagus are normal. No thoracic adenopathy.    Lungs: Small left pleural effusion. No pneumothorax. Bibasilar  bandlike atelectasis. No suspicious pulmonary nodules. Airways are  clear.    Abdomen/pelvis:   Liver: Diffuse moderate intrahepatic biliary duct dilation. Unchanged  subcentimeter hypodensity in segment 4. No focal mass. Near-complete  resolution of pneumobilia.    Gallbladder: No calcified gallstones or pericholecystic inflammation.    Pancreas: Grossly stable size of the ill-defined infiltrative  hypoenhancing mass in the pancreatic head with abutment of the  superior mesenteric, splenic, and portal veins. Atrophic pancreatic  body and tail. Soft tissue encasement of the superior mesenteric  artery and proper hepatic artery, similar to prior (series 8 image  326, series 8 image 298). Occlusion of gastroduodenal artery.  Pancreatic duct is not dilated.    Spleen: Normal size.    Adrenals: No nodule.    Kidney/bladder: No hydronephrosis, obstructing calculus, or renal  mass. Unchanged punctate nonobstructive calculus in the left lower  pole. Urinary bladder is normal.    Reproductive: No suspicious pelvic mass.    Bowel: Normal caliber small and large bowel. Large rectal stool  ball.    Mesentery/peritoneum: No pneumoperitoneum. Trace pelvic free fluid.  Stable size of the mid mesenteric enhancing mass measuring 1.9 x 2.1  cm (series 8 image 372), previously 1.9 x 2.1 cm on 12/12/2024.    Lymph nodes: Multiple prominent mesenteric and peripancreatic lymph  nodes, for example measuring 1.0 cm by short axis (series 8 image  337); stable.    Vascular: Infrarenal aorta is not aneurysmal. Vascular calcifications.    Bones/soft tissues: Stable diffuse multifocal lytic-sclerotic lesions  in the axial and appendicular skeleton. L2-L4 vertebroplasty changes.  Chronic compression fractures of L2 and L4. Mild anasarca.  Impression: Impression:   1. No significant interval change in the size of ill-defined  pancreatic head mass with encasement of SMA and proper hepatic artery.  2. No significant change in metastatic upper abdominal adenopathy,  including a mesenteric implant.  3. Similar distribution of numerous lytic-sclerotic osseous  metastases.  4. Stable position of the biliary stents with interval resolution of  pneumobilia.    I have personally reviewed the examination and initial interpretation  and I agree with the findings.    GERA VALENTINE MD         SYSTEM ID:  [id number]    I reviewed the above imaging report today.    Impression/plan:   Unresectable Pancreatic Cancer  7/31/23 began palliative treatment on FOLFIRINOX  CT CAP 10/24/23 with progression in skeletal mets, lung nodule and lymph nodes; stable pancreatic mass  - 10/31/2023: Treatment was changed to Gemcitabine and Abraxane (Day 1, 8, 15) + Zometa every 3 months  - 03/2024 CT CAP with progression of L4 lesion  - 8/2024 repeat imaging with isolated progression of L4 lesion, see below   - November 2024 imaging showed disease progression.  - Started 5FU/ liposomal irinotecan on 12/18/24. Imaging, as above, shows stable disease.  - He is feeling a bit better today so will plan to resume treatment with cycle 4 5FU and liposomal  irinotecan today.     Anemia  -Normocytic. Occurred in the setting of rectal bleeding last week, now resolved. Will trial resuming Eliquis, but he will stop the Eliquis if the bleeding returns and notify us. Hemoglobin does remain low. Will recheck a CBC in 1 week.     Dehydration  -Will give 1L IV NS today with chemo. He will continue to try to push fluids orally.    Skeletal Mets    - Radiation to T10 completed 11/29/23  - CT chest 5/23/24 unchanged sclerotic bone metastases  -imaging 8/2024 with isolated progression in L4, consult to radiation oncology.   -8/28/24 RFA ablation and vertebral augmentation to L4. Completed radiation to L4 in October 2024   -Pain management by palliative care.  -Back pain and mobility greatly improving since verterberoplasty on 1/10/25.   -Will give Zometa today and will continue on this monthly for now. We switched to monthly dosing in December 2024.  He will continue on vitamin D 50 mcg daily and calcium 1000 mg BID.     Pain    -On Butrans and Dilaudid, as managed by palliative care.     Neuropathy  -Secondary to prior chemotherapy. No acute change.    Pericardial Effusion  Found on CT 4/20/24 at [facility], followed by [facility] with moderate pericardial effusion without tamponade.  CT 5/23/24 small pericardial effusion  5/23/24: Cardiology consult at [facility]- Echo 6/6/2024 with EF 55-60%, LV strain -20.5% which is normal, no pericardial effusion  - repeat echo at [facility] in the future,   - he has been cautioned by Cardiology s/s of cardiac tamponade  -following cardiology as needed  -no acute concerns today    Pulmonary Embolism  Right lower segmental PE found on routine imaging. Will resume Eliquis as above. Will need to hold if bleeding returns.     Anorexia  -Improved. Recommend starting on Zyprexa 5 mg at bedtime for the next few days in anticipation of declined appetite with resuming chemotherapy.     Stools with pain  -Again, recommend sitz baths at least once/day. Will  continue to use barrier cream on open areas.     Steroid induced hyperglycemia  -Managed with insulin as needed.     Hypoalbuminemia  -Ongoing and related to poor po intake. Will continue to push protein as tolerated. Could consider a follow-up appt with our dietician.     Hypokalemia  -Likely related to poor po intake. Will replace per protocol in infusion today.    Berta Anglin PA-C  [facility] [address] [address]  [phone]    31 minutes spent on the date of the encounter doing chart review, review of test results, interpretation of tests, patient visit, and documentation     The longitudinal plan of care for the diagnosis(es)/condition(s) as documented were addressed during this visit. Due to the added complexity in care, I will continue to support Gallo Navarro in the subsequent management and with ongoing continuity of care.

## 2025-02-19 ENCOUNTER — ONCOLOGY VISIT (OUTPATIENT)
Dept: ONCOLOGY | Facility: CLINIC | Age: 60
End: 2025-02-19
Attending: PHYSICIAN ASSISTANT
Payer: COMMERCIAL

## 2025-02-19 ENCOUNTER — DOCUMENTATION ONLY (OUTPATIENT)
Dept: HOME HEALTH SERVICES | Facility: HOME HEALTH | Age: 60
End: 2025-02-19
Payer: COMMERCIAL

## 2025-02-19 ENCOUNTER — INFUSION THERAPY VISIT (OUTPATIENT)
Dept: ONCOLOGY | Facility: CLINIC | Age: 60
End: 2025-02-19
Attending: STUDENT IN AN ORGANIZED HEALTH CARE EDUCATION/TRAINING PROGRAM
Payer: COMMERCIAL

## 2025-02-19 ENCOUNTER — HOME INFUSION BILLING (OUTPATIENT)
Dept: HOME HEALTH SERVICES | Facility: HOME HEALTH | Age: 60
End: 2025-02-19
Payer: COMMERCIAL

## 2025-02-19 ENCOUNTER — TELEPHONE (OUTPATIENT)
Dept: VASCULAR SURGERY | Facility: CLINIC | Age: 60
End: 2025-02-19

## 2025-02-19 ENCOUNTER — HOME INFUSION (OUTPATIENT)
Dept: HOME HEALTH SERVICES | Facility: HOME HEALTH | Age: 60
End: 2025-02-19
Payer: COMMERCIAL

## 2025-02-19 VITALS
BODY MASS INDEX: 20.64 KG/M2 | DIASTOLIC BLOOD PRESSURE: 68 MMHG | OXYGEN SATURATION: 96 % | SYSTOLIC BLOOD PRESSURE: 100 MMHG | HEART RATE: 78 BPM | TEMPERATURE: 98.3 F | WEIGHT: 152.2 LBS

## 2025-02-19 DIAGNOSIS — C25.0 MALIGNANT NEOPLASM OF HEAD OF PANCREAS (H): Primary | ICD-10-CM

## 2025-02-19 DIAGNOSIS — C79.51 MALIGNANT NEOPLASM METASTATIC TO BONE (H): ICD-10-CM

## 2025-02-19 DIAGNOSIS — Z51.11 ENCOUNTER FOR ANTINEOPLASTIC CHEMOTHERAPY: Primary | ICD-10-CM

## 2025-02-19 DIAGNOSIS — C25.0 MALIGNANT NEOPLASM OF HEAD OF PANCREAS (H): ICD-10-CM

## 2025-02-19 DIAGNOSIS — R63.0 ANOREXIA: ICD-10-CM

## 2025-02-19 DIAGNOSIS — D64.9 ANEMIA, UNSPECIFIED TYPE: ICD-10-CM

## 2025-02-19 PROCEDURE — 250N000011 HC RX IP 250 OP 636: Performed by: PHYSICIAN ASSISTANT

## 2025-02-19 PROCEDURE — 258N000003 HC RX IP 258 OP 636: Performed by: PHYSICIAN ASSISTANT

## 2025-02-19 PROCEDURE — A4216 STERILE WATER/SALINE, 10 ML: HCPCS

## 2025-02-19 PROCEDURE — 250N000011 HC RX IP 250 OP 636: Performed by: STUDENT IN AN ORGANIZED HEALTH CARE EDUCATION/TRAINING PROGRAM

## 2025-02-19 PROCEDURE — 258N000003 HC RX IP 258 OP 636: Performed by: STUDENT IN AN ORGANIZED HEALTH CARE EDUCATION/TRAINING PROGRAM

## 2025-02-19 PROCEDURE — S9330 HIT CONT CHEM DIEM: HCPCS

## 2025-02-19 PROCEDURE — 99213 OFFICE O/P EST LOW 20 MIN: CPT | Performed by: PHYSICIAN ASSISTANT

## 2025-02-19 PROCEDURE — 250N000013 HC RX MED GY IP 250 OP 250 PS 637: Performed by: PHYSICIAN ASSISTANT

## 2025-02-19 RX ORDER — HEPARIN SODIUM (PORCINE) LOCK FLUSH IV SOLN 100 UNIT/ML 100 UNIT/ML
5 SOLUTION INTRAVENOUS
OUTPATIENT
Start: 2025-03-12

## 2025-02-19 RX ORDER — HEPARIN SODIUM,PORCINE 10 UNIT/ML
5-20 VIAL (ML) INTRAVENOUS DAILY PRN
OUTPATIENT
Start: 2025-03-12

## 2025-02-19 RX ORDER — ZOLEDRONIC ACID 0.04 MG/ML
4 INJECTION, SOLUTION INTRAVENOUS ONCE
OUTPATIENT
Start: 2025-03-12 | End: 2025-03-12

## 2025-02-19 RX ORDER — POTASSIUM CHLORIDE 1500 MG/1
40 TABLET, EXTENDED RELEASE ORAL ONCE
Status: COMPLETED | OUTPATIENT
Start: 2025-02-19 | End: 2025-02-19

## 2025-02-19 RX ORDER — ALBUTEROL SULFATE 0.83 MG/ML
2.5 SOLUTION RESPIRATORY (INHALATION)
OUTPATIENT
Start: 2025-03-12

## 2025-02-19 RX ORDER — METHYLPREDNISOLONE SODIUM SUCCINATE 125 MG/2ML
125 INJECTION INTRAMUSCULAR; INTRAVENOUS
Start: 2025-03-12

## 2025-02-19 RX ORDER — DEXAMETHASONE SODIUM PHOSPHATE 4 MG/ML
12 INJECTION, SOLUTION INTRA-ARTICULAR; INTRALESIONAL; INTRAMUSCULAR; INTRAVENOUS; SOFT TISSUE ONCE
Status: COMPLETED | OUTPATIENT
Start: 2025-02-19 | End: 2025-02-19

## 2025-02-19 RX ORDER — DIPHENHYDRAMINE HYDROCHLORIDE 50 MG/ML
50 INJECTION INTRAMUSCULAR; INTRAVENOUS
Start: 2025-03-12

## 2025-02-19 RX ORDER — MEPERIDINE HYDROCHLORIDE 25 MG/ML
25 INJECTION INTRAMUSCULAR; INTRAVENOUS; SUBCUTANEOUS EVERY 30 MIN PRN
OUTPATIENT
Start: 2025-03-12

## 2025-02-19 RX ORDER — ONDANSETRON 2 MG/ML
8 INJECTION INTRAMUSCULAR; INTRAVENOUS ONCE
Status: COMPLETED | OUTPATIENT
Start: 2025-02-19 | End: 2025-02-19

## 2025-02-19 RX ORDER — ALBUTEROL SULFATE 90 UG/1
1-2 INHALANT RESPIRATORY (INHALATION)
Start: 2025-03-12

## 2025-02-19 RX ORDER — EPINEPHRINE 1 MG/ML
0.3 INJECTION, SOLUTION, CONCENTRATE INTRAVENOUS EVERY 5 MIN PRN
OUTPATIENT
Start: 2025-03-12

## 2025-02-19 RX ORDER — ZOLEDRONIC ACID 0.04 MG/ML
4 INJECTION, SOLUTION INTRAVENOUS ONCE
Status: COMPLETED | OUTPATIENT
Start: 2025-02-19 | End: 2025-02-19

## 2025-02-19 RX ADMIN — POTASSIUM CHLORIDE 40 MEQ: 1500 TABLET, EXTENDED RELEASE ORAL at 12:59

## 2025-02-19 RX ADMIN — IRINOTECAN HYDROCHLORIDE 129 MG: 4.3 INJECTION, POWDER, FOR SOLUTION INTRAVENOUS at 13:21

## 2025-02-19 RX ADMIN — DEXAMETHASONE SODIUM PHOSPHATE 12 MG: 4 INJECTION, SOLUTION INTRA-ARTICULAR; INTRALESIONAL; INTRAMUSCULAR; INTRAVENOUS; SOFT TISSUE at 12:58

## 2025-02-19 RX ADMIN — DEXTROSE 750 MG: 50 INJECTION, SOLUTION INTRAVENOUS at 14:58

## 2025-02-19 RX ADMIN — SODIUM CHLORIDE 1000 ML: 9 INJECTION, SOLUTION INTRAVENOUS at 12:26

## 2025-02-19 RX ADMIN — ONDANSETRON 8 MG: 2 INJECTION INTRAMUSCULAR; INTRAVENOUS at 12:59

## 2025-02-19 RX ADMIN — ZOLEDRONIC ACID 4 MG: 0.04 INJECTION, SOLUTION INTRAVENOUS at 12:27

## 2025-02-19 ASSESSMENT — PAIN SCALES - GENERAL: PAINLEVEL_OUTOF10: NO PAIN (0)

## 2025-02-19 NOTE — NURSING NOTE
Oncology Rooming Note    February 19, 2025 11:04 AM   Gallo Navarro is a 59 year old male who presents for:    Chief Complaint   Patient presents with    Oncology Clinic Visit     RTN Pancreatic hepatobiliary CA      Initial Vitals: /68 (BP Location: Right arm, Patient Position: Sitting, Cuff Size: Adult Regular)   Pulse 78   Temp 98.3  F (36.8  C) (Oral)   Wt 69 kg (152 lb 3.2 oz)   SpO2 96%   BMI 20.64 kg/m   Estimated body mass index is 20.64 kg/m  as calculated from the following:    Height as of 1/21/25: 1.829 m (6').    Weight as of this encounter: 69 kg (152 lb 3.2 oz). Body surface area is 1.87 meters squared.  No Pain (0) Comment: Data Unavailable   No LMP for male patient.  Allergies reviewed: Yes  Medications reviewed: No    Medications: Medication refills not needed today.  Pharmacy name entered into EPIC:    [facility] - [place], [place] - [address] [facility] - [place], [place] - [address]  [facility] [place] [address] [facility] [address] PORTAL TO REGISTERED [facility] SITES  [facility] - [place], [place] - [address]  [facility] [address] [facility] [employer] - [place], [place] - [address]    Frailty Screening:   Is the patient here for a new oncology consult visit in cancer care? 2. No    PHQ9:  Did this patient require a PHQ9?: No      Clinical concerns: [facility] medications are up to date did E check in. [facility] has pressure ulcers around butt.       Faustino Burnham

## 2025-02-19 NOTE — PATIENT INSTRUCTIONS
[facility] Triage and after hours / weekends / holidays:  [phone]    Please call the triage or after hours line if you experience a temperature greater than or equal to 100.4, shaking chills, have uncontrolled nausea, vomiting and/or diarrhea, dizziness, shortness of breath, chest pain, bleeding, unexplained bruising, or if you have any other new/concerning symptoms, questions or concerns.      If you are having any concerning symptoms or wish to speak to a provider before your next infusion visit, please call triage to notify them so we can adequately serve you.     If you need a refill on a narcotic prescription or other medication, please call before your infusion appointment.

## 2025-02-19 NOTE — TELEPHONE ENCOUNTER
Patient needs to be rescheduled for their virtual visit due to Reason for Reschedule: Patient Request    Scheduling team, please refer to service line late cancellation/no-show policies and reach out to patient at a later date for rescheduling.    Appointment mode: Video  Provider: Marce Cha PA-C

## 2025-02-19 NOTE — LETTER
2/19/2025      Gallo Navarro  [address] [address] [place] [id number]      Dear Colleague,    Thank you for referring your patient, Gallo Navarro, to the [facility]. Please see a copy of my visit note below.    Oncology/Hematology Visit Note  Feb 19, 2025    Reason for Visit: follow-up of metastatic Pancreatic Cancer, follow-up of symptoms    Oncology HPI:   -NGS: KRAS G12R  Immuno: pMMR, KAYLIE, TMB-low  Clinical Trial: MARIPOSA-186, MARIPOSA-280, TORL    - 3/2023: presented with acute pancreatitis  c/b chronic pancreatitis with pancreatic mass causing duodenal stenosis with gastric outlet obstruction s/p GJ tube (placed 5/2023), biliary obstruction s/p stent placement on 7/7/23, pancreatic insufficiency, and IDDM2.  -  He then presented to the ED with worsening abdominal pain, increased jaundice, poor PO intake and weight loss admitted on 7/8/2023 for concern of biliary obstruction.   - 7/12/2023: Underwent biopsy of pancreatic mass returned positive for pancreatic adenocarcinoma.   - 7/31/2023: He started on treatment with 5FU, irinotecan, and oxaliplatin (FOLFIRINOX). Please see previous notes for further details on the patient's history.      8/17/23 - ERCP/EGD, duodenal stents x2 placed, exchange of GJ tube     8/21-8/25/24 hospitalized due to diarrhea, colitis, abdominal pain.       8/29 - Cycle 3 deferred due to neutropenia.   Neulasta added. Irinotecan dose reduced 20% due to symptoms including cramping, double vision and slurred speech during infusion.       10/24/23 CT CAP with similar to slight increase in size of peripancreatic and mesenteric lymph nodes, enlargement of previous skeletal metastasis as well as new sclerotic metastasis to left posterior 5th rib, enlarging pulmonary nodule, and unchanged pancreatic head mass. Also unclear concerns for PE,  right lower lobe segmental PE confirmed on CT-PE. Started on apixaban.      10/31/23 Cycle 1 gemzar, abraxane  11/7/23 Cycle 1  Zometa  11/22/23 Removal of GJ tube.   11/29/23 Completed palliative radiation to T10   12/13/23 C3D1 gem/abraxane  12/29/23 Consults at [facility]  1/23-1/26 Consults at [facility]   1/30/24: C4D1 gem/abraxane   3/24: CT CAP with overall stable disease with isolated progression in potential L4 lesion. Referral for radiation oncology.    4/29-5/1: Consults at [facility] for possible cellular therapy, CT guided biopsy of left pubic bone, recommendation to return to [facility] upon progression of current treatment   5/23/24: Cardiology consult at [facility]. CT with small pericardial effusion   6/6/2024: Repeat ECHO on with EF of 55-60%, no pericardial effusion present  - Cycle 8 deferred due to infection concerns and subsequently tested positive for COVID 6/25/24, resumed treatment on 7/2/2024    -Day 8 eliminated after Cycle 5 Lutz/Abraxane.    4/2/2024-present: Gemcitabine/Abraxane, Days 1 and 15 only; Zometa every 3 months     8/2024 repeat imaging with isolated progression to L4 lesion with fracture of that spinous process. Referred to radiation therapy. Decreased Abraxane dose reduced to 75mg/m2 with cycle 9 day 15 for overall treatment tolerance.     8/28/24 RFA ablation and verteral augmentation to L4.   10/1-10/21 Radiation to L4     11/12/24 CT CAP shows disease progression, plan to change to 5FU and liposomal irinotecan after a vacation  12/12/24 ED visit for pain, secondary to cancer, managed with IV pain medication    12/18/24 Cycle 1 5FU and liposomal irinotecan    1/10/25: L2 and L3 RFA and vertebroplasty    2/11/24: Cycle 4 5FU and liposomal irinotecan held due to rectal bleeding with anemia and fatigue with weakness.    Gallo presents today for routine follow-up prior to consideration of delayed cycle 4 5FU and liposomal irinotecan.     Interval history:   Patient feels he is doing a bit better since last week with some increase in his mobility.  He is working with physical and Occupational Therapy.  He notes no  further issues with bleeding over the last week and had a normal bowel movement today.  He has remained off of the Eliquis.  He notes the sores on his bottom are similar to previous.  He has some dyspnea on exertion which he attributes to inactivity.  He reports eating a bit better though still not great.  He also has been trying to push fluids.  He feels his legs are a little less swollen and he has been trying to elevate them.  He also has had his legs wrapped.  He reports improvement in his pain since his surgery.  He has had some intermittent pain under his left ribs over the last month.  He did not yet try the Zyprexa since his appetite was doing a bit better.  His mood has also been doing a bit better.  Of note, 22 year old son had absence seizures while snow boarding recently and was seen in the ED. He is doing okay, but this was an added stressor for the family. He denies other concerns.    Current Outpatient Medications   Medication Sig Dispense Refill     acetaminophen (TYLENOL) 32 mg/mL liquid Take 20.313 mLs (650 mg) by mouth every 8 hours. (Patient taking differently: Take 650 mg by mouth every 8 hours as needed.) 1800 mL 3     alteplase (CATHFLO ACTIVASE) injection Inject 2 mLs (2 mg) into catheter as needed (catheter occlusion). Reconstitute vial. Draw up alteplase 1 mg/mL in a syringe and instill into IV catheter. Dwell for at least 20 min to 24 hours, then aspirate. May repeat dose once in 24 hours if catheter function not restored after at least 2 hours. Discard remainder of vial. [id number] each 0     apixaban ANTICOAGULANT (ELIQUIS) 5 MG tablet Take 1 tablet (5 mg) by mouth 2 times daily. 60 tablet 2     blood glucose (FREESTYLE TEST STRIPS) test strip 1 strip by In Vitro route 3 times daily. (Patient not taking: Reported on 2/11/2025) 100 strip 2     buprenorphine (BUTRANS) 10 MCG/HR WK patch Place 1 patch over 168 hours onto the skin every 7 days. 4 patch 4     buprenorphine (BUTRANS) 20 MCG/HR WK  patch Place 1 patch over 168 hours onto the skin every 7 days. 4 patch 4     calcium carbonate (OS-DENISE) 500 MG tablet Take 2 tablets (1,000 mg) by mouth 2 times daily. 60 tablet 3     Chemo Kit For RN use only. Do not remove items from bag. Contents: 1  sodium chloride 0.9% flush, 4 medium gloves, 1 chemo gown, 1/4 chemo mat, 1 connector female, 1 chemo bag. [id number] kit 0     Continuous Glucose Sensor (DEXCOM G7 SENSOR) MISC Change every 10 days. 6 each 3     dexAMETHasone (DECADRON) 2 MG tablet Take 1 tablet (2 mg) by mouth daily (with breakfast). 10 tablet 0     dicyclomine (BENTYL) 10 MG capsule Take 1 capsule (10 mg) by mouth 4 times daily (before meals and nightly). 120 capsule 1     diphenhydrAMINE (BENADRYL) 50 MG/ML injection Inject 1 mL (50 mg) over 3-5 minutes into the vein via push as needed for other (infusion reaction). For RN use only.  Draw up in a syringe and administer IV push.  Discard remainder of vial. 71857 mL 0     econazole nitrate 1 % external cream Apply topically daily To affected toenails. (Patient not taking: Reported on 2/11/2025) 85 g 5     EPINEPHrine (ANY BX GENERIC EQUIV) 0.3 MG/0.3ML injection 2-pack Inject 0.3 mLs (0.3 mg) into the muscle as needed for anaphylaxis (infusion reaction). Administer into the mid-thigh in case of severe anaphylaxis (wheezing, throat tightening, mouth swelling, difficulty breathing). May repeat dose one time in 5-15 minutes if symptoms persist. 136871 mL 0     Fluorouracil (ADRUCIL) 4,585 mg in sodium chloride 0.9 % 241 mL via HOMEPUMP C-Series Infuse 4,585 mg at 5.2 mL/hr over 46 hours into the vein once for 1 dose. 204490 mL 0     HYDROmorphone (DILAUDID) 4 MG tablet Take 1-2 tablets (4-8 mg) by mouth every 3 hours as needed for severe pain or breakthrough pain. 180 tablet 0     insulin  UNIT/ML injection 20 units around 9 am (with steroids) 10 units around 9 pm 15 mL 3     insulin pen needle (31G X 8 MM) 31G X 8 MM miscellaneous Use 1 pen  needles daily or as directed. 50 each 0     lidocaine (LIDODERM) 5 % patch Place 2 patches over 12 hours onto the skin every 24 hours. (Patient not taking: Reported on 2/11/2025) 60 patch 3     lidocaine-prilocaine (EMLA) 2.5-2.5 % external cream Use 1-2 times a week or as needed prior to port access (Patient not taking: Reported on 2/11/2025) 30 g 3     lipase-protease-amylase (CREON 24) 38771-95005-743208 units CPEP per EC capsule 2-3 capsules with meals 3 times a day and 1-2 capsules with snacks max of 15 capsules a day 200 capsule 11     loperamide (IMODIUM) 2 MG capsule 2 caps at 1st sign of diarrhea & 1 cap every 2hrs until 12hrs diarrhea free. During night, 2 caps at bedtime & 2 caps every 4hrs until AM (Patient taking differently: as needed. 2 caps at 1st sign of diarrhea & 1 cap every 2hrs until 12hrs diarrhea free. During night, 2 caps at bedtime & 2 caps every 4hrs until AM) 30 capsule 0     LORazepam (ATIVAN) 0.5 MG tablet Take 1-2 tablets (0.5-1 mg) by mouth every 6 hours as needed for muscle spasms. 45 tablet 2     methocarbamol (ROBAXIN) 500 MG tablet TAKE 2 TABLETS BY MOUTH 4 TIMES DAILY AS NEEDED FOR MUSCLE SPASMS.*       methocarbamol 1000 MG TABS Take 1,000 mg by mouth 4 times daily as needed for muscle spasms. (Patient not taking: Reported on 2/11/2025) 180 tablet 3     methylphenidate (RITALIN) 5 MG tablet Take 1 tablet (5 mg) by mouth 2 times daily as needed 10 tablet 0     methylPREDNISolone Na Suc, PF, (SOLU-MEDROL) 125 mg/2 mL injection Inject 2 mLs (125 mg) over 3-5 minutes into the vein via push as needed (infusion reaction). For RN use only.  Reconstitute vial. Draw up methylPREDNISolone in a syringe and administer.  Discard remainder of vial. 829004 mL 0     Multiple Vitamins-Minerals (CENTRUM SILVER 50+MEN) TABS as directed Orally       naloxone (NARCAN) 4 MG/0.1ML nasal spray Spray 4 mg into one nostril alternating nostrils once as needed.       OLANZapine (ZYPREXA) 5 MG tablet Take 1  tablet (5 mg) by mouth at bedtime. 30 tablet 3     pantoprazole (PROTONIX) 40 MG EC tablet Take 1 tablet (40 mg) by mouth 2 times daily 60 tablet 3     pegfilgrastim-jmdb (FULPHILA) 6 MG/0.6ML injection Inject 0.6 mLs (6 mg) subcutaneously every 2 weeks as needed (per [facility] orders on day 4 of cycle). Remove from fridge for at least 30  minutes to allow syringe to reach room temperature. 8 mL 0     prochlorperazine (COMPAZINE) 10 MG tablet Take 1 tablet (10 mg) by mouth every 6 hours as needed for nausea or vomiting. 30 tablet 2     prochlorperazine (COMPAZINE) 10 MG tablet Take 1 tablet (10 mg) by mouth every 6 hours as needed for nausea or vomiting 30 tablet 2     sodium chloride 0.9% infusion Infuse 250 mLs over 30 minutes into the vein every 28 (twenty-eight) days. zometa concomitant fluids. Given per therapy plan orders 750 mL 2     sodium chloride 0.9% infusion Infuse 500 mLs into the vein as needed for other (infusion reaction). In case of mild reaction, administer via gravity at 20 mL/hr to keep vein open. In case of severe reaction, administer via gravity wide open on prime setting. 169358 mL 0     sodium chloride, PF, 0.9% PF flush Inject 10 mLs into the vein as needed for line flush. Flush IV before and after med administration as directed and/or at least every 24 hours, or prior to deaccessing for no further use and/or at least every 4 weeks when not accessed. 323394 mL 0     sodium chloride, PF, 0.9% PF flush Inject 10 mLs into the vein as needed for other (infusion reaction). For RN use only as needed for infusion reaction 648531 mL 0     sterile water, preservative free, injection Use 2.2 mLs for reconstitution as needed (with alteplase for catheter occlusion). Direct diluent stream into powder. Mix by gently swirling until dissolved. DO NOT SHAKE. Discard remainder of vial. 613885 mL 0     vitamin D3 (CHOLECALCIFEROL) 50 mcg (2000 units) tablet Take 1 tablet (50 mcg) by mouth daily. 90 tablet 1      zoledronic acid (ZOMETA) 4 MG/100ML infusion Infuse 100 mLs (4 mg) into the vein every 28 days. Infuse via gravity. Given per therapy plan orders 300 mL 2     Physical Exam:    General: The patient is a pleasant slender male in no acute distress. Here today in a wheelchair.   /68 (BP Location: Right arm, Patient Position: Sitting, Cuff Size: Adult Regular)   Pulse 78   Temp 98.3  F (36.8  C) (Oral)   Wt 69 kg (152 lb 3.2 oz)   SpO2 96%   BMI 20.64 kg/m    Wt Readings from Last 10 Encounters:   02/19/25 69 kg (152 lb 3.2 oz)   02/11/25 66.4 kg (146 lb 6.4 oz)   01/31/25 65.5 kg (144 lb 6.4 oz)   01/29/25 65.4 kg (144 lb 3.2 oz)   01/21/25 67.1 kg (148 lb)   01/15/25 66 kg (145 lb 9.6 oz)   01/10/25 68.9 kg (152 lb)   01/07/25 68.9 kg (152 lb)   01/03/25 67.5 kg (148 lb 14.4 oz)   12/31/24 65.8 kg (145 lb)   HEENT: EOMI. Sclerae are anicteric.  Lymph: Neck is supple with no lymphadenopathy in the cervical or supraclavicular areas.   Heart: Regular rate and rhythm.   Lungs: Clear to auscultation bilaterally.   Abdomen: Bowel sounds present, soft, nontender with no palpable masses.   Extremities: 1+ lower extremity edema noted bilaterally.   Neuro: Cranial nerves II through XII are grossly intact.  Skin: No rashes, petechiae, or bruising noted on exposed skin.     Labs:    Most Recent 3 CBC's:  Recent Labs   Lab Test 02/18/25  1115 02/14/25  0930 02/12/25  1132 02/11/25  1054 01/29/25  0658 01/15/25  1251 01/03/25  0804   WBC 11.1* 9.0 8.8   < > 2.2* 4.6 4.1   HGB 7.5* 8.0* 6.4*   < > 8.2* 9.1* 9.8*   MCV 93 92 91   < > 92 90 87    296 245   < > 195 165 190   ANEUTAUTO  --   --   --   --  1.0* 3.2 2.6    < > = values in this interval not displayed.    Most Recent 3 BMP's:  Recent Labs   Lab Test 02/18/25  1115 02/11/25  1054 01/29/25  0658    133* 138   POTASSIUM 3.2* 3.6 3.7   CHLORIDE 105 102 106   CO2 26 22 23   BUN 9.2 11.4 17.9   CR 0.47* 0.60* 0.52*   ANIONGAP 7 9 9   DENISE 7.6* 7.3*   7.3* 8.4*   * 138* 116*   PROTTOTAL 4.9* 5.1* 5.7*   ALBUMIN 2.5* 2.9* 3.3*    Most Recent 2 LFT's:  Recent Labs   Lab Test 02/18/25  1115 02/11/25  1054   AST 29 21   ALT 17 9   ALKPHOS 577* 318*   BILITOTAL 0.5 0.4   I reviewed the above labs today.      Imaging:  CT Chest/Abdomen/Pelvis w Contrast  Narrative: Exam: Chest/abdomen/pelvis CT with contrast 2/18/2025 11:36 AM      History: Pancreatic adenocarcinoma.    Comparison: Multiple prior CTs most recent dated 12/12/2024.     Technique: Helical CT from the thoracic inlet through the symphysis  pubis was performed with intravenous contrast, per the multiphasic  pancreatic mass protocol.     Findings:    Devices: Right chest wall Port-A-Cath tip terminates in the distal  SVC. Intrahepatic and extrahepatic biliary stent in place. Common bile  duct stent in stable position. Duodenal stent in stable position.  Stable gastrojejunal stent.     Chest:   Mediastinum: The thyroid, pulmonary trunk, thoracic aorta, heart, and  esophagus are normal. No thoracic adenopathy.    Lungs: Small left pleural effusion. No pneumothorax. Bibasilar  bandlike atelectasis. No suspicious pulmonary nodules. Airways are  clear.    Abdomen/pelvis:   Liver: Diffuse moderate intrahepatic biliary duct dilation. Unchanged  subcentimeter hypodensity in segment 4. No focal mass. Near-complete  resolution of pneumobilia.    Gallbladder: No calcified gallstones or pericholecystic inflammation.    Pancreas: Grossly stable size of the ill-defined infiltrative  hypoenhancing mass in the pancreatic head with abutment of the  superior mesenteric, splenic, and portal veins. Atrophic pancreatic  body and tail. Soft tissue encasement of the superior mesenteric  artery and proper hepatic artery, similar to prior (series 8 image  326, series 8 image 298). Occlusion of gastroduodenal artery.  Pancreatic duct is not dilated.    Spleen: Normal size.    Adrenals: No nodule.    Kidney/bladder: No  hydronephrosis, obstructing calculus, or renal  mass. Unchanged punctate nonobstructive calculus in the left lower  pole. Urinary bladder is normal.    Reproductive: No suspicious pelvic mass.    Bowel: Normal caliber small and large bowel. Large rectal stool ball.    Mesentery/peritoneum: No pneumoperitoneum. Trace pelvic free fluid.  Stable size of the mid mesenteric enhancing mass measuring 1.9 x 2.1  cm (series 8 image 372), previously 1.9 x 2.1 cm on 12/12/2024.    Lymph nodes: Multiple prominent mesenteric and peripancreatic lymph  nodes, for example measuring 1.0 cm by short axis (series 8 image  337); stable.    Vascular: Infrarenal aorta is not aneurysmal. Vascular calcifications.    Bones/soft tissues: Stable diffuse multifocal lytic-sclerotic lesions  in the axial and appendicular skeleton. L2-L4 vertebroplasty changes.  Chronic compression fractures of L2 and L4. Mild anasarca.  Impression: Impression:   1. No significant interval change in the size of ill-defined  pancreatic head mass with encasement of SMA and proper hepatic artery.  2. No significant change in metastatic upper abdominal adenopathy,  including a mesenteric implant.  3. Similar distribution of numerous lytic-sclerotic osseous  metastases.  4. Stable position of the biliary stents with interval resolution of  pneumobilia.    I have personally reviewed the examination and initial interpretation  and I agree with the findings.    GERA VALENTINE MD         SYSTEM ID:  [id number]    I reviewed the above imaging report today.    Impression/plan:   Unresectable Pancreatic Cancer  7/31/23 began palliative treatment on FOLFIRINOX  CT CAP 10/24/23 with progression in skeletal mets, lung nodule and lymph nodes; stable pancreatic mass  - 10/31/2023: Treatment was changed to Gemcitabine and Abraxane (Day 1, 8, 15) + Zometa every 3 months  - 03/2024 CT CAP with progression of L4 lesion  - 8/2024 repeat imaging with isolated progression of L4 lesion,  see below   - November 2024 imaging showed disease progression.  - Started 5FU/ liposomal irinotecan on 12/18/24. Imaging, as above, shows stable disease.  - He is feeling a bit better today so will plan to resume treatment with cycle 4 5FU and liposomal irinotecan today.     Anemia  -Normocytic. Occurred in the setting of rectal bleeding last week, now resolved. Will trial resuming Eliquis, but he will stop the Eliquis if the bleeding returns and notify us. Hemoglobin does remain low. Will recheck a CBC in 1 week.     Dehydration  -Will give 1L IV NS today with chemo. He will continue to try to push fluids orally.    Skeletal Mets    - Radiation to T10 completed 11/29/23  - CT chest 5/23/24 unchanged sclerotic bone metastases  -imaging 8/2024 with isolated progression in L4, consult to radiation oncology.   -8/28/24 RFA ablation and vertebral augmentation to L4. Completed radiation to L4 in October 2024   -Pain management by palliative care.  -Back pain and mobility greatly improving since verterberoplasty on 1/10/25.   -Will give Zometa today and will continue on this monthly for now. We switched to monthly dosing in December 2024.  He will continue on vitamin D 50 mcg daily and calcium 1000 mg BID.     Pain    -On Butrans and Dilaudid, as managed by palliative care.     Neuropathy  -Secondary to prior chemotherapy. No acute change.    Pericardial Effusion  Found on CT 4/20/24 at [facility], followed by [facility] with moderate pericardial effusion without tamponade.  CT 5/23/24 small pericardial effusion  5/23/24: Cardiology consult at [facility]- Echo 6/6/2024 with EF 55-60%, LV strain -20.5% which is normal, no pericardial effusion  - repeat echo at [facility] in the future,   - he has been cautioned by Cardiology s/s of cardiac tamponade  -following cardiology as needed  -no acute concerns today    Pulmonary Embolism  Right lower segmental PE found on routine imaging. Will resume Eliquis as above. Will need to hold if  bleeding returns.     Anorexia  -Improved. Recommend starting on Zyprexa 5 mg at bedtime for the next few days in anticipation of declined appetite with resuming chemotherapy.     Stools with pain  -Again, recommend sitz baths at least once/day. Will continue to use barrier cream on open areas.     Steroid induced hyperglycemia  -Managed with insulin as needed.     Hypoalbuminemia  -Ongoing and related to poor po intake. Will continue to push protein as tolerated. Could consider a follow-up appt with our dietician.     Hypokalemia  -Likely related to poor po intake. Will replace per protocol in infusion today.    Berta Anglin PA-C  [facility] [address] [address]  [phone]    31 minutes spent on the date of the encounter doing chart review, review of test results, interpretation of tests, patient visit, and documentation     The longitudinal plan of care for the diagnosis(es)/condition(s) as documented were addressed during this visit. Due to the added complexity in care, I will continue to support Gallo Navarro in the subsequent management and with ongoing continuity of care.        Again, thank you for allowing me to participate in the care of your patient.        Sincerely,        Berta Anglin PA-C    Electronically signed

## 2025-02-19 NOTE — PROGRESS NOTES
"Infusion Nursing Note:  Gallo Navarro presents today for Cycle 5, Day 1- Irinotecan, Leucovorin, and Fluorouracil Home Infusion Pump Connection + Zometa Infusion + Potassium replacement.    Patient seen by provider today: Yes: LISA Johnston   present during visit today: Not Applicable.    Note: Patient reports feeling at baseline on arrival  to infusion suite today. Denies the following signs of infection: no fever, cough, chills, rash, chest pain, or shortness of breath. No new concerns or questions since SUZETTE appointment. Consents to treatment today.     Confirms taking calcium and vitamin D supplements. Denies dental concerns.     Patient self-administers insulin at time of steroid IVP. Reports he has not taken any steroids at home today.     Per Secure Chat: LISA Johnston/ Sallie Levine RN   -please replace potassium per protocol       Intravenous Access:  Implanted Port.    Treatment Conditions:  Lab Results   Component Value Date    HGB 7.5 (LL) 02/18/2025    WBC 11.1 (H) 02/18/2025    ANEU 7.4 02/18/2025    ANEUTAUTO 1.0 (L) 01/29/2025     02/18/2025        Lab Results   Component Value Date     02/18/2025    POTASSIUM 3.2 (L) 02/18/2025    MAG 1.9 08/23/2023    CR 0.47 (L) 02/18/2025    DENISE 7.6 (L) 02/18/2025    BILITOTAL 0.5 02/18/2025    ALBUMIN 2.5 (L) 02/18/2025    ALT 17 02/18/2025    AST 29 02/18/2025       Results reviewed, labs MET treatment parameters, ok to proceed with treatment.  Corrected Calcium: 8.8    Post Infusion Assessment:  Patient tolerated infusion without incident.  Blood return noted pre and post infusion.  Site patent and intact, free from redness, edema or discomfort.  No evidence of extravasations.     Prior to discharge: Port is secured in place with tegaderm and flushed with 10cc NS with positive blood return noted.    Continuous home infusion C-Series connected.    All connectors secured in place and clamps taped open.    Pump started, \"running\" " noted on display (CADD): Not Applicable.   Capillary element taped to pt's skin per protocol.  Pump Connection double checked with Hiwot SANCHEZ RN.  Patient instructed to call our clinic or [facility] with any questions or concerns at home.  Patient verbalized understanding.    Patient set up for pump disconnect at home with [facility] on 2/21 @1330, confirmed via IB.          Discharge Plan:   Patient declined prescription refills.  Discharge instructions reviewed with: Patient.  Patient and/or family verbalized understanding of discharge instructions and all questions answered.  AVS to patient via HALSCIONT.  Patient will return 3/5/25 for next appointment.   Patient discharged in stable condition accompanied by: friend.  Departure Mode: Wheelchair.      Sallie Levine RN

## 2025-02-20 ENCOUNTER — VIRTUAL VISIT (OUTPATIENT)
Dept: FAMILY MEDICINE | Facility: CLINIC | Age: 60
End: 2025-02-20
Payer: COMMERCIAL

## 2025-02-20 DIAGNOSIS — I26.99 OTHER PULMONARY EMBOLISM WITHOUT ACUTE COR PULMONALE, UNSPECIFIED CHRONICITY (H): ICD-10-CM

## 2025-02-20 DIAGNOSIS — C25.0 MALIGNANT NEOPLASM OF HEAD OF PANCREAS (H): Primary | ICD-10-CM

## 2025-02-20 DIAGNOSIS — Z79.4 TYPE 2 DIABETES MELLITUS WITHOUT COMPLICATION, WITH LONG-TERM CURRENT USE OF INSULIN (H): ICD-10-CM

## 2025-02-20 DIAGNOSIS — E11.9 TYPE 2 DIABETES MELLITUS WITHOUT COMPLICATION, WITH LONG-TERM CURRENT USE OF INSULIN (H): ICD-10-CM

## 2025-02-20 DIAGNOSIS — D75.89 BICYTOPENIA: ICD-10-CM

## 2025-02-20 DIAGNOSIS — C79.51 MALIGNANT NEOPLASM METASTATIC TO BONE (H): ICD-10-CM

## 2025-02-20 PROCEDURE — S9330 HIT CONT CHEM DIEM: HCPCS

## 2025-02-20 PROCEDURE — A9270 NON-COVERED ITEM OR SERVICE: HCPCS

## 2025-02-20 NOTE — PATIENT INSTRUCTIONS
At [facility], we strive to deliver an exceptional experience to you, every time we see you. If you receive a survey, please let us know what we are doing well and/or what we could improve upon, as we do value your feedback.  If you have MyChart, you can expect to receive results automatically within 24 hours of their completion.  Your provider will send a note interpreting your results as well.   If you do not have MyChart, you should receive your results in about a week by mail.    Your care team:                            Family Medicine Internal Medicine   MD Sohan Haas, MD Jess Perera, MD Akil Hernandez, MD Mai Mancini, PARyneC    Mushtaq Urrutia, MD Pediatrics   Diamante Robins, MD Sherine Costello, MD Lenore Blanton, APRN CNP Katarzyna Hayes APRN CNP   MD Michaelle Baig, MD Erendira Williamson, CNP     Orion Vaughn, CNP Same-Day Provider (No follow-up visits)   ESVIN Solorzano, DNP Viola Thompson, ESVIN Velazquez, FNP, BC ISIAH BoyerC     Clinic hours: Monday - Thursday 7 am-6 pm; Fridays 7 am-5 pm.   Urgent care: Monday - Friday 10 am- 8 pm; Saturday and Sunday 9 am-5 pm.    Clinic: [phone]       [facility]: Monday - Thursday 8 am - 7 pm; Friday 8 am - 6 pm  [facility]: [phone]

## 2025-02-20 NOTE — TELEPHONE ENCOUNTER
I called and spoke with the pt about rescheduling missed return video appointment with IRPA on 2/19/25.  The pt stated that he was on another call and cannot reschedule.  I sent the pt a Fantrotter message reminding the pt to call and reschedule.  Victoriano Harrison on 2/20/2025 at 11:19 AM

## 2025-02-20 NOTE — PROGRESS NOTES
Gallo is a 59 year old who is being evaluated via a billable video visit.    How would you like to obtain your AVS? MyChart  If the video visit is dropped, the invitation should be resent by: Text to cell phone: [phone]  Will anyone else be joining your video visit? No      Assessment & Plan     Malignant neoplasm of head of pancreas (H)  He is getting chemo and radiation  He is getting  some chemo at the infusion center and some chemo at the home  Follows up with oncology and palliative care    Type 2 diabetes mellitus without complication, with long-term current use of insulin (H)  His blood sugars are elevated only when he is placed on steroids following chemo  On those days he takes insulin  Malignant neoplasm metastatic to bone (H)  He has had Zarontin Infusion and Also Kyphoplasty  He has severe pain but this is being controlled with Dilaudid and also Butrans patch    Other pulmonary embolism without acute cor pulmonale, unspecified chronicity (H)  He is on Eliquis     Bicytopenia  This is from his chemotherapy                Subjective   Gallo is a 59 year old, presenting for the following health issues:  Cancer      2/20/2025    11:12 AM   Additional Questions   Roomed by Merced   Accompanied by self     Cancer    History of Present Illness       Reason for visit:  Pancreatin Cancer check in. Need to talk with Dr Hernandez just to keep up to spoed.    He eats 2-3 servings of fruits and vegetables daily.He consumes 0 sweetened beverage(s) daily.He exercises with enough effort to increase his heart rate 9 or less minutes per day.  He exercises with enough effort to increase his heart rate 3 or less days per week.   He is taking medications regularly.               Review of Systems  Constitutional, HEENT, cardiovascular, pulmonary, gi and gu systems are negative, except as otherwise noted.      Objective           Vitals:  No vitals were obtained today due to virtual visit.    Physical Exam   GENERAL: frail,  "elderly, and fatigued  EYES: Eyes grossly normal to inspection.  No discharge or erythema, or obvious scleral/conjunctival abnormalities.  RESP: No audible wheeze, cough, or visible cyanosis.    SKIN: Visible skin clear. No significant rash, abnormal pigmentation or lesions.  NEURO: Cranial nerves grossly intact.  Mentation and speech appropriate for age.  PSYCH: Appropriate affect, tone, and pace of words          Video-Visit Details    Type of service:  Video Visit   Originating Location (pt. Location): Home    Distant Location (provider location):  On-site  Platform used for Video Visit: Lorna  Signed Electronically by: Akil Hernandez MD        If patient has telephone visit, have they been educated on video visit as preferred visit method and offered to change to video visit? yes        Instructions Relayed to Patient by Virtual Roomer:     Patient is active on ncyclo:   Relayed following to patient: \"It looks like you are active on ncyclo, are you able to join the visit this way? If not, do you need us to send you a link now or would you like your provider to send a link via text or email when they are ready to initiate the visit?\"      Patient Confirmed they will join visit via: InstallMonetizer  Reminded patient to ensure they were logged on to virtual visit by arrival time listed.   Asked if patient has flexibility to initiate visit sooner than arrival time: patient stated yes, documented in appointment notes availability to initiate visit earlier than arrival time     If pediatric virtual visit, ensured pediatric patient along with parent/guardian will be present for video visit.     Patient offered the website [email or web address] and/or phone number to ncyclo Help line: [phone]   "

## 2025-02-21 ENCOUNTER — HOME INFUSION BILLING (OUTPATIENT)
Dept: HOME HEALTH SERVICES | Facility: HOME HEALTH | Age: 60
End: 2025-02-21
Payer: COMMERCIAL

## 2025-02-21 PROCEDURE — S9330 HIT CONT CHEM DIEM: HCPCS

## 2025-02-22 ENCOUNTER — HOME CARE VISIT (OUTPATIENT)
Dept: HOME HEALTH SERVICES | Facility: HOME HEALTH | Age: 60
End: 2025-02-22
Payer: COMMERCIAL

## 2025-02-22 ENCOUNTER — LAB REQUISITION (OUTPATIENT)
Dept: LAB | Facility: CLINIC | Age: 60
End: 2025-02-22
Payer: COMMERCIAL

## 2025-02-22 DIAGNOSIS — C25.0 MALIGNANT NEOPLASM OF HEAD OF PANCREAS (H): ICD-10-CM

## 2025-02-22 LAB
ALBUMIN SERPL BCG-MCNC: 2.6 G/DL (ref 3.5–5.2)
ALP SERPL-CCNC: 593 U/L (ref 40–150)
ALT SERPL W P-5'-P-CCNC: 24 U/L (ref 0–70)
ANION GAP SERPL CALCULATED.3IONS-SCNC: 10 MMOL/L (ref 7–15)
AST SERPL W P-5'-P-CCNC: 37 U/L (ref 0–45)
BILIRUB SERPL-MCNC: 0.3 MG/DL
BUN SERPL-MCNC: 9.4 MG/DL (ref 6–20)
CALCIUM SERPL-MCNC: 6.2 MG/DL (ref 8.8–10.4)
CHLORIDE SERPL-SCNC: 103 MMOL/L (ref 98–107)
CREAT SERPL-MCNC: 0.46 MG/DL (ref 0.67–1.17)
EGFRCR SERPLBLD CKD-EPI 2021: >90 ML/MIN/1.73M2
GLUCOSE SERPL-MCNC: 158 MG/DL (ref 70–99)
HCO3 SERPL-SCNC: 25 MMOL/L (ref 22–29)
POTASSIUM SERPL-SCNC: 4.2 MMOL/L (ref 3.4–5.3)
PROT SERPL-MCNC: 5 G/DL (ref 6.4–8.3)
SODIUM SERPL-SCNC: 138 MMOL/L (ref 135–145)

## 2025-02-22 PROCEDURE — S9537 HT HEM HORM INJ DIEM: HCPCS

## 2025-02-22 PROCEDURE — 84460 ALANINE AMINO (ALT) (SGPT): CPT | Performed by: STUDENT IN AN ORGANIZED HEALTH CARE EDUCATION/TRAINING PROGRAM

## 2025-02-22 PROCEDURE — 80053 COMPREHEN METABOLIC PANEL: CPT | Performed by: STUDENT IN AN ORGANIZED HEALTH CARE EDUCATION/TRAINING PROGRAM

## 2025-02-22 PROCEDURE — 82310 ASSAY OF CALCIUM: CPT | Performed by: STUDENT IN AN ORGANIZED HEALTH CARE EDUCATION/TRAINING PROGRAM

## 2025-02-22 PROCEDURE — 99601 HOME NFS VISIT <2 HRS: CPT

## 2025-02-22 NOTE — PROGRESS NOTES
Nursing Visit Note:  Nurse visit today for Fulphila inj, labs, and GA for Gallo Navarro.     present during visit today: Not Applicable.    Intravenous Access:  Implanted Port.    Lab-Only Nursing Note    Labs obtained via PAC    Time Specimen drawn: 1400    Last dose (if applicable): No    Facility sent to: [facility] Tracking number: [id number]    Note: Writer did a step by step review of administration of Fulphila inj with pt. He was engaged but stated he also wanted his spouse to learn (she was not present during visit). Will plan to coordinate time with pt s spouse present for next Fulphila inj. Ordered labs (CMP) drawn and sent to [facility] for processing.     Saline administered: 30 (ml)    Supply Check:   Does the patient have all the supplies they need for the next visit?  Yes    Next visit plan: Per chemo cycle/ oncology clinic.    Quan Brown RN 2/22/2025

## 2025-02-23 VITALS
SYSTOLIC BLOOD PRESSURE: 129 MMHG | OXYGEN SATURATION: 95 % | TEMPERATURE: 98.7 F | HEART RATE: 111 BPM | RESPIRATION RATE: 18 BRPM | DIASTOLIC BLOOD PRESSURE: 71 MMHG

## 2025-02-24 ENCOUNTER — VIRTUAL VISIT (OUTPATIENT)
Dept: ONCOLOGY | Facility: CLINIC | Age: 60
End: 2025-02-24
Attending: STUDENT IN AN ORGANIZED HEALTH CARE EDUCATION/TRAINING PROGRAM
Payer: COMMERCIAL

## 2025-02-24 DIAGNOSIS — C25.0 MALIGNANT NEOPLASM OF HEAD OF PANCREAS (H): Primary | ICD-10-CM

## 2025-02-24 PROCEDURE — 98007 SYNCH AUDIO-VIDEO EST HI 40: CPT | Performed by: STUDENT IN AN ORGANIZED HEALTH CARE EDUCATION/TRAINING PROGRAM

## 2025-02-24 NOTE — PROGRESS NOTES
[facility] - Medical Oncology TeleHealth Consult Note  2025    Patient Identifiers     Name: Gallo Navarro  Preferred Address: [place]  Preferred Language: English  : 1965  Gender: male    Assessment and Plan     Mr. Gallo Navarro is a 59 year old male with a history of IDDM and metastatic pancreas cancer (23) on second-line Hendricks/Abraxane (10/31/2023 - present) who returns to clinic for symptom evaluation.    NGS: KRAS G12R  Immuno: pMMR, KAYLIE, TMB-low  Clinical Trial: MARIPOSA-186, MARIPOSA-280, TORL    I spoke with Gallo and his wife regarding his overall disease course, treatment course and current clinical status.  While we have been technically successful in the treatment of [place] disease so far over the course of 2 years, it is clear that this has taken a personal toll on him.  He and his family have noted increasing fatigue, and an increasing inability to participate in the activities that he enjoys.  Given the disease stability seen on the most recent CTs, this clinical decompensation is almost certainly due to chemotherapeutic treatment.  Therefore, following extended discussion, I provide Gallo with a series of potential treatment options for future management.    First, we can plan to provide a treatment holiday of undetermined length.  During this holiday, we would provide close CT monitoring with imaging every 6 to 8 weeks rather than every 8 to 12 weeks.  Second, we could continue his current regimen, at either reduced schedule or reduce dose.  Given the proven efficacy of the treatment, we would recommend continuation of CT evaluation every 12 weeks with this treatment plan.  Finally, we could plan to convert entirely to palliative management, canceling all lab, infusion, and imaging visits.  This could eventually be transitioned to a home hospice treatment model.    Gallo expressed understanding of all these options, and wishes to discuss these with his family before finalizing a future  treatment plan.  For now, we will plan to cancel his infusion scheduled for next week, to provide him more time to consider his future management.  He will call us when he is ready to communicate his management desires.    45 minutes spent on the date of the encounter doing chart review, review of test results, interpretation of tests, patient visit, documentation, and discussion with other provider(s)     Luis Haile MD, PhD   of Medicine  Division of Hematology, Oncology and Transplantation  [facility]    -----------------------------------    Oncology Summary     Cancer Staging   Malignant neoplasm of head of pancreas (H)  Staging form: Pancreas, AJCC 8th Edition  - Clinical stage from 7/11/2023: Stage III (cT2, cN2, cM0) - Signed by Luis Haile MD on 7/14/2023      Oncology History   Malignant neoplasm of head of pancreas (H)   7/11/2023 -  Cancer Staged    Staging form: Pancreas, AJCC 8th Edition  - Clinical stage from 7/11/2023: Stage III (cT2, cN2, cM0)     7/14/2023 Initial Diagnosis    Malignant neoplasm of head of pancreas (H)     7/31/2023 - 10/21/2023 Chemotherapy    OP ONC Pancreatic Cancer - Modified FOLFIRINOX  Plan Provider: Luis Haile MD  Treatment goal: Curative  Line of treatment: [No plan line of treatment]     10/31/2023 - 11/13/2024 Chemotherapy    OP ONC Pancreatic Cancer - PACLitaxel protein-bound (Abraxane) / Gemcitabine  Plan Provider: Luis Haile MD  Treatment goal: Curative  Line of treatment: Second Line     12/18/2024 -  Chemotherapy    OP ONC Pancreatic or Hepatobiliary Cancer - Liposomal Irinotecan (Onivyde) / Fluorouracil  Plan Provider: Luis Haile MD  Treatment goal: Palliative  Line of treatment: [No plan line of treatment]         Subjective/Interval Events     - has noted significantly greater swelling in his legs  - has noted worsening neuropathy in his fingertips  - feels 'blessed' to be here 2 years out from his metastatic  diagnosis    Objective Data     Lab data:  I have personally reviewed the lab data, notable for:    - labs pending    Radiology data:  I have personally reviewed the radiology data, notable for:  02/18/2025 CT C/A/P  Impression:   1. No significant interval change in the size of ill-defined  pancreatic head mass with encasement of SMA and proper hepatic artery.  2. No significant change in metastatic upper abdominal adenopathy,  including a mesenteric implant.  3. Similar distribution of numerous lytic-sclerotic osseous  metastases.  4. Stable position of the biliary stents with interval resolution of  pneumobilia.    Pathology and other data:  I have personally reviewed and interpreted the pathology data, notable for:    - no new data    Billing Stuff     Virtual Visit Details    Type of service:  Video Visit     Originating Location (pt. Location): Home    Distant Location (provider location):  On-site  Platform used for Video Visit: Lorna

## 2025-02-24 NOTE — NURSING NOTE
Current patient location: [address] [place] [id number]    Is the patient currently in the state of [place]? YES    Visit mode: VIDEO    If the visit is dropped, the patient can be reconnected by:VIDEO VISIT: Text to cell phone:   Telephone Information:   Mobile [phone]       Will anyone else be joining the visit? NO  (If patient encounters technical issues they should call [phone])    Are changes needed to the allergy or medication list? Pt stated no changes to allergies and Pt stated no med changes    Are refills needed on medications prescribed by this physician? NO    Rooming Documentation:  Questionnaire(s) completed    Reason for visit: RECHECK    Patricia NICOLE

## 2025-02-24 NOTE — LETTER
2025      Gallo Navarro  [id number] [address] [place] [id number]      Dear Colleague,    Thank you for referring your patient, Gallo Navarro, to the [facility]. Please see a copy of my visit note below.    [facility] - Medical Oncology TeleHealth Consult Note  2025    Patient Identifiers     Name: Gallo Navarro  Preferred Address: [place]  Preferred Language: English  : 1965  Gender: male    Assessment and Plan     Mr. Gallo Navarro is a 59 year old male with a history of IDDM and metastatic pancreas cancer (23) on second-line Preble/Abraxane (10/31/2023 - present) who returns to clinic for symptom evaluation.    NGS: KRAS G12R  Immuno: pMMR, KAYLIE, TMB-low  Clinical Trial: MARIPOSA-186, MARIPOSA-280, TORL    I spoke with Gallo and his wife regarding his overall disease course, treatment course and current clinical status.  While we have been technically successful in the treatment of [place] disease so far over the course of 2 years, it is clear that this has taken a personal toll on him.  He and his family have noted increasing fatigue, and an increasing inability to participate in the activities that he enjoys.  Given the disease stability seen on the most recent CTs, this clinical decompensation is almost certainly due to chemotherapeutic treatment.  Therefore, following extended discussion, I provide Gallo with a series of potential treatment options for future management.    First, we can plan to provide a treatment holiday of undetermined length.  During this holiday, we would provide close CT monitoring with imaging every 6 to 8 weeks rather than every 8 to 12 weeks.  Second, we could continue his current regimen, at either reduced schedule or reduce dose.  Given the proven efficacy of the treatment, we would recommend continuation of CT evaluation every 12 weeks with this treatment plan.  Finally, we could plan to convert entirely to palliative management,  canceling all lab, infusion, and imaging visits.  This could eventually be transitioned to a home hospice treatment model.    Gallo expressed understanding of all these options, and wishes to discuss these with his family before finalizing a future treatment plan.  For now, we will plan to cancel his infusion scheduled for next week, to provide him more time to consider his future management.  He will call us when he is ready to communicate his management desires.    45 minutes spent on the date of the encounter doing chart review, review of test results, interpretation of tests, patient visit, documentation, and discussion with other provider(s)     Luis Haile MD, PhD   of Medicine  Division of Hematology, Oncology and Transplantation  [facility]    -----------------------------------    Oncology Summary     Cancer Staging   Malignant neoplasm of head of pancreas (H)  Staging form: Pancreas, AJCC 8th Edition  - Clinical stage from 7/11/2023: Stage III (cT2, cN2, cM0) - Signed by Luis Haile MD on 7/14/2023      Oncology History   Malignant neoplasm of head of pancreas (H)   7/11/2023 -  Cancer Staged    Staging form: Pancreas, AJCC 8th Edition  - Clinical stage from 7/11/2023: Stage III (cT2, cN2, cM0)     7/14/2023 Initial Diagnosis    Malignant neoplasm of head of pancreas (H)     7/31/2023 - 10/21/2023 Chemotherapy    OP ONC Pancreatic Cancer - Modified FOLFIRINOX  Plan Provider: Luis Haile MD  Treatment goal: Curative  Line of treatment: [No plan line of treatment]     10/31/2023 - 11/13/2024 Chemotherapy    OP ONC Pancreatic Cancer - PACLitaxel protein-bound (Abraxane) / Gemcitabine  Plan Provider: Luis Haile MD  Treatment goal: Curative  Line of treatment: Second Line     12/18/2024 -  Chemotherapy    OP ONC Pancreatic or Hepatobiliary Cancer - Liposomal Irinotecan (Onivyde) / Fluorouracil  Plan Provider: Luis Haile MD  Treatment goal: Palliative  Line of  treatment: [No plan line of treatment]         Subjective/Interval Events     - has noted significantly greater swelling in his legs  - has noted worsening neuropathy in his fingertips  - feels 'blessed' to be here 2 years out from his metastatic diagnosis    Objective Data     Lab data:  I have personally reviewed the lab data, notable for:    - labs pending    Radiology data:  I have personally reviewed the radiology data, notable for:  02/18/2025 CT C/A/P  Impression:   1. No significant interval change in the size of ill-defined  pancreatic head mass with encasement of SMA and proper hepatic artery.  2. No significant change in metastatic upper abdominal adenopathy,  including a mesenteric implant.  3. Similar distribution of numerous lytic-sclerotic osseous  metastases.  4. Stable position of the biliary stents with interval resolution of  pneumobilia.    Pathology and other data:  I have personally reviewed and interpreted the pathology data, notable for:    - no new data    Billing Stuff     Virtual Visit Details    Type of service:  Video Visit     Originating Location (pt. Location): Home    Distant Location (provider location):  On-site  Platform used for Video Visit: AmWell      Again, thank you for allowing me to participate in the care of your patient.        Sincerely,        Luis Haile MD    Electronically signed

## 2025-02-25 LAB
ABO + RH BLD: NORMAL
BLD GP AB SCN SERPL QL: NEGATIVE
SPECIMEN EXP DATE BLD: NORMAL

## 2025-02-26 ENCOUNTER — LAB (OUTPATIENT)
Dept: LAB | Facility: CLINIC | Age: 60
End: 2025-02-26
Payer: COMMERCIAL

## 2025-02-26 ENCOUNTER — PATIENT OUTREACH (OUTPATIENT)
Dept: ONCOLOGY | Facility: CLINIC | Age: 60
End: 2025-02-26

## 2025-02-26 ENCOUNTER — NURSE TRIAGE (OUTPATIENT)
Dept: ONCOLOGY | Facility: CLINIC | Age: 60
End: 2025-02-26

## 2025-02-26 DIAGNOSIS — C25.0 MALIGNANT NEOPLASM OF HEAD OF PANCREAS (H): Primary | ICD-10-CM

## 2025-02-26 DIAGNOSIS — C25.0 MALIGNANT NEOPLASM OF HEAD OF PANCREAS (H): ICD-10-CM

## 2025-02-26 DIAGNOSIS — D64.9 ANEMIA, UNSPECIFIED TYPE: ICD-10-CM

## 2025-02-26 LAB
ALBUMIN SERPL BCG-MCNC: 3.1 G/DL (ref 3.5–5.2)
ALP SERPL-CCNC: 567 U/L (ref 40–150)
ALT SERPL W P-5'-P-CCNC: 17 U/L (ref 0–70)
ANION GAP SERPL CALCULATED.3IONS-SCNC: 11 MMOL/L (ref 7–15)
AST SERPL W P-5'-P-CCNC: 25 U/L (ref 0–45)
BASOPHILS # BLD AUTO: 0.1 10E3/UL (ref 0–0.2)
BASOPHILS NFR BLD AUTO: 0 %
BILIRUB SERPL-MCNC: 0.4 MG/DL
BLD PROD TYP BPU: NORMAL
BLOOD COMPONENT TYPE: NORMAL
BUN SERPL-MCNC: 6.6 MG/DL (ref 6–20)
CALCIUM SERPL-MCNC: 8.2 MG/DL (ref 8.8–10.4)
CHLORIDE SERPL-SCNC: 104 MMOL/L (ref 98–107)
CODING SYSTEM: NORMAL
CREAT SERPL-MCNC: 0.58 MG/DL (ref 0.67–1.17)
CROSSMATCH: NORMAL
DACRYOCYTES BLD QL SMEAR: SLIGHT
EGFRCR SERPLBLD CKD-EPI 2021: >90 ML/MIN/1.73M2
ELLIPTOCYTES BLD QL SMEAR: SLIGHT
EOSINOPHIL # BLD AUTO: 0.3 10E3/UL (ref 0–0.7)
EOSINOPHIL NFR BLD AUTO: 2 %
ERYTHROCYTE [DISTWIDTH] IN BLOOD BY AUTOMATED COUNT: 19.8 % (ref 10–15)
FRAGMENTS BLD QL SMEAR: SLIGHT
GLUCOSE SERPL-MCNC: 163 MG/DL (ref 70–99)
HCO3 SERPL-SCNC: 24 MMOL/L (ref 22–29)
HCT VFR BLD AUTO: 26 % (ref 40–53)
HGB BLD-MCNC: 7.4 G/DL (ref 13.3–17.7)
IMM GRANULOCYTES # BLD: 0.3 10E3/UL
IMM GRANULOCYTES NFR BLD: 2 %
ISSUE DATE AND TIME: NORMAL
LYMPHOCYTES # BLD AUTO: 1.2 10E3/UL (ref 0.8–5.3)
LYMPHOCYTES NFR BLD AUTO: 9 %
MCH RBC QN AUTO: 28.1 PG (ref 26.5–33)
MCHC RBC AUTO-ENTMCNC: 28.5 G/DL (ref 31.5–36.5)
MCV RBC AUTO: 99 FL (ref 78–100)
MONOCYTES # BLD AUTO: 1 10E3/UL (ref 0–1.3)
MONOCYTES NFR BLD AUTO: 8 %
NEUTROPHILS # BLD AUTO: 10.9 10E3/UL (ref 1.6–8.3)
NEUTROPHILS NFR BLD AUTO: 80 %
NRBC # BLD AUTO: 0 10E3/UL
NRBC BLD AUTO-RTO: 0 /100
PLAT MORPH BLD: ABNORMAL
PLATELET # BLD AUTO: ABNORMAL 10*3/UL
POLYCHROMASIA BLD QL SMEAR: SLIGHT
POTASSIUM SERPL-SCNC: 4 MMOL/L (ref 3.4–5.3)
PROT SERPL-MCNC: 6 G/DL (ref 6.4–8.3)
RBC # BLD AUTO: 2.63 10E6/UL (ref 4.4–5.9)
RBC MORPH BLD: ABNORMAL
SODIUM SERPL-SCNC: 139 MMOL/L (ref 135–145)
UNIT ABO/RH: NORMAL
UNIT NUMBER: NORMAL
UNIT STATUS: NORMAL
UNIT TYPE ISBT: 8400
VARIANT LYMPHS BLD QL SMEAR: PRESENT
WBC # BLD AUTO: 13.7 10E3/UL (ref 4–11)

## 2025-02-26 PROCEDURE — 85041 AUTOMATED RBC COUNT: CPT

## 2025-02-26 PROCEDURE — 86850 RBC ANTIBODY SCREEN: CPT

## 2025-02-26 PROCEDURE — 86923 COMPATIBILITY TEST ELECTRIC: CPT | Performed by: STUDENT IN AN ORGANIZED HEALTH CARE EDUCATION/TRAINING PROGRAM

## 2025-02-26 PROCEDURE — 85048 AUTOMATED LEUKOCYTE COUNT: CPT

## 2025-02-26 PROCEDURE — 85018 HEMOGLOBIN: CPT

## 2025-02-26 PROCEDURE — 99000 SPECIMEN HANDLING OFFICE-LAB: CPT

## 2025-02-26 PROCEDURE — 86901 BLOOD TYPING SEROLOGIC RH(D): CPT

## 2025-02-26 PROCEDURE — 86900 BLOOD TYPING SEROLOGIC ABO: CPT

## 2025-02-26 PROCEDURE — 85014 HEMATOCRIT: CPT

## 2025-02-26 PROCEDURE — 36415 COLL VENOUS BLD VENIPUNCTURE: CPT

## 2025-02-26 PROCEDURE — 86301 IMMUNOASSAY TUMOR CA 19-9: CPT | Mod: 90

## 2025-02-26 PROCEDURE — 80053 COMPREHEN METABOLIC PANEL: CPT

## 2025-02-26 PROCEDURE — 85004 AUTOMATED DIFF WBC COUNT: CPT

## 2025-02-26 RX ORDER — HEPARIN SODIUM (PORCINE) LOCK FLUSH IV SOLN 100 UNIT/ML 100 UNIT/ML
5 SOLUTION INTRAVENOUS
OUTPATIENT
Start: 2025-02-26

## 2025-02-26 RX ORDER — HEPARIN SODIUM,PORCINE 10 UNIT/ML
5-20 VIAL (ML) INTRAVENOUS DAILY PRN
OUTPATIENT
Start: 2025-02-26

## 2025-02-26 RX ORDER — EPINEPHRINE 1 MG/ML
0.3 INJECTION, SOLUTION INTRAMUSCULAR; SUBCUTANEOUS EVERY 5 MIN PRN
OUTPATIENT
Start: 2025-02-26

## 2025-02-26 RX ORDER — DIPHENHYDRAMINE HYDROCHLORIDE 50 MG/ML
50 INJECTION INTRAMUSCULAR; INTRAVENOUS
Start: 2025-02-26

## 2025-02-26 NOTE — TELEPHONE ENCOUNTER
DATE/TIME OF CALL RECEIVED FROM LAB:  02/26/25 at 11:02 AM   CRITICAL LAB TEST:  hgb 7.4 tube is signed   LAB VALUE:  platelets 292 WBC 13.7 ANC 10.9   Therapy plan in place to transfuse if Hgb is 7 or less.   PROVIDER NOTIFIED?: Yes  PROVIDER NAME: DR Haile   DATE/TIME LAB VALUE REPORTED TO PROVIDER: 11:08 DR Haile called back.   MECHANISM OF PROVIDER NOTIFICATION: Page  PROVIDER RESPONSE: DR Haile does want to transfuse him and will talk to Nanci about it.

## 2025-02-26 NOTE — TELEPHONE ENCOUNTER
[facility]: Cancer Care                                                                                          Pt scheduled for 1u PRBC Thursday 2/27 at 12 pm, scheduling messaged. Pt aware.    Signature:  Efra Lynn RN

## 2025-02-27 ENCOUNTER — TELEPHONE (OUTPATIENT)
Dept: FAMILY MEDICINE | Facility: CLINIC | Age: 60
End: 2025-02-27

## 2025-02-27 ENCOUNTER — INFUSION THERAPY VISIT (OUTPATIENT)
Dept: TRANSPLANT | Facility: CLINIC | Age: 60
End: 2025-02-27
Payer: COMMERCIAL

## 2025-02-27 ENCOUNTER — TELEPHONE (OUTPATIENT)
Dept: FAMILY MEDICINE | Facility: CLINIC | Age: 60
End: 2025-02-27
Payer: COMMERCIAL

## 2025-02-27 VITALS
DIASTOLIC BLOOD PRESSURE: 71 MMHG | SYSTOLIC BLOOD PRESSURE: 114 MMHG | RESPIRATION RATE: 18 BRPM | HEART RATE: 107 BPM | OXYGEN SATURATION: 99 % | TEMPERATURE: 97.8 F

## 2025-02-27 DIAGNOSIS — D64.9 ANEMIA, UNSPECIFIED TYPE: Primary | ICD-10-CM

## 2025-02-27 DIAGNOSIS — C25.0 MALIGNANT NEOPLASM OF HEAD OF PANCREAS (H): ICD-10-CM

## 2025-02-27 PROCEDURE — 250N000011 HC RX IP 250 OP 636: Performed by: PHYSICIAN ASSISTANT

## 2025-02-27 PROCEDURE — P9016 RBC LEUKOCYTES REDUCED: HCPCS | Performed by: STUDENT IN AN ORGANIZED HEALTH CARE EDUCATION/TRAINING PROGRAM

## 2025-02-27 RX ORDER — HEPARIN SODIUM (PORCINE) LOCK FLUSH IV SOLN 100 UNIT/ML 100 UNIT/ML
5 SOLUTION INTRAVENOUS
Status: DISCONTINUED | OUTPATIENT
Start: 2025-02-27 | End: 2025-02-27 | Stop reason: HOSPADM

## 2025-02-27 RX ADMIN — HEPARIN 3 ML: 100 SYRINGE at 14:24

## 2025-02-27 NOTE — PROGRESS NOTES
Infusion Nursing Note:  Gallo Navarro presents today for RBCs.    Patient seen by provider today: No   present during visit today: Not Applicable.    Note: No labs drawn today. Allergies and home medications reviewed.PT reports feeling increasingly more fatigue recently with SOB upon excursion. Pt received x1 unit of RBCs, tolerated well.   Port deaccessed and pt discharged with no further concerns.       Intravenous Access:  Implanted Port.    Treatment Conditions:  Lab Results   Component Value Date    HGB 7.4 (LL) 02/26/2025    WBC 13.7 (H) 02/26/2025    ANEU 7.4 02/18/2025    ANEUTAUTO 10.9 (H) 02/26/2025    PLT  02/26/2025      Comment:      Platelets clumped on smear.  Unable to report count.        Lab Results   Component Value Date     02/26/2025    POTASSIUM 4.0 02/26/2025    MAG 1.9 08/23/2023    CR 0.58 (L) 02/26/2025    DENISE 8.2 (L) 02/26/2025    BILITOTAL 0.4 02/26/2025    ALBUMIN 3.1 (L) 02/26/2025    ALT 17 02/26/2025    AST 25 02/26/2025       Results reviewed, labs MET treatment parameters, ok to proceed with treatment.      Post Infusion Assessment:  Patient tolerated infusion without incident.  Blood return noted pre and post infusion.       Discharge Plan:   Patient and/or family verbalized understanding of discharge instructions and all questions answered.  Patient discharged in stable condition accompanied by: wife.      Meaghan Hui RN

## 2025-02-27 NOTE — TELEPHONE ENCOUNTER
Forms/Letter Request    Type of form/letter: [facility], [facility] order # [id number]    Do we have the form/letter: Yes:     Who is the form from? Home care    Where did/will the form come from? form was faxed in    When is form/letter needed by: asap    How would you like the form/letter returned: Fax : [phone]

## 2025-03-01 LAB — CANCER AG19-9 SERPL IA-ACNC: 42 U/ML

## 2025-03-03 NOTE — TELEPHONE ENCOUNTER
[facility] forms signed by PCP Order # [id number] faxed to [phone]      Janette TEEJDA Visit Facilitator

## 2025-03-05 ENCOUNTER — LAB (OUTPATIENT)
Dept: LAB | Facility: CLINIC | Age: 60
End: 2025-03-05
Attending: STUDENT IN AN ORGANIZED HEALTH CARE EDUCATION/TRAINING PROGRAM
Payer: COMMERCIAL

## 2025-03-05 ENCOUNTER — MYC REFILL (OUTPATIENT)
Dept: RADIATION ONCOLOGY | Facility: HOSPITAL | Age: 60
End: 2025-03-05
Payer: COMMERCIAL

## 2025-03-05 ENCOUNTER — HOME INFUSION BILLING (OUTPATIENT)
Dept: HOME HEALTH SERVICES | Facility: HOME HEALTH | Age: 60
End: 2025-03-05
Payer: COMMERCIAL

## 2025-03-05 ENCOUNTER — INFUSION THERAPY VISIT (OUTPATIENT)
Dept: ONCOLOGY | Facility: CLINIC | Age: 60
End: 2025-03-05
Attending: STUDENT IN AN ORGANIZED HEALTH CARE EDUCATION/TRAINING PROGRAM
Payer: COMMERCIAL

## 2025-03-05 VITALS
BODY MASS INDEX: 20.71 KG/M2 | RESPIRATION RATE: 16 BRPM | HEART RATE: 129 BPM | OXYGEN SATURATION: 98 % | SYSTOLIC BLOOD PRESSURE: 120 MMHG | WEIGHT: 152.7 LBS | TEMPERATURE: 98.3 F | DIASTOLIC BLOOD PRESSURE: 70 MMHG

## 2025-03-05 DIAGNOSIS — C25.0 MALIGNANT NEOPLASM OF HEAD OF PANCREAS (H): ICD-10-CM

## 2025-03-05 DIAGNOSIS — C25.0 MALIGNANT NEOPLASM OF HEAD OF PANCREAS (H): Primary | ICD-10-CM

## 2025-03-05 DIAGNOSIS — M48.50XD PATHOLOGICAL COMPRESSION FRACTURE OF VERTEBRA WITH ROUTINE HEALING, SUBSEQUENT ENCOUNTER: ICD-10-CM

## 2025-03-05 DIAGNOSIS — G89.3 CANCER ASSOCIATED PAIN: ICD-10-CM

## 2025-03-05 DIAGNOSIS — Z51.11 ENCOUNTER FOR ANTINEOPLASTIC CHEMOTHERAPY: ICD-10-CM

## 2025-03-05 DIAGNOSIS — Z51.11 ENCOUNTER FOR ANTINEOPLASTIC CHEMOTHERAPY: Primary | ICD-10-CM

## 2025-03-05 LAB
ALBUMIN SERPL BCG-MCNC: 3 G/DL (ref 3.5–5.2)
ALP SERPL-CCNC: 583 U/L (ref 40–150)
ALT SERPL W P-5'-P-CCNC: 17 U/L (ref 0–70)
ANION GAP SERPL CALCULATED.3IONS-SCNC: 11 MMOL/L (ref 7–15)
AST SERPL W P-5'-P-CCNC: 30 U/L (ref 0–45)
BASOPHILS # BLD MANUAL: 0 10E3/UL (ref 0–0.2)
BASOPHILS NFR BLD MANUAL: 0 %
BILIRUB SERPL-MCNC: 0.4 MG/DL
BUN SERPL-MCNC: 9.9 MG/DL (ref 6–20)
CALCIUM SERPL-MCNC: 8 MG/DL (ref 8.8–10.4)
CHLORIDE SERPL-SCNC: 108 MMOL/L (ref 98–107)
CREAT SERPL-MCNC: 0.45 MG/DL (ref 0.67–1.17)
EGFRCR SERPLBLD CKD-EPI 2021: >90 ML/MIN/1.73M2
EOSINOPHIL # BLD MANUAL: 0 10E3/UL (ref 0–0.7)
EOSINOPHIL NFR BLD MANUAL: 0 %
ERYTHROCYTE [DISTWIDTH] IN BLOOD BY AUTOMATED COUNT: 21.2 % (ref 10–15)
GLUCOSE BLDC GLUCOMTR-MCNC: 75 MG/DL (ref 70–99)
GLUCOSE SERPL-MCNC: 48 MG/DL (ref 70–99)
HCO3 SERPL-SCNC: 23 MMOL/L (ref 22–29)
HCT VFR BLD AUTO: 25.4 % (ref 40–53)
HGB BLD-MCNC: 7.6 G/DL (ref 13.3–17.7)
LYMPHOCYTES # BLD MANUAL: 3 10E3/UL (ref 0.8–5.3)
LYMPHOCYTES NFR BLD MANUAL: 12 %
MCH RBC QN AUTO: 28.4 PG (ref 26.5–33)
MCHC RBC AUTO-ENTMCNC: 29.9 G/DL (ref 31.5–36.5)
MCV RBC AUTO: 95 FL (ref 78–100)
METAMYELOCYTES # BLD MANUAL: 0.4 10E3/UL
METAMYELOCYTES NFR BLD MANUAL: 2 %
MONOCYTES # BLD MANUAL: 0.8 10E3/UL (ref 0–1.3)
MONOCYTES NFR BLD MANUAL: 3 %
NEUTROPHILS # BLD MANUAL: 20.1 10E3/UL (ref 1.6–8.3)
NEUTROPHILS NFR BLD MANUAL: 82 %
PLAT MORPH BLD: ABNORMAL
PLATELET # BLD AUTO: 283 10E3/UL (ref 150–450)
POTASSIUM SERPL-SCNC: 3 MMOL/L (ref 3.4–5.3)
PROMYELOCYTES # BLD MANUAL: 0.2 10E3/UL
PROMYELOCYTES NFR BLD MANUAL: 1 %
PROT SERPL-MCNC: 5.9 G/DL (ref 6.4–8.3)
RBC # BLD AUTO: 2.68 10E6/UL (ref 4.4–5.9)
RBC MORPH BLD: ABNORMAL
SODIUM SERPL-SCNC: 142 MMOL/L (ref 135–145)
WBC # BLD AUTO: 24.5 10E3/UL (ref 4–11)

## 2025-03-05 PROCEDURE — 96413 CHEMO IV INFUSION 1 HR: CPT

## 2025-03-05 PROCEDURE — 82962 GLUCOSE BLOOD TEST: CPT

## 2025-03-05 PROCEDURE — 84155 ASSAY OF PROTEIN SERUM: CPT | Performed by: STUDENT IN AN ORGANIZED HEALTH CARE EDUCATION/TRAINING PROGRAM

## 2025-03-05 PROCEDURE — 250N000013 HC RX MED GY IP 250 OP 250 PS 637: Performed by: STUDENT IN AN ORGANIZED HEALTH CARE EDUCATION/TRAINING PROGRAM

## 2025-03-05 PROCEDURE — 96367 TX/PROPH/DG ADDL SEQ IV INF: CPT

## 2025-03-05 PROCEDURE — 86301 IMMUNOASSAY TUMOR CA 19-9: CPT

## 2025-03-05 PROCEDURE — 96375 TX/PRO/DX INJ NEW DRUG ADDON: CPT

## 2025-03-05 PROCEDURE — 82374 ASSAY BLOOD CARBON DIOXIDE: CPT | Performed by: STUDENT IN AN ORGANIZED HEALTH CARE EDUCATION/TRAINING PROGRAM

## 2025-03-05 PROCEDURE — 36591 DRAW BLOOD OFF VENOUS DEVICE: CPT | Performed by: STUDENT IN AN ORGANIZED HEALTH CARE EDUCATION/TRAINING PROGRAM

## 2025-03-05 PROCEDURE — 250N000011 HC RX IP 250 OP 636: Performed by: STUDENT IN AN ORGANIZED HEALTH CARE EDUCATION/TRAINING PROGRAM

## 2025-03-05 PROCEDURE — 258N000003 HC RX IP 258 OP 636: Performed by: STUDENT IN AN ORGANIZED HEALTH CARE EDUCATION/TRAINING PROGRAM

## 2025-03-05 PROCEDURE — 85014 HEMATOCRIT: CPT | Performed by: STUDENT IN AN ORGANIZED HEALTH CARE EDUCATION/TRAINING PROGRAM

## 2025-03-05 PROCEDURE — 85007 BL SMEAR W/DIFF WBC COUNT: CPT | Performed by: STUDENT IN AN ORGANIZED HEALTH CARE EDUCATION/TRAINING PROGRAM

## 2025-03-05 PROCEDURE — G0498 CHEMO EXTEND IV INFUS W/PUMP: HCPCS

## 2025-03-05 RX ORDER — LORAZEPAM 2 MG/ML
0.5 INJECTION INTRAMUSCULAR EVERY 4 HOURS PRN
OUTPATIENT
Start: 2025-03-19

## 2025-03-05 RX ORDER — ATROPINE SULFATE 0.4 MG/ML
0.4 AMPUL (ML) INJECTION
OUTPATIENT
Start: 2025-03-19

## 2025-03-05 RX ORDER — MEPERIDINE HYDROCHLORIDE 25 MG/ML
25 INJECTION INTRAMUSCULAR; INTRAVENOUS; SUBCUTANEOUS
OUTPATIENT
Start: 2025-03-19

## 2025-03-05 RX ORDER — LORAZEPAM 0.5 MG/1
.5-1 TABLET ORAL EVERY 6 HOURS PRN
Qty: 45 TABLET | Refills: 2 | Status: SHIPPED | OUTPATIENT
Start: 2025-03-05

## 2025-03-05 RX ORDER — METHYLPREDNISOLONE SODIUM SUCCINATE 40 MG/ML
40 INJECTION INTRAMUSCULAR; INTRAVENOUS
Start: 2025-03-19

## 2025-03-05 RX ORDER — ALBUTEROL SULFATE 90 UG/1
1-2 INHALANT RESPIRATORY (INHALATION)
Start: 2025-03-19

## 2025-03-05 RX ORDER — DIPHENHYDRAMINE HYDROCHLORIDE 50 MG/ML
25 INJECTION, SOLUTION INTRAMUSCULAR; INTRAVENOUS
Start: 2025-03-19

## 2025-03-05 RX ORDER — HEPARIN SODIUM,PORCINE 10 UNIT/ML
5-20 VIAL (ML) INTRAVENOUS DAILY PRN
OUTPATIENT
Start: 2025-03-20

## 2025-03-05 RX ORDER — HEPARIN SODIUM (PORCINE) LOCK FLUSH IV SOLN 100 UNIT/ML 100 UNIT/ML
500 SOLUTION INTRAVENOUS ONCE
Status: COMPLETED | OUTPATIENT
Start: 2025-03-05 | End: 2025-03-05

## 2025-03-05 RX ORDER — HEPARIN SODIUM (PORCINE) LOCK FLUSH IV SOLN 100 UNIT/ML 100 UNIT/ML
5 SOLUTION INTRAVENOUS
OUTPATIENT
Start: 2025-03-20

## 2025-03-05 RX ORDER — EPINEPHRINE 1 MG/ML
0.3 INJECTION, SOLUTION INTRAMUSCULAR; SUBCUTANEOUS EVERY 5 MIN PRN
OUTPATIENT
Start: 2025-03-19

## 2025-03-05 RX ORDER — DIPHENHYDRAMINE HYDROCHLORIDE 50 MG/ML
50 INJECTION, SOLUTION INTRAMUSCULAR; INTRAVENOUS
Start: 2025-03-19

## 2025-03-05 RX ORDER — POTASSIUM CHLORIDE 1500 MG/1
40 TABLET, EXTENDED RELEASE ORAL ONCE
Status: COMPLETED | OUTPATIENT
Start: 2025-03-05 | End: 2025-03-05

## 2025-03-05 RX ORDER — ALBUTEROL SULFATE 0.83 MG/ML
2.5 SOLUTION RESPIRATORY (INHALATION)
OUTPATIENT
Start: 2025-03-19

## 2025-03-05 RX ORDER — HEPARIN SODIUM (PORCINE) LOCK FLUSH IV SOLN 100 UNIT/ML 100 UNIT/ML
5 SOLUTION INTRAVENOUS
OUTPATIENT
Start: 2025-03-19

## 2025-03-05 RX ORDER — HEPARIN SODIUM,PORCINE 10 UNIT/ML
5-20 VIAL (ML) INTRAVENOUS DAILY PRN
OUTPATIENT
Start: 2025-03-19

## 2025-03-05 RX ADMIN — LEUCOVORIN CALCIUM 750 MG: 200 INJECTION, POWDER, LYOPHILIZED, FOR SOLUTION INTRAMUSCULAR; INTRAVENOUS at 17:48

## 2025-03-05 RX ADMIN — IRINOTECAN HYDROCHLORIDE 68.8 MG: 4.3 INJECTION, POWDER, FOR SOLUTION INTRAVENOUS at 16:19

## 2025-03-05 RX ADMIN — Medication 500 UNITS: at 14:36

## 2025-03-05 RX ADMIN — SODIUM CHLORIDE 1000 ML: 9 INJECTION, SOLUTION INTRAVENOUS at 15:31

## 2025-03-05 RX ADMIN — DEXAMETHASONE SODIUM PHOSPHATE: 10 INJECTION, SOLUTION INTRAMUSCULAR; INTRAVENOUS at 15:58

## 2025-03-05 RX ADMIN — POTASSIUM CHLORIDE 40 MEQ: 1500 TABLET, EXTENDED RELEASE ORAL at 15:43

## 2025-03-05 ASSESSMENT — PAIN SCALES - GENERAL: PAINLEVEL_OUTOF10: NO PAIN (0)

## 2025-03-05 NOTE — NURSING NOTE
Chief Complaint   Patient presents with    Port Draw     Labs drawn from port by RN     Port accessed with 20 gauge, 3/4 inch, flat needle by RN, labs collected, line flushed with saline and heparin.  Vitals taken. Pt checked in for appointment(s).     Erica Baez, RN

## 2025-03-05 NOTE — PATIENT INSTRUCTIONS
[facility] Triage and after hours / weekends / holidays:  [phone]    Please call the triage or after hours line if you experience a temperature greater than or equal to 100.4, shaking chills, have uncontrolled nausea, vomiting and/or diarrhea, dizziness, shortness of breath, chest pain, bleeding, unexplained bruising, or if you have any other new/concerning symptoms, questions or concerns.      If you are having any concerning symptoms or wish to speak to a provider before your next infusion visit, please call triage to notify them so we can adequately serve you.     If you need a refill on a narcotic prescription or other medication, please call before your infusion appointment.

## 2025-03-05 NOTE — TELEPHONE ENCOUNTER
Received [employer] message from patient requesting refill of  lorazepam .     Last refill: 2/26/25  Last office visit: 1/21/25  Scheduled for follow up 3/10/25     Will route request to MD/ for review.     Reviewed [place].

## 2025-03-06 ENCOUNTER — DOCUMENTATION ONLY (OUTPATIENT)
Dept: HOME HEALTH SERVICES | Facility: HOME HEALTH | Age: 60
End: 2025-03-06
Payer: COMMERCIAL

## 2025-03-06 ENCOUNTER — HOME INFUSION BILLING (OUTPATIENT)
Dept: HOME HEALTH SERVICES | Facility: HOME HEALTH | Age: 60
End: 2025-03-06
Payer: COMMERCIAL

## 2025-03-06 NOTE — PROGRESS NOTES
FHI d/c of Fluorouracil continuous infusion over 46 hours via C series pump.   Scheduled in [facility] for home disconnect on 3/7/25 at 3pm (patient's requested time).  No Neulasta OnPro/IV fluids needed at this visit.   Fulphila injection Day 4, 3/8/25, teach wife/patient self-administration.

## 2025-03-08 ENCOUNTER — HOME CARE VISIT (OUTPATIENT)
Dept: HOME HEALTH SERVICES | Facility: HOME HEALTH | Age: 60
End: 2025-03-08
Payer: COMMERCIAL

## 2025-03-08 VITALS
HEART RATE: 110 BPM | DIASTOLIC BLOOD PRESSURE: 54 MMHG | TEMPERATURE: 97.8 F | SYSTOLIC BLOOD PRESSURE: 100 MMHG | RESPIRATION RATE: 14 BRPM | OXYGEN SATURATION: 95 %

## 2025-03-08 PROCEDURE — 99601 HOME NFS VISIT <2 HRS: CPT

## 2025-03-08 NOTE — PROGRESS NOTES
Nursing Visit Note:  Nurse visit today for filgrastim injection for Gallo Navarro.     present during visit today: Not Applicable.    Intravenous Access:  No Intravenous access/labs at this visit.    filgrastim given in r Abd     Note: fatigue is only symptom pt endorsed    Saline administered: 0 (ml)    Supply Check:   Does the patient have all the supplies they need for the next visit?  Yes    Next visit plan: per chemo plan    Chitra Street RN 3/8/2025

## 2025-03-10 ENCOUNTER — VIRTUAL VISIT (OUTPATIENT)
Dept: RADIATION ONCOLOGY | Facility: HOSPITAL | Age: 60
End: 2025-03-10
Attending: FAMILY MEDICINE
Payer: COMMERCIAL

## 2025-03-10 VITALS — BODY MASS INDEX: 20.61 KG/M2 | WEIGHT: 152 LBS

## 2025-03-10 DIAGNOSIS — C79.51 MALIGNANT NEOPLASM METASTATIC TO BONE (H): ICD-10-CM

## 2025-03-10 DIAGNOSIS — C25.9 PANCREATIC ADENOCARCINOMA (H): ICD-10-CM

## 2025-03-10 DIAGNOSIS — C25.0 MALIGNANT NEOPLASM OF HEAD OF PANCREAS (H): ICD-10-CM

## 2025-03-10 DIAGNOSIS — Z51.5 PALLIATIVE CARE PATIENT: Primary | ICD-10-CM

## 2025-03-10 DIAGNOSIS — G89.3 CANCER ASSOCIATED PAIN: ICD-10-CM

## 2025-03-10 ASSESSMENT — PAIN SCALES - GENERAL: PAINLEVEL_OUTOF10: NO PAIN (0)

## 2025-03-10 NOTE — NURSING NOTE
Patient reviewed medications and allergies in [employer] during e-check in and said everything looked correct.      Current patient location: [address] [place] [address]    Is the patient currently in the state of [place]? YES    Visit mode: VIDEO    If the visit is dropped, the patient can be reconnected by:VIDEO VISIT: Text to cell phone:   Telephone Information:   Mobile [phone]    and VIDEO VISIT: Send to e-mail at: [email or web address]    Will anyone else be joining the visit? NO  (If patient encounters technical issues they should call [phone]    Are changes needed to the allergy or medication list? Pt declined med review and Pt stated no med changes    Are refills needed on medications prescribed by this physician? NO    Rooming Documentation:  Not applicable    Reason for visit: LO NICOLE

## 2025-03-10 NOTE — PATIENT INSTRUCTIONS
It was good to see you today, Gallo.  I hope our energy bounces back quickly.  Please give my best to Violet,    Here are the things we talked about:  Try the claritin 10 mg twice a day starting a day before a filgrastim injection and continuing for 1-2 days after for bony pain.    No other changes to medicines.    Someone from the team will reach out to schedule a follow up appointment in 2 months and I can always see you sooner, if needed.     How to get a hold of us:  For non-urgent matters, MyChart works best.    For more urgent matters, or if you prefer not to use MyChart, call our clinic nurse coordinator Megan Funez RN at [phone]    We have an on-call number for evenings and weekends. Please call this only if you are having uncontrolled symptoms or serious side effects from your medicines: [phone].     For refills, please give us a week (5 working days) notice. We don't always have providers available everyday to do refills. If you call the day you run out of your medicine, we may not be able to refill it in time, so call 5 days in advance!    Jeremy Hurt MD MS FAAFP CAQHPM  [facility]  Office [phone]  Fax [phone]

## 2025-03-10 NOTE — PROGRESS NOTES
Virtual Visit Details    Type of service:  Video Visit     Originating Location (pt. 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  2025  no change in GOC;  no change to GOC  2024   No change  2024  No change to GOC. Gallo is preparing to go to [facility] to see if he qualifies for a cellular therapy trial.  2024 Life prolonging without limits at this time and willingness to consider clinical trials.     Recommendations & Counseling:  Stay off [place] films.  Resume Butrans 30 mcg/hours total--  Continue dilaudid 4-6 mg po q 3 hours prn and record how often it is needed  OK to use methocarbamol  FROM 750 MG TO 1000 MG TABS PO Q 6 HOURS PRN  CONTINUE Ativan 0.5 mg  tabs prescribed to use sparingly 1-2 tablets q 6 hours prn severe spasm not relieved with methocarbamol  CONTINUE lidocaine patches  Tylenol suspension 650 mg po TID  Narcan also prescribed for safety  video follow-up in 8 weeks   e Claritin 10 mg BID for a few days around his filgrastim    I provided emotional support through validating Gallo's feelings and open, empathic communication and his love for the [employer].     Counseling: All of the above was explained to the patient in lay language. The patient has verbalized a clear understanding of the discussion, asked appropriate  questions, which have been answered to patient's apparent satisfaction. The patient is in agreement with the above plan.        Chief Complaint/Patient ID: Gallo Navarro 59 year old male with PMHx of unresectable localized pancreatic cancer and spinal mets       Last Palliative care appointment: 01/21/2025 with me     Reviewed:  Yes:   reviewed - controlled substances reflected in medication list.    Interim History:  Gallo Navarro is a 59 year old male who is seen today for follow up with Palliative Care via billable video visit. He is doing OK though tired from his chemo last week.  He also had filgrastim injection yesterday for neutropenia and had some bony pain.  He also felt like he was bouncing off the walls with his post chemo dex.  That also affects his blood glucoses.     Pain:  good control with current regimen.    Appetite/Nausea: OK     Bowels: no concerns     Sleep: OK unless daughter is playing her music loudly in the morning     Mood: overall OK     Coping:  mostly good    Family History- Reviewed in Epic.    No Known Allergies    Social History:  Pertinent changes to social history/social situation since last visit: none  Key support resources: family  Advance Directive Status:  no ACP documents in Epic    Social History     Tobacco Use    Smoking status: Never     Passive exposure: Never    Smokeless tobacco: Never   Vaping Use    Vaping status: Never Used   Substance Use Topics    Alcohol use: Not Currently    Drug use: Never         No Known Allergies  Current Outpatient Medications   Medication Sig Dispense Refill    acetaminophen (TYLENOL) 32 mg/mL liquid Take 20.313 mLs (650 mg) by mouth every 8 hours. (Patient taking differently: Take 650 mg by mouth every 8 hours as needed.) 1800 mL 3    alteplase (CATHFLO ACTIVASE) injection Inject 2 mLs (2 mg) into catheter as needed (catheter occlusion). Reconstitute vial. Draw up alteplase 1 mg/mL in a syringe and instill into IV catheter. Dwell for at  "least 20 min to 24 hours, then aspirate. May repeat dose once in 24 hours if catheter function not restored after at least 2 hours. Discard remainder of vial. 545559 each 0    apixaban ANTICOAGULANT (ELIQUIS) 5 MG tablet Take 1 tablet (5 mg) by mouth 2 times daily. 60 tablet 2    blood glucose (FREESTYLE TEST STRIPS) test strip 1 strip by In Vitro route 3 times daily. 100 strip 2    buprenorphine (BUTRANS) 10 MCG/HR WK patch Place 1 patch over 168 hours onto the skin every 7 days. 4 patch 4    buprenorphine (BUTRANS) 20 MCG/HR WK patch Place 1 patch over 168 hours onto the skin every 7 days. 4 patch 4    calcium carbonate (OS-DENISE) 500 MG tablet Take 2 tablets (1,000 mg) by mouth 2 times daily. 60 tablet 3    Chemo Kit For RN use only. Do not remove items from bag. Contents: 1  sodium chloride 0.9% flush, 4 medium gloves, 1 chemo gown, 1/4 chemo mat, 1 connector female, 1 chemo bag. [id number] kit 0    Continuous Glucose Sensor (DEXCOM G7 SENSOR) MISC Change every 10 days. 6 each 3    dexAMETHasone (DECADRON) 2 MG tablet Take 1 tablet (2 mg) by mouth daily (with breakfast). 10 tablet 0    dicyclomine (BENTYL) 10 MG capsule Take 1 capsule (10 mg) by mouth 4 times daily (before meals and nightly). 120 capsule 1    diphenhydrAMINE (BENADRYL) 50 MG/ML injection Inject 1 mL (50 mg) over 3-5 minutes into the vein via push as needed for other (infusion reaction). For RN use only.  Draw up in a syringe and administer IV push.  Discard remainder of vial. 30991 mL 0    econazole nitrate 1 % external cream Apply topically daily To affected toenails. 85 g 5    Emergency Supply Kit, Central, Patient use for emergency only. Contents: 3 sodium chloride 0.9% flushes, 1 dressing kit, 1 microclave ext set 14\", 4 nitrile gloves (med), 6 alcohol prep pads, 1 bacitracin, 1 syringe (10 cc 20 G 1\"). Call [phone] to reorder. [id number] kit 0    EPINEPHrine (ANY BX GENERIC EQUIV) 0.3 MG/0.3ML injection 2-pack Inject 0.3 mLs (0.3 mg) into the " muscle as needed for anaphylaxis (infusion reaction). Administer into the mid-thigh in case of severe anaphylaxis (wheezing, throat tightening, mouth swelling, difficulty breathing). May repeat dose one time in 5-15 minutes if symptoms persist. 826760 mL 0    Fluorouracil (ADRUCIL) 2,290 mg in sodium chloride 0.9 % 241 mL via HOMEPUMP C-Series Infuse 2,290 mg at 5.2 mL/hr over 46 hours into the vein once for 1 dose. 348158 mL 0    HYDROmorphone (DILAUDID) 4 MG tablet Take 1-2 tablets (4-8 mg) by mouth every 3 hours as needed for severe pain or breakthrough pain. 180 tablet 0    insulin  UNIT/ML injection 20 units around 9 am (with steroids) 10 units around 9 pm 15 mL 3    insulin pen needle (31G X 8 MM) 31G X 8 MM miscellaneous Use 1 pen needles daily or as directed. 50 each 0    lidocaine (LIDODERM) 5 % patch Place 2 patches over 12 hours onto the skin every 24 hours. (Patient not taking: Reported on 2/20/2025) 60 patch 3    lidocaine-prilocaine (EMLA) 2.5-2.5 % external cream Use 1-2 times a week or as needed prior to port access (Patient not taking: Reported on 2/11/2025) 30 g 3    lipase-protease-amylase (CREON 24) 24089-54147-505783 units CPEP per EC capsule 2-3 capsules with meals 3 times a day and 1-2 capsules with snacks max of 15 capsules a day 200 capsule 11    loperamide (IMODIUM) 2 MG capsule 2 caps at 1st sign of diarrhea & 1 cap every 2hrs until 12hrs diarrhea free. During night, 2 caps at bedtime & 2 caps every 4hrs until AM (Patient taking differently: as needed. 2 caps at 1st sign of diarrhea & 1 cap every 2hrs until 12hrs diarrhea free. During night, 2 caps at bedtime & 2 caps every 4hrs until AM) 30 capsule 0    LORazepam (ATIVAN) 0.5 MG tablet Take 1-2 tablets (0.5-1 mg) by mouth every 6 hours as needed for muscle spasms. 45 tablet 2    methocarbamol (ROBAXIN) 500 MG tablet TAKE 2 TABLETS BY MOUTH 4 TIMES DAILY AS NEEDED FOR MUSCLE SPASMS.*      methocarbamol 1000 MG TABS Take 1,000 mg by  mouth 4 times daily as needed for muscle spasms. (Patient not taking: Reported on 2/20/2025) 180 tablet 3    methylPREDNISolone Na Suc, PF, (SOLU-MEDROL) 125 mg/2 mL injection Inject 2 mLs (125 mg) over 3-5 minutes into the vein via push as needed (infusion reaction). For RN use only.  Reconstitute vial. Draw up methylPREDNISolone in a syringe and administer.  Discard remainder of vial. 578172 mL 0    Multiple Vitamins-Minerals (CENTRUM SILVER 50+MEN) TABS as directed Orally      naloxone (NARCAN) 4 MG/0.1ML nasal spray Spray 4 mg into one nostril alternating nostrils once as needed.      OLANZapine (ZYPREXA) 5 MG tablet Take 1 tablet (5 mg) by mouth at bedtime. 30 tablet 3    pantoprazole (PROTONIX) 40 MG EC tablet Take 1 tablet (40 mg) by mouth 2 times daily 60 tablet 3    pegfilgrastim-jmdb (FULPHILA) 6 MG/0.6ML injection Inject 0.6 mLs (6 mg) subcutaneously every 2 weeks as needed (per [facility] orders on day 4 of cycle). Remove from fridge for at least 30  minutes to allow syringe to reach room temperature. (Patient not taking: Reported on 2/21/2025) 8 mL 0    Port Access Kit For nurse use only.  Do not remove items from bag.  Use for port access.  Do not place syringe on sterile field. [id number] kit 0    prochlorperazine (COMPAZINE) 10 MG tablet Take 1 tablet (10 mg) by mouth every 6 hours as needed for nausea or vomiting. 30 tablet 2    prochlorperazine (COMPAZINE) 10 MG tablet Take 1 tablet (10 mg) by mouth every 6 hours as needed for nausea or vomiting 30 tablet 2    sodium chloride 0.9% infusion Infuse 250 mLs over 30 minutes into the vein every 28 (twenty-eight) days. zometa concomitant fluids. Given per therapy plan orders 750 mL 2    sodium chloride 0.9% infusion Infuse 500 mLs into the vein as needed for other (infusion reaction). In case of mild reaction, administer via gravity at 20 mL/hr to keep vein open. In case of severe reaction, administer via gravity wide open on prime setting. 160467 mL 0     sodium chloride, PF, 0.9% PF flush Inject 10 mLs into the vein as needed for line flush. Flush IV before and after med administration as directed and/or at least every 24 hours, or prior to deaccessing for no further use and/or at least every 4 weeks when not accessed. 999854 mL 0    sodium chloride, PF, 0.9% PF flush Inject 10 mLs into the vein as needed for other (infusion reaction). For RN use only as needed for infusion reaction 822617 mL 0    sterile water, preservative free, injection Use 2.2 mLs for reconstitution as needed (with alteplase for catheter occlusion). Direct diluent stream into powder. Mix by gently swirling until dissolved. DO NOT SHAKE. Discard remainder of vial. 635196 mL 0    vitamin D3 (CHOLECALCIFEROL) 50 mcg (2000 units) tablet Take 1 tablet (50 mcg) by mouth daily. 90 tablet 1    zoledronic acid (ZOMETA) 4 MG/100ML infusion Infuse 100 mLs (4 mg) into the vein every 28 days. Infuse via gravity. 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    S/P radiation therapy     3,000 cGy to L4 Spine completed on 10/21/2024 [facility]     Past Surgical History:   Procedure Laterality Date    AS OPEN TREATMENT CLAVICULAR FRACTURE INTERNAL FX      ENDOSCOPIC RETROGRADE CHOLANGIOPANCREATOGRAM N/A 7/11/2023    Procedure: ENDOSCOPIC RETROGRADE CHOLANGIOPANCREATOGRAPHY;  Surgeon: Khadar Pickett MD;  Location: [facility] OR    ENDOSCOPIC RETROGRADE CHOLANGIOPANCREATOGRAM N/A 8/17/2023    Procedure: ENDOSCOPIC RETROGRADE  with stent removal x1, balloon sweep;  Surgeon: Christos Greenberg,  MD;  Location: [facility] OR    ENDOSCOPIC ULTRASOUND UPPER GASTROINTESTINAL TRACT (GI) N/A 7/11/2023    Procedure: Endoscopic ultrasound upper gastrointestinal tract (GI), with biposy, GJ tube repositioning, stent placement;  Surgeon: Khadar Pickett MD;  Location: [facility] OR    ENDOSCOPIC ULTRASOUND UPPER GASTROINTESTINAL TRACT (GI) N/A 7/13/2023    Procedure: ENDOSCOPIC ULTRASOUND, ESOPHAGOSCOPY, EUS guided gastrojejunostomy;  Surgeon: Tre York MD;  Location: [facility] OR    INSERT PICC LINE  04/29/2023    INSERT PORT VASCULAR ACCESS Right 7/28/2023    Procedure: Insert port vascular access;  Surgeon: Tom العلي MD;  Location: [facility] OR    IR BONE ABLATION RFA  8/28/2024    IR BONE ABLATION RFA  1/10/2025    IR CHEST PORT PLACEMENT > 5 YRS OF AGE  7/28/2023    IR LUMBAR VERTEBROPLASTY  8/28/2024    IR NG TUBE PLACEMENT REQ RAD & FLUORO  05/08/2023    JG tube    REPLACE GASTROJEJUNOSTOMY TUBE, PERCUTANEOUS N/A 8/17/2023    Procedure: possible REPLACEMENT, GASTROJEJUNOSTOMY TUBE, PERCUTANEOUS;  Surgeon: Christos Greenberg MD;  Location: [facility] OR       Physical Exam:   GENERAL APPEARANCE: chronically ill appearing, alert and no distress; neatly groomed  EYES: Eyes grossly normal to inspection, PERRLA, conjunctivae and sclerae without injection or discharge, EOM intact   RESP:  no increased work of breathing; speaks in complete sentences;   MS: No musculoskeletal defects are noted  SKIN: No suspicious lesions or rashes, hydration status appears adequate with normal skin turgor   PSYCH: Alert and oriented x3; speech- coherent , normal rate and volume; able to articulate logical thoughts, able to abstract reason, no tangential thoughts, no hallucinations or delusions, mentation appears normal, Mood is euthymic. Affect is appropriate for this mood state and bright. Thought content is free of suicidal ideation, hallucinations, and delusions.  Eye contact is good during conversation.     Key Data Reviewed:  LABS:  2025- Cr 0.45, Albumin 3.8,  Hgb 7.6,      IMAGIN2025 CT CAP W/CONTRAST  Impression:   1. No significant interval change in the size of ill-defined  pancreatic head mass with encasement of SMA and proper hepatic artery.  2. No significant change in metastatic upper abdominal adenopathy,  including a mesenteric implant.  3. Similar distribution of numerous lytic-sclerotic osseous  metastases.  4. Stable position of the biliary stents with interval resolution of  pneumobilia.    40 minutes spent on the date of the encounter doing chart review, history and exam, patient education & counseling, documentation and other activities as noted above.    The longitudinal plan of care for the diagnosis(es)/condition(s) as documented were addressed during this visit. Due to the added complexity in care, I will continue to support Gallo in the subsequent management and with ongoing continuity of care.    Jeremy Hurt MD MS FAAFP CAQHPM  [facility]  Office [phone]  Fax [phone]

## 2025-03-14 ENCOUNTER — ORDERS ONLY (AUTO-RELEASED) (OUTPATIENT)
Dept: FAMILY MEDICINE | Facility: CLINIC | Age: 60
End: 2025-03-14
Payer: COMMERCIAL

## 2025-03-14 DIAGNOSIS — R00.0 TACHYCARDIA: ICD-10-CM

## 2025-03-16 ENCOUNTER — HEALTH MAINTENANCE LETTER (OUTPATIENT)
Age: 60
End: 2025-03-16

## 2025-03-17 ENCOUNTER — MEDICAL CORRESPONDENCE (OUTPATIENT)
Dept: HEALTH INFORMATION MANAGEMENT | Facility: CLINIC | Age: 60
End: 2025-03-17

## 2025-03-17 DIAGNOSIS — Z53.9 DIAGNOSIS NOT YET DEFINED: Primary | ICD-10-CM

## 2025-03-17 PROCEDURE — G0180 MD CERTIFICATION HHA PATIENT: HCPCS | Performed by: INTERNAL MEDICINE

## 2025-03-18 ENCOUNTER — NURSE TRIAGE (OUTPATIENT)
Dept: FAMILY MEDICINE | Facility: CLINIC | Age: 60
End: 2025-03-18
Payer: COMMERCIAL

## 2025-03-18 NOTE — TELEPHONE ENCOUNTER
Patient complains of hard stools and a small amount of blood on the toilet paper when he wipes. He is straining with bowel movements. Patient has a history of pancreatic cancer and takes opioids regularly for pain control. He is having daily bowel movements , they are hard. Last bowel movement this morning.   He took a fiber supplement last night and this morning. He is currently drinking some miralax.    Advised patient to increase water intake and take a sitz bath for rectal pain. Patient agreeable with plan. He will call back in symptoms worsen.     Milly LITTLE,  RN  [facility]        Reason for Disposition   Minor bleeding from rectum (e.g., blood just on toilet paper, few drops, streaks on surface of normal formed BM), and only 1-2 times    Additional Information   Negative: Abdomen bloating or swelling are main symptoms   Negative: Abdomen pain is main symptom and male   Negative: Abdomen pain is main symptom and female   Negative: Rectal bleeding or blood in stool is main symptom   Negative: Vomiting bile (green color)   Negative: Patient sounds very sick or weak to the triager   Negative: Constant abdominal pain lasting > 2 hours   Negative: Vomiting and abdomen looks much more swollen than usual   Negative: Rectal pain or fullness from fecal impaction (rectum full of stool) and NOT better after SITZ bath, suppository or enema   Negative: Abdomen is more swollen than usual   Negative: Last bowel movement (BM) > 4 days ago   Negative: Leaking stool   Negative: MILD abdominal pain (e.g., does not interfere with normal activities) that comes and goes (cramps) and fever   Negative: Unable to have a bowel movement (BM) without manually removing stool (using finger to pull out stool or perform disimpaction)   Negative: Unable to have a bowel movement (BM) without using a laxative, suppository, or enema   Negative: Significant weight loss (more than 10 pounds [4.5 kg]; 5% or more) and not  "dieting   Negative: Mild swelling of abdomen (e.g., looks distended or swollen) AND new-onset or getting worse   Negative: Pencil-like, narrow stools   Negative: Patient wants to be seen   Negative: Rectal pain    Answer Assessment - Initial Assessment Questions  1. STOOL PATTERN OR FREQUENCY: \"How often do you have a bowel movement (BM)?\"  (Normal range: 3 times a day to every 3 days)  \"When was your last BM?\"        Every day or every other day.   2. STRAINING: \"Do you have to strain to have a BM?\"       Yes   3. ONSET: \"When did the constipation begin?\"      This week   4. RECTAL PAIN: \"Does your rectum hurt when the stool comes out?\" If Yes, ask: \"Do you have hemorrhoids? How bad is the pain?\"  (Scale 1-10; or mild, moderate, severe)      7/10  5. BM COMPOSITION: \"Are the stools hard?\"       Yes   6. BLOOD ON STOOLS: \"Has there been any blood on the toilet tissue or on the surface of the BM?\" If Yes, ask: \"When was the last time?\"      Blood with wiping   7. CHRONIC CONSTIPATION: \"Is this a new problem for you?\"  If No, ask: \"How long have you had this problem?\" (days, weeks, months)       Takes opioids   8. CHANGES IN DIET OR HYDRATION: \"Have there been any recent changes in your diet?\" \"How much fluids are you drinking on a daily basis?\"  \"How much have you had to drink today?\"      Less fiber intake, doing better with water intake   9. MEDICINES: \"Have you been taking any new medicines?\" \"Are you taking any narcotic pain medicines?\" (e.g., Dilaudid, morphine, Percocet, Vicodin)      Dilaudid   10. LAXATIVES: \"Have you been using any stool softeners, laxatives, or enemas?\"  If Yes, ask \"What, how often, and when was the last time?\"        Fiber supplement last night and this morning,   11. ACTIVITY:  \"How much walking do you do every day?\"  \"Has your activity level decreased in the past week?\"           12. CAUSE: \"What do you think is causing the constipation?\"         Pain meds and decreased fiber   13. " "MEDICAL HISTORY: \"Do you have a history of hemorrhoids, rectal fissures, rectal surgery, or rectal abscess?\"          Has pancreatic cancer   14. OTHER SYMPTOMS: \"Do you have any other symptoms?\" (e.g., abdomen pain, bloating, fever, vomiting)        No   15. PREGNANCY: \"Is there any chance you are pregnant?\" \"When was your last menstrual period?\"        no    Protocols used: Constipation-A-OH    "

## 2025-03-24 DIAGNOSIS — C25.9 PANCREATIC ADENOCARCINOMA (H): ICD-10-CM

## 2025-03-24 LAB
ABO + RH BLD: NORMAL
BLD GP AB SCN SERPL QL: NEGATIVE
SPECIMEN EXP DATE BLD: NORMAL

## 2025-03-24 RX ORDER — HYDROMORPHONE HYDROCHLORIDE 4 MG/1
4-8 TABLET ORAL
Qty: 180 TABLET | Refills: 0 | Status: SHIPPED | OUTPATIENT
Start: 2025-03-24 | End: 2025-03-26

## 2025-03-24 NOTE — TELEPHONE ENCOUNTER
Received voicemail from patient's wife requesting refill of hydromorphone.     Last refill: 3/8/25  Last office visit: 3/10/25  Scheduled for follow up 5/15/25     Will route request to NP for review.     Reviewed [place]  Report.

## 2025-03-25 ENCOUNTER — TELEPHONE (OUTPATIENT)
Dept: ONCOLOGY | Facility: CLINIC | Age: 60
End: 2025-03-25

## 2025-03-25 ENCOUNTER — PATIENT OUTREACH (OUTPATIENT)
Dept: ONCOLOGY | Facility: CLINIC | Age: 60
End: 2025-03-25
Payer: COMMERCIAL

## 2025-03-25 ENCOUNTER — LAB (OUTPATIENT)
Dept: LAB | Facility: CLINIC | Age: 60
End: 2025-03-25
Payer: COMMERCIAL

## 2025-03-25 ENCOUNTER — TELEPHONE (OUTPATIENT)
Dept: PALLIATIVE CARE | Facility: CLINIC | Age: 60
End: 2025-03-25

## 2025-03-25 DIAGNOSIS — C25.0 MALIGNANT NEOPLASM OF HEAD OF PANCREAS (H): Primary | ICD-10-CM

## 2025-03-25 DIAGNOSIS — D64.9 ANEMIA, UNSPECIFIED TYPE: ICD-10-CM

## 2025-03-25 DIAGNOSIS — C25.0 MALIGNANT NEOPLASM OF HEAD OF PANCREAS (H): ICD-10-CM

## 2025-03-25 PROCEDURE — 86900 BLOOD TYPING SEROLOGIC ABO: CPT

## 2025-03-25 PROCEDURE — 80053 COMPREHEN METABOLIC PANEL: CPT | Performed by: STUDENT IN AN ORGANIZED HEALTH CARE EDUCATION/TRAINING PROGRAM

## 2025-03-25 PROCEDURE — 85007 BL SMEAR W/DIFF WBC COUNT: CPT | Performed by: STUDENT IN AN ORGANIZED HEALTH CARE EDUCATION/TRAINING PROGRAM

## 2025-03-25 PROCEDURE — 85027 COMPLETE CBC AUTOMATED: CPT | Performed by: STUDENT IN AN ORGANIZED HEALTH CARE EDUCATION/TRAINING PROGRAM

## 2025-03-25 PROCEDURE — 86850 RBC ANTIBODY SCREEN: CPT

## 2025-03-25 PROCEDURE — 86901 BLOOD TYPING SEROLOGIC RH(D): CPT

## 2025-03-25 PROCEDURE — 36415 COLL VENOUS BLD VENIPUNCTURE: CPT | Performed by: STUDENT IN AN ORGANIZED HEALTH CARE EDUCATION/TRAINING PROGRAM

## 2025-03-25 NOTE — TELEPHONE ENCOUNTER
See detailed note from Nanci Lynn this afternoon which details pt's worsening pain. In addition to butrans 30 mcg/hr patch pt is taking hydromorphone 8 mg every 4 hours around the clock. Last week he was also taking hydromorphone around every 4 hours, but would often skip a dose or go longer than 4 hours in between doses. Now he's asking for a dose at the 3 hour david. Reviewed he can take the dilaudid every 3 hours, however he will be more sedated than he already is.    Pt is going to have a visit with Tom Ward tomorrow at 11 am to discuss pain med adjustment, likely buprenorphine microinduction. Pt/wife agreeable with this plan.    MICHAEL SladeN, RN  Palliative Care Nurse Clinician    [phone] (Direct)  [phone] (Main)  [phone] (Appointment Scheduling)

## 2025-03-25 NOTE — TELEPHONE ENCOUNTER
"[facility]: Cancer Care                                                                                          Returned call to spouse Abigail, reported pt has been sleeping most of the day, very weak and tired. Over the weekend he was able visit some friends for a couple of hours. He's been doing home PT twice a week (was there today but only for an assessment, no actual activities done), using walker around the home, celebrated son's birthday on 3/17, lunch at the [employer] yesterday.     Back pain is increasing and \"constant\", started using lidocaine patch on back in addition to dilaudid 8 mg every 4 hours, methocarbamol every 6 hours, butrans weekly patch, lorazepam for muscle spasms. Feels \"so stiff\", unable to tolerate sitting in car, ambulating or staying awake.     Was on dexa prior to last chemo 3/8, discontinued by palliative care. Also unable to sleep so pre-med dexa was also removed from chemo infusions. State insomnia is worse due to pain and stiffness. HR continues to be in 140s with activity.     Discussed checking lab work today, whether through [facility] or going into the clinic, spouse does not think pt will be able to go into clinic, he's also unable to come to clinic earlier than 10 am. Offered AM appointment with Berta GONZALEZ but this is at 8 am. Advised if lab work comes back indicating a blood transfusion, pt may not qualify for chemo anyway but will need to come earlier for transfusion depending on how many units he needs. Will coordinate lab draw with [facility], follow for results and WL for earlier infusion time if available. Will send IB to palliative team for updates on pain control.    Writer called [facility] to ask about drawing labs today, per Carolyn will page on call team but unable to promise anyone would be able to go draw this late in the day. She also stated nursing care is discharging pt this Thursday since there are no other nursing cares " he's requiring at this time. Will call writer back.    Signature:  Efra Lynn RN

## 2025-03-25 NOTE — TELEPHONE ENCOUNTER
[facility]: Cancer Care                                                                                          Called pt and spouse back to let them know [facility] had not called writer back yet, encouraged them to go to [facility] for labs, they were agreeable to this and transferred to scheduling. Lab orders entered.    Signature:  Efra Lynn RN

## 2025-03-26 ENCOUNTER — TELEPHONE (OUTPATIENT)
Dept: PALLIATIVE CARE | Facility: CLINIC | Age: 60
End: 2025-03-26

## 2025-03-26 ENCOUNTER — HOME INFUSION BILLING (OUTPATIENT)
Dept: HOME HEALTH SERVICES | Facility: HOME HEALTH | Age: 60
End: 2025-03-26
Payer: COMMERCIAL

## 2025-03-26 ENCOUNTER — VIRTUAL VISIT (OUTPATIENT)
Dept: PALLIATIVE MEDICINE | Facility: CLINIC | Age: 60
End: 2025-03-26
Payer: COMMERCIAL

## 2025-03-26 VITALS — BODY MASS INDEX: 20.32 KG/M2 | HEIGHT: 72 IN | WEIGHT: 150 LBS

## 2025-03-26 DIAGNOSIS — C25.0 MALIGNANT NEOPLASM OF HEAD OF PANCREAS (H): Primary | ICD-10-CM

## 2025-03-26 DIAGNOSIS — G89.3 CANCER ASSOCIATED PAIN: ICD-10-CM

## 2025-03-26 RX ORDER — MORPHINE SULFATE 15 MG/1
15 TABLET ORAL
Qty: 90 TABLET | Refills: 0 | Status: SHIPPED | OUTPATIENT
Start: 2025-03-26

## 2025-03-26 RX ORDER — FENTANYL 50 UG/1
1 PATCH TRANSDERMAL
Qty: 10 PATCH | Refills: 0 | Status: SHIPPED | OUTPATIENT
Start: 2025-03-26

## 2025-03-26 ASSESSMENT — PAIN SCALES - GENERAL: PAINLEVEL_OUTOF10: MILD PAIN (2)

## 2025-03-26 NOTE — NURSING NOTE
Patient confirms medications and allergies are accurate via patients echeck in completion, and or denies any changes since last reviewed/verified.     Current patient location: [address] [place] [address]    Is the patient currently in the state of [place]? YES    Visit mode: VIDEO    If the visit is dropped, the patient can be reconnected by:VIDEO VISIT: Text to cell phone:   Telephone Information:   Mobile [phone]       Will anyone else be joining the visit? Violet Wife  (If patient encounters technical issues they should call [phone]    Are changes needed to the allergy or medication list? No    Are refills needed on medications prescribed by this physician? NO    Rooming Documentation:  Questionnaire(s) not done per department protocol    Reason for visit: RECHECK    Joan NICOLE

## 2025-03-26 NOTE — TELEPHONE ENCOUNTER
Retail Pharmacy Prior Authorization Team   Phone: [phone]    [place] Initiation    Medication: MORPHINE SULFATE 15 MG PO TABS  Insurance Company: [employer] - Phone [phone] Fax [phone]  Pharmacy Filling the Rx: [facility] - [place], [place] - [address]  Filling Pharmacy Phone: [phone]  Filling Pharmacy Fax:    Start Date: 3/26/2025    COMPLETED CRITERIA QUESTIONS VIA EPA - PENDING DETERMINATION

## 2025-03-26 NOTE — PROGRESS NOTES
Virtual Visit Details    Type of service:  Video Visit   Video start: 11 AM  Video stop: 11:54 AM    Originating Location (pt. Location): Home    Distant Location (provider location):  On-site  Platform used for Video Visit: [facility]    Palliative Care Outpatient Clinic      Patient ID/Chief Complaint: Gallo Navarro 59 year old male who is presenting to the palliative medicine clinic today at the request of Dr. Luis Haile for a palliative care follow-up secondary to pancreatic cancer.   The patient's primary care provider is:  Akil Hernandez.       Impression & Recommendations:  Patient is seen by Dr Jeremy Hurt for palliative care. Visit completed today due to reported poorly controlled symptoms and progressive decline in health status.  Please reference Dr. Jeremy Hurt's notes for more details related to palliative care plan.    59-year-old male with metastatic pancreatic cancer.  Per oncology note, treatment of the last 2 years has been technically successful but has taken a significant personal toll on his overall health.    Pain in back, right shoulder, and legs is reported as uncontrolled despite 30 mcg/h Butrans patches and Dilaudid 8 mg p.o. proximately 5-6 times per day.    He also complains of muscle stiffness and legs jumping regularly despite Robaxin 1000 mg 3-4 times per day and Ativan 1 mg 2-3 times per day.    Recently he reported significant increase in fatigue, weakness, and shortness of breath.  Laboratory studies revealed severe anemia with hemoglobin of 5.6.  PRBC infusion on 3/26.  Follow-up with oncology planned again tomorrow.    Symptoms/recommendations/discussion:  Cancer associated pain:  Discontinue Butrans and Dilaudid  Start fentanyl patch 50 mcg/h  Start morphine sulfate immediate release 15 mg every 1 hour as needed  Narcan nasal spray sent for safety  Muscle spasms in his back have improved, mostly he is having frequent jumping of his legs.  Concern for opioid-induced myoclonus  versus other:  Opioid rotation hopefully will be helpful  Continue Ativan 1 mg 2-3 times per day and Robaxin 1000 mg 3-4 times per day.  If abnormal jerking movements improve, asked them to decrease doses of both Ativan and Robaxin  Could consider Lyrica or baclofen.  Patient reports previous bad emotional reaction to gabapentin, therefore avoiding.  Fatigue, generalized weakness, shortness of breath:  Management of anemia per oncology  Consideration for decreasing doses of Ativan and Robaxin if symptom burden allows  Opioid rotation  Decreased appetite:  Continue Zyprexa 5 mg at bedtime  Right shoulder pain with decreased mobility:  Wife asking about possibility of steroid injection, deferred to patient's primary providers.  His wife watches him closely and controlls medications.  There are prior authorizations on both fentanyl and morphine.  We will call and check on him approximately 1 day after starting new opioids.  Further dosing changes pending his response.  Resuscitation status discussed today:  Gallo wants to talk with his wife Violet about whether he would want intubation or not in the event of critical illness. Violet previously worked in anesthesia setting and understands POLST forms can be suspended for surgical procedures  Gallo is clear that if he experiences cardiopulmonary arrest, he does not want resuscitative measures.  POLST form completed and sent to patient to review.  Once he has had a chance to review the form and is in agreement with it, I will update Epic EMR.         How to get a hold of us:  For non-urgent matters, MyChart works best.    For more urgent matters, or if you prefer not to use MyChart, call our clinic nurse coordinator Megan Funez RN at [phone] or [phone]    We have an on-call number for evenings and weekends. Please call this only if you are having uncontrolled symptoms or serious side effects from your medicines: [phone].     For refills, please give us a  week (5 working days) notice. We don't always have providers available everyday to do refills. If you call the day you run out of your medicine, we may not be able to refill it in time, so call 5 days in advance        History:  History gathered today from: patient, family/loved ones, medical chart, medical team members      PE: Ht 1.829 m (6')   Wt 68 kg (150 lb)   BMI 20.34 kg/m     Wt Readings from Last 3 Encounters:   03/26/25 68 kg (150 lb)   03/10/25 68.9 kg (152 lb)   03/05/25 69.3 kg (152 lb 11.2 oz)       Gen alert, visibly uncomfortable toward end visit due to back pain  Head NCAT.  Eyes anicteric without injection  Face symmetric, eyes conjugate  Lungs unlabored, no cough, speaking full sentences  Skin no rashes or lesions evident on face/neck  Neuro Face symmetric, eyes conjugate; speech fluent.  Neuropsych exam normal including affect, sensorium, gross memory, thought processes, and fund of knowledge.         Data reviewed:  I reviewed electrolytes, BUN/creatinine, liver profile, hemoglobin and hematocrit, platelet count, and most recent imaging  Recent oncology note  Recent palliative care note  Recent discussions with oncology RN care coordinator     database reviewed: 3/26        95  minutes spent on the date of the encounter doing chart review, history and exam, patient education & counseling, documentation and other activities as noted above.        Thank you for involving us in the patient's care.   ESVIN Carpio, [facility]iative [facility]

## 2025-03-26 NOTE — TELEPHONE ENCOUNTER
Brief hematology/oncology note      Notified by lab of critical lab result: Hgb 5.6    Patient is a 60 yo male with metastatic pancreatic cancer, on gem/abraxane.  C6D1 on 3/5/24.  Received filgrastim injection on 3/8    CMP and rest of CBC at baseline/as expected.    Spoke with patient and his wife, Abigail, via telephone.  They report patient has been feeling very week and tired, sleeping a lot.  Deny any evidence of bleeding (epistaxis, hematochezia, melena).        Advised patient present to ED for blood transfusion (will likely require 2 units).  They were agreeable to go to the ED after further discussion.    Patient has an infusion visit tomorrow.  Will notify Dr. Haile as well.      Monika Rosenbaum MD  Hematology/Oncology Fellow, PGY-5  Pager: [phone]

## 2025-03-26 NOTE — TELEPHONE ENCOUNTER
Prior Authorization Retail Medication Request    Medication/Dose: Morphine IR tabs  Diagnosis and ICD code (if different than what is on RX):     New/renewal/insurance change PA/secondary ins. PA:  Previously Tried and Failed:     Rationale:       Insurance   Primary:    Insurance ID:       Secondary (if applicable):   Insurance ID:       Pharmacy Information (if different than what is on RX)  Name:     Phone:     Fax:     Clinic Information  Preferred routing pool for dept communication: Mary Funez RN

## 2025-03-26 NOTE — TELEPHONE ENCOUNTER
Retail Pharmacy Prior Authorization Team  Phone: [phone]    Prior Authorization Approval    Medication: FENTANYL 50 MCG/HR TD PT72  Authorization Effective Date: 3/26/2025  Authorization Expiration Date: 3/26/2026  Approved Dose/Quantity:   Reference #:     Insurance Company: [employer] - Phone [phone] Fax [phone]  Expected CoPay: $    CoPay Card Available:      Financial Assistance Needed:   Which Pharmacy is filling the prescription:    Pharmacy Notified:   Patient Notified:

## 2025-03-26 NOTE — TELEPHONE ENCOUNTER
[facility]: Cancer Care                                                                                          Spoke with pt; his hgb was 5.6 yesterday and he went to [facility] ER for 2u PRBC. State he is feeling slightly better today, able to get out of bed on his own. Pretty tired since they didn't get home until 6:30 this morning. Most recent hgb was 6.2 on discharge.    Informed pt may cancel chemo today as 6.2 is still too low to give, will discuss with Dr. Haile and update pt via Conversation Mediat if appointments get cancelled.    Signature:  Efra Lynn RN

## 2025-03-26 NOTE — TELEPHONE ENCOUNTER
Prior Authorization Retail Medication Request    Medication/Dose: Fentanyl  Diagnosis and ICD code (if different than what is on RX):     New/renewal/insurance change PA/secondary ins. PA:  Previously Tried and Failed:     Rationale:       Insurance   Primary:    Insurance ID:       Secondary (if applicable):   Insurance ID:       Pharmacy Information (if different than what is on RX)  Name:     Phone:     Fax:     Clinic Information  Preferred routing pool for dept communication:  Mary Funez RN

## 2025-03-27 ENCOUNTER — TELEPHONE (OUTPATIENT)
Dept: FAMILY MEDICINE | Facility: CLINIC | Age: 60
End: 2025-03-27
Payer: COMMERCIAL

## 2025-03-27 ENCOUNTER — MYC MEDICAL ADVICE (OUTPATIENT)
Dept: PALLIATIVE CARE | Facility: CLINIC | Age: 60
End: 2025-03-27
Payer: COMMERCIAL

## 2025-03-27 NOTE — TELEPHONE ENCOUNTER
Forms/Letter Request    Type of form/letter: Home Health Certification      Do we have the form/letter: Yes: [facility], Change Order: [id number]    Who is the form from? Home care    Where did/will the form come from? form was faxed in    When is form/letter needed by:     How would you like the form/letter returned: Fax : [phone] Will place form on providers desk for review/signature  CHELSEA Meng

## 2025-03-27 NOTE — TELEPHONE ENCOUNTER
Prior Authorization Approval    Medication: MORPHINE SULFATE 15 MG PO TABS  Authorization Effective Date: 3/26/2025  Authorization Expiration Date: 3/26/2026  Insurance Company: [employer] - Phone [phone] Fax [phone]  Which Pharmacy is filling the prescription: [facility] [address] [place] [address] - [address] [facility]  Pharmacy Notified: YES  Patient Notified: YES (rx released to pharmacy once approved and notified patient via Inboxhart message)

## 2025-03-28 ENCOUNTER — MEDICAL CORRESPONDENCE (OUTPATIENT)
Dept: HEALTH INFORMATION MANAGEMENT | Facility: CLINIC | Age: 60
End: 2025-03-28

## 2025-03-28 NOTE — TELEPHONE ENCOUNTER
"Writer called patient to discuss MyChart message.  Patient reports pharmacy will not dispense fentanyl patch.  Writer called [facility] pharmacy to check on this.  Pharmacist reports they only have 5 patches in stock and will not be able to dispense a complete fill.  Pharmacist said order will come in on Monday.  Writer told pharmacist that patient would  a partial fill so he can initiate the patch before Monday.    Writer called patient back.  Both patient and wife were on the phone.  Wife reports she misunderstood the pharmacist and will  5 patches today at 11 AM.  Patient's wife asked patient if he slept well and patient replied \"yes.\"    Writer encouraged patient to  to  a partial fill today.  Reiterated that he must remove Butrans patch when he starts the fentanyl patch.  Instructed patient and patient's wife to notify writer 3 to 5 days prior to needing a refill.    Patient and patient's wife verbalized understanding and had no further questions.    Rosa Maria Urrutia RN  Palliative Care Nurse Clinician    [phone] (Direct)  [phone] (Main)  [phone] (Appointment Scheduling)      "

## 2025-03-31 ENCOUNTER — MEDICAL CORRESPONDENCE (OUTPATIENT)
Dept: HEALTH INFORMATION MANAGEMENT | Facility: CLINIC | Age: 60
End: 2025-03-31

## 2025-03-31 ENCOUNTER — VIRTUAL VISIT (OUTPATIENT)
Dept: ONCOLOGY | Facility: CLINIC | Age: 60
End: 2025-03-31
Attending: STUDENT IN AN ORGANIZED HEALTH CARE EDUCATION/TRAINING PROGRAM
Payer: COMMERCIAL

## 2025-03-31 VITALS — WEIGHT: 145 LBS | BODY MASS INDEX: 19.64 KG/M2 | HEIGHT: 72 IN

## 2025-03-31 DIAGNOSIS — C25.0 MALIGNANT NEOPLASM OF HEAD OF PANCREAS (H): Primary | ICD-10-CM

## 2025-03-31 ASSESSMENT — PAIN SCALES - GENERAL: PAINLEVEL_OUTOF10: MILD PAIN (2)

## 2025-03-31 NOTE — NURSING NOTE
Patient reviewed medications and allergies in [facility] during e-check in and said everything looked correct.      Current patient location: [address] [place] [address]    Is the patient currently in the state of [place]? YES    Visit mode: VIDEO    If the visit is dropped, the patient can be reconnected by:VIDEO VISIT: Text to cell phone:   Telephone Information:   Mobile [phone]    and VIDEO VISIT: Send to e-mail at: [email or web address]    Will anyone else be joining the visit? Violet-Pt's Wife   (If patient encounters technical issues they should call [phone])    Are changes needed to the allergy or medication list? Pt declined med review and Pt stated no med changes    Are refills needed on medications prescribed by this physician? NO    Rooming Documentation:  Questionnaire(s) completed    Reason for visit: RECHECK (Blood transfusion schedule orders are needed with Chemo schedule/Wanting to stay with Chemo, but also wanting to explore options with Hospice and rescue care in [place]. )        Iris NICOLE

## 2025-03-31 NOTE — TELEPHONE ENCOUNTER
Writer called patient to discuss MyChart message.  Voicemail left asking patient to return writers call.    Writer will respond to Localsensorhart message.    Rosa Maria Urrutia RN  Palliative Care Nurse Clinician    [phone] (Direct)  [phone] (Main)  [phone] (Appointment Scheduling)

## 2025-03-31 NOTE — LETTER
3/31/2025      Gallo Navarro  [id number] [address] [place] [id number]      Dear Colleague,    Thank you for referring your patient, Gallo Navarro, to the [facility] CANCER CLINIC. Please see a copy of my visit note below.    [facility] - Medical Oncology TeleHealth Consult Note  3/31/2025    Patient Identifiers     Name: Gallo Navarro  Preferred Address: [place]  Preferred Language: English  : 1965  Gender: male    Assessment and Plan     Mr. Gallo Navarro is a 59 year old male with a history of IDDM and metastatic pancreas cancer (23) s/p second-line [place]/Abraxane (10/2023 - 2024) on third-line 5FU/nal-IRI (2024 - present) who returns to clinic for symptom evaluation.     NGS: KRAS G12R  Immuno: pMMR, KAYLIE, TMB-low  Clinical Trial: MARIPOSA-186, MARIPOSA-280, TORL    I spoke with Gallo and Abigail about his current clinical status and overall management.  While we noted in his most recent imaging that 5-FU/nal-IRI appeared to be effective and disease management, his current symptoms suggest that we are not having a beneficial effect in his overall clinical status.  He was profoundly anemic last week, likely related to a combination of chemotherapy and cancer status.  And he also has worsening back pain, at the site of treatment by IR.  While we remain concerned about potential disease progression, our primary concern is on his symptom control.  We will work with palliative care to ensure that he has adequate pain control for his back, and determine if additional referrals need to be made to address this.    In the interim, his profound anemia prevents further chemotherapy dosing for now.  We will plan to provide a brief holiday with a cancellation of his infusion next week, followed by potential significant dose reduction for future cycles.  We may also consider treatment modification to subsequent line therapy including gem/erlotinib.  I discussed this reasoning with Stephen and  they were both agreeable to increasing the importance of quality of life and his overall cancer directed therapy.    Cancel infusion on 4 9.  Otherwise continue 5-FU/nal-IRI with SUZETTE monitoring.  Plan for MD visit with CT scans prior as already scheduled.  Consider transition to gem/erlotinib after treatment holiday if signs of progression persist.    60 minutes spent on the date of the encounter doing chart review, review of test results, interpretation of tests, patient visit, documentation, and discussion with other provider(s)     Luis Haile MD, PhD   of Medicine  Division of Hematology, Oncology and Transplantation  [facility]    -----------------------------------    Oncology Summary     Cancer Staging   Malignant neoplasm of head of pancreas (H)  Staging form: Pancreas, AJCC 8th Edition  - Clinical stage from 7/11/2023: Stage III (cT2, cN2, cM0) - Signed by Luis Haile MD on 7/14/2023      Oncology History   Malignant neoplasm of head of pancreas (H)   7/11/2023 -  Cancer Staged    Staging form: Pancreas, AJCC 8th Edition  - Clinical stage from 7/11/2023: Stage III (cT2, cN2, cM0)     7/14/2023 Initial Diagnosis    Malignant neoplasm of head of pancreas (H)     7/31/2023 - 10/21/2023 Chemotherapy    OP ONC Pancreatic Cancer - Modified FOLFIRINOX  Plan Provider: Luis Haile MD  Treatment goal: Curative  Line of treatment: [No plan line of treatment]     10/31/2023 - 11/13/2024 Chemotherapy    OP ONC Pancreatic Cancer - PACLitaxel protein-bound (Abraxane) / Gemcitabine  Plan Provider: Luis Haile MD  Treatment goal: Curative  Line of treatment: Second Line     12/18/2024 -  Chemotherapy    OP ONC Pancreatic or Hepatobiliary Cancer - Liposomal Irinotecan (Onivyde) / Fluorouracil  Plan Provider: Luis Haile MD  Treatment goal: Palliative  Line of treatment: [No plan line of treatment]         Subjective/Interval Events     - lower back pain is increasing. Makes it  difficult to sleep. Has increased to a fentanly patch from a buprenorphine patch.    Pain management:  10PM - 4mg dilaudid, robaxin  ~1AM - 4mg dilaudid  ~4 AM - 4mg dilaudid  ~7 AM - 4mg dilaudid    - Feels he may be dose stacking somewhat. Woke up about 1-1.5hrs later in pain. Feels really 'knocked out' with the morphine, which has a faster onset for him.   - Legs remain quite swollen, though neuropathy is wearing off    - noted the severe pain episode during video visit where he was completely unable to speak    Objective Data     Lab data:  I have personally reviewed the lab data, notable for:    - Hgb 7.2  - WBC 14.5  - Plt 170  - Alk Phos 537    Radiology data:  I have personally reviewed the radiology data, notable for:  02/18/2025 CT C/A/P  Impression:   1. No significant interval change in the size of ill-defined  pancreatic head mass with encasement of SMA and proper hepatic artery.  2. No significant change in metastatic upper abdominal adenopathy,  including a mesenteric implant.  3. Similar distribution of numerous lytic-sclerotic osseous  metastases.  4. Stable position of the biliary stents with interval resolution of  pneumobilia.    Pathology and other data:  I have personally reviewed and interpreted the pathology data, notable for:    - no new data    Billing Stuff     Virtual Visit Details    Type of service:  Video Visit     Originating Location (pt. Location): Home    Distant Location (provider location):  On-site  Platform used for Video Visit: Lorna      Again, thank you for allowing me to participate in the care of your patient.        Sincerely,        Luis Haile MD    Electronically signed

## 2025-03-31 NOTE — PROGRESS NOTES
[facility] - Medical Oncology TeleHealth Consult Note  3/31/2025    Patient Identifiers     Name: Gallo Navarro  Preferred Address: [place]  Preferred Language: English  : 1965  Gender: male    Assessment and Plan     Mr. Gallo Navarro is a 59 year old male with a history of IDDM and metastatic pancreas cancer (23) s/p second-line Dauphin/Abraxane (10/2023 - 2024) on third-line 5FU/nal-IRI (2024 - present) who returns to clinic for symptom evaluation.     NGS: KRAS G12R  Immuno: pMMR, KAYLIE, TMB-low  Clinical Trial: MARIPOSA-186, MARIPOSA-280, TORL    I spoke with Gallo and Abigail about his current clinical status and overall management.  While we noted in his most recent imaging that 5-FU/nal-IRI appeared to be effective and disease management, his current symptoms suggest that we are not having a beneficial effect in his overall clinical status.  He was profoundly anemic last week, likely related to a combination of chemotherapy and cancer status.  And he also has worsening back pain, at the site of treatment by IR.  While we remain concerned about potential disease progression, our primary concern is on his symptom control.  We will work with palliative care to ensure that he has adequate pain control for his back, and determine if additional referrals need to be made to address this.    In the interim, his profound anemia prevents further chemotherapy dosing for now.  We will plan to provide a brief holiday with a cancellation of his infusion next week, followed by potential significant dose reduction for future cycles.  We may also consider treatment modification to subsequent line therapy including gem/erlotinib.  I discussed this reasoning with Gallo and Abigail and they were both agreeable to increasing the importance of quality of life and his overall cancer directed therapy.    Cancel infusion on 4 9.  Otherwise continue 5-FU/nal-IRI with SUZETTE monitoring.  Plan for MD visit with CT scans prior as  already scheduled.  Consider transition to gem/erlotinib after treatment holiday if signs of progression persist.    60 minutes spent on the date of the encounter doing chart review, review of test results, interpretation of tests, patient visit, documentation, and discussion with other provider(s)     Luis Haile MD, PhD   of Medicine  Division of Hematology, Oncology and Transplantation  [facility]    -----------------------------------    Oncology Summary     Cancer Staging   Malignant neoplasm of head of pancreas (H)  Staging form: Pancreas, AJCC 8th Edition  - Clinical stage from 7/11/2023: Stage III (cT2, cN2, cM0) - Signed by Luis Haile MD on 7/14/2023      Oncology History   Malignant neoplasm of head of pancreas (H)   7/11/2023 -  Cancer Staged    Staging form: Pancreas, AJCC 8th Edition  - Clinical stage from 7/11/2023: Stage III (cT2, cN2, cM0)     7/14/2023 Initial Diagnosis    Malignant neoplasm of head of pancreas (H)     7/31/2023 - 10/21/2023 Chemotherapy    OP ONC Pancreatic Cancer - Modified FOLFIRINOX  Plan Provider: Luis Haile MD  Treatment goal: Curative  Line of treatment: [No plan line of treatment]     10/31/2023 - 11/13/2024 Chemotherapy    OP ONC Pancreatic Cancer - PACLitaxel protein-bound (Abraxane) / Gemcitabine  Plan Provider: Luis Haile MD  Treatment goal: Curative  Line of treatment: Second Line     12/18/2024 -  Chemotherapy    OP ONC Pancreatic or Hepatobiliary Cancer - Liposomal Irinotecan (Onivyde) / Fluorouracil  Plan Provider: Luis Haile MD  Treatment goal: Palliative  Line of treatment: [No plan line of treatment]         Subjective/Interval Events     - lower back pain is increasing. Makes it difficult to sleep. Has increased to a fentanly patch from a buprenorphine patch.    Pain management:  10PM - 4mg dilaudid, robaxin  ~1AM - 4mg dilaudid  ~4 AM - 4mg dilaudid  ~7 AM - 4mg dilaudid    - Feels he may be dose stacking somewhat.  Woke up about 1-1.5hrs later in pain. Feels really 'knocked out' with the morphine, which has a faster onset for him.   - Legs remain quite swollen, though neuropathy is wearing off    - noted the severe pain episode during video visit where he was completely unable to speak    Objective Data     Lab data:  I have personally reviewed the lab data, notable for:    - Hgb 7.2  - WBC 14.5  - Plt 170  - Alk Phos 537    Radiology data:  I have personally reviewed the radiology data, notable for:  02/18/2025 CT C/A/P  Impression:   1. No significant interval change in the size of ill-defined  pancreatic head mass with encasement of SMA and proper hepatic artery.  2. No significant change in metastatic upper abdominal adenopathy,  including a mesenteric implant.  3. Similar distribution of numerous lytic-sclerotic osseous  metastases.  4. Stable position of the biliary stents with interval resolution of  pneumobilia.    Pathology and other data:  I have personally reviewed and interpreted the pathology data, notable for:    - no new data    Billing Stuff     Virtual Visit Details    Type of service:  Video Visit     Originating Location (pt. Location): Home    Distant Location (provider location):  On-site  Platform used for Video Visit: TanWell

## 2025-04-01 ENCOUNTER — NURSE TRIAGE (OUTPATIENT)
Dept: ONCOLOGY | Facility: CLINIC | Age: 60
End: 2025-04-01

## 2025-04-01 ENCOUNTER — INFUSION THERAPY VISIT (OUTPATIENT)
Dept: INFUSION THERAPY | Facility: CLINIC | Age: 60
End: 2025-04-01
Attending: STUDENT IN AN ORGANIZED HEALTH CARE EDUCATION/TRAINING PROGRAM
Payer: COMMERCIAL

## 2025-04-01 VITALS
TEMPERATURE: 98.5 F | SYSTOLIC BLOOD PRESSURE: 124 MMHG | OXYGEN SATURATION: 99 % | RESPIRATION RATE: 18 BRPM | HEART RATE: 137 BPM | DIASTOLIC BLOOD PRESSURE: 79 MMHG

## 2025-04-01 DIAGNOSIS — D64.9 ANEMIA, UNSPECIFIED TYPE: Primary | ICD-10-CM

## 2025-04-01 DIAGNOSIS — C25.0 MALIGNANT NEOPLASM OF HEAD OF PANCREAS (H): ICD-10-CM

## 2025-04-01 LAB
ABO + RH BLD: NORMAL
BASOPHILS # BLD MANUAL: 0 10E3/UL (ref 0–0.2)
BASOPHILS NFR BLD MANUAL: 0 %
BLD GP AB SCN SERPL QL: NEGATIVE
BLD PROD TYP BPU: NORMAL
BLOOD COMPONENT TYPE: NORMAL
CODING SYSTEM: NORMAL
CROSSMATCH: NORMAL
DACRYOCYTES BLD QL SMEAR: SLIGHT
ELLIPTOCYTES BLD QL SMEAR: SLIGHT
EOSINOPHIL # BLD MANUAL: 0.1 10E3/UL (ref 0–0.7)
EOSINOPHIL NFR BLD MANUAL: 1 %
ERYTHROCYTE [DISTWIDTH] IN BLOOD BY AUTOMATED COUNT: 19.6 % (ref 10–15)
HCT VFR BLD AUTO: 21.6 % (ref 40–53)
HGB BLD-MCNC: 6.7 G/DL (ref 13.3–17.7)
ISSUE DATE AND TIME: NORMAL
LYMPHOCYTES # BLD MANUAL: 2.1 10E3/UL (ref 0.8–5.3)
LYMPHOCYTES NFR BLD MANUAL: 16 %
MCH RBC QN AUTO: 29.4 PG (ref 26.5–33)
MCHC RBC AUTO-ENTMCNC: 31 G/DL (ref 31.5–36.5)
MCV RBC AUTO: 95 FL (ref 78–100)
METAMYELOCYTES # BLD MANUAL: 0.5 10E3/UL
METAMYELOCYTES NFR BLD MANUAL: 4 %
MONOCYTES # BLD MANUAL: 0.8 10E3/UL (ref 0–1.3)
MONOCYTES NFR BLD MANUAL: 6 %
MYELOCYTES # BLD MANUAL: 0.4 10E3/UL
MYELOCYTES NFR BLD MANUAL: 3 %
NEUTROPHILS # BLD MANUAL: 9 10E3/UL (ref 1.6–8.3)
NEUTROPHILS NFR BLD MANUAL: 70 %
NRBC # BLD AUTO: 0.1 10E3/UL
NRBC BLD MANUAL-RTO: 1 %
PLAT MORPH BLD: ABNORMAL
PLATELET # BLD AUTO: 161 10E3/UL (ref 150–450)
POLYCHROMASIA BLD QL SMEAR: SLIGHT
RBC # BLD AUTO: 2.28 10E6/UL (ref 4.4–5.9)
RBC MORPH BLD: ABNORMAL
SPECIMEN EXP DATE BLD: NORMAL
UNIT ABO/RH: NORMAL
UNIT NUMBER: NORMAL
UNIT STATUS: NORMAL
UNIT TYPE ISBT: 6200
WBC # BLD AUTO: 12.9 10E3/UL (ref 4–11)

## 2025-04-01 PROCEDURE — 250N000011 HC RX IP 250 OP 636: Performed by: PHYSICIAN ASSISTANT

## 2025-04-01 PROCEDURE — 86900 BLOOD TYPING SEROLOGIC ABO: CPT | Performed by: PHYSICIAN ASSISTANT

## 2025-04-01 PROCEDURE — 86923 COMPATIBILITY TEST ELECTRIC: CPT | Performed by: STUDENT IN AN ORGANIZED HEALTH CARE EDUCATION/TRAINING PROGRAM

## 2025-04-01 PROCEDURE — 85007 BL SMEAR W/DIFF WBC COUNT: CPT | Performed by: PHYSICIAN ASSISTANT

## 2025-04-01 PROCEDURE — 99207 PR NO CHARGE LOS: CPT

## 2025-04-01 PROCEDURE — 86850 RBC ANTIBODY SCREEN: CPT | Performed by: PHYSICIAN ASSISTANT

## 2025-04-01 PROCEDURE — 85027 COMPLETE CBC AUTOMATED: CPT | Performed by: PHYSICIAN ASSISTANT

## 2025-04-01 PROCEDURE — 36430 TRANSFUSION BLD/BLD COMPNT: CPT

## 2025-04-01 PROCEDURE — 36415 COLL VENOUS BLD VENIPUNCTURE: CPT | Performed by: PHYSICIAN ASSISTANT

## 2025-04-01 PROCEDURE — P9016 RBC LEUKOCYTES REDUCED: HCPCS | Performed by: STUDENT IN AN ORGANIZED HEALTH CARE EDUCATION/TRAINING PROGRAM

## 2025-04-01 RX ORDER — DIPHENHYDRAMINE HYDROCHLORIDE 50 MG/ML
50 INJECTION, SOLUTION INTRAMUSCULAR; INTRAVENOUS
Status: CANCELLED
Start: 2025-04-01

## 2025-04-01 RX ORDER — HEPARIN SODIUM (PORCINE) LOCK FLUSH IV SOLN 100 UNIT/ML 100 UNIT/ML
5 SOLUTION INTRAVENOUS
Status: DISCONTINUED | OUTPATIENT
Start: 2025-04-01 | End: 2025-04-01 | Stop reason: HOSPADM

## 2025-04-01 RX ORDER — HEPARIN SODIUM (PORCINE) LOCK FLUSH IV SOLN 100 UNIT/ML 100 UNIT/ML
5 SOLUTION INTRAVENOUS
OUTPATIENT
Start: 2025-04-01

## 2025-04-01 RX ORDER — HEPARIN SODIUM (PORCINE) LOCK FLUSH IV SOLN 100 UNIT/ML 100 UNIT/ML
5 SOLUTION INTRAVENOUS
Status: CANCELLED | OUTPATIENT
Start: 2025-04-01

## 2025-04-01 RX ORDER — EPINEPHRINE 1 MG/ML
0.3 INJECTION, SOLUTION INTRAMUSCULAR; SUBCUTANEOUS EVERY 5 MIN PRN
Status: CANCELLED | OUTPATIENT
Start: 2025-04-01

## 2025-04-01 RX ORDER — DIPHENHYDRAMINE HYDROCHLORIDE 50 MG/ML
50 INJECTION, SOLUTION INTRAMUSCULAR; INTRAVENOUS
Start: 2025-04-01

## 2025-04-01 RX ORDER — EPINEPHRINE 1 MG/ML
0.3 INJECTION, SOLUTION INTRAMUSCULAR; SUBCUTANEOUS EVERY 5 MIN PRN
OUTPATIENT
Start: 2025-04-01

## 2025-04-01 RX ORDER — HEPARIN SODIUM,PORCINE 10 UNIT/ML
5-20 VIAL (ML) INTRAVENOUS DAILY PRN
Status: CANCELLED | OUTPATIENT
Start: 2025-04-01

## 2025-04-01 RX ORDER — HEPARIN SODIUM,PORCINE 10 UNIT/ML
5-20 VIAL (ML) INTRAVENOUS DAILY PRN
OUTPATIENT
Start: 2025-04-01

## 2025-04-01 RX ADMIN — Medication 5 ML: at 09:41

## 2025-04-01 RX ADMIN — HEPARIN 5 ML: 100 SYRINGE at 15:16

## 2025-04-01 ASSESSMENT — PAIN SCALES - GENERAL: PAINLEVEL_OUTOF10: MILD PAIN (2)

## 2025-04-01 NOTE — PROGRESS NOTES
Port site scrubbed with Chloraprep for 30 seconds. Accessed port using sterile technique. 2 tubes drawn. See documentation flowsheet. Port needle left accessed for treatment per charge RN ok as patient will be talking with triage about blood product infusion today. Tolerated port access and draw without complaint. Glenda Wheeler RN on 4/1/2025 at 9:26 AM

## 2025-04-01 NOTE — TELEPHONE ENCOUNTER
DATE/TIME OF CALL RECEIVED FROM LAB:  04/01/25 at 10:01 AM     LAB TEST & LAB VALUE:  Critical Hgb 6.7   Other Plts 161    Previous Labs from March 28th Hgb 7.2 Plts 170    This writer verified with [facility] lab they are running ABO T&S.     PROVIDER NOTIFIED?: Yes    PROVIDER NAME: Dr. Haile    TIME LAB VALUE REPORTED TO PROVIDER:   1005    MECHANISM OF PROVIDER NOTIFICATION:  secure chat sent to provider    PROVIDER RESPONSE:   Provider/care team aware, follow blood therapy plan for transfusion parameteres.     1012 This writer spoke to [place] charge nurse, pt is still on wait list for tomorrow but unlikely will get in today or tomorrow in [place].   Possible other infusion sites have openings, check with [facility] location.     1014 This writer called pt and left message to call triage back     1020 This writer spoke to [facility] infusion and they can offer today at 1:00pm for RBC transfusion.     1042 This writer spoke to Violet (with pt [place]o) who is in agreement with plan for transfusion at [facility] center. This writer provided address [address] [place] [address] also informed them appt is in pt's appts they can see details on MyChart too. Violet thanked this writer

## 2025-04-01 NOTE — TELEPHONE ENCOUNTER
Writer called patient to discuss MyChart message.  Voicemail left asking patient to return writers call.    Rosa Maria Urrutia RN  Palliative Care Nurse Clinician    [phone] (Direct)  [phone] (Main)  [phone] (Appointment Scheduling)

## 2025-04-01 NOTE — PROGRESS NOTES
Infusion Nursing Note:  Gallo Navarro presents today for 1 unit of PRBC.    Patient seen by provider today: No   present during visit today: Not Applicable.    Note: Patient tachycardic (HR in 130s). Patient recently turned in zio patch for monitoring and reports that recent baseline HR has been in 120s. Patient thinks that HR is elevated due to pain and took some pain medication from home while at infusion appointment.      Intravenous Access:  Implanted Port.    Treatment Conditions:  Lab Results   Component Value Date    HGB 6.7 (LL) 04/01/2025    WBC 12.9 (H) 04/01/2025    ANEU 9.0 (H) 04/01/2025    ANEUTAUTO 10.9 (H) 02/26/2025     04/01/2025        Results reviewed, labs MET treatment parameters, ok to proceed with treatment.      Post Infusion Assessment:  Patient tolerated infusion without incident.  Site patent and intact, free from redness, edema or discomfort.  No evidence of extravasations.  Access discontinued per protocol.       Discharge Plan:   Discharge instructions reviewed with: Patient.  Patient and/or family verbalized understanding of discharge instructions and all questions answered.  AVS to patient via [employer].  Patient will return 4/9 for next appointment.   Patient discharged in stable condition accompanied by: self and significant other.  Departure Mode: Wheelchair.      Katie Petty RN

## 2025-04-02 ENCOUNTER — TELEPHONE (OUTPATIENT)
Dept: PALLIATIVE CARE | Facility: CLINIC | Age: 60
End: 2025-04-02
Payer: COMMERCIAL

## 2025-04-02 ENCOUNTER — PATIENT OUTREACH (OUTPATIENT)
Dept: ONCOLOGY | Facility: CLINIC | Age: 60
End: 2025-04-02
Payer: COMMERCIAL

## 2025-04-02 DIAGNOSIS — G89.3 CANCER ASSOCIATED PAIN: ICD-10-CM

## 2025-04-02 DIAGNOSIS — C25.9 PANCREATIC ADENOCARCINOMA (H): ICD-10-CM

## 2025-04-02 DIAGNOSIS — C25.0 MALIGNANT NEOPLASM OF HEAD OF PANCREAS (H): Primary | ICD-10-CM

## 2025-04-02 RX ORDER — HYDROMORPHONE HYDROCHLORIDE 2 MG/1
4-8 TABLET ORAL
Refills: 0 | Status: CANCELLED | OUTPATIENT
Start: 2025-04-03

## 2025-04-02 RX ORDER — FENTANYL 75 UG/1
1 PATCH TRANSDERMAL
Qty: 10 PATCH | Refills: 0 | Status: CANCELLED | OUTPATIENT
Start: 2025-04-02

## 2025-04-02 RX ORDER — HYDROMORPHONE HYDROCHLORIDE 2 MG/1
4-8 TABLET ORAL
Qty: 300 TABLET | Refills: 0 | Status: SHIPPED | OUTPATIENT
Start: 2025-04-02

## 2025-04-02 RX ORDER — FENTANYL 75 UG/1
1 PATCH TRANSDERMAL
Qty: 10 PATCH | Refills: 0 | Status: SHIPPED | OUTPATIENT
Start: 2025-04-02

## 2025-04-02 NOTE — TELEPHONE ENCOUNTER
[facility]: Cancer Care                                                                                          Pt received 1u PRBC on 4/1 for Hgb 6.7. Called pt to confirm he is holding Eliquis, pt stated last week he held Eliquis 3/25, 3/26, 3/27. He restarted Saturday and Sunday. When he had the virtual visit with Dr. Haile Monday he was told to hold again and has not taken any since. He informed palliative care he noticed HR is elevated on days he hold Eliquis. Informed him to continue holding until further instructions from Oncology. Will check CBC again on Monday 4/7 and keep infusion appointment on Wednesday 4/9 for possible transfusion or IVF.    Spouse also inquired about [facility] drawing labs, since [facility] discharged pt from nursing services. Informed them [facility] will not enroll for just lab draws but last year pt did have benefits for IVF and labs in the home. If he is interested in this route, may discuss with Berta GONZALEZ at 4/9 appointment. Both also stated 8:45 am check in is very hard for pt to get to, it takes him 45 min or so just to get in the car. Would appreciate a later appointment or if Berta can come see him in infusion.     LM for pt to call back with exact HR numbers on Eliquis and off.    Signature:  Efra Lynn RN

## 2025-04-02 NOTE — TELEPHONE ENCOUNTER
Writer called from patient and patients wife to check in on pain.  Patient reports he is doing better, especially after blood transfusion.  Reports he is holding Eliquis as instructed by oncology but wanted to note that his heart rate goes up when he holds it.  Patient rates pain a 2-3.  Patient is currently taking Dilaudid 8 mg every 3 hours.  He will try to decrease to every 4 hours.  Patient is not taking morphine ir. patient and patient's wife are requesting refill of dilaudid and increased dose of fentanyl to 75 mcg.  They are requesting Dilaudid 2 mg tabs.    Last refill: dilaudid 3/24/2025  Last refill fentanyl 50 mcg 3/28/2025  Last office visit: 3/26/2025  Scheduled for follow up 4/16/2025    Will route request to NP for review.     Reviewed [place]  Report.                                    ----- Message from Rosa Maria DENNEY sent at 4/1/2025  1:41 PM CDT -----  Can you call [place] (make sure wife is participating too) tomorrow btn 10-11 to see how he's doing after blood transfusion. Likely need to increase fentanyl patch tomorrow to 75.

## 2025-04-03 ENCOUNTER — TELEPHONE (OUTPATIENT)
Dept: PALLIATIVE MEDICINE | Facility: CLINIC | Age: 60
End: 2025-04-03
Payer: COMMERCIAL

## 2025-04-03 ENCOUNTER — EPISODE UPDATE (OUTPATIENT)
Dept: HOME HEALTH SERVICES | Facility: HOME HEALTH | Age: 60
End: 2025-04-03
Payer: COMMERCIAL

## 2025-04-03 ENCOUNTER — TELEPHONE (OUTPATIENT)
Dept: PALLIATIVE CARE | Facility: CLINIC | Age: 60
End: 2025-04-03
Payer: COMMERCIAL

## 2025-04-03 ENCOUNTER — TELEPHONE (OUTPATIENT)
Dept: FAMILY MEDICINE | Facility: CLINIC | Age: 60
End: 2025-04-03
Payer: COMMERCIAL

## 2025-04-03 NOTE — TELEPHONE ENCOUNTER
This writer attempted to contact Jeremy on 04/03/25.    Reason for call provider's message and left detailed message.    If Jeremy calls back:   Please relay message below.        DARELL Ervin  [facility]

## 2025-04-03 NOTE — TELEPHONE ENCOUNTER
Home Care is calling regarding an established patient with [facility].  Requesting orders from: Akil Hernandez RN APPROVED: RN able to provide verbal orders.  Home Care will send orders for signature.  RN will close encounter.  OTHER ACTION: Routing to provider to review message below from home care.  Is this a request for a temporary pause in the home care episode?  No    Orders Requested    Skilled Nursing  Request for continuation of care with no increase or decrease in frequency  Frequency: 1x a week for 8 weeks Effective 4/6/25. 4 PRN visits as patient is wanting 2x a week on chemo weeks.  RN gave verbal order: Yes          Phone number Home Care can be reached at: [phone]   Okay to leave a detailed message?: Yes    Contacts       Contact Date/Time Type Contact Phone/Fax    04/03/2025 03:52 PM CDT Phone (Incoming) Jeremy (Home Care) [phone]     [facility] - Confidential voicemail              Home Care also wanting guidance from PCP with patient's heart rate. During visit his HR was 125 resting. He has had a high pulse at rest. Wanting direction or parameters for heart rate for patient or further instructions if any.      Ean Guerra RN

## 2025-04-03 NOTE — TELEPHONE ENCOUNTER
Retail Pharmacy Prior Authorization Team   Phone: [phone]    PA Initiation    Medication: Fentanyl 75 mcg  Insurance Company: [email or web address] - Phone [phone] Fax [phone]  Pharmacy Filling the Rx: [facility] [address] [place] [address] - [address]  Filling Pharmacy Phone: [phone]  Filling Pharmacy Fax: [phone]  Start Date: 4/3/2025

## 2025-04-03 NOTE — TELEPHONE ENCOUNTER
Recommend in person/same-day visit if heart rate greater than 110 persistently at rest for greater than 15 minutes.

## 2025-04-03 NOTE — TELEPHONE ENCOUNTER
PA submitted as urgent.  Please see original PA encounter created today at 10:20AM today.  Shows PA was submitted at 10:27AM.  Any follow up on this please see original encounter.

## 2025-04-03 NOTE — TELEPHONE ENCOUNTER
PCP is out of office.    Please provide verbal orders for continuation of care for Skilled nursing  Frequency: 1x a week for 8 weeks Effective 4/6/25. 4 PRN visits as patient is wanting 2x a week on chemo weeks.      Amelia Gillette PA-C on 4/3/2025 at 4:50 PM

## 2025-04-03 NOTE — TELEPHONE ENCOUNTER
Prior Authorization Retail Medication Request    Medication/Dose: Fentanyl 75 mcg  Diagnosis and ICD code (if different than what is on RX):     New/renewal/insurance change PA/secondary ins. PA:  Previously Tried and Failed:     Rationale:       Insurance   Primary:    Insurance ID:       Secondary (if applicable):   Insurance ID:       Pharmacy Information (if different than what is on RX)  Name:     Phone:     Fax:     Clinic Information  Preferred routing pool for dept communication: Megan pruitt RN

## 2025-04-03 NOTE — TELEPHONE ENCOUNTER
[facility]    Phone Message    May a detailed message be left on voicemail: yes     Reason for Call: Other: The pharmacy called about the prior auth that the insurance is reviewing. The insurance company wants Dr. Ward to call to expedite the prior auth. Please call [phone]      Ask for the insurance company to expedite the prior authorization.

## 2025-04-03 NOTE — TELEPHONE ENCOUNTER
Noted RN in message below approved home care order per RN protocol.     Please advise on HR parameters that was mentioned at bottom of original message, see below.          DARELL Ervin  [facility]

## 2025-04-03 NOTE — TELEPHONE ENCOUNTER
Patient's wife Violet would like to know when the new prescription for fentanyl 75 mcg will be sent to the pharmacy.    Writer informed Violet that the prescription was sent on April 2 and the pharmacy received it at 11:37 AM.  Instructed Violet to call the pharmacy to see when the prescription will be ready for pickup.  When she gets the increased dose patient should remove the existing patch and start the increased dose and 72-hour rotation.    Violet verbalized understanding and had no further questions.    Rosa Maria Urrutia RN  Palliative Care Nurse Clinician    [phone] (Direct)  [phone] (Main)  [phone] (Appointment Scheduling)

## 2025-04-04 NOTE — TELEPHONE ENCOUNTER
Prior Authorization Approval    Authorization Effective Date: 4/3/2025  Authorization Expiration Date: 4/3/2026  Medication: Fentanyl 75 mcg - APPROVED  Approved Dose/Quantity:    Reference #:     Insurance Company: [employer] - [place] [phone] Fax [phone]  Expected CoPay:       CoPay Card Available:      Foundation Assistance Needed:    Which Pharmacy is filling the prescription (Not needed for infusion/clinic administered): [facility] - [place], [place] - [address]  Pharmacy Notified:  YES  Patient Notified:  YES

## 2025-04-07 ENCOUNTER — TELEPHONE (OUTPATIENT)
Dept: FAMILY MEDICINE | Facility: CLINIC | Age: 60
End: 2025-04-07

## 2025-04-07 ENCOUNTER — NURSE TRIAGE (OUTPATIENT)
Dept: ONCOLOGY | Facility: CLINIC | Age: 60
End: 2025-04-07

## 2025-04-07 ENCOUNTER — PATIENT OUTREACH (OUTPATIENT)
Dept: ONCOLOGY | Facility: CLINIC | Age: 60
End: 2025-04-07

## 2025-04-07 ENCOUNTER — ALLIED HEALTH/NURSE VISIT (OUTPATIENT)
Dept: INFUSION THERAPY | Facility: CLINIC | Age: 60
End: 2025-04-07
Attending: STUDENT IN AN ORGANIZED HEALTH CARE EDUCATION/TRAINING PROGRAM
Payer: COMMERCIAL

## 2025-04-07 ENCOUNTER — PATIENT OUTREACH (OUTPATIENT)
Dept: CARE COORDINATION | Facility: CLINIC | Age: 60
End: 2025-04-07
Payer: COMMERCIAL

## 2025-04-07 VITALS
WEIGHT: 143.3 LBS | OXYGEN SATURATION: 98 % | RESPIRATION RATE: 18 BRPM | DIASTOLIC BLOOD PRESSURE: 66 MMHG | SYSTOLIC BLOOD PRESSURE: 114 MMHG | HEART RATE: 133 BPM | BODY MASS INDEX: 19.43 KG/M2 | TEMPERATURE: 98.4 F

## 2025-04-07 DIAGNOSIS — D64.9 ANEMIA, UNSPECIFIED TYPE: Primary | ICD-10-CM

## 2025-04-07 DIAGNOSIS — I47.10 SVT (SUPRAVENTRICULAR TACHYCARDIA): Primary | ICD-10-CM

## 2025-04-07 DIAGNOSIS — C25.0 MALIGNANT NEOPLASM OF HEAD OF PANCREAS (H): ICD-10-CM

## 2025-04-07 DIAGNOSIS — D64.9 ANEMIA, UNSPECIFIED TYPE: ICD-10-CM

## 2025-04-07 DIAGNOSIS — C25.0 MALIGNANT NEOPLASM OF HEAD OF PANCREAS (H): Primary | ICD-10-CM

## 2025-04-07 LAB
ABO + RH BLD: NORMAL
BASOPHILS # BLD MANUAL: 0 10E3/UL (ref 0–0.2)
BASOPHILS NFR BLD MANUAL: 0 %
BLD GP AB SCN SERPL QL: NEGATIVE
CV ZIO PRELIM RESULTS: NORMAL
EOSINOPHIL # BLD MANUAL: 0.1 10E3/UL (ref 0–0.7)
EOSINOPHIL NFR BLD MANUAL: 1 %
ERYTHROCYTE [DISTWIDTH] IN BLOOD BY AUTOMATED COUNT: 18.5 % (ref 10–15)
HCT VFR BLD AUTO: 20.5 % (ref 40–53)
HGB BLD-MCNC: 6.4 G/DL (ref 13.3–17.7)
LYMPHOCYTES # BLD MANUAL: 1.4 10E3/UL (ref 0.8–5.3)
LYMPHOCYTES NFR BLD MANUAL: 15 %
MCH RBC QN AUTO: 29.6 PG (ref 26.5–33)
MCHC RBC AUTO-ENTMCNC: 31.2 G/DL (ref 31.5–36.5)
MCV RBC AUTO: 95 FL (ref 78–100)
METAMYELOCYTES # BLD MANUAL: 0.2 10E3/UL
METAMYELOCYTES NFR BLD MANUAL: 2 %
MONOCYTES # BLD MANUAL: 0.4 10E3/UL (ref 0–1.3)
MONOCYTES NFR BLD MANUAL: 4 %
NEUTROPHILS # BLD MANUAL: 7.3 10E3/UL (ref 1.6–8.3)
NEUTROPHILS NFR BLD MANUAL: 78 %
NRBC # BLD AUTO: 0.1 10E3/UL
NRBC BLD MANUAL-RTO: 1 %
PLAT MORPH BLD: ABNORMAL
PLATELET # BLD AUTO: 130 10E3/UL (ref 150–450)
POLYCHROMASIA BLD QL SMEAR: ABNORMAL
RBC # BLD AUTO: 2.16 10E6/UL (ref 4.4–5.9)
RBC MORPH BLD: ABNORMAL
SPECIMEN EXP DATE BLD: NORMAL
STOMATOCYTES BLD QL SMEAR: SLIGHT
WBC # BLD AUTO: 9.4 10E3/UL (ref 4–11)

## 2025-04-07 PROCEDURE — 86900 BLOOD TYPING SEROLOGIC ABO: CPT | Performed by: PHYSICIAN ASSISTANT

## 2025-04-07 PROCEDURE — 99207 PR NO CHARGE LOS: CPT

## 2025-04-07 PROCEDURE — 85007 BL SMEAR W/DIFF WBC COUNT: CPT | Performed by: PHYSICIAN ASSISTANT

## 2025-04-07 PROCEDURE — 85014 HEMATOCRIT: CPT | Performed by: PHYSICIAN ASSISTANT

## 2025-04-07 PROCEDURE — 250N000011 HC RX IP 250 OP 636: Performed by: STUDENT IN AN ORGANIZED HEALTH CARE EDUCATION/TRAINING PROGRAM

## 2025-04-07 PROCEDURE — 36591 DRAW BLOOD OFF VENOUS DEVICE: CPT | Performed by: PHYSICIAN ASSISTANT

## 2025-04-07 PROCEDURE — 86850 RBC ANTIBODY SCREEN: CPT | Performed by: PHYSICIAN ASSISTANT

## 2025-04-07 RX ORDER — DIPHENHYDRAMINE HYDROCHLORIDE 50 MG/ML
50 INJECTION, SOLUTION INTRAMUSCULAR; INTRAVENOUS
Status: CANCELLED
Start: 2025-04-07

## 2025-04-07 RX ORDER — HEPARIN SODIUM (PORCINE) LOCK FLUSH IV SOLN 100 UNIT/ML 100 UNIT/ML
5 SOLUTION INTRAVENOUS
Status: CANCELLED | OUTPATIENT
Start: 2025-04-07

## 2025-04-07 RX ORDER — HEPARIN SODIUM,PORCINE 10 UNIT/ML
5-20 VIAL (ML) INTRAVENOUS DAILY PRN
Status: CANCELLED | OUTPATIENT
Start: 2025-04-07

## 2025-04-07 RX ORDER — EPINEPHRINE 1 MG/ML
0.3 INJECTION, SOLUTION INTRAMUSCULAR; SUBCUTANEOUS EVERY 5 MIN PRN
Status: CANCELLED | OUTPATIENT
Start: 2025-04-07

## 2025-04-07 RX ORDER — METOPROLOL TARTRATE 25 MG/1
12.5 TABLET, FILM COATED ORAL 2 TIMES DAILY
Qty: 180 TABLET | Refills: 0 | Status: SHIPPED | OUTPATIENT
Start: 2025-04-07

## 2025-04-07 RX ORDER — HEPARIN SODIUM (PORCINE) LOCK FLUSH IV SOLN 100 UNIT/ML 100 UNIT/ML
500 SOLUTION INTRAVENOUS EVERY 8 HOURS
Status: DISCONTINUED | OUTPATIENT
Start: 2025-04-07 | End: 2025-04-07 | Stop reason: HOSPADM

## 2025-04-07 RX ADMIN — Medication 500 UNITS: at 13:23

## 2025-04-07 ASSESSMENT — PAIN SCALES - GENERAL: PAINLEVEL_OUTOF10: MODERATE PAIN (6)

## 2025-04-07 NOTE — PROGRESS NOTES
"Patient's name and  were verified.  See Doc Flowsheet - IV assess for details.  IVAD accessed with 20G 3/4\" eisenberg gripper plus needle  blood return positive: YES  Site without redness, tenderness or swelling: YES  flushed with 20cc NS and 5cc 100u/ml heparin  Needle: De-accessed  Comments: Labs drawn.  Patient tolerated procedure without incident.    Chitra Arellano RN, BSN, OCN    "

## 2025-04-07 NOTE — TELEPHONE ENCOUNTER
Return call from pt and spouse about lab results. Hgb 6.4 today.   Pt reports SOB with exertion and elevated HR with exertion, went up to 160 this morning after walking back from bathroom. Then came back down to 130- pt said he rested a couple of minutes and it came  back down. He reports a small bloody nose on Friday, no other signs of bleeding. Having regular BM with no blood per spouse. He reports energy levels are low. Spouse reports pain level increases as hgb drops- spouse concerned about pain. Pt said he is not sleeping well at night d/t pain. He recently transitioned to Fentanyl and they are still trying to get adjusted on this. They have a follow up VV with palliative care tomorrow afternoon. They are hesitant to wait until Wednesday for transfusion, they will go to any location to be transfused sooner. They are willing to go to ED if needed for transfusion, as pain gets that severe.   Zio patch results are still in process.   Informed will review with care team and call back with recommendations.   [id number] secure chat to Berta Anglin and Nanci, RNCC, who states fine for transfusion tomorrow if spot opens. Per Sosa, triage RN, Stefanie Alan can take pt tomorrow but waiting to confirm a time.

## 2025-04-07 NOTE — TELEPHONE ENCOUNTER
"Patient's wife, Violet (CTC on file) is calling to inquire about zio patch results. Stated patient wore zio for 1 week and sent zio back at the end of march. Stated patient was notified by [facility] they have received it and working on results since April 3- now but has not heard from PCP.  Writer did not see any interpretation note available to relay.     Wife reported when patient wore zio patch, patient only had a couple of episodes but over the weekend, patient had a few additional episode while sitting. Stated patient felt his heart \"racing\". Stated this morning when he went to bathroom, his HR increased to 160's which is a new high rate for patient. Wife stated patient's sister who is a  nurse was present during this time and instructed patient to take a \"deep breath and to bear down taking a breath.\" Stated this help patient HR get out from the fast rate.     Wife reported patient is getting blood work tomorrow at [facility] and oncology provider has been asking if PCP has any updates on results. Wife result call back to patient's phone but if PCP needs to discuss results, wife prefers PCP to call wife at [phone], states she is usually with patient.     Routing to PCP to review and advise.     DARELL Ervin  [facility]      "

## 2025-04-07 NOTE — TELEPHONE ENCOUNTER
[facility]: Cancer Care                                                                                          Returned call to spouse Abigail, she had left a vm this morning at 8:53 am stating pt's HR was in the 160s after ambulating to the restroom. His sister who is a nurse was present and she felt his pulse, stated he was in a-fib. He was able to rest and take deep breaths to get HR down. Pt is scheduled for labs at [place] today for possible transfusion 4/9.    Per Berta GONZALEZ: how long did it take for HR to come down, did pt return Zio patch to Cardiology and what did they say regarding results? Add nurse visit for vital signs when pt goes in for labs. Writer called [facility] scheduling and nurse visit added after lab appointment.    Reached pt's vm and lmcb.    Signature:  Efra Lynn RN

## 2025-04-07 NOTE — TELEPHONE ENCOUNTER
Per chart review:  JAGDEEPO PATCH MAIL OUT    Height: Not recorded   Weight: Not recorded   Blood Pressure: Not recorded    Date of Study: 04/07/2025   Ordering Provider: Akil Hernandez MD   Clinical Indications: Tachycardia [R00.0 (ICD-10-CM)]       [facility]  Performing Staff   Result is not signed. No performing staff assigned to study.     Outgoing call to patient.   Attempt # 1.     Left message to call back and talk with a nurse.    Reason for call: let patient know results were received but waiting for providers to review and advise.    When they return call, please do the following:  Let them know we are waiting for provider to review at this time.     Noreen Woods, RN, BSN, PHN  [facility]

## 2025-04-07 NOTE — TELEPHONE ENCOUNTER
Results of the Zio patch discussed with patient's wife  Predominantly shows sinus tachycardia and also some SVT and 1 brief run of VT which is nonsustained  I suspect his SVT and sinus tachycardia are from the underlying illness   He has anemia  He has pain/anxiety  He also has significant hyperadrenergic state in the body because of his malignancy  Discussed all this with patient's wife  We agreed to start metoprolol 12.5 twice a day  If his blood pressure is stable on this dose the dose can be increased to 25 mg twice a day as long as the systolic blood pressure is in the 100  Discussed about continuing to take lorazepam to minimize his anxiety  She is actually trying to taper this down  She is going to discuss this with his palliative care tomorrow

## 2025-04-07 NOTE — TELEPHONE ENCOUNTER
DATE:  4/7/2025   TIME OF RECEIPT FROM LAB:  1356  Critical lab value:  Hgb: 6.4; Other Pls: 130, WBC: 9.4, ANC: prelim: 6.0  Blood plan: Hgb < 7 without symptoms  Last lab values:  4/1/25: Hgb: 6.7, Plts: 161, WBC: 12.9, ANC: 9.0  Possible transfusion scheduled for Wednesday.  1407: LM asking pt to call back to discuss lab results  Provider Notified: Yes  Provider Name: LISA Johnston  Date/Time lab value reported to provider: 4/7/25 at 1410.  Mechanism of provider notification: Secure chat  1411: Per Sallie--pt called back to report sx. See her note.  Provider response: 1431: Per Berta-- If he can get blood tomorrow, that may be a good idea, depending on his symptoms.  1433: Per Berta, okay to look for an infusion spot tomorrow instead.  1437: Called  infusion Charge, [facility]: they can offer apt for Gallo tomorrow but will let us know what time once they look at their schedules. Informed [facility] that pt has Palliative Care virtual apt tomorrow at 1440.  1450:  can offer a 0900 apt tomorrow for Gallo to get a transfusion. [facility] will schedule and this writer will update Gallo.  1504: Called and LVM on Violet's phone letting her know that the apt is scheduled tomorrow at 0900 in . Left Triage phone number in case they have questions.  Updated care team.

## 2025-04-07 NOTE — TELEPHONE ENCOUNTER
Test Results        Who ordered the test:  Dr Hernandez    Type of test: Zio Patch    Date of test:  Zio Patch company received zio patch on 4/3/25 - test was done on 3/24/25 -3/30/25    Where was the test performed:  home test    What are your questions/concerns?:  Awaiting results for patient and patient's specialists     Could we send this information to you in [facility] or would you prefer to receive a phone call?:   Patient would prefer a phone call   Okay to leave a detailed message?: Yes at Home number on file [phone] (home)

## 2025-04-07 NOTE — PROGRESS NOTES
Social Work - Telephone/MyChart message  [facility]  Patient Name: Gallo Navarro  Goes By: Gallo CURRIE/Age: 1965 (59 year old)    Request for SW to connect on Thursday. SW does not work on this date. SW sent message encouraging patient to call/mychart message at his timeline but not available on Thursday.    Intervention: Sent patient MyChart message on 2025 .   Plan:  will await patient's return phone call/message and provide assistance at that time.    CLARKE Gusman, [facility]   Adult Oncology - [place]  [phone]  Onsite [place] on    *Please note does not work on .   Support Groups at [facility]: Social Work Services for Cancer Patients ([email or web address])

## 2025-04-07 NOTE — TELEPHONE ENCOUNTER
Forms/Letter Request    Type of form/letter: Home Health Certification      Do we have the form/letter: Yes: [facility], Change Order, Order No: [id number]    Who is the form from? Home care    Where did/will the form come from? form was faxed in    When is form/letter needed by:     How would you like the form/letter returned: Fax : [phone]    Will place form on providers desk for review/signature  CHELSEA Meng

## 2025-04-08 ENCOUNTER — VIRTUAL VISIT (OUTPATIENT)
Dept: RADIATION ONCOLOGY | Facility: HOSPITAL | Age: 60
End: 2025-04-08
Attending: FAMILY MEDICINE
Payer: COMMERCIAL

## 2025-04-08 ENCOUNTER — INFUSION THERAPY VISIT (OUTPATIENT)
Dept: INFUSION THERAPY | Facility: CLINIC | Age: 60
End: 2025-04-08
Attending: STUDENT IN AN ORGANIZED HEALTH CARE EDUCATION/TRAINING PROGRAM
Payer: COMMERCIAL

## 2025-04-08 VITALS
HEART RATE: 122 BPM | WEIGHT: 143 LBS | SYSTOLIC BLOOD PRESSURE: 124 MMHG | TEMPERATURE: 98.3 F | HEIGHT: 72 IN | OXYGEN SATURATION: 98 % | BODY MASS INDEX: 19.37 KG/M2 | DIASTOLIC BLOOD PRESSURE: 76 MMHG | RESPIRATION RATE: 18 BRPM

## 2025-04-08 VITALS
HEART RATE: 122 BPM | OXYGEN SATURATION: 98 % | TEMPERATURE: 98.3 F | DIASTOLIC BLOOD PRESSURE: 76 MMHG | RESPIRATION RATE: 18 BRPM | SYSTOLIC BLOOD PRESSURE: 124 MMHG

## 2025-04-08 DIAGNOSIS — D64.9 ANEMIA, UNSPECIFIED TYPE: Primary | ICD-10-CM

## 2025-04-08 DIAGNOSIS — Z51.5 PALLIATIVE CARE PATIENT: Primary | ICD-10-CM

## 2025-04-08 DIAGNOSIS — Z71.89 GOALS OF CARE, COUNSELING/DISCUSSION: ICD-10-CM

## 2025-04-08 DIAGNOSIS — C25.9 PANCREATIC ADENOCARCINOMA (H): ICD-10-CM

## 2025-04-08 DIAGNOSIS — M48.50XD PATHOLOGICAL COMPRESSION FRACTURE OF VERTEBRA WITH ROUTINE HEALING, SUBSEQUENT ENCOUNTER: ICD-10-CM

## 2025-04-08 DIAGNOSIS — G89.3 CANCER ASSOCIATED PAIN: ICD-10-CM

## 2025-04-08 DIAGNOSIS — C25.0 MALIGNANT NEOPLASM OF HEAD OF PANCREAS (H): ICD-10-CM

## 2025-04-08 DIAGNOSIS — C79.51 MALIGNANT NEOPLASM METASTATIC TO BONE (H): ICD-10-CM

## 2025-04-08 LAB
BLD PROD TYP BPU: NORMAL
BLOOD COMPONENT TYPE: NORMAL
CODING SYSTEM: NORMAL
CROSSMATCH: NORMAL
ISSUE DATE AND TIME: NORMAL
UNIT ABO/RH: NORMAL
UNIT NUMBER: NORMAL
UNIT STATUS: NORMAL
UNIT TYPE ISBT: 8400

## 2025-04-08 PROCEDURE — P9016 RBC LEUKOCYTES REDUCED: HCPCS | Performed by: STUDENT IN AN ORGANIZED HEALTH CARE EDUCATION/TRAINING PROGRAM

## 2025-04-08 PROCEDURE — 258N000003 HC RX IP 258 OP 636: Performed by: PHYSICIAN ASSISTANT

## 2025-04-08 PROCEDURE — 36430 TRANSFUSION BLD/BLD COMPNT: CPT

## 2025-04-08 PROCEDURE — 86923 COMPATIBILITY TEST ELECTRIC: CPT | Performed by: STUDENT IN AN ORGANIZED HEALTH CARE EDUCATION/TRAINING PROGRAM

## 2025-04-08 PROCEDURE — G2211 COMPLEX E/M VISIT ADD ON: HCPCS | Performed by: FAMILY MEDICINE

## 2025-04-08 PROCEDURE — 99417 PROLNG OP E/M EACH 15 MIN: CPT | Performed by: FAMILY MEDICINE

## 2025-04-08 PROCEDURE — 250N000011 HC RX IP 250 OP 636: Performed by: PHYSICIAN ASSISTANT

## 2025-04-08 PROCEDURE — 98007 SYNCH AUDIO-VIDEO EST HI 40: CPT | Performed by: FAMILY MEDICINE

## 2025-04-08 PROCEDURE — 99207 PR NO CHARGE LOS: CPT

## 2025-04-08 RX ORDER — HEPARIN SODIUM,PORCINE 10 UNIT/ML
5-20 VIAL (ML) INTRAVENOUS DAILY PRN
OUTPATIENT
Start: 2025-04-08

## 2025-04-08 RX ORDER — DIPHENHYDRAMINE HYDROCHLORIDE 50 MG/ML
50 INJECTION, SOLUTION INTRAMUSCULAR; INTRAVENOUS
Start: 2025-04-08

## 2025-04-08 RX ORDER — HEPARIN SODIUM (PORCINE) LOCK FLUSH IV SOLN 100 UNIT/ML 100 UNIT/ML
5 SOLUTION INTRAVENOUS
OUTPATIENT
Start: 2025-04-08

## 2025-04-08 RX ORDER — MORPHINE SULFATE 15 MG/1
15 TABLET, FILM COATED, EXTENDED RELEASE ORAL EVERY 12 HOURS
COMMUNITY

## 2025-04-08 RX ORDER — HEPARIN SODIUM (PORCINE) LOCK FLUSH IV SOLN 100 UNIT/ML 100 UNIT/ML
5 SOLUTION INTRAVENOUS
Status: DISCONTINUED | OUTPATIENT
Start: 2025-04-08 | End: 2025-04-08 | Stop reason: HOSPADM

## 2025-04-08 RX ORDER — LORAZEPAM 2 MG/ML
.5-1 CONCENTRATE ORAL EVERY 6 HOURS PRN
Qty: 100 ML | Refills: 2 | Status: SHIPPED | OUTPATIENT
Start: 2025-04-08

## 2025-04-08 RX ORDER — EPINEPHRINE 1 MG/ML
0.3 INJECTION, SOLUTION INTRAMUSCULAR; SUBCUTANEOUS EVERY 5 MIN PRN
OUTPATIENT
Start: 2025-04-08

## 2025-04-08 RX ORDER — MORPHINE SULFATE 100 MG/5ML
15-30 SOLUTION ORAL
Qty: 240 ML | Refills: 0 | Status: SHIPPED | OUTPATIENT
Start: 2025-04-08

## 2025-04-08 RX ADMIN — HEPARIN 5 ML: 100 SYRINGE at 11:39

## 2025-04-08 RX ADMIN — SODIUM CHLORIDE 250 ML: 0.9 INJECTION, SOLUTION INTRAVENOUS at 09:40

## 2025-04-08 NOTE — NURSING NOTE
Patient reviewed medications and allergies in [employer] during e-check in and said everything looked correct.      Current patient location: [address] [place] [address]    Is the patient currently in the state of [place]? YES    Visit mode: VIDEO    If the visit is dropped, the patient can be reconnected by:VIDEO VISIT: Text to cell phone:   Telephone Information:   Mobile [phone]    and VIDEO VISIT: Send to e-mail at: [email or web address]    Will anyone else be joining the visit? Violet-Pt's Wife  (If patient encounters technical issues they should call [phone]    Are changes needed to the allergy or medication list? Pt declined med review and Pt stated no med changes    Are refills needed on medications prescribed by this physician? NO    Rooming Documentation:  Not applicable    Reason for visit: RECHECK (Medication/Pain/DNR/low back-has bumps/Right shoulder-when moves it, losing mobility.)    Iris RAMOSF

## 2025-04-08 NOTE — TELEPHONE ENCOUNTER
[facility]: Cancer Care                                                                                          Pt received 1u PRBC for Hgb 6.4 today at [facility]. Schedule 4/16 with Berta GONZALEZ and 1u PRBC following. CBC, CMP, type and screen 4/15 at [facility]. Scheduling messaged.     Spoke with spouse Violet, she stated resting heart rate has been in the 120-130s. Today also noticed new lumps on his back and infusion nurse is going to take some pictures, may page care team before pt leaves infusion. She stated pcp office called and wants to put pt on beta blockers (metoprolol 12.5 mg twice a day).    Signature:  Efra Lynn RN

## 2025-04-08 NOTE — TELEPHONE ENCOUNTER
This RN attempted to reach patient on 4/8/25.  Left message to return call.      If they call back:    Please let them know we are waiting for provider to review at this time regarding his Zio patch results.      Kristina Kjellberg, MSN, RN

## 2025-04-08 NOTE — PROGRESS NOTES
Virtual Visit Details    Type of service:  Video Visit     Originating Location (pt. 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 Impingement vs cancer related     GOALS OF CARE:  04/08/2025   Gallo agreed to my placing a DNAR/DNI order given his recent functional decline.  He would like to complete a work up for his acutely worsening spinal pain and right shoulder pain and he I shared with him my concern that this decline may not be solely due to his anemia and it may reflect he is nearing the end of his life. He was encouraged to continue to have conversations that matter with those people in his life who matter.  03/10/2025  No change to GOC and Gallo is considering his end of life--he is planning to have a brick purchased at [employer] and is pondering what to put on it and have some of his ashes sprinkled on it.  01/21/2025  no change in GOC; 12/17/224 no change to GOC.  Gallo also said he would prefer to have more sedation and less pain than more pain and less sedation. 09/24/2024   No change  04/25/2024  No change to GOC. Gallo is preparing to go to [facility] to see if he qualifies for a cellular therapy trial.  02/22/2024 Life prolonging without limits at this time and willingness to consider clinical trials.     Recommendations & Counseling:  Continue Fentanyl 75 mcg/hour patches and change q 72 hours;    Continue off dilaudid   Stop MSIR tabs  START  Roxanol 15-30 mg po q 3 hours prn po or SL--new rx sent today  OK to use methocarbamol  FROM 750 MG TO 1000 MG TABS PO Q 6 HOURS PRN and I encouraged 750 mg during the day, if possible, to reduce sleepiness  STOP Ativan tabs  START lorazepam solution 2mg/ml with 0.5 to 1 mg po/SL q 6 hours prn and to wait an hour after using it to dispense roxanol  CONTINUE lidocaine patches  Tylenol suspension 650 mg po TID  Narcan previously prescribed for safety  RNCC symptom call in 3 days, and Gallo and his wife know to call sooner, prn  T and L spine MR ordered w/o and w/contrast  Plain films of right shoulder 3v ordered to see if there are any gross skeletal mets  POLST COMPLETED TODAY REFLECTING DNAR/DNI CODE STATUS.  This was scanned into Epic and original mailed to his house.  I provided emotional support through validating Gallo and Violet's feelings and open, empathic communication.     Counseling: All of the above was explained to the patient in lay language. The patient has verbalized a clear understanding of the discussion, asked appropriate questions, which have been answered to patient's apparent satisfaction. The patient is in agreement with the above plan.        Chief Complaint/Patient ID: Gallo Navarro 59 year old male with PMHx of unresectable localized pancreatic cancer and spinal mets       Last Palliative care appointment: 03/10/2025 with me     Reviewed:  Yes:   reviewed - controlled substances reflected in medication list..    Interim History:  Gallo Navarro is a 59 year old male who is seen today for follow up with Palliative Care via billable video visit. We were joined by his wife, Violet who very actively participated.     He is on a treatment holiday due to BM suppression with chemo and had a pRBC transfusion today.     Pain:  worse control in his lower back especially.  He is currently on Fentanyl 75 mcg/hour patch and MSIR 15 mg tablets 2 tabs q 3-4 hours; he transitioned off buprenorphine  patches; sometimes the MSIR helps and sometimes he doesn't get a lot of relief; their pcp advised them to resume his methocarbamol and lorazepam. Doing so has lead to him being much more groggy.    Appetite/Nausea: poor     Bowels: no OIC     Sleep: interrupted by pain     Mood: groggy today; has periods of sadness or perseverating on his pain; using lorazepam no more than q 6 hours; his sadness has to do with him acknowledging he is nearing the end of his life.     Coping:  overall OK    Family History- Reviewed in Epic.    No Known Allergies    Social History:  Pertinent changes to social history/social situation since last visit: he had a nice  visit with his sister last weekend and she plans to return in two weeks  Key support resources: wife Violet  Advance Directive Status:  no ACP documents in Epic; POLST completed today reflecting code status change to DNAR/DNI    Social History     Tobacco Use    Smoking status: Never     Passive exposure: Never    Smokeless tobacco: Never   Vaping Use    Vaping status: Never Used   Substance Use Topics    Alcohol use: Not Currently    Drug use: Never         No Known Allergies  Current Outpatient Medications   Medication Sig Dispense Refill    acetaminophen (TYLENOL) 32 mg/mL liquid Take 20.313 mLs (650 mg) by mouth every 8 hours. 1800 mL 3    alteplase (CATHFLO ACTIVASE) injection Inject 2 mLs (2 mg) into catheter as needed (catheter occlusion). Reconstitute vial. Draw up alteplase 1 mg/mL in a syringe and instill into IV catheter. Dwell for at least 20 min to 24 hours, then aspirate. May repeat dose once in 24 hours if catheter function not restored after at least 2 hours. Discard remainder of vial. (Patient not taking: Reported on 4/8/2025) [id number] each 0    apixaban ANTICOAGULANT (ELIQUIS) 5 MG tablet Take 1 tablet (5 mg) by mouth 2 times daily. (Patient not taking: Reported on 4/8/2025) 60 tablet 2    blood glucose (FREESTYLE TEST STRIPS) test strip 1 strip by In Vitro  "route 3 times daily. 100 strip 2    calcium carbonate (OS-DENISE) 500 MG tablet Take 2 tablets (1,000 mg) by mouth 2 times daily. 60 tablet 3    Chemo Kit For RN use only. Do not remove items from bag. Contents: 1  sodium chloride 0.9% flush, 4 medium gloves, 1 chemo gown, 1/4 chemo mat, 1 connector female, 1 chemo bag. [id number] kit 0    Continuous Glucose Sensor (DEXCOM G7 SENSOR) MISC Change every 10 days. 6 each 3    dexAMETHasone (DECADRON) 2 MG tablet Take 1 tablet (2 mg) by mouth daily (with breakfast). (Patient not taking: Reported on 4/8/2025) 10 tablet 0    dicyclomine (BENTYL) 10 MG capsule Take 1 capsule (10 mg) by mouth 4 times daily (before meals and nightly). 120 capsule 1    diphenhydrAMINE (BENADRYL) 50 MG/ML injection Inject 1 mL (50 mg) over 3-5 minutes into the vein via push as needed for other (infusion reaction). For RN use only.  Draw up in a syringe and administer IV push.  Discard remainder of vial. (Patient not taking: Reported on 4/8/2025) 12790 mL 0    econazole nitrate 1 % external cream Apply topically daily To affected toenails. (Patient not taking: Reported on 4/8/2025) 85 g 5    Emergency Supply Kit, Central, Patient use for emergency only. Contents: 3 sodium chloride 0.9% flushes, 1 dressing kit, 1 microclave ext set 14\", 4 nitrile gloves (med), 6 alcohol prep pads, 1 bacitracin, 1 syringe (10 cc 20 G 1\"). Call [phone] to reorder. [id number] kit 0    EPINEPHrine (ANY BX GENERIC EQUIV) 0.3 MG/0.3ML injection 2-pack Inject 0.3 mLs (0.3 mg) into the muscle as needed for anaphylaxis (infusion reaction). Administer into the mid-thigh in case of severe anaphylaxis (wheezing, throat tightening, mouth swelling, difficulty breathing). May repeat dose one time in 5-15 minutes if symptoms persist. (Patient not taking: Reported on 4/8/2025) [id number] mL 0    fentaNYL (DURAGESIC) 75 mcg/hr 72 hr patch Place 1 patch over 72 hours onto the skin every 72 hours. Replaces 50 MCG/HR patch. Remove old patch " when starting new patch. 10 patch 0    Fluorouracil (ADRUCIL) 2,290 mg in sodium chloride 0.9 % 241 mL via HOMEPUMP C-Series Infuse 2,290 mg at 5.2 mL/hr over 46 hours into the vein once for 1 dose. (Patient not taking: Reported on 4/8/2025) 126109 mL 0    HYDROmorphone (DILAUDID) 2 MG tablet Take 2-4 tablets (4-8 mg) by mouth every 3 hours as needed for moderate to severe pain. 300 tablet 0    insulin  UNIT/ML injection 20 units around 9 am (with steroids) 10 units around 9 pm 15 mL 3    insulin pen needle (31G X 8 MM) 31G X 8 MM miscellaneous Use 1 pen needles daily or as directed. 50 each 0    lidocaine (LIDODERM) 5 % patch Place 2 patches over 12 hours onto the skin every 24 hours. (Patient taking differently: Place onto the skin every 24 hours.) 60 patch 3    lidocaine-prilocaine (EMLA) 2.5-2.5 % external cream Use 1-2 times a week or as needed prior to port access (Patient not taking: Reported on 2/11/2025) 30 g 3    lipase-protease-amylase (CREON 24) 75727-74842-830199 units CPEP per EC capsule 2-3 capsules with meals 3 times a day and 1-2 capsules with snacks max of 15 capsules a day 200 capsule 11    loperamide (IMODIUM) 2 MG capsule 2 caps at 1st sign of diarrhea & 1 cap every 2hrs until 12hrs diarrhea free. During night, 2 caps at bedtime & 2 caps every 4hrs until AM 30 capsule 0    LORazepam (ATIVAN) 0.5 MG tablet Take 1-2 tablets (0.5-1 mg) by mouth every 6 hours as needed for muscle spasms. 45 tablet 2    methocarbamol (ROBAXIN) 500 MG tablet TAKE 2 TABLETS BY MOUTH 4 TIMES DAILY AS NEEDED FOR MUSCLE SPASMS.*      methocarbamol 1000 MG TABS Take 1,000 mg by mouth 4 times daily as needed for muscle spasms. 180 tablet 3    methylPREDNISolone Na Suc, PF, (SOLU-MEDROL) 125 mg/2 mL injection Inject 2 mLs (125 mg) over 3-5 minutes into the vein via push as needed (infusion reaction). For RN use only.  Reconstitute vial. Draw up methylPREDNISolone in a syringe and administer.  Discard remainder of  vial. 604909 mL 0    metoprolol tartrate (LOPRESSOR) 25 MG tablet Take 0.5 tablets (12.5 mg) by mouth 2 times daily. (Patient not taking: Reported on 4/8/2025) 180 tablet 0    morphine (MS CONTIN) 15 MG CR tablet Take 15 mg by mouth every 12 hours.      Multiple Vitamins-Minerals (CENTRUM SILVER 50+MEN) TABS as directed Orally (Patient not taking: Reported on 4/8/2025)      naloxone (NARCAN) 4 MG/0.1ML nasal spray Spray 1 spray (4 mg) into one nostril alternating nostrils as needed for opioid reversal. every 2-3 minutes until assistance arrives (Patient not taking: Reported on 4/8/2025) 2 each 1    naloxone (NARCAN) 4 MG/0.1ML nasal spray Spray 4 mg into one nostril alternating nostrils once as needed. (Patient not taking: Reported on 4/8/2025)      OLANZapine (ZYPREXA) 5 MG tablet Take 1 tablet (5 mg) by mouth at bedtime. 30 tablet 3    pantoprazole (PROTONIX) 40 MG EC tablet Take 1 tablet (40 mg) by mouth 2 times daily 60 tablet 3    Port Access Kit For nurse use only.  Do not remove items from bag.  Use for port access.  Do not place syringe on sterile field. [id number] kit 0    prochlorperazine (COMPAZINE) 10 MG tablet Take 1 tablet (10 mg) by mouth every 6 hours as needed for nausea or vomiting. 30 tablet 2    prochlorperazine (COMPAZINE) 10 MG tablet Take 1 tablet (10 mg) by mouth every 6 hours as needed for nausea or vomiting 30 tablet 2    sodium chloride 0.9% infusion Infuse 250 mLs over 30 minutes into the vein every 28 (twenty-eight) days. zometa concomitant fluids. Given per therapy plan orders 750 mL 2    sodium chloride 0.9% infusion Infuse 500 mLs into the vein as needed for other (infusion reaction). In case of mild reaction, administer via gravity at 20 mL/hr to keep vein open. In case of severe reaction, administer via gravity wide open on prime setting. 879246 mL 0    sodium chloride, PF, 0.9% PF flush Inject 10 mLs into the vein as needed for line flush. Flush IV before and after med administration  as directed and/or at least every 24 hours, or prior to deaccessing for no further use and/or at least every 4 weeks when not accessed. 178788 mL 0    sodium chloride, PF, 0.9% PF flush Inject 10 mLs into the vein as needed for other (infusion reaction). For RN use only as needed for infusion reaction 368069 mL 0    sterile water, preservative free, injection Use 2.2 mLs for reconstitution as needed (with alteplase for catheter occlusion). Direct diluent stream into powder. Mix by gently swirling until dissolved. DO NOT SHAKE. Discard remainder of vial. 360699 mL 0    vitamin D3 (CHOLECALCIFEROL) 50 mcg (2000 units) tablet Take 1 tablet (50 mcg) by mouth daily. 90 tablet 1    zoledronic acid (ZOMETA) 4 MG/100ML infusion Infuse 100 mLs (4 mg) into the vein every 28 days. Infuse via gravity. Given per therapy plan orders 300 mL 2     Past Medical History:   Diagnosis Date    Acute pancreatitis 04/16/2023    Alcohol-induced acute pancreatitis 04/10/2023    Gastric outlet obstruction     Metastasis from pancreatic cancer (H)     Personal history of chemotherapy     Gemzar + Abraxane started on 10/31/2023    Recurrent acute pancreatitis     S/P radiation therapy     2,000 cGy to T10 Spine completed on 11/29/2023 [facility]    S/P radiation therapy     3,000 cGy to L4 Spine completed on 10/21/2024 - [facility]     Past Surgical History:   Procedure Laterality Date    AS OPEN TREATMENT CLAVICULAR FRACTURE INTERNAL FX      ENDOSCOPIC RETROGRADE CHOLANGIOPANCREATOGRAM N/A 7/11/2023    Procedure: ENDOSCOPIC RETROGRADE CHOLANGIOPANCREATOGRAPHY;  Surgeon: Khadar Pickett MD;  Location: [facility] OR    ENDOSCOPIC RETROGRADE CHOLANGIOPANCREATOGRAM N/A 8/17/2023    Procedure: ENDOSCOPIC RETROGRADE  with stent removal x1, balloon sweep;  Surgeon: Christos Greenberg MD;  Location: [facility] OR    ENDOSCOPIC ULTRASOUND UPPER GASTROINTESTINAL TRACT (GI) N/A 7/11/2023    Procedure: Endoscopic ultrasound  upper gastrointestinal tract (GI), with biposy, GJ tube repositioning, stent placement;  Surgeon: Khadar Pickett MD;  Location: [facility] OR    ENDOSCOPIC ULTRASOUND UPPER GASTROINTESTINAL TRACT (GI) N/A 7/13/2023    Procedure: ENDOSCOPIC ULTRASOUND, ESOPHAGOSCOPY, EUS guided gastrojejunostomy;  Surgeon: Tre York MD;  Location: [facility] OR    INSERT PICC LINE  04/29/2023    INSERT PORT VASCULAR ACCESS Right 7/28/2023    Procedure: Insert port vascular access;  Surgeon: Tom العلي MD;  Location: [facility] OR    IR BONE ABLATION RFA  8/28/2024    IR BONE ABLATION RFA  1/10/2025    IR CHEST PORT PLACEMENT > 5 YRS OF AGE  7/28/2023    IR LUMBAR VERTEBROPLASTY  8/28/2024    IR NG TUBE PLACEMENT REQ RAD & FLUORO  05/08/2023    JG tube    REPLACE GASTROJEJUNOSTOMY TUBE, PERCUTANEOUS N/A 8/17/2023    Procedure: possible REPLACEMENT, GASTROJEJUNOSTOMY TUBE, PERCUTANEOUS;  Surgeon: Christos Greenberg MD;  Location: [facility] OR       Physical Exam:   GENERAL APPEARANCE: groggy though interactive, alert and no distress; neatly groomed  EYES: Eyes grossly normal to inspection, PERRLA, conjunctivae and sclerae without injection or discharge, EOM intact   RESP:  no increased work of breathing; speaks in brief, complete sentences;   MS: No musculoskeletal defects are noted  SKIN: No suspicious lesions or rashes, hydration status appears adequate with normal skin turgor   PSYCH: Alert and oriented x3; speech- coherent , normal rate and volume; able to articulate logical thoughts, able to abstract reason, no tangential thoughts, no hallucinations or delusions, mentation appears normal, Mood is euthymic. Affect is appropriate for this mood state and bright. Thought content is free of suicidal ideation, hallucinations, and delusions.  Eye contact is good during conversation.       Key Data Reviewed:  LABS: 03/25/2025- Cr 0.46, Albumin 3.,  Hgb 6.4,  alk phos 537; direct bili 0.76; Ca 19-9 46 (was 3 a year ago);     IMAGING: recent  portable CXR at [facility] ([facility]) was read as normal.    55 minutes spent on the date of the encounter doing chart review, history and exam, patient education & counseling, documentation and other activities as noted above.    The longitudinal plan of care for the diagnosis(es)/condition(s) as documented were addressed during this visit. Due to the added complexity in care, I will continue to support Gallo in the subsequent management and with ongoing continuity of care.    Jeremy Hurt MD MS FAAFP CAQHPM  [facility]  Office [phone]  Fax [phone]

## 2025-04-08 NOTE — PATIENT INSTRUCTIONS
It was good to see you today, Gallo and Violet.  I'm sorry things have been hard.    Keep using the fentanyl patches.      Here are the things we talked about:  Stop the morphine pills and use the morphine liquid that I prescribed today.  OK to use this orally or under the tongue.    Stop the lorazepam pills and use the prescription I sent for the lorazepam also as a liquid that can be swallowed or held under the tongue and absorbed from there.    Megan will call Friday to check in and you call sooner, if needed.  We'll make the next appointment based on how things are going.    I changed your code status to Do Not Resuscitate and I will be sending the papers in the [place] mail.    Try using the 750 mg methocarbamol during the day to avoid some grogginess.    You will be called about getting the spine MRI's done and the shoulder xray.     How to get a hold of us:  For non-urgent matters, MyChart works best.    For more urgent matters, or if you prefer not to use MyChart, call our clinic nurse coordinator Megan Funez RN at [phone]    We have an on-call number for evenings and weekends. Please call this only if you are having uncontrolled symptoms or serious side effects from your medicines: [phone].     For refills, please give us a week (5 working days) notice. We don't always have providers available everyday to do refills. If you call the day you run out of your medicine, we may not be able to refill it in time, so call 5 days in advance!    Jeremy Hurt MD MS FAAFP CAQHPM  [facility]  Office [phone]  Fax [phone]

## 2025-04-08 NOTE — PROGRESS NOTES
Infusion Nursing Note:  Gallo Navarro presents today for 1 Unit PRBC.    Patient seen by provider today: No   present during visit today: Not Applicable.    Note: Gallo reports severe fatigue, increasing pain, shortness of breath with mild activity. His muscle weakness has also increased. He also said that he has developed a few bumps on his back. 2 non-mobile, firm, protruding bumps noted. One on the right side of the spine approximately 2-3 inches in diameter. The other is on the left side of the spine and is approximately 1-2 inches in diameter. The patient has a palliative care video visit appointment today, it was suggested that they show the provider the lumps during the visit.        Intravenous Access:  Implanted Port.    Treatment Conditions:  Lab Results   Component Value Date    HGB 6.4 (LL) 04/07/2025    WBC 9.4 04/07/2025    ANEU 7.3 04/07/2025    ANEUTAUTO 10.9 (H) 02/26/2025     (L) 04/07/2025        Blood transfusion consent signed 02/11/2025.      Post Infusion Assessment:  Patient tolerated infusion without incident.  Blood return noted pre and post infusion.  Site patent and intact, free from redness, edema or discomfort.  No evidence of extravasations.  Access discontinued per protocol.       Discharge Plan:   AVS to patient via MYCHART.  Patient has a palliative care appointment today.  Patient discharged in stable condition accompanied by: wife.  Departure Mode: Wheelchair.      Gabby Martin RN

## 2025-04-09 ENCOUNTER — MEDICAL CORRESPONDENCE (OUTPATIENT)
Dept: HEALTH INFORMATION MANAGEMENT | Facility: CLINIC | Age: 60
End: 2025-04-09

## 2025-04-09 ENCOUNTER — NURSE TRIAGE (OUTPATIENT)
Dept: NURSING | Facility: CLINIC | Age: 60
End: 2025-04-09
Payer: COMMERCIAL

## 2025-04-09 ENCOUNTER — HOSPITAL ENCOUNTER (INPATIENT)
Facility: CLINIC | Age: 60
End: 2025-04-09
Attending: EMERGENCY MEDICINE | Admitting: HOSPITALIST
Payer: COMMERCIAL

## 2025-04-09 DIAGNOSIS — R00.0 TACHYCARDIA, UNSPECIFIED: ICD-10-CM

## 2025-04-09 DIAGNOSIS — D64.9 ANEMIA, UNSPECIFIED TYPE: ICD-10-CM

## 2025-04-09 DIAGNOSIS — C25.0 MALIGNANT NEOPLASM OF HEAD OF PANCREAS (H): Primary | ICD-10-CM

## 2025-04-09 DIAGNOSIS — M48.50XD PATHOLOGICAL COMPRESSION FRACTURE OF VERTEBRA WITH ROUTINE HEALING, SUBSEQUENT ENCOUNTER: ICD-10-CM

## 2025-04-09 DIAGNOSIS — R62.7 FAILURE TO THRIVE IN ADULT: ICD-10-CM

## 2025-04-09 DIAGNOSIS — M62.830 BACK MUSCLE SPASM: ICD-10-CM

## 2025-04-09 DIAGNOSIS — R10.30 LOWER ABDOMINAL PAIN: ICD-10-CM

## 2025-04-09 DIAGNOSIS — C79.51 MALIGNANT NEOPLASM METASTATIC TO BONE (H): ICD-10-CM

## 2025-04-09 DIAGNOSIS — I47.10 SVT (SUPRAVENTRICULAR TACHYCARDIA): ICD-10-CM

## 2025-04-09 DIAGNOSIS — G89.3 CANCER ASSOCIATED PAIN: ICD-10-CM

## 2025-04-09 PROCEDURE — 85041 AUTOMATED RBC COUNT: CPT | Performed by: EMERGENCY MEDICINE

## 2025-04-09 PROCEDURE — 85007 BL SMEAR W/DIFF WBC COUNT: CPT | Performed by: EMERGENCY MEDICINE

## 2025-04-09 PROCEDURE — 96376 TX/PRO/DX INJ SAME DRUG ADON: CPT | Performed by: EMERGENCY MEDICINE

## 2025-04-09 PROCEDURE — 99291 CRITICAL CARE FIRST HOUR: CPT | Mod: 25 | Performed by: EMERGENCY MEDICINE

## 2025-04-09 PROCEDURE — 96374 THER/PROPH/DIAG INJ IV PUSH: CPT | Performed by: EMERGENCY MEDICINE

## 2025-04-09 PROCEDURE — 93005 ELECTROCARDIOGRAM TRACING: CPT | Performed by: EMERGENCY MEDICINE

## 2025-04-09 PROCEDURE — 83690 ASSAY OF LIPASE: CPT | Performed by: EMERGENCY MEDICINE

## 2025-04-09 PROCEDURE — 83735 ASSAY OF MAGNESIUM: CPT | Performed by: EMERGENCY MEDICINE

## 2025-04-09 PROCEDURE — 85610 PROTHROMBIN TIME: CPT | Performed by: EMERGENCY MEDICINE

## 2025-04-09 PROCEDURE — 99291 CRITICAL CARE FIRST HOUR: CPT | Performed by: EMERGENCY MEDICINE

## 2025-04-09 PROCEDURE — 83605 ASSAY OF LACTIC ACID: CPT | Performed by: EMERGENCY MEDICINE

## 2025-04-09 PROCEDURE — 84484 ASSAY OF TROPONIN QUANT: CPT | Performed by: EMERGENCY MEDICINE

## 2025-04-09 PROCEDURE — 36430 TRANSFUSION BLD/BLD COMPNT: CPT | Performed by: EMERGENCY MEDICINE

## 2025-04-09 PROCEDURE — 80053 COMPREHEN METABOLIC PANEL: CPT | Performed by: EMERGENCY MEDICINE

## 2025-04-09 PROCEDURE — 84100 ASSAY OF PHOSPHORUS: CPT | Performed by: EMERGENCY MEDICINE

## 2025-04-09 PROCEDURE — 85379 FIBRIN DEGRADATION QUANT: CPT | Performed by: EMERGENCY MEDICINE

## 2025-04-09 PROCEDURE — 36415 COLL VENOUS BLD VENIPUNCTURE: CPT | Performed by: EMERGENCY MEDICINE

## 2025-04-09 PROCEDURE — 93010 ELECTROCARDIOGRAM REPORT: CPT | Performed by: EMERGENCY MEDICINE

## 2025-04-09 PROCEDURE — 86140 C-REACTIVE PROTEIN: CPT | Performed by: EMERGENCY MEDICINE

## 2025-04-09 ASSESSMENT — ACTIVITIES OF DAILY LIVING (ADL)
ADLS_ACUITY_SCORE: 54
ADLS_ACUITY_SCORE: 54

## 2025-04-09 NOTE — TELEPHONE ENCOUNTER
This RN attempted to reach patient on 4/9/25.  Left message to return call.  [employer] message sent to patient.         If they call back:    Please let them know we are waiting for provider to review at this time regarding his Zio patch results.       Kristina Kjellberg, MSN, RN

## 2025-04-10 ENCOUNTER — APPOINTMENT (OUTPATIENT)
Dept: CT IMAGING | Facility: CLINIC | Age: 60
End: 2025-04-10
Attending: EMERGENCY MEDICINE
Payer: COMMERCIAL

## 2025-04-10 ENCOUNTER — APPOINTMENT (OUTPATIENT)
Dept: CARDIOLOGY | Facility: CLINIC | Age: 60
End: 2025-04-10
Payer: COMMERCIAL

## 2025-04-10 ENCOUNTER — APPOINTMENT (OUTPATIENT)
Dept: GENERAL RADIOLOGY | Facility: CLINIC | Age: 60
End: 2025-04-10
Attending: STUDENT IN AN ORGANIZED HEALTH CARE EDUCATION/TRAINING PROGRAM
Payer: COMMERCIAL

## 2025-04-10 VITALS
SYSTOLIC BLOOD PRESSURE: 128 MMHG | RESPIRATION RATE: 18 BRPM | OXYGEN SATURATION: 97 % | TEMPERATURE: 97.6 F | HEART RATE: 120 BPM | DIASTOLIC BLOOD PRESSURE: 64 MMHG

## 2025-04-10 PROBLEM — D64.9 ANEMIA, UNSPECIFIED TYPE: Status: ACTIVE | Noted: 2025-04-10

## 2025-04-10 LAB
ABO + RH BLD: NORMAL
ALBUMIN SERPL BCG-MCNC: 2.9 G/DL (ref 3.5–5.2)
ALBUMIN SERPL BCG-MCNC: 2.9 G/DL (ref 3.5–5.2)
ALBUMIN SERPL BCG-MCNC: 3 G/DL (ref 3.5–5.2)
ALBUMIN UR-MCNC: 10 MG/DL
ALP SERPL-CCNC: 276 U/L (ref 40–150)
ALP SERPL-CCNC: 279 U/L (ref 40–150)
ALP SERPL-CCNC: 279 U/L (ref 40–150)
ALT SERPL W P-5'-P-CCNC: 11 U/L (ref 0–70)
ALT SERPL W P-5'-P-CCNC: 14 U/L (ref 0–70)
ALT SERPL W P-5'-P-CCNC: 14 U/L (ref 0–70)
AMMONIA PLAS-SCNC: 31 UMOL/L (ref 16–60)
ANION GAP SERPL CALCULATED.3IONS-SCNC: 12 MMOL/L (ref 7–15)
ANION GAP SERPL CALCULATED.3IONS-SCNC: 13 MMOL/L (ref 7–15)
APPEARANCE UR: CLEAR
AST SERPL W P-5'-P-CCNC: 25 U/L (ref 0–45)
AST SERPL W P-5'-P-CCNC: 26 U/L (ref 0–45)
AST SERPL W P-5'-P-CCNC: 26 U/L (ref 0–45)
ATRIAL RATE - MUSE: 111 BPM
ATRIAL RATE - MUSE: 118 BPM
ATRIAL RATE - MUSE: NORMAL BPM
BASE EXCESS BLDV CALC-SCNC: 2 MMOL/L (ref -3–3)
BASOPHILS # BLD AUTO: 0.1 10E3/UL (ref 0–0.2)
BASOPHILS # BLD MANUAL: 0.1 10E3/UL (ref 0–0.2)
BASOPHILS NFR BLD AUTO: 1 %
BASOPHILS NFR BLD MANUAL: 1 %
BILIRUB DIRECT SERPL-MCNC: 0.57 MG/DL (ref 0–0.3)
BILIRUB SERPL-MCNC: 0.8 MG/DL
BILIRUB SERPL-MCNC: 1 MG/DL
BILIRUB SERPL-MCNC: 1 MG/DL
BILIRUB UR QL STRIP: NEGATIVE
BLD GP AB SCN SERPL QL: NEGATIVE
BLD PROD TYP BPU: NORMAL
BLD PROD TYP BPU: NORMAL
BLOOD COMPONENT TYPE: NORMAL
BLOOD COMPONENT TYPE: NORMAL
BUN SERPL-MCNC: 12 MG/DL (ref 6–20)
BUN SERPL-MCNC: 13.4 MG/DL (ref 6–20)
C PNEUM DNA SPEC QL NAA+PROBE: NOT DETECTED
CA-I BLD-MCNC: 4.9 MG/DL (ref 4.4–5.2)
CALCIUM SERPL-MCNC: 8.6 MG/DL (ref 8.8–10.4)
CALCIUM SERPL-MCNC: 8.8 MG/DL (ref 8.8–10.4)
CAOX CRY #/AREA URNS HPF: ABNORMAL /HPF
CHLORIDE SERPL-SCNC: 99 MMOL/L (ref 98–107)
CHLORIDE SERPL-SCNC: 99 MMOL/L (ref 98–107)
CODING SYSTEM: NORMAL
CODING SYSTEM: NORMAL
COLOR UR AUTO: YELLOW
CPB POCT: NO
CREAT SERPL-MCNC: 0.4 MG/DL (ref 0.67–1.17)
CREAT SERPL-MCNC: 0.43 MG/DL (ref 0.67–1.17)
CROSSMATCH: NORMAL
CROSSMATCH: NORMAL
CRP SERPL-MCNC: 125 MG/L
D DIMER PPP FEU-MCNC: 11.91 UG/ML FEU (ref 0–0.5)
DIASTOLIC BLOOD PRESSURE - MUSE: NORMAL MMHG
EGFRCR SERPLBLD CKD-EPI 2021: >90 ML/MIN/1.73M2
EGFRCR SERPLBLD CKD-EPI 2021: >90 ML/MIN/1.73M2
EOSINOPHIL # BLD AUTO: 0.1 10E3/UL (ref 0–0.7)
EOSINOPHIL # BLD MANUAL: 0.1 10E3/UL (ref 0–0.7)
EOSINOPHIL NFR BLD AUTO: 1 %
EOSINOPHIL NFR BLD MANUAL: 1 %
ERYTHROCYTE [DISTWIDTH] IN BLOOD BY AUTOMATED COUNT: 17.2 % (ref 10–15)
ERYTHROCYTE [DISTWIDTH] IN BLOOD BY AUTOMATED COUNT: 17.6 % (ref 10–15)
ERYTHROCYTE [DISTWIDTH] IN BLOOD BY AUTOMATED COUNT: 17.7 % (ref 10–15)
FLUAV H1 2009 PAND RNA SPEC QL NAA+PROBE: NOT DETECTED
FLUAV H1 RNA SPEC QL NAA+PROBE: NOT DETECTED
FLUAV H3 RNA SPEC QL NAA+PROBE: NOT DETECTED
FLUAV RNA SPEC QL NAA+PROBE: NEGATIVE
FLUAV RNA SPEC QL NAA+PROBE: NOT DETECTED
FLUBV RNA RESP QL NAA+PROBE: NEGATIVE
FLUBV RNA SPEC QL NAA+PROBE: NOT DETECTED
GLUCOSE BLD-MCNC: 149 MG/DL (ref 70–99)
GLUCOSE BLDC GLUCOMTR-MCNC: 139 MG/DL (ref 70–99)
GLUCOSE SERPL-MCNC: 140 MG/DL (ref 70–99)
GLUCOSE SERPL-MCNC: 165 MG/DL (ref 70–99)
GLUCOSE UR STRIP-MCNC: NEGATIVE MG/DL
HADV DNA SPEC QL NAA+PROBE: NOT DETECTED
HCO3 BLDV-SCNC: 26 MMOL/L (ref 21–28)
HCO3 SERPL-SCNC: 24 MMOL/L (ref 22–29)
HCO3 SERPL-SCNC: 25 MMOL/L (ref 22–29)
HCOV PNL SPEC NAA+PROBE: NOT DETECTED
HCT VFR BLD AUTO: 20.1 % (ref 40–53)
HCT VFR BLD AUTO: 21.3 % (ref 40–53)
HCT VFR BLD AUTO: 23.9 % (ref 40–53)
HCT VFR BLD CALC: 20 %
HGB BLD-MCNC: 6.4 G/DL (ref 13.3–17.7)
HGB BLD-MCNC: 6.6 G/DL (ref 13.3–17.7)
HGB BLD-MCNC: 6.8 G/DL (ref 13.3–17.7)
HGB BLD-MCNC: 7.5 G/DL (ref 13.3–17.7)
HGB BLD-MCNC: 7.5 G/DL (ref 13.3–17.7)
HGB UR QL STRIP: NEGATIVE
HMPV RNA SPEC QL NAA+PROBE: NOT DETECTED
HPIV1 RNA SPEC QL NAA+PROBE: NOT DETECTED
HPIV2 RNA SPEC QL NAA+PROBE: NOT DETECTED
HPIV3 RNA SPEC QL NAA+PROBE: NOT DETECTED
HPIV4 RNA SPEC QL NAA+PROBE: NOT DETECTED
IMM GRANULOCYTES # BLD: 0.4 10E3/UL
IMM GRANULOCYTES NFR BLD: 5 %
INR PPP: 2.09 (ref 0.85–1.15)
INTERPRETATION ECG - MUSE: NORMAL
ISSUE DATE AND TIME: NORMAL
KETONES UR STRIP-MCNC: NEGATIVE MG/DL
LACTATE SERPL-SCNC: 1.2 MMOL/L (ref 0.7–2)
LEUKOCYTE ESTERASE UR QL STRIP: NEGATIVE
LIPASE SERPL-CCNC: 7 U/L (ref 13–60)
LVEF ECHO: NORMAL
LYMPHOCYTES # BLD AUTO: 1.1 10E3/UL (ref 0.8–5.3)
LYMPHOCYTES # BLD MANUAL: 0.9 10E3/UL (ref 0.8–5.3)
LYMPHOCYTES NFR BLD AUTO: 17 %
LYMPHOCYTES NFR BLD MANUAL: 13 %
M PNEUMO DNA SPEC QL NAA+PROBE: NOT DETECTED
MAGNESIUM SERPL-MCNC: 1.9 MG/DL (ref 1.7–2.3)
MAGNESIUM SERPL-MCNC: 2 MG/DL (ref 1.7–2.3)
MCH RBC QN AUTO: 29.8 PG (ref 26.5–33)
MCH RBC QN AUTO: 29.9 PG (ref 26.5–33)
MCH RBC QN AUTO: 30 PG (ref 26.5–33)
MCHC RBC AUTO-ENTMCNC: 31 G/DL (ref 31.5–36.5)
MCHC RBC AUTO-ENTMCNC: 31.4 G/DL (ref 31.5–36.5)
MCHC RBC AUTO-ENTMCNC: 31.8 G/DL (ref 31.5–36.5)
MCV RBC AUTO: 94 FL (ref 78–100)
MCV RBC AUTO: 95 FL (ref 78–100)
MCV RBC AUTO: 96 FL (ref 78–100)
METAMYELOCYTES # BLD MANUAL: 0.3 10E3/UL
METAMYELOCYTES NFR BLD MANUAL: 4 %
MONOCYTES # BLD AUTO: 0.8 10E3/UL (ref 0–1.3)
MONOCYTES # BLD MANUAL: 0.4 10E3/UL (ref 0–1.3)
MONOCYTES NFR BLD AUTO: 11 %
MONOCYTES NFR BLD MANUAL: 5 %
MUCOUS THREADS #/AREA URNS LPF: PRESENT /LPF
MYELOCYTES # BLD MANUAL: 0.2 10E3/UL
MYELOCYTES NFR BLD MANUAL: 3 %
NEUTROPHILS # BLD AUTO: 4.4 10E3/UL (ref 1.6–8.3)
NEUTROPHILS # BLD MANUAL: 5.2 10E3/UL (ref 1.6–8.3)
NEUTROPHILS NFR BLD AUTO: 65 %
NEUTROPHILS NFR BLD MANUAL: 73 %
NITRATE UR QL: NEGATIVE
NRBC # BLD AUTO: 0.1 10E3/UL
NRBC # BLD AUTO: 0.1 10E3/UL
NRBC BLD AUTO-RTO: 1 /100
NRBC BLD MANUAL-RTO: 1 %
P AXIS - MUSE: 18 DEGREES
P AXIS - MUSE: 62 DEGREES
P AXIS - MUSE: NORMAL DEGREES
PATH REPORT.COMMENTS IMP SPEC: NORMAL
PATH REPORT.COMMENTS IMP SPEC: NORMAL
PATH REPORT.FINAL DX SPEC: NORMAL
PATH REPORT.MICROSCOPIC SPEC OTHER STN: NORMAL
PATH REPORT.MICROSCOPIC SPEC OTHER STN: NORMAL
PATH REPORT.RELEVANT HX SPEC: NORMAL
PCO2 BLDV: 39 MM HG (ref 40–50)
PH BLDV: 7.43 [PH] (ref 7.32–7.43)
PH UR STRIP: 6 [PH] (ref 5–7)
PHOSPHATE SERPL-MCNC: 3.4 MG/DL (ref 2.5–4.5)
PHOSPHATE SERPL-MCNC: 3.5 MG/DL (ref 2.5–4.5)
PLAT MORPH BLD: NORMAL
PLATELET # BLD AUTO: 83 10E3/UL (ref 150–450)
PLATELET # BLD AUTO: 90 10E3/UL (ref 150–450)
PLATELET # BLD AUTO: 91 10E3/UL (ref 150–450)
PO2 BLDV: 39 MM HG (ref 25–47)
POTASSIUM BLD-SCNC: 3.9 MMOL/L (ref 3.4–5.3)
POTASSIUM SERPL-SCNC: 4 MMOL/L (ref 3.4–5.3)
POTASSIUM SERPL-SCNC: 4.2 MMOL/L (ref 3.4–5.3)
PR INTERVAL - MUSE: 118 MS
PR INTERVAL - MUSE: 132 MS
PR INTERVAL - MUSE: NORMAL MS
PROT SERPL-MCNC: 5.9 G/DL (ref 6.4–8.3)
PROT SERPL-MCNC: 5.9 G/DL (ref 6.4–8.3)
PROT SERPL-MCNC: 6.2 G/DL (ref 6.4–8.3)
QRS DURATION - MUSE: 62 MS
QRS DURATION - MUSE: 68 MS
QRS DURATION - MUSE: 78 MS
QT - MUSE: 242 MS
QT - MUSE: 304 MS
QT - MUSE: 328 MS
QTC - MUSE: 426 MS
QTC - MUSE: 428 MS
QTC - MUSE: 446 MS
R AXIS - MUSE: 40 DEGREES
R AXIS - MUSE: 71 DEGREES
R AXIS - MUSE: 73 DEGREES
RBC # BLD AUTO: 2.13 10E6/UL (ref 4.4–5.9)
RBC # BLD AUTO: 2.21 10E6/UL (ref 4.4–5.9)
RBC # BLD AUTO: 2.52 10E6/UL (ref 4.4–5.9)
RBC MORPH BLD: NORMAL
RBC URINE: 0 /HPF
RETICS # AUTO: 0.07 10E6/UL (ref 0.03–0.1)
RETICS/RBC NFR AUTO: 3.4 % (ref 0.5–2)
RSV RNA SPEC NAA+PROBE: NEGATIVE
RSV RNA SPEC QL NAA+PROBE: NOT DETECTED
RSV RNA SPEC QL NAA+PROBE: NOT DETECTED
RV+EV RNA SPEC QL NAA+PROBE: NOT DETECTED
SAO2 % BLDV: 75 % (ref 70–75)
SARS-COV-2 RNA RESP QL NAA+PROBE: NEGATIVE
SODIUM BLD-SCNC: 135 MMOL/L (ref 135–145)
SODIUM SERPL-SCNC: 136 MMOL/L (ref 135–145)
SODIUM SERPL-SCNC: 136 MMOL/L (ref 135–145)
SP GR UR STRIP: 1.01 (ref 1–1.03)
SPECIMEN EXP DATE BLD: NORMAL
SYSTOLIC BLOOD PRESSURE - MUSE: NORMAL MMHG
T AXIS - MUSE: -85 DEGREES
T AXIS - MUSE: 71 DEGREES
T AXIS - MUSE: 83 DEGREES
TRANSITIONAL EPI: <1 /HPF
TROPONIN T SERPL HS-MCNC: 44 NG/L
TROPONIN T SERPL HS-MCNC: 47 NG/L
TROPONIN T SERPL HS-MCNC: 47 NG/L
TSH SERPL DL<=0.005 MIU/L-ACNC: 2.87 UIU/ML (ref 0.3–4.2)
TSH SERPL DL<=0.005 MIU/L-ACNC: 2.94 UIU/ML (ref 0.3–4.2)
UNIT ABO/RH: NORMAL
UNIT ABO/RH: NORMAL
UNIT NUMBER: NORMAL
UNIT NUMBER: NORMAL
UNIT STATUS: NORMAL
UNIT STATUS: NORMAL
UNIT TYPE ISBT: 1700
UNIT TYPE ISBT: 1700
UROBILINOGEN UR STRIP-MCNC: 2 MG/DL
VENTRICULAR RATE- MUSE: 111 BPM
VENTRICULAR RATE- MUSE: 118 BPM
VENTRICULAR RATE- MUSE: 188 BPM
WBC # BLD AUTO: 6.7 10E3/UL (ref 4–11)
WBC # BLD AUTO: 7.1 10E3/UL (ref 4–11)
WBC # BLD AUTO: 7.2 10E3/UL (ref 4–11)
WBC URINE: 1 /HPF

## 2025-04-10 PROCEDURE — 82330 ASSAY OF CALCIUM: CPT

## 2025-04-10 PROCEDURE — 87633 RESP VIRUS 12-25 TARGETS: CPT | Performed by: EMERGENCY MEDICINE

## 2025-04-10 PROCEDURE — 85018 HEMOGLOBIN: CPT | Performed by: EMERGENCY MEDICINE

## 2025-04-10 PROCEDURE — 87637 SARSCOV2&INF A&B&RSV AMP PRB: CPT | Performed by: EMERGENCY MEDICINE

## 2025-04-10 PROCEDURE — 87040 BLOOD CULTURE FOR BACTERIA: CPT | Performed by: EMERGENCY MEDICINE

## 2025-04-10 PROCEDURE — P9040 RBC LEUKOREDUCED IRRADIATED: HCPCS | Performed by: EMERGENCY MEDICINE

## 2025-04-10 PROCEDURE — 99223 1ST HOSP IP/OBS HIGH 75: CPT | Mod: GC | Performed by: STUDENT IN AN ORGANIZED HEALTH CARE EDUCATION/TRAINING PROGRAM

## 2025-04-10 PROCEDURE — 84443 ASSAY THYROID STIM HORMONE: CPT

## 2025-04-10 PROCEDURE — 71275 CT ANGIOGRAPHY CHEST: CPT | Mod: 26 | Performed by: RADIOLOGY

## 2025-04-10 PROCEDURE — 70450 CT HEAD/BRAIN W/O DYE: CPT

## 2025-04-10 PROCEDURE — 82803 BLOOD GASES ANY COMBINATION: CPT

## 2025-04-10 PROCEDURE — 258N000003 HC RX IP 258 OP 636

## 2025-04-10 PROCEDURE — 99223 1ST HOSP IP/OBS HIGH 75: CPT | Mod: GC | Performed by: INTERNAL MEDICINE

## 2025-04-10 PROCEDURE — 86901 BLOOD TYPING SEROLOGIC RH(D): CPT | Performed by: EMERGENCY MEDICINE

## 2025-04-10 PROCEDURE — 36415 COLL VENOUS BLD VENIPUNCTURE: CPT | Performed by: EMERGENCY MEDICINE

## 2025-04-10 PROCEDURE — 250N000013 HC RX MED GY IP 250 OP 250 PS 637

## 2025-04-10 PROCEDURE — 74177 CT ABD & PELVIS W/CONTRAST: CPT

## 2025-04-10 PROCEDURE — 85045 AUTOMATED RETICULOCYTE COUNT: CPT | Performed by: EMERGENCY MEDICINE

## 2025-04-10 PROCEDURE — 73030 X-RAY EXAM OF SHOULDER: CPT | Mod: 26 | Performed by: RADIOLOGY

## 2025-04-10 PROCEDURE — 81003 URINALYSIS AUTO W/O SCOPE: CPT | Performed by: EMERGENCY MEDICINE

## 2025-04-10 PROCEDURE — 86900 BLOOD TYPING SEROLOGIC ABO: CPT | Performed by: EMERGENCY MEDICINE

## 2025-04-10 PROCEDURE — 83735 ASSAY OF MAGNESIUM: CPT

## 2025-04-10 PROCEDURE — 84443 ASSAY THYROID STIM HORMONE: CPT | Performed by: EMERGENCY MEDICINE

## 2025-04-10 PROCEDURE — 84484 ASSAY OF TROPONIN QUANT: CPT | Performed by: EMERGENCY MEDICINE

## 2025-04-10 PROCEDURE — 85025 COMPLETE CBC W/AUTO DIFF WBC: CPT | Performed by: EMERGENCY MEDICINE

## 2025-04-10 PROCEDURE — 85014 HEMATOCRIT: CPT

## 2025-04-10 PROCEDURE — 82310 ASSAY OF CALCIUM: CPT

## 2025-04-10 PROCEDURE — 73030 X-RAY EXAM OF SHOULDER: CPT | Mod: RT

## 2025-04-10 PROCEDURE — 93306 TTE W/DOPPLER COMPLETE: CPT

## 2025-04-10 PROCEDURE — 86923 COMPATIBILITY TEST ELECTRIC: CPT | Performed by: STUDENT IN AN ORGANIZED HEALTH CARE EDUCATION/TRAINING PROGRAM

## 2025-04-10 PROCEDURE — 82140 ASSAY OF AMMONIA: CPT | Performed by: EMERGENCY MEDICINE

## 2025-04-10 PROCEDURE — 250N000009 HC RX 250

## 2025-04-10 PROCEDURE — 250N000011 HC RX IP 250 OP 636: Performed by: EMERGENCY MEDICINE

## 2025-04-10 PROCEDURE — 70450 CT HEAD/BRAIN W/O DYE: CPT | Mod: 26 | Performed by: RADIOLOGY

## 2025-04-10 PROCEDURE — 74177 CT ABD & PELVIS W/CONTRAST: CPT | Mod: 26 | Performed by: RADIOLOGY

## 2025-04-10 PROCEDURE — 82962 GLUCOSE BLOOD TEST: CPT

## 2025-04-10 PROCEDURE — 999N000157 HC STATISTIC RCP TIME EA 10 MIN

## 2025-04-10 PROCEDURE — 82247 BILIRUBIN TOTAL: CPT

## 2025-04-10 PROCEDURE — 120N000002 HC R&B MED SURG/OB UMMC

## 2025-04-10 PROCEDURE — 93306 TTE W/DOPPLER COMPLETE: CPT | Mod: 26 | Performed by: INTERNAL MEDICINE

## 2025-04-10 PROCEDURE — 86923 COMPATIBILITY TEST ELECTRIC: CPT | Performed by: EMERGENCY MEDICINE

## 2025-04-10 PROCEDURE — 36591 DRAW BLOOD OFF VENOUS DEVICE: CPT | Performed by: EMERGENCY MEDICINE

## 2025-04-10 PROCEDURE — 250N000013 HC RX MED GY IP 250 OP 250 PS 637: Performed by: HOSPITALIST

## 2025-04-10 PROCEDURE — 84100 ASSAY OF PHOSPHORUS: CPT

## 2025-04-10 PROCEDURE — 250N000011 HC RX IP 250 OP 636: Mod: JZ | Performed by: EMERGENCY MEDICINE

## 2025-04-10 PROCEDURE — 85060 BLOOD SMEAR INTERPRETATION: CPT | Performed by: STUDENT IN AN ORGANIZED HEALTH CARE EDUCATION/TRAINING PROGRAM

## 2025-04-10 RX ORDER — LIDOCAINE 40 MG/G
CREAM TOPICAL
Status: DISCONTINUED | OUTPATIENT
Start: 2025-04-10 | End: 2025-04-12 | Stop reason: HOSPADM

## 2025-04-10 RX ORDER — PROCHLORPERAZINE MALEATE 10 MG
10 TABLET ORAL EVERY 6 HOURS PRN
Status: DISCONTINUED | OUTPATIENT
Start: 2025-04-10 | End: 2025-04-12 | Stop reason: HOSPADM

## 2025-04-10 RX ORDER — NICOTINE POLACRILEX 4 MG
15-30 LOZENGE BUCCAL
Status: DISCONTINUED | OUTPATIENT
Start: 2025-04-10 | End: 2025-04-12 | Stop reason: HOSPADM

## 2025-04-10 RX ORDER — NALOXONE HYDROCHLORIDE 0.4 MG/ML
0.2 INJECTION, SOLUTION INTRAMUSCULAR; INTRAVENOUS; SUBCUTANEOUS
Status: DISCONTINUED | OUTPATIENT
Start: 2025-04-10 | End: 2025-04-12 | Stop reason: HOSPADM

## 2025-04-10 RX ORDER — CALCIUM CARBONATE 500(1250)
2 TABLET ORAL 2 TIMES DAILY
Status: DISCONTINUED | OUTPATIENT
Start: 2025-04-10 | End: 2025-04-12 | Stop reason: HOSPADM

## 2025-04-10 RX ORDER — POLYETHYLENE GLYCOL 3350 17 G/17G
17 POWDER, FOR SOLUTION ORAL DAILY
Status: DISCONTINUED | OUTPATIENT
Start: 2025-04-10 | End: 2025-04-12 | Stop reason: HOSPADM

## 2025-04-10 RX ORDER — FENTANYL 75 UG/1
75 PATCH TRANSDERMAL
Status: DISCONTINUED | OUTPATIENT
Start: 2025-04-10 | End: 2025-04-11 | Stop reason: DRUGHIGH

## 2025-04-10 RX ORDER — MORPHINE SULFATE 10 MG/5ML
15 SOLUTION ORAL
Status: DISCONTINUED | OUTPATIENT
Start: 2025-04-10 | End: 2025-04-10

## 2025-04-10 RX ORDER — METHOCARBAMOL 500 MG/1
1000 TABLET, FILM COATED ORAL 4 TIMES DAILY PRN
Status: DISCONTINUED | OUTPATIENT
Start: 2025-04-10 | End: 2025-04-12 | Stop reason: HOSPADM

## 2025-04-10 RX ORDER — MORPHINE SULFATE 4 MG/ML
4 INJECTION, SOLUTION INTRAMUSCULAR; INTRAVENOUS ONCE
Status: COMPLETED | OUTPATIENT
Start: 2025-04-10 | End: 2025-04-10

## 2025-04-10 RX ORDER — AMOXICILLIN 250 MG
1 CAPSULE ORAL 2 TIMES DAILY PRN
Status: DISCONTINUED | OUTPATIENT
Start: 2025-04-10 | End: 2025-04-12 | Stop reason: HOSPADM

## 2025-04-10 RX ORDER — NALOXONE HYDROCHLORIDE 0.4 MG/ML
0.4 INJECTION, SOLUTION INTRAMUSCULAR; INTRAVENOUS; SUBCUTANEOUS
Status: DISCONTINUED | OUTPATIENT
Start: 2025-04-10 | End: 2025-04-12 | Stop reason: HOSPADM

## 2025-04-10 RX ORDER — AMOXICILLIN 250 MG
2 CAPSULE ORAL 2 TIMES DAILY PRN
Status: DISCONTINUED | OUTPATIENT
Start: 2025-04-10 | End: 2025-04-12 | Stop reason: HOSPADM

## 2025-04-10 RX ORDER — POLYETHYLENE GLYCOL 3350 17 G/17G
17 POWDER, FOR SOLUTION ORAL 2 TIMES DAILY PRN
Status: DISCONTINUED | OUTPATIENT
Start: 2025-04-10 | End: 2025-04-12 | Stop reason: HOSPADM

## 2025-04-10 RX ORDER — PANTOPRAZOLE SODIUM 40 MG/1
40 TABLET, DELAYED RELEASE ORAL 2 TIMES DAILY
Status: DISCONTINUED | OUTPATIENT
Start: 2025-04-10 | End: 2025-04-12 | Stop reason: HOSPADM

## 2025-04-10 RX ORDER — METOPROLOL TARTRATE 1 MG/ML
5 INJECTION, SOLUTION INTRAVENOUS ONCE
Status: COMPLETED | OUTPATIENT
Start: 2025-04-10 | End: 2025-04-10

## 2025-04-10 RX ORDER — PROCHLORPERAZINE 25 MG
25 SUPPOSITORY, RECTAL RECTAL EVERY 12 HOURS PRN
Status: DISCONTINUED | OUTPATIENT
Start: 2025-04-10 | End: 2025-04-12 | Stop reason: HOSPADM

## 2025-04-10 RX ORDER — LIDOCAINE 50 MG/G
PATCH TOPICAL EVERY 24 HOURS
COMMUNITY

## 2025-04-10 RX ORDER — DICYCLOMINE HYDROCHLORIDE 10 MG/1
10 CAPSULE ORAL
Status: DISCONTINUED | OUTPATIENT
Start: 2025-04-10 | End: 2025-04-12 | Stop reason: HOSPADM

## 2025-04-10 RX ORDER — IOPAMIDOL 755 MG/ML
88 INJECTION, SOLUTION INTRAVASCULAR ONCE
Status: COMPLETED | OUTPATIENT
Start: 2025-04-10 | End: 2025-04-10

## 2025-04-10 RX ORDER — LIDOCAINE 4 G/G
1 PATCH TOPICAL
Status: DISCONTINUED | OUTPATIENT
Start: 2025-04-10 | End: 2025-04-12 | Stop reason: HOSPADM

## 2025-04-10 RX ORDER — CALCIUM CARBONATE 500 MG/1
1000 TABLET, CHEWABLE ORAL 4 TIMES DAILY PRN
Status: DISCONTINUED | OUTPATIENT
Start: 2025-04-10 | End: 2025-04-12 | Stop reason: HOSPADM

## 2025-04-10 RX ORDER — HYDROMORPHONE HYDROCHLORIDE 1 MG/ML
0.5 INJECTION, SOLUTION INTRAMUSCULAR; INTRAVENOUS; SUBCUTANEOUS
Status: DISCONTINUED | OUTPATIENT
Start: 2025-04-10 | End: 2025-04-12 | Stop reason: HOSPADM

## 2025-04-10 RX ORDER — LORAZEPAM 0.5 MG/1
.5-1 TABLET ORAL EVERY 6 HOURS PRN
Status: DISCONTINUED | OUTPATIENT
Start: 2025-04-10 | End: 2025-04-12 | Stop reason: HOSPADM

## 2025-04-10 RX ORDER — HYDROMORPHONE HCL IN WATER/PF 6 MG/30 ML
0.2 PATIENT CONTROLLED ANALGESIA SYRINGE INTRAVENOUS ONCE
Status: DISCONTINUED | OUTPATIENT
Start: 2025-04-10 | End: 2025-04-10

## 2025-04-10 RX ORDER — SODIUM CHLORIDE, SODIUM LACTATE, POTASSIUM CHLORIDE, CALCIUM CHLORIDE 600; 310; 30; 20 MG/100ML; MG/100ML; MG/100ML; MG/100ML
INJECTION, SOLUTION INTRAVENOUS CONTINUOUS
Status: ACTIVE | OUTPATIENT
Start: 2025-04-10 | End: 2025-04-11

## 2025-04-10 RX ORDER — MORPHINE SULFATE 10 MG/5ML
30 SOLUTION ORAL
Status: DISCONTINUED | OUTPATIENT
Start: 2025-04-10 | End: 2025-04-12 | Stop reason: HOSPADM

## 2025-04-10 RX ORDER — OLANZAPINE 2.5 MG/1
5 TABLET, FILM COATED ORAL AT BEDTIME
Status: DISCONTINUED | OUTPATIENT
Start: 2025-04-10 | End: 2025-04-12 | Stop reason: HOSPADM

## 2025-04-10 RX ORDER — DEXTROSE MONOHYDRATE 25 G/50ML
25-50 INJECTION, SOLUTION INTRAVENOUS
Status: DISCONTINUED | OUTPATIENT
Start: 2025-04-10 | End: 2025-04-12 | Stop reason: HOSPADM

## 2025-04-10 RX ORDER — SENNOSIDES 8.6 MG
1-2 TABLET ORAL 2 TIMES DAILY
Status: DISCONTINUED | OUTPATIENT
Start: 2025-04-10 | End: 2025-04-12 | Stop reason: HOSPADM

## 2025-04-10 RX ORDER — METOPROLOL TARTRATE 25 MG/1
25 TABLET, FILM COATED ORAL 2 TIMES DAILY
Status: DISCONTINUED | OUTPATIENT
Start: 2025-04-11 | End: 2025-04-11

## 2025-04-10 RX ORDER — ACETAMINOPHEN 325 MG/1
650 TABLET ORAL 3 TIMES DAILY
Status: DISCONTINUED | OUTPATIENT
Start: 2025-04-10 | End: 2025-04-12 | Stop reason: HOSPADM

## 2025-04-10 RX ADMIN — MORPHINE SULFATE 4 MG: 4 INJECTION INTRAVENOUS at 05:10

## 2025-04-10 RX ADMIN — ACETAMINOPHEN 650 MG: 325 TABLET, FILM COATED ORAL at 15:48

## 2025-04-10 RX ADMIN — PANTOPRAZOLE SODIUM 40 MG: 40 TABLET, DELAYED RELEASE ORAL at 20:08

## 2025-04-10 RX ADMIN — DICYCLOMINE HYDROCHLORIDE 10 MG: 10 CAPSULE ORAL at 22:46

## 2025-04-10 RX ADMIN — CALCIUM 1000 MG: 500 TABLET ORAL at 20:26

## 2025-04-10 RX ADMIN — LORAZEPAM 1 MG: 0.5 TABLET ORAL at 08:20

## 2025-04-10 RX ADMIN — LIDOCAINE 4% 1 PATCH: 40 PATCH TOPICAL at 22:55

## 2025-04-10 RX ADMIN — MORPHINE SULFATE 15 MG: 10 SOLUTION ORAL at 11:18

## 2025-04-10 RX ADMIN — Medication 12.5 MG: at 20:32

## 2025-04-10 RX ADMIN — MORPHINE SULFATE 30 MG: 10 SOLUTION ORAL at 17:17

## 2025-04-10 RX ADMIN — PANCRELIPASE 2 CAPSULE: 120000; 24000; 76000 CAPSULE, DELAYED RELEASE PELLETS ORAL at 17:22

## 2025-04-10 RX ADMIN — Medication 12.5 MG: at 14:06

## 2025-04-10 RX ADMIN — SODIUM CHLORIDE, SODIUM LACTATE, POTASSIUM CHLORIDE, AND CALCIUM CHLORIDE: .6; .31; .03; .02 INJECTION, SOLUTION INTRAVENOUS at 21:59

## 2025-04-10 RX ADMIN — SODIUM CHLORIDE, SODIUM LACTATE, POTASSIUM CHLORIDE, AND CALCIUM CHLORIDE: .6; .31; .03; .02 INJECTION, SOLUTION INTRAVENOUS at 11:59

## 2025-04-10 RX ADMIN — MORPHINE SULFATE 30 MG: 10 SOLUTION ORAL at 22:55

## 2025-04-10 RX ADMIN — METOPROLOL TARTRATE 5 MG: 5 INJECTION INTRAVENOUS at 11:48

## 2025-04-10 RX ADMIN — METHOCARBAMOL 1000 MG: 500 TABLET ORAL at 11:19

## 2025-04-10 RX ADMIN — IOPAMIDOL 88 ML: 755 INJECTION, SOLUTION INTRAVENOUS at 03:23

## 2025-04-10 RX ADMIN — LORAZEPAM 0.5 MG: 0.5 TABLET ORAL at 15:49

## 2025-04-10 RX ADMIN — CALCIUM 1000 MG: 500 TABLET ORAL at 12:20

## 2025-04-10 RX ADMIN — ACETAMINOPHEN 650 MG: 325 TABLET, FILM COATED ORAL at 20:09

## 2025-04-10 RX ADMIN — PANTOPRAZOLE SODIUM 40 MG: 40 TABLET, DELAYED RELEASE ORAL at 12:20

## 2025-04-10 RX ADMIN — MORPHINE SULFATE 4 MG: 4 INJECTION INTRAVENOUS at 02:35

## 2025-04-10 RX ADMIN — DICYCLOMINE HYDROCHLORIDE 10 MG: 10 CAPSULE ORAL at 15:49

## 2025-04-10 RX ADMIN — MORPHINE SULFATE 15 MG: 10 SOLUTION ORAL at 07:02

## 2025-04-10 RX ADMIN — PANCRELIPASE 2 CAPSULE: 120000; 24000; 76000 CAPSULE, DELAYED RELEASE PELLETS ORAL at 12:00

## 2025-04-10 RX ADMIN — MORPHINE SULFATE 15 MG: 10 SOLUTION ORAL at 14:06

## 2025-04-10 RX ADMIN — DICYCLOMINE HYDROCHLORIDE 10 MG: 10 CAPSULE ORAL at 12:23

## 2025-04-10 RX ADMIN — OLANZAPINE 5 MG: 5 TABLET, FILM COATED ORAL at 21:59

## 2025-04-10 ASSESSMENT — ACTIVITIES OF DAILY LIVING (ADL)
ADLS_ACUITY_SCORE: 54
ADLS_ACUITY_SCORE: 59
ADLS_ACUITY_SCORE: 59
ADLS_ACUITY_SCORE: 54
ADLS_ACUITY_SCORE: 61
ADLS_ACUITY_SCORE: 59
ADLS_ACUITY_SCORE: 54
ADLS_ACUITY_SCORE: 59
ADLS_ACUITY_SCORE: 61
ADLS_ACUITY_SCORE: 54
ADLS_ACUITY_SCORE: 61
ADLS_ACUITY_SCORE: 54
ADLS_ACUITY_SCORE: 59
ADLS_ACUITY_SCORE: 54
ADLS_ACUITY_SCORE: 61
ADLS_ACUITY_SCORE: 61

## 2025-04-10 NOTE — TELEPHONE ENCOUNTER
Nurse Triage SBAR    Situation: Severe weakness and fatigue.     Background: Significant Other, Violet, calling. Consent: on file in chart. Transfusion on Tuesday.     Assessment: Sleeping. Occasionally wakes up to eat. Breathing is heavy. Moderate sob. Severe weakness - they state he is unable to stand and walk without significant assistance. Some confusion - mostly when he is waking up.    Protocol Recommended Disposition: Call 911    Recommendation: According to the protocol, Patient should Call 911. Advised Significant Other needs to Call 911. Care advice given. Significant Other verbalizes understanding but stated they are going to bring the patient into the ED themselves. RN advised if they change their mind to call 911.     Iris Ventura RN Nursing Advisor 4/9/2025 7:13 PM     Reason for Disposition   Shock suspected (e.g., cold/pale/clammy skin, too weak to stand, low BP, rapid pulse)   [1] SEVERE weakness (i.e., unable to walk or barely able to walk, requires support) AND [2] new-onset or worsening   Difficult to awaken or acting confused (e.g., disoriented, slurred speech)    Additional Information   Negative: SEVERE difficulty breathing (e.g., struggling for each breath, speaks in single words)    Protocols used: Weakness (Generalized) and Fatigue-A-AH

## 2025-04-10 NOTE — TELEPHONE ENCOUNTER
RN team has been unable to reach patient after 3 attempts.     Per chart review - patient is admitted to the hospital at [facility] as of 4/9/25.   Dr. Hernandez called patient to review results and also put note in [facility] results. Patient read message as of 4/8/25.    No further action needed from RN team at this time.    Noreen Woods, RN, BSN, PHN  [facility]

## 2025-04-10 NOTE — ED PROVIDER NOTES
History     Chief Complaint   Patient presents with    Fatigue    Shortness of Breath    Shoulder Pain     HPI  Gallo Navarro is a 59 year old male who has a PMH notable for ***      PRIOR HISTORY REVIEW:      CURRENT PRESENTATION:      No other new symptoms or concerns reported at this time. Full ROS completed w/o additional findings.         {Past History:623660}          REVIEW OF SYSTEMS  A complete review of systems was performed with pertinent positives and negatives noted in the HPI, and all other systems negative.  ***  Physical Exam   BP: 117/72  Pulse: (!) 127  Temp: 97.7  F (36.5  C)  Resp: 18  SpO2: 98 %      Physical Exam  CONSTITUTIONAL: Awake and alert. Non-toxic appearance. No acute distress.   HENT:   - Head: Normocephalic and atraumatic.   - Ears: External ear grossly normal.   - Nose: Nose normal. No rhinorrhea. No epistaxis.   - Mouth/Throat: MMM  EYES: Conjunctivae and lids are normal. No scleral icterus.   NECK: Normal range of motion and phonation normal. Neck supple.  No tracheal deviation, no stridor. No edema or erythema noted.  CARDIOVASCULAR: Normal rate, regular rhythm and no appreciable abnormal heart sounds.  PULMONARY/CHEST: Normal work of breathing. No accessory muscle usage or stridor. No respiratory distress.  No appreciable abnormal breath sounds.  ABDOMEN: Soft, non-distended. No tenderness. No peritoneal findings, no rigidity, rebound or guarding.  No palpable masses or abnormal pulsatility appreciated.  MUSCULOSKELETAL: Extremities warm and seemingly well perfused.   NEUROLOGIC: Awake, alert. Not disoriented. No seizure activity. GCS 15  SKIN: Skin is warm and dry. No diaphoresis. No pallor. No rash/acute appearing skin changes noted.  PSYCHIATRIC: Normal mood and affect. Speech and behavior normal. Thought processes linear. Cognition and memory are normal. No SI/HI reported.    ED Course     ED Course as of 04/10/25 0106   Thu Apr 10, 2025   0044 Patient still quite anemic.   Blood consent and R/B/A discussed with the patient (ED RN present).  Patient consented for blood transfusion.  Irradiated blood transfusion ordered       Procedures  ----------------------------------------------------------------------------------------------------------------------              EKG Interpretation:      Interpreted by Becca López MD  Time reviewed: 0 107  Symptoms at time of EKG: Shortness of breath, fatigue  Rhythm: Sinus tachycardia  Rate: 118 bpm  Axis: Normal  Ectopy: None  Nonspecific ST/T wave findings without obvious ST elevation  Comparison to prior: Compared to EKG from 12 December 2024, continues to have some sinus tachycardia (though slightly increased on today's EKG), otherwise, some decreased magnitude of forces (though not all leads, continues to have some nonspecific ST/T wave findings    Clinical Impression: Sinus tachycardia with nonspecific ST/T wave findings        ----------------------------------------------------------------------------------------------------------------------  Critical Care Addendum  My initial assessment, based on my {ED Assessment Actions:835584}, established a high suspicion that Gallo Navarro has {ED Initial Impression:126281}, which requires immediate intervention, and therefore he is critically ill.     After the initial assessment, the care team {ED Interventions:943755} to provide stabilization care. Due to the critical nature of this patient, I reassessed {ED Reassessments:011601} multiple times prior to his disposition.     Time also spent performing {ED Other Actions:864897}.     Critical care time (excluding teaching time and procedures): *** minutes.       Assessments & Plan (with Medical Decision Making)     IMPRESSION:   59 year old male w/ PMH notable for ***     Clinically, patient appears *** . Vitals ***. Otherwise on examination, ***    Ddx includes, but not limited to, ***     PLAN:   - ***  - ***  - CT scan ordered to evaluate for  potential increased PE burden (given ongoing positive D-dimer, tachycardia, shortness of breath (though admittedly many of that could be due to the known PE, anemia, etc.).  Also looking for potential bleed or other reasons to not have notable improvement in hemoglobin after recent transfusion, infection, et al.   - Risks/benefits of pursuing imaging reviewed and accepted.   - Dispo pending ED Course    RESULTS:  Labs:   - Hgb 6.4, PLT 83 (down from previous)  - No leukocytosis, no neutropenia   -   - Troponin elevated to 44   Urine:   - ***  Imaging: Pending at signout  Results/reports reviewed w/ patient who expresses understanding of findings and F/U recommendations.    INTERVENTIONS:   - Blood transfusion  - Cardiac monitoring  - IV morphine    RE-EVALUATION:  - Pt otherwise continues to do well here in the ED, no acute issues or apparent concerning changes in vitals or clinical appearance.    DISCUSSIONS:  - w/ IM: Reviewed patient/presentation, current state of workup/any pending studies. They will admit for further evaluation/management, F/U pending studies as needed (overnight ED physician will call IM attending back w/ update when results return), coordinate w/ consulting services as needed. No additional requests/recommendations for workup/management for in the ED at this time.     SIGNOUT:  Patient signed out to overnight EM Physician.  IMPRESSION AT SHIFT CHANGE:  - Acute on chronic anemia (despite recent blood transfusions  - Elevated troponin in setting of tachycardia and significant anemia (no obvious STEMI on ECG)  -Elevated CRP, normal lactate  -Elevated D-dimer and tachycardia in the setting of known prior PE (chronically anticoagulated)  PENDING AT SHIFT CHANGE:   - CT head and C/A/P  TENTATIVE PLAN:   - F/U pending imaging and then continue planned admission  - Recommend following up pending studies, Palliative  consult    ______________________________________________________________________          Becca López MD  4/9/2025   [facility] EMERGENCY DEPARTMENT     forces (though not all leads, continues to have some nonspecific ST/T wave findings    Clinical Impression: Sinus tachycardia with nonspecific ST/T wave findings        ----------------------------------------------------------------------------------------------------------------------  Critical Care Addendum  My initial assessment, based on my review of vital signs, focused history, physical exam, review of cardiac rhythm monitor, and discussion with Hem-Onc , established a high suspicion that Gallo Navarro has  significant anemia w/ evidence for end-organ effects (elevated troponin, etc.) , which requires immediate intervention, and therefore he is critically ill.     After the initial assessment, the care team initiated multiple lab tests, initiated medication therapy with Blood transusion, and consulted with Hem-Onc  to provide stabilization care. Due to the critical nature of this patient, I reassessed vital signs and review of cardiac rhythm monitor multiple times prior to his disposition.     Time also spent performing documentation, reviewing test results, discussion with consultants, and coordination of care.     Critical care time (excluding teaching time and procedures): 45 minutes.       Assessments & Plan (with Medical Decision Making)     IMPRESSION:   59 year old male w/ PMH notable for metastatic pancreatitic cancer, DM2, diffuse pain (back, shoulders, et al), Hx PE, anemia requiring transfusions, et al., presenting w/ worsening malaise, fatigue, generally not feeling well w/ somewhat uncontrolled pain, et al.     ** NOTE IN PROGRESS **      PLAN:   - Labs, US, Imaging  - CT scan ordered to evaluate for potential increased PE burden (given ongoing positive D-dimer, tachycardia, shortness of breath (though admittedly many of that could be due to the known PE, anemia, etc.).  Also looking for potential bleed or other reasons to not have notable improvement in hemoglobin after recent transfusion,  infection, et al. Discussed w/ them and they agree.   - Transfusion as needed (suspect will need additional RBCs based on exam)  - Dispo pending ED Course (will need admission, but awaiting results to determine service, etc.)    RESULTS:  Labs:   - Hgb 6.4, PLT 83 (down from previous)  - No leukocytosis, no neutropenia   -   - Troponin elevated to 44   Imaging: Pending at signout  Results/reports reviewed w/ patient who expresses understanding of findings and F/U recommendations.    INTERVENTIONS:   - Blood transfusion  - Cardiac monitoring  - IV morphine    RE-EVALUATION:  - Pt otherwise continues to do well here in the ED, no acute issues or apparent concerning changes in vitals or clinical appearance.    DISCUSSIONS:  - w/ IM: Reviewed patient/presentation, current state of workup/any pending studies. They will admit for further evaluation/management, F/U pending studies as needed (overnight ED physician will call IM attending back w/ update when results return), coordinate w/ consulting services as needed. No additional requests/recommendations for workup/management for in the ED at this time.     SIGNOUT:  Patient signed out to overnight EM Physician.  IMPRESSION AT SHIFT CHANGE:  - Acute on chronic anemia (despite recent blood transfusions  - Elevated troponin in setting of tachycardia and significant anemia (no obvious STEMI on ECG)  - Elevated CRP, normal lactate  - Elevated D-dimer and tachycardia in the setting of known prior PE (chronically anticoagulated)  PENDING AT SHIFT CHANGE:   - CT head and C/A/P  TENTATIVE PLAN:   - F/U pending imaging and then continue planned admission  - Recommend following up pending studies, Palliative consult    ______________________________________________________________________          Becca López MD  4/9/2025   [facility] EMERGENCY DEPARTMENT       Becca López MD  04/11/25 7090

## 2025-04-10 NOTE — CONSULTS
"  Palliative Care Consultation Note  [facility]      Patient: Gallo Navarro  Date of Admission:  4/9/2025    Requesting Clinician / Team: Primary/Medicine Team  Reason for consult: Symptom management for Patient with chronic pain related to malignancy admitted for FTT and worsening pain       Recommendations & Counseling     GOALS OF CARE:   Life-prolonging with limits  DNR/DNI  Continue all current cares as per primary medicine team  Palliative care team met with Gallo and his wife Violet and began goals of care discussion.  Unfortunately, every time we try to speak with Gallo, he went into a rapid ventricular rate requiring the attention of the rapid response team.  Because of this we had to focus on pain management today and will readdress goals in the coming days.  Outside of the room, we were able to talk with his wife who stated that she feels it is likely time to focus on quality rather than quantity. The word \"hospice\" has been brought up to them in the past and they would like to hear more about what hospice would entail. They have a strong bond with their palliative care physician Dr. Hurt and it is important for them to keep the bond even if they choose hospice as an option.      ADVANCE CARE PLANNING:  No health care directive on file. Per system policy, Surrogate Decision-makers for Patients With Diminished Decision-making Capacity offers guidance on possible decision-makers. Wife Violet has been identified as a surrogate decision maker.   There is no POLST form on file, defer to patient and/or next of kin for decisions   Code status: No CPR- Do NOT Intubate    MEDICAL MANAGEMENT:     Acute on chronic back pain due to metastases and compression fractures and also complains of right shoulder pain  - recommend x-ray of the right shoulder given persistent pain  - Continue fentanyl 75mcg q72hrs - increased 4/2  - increase morphine oral solution to 30mg PO q3hr " "PRN (ordered for you)  - Continue scheduled tylenol 650 mg TID   - Continue lidocaine patch  - recommend to add dilaudid 0.5 g IV q2hr PRN as a 2nd line (ordered for you)  - Robaxin 1000 mg QID PRN   - Continue scheduled Bentyl 10 mg capsules 4 times daily    Opioid-induced constipation prevention: Stool seen throughout the colon on CT scan  - Senna 1-2 tabs twice daily scheduled (ordered for you)  - MiraLAX daily scheduled (ordered for you)      PSYCHOSOCIAL/SPIRITUAL SUPPORT:  His wife Violet is his main support system.  He is very close with his daughter and son  Psychosocial and spiritual needs have not been assessed.     Palliative Care will continue to follow. Thank you for the consult and allowing us to aid in the care of Gallo Navarro.      The patient has been seen and discussed with the attending physician Dr. Macedo.     Maribel Vega DO, MS  Internal Medicine PGY-3    [facility], Palliative Care  Securely message with the Vocera Web Console (learn more here) or  Text page via HipLink Paging/Directory     Thank you for the opportunity to continue to participate in the care of this patient and family.  Please feel free to contact on-call palliative provider with any emergent needs.  We can be reached via Securely message with the Vocera Web Console (learn more here) or Text page via HipLink Paging/"[employer]    If you are not sure who specifically to contact -- please text the \"Palliative Care [facility]\" voice group in [employer].   Physician Attestation:   I, Jeny Macedo MD, saw this patient and agree with Dr. Vega's findings and plan of care as documented in the note.   Jeny Macedo MD / Palliative Medicine       Assessment      Gallo Navarro is a 59 year old male with a past medical history of non-resectable pancreatic cancer and mets to T/L spine  who presented on 4/10 with acute on chronic pain and failure to thrive.    Today patient was seen for:  Metastatic pancreatic cancer  L4 me s/p " vertebroplasty and RFA   Cancer associated pain  Right shoulder pain  Failure to thrive  Intermittent rapid ventricular tachycardia  Support  Palliative care encounter      History of Present Illness   Met with him and wife Violet. They had multiple questions regarding the medical etiology of his symptoms which we deferred to the primary team. As we sat down for a discussion, patient's HR went from baseline of 120s to 180s. Repeat BP obtained and MAP>80. Normal O2 needs. Feeling dizzy. EKG obtained to confirm SVT. Valsalva and reverse Valsalva maneuvers attempted with transient improvement. Primary team arrive at the bedside and orders fluids/metoprolol which brings the rate back to baseline of 120s.  We checked in with Gallo and Violet a few hours later.  His heart rate was back in the 120s.  It seems like the conversations about his care and pain management are still stressful to him as his heart rate spiked several x 280 and was brought down with Valsalva maneuvers.  We continued the conversation outside of the room with his wife Abigail to prevent further stress for Gallo.    It seems like was in the past months pain has especially worsened. The family is distressed about the fact that he has required several transfusions. His pain management has often been an issue.  Recently it feels that it is no longer under control again.  They have had a recent increase to fentanyl from 50 mcg every 72 hours to 75 mcg q72 hrs on 4/2/2025, which has helped a bit.  At home Abigail has been alternating giving lorazepam and morphine every few hours to make sure that he is getting something on a regular bases.  She does note that at this point Gallo would much rather be groggy than in pain  Over the past couple weeks they have had a change in paradigm and have been trying to focus on quality of remaining days first and extending the number of days available.  They have thought about hospice but would like to know more before making  any decisions.    I introduced our role as an extra layer of support and how we help patients and families dealing with serious, potentially life-limiting illnesses. I explained the composition of the palliative care team.  Palliative care helps patients and families navigate their care while focusing on the whole person; providing emotional, social and spiritual support  Palliative care often assists with symptom management, information sharing about what to expect from the illness, available treatment options and what effect those options may have on the disease course, and provide effective communication and caring support. and Introduced the role of palliative care as an interdisciplinary team that cares for patients with serious illness to help support symptom management, communication, coping for patients and their families as well as support with medical decision making.    Prognosis, Goals, & Planning:   Functional Status just prior to this current hospitalization:  Outpatient Palliative Performance Score (PPS) 60%  Significant disease.  Normal or reduced intake. Normal LOC or confusion. Reduced ambulation, some assist w/self-care, unable to work/do housework.  Gallo is likely fluctuating between palliative performance scores and has had a decline in his performance status over the past weeks.    Prognosis, Goals, and/or Advance Care Planning:  Goals of care conversations were briefly addressed today with his wife outside of the room and will continue goals of care discussions with Gallo in the coming days    Code Status was addressed today:   No recently switched to DNR/DNI    Patient's decision making preferences: shared with support from loved ones        Patient has decision-making capacity today for complex decisions: Intact           Coping, Meaning, & Spirituality:   Mood, coping, and/or meaning in the context of serious illness were addressed today: No    Social:   Living situation: lives with  significant other/spouse    Medications:  Reviewed this patient's medication profile and medications from this hospitalization.     [place] Board of Pharmacy Data Base Reviewed: Yes:   reviewed - controlled substances reflected in medication list..    ROS:  Comprehensive ROS is reviewed and is negative except as here & per HPI:     Physical Exam   Vital Signs with Ranges  Temp:  [97.5  F (36.4  C)-98.6  F (37  C)] 97.5  F (36.4  C)  Pulse:  [107-180] 113  Resp:  [16-18] 16  BP: (110-134)/(66-84) 110/74  SpO2:  [96 %-100 %] 97 %  Wt Readings from Last 10 Encounters:   04/08/25 64.9 kg (143 lb)   04/07/25 65 kg (143 lb 4.8 oz)   03/31/25 65.8 kg (145 lb)   03/26/25 68 kg (150 lb)   03/10/25 68.9 kg (152 lb)   03/05/25 69.3 kg (152 lb 11.2 oz)   02/21/25 68.9 kg (152 lb)   02/19/25 69 kg (152 lb 3.2 oz)   02/11/25 66.4 kg (146 lb 6.4 oz)   01/31/25 65.5 kg (144 lb 6.4 oz)     0 lbs 0 oz    PHYSICAL EXAM:  Constitutional: mild distress. Appearing frail and fatigued  Cardiovascular: tachycardic, regular rhythm   Respiratory: on room air, normal work of breathing     Data reviewed:  Results for orders placed or performed during the hospital encounter of 04/09/25 (from the past 24 hours)   Blood Culture Peripheral Blood    Specimen: Peripheral Blood   Result Value Ref Range    Culture No growth after 12 hours    Blood Culture Peripheral Blood    Specimen: Peripheral Blood   Result Value Ref Range    Culture No growth after 12 hours    TSH with free T4 reflex   Result Value Ref Range    TSH 2.87 0.30 - 4.20 uIU/mL   Extra Tube    Narrative    The following orders were created for panel order Extra Tube.  Procedure                               Abnormality         Status                     ---------                               -----------         ------                     Extra Blue Top Tube[[id number]]                                                        Extra Purple Top Tube[[id number]]                            In process                   Please view results for these tests on the individual orders.   ABO/Rh type and screen *Canceled*    Narrative    The following orders were created for panel order ABO/Rh type and screen.  Procedure                               Abnormality         Status                     ---------                               -----------         ------                     Adult Type and Screen[[id number]]                                                        Please view results for these tests on the individual orders.   Influenza A/B, RSV and SARS-CoV2 PCR (COVID-19) Nasopharyngeal    Specimen: Nasopharyngeal; Swab   Result Value Ref Range    Influenza A PCR Negative Negative    Influenza B PCR Negative Negative    RSV PCR Negative Negative    SARS CoV2 PCR Negative Negative    Narrative    Testing was performed using the Xpert Xpress CoV2/Flu/RSV Assay on the [employer] F[employer] Instrument. This test should be ordered for the detection of SARS-CoV2, influenza, and RSV viruses in individuals with signs and symptoms of respiratory tract infection. This test is for in vitro diagnostic use under the [place] FDA for laboratories certified under CLIA to perform high or moderate complexity testing. This test has been [place] FDA cleared. A negative result does not rule out the presence of PCR inhibitors in the specimen or target RNA in concentration below the limit of detection for the assay. If only one viral target is positive but coinfection with multiple targets is suspected, the sample should be re-tested with another FDA cleared, approved, or authorized test, if coninfection would change clinical management. This test was validated by the [facility]. These laboratories are certified under the Clinical Laboratory Improvement Amendments of 1988 (CLIA-88) as qualified to perfom high complexity laboratory testing.   Respiratory Panel PCR    Specimen: Nasopharyngeal; Swab   Result Value Ref  Range    Adenovirus Not Detected Not Detected    Coronavirus Not Detected Not Detected    Human Metapneumovirus Not Detected Not Detected    Human Rhin/Enterovirus Not Detected Not Detected    Influenza A Not Detected Not Detected    Influenza A, H1 Not Detected Not Detected    Influenza A 2009 H1N1 Not Detected Not Detected    Influenza A, H3 Not Detected Not Detected    Influenza B Not Detected Not Detected    Parainfluenza Virus 1 Not Detected Not Detected    Parainfluenza Virus 2 Not Detected Not Detected    Parainfluenza Virus 3 Not Detected Not Detected    Parainfluenza Virus 4 Not Detected Not Detected    Respiratory Syncytial Virus A Not Detected Not Detected    Respiratory Syncytial Virus B Not Detected Not Detected    Chlamydia Pneumoniae Not Detected Not Detected    Mycoplasma Pneumoniae Not Detected Not Detected    Narrative    The ePlex Respiratory Panel is a qualitative nucleic acid, multiplex, in vitro diagnostic test for the simultaneous detection and identification of multiple respiratory viral and bacterial nucleic acids in nasopharyngeal swabs collected in viral transport media from individual exhibiting signs and symptoms of respiratory infection. The assay has received FDA approval for the testing of nasopharyngeal (NP) swabs only. This test is used for clinical purposes and should not be regarded as investigational or for research. This laboratory is certified under the Clinical Laboratory Improvement Amendments of 1988 (CLIA-88) as qualified to perform high complexity clinical laboratory testing.   iStat Gases Electrolytes ICA Glucose Venous, POCT   Result Value Ref Range    CPB Applied No     Hematocrit POCT 20 (L) 40-53 % %    Calcium, Ionized Whole Blood POCT 4.9 4.4 - 5.2 mg/dL    Glucose Whole Blood POCT 149 (H) 70 - 99 mg/dL    Bicarbonate Venous POCT 26 21 - 28 mmol/L    Hemoglobin POCT 6.8 (LL) 13.3 - 17.7 g/dL    Potassium POCT 3.9 3.4 - 5.3 mmol/L    Sodium POCT 135 135 - 145 mmol/L     pCO2 Venous POCT 39 (L) 40 - 50 mm Hg    pO2 Venous POCT 39 25 - 47 mm Hg    pH Venous POCT 7.43 7.32 - 7.43    O2 Sat, Venous POCT 75 70 - 75 %    Base Excess/Deficit (+/-) POCT 2.0 -3.0 - 3.0 mmol/L   Ammonia   Result Value Ref Range    Ammonia 31 16 - 60 umol/L   Prepare red blood cells (unit)   Result Value Ref Range    Blood Component Type Red Blood Cells     Product Code X1252A98     Unit Status Transfused     Unit Number [id number]     CROSSMATCH Compatible     CODING SYSTEM [id number]     ISSUE DATE AND TIME [id number]     UNIT ABO/RH B-     UNIT TYPE ISBT 1700    Prepare red blood cells (unit)   Result Value Ref Range    Blood Component Type Red Blood Cells     Product Code O7831M55     Unit Status Ready for issue     Unit Number [id number]     CROSSMATCH Compatible     CODING SYSTEM [id number]    Lab Blood Morphology Pathologist Review    Narrative    The following orders were created for panel order Lab Blood Morphology Pathologist Review.  Procedure                               Abnormality         Status                     ---------                               -----------         ------                     Bld morphology patholog...[[id number]]                      In process                 CBC with platelets and ...[[id number]]  Abnormal            Final result               Reticulocyte count[[id number]]          Abnormal            Final result               Morphology Tracking[[id number]]                             Final result                 Please view results for these tests on the individual orders.   CBC with platelets and differential   Result Value Ref Range    WBC Count 6.7 4.0 - 11.0 10e3/uL    RBC Count 2.13 (L) 4.40 - 5.90 10e6/uL    Hemoglobin 6.4 (LL) 13.3 - 17.7 g/dL    Hematocrit 20.1 (L) 40.0 - 53.0 %    MCV 94 78 - 100 fL    MCH 30.0 26.5 - 33.0 pg    MCHC 31.8 31.5 - 36.5 g/dL    RDW 17.6 (H) 10.0 - 15.0 %    Platelet Count 83 (L) 150 - 450 10e3/uL    % Neutrophils 65  %    % Lymphocytes 17 %    % Monocytes 11 %    % Eosinophils 1 %    % Basophils 1 %    % Immature Granulocytes 5 %    NRBCs per 100 WBC 1 (H) <1 /100    Absolute Neutrophils 4.4 1.6 - 8.3 10e3/uL    Absolute Lymphocytes 1.1 0.8 - 5.3 10e3/uL    Absolute Monocytes 0.8 0.0 - 1.3 10e3/uL    Absolute Eosinophils 0.1 0.0 - 0.7 10e3/uL    Absolute Basophils 0.1 0.0 - 0.2 10e3/uL    Absolute Immature Granulocytes 0.4 <=0.4 10e3/uL    Absolute NRBCs 0.1 10e3/uL   Reticulocyte count   Result Value Ref Range    % Reticulocyte 3.4 (H) 0.5 - 2.0 %    Absolute Reticulocyte 0.073 0.025 - 0.095 10e6/uL   ABO/Rh type and screen *Canceled*    Narrative    The following orders were created for panel order ABO/Rh type and screen.  Procedure                               Abnormality         Status                     ---------                               -----------         ------                       Please view results for these tests on the individual orders.   ABO/Rh type and screen    Narrative    The following orders were created for panel order ABO/Rh type and screen.  Procedure                               Abnormality         Status                     ---------                               -----------         ------                     Adult Type and Screen[[id number]]                           Final result                 Please view results for these tests on the individual orders.   Adult Type and Screen   Result Value Ref Range    ABO/RH(D) AB POS     Antibody Screen Negative Negative    SPECIMEN EXPIRATION DATE [id number]    EKG 12-lead, tracing only   Result Value Ref Range    Systolic Blood Pressure  mmHg    Diastolic Blood Pressure  mmHg    Ventricular Rate 118 BPM    Atrial Rate 118 BPM    OH Interval 118 ms    QRS Duration 78 ms     ms    QTc 426 ms    P Axis 18 degrees    R AXIS 40 degrees    T Axis 71 degrees    Interpretation ECG       Sinus tachycardia  Nonspecific ST and T wave  abnormality  Abnormal ECG  Unconfirmed report - interpretation of this ECG is computer generated - see medical record for final interpretation  Confirmed by - EMERGENCY ROOM, PHYSICIAN ([id number]),  ISABELLA SCHWARTZ ([id number]) on 4/10/2025 9:35:22 AM     Glucose by meter   Result Value Ref Range    GLUCOSE BY METER POCT 139 (H) 70 - 99 mg/dL   Head CT w/o contrast    Narrative    EXAM: CT HEAD W/O CONTRAST  LOCATION: [facility]  DATE: 4/10/2025    INDICATION: ? cause for worsening weakness fatigue (hx metastatic cancer, thrombocytopenia)  COMPARISON: None.  TECHNIQUE: Routine CT Head without IV contrast. Multiplanar reformats. Dose reduction techniques were used.    FINDINGS:  INTRACRANIAL CONTENTS: No intracranial hemorrhage, extraaxial collection, or mass effect.  No CT evidence of acute infarct. Normal parenchymal attenuation. Normal ventricles and sulci.     VISUALIZED ORBITS/SINUSES/MASTOIDS: No intraorbital abnormality. 1.7 cm polyp or retention cyst in the left maxillary sinus. No middle ear or mastoid effusion.    BONES/SOFT TISSUES: No acute abnormality.      Impression    IMPRESSION:  1.  No acute intracranial process.     CT Chest PE Abdomen Pelvis w Contrast    Narrative    EXAM: CT CHEST PE ABDOMEN PELVIS W CONTRAST  LOCATION: [facility]  DATE: 4/10/2025    INDICATION: ? worsening PE burden, infection, bleed, et al. (elevated CRP, very low hgb despite recent transfusion, high dimer and elevt)  COMPARISON: None.  TECHNIQUE: CT chest pulmonary angiogram and routine CT abdomen pelvis with IV contrast. Arterial phase through the chest and venous phase through the abdomen and pelvis. Multiplanar reformats and MIP reconstructions were performed. Dose reduction   techniques were used.   CONTRAST: iopamidol (ISOVUE 370) solution 88 mL    FINDINGS:  ANGIOGRAM CHEST: Pulmonary arteries are normal caliber and negative for  pulmonary emboli. Thoracic aorta is negative for dissection. No CT evidence of right heart strain.     LUNGS AND PLEURA: Lungs are clear. No pleural effusions.    MEDIASTINUM/AXILLAE: No lymphadenopathy. No thoracic aortic aneurysms. No pericardial effusion.    CORONARY ARTERY CALCIFICATION: None.    HEPATOBILIARY: Scattered pneumobilia. Moderate intrahepatic biliary dilatation. Stent in the right central intrahepatic bile duct with distal tip seen in the common bile duct. The distal tip of the stent is adjacent to a metallic wall stent within the   common bile duct.    PANCREAS: No significant mass, duct dilatation, or inflammatory change.    SPLEEN: Normal size.    ADRENAL GLANDS: Normal.    KIDNEYS/BLADDER: The bilateral kidneys enhance symmetrically without evidence for hydronephrosis or pyelonephritis. No renal masses. No renal calculi. The bilateral ureters and urinary bladder are unremarkable.    BOWEL: Expandable stent in the duodenum. Moderate to marked residual stool throughout the colon. Visualized bowel gas pattern is nonspecific.    LYMPH NODES: No lymphadenopathy.    VASCULATURE: No abdominal aortic aneurysm. Mild vascular calcifications abdominal aorta.    PELVIC ORGANS: No pelvic masses. No pelvic free fluid.    MUSCULOSKELETAL: Numerous sclerotic metastasis. Vertebral augmentation changes L2-3, L3, and L4      Impression    IMPRESSION:  1.  No evidence for pulmonary embolism.  2.  Lungs are clear.  3.  Moderate intrahepatic biliary dilatation with pneumobilia. Stent in the right central intrahepatic bile duct with distal tip seen in the common bile duct. The distal tip of the stent is adjacent to a metallic wall stent within the common bile duct.  4.  Expandable stent in the duodenum.  5.  Moderate to marked residual stool throughout the colon.   6.  Numerous sclerotic metastasis.    UA with Microscopic reflex to Culture    Specimen: Urine, Clean Catch   Result Value Ref Range    Color Urine Yellow  Colorless, Straw, Light Yellow, Yellow    Appearance Urine Clear Clear    Glucose Urine Negative Negative mg/dL    Bilirubin Urine Negative Negative    Ketones Urine Negative Negative mg/dL    Specific Gravity Urine 1.015 1.003 - 1.035    Blood Urine Negative Negative    pH Urine 6.0 5.0 - 7.0    Protein Albumin Urine 10 (A) Negative mg/dL    Urobilinogen Urine 2.0 (A) Normal mg/dL    Nitrite Urine Negative Negative    Leukocyte Esterase Urine Negative Negative    Mucus Urine Present (A) None Seen /LPF    Calcium Oxalate Crystals Urine Few (A) None Seen /HPF    RBC Urine 0 <=2 /HPF    WBC Urine 1 <=5 /HPF    Transitional Epithelials Urine <1 <=1 /HPF    Narrative    Urine Culture not indicated   Troponin T, High Sensitivity   Result Value Ref Range    Troponin T, High Sensitivity 47 (H) <=22 ng/L   Hemoglobin   Result Value Ref Range    Hemoglobin 7.5 (L) 13.3 - 17.7 g/dL   CBC with platelets   Result Value Ref Range    WBC Count 7.2 4.0 - 11.0 10e3/uL    RBC Count 2.52 (L) 4.40 - 5.90 10e6/uL    Hemoglobin 7.5 (L) 13.3 - 17.7 g/dL    Hematocrit 23.9 (L) 40.0 - 53.0 %    MCV 95 78 - 100 fL    MCH 29.8 26.5 - 33.0 pg    MCHC 31.4 (L) 31.5 - 36.5 g/dL    RDW 17.2 (H) 10.0 - 15.0 %    Platelet Count 91 (L) 150 - 450 10e3/uL   Troponin T, High Sensitivity   Result Value Ref Range    Troponin T, High Sensitivity 47 (H) <=22 ng/L   Comprehensive metabolic panel   Result Value Ref Range    Sodium 136 135 - 145 mmol/L    Potassium 4.2 3.4 - 5.3 mmol/L    Carbon Dioxide (CO2) 24 22 - 29 mmol/L    Anion Gap 13 7 - 15 mmol/L    Urea Nitrogen 12.0 6.0 - 20.0 mg/dL    Creatinine 0.40 (L) 0.67 - 1.17 mg/dL    GFR Estimate >90 >60 mL/min/1.73m2    Calcium 8.6 (L) 8.8 - 10.4 mg/dL    Chloride 99 98 - 107 mmol/L    Glucose 140 (H) 70 - 99 mg/dL    Alkaline Phosphatase 279 (H) 40 - 150 U/L    AST 26 0 - 45 U/L    ALT 14 0 - 70 U/L    Protein Total 5.9 (L) 6.4 - 8.3 g/dL    Albumin 2.9 (L) 3.5 - 5.2 g/dL    Bilirubin Total 1.0 <=1.2  mg/dL   Hepatic panel   Result Value Ref Range    Protein Total 5.9 (L) 6.4 - 8.3 g/dL    Albumin 2.9 (L) 3.5 - 5.2 g/dL    Bilirubin Total 1.0 <=1.2 mg/dL    Alkaline Phosphatase 279 (H) 40 - 150 U/L    AST 26 0 - 45 U/L    ALT 14 0 - 70 U/L    Bilirubin Direct 0.57 (H) 0.00 - 0.30 mg/dL   Magnesium   Result Value Ref Range    Magnesium 2.0 1.7 - 2.3 mg/dL   Phosphorus   Result Value Ref Range    Phosphorus 3.4 2.5 - 4.5 mg/dL   TSH with free T4 reflex   Result Value Ref Range    TSH 2.94 0.30 - 4.20 uIU/mL   EKG 12-lead, complete   Result Value Ref Range    Systolic Blood Pressure  mmHg    Diastolic Blood Pressure  mmHg    Ventricular Rate 111 BPM    Atrial Rate 111 BPM    TX Interval 132 ms    QRS Duration 68 ms     ms    QTc 446 ms    P Axis 62 degrees    R AXIS 73 degrees    T Axis 83 degrees    Interpretation ECG       Sinus tachycardia  Minimal voltage criteria for LVH, may be normal variant ( Sokolow-Major )  Borderline ECG       *Note: Due to a large number of results and/or encounters for the requested time period, some results have not been displayed. A complete set of results can be found in Results Review.     Recent Results (from the past 24 hours)   Head CT w/o contrast    Narrative    EXAM: CT HEAD W/O CONTRAST  LOCATION: [facility]  DATE: 4/10/2025    INDICATION: ? cause for worsening weakness fatigue (hx metastatic cancer, thrombocytopenia)  COMPARISON: None.  TECHNIQUE: Routine CT Head without IV contrast. Multiplanar reformats. Dose reduction techniques were used.    FINDINGS:  INTRACRANIAL CONTENTS: No intracranial hemorrhage, extraaxial collection, or mass effect.  No CT evidence of acute infarct. Normal parenchymal attenuation. Normal ventricles and sulci.     VISUALIZED ORBITS/SINUSES/MASTOIDS: No intraorbital abnormality. 1.7 cm polyp or retention cyst in the left maxillary sinus. No middle ear or mastoid effusion.    BONES/SOFT TISSUES: No  acute abnormality.      Impression    IMPRESSION:  1.  No acute intracranial process.     CT Chest PE Abdomen Pelvis w Contrast    Narrative    EXAM: CT CHEST PE ABDOMEN PELVIS W CONTRAST  LOCATION: [facility]  DATE: 4/10/2025    INDICATION: ? worsening PE burden, infection, bleed, et al. (elevated CRP, very low hgb despite recent transfusion, high dimer and elevt)  COMPARISON: None.  TECHNIQUE: CT chest pulmonary angiogram and routine CT abdomen pelvis with IV contrast. Arterial phase through the chest and venous phase through the abdomen and pelvis. Multiplanar reformats and MIP reconstructions were performed. Dose reduction   techniques were used.   CONTRAST: iopamidol (ISOVUE 370) solution 88 mL    FINDINGS:  ANGIOGRAM CHEST: Pulmonary arteries are normal caliber and negative for pulmonary emboli. Thoracic aorta is negative for dissection. No CT evidence of right heart strain.     LUNGS AND PLEURA: Lungs are clear. No pleural effusions.    MEDIASTINUM/AXILLAE: No lymphadenopathy. No thoracic aortic aneurysms. No pericardial effusion.    CORONARY ARTERY CALCIFICATION: None.    HEPATOBILIARY: Scattered pneumobilia. Moderate intrahepatic biliary dilatation. Stent in the right central intrahepatic bile duct with distal tip seen in the common bile duct. The distal tip of the stent is adjacent to a metallic wall stent within the   common bile duct.    PANCREAS: No significant mass, duct dilatation, or inflammatory change.    SPLEEN: Normal size.    ADRENAL GLANDS: Normal.    KIDNEYS/BLADDER: The bilateral kidneys enhance symmetrically without evidence for hydronephrosis or pyelonephritis. No renal masses. No renal calculi. The bilateral ureters and urinary bladder are unremarkable.    BOWEL: Expandable stent in the duodenum. Moderate to marked residual stool throughout the colon. Visualized bowel gas pattern is nonspecific.    LYMPH NODES: No  lymphadenopathy.    VASCULATURE: No abdominal aortic aneurysm. Mild vascular calcifications abdominal aorta.    PELVIC ORGANS: No pelvic masses. No pelvic free fluid.    MUSCULOSKELETAL: Numerous sclerotic metastasis. Vertebral augmentation changes L2-3, L3, and L4      Impression    IMPRESSION:  1.  No evidence for pulmonary embolism.  2.  Lungs are clear.  3.  Moderate intrahepatic biliary dilatation with pneumobilia. Stent in the right central intrahepatic bile duct with distal tip seen in the common bile duct. The distal tip of the stent is adjacent to a metallic wall stent within the common bile duct.  4.  Expandable stent in the duodenum.  5.  Moderate to marked residual stool throughout the colon.   6.  Numerous sclerotic metastasis.        Medical Decision Making       Please see A&P for additional details of medical decision making.  MANAGEMENT DISCUSSED with the following over the past 24 hours: Primary medicine team, palliative care outpatient physician   NOTE(S)/MEDICAL RECORDS REVIEWED over the past 24 hours: Medicine, pharmacy, respiratory therapy, emergency medicine  Tests personally interpreted in the past 24 hours:  - ABDOMINAL CT showing moderate intrahepatic biliary dilatation.  Stent seen.  Moderate to marked residual stool throughout the colon, numerous sclerotic metastases  Tests REVIEWED in the past 24 hours:  - CMP  - CBC  - Troponin  - TSH  SUPPLEMENTAL HISTORY, in addition to the patient's history, over the past 24 hours obtained from:   - Spouse or significant other

## 2025-04-10 NOTE — PHARMACY-ADMISSION MEDICATION HISTORY
Pharmacy Intern Admission Medication History    Admission medication history is complete. The information provided in this note is only as accurate as the sources available at the time of the update.    Information Source(s): Family member and CareEverywhere/SureScripts via in-person    Pertinent Information:   I spoke with the patient's spouse, Violet, and she was a reliable historian and familiar with Gallo's medications.   Patient was taken off Eliquis 1 to 2 days before he was admitted to [facility] late March.   Patient uses at least 2 dicyclomine 10 mg capsules a day, and takes up to 4 daily if he is not having regular bowel movements.   Last fentanyl patch put on 4/9 at 9:00 am per spouse.   Per spouse, they have most recently only been using one lidocaine patch at a time. When treatment was initiated up to three at a time were required to achieve adequate pain relief. Recently ran out of the 5% patches and have been substituting 4% patches.   Per spouse, lidocaine-prilocaine cream has been useful for port access when needed and should be available here.   Patient has been eating about 5 small meals a day at maximum, and spouse usually gives 2 or 3 creon capsules at a time.   Recently, lorazepam 2 mg/mL and Morphine 20 mg/mL were prescribed to substitute for the tablet formulations of each, but the patient has not received them yet from his pharmacy.   Patient has not yet received his Lopressor 12.5 mg BID from his pharmacy yet.   Patient has Narcan at home.   Patient uses Delta-8 hemp extract oil at home. The spouse brought it in and I was able to examine the product. It comes as a 166.7 mg/mL oil, with about 50 drops per mL. Per spouse, the patient usually uses 2 drops (about 7 mg) as needed for pain/anxiety but has taken up to 0.25 mL at a time before (about 42 mg).   Infusions and Infusion-related medications are received intermittently or have standing PRN orders and I have not marked them as taking,  but have left them in the medication list:   Alteplase 2mg PRN   Diphenhydramine 50 mg PRN for infusion reaction   Epinephrine 0.3 mg (Epipen) PRN for infusion reaction   Fluorouracil 2,290 mg   Methyprednisolone 125 mg PRN for infusion reaction    mL PRN for infusion reaction    mL with zoledronic acid   NS 10 mL flush as needed   Zoledronic acid 4 mg every 28 days.     Changes made to PTA medication list:  Added:   Cannabis (delta 8) liquid   Deleted:   Eliquis 5 mg BID   Dexamethasone 2 mg daily - patient finished treatment course  Econazole 1% cream - no longer using on toenails.   Insulin NPH - patient no longer using since off of dexamethasone treatment   Dilaudid 4 to 8 mg q3h - patient no longer using since fentanyl patches + morphine started.   Duplicate methocarbamol   Duplicate Narcan  Duplicate Compazine   Changed:   Dicyclomine to 4 times daily prn   Lidocaine 5% patch to one daily for 12 hours.     Allergies reviewed with patient and updates made in EHR: yes    Medication History Completed By: Lei Sanon 4/10/2025 11:35 AM    PTA Med List   Medication Sig Last Dose/Taking    acetaminophen (TYLENOL) 32 mg/mL liquid Take 20.313 mLs (650 mg) by mouth every 8 hours. Taking    calcium carbonate (OS-DENISE) 500 MG tablet Take 2 tablets (1,000 mg) by mouth 2 times daily. Taking    dicyclomine (BENTYL) 10 MG capsule Take 1 capsule (10 mg) by mouth 4 times daily (before meals and nightly). (Patient taking differently: Take 10 mg by mouth 4 times daily as needed (Patient takes twice daily, and up to 4 times daily as needed if he is not having regular bowel movements.).) Taking Differently    fentaNYL (DURAGESIC) 75 mcg/hr 72 hr patch Place 1 patch over 72 hours onto the skin every 72 hours. Replaces 50 MCG/HR patch. Remove old patch when starting new patch. 4/9/2025 at  9:00 AM    HEMP OIL OR EXTRACT OR OTHER CBD CANNABINOID, NOT MEDICAL CANNABIS, Place 7 mg under the tongue daily as needed  (Delta-8 hemp extract oil (166.7 mg/mL)). 2 drops (7 mg) under the tongue as needed for pain or anxiety Taking As Needed    lidocaine (LIDODERM) 5 % patch Place onto the skin every 24 hours. To prevent lidocaine toxicity, patient should be patch free for 12 hrs daily. Taking    lidocaine-prilocaine (EMLA) 2.5-2.5 % external cream Use 1-2 times a week or as needed prior to port access Taking    lipase-protease-amylase (CREON 24) [id number] units CPEP per EC capsule 2-3 capsules with meals 3 times a day and 1-2 capsules with snacks max of 15 capsules a day Taking    loperamide (IMODIUM) 2 MG capsule 2 caps at 1st sign of diarrhea & 1 cap every 2hrs until 12hrs diarrhea free. During night, 2 caps at bedtime & 2 caps every 4hrs until AM Taking    LORazepam (ATIVAN) 0.5 MG tablet Take 1-2 tablets (0.5-1 mg) by mouth every 6 hours as needed for muscle spasms. Taking As Needed    methocarbamol (ROBAXIN) 500 MG tablet TAKE 2 TABLETS BY MOUTH 4 TIMES DAILY AS NEEDED FOR MUSCLE SPASMS.* Taking    morphine (MS CONTIN) 15 MG CR tablet Take 15 mg by mouth every 12 hours. Taking    Multiple Vitamins-Minerals (CENTRUM SILVER 50+MEN) TABS  Taking    naloxone (NARCAN) 4 MG/0.1ML nasal spray Spray 1 spray (4 mg) into one nostril alternating nostrils as needed for opioid reversal. every 2-3 minutes until assistance arrives Taking As Needed    OLANZapine (ZYPREXA) 5 MG tablet Take 1 tablet (5 mg) by mouth at bedtime. Taking    pantoprazole (PROTONIX) 40 MG EC tablet Take 1 tablet (40 mg) by mouth 2 times daily Taking    prochlorperazine (COMPAZINE) 10 MG tablet Take 1 tablet (10 mg) by mouth every 6 hours as needed for nausea or vomiting. Taking As Needed    vitamin D3 (CHOLECALCIFEROL) 50 mcg (2000 units) tablet Take 1 tablet (50 mcg) by mouth daily. Taking

## 2025-04-10 NOTE — PLAN OF CARE
A:   Neuro: A&Ox4. Intermittent confusion. Used call light appropriately. Patient calm and cooperative with cares. Denied headache, dizziness, and lightheadedness.  Cardiac/Tele: VSS. Sinus tachycardia. MAPs > 65. Denied chest pain and palpitations.  Respiratory: Sats > 94% on room air. Denied shortness of breath.  GI/: Continent. Did not urinate or have bowel movement during shift. Previously used urinal and bedside commode. Adequate urine output during previous shift. 1 BM earlier on 4/10/25. Denied nausea and vomiting.  Diet/Appetite: Regular diet. Supper arrived right at shift change.  Skin: No skin deficits noted.  LDAs: 1 accessed port, infusing LR at ordered rate of 100 mL/hr.  Activity: Did not leave bed during shift. Assist of 2 for repositioning.   Pain: 4/10 shoulder pain, pain med given by previous shift.   Electrolytes: no electrolytes replaced during shift.     P: Continue to monitor during shift and notify team with changes. Reviewed plan of care with patient    Shift: 17:25 to 19:30

## 2025-04-10 NOTE — CODE/RAPID RESPONSE
Rapid Response Team Note    Assessment   A rapid response was called on Gallo Navarro due to new onset arrhythmia. Patient fluctuating between SVT and sinus tach. Responds to vagal maneuvers but will go back in SVT. This presentation is likely due to underlying malignancy.    Plan   -  Trial dosing one 5 mg IV metoprolol    - Primary team likely to start 12.5 mg BID as was recommended as outpatient  -  Check Mag, phos  -  The  riri  primary team was at bedside  -  Disposition: The patient will remain on the current unit. We will continue to monitor this patient closely.  -  Reassessment and plan follow-up will be performed by the primary team    Ansley Alvares PA-C  Rapid Response Team SUZETTE  Securely message with [facility] Course   Brief Summary of events leading to rapid response:   Patient admitted for weakness and failure to thrive at home in the setting of metastatic pancreatic cancer.  Discussing goals of care with palliative team and patient was noted to be in SV on monitor thus rapid called. Patient was recently found to predominantly have sinus tachycardic as well as some SVT on zio patch. Was recommended to start on metoprolol 12.5 mg BID, but has not yet picked up prescription.     Physical Exam   Vital Signs: Temp: 97.8  F (36.6  C) Temp src: Oral BP: 124/82 Pulse: (!) 180   Resp: 17 SpO2: 98 % O2 Device: None (Room air)    Weight: 0 lbs 0 oz      Exam:   General Appearance: Chronically ill appearing male seen laying in bed.  Eyes: PERRLA.  No conjunctival icterus.  HEENT: Atraumatic.  Respiratory: Breathing comfortably on room air.    Cardiovascular: Tachycardic  GI: Bowel sounds present throughout.  Abdomen soft, nontender.  Lymph/Hematologic: No bruising on exposed skin.  Skin: No lesions or rashes noted on exposed skin.  Musculoskeletal: Moving all extremities spontaneously.  Neurologic: Cranial nerves II through XII grossly intact.  Psychiatric: Mood appropriate.      Significant  Verified Results  BASIC METABOLIC PNL 21Jun2017 12:01AM HELENA RUBIO   [Jun 22, 2017 11:49AM HELENA RUBIO]  Diabetes is in excellent shape and tightly controlled, decrease metformin to 1 tablet once a day only and update medication list     Test Name Result Flag Reference   FASTING STATUS UNKNOWN hrs     SODIUM 141 mmol/L  135-145   POTASSIUM 3.5 mmol/L  3.4-5.1   CHLORIDE 101 mmol/L     CARBON DIOXIDE 32 mmol/L  21-32   ANION GAP 12 mmol/L  10-20   GLUCOSE 83 mg/dl  65-99   BUN 14 mg/dl  6-20   CREATININE 0.94 mg/dl  0.51-0.95   GFR EST.AFRICAN AMER 68     eGFR 60 - 89 mL/min/1.73m2 = Mild decrease in kidney function.   GFR EST.NONAFRI AMER 59     eGFR 30-59 mL/min/1.73m2 = Moderate decrease in kidney function. Stage 3 CKD (chronic kidney disease) or moderate kidney disease.   BUN/CREATININE RATIO 15  7-25   CALCIUM 8.9 mg/dl  8.4-10.2     HEMOGLOBIN A1C GLYCOSYLATED 21Jun2017 12:01AM HELENA RUBIO   [Jun 22, 2017 11:49AM HELENA RUBIO]  Diabetes is in excellent shape and tightly controlled, decrease metformin to 1 tablet once a day only and update medication list     Test Name Result Flag Reference   HEMOGLOBIN A1C GLYH 5.5 %  4.5-5.6   ----DIABETIC SCREENING---  NON DIABETIC                 <5.7%  INCREASED RISK                5.7-6.4%  DIAGNOSTIC FOR DIABETES      >6.4%     ----DIABETIC CONTROL---     A1C%           eAG mg/dL  6.0            126  6.5            140  7.0            154  7.5            169  8.0            183  8.5            197  9.0            212  9.5            226  10.0           240        Results and Procedures   Recent Labs   Lab 04/10/25  0803 04/10/25  0028 04/09/25  1148    135 136   POTASSIUM 4.2 3.9 4.0   CHLORIDE 99  --  99   CO2 24  --  25   ANIONGAP 13  --  12   BUN 12.0  --  13.4   CR 0.40*  --  0.43*   DENISE 8.6*  --  8.8   MAG  --   --  1.9   PHOS  --   --  3.5   PROTTOTAL 5.9*  5.9*  --  6.2*   ALBUMIN 2.9*  2.9*  --  3.0*   BILITOTAL 1.0  1.0  --  0.8   ALKPHOS 279*  279*  --  276*   AST 26  26  --  25   ALT 14  14  --  11     Recent Labs   Lab 04/10/25  0634 04/10/25  0050 04/10/25  0028 04/09/25  1148 04/07/25  1304   WBC 7.2 6.7  --  7.1 9.4   RBC 2.52* 2.13*  --  2.21* 2.16*   HGB 7.5*  7.5* 6.4* 6.8* 6.6* 6.4*   HCT 23.9* 20.1* 20* 21.3* 20.5*   MCV 95 94  --  96 95   MCH 29.8 30.0  --  29.9 29.6   MCHC 31.4* 31.8  --  31.0* 31.2*   RDW 17.2* 17.6*  --  17.7* 18.5*   PLT 91* 83*  --  90* 130*     Recent Labs   Lab 04/09/25  1148   INR 2.09*     No lab results found in last 7 days.   Recent Labs   Lab 04/10/25  0011   TSH 2.87     No lab results found in last 7 days.  Recent Labs   Lab 04/10/25  0803 04/10/25  0148 04/10/25  0028 04/09/25  1148   * 139* 149* 165*        All labs personally reviewed in [facility].  See A&P for additional results.     Unresulted Labs Ordered in the Past 30 Days of this Admission       Date and Time Order Name Status Description    4/10/2025 11:49 AM TSH with free T4 reflex In process     4/10/2025 11:45 AM Phosphorus In process     4/10/2025 11:45 AM Magnesium In process     4/10/2025 12:47 AM Bld morphology pathology review In process     4/10/2025 12:36 AM Prepare red blood cells (unit) Preliminary     4/9/2025 10:31 PM Blood Culture Peripheral Blood In process     4/9/2025 10:31 PM Blood Culture Peripheral Blood In process     3/26/2025  7:05 AM PREPARE RED BLOOD CELLS (UNIT) Preliminary

## 2025-04-10 NOTE — PROGRESS NOTES
CLINICAL NUTRITION SERVICES - ASSESSMENT NOTE    RECOMMENDATIONS FOR MDs/PROVIDERS TO ORDER:  Nutritional supplements if oral intake is low    Future/Additional Recommendations:  -- obtain nutrition hx and NFPE as able  -- monitor oral intake of meals   -- monitor weight trends     REASON FOR ASSESSMENT  Provider order - Patient with metastatic pancreatic cancer with concern for declining nutrition    Per chart review patient with a history of metastatic pancreatic cancer     INFORMATION OBTAINED  Patient not available for interview due to busy with other disciplines      NUTRITION HISTORY  Previous G/J tube - removed 11/22/23    Per chart review patient takes MVI at home and declining nutrition at home.     CURRENT NUTRITION ORDERS  Diet: Regular    CURRENT INTAKE/TOLERANCE  Per chart review patient is eating 5 small meals daily and taking Creon 24 2-3 capsules with meals   50% intake x 1 meal per nursing documentation     LABS  Nutrition-relevant labs:   (4/10): BUN 12 mg/dL, Cr 0.4 mg/dL (L), alkaline phosphatase 279 U/L (H)   (4/9):  mg/L (H)     MEDICATIONS  Nutrition-relevant medications:   Oscal  Creon 24 2 capsules with meals and 1 capsule in snacks (738 units of lipase/kg/meal)    ANTHROPOMETRICS  Height: 182.9 cm (6')  Admission Weight:     Most Recent Weight:  64.9 kg (4/8/25)  IBW: 80.9 kg  BMI: 19.39   Weight History:   Wt Readings from Last 10 Encounters:   04/08/25 64.9 kg (143 lb)   04/07/25 65 kg (143 lb 4.8 oz)   03/31/25 65.8 kg (145 lb)   03/26/25 68 kg (150 lb)   03/10/25 68.9 kg (152 lb)   03/05/25 69.3 kg (152 lb 11.2 oz)   02/21/25 68.9 kg (152 lb)   02/19/25 69 kg (152 lb 3.2 oz)   02/11/25 66.4 kg (146 lb 6.4 oz)   01/31/25 65.5 kg (144 lb 6.4 oz)   5.9% weight loss in 1 months     Dosing Weight: 65 kg, based on actual wt    ASSESSED NUTRITION NEEDS  Estimated Energy Needs: 4222-8771 kcals/day (30 - 35 kcals/kg)  Justification: Increased needs and Repletion  Estimated Protein Needs:  78-98 grams protein/day (1.2 - 1.5 grams of pro/kg)  Justification: Increased needs and Repletion  Estimated Fluid Needs: 0041-8503 mL/day (25 - 30 mL/kg)  Justification: Maintenance    SYSTEM AND PHYSICAL FINDINGS    GI symptoms:  LBM (4/10)  Skin/wounds: ReviewedBraden score 16    MALNUTRITION  % Intake: Unable to assess  % Weight Loss: > 5% in 1 month (severe)   Subcutaneous Fat Loss: Unable to assess  Muscle Loss: Unable to assess  Fluid Accumulation/Edema: Mild, 1+ - per chart review   Malnutrition Diagnosis: at least Moderate malnutrition in the context of chronic illness  Malnutrition Present on Admission: Yes    NUTRITION DIAGNOSIS  Inadequate oral intake related to FTT and declining nutrition as evidenced by >5% weight loss in 1 month    INTERVENTIONS  Continue Creon with meals and snacks    GOALS  Patient to consume % of nutritionally adequate meal trays TID, or the equivalent with supplements/snacks.     MONITORING/EVALUATION  Progress toward goals will be monitored and evaluated per policy.    Amelia Duval MS/RD/LD/CNSC  Available on [employer]   MICHAEL-F (7am-3:30pm) - ED Clinical Dietitian  Weekend/Holiday Dietitian (7am-3:30pm)    ** Clinical Dietitians no longer available on pager

## 2025-04-10 NOTE — ED TRIAGE NOTES
"Pt arrives to the ED for complaints of increasing fatigue, shortness of breath & pain. Pt states that a few weeks ago, he was able to ambulate with a walker with minimal assistance but now is unable to lift his legs. Per pt's wife, pt has had increased shakiness, confusion & \"isn't as sharp as he used to be.\" Pt's wife also states that pt has new, itchy, bumps on his back; Pt had back surgery in January. Pt endorses nausea but no vomiting or diarrhea (last BM was this evening).     Pt states that he received 1 unit of blood at the clinic yesterday    HX: DM, PE-on Eliquis, metastatic pancreatic cancer on palliative chemotherapy, anemia     Remedios Dick, MICHAELN  Shift: 1500 - 4150        "

## 2025-04-10 NOTE — H&P
[facility]    History and Physical - Medicine Service, MAROON TEAM 2       Date of Admission:  4/9/2025    Assessment & Plan      Gallo Navarro is a 59 year old male admitted on 4/9/2025. He has a history of metastatic pancreatic cancer on active treatment with 5FU/Nal-IRI and IDDM and is admitted for weakness, failure-to-thrive, and worsening chronic cancer related pain over the last 3 weeks.     # Metastatic pancreas cancer, on active treatment with 5-FU /Nal-IRI   # Failure to thrive  # Weakness in lower extremities, worsening  Patient has a history of metastatic pancreatic cancer that was first diagnosed on 7/12/2023.  Was first staged as stage III (T2, N2, M0).  Since then found to have metastatic lesions to the lumbar spine.  Is now on third line therapy with 5 fluorouracil/toya liposomal irinotecan.  Has previously been on gem/Abraxane.  Therapy with 5 fluorouracil/and liposomal irinotecan has been effective in disease control, but patient has been experiencing worsening quality of life.  Due to this dose was recently decreased by half,  but has not received dose reduced therapy.  Patient has had worsening weakness, particular in his lower extremities over the last 3 weeks.  Patient also had declining nutrition per patient's wife.  Will plan to assess for reversible causes of generalized weakness.  On exam no specific exam findings, outside of mild 1+ pitting edema to mid shins that is new.  Differential includes possible infection, especially considering pneumobilia noted on exam with a history of biliary stent placement.  CRP is elevated, indicating inflammation. New edema in lower extremities may also poitn towards a cardio etiology as well.  Last echo was done on 6/2024 with a EF of 55-60% and normal valvular function.  Troponin was mildly elevated on admission to 44 and plateaued at 47. Otherwise, possible that patient's presentation is related to  malignancy and chemotherapy. Has had changes to patient's recent pain regimen, though would not expect any medications to cause profound weakness and FTT.    -Blood cultures x2 4/10 - pending  -Recently changed code-status to DNR/DNI   -Palliative care consulted to assist with pain management  -Pharmacy med rec on admission  -PT/OT consulted  -Nutrition consulted    # Episode of SVT, stable   # Non-sustained SVTs on recent Zio   Patient has been noted to have been tachycardic recently.  Was initially stable at heart rates of 120s on admission.  On the morning of 4/10, rapid was called due to heart rate still 180.  Patient remained hemodynamically stable, but noted palpitations.  Denied any chest pain.  Patient recently had a Zio patch evaluation for 1 week that noted nonsustained SVT.  Patient was prescribed metoprolol to tartrate 12.5 mg twice daily, but patient had not been started on this prior to admission.  Patient last had an echo on 6/2024 which showed an EF of 55-60% without any valvular abnormalities.  On exam patient has 1+ pitting edema to the mid shins, which patient and patient's family knows has been intermittent since starting chemotherapy.  Of note irinotecan has been noted to cause peripheral edema in the past.  5-fluorouracil has been known to cause cardiotoxicity as well.  Will plan on assessing cardiac function with a repeat echo given new arrhythmia.  -TTE to assess for any changes compared to prior.  -Continue Telemetry  -Troponin, 44 -> 47 -> 47  -Metoprolol 12.5mg BID, titrate to effect     # Chronic cancer related pain, worsening  # Sclerotic metastatic lesions, L2-4  # Muscle spasms   Patient has noted metastases to the L-spine, with noted sclerotic lesions on L2-4.  Patient has chronic pain related to his underlying malignancy that is being treated with palliative care as an outpatient.  Patient recently had changes to his medications, notably he was switched off of Dilaudid for fentanyl  at 75 mcg/h patches.  Patient was also started on morphine 15-30 mg every 3 hours.  Patient's methocarbamol was increased to 1000 mg every 6 hours.  Patient was also started on lorazepam solution.  On exam patient continues to appear uncomfortable with pain in his lower back that radiates down his legs.  He has profound weakness in his lower extremities as limited by pain.  -Palliative care consulted to assist with pain management, appreciate recs  -Continue PTA Robaxin PRN  -Continue PTA fentanyl  -Continue PTA morphine  -Continue PTA Lorazepam   -Continue PTA Methacarbamol PRN  -Scheduled APAP 650 TID   -PT/OT as above    # Pneumobilia   # Hx of biliary stent placement   Patient had noted moderate intrahepatic biliary dilation with pneumobilia on imaging done on 4/10.  Patient has a history of biliary stent placed due to gastric outlet obstruction.  Patient also noted to have moderate to marked residual stool throughout the colon.  Pneumobilia may reflect intrabiliary infection and may be driving patient's weakness.  Blood cultures were drawn on admission.  CRP is elevated under 45.  However no leukocytosis, patient remains afebrile, and hemodynamically stable.  Was not started on antibiotics on admission.  No pain over the abdomen on exam.  Alk phos is elevated 279, total bili is 1.0.  LFTs otherwise normal.  -Will plan to monitor for now off antibiotics  -If hemodynamically unstable, low threshold to start Zosyn or ceftriaxone/metronidazole for intra-abdominal infection.  -Continue PTA bentyl     # History of pancreatitis related to malignancy  # Pancreatic insufficiency  Patient has noted history of pancreatic insufficiency in the setting of chronic pancreatitis   Related to pancreatic malignancy.  Patient has been getting Creon PTA.  Will continue this during admission.  -Continue PTA creon   -As above consult nutrition    # Acute on chronic normocytic anemia  Patient initially admitted with a hemoglobin of  6.6, decline to 6.4 on 4/10.  Transfuse 1 unit of PRBCs.  MCV of 95.  Hemoglobin improved to 7.5.  No leukocytosis, though elevated WBCs on 4/1 at 12.9.  Reticulocyte count of 3.4%, hypoproliferative given patient's degree of anemia.  Likely anemia of chronic disease in the setting of malignancy and chemotherapy.  No signs of acute bleed though noted some hemorrhoids that was addressed.   -Continue to monitor  -Transfuse Hgb to > 7  -S/p 1u pRBC on 4/10    # DM Type 3c  Had previously received NPH in the past for steroid-induced hyperglycemia and has been part of patient's chemotherapy regimen.  Has not been using NPH recently.  Patient and patient's wife notes patient's home blood sugars have been well-controlled.  -Not currently on insulin currently   -Continue to monitor while inpatient    # Hemorrhoids   Patient recently noted some hemorrhoids that was the source of some noted rectal bleeding. Likely does not explain patient's profound anemia.   -Preparation H TID           Diet: Combination Diet Regular Diet Adult    DVT Prophylaxis: Pneumatic Compression Devices  Adan Catheter: Not present  Fluids: LR 100cc/hr  Lines: PRESENT      Port a Cath 07/31/23 Single Lumen Right Chest wall-Site Assessment: WDL      Cardiac Monitoring: None  Code Status:  DNR/DNI    Clinically Significant Risk Factors Present on Admission               # Hypoalbuminemia: Lowest albumin = 3 g/dL at 4/9/2025 11:48 AM, will monitor as appropriate  # Drug Induced Coagulation Defect: home medication list includes an anticoagulant medication  # Thrombocytopenia: Lowest platelets = 83 in last 2 days, will monitor for bleeding        # Anemia: based on hgb <11      # DMII: A1C = 7.2 % (Ref range: <5.7 %) within past 6 months               Disposition Plan      Expected Discharge Date: 04/12/2025                The patient's care was discussed with the Attending Physician, Dr. Lopez .      STEPHANIE TROY MD  Medicine Service, [address]  [facility]  Securely message with [employer] (more info)  Text page via [facility] Paging/Directory   See signed in provider for up to date coverage information  ______________________________________________________________________    Chief Complaint   Lower extremity weakness, failure to thrive    History is obtained from the patient    History of Present Illness   Gallo Navarro is a 59 year old male admitted on 4/9/2025. He has a history of metastatic pancreatic cancer on active treatment with 5FU/Nal-IRI and IDDM and is admitted for weakness, failure-to-thrive, and worsening chronic cancer related pain over the last 3 weeks.     Patient and patient's wife is not able to elaborate on the clear inciting event, however they both feel that patient has progressively declined since his last round of chemotherapy approximately 3 weeks ago.  In line with was charted, patient and patient's wife notes that cancer has been well-controlled on current chemotherapy, however patient has had declining functional status while on this therapy.  They note that his chemotherapy dosage was recently adjusted, but patient has not started this new dose reduced chemo regimen as of admission.  Patient's wife also notes that he had recently adjusted his pain medications.  Patient has experienced worsening lower back pain, lower extremity weakness, with chronic neuropathy.  Patient has also had declining appetite over the last several months.  Patient and patient's wife, but patient's abdomen is actually grown larger.  Denies any abdominal pain or tenderness.  Notes that he has had pain consistent bowel movements.  Denies any sid blood in stool, but notes streaks of blood related to hemorrhoids that was treated recently.  Denies any dysuria.  Notes intermittent fevers at home.  Denies any chest pain but notes occasional palpitations.  Patient recently was evaluated by Zio patch, but patient and patient's wife  are unaware of the results.    Patient notes no history of alcohol use, but notes no recent alcohol use and denies any tobacco use.  He occasionally uses CBD cream for pain.       Past Medical History    Past Medical History:   Diagnosis Date    Acute pancreatitis 04/16/2023    Alcohol-induced acute pancreatitis 04/10/2023    Gastric outlet obstruction     Metastasis from pancreatic cancer (H)     Personal history of chemotherapy     Gemzar + Abraxane started on 10/31/2023    Recurrent acute pancreatitis     S/P radiation therapy     2,000 cGy to T10 Spine completed on 11/29/2023 [facility]    S/P radiation therapy     3,000 cGy to L4 Spine completed on 10/21/2024 - [facility]       Past Surgical History   Past Surgical History:   Procedure Laterality Date    AS OPEN TREATMENT CLAVICULAR FRACTURE INTERNAL FX      ENDOSCOPIC RETROGRADE CHOLANGIOPANCREATOGRAM N/A 7/11/2023    Procedure: ENDOSCOPIC RETROGRADE CHOLANGIOPANCREATOGRAPHY;  Surgeon: Khadar Pickett MD;  Location: [facility] OR    ENDOSCOPIC RETROGRADE CHOLANGIOPANCREATOGRAM N/A 8/17/2023    Procedure: ENDOSCOPIC RETROGRADE  with stent removal x1, balloon sweep;  Surgeon: Christos Greenberg MD;  Location: [facility] OR    ENDOSCOPIC ULTRASOUND UPPER GASTROINTESTINAL TRACT (GI) N/A 7/11/2023    Procedure: Endoscopic ultrasound upper gastrointestinal tract (GI), with biposy, GJ tube repositioning, stent placement;  Surgeon: Khadar Pickett MD;  Location: [facility] OR    ENDOSCOPIC ULTRASOUND UPPER GASTROINTESTINAL TRACT (GI) N/A 7/13/2023    Procedure: ENDOSCOPIC ULTRASOUND, ESOPHAGOSCOPY, EUS guided gastrojejunostomy;  Surgeon: Tre York MD;  Location: [facility] OR    INSERT PICC LINE  04/29/2023    INSERT PORT VASCULAR ACCESS Right 7/28/2023    Procedure: Insert port vascular access;  Surgeon: Tom العلي MD;  Location: [facility] OR    IR BONE ABLATION RFA  8/28/2024    IR BONE ABLATION RFA  1/10/2025    IR CHEST PORT PLACEMENT > 5  "YRS OF AGE  7/28/2023    IR LUMBAR VERTEBROPLASTY  8/28/2024    IR NG TUBE PLACEMENT REQ RAD & FLUORO  05/08/2023    JG tube    REPLACE GASTROJEJUNOSTOMY TUBE, PERCUTANEOUS N/A 8/17/2023    Procedure: possible REPLACEMENT, GASTROJEJUNOSTOMY TUBE, PERCUTANEOUS;  Surgeon: Christos Greenberg MD;  Location: [facility] OR       Prior to Admission Medications   Prior to Admission Medications   Prescriptions Last Dose Informant Patient Reported? Taking?   Chemo Kit   No No   Sig: For RN use only. Do not remove items from bag. Contents: 1  sodium chloride 0.9% flush, 4 medium gloves, 1 chemo gown, 1/4 chemo mat, 1 connector female, 1 chemo bag.   Continuous Glucose Sensor (DEXCOM G7 SENSOR) MISC   No No   Sig: Change every 10 days.   EPINEPHrine (ANY BX GENERIC EQUIV) 0.3 MG/0.3ML injection 2-pack   No No   Sig: Inject 0.3 mLs (0.3 mg) into the muscle as needed for anaphylaxis (infusion reaction). Administer into the mid-thigh in case of severe anaphylaxis (wheezing, throat tightening, mouth swelling, difficulty breathing). May repeat dose one time in 5-15 minutes if symptoms persist.   Patient not taking: Reported on 4/8/2025   Emergency Supply Kit, Central,   No No   Sig: Patient use for emergency only. Contents: 3 sodium chloride 0.9% flushes, 1 dressing kit, 1 microclave ext set 14\", 4 nitrile gloves (med), 6 alcohol prep pads, 1 bacitracin, 1 syringe (10 cc 20 G 1\"). Call [phone] to reorder.   Fluorouracil (ADRUCIL) 2,290 mg in sodium chloride 0.9 % 241 mL via HOMEPUMP C-Series   No No   Sig: Infuse 2,290 mg at 5.2 mL/hr over 46 hours into the vein once for 1 dose.   Patient not taking: Reported on 4/8/2025   HYDROmorphone (DILAUDID) 2 MG tablet   No No   Sig: Take 2-4 tablets (4-8 mg) by mouth every 3 hours as needed for moderate to severe pain.   LORazepam (ATIVAN) 0.5 MG tablet   No No   Sig: Take 1-2 tablets (0.5-1 mg) by mouth every 6 hours as needed for muscle spasms.   LORazepam (ATIVAN) 2 MG/ML (HIGH CONC) " oral solution   No No   Sig: Take 0.25-0.5 mLs (0.5-1 mg) by mouth every 6 hours as needed for anxiety or muscle spasms.   Multiple Vitamins-Minerals (CENTRUM SILVER 50+MEN) TABS   Yes No   Sig: as directed Orally   Patient not taking: Reported on 2025   OLANZapine (ZYPREXA) 5 MG tablet   No No   Sig: Take 1 tablet (5 mg) by mouth at bedtime.   Port Access Kit   No No   Sig: For nurse use only.  Do not remove items from bag.  Use for port access.  Do not place syringe on sterile field.   acetaminophen (TYLENOL) 32 mg/mL liquid   No No   Sig: Take 20.313 mLs (650 mg) by mouth every 8 hours.   alteplase (CATHFLO ACTIVASE) injection   No No   Sig: Inject 2 mLs (2 mg) into catheter as needed (catheter occlusion). Reconstitute vial. Draw up alteplase 1 mg/mL in a syringe and instill into IV catheter. Dwell for at least 20 min to 24 hours, then aspirate. May repeat dose once in 24 hours if catheter function not restored after at least 2 hours. Discard remainder of vial.   Patient not taking: Reported on 2025   apixaban ANTICOAGULANT (ELIQUIS) 5 MG tablet   No No   Sig: Take 1 tablet (5 mg) by mouth 2 times daily.   Patient not taking: Reported on 2025   blood glucose (FREESTYLE TEST STRIPS) test strip   No No   Si strip by In Vitro route 3 times daily.   calcium carbonate (OS-DENISE) 500 MG tablet   No No   Sig: Take 2 tablets (1,000 mg) by mouth 2 times daily.   dexAMETHasone (DECADRON) 2 MG tablet   No No   Sig: Take 1 tablet (2 mg) by mouth daily (with breakfast).   Patient not taking: Reported on 2025   dicyclomine (BENTYL) 10 MG capsule   No No   Sig: Take 1 capsule (10 mg) by mouth 4 times daily (before meals and nightly).   diphenhydrAMINE (BENADRYL) 50 MG/ML injection   No No   Sig: Inject 1 mL (50 mg) over 3-5 minutes into the vein via push as needed for other (infusion reaction). For RN use only.  Draw up in a syringe and administer IV push.  Discard remainder of vial.   Patient not taking:  Reported on 2025   econazole nitrate 1 % external cream   No No   Sig: Apply topically daily To affected toenails.   Patient not taking: Reported on 2025   fentaNYL (DURAGESIC) 75 mcg/hr 72 hr patch   No No   Sig: Place 1 patch over 72 hours onto the skin every 72 hours. Replaces 50 MCG/HR patch. Remove old patch when starting new patch.   insulin  UNIT/ML injection   No No   Si units around 9 am (with steroids) 10 units around 9 pm   insulin pen needle (31G X 8 MM) 31G X 8 MM miscellaneous   No No   Sig: Use 1 pen needles daily or as directed.   lidocaine (LIDODERM) 5 % patch   No No   Sig: Place 2 patches over 12 hours onto the skin every 24 hours.   Patient taking differently: Place onto the skin every 24 hours.   lidocaine-prilocaine (EMLA) 2.5-2.5 % external cream  Self, Spouse/Significant Other No No   Sig: Use 1-2 times a week or as needed prior to port access   Patient not taking: Reported on 2025   lipase-protease-amylase (CREON 24) 21072-89457-476493 units CPEP per EC capsule   No No   Si-3 capsules with meals 3 times a day and 1-2 capsules with snacks max of 15 capsules a day   loperamide (IMODIUM) 2 MG capsule   No No   Si caps at 1st sign of diarrhea & 1 cap every 2hrs until 12hrs diarrhea free. During night, 2 caps at bedtime & 2 caps every 4hrs until AM   methocarbamol (ROBAXIN) 500 MG tablet   Yes No   Sig: TAKE 2 TABLETS BY MOUTH 4 TIMES DAILY AS NEEDED FOR MUSCLE SPASMS.*   methocarbamol 1000 MG TABS   No No   Sig: Take 1,000 mg by mouth 4 times daily as needed for muscle spasms.   methylPREDNISolone Na Suc, PF, (SOLU-MEDROL) 125 mg/2 mL injection   No No   Sig: Inject 2 mLs (125 mg) over 3-5 minutes into the vein via push as needed (infusion reaction). For RN use only.  Reconstitute vial. Draw up methylPREDNISolone in a syringe and administer.  Discard remainder of vial.   metoprolol tartrate (LOPRESSOR) 25 MG tablet   No No   Sig: Take 0.5 tablets (12.5 mg) by  mouth 2 times daily.   Patient not taking: Reported on 4/8/2025   morphine (MS CONTIN) 15 MG CR tablet   Yes No   Sig: Take 15 mg by mouth every 12 hours.   morphine sulfate, high concentrate, (ROXANOL-CONCENTRATED) 20 MG/ML concentrated solution   No No   Sig: Take 0.75-1.5 mLs (15-30 mg) by mouth every 3 hours as needed for breakthrough pain.   naloxone (NARCAN) 4 MG/0.1ML nasal spray   Yes No   Sig: Spray 4 mg into one nostril alternating nostrils once as needed.   Patient not taking: Reported on 4/8/2025   naloxone (NARCAN) 4 MG/0.1ML nasal spray   No No   Sig: Spray 1 spray (4 mg) into one nostril alternating nostrils as needed for opioid reversal. every 2-3 minutes until assistance arrives   Patient not taking: Reported on 4/8/2025   pantoprazole (PROTONIX) 40 MG EC tablet   No No   Sig: Take 1 tablet (40 mg) by mouth 2 times daily   prochlorperazine (COMPAZINE) 10 MG tablet   No No   Sig: Take 1 tablet (10 mg) by mouth every 6 hours as needed for nausea or vomiting   prochlorperazine (COMPAZINE) 10 MG tablet   No No   Sig: Take 1 tablet (10 mg) by mouth every 6 hours as needed for nausea or vomiting.   sodium chloride 0.9% infusion   No No   Sig: Infuse 500 mLs into the vein as needed for other (infusion reaction). In case of mild reaction, administer via gravity at 20 mL/hr to keep vein open. In case of severe reaction, administer via gravity wide open on prime setting.   sodium chloride 0.9% infusion   No No   Sig: Infuse 250 mLs over 30 minutes into the vein every 28 (twenty-eight) days. zometa concomitant fluids. Given per therapy plan orders   sodium chloride, PF, 0.9% PF flush   No No   Sig: Inject 10 mLs into the vein as needed for other (infusion reaction). For RN use only as needed for infusion reaction   sodium chloride, PF, 0.9% PF flush   No No   Sig: Inject 10 mLs into the vein as needed for line flush. Flush IV before and after med administration as directed and/or at least every 24 hours, or  prior to deaccessing for no further use and/or at least every 4 weeks when not accessed.   sterile water, preservative free, injection   No No   Sig: Use 2.2 mLs for reconstitution as needed (with alteplase for catheter occlusion). Direct diluent stream into powder. Mix by gently swirling until dissolved. DO NOT SHAKE. Discard remainder of vial.   vitamin D3 (CHOLECALCIFEROL) 50 mcg (2000 units) tablet   No No   Sig: Take 1 tablet (50 mcg) by mouth daily.   zoledronic acid (ZOMETA) 4 MG/100ML infusion   No No   Sig: Infuse 100 mLs (4 mg) into the vein every 28 days. Infuse via gravity. Given per therapy plan orders      Facility-Administered Medications: None        Review of Systems    The 10 point Review of Systems is negative other than noted in the HPI or here.     Social History   I have reviewed this patient's social history and updated it with pertinent information if needed.  Social History     Tobacco Use    Smoking status: Never     Passive exposure: Never    Smokeless tobacco: Never   Vaping Use    Vaping status: Never Used   Substance Use Topics    Alcohol use: Not Currently    Drug use: Never         Family History   I have reviewed this patient's family history and updated it with pertinent information if needed.  Family History   Problem Relation Age of Onset    Diabetes Type 2  Father     Cirrhosis Father     Genetic Disorder Brother         missing [id number], mild retardation    Colon Polyps Brother     Pancreatic Cancer Paternal Aunt 85    Colon Cancer Paternal Uncle     Pancreatitis Cousin          Allergies   No Known Allergies     Physical Exam   Vital Signs: Temp: 97.8  F (36.6  C) Temp src: Oral BP: 132/79 Pulse: (!) 123   Resp: 17 SpO2: 97 % O2 Device: None (Room air)    Weight: 0 lbs 0 oz    General Appearance: Appears uncomfortable, sitting upright in bed  Respiratory: Clear to auscultation bilaterally, no wheezing or crackles  Cardiovascular: Tachycardic rate, regular rhythm, occasional  skipped beats, no obvious rub or murmurs  GI: Nontender to palpation.  No obvious masses felt.  Skin: No rashes on gross exam.  MSK: Patient is able to move all extremities spontaneously, but limited ROM due to pain in lower extremities bilaterally.  Moves toes spontaneously.  Neuropathy in bilateral feet    Medical Decision Making       Please see A&P for additional details of medical decision making.  Tests personally interpreted in the past 24 hours:      Data   ------------------------- PAST 24 HR DATA REVIEWED -----------------------------------------------    I have personally reviewed the following data over the past 24 hrs:    7.2  \   7.5 (L); 7.5 (L)   / 91 (L)     136 99 12.0 /  140 (H)   4.2 24 0.40 (L) \     ALT: 14; 14 AST: 26; 26 AP: 279 (H); 279 (H) TBILI: 1.0; 1.0   ALB: 2.9 (L); 2.9 (L) TOT PROTEIN: 5.9 (L); 5.9 (L) LIPASE: N/A     Trop: 47 (H) BNP: N/A     TSH: 2.94 T4: N/A A1C: N/A     Procal: N/A CRP: N/A Lactic Acid: N/A       INR:  N/A PTT:  N/A   D-dimer:  N/A Fibrinogen:  N/A     Ferritin:  N/A % Retic:  3.4 (H) LDH:  N/A       Imaging results reviewed over the past 24 hrs:   Recent Results (from the past 24 hours)   Head CT w/o contrast    Narrative    EXAM: CT HEAD W/O CONTRAST  LOCATION: [facility]  DATE: 4/10/2025    INDICATION: ? cause for worsening weakness fatigue (hx metastatic cancer, thrombocytopenia)  COMPARISON: None.  TECHNIQUE: Routine CT Head without IV contrast. Multiplanar reformats. Dose reduction techniques were used.    FINDINGS:  INTRACRANIAL CONTENTS: No intracranial hemorrhage, extraaxial collection, or mass effect.  No CT evidence of acute infarct. Normal parenchymal attenuation. Normal ventricles and sulci.     VISUALIZED ORBITS/SINUSES/MASTOIDS: No intraorbital abnormality. 1.7 cm polyp or retention cyst in the left maxillary sinus. No middle ear or mastoid effusion.    BONES/SOFT TISSUES: No acute abnormality.       Impression    IMPRESSION:  1.  No acute intracranial process.     CT Chest PE Abdomen Pelvis w Contrast    Narrative    EXAM: CT CHEST PE ABDOMEN PELVIS W CONTRAST  LOCATION: [facility]  DATE: 4/10/2025    INDICATION: ? worsening PE burden, infection, bleed, et al. (elevated CRP, very low hgb despite recent transfusion, high dimer and elevt)  COMPARISON: None.  TECHNIQUE: CT chest pulmonary angiogram and routine CT abdomen pelvis with IV contrast. Arterial phase through the chest and venous phase through the abdomen and pelvis. Multiplanar reformats and MIP reconstructions were performed. Dose reduction   techniques were used.   CONTRAST: iopamidol (ISOVUE 370) solution 88 mL    FINDINGS:  ANGIOGRAM CHEST: Pulmonary arteries are normal caliber and negative for pulmonary emboli. Thoracic aorta is negative for dissection. No CT evidence of right heart strain.     LUNGS AND PLEURA: Lungs are clear. No pleural effusions.    MEDIASTINUM/AXILLAE: No lymphadenopathy. No thoracic aortic aneurysms. No pericardial effusion.    CORONARY ARTERY CALCIFICATION: None.    HEPATOBILIARY: Scattered pneumobilia. Moderate intrahepatic biliary dilatation. Stent in the right central intrahepatic bile duct with distal tip seen in the common bile duct. The distal tip of the stent is adjacent to a metallic wall stent within the   common bile duct.    PANCREAS: No significant mass, duct dilatation, or inflammatory change.    SPLEEN: Normal size.    ADRENAL GLANDS: Normal.    KIDNEYS/BLADDER: The bilateral kidneys enhance symmetrically without evidence for hydronephrosis or pyelonephritis. No renal masses. No renal calculi. The bilateral ureters and urinary bladder are unremarkable.    BOWEL: Expandable stent in the duodenum. Moderate to marked residual stool throughout the colon. Visualized bowel gas pattern is nonspecific.    LYMPH NODES: No lymphadenopathy.    VASCULATURE: No abdominal aortic  aneurysm. Mild vascular calcifications abdominal aorta.    PELVIC ORGANS: No pelvic masses. No pelvic free fluid.    MUSCULOSKELETAL: Numerous sclerotic metastasis. Vertebral augmentation changes L2-3, L3, and L4      Impression    IMPRESSION:  1.  No evidence for pulmonary embolism.  2.  Lungs are clear.  3.  Moderate intrahepatic biliary dilatation with pneumobilia. Stent in the right central intrahepatic bile duct with distal tip seen in the common bile duct. The distal tip of the stent is adjacent to a metallic wall stent within the common bile duct.  4.  Expandable stent in the duodenum.  5.  Moderate to marked residual stool throughout the colon.   6.  Numerous sclerotic metastasis.

## 2025-04-10 NOTE — ED PROVIDER NOTES
Emergency Department I-PASS Sign-out    CT with no PE. Moderate intrahepatic biliary dilation with pneumobilia.     Hand off provided to medicine for admission.       Sydnie Moran MD   Emergency Medicine     Sydnie Moran MD  04/10/25 0576

## 2025-04-10 NOTE — CODE/RAPID RESPONSE
RT responded to rapid response called for  new onset arrhythmia . Upon arrival, pt was on room air with sats at 97%. No respiratory interventions were needed at that time.    Berta Leyva, RT on 4/10/2025 at 12:21 PM

## 2025-04-11 LAB
ANION GAP SERPL CALCULATED.3IONS-SCNC: 10 MMOL/L (ref 7–15)
ATRIAL RATE - MUSE: 111 BPM
ATRIAL RATE - MUSE: NORMAL BPM
BASOPHILS # BLD AUTO: 0 10E3/UL (ref 0–0.2)
BASOPHILS NFR BLD AUTO: 0 %
BUN SERPL-MCNC: 9.4 MG/DL (ref 6–20)
CALCIUM SERPL-MCNC: 8.5 MG/DL (ref 8.8–10.4)
CHLORIDE SERPL-SCNC: 100 MMOL/L (ref 98–107)
CREAT SERPL-MCNC: 0.41 MG/DL (ref 0.67–1.17)
DIASTOLIC BLOOD PRESSURE - MUSE: NORMAL MMHG
DIASTOLIC BLOOD PRESSURE - MUSE: NORMAL MMHG
EGFRCR SERPLBLD CKD-EPI 2021: >90 ML/MIN/1.73M2
EOSINOPHIL # BLD AUTO: 0.1 10E3/UL (ref 0–0.7)
EOSINOPHIL NFR BLD AUTO: 2 %
ERYTHROCYTE [DISTWIDTH] IN BLOOD BY AUTOMATED COUNT: 17.4 % (ref 10–15)
GLUCOSE SERPL-MCNC: 142 MG/DL (ref 70–99)
HCO3 SERPL-SCNC: 26 MMOL/L (ref 22–29)
HCT VFR BLD AUTO: 22.1 % (ref 40–53)
HCT VFR BLD AUTO: 23.3 % (ref 40–53)
HGB BLD-MCNC: 7.1 G/DL (ref 13.3–17.7)
HGB BLD-MCNC: 7.2 G/DL (ref 13.3–17.7)
IMM GRANULOCYTES # BLD: 0.4 10E3/UL
IMM GRANULOCYTES NFR BLD: 5 %
INTERPRETATION ECG - MUSE: NORMAL
INTERPRETATION ECG - MUSE: NORMAL
LYMPHOCYTES # BLD AUTO: 0.9 10E3/UL (ref 0.8–5.3)
LYMPHOCYTES NFR BLD AUTO: 14 %
MCH RBC QN AUTO: 29.3 PG (ref 26.5–33)
MCHC RBC AUTO-ENTMCNC: 32.1 G/DL (ref 31.5–36.5)
MCV RBC AUTO: 91 FL (ref 78–100)
MONOCYTES # BLD AUTO: 0.7 10E3/UL (ref 0–1.3)
MONOCYTES NFR BLD AUTO: 10 %
NEUTROPHILS # BLD AUTO: 4.8 10E3/UL (ref 1.6–8.3)
NEUTROPHILS NFR BLD AUTO: 69 %
NRBC # BLD AUTO: 0 10E3/UL
NRBC BLD AUTO-RTO: 1 /100
P AXIS - MUSE: 62 DEGREES
P AXIS - MUSE: NORMAL DEGREES
PLATELET # BLD AUTO: 77 10E3/UL (ref 150–450)
POTASSIUM SERPL-SCNC: 3.9 MMOL/L (ref 3.4–5.3)
PR INTERVAL - MUSE: 132 MS
PR INTERVAL - MUSE: NORMAL MS
QRS DURATION - MUSE: 62 MS
QRS DURATION - MUSE: 68 MS
QT - MUSE: 242 MS
QT - MUSE: 328 MS
QTC - MUSE: 428 MS
QTC - MUSE: 446 MS
R AXIS - MUSE: 71 DEGREES
R AXIS - MUSE: 73 DEGREES
RBC # BLD AUTO: 2.42 10E6/UL (ref 4.4–5.9)
SODIUM SERPL-SCNC: 136 MMOL/L (ref 135–145)
SYSTOLIC BLOOD PRESSURE - MUSE: NORMAL MMHG
SYSTOLIC BLOOD PRESSURE - MUSE: NORMAL MMHG
T AXIS - MUSE: -85 DEGREES
T AXIS - MUSE: 83 DEGREES
VENTRICULAR RATE- MUSE: 111 BPM
VENTRICULAR RATE- MUSE: 188 BPM
WBC # BLD AUTO: 6.9 10E3/UL (ref 4–11)

## 2025-04-11 PROCEDURE — 250N000013 HC RX MED GY IP 250 OP 250 PS 637: Performed by: INTERNAL MEDICINE

## 2025-04-11 PROCEDURE — 250N000013 HC RX MED GY IP 250 OP 250 PS 637: Performed by: STUDENT IN AN ORGANIZED HEALTH CARE EDUCATION/TRAINING PROGRAM

## 2025-04-11 PROCEDURE — 99223 1ST HOSP IP/OBS HIGH 75: CPT | Mod: GC

## 2025-04-11 PROCEDURE — 250N000013 HC RX MED GY IP 250 OP 250 PS 637

## 2025-04-11 PROCEDURE — 250N000011 HC RX IP 250 OP 636

## 2025-04-11 PROCEDURE — 120N000002 HC R&B MED SURG/OB UMMC

## 2025-04-11 PROCEDURE — 99233 SBSQ HOSP IP/OBS HIGH 50: CPT | Performed by: INTERNAL MEDICINE

## 2025-04-11 PROCEDURE — 36591 DRAW BLOOD OFF VENOUS DEVICE: CPT | Performed by: HOSPITALIST

## 2025-04-11 PROCEDURE — 85004 AUTOMATED DIFF WBC COUNT: CPT | Performed by: HOSPITALIST

## 2025-04-11 PROCEDURE — 85018 HEMOGLOBIN: CPT | Performed by: STUDENT IN AN ORGANIZED HEALTH CARE EDUCATION/TRAINING PROGRAM

## 2025-04-11 PROCEDURE — 250N000013 HC RX MED GY IP 250 OP 250 PS 637: Performed by: HOSPITALIST

## 2025-04-11 PROCEDURE — 80048 BASIC METABOLIC PNL TOTAL CA: CPT | Performed by: HOSPITALIST

## 2025-04-11 PROCEDURE — 82310 ASSAY OF CALCIUM: CPT | Performed by: HOSPITALIST

## 2025-04-11 PROCEDURE — 36591 DRAW BLOOD OFF VENOUS DEVICE: CPT | Performed by: STUDENT IN AN ORGANIZED HEALTH CARE EDUCATION/TRAINING PROGRAM

## 2025-04-11 PROCEDURE — 250N000011 HC RX IP 250 OP 636: Mod: JZ

## 2025-04-11 PROCEDURE — 99233 SBSQ HOSP IP/OBS HIGH 50: CPT | Mod: GC | Performed by: STUDENT IN AN ORGANIZED HEALTH CARE EDUCATION/TRAINING PROGRAM

## 2025-04-11 PROCEDURE — 99222 1ST HOSP IP/OBS MODERATE 55: CPT | Mod: GC | Performed by: RADIOLOGY

## 2025-04-11 RX ORDER — ENOXAPARIN SODIUM 100 MG/ML
40 INJECTION SUBCUTANEOUS EVERY 24 HOURS
Status: DISCONTINUED | OUTPATIENT
Start: 2025-04-11 | End: 2025-04-12 | Stop reason: HOSPADM

## 2025-04-11 RX ORDER — FENTANYL 100 UG/1
100 PATCH TRANSDERMAL
Status: DISCONTINUED | OUTPATIENT
Start: 2025-04-11 | End: 2025-04-12 | Stop reason: HOSPADM

## 2025-04-11 RX ORDER — HYDROMORPHONE HYDROCHLORIDE 1 MG/ML
0.5 INJECTION, SOLUTION INTRAMUSCULAR; INTRAVENOUS; SUBCUTANEOUS
Status: DISCONTINUED | OUTPATIENT
Start: 2025-04-11 | End: 2025-04-12 | Stop reason: HOSPADM

## 2025-04-11 RX ORDER — FENTANYL 100 UG/1
100 PATCH TRANSDERMAL
Status: DISCONTINUED | OUTPATIENT
Start: 2025-04-11 | End: 2025-04-11

## 2025-04-11 RX ADMIN — METHOCARBAMOL 1000 MG: 500 TABLET ORAL at 22:28

## 2025-04-11 RX ADMIN — HYDROMORPHONE HYDROCHLORIDE 0.5 MG: 1 INJECTION, SOLUTION INTRAMUSCULAR; INTRAVENOUS; SUBCUTANEOUS at 18:28

## 2025-04-11 RX ADMIN — CALCIUM 1000 MG: 500 TABLET ORAL at 08:22

## 2025-04-11 RX ADMIN — PANCRELIPASE 2 CAPSULE: 120000; 24000; 76000 CAPSULE, DELAYED RELEASE PELLETS ORAL at 16:31

## 2025-04-11 RX ADMIN — MORPHINE SULFATE 30 MG: 10 SOLUTION ORAL at 21:13

## 2025-04-11 RX ADMIN — HYDROMORPHONE HYDROCHLORIDE 0.5 MG: 1 INJECTION, SOLUTION INTRAMUSCULAR; INTRAVENOUS; SUBCUTANEOUS at 08:21

## 2025-04-11 RX ADMIN — CALCIUM 1000 MG: 500 TABLET ORAL at 20:05

## 2025-04-11 RX ADMIN — ACETAMINOPHEN 650 MG: 325 TABLET, FILM COATED ORAL at 20:06

## 2025-04-11 RX ADMIN — PANCRELIPASE 2 CAPSULE: 120000; 24000; 76000 CAPSULE, DELAYED RELEASE PELLETS ORAL at 08:27

## 2025-04-11 RX ADMIN — POLYETHYLENE GLYCOL 3350 17 G: 17 POWDER, FOR SOLUTION ORAL at 11:40

## 2025-04-11 RX ADMIN — Medication 37.5 MG: at 20:15

## 2025-04-11 RX ADMIN — LIDOCAINE 4% 1 PATCH: 40 PATCH TOPICAL at 08:28

## 2025-04-11 RX ADMIN — MORPHINE SULFATE 30 MG: 10 SOLUTION ORAL at 11:40

## 2025-04-11 RX ADMIN — MORPHINE SULFATE 30 MG: 10 SOLUTION ORAL at 16:33

## 2025-04-11 RX ADMIN — MORPHINE SULFATE 30 MG: 10 SOLUTION ORAL at 02:56

## 2025-04-11 RX ADMIN — METHOCARBAMOL 1000 MG: 500 TABLET ORAL at 10:13

## 2025-04-11 RX ADMIN — PANTOPRAZOLE SODIUM 40 MG: 40 TABLET, DELAYED RELEASE ORAL at 20:06

## 2025-04-11 RX ADMIN — ENOXAPARIN SODIUM 40 MG: 40 INJECTION SUBCUTANEOUS at 20:06

## 2025-04-11 RX ADMIN — DICYCLOMINE HYDROCHLORIDE 10 MG: 10 CAPSULE ORAL at 11:46

## 2025-04-11 RX ADMIN — SENNOSIDES 1 TABLET: 8.6 TABLET, FILM COATED ORAL at 08:22

## 2025-04-11 RX ADMIN — ACETAMINOPHEN 650 MG: 325 TABLET, FILM COATED ORAL at 08:21

## 2025-04-11 RX ADMIN — FENTANYL 1 PATCH: 100 PATCH TRANSDERMAL at 19:58

## 2025-04-11 RX ADMIN — PANCRELIPASE 2 CAPSULE: 120000; 24000; 76000 CAPSULE, DELAYED RELEASE PELLETS ORAL at 11:45

## 2025-04-11 RX ADMIN — PANCRELIPASE 2 CAPSULE: 120000; 24000; 76000 CAPSULE, DELAYED RELEASE PELLETS ORAL at 20:09

## 2025-04-11 RX ADMIN — LORAZEPAM 1 MG: 0.5 TABLET ORAL at 15:38

## 2025-04-11 RX ADMIN — METOPROLOL TARTRATE 25 MG: 25 TABLET, FILM COATED ORAL at 08:34

## 2025-04-11 RX ADMIN — DICYCLOMINE HYDROCHLORIDE 10 MG: 10 CAPSULE ORAL at 08:32

## 2025-04-11 RX ADMIN — MORPHINE SULFATE 30 MG: 10 SOLUTION ORAL at 07:13

## 2025-04-11 RX ADMIN — Medication 5 MG: at 22:57

## 2025-04-11 RX ADMIN — OLANZAPINE 5 MG: 5 TABLET, FILM COATED ORAL at 21:13

## 2025-04-11 RX ADMIN — Medication 12.5 MG: at 05:46

## 2025-04-11 RX ADMIN — SENNOSIDES 1 TABLET: 8.6 TABLET, FILM COATED ORAL at 20:06

## 2025-04-11 RX ADMIN — PANTOPRAZOLE SODIUM 40 MG: 40 TABLET, DELAYED RELEASE ORAL at 08:22

## 2025-04-11 RX ADMIN — DICYCLOMINE HYDROCHLORIDE 10 MG: 10 CAPSULE ORAL at 21:13

## 2025-04-11 ASSESSMENT — ACTIVITIES OF DAILY LIVING (ADL)
ADLS_ACUITY_SCORE: 61
ADLS_ACUITY_SCORE: 62
ADLS_ACUITY_SCORE: 61
ADLS_ACUITY_SCORE: 62
ADLS_ACUITY_SCORE: 61
ADLS_ACUITY_SCORE: 62
ADLS_ACUITY_SCORE: 61
ADLS_ACUITY_SCORE: 62
ADLS_ACUITY_SCORE: 61
ADLS_ACUITY_SCORE: 62

## 2025-04-11 NOTE — PLAN OF CARE
Goal Outcome Evaluation:      Plan of Care Reviewed With: patient    Overall Patient Progress: no changeOverall Patient Progress: no change     /69 (BP Location: Left arm)   Pulse 106   Temp 98.1  F (36.7  C) (Oral)   Resp 18   SpO2 98%        Pt A&O X4, intermittent confusion. VSS on RA. Tele in place sinus Tachycardic, denies chest pain or SOB. Back and R shoulder pain managed with Morphine x1, dilaudid x1, Robaxin x1 given. Lidocaine patch to lower back, Fentanyl patch to right shoulder. Tolerating regular diet, denies nausea. Up with AX1-2 to bedside commode, Voids via bedside urinal AUOP. R chest port SL. Monitor and cont with POC.

## 2025-04-11 NOTE — CONSULTS
"Palliative Care Initial Social Work Note  Location: [facility]- [place]    Patient Info:  Gallo Navarro is a 59 year old male with \"a past medical history of non-resectable pancreatic cancer and mets to T/L spine  who presented on 4/10 with acute on chronic pain and failure to thrive.\"    Brief summary of visit:   PCSW met with Gallo and Violet to introduce myself and role. Gallo remembered me from about 2 years ago when he was first diagnosed. They talked about what's changed in 2 years, mostly focused on how the kids are doing. They are excited that their son (now 22) is graduating in the spring from [employer]. Their daughter (now 16) is hoping to do [employer] next year for school. They report that their son has been engaged in conversations about Gallo's health and what's coming next. Their daughter has been avoidant in these conversations; although called mom last night to ask a couple questions while she was at track practice. They report that their daughter has an outpatient therapist & is supported at school.    Violet has been trying to find some ways to care for herself. She was able to go to a \"[employer]\" and found it very helpful. She has been working the last year on some other restorative health goals. She thinks that this is giving her the tools she needs to be ready for the heavy conversations.       Date of Admission: 4/9/2025    Reason for consult: Patient and family support    Sources of information: Patient  Family member -wife  Staff -Palliative Care MD  Other -chart review    Recommendations & Plan:  -PCSW will remain available for ongoing emotional support as needed while Palliative Care Team is following.        Symptoms & Concerns Addressed Today:  Caregiver -Violet is working one day a week and trying to find sometime to care for herself.   Emotional -Gallo & Violet have been having \"deep conversations\" and letting their emotions happen.   End of life -Gallo and Violet are working to " enjoy their time together and hope for more.   Family  Physical -Violet reports that Gallo is starting to need more help    Strengths Identified:    -good social supports.    Relationships & Support:  Aspects of relationships and support assessed today:  Identified family members: wife ( 33 yrs), Son (22, graduating from [employer] in  this spring), Daughter (age 16)  Professional supports: Outpatient Palliative Care MD, Oncology teams  Family coping: well at this time, having good conversations.   Bereavement Risk concerns: low    Coping, Mental Health & Adjustment to Illness:   Adjustment to Illness/Hospitalization    Goals, Decision Making & Advance Care Planning:   Prognosis, Goals, and/or Advance Care Planning were assessed today: No  If yes, brief summary of discussion: Gallo and Violet talked about the plan to think about more symptom based care. Not ready to change to Hospice yet.   Preferred language: English  Patient's decision making preferences: shared with support from loved ones  I have concerns about the patient/family's health literacy today: No  Patient has a completed Health Care Directive: No.   Code status per chart review: No CPR / No Intubation      Clinical Social Work Interventions:   Assessment of palliative specific issues    Introduction of Palliative clinical social work interventions   Adjustment to illness counseling  Facilitation of processing of thoughts/feelings  Family communication facilitated  Life legacy work  Psychoeducation  Re-framing   Resource referral -[facility] & Outpatient Oncology BETHANIE Pena  [facility], Palliative Care  Securely message with the Vocera Web Console (learn more here) or  Text page via [facility] Paging/Directory

## 2025-04-11 NOTE — PLAN OF CARE
Goal Outcome Evaluation:     Plan of Care Reviewed With: patient    Overall Patient Progress: no change    Outcome Evaluation:     Admx:  weakness, failure-to-thrive, and worsening chronic cancer related pain over the last 3 weeks  Shift: [id number]  VS: Stable on RA, afebrile  Neuro: A&Ox4  Cardiac: on tele  Pain/Nausea: back pain, denies nausea  PRN: morphine x 2  Diet: Regular  Infusion(s):  ml/hr  Lines/Drains: R.Chest port  GI/: Voids without difficulty, LBM 04/10  Skin: No issues  Mobility: Assist of 1-2 GB, sat in recliner for 2 hrs in the night before bed.   Plan: Continue POC   yes

## 2025-04-11 NOTE — PROGRESS NOTES
PALLIATIVE CARE PROGRESS NOTE  [facility]     Patient Name: Gallo Navarro  Date of Admission: 4/9/2025        Recommendations & Counseling       GOALS OF CARE:   Life-prolonging with limits   Gallo and Violet's main focus currently is to try to feel as good as possible with pain well-controlled and be able to enjoy time with family and friends this summer.  At times, Gallo seems to think that cancer directed treatment will improve his pain but Violet is able to clarify for him that treating the pain with medications and possibly radiation is not treating the cancer and she also reminds Gallo that cancer directed treatments, cause him to feel weaker and out of it and not able to have a good quality of life.  They do want to hear from oncology to learn if there are any treatments that can help him feel better.  DNR/DNR  Violet describes herself as a planner and therefore would like to have an informational only with hospice so that if hospice services are needed, she will be able to understand what that would look like at home.  Palliative care outpatient provider will make that referral once Gallo is discharged.    ADVANCE CARE PLANNING:  No health care directive on file. Per system policy, Surrogate Decision-makers for Patients With Diminished Decision-making Capacity offers guidance on possible decision-makers.  Wife, Violet has been identified as a surrogate decision maker.   New POLST form completed today.   Code status: No CPR- Do NOT Intubate    MEDICAL MANAGEMENT:     Acute on chronic back pain due to metastases and compression fractures as well as right shoulder pain most likely due to metastatic lesion.  Pain has not been well-controlled recently despite increasing fentanyl dose.  Today pain is fairly well-controlled and I had a long discussion with both Gallo and Violet, explaining goal of pain control is to help pain be well enough controlled that Gallo can enjoy  being surrounded by family and friends and engaged with people around him rather than being sedated and not able to enjoy his quality of life.  - Increase fentanyl 100 mcg q72 hrs -ordered for you   (note that patient was on Butrans patches and hydromorphone without pain control so therefore Butrans was discontinued and fentanyl started on 3/26/2025.  Fentanyl slowly increased with last increase to 75 mcg on 4/2) -please give patient one month prescription at discharge  - Continue morphine 30 mg every 3 hours if needed -please give 2-week supply at discharge  - Continue scheduled Tylenol 650 mg TID  - Continue Robaxin 1000 mg QID PRN   - Continue lidocaine patch to back and shoulder  - Appreciate radiation oncology consult to evaluate if radiation to shoulder and femur could help decrease pain  - Consider addition of a steroid burst for bone pain depending on radiation oncology decision.  - Could also consider addition of Lyrica for nonopioid analgesia.  Will not add this to patient's regimen today but could consider as an outpatient.  Patient with a history of bad emotional reaction to gabapentin.    Muscle stiffness: Patient has not been complaining of muscle stiffness during this hospitalization but was complaining of it prior to hospitalization.  - Continue Robaxin as needed    Opioid-induced constipation prevention: Stool seen throughout the colon on CT scan  - Senna 1-2 tabs twice daily scheduled   - MiraLAX daily scheduled    Anorexia and weight loss:  -Continue olanzapine 5 mg at bedtime    Anxiety:  - Continue lorazepam 0.5-1 mg as needed.  Ideally patient should just take at night and this was discussed with him and his wife.  - Consider olanzapine 2.5mg BID PRN to help with anxiety    PSYCHOSOCIAL/SPIRITUAL:  Family wife, Violet, is his main support.  He is very close with his daughter and son and states he has many friends who support him.    Palliative Care will continue to follow. Thank you for the  "consult and allowing us to aid in the care of Gallo Navarro.        Jeny Macedo MD  Securely message with [employer] (more info)  Text page via [employer] Paging/Directory   If you are not sure who specifically to contact -- please text the \"Palliative Care [facility]\" voice group in [employer].        Assessment:           Gallo Navarro is a 59 year old male with a past medical history of non-resectable pancreatic cancer and mets to T/L spine  who presented on 4/10 with acute on chronic pain and failure to thrive.      Today patient was seen for:  Metastatic pancreatic cancer  L4 me s/p vertebroplasty and RFA   Cancer associated pain  Right shoulder pain  Failure to thrive  Intermittent rapid ventricular tachycardia  Support  Palliative care encounter           Interval History:     Multidisciplinary collaboration:  All notes reviewed including nursing notes, nutrition, medicine, oncology  Discussed case with primary medicine team as well as oncology team    I reviewed this patient's medication profile and medications during this hospitalization.  Notable medications:  Tylenol 650mg TID   Bentyl 10mg QID  Fentanyl patch 75mcg q72 hrs  Lidocaine patch     Hydromorphone 0.5mg q2hrs PRN - 0.5mg x 24 hrs  MSIR solution 30mg q3hrs PRN - 150mg x 24hrs  Robaxin 1000mg QID PRN - 1 dose 4/10    Lorazepam 0.5-1mg q6hrs PRN - 1.5mg x 24 hrs  Creon   Olanzapine 5mg at bedtime    Senna 1-2 tabs BID - not taking  Miralax every day - not taking     Patient/family narrative  Gallo feeling better this morning because he was able to sleep well last night.  Pain in his back and right shoulder still present but better controlled than yesterday.  No complaints of nausea.  Does not believe he has constipation.       Review of Systems:     Besides above, ROS was reviewed and is unremarkable              Physical Exam:   Temp:  [97.5  F (36.4  C)-98.8  F (37.1  C)] 98.4  F (36.9  C)  Pulse:  [107-180] 120  Resp:  [16-20] 18  BP: (104-132)/(64-90) " 128/84  SpO2:  [96 %-100 %] 99 %  0 lbs 0 oz    Gen: Alert, lying in bed, cachectic, frail-appearing, no increased work of breathing, in no acute distress, lying fairly still in the bed as movements cause pain, engaged, pleasant, needs help with complex medical decision making                 Current Problem List:   Active Problems:    Anemia, unspecified type      No Known Allergies         Data Reviewed:     Reviewed recent labs and pertinent imaging  ROUTINE ICU LABS (Last four results)  CMP  Recent Labs   Lab 04/11/25  0602 04/10/25  0803 04/10/25  0148 04/10/25  0028 04/09/25  1148    136  --  135 136   POTASSIUM 3.9 4.2  --  3.9 4.0   CHLORIDE 100 99  --   --  99   CO2 26 24  --   --  25   ANIONGAP 10 13  --   --  12   * 140* 139* 149* 165*   BUN 9.4 12.0  --   --  13.4   CR 0.41* 0.40*  --   --  0.43*   GFRESTIMATED >90 >90  --   --  >90   DENISE 8.5* 8.6*  --   --  8.8   MAG  --  2.0  --   --  1.9   PHOS  --  3.4  --   --  3.5   PROTTOTAL  --  5.9*  5.9*  --   --  6.2*   ALBUMIN  --  2.9*  2.9*  --   --  3.0*   BILITOTAL  --  1.0  1.0  --   --  0.8   ALKPHOS  --  279*  279*  --   --  276*   AST  --  26  26  --   --  25   ALT  --  14  14  --   --  11     CBC  Recent Labs   Lab 04/11/25  0602 04/10/25  0634 04/10/25  0050 04/10/25  0028 04/09/25  1148   WBC 6.9 7.2 6.7  --  7.1   RBC 2.42* 2.52* 2.13*  --  2.21*   HGB 7.1* 7.5*  7.5* 6.4* 6.8* 6.6*   HCT 22.1* 23.9* 20.1* 20* 21.3*   MCV 91 95 94  --  96   MCH 29.3 29.8 30.0  --  29.9   MCHC 32.1 31.4* 31.8  --  31.0*   RDW 17.4* 17.2* 17.6*  --  17.7*   PLT 77* 91* 83*  --  90*     INR  Recent Labs   Lab 04/09/25  1148   INR 2.09*     Arterial Blood GasNo lab results found in last 7 days.    Right shoulder xray:  IMPRESSION: No acute fracture or dislocation. Joint spaces appear intact. Lytic/sclerotic lesions seen in the humeral neck and possibly in the proximal humeral shaft, indeterminate but likely metastatic. Sclerotic lesions of the mid  clavicle, inferior   glenoid are also suspicious for metastatic disease. No acute pathologic fractures identified.    Echocardiogram 4/10:  Interpretation Summary  The visual ejection fraction is 65-70%.  The right ventricle is hyperdynamic.  The right ventricular systolic pressure is 18mmHg above the right atrial  pressure.  The inferior vena cava was normal in size with preserved respiratory  variability. No pericardial effusion is present.     Compared to previous study on 6/6/2024, no significant change         Medical Decision Making       MANAGEMENT DISCUSSED with the following over the past 24 hours: primary medicine team, oncology team, bedside RN   NOTE(S)/MEDICAL RECORDS REVIEWED over the past 24 hours: nursing, medicine, oncology, radiation onc, pharmacy  Tests personally interpreted in the past 24 hours:  - echocardiogram showing no sig change from previous 6/2024  Tests REVIEWED in the past 24 hours:  - BMP  - CBC  - Coags/INR  - Spouse or significant other        Chart documentation was completed using Dragon voice-recognition software. Even though reviewed, some grammatical, spelling, and word errors may remain.

## 2025-04-11 NOTE — PROGRESS NOTES
[facility]    Progress Note - Medicine Service, GELY TEAM 2       Date of Admission:  4/9/2025    Assessment & Plan   Gallo Navarro is a 59 year old male admitted on 4/9/2025. He has a history of metastatic pancreatic cancer on active treatment with 5FU/Nal-IRI and IDDM and is admitted for weakness, failure-to-thrive, and worsening chronic cancer related pain over the last 3 weeks.      Updates today:  -- MRI L and S spine wwo per Rad Onc pending   -- Onc consulted today, appreciated recommendations   -- Palliative continuing to follow   -- Hgb, Plt downtrending. Repeated type and screen, PM recheck pending. Give blood for Hgb <7  -- Potential for discharge tomorrow with improved pain regimen and planning with Rad Onc, Onc, Palliative     Patient's main goal is quality time with his wife, son, and teenage daughter this summer, would like to continue to engage with oncology and radiation oncology to explore options and see how palliative radiation treats his pain, if possible.     # Metastatic pancreas cancer, on active treatment with 5-FU /Nal-IRI   # Failure to thrive  # Weakness in lower extremities, worsening  Patient has a history of metastatic pancreatic cancer that was first diagnosed on 7/12/2023.  Was first staged as stage III (T2, N2, M0).  Since then found to have metastatic lesions to the lumbar spine.  Is now on third line therapy with 5 fluorouracil/toya liposomal irinotecan.  Has previously been on gem/Abraxane.  Therapy with 5 fluorouracil/and liposomal irinotecan has been effective in disease control, but patient has been experiencing worsening quality of life.  Due to this dose was recently decreased by half,  but has not received dose reduced therapy.  Patient has had worsening weakness, particular in his lower extremities over the last 3 weeks.  Patient also had declining nutrition per patient's wife.  Will plan to assess for reversible causes of  generalized weakness.  On exam no specific exam findings, outside of mild 1+ pitting edema to mid shins that is new.  Differential includes possible infection, especially considering pneumobilia noted on exam with a history of biliary stent placement.  CRP is elevated, indicating inflammation. New edema in lower extremities may also poitn towards a cardio etiology as well.  Last echo was done on 6/2024 with a EF of 55-60% and normal valvular function.  Troponin was mildly elevated on admission to 44 and plateaued at 47. Otherwise, possible that patient's presentation is related to malignancy and chemotherapy. Has had changes to patient's recent pain regimen, though would not expect any medications to cause profound weakness and FTT.    -Blood cultures x2 4/10 - pending  -Recently changed code-status to DNR/DNI   -Palliative care consulted to assist with pain management  -Onc, Rad Onc consulted to assist with treatment planning   -Pharmacy med rec on admission  -PT/OT consulted  -Nutrition consulted     # Episode of SVT, stable   # Non-sustained SVTs on recent Zio   Patient has been noted to have been tachycardic recently.  Was initially stable at heart rates of 120s on admission.  On the morning of 4/10, rapid was called due to heart rate still 180.  Patient remained hemodynamically stable, but noted palpitations.  Denied any chest pain.  Patient recently had a Zio patch evaluation for 1 week that noted nonsustained SVT.  Patient was prescribed metoprolol to tartrate 12.5 mg twice daily, but patient had not been started on this prior to admission.  Patient last had an echo on 6/2024 which showed an EF of 55-60% without any valvular abnormalities.  On exam patient has 1+ pitting edema to the mid shins, which patient and patient's family knows has been intermittent since starting chemotherapy.  Of note irinotecan has been noted to cause peripheral edema in the past.  5-fluorouracil has been known to cause  cardiotoxicity as well.  Will plan on assessing cardiac function with a repeat echo given new arrhythmia.  -TTE to assess for any changes compared to prior.  -Continue Telemetry  -Troponin, 44 -> 47 -> 47  -Metoprolol 12.5mg BID, titrate to effect      # Chronic cancer related pain, worsening  # Sclerotic metastatic lesions, L2-4  # Muscle spasms   Patient has noted metastases to the L-spine, with noted sclerotic lesions on L2-4.  Patient has chronic pain related to his underlying malignancy that is being treated with palliative care as an outpatient.  Patient recently had changes to his medications, notably he was switched off of Dilaudid for fentanyl at 75 mcg/h patches.  Patient was also started on morphine 15-30 mg every 3 hours.  Patient's methocarbamol was increased to 1000 mg every 6 hours.  Patient was also started on lorazepam solution.  On exam patient continues to appear uncomfortable with pain in his lower back that radiates down his legs.  He has profound weakness in his lower extremities as limited by pain.  -Palliative care consulted to assist with pain management, appreciate recs  -Continue PTA Robaxin PRN  -Continue PTA fentanyl  -Continue PTA morphine  -Continue PTA Lorazepam   -Continue PTA Methacarbamol PRN  -Scheduled APAP 650 TID   -PT/OT as above     # Pneumobilia   # Hx of biliary stent placement   Patient had noted moderate intrahepatic biliary dilation with pneumobilia on imaging done on 4/10.  Patient has a history of biliary stent placed due to gastric outlet obstruction.  Patient also noted to have moderate to marked residual stool throughout the colon.  Pneumobilia may reflect intrabiliary infection and may be driving patient's weakness.  Blood cultures were drawn on admission.  CRP is elevated under 45.  However no leukocytosis, patient remains afebrile, and hemodynamically stable.  Was not started on antibiotics on admission.  No pain over the abdomen on exam.  Alk phos is elevated  279, total bili is 1.0.  LFTs otherwise normal.  -Will plan to monitor for now off antibiotics  -If hemodynamically unstable, low threshold to start Zosyn or ceftriaxone/metronidazole for intra-abdominal infection.  -Continue PTA bentyl      # History of pancreatitis related to malignancy  # Pancreatic insufficiency  Patient has noted history of pancreatic insufficiency in the setting of chronic pancreatitis   Related to pancreatic malignancy.  Patient has been getting Creon PTA.  Will continue this during admission.  -Continue PTA creon   -As above consult nutrition     # Acute on chronic normocytic anemia  Patient initially admitted with a hemoglobin of 6.6, decline to 6.4 on 4/10.  Transfuse 1 unit of PRBCs.  MCV of 95.  Hemoglobin improved to 7.5.  No leukocytosis, though elevated WBCs on 4/1 at 12.9.  Reticulocyte count of 3.4%, hypoproliferative given patient's degree of anemia.  Likely anemia of chronic disease in the setting of malignancy and chemotherapy.  No signs of acute bleed though noted some hemorrhoids that was addressed.   -Continue to monitor  -Transfuse Hgb to > 7  -S/p 1u pRBC on 4/10     # DM Type 3c  Had previously received NPH in the past for steroid-induced hyperglycemia and has been part of patient's chemotherapy regimen.  Has not been using NPH recently.  Patient and patient's wife notes patient's home blood sugars have been well-controlled.  -Not currently on insulin currently   -Continue to monitor while inpatient     # Hemorrhoids   Patient recently noted some hemorrhoids that was the source of some noted rectal bleeding. Likely does not explain patient's profound anemia.   -Preparation H TID       #Moderate malnutrition - RD consulted           Diet: Combination Diet Regular Diet Adult    DVT Prophylaxis: Enoxaparin (Lovenox) SQ  Adan Catheter: Not present  Fluids: None   Lines: PRESENT      Port a Cath 07/31/23 Single Lumen Right Chest wall-Site Assessment: WDL      Cardiac  Monitoring: None  Code Status: No CPR- Do NOT Intubate         Disposition Plan   Medically Ready for Discharge: Anticipated Tomorrow    The patient's care was discussed with the Attending Physician, Dr. Lopez, Bedside Nurse, Patient, and Patient's Family.    Faith Dixon MD  Medical Student  Medicine Service, [address]  Securely message with [employer] (more info)  Text page via [facility] Paging[facility]Directory   See signed in provider for up to date coverage information  ______________________________________________________________________    Interval History   PM nursing notes reviewed. Patient is feeling better this AM. Still experiencing shoulder and back pain today but it is better than it was prior to admission. He is wanting to speak with oncology and radiation oncology further today to discuss his options, noting that he values quality time with his children and wife this summer in particular and wants to continue to engage with treatment as able for right now and explore his options.     Physical Exam   Vital Signs: Temp: 98.4  F (36.9  C) Temp src: Oral BP: 118/73 Pulse: 115   Resp: 18 SpO2: 99 % O2 Device: None (Room air)    Weight: 0 lbs 0 oz    General Appearance:  Appears uncomfortable, sitting upright in bed  Respiratory: Clear to auscultation bilaterally, no wheezing or crackles  Cardiovascular: Tachycardic rate, regular rhythm, occasional skipped beats, no obvious rub or murmurs  GI: Nontender to palpation.  No obvious masses felt.  Skin: No rashes on gross exam.  MSK:   Patient is able to move all extremities spontaneously, but limited ROM due to pain in lower extremities bilaterally.  Moves toes spontaneously.  Neuropathy in bilateral feet    Medical Decision Making   Please see A&P for additional details of medical decision making.      Data     I have personally reviewed the following data over the past 24 hrs:    6.9  \   7.1 (L)   / 77 (L)      136 100 9.4 /  142 (H)   3.9 26 0.41 (L) \       Imaging results reviewed over the past 24 hrs:   Recent Results (from the past 24 hours)   XR Shoulder Right Port G/E 2 Views    Narrative    EXAM: XR SHOULDER RIGHT PORT G/E 2 VIEWS  LOCATION: [facility]  DATE: 4/10/2025    INDICATION: shoulder pain, metastatic cancer, unclear if metastatic disease lesions causing pain  COMPARISON: 2 02/03/2025      Impression    IMPRESSION: No acute fracture or dislocation. Joint spaces appear intact. Lytic/sclerotic lesions seen in the humeral neck and possibly in the proximal humeral shaft, indeterminate but likely metastatic. Sclerotic lesions of the mid clavicle, inferior   glenoid are also suspicious for metastatic disease. No acute pathologic fractures identified.

## 2025-04-11 NOTE — CONSULTS
Oncology Consult Note   Date of Service: 04/11/2025    Patient: Gallo Navarro  MRN: [id number]  Admission Date: 4/9/2025  Hospital Day # Hospital Day: 3  Diagnosis: Metastatic pancreatic cancer  Primary Outpatient Oncologist: Dr. Haile    Reason for Consult: GOC discussion in the setting of a worsening cancer related pain, anemia, thrombocytopenia and question about further treatment options    Updates and Recommendations  - No inpatient chemo currently indicated  - Continue best supportive care  - Agree with Rad onc consult for consideration of palliative radiation to bone mets  - Continue to focus on regaining strength, transfusions and plan to regroup with primary oncologist outpatient after a month to readdress GOC. Patient and family are still in the decision making phase   - Appreciate excellent cares by primary team, Palliative and Rad/onc teams  - Code status- DNAR/DNI    Assessment & Plan:   Mr. Gallo Navarro is a 59 year old male with a history of IDDM and metastatic pancreas cancer (7/11/23) s/p second-line Gorin/Abraxane (10/2023 - 11/2024) on third-line 5FU/nal-IRI (12/2024 - present) who presented to emergency department with worsening cancer-related pain, anemia, worsening fatigue, new shoulder pain, subjective shortness of breath in the setting of metastatic pancreatic cancer.    Unresectable metastatic pancreatic cancer (stage 4)  NGS: KRAS G12R  Immuno: pMMR, KAYLIE, TMB-low  Clinical Trial: MARIPOSA-186, MARIPOSA-280, TORMAYCOL  He was diagnosed on 7/11/2023, s/p second-line gem/Abraxane (10/20/2023 to 11/20/2024), now on third line chemotherapy with 5-FU/Nal-IRI now, last dose 3/5/2025 (dose reduced to half).  Ever since he received his chemotherapy on 3/5/2025, patient has been feeling worse with anemia, thrombocytopenia, worsening fatigue and worsening chronic cancer related pain.  Found to have new lytic/sclerotic lesions on right humeral neck,  proximal humeral shaft, mid clavicle.  CT abdomen on/9/2025 with  no mass or duct dilation or inflammatory change in pancreas but numerous sclerotic metastatic lesions on L2-3, L3, and L4.   -No inpatient chemo indicated    GOC discussion  Today, we spoke to his primary oncologist Dr. Haile, who thinks Gallo's pancreatic cancer is progressing and failing the treatment and there might not be significant benefit with continuing systemic chemotherapy given metastatic pancreatic cancer. We spoke with patient about the prognosis and median survival duration with and without chemotherapy, after confirming that both Gallo and violet were okay with hearing it.  It was mentioned to Gallo that he might have 3 to 6 months without treatment and 6-7 months with chemo.  Very reasonably, Gallo and Violet decided to push the decision of getting chemotherapy or going into comfort cares to May, 2025 when they see their primary oncologist Dr. Haile.  They made a very fair point that they want to see how Gallo does in the next coming days with eating, and wants to continue focusing on gaining strength first.  Gallo wants to continue getting transfusions as he thinks he is symptomatically much better with blood transfusions and wants to see if palliative radiation to bone mets helps if they are truly bone mets.  Son's graduation is on May 9th. Overall, it is clear that Gallo wants to focus on quality of life from now on and wants to spend time with family and extended family as much as possible in the next coming days and wants to do things if they are productive. If he chooses to pursue Chemo, his options would be Gemcitabine plus erlotinib as per Dr. Haile's recent note. It would also be interesting to see if he would be the candidate for single agent Gemcitabine if he chooses to pursue chemo.      Acute on chronic anemia  Acute Thrombocytopenia  Likely 2/2 Pancreatic cancer and chemo  Transfuse fr Hgb <7 and platelets <50k with bleeding and <10k without bleeding as you are    Anorexia  Weight  loss  Failure to thrive  Nutrition consulted  -encourage PO intake as much as possible    Chronic cancer related pain, worsening  Sclerotic metastatic lesions  Palliative care consulted and following  - Increase fentanyl to 100mcg q72hrs - increased 4/2  - Continue morphine oral solution to 30mg PO q3hr PRN  - Continue scheduled tylenol 650 mg TID   - Continue lidocaine patch  - recommend to add dilaudid 0.5 g IV q2hr PRN as a 2nd line (ordered for you)  - Robaxin 1000 mg QID PRN   - Continue scheduled Bentyl 10 mg capsules 4 times daily  - Rad onc consult for considering palliative radiation for sclerotic lesions in right shoulder  - MRI pending    Patient was seen and plan of care was discussed with attending physician Dr. Newman.    We will continue to follow this patient. Please don't hesitate to contact the Fellow On-Call with questions.      Mendez Khoury MD  Internal Medicine Resident, PGY-2    History of Present Illness:    Gallo Navarro is a 59 year old male with stage III metastatic pancreatic cancer on third line treatment with 5-FU/Nal-Iri and insulin-dependent diabetes mellitus is admitted for failure to thrive, worsening fatigue, worsening chronic cancer related pain over the last 3 weeks.    Patient reports that he has been getting chronically worse especially with his back pain and shoulder pain.  No nausea, vomiting.  Low appetite as well.  Reports weakness.  Wants to spend the summer with his daughter.  Planning to go home tomorrow to spend time with his daughter.  Gallo and Abigail both of them think that chemotherapy is decreasing his quality of life.  On 3/19/2025 he did not receive his chemotherapy but prior to that on 3/5/2025, the dose was have reduced however patient still have symptoms like anemia, thrombocytopenia, and meds to his shoulder.  He has been experiencing worsening lower back pain lower extremity weakness and chronic neuropathy.  Denies abdominal pain, diarrhea however reports  constipation.  Denies chest pain, cough, fever, dysuria.  Reports palpitations. Overall, pt feels better after pain control.     Oncologic History:    3/2023: presented with acute pancreatitis  c/b chronic pancreatitis with pancreatic mass causing duodenal stenosis with gastric outlet obstruction s/p GJ tube (placed   5/2023), biliary obstruction s/p stent placement on 7/7/23, pancreatic insufficiency, and IDDM2.    He then presented to the ED with worsening abdominal pain, increased jaundice, poor PO intake and weight loss admitted on 7/8/2023 for concern of biliary obstruction.   7/12/2023: Underwent biopsy of pancreatic mass returned positive for pancreatic adenocarcinoma.     7/31/2023: He started on treatment with 5FU, irinotecan, and oxaliplatin (FOLFIRINOX). Please see previous notes for further details on the patient's history.      8/17/23 - ERCP/EGD, duodenal stents x2 placed, exchange of GJ tube     8/21-8/25/24 hospitalized due to diarrhea, colitis, abdominal pain.       8/29 - Cycle 3 deferred due to neutropenia.   Neulasta added. Irinotecan dose reduced 20% due to symptoms including cramping, double vision and slurred speech during infusion.       10/24/23 CT CAP with similar to slight increase in size of peripancreatic and mesenteric lymph nodes, enlargement of previous skeletal metastasis as well as new sclerotic metastasis to left posterior 5th rib, enlarging pulmonary nodule, and unchanged pancreatic head mass. Also unclear concerns for PE,  right lower lobe segmental PE confirmed on CT-PE. Started on apixaban.      10/31/23 Cycle 1 gemzar, abraxane  11/7/23 Cycle 1 Zometa  11/22/23 Removal of GJ tube.   11/29/23 Completed palliative radiation to T10   12/13/23 C3D1 gem/abraxane  12/29/23 Consults at [facility]  1/23-1/26 Consults at [facility]   1/30/24: C4D1 gem/abraxane   3/24: CT CAP with overall stable disease with isolated progression in potential L4 lesion. Referral for radiation oncology.     4/29-5/1: Consults at [facility] for possible cellular therapy, CT guided biopsy of left pubic bone, recommendation to return to [facility] upon progression of current treatment   5/23/24: Cardiology consult at [facility]. CT with small pericardial effusion   6/6/2024: Repeat ECHO on with EF of 55-60%, no pericardial effusion present  4/2/2024-present: Gemcitabine/Abraxane, Days 1 and 15 only; Zometa every 3 months      8/2024 repeat imaging with isolated progression to L4 lesion with fracture of that spinous process. Referred to radiation therapy. Decreased Abraxane dose reduced to 75mg/m2 with cycle 9 day 15 for overall treatment tolerance.      8/28/24 RFA ablation and verteral augmentation to L4.   10/1-10/21 Radiation to L4     11/12/24 CT CAP shows disease progression, plan to change to 5FU and liposomal irinotecan after a vacation  12/12/24 ED visit for pain, secondary to cancer, managed with IV pain medication     12/18/24 Cycle 1 5FU and liposomal irinotecan     1/10/25: L2 and L3 RFA and vertebroplasty     3/5/2025: Dose reduced to half (5-FU, Irinotecan)    Further treatments paused given anemia and thrombocytopenia            Review of Systems: Pertinent positive and negative systems described in HPI; the remainder of the 14 systems are negative    Past Medical History:  Past Medical History:   Diagnosis Date    Acute pancreatitis 04/16/2023    Alcohol-induced acute pancreatitis 04/10/2023    Gastric outlet obstruction     Metastasis from pancreatic cancer (H)     Personal history of chemotherapy     Gemzar + Abraxane started on 10/31/2023    Recurrent acute pancreatitis     S/P radiation therapy     2,000 cGy to T10 Spine completed on 11/29/2023 - [facility]    S/P radiation therapy     3,000 cGy to L4 Spine completed on 10/21/2024 - [facility]       Past Surgical History:  Past Surgical History:   Procedure Laterality Date    AS OPEN TREATMENT CLAVICULAR FRACTURE  INTERNAL FX      ENDOSCOPIC RETROGRADE CHOLANGIOPANCREATOGRAM N/A 7/11/2023    Procedure: ENDOSCOPIC RETROGRADE CHOLANGIOPANCREATOGRAPHY;  Surgeon: Khadar Pickett MD;  Location: [facility] OR    ENDOSCOPIC RETROGRADE CHOLANGIOPANCREATOGRAM N/A 8/17/2023    Procedure: ENDOSCOPIC RETROGRADE  with stent removal x1, balloon sweep;  Surgeon: Christos Greenberg MD;  Location: [facility] OR    ENDOSCOPIC ULTRASOUND UPPER GASTROINTESTINAL TRACT (GI) N/A 7/11/2023    Procedure: Endoscopic ultrasound upper gastrointestinal tract (GI), with biposy, GJ tube repositioning, stent placement;  Surgeon: Khadar Pickett MD;  Location: [facility] OR    ENDOSCOPIC ULTRASOUND UPPER GASTROINTESTINAL TRACT (GI) N/A 7/13/2023    Procedure: ENDOSCOPIC ULTRASOUND, ESOPHAGOSCOPY, EUS guided gastrojejunostomy;  Surgeon: Tre York MD;  Location: [facility] OR    INSERT PICC LINE  04/29/2023    INSERT PORT VASCULAR ACCESS Right 7/28/2023    Procedure: Insert port vascular access;  Surgeon: Tom العلي MD;  Location: [facility] OR    IR BONE ABLATION RFA  8/28/2024    IR BONE ABLATION RFA  1/10/2025    IR CHEST PORT PLACEMENT > 5 YRS OF AGE  7/28/2023    IR LUMBAR VERTEBROPLASTY  8/28/2024    IR NG TUBE PLACEMENT REQ RAD & FLUORO  05/08/2023    JG tube    REPLACE GASTROJEJUNOSTOMY TUBE, PERCUTANEOUS N/A 8/17/2023    Procedure: possible REPLACEMENT, GASTROJEJUNOSTOMY TUBE, PERCUTANEOUS;  Surgeon: Christos Greenberg MD;  Location: [facility] OR       Social History:  Social History     Socioeconomic History    Marital status:    Tobacco Use    Smoking status: Never     Passive exposure: Never    Smokeless tobacco: Never   Vaping Use    Vaping status: Never Used   Substance and Sexual Activity    Alcohol use: Not Currently    Drug use: Never     Social Drivers of Health     Financial Resource Strain: Low Risk  (2/2/2024)    Financial Resource Strain     Within the past 12 months, have you or your family members you live with been unable to get utilities (heat,  electricity) when it was really needed?: No   Food Insecurity: No Food Insecurity (3/26/2025)    Received from [facility]     Hunger Vital Sign     Worried About Running Out of Food in the Last Year: Never true     Ran Out of Food in the Last Year: Never true   Transportation Needs: No Transportation Needs (3/26/2025)    Received from [facility]     PRAPARE - Transportation     Lack of Transportation (Medical): No     Lack of Transportation (Non-Medical): No   Interpersonal Safety: Not At Risk (3/26/2025)    Received from [facility]     Humiliation, Afraid, Rape, and Kick questionnaire     Fear of Current or Ex-Partner: No     Emotionally Abused: No     Physically Abused: No     Sexually Abused: No   Housing Stability: Low Risk  (3/26/2025)    Received from [facility]     Housing Stability Vital Sign     Unable to Pay for Housing in the Last Year: No     Number of Times Moved in the Last Year: 0     Homeless in the Last Year: No        Family History  Family History   Problem Relation Age of Onset    Diabetes Type 2  Father     Cirrhosis Father     Genetic Disorder Brother         missing [id number], mild retardation    Colon Polyps Brother     Pancreatic Cancer Paternal Aunt 85    Colon Cancer Paternal Uncle     Pancreatitis Cousin        Outpatient Medications:  Current Facility-Administered Medications   Medication Dose Route Frequency Provider Last Rate Last Admin    acetaminophen (TYLENOL) tablet 650 mg  650 mg Oral TID Rajesh Sanderson MD   650 mg at 04/11/25 0821    calcium carbonate (TUMS) chewable tablet 1,000 mg  1,000 mg Oral 4x Daily PRN Rj Ricci MD        calcium carbonate 500 mg (elemental) (OSCAL) tablet 1,000 mg  2 tablet Oral BID Rajesh Sanderson MD   1,000 mg at 04/11/25 0822    glucose gel 15-30 g  15-30 g Oral Q15 Min PRN Rj Ricci MD        Or    dextrose 50 % injection 25-50 mL  25-50 mL Intravenous Q15 Min PRN  Rj Ricci MD        Or    glucagon injection 1 mg  1 mg Subcutaneous Q15 Min PRN Rj Ricci MD        dicyclomine (BENTYL) capsule 10 mg  10 mg Oral 4x Daily AC & HS Rajesh Sanderson MD   10 mg at 04/11/25 1146    fentaNYL (DURAGESIC) 100 mcg/hr 72 hr patch 1 patch  100 mcg Transdermal Q72H Jeny Macedo MD        fentaNYL (DURAGESIC) Patch in Place   Transdermal Q8H Jeny Macedo MD        fentaNYL (DURAGESIC) Patch in Place   Transdermal Q8H [facility] Rj Ricci MD        HYDROmorphone (PF) (DILAUDID) injection 0.5 mg  0.5 mg Intravenous Q2H PRN Maribel Vega MD   0.5 mg at 04/11/25 0821    Lidocaine (LIDOCARE) 4 % Patch 1 patch  1 patch Transdermal Q24H Rj Ricci MD   1 patch at 04/11/25 0828    lidocaine (LMX4) cream   Topical Q1H PRN Rj Ricci MD        lidocaine 1 % 0.1-1 mL  0.1-1 mL Other Q1H PRN Rj Ricci MD        lipase-protease-amylase (CREON 24) 10585-93822-513653 units per EC capsule 2 capsule  2 capsule Oral TID w/meals Rajesh Sanderson MD   2 capsule at 04/11/25 1145    And    lipase-protease-amylase (CREON 24) 19919-20113-177020 units per EC capsule 1 capsule  1 capsule Oral With Snacks or Supplements Rajesh Sanderson MD        LORazepam (ATIVAN) tablet 0.5-1 mg  0.5-1 mg Oral Q6H PRN Rj Ricci MD   0.5 mg at 04/10/25 1549    methocarbamol (ROBAXIN) tablet 1,000 mg  1,000 mg Oral 4x Daily PRN Rajesh Sanderson MD   1,000 mg at 04/11/25 1013    metoprolol tartrate (LOPRESSOR) half-tab 37.5 mg  37.5 mg Oral BID Kenton Lopez MD        morphine solution 30 mg  30 mg Oral Q3H PRN Maribel Vega MD   30 mg at 04/11/25 1140    naloxone (NARCAN) injection 0.2 mg  0.2 mg Intravenous Q2 Min PRN Rj Ricci MD        Or    naloxone (NARCAN) injection 0.4 mg  0.4 mg Intravenous Q2 Min PRN Rj Ricci MD        Or    naloxone  (NARCAN) injection 0.2 mg  0.2 mg Intramuscular Q2 Min PRN Rj Ricci MD        Or    naloxone (NARCAN) injection 0.4 mg  0.4 mg Intramuscular Q2 Min PRN Rj Ricci MD        OLANZapine (zyPREXA) tablet 5 mg  5 mg Oral At Bedtime Rajesh Sanderson MD   5 mg at 04/10/25 2159    pantoprazole (PROTONIX) EC tablet 40 mg  40 mg Oral BID Rajesh Sanderson MD   40 mg at 04/11/25 0822    polyethylene glycol (MIRALAX) Packet 17 g  17 g Oral BID PRN Rajesh Sanderson MD   17 g at 04/11/25 1140    polyethylene glycol (MIRALAX) Packet 17 g  17 g Oral or Feeding Tube Daily Jeny Macedo MD        pramox-pe-glycerin-petrolatum (PREPARATION H) cream   Rectal TID Rajesh Sanderson MD        prochlorperazine (COMPAZINE) injection 10 mg  10 mg Intravenous Q6H PRN Rajesh Sanderson MD        Or    prochlorperazine (COMPAZINE) tablet 10 mg  10 mg Oral Q6H PRN Rajesh Sanderson MD        Or    prochlorperazine (COMPAZINE) suppository 25 mg  25 mg Rectal Q12H PRN Rajesh Sanderson MD        senna-docusate (SENOKOT-S/PERICOLACE) 8.6-50 MG per tablet 1 tablet  1 tablet Oral BID PRN Rj Ricci MD        Or    senna-docusate (SENOKOT-S/PERICOLACE) 8.6-50 MG per tablet 2 tablet  2 tablet Oral BID PRN Rj Ricci MD        sennosides (SENOKOT) tablet 1-2 tablet  1-2 tablet Oral or Feeding Tube BID Jeny Macedo MD   1 tablet at 04/11/25 0822    sodium chloride (PF) 0.9% PF flush 3 mL  3 mL Intracatheter Q8H HEMALATHA Rj Ricci MD   3 mL at 04/11/25 0546    sodium chloride (PF) 0.9% PF flush 3 mL  3 mL Intracatheter q1 min prn Rj Ricci MD         Current Outpatient Medications   Medication Sig Dispense Refill    acetaminophen (TYLENOL) 32 mg/mL liquid Take 20.313 mLs (650 mg) by mouth every 8 hours. 1800 mL 3    calcium carbonate (OS-DENISE) 500 MG tablet Take 2 tablets (1,000 mg) by mouth 2 times daily. 60 tablet 3    dicyclomine (BENTYL) 10 MG  capsule Take 1 capsule (10 mg) by mouth 4 times daily (before meals and nightly). (Patient taking differently: Take 10 mg by mouth 4 times daily as needed (Patient takes twice daily, and up to 4 times daily as needed if he is not having regular bowel movements.).) 120 capsule 1    fentaNYL (DURAGESIC) 75 mcg/hr 72 hr patch Place 1 patch over 72 hours onto the skin every 72 hours. Replaces 50 MCG/HR patch. Remove old patch when starting new patch. 10 patch 0    HEMP OIL OR EXTRACT OR OTHER CBD CANNABINOID, NOT MEDICAL CANNABIS, Place 7 mg under the tongue daily as needed (Delta-8 hemp extract oil (166.7 mg/mL)). 2 drops (7 mg) under the tongue as needed for pain or anxiety      lidocaine (LIDODERM) 5 % patch Place onto the skin every 24 hours. To prevent lidocaine toxicity, patient should be patch free for 12 hrs daily.      lidocaine-prilocaine (EMLA) 2.5-2.5 % external cream Use 1-2 times a week or as needed prior to port access 30 g 3    lipase-protease-amylase (CREON 24) 51098-30829-808692 units CPEP per EC capsule 2-3 capsules with meals 3 times a day and 1-2 capsules with snacks max of 15 capsules a day 200 capsule 11    loperamide (IMODIUM) 2 MG capsule 2 caps at 1st sign of diarrhea & 1 cap every 2hrs until 12hrs diarrhea free. During night, 2 caps at bedtime & 2 caps every 4hrs until AM 30 capsule 0    LORazepam (ATIVAN) 0.5 MG tablet Take 1-2 tablets (0.5-1 mg) by mouth every 6 hours as needed for muscle spasms. 45 tablet 2    methocarbamol (ROBAXIN) 500 MG tablet TAKE 2 TABLETS BY MOUTH 4 TIMES DAILY AS NEEDED FOR MUSCLE SPASMS.*      morphine (MS CONTIN) 15 MG CR tablet Take 15 mg by mouth every 12 hours.      Multiple Vitamins-Minerals (CENTRUM SILVER 50+MEN) TABS       naloxone (NARCAN) 4 MG/0.1ML nasal spray Spray 1 spray (4 mg) into one nostril alternating nostrils as needed for opioid reversal. every 2-3 minutes until assistance arrives 2 each 1    OLANZapine (ZYPREXA) 5 MG tablet Take 1 tablet (5  "mg) by mouth at bedtime. 30 tablet 3    pantoprazole (PROTONIX) 40 MG EC tablet Take 1 tablet (40 mg) by mouth 2 times daily 60 tablet 3    prochlorperazine (COMPAZINE) 10 MG tablet Take 1 tablet (10 mg) by mouth every 6 hours as needed for nausea or vomiting. 30 tablet 2    vitamin D3 (CHOLECALCIFEROL) 50 mcg (2000 units) tablet Take 1 tablet (50 mcg) by mouth daily. 90 tablet 1    alteplase (CATHFLO ACTIVASE) injection Inject 2 mLs (2 mg) into catheter as needed (catheter occlusion). Reconstitute vial. Draw up alteplase 1 mg/mL in a syringe and instill into IV catheter. Dwell for at least 20 min to 24 hours, then aspirate. May repeat dose once in 24 hours if catheter function not restored after at least 2 hours. Discard remainder of vial. (Patient not taking: Reported on 4/8/2025) [id number] each 0    blood glucose (FREESTYLE TEST STRIPS) test strip 1 strip by In Vitro route 3 times daily. 100 strip 2    Chemo Kit For RN use only. Do not remove items from bag. Contents: 1  sodium chloride 0.9% flush, 4 medium gloves, 1 chemo gown, 1/4 chemo mat, 1 connector female, 1 chemo bag. [id number] kit 0    Continuous Glucose Sensor (DEXCOM G7 SENSOR) MISC Change every 10 days. 6 each 3    diphenhydrAMINE (BENADRYL) 50 MG/ML injection Inject 1 mL (50 mg) over 3-5 minutes into the vein via push as needed for other (infusion reaction). For RN use only.  Draw up in a syringe and administer IV push.  Discard remainder of vial. (Patient not taking: Reported on 4/8/2025) 65078 mL 0    Emergency Supply Kit, Central, Patient use for emergency only. Contents: 3 sodium chloride 0.9% flushes, 1 dressing kit, 1 microclave ext set 14\", 4 nitrile gloves (med), 6 alcohol prep pads, 1 bacitracin, 1 syringe (10 cc 20 G 1\"). Call [phone] to reorder. [id number] kit 0    EPINEPHrine (ANY BX GENERIC EQUIV) 0.3 MG/0.3ML injection 2-pack Inject 0.3 mLs (0.3 mg) into the muscle as needed for anaphylaxis (infusion reaction). Administer into the " mid-thigh in case of severe anaphylaxis (wheezing, throat tightening, mouth swelling, difficulty breathing). May repeat dose one time in 5-15 minutes if symptoms persist. (Patient not taking: Reported on 4/8/2025) [id number] mL 0    Fluorouracil (ADRUCIL) 2,290 mg in sodium chloride 0.9 % 241 mL via HOMEPUMP C-Series Infuse 2,290 mg at 5.2 mL/hr over 46 hours into the vein once for 1 dose. (Patient not taking: Reported on 4/8/2025) [id number] mL 0    LORazepam (ATIVAN) 2 MG/ML (HIGH CONC) oral solution Take 0.25-0.5 mLs (0.5-1 mg) by mouth every 6 hours as needed for anxiety or muscle spasms. 100 mL 2    methylPREDNISolone Na Suc, PF, (SOLU-MEDROL) 125 mg/2 mL injection Inject 2 mLs (125 mg) over 3-5 minutes into the vein via push as needed (infusion reaction). For RN use only.  Reconstitute vial. Draw up methylPREDNISolone in a syringe and administer.  Discard remainder of vial. [id number] mL 0    metoprolol tartrate (LOPRESSOR) 25 MG tablet Take 0.5 tablets (12.5 mg) by mouth 2 times daily. (Patient not taking: Reported on 4/8/2025) 180 tablet 0    morphine sulfate, high concentrate, (ROXANOL-CONCENTRATED) 20 MG/ML concentrated solution Take 0.75-1.5 mLs (15-30 mg) by mouth every 3 hours as needed for breakthrough pain. 240 mL 0    Port Access Kit For nurse use only.  Do not remove items from bag.  Use for port access.  Do not place syringe on sterile field. [id number] kit 0    sodium chloride 0.9% infusion Infuse 250 mLs over 30 minutes into the vein every 28 (twenty-eight) days. zometa concomitant fluids. Given per therapy plan orders 750 mL 2    sodium chloride 0.9% infusion Infuse 500 mLs into the vein as needed for other (infusion reaction). In case of mild reaction, administer via gravity at 20 mL/hr to keep vein open. In case of severe reaction, administer via gravity wide open on prime setting. [id number] mL 0    sodium chloride, PF, 0.9% PF flush Inject 10 mLs into the vein as needed for line flush. Flush IV before and after  med administration as directed and/or at least every 24 hours, or prior to deaccessing for no further use and/or at least every 4 weeks when not accessed. 109459 mL 0    sodium chloride, PF, 0.9% PF flush Inject 10 mLs into the vein as needed for other (infusion reaction). For RN use only as needed for infusion reaction 849855 mL 0    sterile water, preservative free, injection Use 2.2 mLs for reconstitution as needed (with alteplase for catheter occlusion). Direct diluent stream into powder. Mix by gently swirling until dissolved. DO NOT SHAKE. Discard remainder of vial. [id number] mL 0    zoledronic acid (ZOMETA) 4 MG/100ML infusion Infuse 100 mLs (4 mg) into the vein every 28 days. Infuse via gravity. Given per therapy plan orders 300 mL 2        Physical Exam:    /84 (BP Location: Left arm, Patient Position: Fowlers, Cuff Size: Adult Regular)   Pulse 120   Temp 98.4  F (36.9  C) (Oral)   Resp 18   SpO2 99%   Gen: No acute distress  HEENT: EOMI  Chest: equal rise, no audible wheezing, no acute distress  Abd: non distended  Ext: Warm and well perfused. 2+ lower extremity edema  Skin: No cyanosis or petechial lesion over exposed skin  Neuro: Alert and answering questions appropriately. CNII-XII grossly intact. Moving all extremities, bilateral lower extremity peripheral neuropathies      Labs & Studies: I personally reviewed the following studies:  ROUTINE LABS (Last four results):  CMP  Recent Labs   Lab 04/11/25  0602 04/10/25  0803 04/10/25  0148 04/10/25  0028 04/09/25  1148    136  --  135 136   POTASSIUM 3.9 4.2  --  3.9 4.0   CHLORIDE 100 99  --   --  99   CO2 26 24  --   --  25   ANIONGAP 10 13  --   --  12   * 140* 139* 149* 165*   BUN 9.4 12.0  --   --  13.4   CR 0.41* 0.40*  --   --  0.43*   GFRESTIMATED >90 >90  --   --  >90   DENISE 8.5* 8.6*  --   --  8.8   MAG  --  2.0  --   --  1.9   PHOS  --  3.4  --   --  3.5   PROTTOTAL  --  5.9*  5.9*  --   --  6.2*   ALBUMIN  --  2.9*  2.9*   --   --  3.0*   BILITOTAL  --  1.0  1.0  --   --  0.8   ALKPHOS  --  279*  279*  --   --  276*   AST  --  26  26  --   --  25   ALT  --  14  14  --   --  11     CBC  Recent Labs   Lab 04/11/25  0602 04/10/25  0634 04/10/25  0050 04/10/25  0028 04/09/25  1148   WBC 6.9 7.2 6.7  --  7.1   RBC 2.42* 2.52* 2.13*  --  2.21*   HGB 7.1* 7.5*  7.5* 6.4* 6.8* 6.6*   HCT 22.1* 23.9* 20.1* 20* 21.3*   MCV 91 95 94  --  96   MCH 29.3 29.8 30.0  --  29.9   MCHC 32.1 31.4* 31.8  --  31.0*   RDW 17.4* 17.2* 17.6*  --  17.7*   PLT 77* 91* 83*  --  90*     INR  Recent Labs   Lab 04/09/25  1148   INR 2.09*

## 2025-04-12 ENCOUNTER — APPOINTMENT (OUTPATIENT)
Dept: OCCUPATIONAL THERAPY | Facility: CLINIC | Age: 60
End: 2025-04-12
Attending: HOSPITALIST
Payer: COMMERCIAL

## 2025-04-12 ENCOUNTER — APPOINTMENT (OUTPATIENT)
Dept: PHYSICAL THERAPY | Facility: CLINIC | Age: 60
End: 2025-04-12
Attending: HOSPITALIST
Payer: COMMERCIAL

## 2025-04-12 VITALS
TEMPERATURE: 98 F | SYSTOLIC BLOOD PRESSURE: 117 MMHG | OXYGEN SATURATION: 100 % | RESPIRATION RATE: 20 BRPM | DIASTOLIC BLOOD PRESSURE: 75 MMHG | HEART RATE: 116 BPM

## 2025-04-12 LAB
ANION GAP SERPL CALCULATED.3IONS-SCNC: 7 MMOL/L (ref 7–15)
BLD PROD TYP BPU: NORMAL
BLOOD COMPONENT TYPE: NORMAL
BUN SERPL-MCNC: 9.2 MG/DL (ref 6–20)
CALCIUM SERPL-MCNC: 8.4 MG/DL (ref 8.8–10.4)
CHLORIDE SERPL-SCNC: 100 MMOL/L (ref 98–107)
CODING SYSTEM: NORMAL
CREAT SERPL-MCNC: 0.39 MG/DL (ref 0.67–1.17)
CROSSMATCH: NORMAL
EGFRCR SERPLBLD CKD-EPI 2021: >90 ML/MIN/1.73M2
ERYTHROCYTE [DISTWIDTH] IN BLOOD BY AUTOMATED COUNT: 17.6 % (ref 10–15)
GLUCOSE SERPL-MCNC: 158 MG/DL (ref 70–99)
HCO3 SERPL-SCNC: 27 MMOL/L (ref 22–29)
HCT VFR BLD AUTO: 22.1 % (ref 40–53)
HGB BLD-MCNC: 7 G/DL (ref 13.3–17.7)
ISSUE DATE AND TIME: NORMAL
MCH RBC QN AUTO: 30 PG (ref 26.5–33)
MCHC RBC AUTO-ENTMCNC: 31.7 G/DL (ref 31.5–36.5)
MCV RBC AUTO: 95 FL (ref 78–100)
PLATELET # BLD AUTO: 73 10E3/UL (ref 150–450)
POTASSIUM SERPL-SCNC: 4 MMOL/L (ref 3.4–5.3)
RBC # BLD AUTO: 2.33 10E6/UL (ref 4.4–5.9)
SODIUM SERPL-SCNC: 134 MMOL/L (ref 135–145)
UNIT ABO/RH: NORMAL
UNIT NUMBER: NORMAL
UNIT STATUS: NORMAL
UNIT TYPE ISBT: 6200
WBC # BLD AUTO: 6.2 10E3/UL (ref 4–11)

## 2025-04-12 PROCEDURE — 99238 HOSP IP/OBS DSCHRG MGMT 30/<: CPT | Mod: GC | Performed by: STUDENT IN AN ORGANIZED HEALTH CARE EDUCATION/TRAINING PROGRAM

## 2025-04-12 PROCEDURE — 250N000013 HC RX MED GY IP 250 OP 250 PS 637

## 2025-04-12 PROCEDURE — 97530 THERAPEUTIC ACTIVITIES: CPT | Mod: GP

## 2025-04-12 PROCEDURE — 250N000013 HC RX MED GY IP 250 OP 250 PS 637: Performed by: INTERNAL MEDICINE

## 2025-04-12 PROCEDURE — 97161 PT EVAL LOW COMPLEX 20 MIN: CPT | Mod: GP

## 2025-04-12 PROCEDURE — 80048 BASIC METABOLIC PNL TOTAL CA: CPT

## 2025-04-12 PROCEDURE — 250N000011 HC RX IP 250 OP 636: Performed by: STUDENT IN AN ORGANIZED HEALTH CARE EDUCATION/TRAINING PROGRAM

## 2025-04-12 PROCEDURE — P9040 RBC LEUKOREDUCED IRRADIATED: HCPCS | Performed by: STUDENT IN AN ORGANIZED HEALTH CARE EDUCATION/TRAINING PROGRAM

## 2025-04-12 PROCEDURE — 97535 SELF CARE MNGMENT TRAINING: CPT | Mod: GO

## 2025-04-12 PROCEDURE — 250N000013 HC RX MED GY IP 250 OP 250 PS 637: Performed by: HOSPITALIST

## 2025-04-12 PROCEDURE — 250N000011 HC RX IP 250 OP 636

## 2025-04-12 PROCEDURE — 97165 OT EVAL LOW COMPLEX 30 MIN: CPT | Mod: GO

## 2025-04-12 PROCEDURE — 36591 DRAW BLOOD OFF VENOUS DEVICE: CPT

## 2025-04-12 PROCEDURE — 250N000013 HC RX MED GY IP 250 OP 250 PS 637: Performed by: STUDENT IN AN ORGANIZED HEALTH CARE EDUCATION/TRAINING PROGRAM

## 2025-04-12 PROCEDURE — 99233 SBSQ HOSP IP/OBS HIGH 50: CPT | Performed by: FAMILY MEDICINE

## 2025-04-12 PROCEDURE — 250N000011 HC RX IP 250 OP 636: Mod: JZ

## 2025-04-12 PROCEDURE — 85027 COMPLETE CBC AUTOMATED: CPT

## 2025-04-12 RX ORDER — METHOCARBAMOL 500 MG/1
1000 TABLET, FILM COATED ORAL 4 TIMES DAILY PRN
Qty: 180 TABLET | Refills: 3 | Status: SHIPPED | OUTPATIENT
Start: 2025-04-12

## 2025-04-12 RX ORDER — MORPHINE SULFATE 10 MG/5ML
30 SOLUTION ORAL
Qty: 1500 ML | Refills: 0 | Status: SHIPPED | OUTPATIENT
Start: 2025-04-12 | End: 2025-04-12

## 2025-04-12 RX ORDER — HEPARIN SODIUM (PORCINE) LOCK FLUSH IV SOLN 100 UNIT/ML 100 UNIT/ML
5-10 SOLUTION INTRAVENOUS
Status: DISCONTINUED | OUTPATIENT
Start: 2025-04-12 | End: 2025-04-12 | Stop reason: HOSPADM

## 2025-04-12 RX ORDER — FENTANYL 100 UG/1
1 PATCH TRANSDERMAL
Qty: 5 PATCH | Refills: 0 | Status: SHIPPED | OUTPATIENT
Start: 2025-04-14 | End: 2025-04-14

## 2025-04-12 RX ORDER — METOPROLOL TARTRATE 37.5 MG/1
37.5 TABLET ORAL 2 TIMES DAILY
Qty: 60 TABLET | Refills: 5 | Status: SHIPPED | OUTPATIENT
Start: 2025-04-12

## 2025-04-12 RX ORDER — HEPARIN SODIUM,PORCINE 10 UNIT/ML
5-10 VIAL (ML) INTRAVENOUS EVERY 24 HOURS
Status: DISCONTINUED | OUTPATIENT
Start: 2025-04-12 | End: 2025-04-12 | Stop reason: HOSPADM

## 2025-04-12 RX ORDER — METHOCARBAMOL 1000 MG/1
1000 TABLET, FILM COATED ORAL 4 TIMES DAILY PRN
Qty: 180 TABLET | Refills: 3 | Status: SHIPPED | OUTPATIENT
Start: 2025-04-12 | End: 2025-04-12

## 2025-04-12 RX ORDER — MORPHINE SULFATE 10 MG/5ML
30 SOLUTION ORAL
Qty: 500 ML | Refills: 0 | Status: SHIPPED | OUTPATIENT
Start: 2025-04-12 | End: 2025-04-16

## 2025-04-12 RX ORDER — HEPARIN SODIUM,PORCINE 10 UNIT/ML
5-10 VIAL (ML) INTRAVENOUS
Status: DISCONTINUED | OUTPATIENT
Start: 2025-04-12 | End: 2025-04-12 | Stop reason: HOSPADM

## 2025-04-12 RX ADMIN — PANCRELIPASE 3 CAPSULE: 120000; 24000; 76000 CAPSULE, DELAYED RELEASE PELLETS ORAL at 11:35

## 2025-04-12 RX ADMIN — PROCHLORPERAZINE EDISYLATE 10 MG: 5 INJECTION INTRAMUSCULAR; INTRAVENOUS at 04:34

## 2025-04-12 RX ADMIN — PROCHLORPERAZINE EDISYLATE 10 MG: 5 INJECTION INTRAMUSCULAR; INTRAVENOUS at 13:46

## 2025-04-12 RX ADMIN — MORPHINE SULFATE 30 MG: 10 SOLUTION ORAL at 04:35

## 2025-04-12 RX ADMIN — Medication 37.5 MG: at 08:46

## 2025-04-12 RX ADMIN — LORAZEPAM 1 MG: 0.5 TABLET ORAL at 10:02

## 2025-04-12 RX ADMIN — HYDROMORPHONE HYDROCHLORIDE 0.5 MG: 1 INJECTION, SOLUTION INTRAMUSCULAR; INTRAVENOUS; SUBCUTANEOUS at 02:54

## 2025-04-12 RX ADMIN — HYDROMORPHONE HYDROCHLORIDE 0.5 MG: 1 INJECTION, SOLUTION INTRAMUSCULAR; INTRAVENOUS; SUBCUTANEOUS at 11:36

## 2025-04-12 RX ADMIN — SENNOSIDES 1 TABLET: 8.6 TABLET, FILM COATED ORAL at 08:41

## 2025-04-12 RX ADMIN — PANCRELIPASE 2 CAPSULE: 120000; 24000; 76000 CAPSULE, DELAYED RELEASE PELLETS ORAL at 08:43

## 2025-04-12 RX ADMIN — LIDOCAINE 4% 1 PATCH: 40 PATCH TOPICAL at 08:42

## 2025-04-12 RX ADMIN — HYDROMORPHONE HYDROCHLORIDE 0.5 MG: 1 INJECTION, SOLUTION INTRAMUSCULAR; INTRAVENOUS; SUBCUTANEOUS at 00:17

## 2025-04-12 RX ADMIN — METHOCARBAMOL 1000 MG: 500 TABLET ORAL at 13:46

## 2025-04-12 RX ADMIN — DICYCLOMINE HYDROCHLORIDE 10 MG: 10 CAPSULE ORAL at 11:35

## 2025-04-12 RX ADMIN — CALCIUM 1000 MG: 500 TABLET ORAL at 08:41

## 2025-04-12 RX ADMIN — HEPARIN 5 ML: 100 SYRINGE at 15:43

## 2025-04-12 RX ADMIN — ACETAMINOPHEN 650 MG: 325 TABLET, FILM COATED ORAL at 13:46

## 2025-04-12 RX ADMIN — PANTOPRAZOLE SODIUM 40 MG: 40 TABLET, DELAYED RELEASE ORAL at 08:41

## 2025-04-12 RX ADMIN — HYDROMORPHONE HYDROCHLORIDE 0.5 MG: 1 INJECTION, SOLUTION INTRAMUSCULAR; INTRAVENOUS; SUBCUTANEOUS at 06:21

## 2025-04-12 RX ADMIN — DICYCLOMINE HYDROCHLORIDE 10 MG: 10 CAPSULE ORAL at 08:42

## 2025-04-12 RX ADMIN — ACETAMINOPHEN 650 MG: 325 TABLET, FILM COATED ORAL at 08:41

## 2025-04-12 RX ADMIN — HYDROMORPHONE HYDROCHLORIDE 0.5 MG: 1 INJECTION, SOLUTION INTRAMUSCULAR; INTRAVENOUS; SUBCUTANEOUS at 08:43

## 2025-04-12 ASSESSMENT — ACTIVITIES OF DAILY LIVING (ADL)
ADLS_ACUITY_SCORE: 66
ADLS_ACUITY_SCORE: 66
ADLS_ACUITY_SCORE: 62
ADLS_ACUITY_SCORE: 66
ADLS_ACUITY_SCORE: 68
ADLS_ACUITY_SCORE: 68
ADLS_ACUITY_SCORE: 62
ADLS_ACUITY_SCORE: 62
ADLS_ACUITY_SCORE: 68
ADLS_ACUITY_SCORE: 66
ADLS_ACUITY_SCORE: 62
ADLS_ACUITY_SCORE: 66
ADLS_ACUITY_SCORE: 62
ADLS_ACUITY_SCORE: 68
ADLS_ACUITY_SCORE: 68
ADLS_ACUITY_SCORE: 62

## 2025-04-12 NOTE — PROGRESS NOTES
"   04/12/25 1600   Appointment Info   Signing Clinician's Name / Credentials (OT) Venessa Kj, OTD, OTR/L   Living Environment   People in Home spouse;child(merry), adult   Current Living Arrangements house   Home Accessibility stairs to enter home   Number of Stairs, Main Entrance 2   Stair Railings, Main Entrance other (see comments)   Transportation Anticipated family or friend will provide   Living Environment Comments Lives with spouse and 2 adult children in a house. 2 stairs down to enter with 1 railing.   Self-Care   Equipment Currently Used at Home shower chair;grab bar, tub/shower;grab bar, toilet;raised toilet seat;walker, rolling   Fall history within last six months yes   Number of times patient has fallen within last six months 1   Activity/Exercise/Self-Care Comment Spouse assists with all mobility and ADLs at baseline. Sees HH PT 1x/wk. Ambulates short distances with 4WW.   General Information   Onset of Illness/Injury or Date of Surgery 04/09/25   Referring Physician Rj Ricci MD   Additional Occupational Profile Info/Pertinent History of Current Problem \"Gallo Navarro is a 59 year old male admitted on 4/9/2025. He has a history of metastatic pancreatic cancer on active treatment with 5FU/Nal-IRI and IDDM and is admitted for weakness, failure-to-thrive, and worsening chronic cancer related pain over the last 3 weeks. Patient was assessed for reversible causes of FTT and weakness, including infection and cardiac etiology. Work-up was overall unremarkable.\"   Existing Precautions/Restrictions fall;spinal   Left Upper Extremity (Weight-bearing Status)   (<10lbs)   Right Upper Extremity (Weight-bearing Status)   (<10lbs)   Left Lower Extremity (Weight-bearing Status) full weight-bearing (FWB)   Right Lower Extremity (Weight-bearing Status) full weight-bearing (FWB)   Cognitive Status Examination   Orientation Status orientation to person, place and time   Range of Motion " Comprehensive   General Range of Motion no range of motion deficits identified   Strength Comprehensive (MMT)   Comment, General Manual Muscle Testing (MMT) Assessment see PT note- appears generally deconditioned   Bed Mobility   Comment (Bed Mobility) not witnessed   Transfers   Transfer Comments not witnessed   Activities of Daily Living   BADL Assessment/Intervention toileting   Toileting   Comment, (Toileting) anticipate min A per clinical judgement   Clinical Impression   Criteria for Skilled Therapeutic Interventions Met (OT) Yes, treatment indicated   OT Diagnosis dec IND with ADLs, family training to assist with maintenence of IND and DME recommendations   OT Problem List-Impairments impacting ADL problems related to;activity tolerance impaired;strength   Assessment of Occupational Performance 1-3 Performance Deficits   Identified Performance Deficits toileting, functional mobility, dressing   Planned Therapy Interventions (OT) ADL retraining;home program guidelines;strengthening   Clinical Decision Making Complexity (OT) problem focused assessment/low complexity   Risk & Benefits of therapy have been explained evaluation/treatment results reviewed;care plan/treatment goals reviewed;risks/benefits reviewed;current/potential barriers reviewed;participants voiced agreement with care plan;participants included;spouse/significant other   Clinical Impression Comments pt with limited tolerance for therapy, all mobility and transfer goals addressed by PT this date, refer to eval for further information.   OT Total Evaluation Time   OT Eval, Low Complexity Minutes (69743) 4   OT Goals   Therapy Frequency (OT) One time eval and treatment   OT Predicted Duration/Target Date for Goal Attainment 04/12/25   OT Goals OT Goal 1   OT: Goal 1 Family will demonstrate understanding of DME recommendations and options for maintaining IND.  (goal met)   OT Discharge Planning   OT Plan 1x eval/treat   OT Discharge Recommendation  (DC Rec) home with assist   OT Rationale for DC Rec pt appears safe to d/c home with assist and HHPT services. presents below baseline but has excellent support and DME available.   Total Session Time   Timed Code Treatment Minutes 16   Total Session Time (sum of timed and untimed services) 20     Occupational Therapy Discharge Summary    Reason for therapy discharge:    Discharged to home with home therapy.    Progress towards therapy goal(s). See goals on Care Plan in [facility] electronic health record for goal details.  Goals met    Therapy recommendation(s):    Pt will benefit from ongoing HHPT services to progress strength and IND. Will also benefit from assist as needed from spouse, provided education on DME options to meet needs.

## 2025-04-12 NOTE — PROGRESS NOTES
"PALLIATIVE CARE PROGRESS NOTE  [facility]     Patient Name: Gallo Navarro  Date of Admission: 4/9/2025        Recommendations & Counseling     What's New Today:  I saw Gallo today in followup after his Fentanyl patch was increased yesterday from 75 mcg to 100 FPC/hour.  His prn MSIR was increased as well yesterday from 15 mg q 3 prn to 30 mg q 3 prn. Fentanyl patch may not yet have achieved full effect, but Gallo (and his wife at the bedside) tell me \"the pain is coming down, it's somewhat better today\".    At the same time, he is continuing to use frequent as needed doses of IV Dilaudid 0.5 mg (6 doses in the past 24 hours).  He has used 5 doses of MS IR 30 mg in the past 24 hours.  Thus, all things considered, will not make any change to pain regimen today (patient and wife agree with this), and we will see how he is doing by tomorrow.  We note that he tried to do an MRI yesterday for disease staging but was unable to tolerate this due to pain.  He is scheduled for an MRI under general anesthesia in the upcoming several days.    GOALS OF CARE:   Life-prolonging with limits   DNR/DNR  Violet describes herself as a planner and therefore would like to have an informational only with hospice so that if hospice services are needed, she will be able to understand what that would look like at home.  Palliative care outpatient provider will make that referral once Gallo is discharged.  Gallo and Violet's main focus currently is to try to feel as good as possible with pain well-controlled and be able to enjoy time with family and friends this summer.  At times, Gallo seems to think that cancer directed treatment will improve his pain but Violet is able to clarify for him that treating the pain with medications and possibly radiation is not treating the cancer and she also reminds Gallo that cancer directed treatments, cause him to feel weaker and out of it and not able to have a " good quality of life.  They do want to hear from oncology to learn if there are any treatments that can help him feel better.    ADVANCE CARE PLANNING:  No health care directive on file. Per system policy, Surrogate Decision-makers for Patients With Diminished Decision-making Capacity offers guidance on possible decision-makers.  Wife, Violet has been identified as a surrogate decision maker.   New POLST form completed today.   Code status: No CPR- Do NOT Intubate    MEDICAL MANAGEMENT:     Acute on chronic back pain due to metastases and compression fractures as well as right shoulder pain most likely due to metastatic lesion.  Pain has not been well-controlled recently despite increasing fentanyl dose.   Goal of pain control is to help pain be well enough controlled that Gallo can enjoy being surrounded by family and friends and engaged with people around him rather than being sedated and not able to enjoy his quality of life.  Continue  fentanyl 100 mcg q72 hrs   (note that patient was on Butrans patches and hydromorphone without pain control so therefore Butrans was discontinued and fentanyl started on 3/26/2025.  Fentanyl slowly increased with last increase to 100 mcg 4/11) -please give patient one month prescription at discharge  -Continue morphine 30 mg every 3 hours if needed -please give 2-week supply at discharge  - Continue scheduled Tylenol 650 mg TID  - Continue Robaxin 1000 mg QID PRN   - Continue lidocaine patch to back and shoulder  - Appreciate radiation oncology consult to evaluate if radiation to shoulder and femur could help decrease pain  - Consider addition of a steroid burst for bone pain depending on radiation oncology decision.  - Could also consider addition of Lyrica for nonopioid analgesia.  Will not add this to patient's regimen today but could consider as an outpatient.  Patient with a history of bad emotional reaction to gabapentin.    Muscle stiffness: Patient has not been complaining  of muscle stiffness during this hospitalization but was complaining of it prior to hospitalization.  - Continue Robaxin as needed    Opioid-induced constipation prevention: Stool seen throughout the colon on CT scan  - Senna 1-2 tabs twice daily scheduled   - MiraLAX daily scheduled    Anorexia and weight loss:  -Continue olanzapine 5 mg at bedtime    Anxiety:  - Continue lorazepam 0.5-1 mg as needed.  Ideally patient should just take at night and this was discussed with him and his wife.  - Consider olanzapine 2.5mg BID PRN to help with anxiety    PSYCHOSOCIAL/SPIRITUAL:  Family wife, Violet, is his main support.  He is very close with his daughter and son and states he has many friends who support him.    Palliative Care will continue to follow. Thank you for the consult and allowing us to aid in the care of Gallo Navarro.    Mingo Barton MD  Palliative Medicine Consult Team  Page via [employer]  TT: 36 minutes, with > 50% spent in C/C/E patient/family/care teams re: GOC, POC, Sx management. [id number]         Assessment:           Gallo Navarro is a 59 year old male with a past medical history of non-resectable pancreatic cancer and mets to T/L spine  who presented on 4/10 with acute on chronic pain and failure to thrive.      Today patient was seen for:  Metastatic pancreatic cancer  L4 me s/p vertebroplasty and RFA   Cancer associated pain  Right shoulder pain  Failure to thrive  Intermittent rapid ventricular tachycardia  Support  Palliative care encounter           Interval History:     Multidisciplinary collaboration:  All notes reviewed including nursing notes, nutrition, medicine, oncology  Discussed case with primary medicine team as well as oncology team    I reviewed this patient's medication profile and medications during this hospitalization.  Notable medications:  Tylenol 650mg TID   Bentyl 10mg QID  Fentanyl patch 100 mcg q72 hrs  Lidocaine patch     Hydromorphone 0.5mg q2hrs PRN x5 x 24 hrs  MSIR solution  30mg q3hrs PRN - 150mg x 24hrs  Robaxin 1000mg QID PRN     Lorazepam 0.5-1mg q6hrs PRN - 1.5mg x 24 hrs  Creon   Olanzapine 5mg at bedtime    Senna 1-2 tabs BID - not taking  Miralax every day - not taking          Review of Systems:     Besides above, ROS was reviewed and is unremarkable              Physical Exam:   Blood pressure 104/71, pulse 103, temperature 98  F (36.7  C), temperature source Oral, resp. rate 17, SpO2 100%.     Gen: Alert, lying in bed, cachectic, frail-appearing  No increased work of breathing, NAD,   Lying fairly still in the bed as movements cause pain, engaged, pleasant, awakens easily to voice, no significant sedation.               Current Problem List:   Active Problems:    Anemia, unspecified type      No Known Allergies         Data Reviewed:     Reviewed recent labs and pertinent imaging  ROUTINE ICU LABS (Last four results)  CMP  Recent Labs   Lab 04/12/25  0619 04/11/25  0602 04/10/25  0803 04/10/25  0148 04/10/25  0028 04/09/25  1148   * 136 136  --  135 136   POTASSIUM 4.0 3.9 4.2  --  3.9 4.0   CHLORIDE 100 100 99  --   --  99   CO2 27 26 24  --   --  25   ANIONGAP 7 10 13  --   --  12   * 142* 140* 139* 149* 165*   BUN 9.2 9.4 12.0  --   --  13.4   CR 0.39* 0.41* 0.40*  --   --  0.43*   GFRESTIMATED >90 >90 >90  --   --  >90   DENISE 8.4* 8.5* 8.6*  --   --  8.8   MAG  --   --  2.0  --   --  1.9   PHOS  --   --  3.4  --   --  3.5   PROTTOTAL  --   --  5.9*  5.9*  --   --  6.2*   ALBUMIN  --   --  2.9*  2.9*  --   --  3.0*   BILITOTAL  --   --  1.0  1.0  --   --  0.8   ALKPHOS  --   --  279*  279*  --   --  276*   AST  --   --  26  26  --   --  25   ALT  --   --  14  14  --   --  11     CBC  Recent Labs   Lab 04/12/25  0619 04/11/25  1638 04/11/25  0602 04/10/25  0634 04/10/25  0050   WBC 6.2  --  6.9 7.2 6.7   RBC 2.33*  --  2.42* 2.52* 2.13*   HGB 7.0* 7.2* 7.1* 7.5*  7.5* 6.4*   HCT 22.1* 23.3* 22.1* 23.9* 20.1*   MCV 95  --  91 95 94   MCH 30.0  --  29.3  29.8 30.0   MCHC 31.7  --  32.1 31.4* 31.8   RDW 17.6*  --  17.4* 17.2* 17.6*   PLT 73*  --  77* 91* 83*     INR  Recent Labs   Lab 04/09/25  1148   INR 2.09*     Arterial Blood GasNo lab results found in last 7 days.    Right shoulder xray:  IMPRESSION: No acute fracture or dislocation. Joint spaces appear intact. Lytic/sclerotic lesions seen in the humeral neck and possibly in the proximal humeral shaft, indeterminate but likely metastatic. Sclerotic lesions of the mid clavicle, inferior   glenoid are also suspicious for metastatic disease. No acute pathologic fractures identified.    Echocardiogram 4/10:  Interpretation Summary  The visual ejection fraction is 65-70%.  The right ventricle is hyperdynamic.  The right ventricular systolic pressure is 18mmHg above the right atrial  pressure.  The inferior vena cava was normal in size with preserved respiratory  variability. No pericardial effusion is present.     Compared to previous study on 6/6/2024, no significant change         Medical Decision Making       MANAGEMENT DISCUSSED with the following over the past 24 hours: primary medicine team, oncology team, bedside RN   NOTE(S)/MEDICAL RECORDS REVIEWED over the past 24 hours: nursing, medicine, oncology, radiation onc, pharmacy  Tests personally interpreted in the past 24 hours:  - echocardiogram showing no sig change from previous 6/2024  Tests REVIEWED in the past 24 hours:  - BMP  - CBC  - Coags/INR  - Spouse or significant other        Chart documentation was completed using Dragon voice-recognition software. Even though reviewed, some grammatical, spelling, and word errors may remain.

## 2025-04-12 NOTE — PROGRESS NOTES
ED PT Evaluation:      04/12/25 1044   Appointment Info   Signing Clinician's Name / Credentials (PT) Ritesh Schuster, PT, DPT   Living Environment   People in Home spouse;child(merry), adult   Current Living Arrangements house   Home Accessibility stairs to enter home   Number of Stairs, Main Entrance 2   Stair Railings, Main Entrance other (see comments)   Transportation Anticipated family or friend will provide   Living Environment Comments Lives with spouse and 2 adult children in a house. 2 stairs down to enter with 1 railing.   Self-Care   Equipment Currently Used at Home shower chair;grab bar, tub/shower;grab bar, toilet;raised toilet seat;walker, rolling   Fall history within last six months yes   Number of times patient has fallen within last six months 1   Activity/Exercise/Self-Care Comment Spouse assists with all mobility and ADLs at baseline. Sees HH PT 1x/wk. Ambulates short distances with 4WW.   General Information   Onset of Illness/Injury or Date of Surgery 04/09/25   Referring Physician Rj Ricci MD   Patient/Family Therapy Goals Statement (PT) Return home   Pertinent History of Current Problem (include personal factors and/or comorbidities that impact the POC) Per EMR: Gallo Navarro is a 59 year old male admitted on 4/9/2025. He has a history of metastatic pancreatic cancer on active treatment with 5FU/Nal-IRI and IDDM and is admitted for weakness, failure-to-thrive, and worsening chronic cancer related pain over the last 3 weeks   Existing Precautions/Restrictions fall   General Observations pt up in chair with spouse in room, agreeable to session.   Cognition   Affect/Mental Status (Cognition) WFL   Cognitive Status Comments gets overwhelmed with too much stimulus   Posture    Posture Forward head position   Range of Motion (ROM)   Range of Motion ROM is WFL   Strength (Manual Muscle Testing)   Strength (Manual Muscle Testing) Deficits observed during functional mobility   Strength  Comments Pt and spouse reporting progressive deconditioning PTA   Clinical Impression   Criteria for Skilled Therapeutic Intervention Yes, treatment indicated   PT Diagnosis (PT) Impaired functional mobility   Influenced by the following impairments pain, deconditioning   Functional limitations due to impairments stairs, gait, balance, activity tolerance   Clinical Presentation (PT Evaluation Complexity) stable   Clinical Presentation Rationale Clinical judgment   Clinical Decision Making (Complexity) low complexity   Planned Therapy Interventions (PT) balance training;gait training;home exercise program;patient/family education;stair training;strengthening;progressive activity/exercise;risk factor education;home program guidelines   Risk & Benefits of therapy have been explained evaluation/treatment results reviewed;care plan/treatment goals reviewed;risks/benefits reviewed;current/potential barriers reviewed;participants voiced agreement with care plan;participants included;patient;spouse/significant other   PT Total Evaluation Time   PT Eval, Low Complexity Minutes (69946) 6   Physical Therapy Goals   PT Frequency One time eval and treatment only   PT Predicted Duration/Target Date for Goal Attainment 04/12/25   PT Goals Gait;Stairs;PT Goal 1;Transfers;PT Goal 2   PT: Transfers Minimal assist;Sit to/from stand;Assistive device   PT: Gait Minimal assist;10 feet;Assistive device;Rolling walker   PT: Stairs Modified independent;2 stairs  (railings per home setup)   PT: Goal 1 Spouse able to demo proper donning/doffing of GB   PT: Goal 2 Spouse able to demo safe guarding for stairs   PT Discharge Planning   PT Plan transfers and gait with 4WW, continue with family training for stairs   PT Discharge Recommendation (DC Rec) home with assist;home with home care physical therapy   PT Rationale for DC Rec Patient below baseline 2/2 progressive deconditioning. Has excellent support and all DME needs met at home. Anticipate  will be able to return home with continued assist from family and resumption of  PT when medically ready.   PT Brief overview of current status Ax1 transfers   PT Total Distance Amb During Session (feet) 0   Physical Therapy Time and Intention   Total Session Time (sum of timed and untimed services) 6

## 2025-04-12 NOTE — PLAN OF CARE
Goal Outcome Evaluation:      Plan of Care Reviewed With: patient, spouse    Overall Patient Progress: no changeOverall Patient Progress: no change    Outcome Evaluation: pt attempted MRI but unable to tolerate d/t pain    Pt alert, oriented. ST on tele, adequate O2 sats on RA. Appetite appropriate, using creon with meals. Voiding in urinal, no BM this shift. Ongoing pain in Bilat shoulders, R>L, and back, especially lower back. Some bony prominences in midback covered with protective mepilex.     Pt attempted MRI this evening, but unable to tolerate r/t pain. Pt was given PO morphine one hour prior to scan and had been given ativan approx 2 hours prior to scan. Pt was taken to MRI by resource nurse and dose of 0.5mg IV dilaudid was given during scan. Pt was repositioned for comfort, but was still unable to tolerate positioning for scan. Per the resource nurse, primary team was notified. Wife, Abigail updated. Will discuss with patient tomorrow to determine plan.    Continue to address pain and continue plan of care.

## 2025-04-12 NOTE — DISCHARGE SUMMARY
Patient discharged home accompanied by his wife at 1602, discharge instructions given, patient understood his discharge instructions and follow up appointments, port flushed and removed with no issues, VS stable.

## 2025-04-12 NOTE — PROGRESS NOTES
Brief Oncology Fellow Note:    Please see Dr. Newman's attestation from 4/11/25 for further details.    Spoke with Gallo and wife Violet yesterday and again this morning regarding his goals of care and the big picture regarding his cancer.    Gallo has come to terms with his metastatic pancreatic cancer and understands that further chemotherapy likely would cause more harm than benefit. He understands that he will likely move to a hospice/comfort focus at some point in the next few months.    However, Gallo is not ready to transition to hospice yet at this point. His goal is to live through his college-aged son's graduation from [employer] on 5/9/25 and to be there to cheer him on as he walks across the stage. He requests to continue weekly blood transfusions and his current level of care until that time.    Plan:  -stable to discharge home from Oncology perspective when deemed ready by primary team  -continue outpatient weekly transfusions with goal of living through sons college graduation 5/9/25  -Oncology follow-up appt 4/16/25    Keegan Faye MD  Hematology/Oncology/BMT Fellow PGY4  Pager: [phone]

## 2025-04-12 NOTE — DISCHARGE SUMMARY
[facility]  Discharge Summary - Medicine & Pediatrics       Date of Admission:  4/9/2025  Date of Discharge:  4/12/2025  Discharging Provider: Rajesh Sanderson MD    Discharge Service: Medicine Service, GELY TEAM 2    Discharge Diagnoses   # Metastatic pancreas cancer, on active treatment with 5-FU /Nal-IRI   # Failure to thrive  # Weakness in lower extremities, worsening  # Episode of SVT, stable   # Non-sustained SVTs on recent Zio  # Chronic cancer related pain, worsening  # Sclerotic metastatic lesions, L2-4  # Muscle spasms   # Pneumobilia   # Hx of biliary stent placement   # History of pancreatitis related to malignancy  # Pancreatic insufficiency  # Acute on chronic normocytic anemia  # DM Type 3c  # Hemorrhoids           Clinically Significant Risk Factors     # DMII: A1C = 7.2 % (Ref range: <5.7 %) within past 6 months  # Moderate Malnutrition: based on nutrition assessment and treatment provided per dietitian's recommendations.      Follow-ups Needed After Discharge   Follow up cancer related pain  Follow up MRI scan ordered at discharge to see if palliative radiotherapy possible     Unresulted Labs Ordered in the Past 30 Days of this Admission       Date and Time Order Name Status Description    4/10/2025 12:36 AM Prepare red blood cells (unit) Preliminary     4/9/2025 10:31 PM Blood Culture Peripheral Blood Preliminary     4/9/2025 10:31 PM Blood Culture Peripheral Blood Preliminary     3/26/2025  7:05 AM PREPARE RED BLOOD CELLS (UNIT) Preliminary         These results will be followed up by Oncology follow up on 4/16/2025    Discharge Disposition   Discharged to home  Condition at discharge: Stable    Hospital Course   Gallo Navarro is a 59 year old male admitted on 4/9/2025. He has a history of metastatic pancreatic cancer on active treatment with 5FU/Nal-IRI and IDDM and is admitted for weakness, failure-to-thrive, and worsening chronic cancer related pain  over the last 3 weeks. Patient was assessed for reversible causes of FTT and weakness, including infection and cardiac etiology. Work-up was overall unremarkable. Pain medications were adjusted with palliative care and patient improved mldly. Patient's care was discussed with radiation oncology and oncology  Discussed possible palliative radiotherapy to known mets to the L-spine. MRI was limited inpatient due to chronic pain. Opted to discharge home with outpatient MRI. Will need coordination for anesthesia to provide sedation during scan due to ongoing cancer related pain from mets to the L-Spine.         # Metastatic pancreas cancer, on active treatment with 5-FU /Nal-IRI   # Failure to thrive  # Weakness in lower extremities, worsening  History of metastatic pancreatic cancer first diagnosed in 7/12/2023.  Is on active treatment with palliative intent.  Is currently on 5-FU/Nal-irinotecan.  Ruled out cardiac infectious etiologies that might be contributing to patient's weakness.  Suspect patient's overall decline in function is related to metastatic cancer and chemotherapy.  This is already discussed at outpatient oncology appointments.  Patient's chemotherapy was recently dose reduced.  Will have scheduled follow-up on 4/16.    Radiation oncology was consulted this admission, along with oncology, and palliative care.  Plan at discharge is to reassess patient's mets to the spine with an MRI as an outpatient.  Patient may be a candidate for palliative radiotherapy with radiation oncology.  Patient's MRI inpatient was limited due to lower back pain.  Patient will likely require coordination with anesthesia for outpatient MRI, so that he may receive sedation to tolerate the scan. Given patient's improvement in pain, pt opted to discharge home with outpatient scan.     -Outpatient MRI ordered at discharge  -Oncology follow-up scheduled on 4/16  -Recently changed code-status to DNR/DNI   -Palliative care consulted to  assist with pain management during admission  -Onc, Rad Onc consulted to assist with treatment planning during admission  -Outpatient MRI of T and L-spine ordered at discharge.   > Will require coordination with anesthesia for sedation during scan.       # Episode of SVT, stable   # Non-sustained SVTs on recent Zio   Had episode of SVT with rates into the 180s on 4/10.  Improved with fluids and IV metoprolol 5 mg x 1.  Patient was started on metoprolol 12.5 mg and titrated up to 37.5 mg twice daily at discharge.  Patient was prescribed this prior to admission but was not taking.  TTE showed no changes to EF or structural concerns.  -Metoprolol 37.5mg BID at discharge, increased from 12.5mg BID at admission     # Chronic cancer related pain, worsening  # Sclerotic metastatic lesions, L2-4  # Muscle spasms   Has chronic cancer associated pain.  Imaging with CT chest abdomen pelvis during admission was notable again for sclerotic lesions of L2/4 that is likely causing chronic back pain.  Patient's fentanyl was increased from 75 mcg to 100 mcg this admission.  Morphine was increased from 15 to 30 mg every 3 hours as needed.  Patient's methocarbamol was increased to 1000 mg every 6 hours.  Patient was also started on a lorazepam solution.  Patient's pain appears improved by discharge.  Palliative care was consulted this admission to assist with medication changes.  -Continue oncology follow-up as scheduled on 4/16  -Continue PTA Robaxin PRN  -Continue fentanyl increased from 75 mcg to 100 mcg  -Continue morphine increased from 15 mg to 30 mg every 3 hours as needed  -Continue PTA Lorazepam   -Continue Methacarbamol PRN 1000 mg every 6 hours for muscle spasms  -Scheduled APAP 650 TID      # Pneumobilia   # Hx of biliary stent placement   Monitored off antibiotics. Suspected pneumobilia a result of prior biliary stent placement.  Did not have any abdominal tenderness, vital sign changes, or lab findings to suggest biliary  infection.  Blood cultures have been unremarkable.  Low suspicion there is a occult infection that is contributing to patient's weakness.  Condition improved with supportive cares throughout admission.  -Attention on follow-up  -Continue PTA bentyl at discharge     # History of pancreatitis related to malignancy  # Pancreatic insufficiency  Patient has noted history of pancreatic insufficiency in the setting of chronic pancreatitis   Related to pancreatic malignancy.  Patient has been getting Creon PTA.  Will continue this during admission.  -Continue PTA creon   -As above consult nutrition     # Acute on chronic normocytic anemia  Admission hemoglobin was 6.6.  Received 1 unit of PRBCs this admission.  Hemoglobin improved to 7.5.  Required no further transfusions.  Suspect anemia of chronic disease in setting of malignancy and chemotherapy.  -S/p 1u pRBC on 4/10  -Recommend CBC at follow-up     # DM Type 3c  Has received NPH insulin in the past.  Did not require insulin this admission.  Was not admitted with insulin.  Blood sugars well-controlled this admission     # Hemorrhoids   No concerns for GIB this admission   -Continue Preparation H TID         Consultations This Hospital Stay   PHYSICAL THERAPY ADULT IP CONSULT  OCCUPATIONAL THERAPY ADULT IP CONSULT  PALLIATIVE CARE ADULT IP CONSULT  MEDICATION HISTORY IP PHARMACY CONSULT  NUTRITION SERVICES ADULT IP CONSULT  CARE MANAGEMENT / SOCIAL WORK IP CONSULT  ONCOLOGY ADULT IP CONSULT  RADIATION ONCOLOGY IP CONSULT  IP PALLIATIVE CARE SOCIAL WORK ADULT CONSULT    Code Status   No CPR- Do NOT Intubate       The patient was discussed with Dr. John TROY MD  [address] EMERGENCY DEPARTMENT  [address] [facility] [address]  Phone: [phone]  ______________________________________________________________________    Physical Exam   Vital Signs: Temp: 98.1  F (36.7  C) Temp src: Oral BP: 96/60 Pulse: 101   Resp: 17 SpO2: 100 %  O2 Device: None (Room air)    Weight: 0 lbs 0 oz  General Appearance:  Appears uncomfortable, sitting upright in bed  Respiratory: Clear to auscultation bilaterally, no wheezing or crackles  Cardiovascular: Tachycardic rate, regular rhythm, occasional skipped beats, no obvious rub or murmurs  GI: Nontender to palpation.  No obvious masses felt.  Skin: No rashes on gross exam.  MSK:   Patient is able to move all extremities spontaneously, but limited ROM due to pain in lower extremities bilaterally.  Moves toes spontaneously.  Neuropathy in bilateral feet      Primary Care Physician   Akil Hernandez    Discharge Orders      MR Lumbar Spine w/o Contrast    eval metastatic cancer for radiation treatment. This should be a sedated MRI     MR Sacroilliac Joints wwo Contrast    Administration of IV contrast (contrast agent, dose, and amount) will be tailored to this examination per the appropriate written protocol listed in the Protocol E-Book, or by the interpreting provider. If you do not want your order altered by the radiologist, please state that in the order comments.    eval metastatic cancer for radiation treatment. This should be a sedated MRI       Significant Results and Procedures   Most Recent 3 CBC's:  Recent Labs   Lab Test 04/12/25  0619 04/11/25  1638 04/11/25  0602 04/10/25  0634   WBC 6.2  --  6.9 7.2   HGB 7.0* 7.2* 7.1* 7.5*  7.5*   MCV 95  --  91 95   PLT 73*  --  77* 91*     Most Recent 3 BMP's:  Recent Labs   Lab Test 04/12/25  0619 04/11/25  0602 04/10/25  0803   * 136 136   POTASSIUM 4.0 3.9 4.2   CHLORIDE 100 100 99   CO2 27 26 24   BUN 9.2 9.4 12.0   CR 0.39* 0.41* 0.40*   ANIONGAP 7 10 13   DENISE 8.4* 8.5* 8.6*   * 142* 140*     Most Recent 3 INR's:  Recent Labs   Lab Test 04/09/25  1148 01/10/25  1014 08/28/24  0859   INR 2.09* 1.61* 1.2*       Discharge Medications   Current Discharge Medication List        START taking these medications    Details   fentaNYL (DURAGESIC) 100  mcg/hr 72 hr patch Place 1 patch over 72 hours onto the skin every 72 hours for 14 days. remove old patch.  Qty: 5 patch, Refills: 0    Associated Diagnoses: Cancer associated pain      !! naloxone (NARCAN) 4 MG/0.1ML nasal spray Spray 1 spray (4 mg) into one nostril alternating nostrils as needed for opioid reversal. every 2-3 minutes until assistance arrives  Qty: 2 each, Refills: 0    Associated Diagnoses: Cancer associated pain       !! - Potential duplicate medications found. Please discuss with provider.        CONTINUE these medications which have CHANGED    Details   methocarbamol (ROBAXIN) 500 MG tablet Take 2 tablets (1,000 mg) by mouth 4 times daily as needed for muscle spasms.  Qty: 180 tablet, Refills: 3    Associated Diagnoses: Cancer associated pain      metoprolol tartrate 37.5 MG TABS Take 37.5 mg by mouth 2 times daily.  Qty: 60 tablet, Refills: 5    Associated Diagnoses: SVT (supraventricular tachycardia)      morphine 10 MG/5ML solution Take 15 mLs (30 mg) by mouth every 3 hours as needed for severe pain.  Qty: 500 mL, Refills: 0    Associated Diagnoses: Cancer associated pain           CONTINUE these medications which have NOT CHANGED    Details   acetaminophen (TYLENOL) 32 mg/mL liquid Take 20.313 mLs (650 mg) by mouth every 8 hours.  Qty: 1800 mL, Refills: 3    Associated Diagnoses: Lower abdominal pain      alteplase (CATHFLO ACTIVASE) injection Inject 2 mLs (2 mg) into catheter as needed (catheter occlusion). Reconstitute vial. Draw up alteplase 1 mg/mL in a syringe and instill into IV catheter. Dwell for at least 20 min to 24 hours, then aspirate. May repeat dose once in 24 hours if catheter function not restored after at least 2 hours. Discard remainder of vial.  Qty: 624060 each, Refills: 0    Associated Diagnoses: Encounter for antineoplastic chemotherapy      calcium carbonate (OS-DENISE) 500 MG tablet Take 2 tablets (1,000 mg) by mouth 2 times daily.  Qty: 60 tablet, Refills: 3     Associated Diagnoses: Hypocalcemia      dicyclomine (BENTYL) 10 MG capsule Take 1 capsule (10 mg) by mouth 4 times daily (before meals and nightly).  Qty: 120 capsule, Refills: 1    Associated Diagnoses: Lower abdominal pain      diphenhydrAMINE (BENADRYL) 50 MG/ML injection Inject 1 mL (50 mg) over 3-5 minutes into the vein via push as needed for other (infusion reaction). For RN use only.  Draw up in a syringe and administer IV push.  Discard remainder of vial.  Qty: 50283 mL, Refills: 0    Comments: NOTE:*Pt home emergency benadryl expires 3/2026  Associated Diagnoses: Malignant neoplasm of head of pancreas (H)      EPINEPHrine (ANY BX GENERIC EQUIV) 0.3 MG/0.3ML injection 2-pack Inject 0.3 mLs (0.3 mg) into the muscle as needed for anaphylaxis (infusion reaction). Administer into the mid-thigh in case of severe anaphylaxis (wheezing, throat tightening, mouth swelling, difficulty breathing). May repeat dose one time in 5-15 minutes if symptoms persist.  Qty: 219304 mL, Refills: 0    Comments: Pt home epi pen expires on 4/2026  Associated Diagnoses: Malignant neoplasm of head of pancreas (H)      Fluorouracil (ADRUCIL) 2,290 mg in sodium chloride 0.9 % 241 mL via HOMEPUMP C-Series Infuse 2,290 mg at 5.2 mL/hr over 46 hours into the vein once for 1 dose.  Qty: 876025 mL, Refills: 0    Associated Diagnoses: Malignant neoplasm of head of pancreas (H)      HEMP OIL OR EXTRACT OR OTHER CBD CANNABINOID, NOT MEDICAL CANNABIS, Place 7 mg under the tongue daily as needed (Delta-8 hemp extract oil (166.7 mg/mL)). 2 drops (7 mg) under the tongue as needed for pain or anxiety      lidocaine (LIDODERM) 5 % patch Place onto the skin every 24 hours. To prevent lidocaine toxicity, patient should be patch free for 12 hrs daily.      lidocaine-prilocaine (EMLA) 2.5-2.5 % external cream Use 1-2 times a week or as needed prior to port access  Qty: 30 g, Refills: 3    Associated Diagnoses: Malignant neoplasm of head of pancreas (H)       lipase-protease-amylase (CREON 24) 60674-41289-664862 units CPEP per EC capsule 2-3 capsules with meals 3 times a day and 1-2 capsules with snacks max of 15 capsules a day  Qty: 200 capsule, Refills: 11    Associated Diagnoses: History of biliary stent insertion; Pancreatic adenocarcinoma (H)      loperamide (IMODIUM) 2 MG capsule 2 caps at 1st sign of diarrhea & 1 cap every 2hrs until 12hrs diarrhea free. During night, 2 caps at bedtime & 2 caps every 4hrs until AM  Qty: 30 capsule, Refills: 0    Associated Diagnoses: Encounter for antineoplastic chemotherapy; Malignant neoplasm of head of pancreas (H)      LORazepam (ATIVAN) 0.5 MG tablet Take 1-2 tablets (0.5-1 mg) by mouth every 6 hours as needed for muscle spasms.  Qty: 45 tablet, Refills: 2    Associated Diagnoses: Cancer associated pain; Malignant neoplasm of head of pancreas (H); Pathological compression fracture of vertebra with routine healing, subsequent encounter      Multiple Vitamins-Minerals (CENTRUM SILVER 50+MEN) TABS       !! naloxone (NARCAN) 4 MG/0.1ML nasal spray Spray 1 spray (4 mg) into one nostril alternating nostrils as needed for opioid reversal. every 2-3 minutes until assistance arrives  Qty: 2 each, Refills: 1    Associated Diagnoses: Cancer associated pain      OLANZapine (ZYPREXA) 5 MG tablet Take 1 tablet (5 mg) by mouth at bedtime.  Qty: 30 tablet, Refills: 3    Associated Diagnoses: Malignant neoplasm of head of pancreas (H); Chemotherapy-induced nausea; Anorexia      pantoprazole (PROTONIX) 40 MG EC tablet Take 1 tablet (40 mg) by mouth 2 times daily  Qty: 60 tablet, Refills: 3    Associated Diagnoses: Malignant neoplasm of head of pancreas (H); Cancer associated pain; Gastric outlet obstruction; Type 2 diabetes mellitus without complication, with long-term current use of insulin (H)      prochlorperazine (COMPAZINE) 10 MG tablet Take 1 tablet (10 mg) by mouth every 6 hours as needed for nausea or vomiting.  Qty: 30 tablet,  "Refills: 2    Associated Diagnoses: Encounter for antineoplastic chemotherapy; Malignant neoplasm of head of pancreas (H)      vitamin D3 (CHOLECALCIFEROL) 50 mcg (2000 units) tablet Take 1 tablet (50 mcg) by mouth daily.  Qty: 90 tablet, Refills: 1    Associated Diagnoses: Vitamin D deficiency      blood glucose (FREESTYLE TEST STRIPS) test strip 1 strip by In Vitro route 3 times daily.  Qty: 100 strip, Refills: 2    Associated Diagnoses: Type 2 diabetes mellitus without complication, with long-term current use of insulin (H)      Chemo Kit For RN use only. Do not remove items from bag. Contents: 1  sodium chloride 0.9% flush, 4 medium gloves, 1 chemo gown, 1/4 chemo mat, 1 connector female, 1 chemo bag.  Qty: [id number] kit, Refills: 0    Associated Diagnoses: Malignant neoplasm of head of pancreas (H)      Continuous Glucose Sensor (DEXCOM G7 SENSOR) MISC Change every 10 days.  Qty: 6 each, Refills: 3    Associated Diagnoses: Type 2 diabetes mellitus without complication, with long-term current use of insulin (H)      Emergency Supply Kit, Central, Patient use for emergency only. Contents: 3 sodium chloride 0.9% flushes, 1 dressing kit, 1 microclave ext set 14\", 4 nitrile gloves (med), 6 alcohol prep pads, 1 bacitracin, 1 syringe (10 cc 20 G 1\"). Call [phone] to reorder.  Qty: [id number] kit, Refills: 0    Associated Diagnoses: Malignant neoplasm of head of pancreas (H)      LORazepam (ATIVAN) 2 MG/ML (HIGH CONC) oral solution Take 0.25-0.5 mLs (0.5-1 mg) by mouth every 6 hours as needed for anxiety or muscle spasms.  Qty: 100 mL, Refills: 2    Comments: Please dispense with a 2 ml syringe and show family how to draw up 1 ml  Associated Diagnoses: Cancer associated pain; Pancreatic adenocarcinoma (H); Malignant neoplasm metastatic to bone (H)      methylPREDNISolone Na Suc, PF, (SOLU-MEDROL) 125 mg/2 mL injection Inject 2 mLs (125 mg) over 3-5 minutes into the vein via push as needed (infusion reaction). For RN " use only.  Reconstitute vial. Draw up methylPREDNISolone in a syringe and administer.  Discard remainder of vial.  Qty: 203103 mL, Refills: 0    Comments: Pt home emergency methyl pred expires on 11/2026  Associated Diagnoses: Malignant neoplasm of head of pancreas (H)      Port Access Kit For nurse use only.  Do not remove items from bag.  Use for port access.  Do not place syringe on sterile field.  Qty: [id number] kit, Refills: 0    Associated Diagnoses: Malignant neoplasm of head of pancreas (H)      !! sodium chloride 0.9% infusion Infuse 250 mLs over 30 minutes into the vein every 28 (twenty-eight) days. zometa concomitant fluids. Given per therapy plan orders  Qty: 750 mL, Refills: 2    Associated Diagnoses: Malignant neoplasm metastatic to bone (H)      !! sodium chloride 0.9% infusion Infuse 500 mLs into the vein as needed for other (infusion reaction). In case of mild reaction, administer via gravity at 20 mL/hr to keep vein open. In case of severe reaction, administer via gravity wide open on prime setting.  Qty: 178064 mL, Refills: 0    Comments: Pt home emergency bag expires 8/2025  Associated Diagnoses: Malignant neoplasm of head of pancreas (H)      !! sodium chloride, PF, 0.9% PF flush Inject 10 mLs into the vein as needed for line flush. Flush IV before and after med administration as directed and/or at least every 24 hours, or prior to deaccessing for no further use and/or at least every 4 weeks when not accessed.  Qty: 758451 mL, Refills: 0    Associated Diagnoses: Malignant neoplasm of head of pancreas (H)      !! sodium chloride, PF, 0.9% PF flush Inject 10 mLs into the vein as needed for other (infusion reaction). For RN use only as needed for infusion reaction  Qty: 741688 mL, Refills: 0    Comments: Replace inga home kit on DT anually (sent last on 8/28/24)  Associated Diagnoses: Malignant neoplasm of head of pancreas (H)      sterile water, preservative free, injection Use 2.2 mLs for  reconstitution as needed (with alteplase for catheter occlusion). Direct diluent stream into powder. Mix by gently swirling until dissolved. DO NOT SHAKE. Discard remainder of vial.  Qty: 745331 mL, Refills: 0    Associated Diagnoses: Encounter for antineoplastic chemotherapy      zoledronic acid (ZOMETA) 4 MG/100ML infusion Infuse 100 mLs (4 mg) into the vein every 28 days. Infuse via gravity. Given per therapy plan orders  Qty: 300 mL, Refills: 2    Associated Diagnoses: Malignant neoplasm metastatic to bone (H)       !! - Potential duplicate medications found. Please discuss with provider.        STOP taking these medications       fentaNYL (DURAGESIC) 75 mcg/hr 72 hr patch Comments:   Reason for Stopping:         morphine (MS CONTIN) 15 MG CR tablet Comments:   Reason for Stopping:         morphine sulfate, high concentrate, (ROXANOL-CONCENTRATED) 20 MG/ML concentrated solution Comments:   Reason for Stopping:             Allergies   No Known Allergies

## 2025-04-12 NOTE — PLAN OF CARE
Goal Outcome Evaluation:      Plan of Care Reviewed With: patient      Pt Aox4 w/ occasional confusion/loss of environmental awareness. Tele: sinus tachy, stable SpO2 on RA. Voiding in urinal, no BM this shift. Ongoing pain in Bilat shoulders (patch in place), and back, especially lower back. Mepelex over bony prominences on back. Pain managed w/ IV dilaudid q2, PO morphine. Nausea managed w/ IV compazine        Continue to address pain and continue plan of care.

## 2025-04-12 NOTE — PLAN OF CARE
Goal Outcome Evaluation:      Plan of Care Reviewed With: patient    Overall Patient Progress: no changeOverall Patient Progress: no change    Outcome Evaluation: Px alert and oriented x4, able to make needs known. c/o pain on low back, PRN meds given and was effective. Hgb 7.0 this morning, provider ordered BT. Transferred from bed to recliner AO2, having a hard time moving due to shoulder and back pain. Sat in the commode a couple of times trying to have a BM and was successful on his 2nd attempt. Back to bed to sleep. Provider called said px is going to be discharging today after blood transfusion. Px c/o nausea this afternoon, PRN compazine given and was effective. Leg twitching wife asked for robaxin, px slept after that. Going home this afternoon. Continue with POC.

## 2025-04-14 ENCOUNTER — MEDICAL CORRESPONDENCE (OUTPATIENT)
Dept: HEALTH INFORMATION MANAGEMENT | Facility: CLINIC | Age: 60
End: 2025-04-14
Payer: COMMERCIAL

## 2025-04-14 ENCOUNTER — TELEPHONE (OUTPATIENT)
Dept: PALLIATIVE CARE | Facility: CLINIC | Age: 60
End: 2025-04-14
Payer: COMMERCIAL

## 2025-04-14 ENCOUNTER — PATIENT OUTREACH (OUTPATIENT)
Dept: CARE COORDINATION | Facility: CLINIC | Age: 60
End: 2025-04-14
Payer: COMMERCIAL

## 2025-04-14 DIAGNOSIS — Z53.9 DIAGNOSIS NOT YET DEFINED: Primary | ICD-10-CM

## 2025-04-14 DIAGNOSIS — G89.3 CANCER ASSOCIATED PAIN: ICD-10-CM

## 2025-04-14 LAB
ABO + RH BLD: NORMAL
BLD GP AB SCN SERPL QL: NEGATIVE
SPECIMEN EXP DATE BLD: NORMAL

## 2025-04-14 PROCEDURE — G0179 MD RECERTIFICATION HHA PT: HCPCS | Performed by: INTERNAL MEDICINE

## 2025-04-14 RX ORDER — FENTANYL 100 UG/1
1 PATCH TRANSDERMAL
Qty: 10 PATCH | Refills: 0 | Status: SHIPPED | OUTPATIENT
Start: 2025-04-14

## 2025-04-14 NOTE — TELEPHONE ENCOUNTER
Prior Authorization Retail Medication Request    Medication/Dose: Fentanyl 100 mcg/hr  Diagnosis and ICD code (if different than what is on RX):     New/renewal/insurance change PA/secondary ins. PA:  Previously Tried and Failed:     Rationale:       Insurance   Primary:    Insurance ID:       Secondary (if applicable):   Insurance ID:       Pharmacy Information (if different than what is on RX)  Name:     Phone:     Fax:     Clinic Information  Preferred routing pool for dept communication:  Mary Funez RN

## 2025-04-14 NOTE — PROGRESS NOTES
Clinic Care Coordination Contact  Care Coordination Clinician Chart Review    Situation: Patient chart reviewed by care coordinator.    Background: Clinic Care Coordination Referral received from inpatient care team for transition handoff communication following hospital admission.    Assessment: Upon chart review, patient is not a candidate for Primary Care Clinic Care Coordination enrollment due to reason stated below:  Primary Care Clinic Care Coordination will defer follow-up outreach to oncology Specialty Clinic Care Coordination team who are already closely following patient. Patient has had recent contact with both CC RN and .     Plan/Recommendations: Clinic Care Coordination Referral/order cancelled. RN/SW CC will perform no further monitoring/outreaches at this time and will remain available as needed. If new needs arise, a new Care Coordination Referral may be placed.    Senait Carson RN, BSN, PHN Care Coordinator  [facility], [place], and [facility]   Phone: [phone]

## 2025-04-14 NOTE — TELEPHONE ENCOUNTER
Prior Authorization Approval    Medication: FENTANYL 100 MCG/HR TD PT72  Authorization Effective Date: 4/14/2025  Authorization Expiration Date: 4/14/2026  Approved Dose/Quantity: NA  Reference #:     Insurance Company: [employer] - Phone [phone] Fax [phone]  Expected CoPay: $    CoPay Card Available:      Financial Assistance Needed: NA  Which Pharmacy is filling the prescription:  [facility]  Pharmacy Notified: Yes, already filled and ready for   Patient Notified: Yes, via [employer]

## 2025-04-14 NOTE — TELEPHONE ENCOUNTER
Resending fentanyl prescription to pt's local pharmacy. It will require a PA and pt's wife did not [facility] this [facility] this [facility].    MICHAEL SladeN, RN  Palliative Care Nurse Clinician    [phone] (Direct)  [phone] (Main)  [phone] (Appointment Scheduling)

## 2025-04-15 ENCOUNTER — MYC REFILL (OUTPATIENT)
Dept: ENDOCRINOLOGY | Facility: CLINIC | Age: 60
End: 2025-04-15

## 2025-04-15 ENCOUNTER — MYC REFILL (OUTPATIENT)
Dept: ONCOLOGY | Facility: CLINIC | Age: 60
End: 2025-04-15

## 2025-04-15 ENCOUNTER — INFUSION THERAPY VISIT (OUTPATIENT)
Dept: INFUSION THERAPY | Facility: CLINIC | Age: 60
End: 2025-04-15
Attending: PHYSICIAN ASSISTANT
Payer: COMMERCIAL

## 2025-04-15 ENCOUNTER — MYC REFILL (OUTPATIENT)
Dept: RADIATION ONCOLOGY | Facility: HOSPITAL | Age: 60
End: 2025-04-15

## 2025-04-15 ENCOUNTER — NURSE TRIAGE (OUTPATIENT)
Dept: ONCOLOGY | Facility: CLINIC | Age: 60
End: 2025-04-15

## 2025-04-15 DIAGNOSIS — C25.0 MALIGNANT NEOPLASM OF HEAD OF PANCREAS (H): Primary | ICD-10-CM

## 2025-04-15 DIAGNOSIS — G89.3 CANCER ASSOCIATED PAIN: ICD-10-CM

## 2025-04-15 DIAGNOSIS — M48.50XD PATHOLOGICAL COMPRESSION FRACTURE OF VERTEBRA WITH ROUTINE HEALING, SUBSEQUENT ENCOUNTER: ICD-10-CM

## 2025-04-15 DIAGNOSIS — E11.9 TYPE 2 DIABETES MELLITUS WITHOUT COMPLICATION, WITH LONG-TERM CURRENT USE OF INSULIN (H): ICD-10-CM

## 2025-04-15 DIAGNOSIS — Z79.4 TYPE 2 DIABETES MELLITUS WITHOUT COMPLICATION, WITH LONG-TERM CURRENT USE OF INSULIN (H): ICD-10-CM

## 2025-04-15 DIAGNOSIS — C25.0 MALIGNANT NEOPLASM OF HEAD OF PANCREAS (H): ICD-10-CM

## 2025-04-15 DIAGNOSIS — Z98.890 HISTORY OF BILIARY STENT INSERTION: ICD-10-CM

## 2025-04-15 DIAGNOSIS — D64.9 ANEMIA, UNSPECIFIED TYPE: ICD-10-CM

## 2025-04-15 DIAGNOSIS — C25.9 PANCREATIC ADENOCARCINOMA (H): ICD-10-CM

## 2025-04-15 LAB
ALBUMIN SERPL BCG-MCNC: 3.1 G/DL (ref 3.5–5.2)
ALP SERPL-CCNC: 314 U/L (ref 40–150)
ALT SERPL W P-5'-P-CCNC: 11 U/L (ref 0–70)
ANION GAP SERPL CALCULATED.3IONS-SCNC: 13 MMOL/L (ref 7–15)
AST SERPL W P-5'-P-CCNC: 26 U/L (ref 0–45)
BACTERIA BLD CULT: NO GROWTH
BACTERIA BLD CULT: NO GROWTH
BASOPHILS # BLD AUTO: 0 10E3/UL (ref 0–0.2)
BASOPHILS NFR BLD AUTO: 1 %
BILIRUB SERPL-MCNC: 1 MG/DL
BLD PROD TYP BPU: NORMAL
BLOOD COMPONENT TYPE: NORMAL
BUN SERPL-MCNC: 14.1 MG/DL (ref 6–20)
CALCIUM SERPL-MCNC: 8.8 MG/DL (ref 8.8–10.4)
CHLORIDE SERPL-SCNC: 97 MMOL/L (ref 98–107)
CODING SYSTEM: NORMAL
CREAT SERPL-MCNC: 0.44 MG/DL (ref 0.67–1.17)
CROSSMATCH: NORMAL
EGFRCR SERPLBLD CKD-EPI 2021: >90 ML/MIN/1.73M2
EOSINOPHIL # BLD AUTO: 0.1 10E3/UL (ref 0–0.7)
EOSINOPHIL NFR BLD AUTO: 1 %
ERYTHROCYTE [DISTWIDTH] IN BLOOD BY AUTOMATED COUNT: 18.4 % (ref 10–15)
GLUCOSE SERPL-MCNC: 193 MG/DL (ref 70–99)
HCO3 SERPL-SCNC: 24 MMOL/L (ref 22–29)
HCT VFR BLD AUTO: 24.8 % (ref 40–53)
HGB BLD-MCNC: 7.8 G/DL (ref 13.3–17.7)
IMM GRANULOCYTES # BLD: 0.2 10E3/UL
IMM GRANULOCYTES NFR BLD: 3 %
ISSUE DATE AND TIME: NORMAL
LYMPHOCYTES # BLD AUTO: 1.2 10E3/UL (ref 0.8–5.3)
LYMPHOCYTES NFR BLD AUTO: 18 %
MCH RBC QN AUTO: 29.2 PG (ref 26.5–33)
MCHC RBC AUTO-ENTMCNC: 31.5 G/DL (ref 31.5–36.5)
MCV RBC AUTO: 93 FL (ref 78–100)
MONOCYTES # BLD AUTO: 0.7 10E3/UL (ref 0–1.3)
MONOCYTES NFR BLD AUTO: 10 %
NEUTROPHILS # BLD AUTO: 4.5 10E3/UL (ref 1.6–8.3)
NEUTROPHILS NFR BLD AUTO: 68 %
NRBC # BLD AUTO: 0 10E3/UL
NRBC BLD AUTO-RTO: 0 /100
PLATELET # BLD AUTO: 68 10E3/UL (ref 150–450)
POTASSIUM SERPL-SCNC: 3.9 MMOL/L (ref 3.4–5.3)
PROT SERPL-MCNC: 6.4 G/DL (ref 6.4–8.3)
RBC # BLD AUTO: 2.67 10E6/UL (ref 4.4–5.9)
SODIUM SERPL-SCNC: 134 MMOL/L (ref 135–145)
UNIT ABO/RH: NORMAL
UNIT NUMBER: NORMAL
UNIT STATUS: NORMAL
UNIT TYPE ISBT: 8400
WBC # BLD AUTO: 6.6 10E3/UL (ref 4–11)

## 2025-04-15 PROCEDURE — 99207 PR NO CHARGE LOS: CPT

## 2025-04-15 PROCEDURE — 86900 BLOOD TYPING SEROLOGIC ABO: CPT

## 2025-04-15 PROCEDURE — 250N000011 HC RX IP 250 OP 636: Performed by: STUDENT IN AN ORGANIZED HEALTH CARE EDUCATION/TRAINING PROGRAM

## 2025-04-15 PROCEDURE — 85025 COMPLETE CBC W/AUTO DIFF WBC: CPT

## 2025-04-15 PROCEDURE — 86923 COMPATIBILITY TEST ELECTRIC: CPT | Performed by: STUDENT IN AN ORGANIZED HEALTH CARE EDUCATION/TRAINING PROGRAM

## 2025-04-15 PROCEDURE — 36591 DRAW BLOOD OFF VENOUS DEVICE: CPT

## 2025-04-15 PROCEDURE — 82310 ASSAY OF CALCIUM: CPT

## 2025-04-15 RX ORDER — HEPARIN SODIUM,PORCINE 10 UNIT/ML
5-20 VIAL (ML) INTRAVENOUS DAILY PRN
OUTPATIENT
Start: 2025-04-15

## 2025-04-15 RX ORDER — HEPARIN SODIUM (PORCINE) LOCK FLUSH IV SOLN 100 UNIT/ML 100 UNIT/ML
5 SOLUTION INTRAVENOUS
OUTPATIENT
Start: 2025-04-15

## 2025-04-15 RX ORDER — EPINEPHRINE 1 MG/ML
0.3 INJECTION, SOLUTION INTRAMUSCULAR; SUBCUTANEOUS EVERY 5 MIN PRN
OUTPATIENT
Start: 2025-04-15

## 2025-04-15 RX ORDER — HEPARIN SODIUM (PORCINE) LOCK FLUSH IV SOLN 100 UNIT/ML 100 UNIT/ML
5 SOLUTION INTRAVENOUS
Status: DISCONTINUED | OUTPATIENT
Start: 2025-04-15 | End: 2025-04-15 | Stop reason: HOSPADM

## 2025-04-15 RX ORDER — DIPHENHYDRAMINE HYDROCHLORIDE 50 MG/ML
50 INJECTION, SOLUTION INTRAMUSCULAR; INTRAVENOUS
Start: 2025-04-15

## 2025-04-15 RX ADMIN — Medication 5 ML: at 11:01

## 2025-04-15 NOTE — TELEPHONE ENCOUNTER
Gallo is returning call from Triage.  He is having no problems with bleeding.  Takes incredible exertion to get up and go to the car and then to go from car to house is difficult. States he gets out of breath, needs to sit to catch his breath, and can't talk for awhile. And his heart races.  Very fatigued.  Tired today, but slept well last night.    Gallo is scheduled for transfusion tomorrow at 1400 at [facility].  If he needs a transfusion, their preference is [facility].  They are requesting a later apt with Berta and do not think he can be here in person at 0745 because he needs that time to rest.

## 2025-04-15 NOTE — PROGRESS NOTES
Port site scrubbed with Chloraprep for 30 seconds. Accessed port using sterile technique. Green and two purple tubes drawn. See documentation flowsheet. Port needle deaccessed. Tolerated port access and draw without complaint. Jania Moss RN on 4/15/2025 at 11:16 AM

## 2025-04-15 NOTE — TELEPHONE ENCOUNTER
Call to pt, spoke with spouse, Violet, offered to change SUZETTE visit from 8am to 1130am tomorrow. Violet accepting of this. Will keep infusion appt as is, as pt is symptomatic.   [id number] message sent to scheduling.

## 2025-04-16 ENCOUNTER — INFUSION THERAPY VISIT (OUTPATIENT)
Dept: ONCOLOGY | Facility: CLINIC | Age: 60
End: 2025-04-16
Attending: STUDENT IN AN ORGANIZED HEALTH CARE EDUCATION/TRAINING PROGRAM
Payer: COMMERCIAL

## 2025-04-16 VITALS
HEART RATE: 106 BPM | RESPIRATION RATE: 20 BRPM | SYSTOLIC BLOOD PRESSURE: 130 MMHG | OXYGEN SATURATION: 98 % | WEIGHT: 133.2 LBS | BODY MASS INDEX: 18.07 KG/M2 | DIASTOLIC BLOOD PRESSURE: 80 MMHG | TEMPERATURE: 97.9 F

## 2025-04-16 DIAGNOSIS — C25.0 MALIGNANT NEOPLASM OF HEAD OF PANCREAS (H): ICD-10-CM

## 2025-04-16 DIAGNOSIS — D64.9 ANEMIA, UNSPECIFIED TYPE: Primary | ICD-10-CM

## 2025-04-16 DIAGNOSIS — G89.3 CANCER ASSOCIATED PAIN: ICD-10-CM

## 2025-04-16 DIAGNOSIS — C25.0 MALIGNANT NEOPLASM OF HEAD OF PANCREAS (H): Primary | ICD-10-CM

## 2025-04-16 PROCEDURE — 258N000003 HC RX IP 258 OP 636: Performed by: PHYSICIAN ASSISTANT

## 2025-04-16 PROCEDURE — 250N000011 HC RX IP 250 OP 636: Performed by: PHYSICIAN ASSISTANT

## 2025-04-16 PROCEDURE — P9016 RBC LEUKOCYTES REDUCED: HCPCS | Performed by: STUDENT IN AN ORGANIZED HEALTH CARE EDUCATION/TRAINING PROGRAM

## 2025-04-16 RX ORDER — DEXAMETHASONE SODIUM PHOSPHATE 4 MG/ML
8 INJECTION, SOLUTION INTRA-ARTICULAR; INTRALESIONAL; INTRAMUSCULAR; INTRAVENOUS; SOFT TISSUE ONCE
Status: COMPLETED | OUTPATIENT
Start: 2025-04-16 | End: 2025-04-16

## 2025-04-16 RX ORDER — MORPHINE SULFATE 2 MG/ML
10 INJECTION, SOLUTION INTRAMUSCULAR; INTRAVENOUS ONCE
Status: COMPLETED | OUTPATIENT
Start: 2025-04-16 | End: 2025-04-16

## 2025-04-16 RX ORDER — ACYCLOVIR 400 MG/1
TABLET ORAL
Qty: 9 EACH | Refills: 2 | Status: SHIPPED | OUTPATIENT
Start: 2025-04-16

## 2025-04-16 RX ORDER — LORAZEPAM 0.5 MG/1
.5-1 TABLET ORAL EVERY 6 HOURS PRN
Qty: 45 TABLET | Refills: 2 | Status: SHIPPED | OUTPATIENT
Start: 2025-04-16

## 2025-04-16 RX ORDER — HEPARIN SODIUM (PORCINE) LOCK FLUSH IV SOLN 100 UNIT/ML 100 UNIT/ML
5 SOLUTION INTRAVENOUS
Status: DISCONTINUED | OUTPATIENT
Start: 2025-04-16 | End: 2025-04-16 | Stop reason: HOSPADM

## 2025-04-16 RX ORDER — PALONOSETRON 0.05 MG/ML
0.25 INJECTION, SOLUTION INTRAVENOUS ONCE
Status: COMPLETED | OUTPATIENT
Start: 2025-04-16 | End: 2025-04-16

## 2025-04-16 RX ORDER — MORPHINE SULFATE 10 MG/5ML
30 SOLUTION ORAL
Qty: 1800 ML | Refills: 0 | Status: SHIPPED | OUTPATIENT
Start: 2025-04-16 | End: 2025-04-17

## 2025-04-16 RX ADMIN — MORPHINE SULFATE 10 MG: 2 INJECTION, SOLUTION INTRAMUSCULAR; INTRAVENOUS at 15:08

## 2025-04-16 RX ADMIN — HEPARIN 5 ML: 100 SYRINGE at 16:19

## 2025-04-16 RX ADMIN — SODIUM CHLORIDE 250 ML: 0.9 INJECTION, SOLUTION INTRAVENOUS at 14:00

## 2025-04-16 RX ADMIN — DEXAMETHASONE SODIUM PHOSPHATE 8 MG: 4 INJECTION, SOLUTION INTRA-ARTICULAR; INTRALESIONAL; INTRAMUSCULAR; INTRAVENOUS; SOFT TISSUE at 16:16

## 2025-04-16 RX ADMIN — PALONOSETRON HYDROCHLORIDE 0.25 MG: 0.25 INJECTION INTRAVENOUS at 15:11

## 2025-04-16 NOTE — PROGRESS NOTES
Oncology/Hematology Visit Note  Apr 16, 2025    Reason for Visit: follow-up of metastatic Pancreatic Cancer, follow-up of symptoms    Oncology HPI:   -NGS: KRAS G12R  Immuno: pMMR, KAYLIE, TMB-low  Clinical Trial: MARIPOSA-186, MARIPOSA-280, TORL    - 3/2023: presented with acute pancreatitis  c/b chronic pancreatitis with pancreatic mass causing duodenal stenosis with gastric outlet obstruction s/p GJ tube (placed 5/2023), biliary obstruction s/p stent placement on 7/7/23, pancreatic insufficiency, and IDDM2.  -  He then presented to the ED with worsening abdominal pain, increased jaundice, poor PO intake and weight loss admitted on 7/8/2023 for concern of biliary obstruction.   - 7/12/2023: Underwent biopsy of pancreatic mass returned positive for pancreatic adenocarcinoma.   - 7/31/2023: He started on treatment with 5FU, irinotecan, and oxaliplatin (FOLFIRINOX). Please see previous notes for further details on the patient's history.      8/17/23 - ERCP/EGD, duodenal stents x2 placed, exchange of GJ tube     8/21-8/25/24 hospitalized due to diarrhea, colitis, abdominal pain.       8/29 - Cycle 3 deferred due to neutropenia.   Neulasta added. Irinotecan dose reduced 20% due to symptoms including cramping, double vision and slurred speech during infusion.       10/24/23 CT CAP with similar to slight increase in size of peripancreatic and mesenteric lymph nodes, enlargement of previous skeletal metastasis as well as new sclerotic metastasis to left posterior 5th rib, enlarging pulmonary nodule, and unchanged pancreatic head mass. Also unclear concerns for PE,  right lower lobe segmental PE confirmed on CT-PE. Started on apixaban.      10/31/23 Cycle 1 gemzar, abraxane  11/7/23 Cycle 1 Zometa  11/22/23 Removal of GJ tube.   11/29/23 Completed palliative radiation to T10   12/13/23 C3D1 gem/abraxane  12/29/23 Consults at [place]  1/23-1/26 Consults at [facility]   1/30/24: C4D1 gem/abraxane   3/24: CT CAP with overall stable disease  with isolated progression in potential L4 lesion. Referral for radiation oncology.    4/29-5/1: Consults at [facility] for possible cellular therapy, CT guided biopsy of left pubic bone, recommendation to return to [facility] upon progression of current treatment   5/23/24: Cardiology consult at [facility]. CT with small pericardial effusion   6/6/2024: Repeat ECHO on with EF of 55-60%, no pericardial effusion present  - Cycle 8 deferred due to infection concerns and subsequently tested positive for COVID 6/25/24, resumed treatment on 7/2/2024    -Day 8 eliminated after Cycle 5 Mac[place]in/Abraxane.    4/2/2024-present: Gemcitabine/Abraxane, Days 1 and 15 only; Zometa every 3 months     8/2024 repeat imaging with isolated progression to L4 lesion with fracture of that spinous process. Referred to radiation therapy. Decreased Abraxane dose reduced to 75mg/m2 with cycle 9 day 15 for overall treatment tolerance.     8/28/24 RFA ablation and verteral augmentation to L4.   10/1-10/21 Radiation to L4     11/12/24 CT CAP shows disease progression, plan to change to 5FU and liposomal irinotecan after a vacation  12/12/24 ED visit for pain, secondary to cancer, managed with IV pain medication    12/18/24 Cycle 1 5FU and liposomal irinotecan    1/10/25: L2 and L3 RFA and vertebroplasty    2/11/24: Cycle 4 5FU and liposomal irinotecan held due to rectal bleeding with anemia and fatigue with weakness.    3/5/25: C6 5FU and liposomal irinotecan     3/25-3/26/25: admitted for symptomatic anemia    4/9-4/12/25: admitted for failure to thrive and weakness    Gallo presents today for routine close follow-up.     Interval history:   Patient reports that his son will be graduating on the ninth with a degree in .  He reports that he continues to feel very fatigued and spends between 2/3-3/4 of his day asleep.  He does feel better for a couple of days after blood transfusion.  He denies any bleeding issues.  He is sleeping in a  recliner due to pain.  His pain is located in his low back, calves, and shoulders.  He feels his pain control is better than prior to his last hospital stay, but still not great.  He continues on the fentanyl patches as well as taking 30 mg of morphine about every 3-4 hours along with taking Robaxin and lorazepam each about 3 times per day.  He reports no appetite and is eating about a third of a normal diet.  He is drinking about 3 cups of fluids per day.  He reports feeling weak and is using a walker to get around.  He notes some improvement in his leg swelling.  He has continued to have the fast heart rate though it has been less high since taking metoprolol.  Due to nausea that he associates with metoprolol, he has been taking 25 mg twice a day.  He denies other concerns.    Current Outpatient Medications   Medication Sig Dispense Refill    morphine 10 MG/5ML solution Take 15 mLs (30 mg) by mouth every 3 hours as needed for severe pain. 1800 mL 0    acetaminophen (TYLENOL) 32 mg/mL liquid Take 20.313 mLs (650 mg) by mouth every 8 hours. 1800 mL 3    alteplase (CATHFLO ACTIVASE) injection Inject 2 mLs (2 mg) into catheter as needed (catheter occlusion). Reconstitute vial. Draw up alteplase 1 mg/mL in a syringe and instill into IV catheter. Dwell for at least 20 min to 24 hours, then aspirate. May repeat dose once in 24 hours if catheter function not restored after at least 2 hours. Discard remainder of vial. 109362 each 0    blood glucose (FREESTYLE TEST STRIPS) test strip 1 strip by In Vitro route 3 times daily. 100 strip 2    calcium carbonate (OS-DENISE) 500 MG tablet Take 2 tablets (1,000 mg) by mouth 2 times daily. 60 tablet 3    Chemo Kit For RN use only. Do not remove items from bag. Contents: 1  sodium chloride 0.9% flush, 4 medium gloves, 1 chemo gown, 1/4 chemo mat, 1 connector female, 1 chemo bag. [id number] kit 0    Continuous Glucose Sensor (DEXCOM G7 SENSOR) MISC Change every 10 days. 9 each 2     "dicyclomine (BENTYL) 10 MG capsule Take 1 capsule (10 mg) by mouth 4 times daily (before meals and nightly). 120 capsule 1    diphenhydrAMINE (BENADRYL) 50 MG/ML injection Inject 1 mL (50 mg) over 3-5 minutes into the vein via push as needed for other (infusion reaction). For RN use only.  Draw up in a syringe and administer IV push.  Discard remainder of vial. 78571 mL 0    Emergency Supply Kit, Central, Patient use for emergency only. Contents: 3 sodium chloride 0.9% flushes, 1 dressing kit, 1 microclave ext set 14\", 4 nitrile gloves (med), 6 alcohol prep pads, 1 bacitracin, 1 syringe (10 cc 20 G 1\"). Call [phone] to reorder. [id number] kit 0    EPINEPHrine (ANY BX GENERIC EQUIV) 0.3 MG/0.3ML injection 2-pack Inject 0.3 mLs (0.3 mg) into the muscle as needed for anaphylaxis (infusion reaction). Administer into the mid-thigh in case of severe anaphylaxis (wheezing, throat tightening, mouth swelling, difficulty breathing). May repeat dose one time in 5-15 minutes if symptoms persist. 408710 mL 0    fentaNYL (DURAGESIC) 100 mcg/hr 72 hr patch Place 1 patch over 72 hours onto the skin every 72 hours. remove old patch. 10 patch 0    Fluorouracil (ADRUCIL) 2,290 mg in sodium chloride 0.9 % 241 mL via HOMEPUMP C-Series Infuse 2,290 mg at 5.2 mL/hr over 46 hours into the vein once for 1 dose. 968504 mL 0    HEMP OIL OR EXTRACT OR OTHER CBD CANNABINOID, NOT MEDICAL CANNABIS, Place 7 mg under the tongue daily as needed (Delta-8 hemp extract oil (166.7 mg/mL)). 2 drops (7 mg) under the tongue as needed for pain or anxiety      lidocaine (LIDODERM) 5 % patch Place onto the skin every 24 hours. To prevent lidocaine toxicity, patient should be patch free for 12 hrs daily.      lidocaine-prilocaine (EMLA) 2.5-2.5 % external cream Use 1-2 times a week or as needed prior to port access 30 g 3    lipase-protease-amylase (CREON 24) 71672-40309-141717 units CPEP per EC capsule 2-3 capsules with meals 3 times a day and 1-2 capsules " with snacks max of 15 capsules a day 200 capsule 11    loperamide (IMODIUM) 2 MG capsule 2 caps at 1st sign of diarrhea & 1 cap every 2hrs until 12hrs diarrhea free. During night, 2 caps at bedtime & 2 caps every 4hrs until AM 30 capsule 0    LORazepam (ATIVAN) 0.5 MG tablet Take 1-2 tablets (0.5-1 mg) by mouth every 6 hours as needed for muscle spasms. 45 tablet 2    LORazepam (ATIVAN) 2 MG/ML (HIGH CONC) oral solution Take 0.25-0.5 mLs (0.5-1 mg) by mouth every 6 hours as needed for anxiety or muscle spasms. 100 mL 2    methocarbamol (ROBAXIN) 500 MG tablet Take 2 tablets (1,000 mg) by mouth 4 times daily as needed for muscle spasms. 180 tablet 3    methylPREDNISolone Na Suc, PF, (SOLU-MEDROL) 125 mg/2 mL injection Inject 2 mLs (125 mg) over 3-5 minutes into the vein via push as needed (infusion reaction). For RN use only.  Reconstitute vial. Draw up methylPREDNISolone in a syringe and administer.  Discard remainder of vial. 543933 mL 0    metoprolol tartrate 37.5 MG TABS Take 37.5 mg by mouth 2 times daily. 60 tablet 5    Multiple Vitamins-Minerals (CENTRUM SILVER 50+MEN) TABS       naloxone (NARCAN) 4 MG/0.1ML nasal spray Spray 1 spray (4 mg) into one nostril alternating nostrils as needed for opioid reversal. every 2-3 minutes until assistance arrives 2 each 0    naloxone (NARCAN) 4 MG/0.1ML nasal spray Spray 1 spray (4 mg) into one nostril alternating nostrils as needed for opioid reversal. every 2-3 minutes until assistance arrives 2 each 1    OLANZapine (ZYPREXA) 5 MG tablet Take 1 tablet (5 mg) by mouth at bedtime. 30 tablet 3    pantoprazole (PROTONIX) 40 MG EC tablet Take 1 tablet (40 mg) by mouth 2 times daily 60 tablet 3    Port Access Kit For nurse use only.  Do not remove items from bag.  Use for port access.  Do not place syringe on sterile field. [id number] kit 0    prochlorperazine (COMPAZINE) 10 MG tablet Take 1 tablet (10 mg) by mouth every 6 hours as needed for nausea or vomiting. 30 tablet 2     sodium chloride 0.9% infusion Infuse 250 mLs over 30 minutes into the vein every 28 (twenty-eight) days. zometa concomitant fluids. Given per therapy plan orders 750 mL 2    sodium chloride 0.9% infusion Infuse 500 mLs into the vein as needed for other (infusion reaction). In case of mild reaction, administer via gravity at 20 mL/hr to keep vein open. In case of severe reaction, administer via gravity wide open on prime setting. 855020 mL 0    sodium chloride, PF, 0.9% PF flush Inject 10 mLs into the vein as needed for line flush. Flush IV before and after med administration as directed and/or at least every 24 hours, or prior to deaccessing for no further use and/or at least every 4 weeks when not accessed. 079958 mL 0    sodium chloride, PF, 0.9% PF flush Inject 10 mLs into the vein as needed for other (infusion reaction). For RN use only as needed for infusion reaction 129634 mL 0    sterile water, preservative free, injection Use 2.2 mLs for reconstitution as needed (with alteplase for catheter occlusion). Direct diluent stream into powder. Mix by gently swirling until dissolved. DO NOT SHAKE. Discard remainder of vial. 898251 mL 0    vitamin D3 (CHOLECALCIFEROL) 50 mcg (2000 units) tablet Take 1 tablet (50 mcg) by mouth daily. 90 tablet 1    zoledronic acid (ZOMETA) 4 MG/100ML infusion Infuse 100 mLs (4 mg) into the vein every 28 days. Infuse via gravity. Given per therapy plan orders 300 mL 2     Physical Exam:    General: The patient is a pleasant moderately fatigued cachetic male in moderate distress secondary to pain. Seen in infusion. Here today with his wife.    4/16/2025  2:10 PM   Vital Signs    Systolic 117    Diastolic 73    Pulse 113    Temperature 98.1  F (36.7  C)    Weight (LB) 133 lb 3.2 oz    Pain Score 9 (Severe)    O2 99 %      Wt Readings from Last 10 Encounters:   04/16/25 60.4 kg (133 lb 3.2 oz)   04/08/25 64.9 kg (143 lb)   04/07/25 65 kg (143 lb 4.8 oz)   03/31/25 65.8 kg (145 lb)    03/26/25 68 kg (150 lb)   03/10/25 68.9 kg (152 lb)   03/05/25 69.3 kg (152 lb 11.2 oz)   02/21/25 68.9 kg (152 lb)   02/19/25 69 kg (152 lb 3.2 oz)   02/11/25 66.4 kg (146 lb 6.4 oz)   HEENT: EOMI. Sclerae are anicteric.  Lungs: Breathing is not labored.   Neuro: Cranial nerves II through XII are grossly intact.  Skin: No rashes, petechiae, or bruising noted on exposed skin.     Labs:    Most Recent 3 CBC's:  Recent Labs   Lab Test 04/15/25  1102 04/12/25  0619 04/11/25  1638 04/11/25  0602 04/10/25  0634 04/10/25  0050   WBC 6.6 6.2  --  6.9   < > 6.7   HGB 7.8* 7.0* 7.2* 7.1*   < > 6.4*   MCV 93 95  --  91   < > 94   PLT 68* 73*  --  77*   < > 83*   ANEUTAUTO 4.5  --   --  4.8  --  4.4    < > = values in this interval not displayed.    Most Recent 3 BMP's:  Recent Labs   Lab Test 04/15/25  1102 04/12/25  0619 04/11/25  0602 04/10/25  0803 04/10/25  0028 04/09/25  1148   * 134* 136 136   < > 136   POTASSIUM 3.9 4.0 3.9 4.2   < > 4.0   CHLORIDE 97* 100 100 99  --  99   CO2 24 27 26 24  --  25   BUN 14.1 9.2 9.4 12.0  --  13.4   CR 0.44* 0.39* 0.41* 0.40*  --  0.43*   ANIONGAP 13 7 10 13  --  12   DENISE 8.8 8.4* 8.5* 8.6*  --  8.8   * 158* 142* 140*   < > 165*   PROTTOTAL 6.4  --   --  5.9*  5.9*  --  6.2*   ALBUMIN 3.1*  --   --  2.9*  2.9*  --  3.0*    < > = values in this interval not displayed.    Most Recent 2 LFT's:  Recent Labs   Lab Test 04/15/25  1102 04/10/25  0803   AST 26 26  26   ALT 11 14  14   ALKPHOS 314* 279*  279*   BILITOTAL 1.0 1.0  1.0   I reviewed the above labs today.      Impression/plan:   Metastatic Pancreatic Cancer. Patient last received chemotherapy about 6 weeks ago. Due to a decline in overall performance status, along with anemia requiring repeated transfusions, treatment has been on hold. It appears clear that his performance status has continued to decline and I recommend that he enroll in hospice. He is agreeable to meeting with them soon, but will wait to enroll  in hospice until after his son's graduation on 5/9 so he may continue to receive weekly blood transfusions in the interim.     Anemia and thrombocytopenia. Likely secondary to myelosuppression from chemotherapy and radiation, along with extensive bony disease and question possible marrow involvement, given decline now in 2 cell lines. Peripheral blood smear on 4/10/25 did not show evidence of dysplasia.     Non-sustained SVTs on recent Zio. Somewhat managed with metoprolol 25 mg bid. Discussed with patient and his wife the potential of increasing to 25 mg tid since cutting pills is a challenge and recommended discharge dose was 37.5 mg bid. Discussed okay to take Compazine to manage nausea that seems associated with metoprolol.     Chronic cancer related pain and muscle spasms. Patient continues to be an issue for the patient. He is working closely with palliative care whom he will see in follow-up tomorrow. He remains on Fentanyl, Robaxin, lorazepam, morphine, and Tylenol. He was given 10 mg morphine IV while in infusion today along with 8 mg dexamethasone IV.     Berta Anglin PA-C  [facility]  [address] [place] [address] [id number] [phone]    60 minutes spent on the date of the encounter doing chart review, review of test results, interpretation of tests, patient visit, and documentation     The longitudinal plan of care for the diagnosis(es)/condition(s) as documented were addressed during this visit. Due to the added complexity in care, I will continue to support Gallo Navarro in the subsequent management and with ongoing continuity of care.

## 2025-04-16 NOTE — TELEPHONE ENCOUNTER
Received [employer] message from patient requesting refill of  lorazepam .     Last refill: 4/3/25  Last office visit: 4/11/25 (inpatient)  Scheduled for follow up 4/17/25     Will route request to MD/DO for review.     Reviewed [place]  Report.

## 2025-04-16 NOTE — PROGRESS NOTES
"Infusion Nursing Note:  Gallo Navarro presents today for 1 unit PRBCs.    Patient seen by provider today: Yes: Berta Anglin PA-C   present during visit today: Not Applicable.    Note: Patient presents to the infusion center today via w/c with his wife. Berta Derrek came to see them in the infusion center today.    Pain 9/10 in bilateral shoulders and mild nausea. IV Morphine and Aloxi given x1 with relief, pain 4/10. Hospice referral placed.     Pain back up; 9/10, \"back spasming\" and pt feeling \"queasy\". Pt's wife gave him home supply Compazine and Ativan x1 each while in infusion.     TORB Berta Anglin PA-C/Jazz Carpio RN 4/16/25 1612  -Dex 8mg IV x1    Patient and wife requested to leave directly after giving IVP Dex to hopefully get home before another pain wave hits. Patient was able to stand and pivot x1 with a GB and his wife into his w/c. They both felt comfortable being discharged home and have the nurse triage line and palliative cares number to call if pain becomes uncontrollable or any new symptoms arise.     Intravenous Access:  Implanted Port.    Treatment Conditions:   Latest Reference Range & Units 04/15/25 11:02   Hemoglobin 13.3 - 17.7 g/dL 7.8 (LL)     Blood transfusion consent signed 4/10/25.    Results reviewed, labs MET treatment parameters, ok to proceed with treatment.    Post Infusion Assessment:  Patient tolerated infusion without incident.  Blood return noted pre and post infusion.  No evidence of extravasations.  Access discontinued per protocol.     Discharge Plan:   Patient declined prescription refills.  Discharge instructions reviewed with: Patient and Family.  Patient and/or family verbalized understanding of discharge instructions and all questions answered.  AVS to patient via [employer].  Patient will return 4/23 for next appointment.   Patient discharged in stable condition accompanied by: wife.  Departure Mode: Wheelchair.      Jazz Carpio RN  "

## 2025-04-16 NOTE — TELEPHONE ENCOUNTER
Medication requested: CREON 24 capsule    Last prescribing provider: Luis Haile MD on 11/28/24    Last clinic visit date: 3/31/25 with Dr. Haile    Recommendations for requested medication (if none, N/A): NA    Any other pertinent information (if none, N/A): NA    Refilled: Y/N, if NO, why?    Pended and Routed to Luis Haile MD

## 2025-04-16 NOTE — LETTER
4/16/2025      Gallo Navarro  [address] [address] [place] [id number]      Dear Colleague,    Thank you for referring your patient, Gallo Navarro, to the [facility]. Please see a copy of my visit note below.    Oncology/Hematology Visit Note  Apr 16, 2025    Reason for Visit: follow-up of metastatic Pancreatic Cancer, follow-up of symptoms    Oncology HPI:   -NGS: KRAS G12R  Immuno: pMMR, KAYLIE, TMB-low  Clinical Trial: MARIPOSA-186, MARIPOSA-280, TORL    - 3/2023: presented with acute pancreatitis  c/b chronic pancreatitis with pancreatic mass causing duodenal stenosis with gastric outlet obstruction s/p GJ tube (placed 5/2023), biliary obstruction s/p stent placement on 7/7/23, pancreatic insufficiency, and IDDM2.  -  He then presented to the ED with worsening abdominal pain, increased jaundice, poor PO intake and weight loss admitted on 7/8/2023 for concern of biliary obstruction.   - 7/12/2023: Underwent biopsy of pancreatic mass returned positive for pancreatic adenocarcinoma.   - 7/31/2023: He started on treatment with 5FU, irinotecan, and oxaliplatin (FOLFIRINOX). Please see previous notes for further details on the patient's history.      8/17/23 - ERCP/EGD, duodenal stents x2 placed, exchange of GJ tube     8/21-8/25/24 hospitalized due to diarrhea, colitis, abdominal pain.       8/29 - Cycle 3 deferred due to neutropenia.   Neulasta added. Irinotecan dose reduced 20% due to symptoms including cramping, double vision and slurred speech during infusion.       10/24/23 CT CAP with similar to slight increase in size of peripancreatic and mesenteric lymph nodes, enlargement of previous skeletal metastasis as well as new sclerotic metastasis to left posterior 5th rib, enlarging pulmonary nodule, and unchanged pancreatic head mass. Also unclear concerns for PE,  right lower lobe segmental PE confirmed on CT-PE. Started on apixaban.      10/31/23 Cycle 1 gemzar, abraxane  11/7/23 Cycle 1  Zometa  11/22/23 Removal of GJ tube.   11/29/23 Completed palliative radiation to T10   12/13/23 C3D1 gem/abraxane  12/29/23 Consults at [facility]  1/23-1/26 Consults at [facility]   1/30/24: C4D1 gem/abraxane   3/24: CT CAP with overall stable disease with isolated progression in potential L4 lesion. Referral for radiation oncology.    4/29-5/1: Consults at [facility] for possible cellular therapy, CT guided biopsy of left pubic bone, recommendation to return to [facility] upon progression of current treatment   5/23/24: Cardiology consult at [facility]. CT with small pericardial effusion   6/6/2024: Repeat ECHO on with EF of 55-60%, no pericardial effusion present  - Cycle 8 deferred due to infection concerns and subsequently tested positive for COVID 6/25/24, resumed treatment on 7/2/2024    -Day 8 eliminated after Cycle 5 [place]/Abraxane.    4/2/2024-present: Gemcitabine/Abraxane, Days 1 and 15 only; Zometa every 3 months     8/2024 repeat imaging with isolated progression to L4 lesion with fracture of that spinous process. Referred to radiation therapy. Decreased Abraxane dose reduced to 75mg/m2 with cycle 9 day 15 for overall treatment tolerance.     8/28/24 RFA ablation and verteral augmentation to L4.   10/1-10/21 Radiation to L4     11/12/24 CT CAP shows disease progression, plan to change to 5FU and liposomal irinotecan after a vacation  12/12/24 ED visit for pain, secondary to cancer, managed with IV pain medication    12/18/24 Cycle 1 5FU and liposomal irinotecan    1/10/25: L2 and L3 RFA and vertebroplasty    2/11/24: Cycle 4 5FU and liposomal irinotecan held due to rectal bleeding with anemia and fatigue with weakness.    3/5/25: C6 5FU and liposomal irinotecan     3/25-3/26/25: admitted for symptomatic anemia    4/9-4/12/25: admitted for failure to thrive and weakness    Gallo presents today for routine close follow-up.     Interval history:   Patient reports that his son will be graduating on the ninth with a  degree in .  He reports that he continues to feel very fatigued and spends between 2/3-3/4 of his day asleep.  He does feel better for a couple of days after blood transfusion.  He denies any bleeding issues.  He is sleeping in a recliner due to pain.  His pain is located in his low back, calves, and shoulders.  He feels his pain control is better than prior to his last hospital stay, but still not great.  He continues on the fentanyl patches as well as taking 30 mg of morphine about every 3-4 hours along with taking Robaxin and lorazepam each about 3 times per day.  He reports no appetite and is eating about a third of a normal diet.  He is drinking about 3 cups of fluids per day.  He reports feeling weak and is using a walker to get around.  He notes some improvement in his leg swelling.  He has continued to have the fast heart rate though it has been less high since taking metoprolol.  Due to nausea that he associates with metoprolol, he has been taking 25 mg twice a day.  He denies other concerns.    Current Outpatient Medications   Medication Sig Dispense Refill     morphine 10 MG/5ML solution Take 15 mLs (30 mg) by mouth every 3 hours as needed for severe pain. 1800 mL 0     acetaminophen (TYLENOL) 32 mg/mL liquid Take 20.313 mLs (650 mg) by mouth every 8 hours. 1800 mL 3     alteplase (CATHFLO ACTIVASE) injection Inject 2 mLs (2 mg) into catheter as needed (catheter occlusion). Reconstitute vial. Draw up alteplase 1 mg/mL in a syringe and instill into IV catheter. Dwell for at least 20 min to 24 hours, then aspirate. May repeat dose once in 24 hours if catheter function not restored after at least 2 hours. Discard remainder of vial. 682014 each 0     blood glucose (FREESTYLE TEST STRIPS) test strip 1 strip by In Vitro route 3 times daily. 100 strip 2     calcium carbonate (OS-DENISE) 500 MG tablet Take 2 tablets (1,000 mg) by mouth 2 times daily. 60 tablet 3     Chemo Kit For RN use only. Do not  "remove items from bag. Contents: 1  sodium chloride 0.9% flush, 4 medium gloves, 1 chemo gown, 1/4 chemo mat, 1 connector female, 1 chemo bag. [id number] kit 0     Continuous Glucose Sensor (DEXCOM G7 SENSOR) MISC Change every 10 days. 9 each 2     dicyclomine (BENTYL) 10 MG capsule Take 1 capsule (10 mg) by mouth 4 times daily (before meals and nightly). 120 capsule 1     diphenhydrAMINE (BENADRYL) 50 MG/ML injection Inject 1 mL (50 mg) over 3-5 minutes into the vein via push as needed for other (infusion reaction). For RN use only.  Draw up in a syringe and administer IV push.  Discard remainder of vial. 46328 mL 0     Emergency Supply Kit, Central, Patient use for emergency only. Contents: 3 sodium chloride 0.9% flushes, 1 dressing kit, 1 microclave ext set 14\", 4 nitrile gloves (med), 6 alcohol prep pads, 1 bacitracin, 1 syringe (10 cc 20 G 1\"). Call [phone] to reorder. [id number] kit 0     EPINEPHrine (ANY BX GENERIC EQUIV) 0.3 MG/0.3ML injection 2-pack Inject 0.3 mLs (0.3 mg) into the muscle as needed for anaphylaxis (infusion reaction). Administer into the mid-thigh in case of severe anaphylaxis (wheezing, throat tightening, mouth swelling, difficulty breathing). May repeat dose one time in 5-15 minutes if symptoms persist. 086823 mL 0     fentaNYL (DURAGESIC) 100 mcg/hr 72 hr patch Place 1 patch over 72 hours onto the skin every 72 hours. remove old patch. 10 patch 0     Fluorouracil (ADRUCIL) 2,290 mg in sodium chloride 0.9 % 241 mL via HOMEPUMP C-Series Infuse 2,290 mg at 5.2 mL/hr over 46 hours into the vein once for 1 dose. 640716 mL 0     HEMP OIL OR EXTRACT OR OTHER CBD CANNABINOID, NOT MEDICAL CANNABIS, Place 7 mg under the tongue daily as needed (Delta-8 hemp extract oil (166.7 mg/mL)). 2 drops (7 mg) under the tongue as needed for pain or anxiety       lidocaine (LIDODERM) 5 % patch Place onto the skin every 24 hours. To prevent lidocaine toxicity, patient should be patch free for 12 hrs daily.       " lidocaine-prilocaine (EMLA) 2.5-2.5 % external cream Use 1-2 times a week or as needed prior to port access 30 g 3     lipase-protease-amylase (CREON 24) 27637-45127-208488 units CPEP per EC capsule 2-3 capsules with meals 3 times a day and 1-2 capsules with snacks max of 15 capsules a day 200 capsule 11     loperamide (IMODIUM) 2 MG capsule 2 caps at 1st sign of diarrhea & 1 cap every 2hrs until 12hrs diarrhea free. During night, 2 caps at bedtime & 2 caps every 4hrs until AM 30 capsule 0     LORazepam (ATIVAN) 0.5 MG tablet Take 1-2 tablets (0.5-1 mg) by mouth every 6 hours as needed for muscle spasms. 45 tablet 2     LORazepam (ATIVAN) 2 MG/ML (HIGH CONC) oral solution Take 0.25-0.5 mLs (0.5-1 mg) by mouth every 6 hours as needed for anxiety or muscle spasms. 100 mL 2     methocarbamol (ROBAXIN) 500 MG tablet Take 2 tablets (1,000 mg) by mouth 4 times daily as needed for muscle spasms. 180 tablet 3     methylPREDNISolone Na Suc, PF, (SOLU-MEDROL) 125 mg/2 mL injection Inject 2 mLs (125 mg) over 3-5 minutes into the vein via push as needed (infusion reaction). For RN use only.  Reconstitute vial. Draw up methylPREDNISolone in a syringe and administer.  Discard remainder of vial. 008648 mL 0     metoprolol tartrate 37.5 MG TABS Take 37.5 mg by mouth 2 times daily. 60 tablet 5     Multiple Vitamins-Minerals (CENTRUM SILVER 50+MEN) TABS        naloxone (NARCAN) 4 MG/0.1ML nasal spray Spray 1 spray (4 mg) into one nostril alternating nostrils as needed for opioid reversal. every 2-3 minutes until assistance arrives 2 each 0     naloxone (NARCAN) 4 MG/0.1ML nasal spray Spray 1 spray (4 mg) into one nostril alternating nostrils as needed for opioid reversal. every 2-3 minutes until assistance arrives 2 each 1     OLANZapine (ZYPREXA) 5 MG tablet Take 1 tablet (5 mg) by mouth at bedtime. 30 tablet 3     pantoprazole (PROTONIX) 40 MG EC tablet Take 1 tablet (40 mg) by mouth 2 times daily 60 tablet 3     Port Access Kit  For nurse use only.  Do not remove items from bag.  Use for port access.  Do not place syringe on sterile field. [id number] kit 0     prochlorperazine (COMPAZINE) 10 MG tablet Take 1 tablet (10 mg) by mouth every 6 hours as needed for nausea or vomiting. 30 tablet 2     sodium chloride 0.9% infusion Infuse 250 mLs over 30 minutes into the vein every 28 (twenty-eight) days. zometa concomitant fluids. Given per therapy plan orders 750 mL 2     sodium chloride 0.9% infusion Infuse 500 mLs into the vein as needed for other (infusion reaction). In case of mild reaction, administer via gravity at 20 mL/hr to keep vein open. In case of severe reaction, administer via gravity wide open on prime setting. 008376 mL 0     sodium chloride, PF, 0.9% PF flush Inject 10 mLs into the vein as needed for line flush. Flush IV before and after med administration as directed and/or at least every 24 hours, or prior to deaccessing for no further use and/or at least every 4 weeks when not accessed. 506571 mL 0     sodium chloride, PF, 0.9% PF flush Inject 10 mLs into the vein as needed for other (infusion reaction). For RN use only as needed for infusion reaction 625705 mL 0     sterile water, preservative free, injection Use 2.2 mLs for reconstitution as needed (with alteplase for catheter occlusion). Direct diluent stream into powder. Mix by gently swirling until dissolved. DO NOT SHAKE. Discard remainder of vial. 083185 mL 0     vitamin D3 (CHOLECALCIFEROL) 50 mcg (2000 units) tablet Take 1 tablet (50 mcg) by mouth daily. 90 tablet 1     zoledronic acid (ZOMETA) 4 MG/100ML infusion Infuse 100 mLs (4 mg) into the vein every 28 days. Infuse via gravity. Given per therapy plan orders 300 mL 2     Physical Exam:    General: The patient is a pleasant moderately fatigued cachetic male in moderate distress secondary to pain. Seen in infusion. Here today with his wife.    4/16/2025  2:10 PM   Vital Signs    Systolic 117    Diastolic 73    Pulse  113    Temperature 98.1  F (36.7  C)    Weight (LB) 133 lb 3.2 oz    Pain Score 9 (Severe)    O2 99 %      Wt Readings from Last 10 Encounters:   04/16/25 60.4 kg (133 lb 3.2 oz)   04/08/25 64.9 kg (143 lb)   04/07/25 65 kg (143 lb 4.8 oz)   03/31/25 65.8 kg (145 lb)   03/26/25 68 kg (150 lb)   03/10/25 68.9 kg (152 lb)   03/05/25 69.3 kg (152 lb 11.2 oz)   02/21/25 68.9 kg (152 lb)   02/19/25 69 kg (152 lb 3.2 oz)   02/11/25 66.4 kg (146 lb 6.4 oz)   HEENT: EOMI. Sclerae are anicteric.  Lungs: Breathing is not labored.   Neuro: Cranial nerves II through XII are grossly intact.  Skin: No rashes, petechiae, or bruising noted on exposed skin.     Labs:    Most Recent 3 CBC's:  Recent Labs   Lab Test 04/15/25  1102 04/12/25  0619 04/11/25  1638 04/11/25  0602 04/10/25  0634 04/10/25  0050   WBC 6.6 6.2  --  6.9   < > 6.7   HGB 7.8* 7.0* 7.2* 7.1*   < > 6.4*   MCV 93 95  --  91   < > 94   PLT 68* 73*  --  77*   < > 83*   ANEUTAUTO 4.5  --   --  4.8  --  4.4    < > = values in this interval not displayed.    Most Recent 3 BMP's:  Recent Labs   Lab Test 04/15/25  1102 04/12/25  0619 04/11/25  0602 04/10/25  0803 04/10/25  0028 04/09/25  1148   * 134* 136 136   < > 136   POTASSIUM 3.9 4.0 3.9 4.2   < > 4.0   CHLORIDE 97* 100 100 99  --  99   CO2 24 27 26 24  --  25   BUN 14.1 9.2 9.4 12.0  --  13.4   CR 0.44* 0.39* 0.41* 0.40*  --  0.43*   ANIONGAP 13 7 10 13  --  12   DENISE 8.8 8.4* 8.5* 8.6*  --  8.8   * 158* 142* 140*   < > 165*   PROTTOTAL 6.4  --   --  5.9*  5.9*  --  6.2*   ALBUMIN 3.1*  --   --  2.9*  2.9*  --  3.0*    < > = values in this interval not displayed.    Most Recent 2 LFT's:  Recent Labs   Lab Test 04/15/25  1102 04/10/25  0803   AST 26 26  26   ALT 11 14  14   ALKPHOS 314* 279*  279*   BILITOTAL 1.0 1.0  1.0   I reviewed the above labs today.      Impression/plan:   Metastatic Pancreatic Cancer. Patient last received chemotherapy about 6 weeks ago. Due to a decline in overall  performance status, along with anemia requiring repeated transfusions, treatment has been on hold. It appears clear that his performance status has continued to decline and I recommend that he enroll in hospice. He is agreeable to meeting with them soon, but will wait to enroll in hospice until after his son's graduation on 5/9 so he may continue to receive weekly blood transfusions in the interim.     Anemia and thrombocytopenia. Likely secondary to myelosuppression from chemotherapy and radiation, along with extensive bony disease and question possible marrow involvement, given decline now in 2 cell lines. Peripheral blood smear on 4/10/25 did not show evidence of dysplasia.     Non-sustained SVTs on recent Zio. Somewhat managed with metoprolol 25 mg bid. Discussed with patient and his wife the potential of increasing to 25 mg tid since cutting pills is a challenge and recommended discharge dose was 37.5 mg bid. Discussed okay to take Compazine to manage nausea that seems associated with metoprolol.     Chronic cancer related pain and muscle spasms. Patient continues to be an issue for the patient. He is working closely with palliative care whom he will see in follow-up tomorrow. He remains on Fentanyl, Robaxin, lorazepam, morphine, and Tylenol. He was given 10 mg morphine IV while in infusion today along with 8 mg dexamethasone IV.     Berta Anglin PA-C  [facility]  [address], [place] [address] [phone]    60 minutes spent on the date of the encounter doing chart review, review of test results, interpretation of tests, patient visit, and documentation     The longitudinal plan of care for the diagnosis(es)/condition(s) as documented were addressed during this visit. Due to the added complexity in care, I will continue to support Gallo Navarro in the subsequent management and with ongoing continuity of care.        Again, thank you for allowing me to participate in the care of your  patient.        Sincerely,        Berta Anglin PA-C    Electronically signed

## 2025-04-16 NOTE — PATIENT INSTRUCTIONS
After Your Blood Transfusion  Discharge Instructions after you leave  After you have a blood transfusion,* watch for signs of a transfusion reaction for the next 48 hours.   Signs to watch for:  Shaking or chills  Fever above 100.4 F  Headache  Nausea (feeling sich to your stomach)  Hives  Itching  Swelling of the face or feeling flushed  Ongoing dry cough (nothing is coughed up)  Trouble breathing, or wheezing      Some signs of a reaction won't show up for a few days or up to 4 weeks.   These may include:  Fatigue (feeling very tired)  Dizziness  Pink or red urine    *A blood transfusion is when you receive red blood cells, platelets, plasma or cryoprecipitate.      April 2025 Sunday Monday Tuesday Wednesday Thursday Friday Saturday             1    LAB CENTRAL   9:00 AM   (15 min.)   MG CANCER FAST TRACK LAB   [facility]    INFUSION 3 HR (180 MIN)   1:00 PM   (180 min.)   SH CANCER INFUSION NURSE   [facility] 2     3     4     5       6     7    LAB CENTRAL  12:45 PM   (15 min.)   MG CANCER FAST TRACK LAB   [facility]    NURSE ONLY   1:00 PM   (15 min.)   MG CANCER FAST TRACK LAB   [facility] 8    INFUSION 2 HR (120 MIN)   8:45 AM   (120 min.)   MG BAY 10 INFUSION   [facility]    RETURN PALLIATIVE   2:25 PM   (40 min.)   Jeremy Hurt MD   [facility] [place] 9    Admission   9:09 PM   Kenton Lopez MD   [facility]   (Discharge: 4/12/2025) 10    CT CHEST (PE) ABD PELV W   2:05 AM   (20 min.)   [id number]   [facility]    CT HEAD WO   2:10 AM   (20 min.)   UUCT1   [facility]    ECHO COMPLETE  12:25 PM   (60 min.)   [id number]   [facility]    XR GENERAL XRAY  11:05 PM   (20 min.)   [id number]     [facility] 11     12    IP OT EVALUATION   9:45 AM   (45 min.)   Venessa Underwood M, OTR   [facility]    IP PT EVALUATION   8:00 PM   (45 min.)   Ritesh Schuster PT   [facility]   13     14     15    LAB CENTRAL  10:45 AM   (15 min.)   MG CANCER FAST TRACK LAB   [facility] 16    RETURN CCSL   2:00 PM   (45 min.)   Berta Anglin PA-C   [facility]    ONC INFUSION 3 HR (180 MIN)   2:00 PM   (180 min.)   UC ONC INFUSION NURSE   [facility] 17    RETURN PALLIATIVE EXTENDED  10:15 AM   (80 min.)   Jeremy Hurt MD   [facility] Radiation Oncology [place] 18     19       20     21     22     23    LAB CENTRAL  10:15 AM   (15 min.)   MG CANCER FAST TRACK LAB   [facility] 24     25     26       27     28     29     30    LAB CENTRAL  10:30 AM   (15 min.)   MG CANCER FAST TRACK LAB   [facility]                            May 2025      [place] Monday Tuesday Wednesday Thursday Friday Saturday                       1     2     3       4     5     6     7    LAB CENTRAL  10:30 AM   (15 min.)   MG CANCER FAST TRACK LAB   [facility] 8     9     10       11     12     13     14     15    RETURN PALLIATIVE   2:05 PM   (40 min.)   Jeremy Hurt MD   [facility] 16     17       18     19     20     21     22     23     24       25     26     27     28     29     30     31                  No results found for this or any previous visit (from the past 24 hours).

## 2025-04-17 ENCOUNTER — VIRTUAL VISIT (OUTPATIENT)
Dept: RADIATION ONCOLOGY | Facility: CLINIC | Age: 60
End: 2025-04-17
Attending: FAMILY MEDICINE
Payer: COMMERCIAL

## 2025-04-17 ENCOUNTER — TELEPHONE (OUTPATIENT)
Dept: PALLIATIVE CARE | Facility: CLINIC | Age: 60
End: 2025-04-17

## 2025-04-17 VITALS
WEIGHT: 133 LBS | DIASTOLIC BLOOD PRESSURE: 30 MMHG | SYSTOLIC BLOOD PRESSURE: 178 MMHG | BODY MASS INDEX: 18.01 KG/M2 | HEIGHT: 72 IN

## 2025-04-17 DIAGNOSIS — Z98.890 HISTORY OF BILIARY STENT INSERTION: ICD-10-CM

## 2025-04-17 DIAGNOSIS — C25.0 MALIGNANT NEOPLASM OF HEAD OF PANCREAS (H): ICD-10-CM

## 2025-04-17 DIAGNOSIS — C25.9 PANCREATIC ADENOCARCINOMA (H): ICD-10-CM

## 2025-04-17 DIAGNOSIS — G89.3 CANCER ASSOCIATED PAIN: ICD-10-CM

## 2025-04-17 DIAGNOSIS — Z71.89 GOALS OF CARE, COUNSELING/DISCUSSION: ICD-10-CM

## 2025-04-17 DIAGNOSIS — Z51.5 PALLIATIVE CARE PATIENT: Primary | ICD-10-CM

## 2025-04-17 RX ORDER — MORPHINE SULFATE 100 MG/5ML
30-40 SOLUTION ORAL
Qty: 200 ML | Refills: 0 | Status: SHIPPED | OUTPATIENT
Start: 2025-04-17 | End: 2025-04-17

## 2025-04-17 RX ORDER — MORPHINE SULFATE 100 MG/5ML
30-40 SOLUTION ORAL
Qty: 200 ML | Refills: 0 | Status: SHIPPED | OUTPATIENT
Start: 2025-04-17

## 2025-04-17 ASSESSMENT — PAIN SCALES - GENERAL: PAINLEVEL_OUTOF10: NO PAIN (0)

## 2025-04-17 NOTE — TELEPHONE ENCOUNTER
Call to [facility] to speak with a PA Rep whom informed me that the PA did not included the limit/quantity limit on the request.  She spoke with a pharmacy department/pharmacist department and they placed the quantity limit override in PA.

## 2025-04-17 NOTE — PROGRESS NOTES
Virtual Visit Details    Type of service:  Video Visit     Originating Location (pt. 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 Impingement vs cancer related  Bone marrow failure having rec'd 7 units pRBCs since the end of March--the last one given for symptoms not just the lab result    GOALS OF CARE:  04/17/2025 Gallo's current plan is to continue his current care through his son's college graduation in early May and then transition to hospice at home.  We've been anticipating this by rotating to roxanol and liquid lorazepam.  Gallo and Violet have had some conversations with their children and after they have an informational visit with hospice sit down and review all of that with them.  Gallo want to continue his RBC transfusions until he enrolls in hospice and I encouraged he and Violet to see if they might be available in hospice for symptom management. I agreed to be Gallo's hospice attending physician.  [facility] seems to be the preferred hospice for their insurance and they are going to begin by requesting a meeting with them.  04/08/2025   Gallo agreed to my placing a DNAR/DNI order given his recent functional decline.  He would like to complete a work up for his acutely worsening spinal pain and right shoulder pain and he I shared with him my concern that this decline may not be solely due to his anemia and it may reflect he is nearing the end of  his life. He was encouraged to continue to have conversations that matter with those people in his life who matter.  03/10/2025  No change to GOC and Gallo is considering his end of life--he is planning to have a brick purchased at [email or web address] and is pondering what to put on it and have some of his ashes sprinkled on it.  01/21/2025  no change in GOC; 12/17/224 no change to GOC.  Gallo also said he would prefer to have more sedation and less pain than more pain and less sedation. 09/24/2024   No change  04/25/2024  No change to GOC. Gallo is preparing to go to [facility] to see if he qualifies for a cellular therapy trial.  02/22/2024 Life prolonging without limits at this time and willingness to consider clinical trials.     Recommendations & Counseling:  Continue Fentanyl 100 mcg/hour patches and change q 72 hours;    Continue off dilaudid   Stop MSIR tabs  START Roxanol 30-40 mg po q 3 hours prn po or SL--new rx sent today  Stop MS solution 10 mg/5 ml.  OK to use methocarbamol  FROM 750 MG TO 1000 MG TABS PO Q 6 HOURS PRN and I encouraged 750 mg during the day, if possible, to reduce sleepiness  Continue lorazepam solution 2mg/ml with 0.5 to 1 mg po/SL q 6 hours prn and to wait an hour after using it to dispense roxanol  CONTINUE lidocaine patches  Tylenol suspension 650 mg po TID  Narcan previously prescribed for safety  [facility] symptom call in 3 days, and Gallo and his wife know to call sooner, prn  T and L spine MR ordered w/o and w/contrast  Plain films of right shoulder 3v ordered to see if there are any gross skeletal mets    [facility] symptom check in 3 days and follow-up visit in 4 weeks.    I provided emotional support through validating Gallo and Violet's feelings and open, empathic communication.     Counseling: All of the above was explained to the patient in lay language. The patient has verbalized a clear understanding of the discussion, asked appropriate questions, which have been answered to patient's  apparent satisfaction. The patient is in agreement with the above plan.        Chief Complaint/Patient ID: Gallo Navarro 59 year old male with PMHx of unresectable localized pancreatic cancer and spinal mets       Last Palliative care appointment: 04/08/2025 with me and I made a socia visit to see Gallo and Violet while he was in the hospital last week     Reviewed:  Yes:   reviewed - controlled substances reflected in medication list..    Interim History:  Gallo Navarro is a 59 year old male who is seen today for follow up with Palliative Care via billable video visit. We were joined by his wife, Violet.     Pain:  Under pretty control with fentanyl patches and prn morphine solution.  Gallo is taking 15 ml with current MS doses and wondered if there is something more concentrated.  I told him about Roxanol (I actually prescribed it before hs recent admission and they didn't get a chance to pick it up or try it.)  I reordered it today.    Appetite/Nausea: sporadic use of olanzapine seems to help and they will confer with [facility] dispensary for other products.  Gallo's creon wasn't continued at discharge so I ordered it today.     Bowels: no concerns     Sleep: pretty well--pain wakes him up and he is able to go back to sleep after he takes pain meds     Mood: denies anxiety or depression; Gallo finds lorazepam to be very helpful and let him feel calm     Coping:  overall well; Gallo is planning to speak with a  from his younger days and is coming to  that he is dying.      Family History- Reviewed in Epic.    No Known Allergies    Social History:  Pertinent changes to social history/social situation since last visit: none  Key support resources: wife Violet, who is amazing, and young adult children and family of origin and neighbors and friends  Advance Directive Status:  POLST completed reflecting DNAR/DNI    Social History     Tobacco Use    Smoking status: Never     Passive exposure: Never    Smokeless  "tobacco: Never   Vaping Use    Vaping status: Never Used   Substance Use Topics    Alcohol use: Not Currently    Drug use: Never         No Known Allergies  Current Outpatient Medications   Medication Sig Dispense Refill    acetaminophen (TYLENOL) 32 mg/mL liquid Take 20.313 mLs (650 mg) by mouth every 8 hours. 1800 mL 3    alteplase (CATHFLO ACTIVASE) injection Inject 2 mLs (2 mg) into catheter as needed (catheter occlusion). Reconstitute vial. Draw up alteplase 1 mg/mL in a syringe and instill into IV catheter. Dwell for at least 20 min to 24 hours, then aspirate. May repeat dose once in 24 hours if catheter function not restored after at least 2 hours. Discard remainder of vial. 027821 each 0    blood glucose (FREESTYLE TEST STRIPS) test strip 1 strip by In Vitro route 3 times daily. 100 strip 2    calcium carbonate (OS-DENISE) 500 MG tablet Take 2 tablets (1,000 mg) by mouth 2 times daily. 60 tablet 3    Chemo Kit For RN use only. Do not remove items from bag. Contents: 1  sodium chloride 0.9% flush, 4 medium gloves, 1 chemo gown, 1/4 chemo mat, 1 connector female, 1 chemo bag. [id number] kit 0    Continuous Glucose Sensor (DEXCOM G7 SENSOR) MISC Change every 10 days. 9 each 2    dicyclomine (BENTYL) 10 MG capsule Take 1 capsule (10 mg) by mouth 4 times daily (before meals and nightly). 120 capsule 1    diphenhydrAMINE (BENADRYL) 50 MG/ML injection Inject 1 mL (50 mg) over 3-5 minutes into the vein via push as needed for other (infusion reaction). For RN use only.  Draw up in a syringe and administer IV push.  Discard remainder of vial. 66270 mL 0    Emergency Supply Kit, Central, Patient use for emergency only. Contents: 3 sodium chloride 0.9% flushes, 1 dressing kit, 1 microclave ext set 14\", 4 nitrile gloves (med), 6 alcohol prep pads, 1 bacitracin, 1 syringe (10 cc 20 G 1\"). Call [phone] to reorder. [id number] kit 0    EPINEPHrine (ANY BX GENERIC EQUIV) 0.3 MG/0.3ML injection 2-pack Inject 0.3 mLs (0.3 mg) into " the muscle as needed for anaphylaxis (infusion reaction). Administer into the mid-thigh in case of severe anaphylaxis (wheezing, throat tightening, mouth swelling, difficulty breathing). May repeat dose one time in 5-15 minutes if symptoms persist. 618336 mL 0    fentaNYL (DURAGESIC) 100 mcg/hr 72 hr patch Place 1 patch over 72 hours onto the skin every 72 hours. remove old patch. 10 patch 0    Fluorouracil (ADRUCIL) 2,290 mg in sodium chloride 0.9 % 241 mL via HOMEPUMP C-Series Infuse 2,290 mg at 5.2 mL/hr over 46 hours into the vein once for 1 dose. 364469 mL 0    HEMP OIL OR EXTRACT OR OTHER CBD CANNABINOID, NOT MEDICAL CANNABIS, Place 7 mg under the tongue daily as needed (Delta-8 hemp extract oil (166.7 mg/mL)). 2 drops (7 mg) under the tongue as needed for pain or anxiety      lidocaine (LIDODERM) 5 % patch Place onto the skin every 24 hours. To prevent lidocaine toxicity, patient should be patch free for 12 hrs daily.      lidocaine-prilocaine (EMLA) 2.5-2.5 % external cream Use 1-2 times a week or as needed prior to port access 30 g 3    lipase-protease-amylase (CREON 24) 39238-31644-009989 units CPEP per EC capsule 2-3 capsules with meals 3 times a day and 1-2 capsules with snacks max of 15 capsules a day 200 capsule 11    loperamide (IMODIUM) 2 MG capsule 2 caps at 1st sign of diarrhea & 1 cap every 2hrs until 12hrs diarrhea free. During night, 2 caps at bedtime & 2 caps every 4hrs until AM 30 capsule 0    LORazepam (ATIVAN) 0.5 MG tablet Take 1-2 tablets (0.5-1 mg) by mouth every 6 hours as needed for muscle spasms. 45 tablet 2    LORazepam (ATIVAN) 2 MG/ML (HIGH CONC) oral solution Take 0.25-0.5 mLs (0.5-1 mg) by mouth every 6 hours as needed for anxiety or muscle spasms. 100 mL 2    methocarbamol (ROBAXIN) 500 MG tablet Take 2 tablets (1,000 mg) by mouth 4 times daily as needed for muscle spasms. 180 tablet 3    methylPREDNISolone Na Suc, PF, (SOLU-MEDROL) 125 mg/2 mL injection Inject 2 mLs (125 mg)  over 3-5 minutes into the vein via push as needed (infusion reaction). For RN use only.  Reconstitute vial. Draw up methylPREDNISolone in a syringe and administer.  Discard remainder of vial. 545768 mL 0    metoprolol tartrate 37.5 MG TABS Take 37.5 mg by mouth 2 times daily. 60 tablet 5    morphine 10 MG/5ML solution Take 15 mLs (30 mg) by mouth every 3 hours as needed for severe pain. 1800 mL 0    Multiple Vitamins-Minerals (CENTRUM SILVER 50+MEN) TABS       naloxone (NARCAN) 4 MG/0.1ML nasal spray Spray 1 spray (4 mg) into one nostril alternating nostrils as needed for opioid reversal. every 2-3 minutes until assistance arrives 2 each 0    naloxone (NARCAN) 4 MG/0.1ML nasal spray Spray 1 spray (4 mg) into one nostril alternating nostrils as needed for opioid reversal. every 2-3 minutes until assistance arrives 2 each 1    OLANZapine (ZYPREXA) 5 MG tablet Take 1 tablet (5 mg) by mouth at bedtime. 30 tablet 3    pantoprazole (PROTONIX) 40 MG EC tablet Take 1 tablet (40 mg) by mouth 2 times daily 60 tablet 3    Port Access Kit For nurse use only.  Do not remove items from bag.  Use for port access.  Do not place syringe on sterile field. [id number] kit 0    prochlorperazine (COMPAZINE) 10 MG tablet Take 1 tablet (10 mg) by mouth every 6 hours as needed for nausea or vomiting. 30 tablet 2    sodium chloride 0.9% infusion Infuse 250 mLs over 30 minutes into the vein every 28 (twenty-eight) days. zometa concomitant fluids. Given per therapy plan orders 750 mL 2    sodium chloride 0.9% infusion Infuse 500 mLs into the vein as needed for other (infusion reaction). In case of mild reaction, administer via gravity at 20 mL/hr to keep vein open. In case of severe reaction, administer via gravity wide open on prime setting. 461449 mL 0    sodium chloride, PF, 0.9% PF flush Inject 10 mLs into the vein as needed for line flush. Flush IV before and after med administration as directed and/or at least every 24 hours, or prior to  deaccessing for no further use and/or at least every 4 weeks when not accessed. 146325 mL 0    sodium chloride, PF, 0.9% PF flush Inject 10 mLs into the vein as needed for other (infusion reaction). For RN use only as needed for infusion reaction 306516 mL 0    sterile water, preservative free, injection Use 2.2 mLs for reconstitution as needed (with alteplase for catheter occlusion). Direct diluent stream into powder. Mix by gently swirling until dissolved. DO NOT SHAKE. Discard remainder of vial. 777166 mL 0    vitamin D3 (CHOLECALCIFEROL) 50 mcg (2000 units) tablet Take 1 tablet (50 mcg) by mouth daily. 90 tablet 1    zoledronic acid (ZOMETA) 4 MG/100ML infusion Infuse 100 mLs (4 mg) into the vein every 28 days. Infuse via gravity. Given per therapy plan orders 300 mL 2     Past Medical History:   Diagnosis Date    Acute pancreatitis 04/16/2023    Alcohol-induced acute pancreatitis 04/10/2023    Gastric outlet obstruction     Metastasis from pancreatic cancer (H)     Personal history of chemotherapy     Gemzar + Abraxane started on 10/31/2023    Recurrent acute pancreatitis     S/P radiation therapy     2,000 cGy to T10 Spine completed on 11/29/2023 [facility]    S/P radiation therapy     3,000 cGy to L4 Spine completed on 10/21/2024 - [facility]     Past Surgical History:   Procedure Laterality Date    AS OPEN TREATMENT CLAVICULAR FRACTURE INTERNAL FX      ENDOSCOPIC RETROGRADE CHOLANGIOPANCREATOGRAM N/A 7/11/2023    Procedure: ENDOSCOPIC RETROGRADE CHOLANGIOPANCREATOGRAPHY;  Surgeon: Khadar Pickett MD;  Location: [facility] OR    ENDOSCOPIC RETROGRADE CHOLANGIOPANCREATOGRAM N/A 8/17/2023    Procedure: ENDOSCOPIC RETROGRADE  with stent removal x1, balloon sweep;  Surgeon: Christos Greenberg MD;  Location: [facility] OR    ENDOSCOPIC ULTRASOUND UPPER GASTROINTESTINAL TRACT (GI) N/A 7/11/2023    Procedure: Endoscopic ultrasound upper gastrointestinal tract (GI), with biposy, GJ tube  repositioning, stent placement;  Surgeon: Khadar Pickett MD;  Location: [facility] OR    ENDOSCOPIC ULTRASOUND UPPER GASTROINTESTINAL TRACT (GI) N/A 2023    Procedure: ENDOSCOPIC ULTRASOUND, ESOPHAGOSCOPY, EUS guided gastrojejunostomy;  Surgeon: Tre York MD;  Location: [facility] OR    INSERT PICC LINE  2023    INSERT PORT VASCULAR ACCESS Right 2023    Procedure: Insert port vascular access;  Surgeon: Tom العلي MD;  Location: [facility] OR    IR BONE ABLATION RFA  2024    IR BONE ABLATION RFA  1/10/2025    IR CHEST PORT PLACEMENT > 5 YRS OF AGE  2023    IR LUMBAR VERTEBROPLASTY  2024    IR NG TUBE PLACEMENT REQ RAD & FLUORO  2023    JG tube    REPLACE GASTROJEJUNOSTOMY TUBE, PERCUTANEOUS N/A 2023    Procedure: possible REPLACEMENT, GASTROJEJUNOSTOMY TUBE, PERCUTANEOUS;  Surgeon: Christos Greenberg MD;  Location: [facility] OR       Physical Exam:   GENERAL APPEARANCE: chronically ill appearing, some facial cachexia, alert and no distress; neatly groomed  EYES: Eyes grossly normal to inspection, PERRLA, conjunctivae and sclerae without injection or discharge, EOM intact   RESP:  no increased work of breathing; speaks in complete sentences;   MS: No musculoskeletal defects are noted  SKIN: No suspicious lesions or rashes, hydration status appears adequate with normal skin turgor   PSYCH: Alert and oriented x3; speech- coherent , normal rate and volume; able to articulate logical thoughts, able to abstract reason, no tangential thoughts, no hallucinations or delusions, mentation appears normal, Mood is euthymic. Affect is appropriate for this mood state and bright. Thought content is free of suicidal ideation, hallucinations, and delusions.  Eye contact is good during conversation.       Key Data Reviewed:  LABS: Cr 0.44, Albumin 3.1,  Hgb 7.8,      IMAGIN/10/2025 CT CHEST (PE) AP W/CONTRAST  IMPRESSION:  1.  No evidence for pulmonary embolism.  2.  Lungs are clear.  3.   Moderate intrahepatic biliary dilatation with pneumobilia. Stent in the right central intrahepatic bile duct with distal tip seen in the common bile duct. The distal tip of the stent is adjacent to a metallic wall stent within the common bile duct.  4.  Expandable stent in the duodenum.  5.  Moderate to marked residual stool throughout the colon.   6.  Numerous sclerotic metastasis.     04/10/2025 XR RIGHT SHOULDER 2V G/E  IMPRESSION: No acute fracture or dislocation. Joint spaces appear intact. Lytic/sclerotic lesions seen in the humeral neck and possibly in the proximal humeral shaft, indeterminate but likely metastatic. Sclerotic lesions of the mid clavicle, inferior   glenoid are also suspicious for metastatic disease. No acute pathologic fractures identified    60 minutes spent on the date of the encounter doing chart review, history and exam, patient education & counseling, documentation and other activities as noted above.    The longitudinal plan of care for the diagnosis(es)/condition(s) as documented were addressed during this visit. Due to the added complexity in care, I will continue to support Gallo in the subsequent management and with ongoing continuity of care.    Jeremy Hurt MD MS FAAFP CAQHPM  [facility]  Office [phone]  Fax [phone]

## 2025-04-17 NOTE — TELEPHONE ENCOUNTER
Prior Authorization Retail Medication Request    Medication/Dose: Roxanol (Morphine concentrated soln)  Diagnosis and ICD code (if different than what is on RX):     New/renewal/insurance change PA/secondary ins. PA:  Previously Tried and Failed:     Rationale:       Insurance   Primary:    Insurance ID:       Secondary (if applicable):   Insurance ID:       Pharmacy Information (if different than what is on RX)  Name:     Phone:     Fax:     Clinic Information  Preferred routing pool for dept communication:  Mary Funez RN

## 2025-04-17 NOTE — TELEPHONE ENCOUNTER
Resending Roxanol prescription to a  Pharmacy who can get in stock for pt before the weekend.    MICHAEL SladeN, RN  Palliative Care Nurse Clinician    [phone] (Direct)  [phone] (Main)  [phone] (Appointment Scheduling)

## 2025-04-17 NOTE — TELEPHONE ENCOUNTER
[facility] in [place] received the Rx order and they prefer not to break bottles for the medication.  They will use 6 bottles of 30 mL for a total of 180 ml for 12 days and it did process through.  They are ordering in the medication and it will be in with their order tomorrow (04/18/25) at 1 PM.  They have signed the patient up for text alert.

## 2025-04-17 NOTE — PATIENT INSTRUCTIONS
It was good to see you today, Gallo and Abigail.    Here are the things we talked about:  Stop using your current liquid morphine and use the more concentrated morphine I ordered today.  I ordered Creon for you.  Ask [facility] for an informational meeting and if they might cover transfusions for comfort.  Use olanzapine or CBD/THC gummies for appetite.    I will ask Megan to follow  up with you and I will ask her to find us an appointment in mid-may.     How to get a hold of us:  For non-urgent matters, MyChart works best.    For more urgent matters, or if you prefer not to use MyChart, call our clinic nurse coordinator Megan Funez RN at [phone]    We have an on-call number for evenings and weekends. Please call this only if you are having uncontrolled symptoms or serious side effects from your medicines: [phone].     For refills, please give us a week (5 working days) notice. We don't always have providers available everyday to do refills. If you call the day you run out of your medicine, we may not be able to refill it in time, so call 5 days in advance!    Jeremy Hurt MD MS FAAFP CAQHPM  [facility]  Office [phone]  Fax [phone]    [employer]

## 2025-04-17 NOTE — TELEPHONE ENCOUNTER
Retail Pharmacy Prior Authorization Team   Phone: [phone]      Prior Authorization Approval    Medication: MORPHINE SULFATE (CONCENTRATE) 20 MG/ML PO SOLN  Authorization Effective Date: 4/17/2025  Authorization Expiration Date: 4/17/2026  Approved Dose/Quantity: 200/13  Reference #: (Key: [id number]) PA Case ID #: [id number]   Insurance Company: [employer]Specialty PA's - Phone [phone] Fax [phone]  Expected CoPay: $    CoPay Card Available: No    Financial Assistance Needed:   Which Pharmacy is filling the prescription: [facility] [address] [place] [address] - [address] [facility]  Pharmacy Notified: Yes  Patient Notified: **Instructed pharmacy to notify patient when script is ready to /ship.**

## 2025-04-17 NOTE — TELEPHONE ENCOUNTER
Call to pharmacy and they are still getting a rejection for max quantity of 9.  Calling [facility] to see if they will let me do a quantity limit over phone as patient is taking a total of 16 ML for the total daily dose.

## 2025-04-17 NOTE — PROGRESS NOTES
Is the patient currently in the state of [place]? YES    Visit mode: VIDEO    If the visit is dropped, the patient can be reconnected by:VIDEO VISIT: Send to e-mail at: [email or web address]    Will anyone else be joining the visit? NO  (If patient encounters technical issues they should call [phone])    Are changes needed to the allergy or medication list? No    Are refills needed on medications prescribed by this physician? NO    Rooming Documentation:  Questionnaire(s) completed    Reason for visit: Video Visit (Follow up )    Brittany NICOLE

## 2025-04-21 ENCOUNTER — TELEPHONE (OUTPATIENT)
Dept: PALLIATIVE CARE | Facility: CLINIC | Age: 60
End: 2025-04-21
Payer: COMMERCIAL

## 2025-04-21 ENCOUNTER — TELEPHONE (OUTPATIENT)
Dept: FAMILY MEDICINE | Facility: CLINIC | Age: 60
End: 2025-04-21
Payer: COMMERCIAL

## 2025-04-21 NOTE — CONFIDENTIAL NOTE
Forms/Letter Request    Type of form/letter: Home Health Certification      Do we have the form/letter: Yes: [facility], Change Order #[id number]    Who is the form from? Home care    Where did/will the form come from? form was faxed in    When is form/letter needed by: asap    How would you like the form/letter returned: Fax : [phone]

## 2025-04-21 NOTE — TELEPHONE ENCOUNTER
Received phone call update from pt's wife that pt enrolled with [facility] on Saturday.     MICHAEL SladeN, RN  Palliative Care Nurse Clinician    [phone] (Direct)  [phone] (Main)  [phone] (Appointment Scheduling)

## 2025-04-23 ENCOUNTER — MEDICAL CORRESPONDENCE (OUTPATIENT)
Dept: HEALTH INFORMATION MANAGEMENT | Facility: CLINIC | Age: 60
End: 2025-04-23
Payer: COMMERCIAL

## 2025-04-23 NOTE — TELEPHONE ENCOUNTER
Completed form has been faxed to [phone] . Right-Fax reviewed to confirm form was sent without error.   [facility]

## 2025-04-25 ENCOUNTER — TELEPHONE (OUTPATIENT)
Dept: FAMILY MEDICINE | Facility: CLINIC | Age: 60
End: 2025-04-25
Payer: COMMERCIAL

## 2025-04-25 NOTE — TELEPHONE ENCOUNTER
Forms/Letter Request    Type of form/letter: Home Health Certification      Do we have the form/letter: Yes: [facility], Missed Visit Orders: [id number], [id number]    Who is the form from? Home care    Where did/will the form come from? form was faxed in    When is form/letter needed by:     How would you like the form/letter returned: Fax : [phone]    Will place form on providers desk for review/signature  CHELSEA Meng

## 2025-04-28 ENCOUNTER — MEDICAL CORRESPONDENCE (OUTPATIENT)
Dept: HEALTH INFORMATION MANAGEMENT | Facility: CLINIC | Age: 60
End: 2025-04-28
Payer: COMMERCIAL

## 2025-04-28 NOTE — TELEPHONE ENCOUNTER
Completed From Faxed To:[phone] [facility]  Right-Fax reviewed to confirm form was sent without error.

## 2025-04-29 ENCOUNTER — DOCUMENTATION ONLY (OUTPATIENT)
Dept: OTHER | Facility: CLINIC | Age: 60
End: 2025-04-29
Payer: COMMERCIAL

## 2025-06-02 LAB
ATRIAL RATE - MUSE: 111 BPM
ATRIAL RATE - MUSE: NORMAL BPM
DIASTOLIC BLOOD PRESSURE - MUSE: NORMAL MMHG
DIASTOLIC BLOOD PRESSURE - MUSE: NORMAL MMHG
INTERPRETATION ECG - MUSE: NORMAL
INTERPRETATION ECG - MUSE: NORMAL
P AXIS - MUSE: 62 DEGREES
P AXIS - MUSE: NORMAL DEGREES
PR INTERVAL - MUSE: 132 MS
PR INTERVAL - MUSE: NORMAL MS
QRS DURATION - MUSE: 62 MS
QRS DURATION - MUSE: 68 MS
QT - MUSE: 242 MS
QT - MUSE: 328 MS
QTC - MUSE: 428 MS
QTC - MUSE: 446 MS
R AXIS - MUSE: 71 DEGREES
R AXIS - MUSE: 73 DEGREES
SYSTOLIC BLOOD PRESSURE - MUSE: NORMAL MMHG
SYSTOLIC BLOOD PRESSURE - MUSE: NORMAL MMHG
T AXIS - MUSE: -85 DEGREES
T AXIS - MUSE: 83 DEGREES
VENTRICULAR RATE- MUSE: 111 BPM
VENTRICULAR RATE- MUSE: 188 BPM

## (undated) DEVICE — Device

## (undated) DEVICE — BIOPSY VALVE BIOSHIELD 00711135

## (undated) DEVICE — KIT ENDO FIRST STEP DISINFECTANT 200ML W/POUCH EP-4

## (undated) DEVICE — GUIDEWIRE TERUMO .035X260 3CM STR TIP GS3504

## (undated) DEVICE — WIRE GUIDE 0.025"X270CM ANG VISIGLIDE G-240-2527A

## (undated) DEVICE — WIRE GUIDE 0.025"X450CM ANG VISIGLIDE G-240-2545A

## (undated) DEVICE — BALLOON ELATION 180CML 14-15-16.5-18-19MM 5.5CM EPX15

## (undated) DEVICE — INFLATION DEVICE BIG 60 ENDO-AN6012

## (undated) DEVICE — PACK ENDOSCOPY GI CUSTOM UMMC

## (undated) DEVICE — SUCTION MANIFOLD NEPTUNE 2 SYS 4 PORT 0702-020-000

## (undated) DEVICE — ENDO SNARE POLYPECTOMY OVAL 15MM LOOP SD-240U-15

## (undated) DEVICE — LINEN TOWEL PACK X5 5464

## (undated) DEVICE — SU VICRYL 4-0 P-3 18" UND  J494H

## (undated) DEVICE — DEVICE ANCHORING FLEXI-TRAK 37449

## (undated) DEVICE — GLIDEWIRE

## (undated) DEVICE — SU MONOCRYL 4-0 P-3 18" UND Y494G

## (undated) DEVICE — DEVICE SUTURE GRASPER TROCAR CLOSURE 14GA PMITCSG

## (undated) DEVICE — ENDO NDL ASPIRATION ULTRASOUND 22GA ACQUIRE M00555540

## (undated) DEVICE — GLOVE BIOGEL PI ULTRATOUCH SZ 8.0 41180

## (undated) DEVICE — VALVE FEEDING TUBE LOPEZ STOPCOCK CLOSED SYS M9000

## (undated) DEVICE — BALLOON EXTRACTION 15X1950MM 3.2MM TL B-V243Q-A

## (undated) DEVICE — ENDO FUSION OMNI-TOME G31903

## (undated) DEVICE — TUBE TRANS GASTROJEJUNOSTOMY ENFIT 18FRX45CM 8250-18

## (undated) DEVICE — PAD CHUX UNDERPAD 23X24" 7136

## (undated) DEVICE — SU DERMABOND ADVANCED .7ML DNX12

## (undated) DEVICE — PACK CENTRAL LINE INSERTION SAN32CLFCG

## (undated) DEVICE — TUBING PRESSURE W/MALE TO FEMALE CONNECTOR 24" 50P124

## (undated) DEVICE — COVER ULTRASOUND PROBE W/GEL FLEXI-FEEL 6"X58" LF  25-FF658

## (undated) DEVICE — GLOVE BIOGEL PI ULTRATOUCH G SZ 8.0 42180

## (undated) DEVICE — SPHINCTEROTOME 5.5FRX25MM OMNI-TOME FS-OMNI-35 G31911

## (undated) DEVICE — KNIFE HANDLE W/15 BLADE 371615

## (undated) DEVICE — ENDO TUBING CO2 SMARTCAP STERILE DISP 100145CO2EXT

## (undated) DEVICE — SOL WATER IRRIG 1000ML BOTTLE 2F7114

## (undated) DEVICE — SOL NACL 0.9% IRRIG 1000ML BOTTLE 2F7124

## (undated) DEVICE — LINEN GOWN XLG 5407

## (undated) DEVICE — BALLOON ELATION 240CML 11-12-13.5-15-16MM 5.5CM EPCX12

## (undated) DEVICE — DECANTER BAG 2002S

## (undated) DEVICE — CONNECTOR MALE TO MALE LL

## (undated) DEVICE — ENDO BITE BLOCK ADULT OMNI-BLOC

## (undated) DEVICE — KIT INTRODUCER FLUENT MICRO 5FRX10CM ECHO TIP KIT-038-04

## (undated) DEVICE — SPECIMEN CONTAINER 3OZ W/FORMALIN 59901

## (undated) DEVICE — ENDO CATH BALLOON DILATION HURRICANE 04MMX4X180CM M00545900

## (undated) DEVICE — GOWN XLG DISP 9545

## (undated) DEVICE — ENDO NDL ASPIRATION 19GA FLEX SLIMLINE EXPECT EUS M00555530

## (undated) RX ORDER — ACETAMINOPHEN 325 MG/1
TABLET ORAL
Status: DISPENSED
Start: 2023-07-28

## (undated) RX ORDER — DEXAMETHASONE SODIUM PHOSPHATE 4 MG/ML
INJECTION, SOLUTION INTRA-ARTICULAR; INTRALESIONAL; INTRAMUSCULAR; INTRAVENOUS; SOFT TISSUE
Status: DISPENSED
Start: 2023-07-13

## (undated) RX ORDER — PROPOFOL 10 MG/ML
INJECTION, EMULSION INTRAVENOUS
Status: DISPENSED
Start: 2023-07-11

## (undated) RX ORDER — DEXAMETHASONE SODIUM PHOSPHATE 4 MG/ML
INJECTION, SOLUTION INTRA-ARTICULAR; INTRALESIONAL; INTRAMUSCULAR; INTRAVENOUS; SOFT TISSUE
Status: DISPENSED
Start: 2023-08-17

## (undated) RX ORDER — FENTANYL CITRATE 50 UG/ML
INJECTION, SOLUTION INTRAMUSCULAR; INTRAVENOUS
Status: DISPENSED
Start: 2025-01-10

## (undated) RX ORDER — SODIUM CHLORIDE 9 MG/ML
INJECTION, SOLUTION INTRAVENOUS
Status: DISPENSED
Start: 2024-08-28

## (undated) RX ORDER — BUPIVACAINE HYDROCHLORIDE 5 MG/ML
INJECTION, SOLUTION EPIDURAL; INTRACAUDAL
Status: DISPENSED
Start: 2025-01-10

## (undated) RX ORDER — BUPIVACAINE HYDROCHLORIDE 5 MG/ML
INJECTION, SOLUTION EPIDURAL; INTRACAUDAL
Status: DISPENSED
Start: 2024-08-28

## (undated) RX ORDER — DIPHENHYDRAMINE HYDROCHLORIDE 50 MG/ML
INJECTION INTRAMUSCULAR; INTRAVENOUS
Status: DISPENSED
Start: 2025-01-10

## (undated) RX ORDER — SIMETHICONE 40MG/0.6ML
SUSPENSION, DROPS(FINAL DOSAGE FORM)(ML) ORAL
Status: DISPENSED
Start: 2023-07-11

## (undated) RX ORDER — ONDANSETRON 2 MG/ML
INJECTION INTRAMUSCULAR; INTRAVENOUS
Status: DISPENSED
Start: 2023-07-11

## (undated) RX ORDER — LIDOCAINE HYDROCHLORIDE 10 MG/ML
INJECTION, SOLUTION EPIDURAL; INFILTRATION; INTRACAUDAL; PERINEURAL
Status: DISPENSED
Start: 2024-08-28

## (undated) RX ORDER — FENTANYL CITRATE 50 UG/ML
INJECTION, SOLUTION INTRAMUSCULAR; INTRAVENOUS
Status: DISPENSED
Start: 2023-07-11

## (undated) RX ORDER — HYDROMORPHONE HCL IN WATER/PF 6 MG/30 ML
PATIENT CONTROLLED ANALGESIA SYRINGE INTRAVENOUS
Status: DISPENSED
Start: 2024-08-28

## (undated) RX ORDER — DIPHENHYDRAMINE HYDROCHLORIDE 50 MG/ML
INJECTION INTRAMUSCULAR; INTRAVENOUS
Status: DISPENSED
Start: 2024-08-28

## (undated) RX ORDER — FENTANYL CITRATE 50 UG/ML
INJECTION, SOLUTION INTRAMUSCULAR; INTRAVENOUS
Status: DISPENSED
Start: 2023-07-28

## (undated) RX ORDER — PROPOFOL 10 MG/ML
INJECTION, EMULSION INTRAVENOUS
Status: DISPENSED
Start: 2023-07-13

## (undated) RX ORDER — KETOROLAC TROMETHAMINE 30 MG/ML
INJECTION, SOLUTION INTRAMUSCULAR; INTRAVENOUS
Status: DISPENSED
Start: 2024-08-28

## (undated) RX ORDER — HEPARIN SODIUM (PORCINE) LOCK FLUSH IV SOLN 100 UNIT/ML 100 UNIT/ML
SOLUTION INTRAVENOUS
Status: DISPENSED
Start: 2025-01-10

## (undated) RX ORDER — HYDROMORPHONE HCL IN WATER/PF 6 MG/30 ML
PATIENT CONTROLLED ANALGESIA SYRINGE INTRAVENOUS
Status: DISPENSED
Start: 2023-07-13

## (undated) RX ORDER — CEFAZOLIN SODIUM 2 G/100ML
INJECTION, SOLUTION INTRAVENOUS
Status: DISPENSED
Start: 2025-01-10

## (undated) RX ORDER — OXYCODONE HYDROCHLORIDE 10 MG/1
TABLET ORAL
Status: DISPENSED
Start: 2025-01-10

## (undated) RX ORDER — HEPARIN SODIUM (PORCINE) LOCK FLUSH IV SOLN 100 UNIT/ML 100 UNIT/ML
SOLUTION INTRAVENOUS
Status: DISPENSED
Start: 2023-08-09

## (undated) RX ORDER — FENTANYL CITRATE-0.9 % NACL/PF 10 MCG/ML
PLASTIC BAG, INJECTION (ML) INTRAVENOUS
Status: DISPENSED
Start: 2023-07-11

## (undated) RX ORDER — FENTANYL CITRATE 50 UG/ML
INJECTION, SOLUTION INTRAMUSCULAR; INTRAVENOUS
Status: DISPENSED
Start: 2023-07-13

## (undated) RX ORDER — ONDANSETRON 2 MG/ML
INJECTION INTRAMUSCULAR; INTRAVENOUS
Status: DISPENSED
Start: 2023-07-13

## (undated) RX ORDER — HEPARIN SODIUM (PORCINE) LOCK FLUSH IV SOLN 100 UNIT/ML 100 UNIT/ML
SOLUTION INTRAVENOUS
Status: DISPENSED
Start: 2023-10-24

## (undated) RX ORDER — FENTANYL CITRATE 50 UG/ML
INJECTION, SOLUTION INTRAMUSCULAR; INTRAVENOUS
Status: DISPENSED
Start: 2024-08-28

## (undated) RX ORDER — FENTANYL CITRATE-0.9 % NACL/PF 10 MCG/ML
PLASTIC BAG, INJECTION (ML) INTRAVENOUS
Status: DISPENSED
Start: 2023-07-13

## (undated) RX ORDER — PROPOFOL 10 MG/ML
INJECTION, EMULSION INTRAVENOUS
Status: DISPENSED
Start: 2023-08-17

## (undated) RX ORDER — HYDROMORPHONE HCL IN WATER/PF 6 MG/30 ML
PATIENT CONTROLLED ANALGESIA SYRINGE INTRAVENOUS
Status: DISPENSED
Start: 2023-07-11

## (undated) RX ORDER — ONDANSETRON 2 MG/ML
INJECTION INTRAMUSCULAR; INTRAVENOUS
Status: DISPENSED
Start: 2023-08-17

## (undated) RX ORDER — HEPARIN SODIUM (PORCINE) LOCK FLUSH IV SOLN 100 UNIT/ML 100 UNIT/ML
SOLUTION INTRAVENOUS
Status: DISPENSED
Start: 2024-08-28

## (undated) RX ORDER — NEOSTIGMINE METHYLSULFATE 1 MG/ML
VIAL (ML) INJECTION
Status: DISPENSED
Start: 2023-07-13

## (undated) RX ORDER — FENTANYL CITRATE-0.9 % NACL/PF 10 MCG/ML
PLASTIC BAG, INJECTION (ML) INTRAVENOUS
Status: DISPENSED
Start: 2023-08-17

## (undated) RX ORDER — CEFAZOLIN SODIUM 2 G/50ML
SOLUTION INTRAVENOUS
Status: DISPENSED
Start: 2023-07-28

## (undated) RX ORDER — HYDROMORPHONE HYDROCHLORIDE 2 MG/1
TABLET ORAL
Status: DISPENSED
Start: 2024-08-28

## (undated) RX ORDER — LEVOFLOXACIN 5 MG/ML
INJECTION, SOLUTION INTRAVENOUS
Status: DISPENSED
Start: 2023-07-13

## (undated) RX ORDER — CEFAZOLIN SODIUM 2 G/100ML
INJECTION, SOLUTION INTRAVENOUS
Status: DISPENSED
Start: 2024-08-28

## (undated) RX ORDER — DEXAMETHASONE SODIUM PHOSPHATE 4 MG/ML
INJECTION, SOLUTION INTRA-ARTICULAR; INTRALESIONAL; INTRAMUSCULAR; INTRAVENOUS; SOFT TISSUE
Status: DISPENSED
Start: 2023-07-11

## (undated) RX ORDER — FENTANYL CITRATE 50 UG/ML
INJECTION, SOLUTION INTRAMUSCULAR; INTRAVENOUS
Status: DISPENSED
Start: 2023-08-17